# Patient Record
Sex: FEMALE | Race: WHITE | Employment: OTHER | URBAN - METROPOLITAN AREA
[De-identification: names, ages, dates, MRNs, and addresses within clinical notes are randomized per-mention and may not be internally consistent; named-entity substitution may affect disease eponyms.]

---

## 2017-02-09 ENCOUNTER — OFFICE VISIT (OUTPATIENT)
Dept: VASCULAR SURGERY | Age: 67
End: 2017-02-09

## 2017-02-09 DIAGNOSIS — I70.202 OCCLUSION OF LEFT FEMORAL ARTERY (HCC): ICD-10-CM

## 2017-02-09 DIAGNOSIS — I73.9 PVD (PERIPHERAL VASCULAR DISEASE) (HCC): ICD-10-CM

## 2017-02-09 DIAGNOSIS — Z95.828 HISTORY OF EXTREMITY BYPASS GRAFT: Primary | ICD-10-CM

## 2017-02-09 DIAGNOSIS — I74.09 CHRONIC AORTO-ILIAC OCCLUSION SYNDROME (HCC): ICD-10-CM

## 2017-02-09 NOTE — PROCEDURES
[facility]   *** FINAL REPORT ***    Name: Zaira Martinez  MRN: [id number]       Outpatient  : 1950  HIS Order #: [id number]  [id number] [facility] Visit #: [id number]  Date: 2017    TYPE OF TEST: Aorto-Iliac Duplex    REASON FOR TEST  Peripheral Arterial Disease    B-Mode:-                 (cm)   1     2     3  Aortic diameter:         AP:                           TV:  Common iliac diameter:   Right:                           Left:    Duplex:-                           PSV  Stenosis                           ----- --------------------  Aorta: (1)         (2)         (3)    Right common iliac:  Right external iliac:    Left common iliac:  Left external iliac:    INTERPRETATION/FINDINGS  Duplex images were obtained using 2-D gray scale, color flow and  spectral doppler analysis. RT Ax - RT Fem BPG study:  1. Patent right axillary artery to right common femoral artery bypass  graft without significant stenosis. 2. Multiphasic signals noted throughout. 3. The LAMONTE on the right suggest moderate arterial insufficiency at  rest.  The right LAMONTE is 0.68. Left LAMONTE waveform suggest moderate  arterial insufficiency at rest.  The left LAMONTE suggest normal perfusion   at rest.    ADDITIONAL COMMENTS  Inflow 118 cm/sec    Prx Anast 121 cm/sec    Prx 63 cm/sec  Ax 60 cm/sec      Below Ax 65 cm/sec             Mid Rib 81 cm/sec  RibLine 84 cm/sec            Below Rib 69 cm/sec       Hip 61 cm/sec              RT  Groin 72 cm/sec                    Dst Anast 120 cm/sec               Outlfow 123  cm/sec    I have personally reviewed the data relevant to the interpretation of  this  study. TECHNOLOGIST: Carolina Elias RVT, SUE  Signed: 2017 09:33 AM    PHYSICIAN: Chantale Barcenas MD  Signed: 2017 01:46 PM

## 2017-02-09 NOTE — PROCEDURES
[facility]   *** FINAL REPORT ***    Name: Dianna Zazueta  MRN: [id number]       Outpatient  : 1950  HIS Order #: [id number]  [address] [facility] Visit #: [id number]  Date: 2017    TYPE OF TEST: Peripheral Arterial Testing    REASON FOR TEST  Peripheral vascular dz NOS    Right Leg  Segmentals: Abnormal                     mmHg  Brachial         161  High thigh  Low thigh  Calf  Posterior tibial 109  Dorsalis pedis   104  Peroneal  Metatarsal  Toe pressure  Doppler:    Abnormal  Ankle/Brachial: 0.68    Left Leg  Segmentals: Normal                     mmHg  Brachial         146  High thigh  Low thigh  Calf  Posterior tibial 137  Dorsalis pedis   153  Peroneal  Metatarsal  Toe pressure  Doppler:    Abnormal  Ankle/Brachial: 0.95    INTERPRETATION/FINDINGS  Physiologic testing was performed using continuous wave doppler and  segmental pressures. ABIs only:  1. Moderate arterial insufficiency on the right at rest.  2. Moderate arterial insufficiency on the left at rest by waveform  analysis. Left LAMONTE suggest normal perfusion. 3. The right ankle/brachial index is 0.68 and the left ankle/brachial  index is 0.95. ADDITIONAL COMMENTS    I have personally reviewed the data relevant to the interpretation of  this  study. TECHNOLOGIST: Mary Elias RVT, BS  Signed: 2017 09:20 AM    PHYSICIAN: Cl Padilla MD  Signed: 2017 01:46 PM

## 2017-02-09 NOTE — PROCEDURES
[facility]   *** FINAL REPORT ***    Name: Jens Vargas  MRN: [id number]       Outpatient  : 1950  HIS Order #: [id number]  [address] [facility] Visit #: [id number]  Date: 2017    TYPE OF TEST: Bypass Graft Duplex    REASON FOR TEST  PVD, f/u Revasc    Graft:-  Summary:   Left common femoral - popliteal (above knee) bypass with  graft  Op. Date:  2013  Surgeon:   Tiana Walls    Results:-            Velocity  Ratio  Stenosis         Waveform            --------  -----  --------         ----------  Inflow:    180.0  Proximal:  183.0      1.0  Normal           Biphasic  Upper:      39.0      0.2  Normal           Biphasic  Mid-graft:  55.0      1.4  Normal           Biphasic  Lower:      56.0      1.0  Normal           Biphasic  Distal:     63.0      1.1  Normal           Biphasic  Outflow:    92.0      1.5  Normal           Biphasic    LAMONTE:    INTERPRETATION/FINDINGS  Duplex images were obtained using 2-D gray scale, color flow and  spectral doppler analysis. Duplex exam of the bypass graft reveals :  1. Patent left common femoral artery to left popliteal artery bypass  graft without significant stenosis. 2. Biphasic signals noted throughout. 3. The LAMONTE on the right suggest moderate arterial insufficiencyat  rest.  Left LAMONTE suggest normal perfusion at rest, however waveform  analysis suggest moderate arterial insufficiency at rest.  The right  ankle/brachial index is 0.68 and the left ankle/brachial index is  0.95. ADDITIONAL COMMENTS    I have personally reviewed the data relevant to the interpretation of  this  study. TECHNOLOGIST: Cale Elias RVT, SUE  Signed: 2017 09:40 AM    PHYSICIAN: Leanne Jamison MD  Signed: 2017 01:46 PM

## 2017-02-21 ENCOUNTER — OFFICE VISIT (OUTPATIENT)
Dept: VASCULAR SURGERY | Age: 67
End: 2017-02-21

## 2017-02-21 VITALS
DIASTOLIC BLOOD PRESSURE: 72 MMHG | RESPIRATION RATE: 20 BRPM | HEART RATE: 80 BPM | SYSTOLIC BLOOD PRESSURE: 140 MMHG | BODY MASS INDEX: 22.49 KG/M2 | HEIGHT: 65 IN | WEIGHT: 135 LBS

## 2017-02-21 DIAGNOSIS — I70.202 OCCLUSION OF LEFT FEMORAL ARTERY (HCC): ICD-10-CM

## 2017-02-21 DIAGNOSIS — I74.09 CHRONIC AORTO-ILIAC OCCLUSION SYNDROME (HCC): Primary | ICD-10-CM

## 2017-02-21 NOTE — MR AVS SNAPSHOT
Visit Information Date & Time Provider Department Dept. Phone Encounter #  
 2/21/2017  9:30 AM Elpidio Napoles, [address] [facility] [phone] [id number] Follow-up Instructions Return in about 6 months (around 8/21/2017). Upcoming Health Maintenance Date Due Hepatitis C Screening 1950 DTaP/Tdap/Td series (1 - Tdap) 6/7/1969 FOBT Q 1 YEAR AGE 50-75 6/7/1998 ZOSTER VACCINE AGE 60> 6/7/2008 GLAUCOMA SCREENING Q2Y 6/7/2013 OSTEOPOROSIS SCREENING (DEXA) 6/7/2013 Pneumococcal 65+ Low/Medium Risk (1 of 2 - PCV13) 6/7/2013 MEDICARE YEARLY EXAM 6/7/2013 BREAST CANCER SCRN MAMMOGRAM 7/8/2016 INFLUENZA AGE 9 TO ADULT 8/1/2016 Allergies as of 2/21/2017  Review Complete On: 2/21/2017 By: Elpidio Napoles MD  
  
 Severity Noted Reaction Type Reactions Codeine  04/01/2013   Intolerance Nausea and Vomiting Other reaction(s): other/intolerance Darvon [Propoxyphene]  04/01/2013   Systemic Itching Demerol [Meperidine]  04/01/2013   Side Effect Other (comments) Halucinations Keflex [Cephalexin]  01/06/2015    Diarrhea Talwin [Pentazocine Lactate]  04/09/2013    Shortness of Breath, Nausea and Vomiting Current Immunizations  Never Reviewed No immunizations on file. Not reviewed this visit You Were Diagnosed With   
  
 Codes Comments Chronic aorto-iliac occlusion syndrome (HCC)    -  Primary ICD-10-CM: P72.25 
ICD-9-CM: 444.09 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HCC)     ICD-10-CM: I74.3 ICD-9-CM: 534.76 Vitals BP Pulse Resp Height(growth percentile) Weight(growth percentile) BMI  
 140/72 (BP 1 Location: Left arm, BP Patient Position: Sitting) 80 20 5' 5\" (1.651 m) 135 lb (61.2 kg) 22.47 kg/m2 OB Status Smoking Status Postmenopausal Current Every Day Smoker Vitals History BMI and BSA Data  Body Mass Index Body Surface Area  
 22.47 kg/m 2 1.68 m 2  
  
  
 Preferred Pharmacy Pharmacy Name Phone  [address] [phone] Your Updated Medication List  
  
   
This list is accurate as of: 2/21/17 10:18 AM.  Always use your most recent med list.  
  
  
  
  
 aspirin 81 mg tablet Take 81 mg by mouth daily. COMBIVENT  mcg/actuation inhaler Generic drug:  ipratropium-albuterol Take 2 Puffs by inhalation nightly as needed. CYMBALTA 60 mg capsule Generic drug:  DULoxetine Take 60 mg by mouth daily. dicyclomine 10 mg capsule Commonly known as:  BENTYL Take 10 mg by mouth two (2) times a day. diphenhydrAMINE 25 mg capsule Commonly known as:  BENADRYL Take 25 mg by mouth every six (6) hours as needed. DONNATAL 16.2-0.1037 -0.0194 mg per tablet Generic drug:  atropine-PHENobarbital-scopolamine-hyoscyamine Take 1 Tab by mouth as needed. DYAZIDE 37.5-25 mg per capsule Generic drug:  triamterene-hydroCHLOROthiazide Take 1 Cap by mouth daily. FLONASE 50 mcg/actuation nasal spray Generic drug:  fluticasone 2 Sprays by Both Nostrils route nightly. loperamide 2 mg tablet Commonly known as:  IMMODIUM Take 2 mg by mouth daily. LUMIGAN 0.03 % ophthalmic drops Generic drug:  bimatoprost  
Administer 1 Drop to both eyes every evening. predniSONE 5 mg tablet Commonly known as:  Francesca Lion Take 30 mg by mouth as needed. pregabalin 75 mg capsule Commonly known as:  Sánchez Karolina Take 1 Cap by mouth two (2) times a day. Max Daily Amount: 150 mg.  
  
 promethazine 12.5 mg tablet Commonly known as:  PHENERGAN Take 12.5 mg by mouth. Takes 6.25 mg in the morning and 12.5 mg at night REMICADE 100 mg injection Generic drug:  inFLIXimab  
5 mg/kg by IntraVENous route once. Every 8 weeks VITAMIN B-12 1,000 mcg/mL injection Generic drug:  cyanocobalamin  
1,000 mcg by IntraMUSCular route every fourteen (14) days. VOLTAREN 1 % Gel Generic drug:  diclofenac APPLY 4 GRAMS TO AFFECTED AREA FOUR TIMES DAILY. APPLY TO LEFT FOOT FOUR TIMES DAILY  
  
 XANAX 1 mg tablet Generic drug:  ALPRAZolam  
Take 0.5 mg by mouth nightly as needed for Anxiety. Follow-up Instructions Return in about 6 months (around 8/21/2017). To-Do List   
 05/21/2017 Imaging:  LAMONTE WBI LTD SINGLE LEVEL AMB   
  
 05/21/2017 Imaging:  DUPLEX UPPER EXT ARTERY/GRAFT LTD RIGHT AMB   
  
 08/21/2017 Imaging:  DUPLEX LOW EXT ARTERY / GRAFTS LTD LEFT AMB Please provide this summary of care documentation to your next provider. Your primary care clinician is listed as Brandon Robles. If you have any questions after today's visit, please call [phone].

## 2017-02-21 NOTE — PROGRESS NOTES
[address] [place] [address]    Chief Complaint   Patient presents with    Leg Pain       History and Physical    52-year-old following up regarding her history of aortoiliac syndrome femoral occlusive syndrome. She has a right ax femorofemoral bypass and a left femoropopliteal bypass. She has had no claudication no ischemic pain to report. She does have this flank wound on the right there is been essentially a chronic wound. She has been seen by infectious disease and plastic surgery she is off of her antibiotics and one chronically drains but is closing up. She had multiple procedures to cover the graft. She has no fever she feels well she is happy with her progress and keeps a Band-Aid over it.     Past Medical History   Diagnosis Date    Arthritis     Claudication [facility])      left leg    Crohn's disease ([address]     GERD (gastroesophageal reflux disease)     HTN (hypertension)     Nausea & vomiting     Other and unspecified symptoms and signs involving general sensations and perceptions      PVD    Other ill-defined conditions(799.89)      Crohn's    PVD (peripheral vascular disease) ([address]      right leg    Vitamin B12 deficiency      Patient Active Problem List   Diagnosis Code    HTN (hypertension) I10    PVD (peripheral vascular disease) ([address] I73.9    Crohn's disease ([address] K50.90    Mass of breast, left N63    Wound disruption, post-op, skin T81.31XA    Chronic aorto-iliac occlusion syndrome (HCC) I74.09    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HCC) I74.3     Past Surgical History   Procedure Laterality Date    Hx cholecystectomy      Hx appendectomy      Hx bunionectomy       left eye    Hx orthopaedic       lateral epicondilitis on right    Hx cataract removal       bilateral    Hx breast lumpectomy       breast left    Hx other surgical  3/7/2013     catheter into aorta    Hx other surgical       intestional resection x4    Hx other surgical  9/2013     I & D Right chest/flank wall abscess vs granuloma    Pr bypass graft othr,axill-fem Right 4/19/2013    Pr bypass graft othr,fem-pop Left 5/2013     Current Outpatient Prescriptions   Medication Sig Dispense Refill    DULoxetine (CYMBALTA) 60 mg capsule Take 60 mg by mouth daily.  diphenhydrAMINE (BENADRYL) 25 mg capsule Take 25 mg by mouth every six (6) hours as needed.  pregabalin (LYRICA) 75 mg capsule Take 1 Cap by mouth two (2) times a day. Max Daily Amount: 150 mg. (Patient taking differently: Take 150 mg by mouth two (2) times a day.) 180 Cap 3    VOLTAREN 1 % topical gel APPLY 4 GRAMS TO AFFECTED AREA FOUR TIMES DAILY. APPLY TO LEFT FOOT FOUR TIMES DAILY 5 Tube 1    ALPRAZolam (XANAX) 1 mg tablet Take 0.5 mg by mouth nightly as needed for Anxiety.  aspirin 81 mg tablet Take 81 mg by mouth daily.  cyanocobalamin (VITAMIN B-12) 1,000 mcg/mL injection 1,000 mcg by IntraMUSCular route every fourteen (14) days.  promethazine (PHENERGAN) 12.5 mg tablet Take 12.5 mg by mouth. Takes 6.25 mg in the morning and 12.5 mg at night      loperamide (IMMODIUM) 2 mg tablet Take 2 mg by mouth daily.  dicyclomine (BENTYL) 10 mg capsule Take 10 mg by mouth two (2) times a day.  ipratropium-albuterol (COMBIVENT)  mcg/actuation inhaler Take 2 Puffs by inhalation nightly as needed.  fluticasone (FLONASE) 50 mcg/actuation nasal spray 2 Sprays by Both Nostrils route nightly.  triamterene-hydrochlorothiazide (DYAZIDE) 37.5-25 mg per capsule Take 1 Cap by mouth daily.  bimatoprost (LUMIGAN) 0.03 % ophthalmic drops Administer 1 Drop to both eyes every evening.  atropine-PHENobarbital-scopolamine-hyoscyamine (DONNATAL) 16.2-0.1037 -0.0194 mg per tablet Take 1 Tab by mouth as needed.  predniSONE (DELTASONE) 5 mg tablet Take 30 mg by mouth as needed.  inFLIXimab (REMICADE) 100 mg injection 5 mg/kg by IntraVENous route once.  Every 8 weeks       Allergies   Allergen Reactions    Codeine Nausea and Vomiting     Other reaction(s): other/intolerance    Darvon [Propoxyphene] Itching    Demerol [Meperidine] Other (comments)     Halucinations    Keflex [Cephalexin] Diarrhea    Talwin [Pentazocine Lactate] Shortness of Breath and Nausea and Vomiting       Review of Systems    A full review of systems was completed times ten organ systems and was deemed negative unless otherwise mentioned in the HPI. Physical   Visit Vitals    /72 (BP 1 Location: Left arm, BP Patient Position: Sitting)    Pulse 80    Resp 20    Ht 5' 5\" (1.651 m)    Wt 135 lb (61.2 kg)    BMI 22.47 kg/m2       Healthy-appearing 66 good spirits  No facial symmetry pupils are reactive  Neck no JVD  Speech hearing and vision intact  Chest clear  Cardiac regular  Abdomen soft nontender  Small 1 cm opening on her right flank does not appear acutely infected  Extremities without signs of acute arterial insufficiency  No edema notable  No other skin ulceration notable  Axillofemoral bypass wide open with no signs of stenosis  Femoropopliteal bypass wide open with no signs of stenosis    Impression/Plan:     ICD-10-CM ICD-9-CM    1. Chronic aorto-iliac occlusion syndrome (HCC) I74.09 444.09 LAMONTE WBI LTD SINGLE LEVEL AMB      DUPLEX LOW EXT ARTERY / GRAFTS LTD LEFT AMB      DUPLEX UPPER EXT ARTERY/GRAFT LTD RIGHT AMB   2. 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HCC) I74.3 444.22 LAMONTE WBI LTD SINGLE LEVEL AMB      DUPLEX LOW EXT ARTERY / GRAFTS LTD LEFT AMB      DUPLEX UPPER EXT ARTERY/GRAFT LTD RIGHT AMB    continue current care will follow-up in 6 months  Should she develop any abscess or fevers would have to have a diversion procedure   Orders Placed This Encounter    LAMONTE  AMB    DUPLEX LOW EXT ARTERY / GRAFTS LTD LEFT AMB    DUPLEX UPPER EXT ARTERY/GRAFT LTD RIGHT AMB       Follow-up Disposition:  Return in about 6 months (around 8/21/2017).     Hannah Elder MD    PLEASE NOTE:  This document has been produced using voice recognition software. Unrecognized errors in transcription may be present.

## 2017-05-10 ENCOUNTER — HOSPITAL ENCOUNTER (OUTPATIENT)
Dept: WOUND CARE | Age: 67
Discharge: HOME | End: 2017-05-10
Payer: MEDICARE

## 2017-05-10 VITALS
HEART RATE: 74 BPM | TEMPERATURE: 97 F | OXYGEN SATURATION: 95 % | RESPIRATION RATE: 16 BRPM | SYSTOLIC BLOOD PRESSURE: 146 MMHG | DIASTOLIC BLOOD PRESSURE: 79 MMHG

## 2017-05-10 PROBLEM — Q06.8 DERMAL SINUS TRACT OF LUMBOSACRAL REGION (HCC): Status: ACTIVE | Noted: 2017-05-10

## 2017-05-10 PROBLEM — L02.91 ABSCESS: Status: ACTIVE | Noted: 2017-05-10

## 2017-05-10 PROBLEM — T82.7XXA ARTERIOVENOUS GRAFT INFECTION (HCC): Status: ACTIVE | Noted: 2017-05-10

## 2017-05-10 PROCEDURE — A6209 FOAM DRSG <=16 SQ IN W/O BDR: HCPCS | Performed by: INTERNAL MEDICINE

## 2017-05-10 PROCEDURE — 77030020057 HC DRSG MTRX CLLGN STGN -B: Performed by: INTERNAL MEDICINE

## 2017-05-10 PROCEDURE — 10060 I&D ABSCESS SIMPLE/SINGLE: CPT

## 2017-05-10 PROCEDURE — 87070 CULTURE OTHR SPECIMN AEROBIC: CPT | Performed by: INTERNAL MEDICINE

## 2017-05-10 NOTE — PROGRESS NOTES
Interim Visit on 5-10-17  ASSESSMENT;  Purulent discharge again from the center of the area in question - likely fistulous tract to underlying vascular graft which is easily felt. No juan diego erythema . No systemic symptoms    RECOMMENDATION:  Abscess drainage and deep Cx  [facility]  Review Cx on Friday  May need antibiotic suppression to save the graft   Abscess Drainage Wound Care      Pre-Operative Diagnosis: Sinus / abscess  Post-Operative Diagnosis: Same  Procedure:  Drainage  Indications: Drainage & Cx    Site: RT flank    After the benefits/risks/SE were discussed, the patient agreed to proceed. Time out was done:   * Patient was identified by name and date of birth   * Agreement on procedure being performed was verified   * Procedure site verified and marked as necessary   * Patient was positioned for comfort   * Consent was signed and verified. Instruments used:    []  Punch Biopsy  Blade  [] #15  [] #10         [x] Forceps  [] Tissue nippers  [x] sterile scissors  [] Dermal curette     Anesthesia used:    []  EMLA 2.5% cream: applied to wound beds for approximately 15minutes. []   Lidocaine 2% Topical Gel      []  Lidocaine injectable 1% with epinephrine 1:100,000; Amount used: mL    []  Lidocaine injectable 1% without epinephrine; Amount used: mL    []  Other:     []  None         [] patient is insensate due to neuropathy         [] patient declines        [] allergy to anesthetic        [] tissue for debridement is either superficial, loosely adherent and/or necrotic and denervated        []  Other:      Description of Procedure: Abscess drained and explored  Intra-Operative Findings: purulent fluid  Material Removed: pus  Specimens Obtained:  [unfilled]  Estimated Blood Loss:  None      Assessment : Follow up Visit Week: 13   Contact dermatitis around the area - resolved  Wound remains closed - skin tag in the center  No discharge     Cx from 6-8-16. No orgs seen on GS.  Cx grew CNS and Serratia  No antibiotics used   6 monthly patency test of the graft was excellent    PLAN / RECCOMENDATIONS:    Triamcinolone ointment as needed  ABD dressings with cross taping - need to keep open to air  Counseled about ointment application , hand hygiene etc   RTC prn    FIRST VISIT on 6-8-16    ASSESSMENT:    1.RT flank surgical wound with Partial nonhealing and nonclosure. Multiple antibiotic courses. Last- Augmentin three weeks ago. Rt upper body and lt leg vascular graft surgery in 2013. Persistent lower end opening. Debridement and resuturing April , 2016. No complete closure. The Vascular graft felt under the open area. 2. Immunosuppression - On Remicade X 15 yrs  3. Chron,s disease - well controlled  4. HTN - diuretic controlled       WOUND POA CONDITIONS      Wound Abdomen Right (Active)    Number of days:1007         Wound Axilla Right (Active)    Number of days:898         Wound Chest Right;Upper; Lateral (Active)    Number of days:818         Wound Abdomen Right;Lateral (Active)    DRESSING STATUS  Removed  6/8/2016  9:35 AM    DRESSING TYPE  Adhesive wound dressing (Band-Aid)  6/8/2016  9:35 AM    Wound Dimensions (length x width x depth)  1.5x0.4x1.1  6/8/2016  9:35 AM    Condition of Base  [place]  6/8/2016  9:35 AM    Condition of Edges  Open  6/8/2016  9:35 AM    Tissue Type  Pink;Yellow  6/8/2016  9:35 AM    Tissue Type Percent Pink  70  6/8/2016  9:35 AM    Tissue Type Percent Yellow  30  6/8/2016  9:35 AM    Drainage Amount   Small   6/8/2016  9:35 AM    Drainage Color  Yellow  6/8/2016  9:35 AM    Wound Odor  None  6/8/2016  9:35 AM    Periwound Skin Condition  Other (comment)  6/8/2016  9:35 AM    Cleansing and Cleansing Agents   Dermal wound cleanser;Normal saline  6/8/2016  9:35 AM    Dressing Type Applied  Other (Comment)  6/8/2016  9:35 AM    Procedure Tolerated  Carlos Enrique Flor 6/8/2016  9:35 AM    Number of days:0                PLAN /RECOMMENDATIONS:  Deep wound Cx  Alie based dressings  Counseled about hand hygiene and unnecessary antibiotic use      FIRST VISIT DATA: on  6-8-16  Reason for consult:   Thomasina Sacks is 74 y.o. female on whom Wound Care Service is being consulted for 6-8-16    History of Present Illness: On First Visit  1. RT flank surgical wound with Partial nonhealing and nonclosure. Multiple antibiotic courses. Last- Augmentin three weeks ago. Rt upper body and lt leg vascular graft surgery in 2013. Persistent lower end opening. Debridement and resuturing April , 2016. No complete closure. The Vascular graft felt under the open area. 2. Immunosuppression - On Remicade X 15 yrs  3. Chron,s disease - well controlled  4. HTN - diuretic controlled  No systemic symptoms. States she has not had a problem           Patient Active Problem List    Diagnosis  Code      HTN (hypertension)  I10      PVD (peripheral vascular disease) ([facility])  I73.9      Crohn's disease ([facility])  K50.90      Mass of breast, left  N63      Wound disruption, post-op, skin  T81. 31XA      Chronic aorto-iliac occlusion syndrome ([facility])  I74.09      Allergies    Allergen  Reactions      Codeine  Nausea and Vomiting        Other reaction(s): other/intolerance      Darvon [Propoxyphene]  Itching      Demerol [Meperidine]  Other (comments)        Halucinations      Keflex [Cephalexin]  Diarrhea      Talwin [Pentazocine Lactate]  Shortness of Breath and Nausea and Vomiting        Past Medical History    Diagnosis  Date      HTN (hypertension)        Crohn's disease ([facility])        PVD (peripheral vascular disease) ([facility])          right leg      Claudication ([facility])          left leg      Vitamin B12 deficiency        Nausea & vomiting        Other ill-defined conditions(799.89)          Crohn's      Other and unspecified symptoms and signs involving general sensations and perceptions          PVD      Arthritis        GERD (gastroesophageal reflux disease)        Past Surgical History  Procedure  Laterality  Date      Hx cholecystectomy          Hx appendectomy          Hx bunionectomy            left eye      Hx orthopaedic            lateral epicondilitis on right      Hx cataract removal            bilateral      Hx breast lumpectomy            breast left      Hx other surgical    3/7/2013        catheter into aorta      Hx other surgical            intestional resection x4      Hx other surgical    9/2013        I & D Right chest/flank wall abscess vs granuloma      Pr bypass graft othr,axill-fem  Right  4/19/2013      Pr bypass graft othr,fem-pop  Left  5/2013      History    Substance Use Topics      Smoking status:  Current Every Day Smoker -- 0.50 packs/day for 40 years      Smokeless tobacco:  Never Used      Alcohol Use:  No      Family History    Problem  Relation  Age of Onset      Coronary Artery Disease  Mother        Heart Attack  Mother        Heart Surgery  Mother          CABGx3      Elevated Lipids  Mother        COPD  Father        Heart Attack  Brother        COPD  Brother        Other  Brother          PAD      Prior to Admission medications     Medication  Sig  Start Date  End Date  Taking?  Authorizing Provider    diphenhydrAMINE (BENADRYL) 25 mg capsule  Take 25 mg by mouth every six (6) hours as needed.        Historical Provider    pregabalin (LYRICA) 75 mg capsule  Take 1 Cap by mouth two (2) times a day. Max Daily Amount: 150 mg.  5/19/16      TIMMY French    clopidogrel (PLAVIX) 75 mg tablet  Take 1 Tab by mouth daily.  5/9/16      Elana Levy MD    VOLTAREN 1 % topical gel  APPLY 4 GRAMS TO AFFECTED AREA FOUR TIMES DAILY.  APPLY TO LEFT FOOT FOUR TIMES DAILY  8/5/15      Mildred Olivares MD    ALPRAZolam Nagi Mccarthy) 1 mg tablet  Take 0.5 mg by mouth nightly as needed for Anxiety.        Historical Provider    aspirin 81 mg tablet  Take 81 mg by mouth daily.        Historical Provider    cyanocobalamin (VITAMIN B-12) 1,000 mcg/mL injection  1,000 mcg by IntraMUSCular route every fourteen (14) days.        Historical Provider    promethazine (PHENERGAN) 12.5 mg tablet  Take 12.5 mg by mouth. Takes 6.25 mg in the morning and 12.5 mg at night        Historical Provider    loperamide (IMMODIUM) 2 mg tablet  Take 2 mg by mouth daily.        Historical Provider    dicyclomine (BENTYL) 10 mg capsule  Take 10 mg by mouth two (2) times a day.        Historical Provider    ipratropium-albuterol (COMBIVENT)  mcg/actuation inhaler  Take 2 Puffs by inhalation nightly as needed.        Historical Provider    fluticasone (FLONASE) 50 mcg/actuation nasal spray  2 Sprays by Both Nostrils route nightly.        Historical Provider    triamterene-hydrochlorothiazide (DYAZIDE) 37.5-25 mg per capsule  Take 1 Cap by mouth daily.        Historical Provider    bimatoprost (LUMIGAN) 0.03 % ophthalmic drops  Administer 1 Drop to both eyes every evening.        Historical Provider    atropine-PHENobarbital-scopolamine-hyoscyamine (DONNATAL) 16.2-0.1037 -0.0194 mg per tablet  Take 1 Tab by mouth as needed.        Historical Provider    predniSONE (DELTASONE) 5 mg tablet  Take 30 mg by mouth as needed.        Historical Provider    inFLIXimab (REMICADE) 100 mg injection  5 mg/kg by IntraVENous route once. Every 8 weeks        Historical Provider      There are no discontinued medications. Current Outpatient Prescriptions    Medication  Sig      diphenhydrAMINE (BENADRYL) 25 mg capsule  Take 25 mg by mouth every six (6) hours as needed.      pregabalin (LYRICA) 75 mg capsule  Take 1 Cap by mouth two (2) times a day. Max Daily Amount: 150 mg.      clopidogrel (PLAVIX) 75 mg tablet  Take 1 Tab by mouth daily.      VOLTAREN 1 % topical gel  APPLY 4 GRAMS TO AFFECTED AREA FOUR TIMES DAILY.  APPLY TO LEFT FOOT FOUR TIMES DAILY      ALPRAZolam (XANAX) 1 mg tablet  Take 0.5 mg by mouth nightly as needed for Anxiety.      aspirin 81 mg tablet  Take 81 mg by mouth daily.      cyanocobalamin (VITAMIN B-12) 1,000 mcg/mL injection  1,000 mcg by IntraMUSCular route every fourteen (14) days.      promethazine (PHENERGAN) 12.5 mg tablet  Take 12.5 mg by mouth. Takes 6.25 mg in the morning and 12.5 mg at night      loperamide (IMMODIUM) 2 mg tablet  Take 2 mg by mouth daily.      dicyclomine (BENTYL) 10 mg capsule  Take 10 mg by mouth two (2) times a day.      ipratropium-albuterol (COMBIVENT)  mcg/actuation inhaler  Take 2 Puffs by inhalation nightly as needed.      fluticasone (FLONASE) 50 mcg/actuation nasal spray  2 Sprays by Both Nostrils route nightly.      triamterene-hydrochlorothiazide (DYAZIDE) 37.5-25 mg per capsule  Take 1 Cap by mouth daily.      bimatoprost (LUMIGAN) 0.03 % ophthalmic drops  Administer 1 Drop to both eyes every evening.      atropine-PHENobarbital-scopolamine-hyoscyamine (DONNATAL) 16.2-0.1037 -0.0194 mg per tablet  Take 1 Tab by mouth as needed.      predniSONE (DELTASONE) 5 mg tablet  Take 30 mg by mouth as needed.      inFLIXimab (REMICADE) 100 mg injection  5 mg/kg by IntraVENous route once.  Every 8 weeks        No current facility-administered medications for this encounter.                MICROBIOLOGY:-        CULTURE RESULT:    Date  Value  Ref Range  Status    06/08/2016  SERRATIA MARCESCENS     Final    06/08/2016  FEW  STAPHYLOCOCCUS LUGDUNENSIS       Final    06/08/2016  FEW  STAPHYLOCOCCUS SPECIES, COAGULASE NEGATIVE       Final        GRAM STAIN    Date  Value  Ref Range  Status    06/08/2016  FEW  WBC'S       Final    06/08/2016  NO ORGANISMS SEEN     Final    04/19/2016  FEW  WBC'S       Final    04/19/2016  NO ORGANISMS SEEN     Final          Antibiotic History:                Current:                S/P:    Radiology:  Kassandra Quezada        Review of Systems:        Constitutional:  negative for chills, diaphoresis, fever, malaise/fatigue, weakness and weight loss    Skin:  negative for itching and rash    HENT:  negative for ear discharge, ear pain, hearing loss and sore throat    Eyes:  negative for blurred vision, double vision and photophobia    Cardiovascular:  negative for chest pain, claudication, leg swelling, orthopnea, paroxysmal nocturnal dyspnea    Respiratory:  negative for cough, hemoptysis, shortness of breath or sputum production    Gastointestinal:  negative for abdominal pain, blood in stool, constipation, diarrhea, melena, nausea and vomiting    Genitourinary:  No dysuria, increased frequency, hematuria    Musculoskeletal:  negative for back pain, joint pain and myalgias    Endo:  negative for cold intolerance, heat intolerance, polydipsia and polyuria.    Heme:  negative for easy bleeding and easy bruising    Allergies:  negative for hives    Neurological:  negative for headaches, dizziness, focal weakness, level of consciousness, seizures and tingling    Psychiatric:   negative for depression, hallucinations and suicidal ideals          Objective:      Patient Vitals for the past 24 hrs:    Temp  Pulse  Resp  BP  SpO2    06/24/16 0946  97.6 °F (36.4 °C)  82  16  148/83 mmHg  98 %          Constitutional:  well developed, nourished, no distress and alert and oriented x 3    HENT:  atraumatic, nose normal, normocephalic and oropharynx clear and moist    Eyes:  conjunctiva normal, EOM normal and PERRL    Neck:  ROM normal, supple, trachea normal and no adenopathy, thrush.  MMM    Cardiovascular:  heart sounds normal, intact distal pulses, normal rate and regular rhythm    Pulmonary/Chest Wall:  breath sounds normal and effort normal    Abdominal:  appearance normal, bowel sounds normal, soft and No rebound, gaurding or tenderness    Genitourinary/Anorectal:  deferred    Musculoskeletal:  normal ROM    Neurological:  awake, alert and oriented x 3, and    cranial nerves intact  muscular strength 5/5 and symmetric  reflexes normal and symmetric  sensory normal        Skin:  I/3rd of Rt flank surgical wound open with palpable vascular graft underneath                Labwork and Ancillary Studies    CBC w/Diff  Lab Results    Component  Value  Date/Time      WBC  13.4*  12/20/2013 12:39 PM      RBC  5.29  12/20/2013 12:39 PM      HCT  46.6*  12/20/2013 12:39 PM      MCV  88.1  12/20/2013 12:39 PM      MCH  29.7  12/20/2013 12:39 PM      MCHC  33.7  12/20/2013 12:39 PM      RDW  15.7*  12/20/2013 12:39 PM      MONOS  8  05/08/2013 05:20 AM      EOS  3  05/08/2013 05:20 AM      BASOS  1  05/08/2013 05:20 AM        Comprehensive Metabolic Profile  Lab Results    Component  Value  Date/Time      NA  140  04/14/2016 09:10 AM      CO2  31  04/14/2016 09:10 AM      BUN  11  04/14/2016 09:10 AM          Nanci Reyes MD  Pager: [phone]  [facility] Wound Care Staff                       Authored by Vinay Schulte MD on 06/24/16 1022

## 2017-05-10 NOTE — WOUND CARE
05/10/17 0842   Wound Abdomen Right;Lateral   Date First Assessed: 06/08/16   POA: Yes  Wound Type: Closed incision/surgical site  Location: Abdomen  Orientation: Right;Lateral   DRESSING STATUS Removed   DRESSING TYPE Adhesive wound dressing (Band-Aid)   Wound Length (cm) 0.7 cm   Wound Width (cm) 0.5 cm   Wound Depth (cm) 1   Wound Surface area (cm^3) 0.35 cm^2   Condition of Base Pink   Condition of Edges Open   Tissue Type Pink   Tissue Type Percent Pink 100   Drainage Amount  Small    Drainage Color Serosanguinous   Wound Odor None   Periwound Skin Condition Intact   Cleansing and Cleansing Agents  Normal saline   Dressing Type Applied Collagens/cell matrix; Other (Comment)  (Alie, Cutimed Sorbact Siltec)   Procedure Tolerated Well     Patient seen in clinic for wound to right flank. I&D performed bedside by Dr Beltran Benitez and sent for culture. Patient to follow up in wound clinic in 4weeks.

## 2017-05-13 LAB
BACTERIA SPEC CULT: ABNORMAL
GRAM STN SPEC: ABNORMAL
GRAM STN SPEC: ABNORMAL
SERVICE CMNT-IMP: ABNORMAL

## 2017-05-18 ENCOUNTER — HOSPITAL ENCOUNTER (OUTPATIENT)
Age: 67
Discharge: HOME | End: 2017-05-18
Attending: NURSE PRACTITIONER
Payer: MEDICARE

## 2017-05-18 DIAGNOSIS — Z12.39 ENCOUNTER FOR BREAST CANCER SCREENING OTHER THAN MAMMOGRAM: ICD-10-CM

## 2017-05-18 LAB — CREAT UR-MCNC: 0.7 MG/DL (ref 0.6–1.3)

## 2017-05-18 PROCEDURE — A9585 GADOBUTROL INJECTION: HCPCS

## 2017-05-18 PROCEDURE — 74011250636 HC RX REV CODE- 250/636

## 2017-05-18 PROCEDURE — 77059 MRI BREAST BI W WO CONT: CPT

## 2017-05-18 PROCEDURE — 82565 ASSAY OF CREATININE: CPT

## 2017-05-18 RX ADMIN — GADOBUTROL 6 ML: 604.72 INJECTION INTRAVENOUS at 09:00

## 2017-05-24 ENCOUNTER — HOSPITAL ENCOUNTER (OUTPATIENT)
Dept: WOUND CARE | Age: 67
Discharge: HOME | End: 2017-05-24
Payer: MEDICARE

## 2017-05-24 VITALS
RESPIRATION RATE: 16 BRPM | TEMPERATURE: 97.3 F | SYSTOLIC BLOOD PRESSURE: 148 MMHG | DIASTOLIC BLOOD PRESSURE: 75 MMHG | HEART RATE: 90 BPM

## 2017-05-24 PROCEDURE — 77030011256 HC DRSG MEPILEX <16IN NO BORD MOLN -A: Performed by: INTERNAL MEDICINE

## 2017-05-24 PROCEDURE — 97602 WOUND(S) CARE NON-SELECTIVE: CPT

## 2017-05-24 NOTE — PROGRESS NOTES
Follow up visit on 5-24-17    ASSESSMENT:  Much improved  Area covered by a thin layer of tissue  No purulence , jono-erythema , tenderness  CX from 5-10 -- CNS      RECOMMENDATION:  Continue Alie - Cutimed Sorbact siltec  RTC 6 weeks        Interim Visit on 5-10-17  ASSESSMENT;  Purulent discharge again from the center of the area in question - likely fistulous tract to underlying vascular graft which is easily felt. No juan diego erythema . No systemic symptoms    RECOMMENDATION:  Abscess drainage and deep Cx  Cutimed Sorbact Siltec  Review Cx on Friday  May need antibiotic suppression to save the graft   Abscess Drainage Wound Care      Pre-Operative Diagnosis: Sinus / abscess  Post-Operative Diagnosis: Same  Procedure:  Drainage  Indications: Drainage & Cx    Site: RT flank    After the benefits/risks/SE were discussed, the patient agreed to proceed. Time out was done:   * Patient was identified by name and date of birth   * Agreement on procedure being performed was verified   * Procedure site verified and marked as necessary   * Patient was positioned for comfort   * Consent was signed and verified. Instruments used:    []  Punch Biopsy  Blade  [] #15  [] #10         [x] Forceps  [] Tissue nippers  [x] sterile scissors  [] Dermal curette     Anesthesia used:    []  EMLA 2.5% cream: applied to wound beds for approximately 15minutes. []   Lidocaine 2% Topical Gel      []  Lidocaine injectable 1% with epinephrine 1:100,000; Amount used: mL    []  Lidocaine injectable 1% without epinephrine;  Amount used: mL    []  Other:     []  None         [] patient is insensate due to neuropathy         [] patient declines        [] allergy to anesthetic        [] tissue for debridement is either superficial, loosely adherent and/or necrotic and denervated        []  Other:      Description of Procedure: Abscess drained and explored  Intra-Operative Findings: purulent fluid  Material Removed: pus  Specimens Obtained: [unfilled]  Estimated Blood Loss:  None      Assessment : Follow up Visit Week: 13   Contact dermatitis around the area - resolved  Wound remains closed - skin tag in the center  No discharge     Cx from 6-8-16. No orgs seen on GS. Cx grew CNS and Serratia  No antibiotics used   6 monthly patency test of the graft was excellent    PLAN / RECCOMENDATIONS:    Triamcinolone ointment as needed  ABD dressings with cross taping - need to keep open to air  Counseled about ointment application , hand hygiene etc   RTC prn    FIRST VISIT on 6-8-16    ASSESSMENT:    1.RT flank surgical wound with Partial nonhealing and nonclosure. Multiple antibiotic courses. Last- Augmentin three weeks ago. Rt upper body and lt leg vascular graft surgery in 2013. Persistent lower end opening. Debridement and resuturing April , 2016. No complete closure. The Vascular graft felt under the open area. 2. Immunosuppression - On Remicade X 15 yrs  3. Chron,s disease - well controlled  4. HTN - diuretic controlled       WOUND POA CONDITIONS      Wound Abdomen Right (Active)    Number of days:1007         Wound Axilla Right (Active)    Number of days:898         Wound Chest Right;Upper; Lateral (Active)    Number of days:818         Wound Abdomen Right;Lateral (Active)    DRESSING STATUS  Removed  6/8/2016  9:35 AM    DRESSING TYPE  Adhesive wound dressing (Band-Aid)  6/8/2016  9:35 AM    Wound Dimensions (length x width x depth)  1.5x0.4x1.1  6/8/2016  9:35 AM    Condition of Base  [place]  6/8/2016  9:35 AM    Condition of Edges  Open  6/8/2016  9:35 AM    Tissue Type  Pink;Yellow  6/8/2016  9:35 AM    Tissue Type Percent Pink  70  6/8/2016  9:35 AM    Tissue Type Percent Yellow  30  6/8/2016  9:35 AM    Drainage Amount   Small   6/8/2016  9:35 AM    Drainage Color  Yellow  6/8/2016  9:35 AM    Wound Odor  None  6/8/2016  9:35 AM    Periwound Skin Condition  Other (comment)  6/8/2016  9:35 AM    Cleansing and Cleansing Agents   Dermal wound cleanser;Normal saline  6/8/2016  9:35 AM    Dressing Type Applied  Other (Comment)  6/8/2016  9:35 AM    Procedure Tolerated  Nora Seats 6/8/2016  9:35 AM    Number of days:0                PLAN /RECOMMENDATIONS:  Deep wound Cx  Alie based dressings  Counseled about hand hygiene and unnecessary antibiotic use      FIRST VISIT DATA: on  6-8-16  Reason for consult:   Sharon Ortiz is 74 y.o. female on whom Wound Care Service is being consulted for 6-8-16    History of Present Illness: On First Visit  1. RT flank surgical wound with Partial nonhealing and nonclosure. Multiple antibiotic courses. Last- Augmentin three weeks ago. Rt upper body and lt leg vascular graft surgery in 2013. Persistent lower end opening. Debridement and resuturing April , 2016. No complete closure. The Vascular graft felt under the open area. 2. Immunosuppression - On Remicade X 15 yrs  3. Chron,s disease - well controlled  4. HTN - diuretic controlled  No systemic symptoms. States she has not had a problem           Patient Active Problem List    Diagnosis  Code      HTN (hypertension)  I10      PVD (peripheral vascular disease) ([facility])  I73.9      Crohn's disease ([facility])  K50.90      Mass of breast, left  N63      Wound disruption, post-op, skin  T81. 31XA      Chronic aorto-iliac occlusion syndrome ([facility])  I74.09      Allergies    Allergen  Reactions      Codeine  Nausea and Vomiting        Other reaction(s): other/intolerance      Darvon [Propoxyphene]  Itching      Demerol [Meperidine]  Other (comments)        Halucinations      Keflex [Cephalexin]  Diarrhea      Talwin [Pentazocine Lactate]  Shortness of Breath and Nausea and Vomiting        Past Medical History    Diagnosis  Date      HTN (hypertension)        Crohn's disease ([facility])        PVD (peripheral vascular disease) ([facility])          right leg      Claudication ([facility])          left leg      Vitamin B12 deficiency        Nausea & vomiting        Other ill-defined conditions(799.89)          Crohn's      Other and unspecified symptoms and signs involving general sensations and perceptions          PVD      Arthritis        GERD (gastroesophageal reflux disease)        Past Surgical History    Procedure  Laterality  Date      Hx cholecystectomy          Hx appendectomy          Hx bunionectomy            left eye      Hx orthopaedic            lateral epicondilitis on right      Hx cataract removal            bilateral      Hx breast lumpectomy            breast left      Hx other surgical    3/7/2013        catheter into aorta      Hx other surgical            intestional resection x4      Hx other surgical    9/2013        I & D Right chest/flank wall abscess vs granuloma      Pr bypass graft othr,axill-fem  Right  4/19/2013      Pr bypass graft othr,fem-pop  Left  5/2013      History    Substance Use Topics      Smoking status:  Current Every Day Smoker -- 0.50 packs/day for 40 years      Smokeless tobacco:  Never Used      Alcohol Use:  No      Family History    Problem  Relation  Age of Onset      Coronary Artery Disease  Mother        Heart Attack  Mother        Heart Surgery  Mother          CABGx3      Elevated Lipids  Mother        COPD  Father        Heart Attack  Brother        COPD  Brother        Other  Brother          PAD      Prior to Admission medications     Medication  Sig  Start Date  End Date  Taking?  Authorizing Provider    diphenhydrAMINE (BENADRYL) 25 mg capsule  Take 25 mg by mouth every six (6) hours as needed.        Historical Provider    pregabalin (LYRICA) 75 mg capsule  Take 1 Cap by mouth two (2) times a day. Max Daily Amount: 150 mg.  5/19/16      TIMMY French    clopidogrel (PLAVIX) 75 mg tablet  Take 1 Tab by mouth daily.  5/9/16      Elana Levy MD    VOLTAREN 1 % topical gel  APPLY 4 GRAMS TO AFFECTED AREA FOUR TIMES DAILY.  APPLY TO LEFT FOOT FOUR TIMES DAILY  8/5/15      Lupe Pulliam MD  ALPRAZolam (XANAX) 1 mg tablet  Take 0.5 mg by mouth nightly as needed for Anxiety.        Historical Provider    aspirin 81 mg tablet  Take 81 mg by mouth daily.        Historical Provider    cyanocobalamin (VITAMIN B-12) 1,000 mcg/mL injection  1,000 mcg by IntraMUSCular route every fourteen (14) days.        Historical Provider    promethazine (PHENERGAN) 12.5 mg tablet  Take 12.5 mg by mouth. Takes 6.25 mg in the morning and 12.5 mg at night        Historical Provider    loperamide (IMMODIUM) 2 mg tablet  Take 2 mg by mouth daily.        Historical Provider    dicyclomine (BENTYL) 10 mg capsule  Take 10 mg by mouth two (2) times a day.        Historical Provider    ipratropium-albuterol (COMBIVENT)  mcg/actuation inhaler  Take 2 Puffs by inhalation nightly as needed.        Historical Provider    fluticasone (FLONASE) 50 mcg/actuation nasal spray  2 Sprays by Both Nostrils route nightly.        Historical Provider    triamterene-hydrochlorothiazide (DYAZIDE) 37.5-25 mg per capsule  Take 1 Cap by mouth daily.        Historical Provider    bimatoprost (LUMIGAN) 0.03 % ophthalmic drops  Administer 1 Drop to both eyes every evening.        Historical Provider    atropine-PHENobarbital-scopolamine-hyoscyamine (DONNATAL) 16.2-0.1037 -0.0194 mg per tablet  Take 1 Tab by mouth as needed.        Historical Provider    predniSONE (DELTASONE) 5 mg tablet  Take 30 mg by mouth as needed.        Historical Provider    inFLIXimab (REMICADE) 100 mg injection  5 mg/kg by IntraVENous route once. Every 8 weeks        Historical Provider      There are no discontinued medications. Current Outpatient Prescriptions    Medication  Sig      diphenhydrAMINE (BENADRYL) 25 mg capsule  Take 25 mg by mouth every six (6) hours as needed.      pregabalin (LYRICA) 75 mg capsule  Take 1 Cap by mouth two (2) times a day.  Max Daily Amount: 150 mg.      clopidogrel (PLAVIX) 75 mg tablet  Take 1 Tab by mouth daily.      VOLTAREN 1 % topical gel  APPLY 4 GRAMS TO AFFECTED AREA FOUR TIMES DAILY. APPLY TO LEFT FOOT FOUR TIMES DAILY      ALPRAZolam (XANAX) 1 mg tablet  Take 0.5 mg by mouth nightly as needed for Anxiety.      aspirin 81 mg tablet  Take 81 mg by mouth daily.      cyanocobalamin (VITAMIN B-12) 1,000 mcg/mL injection  1,000 mcg by IntraMUSCular route every fourteen (14) days.      promethazine (PHENERGAN) 12.5 mg tablet  Take 12.5 mg by mouth. Takes 6.25 mg in the morning and 12.5 mg at night      loperamide (IMMODIUM) 2 mg tablet  Take 2 mg by mouth daily.      dicyclomine (BENTYL) 10 mg capsule  Take 10 mg by mouth two (2) times a day.      ipratropium-albuterol (COMBIVENT)  mcg/actuation inhaler  Take 2 Puffs by inhalation nightly as needed.      fluticasone (FLONASE) 50 mcg/actuation nasal spray  2 Sprays by Both Nostrils route nightly.      triamterene-hydrochlorothiazide (DYAZIDE) 37.5-25 mg per capsule  Take 1 Cap by mouth daily.      bimatoprost (LUMIGAN) 0.03 % ophthalmic drops  Administer 1 Drop to both eyes every evening.      atropine-PHENobarbital-scopolamine-hyoscyamine (DONNATAL) 16.2-0.1037 -0.0194 mg per tablet  Take 1 Tab by mouth as needed.      predniSONE (DELTASONE) 5 mg tablet  Take 30 mg by mouth as needed.      inFLIXimab (REMICADE) 100 mg injection  5 mg/kg by IntraVENous route once.  Every 8 weeks        No current facility-administered medications for this encounter.                MICROBIOLOGY:-        CULTURE RESULT:    Date  Value  Ref Range  Status    06/08/2016  SERRATIA MARCESCENS     Final    06/08/2016  FEW  STAPHYLOCOCCUS LUGDUNENSIS       Final    06/08/2016  FEW  STAPHYLOCOCCUS SPECIES, COAGULASE NEGATIVE       Final        GRAM STAIN    Date  Value  Ref Range  Status    06/08/2016  FEW  WBC'S       Final    06/08/2016  NO ORGANISMS SEEN     Final    04/19/2016  FEW  WBC'S       Final    04/19/2016  NO ORGANISMS SEEN     Final          Antibiotic History:                Current:                S/P:    Radiology:  @[facility]@        Review of Systems:        Constitutional:  negative for chills, diaphoresis, fever, malaise/fatigue, weakness and weight loss    Skin:  negative for itching and rash    HENT:  negative for ear discharge, ear pain, hearing loss and sore throat    Eyes:  negative for blurred vision, double vision and photophobia    Cardiovascular:  negative for chest pain, claudication, leg swelling, orthopnea, paroxysmal nocturnal dyspnea    Respiratory:  negative for cough, hemoptysis, shortness of breath or sputum production    Gastointestinal:  negative for abdominal pain, blood in stool, constipation, diarrhea, melena, nausea and vomiting    Genitourinary:  No dysuria, increased frequency, hematuria    Musculoskeletal:  negative for back pain, joint pain and myalgias    Endo:  negative for cold intolerance, heat intolerance, polydipsia and polyuria.    Heme:  negative for easy bleeding and easy bruising    Allergies:  negative for hives    Neurological:  negative for headaches, dizziness, focal weakness, level of consciousness, seizures and tingling    Psychiatric:   negative for depression, hallucinations and suicidal ideals          Objective:      Patient Vitals for the past 24 hrs:    Temp  Pulse  Resp  BP  SpO2    06/24/16 0946  97.6 °F (36.4 °C)  82  16  148/83 mmHg  98 %          Constitutional:  well developed, nourished, no distress and alert and oriented x 3    HENT:  atraumatic, nose normal, normocephalic and oropharynx clear and moist    Eyes:  conjunctiva normal, EOM normal and PERRL    Neck:  ROM normal, supple, trachea normal and no adenopathy, thrush.  MMM    Cardiovascular:  heart sounds normal, intact distal pulses, normal rate and regular rhythm    Pulmonary/Chest Wall:  breath sounds normal and effort normal    Abdominal:  appearance normal, bowel sounds normal, soft and No rebound, gaurding or tenderness    Genitourinary/Anorectal:  deferred    Musculoskeletal:  normal ROM    Neurological:  awake, alert and oriented x 3, and    cranial nerves intact  muscular strength 5/5 and symmetric  reflexes normal and symmetric  sensory normal        Skin:  I/3rd of Rt flank surgical wound open with palpable vascular graft underneath                Labwork and Ancillary Studies    CBC w/Diff  Lab Results    Component  Value  Date/Time      WBC  13.4*  12/20/2013 12:39 PM      RBC  5.29  12/20/2013 12:39 PM      HCT  46.6*  12/20/2013 12:39 PM      MCV  88.1  12/20/2013 12:39 PM      MCH  29.7  12/20/2013 12:39 PM      MCHC  33.7  12/20/2013 12:39 PM      RDW  15.7*  12/20/2013 12:39 PM      MONOS  8  05/08/2013 05:20 AM      EOS  3  05/08/2013 05:20 AM      BASOS  1  05/08/2013 05:20 AM        Comprehensive Metabolic Profile  Lab Results    Component  Value  Date/Time      NA  140  04/14/2016 09:10 AM      CO2  31  04/14/2016 09:10 AM      BUN  11  04/14/2016 09:10 AM          Ravi Shore MD  Pager: [phone]  [facility] Wound Care Staff                       Authored by Mayra Arredondo MD on 06/24/16 [id number]

## 2017-05-24 NOTE — WOUND CARE
05/24/17 0843   Wound Abdomen Right;Lateral   Date First Assessed: 06/08/16   POA: Yes  Wound Type: Closed incision/surgical site  Location: Abdomen  Orientation: Right;Lateral   DRESSING STATUS Removed   DRESSING TYPE Other (Comment)  (Cutimed Sorbact Siltec)   Wound Length (cm) 0.4 cm   Wound Width (cm) 0.3 cm   Wound Depth (cm) 1.2   Wound Surface area (cm^3) 0.14 cm^2   Condition of Base Pink   Condition of Edges Open   Tissue Type Pink   Tissue Type Percent Pink 100   Drainage Amount  Small    Drainage Color Serosanguinous   Wound Odor None   Periwound Skin Condition Intact   Cleansing and Cleansing Agents  Dermal wound cleanser   Dressing Type Applied Other (Comment)  (Alie/Cutimed Sorbact/Mepilex Border)   Procedure Tolerated Well      Patient was made a follow-up appointment on 7/5/2017 at 0900. [facility]. Notified patient to contact the clinic with any questions/concerns.

## 2017-05-30 RX ORDER — CLOPIDOGREL BISULFATE 75 MG/1
TABLET ORAL
Qty: 30 TAB | Refills: 11 | Status: SHIPPED | OUTPATIENT
Start: 2017-05-30 | End: 2018-06-18 | Stop reason: SDUPTHER

## 2017-06-27 ENCOUNTER — APPOINTMENT (OUTPATIENT)
Dept: CT IMAGING | Age: 67
DRG: 871 | End: 2017-06-27
Attending: EMERGENCY MEDICINE
Payer: MEDICARE

## 2017-06-27 ENCOUNTER — HOSPITAL ENCOUNTER (INPATIENT)
Age: 67
LOS: 4 days | Discharge: HOME HEALTH | DRG: 871 | End: 2017-07-01
Attending: EMERGENCY MEDICINE | Admitting: INTERNAL MEDICINE
Payer: MEDICARE

## 2017-06-27 ENCOUNTER — APPOINTMENT (OUTPATIENT)
Dept: GENERAL RADIOLOGY | Age: 67
DRG: 871 | End: 2017-06-27
Attending: EMERGENCY MEDICINE
Payer: MEDICARE

## 2017-06-27 DIAGNOSIS — J44.1 COPD WITH ACUTE EXACERBATION (HCC): ICD-10-CM

## 2017-06-27 DIAGNOSIS — A41.9 SEVERE SEPSIS (HCC): ICD-10-CM

## 2017-06-27 DIAGNOSIS — J18.9 CAP (COMMUNITY ACQUIRED PNEUMONIA): Primary | ICD-10-CM

## 2017-06-27 DIAGNOSIS — R65.20 SEVERE SEPSIS (HCC): ICD-10-CM

## 2017-06-27 LAB
ALBUMIN SERPL BCP-MCNC: 3.2 G/DL (ref 3.4–5)
ALBUMIN/GLOB SERPL: 0.6 {RATIO} (ref 0.8–1.7)
ALP SERPL-CCNC: 133 U/L (ref 45–117)
ALT SERPL-CCNC: 27 U/L (ref 13–56)
ANION GAP BLD CALC-SCNC: 10 MMOL/L (ref 3–18)
AST SERPL W P-5'-P-CCNC: 14 U/L (ref 15–37)
BASOPHILS # BLD AUTO: 0 K/UL (ref 0–0.06)
BASOPHILS # BLD: 0 % (ref 0–3)
BILIRUB SERPL-MCNC: 0.6 MG/DL (ref 0.2–1)
BNP SERPL-MCNC: 467 PG/ML (ref 0–900)
BUN SERPL-MCNC: 12 MG/DL (ref 7–18)
BUN/CREAT SERPL: 14 (ref 12–20)
CALCIUM SERPL-MCNC: 10.6 MG/DL (ref 8.5–10.1)
CHLORIDE SERPL-SCNC: 92 MMOL/L (ref 100–108)
CO2 SERPL-SCNC: 33 MMOL/L (ref 21–32)
CREAT SERPL-MCNC: 0.86 MG/DL (ref 0.6–1.3)
D DIMER PPP FEU-MCNC: 2.53 UG/ML(FEU)
DIFFERENTIAL METHOD BLD: ABNORMAL
EOSINOPHIL # BLD: 0 K/UL (ref 0–0.4)
EOSINOPHIL NFR BLD: 0 % (ref 0–5)
ERYTHROCYTE [DISTWIDTH] IN BLOOD BY AUTOMATED COUNT: 13.8 % (ref 11.6–14.5)
GLOBULIN SER CALC-MCNC: 5 G/DL (ref 2–4)
GLUCOSE SERPL-MCNC: 131 MG/DL (ref 74–99)
HCT VFR BLD AUTO: 39.6 % (ref 35–45)
HGB BLD-MCNC: 13.9 G/DL (ref 12–16)
LACTATE BLD-SCNC: 2.5 MMOL/L (ref 0.4–2)
LACTATE BLD-SCNC: 3.7 MMOL/L (ref 0.4–2)
LIPASE SERPL-CCNC: 65 U/L (ref 73–393)
LYMPHOCYTES # BLD AUTO: 19 % (ref 20–51)
LYMPHOCYTES # BLD: 3.6 K/UL (ref 0.8–3.5)
MAGNESIUM SERPL-MCNC: 1.7 MG/DL (ref 1.6–2.6)
MCH RBC QN AUTO: 30.8 PG (ref 24–34)
MCHC RBC AUTO-ENTMCNC: 35.1 G/DL (ref 31–37)
MCV RBC AUTO: 87.6 FL (ref 74–97)
MONOCYTES # BLD: 2.1 K/UL (ref 0–1)
MONOCYTES NFR BLD AUTO: 11 % (ref 2–9)
NEUTS SEG # BLD: 13.2 K/UL (ref 1.8–8)
NEUTS SEG NFR BLD AUTO: 70 % (ref 42–75)
PLATELET # BLD AUTO: 457 K/UL (ref 135–420)
PLATELET COMMENTS,PCOM: ABNORMAL
PMV BLD AUTO: 10.4 FL (ref 9.2–11.8)
POTASSIUM SERPL-SCNC: 3 MMOL/L (ref 3.5–5.5)
PROT SERPL-MCNC: 8.2 G/DL (ref 6.4–8.2)
RBC # BLD AUTO: 4.52 M/UL (ref 4.2–5.3)
RBC MORPH BLD: ABNORMAL
SODIUM SERPL-SCNC: 135 MMOL/L (ref 136–145)
WBC # BLD AUTO: 18.9 K/UL (ref 4.6–13.2)

## 2017-06-27 PROCEDURE — 80053 COMPREHEN METABOLIC PANEL: CPT | Performed by: EMERGENCY MEDICINE

## 2017-06-27 PROCEDURE — 71010 XR CHEST PORT: CPT

## 2017-06-27 PROCEDURE — 74011250637 HC RX REV CODE- 250/637: Performed by: EMERGENCY MEDICINE

## 2017-06-27 PROCEDURE — 83605 ASSAY OF LACTIC ACID: CPT

## 2017-06-27 PROCEDURE — 74011250636 HC RX REV CODE- 250/636: Performed by: EMERGENCY MEDICINE

## 2017-06-27 PROCEDURE — 74011636320 HC RX REV CODE- 636/320: Performed by: EMERGENCY MEDICINE

## 2017-06-27 PROCEDURE — 87040 BLOOD CULTURE FOR BACTERIA: CPT | Performed by: EMERGENCY MEDICINE

## 2017-06-27 PROCEDURE — 83690 ASSAY OF LIPASE: CPT | Performed by: EMERGENCY MEDICINE

## 2017-06-27 PROCEDURE — 74011000250 HC RX REV CODE- 250: Performed by: EMERGENCY MEDICINE

## 2017-06-27 PROCEDURE — 99285 EMERGENCY DEPT VISIT HI MDM: CPT

## 2017-06-27 PROCEDURE — 77030029684 HC NEB SM VOL KT MONA -A

## 2017-06-27 PROCEDURE — 96365 THER/PROPH/DIAG IV INF INIT: CPT

## 2017-06-27 PROCEDURE — 94640 AIRWAY INHALATION TREATMENT: CPT

## 2017-06-27 PROCEDURE — 93005 ELECTROCARDIOGRAM TRACING: CPT

## 2017-06-27 PROCEDURE — 65660000000 HC RM CCU STEPDOWN

## 2017-06-27 PROCEDURE — 83880 ASSAY OF NATRIURETIC PEPTIDE: CPT | Performed by: EMERGENCY MEDICINE

## 2017-06-27 PROCEDURE — 85379 FIBRIN DEGRADATION QUANT: CPT | Performed by: EMERGENCY MEDICINE

## 2017-06-27 PROCEDURE — 71275 CT ANGIOGRAPHY CHEST: CPT

## 2017-06-27 PROCEDURE — 83735 ASSAY OF MAGNESIUM: CPT | Performed by: EMERGENCY MEDICINE

## 2017-06-27 PROCEDURE — 85025 COMPLETE CBC W/AUTO DIFF WBC: CPT | Performed by: EMERGENCY MEDICINE

## 2017-06-27 RX ORDER — DICYCLOMINE HYDROCHLORIDE 10 MG/1
10 CAPSULE ORAL 2 TIMES DAILY
Status: DISCONTINUED | OUTPATIENT
Start: 2017-06-27 | End: 2017-07-01 | Stop reason: HOSPADM

## 2017-06-27 RX ORDER — ASPIRIN 81 MG/1
81 TABLET ORAL DAILY
Status: DISCONTINUED | OUTPATIENT
Start: 2017-06-28 | End: 2017-07-01 | Stop reason: HOSPADM

## 2017-06-27 RX ORDER — TRIAMTERENE/HYDROCHLOROTHIAZID 37.5-25 MG
1 TABLET ORAL DAILY
Status: DISCONTINUED | OUTPATIENT
Start: 2017-06-29 | End: 2017-07-01 | Stop reason: HOSPADM

## 2017-06-27 RX ORDER — LEVOFLOXACIN 5 MG/ML
750 INJECTION, SOLUTION INTRAVENOUS
Status: COMPLETED | OUTPATIENT
Start: 2017-06-27 | End: 2017-06-27

## 2017-06-27 RX ORDER — IBUPROFEN 200 MG
1 TABLET ORAL DAILY
Status: DISCONTINUED | OUTPATIENT
Start: 2017-06-28 | End: 2017-06-27

## 2017-06-27 RX ORDER — DULOXETIN HYDROCHLORIDE 60 MG/1
60 CAPSULE, DELAYED RELEASE ORAL DAILY
Status: DISCONTINUED | OUTPATIENT
Start: 2017-06-28 | End: 2017-07-01 | Stop reason: HOSPADM

## 2017-06-27 RX ORDER — SODIUM CHLORIDE 0.9 % (FLUSH) 0.9 %
5-10 SYRINGE (ML) INJECTION AS NEEDED
Status: DISCONTINUED | OUTPATIENT
Start: 2017-06-27 | End: 2017-07-01 | Stop reason: HOSPADM

## 2017-06-27 RX ORDER — PREGABALIN 75 MG/1
75 CAPSULE ORAL 2 TIMES DAILY
Status: DISCONTINUED | OUTPATIENT
Start: 2017-06-28 | End: 2017-06-28

## 2017-06-27 RX ORDER — SODIUM CHLORIDE 9 MG/ML
125 INJECTION, SOLUTION INTRAVENOUS CONTINUOUS
Status: DISCONTINUED | OUTPATIENT
Start: 2017-06-27 | End: 2017-06-27

## 2017-06-27 RX ORDER — LOPERAMIDE HYDROCHLORIDE 2 MG/1
2 CAPSULE ORAL DAILY PRN
Status: DISCONTINUED | OUTPATIENT
Start: 2017-06-28 | End: 2017-07-01 | Stop reason: HOSPADM

## 2017-06-27 RX ORDER — ALPRAZOLAM 0.5 MG/1
0.5 TABLET ORAL
Status: DISCONTINUED | OUTPATIENT
Start: 2017-06-27 | End: 2017-07-01 | Stop reason: HOSPADM

## 2017-06-27 RX ORDER — BIMATOPROST 0.3 MG/ML
1 SOLUTION/ DROPS OPHTHALMIC EVERY EVENING
Status: DISCONTINUED | OUTPATIENT
Start: 2017-06-28 | End: 2017-07-01 | Stop reason: HOSPADM

## 2017-06-27 RX ORDER — IBUPROFEN 200 MG
1 TABLET ORAL DAILY
Status: DISCONTINUED | OUTPATIENT
Start: 2017-06-27 | End: 2017-07-01 | Stop reason: HOSPADM

## 2017-06-27 RX ORDER — LEVOFLOXACIN 5 MG/ML
750 INJECTION, SOLUTION INTRAVENOUS EVERY 24 HOURS
Status: DISCONTINUED | OUTPATIENT
Start: 2017-06-27 | End: 2017-06-28

## 2017-06-27 RX ORDER — CLOPIDOGREL BISULFATE 75 MG/1
75 TABLET ORAL DAILY
Status: DISCONTINUED | OUTPATIENT
Start: 2017-06-28 | End: 2017-07-01 | Stop reason: HOSPADM

## 2017-06-27 RX ORDER — FLUTICASONE PROPIONATE 50 MCG
2 SPRAY, SUSPENSION (ML) NASAL
Status: DISCONTINUED | OUTPATIENT
Start: 2017-06-27 | End: 2017-07-01 | Stop reason: HOSPADM

## 2017-06-27 RX ORDER — SODIUM CHLORIDE 9 MG/ML
100 INJECTION, SOLUTION INTRAVENOUS CONTINUOUS
Status: DISCONTINUED | OUTPATIENT
Start: 2017-06-27 | End: 2017-06-30

## 2017-06-27 RX ORDER — SODIUM CHLORIDE 0.9 % (FLUSH) 0.9 %
5-10 SYRINGE (ML) INJECTION EVERY 8 HOURS
Status: DISCONTINUED | OUTPATIENT
Start: 2017-06-27 | End: 2017-07-01 | Stop reason: HOSPADM

## 2017-06-27 RX ORDER — IPRATROPIUM BROMIDE AND ALBUTEROL SULFATE 2.5; .5 MG/3ML; MG/3ML
3 SOLUTION RESPIRATORY (INHALATION)
Status: COMPLETED | OUTPATIENT
Start: 2017-06-27 | End: 2017-06-27

## 2017-06-27 RX ADMIN — IPRATROPIUM BROMIDE AND ALBUTEROL SULFATE 3 ML: 2.5; .5 SOLUTION RESPIRATORY (INHALATION) at 19:40

## 2017-06-27 RX ADMIN — IOPAMIDOL 100 ML: 612 INJECTION, SOLUTION INTRAVENOUS at 21:24

## 2017-06-27 RX ADMIN — SODIUM CHLORIDE 125 ML/HR: 900 INJECTION, SOLUTION INTRAVENOUS at 19:53

## 2017-06-27 RX ADMIN — IPRATROPIUM BROMIDE AND ALBUTEROL SULFATE 3 ML: 2.5; .5 SOLUTION RESPIRATORY (INHALATION) at 20:02

## 2017-06-27 RX ADMIN — LEVOFLOXACIN 750 MG: 5 INJECTION, SOLUTION INTRAVENOUS at 19:56

## 2017-06-27 RX ADMIN — SODIUM CHLORIDE 1000 ML: 900 INJECTION, SOLUTION INTRAVENOUS at 19:52

## 2017-06-27 RX ADMIN — IPRATROPIUM BROMIDE AND ALBUTEROL SULFATE 3 ML: 2.5; .5 SOLUTION RESPIRATORY (INHALATION) at 20:12

## 2017-06-27 NOTE — IP AVS SNAPSHOT
[address] Patient: Faizan Mckeon 
MRN: [id number] VERNELL:[birth year] You are allergic to the following Allergen Reactions Codeine Nausea and Vomiting Other reaction(s): other/intolerance Darvon (Propoxyphene) Itching Demerol (Meperidine) Other (comments) Halucinations Keflex (Cephalexin) Diarrhea Talwin (Pentazocine Lactate) Shortness of Breath Nausea and Vomiting Recent Documentation Height Weight Breastfeeding? BMI OB Status Smoking Status 1.651 m 58.1 kg No 21.3 kg/m2 Postmenopausal Current Every Day Smoker Unresulted Labs Order Current Status MYCOPLASMA AB, IGG/IGM In process CULTURE, BLOOD Preliminary result CULTURE, BLOOD Preliminary result Emergency Contacts Name Discharge Info Relation Home Work Mobile Luis Carlos Souza DISCHARGE CAREGIVER [3] Spouse [3] [phone] About your hospitalization You were admitted on:  June 27, 2017 You last received care in the:  [facility] [address] You were discharged on:  July 1, 2017 Unit phone number:  [phone] Why you were hospitalized Your primary diagnosis was:  Not on File Your diagnoses also included:  Cap (Community Acquired Pneumonia), Severe Sepsis (Hcc) Providers Seen During Your Hospitalizations Provider Role Specialty Primary office phone Nazia Friday, MD Attending Provider Emergency Medicine [phone] Mauricio Mcdnoald MD Attending Provider Emergency Medicine [phone] Cady Noe MD Attending Provider Internal Medicine [phone] Ledy Manley MD Attending Provider [facility] [phone] Your Primary Care Physician (PCP) Primary Care Physician Office Phone Office Fax Lalit Peter, [address] [facility] [phone] Follow-up Information Follow up With Details Comments Contact Info Apolinar Viramontes MD  The  will call the office on Monday for 1 week f/u appt with the pcp-will call with appt and time. [facility] 
[phone] Sona Hines MD  The  will call the office on Monday morning for 1 week f/u appt with Dr. Cesario Storey call with appt and time. [address] [place] [address] [address] [address] 
[phone] Your Appointments Wednesday July 05, 2017  9:00 AM EDT  
WOUND CARE FOLLOW UP with David Burk MD  
[facility] [address] [facility] [address] [id number] [phone] Current Discharge Medication List  
  
START taking these medications Dose & Instructions Dispensing Information Comments Morning Noon Evening Bedtime  
 ipratropium-albuterol  mcg/actuation inhaler Commonly known as:  Michael Monreal Replaces:  COMBIVENT  mcg/actuation inhaler Your last dose was: Your next dose is:    
   
   
 Dose:  1 Puff Take 1 Puff by inhalation every six (6) hours as needed for Wheezing or Shortness of Breath. Quantity:  1 Inhaler Refills:  0 Lactobacillus Acidoph & Bulgar 1 million cell Tab tablet Commonly known as:  Kasey Gallardo Your last dose was: Your next dose is:    
   
   
 Dose:  2 Tab Take 2 Tabs by mouth two (2) times a day. Quantity:  20 Tab Refills:  0  
     
   
   
   
  
 levoFLOXacin 750 mg tablet Commonly known as:  Bella Vallejo Start taking on:  7/2/2017 Your last dose was: Your next dose is:    
   
   
 Dose:  750 mg Take 1 Tab by mouth Daily (before breakfast) for 3 days. Quantity:  3 Tab Refills:  0  
     
   
   
   
  
 * OTHER Your last dose was: Your next dose is:    
   
   
 Incentive Spirometry- Use as directed Quantity:  1 Each Refills:  0 * OTHER Your last dose was: Your next dose is:    
   
   
 Check CBC, CMP, Mg in 3 days, results to PCP immediately, Diagnosis- PNA Quantity:  1 Each Refills:  0  
     
   
   
   
  
 * OTHER Your last dose was: Your next dose is:    
   
   
 Oxygen- Use as directed Quantity:  1 Each Refills:  0  
     
   
   
   
  
 potassium chloride 20 mEq packet Commonly known as:  KLOR-CON Your last dose was: Your next dose is:    
   
   
 Dose:  20 mEq Take 1 Packet by mouth daily for 3 days. Quantity:  3 Packet Refills:  0  
     
   
   
   
  
 * Notice: This list has 3 medication(s) that are the same as other medications prescribed for you. Read the directions carefully, and ask your doctor or other care provider to review them with you. CONTINUE these medications which have CHANGED Dose & Instructions Dispensing Information Comments Morning Noon Evening Bedtime  
 pregabalin 75 mg capsule Commonly known as:  Ally Goodson What changed:  how much to take Your last dose was: Your next dose is:    
   
   
 Dose:  75 mg Take 1 Cap by mouth two (2) times a day. Max Daily Amount: 150 mg.  
 Quantity:  180 Cap Refills:  3 CONTINUE these medications which have NOT CHANGED Dose & Instructions Dispensing Information Comments Morning Noon Evening Bedtime  
 aspirin 81 mg tablet Your last dose was: Your next dose is:    
   
   
 Dose:  81 mg Take 81 mg by mouth daily. Refills:  0  
     
   
   
   
  
 clopidogrel 75 mg Tab Commonly known as:  PLAVIX Your last dose was: Your next dose is: TAKE ONE TABLET BY MOUTH DAILY Quantity:  30 Tab Refills:  11  
     
   
   
   
  
 CYMBALTA 60 mg capsule Generic drug:  DULoxetine Your last dose was: Your next dose is:    
   
   
 Dose:  60 mg Take 60 mg by mouth daily. Refills:  0 dicyclomine 10 mg capsule Commonly known as:  BENTYL Your last dose was: Your next dose is:    
   
   
 Dose:  10 mg Take 10 mg by mouth two (2) times a day. Refills:  0 DONNATAL 16.2-0.1037 -0.0194 mg per tablet Generic drug:  atropine-PHENobarbital-scopolamine-hyoscyamine Your last dose was: Your next dose is:    
   
   
 Dose:  1 Tab Take 1 Tab by mouth as needed. Refills:  0 DYAZIDE 37.5-25 mg per capsule Generic drug:  triamterene-hydroCHLOROthiazide Your last dose was: Your next dose is:    
   
   
 Dose:  1 Cap Take 1 Cap by mouth daily. Refills:  0  
     
   
   
   
  
 FLONASE 50 mcg/actuation nasal spray Generic drug:  fluticasone Your last dose was: Your next dose is:    
   
   
 Dose:  2 Spray 2 Sprays by Both Nostrils route nightly. Refills:  0  
     
   
   
   
  
 loperamide 2 mg tablet Commonly known as:  IMMODIUM Your last dose was: Your next dose is:    
   
   
 Dose:  2 mg Take 2 mg by mouth daily. Refills:  0 LUMIGAN 0.03 % ophthalmic drops Generic drug:  bimatoprost  
   
Your last dose was: Your next dose is:    
   
   
 Dose:  1 Drop Administer 1 Drop to both eyes every evening. Refills:  0  
     
   
   
   
  
 VITAMIN B-12 1,000 mcg/mL injection Generic drug:  cyanocobalamin Your last dose was: Your next dose is:    
   
   
 Dose:  1000 mcg  
1,000 mcg by IntraMUSCular route every fourteen (14) days. Refills:  0  
     
   
   
   
  
 XANAX 1 mg tablet Generic drug:  ALPRAZolam  
   
Your last dose was: Your next dose is:    
   
   
 Dose:  0.5 mg Take 0.5 mg by mouth nightly as needed for Anxiety. Refills:  0 STOP taking these medications COMBIVENT  mcg/actuation inhaler Generic drug:  ipratropium-albuterol Replaced by:  ipratropium-albuterol  mcg/actuation inhaler diphenhydrAMINE 25 mg capsule Commonly known as:  BENADRYL predniSONE 5 mg tablet Commonly known as:  DELTASONE  
   
  
 promethazine 12.5 mg tablet Commonly known as:  PHENERGAN  
   
  
 VOLTAREN 1 % Gel Generic drug:  diclofenac ASK your doctor about these medications Dose & Instructions Dispensing Information Comments Morning Noon Evening Bedtime REMICADE 100 mg injection Generic drug:  inFLIXimab Your last dose was: Your next dose is:    
   
   
 Dose:  5 mg/kg 5 mg/kg by IntraVENous route once. Every 8 weeks Refills:  0 Where to Get Your Medications Information on where to get these meds will be given to you by the nurse or doctor. ! Ask your nurse or doctor about these medications  
  ipratropium-albuterol  mcg/actuation inhaler Lactobacillus Acidoph & Bulgar 1 million cell Tab tablet  
 levoFLOXacin 750 mg tablet OTHER  
 OTHER  
 OTHER  
 potassium chloride 20 mEq packet Discharge Instructions DISCHARGE SUMMARY from Nurse The following personal items are in your possession at time of discharge: 
 
Dental Appliances: None, Partials, Other (comment) (top and implants ) Visual Aid: None Home Medications: None Jewelry: Ring, Earrings, Watch Clothing: Slippers, Undergarments, With patient, Shirt Other Valuables: Cell Phone Personal Items Sent to Safe: non PATIENT INSTRUCTIONS: 
 
 
F-face looks uneven A-arms unable to move or move unevenly S-speech slurred or non-existent T-time-call 911 as soon as signs and symptoms begin-DO NOT go Back to bed or wait to see if you get better-TIME IS BRAIN. Warning Signs of HEART ATTACK Call 911 if you have these symptoms: 
? Chest discomfort. Most heart attacks involve discomfort in the center of the chest that lasts more than a few minutes, or that goes away and comes back. It can feel like uncomfortable pressure, squeezing, fullness, or pain. ? Discomfort in other areas of the upper body. Symptoms can include pain or discomfort in one or both arms, the back, neck, jaw, or stomach. ? Shortness of breath with or without chest discomfort. ? Other signs may include breaking out in a cold sweat, nausea, or lightheadedness. Don't wait more than five minutes to call [address]! Fast action can save your life. Calling 911 is almost always the fastest way to get lifesaving treatment. Emergency Medical Services staff can begin treatment when they arrive  up to an hour sooner than if someone gets to the hospital by car. The discharge information has been reviewed with the patient. The patient verbalized understanding. Discharge medications reviewed with the patient and appropriate educational materials and side effects teaching were provided. Patient armband removed and shredded. Discharge Instructions Attachments/References PNEUMONIA (ENGLISH) Discharge Orders None General Information Please provide this summary of care documentation to your next provider. Patient Signature:  ____________________________________________________________ Date:  ____________________________________________________________  
  
[facility] Provider Signature:  ____________________________________________________________ Date:  ____________________________________________________________ More Information Pneumonia: Care Instructions Your Care Instructions Pneumonia is an infection of the lungs. Most cases are caused by infections from bacteria or viruses. Pneumonia may be mild or very severe. If it is caused by bacteria, you will be treated with antibiotics. It may take a few weeks to a few months to recover fully from pneumonia, depending on how sick you were and whether your overall health is good. Follow-up care is a key part of your treatment and safety. Be sure to make and go to all appointments, and call your doctor if you are having problems. Its also a good idea to know your test results and keep a list of the medicines you take. How can you care for yourself at home? · Take your antibiotics exactly as directed. Do not stop taking the medicine just because you are feeling better. You need to take the full course of antibiotics. · Take your medicines exactly as prescribed. Call your doctor if you think you are having a problem with your medicine. · Get plenty of rest and sleep. You may feel weak and tired for a while, but your energy level will improve with time. · To prevent dehydration, drink plenty of fluids, enough so that your urine is light yellow or clear like water. Choose water and other caffeine-free clear liquids until you feel better. If you have kidney, heart, or liver disease and have to limit fluids, talk with your doctor before you increase the amount of fluids you drink. · Take care of your cough so you can rest. A cough that brings up mucus from your lungs is common with pneumonia. It is one way your body gets rid of the infection. But if coughing keeps you from resting or causes severe fatigue and chest-wall pain, talk to your doctor. He or she may suggest that you take a medicine to reduce the cough. · Use a vaporizer or humidifier to add moisture to your bedroom. Follow the directions for cleaning the machine. · Do not smoke or allow others to smoke around you. Smoke will make your cough last longer. If you need help quitting, talk to your doctor about stop-smoking programs and medicines. These can increase your chances of quitting for good. · Take an over-the-counter pain medicine, such as acetaminophen (Tylenol), ibuprofen (Advil, Motrin), or naproxen (Aleve). Read and follow all instructions on the label. · Do not take two or more pain medicines at the same time unless the doctor told you to. Many pain medicines have acetaminophen, which is Tylenol. Too much acetaminophen (Tylenol) can be harmful. · If you were given a spirometer to measure how well your lungs are working, use it as instructed. This can help your doctor tell how your recovery is going. · To prevent pneumonia in the future, talk to your doctor about getting a flu vaccine (once a year) and a pneumococcal vaccine (one time only for most people). When should you call for help? Call 911 anytime you think you may need emergency care. For example, call if: 
· You have severe trouble breathing. Call your doctor now or seek immediate medical care if: 
· You cough up dark brown or bloody mucus (sputum). · You have new or worse trouble breathing. · You are dizzy or lightheaded, or you feel like you may faint. Watch closely for changes in your health, and be sure to contact your doctor if: 
· You have a new or higher fever. · You are coughing more deeply or more often. · You are not getting better after 2 days (48 hours). · You do not get better as expected. Where can you learn more? Go to http://kirit-della.info/. Enter [id number] in the search box to learn more about \"Pneumonia: Care Instructions. \" Current as of: March 25, 2017 Content Version: 11.3 © 1474-7368 [employer].  Care instructions adapted under license by [employer] (which disclaims liability or warranty for this information). If you have questions about a medical condition or this instruction, always ask your healthcare professional. [address] [address] [address] any warranty or liability for your use of this information.

## 2017-06-27 NOTE — ED PROVIDER NOTES
HPI Comments: 7:12 PM Sathish Sood is a 69 y.o. female who presents to the ED for the evaluation of exertional dyspnea, onset suddenly 5 days ago. Also reports productive cough with green sputum, fever (Tmax 102), and states that she's lost about ten pounds in the last week. States that she saw her PCP today for the dyspnea, who recommended she be seen in the ED and prescribed her an inhaler. Reports that her sx started to improve after a breathing treatment while in her PCP's office. Pt reports the she is on Remacade for her Crohn's. Denies recent long distance travel, DVT/PE hx, or h/o hormone therapy. Pt is a smoker. No other complaints at this time. PMHx: Pt has a past medical history of Arthritis; Claudication ([address] Crohn's disease ([address] GERD (gastroesophageal reflux disease); HTN (hypertension); Nausea & vomiting; Other and unspecified symptoms and signs involving general sensations and perceptions; Other ill-defined conditions(799.89); PVD (peripheral vascular disease) ([address] and Vitamin B12 deficiency. PCP: Avery Worthington MD       The history is provided by the patient.         Past Medical History:   Diagnosis Date    Arthritis     Claudication [facility])     left leg    Crohn's disease ([address]     GERD (gastroesophageal reflux disease)     HTN (hypertension)     Nausea & vomiting     Other and unspecified symptoms and signs involving general sensations and perceptions     PVD    Other ill-defined conditions(799.89)     Crohn's    PVD (peripheral vascular disease) ([address]     right leg    Vitamin B12 deficiency        Past Surgical History:   Procedure Laterality Date    BYPASS GRAFT OTHR,AXILL-FEM Right 4/19/2013    BYPASS GRAFT OTHR,FEM-POP Left 5/2013    HX APPENDECTOMY      HX BREAST LUMPECTOMY      breast left    HX BUNIONECTOMY      left eye    HX CATARACT REMOVAL      bilateral    HX CHOLECYSTECTOMY      HX ORTHOPAEDIC      lateral epicondilitis on right    HX OTHER SURGICAL  3/7/2013    catheter into aorta    HX OTHER SURGICAL      intestional resection x4    HX OTHER SURGICAL  9/2013    I & D Right chest/flank wall abscess vs granuloma         Family History:   Problem Relation Age of Onset    Coronary Artery Disease Mother     Heart Attack Mother     Heart Surgery Mother      CABGx3    Elevated Lipids Mother     COPD Father     Heart Attack Brother     COPD Brother     Other Brother      PAD       Social History     Social History    Marital status:      Spouse name: N/A    Number of children: N/A    Years of education: N/A     Occupational History    Not on file. Social History Main Topics    Smoking status: Current Every Day Smoker     Packs/day: 0.50     Years: 40.00    Smokeless tobacco: Never Used    Alcohol use No    Drug use: No    Sexual activity: Not on file     Other Topics Concern    Not on file     Social History Narrative         ALLERGIES: Codeine; Darvon [propoxyphene]; Demerol [meperidine]; Keflex [cephalexin]; and Talwin [pentazocine lactate]    Review of Systems   Constitutional: Positive for fever and unexpected weight change. Negative for chills and fatigue. HENT: Negative for congestion and rhinorrhea. Respiratory: Positive for cough and shortness of breath. Negative for chest tightness. Cardiovascular: Negative for chest pain, palpitations and leg swelling. Gastrointestinal: Negative for abdominal pain, nausea and vomiting. Genitourinary: Negative for dysuria. Musculoskeletal: Negative for back pain. Skin: Negative for rash. Neurological: Negative for weakness. Psychiatric/Behavioral: The patient is not nervous/anxious. Vitals:    06/27/17 2008 06/27/17 2015 06/27/17 2022 06/27/17 2030   BP:  117/49  128/47   Pulse: (!) 103 (!) 107 (!) 110 (!) 114   Resp: 13 16 23 23   Temp:       SpO2: 98%  94%    Weight:       Height:            98% on RA, indicating adequate oxygenation.       Physical Exam Constitutional: She is oriented to person, place, and time. She appears well-developed and well-nourished. No distress. Mild conversational dyspnea    HENT:   Head: Normocephalic and atraumatic. Right Ear: External ear normal.   Left Ear: External ear normal.   Nose: Nose normal.   Mouth/Throat: Oropharynx is clear and moist.   Eyes: Conjunctivae and EOM are normal. Pupils are equal, round, and reactive to light. No scleral icterus. Neck: Normal range of motion. Neck supple. No JVD present. No tracheal deviation present. No thyromegaly present. Cardiovascular: Regular rhythm, normal heart sounds and intact distal pulses. Exam reveals no gallop and no friction rub. No murmur heard. Tachy    Pulmonary/Chest: Effort normal. She has wheezes. She has rales. She exhibits no tenderness. Abdominal: Soft. Bowel sounds are normal. She exhibits no distension. There is no tenderness. There is no rebound and no guarding. Musculoskeletal: Normal range of motion. She exhibits no edema or tenderness. No calf pain    Lymphadenopathy:     She has no cervical adenopathy. Neurological: She is alert and oriented to person, place, and time. No cranial nerve deficit. Coordination normal.   No sensory loss, Gait normal, Motor 5/5   Skin: Skin is warm and dry. Psychiatric: She has a normal mood and affect. Her behavior is normal. Judgment and thought content normal.   Nursing note and vitals reviewed. MDM  Number of Diagnoses or Management Options  Diagnosis management comments: Pt is a 69yo female smoker with a hx of COPD, chron's disease on Remicade, claudication due to PVD, HTN presents to the ED with increasingly productive cough with fever. Pt CXR is suggestive of atypical PNA and meets sepsis criteria. Sepsis alert called but pt is not in distress. Will start nebs, steroids, lactate, levaquin, follow lytes, hydrate, then reevaluate.  Natacha Emery, DO 7:47 PM      ED Course Procedures    Medications ordered:   Medications   0.9% sodium chloride infusion (125 mL/hr IntraVENous New Bag 6/27/17 1953)   sodium chloride 0.9 % bolus infusion 1,000 mL (1,000 mL IntraVENous New Bag 6/27/17 1952)   albuterol-ipratropium (DUO-NEB) 2.5 MG-0.5 MG/3 ML (3 mL Nebulization Given 6/27/17 2012)   levoFLOXacin (LEVAQUIN) 750 mg in D5W IVPB (750 mg IntraVENous New Bag 6/27/17 1956)   iopamidol (ISOVUE 300) 61 % contrast injection 100 mL (100 mL IntraVENous Given 6/27/17 2124)         Lab findings:  Recent Results (from the past 12 hour(s))   EKG, 12 LEAD, INITIAL    Collection Time: 06/27/17  4:57 PM   Result Value Ref Range    Ventricular Rate 107 BPM    Atrial Rate 107 BPM    P-R Interval 156 ms    QRS Duration 100 ms    Q-T Interval 380 ms    QTC Calculation (Bezet) 507 ms    Calculated P Axis 73 degrees    Calculated R Axis 77 degrees    Calculated T Axis 70 degrees    Diagnosis       Sinus tachycardia  Possible Left atrial enlargement  Marked ST abnormality, possible inferior subendocardial injury  Abnormal ECG  When compared with ECG of 27-JAN-2015 11:04,  ST now depressed in Inferior leads  T wave inversion no longer evident in Inferior leads     CBC WITH AUTOMATED DIFF    Collection Time: 06/27/17  5:06 PM   Result Value Ref Range    WBC 18.9 (H) 4.6 - 13.2 K/uL    RBC 4.52 4.20 - 5.30 M/uL    HGB 13.9 12.0 - 16.0 g/dL    HCT 39.6 35.0 - 45.0 %    MCV 87.6 74.0 - 97.0 FL    MCH 30.8 24.0 - 34.0 PG    MCHC 35.1 31.0 - 37.0 g/dL    RDW 13.8 11.6 - 14.5 %    PLATELET 389 (H) 912 - 420 K/uL    MPV 10.4 9.2 - 11.8 FL    NEUTROPHILS 70 42 - 75 %    LYMPHOCYTES 19 (L) 20 - 51 %    MONOCYTES 11 (H) 2 - 9 %    EOSINOPHILS 0 0 - 5 %    BASOPHILS 0 0 - 3 %    ABS. NEUTROPHILS 13.2 (H) 1.8 - 8.0 K/UL    ABS. LYMPHOCYTES 3.6 (H) 0.8 - 3.5 K/UL    ABS. MONOCYTES 2.1 (H) 0 - 1.0 K/UL    ABS. EOSINOPHILS 0.0 0.0 - 0.4 K/UL    ABS.  BASOPHILS 0.0 0.0 - 0.06 K/UL    DF MANUAL      PLATELET COMMENTS ADEQUATE PLATELETS      RBC COMMENTS NORMOCYTIC, NORMOCHROMIC     METABOLIC PANEL, COMPREHENSIVE    Collection Time: 06/27/17  5:06 PM   Result Value Ref Range    Sodium 135 (L) 136 - 145 mmol/L    Potassium 3.0 (L) 3.5 - 5.5 mmol/L    Chloride 92 (L) 100 - 108 mmol/L    CO2 33 (H) 21 - 32 mmol/L    Anion gap 10 3.0 - 18 mmol/L    Glucose 131 (H) 74 - 99 mg/dL    BUN 12 7.0 - 18 MG/DL    Creatinine 0.86 0.6 - 1.3 MG/DL    BUN/Creatinine ratio 14 12 - 20      GFR est AA >60 >60 ml/min/1.73m2    GFR est non-AA >60 >60 ml/min/1.73m2    Calcium 10.6 (H) 8.5 - 10.1 MG/DL    Bilirubin, total 0.6 0.2 - 1.0 MG/DL    ALT (SGPT) 27 13 - 56 U/L    AST (SGOT) 14 (L) 15 - 37 U/L    Alk. phosphatase 133 (H) 45 - 117 U/L    Protein, total 8.2 6.4 - 8.2 g/dL    Albumin 3.2 (L) 3.4 - 5.0 g/dL    Globulin 5.0 (H) 2.0 - 4.0 g/dL    A-G Ratio 0.6 (L) 0.8 - 1.7     PRO-BNP    Collection Time: 06/27/17  5:06 PM   Result Value Ref Range    NT pro- 0 - 900 PG/ML   LIPASE    Collection Time: 06/27/17  5:06 PM   Result Value Ref Range    Lipase 65 (L) 73 - 393 U/L   MAGNESIUM    Collection Time: 06/27/17  5:06 PM   Result Value Ref Range    Magnesium 1.7 1.6 - 2.6 mg/dL   D DIMER    Collection Time: 06/27/17  5:06 PM   Result Value Ref Range    D DIMER 2.53 (H) <0.46 ug/ml(FEU)   POC LACTIC ACID    Collection Time: 06/27/17  7:53 PM   Result Value Ref Range    Lactic Acid (POC) 2.5 (HH) 0.4 - 2.0 mmol/L        EKG interpretation per Harshil Umana MD :  ST at 107, Diffuse ST depressions, no stemi    X-Ray, CT or other radiology findings or impressions:  Cta Chest W Or W Wo Cont    Result Date: 6/27/2017  CTA Chest With Contrast Pulmonary Arterial Exam With Maximum Intensity Projections (MIPs) CPT CODE: 32480 INDICATION: Sudden onset of exertional dyspnea x5 days associated with productive cough and fever , Chest pain, acute, pulmonary embolism (PE) suspected.   coronal and sagittal MIPs  were obtained to better evaluate the pulmonary arteries in a three dimensional angiographic method to evaluate for possible pulmonary emboli COMPARISON: None. TECHNIQUE: Initial localizing images were obtained without intravenous contrast. Standard helical images were obtained from the thoracic inlet through the adrenals at 2.5 mm thick sections following the intravenous injection of a bolus of 100 cc nonionic contrast.   All CT scans at this facility are performed using dose optimization technique as appropriate to a performed exam, to include automated exposure control, adjustment of the mA and/or kV according to patient's size (including appropriate matching for site-specific examinations), or use of iterative reconstruction technique. Images were reviewed on both soft tissue, lung, and bone window settings. FINDINGS: The quality of opacification of the pulmonary arteries is excellent. There are no filling defects . There multiple enlarged middle mediastinal nodes. Left paratracheal node at the AP window measures 1.8 x 1.5 cm. Image 76 series 2. Note the left of the jack measures 1.5 x 1.4 cm, image 85. Right hilar node measures 3.1 x 1.6 cm, image 103. Left hilar node measures 1.5 x 1.4 cm. Infracarinal node measures 2.5 x 1.4 cm. The lungs are emphysematous appearing. There are fuzzy clustered nodules in the peribronchial regions with tree-in-bud appearance within the right upper lobe, lingula, superior segment of the right lower lobe, left lower lobe, and medial right middle lobe. The great vessels and thoracic aorta are unremarkable. There are no pleural effusions. The lungs are clear. The included portion of the of the liver is unremarkable. Micronodularity of the left adrenal gland is nonspecific. The chest wall soft tissues are unremarkable. Reconstructions: Coronal and sagittal MIPs ( maximum intensity projections) were obtained from the axial acquisition. The pulmonary arteries branch normally. There are no filling defects.      IMPRESSION: No evidence of PE. Multilobar pneumonia. Bilateral hilar and mediastinal adenopathy nonspecific. This is in the clinically in determinate size category and may be reactive/post infectious inflammatory or neoplastic. Follow-up is recommended. Report provided to the emergency department at 2144 hrs. Xr Chest Port    Result Date: 6/27/2017  EXAM: Chest Radiograph INDICATION: Shortness of breath and dizziness TECHNIQUE: AP view of the chest COMPARISON: 3/29/2013, 2/18/2012 and 5/23/2008 FINDINGS: No pneumothorax identified. Interstitial markings are noted bilaterally. These are increased. No effusions identified. The cardiomediastinal silhouette is unremarkable. There is cephalization of the pulmonary vasculature. The osseous structures are unremarkable. Impression: 1. Findings concerning for mild interstitial pulmonary edema. Progress notes, Consult notes or additional Procedure notes:   Pt lactate is 2.48 and now meets severe sepsis criteria. Time zero is 7:55 PM.  Will inform the nurse that the lactate needs to be repeated. Feliciana Councilman, DO 7:56 PM    9:59 PM CTA shows that the pt has multil owerlobe pneumonia. Will discuss with the hospitalist to admit. 10:03 PM Consult:  Discussed care with Dr. Esa Grier, hospitalist. Standard discussion; including history of patients chief complaint, available diagnostic results, and treatment course. Agrees to admit the pt     10:09 PM Rechecked patient. Updated patient on all ED findings and plans for admission. All questions answered. Pt understands and agrees. Disposition: Admitted    Diagnosis:  1. CAP (community acquired pneumonia)    2. Severe sepsis ([address]    3. COPD with acute exacerbation ([facility]          Patient's Medications   Start Taking    No medications on file   Continue Taking    ALPRAZOLAM (XANAX) 1 MG TABLET    Take 0.5 mg by mouth nightly as needed for Anxiety. ASPIRIN 81 MG TABLET    Take 81 mg by mouth daily. ATROPINE-PHENOBARBITAL-SCOPOLAMINE-HYOSCYAMINE (DONNATAL) 16.2-0.1037 -0.0194 MG PER TABLET    Take 1 Tab by mouth as needed. BIMATOPROST (LUMIGAN) 0.03 % OPHTHALMIC DROPS    Administer 1 Drop to both eyes every evening. CLOPIDOGREL (PLAVIX) 75 MG TAB    TAKE ONE TABLET BY MOUTH DAILY    CYANOCOBALAMIN (VITAMIN B-12) 1,000 MCG/ML INJECTION    1,000 mcg by IntraMUSCular route every fourteen (14) days. DICYCLOMINE (BENTYL) 10 MG CAPSULE    Take 10 mg by mouth two (2) times a day. DIPHENHYDRAMINE (BENADRYL) 25 MG CAPSULE    Take 25 mg by mouth every six (6) hours as needed. DULOXETINE (CYMBALTA) 60 MG CAPSULE    Take 60 mg by mouth daily. FLUTICASONE (FLONASE) 50 MCG/ACTUATION NASAL SPRAY    2 Sprays by Both Nostrils route nightly. INFLIXIMAB (REMICADE) 100 MG INJECTION    5 mg/kg by IntraVENous route once. Every 8 weeks    IPRATROPIUM-ALBUTEROL (COMBIVENT)  MCG/ACTUATION INHALER    Take 2 Puffs by inhalation nightly as needed. LOPERAMIDE (IMMODIUM) 2 MG TABLET    Take 2 mg by mouth daily. PREDNISONE (DELTASONE) 5 MG TABLET    Take 30 mg by mouth as needed. PREGABALIN (LYRICA) 75 MG CAPSULE    Take 1 Cap by mouth two (2) times a day. Max Daily Amount: 150 mg. PROMETHAZINE (PHENERGAN) 12.5 MG TABLET    Take 12.5 mg by mouth. Takes 6.25 mg in the morning and 12.5 mg at night    TRIAMTERENE-HYDROCHLOROTHIAZIDE (DYAZIDE) 37.5-25 MG PER CAPSULE    Take 1 Cap by mouth daily. VOLTAREN 1 % TOPICAL GEL    APPLY 4 GRAMS TO AFFECTED AREA FOUR TIMES DAILY. APPLY TO LEFT FOOT FOUR TIMES DAILY   These Medications have changed    No medications on file   Stop Taking    No medications on file     SCRIBE ATTESTATION STATEMENT  Documented by: Nakul Colon. Anjum Hurst for, and in the presence of, Gregory Chambers MD 7:12 PM   Signed by Humble Back, 6/27/2017 7:12 PM     PROVIDER ATTESTATION STATEMENT  I personally performed the services described in the documentation, reviewed the documentation, as recorded by the scribe in my presence, and it accurately and completely records my words and actions.   Mauricio Mcdonald MD

## 2017-06-27 NOTE — IP AVS SNAPSHOT
Current Discharge Medication List  
  
START taking these medications Dose & Instructions Dispensing Information Comments Morning Noon Evening Bedtime  
 ipratropium-albuterol  mcg/actuation inhaler Commonly known as:  Nereida Ingram Replaces:  COMBIVENT  mcg/actuation inhaler Your last dose was: Your next dose is:    
   
   
 Dose:  1 Puff Take 1 Puff by inhalation every six (6) hours as needed for Wheezing or Shortness of Breath. Quantity:  1 Inhaler Refills:  0 Lactobacillus Acidoph & Bulgar 1 million cell Tab tablet Commonly known as:  Lucy Paredes Your last dose was: Your next dose is:    
   
   
 Dose:  2 Tab Take 2 Tabs by mouth two (2) times a day. Quantity:  20 Tab Refills:  0  
     
   
   
   
  
 levoFLOXacin 750 mg tablet Commonly known as:  Natan Goldsmith Start taking on:  7/2/2017 Your last dose was: Your next dose is:    
   
   
 Dose:  750 mg Take 1 Tab by mouth Daily (before breakfast) for 3 days. Quantity:  3 Tab Refills:  0  
     
   
   
   
  
 * OTHER Your last dose was: Your next dose is:    
   
   
 Incentive Spirometry- Use as directed Quantity:  1 Each Refills:  0  
     
   
   
   
  
 * OTHER Your last dose was: Your next dose is:    
   
   
 Check CBC, CMP, Mg in 3 days, results to PCP immediately, Diagnosis- PNA Quantity:  1 Each Refills:  0  
     
   
   
   
  
 * OTHER Your last dose was: Your next dose is:    
   
   
 Oxygen- Use as directed Quantity:  1 Each Refills:  0  
     
   
   
   
  
 potassium chloride 20 mEq packet Commonly known as:  KLOR-CON Your last dose was: Your next dose is:    
   
   
 Dose:  20 mEq Take 1 Packet by mouth daily for 3 days. Quantity:  3 Packet Refills:  0 * Notice: This list has 3 medication(s) that are the same as other medications prescribed for you. Read the directions carefully, and ask your doctor or other care provider to review them with you. CONTINUE these medications which have CHANGED Dose & Instructions Dispensing Information Comments Morning Noon Evening Bedtime  
 pregabalin 75 mg capsule Commonly known as:  Brooke Nair What changed:  how much to take Your last dose was: Your next dose is:    
   
   
 Dose:  75 mg Take 1 Cap by mouth two (2) times a day. Max Daily Amount: 150 mg.  
 Quantity:  180 Cap Refills:  3 CONTINUE these medications which have NOT CHANGED Dose & Instructions Dispensing Information Comments Morning Noon Evening Bedtime  
 aspirin 81 mg tablet Your last dose was: Your next dose is:    
   
   
 Dose:  81 mg Take 81 mg by mouth daily. Refills:  0  
     
   
   
   
  
 clopidogrel 75 mg Tab Commonly known as:  PLAVIX Your last dose was: Your next dose is: TAKE ONE TABLET BY MOUTH DAILY Quantity:  30 Tab Refills:  11  
     
   
   
   
  
 CYMBALTA 60 mg capsule Generic drug:  DULoxetine Your last dose was: Your next dose is:    
   
   
 Dose:  60 mg Take 60 mg by mouth daily. Refills:  0  
     
   
   
   
  
 dicyclomine 10 mg capsule Commonly known as:  BENTYL Your last dose was: Your next dose is:    
   
   
 Dose:  10 mg Take 10 mg by mouth two (2) times a day. Refills:  0 DONNATAL 16.2-0.1037 -0.0194 mg per tablet Generic drug:  atropine-PHENobarbital-scopolamine-hyoscyamine Your last dose was: Your next dose is:    
   
   
 Dose:  1 Tab Take 1 Tab by mouth as needed. Refills:  0 DYAZIDE 37.5-25 mg per capsule Generic drug:  triamterene-hydroCHLOROthiazide Your last dose was: Your next dose is:    
   
   
 Dose:  1 Cap Take 1 Cap by mouth daily. Refills:  0  
     
   
   
   
  
 FLONASE 50 mcg/actuation nasal spray Generic drug:  fluticasone Your last dose was: Your next dose is:    
   
   
 Dose:  2 Spray 2 Sprays by Both Nostrils route nightly. Refills:  0  
     
   
   
   
  
 loperamide 2 mg tablet Commonly known as:  IMMODIUM Your last dose was: Your next dose is:    
   
   
 Dose:  2 mg Take 2 mg by mouth daily. Refills:  0 LUMIGAN 0.03 % ophthalmic drops Generic drug:  bimatoprost  
   
Your last dose was: Your next dose is:    
   
   
 Dose:  1 Drop Administer 1 Drop to both eyes every evening. Refills:  0  
     
   
   
   
  
 VITAMIN B-12 1,000 mcg/mL injection Generic drug:  cyanocobalamin Your last dose was: Your next dose is:    
   
   
 Dose:  1000 mcg  
1,000 mcg by IntraMUSCular route every fourteen (14) days. Refills:  0  
     
   
   
   
  
 XANAX 1 mg tablet Generic drug:  ALPRAZolam  
   
Your last dose was: Your next dose is:    
   
   
 Dose:  0.5 mg Take 0.5 mg by mouth nightly as needed for Anxiety. Refills:  0 STOP taking these medications COMBIVENT  mcg/actuation inhaler Generic drug:  ipratropium-albuterol Replaced by:  ipratropium-albuterol  mcg/actuation inhaler diphenhydrAMINE 25 mg capsule Commonly known as:  BENADRYL predniSONE 5 mg tablet Commonly known as:  DELTASONE  
   
  
 promethazine 12.5 mg tablet Commonly known as:  PHENERGAN  
   
  
 VOLTAREN 1 % Gel Generic drug:  diclofenac ASK your doctor about these medications Dose & Instructions Dispensing Information Comments Morning Noon Evening Bedtime REMICADE 100 mg injection Generic drug:  inFLIXimab Your last dose was: Your next dose is: Dose:  5 mg/kg 5 mg/kg by IntraVENous route once. Every 8 weeks Refills:  0 Where to Get Your Medications Information on where to get these meds will be given to you by the nurse or doctor. ! Ask your nurse or doctor about these medications  
  ipratropium-albuterol  mcg/actuation inhaler Lactobacillus Acidoph & Bulgar 1 million cell Tab tablet  
 levoFLOXacin 750 mg tablet OTHER  
 OTHER  
 OTHER  
 potassium chloride 20 mEq packet

## 2017-06-28 LAB
ANION GAP BLD CALC-SCNC: 7 MMOL/L (ref 3–18)
ATRIAL RATE: 107 BPM
BASOPHILS # BLD AUTO: 0 K/UL (ref 0–0.06)
BASOPHILS # BLD: 0 % (ref 0–2)
BUN SERPL-MCNC: 8 MG/DL (ref 7–18)
BUN/CREAT SERPL: 12 (ref 12–20)
CALCIUM SERPL-MCNC: 9 MG/DL (ref 8.5–10.1)
CALCULATED P AXIS, ECG09: 73 DEGREES
CALCULATED R AXIS, ECG10: 77 DEGREES
CALCULATED T AXIS, ECG11: 70 DEGREES
CHLORIDE SERPL-SCNC: 97 MMOL/L (ref 100–108)
CO2 SERPL-SCNC: 31 MMOL/L (ref 21–32)
CREAT SERPL-MCNC: 0.69 MG/DL (ref 0.6–1.3)
DIAGNOSIS, 93000: NORMAL
DIFFERENTIAL METHOD BLD: ABNORMAL
EOSINOPHIL # BLD: 0 K/UL (ref 0–0.4)
EOSINOPHIL NFR BLD: 0 % (ref 0–5)
ERYTHROCYTE [DISTWIDTH] IN BLOOD BY AUTOMATED COUNT: 13.8 % (ref 11.6–14.5)
GLUCOSE BLD STRIP.AUTO-MCNC: 117 MG/DL (ref 70–110)
GLUCOSE SERPL-MCNC: 138 MG/DL (ref 74–99)
HCT VFR BLD AUTO: 31.9 % (ref 35–45)
HGB BLD-MCNC: 10.9 G/DL (ref 12–16)
LACTATE BLD-SCNC: 1.6 MMOL/L (ref 0.4–2)
LACTATE BLD-SCNC: 3.8 MMOL/L (ref 0.4–2)
LYMPHOCYTES # BLD AUTO: 17 % (ref 21–52)
LYMPHOCYTES # BLD: 2.8 K/UL (ref 0.9–3.6)
MCH RBC QN AUTO: 29.6 PG (ref 24–34)
MCHC RBC AUTO-ENTMCNC: 34.2 G/DL (ref 31–37)
MCV RBC AUTO: 86.7 FL (ref 74–97)
MONOCYTES # BLD: 2 K/UL (ref 0.05–1.2)
MONOCYTES NFR BLD AUTO: 12 % (ref 3–10)
NEUTS SEG # BLD: 11.4 K/UL (ref 1.8–8)
NEUTS SEG NFR BLD AUTO: 71 % (ref 40–73)
P-R INTERVAL, ECG05: 156 MS
PLATELET # BLD AUTO: 328 K/UL (ref 135–420)
PMV BLD AUTO: 9.6 FL (ref 9.2–11.8)
POTASSIUM SERPL-SCNC: 2.3 MMOL/L (ref 3.5–5.5)
Q-T INTERVAL, ECG07: 380 MS
QRS DURATION, ECG06: 100 MS
QTC CALCULATION (BEZET), ECG08: 507 MS
RBC # BLD AUTO: 3.68 M/UL (ref 4.2–5.3)
SODIUM SERPL-SCNC: 135 MMOL/L (ref 136–145)
VENTRICULAR RATE, ECG03: 107 BPM
WBC # BLD AUTO: 16.3 K/UL (ref 4.6–13.2)

## 2017-06-28 PROCEDURE — 80048 BASIC METABOLIC PNL TOTAL CA: CPT | Performed by: INTERNAL MEDICINE

## 2017-06-28 PROCEDURE — 74011250637 HC RX REV CODE- 250/637: Performed by: INTERNAL MEDICINE

## 2017-06-28 PROCEDURE — 85025 COMPLETE CBC W/AUTO DIFF WBC: CPT | Performed by: INTERNAL MEDICINE

## 2017-06-28 PROCEDURE — 74011000250 HC RX REV CODE- 250: Performed by: EMERGENCY MEDICINE

## 2017-06-28 PROCEDURE — 74011250636 HC RX REV CODE- 250/636: Performed by: INTERNAL MEDICINE

## 2017-06-28 PROCEDURE — 83605 ASSAY OF LACTIC ACID: CPT

## 2017-06-28 PROCEDURE — 65660000000 HC RM CCU STEPDOWN

## 2017-06-28 PROCEDURE — 87070 CULTURE OTHR SPECIMN AEROBIC: CPT | Performed by: INTERNAL MEDICINE

## 2017-06-28 PROCEDURE — 82962 GLUCOSE BLOOD TEST: CPT

## 2017-06-28 RX ORDER — POTASSIUM CHLORIDE 20 MEQ/1
40 TABLET, EXTENDED RELEASE ORAL
Status: COMPLETED | OUTPATIENT
Start: 2017-06-28 | End: 2017-06-28

## 2017-06-28 RX ORDER — POTASSIUM CHLORIDE 7.45 MG/ML
10 INJECTION INTRAVENOUS
Status: COMPLETED | OUTPATIENT
Start: 2017-06-28 | End: 2017-06-28

## 2017-06-28 RX ORDER — LEVOFLOXACIN 5 MG/ML
750 INJECTION, SOLUTION INTRAVENOUS EVERY 24 HOURS
Status: DISCONTINUED | OUTPATIENT
Start: 2017-06-28 | End: 2017-07-01

## 2017-06-28 RX ORDER — IPRATROPIUM BROMIDE AND ALBUTEROL SULFATE 2.5; .5 MG/3ML; MG/3ML
3 SOLUTION RESPIRATORY (INHALATION)
Status: DISCONTINUED | OUTPATIENT
Start: 2017-06-28 | End: 2017-07-01 | Stop reason: HOSPADM

## 2017-06-28 RX ADMIN — POTASSIUM CHLORIDE 10 MEQ: 10 INJECTION, SOLUTION INTRAVENOUS at 08:56

## 2017-06-28 RX ADMIN — POTASSIUM CHLORIDE 10 MEQ: 10 INJECTION, SOLUTION INTRAVENOUS at 05:54

## 2017-06-28 RX ADMIN — POTASSIUM CHLORIDE 10 MEQ: 10 INJECTION, SOLUTION INTRAVENOUS at 07:29

## 2017-06-28 RX ADMIN — Medication 10 ML: at 11:59

## 2017-06-28 RX ADMIN — ASPIRIN 81 MG: 81 TABLET, COATED ORAL at 08:54

## 2017-06-28 RX ADMIN — SODIUM CHLORIDE 1000 MG: 900 INJECTION, SOLUTION INTRAVENOUS at 18:45

## 2017-06-28 RX ADMIN — LEVOFLOXACIN 750 MG: 5 INJECTION, SOLUTION INTRAVENOUS at 23:14

## 2017-06-28 RX ADMIN — IPRATROPIUM BROMIDE AND ALBUTEROL SULFATE 3 ML: .5; 3 SOLUTION RESPIRATORY (INHALATION) at 11:58

## 2017-06-28 RX ADMIN — SODIUM CHLORIDE 100 ML/HR: 900 INJECTION, SOLUTION INTRAVENOUS at 00:39

## 2017-06-28 RX ADMIN — DICYCLOMINE HYDROCHLORIDE 10 MG: 10 CAPSULE ORAL at 00:38

## 2017-06-28 RX ADMIN — CLOPIDOGREL BISULFATE 75 MG: 75 TABLET, FILM COATED ORAL at 08:54

## 2017-06-28 RX ADMIN — PREGABALIN 75 MG: 75 CAPSULE ORAL at 08:54

## 2017-06-28 RX ADMIN — DICYCLOMINE HYDROCHLORIDE 10 MG: 10 CAPSULE ORAL at 08:54

## 2017-06-28 RX ADMIN — VANCOMYCIN HYDROCHLORIDE 1250 MG: 10 INJECTION, POWDER, LYOPHILIZED, FOR SOLUTION INTRAVENOUS at 04:43

## 2017-06-28 RX ADMIN — POTASSIUM CHLORIDE 40 MEQ: 1500 TABLET, EXTENDED RELEASE ORAL at 04:22

## 2017-06-28 RX ADMIN — Medication 10 ML: at 23:15

## 2017-06-28 RX ADMIN — POTASSIUM CHLORIDE 10 MEQ: 10 INJECTION, SOLUTION INTRAVENOUS at 04:28

## 2017-06-28 RX ADMIN — Medication 10 ML: at 15:10

## 2017-06-28 RX ADMIN — SODIUM CHLORIDE 100 ML/HR: 900 INJECTION, SOLUTION INTRAVENOUS at 18:51

## 2017-06-28 RX ADMIN — PREGABALIN 75 MG: 75 CAPSULE ORAL at 17:35

## 2017-06-28 NOTE — ED NOTES
Hourly rounding complete. Safety  Pt resting   [x  ]  On stretcher with side rails up and bed in locked position, call bell within reach  [  ]  Sitting in chair with casters locked, call bell within reach    Toileting  [  x] pt denies need to use bathroom  [  ] pt assisted to bathroom  [  ] pt assisted with bedpan  [  ] pt independent to bathroom as needed    Ongoing Plan of Care  Plan of care and expected time for test and results reviewed with pt. Pain Management / Comfort  [ x ] dimmed lights  [  ] warm blanket provided  [  ] pain assessed  [ x ] monitor alarms reviewed    Pt is resting quietly in room at this time.

## 2017-06-28 NOTE — ED NOTES
Hourly rounding complete. Safety  Pt resting   [ x ]  On stretcher with side rails up and bed in locked position, call bell within reach  [  ]  Sitting in chair with casters locked, call bell within reach    Toileting  [  ] pt denies need to use bathroom  [  ] pt assisted to bathroom  [  ] pt assisted with bedpan  [ x ] pt independent to bathroom as needed    Ongoing Plan of Care  Plan of care and expected time for test and results reviewed with pt.     Pain Management / Comfort  [ x ] dimmed lights  [ x ] warm blanket provided  [ x ] pain assessed  [ x ] monitor alarms reviewed

## 2017-06-28 NOTE — ED NOTES
Hourly rounding complete. Safety  Pt resting   [ x ]  On stretcher with side rails up and bed in locked position, call bell within reach  [  ]  Sitting in chair with casters locked, call bell within reach    Toileting  [  ] pt denies need to use bathroom  [  ] pt assisted to bathroom  [  ] pt assisted with bedpan  [ x ] pt independent to bathroom as needed    Ongoing Plan of Care  Plan of care and expected time for test and results reviewed with pt.     Pain Management / Comfort  [x  ] dimmed lights  [ x ] warm blanket provided  [ x ] pain assessed  [ x ] monitor alarms reviewed

## 2017-06-28 NOTE — ED NOTES
Bedside shift change report given to Alona RN (oncoming nurse) by Samira Murillo RN (offgoing nurse). Report included the following information SBAR, ED Summary, Intake/Output, MAR, Recent Results and Med Rec Status.

## 2017-06-28 NOTE — ED NOTES
TRANSFER - OUT REPORT:    Verbal report given to SPENCER Lucero(name) on [employer]  being transferred to  (unit) for routine progression of care       Report consisted of patients Situation, Background, Assessment and   Recommendations(SBAR). Information from the following report(s) SBAR, ED Summary and MAR was reviewed with the receiving nurse. Lines:   Peripheral IV 06/27/17 Left Antecubital (Active)   Site Assessment Clean, dry, & intact 6/27/2017  7:50 PM   Dressing Status Clean, dry, & intact 6/27/2017  7:50 PM       Peripheral IV 06/28/17 Right Forearm (Active)   Site Assessment Clean, dry, & intact 6/28/2017  4:51 AM   Phlebitis Assessment 0 6/28/2017  4:51 AM   Infiltration Assessment 0 6/28/2017  4:51 AM   Dressing Status Clean, dry, & intact 6/28/2017  4:51 AM   Dressing Type Transparent 6/28/2017  4:51 AM   Hub Color/Line Status Blue;Flushed;Patent 6/28/2017  4:51 AM        Opportunity for questions and clarification was provided.       Patient transported with:   Viddler

## 2017-06-28 NOTE — PROGRESS NOTES
conducted an initial consultation and Spiritual Assessment for [employer], who is a 69 y.o.,female. Patients Primary Language is: [place]. According to the patients EMR Episcopalian Affiliation is: Anglican. The reason the Patient came to the hospital is:   Patient Active Problem List    Diagnosis Date Noted    CAP (community acquired pneumonia) 06/27/2017    Severe sepsis ([facility] [address] 06/27/2017    [facility] 05/10/2017    Abscess 05/10/2017    Dermal sinus tract of lumbosacral region 05/10/2017    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[facility] 08/09/2016    Chronic aorto-iliac occlusion syndrome ([facility] 01/19/2016    Wound disruption, post-op, skin 01/09/2015    Mass of breast, left 07/30/2014    HTN (hypertension) 04/01/2013    PVD (peripheral vascular disease) ([facility] [facility] 04/01/2013    Crohn's disease ([facility] 04/01/2013        The  provided the following Interventions:  Initiated a relationship of care and support. Explored issues of eleazar, belief, spirituality and Catholic/ritual needs while hospitalized. Listened empathically. Provided chaplaincy education. Provided information about Spiritual Care Services. Offered prayer and assurance of continued prayers on patient's behalf. Chart reviewed. The following outcomes where achieved:  Patient shared limited information about both their medical narrative and spiritual journey/beliefs.  confirmed Patient's Episcopalian Affiliation. Patient processed feeling about current hospitalization. Patient expressed gratitude for 's visit. Assessment:  Patient does not have any Catholic/cultural needs that will affect patients preferences in health care. There are no spiritual or Catholic issues which require intervention at this time. Plan:  Chaplains will continue to follow and will provide pastoral care on an as needed/requested basis.    recommends bedside caregivers page  on duty if patient shows signs of acute spiritual or emotional distress. анна MELÉNDEZ  [address] [facility]  [phone]

## 2017-06-28 NOTE — ED NOTES
Bedside and Verbal shift change report given to Tamara Cleveland RN  (oncoming nurse) by Vernell Freed RN  (offgoing nurse). Report included the following information SBAR, ED Summary and MAR.

## 2017-06-28 NOTE — ED NOTES
Patient greeted / introduced myself as their primary nurse. Patient advised that medical information will be discussed at this time and it is their own responsibility to let nurse know if such conversation should not take place in presence of visitors. Pt expressed understanding. Encouraged to voice any concerns, and all questions/concerns addressed. Assessment in progress. Explanation and teaching of all care given,  including any pending orders or procedures. Awaiting further MD orders at this time. Patient fall risk assessed, with prevention measures in place, to include bed in lowest position with casters locked and rail up, call bell within reach, lights on, pathway to bathroom free from obstacles. Patient instructed to call for assist OOB at all times. Floor dry. Slip resistant socks offered if needed.  Instructed that staff will make hourly rounds to provide reassessments in pain control, concerns, toileting, and any other updates in care. Hand hygiene maintained prior to and after patient/staff interaction.

## 2017-06-28 NOTE — ED NOTES
Hourly rounding complete. Safety  Pt resting   [ x ]  On stretcher with side rails up and bed in locked position, call bell within reach  [  ]  Sitting in chair with casters locked, call bell within reach    Toileting  [  ] pt denies need to use bathroom  [  ] pt assisted to bathroom  [  ] pt assisted with bedpan  [ x ] pt independent to bathroom as needed    Ongoing Plan of Care  Plan of care and expected time for test and results reviewed with pt. Pain Management / Comfort  [  ] dimmed lights  [x  ] warm blanket provided  [x  ] pain assessed  [x  ] monitor alarms reviewed    Pt ambulatory to bathroom. 4th run of K+ started. Pt denies complaints. NSR notes on monitor.

## 2017-06-28 NOTE — ED NOTES
Hourly rounding complete. Safety  Pt resting   [ x ]  On stretcher with side rails up and bed in locked position, call bell within reach  [  ]  Sitting in chair with casters locked, call bell within reach    Toileting  [  x] pt denies need to use bathroom  [  ] pt assisted to bathroom  [  ] pt assisted with bedpan  [  ] pt independent to bathroom as needed    Ongoing Plan of Care  Plan of care and expected time for test and results reviewed with pt. Pain Management / Comfort  [  ] dimmed lights  [  ] warm blanket provided  [  ] pain assessed  [  ] monitor alarms reviewed    Pt is resting quietly watching TV.

## 2017-06-28 NOTE — ED NOTES
Bedside shift change report given to Mateus Love RN   (oncoming nurse) by Faustino Amado RN (offgoing nurse). Report included the following information SBAR, ED Summary and MAR.

## 2017-06-28 NOTE — H&P
History and Physical      NAME:  Isaiah Mars   :   1950   MRN:   [id number]     Date/Time:  2017     CHIEF COMPLAINT: SOB     HISTORY OF PRESENT ILLNESS:     Ms. Ana Perez is a 69 y.o.   female with a PMH of Crohn's disease on remicade, HTN, PVD  who presents with c/c of  Shortness of breathing. She was also c/o productive cough of greenish sputum, fever of 102 at home, shortness of breathing and weakness. She receive remicade as o/p and her last dose was on 17. She denies abdominal pain, nausea or vomiting. She has CXR that showed Findings concerning for mild interstitial pulmonary edema. She had elevated D dimer so she had CT chest that was negative for PE but showed Multilobar pneumonia. Bilateral hilar and mediastinal adenopathy nonspecific. She is started on IV antibiotics and admitted for further evaluation and management.        Past Medical History:   Diagnosis Date    Arthritis     Claudication [facility])     left leg    Crohn's disease ([facility]     GERD (gastroesophageal reflux disease)     HTN (hypertension)     Nausea & vomiting     Other and unspecified symptoms and signs involving general sensations and perceptions     PVD    Other ill-defined conditions(799.89)     Crohn's    PVD (peripheral vascular disease) ([facility] [facility]     right leg    Vitamin B12 deficiency         Past Surgical History:   Procedure Laterality Date    BYPASS GRAFT OTHR,AXILL-FEM Right 2013    BYPASS GRAFT OTHR,FEM-POP Left 2013    HX APPENDECTOMY      HX BREAST LUMPECTOMY      breast left    HX BUNIONECTOMY      left eye    HX CATARACT REMOVAL      bilateral    HX CHOLECYSTECTOMY      HX ORTHOPAEDIC      lateral epicondilitis on right    HX OTHER SURGICAL  3/7/2013    catheter into aorta    HX OTHER SURGICAL      intestional resection x4    HX OTHER SURGICAL  2013    I & D Right chest/flank wall abscess vs granuloma       Social History Substance Use Topics    Smoking status: Current Every Day Smoker     Packs/day: 0.50     Years: 40.00    Smokeless tobacco: Never Used    Alcohol use No        Family History   Problem Relation Age of Onset    Coronary Artery Disease Mother     Heart Attack Mother     Heart Surgery Mother      CABGx3    Elevated Lipids Mother     COPD Father     Heart Attack Brother     COPD Brother     Other Brother      PAD        Allergies   Allergen Reactions    Codeine Nausea and Vomiting     Other reaction(s): other/intolerance    Darvon [Propoxyphene] Itching    Demerol [Meperidine] Other (comments)     Halucinations    Keflex [Cephalexin] Diarrhea    Talwin [Pentazocine Lactate] Shortness of Breath and Nausea and Vomiting        Prior to Admission medications    Medication Sig Start Date End Date Taking? Authorizing Provider   clopidogrel (PLAVIX) 75 mg tab TAKE ONE TABLET BY MOUTH DAILY 5/30/17   Harlan Dutton MD   DULoxetine (CYMBALTA) 60 mg capsule Take 60 mg by mouth daily. Historical Provider   diphenhydrAMINE (BENADRYL) 25 mg capsule Take 25 mg by mouth every six (6) hours as needed. Historical Provider   pregabalin (LYRICA) 75 mg capsule Take 1 Cap by mouth two (2) times a day. Max Daily Amount: 150 mg. Patient taking differently: Take 150 mg by mouth two (2) times a day. 5/19/16   TIMMY French   VOLTAREN 1 % topical gel APPLY 4 GRAMS TO AFFECTED AREA FOUR TIMES DAILY. APPLY TO LEFT FOOT FOUR TIMES DAILY 8/5/15   Chika Ahn MD   ALPRAZolam Naomie Menjivarunes) 1 mg tablet Take 0.5 mg by mouth nightly as needed for Anxiety. Historical Provider   aspirin 81 mg tablet Take 81 mg by mouth daily. Historical Provider   cyanocobalamin (VITAMIN B-12) 1,000 mcg/mL injection 1,000 mcg by IntraMUSCular route every fourteen (14) days. Historical Provider   promethazine (PHENERGAN) 12.5 mg tablet Take 12.5 mg by mouth.  Takes 6.25 mg in the morning and 12.5 mg at night    Historical Provider loperamide (IMMODIUM) 2 mg tablet Take 2 mg by mouth daily. Historical Provider   dicyclomine (BENTYL) 10 mg capsule Take 10 mg by mouth two (2) times a day. Historical Provider   ipratropium-albuterol (COMBIVENT)  mcg/actuation inhaler Take 2 Puffs by inhalation nightly as needed. Historical Provider   fluticasone (FLONASE) 50 mcg/actuation nasal spray 2 Sprays by Both Nostrils route nightly. Historical Provider   triamterene-hydrochlorothiazide (DYAZIDE) 37.5-25 mg per capsule Take 1 Cap by mouth daily. Historical Provider   bimatoprost (LUMIGAN) 0.03 % ophthalmic drops Administer 1 Drop to both eyes every evening. Historical Provider   atropine-PHENobarbital-scopolamine-hyoscyamine [facility]) 16.2-0.1037 -0.0194 mg per tablet Take 1 Tab by mouth as needed. Historical Provider   predniSONE (DELTASONE) 5 mg tablet Take 30 mg by mouth as needed. Historical Provider   inFLIXimab (REMICADE) 100 mg injection 5 mg/kg by IntraVENous route once.  Every 8 weeks    Historical Provider       REVIEW OF SYSTEMS:     CONSTITUTIONAL: No chills, No weight loss, No Night sweats  HEENT:  No epistaxis, No diff in swallowing  CVS: No chest pain, No palpitations, No syncope, No peripheral edema, No PND, No orthopnea  RS: No hemoptysis, No pleuritic chest pain  GI: No abd pain, No vomitting, No diarrhea, No hematemesis, No rectal bleeding, No acid reflux or heartburn  NEURO: No focal weakness, No headaches, No seizures  PSYCH: No anxiety, No depression  MUSCULOSKLETAL: No joint pain or swelling  : No hematuria or dysuria  SKIN: No rash      Physical Exam:    VITALS:    Vital signs reviewed; most recent are:    Visit Vitals    /47    Pulse (!) 114    Temp 99.9 °F (37.7 °C)    Resp 23    Ht 5' 5\" (1.651 m)    Wt 58.1 kg (128 lb)    SpO2 94%    BMI 21.3 kg/m2     SpO2 Readings from Last 6 Encounters:   06/27/17 94%   05/10/17 95%   08/26/16 97%   07/15/16 99%   06/24/16 98%   06/08/16 97% No intake or output data in the 24 hours ending 06/27/17 8141       GENERAL: Not in acute distress  HEENT: pink conjunctiva, un icteric sclera,   NECK: No lymphadenopthy or thyroid swelling, JVD not seen  LYMPH: No supraclavicular or cervical or axillary nodes on both sides  CVS: S1S2, No murmurs, No gallop or rub  RS: bilateral rhonchi  Abd: Soft, non tender, not distended, No guarding, No rigidity  NEURO:  No focal neurologic deficits   Extrm: no leg edema or swelling   Skin: No rash      Labs:  Recent Results (from the past 24 hour(s))   EKG, 12 LEAD, INITIAL    Collection Time: 06/27/17  4:57 PM   Result Value Ref Range    Ventricular Rate 107 BPM    Atrial Rate 107 BPM    P-R Interval 156 ms    QRS Duration 100 ms    Q-T Interval 380 ms    QTC Calculation (Bezet) 507 ms    Calculated P Axis 73 degrees    Calculated R Axis 77 degrees    Calculated T Axis 70 degrees    Diagnosis       Sinus tachycardia  Possible Left atrial enlargement  Marked ST abnormality, possible inferior subendocardial injury  Abnormal ECG  When compared with ECG of 27-JAN-2015 11:04,  ST now depressed in Inferior leads  T wave inversion no longer evident in Inferior leads     CBC WITH AUTOMATED DIFF    Collection Time: 06/27/17  5:06 PM   Result Value Ref Range    WBC 18.9 (H) 4.6 - 13.2 K/uL    RBC 4.52 4.20 - 5.30 M/uL    HGB 13.9 12.0 - 16.0 g/dL    HCT 39.6 35.0 - 45.0 %    MCV 87.6 74.0 - 97.0 FL    MCH 30.8 24.0 - 34.0 PG    MCHC 35.1 31.0 - 37.0 g/dL    RDW 13.8 11.6 - 14.5 %    PLATELET 075 (H) 855 - 420 K/uL    MPV 10.4 9.2 - 11.8 FL    NEUTROPHILS 70 42 - 75 %    LYMPHOCYTES 19 (L) 20 - 51 %    MONOCYTES 11 (H) 2 - 9 %    EOSINOPHILS 0 0 - 5 %    BASOPHILS 0 0 - 3 %    ABS. NEUTROPHILS 13.2 (H) 1.8 - 8.0 K/UL    ABS. LYMPHOCYTES 3.6 (H) 0.8 - 3.5 K/UL    ABS. MONOCYTES 2.1 (H) 0 - 1.0 K/UL    ABS. EOSINOPHILS 0.0 0.0 - 0.4 K/UL    ABS.  BASOPHILS 0.0 0.0 - 0.06 K/UL    DF MANUAL      PLATELET COMMENTS ADEQUATE PLATELETS RBC COMMENTS NORMOCYTIC, NORMOCHROMIC     METABOLIC PANEL, COMPREHENSIVE    Collection Time: 06/27/17  5:06 PM   Result Value Ref Range    Sodium 135 (L) 136 - 145 mmol/L    Potassium 3.0 (L) 3.5 - 5.5 mmol/L    Chloride 92 (L) 100 - 108 mmol/L    CO2 33 (H) 21 - 32 mmol/L    Anion gap 10 3.0 - 18 mmol/L    Glucose 131 (H) 74 - 99 mg/dL    BUN 12 7.0 - 18 MG/DL    Creatinine 0.86 0.6 - 1.3 MG/DL    BUN/Creatinine ratio 14 12 - 20      GFR est AA >60 >60 ml/min/1.73m2    GFR est non-AA >60 >60 ml/min/1.73m2    Calcium 10.6 (H) 8.5 - 10.1 MG/DL    Bilirubin, total 0.6 0.2 - 1.0 MG/DL    ALT (SGPT) 27 13 - 56 U/L    AST (SGOT) 14 (L) 15 - 37 U/L    Alk. phosphatase 133 (H) 45 - 117 U/L    Protein, total 8.2 6.4 - 8.2 g/dL    Albumin 3.2 (L) 3.4 - 5.0 g/dL    Globulin 5.0 (H) 2.0 - 4.0 g/dL    A-G Ratio 0.6 (L) 0.8 - 1.7     PRO-BNP    Collection Time: 06/27/17  5:06 PM   Result Value Ref Range    NT pro- 0 - 900 PG/ML   LIPASE    Collection Time: 06/27/17  5:06 PM   Result Value Ref Range    Lipase 65 (L) 73 - 393 U/L   MAGNESIUM    Collection Time: 06/27/17  5:06 PM   Result Value Ref Range    Magnesium 1.7 1.6 - 2.6 mg/dL   D DIMER    Collection Time: 06/27/17  5:06 PM   Result Value Ref Range    D DIMER 2.53 (H) <0.46 ug/ml(FEU)   POC LACTIC ACID    Collection Time: 06/27/17  7:53 PM   Result Value Ref Range    Lactic Acid (POC) 2.5 (HH) 0.4 - 2.0 mmol/L   POC LACTIC ACID    Collection Time: 06/27/17 10:11 PM   Result Value Ref Range    Lactic Acid (POC) 3.7 (HH) 0.4 - 2.0 mmol/L         Active Problems:    CAP (community acquired pneumonia) (6/27/2017)      Severe sepsis ([facility] [address] (6/27/2017)        Assessment:       1. Pneumonia, multi lobar  2. Sepsis POA  3. Hypokalemia  4. Crohn's disease on remicade,   5. HTN, controlled  6.  PVD     Plan:       · Admit to medical floor  · Continue broad spectrum IV antibiotic in the setting of immune compromised status  · Blood and sputum culture  · Replete K  · IVF hydration  · Resume home medications  · Full code  · DVT prophylaxsis        Total time:  64 minutes             _______________________________________________________________________        Attending Physician:  Nancy Garzon MD

## 2017-06-28 NOTE — ED NOTES
Hourly rounding complete. Safety  Pt resting   [x  ]  On stretcher with side rails up and bed in locked position, call bell within reach  [  ]  Sitting in chair with casters locked, call bell within reach    Toileting  [ x ] pt denies need to use bathroom  [  ] pt assisted to bathroom  [  ] pt assisted with bedpan  [  ] pt independent to bathroom as needed    Ongoing Plan of Care  Plan of care and expected time for test and results reviewed with pt.     Pain Management / Comfort  [  ] dimmed lights  [ x ] warm blanket provided  [ x ] pain assessed  [x  ] monitor alarms reviewed

## 2017-06-28 NOTE — ROUTINE PROCESS
Bedside and Verbal shift change report given to Anuja Dela Cruz RN (oncoming nurse) b Alonzo Duane RN (offgoing nurse). Report given with SBAR, Kardex, Intake/Output, MAR, Accordion and Recent Results.

## 2017-06-28 NOTE — ED NOTES
Hourly rounding complete. Safety  Pt resting   [ x ]  On stretcher with side rails up and bed in locked position, call bell within reach  [  ]  Sitting in chair with casters locked, call bell within reach    Toileting  [  ] pt denies need to use bathroom  [  ] pt assisted to bathroom  [  ] pt assisted with bedpan  [ x ] pt independent to bathroom as needed    Ongoing Plan of Care  Plan of care and expected time for test and results reviewed with pt.     Pain Management / Comfort  [ x ] dimmed lights  [x  ] warm blanket provided  [ x ] pain assessed  [ x ] monitor alarms reviewed

## 2017-06-28 NOTE — ED NOTES
Bedside shift change report given to SPENCER Chavez (oncoming nurse) by ALAN Munoz RN (offgoing nurse). Report included the following information SBAR, ED Summary, Procedure Summary, Intake/Output, Recent Results and Cardiac Rhythm NSR.

## 2017-06-28 NOTE — ED NOTES
Cymbalta not given. Pt states that she takes this medication at night. Pharmacy message sent to re-time medication.

## 2017-06-28 NOTE — ED NOTES
Hourly rounding complete. Safety  Pt resting   [ x ]  On stretcher with side rails up and bed in locked position, call bell within reach  [  ]  Sitting in chair with casters locked, call bell within reach    Toileting  [ x ] pt denies need to use bathroom  [  ] pt assisted to bathroom  [  ] pt assisted with bedpan  [  ] pt independent to bathroom as needed    Ongoing Plan of Care  Plan of care and expected time for test and results reviewed with pt.     Pain Management / Comfort  [ x ] dimmed lights  [ x ] warm blanket provided  [ x ] pain assessed  [  ] monitor alarms reviewed    Pt is resting quietly, denies any pain at this time

## 2017-06-28 NOTE — ED NOTES
Bed in low/locked position with side rail up and call bell within reach. Pt placed on BP, cardiac and O2 monitoring.

## 2017-06-29 LAB
ANION GAP BLD CALC-SCNC: 7 MMOL/L (ref 3–18)
BACTERIA SPEC CULT: NORMAL
BASOPHILS # BLD AUTO: 0 K/UL (ref 0–0.06)
BASOPHILS # BLD: 0 % (ref 0–3)
BUN SERPL-MCNC: 5 MG/DL (ref 7–18)
BUN/CREAT SERPL: 10 (ref 12–20)
CALCIUM SERPL-MCNC: 8.9 MG/DL (ref 8.5–10.1)
CHLORIDE SERPL-SCNC: 103 MMOL/L (ref 100–108)
CO2 SERPL-SCNC: 30 MMOL/L (ref 21–32)
CREAT SERPL-MCNC: 0.5 MG/DL (ref 0.6–1.3)
DIFFERENTIAL METHOD BLD: ABNORMAL
EOSINOPHIL # BLD: 0.1 K/UL (ref 0–0.4)
EOSINOPHIL NFR BLD: 1 % (ref 0–5)
ERYTHROCYTE [DISTWIDTH] IN BLOOD BY AUTOMATED COUNT: 14.6 % (ref 11.6–14.5)
GLUCOSE BLD STRIP.AUTO-MCNC: 84 MG/DL (ref 70–110)
GLUCOSE SERPL-MCNC: 105 MG/DL (ref 74–99)
HCT VFR BLD AUTO: 30.3 % (ref 35–45)
HGB BLD-MCNC: 10.1 G/DL (ref 12–16)
LYMPHOCYTES # BLD AUTO: 19 % (ref 20–51)
LYMPHOCYTES # BLD: 2.5 K/UL (ref 0.8–3.5)
MAGNESIUM SERPL-MCNC: 1.6 MG/DL (ref 1.6–2.6)
MCH RBC QN AUTO: 29.5 PG (ref 24–34)
MCHC RBC AUTO-ENTMCNC: 33.3 G/DL (ref 31–37)
MCV RBC AUTO: 88.6 FL (ref 74–97)
MONOCYTES # BLD: 1.1 K/UL (ref 0–1)
MONOCYTES NFR BLD AUTO: 8 % (ref 2–9)
NEUTS BAND NFR BLD MANUAL: 1 % (ref 0–5)
NEUTS SEG # BLD: 9.7 K/UL (ref 1.8–8)
NEUTS SEG NFR BLD AUTO: 71 % (ref 42–75)
PLATELET # BLD AUTO: 402 K/UL (ref 135–420)
PLATELET COMMENTS,PCOM: ABNORMAL
PMV BLD AUTO: 10.1 FL (ref 9.2–11.8)
POTASSIUM SERPL-SCNC: 3.1 MMOL/L (ref 3.5–5.5)
RBC # BLD AUTO: 3.42 M/UL (ref 4.2–5.3)
RBC MORPH BLD: ABNORMAL
SERVICE CMNT-IMP: NORMAL
SODIUM SERPL-SCNC: 140 MMOL/L (ref 136–145)
WBC # BLD AUTO: 13.4 K/UL (ref 4.6–13.2)

## 2017-06-29 PROCEDURE — 97165 OT EVAL LOW COMPLEX 30 MIN: CPT

## 2017-06-29 PROCEDURE — 85025 COMPLETE CBC W/AUTO DIFF WBC: CPT | Performed by: INTERNAL MEDICINE

## 2017-06-29 PROCEDURE — 97161 PT EVAL LOW COMPLEX 20 MIN: CPT

## 2017-06-29 PROCEDURE — 74011000250 HC RX REV CODE- 250: Performed by: INTERNAL MEDICINE

## 2017-06-29 PROCEDURE — 82962 GLUCOSE BLOOD TEST: CPT

## 2017-06-29 PROCEDURE — 74011250636 HC RX REV CODE- 250/636: Performed by: INTERNAL MEDICINE

## 2017-06-29 PROCEDURE — 83735 ASSAY OF MAGNESIUM: CPT | Performed by: INTERNAL MEDICINE

## 2017-06-29 PROCEDURE — 36415 COLL VENOUS BLD VENIPUNCTURE: CPT | Performed by: INTERNAL MEDICINE

## 2017-06-29 PROCEDURE — 74011250637 HC RX REV CODE- 250/637: Performed by: INTERNAL MEDICINE

## 2017-06-29 PROCEDURE — 65660000000 HC RM CCU STEPDOWN

## 2017-06-29 PROCEDURE — 74011250637 HC RX REV CODE- 250/637: Performed by: EMERGENCY MEDICINE

## 2017-06-29 PROCEDURE — 80048 BASIC METABOLIC PNL TOTAL CA: CPT | Performed by: INTERNAL MEDICINE

## 2017-06-29 PROCEDURE — 87493 C DIFF AMPLIFIED PROBE: CPT | Performed by: EMERGENCY MEDICINE

## 2017-06-29 RX ORDER — ONDANSETRON 4 MG/1
8 TABLET, ORALLY DISINTEGRATING ORAL
Status: DISCONTINUED | OUTPATIENT
Start: 2017-06-29 | End: 2017-07-01 | Stop reason: HOSPADM

## 2017-06-29 RX ADMIN — Medication 10 ML: at 06:08

## 2017-06-29 RX ADMIN — ASPIRIN 81 MG: 81 TABLET, COATED ORAL at 09:19

## 2017-06-29 RX ADMIN — ONDANSETRON 8 MG: 4 TABLET, ORALLY DISINTEGRATING ORAL at 09:31

## 2017-06-29 RX ADMIN — DULOXETINE HYDROCHLORIDE 60 MG: 60 CAPSULE, DELAYED RELEASE ORAL at 00:16

## 2017-06-29 RX ADMIN — SODIUM CHLORIDE 1000 MG: 900 INJECTION, SOLUTION INTRAVENOUS at 06:08

## 2017-06-29 RX ADMIN — PREGABALIN 200 MG: 75 CAPSULE ORAL at 18:28

## 2017-06-29 RX ADMIN — Medication 10 ML: at 20:10

## 2017-06-29 RX ADMIN — PREGABALIN 200 MG: 75 CAPSULE ORAL at 09:19

## 2017-06-29 RX ADMIN — DICYCLOMINE HYDROCHLORIDE 10 MG: 10 CAPSULE ORAL at 09:19

## 2017-06-29 RX ADMIN — LEVOFLOXACIN 750 MG: 5 INJECTION, SOLUTION INTRAVENOUS at 20:09

## 2017-06-29 RX ADMIN — BIMATOPROST 1 DROP: 0.3 SOLUTION/ DROPS OPHTHALMIC at 18:28

## 2017-06-29 RX ADMIN — CLOPIDOGREL BISULFATE 75 MG: 75 TABLET, FILM COATED ORAL at 09:19

## 2017-06-29 RX ADMIN — Medication 10 ML: at 16:33

## 2017-06-29 RX ADMIN — LOPERAMIDE HYDROCHLORIDE 2 MG: 2 CAPSULE ORAL at 09:19

## 2017-06-29 RX ADMIN — FLUTICASONE PROPIONATE 2 SPRAY: 50 SPRAY, METERED NASAL at 23:00

## 2017-06-29 RX ADMIN — TRIAMTERENE AND HYDROCHLOROTHIAZIDE 1 TABLET: 37.5; 25 TABLET ORAL at 09:19

## 2017-06-29 RX ADMIN — SODIUM CHLORIDE 100 ML/HR: 900 INJECTION, SOLUTION INTRAVENOUS at 06:37

## 2017-06-29 RX ADMIN — SODIUM CHLORIDE 1000 MG: 900 INJECTION, SOLUTION INTRAVENOUS at 18:28

## 2017-06-29 RX ADMIN — DICYCLOMINE HYDROCHLORIDE 10 MG: 10 CAPSULE ORAL at 20:08

## 2017-06-29 RX ADMIN — DULOXETINE HYDROCHLORIDE 60 MG: 60 CAPSULE, DELAYED RELEASE ORAL at 20:08

## 2017-06-29 RX ADMIN — FLUTICASONE PROPIONATE 2 SPRAY: 50 SPRAY, METERED NASAL at 06:08

## 2017-06-29 NOTE — PROGRESS NOTES
Problem: Mobility Impaired (Adult and Pediatric)  Goal: *Acute Goals and Plan of Care (Insert Text)  Outcome: Resolved/Met Date Met:  06/29/17  PHYSICAL THERAPY EVALUATION & DISCHARGE     Patient: Haris Monsivais (33 y.o. female)  Date: 6/29/2017  Primary Diagnosis: Severe sepsis ([facility] [address]  CAP (community acquired pneumonia)        Precautions: contact         ASSESSMENT AND RECOMMENDATIONS:  Based on the objective data described below, the patient presents at independent level with ambulation and transfers without an assistive device. Skilled physical therapy is not indicated at this time. Discharge Recommendations: None  Further Equipment Recommendations for Discharge: N/A        G-CODES:      Mobility  Current  CH= 0%   Goal  CH= 0%  D/C  CH= 0%. The severity rating is based on the Level of Assistance required for Functional Mobility and ADLs. Eval Complexity: History: MEDIUM  Complexity : 1-2 comorbidities / personal factors will impact the outcome/ POC Exam:LOW Complexity : 1-2 Standardized tests and measures addressing body structure, function, activity limitation and / or participation in recreation  Presentation: LOW Complexity : Stable, uncomplicated  Clinical Decision Making:Low Complexity , Overall Complexity:LOW       SUBJECTIVE:   Patient stated I'm moving around fine, running to the bathroom has kept me busy.       OBJECTIVE DATA SUMMARY:       Past Medical History:   Diagnosis Date    Arthritis      Claudication ([facility] [facility]       left leg    Crohn's disease ([facility]      GERD (gastroesophageal reflux disease)      HTN (hypertension)      Nausea & vomiting      Other and unspecified symptoms and signs involving general sensations and perceptions       PVD    Other ill-defined conditions(799.89)       Crohn's    PVD (peripheral vascular disease) ([facility]       right leg    Vitamin B12 deficiency       Past Surgical History:   Procedure Laterality Date    BYPASS GRAFT OTHR,AXILL-FEM Right 4/19/2013    BYPASS GRAFT OTHR,FEM-POP Left 5/2013    HX APPENDECTOMY        HX BREAST LUMPECTOMY         breast left    HX BUNIONECTOMY         left eye    HX CATARACT REMOVAL         bilateral    HX CHOLECYSTECTOMY        HX ORTHOPAEDIC         lateral epicondilitis on right    HX OTHER SURGICAL   3/7/2013     catheter into aorta    HX OTHER SURGICAL         intestional resection x4    HX OTHER SURGICAL   9/2013     I & D Right chest/flank wall abscess vs granuloma     Barriers to Learning/Limitations: None  Compensate with: visual, verbal, tactile, kinesthetic cues/model  Prior Level of Function/Home Situation: independent with mobility without assistive device  Home Situation  Home Environment: Apartment (house)  # Steps to Enter: 3  One/Two Story Residence: Two story  # of Interior Steps: 12  Interior Rails: Both  Lift Chair Available: No  Living Alone: No  Support Systems: Family member(s)  Patient Expects to be Discharged to[de-identified] Apartment  Current DME Used/Available at Home: None  Critical Behavior:   calm and cooperative, motivated   Strength:    Strength: Within functional limits (B LEs)      Tone & Sensation:   Tone: Normal (B LEs)       Range Of Motion:  AROM: Within functional limits (B LEs)      Functional Mobility:  Bed Mobility:  Rolling: Independent  Supine to Sit: Independent  Sit to Supine: Independent  Scooting: Independent  Transfers:  Sit to Stand: Independent  Stand to Sit: Independent  Balance:   Sitting: Intact  Standing: Intact; Without support  Ambulation/Gait Training:  Distance (ft): 60 Feet (ft)  Assistive Device:  (none)  Ambulation - Level of Assistance: Independent     Pain:  Pt reports 0/10 pain or discomfort prior to treatment. Pt reports 0/10 pain or discomfort post treatment. Activity Tolerance:   good  Please refer to the flowsheet for vital signs taken during this treatment.   After treatment:   [ ]         Patient left in no apparent distress sitting up in chair  [X]         Patient left in no apparent distress in bed  [X]         Call bell left within reach  [X]         Nursing notified  [ ]         Caregiver present  [ ]         Bed alarm activated      COMMUNICATION/EDUCATION:   [X]         Fall prevention education was provided and the patient/caregiver indicated understanding. [X]         Patient/family have participated as able in goal setting and plan of care.-eval only  [ ]         Patient/family agree to work toward stated goals and plan of care. [ ]         Patient understands intent and goals of therapy, but is neutral about his/her participation. [ ]         Patient is unable to participate in goal setting and plan of care.   Patient was educated on increasing activity in her room as able     Thank you for this referral.  Mirian Galindo, PT   Time Calculation: 10 mins

## 2017-06-29 NOTE — PROGRESS NOTES
Care Management Interventions  PCP Verified by CM: Yes  Last Visit to PCP: 06/27/17  Transition of Care Consult (CM Consult): Discharge Planning  Physical Therapy Consult: Yes  Occupational Therapy Consult: Yes  Current Support Network: Lives with Spouse, Family Lives Nearby  Confirm Follow Up Transport: Family  Plan discussed with Pt/Family/Caregiver: Yes  Discharge Location  Discharge Placement: Home    Pt is a 69year old admitted for community acquired pneumonia and  Severe sepsis. Pt is alert and oriented and alone in room. Pt reports that he is independent with his ADLs and has no DME at home Pt lives with her  Michael Arce [phone] and son. Pt plans to return home and has transportation home with family.

## 2017-06-29 NOTE — PROGRESS NOTES
NUTRITION    Nursing Referral: [facility]      RECOMMENDATIONS / PLAN:     - Add food preference  - Modify diet; change to GI lite  - Add nutrition supplement: Ensure Enlive once daily  - Continue RD inpatient monitoring and evaluation. NUTRITION INTERVENTIONS & DIAGNOSIS:     [x] Meals/Snacks: regular diet  [x] Medical food supplementation: add  [x] Coordination of care: discussed re-ordering po diet for pt given not showing up in \"Manage Orders\" section; MD agreed with plan. Discussed pt's food preference with Foodservice and agreed GI lite may be more tolerable for patient. Nutrition Diagnosis:  Inadequate oral intake related to decreased appetite as evidenced by poor/decreased meal intake x 3 days PTA and 1-2 days since admission    ASSESSMENT:     Pt reported appetite and meal intake were decreased/poor x 3 days PTA and 1-2 days since admission; starting to improve today with fair meal intake. Tolerating meals. Discussed food preference. Discussed adding nutrition supplement; pt agree with plans, options discussed.  Discussed with pt changing diet to GI lite to provide more foods that are lower in spices; pt agreed with plan    Average po intake adequate to meet patients estimated nutritional needs:   [] Yes     [] No   [x] Unable to determine at this time    Diet:   DIET REGULAR  Food Allergies:  None known   Current Appetite:   [x] Good     [x] Fair     [] Poor     [] Other:  Appetite/meal intake prior to admission:   [] Good     [x] Fair     [] Poor     [] Other:  Feeding Limitations:  [] Swallowing difficulty    [] Chewing difficulty    [] Other:  Current Meal Intake: Patient Vitals for the past 100 hrs:   % Diet Eaten   06/29/17 1200 100 %   06/29/17 0919 0 %       BM:  6/28 - loose  Skin Integrity:  No pressure ulcer or wound  Edema:  None   Pertinent Medications: Reviewed    Recent Labs      06/29/17   0334  06/28/17   0305  06/27/17   1706   NA  140  135*  135*   K  3.1*  2.3*  3.0*   CL  103  97*  92* CO2  30  31  33*   GLU  105*  138*  131*   BUN  5*  8  12   CREA  0.50*  0.69  0.86   CA  8.9  9.0  10.6*   MG  1.6   --   1.7   ALB   --    --   3.2*   SGOT   --    --   14*   ALT   --    --   27       Intake/Output Summary (Last 24 hours) at 06/29/17 1627  Last data filed at 06/29/17 1200   Gross per 24 hour   Intake              360 ml   Output                0 ml   Net              360 ml       Anthropometrics:  Ht Readings from Last 1 Encounters:   06/27/17 5' 5\" (1.651 m)     Last 3 Recorded Weights in this Encounter    06/27/17 1648   Weight: 58.1 kg (128 lb)     Body mass index is 21.3 kg/(m^2). Weight History:  Pt reported experiencing unplanned weight loss PTA; stated usual body weight is 135 lb. Noted 7 lb (5.2%) weight loss in past 4 months PTA per chart hx; not significant    Weight Metrics 6/27/2017 2/21/2017 8/16/2016 8/9/2016 8/3/2016 4/7/2016 1/19/2016   Weight 128 lb 135 lb 135 lb 135 lb 135 lb 141 lb 141 lb   BMI 21.3 kg/m2 22.47 kg/m2 22.47 kg/m2 22.47 kg/m2 22.47 kg/m2 23.46 kg/m2 23.46 kg/m2        Admitting Diagnosis: Severe sepsis (HCC)  CAP (community acquired pneumonia)  Pertinent PMHx:  Crohn's disease, hypertension, nausea/vomiting, vitamin B12 deficiency    Education Needs:        [x] None identified  [] Identified - Not appropriate at this time  []  Identified and addressed - refer to education log  Learning Limitations:   [x] None identified  [] Identified    Cultural, Religion & ethnic food preferences:  [x] None identified    [] Identified and addressed     ESTIMATED NUTRITION NEEDS:     Calories: 6017-2806 kcal (MSJx1.2-1.3) based on  [x] Actual BW: 58 kg      [] IBW   Protein: 46-58 gm (0.8-1 gm/kg) based on  [x] Actual BW      [] IBW   Fluid: 1 mL/kcal     MONITORING & EVALUATION:     Nutrition Goal(s):   1. Po intake of meals will meet >75% of patient estimated nutritional needs within the next 7 days.   Outcome:  [] Met/Ongoing    []  Not Met    [x] New/Initial Goal Monitoring:   [x] Diet tolerance   [x] Meal intake   [] Supplement intake   [] GI symptoms/ability to tolerate po diet   [] Respiratory status   [] Plan of care      Previous Recommendations (for follow-up assessments only):     []   Implemented       []   Not Implemented (RD to address)     [] No Recommendation Made     Discharge Planning:  Regular diet   [x] Participated in care planning, discharge planning, & interdisciplinary rounds as appropriate      Afua Gonzalez, [address] [place] [address]   Pager: [phone]

## 2017-06-29 NOTE — ROUTINE PROCESS
Respiratory Care Assessment for Bronchial hygiene or Lung Expansion Therapy  Patient  Haris Monsivais     69 y.o.   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 7XXA    Abscess L02.91    Dermal sinus tract of lumbosacral region L05.92    CAP (community acquired pneumonia) J18.9    Severe sepsis (HCC) A41.9, R65.20       ABG:  Date:6/28/2017  No results found for: PH, PHI, PCO2, PCO2I, PO2, PO2I, HCO3, HCO3I, FIO2, FIO2I    Chest X-ray:  Date:6/28/2017    Results from Hospital Encounter encounter on 06/27/17   XR CHEST PORT   Narrative EXAM: Chest Radiograph    INDICATION: Shortness of breath and dizziness    TECHNIQUE: AP view of the chest    COMPARISON: 3/29/2013, 2/18/2012 and 5/23/2008    FINDINGS: No pneumothorax identified. Interstitial markings are noted  bilaterally. These are increased. No effusions identified. The  cardiomediastinal silhouette is unremarkable. There is cephalization of the  pulmonary vasculature. The osseous structures are unremarkable. Impression Impression:  1. Findings concerning for mild interstitial pulmonary edema.          Lab Test:  Date:6/28/2017  WBC:   Lab Results   Component Value Date/Time    WBC 16.3 06/28/2017 03:05 AM   HGB: Lab Results   Component Value Date/Time    HGB 10.9 06/28/2017 03:05 AM    PLTS: Lab Results   Component Value Date/Time    PLATELET 833 53/67/2429 03:05 AM       SaO2%/flow:   SpO2 Readings from Last 1 Encounters:   06/28/17 97%       Vital Signs:   Patient Vitals for the past 8 hrs:   Pulse Resp BP SpO2   06/28/17 1616 - - - 97 %   06/28/17 1445 89 29 - 97 %   06/28/17 1430 90 27 - 96 %   06/28/17 1415 93 25 - 95 %   06/28/17 1400 94 24 103/48 94 %         RA/O2 flow/device:ROOM AIR       First Inital Assesment:     [x]Yes []No   Reevaluation/Reassessment:    []Yes []No     CHART REVIEW   Points 0 X 1 X 2 X 3 X 4 X Points   Pulmonary History Smoking History (1) none  Recent Smoking History <1 PPD  Recent Smoking History >1 PPD X Pulmonary Disease or Impairment X Severe Pulmonary Disease  3   Surgical History No Surgery X General Surgery  Lower Abdominal  Thoracic or Upper Abdominal  Thoracic & Pulmonary Disease  0   CXR Clear or not indicated  Chronic changes or CXR Pending  Infiltrate, atelectasis or pleural effusions X Infiltrates in more than 1lobe  Infiltrates +atelectasis  +/or pleural effusions  2     PATIENT ASSESSMENT    0 X 1 X 2 X 3 X 4 X Points   Respiratory Pattern Regular pattern RR 12-20 X Increased RR 21-27   Mild Dyspnea at rest, irregular pattern RR 28-32  Moderate Dyspnea at rest, Use of accessory muscles, RR 33-36  Severe Dyspnea, Use of accessory muscles RR >36  0   Mental Status Alert Oriented cooperative X Confused, Follows commands  Lethargic, Does not follow commands  Obtunded  Unresponsive  0   Breath Sounds Clear  Decreased Unilaterally  Decreased Bilaterally  Mild Scattered wheezing or Crackles in bases X Severe Wheezing or rhonchi  3   Cough Strong dry NPC  Strong Productive X Weak NPC  Weak productive or weak with rhonchi  No cough or may require suctioning  1   Level of Activity Ambulatory  Ambulatory with assistance X Temporarily Non-ambulatory  Non-Ambulatory, able to position self  Unable to position self, confined to bed  1   Total Points/Score:   10     Specific Intervention Chart(BRONCHIAL HYGIENE AND LUNG EXPANSION)    Bronchial Hygiene/Secretion Clearance:    []EZPAP []Rotation bed with vibration    []CPT with percussor []CPT via vest   [x]Oscillastiang positive pressure expiratory device      Lung Expansion:    []Incentive Spirometer w/RT visits [x]Incentive Spirometer w/nursing    []EZPAP      *Suctioning:    []Nasal Tracheal []Tracheal     *suctioning will be ordered and done PRN with an associated frequency such as QID/PRN based on score       Other:    Care Plan   Level # Score Modality Frequency Comment   Level 1 >17 0 0    Level 2 14-17 0 0    Level 3 10-13 FLUTTER/IS TID    Level 4 1-9 0 0      BRONCHIAL HYGIENE SCORING AND FREQUENCY GUIDELINES   Frequency Indications/Findings Level #   Q4 ATC Copious secretions, SOB, unable to sleep 1   QID & PRN at night Moderate amounts of secretions 2   TID or Q6 wa Small amounts of secretions and poor cough: recent history of secretions 3   BID or Q8 wa Unable to deep breathe and cough effectively 4        Comments: FLUTTER TID W/ RT AND IS W/ NURSING    Respiratory Therapist: David Grider, RT

## 2017-06-29 NOTE — PROGRESS NOTES
Bedside and Verbal shift change report given to Robert (oncoming nurse) by Yasmin Bravo RN (offgoing nurse). Report included the following information SBAR and Recent Results.

## 2017-06-29 NOTE — PROGRESS NOTES
Problem: Self Care Deficits Care Plan (Adult)  Goal: *Acute Goals and Plan of Care (Insert Text)  OCCUPATIONAL THERAPY EVALUATION/DISCHARGE     Patient: Jovita Francis (89 y.o. female)  Date: 6/29/2017  Primary Diagnosis: Severe sepsis ([facility]  CAP (community acquired pneumonia)  Precautions: contact isolation        ASSESSMENT AND RECOMMENDATIONS:  Based on the objective data described below, the patient presents with out functional deficits; therefore, skilled occupational therapy is not indicated at this time. Discharge Recommendations: None  Further Equipment Recommendations for Discharge: N/A       COMPLEXITY      Eval Complexity: History: LOW Complexity : Brief history review ; Examination: LOW Complexity : 1-3 performance deficits relating to physical, cognitive , or psychosocial skils that result in activity limitations and / or participation restrictions ; Decision Making:LOW Complexity : No comorbidities that affect functional and no verbal or physical assistance needed to complete eval tasks  Assessment: LOW Complexity          G-CODES:      Self Care  Current  CH= 0%   Goal  CH= 0%   D/C  CH= 0%. The severity rating is based on the Level of Assistance required for Functional Mobility and ADLs. SUBJECTIVE:   Patient stated I can do everything.       OBJECTIVE DATA SUMMARY:       Past Medical History:   Diagnosis Date    Arthritis      Claudication ([facility]       left leg    Crohn's disease ([facility]      GERD (gastroesophageal reflux disease)      HTN (hypertension)      Nausea & vomiting      Other and unspecified symptoms and signs involving general sensations and perceptions       PVD    Other ill-defined conditions(799.89)       Crohn's    PVD (peripheral vascular disease) ([facility]       right leg    Vitamin B12 deficiency       Past Surgical History:   Procedure Laterality Date    BYPASS GRAFT OTHR,AXILL-FEM Right 4/19/2013    BYPASS GRAFT OTHR,FEM-POP Left 5/2013    HX APPENDECTOMY        HX BREAST LUMPECTOMY         breast left    HX BUNIONECTOMY         left eye    HX CATARACT REMOVAL         bilateral    HX CHOLECYSTECTOMY        HX ORTHOPAEDIC         lateral epicondilitis on right    HX OTHER SURGICAL   3/7/2013     catheter into aorta    HX OTHER SURGICAL         intestional resection x4    HX OTHER SURGICAL   9/2013     I & D Right chest/flank wall abscess vs granuloma     Prior Level of Function/Home Situation:   Home Situation  Home Environment: Apartment (house)  # Steps to Enter: 3  One/Two Story Residence: Two story  # of Interior Steps: 12  Interior Rails: Both  Lift Chair Available: No  Living Alone: No  Support Systems: Family member(s)  Patient Expects to be Discharged to[de-identified] Apartment  Current DME Used/Available at Home: None  [X]     Right hand dominant       [ ]     Left hand dominant  Cognitive/Behavioral Status:   she is alert, oriented X 4   Skin: no skin integrity issues noted during OT evaluation  Edema: no extremity edema noted  Vision/Perceptual:     tracking is WFL      Coordination:   BUE's WFL   Balance:   independent sitting for LB dressing  Independent standing for LB clothes management  Strength:  BUE's: 4/5   Tone & Sensation:  BUE's WFL   Range of Motion:  BUE's AROM WFL   Functional Mobility and Transfers for ADLs:  Bed Mobility:   supine to sit and return to supine is independent   Transfers:   independent   ADL Assessment:   self feeding: independent  Grooming: independent (simulated)  UB bathing/dressing: independent (simulated)  LB bathing/dressing: independent  Pain:  Pt reports 0/10 pain or discomfort prior to treatment. Pt reports 0/10 pain or discomfort post treatment. Activity Tolerance:   No SOB or c/o fatigue noted  Please refer to the flowsheet for vital signs taken during this treatment.   After treatment:   [ ]  Patient left in no apparent distress sitting up in chair  [X]  Patient left in no apparent distress in bed  [X] Call bell left within reach  [ ]  Nursing notified  [ ]  Caregiver present  [ ]  Bed alarm activated      COMMUNICATION/EDUCATION:   Communication/Collaboration:  [ ]      Home safety education was provided and the patient/caregiver indicated understanding. [X]      Patient/family have participated as able and agree with findings and recommendations. [ ]      Patient is unable to participate in plan of care at this time.      Jose A Callahan, OTR/L  Time Calculation: 9 mins

## 2017-06-30 ENCOUNTER — HOME HEALTH ADMISSION (OUTPATIENT)
Dept: HOME HEALTH SERVICES | Facility: HOME HEALTH | Age: 67
End: 2017-06-30
Payer: MEDICARE

## 2017-06-30 LAB
BACTERIA SPEC CULT: ABNORMAL
BACTERIA SPEC CULT: ABNORMAL
DATE LAST DOSE: NORMAL
GRAM STN SPEC: ABNORMAL
L PNEUMO AG UR QL IA: NEGATIVE
REPORTED DOSE,DOSE: NORMAL UNITS
REPORTED DOSE/TIME,TMG: 1800
SERVICE CMNT-IMP: ABNORMAL
VANCOMYCIN TROUGH SERPL-MCNC: 11.6 UG/ML (ref 10–20)

## 2017-06-30 PROCEDURE — 74011250636 HC RX REV CODE- 250/636: Performed by: INTERNAL MEDICINE

## 2017-06-30 PROCEDURE — 74011250637 HC RX REV CODE- 250/637: Performed by: EMERGENCY MEDICINE

## 2017-06-30 PROCEDURE — 36415 COLL VENOUS BLD VENIPUNCTURE: CPT | Performed by: INTERNAL MEDICINE

## 2017-06-30 PROCEDURE — 74011250637 HC RX REV CODE- 250/637: Performed by: INTERNAL MEDICINE

## 2017-06-30 PROCEDURE — 65660000000 HC RM CCU STEPDOWN

## 2017-06-30 PROCEDURE — 86738 MYCOPLASMA ANTIBODY: CPT | Performed by: INTERNAL MEDICINE

## 2017-06-30 PROCEDURE — 80202 ASSAY OF VANCOMYCIN: CPT | Performed by: INTERNAL MEDICINE

## 2017-06-30 PROCEDURE — 87450 LEGIONELLA PNEUMOPHILA AG, URINE: CPT | Performed by: INTERNAL MEDICINE

## 2017-06-30 RX ORDER — POTASSIUM CHLORIDE 20 MEQ/1
40 TABLET, EXTENDED RELEASE ORAL
Status: COMPLETED | OUTPATIENT
Start: 2017-06-30 | End: 2017-06-30

## 2017-06-30 RX ADMIN — PREGABALIN 200 MG: 75 CAPSULE ORAL at 17:05

## 2017-06-30 RX ADMIN — ASPIRIN 81 MG: 81 TABLET, COATED ORAL at 08:22

## 2017-06-30 RX ADMIN — DICYCLOMINE HYDROCHLORIDE 10 MG: 10 CAPSULE ORAL at 08:22

## 2017-06-30 RX ADMIN — SODIUM CHLORIDE 1000 MG: 900 INJECTION, SOLUTION INTRAVENOUS at 17:06

## 2017-06-30 RX ADMIN — SODIUM CHLORIDE 1000 MG: 900 INJECTION, SOLUTION INTRAVENOUS at 05:47

## 2017-06-30 RX ADMIN — Medication 10 ML: at 13:42

## 2017-06-30 RX ADMIN — Medication 10 ML: at 20:34

## 2017-06-30 RX ADMIN — Medication 10 ML: at 05:47

## 2017-06-30 RX ADMIN — CLOPIDOGREL BISULFATE 75 MG: 75 TABLET, FILM COATED ORAL at 08:22

## 2017-06-30 RX ADMIN — DULOXETINE HYDROCHLORIDE 60 MG: 60 CAPSULE, DELAYED RELEASE ORAL at 20:33

## 2017-06-30 RX ADMIN — FLUTICASONE PROPIONATE 2 SPRAY: 50 SPRAY, METERED NASAL at 20:33

## 2017-06-30 RX ADMIN — LEVOFLOXACIN 750 MG: 5 INJECTION, SOLUTION INTRAVENOUS at 20:33

## 2017-06-30 RX ADMIN — TRIAMTERENE AND HYDROCHLOROTHIAZIDE 1 TABLET: 37.5; 25 TABLET ORAL at 08:22

## 2017-06-30 RX ADMIN — BIMATOPROST 1 DROP: 0.3 SOLUTION/ DROPS OPHTHALMIC at 17:07

## 2017-06-30 RX ADMIN — DICYCLOMINE HYDROCHLORIDE 10 MG: 10 CAPSULE ORAL at 20:33

## 2017-06-30 RX ADMIN — POTASSIUM CHLORIDE 40 MEQ: 1500 TABLET, EXTENDED RELEASE ORAL at 09:06

## 2017-06-30 RX ADMIN — PREGABALIN 200 MG: 75 CAPSULE ORAL at 08:21

## 2017-06-30 NOTE — PROGRESS NOTES
[facility]  Progress Note    Patient: Earl Garcia Age: 69 y.o. : 1950 MR#: [id number] SSN: [id number]  Date/Time:     Subjective:     Patient in NAD, feels better    Assessment/Plan:     1- Pneumonia  2- Sepsis at admission  3- Crohns disease  4- HTN  5- PAD    PLAN  Continue antibiotics, no growth on cultures yet  Bronchial hygiene  On Maxzide  On asa, plavix  Likely home soon    Case discussed with:  [x]Patient  []Family  [x]Nursing  []Case Management  DVT Prophylaxis:  []Lovenox  []Hep SQ  []SCDs  []Coumadin   []On Heparin gtt    Objective:   VS:   Visit Vitals    /75 (BP 1 Location: Left arm, BP Patient Position: At rest)    Pulse 86    Temp 99 °F (37.2 °C)    Resp 18    Ht 5' 5\" (1.651 m)    Wt 58.1 kg (128 lb)    SpO2 90%    Breastfeeding No    BMI 21.3 kg/m2      Tmax/24hrs: Temp (24hrs), Av.9 °F (37.2 °C), Min:98.2 °F (36.8 °C), Max:99.4 °F (37.4 °C)      Intake/Output Summary (Last 24 hours) at 17 0856  Last data filed at 17 0547   Gross per 24 hour   Intake             1000 ml   Output                0 ml   Net             1000 ml       General:  Awake, alert  Cardiovascular:  S1S2+, RRR  Pulmonary: coarse bs b/l  GI:  Soft, BS+, NT, ND  Extremities:  No edema  No rash      Labs:    Recent Results (from the past 24 hour(s))   GLUCOSE, POC    Collection Time: 17  3:48 PM   Result Value Ref Range    Glucose (POC) 84 70 - 110 mg/dL   C. DIFFICILE/EPI PCR    Collection Time: 17  4:03 PM   Result Value Ref Range    Special Requests: STOOL      Culture result:        Toxigenic C. difficile NEGATIVE                         C. difficile target DNA sequences are not detected.    Kenia Blackman    Collection Time: 17  4:43 AM   Result Value Ref Range    Vancomycin,trough 11.6 10.0 - 20.0 ug/mL    Reported dose date: 2017      Reported dose time:       Reported dose: 1000 MG UNITS       Signed By: Margy Jacques MD

## 2017-06-30 NOTE — ROUTINE PROCESS
Patient with [facility] blood sugar checks; patient refusing checks at this time. Per patient, she is not diabetic.  Amor Herman

## 2017-06-30 NOTE — HOME CARE
Rec HC order - d/c plan for Saturday - pt will need O2 - ordered from Kathleen He with [employer] - Office will deliver concentrator to pt home Saturday once notified - they can be reached at [phone] [facility] will follow for SN for disease and med mgmt for PNA - PNA protocol- family will transport pt home -  D Taran PALMER

## 2017-06-30 NOTE — PROGRESS NOTES
[facility] Hospitalist Group  Progress Note    Patient: Taylor Mckeon Age: 69 y.o. : 1950 MR#: [id number] SSN: [id number]  Date/Time:     Subjective:     Patient sitting in bed in NAD, awake, follows commands. O2 sats on RA with ambulation dropped down to 86%    Assessment/Plan:     1- Pneumonia  2- Sepsis at admission  3- Crohns disease  4- HTN  5- PAD    PLAN  On levaquin, follow cx  O2, Pulm consulted  Will need home O2  Bronchial hygiene  On Maxzide  On asa, plavix  Likely home soon  D/w Care manger to arrange home O2- orders placed   D/w patient and   D/w pulmonologist    Case discussed with:  [x]Patient  [x]Family  [x]Nursing  []Case Management  DVT Prophylaxis:  []Lovenox  []Hep SQ  []SCDs  []Coumadin   []On Heparin gtt    Objective:   VS:   Visit Vitals    /72 (BP 1 Location: Left arm, BP Patient Position: At rest)    Pulse (!) 106    Temp 99.6 °F (37.6 °C)    Resp 22    Ht 5' 5\" (1.651 m)    Wt 58.1 kg (128 lb)    SpO2 (!) 86%    Breastfeeding No    BMI 21.3 kg/m2      Tmax/24hrs: Temp (24hrs), Av.1 °F (37.3 °C), Min:98.3 °F (36.8 °C), Max:99.6 °F (37.6 °C)      Intake/Output Summary (Last 24 hours) at 17 1525  Last data filed at 17 1343   Gross per 24 hour   Intake             1130 ml   Output                0 ml   Net             1130 ml       General:  Awake, alert  Cardiovascular:  S1S2+, RRR  Pulmonary:  Coarse BS b/l  GI:  Soft, BS+, NT, ND  Extremities:  No edema        Labs:    Recent Results (from the past 24 hour(s))   GLUCOSE, POC    Collection Time: 17  3:48 PM   Result Value Ref Range    Glucose (POC) 84 70 - 110 mg/dL   C. DIFFICILE/EPI PCR    Collection Time: 17  4:03 PM   Result Value Ref Range    Special Requests: STOOL      Culture result:        Toxigenic C. difficile NEGATIVE                         C. difficile target DNA sequences are not detected.    Jacoby Riedel    Collection Time: 17 4:43 AM   Result Value Ref Range    Vancomycin,trough 11.6 10.0 - 20.0 ug/mL    Reported dose date: [id number]      Reported dose time: 1800      Reported dose: 1000 MG UNITS       Signed By: Jeferson Sinha MD

## 2017-06-30 NOTE — ROUTINE PROCESS
Bedside and Verbal shift change report given to Makayla Stapleton LPN (oncoming nurse) by Christin Mcgrath RN (offgoing nurse). Report included the following information SBAR, Kardex, Intake/Output, MAR and Recent Results.

## 2017-06-30 NOTE — ROUTINE PROCESS
1900 Assumed care of patient. Pt alert and oriented x 4. VSS. 2 PIV's CDI. NS @ 100 mL/hr maintenance fluids infusing. Pt in no distress on RA. No complaints of pain at this time. Bed in low locked position and call bell within reach. 2000  Shift assessment completed. Pt on tele box #41, NSR.    2100 Hourly rounding. Evening meds given. 2200- 0600 Hourly rounding. Pt reassessed, resting quietly with no c/o of pain at this time. Pt up to bathroom independently throughout night. 0700 Bedside shift change report given to Navid Mclean (oncoming nurse) by Dino Lemos RN (offgoing nurse). Report included the following information SBAR, Kardex, Intake/Output, MAR and Recent Results.

## 2017-06-30 NOTE — CONSULTS
[facility]  Pulmonary, Critical Care, and Sleep Medicine    Initial Patient Consult    Name: Urbano Chauhan MRN: [id number]   : 1950 Hospital: [address] [place] [address]    Date: 2017        IMPRESSION:   · Acute hypoxic respiratory failure due to multilobar pneumonia. · CT evidence of Emphysema  · Exercise hypoxemia requiring supplemental O2  · Interstitial infiltrate with nodularity in tree-in-bud pattern more consistent with inflammatory rather than neoplastic etiology  · Mediastinal lymphadenopathy probably reactive, will need follow up  · Crohn's disease  · Tobacco use  · HTN  · GERD      RECOMMENDATIONS:   · Pt responded to empiric antibiotic therapy, would continue but recommend D/c Vancomycin if no further evidence of MRSA  · Legionella and Mycoplasma titers sent. · Acute febrile illness with interstitial pneumonia, consider Influenza although not typically seen this time if the year  · Start supplemental O2 with activity. Will titrate off as tolerated on follow up visit  · Pt will benefit from baseline PFT's to be done in office  · Counseled on smoking cessation      Subjective: This patient has been seen and evaluated at the request of Dr. Seun White for hypoxemia. Patient is a 69 y.o. female PMH significant for Crohn's disease who presented with an acute febrile illness and worsening nonproductive cough with shortness of breath, malaise and prostration. CT done was negative for PE but showed a multilobar interstitial pneumonia. Pt responded to empiric therapy but was found to desaturate upon walking a short distance. She denies chest pain or hemoptysis. Shortness of breath is significantly improved. She denies neurologic Sx, nor has she noted GI Sx other than those typical for her Crohn's Dz.       Past Medical History:   Diagnosis Date    Arthritis     Claudication [facility])     left leg    [facility]hn's disease ([facility] [address]     GERD (gastroesophageal reflux disease)     HTN (hypertension)     Nausea & vomiting     Other and unspecified symptoms and signs involving general sensations and perceptions     PVD    Other ill-defined conditions     Crohn's    PVD (peripheral vascular disease) ([facility] [address]     right leg    Vitamin B12 deficiency       Past Surgical History:   Procedure Laterality Date    BYPASS GRAFT OTHR,AXILL-FEM Right 4/19/2013    BYPASS GRAFT OTHR,FEM-POP Left 5/2013    HX APPENDECTOMY      HX BREAST LUMPECTOMY      breast left    HX BUNIONECTOMY      left eye    HX CATARACT REMOVAL      bilateral    HX CHOLECYSTECTOMY      HX ORTHOPAEDIC      lateral epicondilitis on right    HX OTHER SURGICAL  3/7/2013    catheter into aorta    HX OTHER SURGICAL      intestional resection x4    HX OTHER SURGICAL  9/2013    I & D Right chest/flank wall abscess vs granuloma      Prior to Admission medications    Medication Sig Start Date End Date Taking? Authorizing Provider   clopidogrel (PLAVIX) 75 mg tab TAKE ONE TABLET BY MOUTH DAILY 5/30/17  Yes Kristina Mayer MD   DULoxetine (CYMBALTA) 60 mg capsule Take 60 mg by mouth daily. Yes Historical Provider   pregabalin (LYRICA) 75 mg capsule Take 1 Cap by mouth two (2) times a day. Max Daily Amount: 150 mg. Patient taking differently: Take 150 mg by mouth two (2) times a day. 5/19/16  Yes [address] [place] [address]   aspirin 81 mg tablet Take 81 mg by mouth daily. Yes Historical Provider   cyanocobalamin (VITAMIN B-12) 1,000 mcg/mL injection 1,000 mcg by IntraMUSCular route every fourteen (14) days. Yes Historical Provider   dicyclomine (BENTYL) 10 mg capsule Take 10 mg by mouth two (2) times a day. Yes Historical Provider   ipratropium-albuterol (COMBIVENT)  mcg/actuation inhaler Take 2 Puffs by inhalation nightly as needed. Yes Historical Provider   fluticasone (FLONASE) 50 mcg/actuation nasal spray 2 Sprays by Both Nostrils route nightly.    Yes Historical Provider   triamterene-hydrochlorothiazide (DYAZIDE) 37.5-25 mg per capsule Take 1 Cap by mouth daily. Yes Historical Provider   bimatoprost (LUMIGAN) 0.03 % ophthalmic drops Administer 1 Drop to both eyes every evening. Yes Historical Provider   atropine-PHENobarbital-scopolamine-hyoscyamine [facility]) 16.2-0.1037 -0.0194 mg per tablet Take 1 Tab by mouth as needed. Yes Historical Provider   inFLIXimab (REMICADE) 100 mg injection 5 mg/kg by IntraVENous route once. Every 8 weeks   Yes Historical Provider   diphenhydrAMINE (BENADRYL) 25 mg capsule Take 25 mg by mouth every six (6) hours as needed. Historical Provider   VOLTAREN 1 % topical gel APPLY 4 GRAMS TO AFFECTED AREA FOUR TIMES DAILY. APPLY TO LEFT FOOT FOUR TIMES DAILY 8/5/15   Aubrie Laureano MD   ALPRAZolam [facility]) 1 mg tablet Take 0.5 mg by mouth nightly as needed for Anxiety. Historical Provider   promethazine (PHENERGAN) 12.5 mg tablet Take 12.5 mg by mouth. Takes 6.25 mg in the morning and 12.5 mg at night    Historical Provider   loperamide (IMMODIUM) 2 mg tablet Take 2 mg by mouth daily. Historical Provider   predniSONE (DELTASONE) 5 mg tablet Take 30 mg by mouth as needed.     Historical Provider     Allergies   Allergen Reactions    Codeine Nausea and Vomiting     Other reaction(s): other/intolerance    Darvon [Propoxyphene] Itching    Demerol [Meperidine] Other (comments)     Halucinations    Keflex [Cephalexin] Diarrhea    Talwin [Pentazocine Lactate] Shortness of Breath and Nausea and Vomiting      Social History   Substance Use Topics    Smoking status: Current Every Day Smoker     Packs/day: 1.50     Years: 45.00    Smokeless tobacco: Never Used      Comment: second hand smoke exposure prior to active smoking    Alcohol use No      Family History   Problem Relation Age of Onset    Coronary Artery Disease Mother     Heart Attack Mother     Heart Surgery Mother      CABGx3    Elevated Lipids Mother     COPD Father     Heart Attack Brother     COPD Brother     Other Brother PAD        Current Facility-Administered Medications   Medication Dose Route Frequency    levoFLOXacin (LEVAQUIN) 750 mg in D5W IVPB  750 mg IntraVENous Q24H    vancomycin (VANCOCIN) 1,000 mg in 0.9% sodium chloride (MBP/ADV) 250 mL adv  1,000 mg IntraVENous Q12H    pregabalin (LYRICA) capsule 200 mg  200 mg Oral BID    nicotine (NICODERM CQ) 21 mg/24 hr patch 1 Patch  1 Patch TransDERmal DAILY    aspirin delayed-release tablet 81 mg  81 mg Oral DAILY    bimatoprost (LUMIGAN) 0.03 % ophthalmic drops 1 Drop  1 Drop Ophthalmic QPM    clopidogrel (PLAVIX) tablet 75 mg  75 mg Oral DAILY    dicyclomine (BENTYL) capsule 10 mg  10 mg Oral BID    DULoxetine (CYMBALTA) capsule 60 mg  60 mg Oral DAILY    fluticasone (FLONASE) 50 mcg/actuation nasal spray 2 Spray  2 Spray Both Nostrils QHS    triamterene-hydroCHLOROthiazide (MAXZIDE) 37.5-25 mg per tablet 1 Tab  1 Tab Oral DAILY    sodium chloride (NS) flush 5-10 mL  5-10 mL IntraVENous Q8H       Review of Systems:  Pertinent items are noted in HPI. Objective:   Vital Signs:    Visit Vitals    /66 (BP 1 Location: Left arm, BP Patient Position: At rest)    Pulse 83    Temp 99.3 °F (37.4 °C)    Resp 18    Ht 5' 5\" (1.651 m)    Wt 58.1 kg (128 lb)    SpO2 92%    Breastfeeding No    BMI 21.3 kg/m2       O2 Device: Room air       Temp (24hrs), Av.3 °F (37.4 °C), Min:99 °F (37.2 °C), Max:99.6 °F (37.6 °C)       Intake/Output:   Last shift:       07 -  190  In: 490 [P.O.:490]  Out: -   Last 3 shifts: 1901 -  0700  In: 1000 [P.O.:600; I.V.:400]  Out: -     Intake/Output Summary (Last 24 hours) at 17 1741  Last data filed at 17 1343   Gross per 24 hour   Intake             1130 ml   Output                0 ml   Net             1130 ml      Physical Exam:   General:  Alert, cooperative, no distress, appears stated age. Head:  Normocephalic, without obvious abnormality, atraumatic.    Eyes:  Conjunctivae/corneas clear. PERRL, EOMs intact. Nose: Nares normal.  Mucosa normal. No drainage or sinus tenderness. Throat: Lips, mucosa, and tongue normal. Teeth and gums normal.   Neck: Supple, symmetrical, trachea midline, no adenopathy, thyroid: no enlargment/tenderness/nodules    Back:   Symmetric    Lungs:   Bibasilar crackles without egophony, bilateral mid lung field squeaks and wheezes   Chest wall:  No tenderness or deformity. Heart:  Regular rate and rhythm, S1, S2 normal, no murmur, click, rub or gallop. Abdomen:   Soft, non-tender. Bowel sounds normal. No masses,  No organomegaly. Extremities: Extremities normal, atraumatic, no cyanosis or edema. Pulses: 2+ and symmetric all extremities. Skin: Skin color, texture, turgor normal. No rashes or lesions   Lymph nodes: Cervical,  nodes normal.   Neurologic: Grossly nonfocal     Data review:     Recent Results (from the past 24 hour(s))   VANCOMYCIN, TROUGH    Collection Time: 06/30/17  4:43 AM   Result Value Ref Range    Vancomycin,trough 11.6 10.0 - 20.0 ug/mL    Reported dose date: 20170629      Reported dose time: 1800      Reported dose: 1000 MG UNITS       Imaging:  I have personally reviewed the patients radiographs and have reviewed the reports:  XR Results (most recent):    Results from Hospital Encounter encounter on 06/27/17   XR CHEST PORT   Narrative EXAM: Chest Radiograph    INDICATION: Shortness of breath and dizziness    TECHNIQUE: AP view of the chest    COMPARISON: 3/29/2013, 2/18/2012 and 5/23/2008    FINDINGS: No pneumothorax identified. Interstitial markings are noted  bilaterally. These are increased. No effusions identified. The  cardiomediastinal silhouette is unremarkable. There is cephalization of the  pulmonary vasculature. The osseous structures are unremarkable. Impression Impression:  1. Findings concerning for mild interstitial pulmonary edema.          CT Results (most recent):    Results from [facility] encounter on 06/27/17   CTA CHEST W OR W WO CONT   Narrative CTA Chest With Contrast Pulmonary Arterial Exam With Maximum Intensity  Projections (MIPs)    CPT CODE: 43594    INDICATION: Sudden onset of exertional dyspnea x5 days associated with  productive cough and fever , Chest pain, acute, pulmonary embolism (PE)  suspected. coronal and sagittal MIPs  were obtained to better evaluate the pulmonary  arteries in a three dimensional angiographic method to evaluate for possible  pulmonary emboli    COMPARISON: None. TECHNIQUE: Initial localizing images were obtained without intravenous contrast.  Standard helical images were obtained from the thoracic inlet through the  adrenals at 2.5 mm thick sections following the intravenous injection of a bolus  of 100 cc nonionic contrast.   All CT scans at this facility are performed using  dose optimization technique as appropriate to a performed exam, to include  automated exposure control, adjustment of the mA and/or kV according to  patient's size (including appropriate matching for site-specific examinations),  or use of iterative reconstruction technique. Images were reviewed on both soft tissue, lung, and bone window settings. FINDINGS:     The quality of opacification of the pulmonary arteries is excellent. There are  no filling defects . There multiple enlarged middle mediastinal nodes. Left paratracheal node at the  AP window measures 1.8 x 1.5 cm. Image 76 series 2. Note the left of the jack  measures 1.5 x 1.4 cm, image 85. Right hilar node measures 3.1 x 1.6 cm, image  103. Left hilar node measures 1.5 x 1.4 cm. Infracarinal node measures 2.5 x 1.4  cm. The lungs are emphysematous appearing. There are fuzzy clustered nodules in the  peribronchial regions with tree-in-bud appearance within the right upper lobe,  lingula, superior segment of the right lower lobe, left lower lobe, and medial  right middle lobe.   The great vessels and thoracic aorta are unremarkable. There are no pleural effusions. The lungs are clear. The included portion of the of the liver is unremarkable. Micronodularity of the  left adrenal gland is nonspecific. The chest wall soft tissues are unremarkable. Reconstructions: Coronal and sagittal MIPs ( maximum intensity projections) were  obtained from the axial acquisition. The pulmonary arteries branch normally. There are no filling defects. Impression IMPRESSION:    No evidence of PE. Multilobar pneumonia. Bilateral hilar and mediastinal adenopathy nonspecific. This is in the  clinically in determinate size category and may be reactive/post infectious  inflammatory or neoplastic. Follow-up is recommended. Report provided to the emergency department at 2144 hrs.               Heydi Mccoy MD

## 2017-07-01 VITALS
WEIGHT: 128 LBS | SYSTOLIC BLOOD PRESSURE: 126 MMHG | OXYGEN SATURATION: 93 % | BODY MASS INDEX: 21.33 KG/M2 | DIASTOLIC BLOOD PRESSURE: 66 MMHG | HEART RATE: 84 BPM | RESPIRATION RATE: 20 BRPM | HEIGHT: 65 IN | TEMPERATURE: 98.2 F

## 2017-07-01 LAB
ANION GAP BLD CALC-SCNC: 8 MMOL/L (ref 3–18)
BASOPHILS # BLD AUTO: 0 K/UL (ref 0–0.06)
BASOPHILS # BLD: 0 % (ref 0–3)
BUN SERPL-MCNC: 4 MG/DL (ref 7–18)
BUN/CREAT SERPL: 6 (ref 12–20)
CALCIUM SERPL-MCNC: 9.2 MG/DL (ref 8.5–10.1)
CHLORIDE SERPL-SCNC: 103 MMOL/L (ref 100–108)
CO2 SERPL-SCNC: 29 MMOL/L (ref 21–32)
CREAT SERPL-MCNC: 0.62 MG/DL (ref 0.6–1.3)
DIFFERENTIAL METHOD BLD: ABNORMAL
EOSINOPHIL # BLD: 0.1 K/UL (ref 0–0.4)
EOSINOPHIL NFR BLD: 1 % (ref 0–5)
ERYTHROCYTE [DISTWIDTH] IN BLOOD BY AUTOMATED COUNT: 14.4 % (ref 11.6–14.5)
GLUCOSE BLD STRIP.AUTO-MCNC: 120 MG/DL (ref 70–110)
GLUCOSE SERPL-MCNC: 95 MG/DL (ref 74–99)
HCT VFR BLD AUTO: 31 % (ref 35–45)
HGB BLD-MCNC: 10.3 G/DL (ref 12–16)
LYMPHOCYTES # BLD AUTO: 20 % (ref 20–51)
LYMPHOCYTES # BLD: 2.1 K/UL (ref 0.8–3.5)
MCH RBC QN AUTO: 29.4 PG (ref 24–34)
MCHC RBC AUTO-ENTMCNC: 33.2 G/DL (ref 31–37)
MCV RBC AUTO: 88.6 FL (ref 74–97)
MONOCYTES # BLD: 1.2 K/UL (ref 0–1)
MONOCYTES NFR BLD AUTO: 11 % (ref 2–9)
NEUTS BAND NFR BLD MANUAL: 7 % (ref 0–5)
NEUTS SEG # BLD: 7.2 K/UL (ref 1.8–8)
NEUTS SEG NFR BLD AUTO: 61 % (ref 42–75)
PLATELET # BLD AUTO: 451 K/UL (ref 135–420)
PLATELET COMMENTS,PCOM: ABNORMAL
PMV BLD AUTO: 9.6 FL (ref 9.2–11.8)
POTASSIUM SERPL-SCNC: 2.9 MMOL/L (ref 3.5–5.5)
RBC # BLD AUTO: 3.5 M/UL (ref 4.2–5.3)
RBC MORPH BLD: ABNORMAL
SODIUM SERPL-SCNC: 140 MMOL/L (ref 136–145)
WBC # BLD AUTO: 10.6 K/UL (ref 4.6–13.2)

## 2017-07-01 PROCEDURE — 82962 GLUCOSE BLOOD TEST: CPT

## 2017-07-01 PROCEDURE — 74011250637 HC RX REV CODE- 250/637: Performed by: EMERGENCY MEDICINE

## 2017-07-01 PROCEDURE — 85025 COMPLETE CBC W/AUTO DIFF WBC: CPT | Performed by: EMERGENCY MEDICINE

## 2017-07-01 PROCEDURE — 74011250636 HC RX REV CODE- 250/636: Performed by: INTERNAL MEDICINE

## 2017-07-01 PROCEDURE — 80048 BASIC METABOLIC PNL TOTAL CA: CPT | Performed by: EMERGENCY MEDICINE

## 2017-07-01 PROCEDURE — 74011250637 HC RX REV CODE- 250/637: Performed by: INTERNAL MEDICINE

## 2017-07-01 PROCEDURE — 36415 COLL VENOUS BLD VENIPUNCTURE: CPT | Performed by: EMERGENCY MEDICINE

## 2017-07-01 RX ORDER — LEVOFLOXACIN 750 MG/1
750 TABLET ORAL
Qty: 3 TAB | Refills: 0 | Status: SHIPPED | OUTPATIENT
Start: 2017-07-02 | End: 2017-07-05

## 2017-07-01 RX ORDER — UREA 10 %
2 LOTION (ML) TOPICAL 2 TIMES DAILY
Status: DISCONTINUED | OUTPATIENT
Start: 2017-07-01 | End: 2017-07-01 | Stop reason: HOSPADM

## 2017-07-01 RX ORDER — POTASSIUM CHLORIDE 1.5 G/1.77G
40 POWDER, FOR SOLUTION ORAL
Status: COMPLETED | OUTPATIENT
Start: 2017-07-01 | End: 2017-07-01

## 2017-07-01 RX ORDER — LEVOFLOXACIN 750 MG/1
750 TABLET ORAL
Status: DISCONTINUED | OUTPATIENT
Start: 2017-07-02 | End: 2017-07-01 | Stop reason: HOSPADM

## 2017-07-01 RX ORDER — POTASSIUM CHLORIDE 1.5 G/1.77G
20 POWDER, FOR SOLUTION ORAL DAILY
Qty: 3 PACKET | Refills: 0 | Status: SHIPPED | OUTPATIENT
Start: 2017-07-01 | End: 2017-07-04

## 2017-07-01 RX ORDER — POTASSIUM CHLORIDE 1.5 G/1.77G
40 POWDER, FOR SOLUTION ORAL EVERY 4 HOURS
Status: DISCONTINUED | OUTPATIENT
Start: 2017-07-01 | End: 2017-07-01 | Stop reason: HOSPADM

## 2017-07-01 RX ORDER — UREA 10 %
2 LOTION (ML) TOPICAL 2 TIMES DAILY
Qty: 20 TAB | Refills: 0 | Status: SHIPPED | OUTPATIENT
Start: 2017-07-01 | End: 2018-04-24

## 2017-07-01 RX ADMIN — PREGABALIN 200 MG: 75 CAPSULE ORAL at 08:22

## 2017-07-01 RX ADMIN — Medication 10 ML: at 13:25

## 2017-07-01 RX ADMIN — LOPERAMIDE HYDROCHLORIDE 2 MG: 2 CAPSULE ORAL at 08:29

## 2017-07-01 RX ADMIN — TRIAMTERENE AND HYDROCHLOROTHIAZIDE 1 TABLET: 37.5; 25 TABLET ORAL at 08:21

## 2017-07-01 RX ADMIN — DICYCLOMINE HYDROCHLORIDE 10 MG: 10 CAPSULE ORAL at 08:22

## 2017-07-01 RX ADMIN — ASPIRIN 81 MG: 81 TABLET, COATED ORAL at 08:22

## 2017-07-01 RX ADMIN — SODIUM CHLORIDE 1000 MG: 900 INJECTION, SOLUTION INTRAVENOUS at 05:05

## 2017-07-01 RX ADMIN — POTASSIUM CHLORIDE 40 MEQ: 1.5 POWDER, FOR SOLUTION ORAL at 11:23

## 2017-07-01 RX ADMIN — CLOPIDOGREL BISULFATE 75 MG: 75 TABLET, FILM COATED ORAL at 08:22

## 2017-07-01 RX ADMIN — Medication 10 ML: at 05:05

## 2017-07-01 RX ADMIN — POTASSIUM CHLORIDE 40 MEQ: 1.5 POWDER, FOR SOLUTION ORAL at 06:53

## 2017-07-01 NOTE — ROUTINE PROCESS
Bedside shift change report given to Navid N Joan Mclean (oncoming nurse) by Dipti King LPN (offgoing nurse).  Report included the following information SBAR, Kardex, Intake/Output, MAR, Recent Results (potassium 2.9) and Cardiac Rhythm SR.

## 2017-07-01 NOTE — ROUTINE PROCESS
Discharge instructions given to patient. No questions at this time. Patient verbalized understanding of all instructions. Belongings and prescriptions packed and sent with patient and . Patient discharged at this time via personal vehicle with  driving.  Jocelyne Boyce

## 2017-07-01 NOTE — CONSULTS
[facility]  Pulmonary, Critical Care, and Sleep Medicine    Initial Patient Consult    Name: Desiree Armstrong MRN: [id number]   : 1950 Hospital: [facility]   Date: 2017        IMPRESSION:   · Acute hypoxic respiratory failure due to multilobar pneumonia. · CT evidence of Emphysema  · Exercise hypoxemia requiring supplemental O2  · Interstitial infiltrate with nodularity in tree-in-bud pattern more consistent with inflammatory rather than neoplastic etiology  · Mediastinal lymphadenopathy probably reactive, will need follow up  · Hypokalemia  · Crohn's disease  · Tobacco use  · HTN  · GERD      RECOMMENDATIONS:   · Pt responded to empiric antibiotic therapy, would continue but recommend D/c Vancomycin as there is no further evidence of MRSA  · Will complete treatment with Levaquin 750 mg daily X 7 days total   · Legionella ( negative) and Mycoplasma titers sent. · Will need follow up to complete resolution of imaging abnormalities  · Assess need for supplemental O2 with activity. Will titrate off as tolerated on follow up visit  · Pt will benefit from baseline PFT's to be done in office  · Counseled on smoking cessation   · Replace K  · Will follow up in clinic- 7-10 days     Subjective:     17   Feels better but still very fatigued with going to bathroom- admits to rushing. SaO2 decreased to 90% after walking  C/o cough- less Using Lung Flute  Denies chest pain  K - low this am,  No new complaints- anxious to go home    HPI:  This patient has been seen and evaluated at the request of Dr. Loyda Mares for hypoxemia. Patient is a 69 y.o. female PMH significant for Crohn's disease who presented with an acute febrile illness and worsening nonproductive cough with shortness of breath, malaise and prostration. CT done was negative for PE but showed a multilobar interstitial pneumonia.  Pt responded to empiric therapy but was found to desaturate upon walking a short distance. She denies chest pain or hemoptysis. Shortness of breath is significantly improved. She denies neurologic Sx, nor has she noted GI Sx other than those typical for her Crohn's Dz. Past Medical History:   Diagnosis Date    Arthritis     Claudication [facility])     left leg    Crohn's disease ([facility] [address]     GERD (gastroesophageal reflux disease)     HTN (hypertension)     Nausea & vomiting     Other and unspecified symptoms and signs involving general sensations and perceptions     PVD    Other ill-defined conditions     Crohn's    PVD (peripheral vascular disease) ([facility] [address]     right leg    Vitamin B12 deficiency       Past Surgical History:   Procedure Laterality Date    BYPASS GRAFT OTHR,AXILL-FEM Right 4/19/2013    BYPASS GRAFT OTHR,FEM-POP Left 5/2013    HX APPENDECTOMY      HX BREAST LUMPECTOMY      breast left    HX BUNIONECTOMY      left eye    HX CATARACT REMOVAL      bilateral    HX CHOLECYSTECTOMY      HX ORTHOPAEDIC      lateral epicondilitis on right    HX OTHER SURGICAL  3/7/2013    catheter into aorta    HX OTHER SURGICAL      intestional resection x4    HX OTHER SURGICAL  9/2013    I & D Right chest/flank wall abscess vs granuloma      Prior to Admission medications    Medication Sig Start Date End Date Taking? Authorizing Provider   clopidogrel (PLAVIX) 75 mg tab TAKE ONE TABLET BY MOUTH DAILY 5/30/17  Yes Avery Ann MD   DULoxetine (CYMBALTA) 60 mg capsule Take 60 mg by mouth daily. Yes Historical Provider   pregabalin (LYRICA) 75 mg capsule Take 1 Cap by mouth two (2) times a day. Max Daily Amount: 150 mg. Patient taking differently: Take 150 mg by mouth two (2) times a day. 5/19/16  Yes [place] [place]   aspirin 81 mg tablet Take 81 mg by mouth daily. Yes Historical Provider   cyanocobalamin (VITAMIN B-12) 1,000 mcg/mL injection 1,000 mcg by IntraMUSCular route every fourteen (14) days.    Yes Historical Provider   dicyclomine (BENTYL) 10 mg capsule Take 10 mg by mouth two (2) times a day. Yes Historical Provider   ipratropium-albuterol (COMBIVENT)  mcg/actuation inhaler Take 2 Puffs by inhalation nightly as needed. Yes Historical Provider   fluticasone (FLONASE) 50 mcg/actuation nasal spray 2 Sprays by Both Nostrils route nightly. Yes Historical Provider   triamterene-hydrochlorothiazide (DYAZIDE) 37.5-25 mg per capsule Take 1 Cap by mouth daily. Yes Historical Provider   bimatoprost (LUMIGAN) 0.03 % ophthalmic drops Administer 1 Drop to both eyes every evening. Yes Historical Provider   atropine-PHENobarbital-scopolamine-hyoscyamine [facility]) 16.2-0.1037 -0.0194 mg per tablet Take 1 Tab by mouth as needed. Yes Historical Provider   inFLIXimab (REMICADE) 100 mg injection 5 mg/kg by IntraVENous route once. Every 8 weeks   Yes Historical Provider   diphenhydrAMINE (BENADRYL) 25 mg capsule Take 25 mg by mouth every six (6) hours as needed. Historical Provider   VOLTAREN 1 % topical gel APPLY 4 GRAMS TO AFFECTED AREA FOUR TIMES DAILY. APPLY TO LEFT FOOT FOUR TIMES DAILY 8/5/15   Chika Ahn MD   ALPRAZolam Margorie Clunes) 1 mg tablet Take 0.5 mg by mouth nightly as needed for Anxiety. Historical Provider   promethazine (PHENERGAN) 12.5 mg tablet Take 12.5 mg by mouth. Takes 6.25 mg in the morning and 12.5 mg at night    Historical Provider   loperamide (IMMODIUM) 2 mg tablet Take 2 mg by mouth daily. Historical Provider   predniSONE (DELTASONE) 5 mg tablet Take 30 mg by mouth as needed.     Historical Provider     Allergies   Allergen Reactions    Codeine Nausea and Vomiting     Other reaction(s): other/intolerance    Darvon [Propoxyphene] Itching    Demerol [Meperidine] Other (comments)     Halucinations    Keflex [Cephalexin] Diarrhea    Talwin [Pentazocine Lactate] Shortness of Breath and Nausea and Vomiting        Current Facility-Administered Medications   Medication Dose Route Frequency    potassium chloride (KAON 10%) 20 mEq/15 mL oral liquid 40 mEq  40 mEq Oral Q4H    [START ON 2017] levoFLOXacin (LEVAQUIN) tablet 750 mg  750 mg Oral ACB    pregabalin (LYRICA) capsule 200 mg  200 mg Oral BID    nicotine (NICODERM CQ) 21 mg/24 hr patch 1 Patch  1 Patch TransDERmal DAILY    aspirin delayed-release tablet 81 mg  81 mg Oral DAILY    bimatoprost (LUMIGAN) 0.03 % ophthalmic drops 1 Drop  1 Drop Ophthalmic QPM    clopidogrel (PLAVIX) tablet 75 mg  75 mg Oral DAILY    dicyclomine (BENTYL) capsule 10 mg  10 mg Oral BID    DULoxetine (CYMBALTA) capsule 60 mg  60 mg Oral DAILY    fluticasone (FLONASE) 50 mcg/actuation nasal spray 2 Spray  2 Spray Both Nostrils QHS    triamterene-hydroCHLOROthiazide (MAXZIDE) 37.5-25 mg per tablet 1 Tab  1 Tab Oral DAILY    sodium chloride (NS) flush 5-10 mL  5-10 mL IntraVENous Q8H       Review of Systems:  Pertinent items are noted in HPI. Objective:   Vital Signs:    Visit Vitals    /72 (BP 1 Location: Left arm, BP Patient Position: Supine)    Pulse 75    Temp 98.2 °F (36.8 °C)    Resp 20    Ht 5' 5\" (1.651 m)    Wt 58.1 kg (128 lb)    SpO2 97%    Breastfeeding No    BMI 21.3 kg/m2       O2 Device: Nasal cannula   O2 Flow Rate (L/min): 2 l/min   Temp (24hrs), Av.7 °F (37.1 °C), Min:98.1 °F (36.7 °C), Max:99.6 °F (37.6 °C)       Intake/Output:   Last shift:      701 - 1900  In: 240 [P.O.:240]  Out: -   Last 3 shifts: 1901 -  07  In: 6735 [P.O.:730; I.V.:400]  Out: 765 [Urine:765]    Intake/Output Summary (Last 24 hours) at 17 1030  Last data filed at 17 0900   Gross per 24 hour   Intake              720 ml   Output              765 ml   Net              -45 ml      Physical Exam:   General:  Alert, cooperative, no distress, appears stated age. Head:  Normocephalic, without obvious abnormality, atraumatic. Eyes:  Conjunctivae/corneas clear. PERRL, EOMs intact. Nose: Nares normal.  Mucosa normal. No drainage or sinus tenderness.    Throat: Lips, mucosa, and tongue normal. Teeth and gums normal.   Neck: Supple, symmetrical, trachea midline, no adenopathy, thyroid: no enlargment/tenderness/nodules    Back:   Symmetric    Lungs:   Bibasilar crackles without egophony, bilateral mid lung field squeaks and wheezes   Chest wall:  No tenderness or deformity. Heart:  Regular rate and rhythm, S1, S2 normal, no murmur, click, rub or gallop. Abdomen:   Soft, non-tender. Bowel sounds normal. No masses,  No organomegaly. Extremities: Extremities normal, atraumatic, no cyanosis or edema. Pulses: 2+ and symmetric all extremities. Skin: Skin color, texture, turgor normal. No rashes or lesions   Lymph nodes: Cervical,  nodes normal.   Neurologic: Grossly nonfocal     Data review:     Recent Results (from the past 24 hour(s))   LEGIONELLA PNEUMOPHILA AG, URINE     Collection Time: 06/30/17  6:05 PM   Result Value Ref Range    Legionella Ag, urine NEGATIVE  NEG     CBC WITH AUTOMATED DIFF    Collection Time: 07/01/17  3:10 AM   Result Value Ref Range    WBC 10.6 4.6 - 13.2 K/uL    RBC 3.50 (L) 4.20 - 5.30 M/uL    HGB 10.3 (L) 12.0 - 16.0 g/dL    HCT 31.0 (L) 35.0 - 45.0 %    MCV 88.6 74.0 - 97.0 FL    MCH 29.4 24.0 - 34.0 PG    MCHC 33.2 31.0 - 37.0 g/dL    RDW 14.4 11.6 - 14.5 %    PLATELET 167 (H) 460 - 420 K/uL    MPV 9.6 9.2 - 11.8 FL    NEUTROPHILS 61 42 - 75 %    BAND NEUTROPHILS 7 (H) 0 - 5 %    LYMPHOCYTES 20 20 - 51 %    MONOCYTES 11 (H) 2 - 9 %    EOSINOPHILS 1 0 - 5 %    BASOPHILS 0 0 - 3 %    ABS. NEUTROPHILS 7.2 1.8 - 8.0 K/UL    ABS. LYMPHOCYTES 2.1 0.8 - 3.5 K/UL    ABS. MONOCYTES 1.2 (H) 0 - 1.0 K/UL    ABS. EOSINOPHILS 0.1 0.0 - 0.4 K/UL    ABS.  BASOPHILS 0.0 0.0 - 0.06 K/UL    DF MANUAL      PLATELET COMMENTS INCREASED PLATELETS      RBC COMMENTS NORMOCYTIC, NORMOCHROMIC     METABOLIC PANEL, BASIC    Collection Time: 07/01/17  3:10 AM   Result Value Ref Range    Sodium 140 136 - 145 mmol/L    Potassium 2.9 (LL) 3.5 - 5.5 mmol/L    Chloride 103 100 - 108 mmol/L    CO2 29 21 - 32 mmol/L    Anion gap 8 3.0 - 18 mmol/L    Glucose 95 74 - 99 mg/dL    BUN 4 (L) 7.0 - 18 MG/DL    Creatinine 0.62 0.6 - 1.3 MG/DL    BUN/Creatinine ratio 6 (L) 12 - 20      GFR est AA >60 >60 ml/min/1.73m2    GFR est non-AA >60 >60 ml/min/1.73m2    Calcium 9.2 8.5 - 10.1 MG/DL       Imaging:  I have personally reviewed the patients radiographs and have reviewed the reports:  XR Results (most recent):    Results from Hospital Encounter encounter on 06/27/17   XR CHEST PORT   Narrative EXAM: Chest Radiograph    INDICATION: Shortness of breath and dizziness    TECHNIQUE: AP view of the chest    COMPARISON: 3/29/2013, 2/18/2012 and 5/23/2008    FINDINGS: No pneumothorax identified. Interstitial markings are noted  bilaterally. These are increased. No effusions identified. The  cardiomediastinal silhouette is unremarkable. There is cephalization of the  pulmonary vasculature. The osseous structures are unremarkable. Impression Impression:  1. Findings concerning for mild interstitial pulmonary edema. CT Results (most recent):    Results from Hospital Encounter encounter on 06/27/17   CTA CHEST W OR W WO CONT   Narrative CTA Chest With Contrast Pulmonary Arterial Exam With Maximum Intensity  Projections (MIPs)    CPT CODE: 19002    INDICATION: Sudden onset of exertional dyspnea x5 days associated with  productive cough and fever , Chest pain, acute, pulmonary embolism (PE)  suspected. coronal and sagittal MIPs  were obtained to better evaluate the pulmonary  arteries in a three dimensional angiographic method to evaluate for possible  pulmonary emboli    COMPARISON: None.     TECHNIQUE: Initial localizing images were obtained without intravenous contrast.  Standard helical images were obtained from the thoracic inlet through the  adrenals at 2.5 mm thick sections following the intravenous injection of a bolus  of 100 cc nonionic contrast.   All CT scans at this facility are performed using  dose optimization technique as appropriate to a performed exam, to include  automated exposure control, adjustment of the mA and/or kV according to  patient's size (including appropriate matching for site-specific examinations),  or use of iterative reconstruction technique. Images were reviewed on both soft tissue, lung, and bone window settings. FINDINGS:     The quality of opacification of the pulmonary arteries is excellent. There are  no filling defects . There multiple enlarged middle mediastinal nodes. Left paratracheal node at the  AP window measures 1.8 x 1.5 cm. Image 76 series 2. Note the left of the jack  measures 1.5 x 1.4 cm, image 85. Right hilar node measures 3.1 x 1.6 cm, image  103. Left hilar node measures 1.5 x 1.4 cm. Infracarinal node measures 2.5 x 1.4  cm. The lungs are emphysematous appearing. There are fuzzy clustered nodules in the  peribronchial regions with tree-in-bud appearance within the right upper lobe,  lingula, superior segment of the right lower lobe, left lower lobe, and medial  right middle lobe. The great vessels and thoracic aorta are unremarkable. There are no pleural effusions. The lungs are clear. The included portion of the of the liver is unremarkable. Micronodularity of the  left adrenal gland is nonspecific. The chest wall soft tissues are unremarkable. Reconstructions: Coronal and sagittal MIPs ( maximum intensity projections) were  obtained from the axial acquisition. The pulmonary arteries branch normally. There are no filling defects. Impression IMPRESSION:    No evidence of PE. Multilobar pneumonia. Bilateral hilar and mediastinal adenopathy nonspecific. This is in the  clinically in determinate size category and may be reactive/post infectious  inflammatory or neoplastic. Follow-up is recommended. Report provided to the emergency department at 2144 hrs.               Jerome Vargas MD

## 2017-07-01 NOTE — PROGRESS NOTES
Care Management Interventions  PCP Verified by CM: Yes  Last Visit to PCP: 06/27/17  Transition of Care Consult (CM Consult): Discharge Planning  Discharge Durable Medical Equipment: Yes (Home oxygen)  Physical Therapy Consult: Yes  Occupational Therapy Consult: Yes  Current Support Network: Lives with Spouse, Family Lives Nearby  Confirm Follow Up Transport: Family  Plan discussed with Pt/Family/Caregiver: Yes  Freedom of Choice Offered: Yes  Discharge Location  Discharge Placement: Home with home health     Pt is being discharged home with home health. [facility] was chosen by pt and [facility] was contacted and informed of pt's discharge. Information refaxed to [facility]. [facility] [place]) shares that DME will be dispatched for delivery to pt's home.

## 2017-07-01 NOTE — ROUTINE PROCESS
Pt refuse Blood glucose. \"Im not even diabetic, there is no need for you to take my Blood sugar\" Blood glucose this am 105 via chemistry that was drawn. Message left for Md to reevaluate to see if it is necessary to continue with orders for blood glucose at this time.

## 2017-07-01 NOTE — DISCHARGE INSTRUCTIONS
DISCHARGE SUMMARY from Nurse    The following personal items are in your possession at time of discharge:    Dental Appliances: None, Partials, Other (comment) (top and implants )  Visual Aid: None     Home Medications: None  Jewelry: Ring, Earrings, Watch  Clothing: Slippers, Undergarments, With patient, Shirt  Other Valuables: Cell Phone  Personal Items Sent to Safe: non          PATIENT INSTRUCTIONS:    After general anesthesia or intravenous sedation, for 24 hours or while taking prescription Narcotics:  · Limit your activities  · Do not drive and operate hazardous machinery  · Do not make important personal or business decisions  · Do  not drink alcoholic beverages  · If you have not urinated within 8 hours after discharge, please contact your surgeon on call. Report the following to your surgeon:  · Excessive pain, swelling, redness or odor of or around the surgical area  · Temperature over 100.5  · Nausea and vomiting lasting longer than 4 hours or if unable to take medications  · Any signs of decreased circulation or nerve impairment to extremity: change in color, persistent  numbness, tingling, coldness or increase pain  · Any questions        What to do at Home:  Recommended activity: Activity as tolerated. If you experience any of the following symptoms shortness of breath with exertion or at rest, decrease in energy, inability to catch breath, please follow up with your primary care provider. *  Please give a list of your current medications to your Primary Care Provider. *  Please update this list whenever your medications are discontinued, doses are      changed, or new medications (including over-the-counter products) are added. *  Please carry medication information at all times in case of emergency situations.           These are general instructions for a healthy lifestyle:    No smoking/ No tobacco products/ Avoid exposure to second hand smoke    Surgeon General's Warning: Quitting smoking now greatly reduces serious risk to your health. Obesity, smoking, and sedentary lifestyle greatly increases your risk for illness    A healthy diet, regular physical exercise & weight monitoring are important for maintaining a healthy lifestyle    You may be retaining fluid if you have a history of heart failure or if you experience any of the following symptoms:  Weight gain of 3 pounds or more overnight or 5 pounds in a week, increased swelling in our hands or feet or shortness of breath while lying flat in bed. Please call your doctor as soon as you notice any of these symptoms; do not wait until your next office visit. Recognize signs and symptoms of STROKE:    F-face looks uneven    A-arms unable to move or move unevenly    S-speech slurred or non-existent    T-time-call 911 as soon as signs and symptoms begin-DO NOT go       Back to bed or wait to see if you get better-TIME IS BRAIN. Warning Signs of HEART ATTACK     Call 911 if you have these symptoms:   Chest discomfort. Most heart attacks involve discomfort in the center of the chest that lasts more than a few minutes, or that goes away and comes back. It can feel like uncomfortable pressure, squeezing, fullness, or pain.  Discomfort in other areas of the upper body. Symptoms can include pain or discomfort in one or both arms, the back, neck, jaw, or stomach.  Shortness of breath with or without chest discomfort.  Other signs may include breaking out in a cold sweat, nausea, or lightheadedness. Don't wait more than five minutes to call 911 - MINUTES MATTER! Fast action can save your life. Calling 911 is almost always the fastest way to get lifesaving treatment. Emergency Medical Services staff can begin treatment when they arrive -- up to an hour sooner than if someone gets to the hospital by car. The discharge information has been reviewed with the patient. The patient verbalized understanding.     Discharge medications reviewed with the patient and appropriate educational materials and side effects teaching were provided. Patient armband removed and shredded.

## 2017-07-02 ENCOUNTER — HOME CARE VISIT (OUTPATIENT)
Dept: SCHEDULING | Facility: HOME HEALTH | Age: 67
End: 2017-07-02
Payer: MEDICARE

## 2017-07-02 PROCEDURE — 3331090002 HH PPS REVENUE DEBIT

## 2017-07-02 PROCEDURE — 400013 HH SOC

## 2017-07-02 PROCEDURE — G0299 HHS/HOSPICE OF RN EA 15 MIN: HCPCS

## 2017-07-02 PROCEDURE — 3331090001 HH PPS REVENUE CREDIT

## 2017-07-03 LAB
BACTERIA SPEC CULT: NORMAL
BACTERIA SPEC CULT: NORMAL
SERVICE CMNT-IMP: NORMAL
SERVICE CMNT-IMP: NORMAL

## 2017-07-03 PROCEDURE — 3331090002 HH PPS REVENUE DEBIT

## 2017-07-03 PROCEDURE — 3331090001 HH PPS REVENUE CREDIT

## 2017-07-03 NOTE — HOME CARE
Discharge noted over the weekend, [address] will follow, referral processed by [address] central intake over the weekend. AMPARO VELAZCO.

## 2017-07-04 PROCEDURE — 3331090001 HH PPS REVENUE CREDIT

## 2017-07-04 PROCEDURE — 3331090002 HH PPS REVENUE DEBIT

## 2017-07-05 ENCOUNTER — HOSPITAL ENCOUNTER (OUTPATIENT)
Dept: WOUND CARE | Age: 67
Discharge: HOME | End: 2017-07-05
Payer: MEDICARE

## 2017-07-05 ENCOUNTER — TELEPHONE (OUTPATIENT)
Dept: WOUND CARE | Age: 67
End: 2017-07-05

## 2017-07-05 VITALS
SYSTOLIC BLOOD PRESSURE: 100 MMHG | BODY MASS INDEX: 21.33 KG/M2 | DIASTOLIC BLOOD PRESSURE: 60 MMHG | OXYGEN SATURATION: 95 % | HEART RATE: 72 BPM | RESPIRATION RATE: 18 BRPM | WEIGHT: 128 LBS | HEIGHT: 65 IN | TEMPERATURE: 98.2 F

## 2017-07-05 VITALS
HEART RATE: 78 BPM | OXYGEN SATURATION: 95 % | TEMPERATURE: 97.1 F | SYSTOLIC BLOOD PRESSURE: 139 MMHG | RESPIRATION RATE: 16 BRPM | DIASTOLIC BLOOD PRESSURE: 67 MMHG

## 2017-07-05 LAB
M PNEUMO IGG SER IA-ACNC: 584 U/ML (ref 0–99)
M PNEUMO IGM SER IA-ACNC: <770 U/ML (ref 0–769)

## 2017-07-05 PROCEDURE — 97602 WOUND(S) CARE NON-SELECTIVE: CPT

## 2017-07-05 PROCEDURE — 3331090001 HH PPS REVENUE CREDIT

## 2017-07-05 PROCEDURE — 3331090002 HH PPS REVENUE DEBIT

## 2017-07-05 NOTE — TELEPHONE ENCOUNTER
Patient asked today to have her [address] [facility] switched from [place] to [facility]. She stated in the past she has had a better experience with [facility] and she was having billing issues with [employer] [facility] She received a bill for $100+ and when she called her insurance company they told her she should have a $0 balance. Called and spoke to Edis Palacios with [employer] to close patient's account. Patient wishes to have her supply needs sent to [facility] at this time.

## 2017-07-05 NOTE — PROGRESS NOTES
Progress Notes  Date of Service: 7/5/17 0962  Dana Urena MD   Infectious Diseases      []Hide copied text  Follow up visit on 7/5/17     ASSESSMENT  Recent hospital admission for Pneumonia  Otherwise Doing well  Area covered by a thin layer of tissue  No purulence , jono-erythema , tenderness  CX from 5-10 -- CNS        RECOMMENDATION:  Continue Alie for 2 weeks -   Continue Cutimed Sorbact siltec  RTC 6 weeks                 Interim Visit on 5-10-17  ASSESSMENT;  Purulent discharge again from the center of the area in question - likely fistulous tract to underlying vascular graft which is easily felt. No juan diego erythema . No systemic symptoms     RECOMMENDATION:  Abscess drainage and deep Cx  Cutimed Sorbact Siltec  Review Cx on Friday  May need antibiotic suppression to save the graft   Abscess Drainage Wound Care        Pre-Operative Diagnosis: Sinus / abscess  Post-Operative Diagnosis: Same  Procedure:  Drainage  Indications: Drainage & Cx     Site: RT flank     After the benefits/risks/SE were discussed, the patient agreed to proceed.      Time out was done:   * Patient was identified by name and date of birth   * Agreement on procedure being performed was verified   * Procedure site verified and marked as necessary   * Patient was positioned for comfort   * Consent was signed and verified.      Instruments used:    []  Punch Biopsy  Blade  [] #15  [] #10      [x] Forceps  [] Tissue nippers  [x] sterile scissors  [] Dermal curette      Anesthesia used:    []  EMLA 2.5% cream: applied to wound beds for approximately 15minutes. []   Lidocaine 2% Topical Gel      []  Lidocaine injectable 1% with epinephrine 1:100,000; Amount used: mL    []  Lidocaine injectable 1% without epinephrine;  Amount used: mL    []  Other:     []  None         [] patient is insensate due to neuropathy         [] patient declines        [] allergy to anesthetic        [] tissue for debridement is either superficial, loosely adherent and/or necrotic and denervated          []  Other:       Description of Procedure: Abscess drained and explored  Intra-Operative Findings: purulent fluid  Material Removed: pus  Specimens Obtained:  [unfilled]  Estimated Blood Loss:  None        Assessment : Follow up Visit Week: 13   Contact dermatitis around the area - resolved  Wound remains closed - skin tag in the center  No discharge      Cx from 6-8-16. No orgs seen on GS. Cx grew CNS and Serratia  No antibiotics used   6 monthly patency test of the graft was excellent     PLAN / RECCOMENDATIONS:     Triamcinolone ointment as needed  ABD dressings with cross taping - need to keep open to air  Counseled about ointment application , hand hygiene etc   RTC prn     FIRST VISIT on 6-8-16     ASSESSMENT:    1.RT flank surgical wound with Partial nonhealing and nonclosure. Multiple antibiotic courses. Last- Augmentin three weeks ago. Rt upper body and lt leg vascular graft surgery in 2013. Persistent lower end opening. Debridement and resuturing April , 2016. No complete closure. The Vascular graft felt under the open area. 2. Immunosuppression - On Remicade X 15 yrs  3. Chron,s disease - well controlled  4. HTN - diuretic controlled      WOUND POA CONDITIONS           Wound Abdomen Right (Active)    Number of days:1007         Wound Axilla Right (Active)    Number of days:898         Wound Chest Right;Upper; Lateral (Active)    Number of days:818         Wound Abdomen Right;Lateral (Active)    DRESSING STATUS  Removed  6/8/2016  9:35 AM    DRESSING TYPE  Adhesive wound dressing (Band-Aid)  6/8/2016  9:35 AM    Wound Dimensions (length x width x depth)  1.5x0.4x1.1  6/8/2016  9:35 AM    Condition of Base  [place];Slough  6/8/2016  9:35 AM    Condition of Edges  Open  6/8/2016  9:35 AM    Tissue Type  Pink;Yellow  6/8/2016  9:35 AM    Tissue Type Percent Pink  70  6/8/2016  9:35 AM    Tissue Type Percent Yellow  30  6/8/2016  9:35 AM    Drainage Amount   Small   6/8/2016  9:35 AM    Drainage Color  Yellow  6/8/2016  9:35 AM    Wound Odor  None  6/8/2016  9:35 AM    Periwound Skin Condition  Other (comment)  6/8/2016  9:35 AM    Cleansing and Cleansing Agents   Dermal wound cleanser;Normal saline  6/8/2016  9:35 AM    Dressing Type Applied  Other (Comment)  6/8/2016  9:35 AM    Procedure Tolerated  [place] 6/8/2016  9:35 AM    Number of days:0                PLAN /RECOMMENDATIONS:  Deep wound Cx  Alie based dressings  Counseled about hand hygiene and unnecessary antibiotic use        FIRST VISIT DATA: on  6-8-16  Reason for consult:   Anibal Fields is 74 y.o. female on whom Wound Care Service is being consulted for 6-8-16     History of Present Illness: On First Visit  1. RT flank surgical wound with Partial nonhealing and nonclosure. Multiple antibiotic courses. Last- Augmentin three weeks ago. Rt upper body and lt leg vascular graft surgery in 2013. Persistent lower end opening. Debridement and resuturing April , 2016. No complete closure. The Vascular graft felt under the open area. 2. Immunosuppression - On Remicade X 15 yrs  3. Chron,s disease - well controlled  4. HTN - diuretic controlled  No systemic symptoms. States she has not had a problem               Patient Active Problem List    Diagnosis  Code      HTN (hypertension)  I10      PVD (peripheral vascular disease) ([facility])  I73.9      Crohn's disease ([facility])  K50.90      Mass of breast, left  N63      Wound disruption, post-op, skin  T81. 31XA      Chronic aorto-iliac occlusion syndrome ([facility])  I74.09             Allergies    Allergen  Reactions      Codeine  Nausea and Vomiting          Other reaction(s): other/intolerance      Darvon [Propoxyphene]  Itching      Demerol [Meperidine]  Other (comments)          Halucinations      Keflex [Cephalexin]  Diarrhea      Talwin [Pentazocine Lactate]  Shortness of Breath and Nausea and Vomiting              Past Medical History    Diagnosis  Date    HTN (hypertension)         Crohn's disease (HCC)         PVD (peripheral vascular disease) (HCC)             right leg      Claudication (HCC)             left leg      Vitamin B12 deficiency         Nausea & vomiting         Other ill-defined conditions(799.89)             Crohn's      Other and unspecified symptoms and signs involving general sensations and perceptions             PVD      Arthritis         GERD (gastroesophageal reflux disease)                 Past Surgical History    Procedure  Laterality  Date      Hx cholecystectomy            Hx appendectomy            Hx bunionectomy                left eye      Hx orthopaedic                lateral epicondilitis on right      Hx cataract removal                bilateral      Hx breast lumpectomy                breast left      Hx other surgical     3/7/2013          catheter into aorta      Hx other surgical                intestional resection x4      Hx other surgical     9/2013          I & D Right chest/flank wall abscess vs granuloma      Pr bypass graft othr,axill-fem  Right  4/19/2013      Pr bypass graft othr,fem-pop  Left  5/2013            History    Substance Use Topics      Smoking status:  Current Every Day Smoker -- 0.50 packs/day for 40 years      Smokeless tobacco:  Never Used      Alcohol Use:  No              Family History    Problem  Relation  Age of Onset      Coronary Artery Disease  Mother         Heart Attack  Mother         Heart Surgery  Mother             CABGx3      Elevated Lipids  Mother         COPD  Father         Heart Attack  Brother         COPD  Brother         Other  Brother             PAD               Prior to Admission medications     Medication  Sig  Start Date  End Date  Taking?  Authorizing Provider    diphenhydrAMINE (BENADRYL) 25 mg capsule  Take 25 mg by mouth every six (6) hours as needed.           Historical Provider    pregabalin (LYRICA) 75 mg capsule  Take 1 Cap by mouth two (2) times a day. Max Daily Amount: 150 mg.  5/19/16        TIMMY French    clopidogrel (PLAVIX) 75 mg tablet  Take 1 Tab by mouth daily.  5/9/16        Jolene Levy MD    VOLTAREN 1 % topical gel  APPLY 4 GRAMS TO AFFECTED AREA FOUR TIMES DAILY. APPLY TO LEFT FOOT FOUR TIMES DAILY  8/5/15        Raymond Collazo MD    ALPRAZolam Alfornia Sandra) 1 mg tablet  Take 0.5 mg by mouth nightly as needed for Anxiety.           Historical Provider    aspirin 81 mg tablet  Take 81 mg by mouth daily.           Historical Provider    cyanocobalamin (VITAMIN B-12) 1,000 mcg/mL injection  1,000 mcg by IntraMUSCular route every fourteen (14) days.           Historical Provider    promethazine (PHENERGAN) 12.5 mg tablet  Take 12.5 mg by mouth. Takes 6.25 mg in the morning and 12.5 mg at night           Historical Provider    loperamide (IMMODIUM) 2 mg tablet  Take 2 mg by mouth daily.           Historical Provider    dicyclomine (BENTYL) 10 mg capsule  Take 10 mg by mouth two (2) times a day.           Historical Provider    ipratropium-albuterol (COMBIVENT)  mcg/actuation inhaler  Take 2 Puffs by inhalation nightly as needed.           Historical Provider    fluticasone (FLONASE) 50 mcg/actuation nasal spray  2 Sprays by Both Nostrils route nightly.           Historical Provider    triamterene-hydrochlorothiazide (DYAZIDE) 37.5-25 mg per capsule  Take 1 Cap by mouth daily.           Historical Provider    bimatoprost (LUMIGAN) 0.03 % ophthalmic drops  Administer 1 Drop to both eyes every evening.           Historical Provider    atropine-PHENobarbital-scopolamine-hyoscyamine (DONNATAL) 16.2-0.1037 -0.0194 mg per tablet  Take 1 Tab by mouth as needed.           Historical Provider    predniSONE (DELTASONE) 5 mg tablet  Take 30 mg by mouth as needed.           Historical Provider    inFLIXimab (REMICADE) 100 mg injection  5 mg/kg by IntraVENous route once.  Every 8 weeks           Historical Provider       There are no discontinued medications. Current Outpatient Prescriptions    Medication  Sig      diphenhydrAMINE (BENADRYL) 25 mg capsule  Take 25 mg by mouth every six (6) hours as needed.      pregabalin (LYRICA) 75 mg capsule  Take 1 Cap by mouth two (2) times a day. Max Daily Amount: 150 mg.      clopidogrel (PLAVIX) 75 mg tablet  Take 1 Tab by mouth daily.      VOLTAREN 1 % topical gel  APPLY 4 GRAMS TO AFFECTED AREA FOUR TIMES DAILY. APPLY TO LEFT FOOT FOUR TIMES DAILY      ALPRAZolam (XANAX) 1 mg tablet  Take 0.5 mg by mouth nightly as needed for Anxiety.      aspirin 81 mg tablet  Take 81 mg by mouth daily.      cyanocobalamin (VITAMIN B-12) 1,000 mcg/mL injection  1,000 mcg by IntraMUSCular route every fourteen (14) days.      promethazine (PHENERGAN) 12.5 mg tablet  Take 12.5 mg by mouth. Takes 6.25 mg in the morning and 12.5 mg at night      loperamide (IMMODIUM) 2 mg tablet  Take 2 mg by mouth daily.      dicyclomine (BENTYL) 10 mg capsule  Take 10 mg by mouth two (2) times a day.      ipratropium-albuterol (COMBIVENT)  mcg/actuation inhaler  Take 2 Puffs by inhalation nightly as needed.      fluticasone (FLONASE) 50 mcg/actuation nasal spray  2 Sprays by Both Nostrils route nightly.      triamterene-hydrochlorothiazide (DYAZIDE) 37.5-25 mg per capsule  Take 1 Cap by mouth daily.      bimatoprost (LUMIGAN) 0.03 % ophthalmic drops  Administer 1 Drop to both eyes every evening.      atropine-PHENobarbital-scopolamine-hyoscyamine (DONNATAL) 16.2-0.1037 -0.0194 mg per tablet  Take 1 Tab by mouth as needed.      predniSONE (DELTASONE) 5 mg tablet  Take 30 mg by mouth as needed.      inFLIXimab (REMICADE) 100 mg injection  5 mg/kg by IntraVENous route once.  Every 8 weeks        No current facility-administered medications for this encounter.                    MICROBIOLOGY:-              CULTURE RESULT:    Date  Value  Ref Range  Status    06/08/2016  SERRATIA MARCOSVALDO Jacobo 06/08/2016  FEW  STAPHYLOCOCCUS LUGDUNENSIS        Final    06/08/2016  FEW  STAPHYLOCOCCUS SPECIES, COAGULASE NEGATIVE        Final              GRAM STAIN    Date  Value  Ref Range  Status    06/08/2016  FEW  WBC'S        Final    06/08/2016  NO ORGANISMS SEEN     Final    04/19/2016  FEW  WBC'S        Final    04/19/2016  NO ORGANISMS SEEN     Final             Antibiotic History:                Current:                 S/P:     [facility]         Review of Systems:        Constitutional:  negative for chills, diaphoresis, fever, malaise/fatigue, weakness and weight loss    Skin:  negative for itching and rash    HENT:  negative for ear discharge, ear pain, hearing loss and sore throat    Eyes:  negative for blurred vision, double vision and photophobia    Cardiovascular:  negative for chest pain, claudication, leg swelling, orthopnea, paroxysmal nocturnal dyspnea    Respiratory:  negative for cough, hemoptysis, shortness of breath or sputum production    Gastointestinal:  negative for abdominal pain, blood in stool, constipation, diarrhea, melena, nausea and vomiting    Genitourinary:  No dysuria, increased frequency, hematuria    Musculoskeletal:  negative for back pain, joint pain and myalgias    Endo:  negative for cold intolerance, heat intolerance, polydipsia and polyuria.    Heme:  negative for easy bleeding and easy bruising    Allergies:  negative for hives    Neurological:  negative for headaches, dizziness, focal weakness, level of consciousness, seizures and tingling    Psychiatric:   negative for depression, hallucinations and suicidal ideals           Objective:       Patient Vitals for the past 24 hrs:     Temp  Pulse  Resp  BP  SpO2    06/24/16 0946  97.6 °F (36.4 °C)  82  16  148/83 mmHg  98 %           Constitutional:  well developed, nourished, no distress and alert and oriented x 3    HENT:  atraumatic, nose normal, normocephalic and oropharynx clear and moist    Eyes:  conjunctiva normal, EOM normal and PERRL    Neck:  ROM normal, supple, trachea normal and no adenopathy, thrush.  MMM    Cardiovascular:  heart sounds normal, intact distal pulses, normal rate and regular rhythm    Pulmonary/Chest Wall:  breath sounds normal and effort normal    Abdominal:  appearance normal, bowel sounds normal, soft and No rebound, gaurding or tenderness    Genitourinary/Anorectal:  deferred    Musculoskeletal:  normal ROM    Neurological:  awake, alert and oriented x 3, and    cranial nerves intact  muscular strength 5/5 and symmetric  reflexes normal and symmetric  sensory normal          Skin:  I/3rd of Rt flank surgical wound open with palpable vascular graft underneath                   Labwork and Ancillary Studies    CBC w/Diff        Lab Results    Component  Value  Date/Time       [facility] MCNAIR 13.4*  12/20/2013 12:39 PM       RBC  5.29  12/20/2013 12:39 PM       HCT  46.6*  12/20/2013 12:39 PM       MCV  88.1  12/20/2013 12:39 PM       MCH  29.7  12/20/2013 12:39 PM       MCHC  33.7  12/20/2013 12:39 PM       RDW  15.7*  12/20/2013 12:39 PM       MONOS  8  05/08/2013 05:20 AM       EOS  3  05/08/2013 05:20 AM       BASOS  1  05/08/2013 05:20 AM          Comprehensive Metabolic Profile        Lab Results    Component  Value  Date/Time       NA  140  04/14/2016 09:10 AM       CO2  31  04/14/2016 09:10 AM       BUN  11  04/14/2016 09:10 AM             Edwin Card MD  Pager: [phone]  [facility] Wound Care Staff                             Authored by Grace Escoto MD on 06/24/16 9599

## 2017-07-05 NOTE — WOUND CARE
07/05/17 0945   Wound Abdomen Right;Lateral   Date First Assessed: 06/08/16   POA: Yes  Wound Type: Closed incision/surgical site  Location: Abdomen  Orientation: Right;Lateral   DRESSING STATUS Removed   DRESSING TYPE Other (Comment)  (CutimedSorbact/HypafixTape)   Wound Length (cm) 0.4 cm   Wound Width (cm) 0.3 cm   Wound Depth (cm) 0.3   Wound Surface area (cm^3) 0.04 cm^2   Condition of Base Pink   Condition of Edges Open   Tissue Type Pink   Tissue Type Percent Pink 100   Drainage Amount  Small    Drainage Color Serosanguinous   Wound Odor None   Periwound Skin Condition Intact   Cleansing and Cleansing Agents  Normal saline   Dressing Type Applied Other (Comment)  (CutimedSorbactSiltec)   Procedure Tolerated Well     * Alie was used under CutimedSorbactSiltec.

## 2017-07-06 ENCOUNTER — HOSPITAL ENCOUNTER (OUTPATIENT)
Dept: LAB | Age: 67
Discharge: HOME | End: 2017-07-06
Payer: MEDICARE

## 2017-07-06 LAB
ALBUMIN SERPL BCP-MCNC: 3.3 G/DL (ref 3.4–5)
ALBUMIN/GLOB SERPL: 0.7 {RATIO} (ref 0.8–1.7)
ALP SERPL-CCNC: 82 U/L (ref 45–117)
ALT SERPL-CCNC: 27 U/L (ref 13–56)
ANION GAP BLD CALC-SCNC: 7 MMOL/L (ref 3–18)
AST SERPL W P-5'-P-CCNC: 17 U/L (ref 15–37)
BILIRUB SERPL-MCNC: 0.2 MG/DL (ref 0.2–1)
BUN SERPL-MCNC: 18 MG/DL (ref 7–18)
BUN/CREAT SERPL: 23 (ref 12–20)
CALCIUM SERPL-MCNC: 10.1 MG/DL (ref 8.5–10.1)
CHLORIDE SERPL-SCNC: 104 MMOL/L (ref 100–108)
CO2 SERPL-SCNC: 29 MMOL/L (ref 21–32)
CREAT SERPL-MCNC: 0.79 MG/DL (ref 0.6–1.3)
ERYTHROCYTE [DISTWIDTH] IN BLOOD BY AUTOMATED COUNT: 14.8 % (ref 11.6–14.5)
GLOBULIN SER CALC-MCNC: 4.7 G/DL (ref 2–4)
GLUCOSE SERPL-MCNC: 107 MG/DL (ref 74–99)
HCT VFR BLD AUTO: 41.2 % (ref 35–45)
HGB BLD-MCNC: 13.6 G/DL (ref 12–16)
MAGNESIUM SERPL-MCNC: 2.2 MG/DL (ref 1.6–2.6)
MCH RBC QN AUTO: 29.8 PG (ref 24–34)
MCHC RBC AUTO-ENTMCNC: 33 G/DL (ref 31–37)
MCV RBC AUTO: 90.2 FL (ref 74–97)
PLATELET # BLD AUTO: 596 K/UL (ref 135–420)
PMV BLD AUTO: 9.5 FL (ref 9.2–11.8)
POTASSIUM SERPL-SCNC: 4.2 MMOL/L (ref 3.5–5.5)
PROT SERPL-MCNC: 8 G/DL (ref 6.4–8.2)
RBC # BLD AUTO: 4.57 M/UL (ref 4.2–5.3)
SODIUM SERPL-SCNC: 140 MMOL/L (ref 136–145)
WBC # BLD AUTO: 11 K/UL (ref 4.6–13.2)

## 2017-07-06 PROCEDURE — 3331090002 HH PPS REVENUE DEBIT

## 2017-07-06 PROCEDURE — 80053 COMPREHEN METABOLIC PANEL: CPT | Performed by: EMERGENCY MEDICINE

## 2017-07-06 PROCEDURE — 36415 COLL VENOUS BLD VENIPUNCTURE: CPT | Performed by: EMERGENCY MEDICINE

## 2017-07-06 PROCEDURE — 3331090001 HH PPS REVENUE CREDIT

## 2017-07-06 PROCEDURE — 85027 COMPLETE CBC AUTOMATED: CPT | Performed by: EMERGENCY MEDICINE

## 2017-07-06 PROCEDURE — 83735 ASSAY OF MAGNESIUM: CPT | Performed by: EMERGENCY MEDICINE

## 2017-07-07 ENCOUNTER — HOME CARE VISIT (OUTPATIENT)
Dept: SCHEDULING | Facility: HOME HEALTH | Age: 67
End: 2017-07-07
Payer: MEDICARE

## 2017-07-07 ENCOUNTER — HOME CARE VISIT (OUTPATIENT)
Dept: HOME HEALTH SERVICES | Facility: HOME HEALTH | Age: 67
End: 2017-07-07
Payer: MEDICARE

## 2017-07-07 PROCEDURE — G0299 HHS/HOSPICE OF RN EA 15 MIN: HCPCS

## 2017-07-07 PROCEDURE — 3331090001 HH PPS REVENUE CREDIT

## 2017-07-07 PROCEDURE — 3331090002 HH PPS REVENUE DEBIT

## 2017-07-08 PROCEDURE — 3331090001 HH PPS REVENUE CREDIT

## 2017-07-08 PROCEDURE — 3331090002 HH PPS REVENUE DEBIT

## 2017-07-09 PROCEDURE — 3331090001 HH PPS REVENUE CREDIT

## 2017-07-09 PROCEDURE — 3331090002 HH PPS REVENUE DEBIT

## 2017-07-10 ENCOUNTER — HOME CARE VISIT (OUTPATIENT)
Dept: SCHEDULING | Facility: HOME HEALTH | Age: 67
End: 2017-07-10
Payer: MEDICARE

## 2017-07-10 VITALS
SYSTOLIC BLOOD PRESSURE: 116 MMHG | TEMPERATURE: 98.1 F | RESPIRATION RATE: 20 BRPM | OXYGEN SATURATION: 93 % | HEART RATE: 68 BPM | DIASTOLIC BLOOD PRESSURE: 77 MMHG

## 2017-07-10 PROCEDURE — G0299 HHS/HOSPICE OF RN EA 15 MIN: HCPCS

## 2017-07-10 PROCEDURE — 3331090002 HH PPS REVENUE DEBIT

## 2017-07-10 PROCEDURE — 3331090001 HH PPS REVENUE CREDIT

## 2017-07-11 ENCOUNTER — HOME CARE VISIT (OUTPATIENT)
Dept: HOME HEALTH SERVICES | Facility: HOME HEALTH | Age: 67
End: 2017-07-11
Payer: MEDICARE

## 2017-07-11 ENCOUNTER — OFFICE VISIT (OUTPATIENT)
Dept: PULMONOLOGY | Age: 67
End: 2017-07-11

## 2017-07-11 VITALS — DIASTOLIC BLOOD PRESSURE: 71 MMHG | HEART RATE: 93 BPM | TEMPERATURE: 97.8 F | SYSTOLIC BLOOD PRESSURE: 108 MMHG

## 2017-07-11 VITALS
HEART RATE: 81 BPM | TEMPERATURE: 98.3 F | HEIGHT: 65 IN | OXYGEN SATURATION: 96 % | WEIGHT: 130 LBS | BODY MASS INDEX: 21.66 KG/M2 | RESPIRATION RATE: 16 BRPM | DIASTOLIC BLOOD PRESSURE: 60 MMHG | SYSTOLIC BLOOD PRESSURE: 130 MMHG

## 2017-07-11 DIAGNOSIS — J18.9 CAP (COMMUNITY ACQUIRED PNEUMONIA): Primary | ICD-10-CM

## 2017-07-11 DIAGNOSIS — K21.9 GASTROESOPHAGEAL REFLUX DISEASE WITHOUT ESOPHAGITIS: ICD-10-CM

## 2017-07-11 DIAGNOSIS — K50.919 CROHN'S DISEASE WITH COMPLICATION, UNSPECIFIED GASTROINTESTINAL TRACT LOCATION (HCC): ICD-10-CM

## 2017-07-11 DIAGNOSIS — F17.200 TOBACCO DEPENDENCE: ICD-10-CM

## 2017-07-11 PROCEDURE — 3331090001 HH PPS REVENUE CREDIT

## 2017-07-11 PROCEDURE — 3331090002 HH PPS REVENUE DEBIT

## 2017-07-11 NOTE — MR AVS SNAPSHOT
Visit Information Date & Time Provider Department Dept. Phone Encounter #  
 7/11/2017  8:45 AM MD [address] [facility] [place] [phone] [id number] Follow-up Instructions Return in about 3 months (around 10/11/2017). Your Appointments 8/21/2017  8:00 AM  
PROCEDURE with [facility] 2 [facility] ([facility]) Appt Note: ax fem 6 mos evelina; [facility] [address] [facility] [address]  
[phone] [address] [facility]  
  
    
 8/21/2017  9:00 AM  
PROCEDURE with [facility] 2 [facility] ([facility]) Appt Note: fem pop 6 mos evelina; r/s to new office [address] [facility] [address] [facility] [address]  
[phone] 8/21/2017 10:00 AM  
PROCEDURE with [facility] NONIMAGING [facility] ([facility]) Appt Note: milagro 6 mos evelina; r[facility]s to new [facility] [address] [facility] [address] [facility] [address]  
[phone] [address]  
  
    
 9/5/2017  9:45 AM  
Office Visit with Alexx Sinha MD  
[address] [facility]) Appt Note: 6 MONTH FU AFTER STUDY AT OUR LAB ON 8/21/2017; PT IS AWARE OF PREP; rescheduled from [facility] Client schedule [address] [facility] [address] [facility] [address]  
[phone] [address] [place] [address] [facility] [address] Upcoming Health Maintenance Date Due Hepatitis C Screening 1950 DTaP/Tdap/Td series (1 - Tdap) 6/7/1969 FOBT Q 1 YEAR AGE 50-75 6/7/1998 ZOSTER VACCINE AGE 60> 6/7/2008 GLAUCOMA SCREENING Q2Y 6/7/2013 OSTEOPOROSIS SCREENING (DEXA) 6/7/2013 MEDICARE YEARLY EXAM 6/7/2013 BREAST CANCER SCRN MAMMOGRAM 7/8/2016 Pneumococcal 65+ Low/Medium Risk (2 of 2 - PPSV23) 7/11/2020* INFLUENZA AGE 9 TO ADULT 8/1/2017 *Topic was postponed. The date shown is not the original due date. Allergies as of 7/11/2017  Review Complete On: 7/11/2017 By: Tisha Li LPN Severity Noted Reaction Type Reactions Codeine  04/01/2013   Intolerance Nausea and Vomiting Other reaction(s): other/intolerance Darvon [Propoxyphene]  04/01/2013   Systemic Itching Demerol [Meperidine]  04/01/2013   Side Effect Other (comments) Halucinations Keflex [Cephalexin]  01/06/2015    Diarrhea Talwin [Pentazocine Lactate]  04/09/2013    Shortness of Breath, Nausea and Vomiting Current Immunizations  Reviewed on 7/11/2017 Name Date Pneumococcal Vaccine (Unspecified Type) 6/7/2013 Reviewed by Tisha Li LPN on 7/06/4978 at  9:31 AM  
You Were Diagnosed With   
  
 Codes Comments CAP (community acquired pneumonia)    -  Primary ICD-10-CM: J18.9 ICD-9-CM: 227 Tobacco dependence     ICD-10-CM: P77.813 ICD-9-CM: 305.1 Crohn's disease with complication, unspecified gastrointestinal tract location [facility])     ICD-10-CM: B89.712 ICD-9-CM: 555.9 Gastroesophageal reflux disease without esophagitis     ICD-10-CM: K21.9 ICD-9-CM: 530.81 Vitals BP Pulse Temp Resp Height(growth percentile) Weight(growth percentile) 130/60 (BP 1 Location: Left arm, BP Patient Position: Sitting) 81 98.3 °F (36.8 °C) (Oral) 16 5' 5\" (1.651 m) 130 lb (59 kg) SpO2 BMI OB Status Smoking Status 96% 21.63 kg/m2 Postmenopausal Current Every Day Smoker BMI and BSA Data Body Mass Index Body Surface Area  
 21.63 kg/m 2 1.64 m 2 [facility] [address] [facility] [place] [address] [phone] Your Updated Medication List  
  
   
This list is accurate as of: 7/11/17  9:44 AM.  Always use your most recent med list.  
  
 aspirin 81 mg tablet Take 81 mg by mouth daily. clopidogrel 75 mg Tab Commonly known as:  PLAVIX TAKE ONE TABLET BY MOUTH DAILY  
  
 CYMBALTA 60 mg capsule Generic drug:  DULoxetine Take 60 mg by mouth daily. dicyclomine 10 mg capsule Commonly known as:  BENTYL Take 10 mg by mouth two (2) times a day. DONNATAL 16.2-0.1037 -0.0194 mg per tablet Generic drug:  atropine-PHENobarbital-scopolamine-hyoscyamine Take 1 Tab by mouth as needed (diarrhea). Indications: Irritable Bowel Syndrome DYAZIDE 37.5-25 mg per capsule Generic drug:  triamterene-hydroCHLOROthiazide Take 1 Cap by mouth daily. FLONASE 50 mcg/actuation nasal spray Generic drug:  fluticasone 2 Sprays by Both Nostrils route nightly. ipratropium-albuterol  mcg/actuation inhaler Commonly known as:  Hector Barrette Take 1 Puff by inhalation every six (6) hours as needed for Wheezing or Shortness of Breath. Lactobacillus Acidoph & Bulgar 1 million cell Tab tablet Commonly known as:  Arnetha Darlene Take 2 Tabs by mouth two (2) times a day. loperamide 2 mg tablet Commonly known as:  IMMODIUM Take 2 mg by mouth daily. LUMIGAN 0.03 % ophthalmic drops Generic drug:  bimatoprost  
Administer 1 Drop to both eyes every evening. * OTHER Incentive Spirometry- Use as directed * OTHER Check CBC, CMP, Mg in 3 days, results to PCP immediately, Diagnosis- PNA  
  
 * OTHER Oxygen- Use as directed  
  
 pregabalin 75 mg capsule Commonly known as:  Naveed [place] Take 1 Cap by mouth two (2) times a day. Max Daily Amount: 150 mg.  
  
 REMICADE 100 mg injection Generic drug:  inFLIXimab  
5 mg/kg by IntraVENous route once. Every 8 weeks VITAMIN B-12 1,000 mcg/mL injection Generic drug:  cyanocobalamin  
1,000 mcg by IntraMUSCular route every fourteen (14) days. * Notice:   This list has 3 medication(s) that are the same as other medications prescribed for you. Read the directions carefully, and ask your doctor or other care provider to review them with you. We Performed the Following AMB POC XRAY, CHEST, 2 VIEWS, FRONTAL [64217 CPT(R)] Follow-up Instructions Return in about 3 months (around 10/11/2017). To-Do List   
 07/11/2017 Microbiology:  MYCOPLASMA AB, IGG   
  
 07/12/2017 To Be Determined Appointment with Cassidy Narayanan RN at [address] [facility]  
  
 07/17/2017 To Be Determined Appointment with Cassidy Narayanan RN at [facility]  
  
 08/16/2017 9:00 AM  
  Appointment with Angelina Pham MD at [address] [place] [address] ([phone]) Please provide this summary of care documentation to your next provider. Your primary care clinician is listed as Dustin Murrell. If you have any questions after today's visit, please call [phone].

## 2017-07-11 NOTE — PROGRESS NOTES
HISTORY OF PRESENT ILLNESS  Alberta Barker is a 69 y.o. female. HPI Comments: Hospital follow up for acute hypoxic respiratory failure due to multilobar pneumonia, interstitial pattern on CXR. Pt with elevated Mycoplasma IgG but normal IgM. Pt does not recall much from her hospital admission but states she is almost back to baseline. She still complains of dyspnea with moderate to severe exertion. No chest pain or cough. No new respiratory symptoms registered. Pt has abstained from smoking for two weeks and intends to quit totally. She is using a Nicotine patch. Review of Systems   Constitutional: Negative for chills, diaphoresis, malaise/fatigue and weight loss. HENT: Negative for congestion, ear discharge, hearing loss, nosebleeds and tinnitus. Eyes: Negative for blurred vision, double vision, photophobia, pain, discharge and redness. Respiratory: Negative for stridor. Cardiovascular: Negative for palpitations. Gastrointestinal: Negative for blood in stool, constipation, diarrhea, heartburn, melena and nausea. Genitourinary: Negative for dysuria, flank pain, frequency, hematuria and urgency. Musculoskeletal: Negative for back pain, falls, joint pain and myalgias. Skin: Negative for itching. Neurological: Negative for dizziness, tingling, tremors, sensory change, speech change, focal weakness, seizures, loss of consciousness and weakness. Endo/Heme/Allergies: Negative for environmental allergies and polydipsia. Does not bruise/bleed easily. Psychiatric/Behavioral: Negative for depression, hallucinations, memory loss, substance abuse and suicidal ideas. The patient is not nervous/anxious and does not have insomnia.       Past Medical History:   Diagnosis Date    Arthritis     [facility])     left leg    [facility]     [facility] ([facility]     GERD (gastroesophageal reflux disease)     HTN (hypertension)     Nausea & vomiting     Other and unspecified symptoms and signs involving general sensations and perceptions     PVD    Other ill-defined conditions     Crohn's    PVD (peripheral vascular disease) ([facility]     right leg    Vitamin B12 deficiency      Past Surgical History:   Procedure Laterality Date    BYPASS GRAFT OTHR,AXILL-FEM Right 4/19/2013    BYPASS GRAFT OTHR,FEM-POP Left 5/2013    HX APPENDECTOMY      HX BREAST LUMPECTOMY      breast left    HX BUNIONECTOMY      left eye    HX CATARACT REMOVAL      bilateral    HX CHOLECYSTECTOMY      HX ORTHOPAEDIC      lateral epicondilitis on right    HX OTHER SURGICAL  3/7/2013    catheter into aorta    HX OTHER SURGICAL      intestional resection x4    HX OTHER SURGICAL  9/2013    I & D Right chest/flank wall abscess vs granuloma     Current Outpatient Prescriptions on File Prior to Visit   Medication Sig Dispense Refill    ipratropium-albuterol (COMBIVENT RESPIMAT)  mcg/actuation inhaler Take 1 Puff by inhalation every six (6) hours as needed for Wheezing or Shortness of Breath. 1 Inhaler 0    OTHER Incentive Spirometry- Use as directed 1 Each 0    OTHER Check CBC, CMP, Mg in 3 days, results to PCP immediately, Diagnosis- PNA 1 Each 0    OTHER Oxygen- Use as directed (Patient taking differently: Take 2 L/min by mouth continuous. ) 1 Each 0    clopidogrel (PLAVIX) 75 mg tab TAKE ONE TABLET BY MOUTH DAILY 30 Tab 11    DULoxetine (CYMBALTA) 60 mg capsule Take 60 mg by mouth daily.  pregabalin (LYRICA) 75 mg capsule Take 1 Cap by mouth two (2) times a day. Max Daily Amount: 150 mg. (Patient taking differently: Take 150 mg by mouth two (2) times a day.) 180 Cap 3    aspirin 81 mg tablet Take 81 mg by mouth daily.  cyanocobalamin (VITAMIN B-12) 1,000 mcg/mL injection 1,000 mcg by IntraMUSCular route every fourteen (14) days.  loperamide (IMMODIUM) 2 mg tablet Take 2 mg by mouth daily.  dicyclomine (BENTYL) 10 mg capsule Take 10 mg by mouth two (2) times a day.       fluticasone (FLONASE) 50 mcg/actuation nasal spray 2 Sprays by Both Nostrils route nightly.  triamterene-hydrochlorothiazide (DYAZIDE) 37.5-25 mg per capsule Take 1 Cap by mouth daily.  bimatoprost (LUMIGAN) 0.03 % ophthalmic drops Administer 1 Drop to both eyes every evening.  atropine-PHENobarbital-scopolamine-hyoscyamine (DONNATAL) 16.2-0.1037 -0.0194 mg per tablet Take 1 Tab by mouth as needed (diarrhea). Indications: Irritable Bowel Syndrome      inFLIXimab (REMICADE) 100 mg injection 5 mg/kg by IntraVENous route once. Every 8 weeks      Lactobacillus [facility]) 1 million cell tab tablet Take 2 Tabs by mouth two (2) times a day. 20 Tab 0     No current facility-administered medications on file prior to visit. Allergies   Allergen Reactions    Codeine Nausea and Vomiting     Other reaction(s): other/intolerance    Darvon [Propoxyphene] Itching    Demerol [Meperidine] Other (comments)     Halucinations    Keflex [Cephalexin] Diarrhea    Talwin [Pentazocine Lactate] Shortness of Breath and Nausea and Vomiting     Social History     Social History    Marital status:      Spouse name: N/A    Number of children: N/A    Years of education: N/A     Occupational History    Not on file. Social History Main Topics    Smoking status: Current Every Day Smoker     Packs/day: 1.50     Years: 45.00    Smokeless tobacco: Never Used      Comment: second hand smoke exposure prior to active smoking    Alcohol use No    Drug use: No    Sexual activity: Not on file     Other Topics Concern    Not on file     Social History Narrative    Father worked in the [employer] and was diagnosed with Asbestosis. Blood pressure 130/60, pulse 81, temperature 98.3 °F (36.8 °C), temperature source Oral, resp. rate 16, height 5' 5\" (1.651 m), weight 59 kg (130 lb), SpO2 96 %. Physical Exam   Constitutional: She is oriented to person, place, and time.  She appears well-developed and well-nourished. No distress. HENT:   Head: Normocephalic and atraumatic. Nose: Nose normal.   Mouth/Throat: Oropharynx is clear and moist. No oropharyngeal exudate. Eyes: Conjunctivae are normal. Pupils are equal, round, and reactive to light. Right eye exhibits no discharge. Left eye exhibits no discharge. No scleral icterus. Neck: No JVD present. No tracheal deviation present. No thyromegaly present. Cardiovascular: Normal rate, regular rhythm, normal heart sounds and intact distal pulses. Exam reveals no gallop. No murmur heard. Pulmonary/Chest: Effort normal and breath sounds normal. No stridor. No respiratory distress. She has no wheezes. She has no rales. Abdominal: Soft. She exhibits no mass. There is no tenderness. Musculoskeletal: She exhibits no edema, tenderness or deformity. Lymphadenopathy:     She has no cervical adenopathy. Neurological: She is alert and oriented to person, place, and time. Skin: Skin is warm and dry. No rash noted. She is not diaphoretic. No erythema. Psychiatric: She has a normal mood and affect. Her behavior is normal. Judgment and thought content normal.     Chest PA L: hyperinflation, much improved bilateral interstitial infiltrates C/w 6/27. No cardiomegaly(personal review)  ASSESSMENT and PLAN  Encounter Diagnoses   Name Primary?  CAP (community acquired pneumonia) Yes    Tobacco dependence     Crohn's disease with complication, unspecified gastrointestinal tract location ([facility]     Gastroesophageal reflux disease without esophagitis      Pt with significant clinical and radiologic improvement. No indication for additional antibiotics. reviewed CXR with pt. Will send pt for convalescent Mycoplasma IgG titers. Markie Pulling for pt to resume Remicade therapy. RTC 3 months or sooner prn.

## 2017-07-11 NOTE — PROGRESS NOTES
Patient stated she has fallen three times within the last 12 months due to rising from a sitting position to a standing position to quickly no injuries complains of dizziness when this happens. Patient was advised to rise slowly and if feeling dizzy when standing up to sit back down wait a few minutes and try again.

## 2017-07-11 NOTE — LETTER
Request for Examination Exam Date: 2017 Patient's Name:  Merline Self 
 
SS#:  [id number] :  1950 Pregnant: No 
 
Address:  [address] [place] [address] Phone#:  [phone] (home) Ordering Physician: Corinne Pih, MD 
 
Technician: Ildefonso Weaver LPN Insurance Information: See Attached Exams Ordered: CXR: PA & LAT Clinical Data/ Brief History: Pneumonia J18.9, COPD J44.9 Preliminary Exam Report: 
 
 
 
 
 
 
 
 
___________________________  __________________ ___________ Radiologist                  Date         Time

## 2017-07-12 PROCEDURE — 3331090002 HH PPS REVENUE DEBIT

## 2017-07-12 PROCEDURE — 3331090001 HH PPS REVENUE CREDIT

## 2017-07-13 ENCOUNTER — HOME CARE VISIT (OUTPATIENT)
Dept: SCHEDULING | Facility: HOME HEALTH | Age: 67
End: 2017-07-13
Payer: MEDICARE

## 2017-07-13 ENCOUNTER — HOME CARE VISIT (OUTPATIENT)
Dept: HOME HEALTH SERVICES | Facility: HOME HEALTH | Age: 67
End: 2017-07-13
Payer: MEDICARE

## 2017-07-13 PROCEDURE — 3331090001 HH PPS REVENUE CREDIT

## 2017-07-13 PROCEDURE — 3331090003 HH PPS REVENUE ADJ

## 2017-07-13 PROCEDURE — 3331090002 HH PPS REVENUE DEBIT

## 2017-07-13 PROCEDURE — G0299 HHS/HOSPICE OF RN EA 15 MIN: HCPCS

## 2017-07-14 VITALS
HEART RATE: 81 BPM | SYSTOLIC BLOOD PRESSURE: 106 MMHG | DIASTOLIC BLOOD PRESSURE: 74 MMHG | TEMPERATURE: 97.8 F | RESPIRATION RATE: 20 BRPM

## 2017-07-14 PROCEDURE — 3331090001 HH PPS REVENUE CREDIT

## 2017-07-14 PROCEDURE — 3331090002 HH PPS REVENUE DEBIT

## 2017-07-15 PROCEDURE — 3331090002 HH PPS REVENUE DEBIT

## 2017-07-15 PROCEDURE — 3331090001 HH PPS REVENUE CREDIT

## 2017-07-16 PROCEDURE — 3331090001 HH PPS REVENUE CREDIT

## 2017-07-16 PROCEDURE — 3331090002 HH PPS REVENUE DEBIT

## 2017-07-17 PROCEDURE — 3331090002 HH PPS REVENUE DEBIT

## 2017-07-17 PROCEDURE — 3331090001 HH PPS REVENUE CREDIT

## 2017-07-18 ENCOUNTER — HOME CARE VISIT (OUTPATIENT)
Dept: HOME HEALTH SERVICES | Facility: HOME HEALTH | Age: 67
End: 2017-07-18
Payer: MEDICARE

## 2017-07-18 PROCEDURE — 3331090002 HH PPS REVENUE DEBIT

## 2017-07-18 PROCEDURE — 3331090001 HH PPS REVENUE CREDIT

## 2017-07-19 ENCOUNTER — HOSPITAL ENCOUNTER (OUTPATIENT)
Dept: LAB | Age: 67
Discharge: HOME | End: 2017-07-19
Payer: MEDICARE

## 2017-07-19 ENCOUNTER — HOSPITAL ENCOUNTER (OUTPATIENT)
Dept: GENERAL RADIOLOGY | Age: 67
Discharge: HOME | End: 2017-07-19
Payer: MEDICARE

## 2017-07-19 DIAGNOSIS — J18.9 PNEUMONIA: ICD-10-CM

## 2017-07-19 PROCEDURE — 86738 MYCOPLASMA ANTIBODY: CPT | Performed by: INTERNAL MEDICINE

## 2017-07-19 PROCEDURE — 36415 COLL VENOUS BLD VENIPUNCTURE: CPT | Performed by: INTERNAL MEDICINE

## 2017-07-19 PROCEDURE — 3331090001 HH PPS REVENUE CREDIT

## 2017-07-19 PROCEDURE — 71020 XR CHEST PA LAT: CPT

## 2017-07-19 PROCEDURE — 3331090002 HH PPS REVENUE DEBIT

## 2017-07-20 LAB — M PNEUMO IGG SER IA-ACNC: 611 U/ML (ref 0–99)

## 2017-07-20 PROCEDURE — 3331090001 HH PPS REVENUE CREDIT

## 2017-07-20 PROCEDURE — 3331090002 HH PPS REVENUE DEBIT

## 2017-07-21 PROCEDURE — 3331090002 HH PPS REVENUE DEBIT

## 2017-07-21 PROCEDURE — 3331090001 HH PPS REVENUE CREDIT

## 2017-07-22 PROCEDURE — 3331090002 HH PPS REVENUE DEBIT

## 2017-07-22 PROCEDURE — 3331090001 HH PPS REVENUE CREDIT

## 2017-07-23 PROCEDURE — 3331090002 HH PPS REVENUE DEBIT

## 2017-07-23 PROCEDURE — 3331090001 HH PPS REVENUE CREDIT

## 2017-07-24 PROCEDURE — 3331090001 HH PPS REVENUE CREDIT

## 2017-07-24 PROCEDURE — 3331090002 HH PPS REVENUE DEBIT

## 2017-07-25 PROCEDURE — 3331090002 HH PPS REVENUE DEBIT

## 2017-07-25 PROCEDURE — 3331090001 HH PPS REVENUE CREDIT

## 2017-07-26 PROCEDURE — 3331090001 HH PPS REVENUE CREDIT

## 2017-07-26 PROCEDURE — 3331090002 HH PPS REVENUE DEBIT

## 2017-07-27 PROCEDURE — 3331090002 HH PPS REVENUE DEBIT

## 2017-07-27 PROCEDURE — 3331090001 HH PPS REVENUE CREDIT

## 2017-07-28 PROCEDURE — 3331090001 HH PPS REVENUE CREDIT

## 2017-07-28 PROCEDURE — 3331090002 HH PPS REVENUE DEBIT

## 2017-07-29 PROCEDURE — 3331090002 HH PPS REVENUE DEBIT

## 2017-07-29 PROCEDURE — 3331090001 HH PPS REVENUE CREDIT

## 2017-07-30 PROCEDURE — 3331090002 HH PPS REVENUE DEBIT

## 2017-07-30 PROCEDURE — 3331090001 HH PPS REVENUE CREDIT

## 2017-07-31 PROCEDURE — 3331090001 HH PPS REVENUE CREDIT

## 2017-07-31 PROCEDURE — 3331090002 HH PPS REVENUE DEBIT

## 2017-07-31 NOTE — DISCHARGE SUMMARY
[address] [address]  [address] [place] SUMMARY    Name:  Francois Prescott  MR#:  [id number]  :  1950  Account #:  [de-identified]  Date of Adm:  2017  Date of Discharge:  2017      DISCHARGE DIAGNOSES:  1. Pneumonia. 2. Sepsis at admission. 3. Crohn disease. 4. Hypertension. 5. Peripheral arterial disease. 6. History of tobacco use. 7. Gastroesophageal reflux disease. HOSPITAL COURSE: This is a 20-year-old female who presented to  the ER with some shortness of breath. The patient was evaluated and  was admitted. The patient was noted to have multilobar pneumonia. There was concern for sepsis at the time of admission. The patient  was admitted, was started on broad-spectrum antibiotics. Cultures  were requested. Bronchial hygiene protocol was initiated. The patient  started to feel better. PT, OT were consulted. Pulmonary also saw the  patient. The patient was noted to be hypoxic. Antibiotics were tailored. The patient felt better and wanted to go home. Care Management was  involved and patient was discharged to home. Discharge plans were  discussed with the patient. The patient was hemodynamically stable at  time of discharge. The patient was advised to follow up with primary  care physician in 1 week and with Pulmonary in 1 week. DISCHARGE MEDICATIONS: Included:  1. Combivent 1 puff inhaled every 6 hours p.r.n. shortness of breath. 2. Floranex 2 tablets p.o. twice daily. 3. Incentive spirometry, use as directed. 4. Check a CBC, CMP, magnesium in 3 days, results to primary care  physician. 5. Oxygen, use as directed. 6. Lyrica 75 mg p.o. twice daily. 7. Aspirin 81 mg p.o. daily. 8. Plavix 75 mg p.o. daily. 9. Cymbalta 60 mg p.o. daily. 10. Bentyl 10 mg p.o. twice daily. 11. Donnatal as needed. 12. Dyazide 37.5/25 one capsule p.o. daily. 13. Flonase 2 sprays both nostrils daily. 14. Imodium 2 mg p.o. daily.   15. Lumigan eye drops, 1 drop to both eyes every evening. 16. Vitamin B12 1000 mcg IM every 14 days. 17. Levofloxacin 750 mg p.o. daily for 3 more days. 18. Klor-Con 20 mEq p.o. daily for 3 more days. The patient was hemodynamically stable and was cleared for  discharge. TOTAL TIME FOR DISCHARGE: More than 35 minutes.         MD Jorge Palomino / Travis Human  D:  07/30/2017   22:32  T:  07/30/2017   23:04  Job #:  [id number]

## 2017-08-01 PROCEDURE — 3331090001 HH PPS REVENUE CREDIT

## 2017-08-01 PROCEDURE — 3331090002 HH PPS REVENUE DEBIT

## 2017-08-02 PROCEDURE — 3331090001 HH PPS REVENUE CREDIT

## 2017-08-02 PROCEDURE — 3331090002 HH PPS REVENUE DEBIT

## 2017-08-03 PROCEDURE — 3331090001 HH PPS REVENUE CREDIT

## 2017-08-03 PROCEDURE — 3331090002 HH PPS REVENUE DEBIT

## 2017-08-04 PROCEDURE — 3331090002 HH PPS REVENUE DEBIT

## 2017-08-04 PROCEDURE — 3331090001 HH PPS REVENUE CREDIT

## 2017-08-05 PROCEDURE — 3331090001 HH PPS REVENUE CREDIT

## 2017-08-05 PROCEDURE — 3331090002 HH PPS REVENUE DEBIT

## 2017-08-06 PROCEDURE — 3331090002 HH PPS REVENUE DEBIT

## 2017-08-06 PROCEDURE — 3331090001 HH PPS REVENUE CREDIT

## 2017-08-07 PROCEDURE — 3331090001 HH PPS REVENUE CREDIT

## 2017-08-07 PROCEDURE — 3331090002 HH PPS REVENUE DEBIT

## 2017-08-08 PROCEDURE — 3331090002 HH PPS REVENUE DEBIT

## 2017-08-08 PROCEDURE — 3331090001 HH PPS REVENUE CREDIT

## 2017-08-09 PROCEDURE — 3331090001 HH PPS REVENUE CREDIT

## 2017-08-09 PROCEDURE — 3331090002 HH PPS REVENUE DEBIT

## 2017-08-10 PROCEDURE — 3331090001 HH PPS REVENUE CREDIT

## 2017-08-10 PROCEDURE — 3331090002 HH PPS REVENUE DEBIT

## 2017-08-11 PROCEDURE — 3331090001 HH PPS REVENUE CREDIT

## 2017-08-11 PROCEDURE — 3331090002 HH PPS REVENUE DEBIT

## 2017-08-12 PROCEDURE — 3331090001 HH PPS REVENUE CREDIT

## 2017-08-12 PROCEDURE — 3331090002 HH PPS REVENUE DEBIT

## 2017-08-13 PROCEDURE — 3331090002 HH PPS REVENUE DEBIT

## 2017-08-13 PROCEDURE — 3331090001 HH PPS REVENUE CREDIT

## 2017-08-14 PROCEDURE — 3331090002 HH PPS REVENUE DEBIT

## 2017-08-14 PROCEDURE — 3331090001 HH PPS REVENUE CREDIT

## 2017-08-15 PROCEDURE — 3331090001 HH PPS REVENUE CREDIT

## 2017-08-15 PROCEDURE — 3331090002 HH PPS REVENUE DEBIT

## 2017-08-16 ENCOUNTER — HOSPITAL ENCOUNTER (OUTPATIENT)
Dept: WOUND CARE | Age: 67
Discharge: HOME | End: 2017-08-16
Payer: MEDICARE

## 2017-08-16 VITALS
SYSTOLIC BLOOD PRESSURE: 146 MMHG | DIASTOLIC BLOOD PRESSURE: 72 MMHG | TEMPERATURE: 97 F | HEART RATE: 82 BPM | OXYGEN SATURATION: 96 % | RESPIRATION RATE: 16 BRPM

## 2017-08-16 PROCEDURE — 3331090002 HH PPS REVENUE DEBIT

## 2017-08-16 PROCEDURE — 97602 WOUND(S) CARE NON-SELECTIVE: CPT

## 2017-08-16 PROCEDURE — 3331090001 HH PPS REVENUE CREDIT

## 2017-08-16 PROCEDURE — 77030011256 HC DRSG MEPILEX <16IN NO BORD MOLN -A: Performed by: INTERNAL MEDICINE

## 2017-08-16 NOTE — WOUND CARE
08/16/17 0909   Wound Abdomen Right;Lateral   Date First Assessed: 06/08/16   POA: Yes  Wound Type: Closed incision/surgical site  Location: Abdomen  Orientation: Right;Lateral   DRESSING STATUS Removed   DRESSING TYPE Other (Comment)  (Cutemed Sorbact Siltec)   Wound Length (cm) 0.1 cm   Wound Width (cm) 0.1 cm   Wound Depth (cm) 0.1   Wound Surface area (cm^3) 0 cm^2   Change in Wound Size % 100   Condition of Base Pink   Condition of Edges Open   Tissue Type Pink   Tissue Type Percent Pink 100   Drainage Amount  Small    Drainage Color Brown;Serous   Wound Odor None   Periwound Skin Condition Intact   Cleansing and Cleansing Agents  Dermal wound cleanser   Dressing Type Applied Other (Comment)  (Cutimed Sorbact, Mepilex boarder)   Procedure Tolerated Well     Patient tolerated dressing change well. Instructed patient on applying Cutimed Sorbact and obtaining a silicone bandage for top layer, all questions answered. No follow up needed.

## 2017-08-16 NOTE — PROGRESS NOTES
Progress Notes  Date of Service: 8/16/17   Sharonda Bishop MD   Infectious Diseases      []Hide copied text  Follow up visit on 8/16/17     ASSESSMENT  Rt flank wound healed  Area covered by a layer of tissue  Hardware not visible anymore  No purulence , jono-erythema , tenderness  CX from 5-10 -- CNS        RECOMMENDATION:  Continue Cutimed Sorbact siltec for peotection  Counseled at length about skin hygiene and care of delicate healed area  RTC prn                 Interim Visit on 5-10-17  ASSESSMENT;  Purulent discharge again from the center of the area in question - likely fistulous tract to underlying vascular graft which is easily felt. No juan diego erythema . No systemic symptoms     RECOMMENDATION:  Abscess drainage and deep Cx  Cutimed Sorbact Siltec  Review Cx on Friday  May need antibiotic suppression to save the graft   Abscess Drainage Wound Care        Pre-Operative Diagnosis: Sinus / abscess  Post-Operative Diagnosis: Same  Procedure:  Drainage  Indications: Drainage & Cx     Site: RT flank     After the benefits/risks/SE were discussed, the patient agreed to proceed.      Time out was done:   * Patient was identified by name and date of birth   * Agreement on procedure being performed was verified   * Procedure site verified and marked as necessary   * Patient was positioned for comfort   * Consent was signed and verified.      Instruments used:    []  Punch Biopsy  Blade  [] #15  [] #10      [x] Forceps  [] Tissue nippers  [x] sterile scissors  [] Dermal curette      Anesthesia used:    []  EMLA 2.5% cream: applied to wound beds for approximately 15minutes. []   Lidocaine 2% Topical Gel      []  Lidocaine injectable 1% with epinephrine 1:100,000; Amount used: mL    []  Lidocaine injectable 1% without epinephrine;  Amount used: mL    []  Other:     []  None         [] patient is insensate due to neuropathy         [] patient declines        [] allergy to anesthetic        [] tissue for debridement is either superficial, loosely adherent and/or necrotic and denervated          []  Other:       Description of Procedure: Abscess drained and explored  Intra-Operative Findings: purulent fluid  Material Removed: pus  Specimens Obtained:  [unfilled]  Estimated Blood Loss:  None        Assessment : Follow up Visit Week: 13   Contact dermatitis around the area - resolved  Wound remains closed - skin tag in the center  No discharge      Cx from 6-8-16. No orgs seen on GS. Cx grew CNS and Serratia  No antibiotics used   6 monthly patency test of the graft was excellent     PLAN / RECCOMENDATIONS:     Triamcinolone ointment as needed  ABD dressings with cross taping - need to keep open to air  Counseled about ointment application , hand hygiene etc   RTC prn     FIRST VISIT on 6-8-16     ASSESSMENT:    1.RT flank surgical wound with Partial nonhealing and nonclosure. Multiple antibiotic courses. Last- Augmentin three weeks ago. Rt upper body and lt leg vascular graft surgery in 2013. Persistent lower end opening. Debridement and resuturing April , 2016. No complete closure. The Vascular graft felt under the open area. 2. Immunosuppression - On Remicade X 15 yrs  3. Chron,s disease - well controlled  4. HTN - diuretic controlled      WOUND POA CONDITIONS           Wound Abdomen Right (Active)    Number of days:1007         Wound Axilla Right (Active)    Number of days:898         Wound Chest Right;Upper; Lateral (Active)    Number of days:818         Wound Abdomen Right;Lateral (Active)    DRESSING STATUS  Removed  6/8/2016  9:35 AM    DRESSING TYPE  Adhesive wound dressing (Band-Aid)  6/8/2016  9:35 AM    Wound Dimensions (length x width x depth)  1.5x0.4x1.1  6/8/2016  9:35 AM    Condition of Base  [place];Slough  6/8/2016  9:35 AM    Condition of Edges  Open  6/8/2016  9:35 AM    Tissue Type  Pink;Yellow  6/8/2016  9:35 AM    Tissue Type Percent Pink  70  6/8/2016  9:35 AM    Tissue Type Percent Yellow  30  6/8/2016  9:35 AM    Drainage Amount   Small   6/8/2016  9:35 AM    Drainage Color  Yellow  6/8/2016  9:35 AM    Wound Odor  None  6/8/2016  9:35 AM    Periwound Skin Condition  Other (comment)  6/8/2016  9:35 AM    Cleansing and Cleansing Agents   Dermal wound cleanser;Normal saline  6/8/2016  9:35 AM    Dressing Type Applied  Other (Comment)  6/8/2016  9:35 AM    Procedure Tolerated  [place] 6/8/2016  9:35 AM    Number of days:0                PLAN /RECOMMENDATIONS:  Deep wound Cx  Alie based dressings  Counseled about hand hygiene and unnecessary antibiotic use        FIRST VISIT DATA: on  6-8-16  Reason for consult:   Anibal Fields is 74 y.o. female on whom Wound Care Service is being consulted for 6-8-16     History of Present Illness: On First Visit  1. RT flank surgical wound with Partial nonhealing and nonclosure. Multiple antibiotic courses. Last- Augmentin three weeks ago. Rt upper body and lt leg vascular graft surgery in 2013. Persistent lower end opening. Debridement and resuturing April , 2016. No complete closure. The Vascular graft felt under the open area. 2. Immunosuppression - On Remicade X 15 yrs  3. Chron,s disease - well controlled  4. HTN - diuretic controlled  No systemic symptoms. States she has not had a problem               Patient Active Problem List    Diagnosis  Code      HTN (hypertension)  I10      PVD (peripheral vascular disease) ([facility])  I73.9      Crohn's disease ([facility])  K50.90      Mass of breast, left  N63      Wound disruption, post-op, skin  T81. 31XA      Chronic aorto-iliac occlusion syndrome ([facility])  I74.09             Allergies    Allergen  Reactions      Codeine  Nausea and Vomiting          Other reaction(s): other/intolerance      Darvon [Propoxyphene]  Itching      Demerol [Meperidine]  Other (comments)          Halucinations      Keflex [Cephalexin]  Diarrhea      Talwin [Pentazocine Lactate]  Shortness of Breath and Nausea and Vomiting              Past Medical Marvetta Rushing Date      HTN (hypertension)         Crohn's disease (HCC)         PVD (peripheral vascular disease) (HCC)             right leg      Claudication (HCC)             left leg      Vitamin B12 deficiency         Nausea & vomiting         Other ill-defined conditions(799.89)             Crohn's      Other and unspecified symptoms and signs involving general sensations and perceptions             PVD      Arthritis         GERD (gastroesophageal reflux disease)                 Past Surgical History    Procedure  Laterality  Date      Hx cholecystectomy            Hx appendectomy            Hx bunionectomy                left eye      Hx orthopaedic                lateral epicondilitis on right      Hx cataract removal                bilateral      Hx breast lumpectomy                breast left      Hx other surgical     3/7/2013          catheter into aorta      Hx other surgical                intestional resection x4      Hx other surgical     9/2013          I & D Right chest/flank wall abscess vs granuloma      Pr bypass graft othr,axill-fem  Right  4/19/2013      Pr bypass graft othr,fem-pop  Left  5/2013            History    Substance Use Topics      Smoking status:  Current Every Day Smoker -- 0.50 packs/day for 40 years      Smokeless tobacco:  Never Used      Alcohol Use:  No              Family History    Problem  Relation  Age of Onset      Coronary Artery Disease  Mother         Heart Attack  Mother         Heart Surgery  Mother             CABGx3      Elevated Lipids  Mother         COPD  Father         Heart Attack  Brother         COPD  Brother         Other  Brother             PAD               Prior to Admission medications     Medication  Sig  Start Date  End Date  Taking?  Authorizing Provider    diphenhydrAMINE (BENADRYL) 25 mg capsule  Take 25 mg by mouth every six (6) hours as needed.           Historical Provider    pregabalin (LYRICA) 75 mg capsule  Take 1 Cap by mouth two (2) times a day. Max Daily Amount: 150 mg.  5/19/16        TIMMY French    clopidogrel (PLAVIX) 75 mg tablet  Take 1 Tab by mouth daily.  5/9/16        Desirae Levy MD    VOLTAREN 1 % topical gel  APPLY 4 GRAMS TO AFFECTED AREA FOUR TIMES DAILY. APPLY TO LEFT FOOT FOUR TIMES DAILY  8/5/15        Grey Dhillon MD    ALPRAZolam Brown Shaper) 1 mg tablet  Take 0.5 mg by mouth nightly as needed for Anxiety.           Historical Provider    aspirin 81 mg tablet  Take 81 mg by mouth daily.           Historical Provider    cyanocobalamin (VITAMIN B-12) 1,000 mcg/mL injection  1,000 mcg by IntraMUSCular route every fourteen (14) days.           Historical Provider    promethazine (PHENERGAN) 12.5 mg tablet  Take 12.5 mg by mouth. Takes 6.25 mg in the morning and 12.5 mg at night           Historical Provider    loperamide (IMMODIUM) 2 mg tablet  Take 2 mg by mouth daily.           Historical Provider    dicyclomine (BENTYL) 10 mg capsule  Take 10 mg by mouth two (2) times a day.           Historical Provider    ipratropium-albuterol (COMBIVENT)  mcg/actuation inhaler  Take 2 Puffs by inhalation nightly as needed.           Historical Provider    fluticasone (FLONASE) 50 mcg/actuation nasal spray  2 Sprays by Both Nostrils route nightly.           Historical Provider    triamterene-hydrochlorothiazide (DYAZIDE) 37.5-25 mg per capsule  Take 1 Cap by mouth daily.           Historical Provider    bimatoprost (LUMIGAN) 0.03 % ophthalmic drops  Administer 1 Drop to both eyes every evening.           Historical Provider    atropine-PHENobarbital-scopolamine-hyoscyamine (DONNATAL) 16.2-0.1037 -0.0194 mg per tablet  Take 1 Tab by mouth as needed.           Historical Provider    predniSONE (DELTASONE) 5 mg tablet  Take 30 mg by mouth as needed.           Historical Provider    inFLIXimab (REMICADE) 100 mg injection  5 mg/kg by IntraVENous route once.  Every 8 weeks           Historical Provider       There are no discontinued medications. Current Outpatient Prescriptions    Medication  Sig      diphenhydrAMINE (BENADRYL) 25 mg capsule  Take 25 mg by mouth every six (6) hours as needed.      pregabalin (LYRICA) 75 mg capsule  Take 1 Cap by mouth two (2) times a day. Max Daily Amount: 150 mg.      clopidogrel (PLAVIX) 75 mg tablet  Take 1 Tab by mouth daily.      VOLTAREN 1 % topical gel  APPLY 4 GRAMS TO AFFECTED AREA FOUR TIMES DAILY. APPLY TO LEFT FOOT FOUR TIMES DAILY      ALPRAZolam (XANAX) 1 mg tablet  Take 0.5 mg by mouth nightly as needed for Anxiety.      aspirin 81 mg tablet  Take 81 mg by mouth daily.      cyanocobalamin (VITAMIN B-12) 1,000 mcg/mL injection  1,000 mcg by IntraMUSCular route every fourteen (14) days.      promethazine (PHENERGAN) 12.5 mg tablet  Take 12.5 mg by mouth. Takes 6.25 mg in the morning and 12.5 mg at night      loperamide (IMMODIUM) 2 mg tablet  Take 2 mg by mouth daily.      dicyclomine (BENTYL) 10 mg capsule  Take 10 mg by mouth two (2) times a day.      ipratropium-albuterol (COMBIVENT)  mcg/actuation inhaler  Take 2 Puffs by inhalation nightly as needed.      fluticasone (FLONASE) 50 mcg/actuation nasal spray  2 Sprays by Both Nostrils route nightly.      triamterene-hydrochlorothiazide (DYAZIDE) 37.5-25 mg per capsule  Take 1 Cap by mouth daily.      bimatoprost (LUMIGAN) 0.03 % ophthalmic drops  Administer 1 Drop to both eyes every evening.      atropine-PHENobarbital-scopolamine-hyoscyamine (DONNATAL) 16.2-0.1037 -0.0194 mg per tablet  Take 1 Tab by mouth as needed.      predniSONE (DELTASONE) 5 mg tablet  Take 30 mg by mouth as needed.      inFLIXimab (REMICADE) 100 mg injection  5 mg/kg by IntraVENous route once.  Every 8 weeks        No current facility-administered medications for this encounter.                    MICROBIOLOGY:-              CULTURE RESULT:    Date  Value  Ref Range  Status    06/08/2016  SERRATIA Josee De La Jameelie 480   06/08/2016  FEW  STAPHYLOCOCCUS LUGDUNENSIS        Final    06/08/2016  FEW  STAPHYLOCOCCUS SPECIES, COAGULASE NEGATIVE        Final              GRAM STAIN    Date  Value  Ref Range  Status    06/08/2016  FEW  WBC'S        Final    06/08/2016  NO ORGANISMS SEEN     Final    04/19/2016  FEW  WBC'S        Final    04/19/2016  NO ORGANISMS SEEN     Final             Antibiotic History:                Current:                 S/P:     Radiology:  [unfilled]         Review of Systems:        Constitutional:  negative for chills, diaphoresis, fever, malaise/fatigue, weakness and weight loss    Skin:  negative for itching and rash    HENT:  negative for ear discharge, ear pain, hearing loss and sore throat    Eyes:  negative for blurred vision, double vision and photophobia    Cardiovascular:  negative for chest pain, claudication, leg swelling, orthopnea, paroxysmal nocturnal dyspnea    Respiratory:  negative for cough, hemoptysis, shortness of breath or sputum production    Gastointestinal:  negative for abdominal pain, blood in stool, constipation, diarrhea, melena, nausea and vomiting    Genitourinary:  No dysuria, increased frequency, hematuria    Musculoskeletal:  negative for back pain, joint pain and myalgias    Endo:  negative for cold intolerance, heat intolerance, polydipsia and polyuria.    Heme:  negative for easy bleeding and easy bruising    Allergies:  negative for hives    Neurological:  negative for headaches, dizziness, focal weakness, level of consciousness, seizures and tingling    Psychiatric:   negative for depression, hallucinations and suicidal ideals           Objective:       Patient Vitals for the past 24 hrs:     Temp  Pulse  Resp  BP  SpO2    06/24/16 0946  97.6 °F (36.4 °C)  82  16  148/83 mmHg  98 %           Constitutional:  well developed, nourished, no distress and alert and oriented x 3    HENT:  atraumatic, nose normal, normocephalic and oropharynx clear and moist    Eyes:  conjunctiva normal, EOM normal and PERRL    Neck:  ROM normal, supple, trachea normal and no adenopathy, thrush.  MMM    Cardiovascular:  heart sounds normal, intact distal pulses, normal rate and regular rhythm    Pulmonary/Chest Wall:  breath sounds normal and effort normal    Abdominal:  appearance normal, bowel sounds normal, soft and No rebound, gaurding or tenderness    Genitourinary/Anorectal:  deferred    Musculoskeletal:  normal ROM    Neurological:  awake, alert and oriented x 3, and    cranial nerves intact  muscular strength 5/5 and symmetric  reflexes normal and symmetric  sensory normal          Skin:  I/3rd of Rt flank surgical wound open with palpable vascular graft underneath                   Labwork and Ancillary Studies    CBC w/Diff        Lab Results    Component  Value  Date/Time       [facility] MCNAIR 13.4*  12/20/2013 12:39 PM       RBC  5.29  12/20/2013 12:39 PM       HCT  46.6*  12/20/2013 12:39 PM       MCV  88.1  12/20/2013 12:39 PM       MCH  29.7  12/20/2013 12:39 PM       MCHC  33.7  12/20/2013 12:39 PM       RDW  15.7*  12/20/2013 12:39 PM       MONOS  8  05/08/2013 05:20 AM       EOS  3  05/08/2013 05:20 AM       BASOS  1  05/08/2013 05:20 AM          Comprehensive Metabolic Profile        Lab Results    Component  Value  Date/Time       NA  140  04/14/2016 09:10 AM       CO2  31  04/14/2016 09:10 AM       BUN  11  04/14/2016 09:10 AM             Kiesha Marion MD  Pager: [phone]  [facility] Wound Care Staff

## 2017-08-17 PROCEDURE — 3331090001 HH PPS REVENUE CREDIT

## 2017-08-17 PROCEDURE — 3331090002 HH PPS REVENUE DEBIT

## 2017-08-18 PROCEDURE — 3331090001 HH PPS REVENUE CREDIT

## 2017-08-18 PROCEDURE — 3331090002 HH PPS REVENUE DEBIT

## 2017-08-19 PROCEDURE — 3331090002 HH PPS REVENUE DEBIT

## 2017-08-19 PROCEDURE — 3331090001 HH PPS REVENUE CREDIT

## 2017-08-20 PROCEDURE — 3331090001 HH PPS REVENUE CREDIT

## 2017-08-20 PROCEDURE — 3331090002 HH PPS REVENUE DEBIT

## 2017-08-21 ENCOUNTER — OFFICE VISIT (OUTPATIENT)
Dept: VASCULAR SURGERY | Age: 67
End: 2017-08-21

## 2017-08-21 DIAGNOSIS — I74.09 CHRONIC AORTO-ILIAC OCCLUSION SYNDROME (HCC): ICD-10-CM

## 2017-08-21 DIAGNOSIS — I70.202 OCCLUSION OF LEFT FEMORAL ARTERY (HCC): ICD-10-CM

## 2017-08-21 DIAGNOSIS — I73.9 PVD (PERIPHERAL VASCULAR DISEASE) (HCC): ICD-10-CM

## 2017-08-21 DIAGNOSIS — Z95.828 HISTORY OF ARTERIAL BYPASS OF LOWER EXTREMITY: Primary | ICD-10-CM

## 2017-08-21 PROCEDURE — 3331090001 HH PPS REVENUE CREDIT

## 2017-08-21 PROCEDURE — 3331090002 HH PPS REVENUE DEBIT

## 2017-08-21 NOTE — PROCEDURES
[facility]   *** FINAL REPORT ***    Name: Anna Valdes  MRN: [id number]       Outpatient  : 1950  HIS Order #: [id number]  [id number] [facility] Visit #: [id number]  Date: 21 Aug 2017    TYPE OF TEST: Aorto-Iliac Duplex    REASON FOR TEST  Peripheral Arterial Disease    B-Mode:-                 (cm)   1     2     3  Aortic diameter:         AP:                           TV:  Common iliac diameter:   Right:                           Left:    Duplex:-                           PSV  Stenosis                           ----- --------------------  Aorta: (1)         (2)         (3)    Right common iliac:  Right external iliac:    Left common iliac:  Left external iliac:    INTERPRETATION/FINDINGS  Duplex images were obtained using 2-D gray scale, color flow and  spectral doppler analysis. Right Ax Fem BP. Patent right axillary artery to right common femoral artery bypass  graft with elevated velocities noted in the distal anastomosis  suggesting moderate stenosis. 2. Biphasic signals noted throughout. 3. ABIs suggest moderate arterial insufficiency on the right and mid  to moderate arterial insufficiency on the left by waveform analysis. The LAMONTE on the right is 0.62 and on the left is 0.93. ADDITIONAL COMMENTS  Ax Fem BPG:  Inflow: 142 cm/sec     Prx Anast: 93 cm/sec     Prx 93 cm/sec    Ax: 79 cm/sec   Below Ax  66 cm/sec         Ribline 90 cm/sec   Below Ribline  72 cm/sec   Hip 72 cm/sec  Below Hip  75 cm/sec               Dst Anast 167 cm/sec    Outflow 154 cm/sec    I have personally reviewed the data relevant to the interpretation of  this  study. TECHNOLOGIST: Yvette Elias RVT, BS  Signed: 2017 11:25 AM    PHYSICIAN: Vennie Ada A. Romana Bracket, MD  Signed: 2017 02:00 PM

## 2017-08-21 NOTE — PROCEDURES
[facility]   *** FINAL REPORT ***    Name: Alex Linares  MRN: [id number]       Outpatient  : 1950  HIS Order #: [id number]  [id number] [facility] Visit #: [id number]  Date: 21 Aug 2017    TYPE OF TEST: Bypass Graft Duplex    REASON FOR TEST  PVD, f/u Revasc    Graft:-  Summary:   Left common femoral - popliteal (above knee) bypass with  graft  Op. Date:  2013  Surgeon:   Erwin Linder    Results:-            Velocity  Ratio  Stenosis         Waveform            --------  -----  --------         ----------  Inflow:    212.0  Proximal:  144.0      0.7  Normal           Biphasic  Upper:     112.0      0.8  Normal           Biphasic  Mid-graft:  48.0      0.4  Normal           Biphasic  Lower:      62.0      1.3  Normal           Biphasic  Distal:     62.0      1.0  Normal           Biphasic  Outflow:    81.0      1.3  Normal           Biphasic    LAMONTE:    INTERPRETATION/FINDINGS  Duplex exam of the bypass graft reveals :  1. Patent left common femoral artery to popliteal artery bypass graft  wtihout significant stenosis. 2. Biphasic signals noted throughout. 3. ABIs suggest moderate arterial insufficiency on the right and mid  to moderate arterial insufficiency on the left by waveform analysis. The LAMONTE on the right is 0.62 and on the left is 0.93. ADDITIONAL COMMENTS    I have personally reviewed the data relevant to the interpretation of  this  study. TECHNOLOGIST: Colletta Mains [place], RVT, SUE  Signed: 2017 11:29 AM    PHYSICIAN: Umm Polk MD  Signed: 2017 02:00 PM

## 2017-08-21 NOTE — PROCEDURES
[facility]   *** FINAL REPORT ***    Name: Harper Hassan  MRN: [id number]       Outpatient  : 1950  HIS Order #: [id number]  [id number] [facility] Visit #: [id number]  Date: 21 Aug 2017    TYPE OF TEST: Peripheral Arterial Testing    REASON FOR TEST  Peripheral vascular dz NOS    Right Leg  Segmentals: Abnormal                     mmHg  Brachial         146  High thigh  Low thigh  Calf  Posterior tibial  90  Dorsalis pedis    85  Peroneal  Metatarsal  Toe pressure  Doppler:    Abnormal  Ankle/Brachial: 0.62    Left Leg  Segmentals: Abnormal                     mmHg  Brachial         141  High thigh  Low thigh  Calf  Posterior tibial 131  Dorsalis pedis   136  Peroneal  Metatarsal  Toe pressure  Doppler:    Abnormal  Ankle/Brachial: 0.93    INTERPRETATION/FINDINGS  Physiologic testing was performed using continuous wave doppler and  segmental pressures. ABIs only:  1. Moderate peripheral arterial disease indicated at rest in the right   leg. 2. Mild to moderate arterial insufficiency on the left at rest by  waveform analysis. Left LAMONTE suggest mild arterial insufficiency at  rest.  3. The right ankle/brachial index is 0.62 and the left ankle/brachial  index is 0.93. Essentially no significant changes as compared with previous study. ADDITIONAL COMMENTS    I have personally reviewed the data relevant to the interpretation of  this  study. TECHNOLOGIST: Gisela Elias RVT, BS  Signed: 2017 11:09 AM    PHYSICIAN: Andres Soto MD  Signed: 2017 02:00 PM

## 2017-08-22 PROCEDURE — 3331090001 HH PPS REVENUE CREDIT

## 2017-08-22 PROCEDURE — 3331090002 HH PPS REVENUE DEBIT

## 2017-08-23 ENCOUNTER — OFFICE VISIT (OUTPATIENT)
Dept: ORTHOPEDIC SURGERY | Age: 67
End: 2017-08-23

## 2017-08-23 VITALS
OXYGEN SATURATION: 96 % | BODY MASS INDEX: 22.33 KG/M2 | HEART RATE: 84 BPM | DIASTOLIC BLOOD PRESSURE: 72 MMHG | SYSTOLIC BLOOD PRESSURE: 160 MMHG | TEMPERATURE: 97.3 F | WEIGHT: 134 LBS | HEIGHT: 65 IN | RESPIRATION RATE: 18 BRPM

## 2017-08-23 DIAGNOSIS — M75.51 SUBACROMIAL BURSITIS OF RIGHT SHOULDER JOINT: Primary | ICD-10-CM

## 2017-08-23 DIAGNOSIS — M25.512 LEFT SHOULDER PAIN, UNSPECIFIED CHRONICITY: ICD-10-CM

## 2017-08-23 DIAGNOSIS — M25.511 RIGHT SHOULDER PAIN, UNSPECIFIED CHRONICITY: ICD-10-CM

## 2017-08-23 DIAGNOSIS — M75.52 SUBACROMIAL BURSITIS OF LEFT SHOULDER JOINT: ICD-10-CM

## 2017-08-23 PROCEDURE — 3331090001 HH PPS REVENUE CREDIT

## 2017-08-23 PROCEDURE — 3331090002 HH PPS REVENUE DEBIT

## 2017-08-23 RX ORDER — TRIAMCINOLONE ACETONIDE 40 MG/ML
40 INJECTION, SUSPENSION INTRA-ARTICULAR; INTRAMUSCULAR ONCE
Qty: 1 ML | Refills: 0
Start: 2017-08-23 | End: 2017-08-23

## 2017-08-23 NOTE — PROGRESS NOTES
Ketty Mc  1950   Chief Complaint   Patient presents with    Shoulder Pain     Bilateral        HISTORY OF PRESENT ILLNESS  Ketty Mc is a 69 y.o. female who presents today for evaluation of B/L shoulder pain. She rates her pain 7/10 today. Pain has been present for about 3 months. Initially the right shoulder was worse. She describes pain as aching and tenderness. Pain is worsened with overhead motions. Associated symptoms include pain radiating to the elbow and stiffness of the shoulders. She denies weakness. She states she has been doing more yard work lately. Allergies   Allergen Reactions    Codeine Nausea and Vomiting     Other reaction(s): other/intolerance    Darvon [Propoxyphene] Itching    Demerol [Meperidine] Other (comments)     Halucinations    Keflex [Cephalexin] Diarrhea    Talwin [Pentazocine Lactate] Shortness of Breath and Nausea and Vomiting        Past Medical History:   Diagnosis Date    Arthritis     [facility]ication [facility] [facility]     left leg    Crohn's disease ([facility]     Crohn's disease ([facility]     GERD (gastroesophageal reflux disease)     HTN (hypertension)     Nausea & vomiting     Other and unspecified symptoms and signs involving general sensations and perceptions     PVD    Other ill-defined conditions     Crohn's    PVD (peripheral vascular disease) (HCC)     right leg    Vitamin B12 deficiency       Social History     Social History    Marital status:      Spouse name: N/A    Number of children: N/A    Years of education: N/A     Occupational History    Not on file.      Social History Main Topics    Smoking status: Current Every Day Smoker     Packs/day: 1.50     Years: 45.00    Smokeless tobacco: Never Used      Comment: second hand smoke exposure prior to active smoking    Alcohol use No    Drug use: No    Sexual activity: Not on file     Other Topics Concern    Not on file     Social History Narrative    Father worked in the [employer] and was diagnosed with Asbestosis. Past Surgical History:   Procedure Laterality Date    BYPASS GRAFT OTHR,AXILL-FEM Right 4/19/2013    BYPASS GRAFT OTHR,FEM-POP Left 5/2013    FOOT/TOES SURGERY PROC UNLISTED      HX APPENDECTOMY      HX BREAST LUMPECTOMY      breast left    HX BUNIONECTOMY      left eye    HX CATARACT REMOVAL      bilateral    HX CHOLECYSTECTOMY      HX ORTHOPAEDIC      lateral epicondilitis on right    HX OTHER SURGICAL  3/7/2013    catheter into aorta    HX OTHER SURGICAL      intestional resection x4    HX OTHER SURGICAL  9/2013    I & D Right chest/flank wall abscess vs granuloma    UPPER ARM/ELBOW SURGERY UNLISTED        Family History   Problem Relation Age of Onset    Coronary Artery Disease Mother     Heart Attack Mother     Heart Surgery Mother      CABGx3    Elevated Lipids Mother     COPD Father     Heart Attack Brother     COPD Brother     Other Brother      PAD      Current Outpatient Prescriptions   Medication Sig    Lactobacillus Acidoph & Bulgar ([place]) 1 million cell tab tablet Take 2 Tabs by mouth two (2) times a day.  ipratropium-albuterol (COMBIVENT RESPIMAT)  mcg/actuation inhaler Take 1 Puff by inhalation every six (6) hours as needed for Wheezing or Shortness of Breath.  OTHER Incentive Spirometry- Use as directed    OTHER Check CBC, CMP, Mg in 3 days, results to PCP immediately, Diagnosis- PNA    OTHER Oxygen- Use as directed (Patient taking differently: Take 2 L/min by mouth continuous.)    clopidogrel (PLAVIX) 75 mg tab TAKE ONE TABLET BY MOUTH DAILY    DULoxetine (CYMBALTA) 60 mg capsule Take 60 mg by mouth daily.  pregabalin (LYRICA) 75 mg capsule Take 1 Cap by mouth two (2) times a day. Max Daily Amount: 150 mg. (Patient taking differently: Take 150 mg by mouth two (2) times a day.)    aspirin 81 mg tablet Take 81 mg by mouth daily.     cyanocobalamin (VITAMIN B-12) 1,000 mcg/mL injection 1,000 mcg by IntraMUSCular route every fourteen (14) days.  loperamide (IMMODIUM) 2 mg tablet Take 2 mg by mouth daily.  dicyclomine (BENTYL) 10 mg capsule Take 10 mg by mouth two (2) times a day.  fluticasone (FLONASE) 50 mcg/actuation nasal spray 2 Sprays by Both Nostrils route nightly.  triamterene-hydrochlorothiazide (DYAZIDE) 37.5-25 mg per capsule Take 1 Cap by mouth daily.  bimatoprost (LUMIGAN) 0.03 % ophthalmic drops Administer 1 Drop to both eyes every evening.  atropine-PHENobarbital-scopolamine-hyoscyamine (DONNATAL) 16.2-0.1037 -0.0194 mg per tablet Take 1 Tab by mouth as needed (diarrhea). Indications: Irritable Bowel Syndrome    inFLIXimab (REMICADE) 100 mg injection 5 mg/kg by IntraVENous route once. Every 8 weeks     No current facility-administered medications for this visit. REVIEW OF SYSTEM   Patient denies: Weight loss, Fever/Chills, HA, Visual changes, Fatigue, Chest pain, SOB, Abdominal pain, N/V/D/C, Blood in stool or urine, Edema. Pertinent positive as above in HPI. All others were negative    PHYSICAL EXAM:   Visit Vitals    /72    Pulse 84    Temp 97.3 °F (36.3 °C) (Oral)    Resp 18    Ht 5' 5\" (1.651 m)    Wt 134 lb (60.8 kg)    SpO2 96%    BMI 22.3 kg/m2     The patient is a well-developed, well-nourished female   in no acute distress. The patient is alert and oriented times three. The patient is alert and oriented times three. Mood and affect are normal.  LYMPHATIC: lymph nodes are not enlarged and are within normal limits  SKIN: normal in color and non tender to palpation. There are no bruises or abrasions noted. NEUROLOGICAL: Motor sensory exam is within normal limits. Reflexes are equal bilaterally.  There is normal sensation to pinprick and light touch  MUSCULOSKELETAL:  Examination Left shoulder Right shoulder   Skin Intact Intact   AC joint tenderness - -   Biceps tenderness + +   Forward flexion/Elevation  180   Active abduction  160 Glenohumeral abduction 90 90   External rotation ROM 90 90   Internal rotation ROM 70 70   Apprehension - -   Chatos Relocation - -   Jerk - -   Load and Shift - -   Obriens - -   Speeds - -   Impingement sign + +   Supraspinatus/Empty Can -, 5/5 -, 5/5   External Rotation Strength -, 5/5 -, 5/5   Lift Off/Belly Press -, 5/5 -, 5/5   Neurovascular Intact Intact          PROCEDURE: After sterile prep, 6 cc of Xylocaine and 1 cc of Kenalog were injected into the bilateral subacromial spaces. [facility]  OFFICE PROCEDURE PROGRESS NOTE        Chart reviewed for the following:  Scott Agosto MD, have reviewed the History, Physical and updated the Allergic reactions for [address] [place] [address] performed immediately prior to start of procedure:  Scott Agosto MD, have performed the following reviews on [address] [place] [address] prior to the start of the procedure:            * Patient was identified by name and date of birth   * Agreement on procedure being performed was verified  * Risks and Benefits explained to the patient  * Procedure site verified and marked as necessary  * Patient was positioned for comfort  * Consent was signed and verified     Time: 11:31 AM    Date of procedure: 8/23/2017    Procedure performed by:  TIMMY Marrero    Provider assisted by: (see medication administration)    How tolerated by patient: tolerated the procedure well with no complications    Comments: none      IMAGING:   XR of the B/L shoulders dated 8/23/17 was reviewed and read: no acute abnormalities      IMPRESSION:      ICD-10-CM ICD-9-CM    1. Subacromial bursitis of right shoulder joint M75.51 726.19 TRIAMCINOLONE ACETONIDE INJ      triamcinolone acetonide (KENALOG) 40 mg/mL injection      DRAIN/INJECT LARGE JOINT/BURSA   2. Right shoulder pain, unspecified chronicity M25.511 719.41 AMB POC XRAY, SHOULDER; COMPLETE, 2+   3.  Left shoulder pain, unspecified chronicity M25.512 719.41 AMB POC XRAY, SHOULDER; COMPLETE, 2+   4. Subacromial bursitis of left shoulder joint M75.52 726.19 TRIAMCINOLONE ACETONIDE INJ      triamcinolone acetonide (KENALOG) 40 mg/mL injection      DRAIN/INJECT LARGE JOINT/BURSA        PLAN:   1. I feel the patient's B/L shoulder pain is coming from bursitis. Risk factors include: gerd, htn  2. Yes cortisone injection indicated today: B/L shoulders   3. No Physical/Occupational Therapy indicated today  4. No diagnostic test indicated today  5. No durable medical equipment indicated today  6. No referral indicated today  7. No medications indicated today  8.  No Narcotic indicated today    RTC 4 weeks if pain continues  Follow-up Disposition: Not on File    Scribed by Betina Berrios [facility]) as dictated by ADALGISA Arredondo [address] and Spine Specialist

## 2017-08-23 NOTE — PATIENT INSTRUCTIONS
Bursitis: Care Instructions  Your Care Instructions  A bursa is a small sac of fluid that helps the tissues around a joint slide over one another easily. Injury or overuse of a joint can cause pain, redness, and inflammation in the bursa (bursitis). Bursitis usually gets better if you avoid the activity that caused it. You can help prevent bursitis from coming back by doing stretching and strengthening exercises. You may also need to change the way you do some activities. Follow-up care is a key part of your treatment and safety. Be sure to make and go to all appointments, and call your doctor if you are having problems. Its also a good idea to know your test results and keep a list of the medicines you take. How can you care for yourself at home? · Put ice or a cold pack on the area for 10 to 20 minutes at a time. Try to do this every 1 to 2 hours for the next 3 days (when you are awake) or until the swelling goes down. Put a thin cloth between the ice and your skin. · After the 3 days of using ice, you may use heat on the area. You can use a hot water bottle; a warm, moist towel; or a heating pad set on low. You can also try alternating heat and ice. · Rest the area where you have pain. Stop any activities that cause pain. Switch to activities that do not stress the area. · Take pain medicines exactly as directed. ¨ If the doctor gave you a prescription medicine for pain, take it as prescribed. ¨ If you are not taking a prescription pain medicine, ask your doctor if you can take an over-the-counter medicine. ¨ Do not take two or more pain medicines at the same time unless the doctor told you to. Many pain medicines have acetaminophen, which is Tylenol. Too much acetaminophen (Tylenol) can be harmful. · To prevent stiffness, gently move the joint as much as you can without pain every day. As the pain gets better, keep doing range-of-motion exercises.  Ask your doctor for exercises that will make the muscles around the joint stronger. Do these as directed. · You can slowly return to the activity that caused the pain, but do it with less effort until you can do it without pain or swelling. Be sure to warm up before and stretch after you do the activity. When should you call for help? Call your doctor now or seek immediate medical care if:  · You get a fever and chills. · You have increased swelling or redness in a joint. · You cannot use a joint, or the pain in a joint gets worse. Watch closely for changes in your health, and be sure to contact your doctor if:  · You have pain for 2 weeks or longer despite home treatment. Where can you learn more? Go to http://kirit-della.info/. Enter O364 in the search box to learn more about \"Bursitis: Care Instructions. \"  Current as of: May 23, 2016  Content Version: 11.3  © 1629-8587 [employer]. Care instructions adapted under license by [employer] (which disclaims liability or warranty for this information). If you have questions about a medical condition or this instruction, always ask your healthcare professional. [place] [address] any warranty or liability for your use of this information.

## 2017-09-29 ENCOUNTER — OFFICE VISIT (OUTPATIENT)
Dept: VASCULAR SURGERY | Age: 67
End: 2017-09-29

## 2017-09-29 VITALS
BODY MASS INDEX: 22.33 KG/M2 | SYSTOLIC BLOOD PRESSURE: 144 MMHG | RESPIRATION RATE: 18 BRPM | DIASTOLIC BLOOD PRESSURE: 70 MMHG | HEART RATE: 80 BPM | WEIGHT: 134 LBS | HEIGHT: 65 IN

## 2017-09-29 DIAGNOSIS — I74.09 CHRONIC AORTO-ILIAC OCCLUSION SYNDROME (HCC): Primary | ICD-10-CM

## 2017-09-29 RX ORDER — BUPROPION HYDROCHLORIDE 75 MG/1
TABLET ORAL 2 TIMES DAILY
Status: ON HOLD | COMMUNITY
End: 2018-04-30

## 2017-09-29 NOTE — PROGRESS NOTES
[address] [place] [address]    Chief Complaint   Patient presents with    Leg Pain     6 month follow up after testing       History and Physical    Most pleasant 67-year-old female following up today regarding her history of aortoiliac occlusive syndrome. She has a right ax femorofemoral bypass graft and a left femoropopliteal bypass graft. She has had no claudication no pain with walking. She had a hospital admission recently where she was treated for pneumonia and sepsis with a one-week stay. She tells me she had a very good stay in the hospital has no complaints. No chest pain or shortness of breath currently. Her son has had some work issues but she is dealing with now. But otherwise she is happy with how she is doing. She continues on the same medications. She has healed her bypass graft infection with plastic surgery systems and collagen injections. Past Medical History:   Diagnosis Date    Arthritis     Claudication [facility])     left leg    [facility] [address]     Crohn's disease ([facility] [address]     GERD (gastroesophageal reflux disease)     HTN (hypertension)     Nausea & vomiting     Other and unspecified symptoms and signs involving general sensations and perceptions     PVD    Other ill-defined conditions     Crohn's    PVD (peripheral vascular disease) ([facility] [address]     right leg    Vitamin B12 deficiency      Patient Active Problem List   Diagnosis Code    HTN (hypertension) I10    PVD (peripheral vascular disease) ([facility] [address] I73.9    Crohn's disease ([facility] [address] K50.90    Mass of breast, left N63    Wound disruption, post-op, skin T81.31XA    Chronic aorto-iliac occlusion syndrome (HCC) I74.09    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 7XXA    Abscess L02.91    Dermal sinus tract of lumbosacral region L05.92    CAP (community acquired pneumonia) J18.9    Severe sepsis (HCC) A41.9, R65.20     Past Surgical History:   Procedure Laterality Date    BYPASS GRAFT OTHR,AXILL-FEM Right 4/19/2013    BYPASS GRAFT OTHR,FEM-POP Left 5/2013    FOOT/TOES SURGERY PROC UNLISTED      HX APPENDECTOMY      HX BREAST LUMPECTOMY      breast left    HX BUNIONECTOMY      left eye    HX CATARACT REMOVAL      bilateral    HX CHOLECYSTECTOMY      HX ORTHOPAEDIC      lateral epicondilitis on right    HX OTHER SURGICAL  3/7/2013    catheter into aorta    HX OTHER SURGICAL      intestional resection x4    HX OTHER SURGICAL  9/2013    I & D Right chest/flank wall abscess vs granuloma    UPPER ARM/ELBOW SURGERY UNLISTED       Current Outpatient Prescriptions   Medication Sig Dispense Refill    buPROPion (WELLBUTRIN) 75 mg tablet Take  by mouth two (2) times a day.  ipratropium-albuterol (COMBIVENT RESPIMAT)  mcg/actuation inhaler Take 1 Puff by inhalation every six (6) hours as needed for Wheezing or Shortness of Breath. 1 Inhaler 0    OTHER Oxygen- Use as directed (Patient taking differently: Take 2 L/min by mouth continuous. ) 1 Each 0    clopidogrel (PLAVIX) 75 mg tab TAKE ONE TABLET BY MOUTH DAILY 30 Tab 11    DULoxetine (CYMBALTA) 60 mg capsule Take 60 mg by mouth daily.  pregabalin (LYRICA) 75 mg capsule Take 1 Cap by mouth two (2) times a day. Max Daily Amount: 150 mg. (Patient taking differently: Take 150 mg by mouth two (2) times a day.) 180 Cap 3    cyanocobalamin (VITAMIN B-12) 1,000 mcg/mL injection 1,000 mcg by IntraMUSCular route every fourteen (14) days.  loperamide (IMMODIUM) 2 mg tablet Take 2 mg by mouth daily.  dicyclomine (BENTYL) 10 mg capsule Take 10 mg by mouth two (2) times a day.  fluticasone (FLONASE) 50 mcg/actuation nasal spray 2 Sprays by Both Nostrils route nightly.  triamterene-hydrochlorothiazide (DYAZIDE) 37.5-25 mg per capsule Take 1 Cap by mouth daily.  bimatoprost (LUMIGAN) 0.03 % ophthalmic drops Administer 1 Drop to both eyes every evening.       atropine-PHENobarbital-scopolamine-hyoscyamine (DONNATAL) 16.2-0.1037 -0.0194 mg per tablet Take 1 Tab by mouth as needed (diarrhea). Indications: Irritable Bowel Syndrome      inFLIXimab (REMICADE) 100 mg injection 5 mg/kg by IntraVENous route once. Every 8 weeks      Lact[facility] Acidoph & [facility] 1 million cell tab tablet Take 2 Tabs by mouth two (2) times a day. 20 Tab 0    OTHER Incentive Spirometry- Use as directed 1 Each 0    OTHER Check CBC, CMP, Mg in 3 days, results to PCP immediately, Diagnosis- PNA 1 Each 0    aspirin 81 mg tablet Take 81 mg by mouth daily. Allergies   Allergen Reactions    Codeine Nausea and Vomiting     Other reaction(s): other/intolerance    Darvon [Propoxyphene] Itching    Demerol [Meperidine] Other (comments)     Halucinations    Keflex [Cephalexin] Diarrhea    Talwin [Pentazocine Lactate] Shortness of Breath and Nausea and Vomiting       Review of Systems    A full review of systems was completed times ten organ systems and was deemed negative unless otherwise mentioned in the HPI. Physical   Visit Vitals    /70 (BP 1 Location: Left arm, BP Patient Position: Sitting)    Pulse 80    Resp 18    Ht 5' 5\" (1.651 m)    Wt 134 lb (60.8 kg)    BMI 22.3 kg/m2       Healthy-appearing youthful 67 no distress  No facial symmetry notable pupils are equal reactive  Vision speech hearing intact  Neck no JVD  Chest clear  Cardiac regular  Abdomen soft nontender  Bypass graft intact no ulcerations  No edema of the extremities  Range of motion strengths are equal  No signs of arterial insufficiency bilateral  Doppler shows biphasic axillofemoral bypass graft with becoming moderate outflow stenosis but no critical velocities  Left femoropopliteal also multiphasic    Impression/Plan:     ICD-10-CM ICD-9-CM    1.  Chronic aorto-iliac occlusion syndrome (HCC) I74.09 444.09 LAMONTE WBI LTD SINGLE LEVEL AMB      DUPLEX LOW EXT ARTERY / GRAFTS LTD LEFT AMB      DUPLEX LOWER EXT ARTER/GRAFTS BILAT AMB   Continue current medications  Patent axillofemoral thumb, patent femoropopliteal left  We will follow-up with surveillance in 6 months  Orders Placed This Encounter    LAMONTE  AMB    DUPLEX LOW EXT ARTERY / GRAFTS LTD LEFT AMB    DUPLEX LOWER EXT ARTER/GRAFTS BILAT AMB    buPROPion (WELLBUTRIN) 75 mg tablet       Follow-up Disposition:  Return in about 6 months (around 3/29/2018). Darryl Souza MD    PLEASE NOTE:  This document has been produced using voice recognition software. Unrecognized errors in transcription may be present.

## 2017-09-29 NOTE — MR AVS SNAPSHOT
Visit Information Date & Time Provider Department Dept. Phone Encounter #  
 9/29/2017  9:45 AM MD [facility] [phone] [id number] Follow-up Instructions Return in about 6 months (around 3/29/2018). Follow-up and Disposition History Your Appointments 10/31/2017 11:00 AM  
Nurse Visit with [facility]PA SPIROMETRY [address] ([facility] Appt Note: apt w/lz Luigi Orozco, [address] [address]  
[phone]  
  
   
 [address] [place] [address], [address] [facility] [place] [address]  
  
    
 10/31/2017 11:30 AM  
Follow Up with Daria Mishra MD  
[address] ([facility]) Appt Note: from 7/11/17; martita 11am  
 [address] [place] [address], [address]  
  
   
 [address] [place] [address], [address] [address]  
  
    
 3/28/2018  8:00 AM  
PROCEDURE with B[facility] NONIMAGING [facility] ([facility]) Appt Note: LAMONTE 6 months Anna [address] [facility] [phone] [facility] [address]  
[phone] [address] [place] [address]  
  
    
 3/28/2018  9:00 AM  
PROCEDURE with BSVVS IMAGING 1 [facility] ([facility]) Appt Note: Ax Fem 6 months Anna 1212 [facility] [phone] [facility] [address]  
[phone] [address] [place] [address]  
  
    
 3/28/2018 10:00 AM  
PROCEDURE with BSVVS IMAGING 1 [facility] ([facility] Appt Note: LEFT Fem Pop 6 months Anna 1212 [facility] [address] [facility] [address]  
[phone] 4/13/2018  9:30 AM  
Follow Up with MD [facility]) Appt Note: 6 months fup with studies [address] [facility] [phone] [place] [phone] [address] [place] [address], [facility] [address] [facility] Upcoming Health Maintenance Date Due Hepatitis C Screening 1950 DTaP/Tdap/Td series (1 - Tdap) 6/7/1969 FOBT Q 1 YEAR AGE 50-75 6/7/1998 ZOSTER VACCINE AGE 60> 4/7/2008 GLAUCOMA SCREENING Q2Y 6/7/2013 OSTEOPOROSIS SCREENING (DEXA) 6/7/2013 MEDICARE YEARLY EXAM 6/7/2013 BREAST CANCER SCRN MAMMOGRAM 7/8/2016 INFLUENZA AGE 9 TO ADULT 8/1/2017 Pneumococcal 65+ Low/Medium Risk (2 of 2 - PPSV23) 7/11/2020* *Topic was postponed. The date shown is not the original due date. Allergies as of 9/29/2017  Review Complete On: 9/29/2017 By: Yair Ayala MD  
  
 Severity Noted Reaction Type Reactions Codeine  04/01/2013   Intolerance Nausea and Vomiting Other reaction(s): other/intolerance Darvon [Propoxyphene]  04/01/2013   Systemic Itching Demerol [Meperidine]  04/01/2013   Side Effect Other (comments) Halucinations Keflex [Cephalexin]  01/06/2015    Diarrhea Talwin [Pentazocine Lactate]  04/09/2013    Shortness of Breath, Nausea and Vomiting Current Immunizations  Reviewed on 7/11/2017 Name Date Pneumococcal Vaccine (Unspecified Type) 6/7/2013 Not reviewed this visit You Were Diagnosed With   
  
 Codes Comments Chronic aorto-iliac occlusion syndrome (HCC)    -  Primary ICD-10-CM: O61.45 
ICD-9-CM: 444.09 Vitals BP Pulse Resp Height(growth percentile) Weight(growth percentile) BMI  
 144/70 (BP 1 Location: Left arm, BP Patient Position: Sitting) 80 18 5' 5\" (1.651 m) 134 lb (60.8 kg) 22.3 kg/m2 OB Status Smoking Status Postmenopausal Current Every Day Smoker BMI and BSA Data Body Mass Index Body Surface Area  
 22.3 kg/m 2 1.67 m 2 Preferred Pharmacy Pharmacy Name Phone [facility] [address], [address] [facility] [phone] Your Updated Medication List  
  
   
 This list is accurate as of: 9/29/17 10:31 AM.  Always use your most recent med list.  
  
  
  
  
 aspirin 81 mg tablet Take 81 mg by mouth daily. clopidogrel 75 mg Tab Commonly known as:  PLAVIX TAKE ONE TABLET BY MOUTH DAILY  
  
 CYMBALTA 60 mg capsule Generic drug:  DULoxetine Take 60 mg by mouth daily. dicyclomine 10 mg capsule Commonly known as:  BENTYL Take 10 mg by mouth two (2) times a day. DONNATAL 16.2-0.1037 -0.0194 mg per tablet Generic drug:  atropine-PHENobarbital-scopolamine-hyoscyamine Take 1 Tab by mouth as needed (diarrhea). Indications: Irritable Bowel Syndrome DYAZIDE 37.5-25 mg per capsule Generic drug:  triamterene-hydroCHLOROthiazide Take 1 Cap by mouth daily. FLONASE 50 mcg/actuation nasal spray Generic drug:  fluticasone 2 Sprays by Both Nostrils route nightly. ipratropium-albuterol  mcg/actuation inhaler Commonly known as:  Dheeraj Vinhught Take 1 Puff by inhalation every six (6) hours as needed for Wheezing or Shortness of Breath. Lactobacillus Acidoph & Bulgar 1 million cell Tab tablet Commonly known as:  Mara Hams Take 2 Tabs by mouth two (2) times a day. loperamide 2 mg tablet Commonly known as:  IMMODIUM Take 2 mg by mouth daily. LUMIGAN 0.03 % ophthalmic drops Generic drug:  bimatoprost  
Administer 1 Drop to both eyes every evening. * OTHER Incentive Spirometry- Use as directed * OTHER Check CBC, CMP, Mg in 3 days, results to PCP immediately, Diagnosis- PNA  
  
 * OTHER Oxygen- Use as directed  
  
 pregabalin 75 mg capsule Commonly known as:  Ney Watts Take 1 Cap by mouth two (2) times a day. Max Daily Amount: 150 mg.  
  
 REMICADE 100 mg injection Generic drug:  inFLIXimab  
5 mg/kg by IntraVENous route once. Every 8 weeks VITAMIN B-12 1,000 mcg/mL injection Generic drug:  cyanocobalamin  
1,000 mcg by IntraMUSCular route every fourteen (14) days. WELLBUTRIN 75 mg tablet Generic drug:  buPROPion Take  by mouth two (2) times a day. * Notice: This list has 3 medication(s) that are the same as other medications prescribed for you. Read the directions carefully, and ask your doctor or other care provider to review them with you. Follow-up Instructions Return in about 6 months (around 3/29/2018). To-Do List   
 03/29/2018 Imaging:  LAMONTE WBI LTD SINGLE LEVEL AMB   
  
 03/29/2018 Imaging:  DUPLEX LOW EXT ARTERY / GRAFTS LTD LEFT AMB   
  
 03/29/2018 Imaging:  DUPLEX LOWER EXT ARTER/GRAFTS BILAT AMB Please provide this summary of care documentation to your next provider. Your primary care clinician is listed as Nolan Leon. If you have any questions after today's visit, please call [phone].

## 2017-10-06 DIAGNOSIS — J11.00 INFLUENZA AND PNEUMONIA: Primary | ICD-10-CM

## 2017-10-06 NOTE — PROGRESS NOTES
Verbal Order with read back per Rosa Oppenheim, MD  For PFT smart panel. AMB POC PFT complete w/ bronchodilator  AMB POC PFT complete w/o bronchodilator    Dr. Yecenia Araujo MD will co-sign the orders.

## 2017-10-31 ENCOUNTER — OFFICE VISIT (OUTPATIENT)
Dept: PULMONOLOGY | Age: 67
End: 2017-10-31

## 2017-10-31 VITALS
RESPIRATION RATE: 21 BRPM | HEIGHT: 65 IN | TEMPERATURE: 97.9 F | BODY MASS INDEX: 22.49 KG/M2 | HEART RATE: 84 BPM | OXYGEN SATURATION: 99 % | SYSTOLIC BLOOD PRESSURE: 140 MMHG | WEIGHT: 135 LBS | DIASTOLIC BLOOD PRESSURE: 70 MMHG

## 2017-10-31 DIAGNOSIS — J43.8 OTHER EMPHYSEMA (HCC): ICD-10-CM

## 2017-10-31 DIAGNOSIS — K50.00 CROHN'S DISEASE OF SMALL INTESTINE WITHOUT COMPLICATION (HCC): Chronic | ICD-10-CM

## 2017-10-31 DIAGNOSIS — F17.200 TOBACCO DEPENDENCE: ICD-10-CM

## 2017-10-31 DIAGNOSIS — I10 ESSENTIAL HYPERTENSION: Chronic | ICD-10-CM

## 2017-10-31 DIAGNOSIS — R65.20 SEVERE SEPSIS (HCC): ICD-10-CM

## 2017-10-31 DIAGNOSIS — A41.9 SEVERE SEPSIS (HCC): ICD-10-CM

## 2017-10-31 DIAGNOSIS — Z87.01 HISTORY OF MYCOPLASMA PNEUMONIA: Primary | ICD-10-CM

## 2017-10-31 RX ORDER — FLUTICASONE FUROATE AND VILANTEROL 100; 25 UG/1; UG/1
1 POWDER RESPIRATORY (INHALATION) DAILY
COMMUNITY
End: 2018-01-25 | Stop reason: SDUPTHER

## 2017-10-31 NOTE — PROGRESS NOTES
HISTORY OF PRESENT ILLNESS  Alberta Hodgson is a 69 y.o. female. HPI Comments: Hospital follow up for acute hypoxic respiratory failure due to multilobar pneumonia, interstitial pattern on CXR. Pt with elevated Mycoplasma IgG but normal IgM. Pt does not recall much from her hospital admission but states she is almost back to baseline. She still complains of dyspnea with moderate to severe exertion. No chest pain or cough. No new respiratory symptoms registered. Pt has abstained from smoking for two months but relapsed during a period of family stress. She is using a Nicotine patch and is back on Bupropion. Review of Systems   Constitutional: Negative for chills, diaphoresis, malaise/fatigue and weight loss. HENT: Negative for congestion, ear discharge, hearing loss, nosebleeds and tinnitus. Eyes: Negative for blurred vision, double vision, photophobia, pain, discharge and redness. Respiratory: Negative for stridor. Cardiovascular: Negative for palpitations. Gastrointestinal: Negative for blood in stool, constipation, diarrhea, heartburn, melena and nausea. Genitourinary: Negative for dysuria, flank pain, frequency, hematuria and urgency. Musculoskeletal: Negative for back pain, falls, joint pain and myalgias. Skin: Negative for itching. Neurological: Negative for dizziness, tingling, tremors, sensory change, speech change, focal weakness, seizures, loss of consciousness and weakness. Endo/Heme/Allergies: Negative for environmental allergies and polydipsia. Does not bruise/bleed easily. Psychiatric/Behavioral: Negative for depression, hallucinations, memory loss, substance abuse and suicidal ideas. The patient is not nervous/anxious and does not have insomnia.       Past Medical History:   Diagnosis Date    Arthritis     Claudication [facility])     left leg    [facility]     Cro[facility]'s [facility] ([facility]     GERD (gastroesophageal reflux disease)     HTN (hypertension)     Nausea & vomiting     Other and unspecified symptoms and signs involving general sensations and perceptions     PVD    Other ill-defined conditions(799.89)     Crohn's    PVD (peripheral vascular disease) ([facility]     right leg    Vitamin B12 deficiency      Past Surgical History:   Procedure Laterality Date    BYPASS GRAFT OTHR,AXILL-FEM Right 4/19/2013    BYPASS GRAFT OTHR,FEM-POP Left 5/2013    FOOT/TOES SURGERY PROC UNLISTED      HX APPENDECTOMY      HX BREAST LUMPECTOMY      breast left    HX BUNIONECTOMY      left eye    HX CATARACT REMOVAL      bilateral    HX CHOLECYSTECTOMY      HX ORTHOPAEDIC      lateral epicondilitis on right    HX OTHER SURGICAL  3/7/2013    catheter into aorta    HX OTHER SURGICAL      intestional resection x4    HX OTHER SURGICAL  9/2013    I & D Right chest/flank wall abscess vs granuloma    UPPER ARM/ELBOW SURGERY UNLISTED       Current Outpatient Prescriptions on File Prior to Visit   Medication Sig Dispense Refill    buPROPion (WELLBUTRIN) 75 mg tablet Take  by mouth two (2) times a day.  ipratropium-albuterol (COMBIVENT RESPIMAT)  mcg/actuation inhaler Take 1 Puff by inhalation every six (6) hours as needed for Wheezing or Shortness of Breath. 1 Inhaler 0    OTHER Incentive Spirometry- Use as directed 1 Each 0    OTHER Check CBC, CMP, Mg in 3 days, results to PCP immediately, Diagnosis- PNA 1 Each 0    OTHER Oxygen- Use as directed (Patient taking differently: Take 2 L/min by mouth continuous. ) 1 Each 0    clopidogrel (PLAVIX) 75 mg tab TAKE ONE TABLET BY MOUTH DAILY 30 Tab 11    DULoxetine (CYMBALTA) 60 mg capsule Take 60 mg by mouth daily.  pregabalin (LYRICA) 75 mg capsule Take 1 Cap by mouth two (2) times a day. Max Daily Amount: 150 mg. (Patient taking differently: Take 150 mg by mouth two (2) times a day.) 180 Cap 3    aspirin 81 mg tablet Take 81 mg by mouth daily.       cyanocobalamin (VITAMIN B-12) 1,000 mcg/mL injection 1,000 mcg by IntraMUSCular route every fourteen (14) days.  loperamide (IMMODIUM) 2 mg tablet Take 2 mg by mouth daily.  dicyclomine (BENTYL) 10 mg capsule Take 10 mg by mouth two (2) times a day.  fluticasone (FLONASE) 50 mcg/actuation nasal spray 2 Sprays by Both Nostrils route nightly.  triamterene-hydrochlorothiazide (DYAZIDE) 37.5-25 mg per capsule Take 1 Cap by mouth daily.  bimatoprost (LUMIGAN) 0.03 % ophthalmic drops Administer 1 Drop to both eyes every evening.  atropine-PHENobarbital-scopolamine-hyoscyamine (DONNATAL) 16.2-0.1037 -0.0194 mg per tablet Take 1 Tab by mouth as needed (diarrhea). Indications: Irritable Bowel Syndrome      inFLIXimab (REMICADE) 100 mg injection 5 mg/kg by IntraVENous route once. Every 8 weeks      Lactobacillus Acidoph & Bulgar [facility]) 1 million cell tab tablet Take 2 Tabs by mouth two (2) times a day. 20 Tab 0     No current facility-administered medications on file prior to visit. Allergies   Allergen Reactions    Codeine Nausea and Vomiting     Other reaction(s): other/intolerance    Darvon [Propoxyphene] Itching    Demerol [Meperidine] Other (comments)     Halucinations    Keflex [Cephalexin] Diarrhea    Talwin [Pentazocine Lactate] Shortness of Breath and Nausea and Vomiting     Social History     Social History    Marital status:      Spouse name: N/A    Number of children: N/A    Years of education: N/A     Occupational History    Not on file. Social History Main Topics    Smoking status: Current Every Day Smoker     Packs/day: 1.50     Years: 45.00     Types: Cigarettes    Smokeless tobacco: Never Used      Comment: second hand smoke exposure prior to active smoking    Alcohol use No    Drug use: No    Sexual activity: Not on file     Other Topics Concern    Not on file     Social History Narrative    Father worked in the ship[employer] and was diagnosed with Asbestosis.      Blood pressure 140/70, pulse 84, temperature 97.9 °F (36.6 °C), temperature source Oral, resp. rate 21, height 5' 5\" (1.651 m), weight 61.2 kg (135 lb), SpO2 99 %. Physical Exam   Constitutional: She is oriented to person, place, and time. She appears well-developed and well-nourished. No distress. HENT:   Head: Normocephalic and atraumatic. Nose: Nose normal.   Mouth/Throat: Oropharynx is clear and moist. No oropharyngeal exudate. Eyes: Conjunctivae are normal. Pupils are equal, round, and reactive to light. Right eye exhibits no discharge. Left eye exhibits no discharge. No scleral icterus. Neck: No JVD present. No tracheal deviation present. No thyromegaly present. Cardiovascular: Normal rate, regular rhythm, normal heart sounds and intact distal pulses. Exam reveals no gallop. No murmur heard. Pulmonary/Chest: Effort normal and breath sounds normal. No stridor. No respiratory distress. She has no wheezes. She has no rales. Abdominal: Soft. She exhibits no mass. There is no tenderness. Musculoskeletal: She exhibits no edema, tenderness or deformity. Lymphadenopathy:     She has no cervical adenopathy. Neurological: She is alert and oriented to person, place, and time. Skin: Skin is warm and dry. No rash noted. She is not diaphoretic. No erythema. Psychiatric: She has a normal mood and affect. Her behavior is normal. Judgment and thought content normal.     Spirometry: reduced FEV1 with borderline FEV%  ASSESSMENT and PLAN  Encounter Diagnoses   Name Primary?  History of mycoplasma pneumonia Yes    Influenza and pneumonia     Comment: this is an [facility]    Other emphysema ([address]     Crohn's disease of small intestine without complication ([address]     Essential hypertension     Severe sepsis ([address]     Tobacco dependence      Pt with significant clinical and radiologic improvement. Will not restart antibiotics  Reviewed Mycoplasma IgG titers.   Reviewed spirometry with pt  Ok for pt to continue Remicade therapy. RTC 4 months or sooner prn. Schedule LV and DLCO as borderline ratio may imply mixed restriction dain in relation to CTD and Crohn's disease. Counseled on further efforts at smoking cessation.   Face to face counseling was over 50% of this 40 minute visit

## 2017-11-01 ENCOUNTER — CLINICAL SUPPORT (OUTPATIENT)
Dept: PULMONOLOGY | Age: 67
End: 2017-11-01

## 2017-11-01 VITALS — HEIGHT: 65 IN | WEIGHT: 135 LBS | BODY MASS INDEX: 22.49 KG/M2

## 2017-11-01 DIAGNOSIS — J43.9 PULMONARY EMPHYSEMA, UNSPECIFIED EMPHYSEMA TYPE (HCC): Primary | ICD-10-CM

## 2017-11-01 NOTE — PROGRESS NOTES
Verbal Order with read back per Dr. Katie Cohen MD  For PFT smart panel. Gas Dilute/ wash out lung vol w/wo distrib vet & vol  Diffusing capacity    Dr. Katie Cohen MD will co-sign the orders.

## 2018-01-25 RX ORDER — FLUTICASONE FUROATE AND VILANTEROL 100; 25 UG/1; UG/1
1 POWDER RESPIRATORY (INHALATION) DAILY
Qty: 3 INHALER | Refills: 0 | Status: SHIPPED | COMMUNITY
Start: 2018-01-25 | End: 2018-05-17 | Stop reason: SDUPTHER

## 2018-03-28 ENCOUNTER — OFFICE VISIT (OUTPATIENT)
Dept: VASCULAR SURGERY | Age: 68
End: 2018-03-28

## 2018-03-28 DIAGNOSIS — I73.9 PVD (PERIPHERAL VASCULAR DISEASE) (HCC): Primary | ICD-10-CM

## 2018-03-28 DIAGNOSIS — I74.09 CHRONIC AORTO-ILIAC OCCLUSION SYNDROME (HCC): ICD-10-CM

## 2018-03-28 DIAGNOSIS — Z95.828 HISTORY OF EXTREMITY BYPASS GRAFT: ICD-10-CM

## 2018-03-28 NOTE — PROCEDURES
[facility]  *** FINAL REPORT ***    Name: Hetal Pyle  MRN: [id number]       Outpatient  : 1950  HIS Order #: [id number]  [id number] [facility] Visit #: [id number]  Date: 28 Mar 2018    TYPE OF TEST: Bypass Graft Duplex    REASON FOR TEST  PVD, f/u Revasc    Graft:-  Summary:   Left common femoral - popliteal (above knee) bypass with  graft  Op. Date:  2013  Surgeon:   Dwaine Lui    Results:-            Velocity  Ratio  Stenosis         Waveform            --------  -----  --------         ----------  Inflow:    173.0  Proximal:  220.0      1.3  Normal           Biphasic  Upper:      46.0      0.2  Normal           Biphasic  Mid-graft:  62.0      1.3  Normal           Biphasic  Lower:      56.0      0.9  Normal           Biphasic  Distal:     50.0      0.9  Normal           Biphasic  Outflow:    80.0      1.6  Normal           Biphasic    LAMONTE:    INTERPRETATION/FINDINGS  Duplex images were obtained using 2-D gray scale, color flow and  spectral doppler analysis. Duplex exam of the bypass graft reveals :  1. Patent left common femoral artery to popliteal artery bypass graft  without significant stenosis. 2. Multiphasic signals noted throughout. 3. ABIs suggest moderate arterial insufficiency on the right at rest  and left LAMONTE waveform suggest mild to moderate arterial insufficiency  at rest.  The right ankle/brachial index is 0.73 and the left  ankle/brachial index is 0.97. ADDITIONAL COMMENTS    I have personally reviewed the data relevant to the interpretation of  this  study. TECHNOLOGIST: Dionne Elias RVT, SUE  Signed: 2018 02:07 PM    PHYSICIAN: Vincent Olmstead MD  Signed: 2018 02:13 PM

## 2018-03-28 NOTE — PROCEDURES
[facility]  *** FINAL REPORT ***    Name: Duke Armijo  MRN: [id number]       Outpatient  : 1950  HIS Order #: [id number]  [id number] [facility] Visit #: [id number]  Date: 28 Mar 2018    TYPE OF TEST: Peripheral Arterial Testing    REASON FOR TEST  Peripheral vascular dz NOS    Right Leg  Segmentals: Abnormal                     mmHg  Brachial         153  High thigh  Low thigh  Calf  Posterior tibial 112  Dorsalis pedis   110  Peroneal  Metatarsal  Toe pressure  Doppler:    Abnormal  Ankle/Brachial: 0.73    Left Leg  Segmentals: Normal                     mmHg  Brachial         152  High thigh  Low thigh  Calf  Posterior tibial 129  Dorsalis pedis   148  Peroneal  Metatarsal  Toe pressure  Doppler:    Abnormal  Ankle/Brachial: 0.97    INTERPRETATION/FINDINGS  Physiologic testing was performed using continuous wave doppler and  segmental pressures. ABIs/DBIs only:  1. Moderate peripheral arterial disease indicated at rest in the right   leg. 2. Mild to moderate arterial insufficency on the left at rest by  waveform analysis. Left LAMONTE suggest normal perfusion. 3. The right ankle/brachial index is 0.73 and the left ankle/brachial  index is 0.97.  4. The DBI on the right is 0.46 and on the left is 0.35. ADDITIONAL COMMENTS    I have personally reviewed the data relevant to the interpretation of  this  study. TECHNOLOGIST: David Elias RVT, BS  Signed: 2018 02:04 PM    PHYSICIAN: Mauricio Srivastava MD  Signed: 2018 02:14 PM

## 2018-03-28 NOTE — PROCEDURES
[facility]  *** FINAL REPORT ***    Name: Norberto Dozier  MRN: [id number]       Outpatient  : 1950  HIS Order #: [id number]  [id number] [facility] Visit #: [id number]  Date: 28 Mar 2018    TYPE OF TEST: Aorto-Iliac Duplex    REASON FOR TEST  Peripheral Arterial Disease    B-Mode:-                 (cm)   1     2     3  Aortic diameter:         AP:                           TV:  Common iliac diameter:   Right:                           Left:    Duplex:-                           PSV  Stenosis                           ----- --------------------  Aorta: (1)         (2)         (3)    Right common iliac:  Right external iliac:    Left common iliac:  Left external iliac:    INTERPRETATION/FINDINGS  Duplex images were obtained using 2-D gray scale, color flow and  spectral doppler analysis. RT Ax Fem BP. Patent right axilllary artery to right common femoral artery bypass   graft without significant stenosis. 2. Multiphasic signals noted throughout the graft. 3. ABIs suggest moderate arterial insufficiency on the right at rest  and left LAMONTE waveform suggest mild to moderate arterial insufficiency  at rest.  The right ankle/brachial index is 0.73 and the left  ankle/brachial index is 0.97. ADDITIONAL COMMENTS  Inflow: 154 cm/sec    Prx Anast 115 cm/sec   Prx  63 cm/sec     Above  Ax  74  cm/sec    Axillary 65 cm/sec     Below Ax  63 cm/sec    Above Ribline 79  cm/sec    Ribline  69 cm/sec               Below Ribline 86 cm/sec    Umbilicus 81 cm/sec      Hip  59 cm/sec                          Dst 73 cm/sec      Dst Anast 118 cm/sec  Outflow 122 cm/sec    I have personally reviewed the data relevant to the interpretation of  this  study. TECHNOLOGIST: Alf Elias RVT, BS  Signed: 2018 02:18 PM    PHYSICIAN: Alma Keller D.O.   Signed: 2018 12:06 PM

## 2018-04-13 ENCOUNTER — OFFICE VISIT (OUTPATIENT)
Dept: ORTHOPEDIC SURGERY | Age: 68
End: 2018-04-13

## 2018-04-13 VITALS
HEART RATE: 83 BPM | OXYGEN SATURATION: 94 % | RESPIRATION RATE: 16 BRPM | DIASTOLIC BLOOD PRESSURE: 68 MMHG | SYSTOLIC BLOOD PRESSURE: 152 MMHG | BODY MASS INDEX: 22.69 KG/M2 | TEMPERATURE: 97.1 F | WEIGHT: 136.2 LBS | HEIGHT: 65 IN

## 2018-04-13 DIAGNOSIS — G89.29 CHRONIC RIGHT SHOULDER PAIN: ICD-10-CM

## 2018-04-13 DIAGNOSIS — M25.511 CHRONIC RIGHT SHOULDER PAIN: ICD-10-CM

## 2018-04-13 DIAGNOSIS — M75.51 SUBACROMIAL BURSITIS OF RIGHT SHOULDER JOINT: Primary | ICD-10-CM

## 2018-04-13 RX ORDER — TRIAMCINOLONE ACETONIDE 40 MG/ML
40 INJECTION, SUSPENSION INTRA-ARTICULAR; INTRAMUSCULAR ONCE
Qty: 1 ML | Refills: 0
Start: 2018-04-13 | End: 2018-04-13

## 2018-04-13 NOTE — PROGRESS NOTES
Madie Santana  1950   Chief Complaint   Patient presents with    Shoulder Pain     bilateral R>L        HISTORY OF PRESENT ILLNESS  Alberta Donovan is a 69 y.o. female who presents today for reevaluation of right shoulder pain. Over the last 3 weeks the pain has escalated. She has been doing quite a bit of yard work. Has pain with reaching. Sharp, stabbing pain. Has night pain now. Has been using heat and muscle rubs with some relief. Pain is a 5/10. Patient denies any fever, chills, chest pain, shortness of breath or calf pain. There are no new illness or injuries to report since last seen in the office. No changes in medications, allergies, social or family history. PHYSICAL EXAM:   Visit Vitals    /68 (BP 1 Location: Left arm, BP Patient Position: Sitting)    Pulse 83    Temp 97.1 °F (36.2 °C) (Oral)    Resp 16    Ht 5' 5\" (1.651 m)    Wt 136 lb 3.2 oz (61.8 kg)    SpO2 94%    BMI 22.66 kg/m2     The patient is a well-developed, well-nourished female   in no acute distress. The patient is alert and oriented times three. The patient is alert and oriented times three. Mood and affect are normal.  LYMPHATIC: lymph nodes are not enlarged and are within normal limits  SKIN: normal in color and non tender to palpation. There are no bruises or abrasions noted. NEUROLOGICAL: Motor sensory exam is within normal limits. Reflexes are equal bilaterally.  There is normal sensation to pinprick and light touch  MUSCULOSKELETAL:  Examination Right shoulder   Skin Intact   AC joint tenderness -   Biceps tenderness -   Forward flexion/Elevation    Active abduction    Glenohumeral abduction 90   External rotation ROM 70   Internal rotation ROM 50   Apprehension -   Chatos Relocation -   Jerk -   Load and Shift -   Obriens -   Speeds -   Impingement sign +   Supraspinatus/Empty Can +4/5   External Rotation Strength -, 5/5   Lift Off/Belly Press -, 5/5   Neurovascular Intact          PROCEDURE: After sterile prep, 6 cc of Xylocaine and 1 cc of Kenalog were injected into the right shoulder. [facility]  OFFICE PROCEDURE PROGRESS NOTE        Chart reviewed for the following:  Rody FONG PA, have reviewed the History, Physical and updated the Allergic reactions for Alberta INMAN [address] performed immediately prior to start of procedure:  Rody FONG PA-C, have performed the following reviews on [address] [place] [address] prior to the start of the procedure:            * Patient was identified by name and date of birth   * Agreement on procedure being performed was verified  * Risks and Benefits explained to the patient  * Procedure site verified and marked as necessary  * Patient was positioned for comfort  * Consent was signed and verified     Time: 1:52 PM    Date of procedure: 4/13/2018    Procedure performed by:  TIMMY Romero    Provider assisted by: (see medication administration)    How tolerated by patient: tolerated the procedure well with no complications    Comments: none      IMPRESSION:      ICD-10-CM ICD-9-CM    1. Subacromial bursitis of right shoulder joint M75.51 726.19    2. Chronic right shoulder pain M25.511 719.41 TRIAMCINOLONE ACETONIDE INJ    G89.29 338.29 triamcinolone acetonide (KENALOG) 40 mg/mL injection      DRAIN/INJECT INTERMEDIATE JOINT/BURSA        PLAN: 1. Right shoulder pain worsening. Will proceed with subacromial cortisone injection today  Risk factors include: CAD, PVD  2. Yes cortisone injection indicated today right shoulder  3. No Physical/Occupational Therapy indicated today  4. No diagnostic test indicated today:   5. No durable medical equipment indicated today  6. No referral indicated today   7. No medications indicated today:   8.  No Narcotic indicated today     RTC 3 weeks if pain persists    Follow-up Disposition: Not on [address] [place] [address], ADALGISA  [address] [place] [address] and Spine Specialist

## 2018-04-17 ENCOUNTER — OFFICE VISIT (OUTPATIENT)
Dept: VASCULAR SURGERY | Age: 68
End: 2018-04-17

## 2018-04-17 VITALS
HEIGHT: 65 IN | RESPIRATION RATE: 16 BRPM | SYSTOLIC BLOOD PRESSURE: 130 MMHG | WEIGHT: 136 LBS | BODY MASS INDEX: 22.66 KG/M2 | DIASTOLIC BLOOD PRESSURE: 70 MMHG | HEART RATE: 93 BPM

## 2018-04-17 DIAGNOSIS — I74.09 CHRONIC AORTO-ILIAC OCCLUSION SYNDROME (HCC): Primary | ICD-10-CM

## 2018-04-17 DIAGNOSIS — L02.91 ABSCESS: ICD-10-CM

## 2018-04-17 NOTE — PROGRESS NOTES
[address] [place] [address]    Chief Complaint   Patient presents with    Leg Pain     Follow up studies       History and Physical    78-year-old female following up today regarding her axillofemoral femorofemoral bypass also left femoropopliteal bypass. She has had this persistent local ulceration on her right flank. She has had multiple simple and complex attempts to cover this ulcerative area. It is open and draining today. She is without fever no pain she tells me it has just become a chronic feeling to her. She has no claudication no pain like she is overall thrilled with her long-term outcomes from this bypass. She has history of immune suppressive disease with Crohn's disease history of aortoiliac syndrome hypertension and emphysema. Past Medical History:   Diagnosis Date    Arthritis     Claudication [facility])     left leg    [facility]     Crohn's disease ([facility]     GERD (gastroesophageal reflux disease)     HTN (hypertension)     Nausea & vomiting     Other and unspecified symptoms and signs involving general sensations and perceptions     PVD    Other ill-defined conditions(799.89)     Crohn's    Pulmonary emphysema ([facility]     PVD (peripheral vascular disease) ([address]     right leg    Vitamin B12 deficiency      Patient Active Problem List   Diagnosis Code    HTN (hypertension) I10    PVD (peripheral vascular disease) ([facility] I73.9    Crohn's disease ([facility] K50.90    Mass of breast, left N63.20    Wound disruption, post-op, skin T81.31XA    Chronic aorto-iliac occlusion syndrome (HCC) I74.09    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 7XXA    Abscess L02.91    Dermal sinus tract of lumbosacral region L05.92    CAP (community acquired pneumonia) J18.9    Severe sepsis (HCC) A41.9, R65.20     Past Surgical History:   Procedure Laterality Date    BYPASS GRAFT OTHR,AXILL-FEM Right 4/19/2013    BYPASS GRAFT OTHR,FEM-POP Left 5/2013  FOOT/TOES SURGERY PROC UNLISTED      HX APPENDECTOMY      HX BREAST LUMPECTOMY      breast left    HX BUNIONECTOMY      left eye    HX CATARACT REMOVAL      bilateral    HX CHOLECYSTECTOMY      HX ORTHOPAEDIC      lateral epicondilitis on right    HX OTHER SURGICAL  3/7/2013    catheter into aorta    HX OTHER SURGICAL      intestional resection x4    HX OTHER SURGICAL  9/2013    I & D Right chest/flank wall abscess vs granuloma    UPPER ARM/ELBOW SURGERY UNLISTED       Current Outpatient Prescriptions   Medication Sig Dispense Refill    fluticasone-vilanterol (BREO ELLIPTA) 100-25 mcg/dose inhaler Take 1 Puff by inhalation daily. 3 Inhaler 0    OXYGEN-AIR DELIVERY SYSTEMS by Does Not Apply route.  buPROPion (WELLBUTRIN) 75 mg tablet Take  by mouth two (2) times a day.  Lactobacillus Acidoph & Bulgar (FLORANEX) 1 million cell tab tablet Take 2 Tabs by mouth two (2) times a day. 20 Tab 0    ipratropium-albuterol (COMBIVENT RESPIMAT)  mcg/actuation inhaler Take 1 Puff by inhalation every six (6) hours as needed for Wheezing or Shortness of Breath. 1 Inhaler 0    OTHER Incentive Spirometry- Use as directed 1 Each 0    OTHER Check CBC, CMP, Mg in 3 days, results to PCP immediately, Diagnosis- PNA 1 Each 0    clopidogrel (PLAVIX) 75 mg tab TAKE ONE TABLET BY MOUTH DAILY 30 Tab 11    DULoxetine (CYMBALTA) 60 mg capsule Take 60 mg by mouth daily.  pregabalin (LYRICA) 75 mg capsule Take 1 Cap by mouth two (2) times a day. Max Daily Amount: 150 mg. (Patient taking differently: Take 150 mg by mouth two (2) times a day.) 180 Cap 3    aspirin 81 mg tablet Take 81 mg by mouth daily.  cyanocobalamin (VITAMIN B-12) 1,000 mcg/mL injection 1,000 mcg by IntraMUSCular route every fourteen (14) days.  loperamide (IMMODIUM) 2 mg tablet Take 2 mg by mouth daily.  dicyclomine (BENTYL) 10 mg capsule Take 10 mg by mouth two (2) times a day.       fluticasone (FLONASE) 50 mcg/actuation nasal spray 2 Sprays by Both Nostrils route nightly.  triamterene-hydrochlorothiazide (DYAZIDE) 37.5-25 mg per capsule Take 1 Cap by mouth daily.  bimatoprost (LUMIGAN) 0.03 % ophthalmic drops Administer 1 Drop to both eyes every evening.  atropine-PHENobarbital-scopolamine-hyoscyamine (DONNATAL) 16.2-0.1037 -0.0194 mg per tablet Take 1 Tab by mouth as needed (diarrhea). Indications: Irritable Bowel Syndrome      inFLIXimab (REMICADE) 100 mg injection 5 mg/kg by IntraVENous route once. Every 8 weeks       Allergies   Allergen Reactions    Codeine Nausea and Vomiting     Other reaction(s): other/intolerance    Darvon [Propoxyphene] Itching    Demerol [Meperidine] Other (comments)     Halucinations    Keflex [Cephalexin] Diarrhea    Talwin [Pentazocine Lactate] Shortness of Breath and Nausea and Vomiting       Review of Systems    A full review of systems was completed times ten organ systems and was deemed negative unless otherwise mentioned in the HPI. Physical   Visit Vitals    /70 (BP 1 Location: Left arm, BP Patient Position: Sitting)    Pulse 93    Resp 16    Ht 5' 5\" (1.651 m)    Wt 136 lb (61.7 kg)    BMI 22.63 kg/m2       Good spirits she is healthy youthful looking 69  Head is normocephalic no facial symmetry  Neck no JVD  Chest clear  Cardiac regular  Abdomen soft nontender  Right flank with this 2 cm opening over the bypass hyperemic skin and drainage  Other incisions all healed  Doppler shows nicely patent axillofemoral femorofemoral femoropopliteal with no evidence of stenosis multiphasic flow throughout    Impression/Plan:     ICD-10-CM ICD-9-CM    1. Chronic aorto-iliac occlusion syndrome (HCC) I74.09 444.09 XR CHEST PA LAT      EKG, 12 LEAD, INITIAL      CBC W/O DIFF      METABOLIC PANEL, BASIC      PROTHROMBIN TIME + INR   2.  Abscess L02.91 682.9 XR CHEST PA LAT      EKG, 12 LEAD, INITIAL      CBC W/O DIFF      METABOLIC PANEL, BASIC      PROTHROMBIN TIME + INR Revascularization Remains intact  This chronic ulcer needs to be revised talked to her at length today   we will plan for antibiotics at time of surgery interposition revision and removal of this infected segment  Orders Placed This Encounter    XR CHEST PA LAT    CBC W/O DIFF    METABOLIC PANEL, BASIC    PROTHROMBIN TIME + INR    EKG, 12 LEAD, INITIAL       Follow-up Disposition: Not on File    Estil Ruby, MD    PLEASE NOTE:  This document has been produced using voice recognition software. Unrecognized errors in transcription may be present.

## 2018-04-17 NOTE — PROGRESS NOTES
1. Have you been to an emergency room or urgent care clinic since your last visit? NO    Hospitalized since your last visit? If yes, where, when, and reason for visit? NO    2. Have you seen or consulted any other health care providers outside of the [facility] since your last visit including any procedures, health maintenance items. If yes, where, when and reason for visit?   NO

## 2018-04-24 ENCOUNTER — HOSPITAL ENCOUNTER (OUTPATIENT)
Dept: PREADMISSION TESTING | Age: 68
Discharge: HOME | End: 2018-04-24
Payer: MEDICARE

## 2018-04-24 ENCOUNTER — HOSPITAL ENCOUNTER (OUTPATIENT)
Dept: GENERAL RADIOLOGY | Age: 68
Discharge: HOME | End: 2018-04-24
Payer: MEDICARE

## 2018-04-24 DIAGNOSIS — L02.91 ABSCESS: ICD-10-CM

## 2018-04-24 DIAGNOSIS — I74.09 CHRONIC AORTO-ILIAC OCCLUSION SYNDROME (HCC): ICD-10-CM

## 2018-04-24 LAB
ANION GAP SERPL CALC-SCNC: 7 MMOL/L (ref 3–18)
BUN SERPL-MCNC: 13 MG/DL (ref 7–18)
BUN/CREAT SERPL: 17 (ref 12–20)
CALCIUM SERPL-MCNC: 10.1 MG/DL (ref 8.5–10.1)
CHLORIDE SERPL-SCNC: 103 MMOL/L (ref 100–108)
CO2 SERPL-SCNC: 30 MMOL/L (ref 21–32)
CREAT SERPL-MCNC: 0.75 MG/DL (ref 0.6–1.3)
ERYTHROCYTE [DISTWIDTH] IN BLOOD BY AUTOMATED COUNT: 14.8 % (ref 11.6–14.5)
GLUCOSE SERPL-MCNC: 83 MG/DL (ref 74–99)
HCT VFR BLD AUTO: 43.1 % (ref 35–45)
HGB BLD-MCNC: 14.8 G/DL (ref 12–16)
INR PPP: 1 (ref 0.8–1.2)
MCH RBC QN AUTO: 30.3 PG (ref 24–34)
MCHC RBC AUTO-ENTMCNC: 34.3 G/DL (ref 31–37)
MCV RBC AUTO: 88.1 FL (ref 74–97)
PLATELET # BLD AUTO: 341 K/UL (ref 135–420)
PMV BLD AUTO: 9.8 FL (ref 9.2–11.8)
POTASSIUM SERPL-SCNC: 3.6 MMOL/L (ref 3.5–5.5)
PROTHROMBIN TIME: 12.2 SEC (ref 11.5–15.2)
RBC # BLD AUTO: 4.89 M/UL (ref 4.2–5.3)
SODIUM SERPL-SCNC: 140 MMOL/L (ref 136–145)
WBC # BLD AUTO: 10.9 K/UL (ref 4.6–13.2)

## 2018-04-24 PROCEDURE — 36415 COLL VENOUS BLD VENIPUNCTURE: CPT | Performed by: SURGERY

## 2018-04-24 PROCEDURE — 93005 ELECTROCARDIOGRAM TRACING: CPT

## 2018-04-24 PROCEDURE — 85027 COMPLETE CBC AUTOMATED: CPT | Performed by: SURGERY

## 2018-04-24 PROCEDURE — 85610 PROTHROMBIN TIME: CPT | Performed by: SURGERY

## 2018-04-24 PROCEDURE — 71046 X-RAY EXAM CHEST 2 VIEWS: CPT

## 2018-04-24 PROCEDURE — 80048 BASIC METABOLIC PNL TOTAL CA: CPT | Performed by: SURGERY

## 2018-04-24 RX ORDER — PREGABALIN 100 MG/1
CAPSULE ORAL 2 TIMES DAILY
COMMUNITY

## 2018-04-25 LAB
ATRIAL RATE: 90 BPM
CALCULATED P AXIS, ECG09: 49 DEGREES
CALCULATED R AXIS, ECG10: 81 DEGREES
CALCULATED T AXIS, ECG11: 65 DEGREES
DIAGNOSIS, 93000: NORMAL
P-R INTERVAL, ECG05: 172 MS
Q-T INTERVAL, ECG07: 380 MS
QRS DURATION, ECG06: 106 MS
QTC CALCULATION (BEZET), ECG08: 464 MS
VENTRICULAR RATE, ECG03: 90 BPM

## 2018-04-27 ENCOUNTER — ANESTHESIA EVENT (OUTPATIENT)
Dept: CARDIOTHORACIC SURGERY | Age: 68
DRG: 253 | End: 2018-04-27
Payer: MEDICARE

## 2018-04-27 NOTE — H&P
History and Physical    61-year-old female following up today regarding her axillofemoral femorofemoral bypass also left femoropopliteal bypass. She has had this persistent local ulceration on her right flank. She has had multiple simple and complex attempts to cover this ulcerative area. It is open and draining today. She is without fever no pain she tells me it has just become a chronic feeling to her. She has no claudication no pain like she is overall thrilled with her long-term outcomes from this bypass. She has history of immune suppressive disease with Crohn's disease history of aortoiliac syndrome hypertension and emphysema.          Past Medical History:   Diagnosis Date    Arthritis      Claudication [facility])       left leg    Cro[facility]'s disease ([facility]      Crohn's disease ([facility]      GERD (gastroesophageal reflux disease)      HTN (hypertension)      Nausea & vomiting      Other and unspecified symptoms and signs involving general sensations and perceptions       PVD    Other ill-defined conditions(799.89)       Crohn's    Pulmonary emphysema (HCC)      PVD (peripheral vascular disease) ([facility])       right leg    Vitamin B12 deficiency             Patient Active Problem List   Diagnosis Code    HTN (hypertension) I10    PVD (peripheral vascular disease) ([facility] I73.9    Crohn's disease ([facility] K50.90    Mass of breast, left N63.20    Wound disruption, post-op, skin T81.31XA    Chronic aorto-iliac occlusion syndrome (HCC) I74.09    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 7XXA    Abscess L02.91    Dermal sinus tract of lumbosacral region L05.92    CAP (community acquired pneumonia) J18.9    Severe sepsis ([facility]) A41.9, R65.20            Past Surgical History:   Procedure Laterality Date    BYPASS GRAFT OTHR,AXILL-FEM Right 4/19/2013    BYPASS GRAFT OTHR,FEM-POP Left 5/2013    FOOT/TOES SURGERY PROC UNLISTED        HX APPENDECTOMY        HX BREAST LUMPECTOMY         breast left    HX BUNIONECTOMY         left eye    HX CATARACT REMOVAL         bilateral    HX CHOLECYSTECTOMY        HX ORTHOPAEDIC         lateral epicondilitis on right    HX OTHER SURGICAL   3/7/2013     catheter into aorta    HX OTHER SURGICAL         intestional resection x4    HX OTHER SURGICAL   9/2013     I & D Right chest/flank wall abscess vs granuloma    UPPER ARM/ELBOW SURGERY UNLISTED                 Current Outpatient Prescriptions   Medication Sig Dispense Refill    fluticasone-vilanterol (BREO ELLIPTA) 100-25 mcg/dose inhaler Take 1 Puff by inhalation daily. 3 Inhaler 0    OXYGEN-AIR DELIVERY SYSTEMS by Does Not Apply route.        buPROPion (WELLBUTRIN) 75 mg tablet Take  by mouth two (2) times a day.        Lactobacillus Acidoph & Bulgar (FLORANEX) 1 million cell tab tablet Take 2 Tabs by mouth two (2) times a day. 20 Tab 0    ipratropium-albuterol (COMBIVENT RESPIMAT)  mcg/actuation inhaler Take 1 Puff by inhalation every six (6) hours as needed for Wheezing or Shortness of Breath. 1 Inhaler 0    OTHER Incentive Spirometry- Use as directed 1 Each 0    OTHER Check CBC, CMP, Mg in 3 days, results to PCP immediately, Diagnosis- PNA 1 Each 0    clopidogrel (PLAVIX) 75 mg tab TAKE ONE TABLET BY MOUTH DAILY 30 Tab 11    DULoxetine (CYMBALTA) 60 mg capsule Take 60 mg by mouth daily.        pregabalin (LYRICA) 75 mg capsule Take 1 Cap by mouth two (2) times a day. Max Daily Amount: 150 mg.  (Patient taking differently: Take 150 mg by mouth two (2) times a day.) 180 Cap 3    aspirin 81 mg tablet Take 81 mg by mouth daily.        cyanocobalamin (VITAMIN B-12) 1,000 mcg/mL injection 1,000 mcg by IntraMUSCular route every fourteen (14) days.        loperamide (IMMODIUM) 2 mg tablet Take 2 mg by mouth daily.        dicyclomine (BENTYL) 10 mg capsule Take 10 mg by mouth two (2) times a day.        fluticasone (FLONASE) 50 mcg/actuation nasal spray 2 Sprays by Both Nostrils route nightly.        triamterene-hydrochlorothiazide (DYAZIDE) 37.5-25 mg per capsule Take 1 Cap by mouth daily.        bimatoprost (LUMIGAN) 0.03 % ophthalmic drops Administer 1 Drop to both eyes every evening.        atropine-PHENobarbital-scopolamine-hyoscyamine (DONNATAL) 16.2-0.1037 -0.0194 mg per tablet Take 1 Tab by mouth as needed (diarrhea). Indications: Irritable Bowel Syndrome        inFLIXimab (REMICADE) 100 mg injection 5 mg/kg by IntraVENous route once. Every 8 weeks                Allergies   Allergen Reactions    Codeine Nausea and Vomiting       Other reaction(s): other/intolerance    Darvon [Propoxyphene] Itching    Demerol [Meperidine] Other (comments)       Halucinations    Keflex [Cephalexin] Diarrhea    Talwin [Pentazocine Lactate] Shortness of Breath and Nausea and Vomiting         Review of Systems    A full review of systems was completed times ten organ systems and was deemed negative unless otherwise mentioned in the HPI.     Physical        Visit Vitals    /70 (BP 1 Location: Left arm, BP Patient Position: Sitting)    Pulse 93    Resp 16    Ht 5' 5\" (1.651 m)    Wt 136 lb (61.7 kg)    BMI 22.63 kg/m2         Good spirits she is healthy youthful looking 69  Head is normocephalic no facial symmetry  Neck no JVD  Chest clear  Cardiac regular  Abdomen soft nontender  Right flank with this 2 cm opening over the bypass hyperemic skin and drainage  Other incisions all healed  Doppler shows nicely patent axillofemoral femorofemoral femoropopliteal with no evidence of stenosis multiphasic flow throughout     Impression/Plan:       ICD-10-CM ICD-9-CM     1. Chronic aorto-iliac occlusion syndrome (HCC) I74.09 444.09 XR CHEST PA LAT         EKG, 12 LEAD, INITIAL         CBC W/O DIFF         METABOLIC PANEL, BASIC         PROTHROMBIN TIME + INR   2.  Abscess L02.91 682.9 XR CHEST PA LAT         EKG, 12 LEAD, INITIAL         CBC W/O DIFF         METABOLIC PANEL, BASIC         PROTHROMBIN TIME + INR   Revascularization Remains intact  This chronic ulcer needs to be revised talked to her at length today   we will plan for antibiotics at time of surgery interposition revision and removal of this infected segment

## 2018-04-30 ENCOUNTER — ANESTHESIA (OUTPATIENT)
Dept: CARDIOTHORACIC SURGERY | Age: 68
DRG: 253 | End: 2018-04-30
Payer: MEDICARE

## 2018-04-30 ENCOUNTER — HOSPITAL ENCOUNTER (INPATIENT)
Age: 68
LOS: 1 days | Discharge: HOME HEALTH | DRG: 253 | End: 2018-05-01
Attending: SURGERY | Admitting: SURGERY
Payer: MEDICARE

## 2018-04-30 DIAGNOSIS — T81.89XD NON-HEALING SURGICAL WOUND, SUBSEQUENT ENCOUNTER: Primary | ICD-10-CM

## 2018-04-30 DIAGNOSIS — T82.7XXD INFECTION OF VASCULAR BYPASS GRAFT, SUBSEQUENT ENCOUNTER: ICD-10-CM

## 2018-04-30 PROBLEM — T82.7XXA INFECTION OF VASCULAR BYPASS GRAFT (HCC): Status: ACTIVE | Noted: 2018-04-30

## 2018-04-30 PROBLEM — T81.89XA NONHEALING SURGICAL WOUND: Status: ACTIVE | Noted: 2018-04-30

## 2018-04-30 LAB
ABO + RH BLD: NORMAL
BLOOD GROUP ANTIBODIES SERPL: NORMAL
SPECIMEN EXP DATE BLD: NORMAL

## 2018-04-30 PROCEDURE — 86900 BLOOD TYPING SEROLOGIC ABO: CPT | Performed by: SURGERY

## 2018-04-30 PROCEDURE — 77030013079 HC BLNKT BAIR HGGR 3M -A: Performed by: ANESTHESIOLOGY

## 2018-04-30 PROCEDURE — 77030002933 HC SUT MCRYL J&J -A: Performed by: SURGERY

## 2018-04-30 PROCEDURE — 77030002986 HC SUT PROL J&J -A: Performed by: SURGERY

## 2018-04-30 PROCEDURE — 87075 CULTR BACTERIA EXCEPT BLOOD: CPT | Performed by: SURGERY

## 2018-04-30 PROCEDURE — 74011250637 HC RX REV CODE- 250/637: Performed by: SURGERY

## 2018-04-30 PROCEDURE — 0JB60ZZ EXCISION OF CHEST SUBCUTANEOUS TISSUE AND FASCIA, OPEN APPROACH: ICD-10-PCS | Performed by: SURGERY

## 2018-04-30 PROCEDURE — 77030020782 HC GWN BAIR PAWS FLX 3M -B: Performed by: SURGERY

## 2018-04-30 PROCEDURE — 74011000258 HC RX REV CODE- 258: Performed by: SURGERY

## 2018-04-30 PROCEDURE — 77030008683 HC TU ET CUF COVD -A: Performed by: ANESTHESIOLOGY

## 2018-04-30 PROCEDURE — 87205 SMEAR GRAM STAIN: CPT | Performed by: SURGERY

## 2018-04-30 PROCEDURE — 74011250636 HC RX REV CODE- 250/636: Performed by: NURSE ANESTHETIST, CERTIFIED REGISTERED

## 2018-04-30 PROCEDURE — 65660000004 HC RM CVT STEPDOWN

## 2018-04-30 PROCEDURE — 77030002996 HC SUT SLK J&J -A: Performed by: SURGERY

## 2018-04-30 PROCEDURE — C1768 GRAFT, VASCULAR: HCPCS | Performed by: SURGERY

## 2018-04-30 PROCEDURE — 77030008467 HC STPLR SKN COVD -B: Performed by: SURGERY

## 2018-04-30 PROCEDURE — 94640 AIRWAY INHALATION TREATMENT: CPT

## 2018-04-30 PROCEDURE — 74011000250 HC RX REV CODE- 250: Performed by: SURGERY

## 2018-04-30 PROCEDURE — 88305 TISSUE EXAM BY PATHOLOGIST: CPT | Performed by: SURGERY

## 2018-04-30 PROCEDURE — 74011250636 HC RX REV CODE- 250/636: Performed by: SURGERY

## 2018-04-30 PROCEDURE — 77030034850: Performed by: SURGERY

## 2018-04-30 PROCEDURE — 77030010514 HC APPL CLP LIG COVD -B: Performed by: SURGERY

## 2018-04-30 PROCEDURE — 77030002924 HC SUT GORTX WLGO -B: Performed by: SURGERY

## 2018-04-30 PROCEDURE — 77030018836 HC SOL IRR NACL ICUM -A: Performed by: SURGERY

## 2018-04-30 PROCEDURE — 76210000016 HC OR PH I REC 1 TO 1.5 HR: Performed by: SURGERY

## 2018-04-30 PROCEDURE — 03PY0JZ REMOVAL OF SYNTHETIC SUBSTITUTE FROM UPPER ARTERY, OPEN APPROACH: ICD-10-PCS | Performed by: SURGERY

## 2018-04-30 PROCEDURE — 74011250636 HC RX REV CODE- 250/636

## 2018-04-30 PROCEDURE — 74011250637 HC RX REV CODE- 250/637: Performed by: NURSE ANESTHETIST, CERTIFIED REGISTERED

## 2018-04-30 PROCEDURE — 76060000035 HC ANESTHESIA 2 TO 2.5 HR: Performed by: SURGERY

## 2018-04-30 PROCEDURE — 03150J9 BYPASS RIGHT AXILLARY ARTERY TO RIGHT LOWER LEG ARTERY WITH SYNTHETIC SUBSTITUTE, OPEN APPROACH: ICD-10-PCS | Performed by: SURGERY

## 2018-04-30 PROCEDURE — 87186 SC STD MICRODIL/AGAR DIL: CPT | Performed by: SURGERY

## 2018-04-30 PROCEDURE — 74011000250 HC RX REV CODE- 250

## 2018-04-30 PROCEDURE — 77030011640 HC PAD GRND REM COVD -A: Performed by: SURGERY

## 2018-04-30 PROCEDURE — 74011250636 HC RX REV CODE- 250/636: Performed by: PHYSICIAN ASSISTANT

## 2018-04-30 PROCEDURE — C1757 CATH, THROMBECTOMY/EMBOLECT: HCPCS | Performed by: SURGERY

## 2018-04-30 PROCEDURE — 74011000258 HC RX REV CODE- 258: Performed by: NURSE ANESTHETIST, CERTIFIED REGISTERED

## 2018-04-30 PROCEDURE — 77030010507 HC ADH SKN DERMBND J&J -B: Performed by: SURGERY

## 2018-04-30 PROCEDURE — 87077 CULTURE AEROBIC IDENTIFY: CPT | Performed by: SURGERY

## 2018-04-30 PROCEDURE — 76010000108 HC CV SURG 2 TO 2.5 HR: Performed by: SURGERY

## 2018-04-30 DEVICE — GRAFT VASC L50CM DIA8MM RNG L40CM EPTFE THN WALLED CBAS HEP: Type: IMPLANTABLE DEVICE | Site: ABDOMEN | Status: FUNCTIONAL

## 2018-04-30 RX ORDER — OXYCODONE AND ACETAMINOPHEN 5; 325 MG/1; MG/1
1 TABLET ORAL
Status: DISCONTINUED | OUTPATIENT
Start: 2018-04-30 | End: 2018-05-01 | Stop reason: HOSPADM

## 2018-04-30 RX ORDER — LOPERAMIDE HYDROCHLORIDE 2 MG/1
2 CAPSULE ORAL
Status: DISCONTINUED | OUTPATIENT
Start: 2018-04-30 | End: 2018-05-01 | Stop reason: HOSPADM

## 2018-04-30 RX ORDER — NALOXONE HYDROCHLORIDE 0.4 MG/ML
0.4 INJECTION, SOLUTION INTRAMUSCULAR; INTRAVENOUS; SUBCUTANEOUS AS NEEDED
Status: DISCONTINUED | OUTPATIENT
Start: 2018-04-30 | End: 2018-05-01 | Stop reason: HOSPADM

## 2018-04-30 RX ORDER — LIDOCAINE HYDROCHLORIDE 10 MG/ML
INJECTION, SOLUTION EPIDURAL; INFILTRATION; INTRACAUDAL; PERINEURAL
Status: DISCONTINUED
Start: 2018-04-30 | End: 2018-04-30

## 2018-04-30 RX ORDER — DEXTROSE MONOHYDRATE AND SODIUM CHLORIDE 5; .45 G/100ML; G/100ML
50 INJECTION, SOLUTION INTRAVENOUS CONTINUOUS
Status: DISPENSED | OUTPATIENT
Start: 2018-04-30 | End: 2018-05-01

## 2018-04-30 RX ORDER — ONDANSETRON 2 MG/ML
INJECTION INTRAMUSCULAR; INTRAVENOUS AS NEEDED
Status: DISCONTINUED | OUTPATIENT
Start: 2018-04-30 | End: 2018-04-30 | Stop reason: HOSPADM

## 2018-04-30 RX ORDER — LIDOCAINE HYDROCHLORIDE 20 MG/ML
INJECTION, SOLUTION EPIDURAL; INFILTRATION; INTRACAUDAL; PERINEURAL AS NEEDED
Status: DISCONTINUED | OUTPATIENT
Start: 2018-04-30 | End: 2018-04-30 | Stop reason: HOSPADM

## 2018-04-30 RX ORDER — SODIUM CHLORIDE 9 MG/ML
250 INJECTION, SOLUTION INTRAVENOUS ONCE
Status: COMPLETED | OUTPATIENT
Start: 2018-04-30 | End: 2018-04-30

## 2018-04-30 RX ORDER — SODIUM CHLORIDE 900 MG/100ML
INJECTION INTRAVENOUS
Status: DISCONTINUED
Start: 2018-04-30 | End: 2018-04-30

## 2018-04-30 RX ORDER — SODIUM CHLORIDE 0.9 % (FLUSH) 0.9 %
5-10 SYRINGE (ML) INJECTION EVERY 8 HOURS
Status: DISCONTINUED | OUTPATIENT
Start: 2018-04-30 | End: 2018-04-30 | Stop reason: HOSPADM

## 2018-04-30 RX ORDER — CLOPIDOGREL BISULFATE 75 MG/1
75 TABLET ORAL DAILY
Status: DISCONTINUED | OUTPATIENT
Start: 2018-04-30 | End: 2018-05-01 | Stop reason: HOSPADM

## 2018-04-30 RX ORDER — SODIUM CHLORIDE 9 MG/ML
250 INJECTION, SOLUTION INTRAVENOUS CONTINUOUS
Status: DISCONTINUED | OUTPATIENT
Start: 2018-04-30 | End: 2018-04-30

## 2018-04-30 RX ORDER — PREGABALIN 50 MG/1
100 CAPSULE ORAL DAILY
Status: DISCONTINUED | OUTPATIENT
Start: 2018-05-01 | End: 2018-05-01 | Stop reason: HOSPADM

## 2018-04-30 RX ORDER — SODIUM CHLORIDE, SODIUM LACTATE, POTASSIUM CHLORIDE, CALCIUM CHLORIDE 600; 310; 30; 20 MG/100ML; MG/100ML; MG/100ML; MG/100ML
75 INJECTION, SOLUTION INTRAVENOUS CONTINUOUS
Status: DISCONTINUED | OUTPATIENT
Start: 2018-04-30 | End: 2018-04-30 | Stop reason: HOSPADM

## 2018-04-30 RX ORDER — PREGABALIN 50 MG/1
200 CAPSULE ORAL EVERY EVENING
Status: DISCONTINUED | OUTPATIENT
Start: 2018-04-30 | End: 2018-05-01 | Stop reason: HOSPADM

## 2018-04-30 RX ORDER — DEXTROSE 50 % IN WATER (D50W) INTRAVENOUS SYRINGE
25-50 AS NEEDED
Status: DISCONTINUED | OUTPATIENT
Start: 2018-04-30 | End: 2018-04-30 | Stop reason: HOSPADM

## 2018-04-30 RX ORDER — EPHEDRINE SULFATE 50 MG/ML
INJECTION, SOLUTION INTRAVENOUS AS NEEDED
Status: DISCONTINUED | OUTPATIENT
Start: 2018-04-30 | End: 2018-04-30 | Stop reason: HOSPADM

## 2018-04-30 RX ORDER — IPRATROPIUM BROMIDE AND ALBUTEROL SULFATE 2.5; .5 MG/3ML; MG/3ML
3 SOLUTION RESPIRATORY (INHALATION)
Status: DISCONTINUED | OUTPATIENT
Start: 2018-04-30 | End: 2018-05-01 | Stop reason: HOSPADM

## 2018-04-30 RX ORDER — LIDOCAINE HYDROCHLORIDE 10 MG/ML
0.1 INJECTION, SOLUTION EPIDURAL; INFILTRATION; INTRACAUDAL; PERINEURAL AS NEEDED
Status: DISCONTINUED | OUTPATIENT
Start: 2018-04-30 | End: 2018-04-30 | Stop reason: HOSPADM

## 2018-04-30 RX ORDER — FAMOTIDINE 20 MG/1
20 TABLET, FILM COATED ORAL ONCE
Status: COMPLETED | OUTPATIENT
Start: 2018-04-30 | End: 2018-04-30

## 2018-04-30 RX ORDER — HEPARIN SODIUM 200 [USP'U]/100ML
INJECTION, SOLUTION INTRAVENOUS
Status: DISCONTINUED | OUTPATIENT
Start: 2018-04-30 | End: 2018-04-30 | Stop reason: HOSPADM

## 2018-04-30 RX ORDER — VANCOMYCIN HYDROCHLORIDE 1 G/20ML
INJECTION, POWDER, LYOPHILIZED, FOR SOLUTION INTRAVENOUS
Status: DISCONTINUED
Start: 2018-04-30 | End: 2018-04-30

## 2018-04-30 RX ORDER — MAGNESIUM SULFATE 100 %
4 CRYSTALS MISCELLANEOUS AS NEEDED
Status: DISCONTINUED | OUTPATIENT
Start: 2018-04-30 | End: 2018-04-30 | Stop reason: HOSPADM

## 2018-04-30 RX ORDER — SODIUM CHLORIDE 0.9 % (FLUSH) 0.9 %
5-10 SYRINGE (ML) INJECTION EVERY 8 HOURS
Status: DISCONTINUED | OUTPATIENT
Start: 2018-04-30 | End: 2018-05-01 | Stop reason: HOSPADM

## 2018-04-30 RX ORDER — FENTANYL CITRATE 50 UG/ML
INJECTION, SOLUTION INTRAMUSCULAR; INTRAVENOUS AS NEEDED
Status: DISCONTINUED | OUTPATIENT
Start: 2018-04-30 | End: 2018-04-30 | Stop reason: HOSPADM

## 2018-04-30 RX ORDER — SUCCINYLCHOLINE CHLORIDE 20 MG/ML
INJECTION INTRAMUSCULAR; INTRAVENOUS AS NEEDED
Status: DISCONTINUED | OUTPATIENT
Start: 2018-04-30 | End: 2018-04-30 | Stop reason: HOSPADM

## 2018-04-30 RX ORDER — HEPARIN SODIUM 200 [USP'U]/100ML
INJECTION, SOLUTION INTRAVENOUS
Status: DISCONTINUED
Start: 2018-04-30 | End: 2018-04-30

## 2018-04-30 RX ORDER — SODIUM CHLORIDE 0.9 % (FLUSH) 0.9 %
5-10 SYRINGE (ML) INJECTION AS NEEDED
Status: DISCONTINUED | OUTPATIENT
Start: 2018-04-30 | End: 2018-04-30 | Stop reason: HOSPADM

## 2018-04-30 RX ORDER — PREGABALIN 50 MG/1
100 CAPSULE ORAL 2 TIMES DAILY
Status: DISCONTINUED | OUTPATIENT
Start: 2018-04-30 | End: 2018-04-30

## 2018-04-30 RX ORDER — LIDOCAINE HYDROCHLORIDE 10 MG/ML
INJECTION, SOLUTION EPIDURAL; INFILTRATION; INTRACAUDAL; PERINEURAL AS NEEDED
Status: DISCONTINUED | OUTPATIENT
Start: 2018-04-30 | End: 2018-04-30 | Stop reason: HOSPADM

## 2018-04-30 RX ORDER — FLUTICASONE FUROATE AND VILANTEROL 100; 25 UG/1; UG/1
1 POWDER RESPIRATORY (INHALATION) DAILY
Status: DISCONTINUED | OUTPATIENT
Start: 2018-04-30 | End: 2018-05-01 | Stop reason: HOSPADM

## 2018-04-30 RX ORDER — HEPARIN SODIUM 1000 [USP'U]/ML
INJECTION, SOLUTION INTRAVENOUS; SUBCUTANEOUS AS NEEDED
Status: DISCONTINUED | OUTPATIENT
Start: 2018-04-30 | End: 2018-04-30 | Stop reason: HOSPADM

## 2018-04-30 RX ORDER — DICYCLOMINE HYDROCHLORIDE 10 MG/1
10 CAPSULE ORAL 2 TIMES DAILY
Status: DISCONTINUED | OUTPATIENT
Start: 2018-04-30 | End: 2018-05-01 | Stop reason: HOSPADM

## 2018-04-30 RX ORDER — PROPOFOL 10 MG/ML
INJECTION, EMULSION INTRAVENOUS AS NEEDED
Status: DISCONTINUED | OUTPATIENT
Start: 2018-04-30 | End: 2018-04-30 | Stop reason: HOSPADM

## 2018-04-30 RX ORDER — BIMATOPROST 3 UG/ML
1 SOLUTION TOPICAL EVERY EVENING
Status: DISCONTINUED | OUTPATIENT
Start: 2018-04-30 | End: 2018-05-01 | Stop reason: HOSPADM

## 2018-04-30 RX ORDER — HYDROMORPHONE HYDROCHLORIDE 1 MG/ML
.5-1 INJECTION, SOLUTION INTRAMUSCULAR; INTRAVENOUS; SUBCUTANEOUS
Status: DISCONTINUED | OUTPATIENT
Start: 2018-04-30 | End: 2018-05-01 | Stop reason: HOSPADM

## 2018-04-30 RX ORDER — DULOXETIN HYDROCHLORIDE 30 MG/1
60 CAPSULE, DELAYED RELEASE ORAL
Status: DISCONTINUED | OUTPATIENT
Start: 2018-04-30 | End: 2018-05-01 | Stop reason: HOSPADM

## 2018-04-30 RX ORDER — TRIAMTERENE/HYDROCHLOROTHIAZID 37.5-25 MG
1 TABLET ORAL DAILY
Status: DISCONTINUED | OUTPATIENT
Start: 2018-04-30 | End: 2018-05-01 | Stop reason: HOSPADM

## 2018-04-30 RX ORDER — GUAIFENESIN 100 MG/5ML
81 LIQUID (ML) ORAL DAILY
Status: DISCONTINUED | OUTPATIENT
Start: 2018-04-30 | End: 2018-05-01 | Stop reason: HOSPADM

## 2018-04-30 RX ORDER — FENTANYL CITRATE 50 UG/ML
25 INJECTION, SOLUTION INTRAMUSCULAR; INTRAVENOUS
Status: DISCONTINUED | OUTPATIENT
Start: 2018-04-30 | End: 2018-04-30 | Stop reason: HOSPADM

## 2018-04-30 RX ORDER — PHENOBARBITAL, HYOSCYAMINE SULFATE, ATROPINE SULFATE AND SCOPOLAMINE HYDROBROMIDE .0194; .1037; 16.2; .0065 MG/1; MG/1; MG/1; MG/1
1 TABLET ORAL AS NEEDED
Status: DISCONTINUED | OUTPATIENT
Start: 2018-04-30 | End: 2018-05-01 | Stop reason: HOSPADM

## 2018-04-30 RX ORDER — ONDANSETRON 2 MG/ML
4 INJECTION INTRAMUSCULAR; INTRAVENOUS
Status: DISCONTINUED | OUTPATIENT
Start: 2018-04-30 | End: 2018-05-01 | Stop reason: HOSPADM

## 2018-04-30 RX ORDER — SODIUM CHLORIDE 0.9 % (FLUSH) 0.9 %
5-10 SYRINGE (ML) INJECTION AS NEEDED
Status: DISCONTINUED | OUTPATIENT
Start: 2018-04-30 | End: 2018-05-01 | Stop reason: HOSPADM

## 2018-04-30 RX ADMIN — PROMETHAZINE HYDROCHLORIDE 12.5 MG: 25 INJECTION INTRAMUSCULAR; INTRAVENOUS at 12:31

## 2018-04-30 RX ADMIN — FENTANYL CITRATE 50 MCG: 50 INJECTION, SOLUTION INTRAMUSCULAR; INTRAVENOUS at 10:05

## 2018-04-30 RX ADMIN — EPHEDRINE SULFATE 10 MG: 50 INJECTION, SOLUTION INTRAVENOUS at 08:41

## 2018-04-30 RX ADMIN — EPHEDRINE SULFATE 15 MG: 50 INJECTION, SOLUTION INTRAVENOUS at 08:08

## 2018-04-30 RX ADMIN — PREGABALIN 200 MG: 50 CAPSULE ORAL at 18:49

## 2018-04-30 RX ADMIN — DEXTROSE MONOHYDRATE AND SODIUM CHLORIDE 50 ML/HR: 5; .45 INJECTION, SOLUTION INTRAVENOUS at 15:27

## 2018-04-30 RX ADMIN — SODIUM CHLORIDE, SODIUM LACTATE, POTASSIUM CHLORIDE, AND CALCIUM CHLORIDE 75 ML/HR: 600; 310; 30; 20 INJECTION, SOLUTION INTRAVENOUS at 07:30

## 2018-04-30 RX ADMIN — FENTANYL CITRATE 100 MCG: 50 INJECTION, SOLUTION INTRAMUSCULAR; INTRAVENOUS at 07:59

## 2018-04-30 RX ADMIN — DULOXETINE HYDROCHLORIDE 60 MG: 30 CAPSULE, DELAYED RELEASE ORAL at 21:29

## 2018-04-30 RX ADMIN — SODIUM CHLORIDE 1 G: 900 INJECTION, SOLUTION INTRAVENOUS at 08:12

## 2018-04-30 RX ADMIN — ASPIRIN 81 MG 81 MG: 81 TABLET ORAL at 14:01

## 2018-04-30 RX ADMIN — ONDANSETRON 4 MG: 2 INJECTION INTRAMUSCULAR; INTRAVENOUS at 07:59

## 2018-04-30 RX ADMIN — DICYCLOMINE HYDROCHLORIDE 10 MG: 10 CAPSULE ORAL at 21:29

## 2018-04-30 RX ADMIN — OXYCODONE HYDROCHLORIDE AND ACETAMINOPHEN 1 TABLET: 5; 325 TABLET ORAL at 21:29

## 2018-04-30 RX ADMIN — DICYCLOMINE HYDROCHLORIDE 10 MG: 10 CAPSULE ORAL at 14:00

## 2018-04-30 RX ADMIN — SODIUM CHLORIDE 250 ML: 900 INJECTION, SOLUTION INTRAVENOUS at 19:28

## 2018-04-30 RX ADMIN — EPHEDRINE SULFATE 5 MG: 50 INJECTION, SOLUTION INTRAVENOUS at 08:28

## 2018-04-30 RX ADMIN — FAMOTIDINE 20 MG: 20 TABLET, FILM COATED ORAL at 07:30

## 2018-04-30 RX ADMIN — FENTANYL CITRATE 25 MCG: 50 INJECTION INTRAMUSCULAR; INTRAVENOUS at 11:13

## 2018-04-30 RX ADMIN — SUCCINYLCHOLINE CHLORIDE 120 MG: 20 INJECTION INTRAMUSCULAR; INTRAVENOUS at 08:03

## 2018-04-30 RX ADMIN — PROPOFOL 150 MG: 10 INJECTION, EMULSION INTRAVENOUS at 08:03

## 2018-04-30 RX ADMIN — OXYCODONE HYDROCHLORIDE AND ACETAMINOPHEN 1 TABLET: 5; 325 TABLET ORAL at 18:10

## 2018-04-30 RX ADMIN — Medication 10 ML: at 23:25

## 2018-04-30 RX ADMIN — SODIUM CHLORIDE 1000 MG: 900 INJECTION, SOLUTION INTRAVENOUS at 14:00

## 2018-04-30 RX ADMIN — EPHEDRINE SULFATE 10 MG: 50 INJECTION, SOLUTION INTRAVENOUS at 09:22

## 2018-04-30 RX ADMIN — LIDOCAINE HYDROCHLORIDE 100 MG: 20 INJECTION, SOLUTION EPIDURAL; INFILTRATION; INTRACAUDAL; PERINEURAL at 08:03

## 2018-04-30 RX ADMIN — FENTANYL CITRATE 25 MCG: 50 INJECTION INTRAMUSCULAR; INTRAVENOUS at 11:03

## 2018-04-30 RX ADMIN — CLOPIDOGREL BISULFATE 75 MG: 75 TABLET ORAL at 14:00

## 2018-04-30 RX ADMIN — PROPOFOL 50 MG: 10 INJECTION, EMULSION INTRAVENOUS at 08:05

## 2018-04-30 RX ADMIN — HEPARIN SODIUM 4000 UNITS: 1000 INJECTION, SOLUTION INTRAVENOUS; SUBCUTANEOUS at 08:44

## 2018-04-30 RX ADMIN — IPRATROPIUM BROMIDE AND ALBUTEROL SULFATE 3 ML: .5; 3 SOLUTION RESPIRATORY (INHALATION) at 20:19

## 2018-04-30 RX ADMIN — ONDANSETRON 4 MG: 2 INJECTION INTRAMUSCULAR; INTRAVENOUS at 10:04

## 2018-04-30 RX ADMIN — FENTANYL CITRATE 50 MCG: 50 INJECTION, SOLUTION INTRAMUSCULAR; INTRAVENOUS at 08:35

## 2018-04-30 RX ADMIN — Medication 10 ML: at 14:00

## 2018-04-30 NOTE — IP AVS SNAPSHOT
[address] [address] 
[phone] Patient: Ori Muñoz 
MRN: [id number]:[id number] About your hospitalization You were admitted on:  April 30, 2018 You last received care in the:  [facility] 2 CV STEPDOWN You were discharged on:  May 1, 2018 Why you were hospitalized Your primary diagnosis was:  Not on File Your diagnoses also included:  Infection Of Vascular Bypass Graft (Hcc) Follow-up Information Follow up With Details Comments Contact Info [address] [place]   [address] [address] [address] 
[phone] Your Scheduled Appointments Tuesday May 08, 2018  2:15 PM EDT HOSPITAL DISCHARGE with [facility] ([facility]) [address]  
[phone] Thursday May 17, 2018  9:30 AM EDT Nurse Visit with [facility] [address] ([facility]) [address] [facility], [address] [address]  
[phone] Thursday May 17, 2018 10:00 AM EDT Nurse Visit with [facility] [address] ([facility]) [address] [facility], [address] [address]  
[phone] Thursday May 24, 2018 12:00 PM EDT Follow Up with Juan Miguel Rivero MD  
[address] ([facility]) [address] [facility], [address] [address]  
[phone] Discharge Orders None A check wan indicates which time of day the medication should be taken. My Medications START taking these medications Instructions Each Dose to Equal  
 Morning Noon Evening Bedtime  
 amoxicillin-clavulanate 875-125 mg per tablet Commonly known as:  AUGMENTIN Your last dose was: Your next dose is: Take 1 Tab by mouth every twelve (12) hours for 10 days. 1 Tab  
    
   
   
   
  
 oxyCODONE-acetaminophen 5-325 mg per tablet Commonly known as:  PERCOCET Your last dose was: Your next dose is: Take 1 Tab by mouth every four (4) hours as needed. Max Daily Amount: 6 Tabs. 1 Tab CHANGE how you take these medications Instructions Each Dose to Equal  
 Morning Noon Evening Bedtime  
 ipratropium-albuterol  mcg/actuation inhaler Commonly known as:  Esperanza Cates What changed:  when to take this Your last dose was: Your next dose is: Take 1 Puff by inhalation every six (6) hours as needed for Wheezing or Shortness of Breath. 1 Puff CONTINUE taking these medications Instructions Each Dose to Equal  
 Morning Noon Evening Bedtime  
 aspirin 81 mg tablet Your last dose was: Your next dose is: Take 81 mg by mouth daily. 81 mg  
    
   
   
   
  
 clopidogrel 75 mg Tab Commonly known as:  PLAVIX Your last dose was: Your next dose is: TAKE ONE TABLET BY MOUTH DAILY  
     
   
   
   
  
 CYMBALTA 60 mg capsule Generic drug:  DULoxetine Your last dose was: Your next dose is: Take 60 mg by mouth nightly. 60 mg  
    
   
   
   
  
 dicyclomine 10 mg capsule Commonly known as:  BENTYL Your last dose was: Your next dose is: Take 10 mg by mouth two (2) times a day. 10 mg DONNATAL 16.2-0.1037 -0.0194 mg per tablet Generic drug:  atropine-PHENobarbital-scopolamine-hyoscyamine Your last dose was: Your next dose is: Take 1 Tab by mouth as needed (diarrhea). Indications: Irritable Bowel Syndrome 1 Tab DYAZIDE 37.5-25 mg per capsule Generic drug:  triamterene-hydroCHLOROthiazide Your last dose was: Your next dose is: Take 1 Cap by mouth daily. 1 Cap FLONASE 50 mcg/actuation nasal spray Generic drug:  fluticasone Your last dose was: Your next dose is:    
   
   
 1 Spray by Both Nostrils route two (2) times a day. 1 Spray  
    
   
   
   
  
 fluticasone-vilanterol 100-25 mcg/dose inhaler Commonly known as:  BREO ELLIPTA Your last dose was: Your next dose is: Take 1 Puff by inhalation daily. 1 Puff  
    
   
   
   
  
 loperamide 2 mg tablet Commonly known as:  IMMODIUM Your last dose was: Your next dose is: Take 2 mg by mouth daily as needed. 2 mg LUMIGAN 0.03 % ophthalmic drops Generic drug:  bimatoprost  
   
Your last dose was: Your next dose is:    
   
   
 Administer 1 Drop to both eyes every evening. 1 Drop LYRICA 100 mg capsule Generic drug:  pregabalin Your last dose was: Your next dose is: Take  by mouth two (2) times a day. Takes 100 mg in am and 200 in the pm  
     
   
   
   
  
 OXYGEN-AIR DELIVERY SYSTEMS Your last dose was: Your next dose is:    
   
   
 2 L by Does Not Apply route. Every HS and as needed 2 L  
    
   
   
   
  
 REMICADE 100 mg injection Generic drug:  inFLIXimab Your last dose was: Your next dose is:    
   
   
 5 mg/kg by IntraVENous route once. Every 4 weeks 5 mg/kg VITAMIN B-12 1,000 mcg/mL injection Generic drug:  cyanocobalamin Your last dose was: Your next dose is:    
   
   
 1,000 mcg by IntraMUSCular route every fourteen (14) days. 1000 mcg Where to Get Your Medications Information on where to get these meds will be given to you by the nurse or doctor. ! Ask your nurse or doctor about these medications amoxicillin-clavulanate 875-125 mg per tablet  
 oxyCODONE-acetaminophen 5-325 mg per tablet Opioid Education Prescription Opioids: What You Need to Know: 
 
 
 
F-face looks uneven A-arms unable to move or move unevenly S-speech slurred or non-existent T-time-call 911 as soon as signs and symptoms begin-DO NOT go Back to bed or wait to see if you get better-TIME IS BRAIN. Warning Signs of HEART ATTACK Call 911 if you have these symptoms: 
? Chest discomfort. Most heart attacks involve discomfort in the center of the chest that lasts more than a few minutes, or that goes away and comes back. It can feel like uncomfortable pressure, squeezing, fullness, or pain. ? Discomfort in other areas of the upper body. Symptoms can include pain or discomfort in one or both arms, the back, neck, jaw, or stomach. ? Shortness of breath with or without chest discomfort. ? Other signs may include breaking out in a cold sweat, nausea, or lightheadedness. Don't wait more than five minutes to call [address]! Fast action can save your life. Calling 911 is almost always the fastest way to get lifesaving treatment. Emergency Medical Services staff can begin treatment when they arrive  up to an hour sooner than if someone gets to the hospital by car. The discharge information has been reviewed with the patient and parent. The patient verbalized understanding. Discharge medications reviewed with the patient and appropriate educational materials and side effects teaching were provided. Patient armband removed and shredded MyChart Activation Thank you for requesting access to Baofeng. Please follow the instructions below to securely access and download your online medical record. Baofeng allows you to send messages to your doctor, view your test results, renew your prescriptions, schedule appointments, and more. How Do I Sign Up? 1. In your internet browser, go to [email or web address]. 2. Click on the First Time User? Click Here link in the Sign In box. You will see the New Member Sign Up page. 3. Enter your Baofeng Access Code exactly as it appears below. You will not need to use this code after youve completed the sign-up process. If you do not sign up before the expiration date, you must request a new code. Baofeng Access Code: [email or web address] Expires: 2018 10:19 AM (This is the date your Baofeng access code will ) 4. Enter the last four digits of your Social Security Number (xxxx) and Date of Birth (mm/dd/yyyy) as indicated and click Submit. You will be taken to the next sign-up page. 5. Create a Baofeng ID. This will be your Baofeng login ID and cannot be changed, so think of one that is secure and easy to remember. 6. Create a Baofeng password. You can change your password at any time. 7. Enter your Password Reset Question and Answer. This can be used at a later time if you forget your password. 8. Enter your e-mail address. You will receive e-mail notification when new information is available in [address] 9. Click Sign Up. You can now view and download portions of your medical record. 10. Click the Download Summary menu link to download a portable copy of your medical information. Additional Information If you have questions, please visit the Frequently Asked Questions section of the GuideWall website at [email or web address] Pintail [email or web address]/All Def Digitalhart/. Remember, SodaHeadt is NOT to be used for urgent needs. For medical emergencies, dial 911. 
 
 
________________________________________________ MyChart Announcement We are excited to announce that we are making your provider's discharge notes available to you in GuideWall. You will see these notes when they are completed and signed by the physician that discharged you from your recent hospital stay. If you have any questions or concerns about any information you see in GuideWall, please call the Health Information Department where you were seen or reach out to your Primary Care Provider for more information about your plan of care. Introducing [facility]! [facility] introduces GuideWall patient portal. Now you can access parts of your medical record, email your doctor's office, and request medication refills online. 1. In your internet browser, go to [email or web address]/All Def Digitalhart 2. Click on the First Time User? Click Here link in the Sign In box. You will see the New Member Sign Up page. 3. Enter your GuideWall Access Code exactly as it appears below. You will not need to use this code after youve completed the sign-up process. If you do not sign up before the expiration date, you must request a new code. · GuideWall Access Code: [email or web address] Expires: 6/21/2018 10:19 AM 
 
4. Enter the last four digits of your Social Security Number (xxxx) and Date of Birth (mm/dd/yyyy) as indicated and click Submit. You will be taken to the next sign-up page. 5. Create a SodaHeadt ID. This will be your GuideWall login ID and cannot be changed, so think of one that is secure and easy to remember. 6. Create a GuideWall password. You can change your password at any time. 7. Enter your Password Reset Question and Answer.  This can be used at a later time if you forget your password. 8. Enter your e-mail address. You will receive e-mail notification when new information is available in [address] 9. Click Sign Up. You can now view and download portions of your medical record. 10. Click the Download Summary menu link to download a portable copy of your medical information. If you have questions, please visit the Frequently Asked Questions section of the Nu3 website. Remember, Nu3 is NOT to be used for urgent needs. For medical emergencies, dial 911. Now available from your iPhone and Android! Introducing Candelario Chandler As a [facility] patient, I wanted to make you aware of our electronic visit tool called Candelario Chandler. Arrivelyyl Oka 24/7 allows you to connect within minutes with a medical provider 24 hours a day, seven days a week via a mobile device or tablet or logging into a secure website from your computer. You can access Candelario Chandler from anywhere in the [place]. A virtual visit might be right for you when you have a simple condition and feel like you just dont want to get out of bed, or cant get away from work for an appointment, when your regular [facility] provider is not available (evenings, weekends or holidays), or when youre out of town and need minor care. Electronic visits cost only $49 and if the Arrivelyyl Oka 24/7 provider determines a prescription is needed to treat your condition, one can be electronically transmitted to a nearby pharmacy*. Please take a moment to enroll today if you have not already done so. The enrollment process is free and takes just a few minutes. To enroll, please download the Arrivelyyl Oka 24/7 rowena to your tablet or phone, or visit [email or web address] to enroll on your computer.    
And, as an [address] [place] [address] patient with a Scoot & Doodle account, the results of your visits will be scanned into your electronic medical record and your primary care provider will be able to view the scanned results. We urge you to continue to see your regular Willade[facility] Saint provider for your ongoing medical care. And while your primary care provider may not be the one available when you seek a Candelario Solomonfin virtual visit, the peace of mind you get from getting a real diagnosis real time can be priceless. For more information on Lattice Engines, view our Frequently Asked Questions (FAQs) at [email or web address]. Sincerely, 
 
Eulalia Madrid MD 
Chief Medical Officer [address] *:  certain medications cannot be prescribed via Lattice Engines Unresulted Labs-Please follow up with your PCP about these lab tests Order Current Status CULTURE, ANAEROBIC Preliminary result CULTURE, SURGICAL WOUND W GRAM STAIN Preliminary result Providers Seen During Your Hospitalization Provider Specialty Primary office phone Josephine Nguyễn MD Vascular Surgery [phone] Your Primary Care Physician (PCP) Primary Care Physician Office Phone Office Fax Maggy Maynard, [address] [place] [address] [phone] You are allergic to the following Allergen Reactions Codeine Nausea and Vomiting Other reaction(s): other/intolerance Darvon (Propoxyphene) Itching Demerol (Meperidine) Other (comments) Halucinations Keflex (Cephalexin) Diarrhea Talwin (Pentazocine Lactate) Shortness of Breath Nausea and Vomiting Recent Documentation Height Weight BMI OB Status Smoking Status 1.651 m 63.2 kg 23.18 kg/m2 Postmenopausal Current Every Day Smoker Emergency Contacts Name Discharge Info Relation Home Work Mobile Luis Carlos Souza DISCHARGE CAREGIVER [3] Spouse [3] [phone] Patient Belongings The following personal items are in your possession at time of discharge: Dental Appliances: Lowers, Uppers  Visual Aid: Glasses      Home Medications: Sent home   Jewelry: Watch  Clothing: Jacket/Coat, Undergarments, Footwear, Pants, Shirt    Other Valuables: Eyeglasses, Cell Phone, Terressa Paget  Personal Items Sent to Safe: Declines Please provide this summary of care documentation to your next provider. Signatures-by signing, you are acknowledging that this After Visit Summary has been reviewed with you and you have received a copy. Patient Signature:  ____________________________________________________________ Date:  ____________________________________________________________  
  
[facility] Provider Signature:  ____________________________________________________________ Date:  ____________________________________________________________

## 2018-04-30 NOTE — PROGRESS NOTES
Dr. YOUNG [facility] notified of patient's blood pressure of 102/58 and request for pain medicine. Orders received for 250 normal saline bolus and verification to give pain med.

## 2018-04-30 NOTE — OP NOTES
[address] [facility] REPORT    Name:Madhav DAILEY  MR#: [id number]  : 1950  ACCOUNT #: [de-identified]   DATE OF SERVICE: 2018    SURGEON:  SAI PEOPLES DO    PREOPERATIVE DIAGNOSIS:  Peripheral artery disease with infected axillary femoral graft. POSTOPERATIVE DIAGNOSIS:  Peripheral artery disease with infected axillary femoral graft. PROCEDURES PERFORMED:  Excision of infected graft, right flank, revision with interposition graft, right neiccp-vq-nrdrier graft. COMPLICATIONS:  Zero. ESTIMATED BLOOD LOSS:  Minimal.    CONDITION OF THE PATIENT:  Stable. ANESTHESIA:  General.    ASSISTANT:  Marcheta Angelucci. EXPLANT/SPECIMENS: REMOVED:  Infected graft with ulcer. IMPLANT:  8 mm ringed PTFE. DETAILS OF PROCEDURE:  The patient is a 67-year-old with a chronic wound over her right flank with an exposed bypass graft now. She has had attempts at coverage, really heroic in nature and comprehensive, just cannot get this to stay closed. We discussed risks and benefits of revision and she is agreeable. She had vancomycin, was transferred to the room where she had general anesthesia. Her right chest wall, flank and groin were prepped and draped in usual standard fashion followed by [facility] compliant guidelines for aseptic technique. She had had a Mercer catheter placed as well. I placed a Tegaderm over the ulcer and then isolated the graft in the lower chest or upper flank and made an incision directly over the graft and exposed it. It was well incorporated. No signs of infection here. I made a second incision over the graft, down by the lower abdominal area or groin area and exposed here. There were also 2 areas free of any signs of infection. I made a tunnel between the 2 sites around the area of the suspicious graft and placed the new 8 mm ringed PTFE graft. With this, I anticoagulated the patient.   After appropriate waiting period, I clamped the graft proximal and distal, cross cut the graft in both locations, did an end-to-end anastomosis at each site, so there were 2 end-to-end anastomoses using CV-6 Frederick-Rashid suture in a circular and standard fashion. Just prior to opening, I burped to ensure there was inflow, it was brisk. I thrombectomized the outflow to reduce the risk of any intimal flap and opened up the cyst and there was an excellent pulse in the graft. I did take out as much graft as visualized on the isolated portion, irrigated and closed with 3-0 for subcu, 4-0 Monocryl and Dermabond glue for the skin. Now, I removed the Tegaderm and using a Bovie, I excised the ulcer. The ulcer goes straight down to graft. I pulled the graft in both directions. I removed all sections of infected graft, irrigated this and packed it with a wet-to-dry dressing. She tolerated this well, was extubated and transferred to the recovery room in stable condition.       DO PATRICIA Vicente / YVONNE  D: 04/30/2018 10:20     T: 04/30/2018 11:04  JOB #: [id number]

## 2018-04-30 NOTE — PERIOP NOTES
TRANSFER - OUT REPORT:    Verbal report given to Lisa Langford RN on [employer]  being transferred to [address] [place] [address]  for routine post - op       Report consisted of patients Situation, Background, Assessment and   Recommendations(SBAR). Information from the following report(s) SBAR, OR Summary and MAR was reviewed with the receiving nurse. Lines:   Peripheral IV 04/30/18 Right Antecubital (Active)   Site Assessment Clean, dry, & intact 4/30/2018 10:22 AM   Phlebitis Assessment 0 4/30/2018 10:22 AM   Infiltration Assessment 0 4/30/2018 10:22 AM   Dressing Status Clean, dry, & intact 4/30/2018 10:22 AM   Dressing Type Transparent;Tape 4/30/2018 10:22 AM   Hub Color/Line Status Pink 4/30/2018 10:22 AM       Peripheral IV 04/30/18 Left Antecubital (Active)   Site Assessment Clean, dry, & intact 4/30/2018 10:22 AM   Phlebitis Assessment 0 4/30/2018 10:22 AM   Infiltration Assessment 0 4/30/2018 10:22 AM   Dressing Status Clean, dry, & intact 4/30/2018 10:22 AM   Dressing Type Transparent;Tape 4/30/2018 10:22 AM   Hub Color/Line Status Pink 4/30/2018 10:22 AM        Opportunity for questions and clarification was provided.       Patient transported with:   Monitor  O2 @ 3 liters  Registered Nurse  [facility]

## 2018-04-30 NOTE — PERIOP NOTES
Upon arrival to pacu, patients dressing to mid right flank saturated and medium amount of blood also noted to be on the chux pad directly below the dressing. Swelling also noted to right lower flank incision. Notified Dr Bulmaro Arreola.

## 2018-04-30 NOTE — PERIOP NOTES
Dr Kailey Oshea at bedside. Swelling to lower right flank incision ok per Dr Kailey Oshea. Dressing to right mid flank taken down by Dr Kailey Oshea and redressed. Will continue to monitor.

## 2018-04-30 NOTE — ANESTHESIA POSTPROCEDURE EVALUATION
Post-Anesthesia Evaluation and Assessment    Patient: Cara Cisneros MRN: [id number]  SSN: [id number]    YOB: 1950  Age: 69 y.o. Sex: female       Cardiovascular Function/Vital Signs  Visit Vitals    /54    Pulse 82    Temp 36.3 °C (97.4 °F)    Resp 12    Ht 5' 5\" (1.651 m)    Wt 61.5 kg (135 lb 8 oz)    SpO2 99%    BMI 22.55 kg/m2       Patient is status post general anesthesia for Procedure(s):  REVISION OF RIGHT AXILLO-FEMORAL BYPASS. Nausea/Vomiting: None    Postoperative hydration reviewed and adequate. Pain:  Pain Scale 1: Numeric (0 - 10) (04/30/18 1113)  Pain Intensity 1: 4 (04/30/18 1113)   Managed    Neurological Status:   Neuro (WDL): Within Defined Limits (04/30/18 1022)   At baseline    Mental Status and Level of Consciousness: Arousable    Pulmonary Status:   O2 Device: Oxygen mask (04/30/18 1022)   Adequate oxygenation and airway patent    Complications related to anesthesia: None    Post-anesthesia assessment completed.  No concerns    Signed By: Darryl Velasquez MD     April 30, 2018

## 2018-05-01 VITALS
TEMPERATURE: 97.7 F | DIASTOLIC BLOOD PRESSURE: 58 MMHG | HEIGHT: 65 IN | HEART RATE: 74 BPM | RESPIRATION RATE: 12 BRPM | BODY MASS INDEX: 23.21 KG/M2 | SYSTOLIC BLOOD PRESSURE: 107 MMHG | WEIGHT: 139.3 LBS | OXYGEN SATURATION: 98 %

## 2018-05-01 PROCEDURE — 74011000250 HC RX REV CODE- 250: Performed by: SURGERY

## 2018-05-01 PROCEDURE — 94640 AIRWAY INHALATION TREATMENT: CPT

## 2018-05-01 PROCEDURE — 74011250637 HC RX REV CODE- 250/637: Performed by: SURGERY

## 2018-05-01 RX ORDER — AMOXICILLIN AND CLAVULANATE POTASSIUM 875; 125 MG/1; MG/1
1 TABLET, FILM COATED ORAL EVERY 12 HOURS
Qty: 20 TAB | Refills: 0 | Status: SHIPPED | OUTPATIENT
Start: 2018-05-01 | End: 2018-05-11

## 2018-05-01 RX ORDER — OXYCODONE AND ACETAMINOPHEN 5; 325 MG/1; MG/1
1 TABLET ORAL
Qty: 30 TAB | Refills: 0 | Status: SHIPPED | OUTPATIENT
Start: 2018-05-01 | End: 2018-05-17

## 2018-05-01 RX ADMIN — ASPIRIN 81 MG 81 MG: 81 TABLET ORAL at 08:01

## 2018-05-01 RX ADMIN — PREGABALIN 100 MG: 50 CAPSULE ORAL at 08:08

## 2018-05-01 RX ADMIN — FLUTICASONE FUROATE AND VILANTEROL TRIFENATATE 1 PUFF: 100; 25 POWDER RESPIRATORY (INHALATION) at 08:05

## 2018-05-01 RX ADMIN — TRIAMTERENE AND HYDROCHLOROTHIAZIDE 1 TABLET: 37.5; 25 TABLET ORAL at 08:01

## 2018-05-01 RX ADMIN — Medication 10 ML: at 05:08

## 2018-05-01 RX ADMIN — Medication 10 ML: at 14:00

## 2018-05-01 RX ADMIN — IPRATROPIUM BROMIDE AND ALBUTEROL SULFATE 3 ML: .5; 3 SOLUTION RESPIRATORY (INHALATION) at 08:40

## 2018-05-01 RX ADMIN — CLOPIDOGREL BISULFATE 75 MG: 75 TABLET ORAL at 08:03

## 2018-05-01 RX ADMIN — DICYCLOMINE HYDROCHLORIDE 10 MG: 10 CAPSULE ORAL at 08:01

## 2018-05-01 NOTE — PROGRESS NOTES
Spoke to Dr. Everardo Anderson this morning about some bloody drainage coming from dressing and reinforced at this as no orders to complete dressing change. Telephone order for a wet to dry dressing change.  Advised on coming nurse

## 2018-05-01 NOTE — PROGRESS NOTES
conducted an initial consultation and Spiritual Assessment for [employer], who is a 69 y.o.,female. Patients Primary Language is: [place]. According to the patients EMR Islam Affiliation is: Samaritan. The reason the Patient came to the hospital is:   Patient Active Problem List    Diagnosis Date Noted    Nonhealing surgical wound 04/30/2018    Infection of vascular bypass graft ([facility] 04/30/2018    CAP (community acquired pneumonia) 06/27/2017    Severe sepsis ([address] 06/27/2017    Arteriovenous graft infection ([address] 05/10/2017    Abscess 05/10/2017    Dermal sinus tract of lumbosacral region 05/10/2017    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[address] 08/09/2016    Chronic aorto-iliac occlusion syndrome ([facility] 01/19/2016    Wound disruption, post-op, skin 01/09/2015    Mass of breast, left 07/30/2014    HTN (hypertension) 04/01/2013    PVD (peripheral vascular disease) ([facility] 04/01/2013    Crohn's disease ([facility] 04/01/2013        The  provided the following Interventions:  Initiated a relationship of care and support. Explored issues of eleazar, belief, spirituality and Restoration/ritual needs while hospitalized. Listened empathically to patient and briefly to daughter about her life, her family, her eleazar and her health journey. Provided information about Spiritual Care Services. Offered prayer and assurance of continued prayers on patient's behalf. The following outcomes where achieved:  Patient shared limited information about both their medical narrative and spiritual journey/beliefs.  confirmed Patient's Islam Affiliation: [address] Storm thomas: Father Karine Cagle   Patient processed feeling about current hospitalization. Patient expressed gratitude for 's visit. Assessment:  Patient does not have any Restoration/cultural needs that will affect patients preferences in health care.   There are no spiritual or Restoration issues which require intervention at this time. Plan:  Chaplains will continue to follow and will provide pastoral care on an as needed/requested basis.  recommends bedside caregivers page  on duty if patient shows signs of acute spiritual or emotional distress.       Adam Mcgrath, [address] [place] [address] Care  [phone]

## 2018-05-01 NOTE — PROGRESS NOTES
ASIYA NOTE: Pt is being d/c today home with HH. FOC is in the chart for [facility]. Referral made. Reason for Admission:  following up today regarding her axillofemoral femorofemoral bypass also left femoropopliteal bypass                     RRAT Score:     14             Do you (patient/family) have any concerns for transition/discharge? No concerns noted by pt              Plan for utilizing home health:     Hx of [facility] and wish to continue. Likelihood of readmission? Low/green            Transition of Care Plan:      Pt will be d/c home where she resides with her  and adult son. Family to transport. Pt last saw her PCP on 3-5-18 with her next appointment 7-2-18. Care Management Interventions  PCP Verified by CM:  Yes  Mode of Transport at Discharge: BLS  Transition of Care Consult (CM Consult): [address]: Yes  Discharge Durable Medical Equipment: No  Physical Therapy Consult: No  Occupational Therapy Consult: No  Speech Therapy Consult: No  Current Support Network: Lives with Spouse, Other  Confirm Follow Up Transport: Family  Freedom of Choice Offered: Yes  Discharge Location  Discharge Placement: Home with home health     DORIE Velásquez LSJACOB  [phone] (pager)

## 2018-05-01 NOTE — PHYSICIAN ADVISORY
Letter of admission status determination     Yaron Hairston   Age: 69 y.o. MRN: [id number]  Admitting physician: Carito Finley  Insurance: Payor: [place] MEDICARE / Plan: VA MEDICARE PART A & B / Product Type: Medicare /     Date of admission:  4/30/2018    I have reviewed this case as it involves a Medicare patient not meeting criteria for Inpatient services. The patient underwent elective Excision of infected graft, right flank, revision with interposition graft, right tahpzd-lq-rbatduc graft on 4/30/18. The procedure is not on the current Medicare Inpatient Only List. The patient tolerated the procedure well, without any documented complications, was monitored overnight and has remained stable. There were no unexpected complications to warrant Observation services or Inpatient status. Routine postoperative recovery in outpatient status can be overnight. Therefore, Outpatient status is appropriate. The final decision regarding the patient's hospitalization status depends on the attending physician's judgment.          Reynaldo Faustin MD, MARK, Reynaldo Diego [facility]  Physician Lemuel Monreal.  [phone]    May 1, 2018   2:06 PM

## 2018-05-01 NOTE — DISCHARGE SUMMARY
Physician Discharge Summary     Patient ID:  Ori Muñoz  [id number]  21 y.o.  1950    Admit date: 4/30/2018    Discharge date: 5/1/2018      Admitting Physician: Jose Foreman MD     Discharge Physician: Jose Foreman MD     Admission Diagnoses: I74.09 CHRONIC AORTO-ILIAC OCCLUSION SYNDROME  Nonhealing surgical wound  Infection of vascular bypass graft ([facility] [address]  Infection of vascular bypass graft [facility])    Discharge Diagnoses: There are no discharge diagnoses documented for the most recent discharge. Procedures for this admission: Procedure(s):  REVISION OF RIGHT AXILLO-FEMORAL BYPASS    Discharged Condition: stable    Hospital Course: 49-year-old with a chronic wound over her right flank with an exposed bypass graft now. She has had attempts at coverage, really heroic in nature and comprehensive, just cannot get this to stay closed. She therefore underwent excision of infected graft, right flank, revision with interposition graft, right brifue-bc-aecpuym graft. She tolerated the procedure well and is stable for discharge home today with [facility]. Discharge Exam: General: Well-appearing female in no acute distress   HEENT: EOMI, no scleral icterus is noted. Pulmonary: No increased work or breathing is noted. Abdomen: soft, nondistended. Dressing c/d/i. Extremities: Warm and well perfused bilaterally. Pt has no BLE edema  Neuro: Cranial nerves II through XII are grossly intact     Disposition: home with [facility]    Patient Instructions:   Current Discharge Medication List      START taking these medications    Details   oxyCODONE-acetaminophen (PERCOCET) 5-325 mg per tablet Take 1 Tab by mouth every four (4) hours as needed. Max Daily Amount: 6 Tabs. Qty: 30 Tab, Refills: 0    Associated Diagnoses: Non-healing surgical wound, subsequent encounter;  Infection of vascular bypass graft, subsequent encounter         CONTINUE these medications which have NOT CHANGED    Details   pregabalin (LYRICA) 100 mg capsule Take  by mouth two (2) times a day. Takes 100 mg in am and 200 in the pm      fluticasone-vilanterol (BREO ELLIPTA) 100-25 mcg/dose inhaler Take 1 Puff by inhalation daily. Qty: 3 Inhaler, Refills: 0      OXYGEN-AIR DELIVERY SYSTEMS 2 L by Does Not Apply route. Every HS and as needed      ipratropium-albuterol (COMBIVENT RESPIMAT)  mcg/actuation inhaler Take 1 Puff by inhalation every six (6) hours as needed for Wheezing or Shortness of Breath. Qty: 1 Inhaler, Refills: 0      clopidogrel (PLAVIX) 75 mg tab TAKE ONE TABLET BY MOUTH DAILY  Qty: 30 Tab, Refills: 11      DULoxetine (CYMBALTA) 60 mg capsule Take 60 mg by mouth nightly. aspirin 81 mg tablet Take 81 mg by mouth daily. cyanocobalamin (VITAMIN B-12) 1,000 mcg/mL injection 1,000 mcg by IntraMUSCular route every fourteen (14) days. loperamide (IMMODIUM) 2 mg tablet Take 2 mg by mouth daily as needed. dicyclomine (BENTYL) 10 mg capsule Take 10 mg by mouth two (2) times a day. fluticasone (FLONASE) 50 mcg/actuation nasal spray 1 [place] by Both Nostrils route two (2) times a day. triamterene-hydrochlorothiazide (DYAZIDE) 37.5-25 mg per capsule Take 1 Cap by mouth daily. bimatoprost (LUMIGAN) 0.03 % ophthalmic drops Administer 1 Drop to both eyes every evening. atropine-PHENobarbital-scopolamine-hyoscyamine (DONNATAL) 16.2-0.1037 -0.0194 mg per tablet Take 1 Tab by mouth as needed (diarrhea). Indications: Irritable Bowel Syndrome      inFLIXimab (REMICADE) 100 mg injection 5 mg/kg by IntraVENous route once. Every 4 weeks             Reference discharge instructions as provided by nursing for diet, labs, medications, activity, wound care and any outpatient referrals. Follow-up with Dr Adriane Lau office in 1 week.       SignedRocklin Curtis, [place]  5/1/2018  1:09 PM

## 2018-05-01 NOTE — PROGRESS NOTES
Problem: Falls - Risk of  Goal: *Absence of Falls  Document Trina Fall Risk and appropriate interventions in the flowsheet. Outcome: Progressing Towards Goal  Fall Risk Interventions:            Medication Interventions: Evaluate medications/consider consulting pharmacy, Patient to call before getting OOB, Teach patient to arise slowly         History of Falls Interventions: Consult care management for discharge planning, Room close to nurse's station        Problem: Pressure Injury - Risk of  Goal: *Prevention of pressure injury  Document Flaco Scale and appropriate interventions in the flowsheet.    Outcome: Progressing Towards Goal  Pressure Injury Interventions:  Sensory Interventions: Keep linens dry and wrinkle-free         Activity Interventions: Pressure redistribution bed/mattress(bed type)    Mobility Interventions: Pressure redistribution bed/mattress (bed type)    Nutrition Interventions: Document food/fluid/supplement intake

## 2018-05-01 NOTE — DISCHARGE INSTRUCTIONS
DISCHARGE SUMMARY from Nurse    PATIENT INSTRUCTIONS:    After general anesthesia or intravenous sedation, for 24 hours or while taking prescription Narcotics:  · Limit your activities  · Do not drive and operate hazardous machinery  · Do not make important personal or business decisions  · Do  not drink alcoholic beverages  · If you have not urinated within 8 hours after discharge, please contact your surgeon on call. Report the following to your surgeon:  · Excessive pain, swelling, redness or odor of or around the surgical area  · Temperature over 100.5  · Nausea and vomiting lasting longer than 4 hours or if unable to take medications  · Any signs of decreased circulation or nerve impairment to extremity: change in color, persistent  numbness, tingling, coldness or increase pain  · Any questions    What to do at Home:  Recommended activity: Activity as tolerated and No lifting, Driving, or Strenuous exercise for 6 weeks unless ok with MD,     If you experience any of the following symptoms odor bleeding drainage from wound fever over 101 . *  Please give a list of your current medications to your Primary Care Provider. *  Please update this list whenever your medications are discontinued, doses are      changed, or new medications (including over-the-counter products) are added. *  Please carry medication information at all times in case of emergency situations. These are general instructions for a healthy lifestyle:    No smoking/ No tobacco products/ Avoid exposure to second hand smoke  Surgeon General's Warning:  Quitting smoking now greatly reduces serious risk to your health.     Obesity, smoking, and sedentary lifestyle greatly increases your risk for illness    A healthy diet, regular physical exercise & weight monitoring are important for maintaining a healthy lifestyle    You may be retaining fluid if you have a history of heart failure or if you experience any of the following symptoms: Weight gain of 3 pounds or more overnight or 5 pounds in a week, increased swelling in our hands or feet or shortness of breath while lying flat in bed. Please call your doctor as soon as you notice any of these symptoms; do not wait until your next office visit. Recognize signs and symptoms of STROKE:    F-face looks uneven    A-arms unable to move or move unevenly    S-speech slurred or non-existent    T-time-call 911 as soon as signs and symptoms begin-DO NOT go       Back to bed or wait to see if you get better-TIME IS BRAIN. Warning Signs of HEART ATTACK     Call 911 if you have these symptoms:   Chest discomfort. Most heart attacks involve discomfort in the center of the chest that lasts more than a few minutes, or that goes away and comes back. It can feel like uncomfortable pressure, squeezing, fullness, or pain.  Discomfort in other areas of the upper body. Symptoms can include pain or discomfort in one or both arms, the back, neck, jaw, or stomach.  Shortness of breath with or without chest discomfort.  Other signs may include breaking out in a cold sweat, nausea, or lightheadedness. Don't wait more than five minutes to call 911 - MINUTES MATTER! Fast action can save your life. Calling 911 is almost always the fastest way to get lifesaving treatment. Emergency Medical Services staff can begin treatment when they arrive -- up to an hour sooner than if someone gets to the hospital by car. The discharge information has been reviewed with the patient and parent. The patient verbalized understanding. Discharge medications reviewed with the patient and appropriate educational materials and side effects teaching were provided. Patient armband removed and shredded  [employer] Activation    Thank you for requesting access to [employer]. Please follow the instructions below to securely access and download your online medical record.  [employer] allows you to send messages to your doctor, view your test results, renew your prescriptions, schedule appointments, and more. How Do I Sign Up? 1. In your internet browser, go to [email or web address]. 2. Click on the First Time User? Click Here link in the Sign In box. You will see the New Member Sign Up page. 3. Enter your [employer] Access Code exactly as it appears below. You will not need to use this code after youve completed the sign-up process. If you do not sign up before the expiration date, you must request a new code. [employer] Access Code: [email or web address]  Expires: 2018 10:19 AM (This is the date your [employer] access code will )    4. Enter the last four digits of your Social Security Number (xxxx) and Date of Birth (mm/dd/yyyy) as indicated and click Submit. You will be taken to the next sign-up page. 5. Create a Game[employer] ID. This will be your [employer] login ID and cannot be changed, so think of one that is secure and easy to remember. 6. Create a [employer] password. You can change your password at any time. 7. Enter your Password Reset Question and Answer. This can be used at a later time if you forget your password. 8. Enter your e-mail address. You will receive e-mail notification when new information is available in [address] 9. Click Sign Up. You can now view and download portions of your medical record. 10. Click the Download Summary menu link to download a portable copy of your medical information. Additional Information    If you have questions, please visit the Frequently Asked Questions section of the [employer] website at [email or web address] Remember, [employer] is NOT to be used for urgent needs.  For medical emergencies, dial 911.      ________________________________________________

## 2018-05-01 NOTE — PROGRESS NOTES
Problem: Falls - Risk of  Goal: *Absence of Falls  Document Rtina Fall Risk and appropriate interventions in the flowsheet. Outcome: Progressing Towards Goal  Fall Risk Interventions:            Medication Interventions: Evaluate medications/consider consulting pharmacy   Patient reminded of bedrest status and to use call bell when in need.

## 2018-05-02 ENCOUNTER — HOME HEALTH ADMISSION (OUTPATIENT)
Dept: HOME HEALTH SERVICES | Facility: HOME HEALTH | Age: 68
End: 2018-05-02
Payer: MEDICARE

## 2018-05-02 ENCOUNTER — TELEPHONE (OUTPATIENT)
Dept: VASCULAR SURGERY | Age: 68
End: 2018-05-02

## 2018-05-02 NOTE — HOME CARE
[facility] received referral today for home health for wound assessment and care, disease and medication management, PT/OT, MSW, home aid eval and DME as needed - noted patient discharged on 5/1. This nurse called patient to verify address and contact information - patient lives with her spouse and adult son. Per the patient, a nurse was supposed to come to the home today and told this nurse that she was told by Dr. Michael Adair a nurse would come out and pack the wound everyday. This nurse explained that a skilled nurse would come to the home and teach a caregiver how to do the wound care per medicare requirements and the nurse would determine to frequency of visits to monitor wound healing while keeping in contact with Dr. Michael Adair. It has also been noted there are no specific wound care orders noted for [facility] so this nurse has left a message with Dr. Ava Reddy office for [place], [place] to contact this liaison to clarify - ALBERT Correa LPN

## 2018-05-02 NOTE — TELEPHONE ENCOUNTER
[facility] from [facility] would like to clarify orders for patient please call her at [phone]

## 2018-05-02 NOTE — TELEPHONE ENCOUNTER
Spoke with Demetrio Briones at [facility] care, verified [address] home health care orders placed as noted:     Home health for wound assessment and care, disease and medication management, PT/OT, MSW. Home aid eval. DME as needed.

## 2018-05-02 NOTE — TELEPHONE ENCOUNTER
Pt cld, she states that she was told upon discharge that [address] would come visit everyday. She states she called [facility] and they told her she was not in their files and they would not be coming today. Please call her.

## 2018-05-03 ENCOUNTER — DOCUMENTATION ONLY (OUTPATIENT)
Dept: VASCULAR SURGERY | Age: 68
End: 2018-05-03

## 2018-05-03 ENCOUNTER — HOME CARE VISIT (OUTPATIENT)
Dept: SCHEDULING | Facility: HOME HEALTH | Age: 68
End: 2018-05-03
Payer: MEDICARE

## 2018-05-03 ENCOUNTER — HOME CARE VISIT (OUTPATIENT)
Dept: HOME HEALTH SERVICES | Facility: HOME HEALTH | Age: 68
End: 2018-05-03

## 2018-05-03 LAB
BACTERIA SPEC CULT: ABNORMAL
BACTERIA SPEC CULT: ABNORMAL
GRAM STN SPEC: ABNORMAL
GRAM STN SPEC: ABNORMAL
SERVICE CMNT-IMP: ABNORMAL

## 2018-05-03 PROCEDURE — 400013 HH SOC

## 2018-05-03 PROCEDURE — G0299 HHS/HOSPICE OF RN EA 15 MIN: HCPCS

## 2018-05-03 PROCEDURE — 3331090001 HH PPS REVENUE CREDIT

## 2018-05-03 PROCEDURE — 3331090002 HH PPS REVENUE DEBIT

## 2018-05-03 RX ORDER — LEVOFLOXACIN 500 MG/1
500 TABLET, FILM COATED ORAL DAILY
Qty: 14 TAB | Refills: 0 | OUTPATIENT
Start: 2018-05-03 | End: 2018-06-11

## 2018-05-04 ENCOUNTER — HOME CARE VISIT (OUTPATIENT)
Dept: HOME HEALTH SERVICES | Facility: HOME HEALTH | Age: 68
End: 2018-05-04
Payer: MEDICARE

## 2018-05-04 VITALS
DIASTOLIC BLOOD PRESSURE: 72 MMHG | RESPIRATION RATE: 16 BRPM | OXYGEN SATURATION: 97 % | TEMPERATURE: 97.4 F | HEART RATE: 91 BPM | SYSTOLIC BLOOD PRESSURE: 140 MMHG

## 2018-05-04 PROCEDURE — 3331090002 HH PPS REVENUE DEBIT

## 2018-05-04 PROCEDURE — A5120 SKIN BARRIER, WIPE OR SWAB: HCPCS

## 2018-05-04 PROCEDURE — A9270 NON-COVERED ITEM OR SERVICE: HCPCS

## 2018-05-04 PROCEDURE — 3331090001 HH PPS REVENUE CREDIT

## 2018-05-04 PROCEDURE — A6196 ALGINATE DRESSING <=16 SQ IN: HCPCS

## 2018-05-04 PROCEDURE — A6212 FOAM DRG <=16 SQ IN W/BORDER: HCPCS

## 2018-05-05 ENCOUNTER — HOME CARE VISIT (OUTPATIENT)
Dept: SCHEDULING | Facility: HOME HEALTH | Age: 68
End: 2018-05-05
Payer: MEDICARE

## 2018-05-05 LAB
BACTERIA SPEC CULT: NORMAL
SERVICE CMNT-IMP: NORMAL

## 2018-05-05 PROCEDURE — 3331090002 HH PPS REVENUE DEBIT

## 2018-05-05 PROCEDURE — 3331090001 HH PPS REVENUE CREDIT

## 2018-05-05 PROCEDURE — G0299 HHS/HOSPICE OF RN EA 15 MIN: HCPCS

## 2018-05-06 PROCEDURE — 3331090002 HH PPS REVENUE DEBIT

## 2018-05-06 PROCEDURE — 3331090001 HH PPS REVENUE CREDIT

## 2018-05-07 ENCOUNTER — HOME CARE VISIT (OUTPATIENT)
Dept: SCHEDULING | Facility: HOME HEALTH | Age: 68
End: 2018-05-07
Payer: MEDICARE

## 2018-05-07 VITALS
SYSTOLIC BLOOD PRESSURE: 120 MMHG | TEMPERATURE: 98.4 F | DIASTOLIC BLOOD PRESSURE: 64 MMHG | HEART RATE: 82 BPM | RESPIRATION RATE: 18 BRPM

## 2018-05-07 PROCEDURE — 3331090001 HH PPS REVENUE CREDIT

## 2018-05-07 PROCEDURE — 3331090002 HH PPS REVENUE DEBIT

## 2018-05-07 PROCEDURE — G0299 HHS/HOSPICE OF RN EA 15 MIN: HCPCS

## 2018-05-08 ENCOUNTER — HOME CARE VISIT (OUTPATIENT)
Dept: HOME HEALTH SERVICES | Facility: HOME HEALTH | Age: 68
End: 2018-05-08
Payer: MEDICARE

## 2018-05-08 ENCOUNTER — OFFICE VISIT (OUTPATIENT)
Dept: VASCULAR SURGERY | Age: 68
End: 2018-05-08

## 2018-05-08 VITALS
WEIGHT: 139 LBS | HEIGHT: 65 IN | DIASTOLIC BLOOD PRESSURE: 76 MMHG | BODY MASS INDEX: 23.16 KG/M2 | SYSTOLIC BLOOD PRESSURE: 156 MMHG

## 2018-05-08 DIAGNOSIS — T81.89XD NON-HEALING SURGICAL WOUND, SUBSEQUENT ENCOUNTER: Primary | ICD-10-CM

## 2018-05-08 DIAGNOSIS — T82.7XXD INFECTION OF VASCULAR BYPASS GRAFT, SUBSEQUENT ENCOUNTER: ICD-10-CM

## 2018-05-08 PROCEDURE — 3331090002 HH PPS REVENUE DEBIT

## 2018-05-08 PROCEDURE — 3331090001 HH PPS REVENUE CREDIT

## 2018-05-08 NOTE — PROGRESS NOTES
Cleansed wound to right flank with wound cleanser and gauze, applied calcium alginate, ABD and secured with tape. Wound measures: 3.0x2.0x1.5 cm.

## 2018-05-08 NOTE — PROGRESS NOTES
Subjective:      61-year-old with a chronic wound over her right flank with an exposed bypass graft.  She has had attempts at coverage, really heroic in nature and comprehensive, just cannot get this to stay closed. She therefore underwent excision of infected graft, right flank, revision with interposition graft, right sfxgek-si-ffbfbwt graft    She is doing well. Home health is attending to the wound with daily packing  We did receive final wound cultures and adjusted antibiotics accordingly        Objective:     Visit Vitals    /76 (BP 1 Location: Left arm, BP Patient Position: Sitting)    Ht 5' 5\" (1.651 m)    Wt 139 lb (63 kg)    BMI 23.13 kg/m2     Incision sites of new bypass healing well  Excised area is a large and deep enough wound we could actually now consider for wound vac. It is otherwise clean and no significant drainage    Assessment:     Wound check/discharge visit. Plan:       ICD-10-CM ICD-9-CM    1. Non-healing surgical wound, subsequent encounter T81.89XD V58.89      998.83    2. Infection of vascular bypass graft, subsequent encounter T82. 7XXD V58.89      No orders of the defined types were placed in this encounter. Pt seen with dr Yumiko Moe as well  Vac therapy explained and she would like to try this to assist with wound closure  This will be ordered and new orders sent to home health when available  Otherwise continue current dressings until vac delivered and we will reassess progress with another wound check in about 3 weeks    Follow-up Disposition:  Return in about 3 weeks (around 5/29/2018). TIMMY Dean    Portions of this note have been entered using voice recognition software.

## 2018-05-09 ENCOUNTER — HOME CARE VISIT (OUTPATIENT)
Dept: SCHEDULING | Facility: HOME HEALTH | Age: 68
End: 2018-05-09
Payer: MEDICARE

## 2018-05-09 VITALS
HEART RATE: 100 BPM | DIASTOLIC BLOOD PRESSURE: 70 MMHG | TEMPERATURE: 97.4 F | RESPIRATION RATE: 20 BRPM | SYSTOLIC BLOOD PRESSURE: 140 MMHG

## 2018-05-09 PROCEDURE — G0155 HHCP-SVS OF CSW,EA 15 MIN: HCPCS

## 2018-05-09 PROCEDURE — 3331090001 HH PPS REVENUE CREDIT

## 2018-05-09 PROCEDURE — G0299 HHS/HOSPICE OF RN EA 15 MIN: HCPCS

## 2018-05-09 PROCEDURE — 3331090002 HH PPS REVENUE DEBIT

## 2018-05-10 VITALS
TEMPERATURE: 96.6 F | DIASTOLIC BLOOD PRESSURE: 75 MMHG | SYSTOLIC BLOOD PRESSURE: 126 MMHG | OXYGEN SATURATION: 95 % | RESPIRATION RATE: 17 BRPM | HEART RATE: 96 BPM

## 2018-05-10 PROCEDURE — 3331090001 HH PPS REVENUE CREDIT

## 2018-05-10 PROCEDURE — 3331090002 HH PPS REVENUE DEBIT

## 2018-05-11 PROCEDURE — 3331090001 HH PPS REVENUE CREDIT

## 2018-05-11 PROCEDURE — 3331090002 HH PPS REVENUE DEBIT

## 2018-05-12 ENCOUNTER — HOME CARE VISIT (OUTPATIENT)
Dept: SCHEDULING | Facility: HOME HEALTH | Age: 68
End: 2018-05-12
Payer: MEDICARE

## 2018-05-12 PROCEDURE — 3331090001 HH PPS REVENUE CREDIT

## 2018-05-12 PROCEDURE — G0299 HHS/HOSPICE OF RN EA 15 MIN: HCPCS

## 2018-05-12 PROCEDURE — 3331090002 HH PPS REVENUE DEBIT

## 2018-05-13 PROCEDURE — 3331090001 HH PPS REVENUE CREDIT

## 2018-05-13 PROCEDURE — 3331090002 HH PPS REVENUE DEBIT

## 2018-05-14 ENCOUNTER — HOME CARE VISIT (OUTPATIENT)
Dept: SCHEDULING | Facility: HOME HEALTH | Age: 68
End: 2018-05-14
Payer: MEDICARE

## 2018-05-14 VITALS
DIASTOLIC BLOOD PRESSURE: 75 MMHG | HEART RATE: 93 BPM | TEMPERATURE: 97.9 F | SYSTOLIC BLOOD PRESSURE: 117 MMHG | OXYGEN SATURATION: 97 % | RESPIRATION RATE: 18 BRPM

## 2018-05-14 PROCEDURE — G0299 HHS/HOSPICE OF RN EA 15 MIN: HCPCS

## 2018-05-14 PROCEDURE — 3331090001 HH PPS REVENUE CREDIT

## 2018-05-14 PROCEDURE — 3331090002 HH PPS REVENUE DEBIT

## 2018-05-15 VITALS
HEART RATE: 70 BPM | DIASTOLIC BLOOD PRESSURE: 70 MMHG | OXYGEN SATURATION: 97 % | RESPIRATION RATE: 18 BRPM | SYSTOLIC BLOOD PRESSURE: 124 MMHG | TEMPERATURE: 98.2 F

## 2018-05-15 PROCEDURE — 3331090001 HH PPS REVENUE CREDIT

## 2018-05-15 PROCEDURE — 3331090002 HH PPS REVENUE DEBIT

## 2018-05-16 PROCEDURE — 3331090002 HH PPS REVENUE DEBIT

## 2018-05-16 PROCEDURE — 3331090001 HH PPS REVENUE CREDIT

## 2018-05-17 ENCOUNTER — CLINICAL SUPPORT (OUTPATIENT)
Dept: PULMONOLOGY | Age: 68
End: 2018-05-17

## 2018-05-17 ENCOUNTER — HOME CARE VISIT (OUTPATIENT)
Dept: SCHEDULING | Facility: HOME HEALTH | Age: 68
End: 2018-05-17
Payer: MEDICARE

## 2018-05-17 ENCOUNTER — OFFICE VISIT (OUTPATIENT)
Dept: ORTHOPEDIC SURGERY | Age: 68
End: 2018-05-17

## 2018-05-17 VITALS
SYSTOLIC BLOOD PRESSURE: 131 MMHG | WEIGHT: 134.2 LBS | RESPIRATION RATE: 16 BRPM | HEART RATE: 94 BPM | HEIGHT: 65 IN | DIASTOLIC BLOOD PRESSURE: 72 MMHG | BODY MASS INDEX: 22.36 KG/M2 | TEMPERATURE: 97.1 F | OXYGEN SATURATION: 96 %

## 2018-05-17 VITALS
OXYGEN SATURATION: 98 % | HEART RATE: 92 BPM | HEIGHT: 65 IN | DIASTOLIC BLOOD PRESSURE: 70 MMHG | BODY MASS INDEX: 22.16 KG/M2 | TEMPERATURE: 98.1 F | WEIGHT: 133 LBS | RESPIRATION RATE: 22 BRPM | SYSTOLIC BLOOD PRESSURE: 110 MMHG

## 2018-05-17 DIAGNOSIS — J44.9 CHRONIC OBSTRUCTIVE PULMONARY DISEASE, UNSPECIFIED COPD TYPE (HCC): Primary | ICD-10-CM

## 2018-05-17 DIAGNOSIS — M75.52 SUBACROMIAL BURSITIS OF LEFT SHOULDER JOINT: Primary | ICD-10-CM

## 2018-05-17 PROCEDURE — 3331090001 HH PPS REVENUE CREDIT

## 2018-05-17 PROCEDURE — 3331090002 HH PPS REVENUE DEBIT

## 2018-05-17 PROCEDURE — G0299 HHS/HOSPICE OF RN EA 15 MIN: HCPCS

## 2018-05-17 RX ORDER — TRIAMCINOLONE ACETONIDE 40 MG/ML
40 INJECTION, SUSPENSION INTRA-ARTICULAR; INTRAMUSCULAR ONCE
Qty: 1 ML | Refills: 0
Start: 2018-05-17 | End: 2018-05-17

## 2018-05-17 RX ORDER — FLUTICASONE FUROATE AND VILANTEROL 100; 25 UG/1; UG/1
1 POWDER RESPIRATORY (INHALATION) DAILY
Qty: 3 INHALER | Refills: 3 | Status: SHIPPED | COMMUNITY
Start: 2018-05-17 | End: 2018-05-24 | Stop reason: ALTCHOICE

## 2018-05-17 NOTE — PROGRESS NOTES
[facility]    [address] [place] [address]      SIMPLE PULMONARY STRESS TEST - 6 MINUTE WALK    PATIENT NAME: Johnson Tran    DATE: 5/17/2018     YOB: 1950     AGE: 69 y.o. DIAGNOSIS:   Encounter Diagnosis   Name Primary?  Chronic obstructive pulmonary disease, unspecified COPD type ([address] Yes         TECHNICIAN: Will Valentine, RT         PHYSICIAN: Dr Dandy Fried MD      Visit Vitals    /70 (BP 1 Location: Left arm, BP Patient Position: At rest)    Pulse 92    Temp 98.1 °F (36.7 °C) (Oral)    Resp 22    Ht 5' 5\" (1.651 m)    Wt 133 lb (60.3 kg)    SpO2 98%    BMI 22.13 kg/m2        RESTING DATA:  Dyspnea Scale (1-10):    3 SOB    EXERCISE DATA:   6 MINUTE WALK - HALLWAY (34 METERS)      1   RA    95 % SAT     96 HR   3 SOB    1 Laps x 34m = 34m  2   RA    95 % SAT   109 HR   3 SOB    1 Laps x 34m = 34m  3   RA    94 % SAT   103 HR   3 SOB    1 Laps x 34m = 34m  4   RA    97 % SAT   106 HR   4 SOB    1 Laps x 34m = 34m  5   RA    95 % SAT   106 HR   4 SOB    1 Laps x 34m = 34m  6   RA    96 % SAT   106 HR   4 SOB    1 Laps x 34m = 34m    TOTAL DISTANCE:   204 M    RECOVERY DATA:      1   RA    95 % SAT   112 HR   22 RR   110/70 BP   3 SOB  2   RA    98 % SAT     96 HR   22 RR   110/70 BP   3 SOB      TECHNICIAN COMMENTS: Patient tolerated 6 minute walk well. During walk on room air, patient's 02 saturation level decreased to a low of 94% Patient's heart rate increased to a high of 112 BPM at end of walk. No change in patient's respiratory rate and no distress noted during or post walk.

## 2018-05-17 NOTE — TELEPHONE ENCOUNTER
We received a fax from [employer]. Re: RFs on Breo Ellipta 100-25 mcg. The pt. Is up to date on visits. Order is pended to ALBERT Basilio NP.

## 2018-05-17 NOTE — PROGRESS NOTES
Roma Manoj  1950   Chief Complaint   Patient presents with    Shoulder Pain     F/O APPT. HISTORY OF PRESENT ILLNESS  Alberta Maxwell is a 69 y.o. female who presents today for reevaluation of left shoulder pain. Patient describes the pain as aching, sharp, stabbing and throbbing that is Constant in nature. Symptoms are worse with overhead reaching and is better with  rest.  Associated symptoms include weakness. Since problem started, it: has significantly worsened. Pain does wake patient up at night. Has taken nothing for the problem. Pain is a 9/10. Has tried following treatments: Injections:YES - in the past; Brace:NO; Therapy:NO; Cane/Crutch:NO      Patient denies any fever, chills, chest pain, shortness of breath or calf pain. There are no new illness or injuries to report since last seen in the office. No changes in medications, allergies, social or family history. PHYSICAL EXAM:   Visit Vitals    /72    Pulse 94    Temp 97.1 °F (36.2 °C) (Oral)    Resp 16    Ht 5' 5\" (1.651 m)    Wt 134 lb 3.2 oz (60.9 kg)    SpO2 96%    BMI 22.33 kg/m2     The patient is a well-developed, well-nourished female   in no acute distress. The patient is alert and oriented times three. The patient is alert and oriented times three. Mood and affect are normal.  LYMPHATIC: lymph nodes are not enlarged and are within normal limits  SKIN: normal in color and non tender to palpation. There are no bruises or abrasions noted. NEUROLOGICAL: Motor sensory exam is within normal limits. Reflexes are equal bilaterally.  There is normal sensation to pinprick and light touch  MUSCULOSKELETAL:  Examination Left shoulder   Skin Intact   AC joint tenderness -   Biceps tenderness -   Forward flexion/Elevation    Active abduction    Glenohumeral abduction 90   External rotation ROM 80   Internal rotation ROM 50   Apprehension -   Chatos Relocation -   Jerk -   Load and Shift - Janie Carlos -   Impingement sign +   Supraspinatus/Empty Can +   External Rotation Strength -, 5/5   Lift Off/Belly Press -, 5/5   Neurovascular Intact          PROCEDURE: Left shoulder Injection     Indication: left shoulder pain    After sterile prep, 6 cc of Xylocaine and 1 cc of Kenalog were injected into the left shoulder. [facility]  OFFICE PROCEDURE PROGRESS NOTE        Chart reviewed for the following:  Elen FONG PA, have reviewed the History, Physical and updated the Allergic reactions for Alberta INMAN [address] [place] [address] performed immediately prior to start of procedure:  Elen FONG PA-C, have performed the following reviews on [address] [place] [address] prior to the start of the procedure:            * Patient was identified by name and date of birth   * Agreement on procedure being performed was verified  * Risks and Benefits explained to the patient  * Procedure site verified and marked as necessary  * Patient was positioned for comfort  * Consent was signed and verified     Time: 8:55 AM    Date of procedure: 5/17/2018    Procedure performed by:  TIMMY Giang    Provider assisted by: (see medication administration)    How tolerated by patient: tolerated the procedure well with no complications    Comments: none         IMPRESSION:      ICD-10-CM ICD-9-CM    1. Subacromial bursitis of left shoulder joint M75.52 726.19         PLAN:   1. Patient with worsening left shoulder pain. Risk factors include: PVD, smoker  2. Yes cortisone injection indicated today - left shoulder   3. No Physical/Occupational Therapy indicated today  4. No diagnostic test indicated today:   5. No durable medical equipment indicated today  6. No referral indicated today   7. No medications indicated today:   8.  No Narcotic indicated today    RTC PRN    Follow-up Disposition: Not on [address] [place] [address], ADALGISA Hernandez and Spine Specialist

## 2018-05-18 PROCEDURE — 3331090001 HH PPS REVENUE CREDIT

## 2018-05-18 PROCEDURE — 3331090002 HH PPS REVENUE DEBIT

## 2018-05-19 ENCOUNTER — HOME CARE VISIT (OUTPATIENT)
Dept: SCHEDULING | Facility: HOME HEALTH | Age: 68
End: 2018-05-19
Payer: MEDICARE

## 2018-05-19 PROCEDURE — 3331090001 HH PPS REVENUE CREDIT

## 2018-05-19 PROCEDURE — G0299 HHS/HOSPICE OF RN EA 15 MIN: HCPCS

## 2018-05-19 PROCEDURE — 3331090002 HH PPS REVENUE DEBIT

## 2018-05-20 PROCEDURE — 3331090001 HH PPS REVENUE CREDIT

## 2018-05-20 PROCEDURE — 3331090002 HH PPS REVENUE DEBIT

## 2018-05-21 ENCOUNTER — TELEPHONE (OUTPATIENT)
Dept: VASCULAR SURGERY | Age: 68
End: 2018-05-21

## 2018-05-21 ENCOUNTER — HOME CARE VISIT (OUTPATIENT)
Dept: SCHEDULING | Facility: HOME HEALTH | Age: 68
End: 2018-05-21
Payer: MEDICARE

## 2018-05-21 PROCEDURE — 3331090001 HH PPS REVENUE CREDIT

## 2018-05-21 PROCEDURE — 3331090002 HH PPS REVENUE DEBIT

## 2018-05-21 PROCEDURE — G0299 HHS/HOSPICE OF RN EA 15 MIN: HCPCS

## 2018-05-21 NOTE — TELEPHONE ENCOUNTER
Patient had [facility] home health out of Saturday and Dania Francis called Ashwin Villa to inform her of a lump that was below the incision site. And according to patient Ashwin Villa was going to call her Monday for an appointment. I informed patient that Ashwin Villa would not be in until Tuesday, she wanted me to see if a nurse at spoken to Ashwin Villa about this.  Her follow up is not until next Tuesday with Ashwin Villa

## 2018-05-22 ENCOUNTER — OFFICE VISIT (OUTPATIENT)
Dept: VASCULAR SURGERY | Age: 68
End: 2018-05-22

## 2018-05-22 VITALS
DIASTOLIC BLOOD PRESSURE: 66 MMHG | SYSTOLIC BLOOD PRESSURE: 124 MMHG | RESPIRATION RATE: 18 BRPM | OXYGEN SATURATION: 98 % | HEART RATE: 94 BPM | TEMPERATURE: 97.7 F

## 2018-05-22 DIAGNOSIS — T81.89XD NON-HEALING SURGICAL WOUND, SUBSEQUENT ENCOUNTER: Primary | ICD-10-CM

## 2018-05-22 PROCEDURE — 3331090002 HH PPS REVENUE DEBIT

## 2018-05-22 PROCEDURE — 3331090001 HH PPS REVENUE CREDIT

## 2018-05-22 NOTE — PROGRESS NOTES
Patient being seen to have right flank incision assessed. Proximal flank has wound vac dressing in place over wound but distal flank as an incision that appears intact with no open areas but covered with gauze that has scant area of yellowish, brown drainage. There is swelling above the incision which is soft to touch, no redness noted. Patient states has not been running a fever. Removed dressing from proximal flank and wound has slough noted throughout except edges are granulating but continues to have undermining at proximal wound and a crease of depth at bottom of wound. Had Dr. Teddy Zayas assess patient and wound was slightly debrided and decided to place wound vac on hold and switch to NS wet to dry dressing daily for one week and then patient will return for assessment to see if ready to go back on wound vac. Dr. Teddy Zayas stated that feels patient has fluid pocket near graft due to the tunnel of placing the graft and to allow this to go down on own and to keep incision clean and dry. Patient stated understood. New orders were sent to [address] [place] [address].

## 2018-05-23 ENCOUNTER — HOME CARE VISIT (OUTPATIENT)
Dept: SCHEDULING | Facility: HOME HEALTH | Age: 68
End: 2018-05-23
Payer: MEDICARE

## 2018-05-23 VITALS
SYSTOLIC BLOOD PRESSURE: 129 MMHG | OXYGEN SATURATION: 97 % | TEMPERATURE: 97.5 F | RESPIRATION RATE: 18 BRPM | DIASTOLIC BLOOD PRESSURE: 71 MMHG | HEART RATE: 90 BPM

## 2018-05-23 PROCEDURE — A6216 NON-STERILE GAUZE<=16 SQ IN: HCPCS

## 2018-05-23 PROCEDURE — 3331090002 HH PPS REVENUE DEBIT

## 2018-05-23 PROCEDURE — G0299 HHS/HOSPICE OF RN EA 15 MIN: HCPCS

## 2018-05-23 PROCEDURE — 3331090001 HH PPS REVENUE CREDIT

## 2018-05-23 PROCEDURE — A4216 STERILE WATER/SALINE, 10 ML: HCPCS

## 2018-05-23 PROCEDURE — A6407 PACKING STRIPS, NON-IMPREG: HCPCS

## 2018-05-24 ENCOUNTER — OFFICE VISIT (OUTPATIENT)
Dept: PULMONOLOGY | Age: 68
End: 2018-05-24

## 2018-05-24 VITALS
TEMPERATURE: 97.9 F | WEIGHT: 135 LBS | HEART RATE: 94 BPM | OXYGEN SATURATION: 97 % | RESPIRATION RATE: 18 BRPM | HEIGHT: 65 IN | DIASTOLIC BLOOD PRESSURE: 58 MMHG | SYSTOLIC BLOOD PRESSURE: 132 MMHG | BODY MASS INDEX: 22.49 KG/M2

## 2018-05-24 DIAGNOSIS — Z99.81 HYPOXEMIA REQUIRING SUPPLEMENTAL OXYGEN: ICD-10-CM

## 2018-05-24 DIAGNOSIS — I74.09 CHRONIC AORTO-ILIAC OCCLUSION SYNDROME (HCC): ICD-10-CM

## 2018-05-24 DIAGNOSIS — J43.9 PULMONARY EMPHYSEMA, UNSPECIFIED EMPHYSEMA TYPE (HCC): Primary | ICD-10-CM

## 2018-05-24 DIAGNOSIS — R09.02 HYPOXEMIA REQUIRING SUPPLEMENTAL OXYGEN: ICD-10-CM

## 2018-05-24 DIAGNOSIS — T81.31XS POSTOPERATIVE DEHISCENCE OF SKIN WOUND, SEQUELA: ICD-10-CM

## 2018-05-24 DIAGNOSIS — F17.200 TOBACCO DEPENDENCE: ICD-10-CM

## 2018-05-24 PROCEDURE — 3331090001 HH PPS REVENUE CREDIT

## 2018-05-24 PROCEDURE — 3331090002 HH PPS REVENUE DEBIT

## 2018-05-24 NOTE — MR AVS SNAPSHOT
[address] [facility], [address] [address] 
[phone] Patient: Paloma Conley 
MRN: [id number] QNT:3/8/9226 Visit Information Date & Time Provider Department Dept. Phone Encounter #  
 5/24/2018 12:00 PM Verneita Seip, MD [facility] Luigi Melchor [id number] Follow-up Instructions Return in about 5 months (around 10/24/2018). Your Appointments 5/29/2018  2:15 PM  
Follow Up with TIMMY [facility] ([facility]) Appt Note: WOUND VAC WITH 3 WEEK FOLLOW UP APPT  
 [address] [facility] [facility] [address] [address] [facility]  
[phone] [address] [facility] 200 [facility] Upcoming Health Maintenance Date Due Hepatitis C Screening 1950 DTaP/Tdap/Td series (1 - Tdap) 6/7/1969 FOBT Q 1 YEAR AGE 50-75 6/7/1998 ZOSTER VACCINE AGE 60> 4/7/2008 GLAUCOMA SCREENING Q2Y 6/7/2013 Bone Densitometry (Dexa) Screening 6/7/2013 BREAST CANCER SCRN MAMMOGRAM 7/8/2016 MEDICARE YEARLY EXAM 3/14/2018 Pneumococcal 65+ Low/Medium Risk (2 of 2 - PPSV23) 7/11/2020* Influenza Age 5 to Adult 8/1/2018 *Topic was postponed. The date shown is not the original due date. Allergies as of 5/24/2018  Review Complete On: 5/24/2018 By: Verneita Seip, MD  
  
 Severity Noted Reaction Type Reactions Codeine  04/01/2013   Intolerance Nausea and Vomiting Other reaction(s): other/intolerance Darvon [Propoxyphene]  04/01/2013   Systemic Itching Demerol [Meperidine]  04/01/2013   Side Effect Other (comments) Halucinations Keflex [Cephalexin]  01/06/2015    Diarrhea Talwin [Pentazocine Lactate]  04/09/2013    Shortness of Breath, Nausea and Vomiting Current Immunizations  Reviewed on 7/11/2017 Name Date Pneumococcal Vaccine (Unspecified Type) 6/7/2013 Not reviewed this visit You Were Diagnosed With   
  
 Codes Comments Pulmonary emphysema, unspecified emphysema type ([facility] [address]    -  Primary ICD-10-CM: J43.9 ICD-9-CM: 492.8 Hypoxemia requiring supplemental oxygen     ICD-10-CM: R09.02, Z99.81 ICD-9-CM: 799.02 Chronic aorto-iliac occlusion syndrome (HCC)     ICD-10-CM: I74.09 
ICD-9-CM: 444.09 Tobacco dependence     ICD-10-CM: Z66.434 ICD-9-CM: 305.1 Postoperative dehiscence of skin wound, sequela     ICD-10-CM: T81.31XS ICD-9-CM: 657. 3 Vitals BP Pulse Temp Resp Height(growth percentile) Weight(growth percentile) 132/58 (BP 1 Location: Left arm, BP Patient Position: Sitting) 94 97.9 °F (36.6 °C) (Oral) 18 5' 5\" (1.651 m) 135 lb (61.2 kg) SpO2 BMI OB Status Smoking Status 97% 22.47 kg/m2 Postmenopausal Current Every Day Smoker Vitals History BMI and BSA Data Body Mass Index Body Surface Area  
 22.47 kg/m 2 1.68 m 2 Preferred Pharmacy Pharmacy Name Phone  [facility] [phone] Your Updated Medication List  
  
   
This list is accurate as of 5/24/18 12:47 PM.  Always use your most recent med list.  
  
  
  
  
 aspirin 81 mg tablet Take 81 mg by mouth daily. clopidogrel 75 mg Tab Commonly known as:  PLAVIX TAKE ONE TABLET BY MOUTH DAILY  
  
 CYMBALTA 60 mg capsule Generic drug:  DULoxetine Take 60 mg by mouth nightly. dicyclomine 10 mg capsule Commonly known as:  BENTYL Take 10 mg by mouth two (2) times a day. DONNATAL 16.2-0.1037 -0.0194 mg per tablet Generic drug:  atropine-PHENobarbital-scopolamine-hyoscyamine Take 1 Tab by mouth as needed (diarrhea). Indications: Irritable Bowel Syndrome DYAZIDE 37.5-25 mg per capsule Generic drug:  triamterene-hydroCHLOROthiazide Take 1 Cap by mouth daily. FLONASE 50 mcg/actuation nasal spray Generic drug:  fluticasone 1 [place] by Both Nostrils route two (2) times a day. * fluticasone-umeclidin-vilanter 100-62.5-25 mcg Dsdv Commonly known as:  Jay Dela Cruz Take 1 Inhalation by inhalation daily. * fluticasone-umeclidin-vilanter 100-62.5-25 mcg Dsdv Commonly known as:  Jay Dela Cruz Take 1 Inhalation by inhalation daily. ipratropium-albuterol  mcg/actuation inhaler Commonly known as:  Denjack Gola Take 1 Puff by inhalation every six (6) hours as needed for Wheezing or Shortness of Breath. levoFLOXacin 500 mg tablet Commonly known as:  Mittie Bitter Take 1 Tab by mouth daily. loperamide 2 mg tablet Commonly known as:  IMMODIUM Take 2 mg by mouth daily as needed for Diarrhea. LUMIGAN 0.03 % ophthalmic drops Generic drug:  bimatoprost  
Administer 1 Drop to both eyes every evening. LYRICA 100 mg capsule Generic drug:  pregabalin Take  by mouth two (2) times a day. Takes 100 mg in am and 200 in the pm  
  
 OXYGEN-AIR DELIVERY SYSTEMS  
2 L by Does Not Apply route. Every HS and as needed REMICADE 100 mg injection Generic drug:  inFLIXimab  
5 mg/kg by IntraVENous route once. Every 4 weeks VITAMIN B-12 1,000 mcg/mL injection Generic drug:  cyanocobalamin  
1,000 mcg by IntraMUSCular route every fourteen (14) days. * Notice: This list has 2 medication(s) that are the same as other medications prescribed for you. Read the directions carefully, and ask your doctor or other care provider to review them with you. Prescriptions Sent to Mail Order Refills  
 fluticasone-umeclidin-vilanter (TRELEGY ELLIPTA) 100-62.5-25 mcg dsdv 3 Sig: Take 1 Inhalation by inhalation daily. Class: Mail Order Pharmacy: [place] [address] Ph #: [phone] Route: Inhalation We Performed the Following REFERRAL TO PULMONARY REHAB [HFP971 Custom] Follow-up Instructions Return in about 5 months (around 10/24/2018). To-Do List   
 05/25/2018 To Be Determined Appointment with Atif Colon RN at [address] [facility]  
  
 05/28/2018 To Be Determined Appointment with Atif Colon RN at [address] [facility]  
  
 05/30/2018 To Be Determined Appointment with Atif Colon RN at [address] [facility]  
  
 06/01/2018 To Be Determined Appointment with Atif Colon RN at [address] [facility]  
  
 06/04/2018 To Be Determined Appointment with Atif Colon RN at [address] [facility]  
  
 06/06/2018 To Be Determined Appointment with Atif Colon RN at [address] [facility]  
  
 06/08/2018 To Be Determined Appointment with Atif Colon RN at [employer]  
  
 06/11/2018 To Be Determined Appointment with Atif Colon RN at [address] [facility]  
  
 06/13/2018 To Be Determined Appointment with Atif Colon RN at [address] [facility]  
  
 06/15/2018 To Be Determined Appointment with Atif Colon RN at [employer]  
  
 06/18/2018 To Be Determined Appointment with Atif Colon RN at [employer]  
  
 06/20/2018 To Be Determined Appointment with Atif Colon RN at [employer]  
  
 06/22/2018 To Be Determined Appointment with Atif Colon RN at [employer]  
  
 06/25/2018 To Be Determined Appointment with Atif Colon RN at [employer]  
  
 06/27/2018 To Be Determined Appointment with Edda Armijo RN at [address] [facility]  
  
 06/29/2018 To Be Determined Appointment with Edda Armijo RN at [address] [facility] Referral Information Referral ID Referred By Referred To  
  
 [id number] Jenn phelps Not Available Visits Status Start Date End Date 1 New Request 5/24/18 5/24/19 If your referral has a status of pending review or denied, additional information will be sent to support the outcome of this decision. Introducing [facility]! Frankie Delaney introduces ePantry patient portal. Now you can access parts of your medical record, email your doctor's office, and request medication refills online. 1. In your internet browser, go to [email or web address] 2. Click on the First Time User? Click Here link in the Sign In box. You will see the New Member Sign Up page. 3. Enter your ePantry Access Code exactly as it appears below. You will not need to use this code after youve completed the sign-up process. If you do not sign up before the expiration date, you must request a new code. · ePantry Access Code: [email or web address] Expires: 6/21/2018 10:19 AM 
 
4. Enter the last four digits of your Social Security Number (xxxx) and Date of Birth (mm/dd/yyyy) as indicated and click Submit. You will be taken to the next sign-up page. 5. Create a ePantry ID. This will be your ePantry login ID and cannot be changed, so think of one that is secure and easy to remember. 6. Create a ePantry password. You can change your password at any time. 7. Enter your Password Reset Question and Answer. This can be used at a later time if you forget your password. 8. Enter your e-mail address. You will receive e-mail notification when new information is available in [address] 9. Click Sign Up. You can now view and download portions of your medical record. 10. Click the Download Summary menu link to download a portable copy of your medical information. If you have questions, please visit the Frequently Asked Questions section of the Aisle50 website. Remember, Aisle50 is NOT to be used for urgent needs. For medical emergencies, dial 911. Now available from your iPhone and Android! Please provide this summary of care documentation to your next provider. Your primary care clinician is listed as Rob Montejo. If you have any questions after today's visit, please call [phone].

## 2018-05-24 NOTE — PROGRESS NOTES
Chief Complaint   Patient presents with    Follow-up     martita done 5/17/18     1. Have you been to the ER, urgent care clinic since your last visit? Hospitalized since your last visit? Yes Where: [facility]    2. Have you seen or consulted any other health care providers outside of the [address] [place] [address] since your last visit? Include any pap smears or colon screening. No     [facility]    [address] [place] [address]      SIMPLE PULMONARY STRESS TEST - 6 MINUTE WALK    PATIENT NAME: Delfina Cano    DATE: 5/24/2018     YOB: 1950     AGE: 69 y.o. DIAGNOSIS: No diagnosis found.       TECHNICIAN: Lawanda Edwards LPN          PHYSICIAN: Dr Roman Santana      Visit Vitals    /58 (BP 1 Location: Left arm, BP Patient Position: Sitting)    Pulse 94    Temp 97.9 °F (36.6 °C) (Oral)    Resp 18    Ht 5' 5\" (1.651 m)    Wt 61.2 kg (135 lb)    SpO2 97%    BMI 22.47 kg/m2        RESTING DATA:  Dyspnea Scale (1-10):   0 SOB    EXERCISE DATA:   6 MINUTE WALK - HALLWAY (110FT)    1   RA   85 % SAT   95 HR   1 SOB    1.5 Laps x 110ft = 165ft  2   RA   83 % SAT   99 HR   1 SOB    1 Laps x 110ft = 110ft    1   2 liters   91 % SAT   106 HR   0 SOB    1.5 Laps x 110ft = 165ft  2   2 liters   91 % SAT   108 HR   0 SOB    1    Laps x 110ft = 110ft  3   2 liters   91 % SAT   108 HR   2 SOB    1.25 Laps x 110ft = 137.5ft  4   2 liters   91 % SAT   107 HR   2 SOB    1.25 Laps x 110ft = 137.5ft  5   2 liters    91 % SAT    108 HR   2 SOB    1.5  Laps x 110ft = 165ft      6   2 liters   91 % SAT   110 HR    2 SOB    1     Laps x 110ft  = 110ft          TOTAL DISTANCE: 825 .5 meters

## 2018-05-24 NOTE — PROGRESS NOTES
HISTORY OF PRESENT ILLNESS  Paulette Coleman Hatchet is a 69 y.o. female with COPD FEV1 53%. HPI Comments: Hospital follow up for acute hypoxic respiratory failure due to multilobar pneumonia, interstitial pattern on CXR. Pt with elevated Mycoplasma IgG but normal IgM. Pt does not recall much from her hospital admission but states she is almost back to baseline. She still complains of dyspnea with moderate to severe exertion. No chest pain or cough. No new respiratory symptoms registered. Pt has abstained from smoking for two months but relapsed during a period of personal stress. She is using a Nicotine patch and iwas on Bupropion for a while but not at this time. Part of her stress is due to having to deal with problems associated with her Aorto iliac bypass graft. Review of Systems   Constitutional: Negative for chills, diaphoresis, fever, malaise/fatigue and weight loss. HENT: Negative for congestion, ear discharge, ear pain, hearing loss, nosebleeds, sinus pain, sore throat and tinnitus. Eyes: Negative for blurred vision, double vision, photophobia, pain, discharge and redness. Respiratory: Positive for shortness of breath and wheezing. Negative for cough, hemoptysis, sputum production and stridor. Cardiovascular: Negative for chest pain, palpitations, orthopnea, claudication, leg swelling and PND. Gastrointestinal: Negative for blood in stool, constipation, diarrhea, heartburn, melena and nausea. Genitourinary: Negative for dysuria, flank pain, frequency, hematuria and urgency. Musculoskeletal: Negative for back pain, falls, joint pain and myalgias. Skin: Negative for itching and rash. Chronic skin ulcer   Neurological: Negative for dizziness, tingling, tremors, sensory change, speech change, focal weakness, seizures, loss of consciousness, weakness and headaches. Endo/Heme/Allergies: Negative for environmental allergies and polydipsia. Does not bruise/bleed easily. Psychiatric/Behavioral: Negative for depression, hallucinations, memory loss, substance abuse and suicidal ideas. The patient is not nervous/anxious and does not have insomnia. Past Medical History:   Diagnosis Date    Arthritis     CAP (community acquired pneumonia) 06/27/2017    Chronic lung disease     Claudication ([address]     left leg    Crohn's disease ([address]     Crohn's disease ([address]     GERD (gastroesophageal reflux disease)     HTN (hypertension)     Nausea & vomiting     Other and unspecified symptoms and signs involving general sensations and perceptions     PVD    Other ill-defined conditions(799.89)     Crohn's    Peripheral neuropathy     Pulmonary emphysema ([address]     PVD (peripheral vascular disease) ([address]     right leg    Sepsis ([address] 06/27/2017    Vitamin B12 deficiency      Past Surgical History:   Procedure Laterality Date    BYPASS GRAFT OTHR,AXILL-FEM Right 4/19/2013    BYPASS GRAFT OTHR,FEM-POP Left 5/2013    FOOT/TOES SURGERY PROC UNLISTED      HX APPENDECTOMY      HX BREAST LUMPECTOMY Left     breast left    HX BUNIONECTOMY      left eye    HX CATARACT REMOVAL      bilateral    HX CHOLECYSTECTOMY      HX ORTHOPAEDIC      lateral epicondilitis on right    HX OTHER SURGICAL  3/7/2013    catheter into aorta    HX OTHER SURGICAL      intestional resection x4    HX OTHER SURGICAL  9/2013    I & D Right chest/flank wall abscess vs granuloma    UPPER ARM/ELBOW SURGERY UNLISTED       Current Outpatient Prescriptions on File Prior to Visit   Medication Sig Dispense Refill    pregabalin (LYRICA) 100 mg capsule Take  by mouth two (2) times a day. Takes 100 mg in am and 200 in the pm      OXYGEN-AIR DELIVERY SYSTEMS 2 L by Does Not Apply route. Every HS and as needed      ipratropium-albuterol (COMBIVENT RESPIMAT)  mcg/actuation inhaler Take 1 Puff by inhalation every six (6) hours as needed for Wheezing or Shortness of Breath.  (Patient taking differently: Take 1 Puff by inhalation two (2) times a day.) 1 Inhaler 0    clopidogrel (PLAVIX) 75 mg tab TAKE ONE TABLET BY MOUTH DAILY 30 Tab 11    DULoxetine (CYMBALTA) 60 mg capsule Take 60 mg by mouth nightly.  aspirin 81 mg tablet Take 81 mg by mouth daily.  cyanocobalamin (VITAMIN B-12) 1,000 mcg/mL injection 1,000 mcg by IntraMUSCular route every fourteen (14) days.  loperamide (IMMODIUM) 2 mg tablet Take 2 mg by mouth daily as needed for Diarrhea.  dicyclomine (BENTYL) 10 mg capsule Take 10 mg by mouth two (2) times a day.  fluticasone (FLONASE) 50 mcg/actuation nasal spray 1 Umpire by Both Nostrils route two (2) times a day.  triamterene-hydrochlorothiazide (DYAZIDE) 37.5-25 mg per capsule Take 1 Cap by mouth daily.  bimatoprost (LUMIGAN) 0.03 % ophthalmic drops Administer 1 Drop to both eyes every evening.  atropine-PHENobarbital-scopolamine-hyoscyamine (DONNATAL) 16.2-0.1037 -0.0194 mg per tablet Take 1 Tab by mouth as needed (diarrhea). Indications: Irritable Bowel Syndrome      inFLIXimab (REMICADE) 100 mg injection 5 mg/kg by IntraVENous route once. Every 4 weeks      levoFLOXacin (LEVAQUIN) 500 mg tablet Take 1 Tab by mouth daily. 14 Tab 0     No current facility-administered medications on file prior to visit. Allergies   Allergen Reactions    Codeine Nausea and Vomiting     Other reaction(s): other/intolerance    Darvon [Propoxyphene] Itching    Demerol [Meperidine] Other (comments)     Halucinations    Keflex [Cephalexin] Diarrhea    Talwin [Pentazocine Lactate] Shortness of Breath and Nausea and Vomiting     Social History     Social History    Marital status:      Spouse name: N/A    Number of children: N/A    Years of education: N/A     Occupational History    Not on file.      Social History Main Topics    Smoking status: Current Every Day Smoker     Packs/day: 1.00     Years: 45.00     Types: Cigarettes    Smokeless tobacco: Never Used Comment: second hand smoke exposure prior to active smoking    Alcohol use No    Drug use: No    Sexual activity: Not on file     Other Topics Concern    Not on file     Social History Narrative    Father worked in the shipyard and was diagnosed with Asbestosis. Blood pressure 132/58, pulse 94, temperature 97.9 °F (36.6 °C), temperature source Oral, resp. rate 18, height 5' 5\" (1.651 m), weight 61.2 kg (135 lb), SpO2 97 %. 6 minute walk test per nurse note    Physical Exam   Constitutional: She is oriented to person, place, and time. She appears well-developed and well-nourished. No distress. HENT:   Head: Normocephalic and atraumatic. Nose: Nose normal.   Mouth/Throat: Oropharynx is clear and moist. No oropharyngeal exudate. Eyes: Conjunctivae are normal. Pupils are equal, round, and reactive to light. Right eye exhibits no discharge. Left eye exhibits no discharge. No scleral icterus. Neck: No JVD present. No tracheal deviation present. No thyromegaly present. Cardiovascular: Normal rate, regular rhythm, normal heart sounds and intact distal pulses. Exam reveals no gallop. No murmur heard. Pulmonary/Chest: Effort normal and breath sounds normal. No stridor. No respiratory distress. She has no wheezes. She has no rales. Diminished breath sounds   Abdominal: Soft. She exhibits no mass. There is no tenderness. There is no rebound. Musculoskeletal: She exhibits no edema, tenderness or deformity. Lymphadenopathy:     She has no cervical adenopathy. Neurological: She is alert and oriented to person, place, and time. Skin: Skin is warm and dry. No rash noted. She is not diaphoretic. No erythema. Right flank in dressing   Psychiatric: She has a normal mood and affect. Her behavior is normal. Judgment and thought content normal.     PFT: reduced FEV1 52% with borderline FEV%. DLCO reduced in 2017  ASSESSMENT and PLAN  Encounter Diagnoses   Name Primary?     Pulmonary emphysema, unspecified emphysema type ([facility] Yes    Hypoxemia requiring supplemental oxygen     Chronic aorto-iliac occlusion syndrome (HCC)     Tobacco dependence     Postoperative dehiscence of skin wound, sequela      Pt to start Trelegy for added benefit in increasing FEV1  Pt to be referred to Pulmonary Rehab  Ok for pt to continue Remicade therapy. RTC 4 months or sooner prn. Spirometry on or before next visit  Supplemental O2 titrated to ambulatory oxymetry  Counseled on smoking harm and strongly encouraged to quit smoking.   Face to face counseling was over 50% of this 40 minute visit

## 2018-05-25 ENCOUNTER — HOME CARE VISIT (OUTPATIENT)
Dept: SCHEDULING | Facility: HOME HEALTH | Age: 68
End: 2018-05-25
Payer: MEDICARE

## 2018-05-25 ENCOUNTER — TELEPHONE (OUTPATIENT)
Dept: PULMONOLOGY | Age: 68
End: 2018-05-25

## 2018-05-25 VITALS
RESPIRATION RATE: 20 BRPM | OXYGEN SATURATION: 95 % | HEART RATE: 96 BPM | SYSTOLIC BLOOD PRESSURE: 120 MMHG | DIASTOLIC BLOOD PRESSURE: 70 MMHG | TEMPERATURE: 45.1 F

## 2018-05-25 PROCEDURE — 3331090001 HH PPS REVENUE CREDIT

## 2018-05-25 PROCEDURE — G0299 HHS/HOSPICE OF RN EA 15 MIN: HCPCS

## 2018-05-25 PROCEDURE — 3331090002 HH PPS REVENUE DEBIT

## 2018-05-25 NOTE — TELEPHONE ENCOUNTER
Pulmonary Rehab called patient and left a message about the program. Additional attempts at contact will be made.      Thank you,  Micki Ham

## 2018-05-26 PROCEDURE — 3331090001 HH PPS REVENUE CREDIT

## 2018-05-26 PROCEDURE — 3331090002 HH PPS REVENUE DEBIT

## 2018-05-27 VITALS
DIASTOLIC BLOOD PRESSURE: 71 MMHG | SYSTOLIC BLOOD PRESSURE: 131 MMHG | OXYGEN SATURATION: 97 % | TEMPERATURE: 97.9 F | RESPIRATION RATE: 18 BRPM | HEART RATE: 99 BPM

## 2018-05-27 PROCEDURE — 3331090002 HH PPS REVENUE DEBIT

## 2018-05-27 PROCEDURE — 3331090001 HH PPS REVENUE CREDIT

## 2018-05-28 ENCOUNTER — HOME CARE VISIT (OUTPATIENT)
Dept: SCHEDULING | Facility: HOME HEALTH | Age: 68
End: 2018-05-28
Payer: MEDICARE

## 2018-05-28 VITALS
DIASTOLIC BLOOD PRESSURE: 65 MMHG | SYSTOLIC BLOOD PRESSURE: 133 MMHG | OXYGEN SATURATION: 97 % | TEMPERATURE: 97.5 F | HEART RATE: 91 BPM

## 2018-05-28 PROCEDURE — 3331090001 HH PPS REVENUE CREDIT

## 2018-05-28 PROCEDURE — 3331090002 HH PPS REVENUE DEBIT

## 2018-05-28 PROCEDURE — G0299 HHS/HOSPICE OF RN EA 15 MIN: HCPCS

## 2018-05-29 ENCOUNTER — TELEPHONE (OUTPATIENT)
Dept: PULMONOLOGY | Age: 68
End: 2018-05-29

## 2018-05-29 ENCOUNTER — OFFICE VISIT (OUTPATIENT)
Dept: VASCULAR SURGERY | Age: 68
End: 2018-05-29

## 2018-05-29 VITALS
SYSTOLIC BLOOD PRESSURE: 140 MMHG | HEIGHT: 65 IN | BODY MASS INDEX: 22.49 KG/M2 | RESPIRATION RATE: 17 BRPM | WEIGHT: 135 LBS | DIASTOLIC BLOOD PRESSURE: 68 MMHG | HEART RATE: 94 BPM

## 2018-05-29 DIAGNOSIS — Z99.81 HYPOXEMIA REQUIRING SUPPLEMENTAL OXYGEN: Primary | ICD-10-CM

## 2018-05-29 DIAGNOSIS — J43.9 PULMONARY EMPHYSEMA, UNSPECIFIED EMPHYSEMA TYPE (HCC): ICD-10-CM

## 2018-05-29 DIAGNOSIS — T81.31XD POSTOPERATIVE DEHISCENCE OF SKIN WOUND, SUBSEQUENT ENCOUNTER: Primary | ICD-10-CM

## 2018-05-29 DIAGNOSIS — T81.89XD NON-HEALING SURGICAL WOUND, SUBSEQUENT ENCOUNTER: ICD-10-CM

## 2018-05-29 DIAGNOSIS — R09.02 HYPOXEMIA REQUIRING SUPPLEMENTAL OXYGEN: Primary | ICD-10-CM

## 2018-05-29 PROCEDURE — 3331090001 HH PPS REVENUE CREDIT

## 2018-05-29 PROCEDURE — 3331090002 HH PPS REVENUE DEBIT

## 2018-05-29 NOTE — TELEPHONE ENCOUNTER
Tania Muñoz with [facility] calling. They need order to re-quailify the pt for oxygen. Pt just seen by Dr. Bates Innmoisés. She states  has already sent them the office note and 6 minute walk. They need a new oxygen order.

## 2018-05-29 NOTE — PROGRESS NOTES
1. Have you been to an emergency room or urgent care clinic since your last visit? no    Hospitalized since your last visit? If yes, where, when, and reason for visit? no  2. Have you seen or consulted any other health care providers outside of the [facility] since your last visit including any procedures, health maintenance items.  If yes, where, when and reason for visit? no

## 2018-05-29 NOTE — PROGRESS NOTES
Cleansed wound to right flank with wound cleanser and gauze. Wound tracks down to right lower flank incision with copious amount of serous drainage and also track up at 12:00 about 4-5 cm. Wilber Tiwari still noted in wound bed but less than last visit. Packed with packing strip and then covered with ABD and secured with tape. Notified Laila, the home health nurse with [address], of order to apply white foam to tunnels and black foam on top and continue with pressure at 125 mmHG continous pressure and may apply wet to dry dressing daily until wound vac supplies available. She stated understood.

## 2018-05-30 ENCOUNTER — TELEPHONE (OUTPATIENT)
Dept: PULMONOLOGY | Age: 68
End: 2018-05-30

## 2018-05-30 ENCOUNTER — HOME CARE VISIT (OUTPATIENT)
Dept: SCHEDULING | Facility: HOME HEALTH | Age: 68
End: 2018-05-30
Payer: MEDICARE

## 2018-05-30 VITALS
SYSTOLIC BLOOD PRESSURE: 180 MMHG | RESPIRATION RATE: 18 BRPM | HEART RATE: 58 BPM | TEMPERATURE: 98.6 F | DIASTOLIC BLOOD PRESSURE: 67 MMHG

## 2018-05-30 PROCEDURE — 3331090001 HH PPS REVENUE CREDIT

## 2018-05-30 PROCEDURE — G0299 HHS/HOSPICE OF RN EA 15 MIN: HCPCS

## 2018-05-30 PROCEDURE — 3331090002 HH PPS REVENUE DEBIT

## 2018-05-30 NOTE — TELEPHONE ENCOUNTER
Pulmonary Rehab called patient and spoke to her about the program. She is interested, but is currently in [address]. She wants to call us when she is discharged and ready. Will follow up if we do not hear from patient.      Thank you,  Hay Harris

## 2018-05-31 PROCEDURE — 3331090002 HH PPS REVENUE DEBIT

## 2018-05-31 PROCEDURE — 3331090001 HH PPS REVENUE CREDIT

## 2018-06-01 ENCOUNTER — HOME CARE VISIT (OUTPATIENT)
Dept: SCHEDULING | Facility: HOME HEALTH | Age: 68
End: 2018-06-01
Payer: MEDICARE

## 2018-06-01 PROCEDURE — 3331090001 HH PPS REVENUE CREDIT

## 2018-06-01 PROCEDURE — 3331090002 HH PPS REVENUE DEBIT

## 2018-06-01 PROCEDURE — G0299 HHS/HOSPICE OF RN EA 15 MIN: HCPCS

## 2018-06-02 PROCEDURE — 3331090002 HH PPS REVENUE DEBIT

## 2018-06-02 PROCEDURE — 3331090001 HH PPS REVENUE CREDIT

## 2018-06-03 PROCEDURE — 3331090002 HH PPS REVENUE DEBIT

## 2018-06-03 PROCEDURE — 3331090001 HH PPS REVENUE CREDIT

## 2018-06-04 ENCOUNTER — HOME CARE VISIT (OUTPATIENT)
Dept: SCHEDULING | Facility: HOME HEALTH | Age: 68
End: 2018-06-04
Payer: MEDICARE

## 2018-06-04 VITALS
TEMPERATURE: 97.4 F | OXYGEN SATURATION: 98 % | HEART RATE: 84 BPM | DIASTOLIC BLOOD PRESSURE: 56 MMHG | RESPIRATION RATE: 18 BRPM | SYSTOLIC BLOOD PRESSURE: 123 MMHG

## 2018-06-04 VITALS
TEMPERATURE: 98.3 F | DIASTOLIC BLOOD PRESSURE: 75 MMHG | HEART RATE: 77 BPM | OXYGEN SATURATION: 96 % | SYSTOLIC BLOOD PRESSURE: 137 MMHG | RESPIRATION RATE: 18 BRPM

## 2018-06-04 PROCEDURE — G0299 HHS/HOSPICE OF RN EA 15 MIN: HCPCS

## 2018-06-04 PROCEDURE — 3331090002 HH PPS REVENUE DEBIT

## 2018-06-04 PROCEDURE — 3331090001 HH PPS REVENUE CREDIT

## 2018-06-05 PROCEDURE — 3331090001 HH PPS REVENUE CREDIT

## 2018-06-05 PROCEDURE — 3331090002 HH PPS REVENUE DEBIT

## 2018-06-06 ENCOUNTER — HOME CARE VISIT (OUTPATIENT)
Dept: SCHEDULING | Facility: HOME HEALTH | Age: 68
End: 2018-06-06
Payer: MEDICARE

## 2018-06-06 PROCEDURE — 3331090001 HH PPS REVENUE CREDIT

## 2018-06-06 PROCEDURE — 3331090002 HH PPS REVENUE DEBIT

## 2018-06-06 PROCEDURE — G0299 HHS/HOSPICE OF RN EA 15 MIN: HCPCS

## 2018-06-07 ENCOUNTER — TELEPHONE (OUTPATIENT)
Dept: PULMONOLOGY | Age: 68
End: 2018-06-07

## 2018-06-07 VITALS
HEART RATE: 86 BPM | TEMPERATURE: 97.7 F | OXYGEN SATURATION: 97 % | DIASTOLIC BLOOD PRESSURE: 68 MMHG | RESPIRATION RATE: 18 BRPM | SYSTOLIC BLOOD PRESSURE: 115 MMHG

## 2018-06-07 PROCEDURE — 3331090002 HH PPS REVENUE DEBIT

## 2018-06-07 PROCEDURE — 3331090001 HH PPS REVENUE CREDIT

## 2018-06-07 NOTE — TELEPHONE ENCOUNTER
Olu Nam from [facility] called stating that she has faxed over a DWO and that she needs it signed and sent back to them.

## 2018-06-08 ENCOUNTER — HOME CARE VISIT (OUTPATIENT)
Dept: SCHEDULING | Facility: HOME HEALTH | Age: 68
End: 2018-06-08
Payer: MEDICARE

## 2018-06-08 VITALS
DIASTOLIC BLOOD PRESSURE: 54 MMHG | SYSTOLIC BLOOD PRESSURE: 134 MMHG | OXYGEN SATURATION: 98 % | RESPIRATION RATE: 18 BRPM | HEART RATE: 99 BPM | TEMPERATURE: 99.8 F

## 2018-06-08 PROCEDURE — 3331090001 HH PPS REVENUE CREDIT

## 2018-06-08 PROCEDURE — G0299 HHS/HOSPICE OF RN EA 15 MIN: HCPCS

## 2018-06-08 PROCEDURE — 3331090002 HH PPS REVENUE DEBIT

## 2018-06-08 RX ORDER — CLINDAMYCIN HYDROCHLORIDE 300 MG/1
300 CAPSULE ORAL 3 TIMES DAILY
Qty: 42 CAP | Refills: 0 | Status: SHIPPED | OUTPATIENT
Start: 2018-06-08 | End: 2018-06-15 | Stop reason: ALTCHOICE

## 2018-06-08 NOTE — TELEPHONE ENCOUNTER
Patient's home health nurse, Laila from [facility], has called and stated that the track to right flank wound that goes distally, had closed off and placed pressure to track area with q-tip and was able to get opened back up but foul smelling drainage came from track and patient has also started running a low grade fever. Discussed with Dr. Shar Gupta and patient will have washout of wound on this coming Tuesday and will be started on antibiotic over the weekend. Patient is in agreement with plan. Surgery scheduler will contact patient with date and time.   Clindamycin 300 mg capsule, take one capsule by mouth three times a day for 14 days per verbal order TIMMY Alvarez.

## 2018-06-09 PROCEDURE — 3331090001 HH PPS REVENUE CREDIT

## 2018-06-09 PROCEDURE — 3331090002 HH PPS REVENUE DEBIT

## 2018-06-10 PROCEDURE — 3331090002 HH PPS REVENUE DEBIT

## 2018-06-10 PROCEDURE — 3331090001 HH PPS REVENUE CREDIT

## 2018-06-11 ENCOUNTER — HOME CARE VISIT (OUTPATIENT)
Dept: SCHEDULING | Facility: HOME HEALTH | Age: 68
End: 2018-06-11
Payer: MEDICARE

## 2018-06-11 ENCOUNTER — ANESTHESIA EVENT (OUTPATIENT)
Dept: CARDIOTHORACIC SURGERY | Age: 68
End: 2018-06-11
Payer: MEDICARE

## 2018-06-11 PROCEDURE — G0299 HHS/HOSPICE OF RN EA 15 MIN: HCPCS

## 2018-06-11 PROCEDURE — 3331090001 HH PPS REVENUE CREDIT

## 2018-06-11 PROCEDURE — 3331090002 HH PPS REVENUE DEBIT

## 2018-06-12 ENCOUNTER — TELEPHONE (OUTPATIENT)
Dept: VASCULAR SURGERY | Age: 68
End: 2018-06-12

## 2018-06-12 ENCOUNTER — HOSPITAL ENCOUNTER (OUTPATIENT)
Age: 68
Setting detail: OUTPATIENT SURGERY
Discharge: HOME | End: 2018-06-12
Attending: SURGERY | Admitting: SURGERY
Payer: MEDICARE

## 2018-06-12 ENCOUNTER — ANESTHESIA (OUTPATIENT)
Dept: CARDIOTHORACIC SURGERY | Age: 68
End: 2018-06-12
Payer: MEDICARE

## 2018-06-12 VITALS
TEMPERATURE: 97.6 F | HEART RATE: 76 BPM | HEIGHT: 65 IN | BODY MASS INDEX: 22.11 KG/M2 | OXYGEN SATURATION: 97 % | RESPIRATION RATE: 14 BRPM | SYSTOLIC BLOOD PRESSURE: 125 MMHG | DIASTOLIC BLOOD PRESSURE: 69 MMHG | WEIGHT: 132.7 LBS

## 2018-06-12 VITALS
TEMPERATURE: 97.7 F | HEART RATE: 92 BPM | OXYGEN SATURATION: 96 % | RESPIRATION RATE: 20 BRPM | SYSTOLIC BLOOD PRESSURE: 129 MMHG | DIASTOLIC BLOOD PRESSURE: 71 MMHG

## 2018-06-12 DIAGNOSIS — I74.09 CHRONIC AORTO-ILIAC OCCLUSION SYNDROME (HCC): Primary | ICD-10-CM

## 2018-06-12 DIAGNOSIS — L02.91 ABSCESS: ICD-10-CM

## 2018-06-12 LAB
BUN BLD-MCNC: 14 MG/DL (ref 7–18)
CHLORIDE BLD-SCNC: 101 MMOL/L (ref 100–108)
GLUCOSE BLD STRIP.AUTO-MCNC: 95 MG/DL (ref 74–106)
HCT VFR BLD CALC: 40 % (ref 36–49)
HGB BLD-MCNC: 13.6 G/DL (ref 12–16)
POTASSIUM BLD-SCNC: 4.1 MMOL/L (ref 3.5–5.5)
SODIUM BLD-SCNC: 140 MMOL/L (ref 136–145)

## 2018-06-12 PROCEDURE — 76060000032 HC ANESTHESIA 0.5 TO 1 HR: Performed by: SURGERY

## 2018-06-12 PROCEDURE — 76210000021 HC REC RM PH II 0.5 TO 1 HR: Performed by: SURGERY

## 2018-06-12 PROCEDURE — 74011250637 HC RX REV CODE- 250/637: Performed by: SURGERY

## 2018-06-12 PROCEDURE — 74011000258 HC RX REV CODE- 258: Performed by: SURGERY

## 2018-06-12 PROCEDURE — 74011250636 HC RX REV CODE- 250/636

## 2018-06-12 PROCEDURE — 84295 ASSAY OF SERUM SODIUM: CPT

## 2018-06-12 PROCEDURE — 77030002916 HC SUT ETHLN J&J -A: Performed by: SURGERY

## 2018-06-12 PROCEDURE — 3331090002 HH PPS REVENUE DEBIT

## 2018-06-12 PROCEDURE — 74011000250 HC RX REV CODE- 250: Performed by: SURGERY

## 2018-06-12 PROCEDURE — 74011250637 HC RX REV CODE- 250/637: Performed by: NURSE ANESTHETIST, CERTIFIED REGISTERED

## 2018-06-12 PROCEDURE — 76010000112 HC CV SURG 0.5 TO 1 HR: Performed by: SURGERY

## 2018-06-12 PROCEDURE — 3331090001 HH PPS REVENUE CREDIT

## 2018-06-12 PROCEDURE — 76210000006 HC OR PH I REC 0.5 TO 1 HR: Performed by: SURGERY

## 2018-06-12 PROCEDURE — 77030011640 HC PAD GRND REM COVD -A: Performed by: SURGERY

## 2018-06-12 PROCEDURE — 77030018836 HC SOL IRR NACL ICUM -A: Performed by: SURGERY

## 2018-06-12 RX ORDER — FAMOTIDINE 20 MG/1
20 TABLET, FILM COATED ORAL ONCE
Status: COMPLETED | OUTPATIENT
Start: 2018-06-12 | End: 2018-06-12

## 2018-06-12 RX ORDER — DEXTROSE 50 % IN WATER (D50W) INTRAVENOUS SYRINGE
25-50 AS NEEDED
Status: DISCONTINUED | OUTPATIENT
Start: 2018-06-12 | End: 2018-06-12 | Stop reason: HOSPADM

## 2018-06-12 RX ORDER — PROPOFOL 10 MG/ML
INJECTION, EMULSION INTRAVENOUS AS NEEDED
Status: DISCONTINUED | OUTPATIENT
Start: 2018-06-12 | End: 2018-06-12 | Stop reason: HOSPADM

## 2018-06-12 RX ORDER — SODIUM CHLORIDE 0.9 % (FLUSH) 0.9 %
5-10 SYRINGE (ML) INJECTION AS NEEDED
Status: DISCONTINUED | OUTPATIENT
Start: 2018-06-12 | End: 2018-06-12 | Stop reason: HOSPADM

## 2018-06-12 RX ORDER — FENTANYL CITRATE 50 UG/ML
50 INJECTION, SOLUTION INTRAMUSCULAR; INTRAVENOUS
Status: DISCONTINUED | OUTPATIENT
Start: 2018-06-12 | End: 2018-06-12 | Stop reason: HOSPADM

## 2018-06-12 RX ORDER — HYDROCODONE BITARTRATE AND ACETAMINOPHEN 5; 325 MG/1; MG/1
1 TABLET ORAL ONCE
Status: COMPLETED | OUTPATIENT
Start: 2018-06-12 | End: 2018-06-12

## 2018-06-12 RX ORDER — SODIUM CHLORIDE 0.9 % (FLUSH) 0.9 %
5-10 SYRINGE (ML) INJECTION EVERY 8 HOURS
Status: DISCONTINUED | OUTPATIENT
Start: 2018-06-12 | End: 2018-06-12 | Stop reason: HOSPADM

## 2018-06-12 RX ORDER — MAGNESIUM SULFATE 100 %
4 CRYSTALS MISCELLANEOUS AS NEEDED
Status: DISCONTINUED | OUTPATIENT
Start: 2018-06-12 | End: 2018-06-12 | Stop reason: HOSPADM

## 2018-06-12 RX ORDER — SODIUM CHLORIDE, SODIUM LACTATE, POTASSIUM CHLORIDE, CALCIUM CHLORIDE 600; 310; 30; 20 MG/100ML; MG/100ML; MG/100ML; MG/100ML
75 INJECTION, SOLUTION INTRAVENOUS CONTINUOUS
Status: DISCONTINUED | OUTPATIENT
Start: 2018-06-12 | End: 2018-06-12 | Stop reason: HOSPADM

## 2018-06-12 RX ORDER — HYDROCODONE BITARTRATE AND ACETAMINOPHEN 5; 325 MG/1; MG/1
1 TABLET ORAL
Qty: 30 TAB | Refills: 0 | Status: SHIPPED | OUTPATIENT
Start: 2018-06-12 | End: 2019-09-05

## 2018-06-12 RX ORDER — OXYCODONE AND ACETAMINOPHEN 7.5; 325 MG/1; MG/1
1 TABLET ORAL AS NEEDED
Status: DISCONTINUED | OUTPATIENT
Start: 2018-06-12 | End: 2018-06-12 | Stop reason: HOSPADM

## 2018-06-12 RX ORDER — MIDAZOLAM HYDROCHLORIDE 1 MG/ML
INJECTION, SOLUTION INTRAMUSCULAR; INTRAVENOUS AS NEEDED
Status: DISCONTINUED | OUTPATIENT
Start: 2018-06-12 | End: 2018-06-12 | Stop reason: HOSPADM

## 2018-06-12 RX ORDER — SODIUM CHLORIDE, SODIUM LACTATE, POTASSIUM CHLORIDE, CALCIUM CHLORIDE 600; 310; 30; 20 MG/100ML; MG/100ML; MG/100ML; MG/100ML
INJECTION, SOLUTION INTRAVENOUS
Status: DISCONTINUED | OUTPATIENT
Start: 2018-06-12 | End: 2018-06-12 | Stop reason: HOSPADM

## 2018-06-12 RX ORDER — FENTANYL CITRATE 50 UG/ML
INJECTION, SOLUTION INTRAMUSCULAR; INTRAVENOUS AS NEEDED
Status: DISCONTINUED | OUTPATIENT
Start: 2018-06-12 | End: 2018-06-12 | Stop reason: HOSPADM

## 2018-06-12 RX ORDER — FENTANYL CITRATE 50 UG/ML
INJECTION, SOLUTION INTRAMUSCULAR; INTRAVENOUS
Status: COMPLETED
Start: 2018-06-12 | End: 2018-06-12

## 2018-06-12 RX ORDER — LIDOCAINE HYDROCHLORIDE 10 MG/ML
INJECTION, SOLUTION EPIDURAL; INFILTRATION; INTRACAUDAL; PERINEURAL
Status: DISCONTINUED
Start: 2018-06-12 | End: 2018-06-12 | Stop reason: HOSPADM

## 2018-06-12 RX ORDER — ONDANSETRON 2 MG/ML
4 INJECTION INTRAMUSCULAR; INTRAVENOUS ONCE
Status: DISCONTINUED | OUTPATIENT
Start: 2018-06-12 | End: 2018-06-12 | Stop reason: HOSPADM

## 2018-06-12 RX ORDER — LIDOCAINE HYDROCHLORIDE 10 MG/ML
INJECTION, SOLUTION EPIDURAL; INFILTRATION; INTRACAUDAL; PERINEURAL AS NEEDED
Status: DISCONTINUED | OUTPATIENT
Start: 2018-06-12 | End: 2018-06-12 | Stop reason: HOSPADM

## 2018-06-12 RX ADMIN — FENTANYL CITRATE 50 MCG: 50 INJECTION INTRAMUSCULAR; INTRAVENOUS at 09:50

## 2018-06-12 RX ADMIN — PROPOFOL 50 MG: 10 INJECTION, EMULSION INTRAVENOUS at 09:02

## 2018-06-12 RX ADMIN — FENTANYL CITRATE 50 MCG: 50 INJECTION, SOLUTION INTRAMUSCULAR; INTRAVENOUS at 09:50

## 2018-06-12 RX ADMIN — HYDROCODONE BITARTRATE AND ACETAMINOPHEN 1 TABLET: 5; 325 TABLET ORAL at 10:50

## 2018-06-12 RX ADMIN — FENTANYL CITRATE 100 MCG: 50 INJECTION, SOLUTION INTRAMUSCULAR; INTRAVENOUS at 09:00

## 2018-06-12 RX ADMIN — SODIUM CHLORIDE 600 MG: 900 INJECTION, SOLUTION INTRAVENOUS at 09:01

## 2018-06-12 RX ADMIN — PROPOFOL 50 MG: 10 INJECTION, EMULSION INTRAVENOUS at 09:13

## 2018-06-12 RX ADMIN — FAMOTIDINE 20 MG: 20 TABLET ORAL at 08:39

## 2018-06-12 RX ADMIN — FENTANYL CITRATE 50 MCG: 50 INJECTION, SOLUTION INTRAMUSCULAR; INTRAVENOUS at 09:55

## 2018-06-12 RX ADMIN — SODIUM CHLORIDE, SODIUM LACTATE, POTASSIUM CHLORIDE, CALCIUM CHLORIDE: 600; 310; 30; 20 INJECTION, SOLUTION INTRAVENOUS at 08:55

## 2018-06-12 RX ADMIN — MIDAZOLAM HYDROCHLORIDE 2 MG: 1 INJECTION, SOLUTION INTRAMUSCULAR; INTRAVENOUS at 09:00

## 2018-06-12 NOTE — INTERVAL H&P NOTE
H&P Update:  Merline Self was seen and examined. History and physical has been reviewed. Significant clinical changes have occurred as noted:  Wound with further nonhealing now in need of I+D.     Signed By: Carlos Enrique Sneed MD     June 12, 2018 7:24 AM

## 2018-06-12 NOTE — H&P (VIEW-ONLY)
Subjective:      77-year-old with a chronic wound over her right flank with an exposed bypass graft.  She has had attempts at coverage, really heroic in nature and comprehensive, just cannot get this to stay closed. She therefore underwent excision of infected graft, right flank, revision with interposition graft, right kktand-jx-zwyrfok graft    She already had an initial post op visit a few weeks ago  Came last week with concerns about area of swelling  This was felt to be a seroma  As of yesterday, when home health was packing the wound, it appears the seroma was drained. The area of swelling went down but she has a good amount of serous drainage and tunneling down to the level of the seroma  The open wound site is doing well and granulating. Wet to dry dressings had been done    Objective:     Visit Vitals    /68 (BP 1 Location: Left arm, BP Patient Position: Sitting)    Pulse 94    Resp 17    Ht 5' 5\" (1.651 m)    Wt 135 lb (61.2 kg)    BMI 22.47 kg/m2     As described above     Assessment:     Wound check/discharge visit. Plan:       ICD-10-CM ICD-9-CM    1. Postoperative dehiscence of skin wound, subsequent encounter T81. 31XD V58.89      998.32    2. Non-healing surgical wound, subsequent encounter T81.89XD V58.89      998.83      Will renew use of the wound vac. We are requesting the white foam to be able to pack into the tunneled area and then standard black foam at the wound site  Hopefully this will allow continued and better drainage but also allowance for the tunneling and wound to then fill in  We will see her back in a few weeks for a wound check  Sooner if any changes or concerns     TIMMY Olivares    Portions of this note have been entered using voice recognition software.

## 2018-06-12 NOTE — OP NOTES
[facility] REPORT    Name:Barbara DAILEY  MR#: [id number]  : 1950  ACCOUNT #: [de-identified]   DATE OF SERVICE: 2018    SURGEON:  Faisal Dhillon MD    PREOPERATIVE DIAGNOSIS:  Right flank wound poorly healing. POSTOPERATIVE DIAGNOSIS:  Right flank wound poorly healing. PROCEDURE PERFORMED:  Right flank incision and drainage to total wound size 5.5 cm x 2 cm x 2 cm and attempt at complex wound closure. CULTURES:  None. SPECIMENS:  None. DRAINS:  None. ESTIMATED BLOOD LOSS:  Less than 50 mL    ANESTHESIA:  Local anesthetic with MAC. COMPLICATIONS:  None. INDICATION FOR THE PROCEDURE:  The patient is a 24-year-old female with a right flank wound that is poorly healing and was recommended for debridement with washout. The patient was given risks and benefits of the procedure including but not limited to bleeding, infection, damage to adjacent structures, MI, stroke, and death as well as the need for further surgery. The patient was understanding of all the risks and underwent the procedure. DESCRIPTION OF PROCEDURE:  The patient was correctly identified in the preoperative holding area, taken to the operating room in stable condition. The patient had a preincision timeout prior to incision. The patient was prepped and draped in normal sterile fashion according to anesthesia guidelines for aseptic technique. We then were able to make sure the patient had preoperative antibiotics prior to incision. We then were able to numb her up on both sides of her wound and make small incisions on either side to be able to unroof the tunneling. We then saw some serous drainage out of both tunnel sites and we were able to use electric Bovie cautery to obtain hemostasis as well as remove serous drainage. We then copiously irrigated the wound and lifted up the skin edges so that they would be able to come together a little bit more easily.   We then placed 3 nylon sutures to loosely approximate the wound and placed a Covaderm over top. The patient tolerated the procedure well without any issues.       MD SIMRAN Hannon / [place]  D: 06/12/2018 11:50     T: 06/12/2018 12:45  JOB #: [id number]

## 2018-06-12 NOTE — ANESTHESIA PREPROCEDURE EVALUATION
Anesthetic History     PONV          Review of Systems / Medical History  Patient summary reviewed and pertinent labs reviewed    Pulmonary    COPD      Smoker         Neuro/Psych              Cardiovascular    Hypertension              Exercise tolerance: <4 METS     GI/Hepatic/Renal     GERD           Endo/Other        Arthritis     Other Findings   Comments: Documentation of current medication  Current medications obtained, documented and obtained? YES      Risk Factors for Postoperative nausea/vomiting:       History of postoperative nausea/vomiting? YES       Female? YES       Motion sickness? NO       Intended opioid administration for postoperative analgesia? YES      Smoking Abstinence:  Current Smoker? YES  Elective Surgery? YES  Seen preoperatively by anesthesiologist or proxy prior to day of surgery? YES  Pt abstained from smoking 24 hours prior to anesthesia?  NO    Preventive care/screening for High Blood Pressure:  Aged 18 years and older: YES  Screened for high blood pressure: YES  Patients with high blood pressure referred to primary care provider   for BP management: YES                 Physical Exam    Airway  Mallampati: II  TM Distance: 4 - 6 cm  Neck ROM: normal range of motion   Mouth opening: Normal     Cardiovascular  Regular rate and rhythm,  S1 and S2 normal,  no murmur, click, rub, or gallop             Dental    Dentition: Poor dentition     Pulmonary  Breath sounds clear to auscultation               Abdominal  GI exam deferred       Other Findings            Anesthetic Plan    ASA: 2  Anesthesia type: MAC          Induction: Intravenous  Anesthetic plan and risks discussed with: Patient and Family

## 2018-06-12 NOTE — ANESTHESIA POSTPROCEDURE EVALUATION
Post-Anesthesia Evaluation and Assessment    Patient: Cara Cisneros MRN: [id number]  SSN: [id number]    YOB: 1950  Age: 77 y.o. Sex: female      Data from PACU flowsheet    Cardiovascular Function/Vital Signs  Visit Vitals    /57    Pulse 75    Temp 36.6 °C (97.8 °F)    Resp 14    Ht 5' 5\" (1.651 m)    Wt 60.2 kg (132 lb 11.2 oz)    SpO2 95%    BMI 22.08 kg/m2       Patient is status post MAC anesthesia for Procedure(s):  RIGHT FLANK WOUND WASHOUT. Nausea/Vomiting: controlled    Postoperative hydration reviewed and adequate. Pain:  Pain Scale 1: Numeric (0 - 10) (06/12/18 1004)  Pain Intensity 1: 3 (06/12/18 1004)   Managed      Mental Status and Level of Consciousness: Alert and oriented     Pulmonary Status:   O2 Device: Room air (06/12/18 0941)   Adequate oxygenation and airway patent    Complications related to anesthesia: None    Post-anesthesia assessment completed.  No concerns    Signed By: Wilian Perdomo MD     June 12, 2018

## 2018-06-12 NOTE — IP AVS SNAPSHOT
Soraya Scott 
 
 
 [address] [facility] [address] [facility] Patient: Marce Andres 
MRN: [id number] PUB:[id number] About your hospitalization You were admitted on:  June 12, 2018 You last received care in the:  [facility] PHASE 2 RECOVERY You were discharged on:  June 12, 2018 Why you were hospitalized Your primary diagnosis was:  Not on File Follow-up Information Follow up With Details Comments Contact Info [address] [place]   [address] Jose Urrutia [address] [address] 
[phone] Janet Avelar MD  Follow up in 3-5 days [address] Lenore Negro 
[facility]  [facility] 
[phone] Your Scheduled Appointments Wednesday June 13, 2018 To Be Determined ROUTINE with SPENCER [facility] HOME CARE SCHEDULING/INTAKE ([facility] [address] CARE SCHEDULING/INTAKE ([facility]) Friday Monica 15, 2018 To Be Determined ROUTINE with SPENCER Aguero [employer] [facility] CARE SCHEDULING/INTAKE ([facility] [address] CARE SCHEDULING/INTAKE ([facility]) Monday June 18, 2018 To Be Determined ROUTINE with SPENCER Aguero [employer] HOME CARE SCHEDULING/INTAKE [facility] [address] CARE SCHEDULING/INTAKE ([facility]) Tuesday June 19, 2018  1:45 PM EDT Follow Up with [facility] ([address] [facility]) [address] [facility] Jennifer Marie [phone] [facility]  
[phone] Wednesday June 20, 2018 To Be Determined ROUTINE with SPENCER Aguero [employer] [facility] HOME CARE SCHEDULING/INTAKE ([facility]) [address] CARE SCHEDULING/INTAKE ([facility]) Friday June 22, 2018 To Be Determined ROUTINE with SPENCER Felix GALLO [facility] HOME CARE SCHEDULING/INTAKE [facility] [address] CARE SCHEDULING/INTAKE ([facility]) Monday June 25, 2018 To Be Determined ROUTINE with SPENCER Felix GALLO [facility] HOME CARE SCHEDULING/INTAKE [facility] [address] CARE SCHEDULING/INTAKE ([facility]) Wednesday June 27, 2018 To Be Determined ROUTINE with SPENCER Felix GALLO [facility] HOME CARE SCHEDULING/INTAKE [facility] [address] CARE SCHEDULING/INTAKE ([facility]) Friday June 29, 2018 To Be Determined RECERTIFICATION with SPENCER Felix [address] [place], [place] [address]e CARE SCHEDULING/INTAKE ([facility]) [address] CARE SCHEDULING/INTAKE ([facility]) Discharge Orders None A check wan indicates which time of day the medication should be taken. My Medications START taking these medications Instructions Each Dose to Equal  
 Morning Noon Evening Bedtime HYDROcodone-acetaminophen 5-325 mg per tablet Commonly known as:  Phi Arreola Your last dose was:  10:50am  
Your next dose is:  2:50pm  
Notes to Patient:  Take with food Take 1 Tab by mouth every four (4) hours as needed for Pain. Max Daily Amount: 6 Tabs. 1 Tab CHANGE how you take these medications Instructions Each Dose to Equal  
 Morning Noon Evening Bedtime  
 ipratropium-albuterol  mcg/actuation inhaler Commonly known as:  Onel Dimas What changed:  when to take this Take 1 Puff by inhalation every six (6) hours as needed for Wheezing or Shortness of Breath. 1 Puff CONTINUE taking these medications Instructions Each Dose to Equal  
 Morning Noon Evening Bedtime  
 aspirin 81 mg tablet Take 81 mg by mouth daily. 81 mg  
    
   
   
   
  
 clindamycin 300 mg capsule Commonly known as:  CLEOCIN Take 1 Cap by mouth three (3) times daily. 300 mg  
    
   
   
   
  
 clopidogrel 75 mg Tab Commonly known as:  PLAVIX Notes to Patient:  May resume today as instructed by Dr. Chrissie Cranker TAKE ONE TABLET BY MOUTH DAILY  
     
   
   
   
  
 CYMBALTA 60 mg capsule Generic drug:  DULoxetine Take 60 mg by mouth nightly. 60 mg  
    
   
   
   
  
 dicyclomine 10 mg capsule Commonly known as:  BENTYL Take 10 mg by mouth two (2) times a day. 10 mg DONNATAL 16.2-0.1037 -0.0194 mg per tablet Generic drug:  atropine-PHENobarbital-scopolamine-hyoscyamine Take 1 Tab by mouth as needed (diarrhea). Indications: Irritable Bowel Syndrome 1 Tab DYAZIDE 37.5-25 mg per capsule Generic drug:  triamterene-hydroCHLOROthiazide Take 1 Cap by mouth daily. 1 Cap * fluticasone-umeclidin-vilanter 100-62.5-25 mcg Dsdv Commonly known as:  Parthenia Cobia Take 1 Inhalation by inhalation daily. 1 Inhalation * fluticasone-umeclidin-vilanter 100-62.5-25 mcg Dsdv Commonly known as:  Parthenia Cobia Take 1 Inhalation by inhalation daily. 1 Inhalation  
    
   
   
   
  
 loperamide 2 mg tablet Commonly known as:  IMMODIUM Take 2 mg by mouth daily as needed for Diarrhea.  
 2 mg LUMIGAN 0.03 % ophthalmic drops Generic drug:  bimatoprost  
   
 Administer 1 Drop to both eyes every evening. 1 Drop LYRICA 100 mg capsule Generic drug:  pregabalin Take  by mouth two (2) times a day. Takes 100 mg in am and 200 in the pm  
     
   
   
   
  
 OXYGEN-AIR DELIVERY SYSTEMS  
   
 2 L by Does Not Apply route. Every HS and as needed 2 L  
    
   
   
   
  
 REMICADE 100 mg injection Generic drug:  inFLIXimab  
   
 5 mg/kg by IntraVENous route once. Every 4 weeks 5 mg/kg VITAMIN B-12 1,000 mcg/mL injection Generic drug:  cyanocobalamin  
   
 1,000 mcg by IntraMUSCular route every fourteen (14) days. 1000 mcg * Notice: This list has 2 medication(s) that are the same as other medications prescribed for you. Read the directions carefully, and ask your doctor or other care provider to review them with you. Where to Get Your Medications Information on where to get these meds will be given to you by the nurse or doctor. ! Ask your nurse or doctor about these medications HYDROcodone-acetaminophen 5-325 mg per tablet Opioid Education Prescription Opioids: What You Need to Know: 
 
Prescription opioids can be used to help relieve moderate-to-severe pain and are often prescribed following a surgery or injury, or for certain health conditions. These medications can be an important part of treatment but also come with serious risks. Opioids are strong pain medicines. Examples include hydrocodone, oxycodone, fentanyl, and morphine. Heroin is an example of an illegal opioid. It is important to work with your health care provider to make sure you are getting the safest, most effective care. WHAT ARE THE RISKS AND SIDE EFFECTS OF OPIOID USE? Prescription opioids carry serious risks of addiction and overdose, especially with prolonged use. An opioid overdose, often marked by slow breathing, can cause sudden death. The use of prescription opioids can have a number of side effects as well, even when taken as directed. · Tolerance-meaning you might need to take more of a medication for the same pain relief · Physical dependence-meaning you have symptoms of withdrawal when the medication is stopped. Withdrawal symptoms can include nausea, sweating, chills, diarrhea, stomach cramps, and muscle aches. Withdrawal can last up to several weeks, depending on which drug you took and how long you took it. · Increased sensitivity to pain · Constipation · Nausea, vomiting, and dry mouth · Sleepiness and dizziness · Confusion · Depression · Low levels of testosterone that can result in lower sex drive, energy, and strength · Itching and sweating RISKS ARE GREATER WITH:      
· History of drug misuse, substance use disorder, or overdose · Mental health conditions (such as depression or anxiety) · Sleep apnea · Older age (72 years or older) · Pregnancy Avoid alcohol while taking prescription opioids. Also, unless specifically advised by your health care provider, medications to avoid include: · Benzodiazepines (such as Xanax or Valium) · Muscle relaxants (such as Soma or Flexeril) · Hypnotics (such as Ambien or Lunesta) · Other prescription opioids KNOW YOUR OPTIONS Talk to your health care provider about ways to manage your pain that don't involve prescription opioids. Some of these options may actually work better and have fewer risks and side effects. Options may include: 
· Pain relievers such as acetaminophen, ibuprofen, and naproxen · Some medications that are also used for depression or seizures · Physical therapy and exercise · Counseling to help patients learn how to cope better with triggers of pain and stress. · Application of heat or cold compress · Massage therapy · Relaxation techniques Be Informed Make sure you know the name of your medication, how much and how often to take it, and its potential risks & side effects.  
 
IF YOU ARE PRESCRIBED OPIOIDS FOR PAIN: 
 · Never take opioids in greater amounts or more often than prescribed. Remember the goal is not to be pain-free but to manage your pain at a tolerable level. · Follow up with your primary care provider to: · Work together to create a plan on how to manage your pain. · Talk about ways to help manage your pain that don't involve prescription opioids. · Talk about any and all concerns and side effects. · Help prevent misuse and abuse. · Never sell or share prescription opioids · Help prevent misuse and abuse. · Store prescription opioids in a secure place and out of reach of others (this may include visitors, children, friends, and family). · Safely dispose of unused/unwanted prescription opioids: Find your community drug take-back program or your pharmacy mail-back program, or flush them down the toilet, following guidance from the Food and Drug Administration (www.fda.gov/Drugs/ResourcesForYou). · Visit www.cdc.gov/drugoverdose to learn about the risks of opioid abuse and overdose. · If you believe you may be struggling with addiction, tell your health care provider and ask for guidance or call [address] [place], [place] [address] at [phone]-TGTN. Discharge Instructions Wound Debridement: What to Expect at [facility] Your Recovery After surgery to remove debris or dead tissue from your wound (debridement), you can expect some pain and swelling around your wound. This should get better within a few days after the procedure. You may have a bandage or a moist dressing over your wound. Your doctor will let you know how long to keep it on and how often to change it. The amount of time it will take for your wound to heal depends on how serious your wound is and whether you have any other health problems that may slow healing. You may need to have the wound debrided again.  
How soon you can return to work and your normal routine depends on the type of work you do and how you feel. For example, if your wound is on your arm and your job requires you to do heavy lifting, you may need to wait until your wound has healed before you go back to work. This care sheet gives you a general idea about how long it will take for you to recover. But each person recovers at a different pace. Follow the steps below to feel better as quickly as possible. How can you care for yourself at home? Activity ? · Rest when you feel tired. Getting enough sleep will help you recover. ? · Avoid activities that put stress on the affected body part until your doctor says it's okay. ? · Change positions often to keep pressure off your wound, and spread your body weight evenly with cushions, mattresses, foam wedges, or other pressure-relieving devices. ? · If your wound is on your leg or foot, you may have to use crutches, a supportive boot, or a fitted shoe to keep pressure off your wound. If you need crutches, it may help to use a backpack or wear clothes with a lot of pockets to carry items. ? · Do not shower for at least 24 hours after the procedure or for as long as your doctor tells you to. When you shower, keep your dressing and wound dry. ? · Do not take a bath, swim, use a hot tub, or soak your affected body part until your wound has healed. Diet ? · You can eat your normal diet. If your stomach is upset, try bland, low-fat foods like plain rice, broiled chicken, toast, and yogurt. ? · Eat a well-balanced diet with enough protein to help the wound heal. Protein is a mckenzie nutrient in helping to repair damaged tissue and promote new tissue growth. Good sources of protein are milk, yogurt, cheese, meat, and beans. Medicines ? · Your doctor will tell you if and when you can restart your medicines. He or she will also give you instructions about taking any new medicines.   
? · If you take blood thinners, such as warfarin (Coumadin), clopidogrel (Plavix), or aspirin, be sure to talk to your doctor. He or she will tell you if and when to start taking those medicines again. Make sure that you understand exactly what your doctor wants you to do. ? · Take pain medicines exactly as directed. ¨ If the doctor gave you a prescription medicine for pain, take it as prescribed. ¨ If you are not taking a prescription pain medicine, ask your doctor if you can take an over-the-counter medicine. ? · If you think your pain medicine is making you sick to your stomach: 
¨ Take your medicine after meals (unless your doctor has told you not to). ¨ Ask your doctor for a different pain medicine. ? · If your doctor prescribed antibiotics, take them as directed. Do not stop taking them just because you feel better. You need to take the full course of antibiotics. ? · If your doctor prescribed an antibiotic ointment that you put on the wound, use it as directed. ? Wound care ? · Leave dressing clean, dry and in place until your follow up. · A moist dressing may cover your wound. A dressing helps the wound heal and protects it. Your doctor will tell you how to take care of this. ? · If you had a skin graft, you may have a bandage that is stitched over the graft. Your doctor will remove the bandage and stitches. Other instructions ? · Don't smoke. Smoking dries out the skin, reduces blood flow to the skin, and slows healing. If you need help quitting, talk to your doctor about stop-smoking programs and medicines. These can increase your chances of quitting for good. Follow-up care is a key part of your treatment and safety. Be sure to make and go to all appointments, and call your doctor if you are having problems. It's also a good idea to know your test results and keep a list of the medicines you take. When should you call for help? Call your doctor now or seek immediate medical care if: ? · You have pain that does not get better after you take pain medicine. ? · You have signs of infection, such as: 
¨ Increased pain, swelling, warmth, or redness. ¨ Red streaks leading from the wound. ¨ Pus draining from the wound. ¨ A fever. ? · The wound starts to bleed, and blood soaks through the bandage. Oozing small amounts of blood is normal.  
? · You have loose stitches, or your skin graft comes loose. ? Watch closely for any changes in your health, and be sure to contact your doctor if: 
? · The wound is not getting better as expected. Where can you learn more? Go to http://kirit-della.info/. Enter V553 in the search box to learn more about \"Wound Debridement: What to Expect at Home. \" Current as of: March 20, 2017 Content Version: 11.4 © 4667-4589 [employer]. Care instructions adapted under license by [employer] (which disclaims liability or warranty for this information). If you have questions about a medical condition or this instruction, always ask your healthcare professional. [place] [address] any warranty or liability for your use of this information. Narcotic-Analgesic/Acetaminophen (Percocet, Norco, Lorcet HD, Lortab 10/325) - (By mouth) Why this medicine is used:  
Relieves pain. Contact a nurse or doctor right away if you have: 
· Extreme weakness, shallow breathing, slow heartbeat · Severe confusion, lightheadedness, dizziness, fainting · Yellow skin or eyes, dark urine or pale stools · Severe constipation, severe stomach pain, nausea, vomiting, loss of appetite · Sweating or cold, clammy skin Common side effects: · Mild constipation, nausea, vomiting · Sleepiness, tiredness · Itching, rash © 2017 [facility] Information is for End User's use only and may not be sold, redistributed or otherwise used for commercial purposes. DISCHARGE SUMMARY from Nurse PATIENT INSTRUCTIONS: 
 
 
F-face looks uneven A-arms unable to move or move unevenly S-speech slurred or non-existent T-time-call 911 as soon as signs and symptoms begin-DO NOT go Back to bed or wait to see if you get better-TIME IS BRAIN. Warning Signs of HEART ATTACK Call 911 if you have these symptoms: 
? Chest discomfort. Most heart attacks involve discomfort in the center of the chest that lasts more than a few minutes, or that goes away and comes back. It can feel like uncomfortable pressure, squeezing, fullness, or pain. ? Discomfort in other areas of the upper body. Symptoms can include pain or discomfort in one or both arms, the back, neck, jaw, or stomach. ? Shortness of breath with or without chest discomfort. ? Other signs may include breaking out in a cold sweat, nausea, or lightheadedness. Don't wait more than five minutes to call [address]! Fast action can save your life. Calling 911 is almost always the fastest way to get lifesaving treatment. Emergency Medical Services staff can begin treatment when they arrive  up to an hour sooner than if someone gets to the hospital by car. The discharge information has been reviewed with the patient and spouse. The patient and spouse verbalized understanding. Discharge medications reviewed with the patient and spouse and appropriate educational materials and side effects teaching were provided. ___________________________________________________________________________________________________________________________________ Introducing hospitals SERVICES! [facility] introduces [employer] patient portal. Now you can access parts of your medical record, email your doctor's office, and request medication refills online. 1. In your internet browser, go to [email or web address] 2. Click on the First Time User? Click Here link in the Sign In box.  You will see the New Member Sign Up page. 3. Enter your [employer] Access Code exactly as it appears below. You will not need to use this code after youve completed the sign-up process. If you do not sign up before the expiration date, you must request a new code. · [employer] Access Code: [email or web address] Expires: 6/21/2018 10:19 AM 
 
4. Enter the last four digits of your Social Security Number (xxxx) and Date of Birth (mm/dd/yyyy) as indicated and click Submit. You will be taken to the next sign-up page. 5. Create a AtlanteTrekt ID. This will be your [employer] login ID and cannot be changed, so think of one that is secure and easy to remember. 6. Create a Atl[employer] password. You can change your password at any time. 7. Enter your Password Reset Question and Answer. This can be used at a later time if you forget your password. 8. Enter your e-mail address. You will receive e-mail notification when new information is available in [address] 9. Click Sign Up. You can now view and download portions of your medical record. 10. Click the Download Summary menu link to download a portable copy of your medical information. If you have questions, please visit the Frequently Asked Questions section of the [employer] website. Remember, [employer] is NOT to be used for urgent needs. For medical emergencies, dial 911. Now available from your iPhone and Android! Introducing Candelario Chandler As a [facility] patient, I wanted to make you aware of our electronic visit tool called Candelario Chandler. [facility] 24/7 allows you to connect within minutes with a medical provider 24 hours a day, seven days a week via a mobile device or tablet or logging into a secure website from your computer. You can access Candelario Chandler from anywhere in the [place].  
 
A virtual visit might be right for you when you have a simple condition and feel like you just dont want to get out of bed, or cant get away from work for an appointment, when your regular [employer] provider is not available (evenings, weekends or holidays), or when youre out of town and need minor care. Electronic visits cost only $49 and if the [employer] 24/7 provider determines a prescription is needed to treat your condition, one can be electronically transmitted to a nearby pharmacy*. Please take a moment to enroll today if you have not already done so. The enrollment process is free and takes just a few minutes. To enroll, please download the [employer] 24/7 rowena to your tablet or phone, or visit [email or web address] to enroll on your computer. And, as an [address] [place] [address] patient with a [employer] account, the results of your visits will be scanned into your electronic medical record and your primary care provider will be able to view the scanned results. We urge you to continue to see your regular [employer] provider for your ongoing medical care. And while your primary care provider may not be the one available when you seek a [employer] virtual visit, the peace of mind you get from getting a real diagnosis real time can be priceless. For more information on [employer]Kast, view our Frequently Asked Questions (FAQs) at [email or web address]. Sincerely, 
 
Patricia Cabrera MD 
Chief Medical Officer [address] *:  certain medications cannot be prescribed via madKast Providers Seen During Your Hospitalization Provider Specialty Primary office phone Amaya Camp MD Vascular Surgery [phone] Your Primary Care Physician (PCP) Primary Care Physician Office Phone Office Fax Bea Peck, [address] [place] [address] [phone] You are allergic to the following Allergen Reactions Codeine Nausea and Vomiting Other reaction(s): other/intolerance Darvon (Propoxyphene) Itching Demerol (Meperidine) Other (comments) Halucinations Keflex (Cephalexin) Diarrhea Talwin (Pentazocine Lactate) Shortness of Breath Nausea and Vomiting Recent Documentation Height Weight BMI OB Status Smoking Status 1.651 m 60.2 kg 22.08 kg/m2 Postmenopausal Current Every Day Smoker Emergency Contacts Name Discharge Info Relation Home Work Mobile Luis Carlos Souza Nonda Finder DISCHARGE CAREGIVER [3] Spouse [3] [phone] Luis Carlos Souza  Spouse [3] [phone] Patient Belongings The following personal items are in your possession at time of discharge: 
  Dental Appliances: Lowers, Uppers  Visual Aid: None Please provide this summary of care documentation to your next provider. Signatures-by signing, you are acknowledging that this After Visit Summary has been reviewed with you and you have received a copy. Patient Signature:  ____________________________________________________________ Date:  ____________________________________________________________  
  
Heyd Newton Provider Signature:  ____________________________________________________________ Date:  ____________________________________________________________

## 2018-06-12 NOTE — BRIEF OP NOTE
BRIEF OPERATIVE NOTE    Date of Procedure: 6/12/2018   Preoperative Diagnosis: T81.31XD WOUND DEHISCENCE  Postoperative Diagnosis: T81.31XD WOUND DEHISCENCE    Procedure(s):  RIGHT FLANK WOUND WASHOUT  Surgeon(s) and Role:     * Hesham Bahena MD - Primary    Surgical Staff:  Circ-1: Anila Yao RN  Circ-2: Dania Leslie RN  Scrub RN-1: Kadi Bernstein Asst-1: Marlene Ruby  Event Time In   Incision Start 0912   Incision Close      Anesthesia: MAC   Estimated Blood Loss: <50mL  Specimens: * No specimens in log *   Findings: none   Complications: none  Implants: * No implants in log *

## 2018-06-12 NOTE — PERIOP NOTES
Patient confirmed by two identifiers with discharge instructions prior too being provided to patient and spouse.

## 2018-06-12 NOTE — PERIOP NOTES
Per Dr. Phan Polk, pt may keep dressing in place until follow up. Home health may not be needed as wound was almost closed. Will receive further instructions at follow up. Patient and spouse informed and express understanding.

## 2018-06-12 NOTE — TELEPHONE ENCOUNTER
Patient will be seen this Friday for assessment of wound per Dr. Manuel Bauer. Have notified patient.

## 2018-06-12 NOTE — DISCHARGE INSTRUCTIONS
Wound Debridement: What to Expect at Home  Your Recovery  After surgery to remove debris or dead tissue from your wound (debridement), you can expect some pain and swelling around your wound. This should get better within a few days after the procedure. You may have a bandage or a moist dressing over your wound. Your doctor will let you know how long to keep it on and how often to change it. The amount of time it will take for your wound to heal depends on how serious your wound is and whether you have any other health problems that may slow healing. You may need to have the wound debrided again. How soon you can return to work and your normal routine depends on the type of work you do and how you feel. For example, if your wound is on your arm and your job requires you to do heavy lifting, you may need to wait until your wound has healed before you go back to work. This care sheet gives you a general idea about how long it will take for you to recover. But each person recovers at a different pace. Follow the steps below to feel better as quickly as possible. How can you care for yourself at home? Activity  ? · Rest when you feel tired. Getting enough sleep will help you recover. ? · Avoid activities that put stress on the affected body part until your doctor says it's okay. ? · Change positions often to keep pressure off your wound, and spread your body weight evenly with cushions, mattresses, foam wedges, or other pressure-relieving devices. ? · If your wound is on your leg or foot, you may have to use crutches, a supportive boot, or a fitted shoe to keep pressure off your wound. If you need crutches, it may help to use a backpack or wear clothes with a lot of pockets to carry items. ? · Do not shower for at least 24 hours after the procedure or for as long as your doctor tells you to. When you shower, keep your dressing and wound dry.    ? · Do not take a bath, swim, use a hot tub, or soak your affected body part until your wound has healed. Diet  ? · You can eat your normal diet. If your stomach is upset, try bland, low-fat foods like plain rice, broiled chicken, toast, and yogurt. ? · Eat a well-balanced diet with enough protein to help the wound heal. Protein is a mckenzie nutrient in helping to repair damaged tissue and promote new tissue growth. Good sources of protein are milk, yogurt, cheese, meat, and beans. Medicines  ? · Your doctor will tell you if and when you can restart your medicines. He or she will also give you instructions about taking any new medicines. ? · If you take blood thinners, such as warfarin (Coumadin), clopidogrel (Plavix), or aspirin, be sure to talk to your doctor. He or she will tell you if and when to start taking those medicines again. Make sure that you understand exactly what your doctor wants you to do. ? · Take pain medicines exactly as directed. ¨ If the doctor gave you a prescription medicine for pain, take it as prescribed. ¨ If you are not taking a prescription pain medicine, ask your doctor if you can take an over-the-counter medicine. ? · If you think your pain medicine is making you sick to your stomach:  ¨ Take your medicine after meals (unless your doctor has told you not to). ¨ Ask your doctor for a different pain medicine. ? · If your doctor prescribed antibiotics, take them as directed. Do not stop taking them just because you feel better. You need to take the full course of antibiotics. ? · If your doctor prescribed an antibiotic ointment that you put on the wound, use it as directed. ? Wound care  ? · Leave dressing clean, dry and in place until your follow up. · A moist dressing may cover your wound. A dressing helps the wound heal and protects it. Your doctor will tell you how to take care of this. ? · If you had a skin graft, you may have a bandage that is stitched over the graft. Your doctor will remove the bandage and stitches. Other instructions  ? · Don't smoke. Smoking dries out the skin, reduces blood flow to the skin, and slows healing. If you need help quitting, talk to your doctor about stop-smoking programs and medicines. These can increase your chances of quitting for good. Follow-up care is a key part of your treatment and safety. Be sure to make and go to all appointments, and call your doctor if you are having problems. It's also a good idea to know your test results and keep a list of the medicines you take. When should you call for help? Call your doctor now or seek immediate medical care if:  ? · You have pain that does not get better after you take pain medicine. ? · You have signs of infection, such as:  ¨ Increased pain, swelling, warmth, or redness. ¨ Red streaks leading from the wound. ¨ Pus draining from the wound. ¨ A fever. ? · The wound starts to bleed, and blood soaks through the bandage. Oozing small amounts of blood is normal.   ? · You have loose stitches, or your skin graft comes loose. ? Watch closely for any changes in your health, and be sure to contact your doctor if:  ? · The wound is not getting better as expected. Where can you learn more? Go to http://kirit-della.info/. Enter P193 in the search box to learn more about \"Wound Debridement: What to Expect at Home. \"  Current as of: March 20, 2017  Content Version: 11.4  © 2975-5586 [employer]. Care instructions adapted under license by [employer] (which disclaims liability or warranty for this information). If you have questions about a medical condition or this instruction, always ask your healthcare professional. [place] [address] any warranty or liability for your use of this information. Narcotic-Analgesic/Acetaminophen (Percocet, Norco, Lorcet HD, Lortab 10/325) - (By mouth)   Why this medicine is used:   Relieves pain.   Contact a nurse or doctor right away if you have:  · Extreme weakness, shallow breathing, slow heartbeat  · Severe confusion, lightheadedness, dizziness, fainting  · Yellow skin or eyes, dark urine or pale stools  · Severe constipation, severe stomach pain, nausea, vomiting, loss of appetite  · Sweating or cold, clammy skin     Common side effects:  · Mild constipation, nausea, vomiting  · Sleepiness, tiredness  · Itching, rash  © 2017 [address]  Information is for End User's use only and may not be sold, redistributed or otherwise used for commercial purposes. DISCHARGE SUMMARY from Nurse    PATIENT INSTRUCTIONS:    After general anesthesia or intravenous sedation, for 24 hours or while taking prescription Narcotics:  · Limit your activities  · Do not drive and operate hazardous machinery  · Do not make important personal or business decisions  · Do  not drink alcoholic beverages  · If you have not urinated within 8 hours after discharge, please contact your surgeon on call. Report the following to your surgeon:  · Excessive pain, swelling, redness or odor of or around the surgical area  · Temperature over 100.5  · Nausea and vomiting lasting longer than 4 hours or if unable to take medications  · Any signs of decreased circulation or nerve impairment to extremity: change in color, persistent  numbness, tingling, coldness or increase pain  · Any questions    *  Please give a list of your current medications to your Primary Care Provider. *  Please update this list whenever your medications are discontinued, doses are      changed, or new medications (including over-the-counter products) are added. *  Please carry medication information at all times in case of emergency situations. These are general instructions for a healthy lifestyle:    No smoking/ No tobacco products/ Avoid exposure to second hand smoke  Surgeon General's Warning:  Quitting smoking now greatly reduces serious risk to your health.     Obesity, smoking, and sedentary lifestyle greatly increases your risk for illness    A healthy diet, regular physical exercise & weight monitoring are important for maintaining a healthy lifestyle    You may be retaining fluid if you have a history of heart failure or if you experience any of the following symptoms:  Weight gain of 3 pounds or more overnight or 5 pounds in a week, increased swelling in our hands or feet or shortness of breath while lying flat in bed. Please call your doctor as soon as you notice any of these symptoms; do not wait until your next office visit. Recognize signs and symptoms of STROKE:    F-face looks uneven    A-arms unable to move or move unevenly    S-speech slurred or non-existent    T-time-call 911 as soon as signs and symptoms begin-DO NOT go       Back to bed or wait to see if you get better-TIME IS BRAIN. Warning Signs of HEART ATTACK     Call 911 if you have these symptoms:   Chest discomfort. Most heart attacks involve discomfort in the center of the chest that lasts more than a few minutes, or that goes away and comes back. It can feel like uncomfortable pressure, squeezing, fullness, or pain.  Discomfort in other areas of the upper body. Symptoms can include pain or discomfort in one or both arms, the back, neck, jaw, or stomach.  Shortness of breath with or without chest discomfort.  Other signs may include breaking out in a cold sweat, nausea, or lightheadedness. Don't wait more than five minutes to call 911 - MINUTES MATTER! Fast action can save your life. Calling 911 is almost always the fastest way to get lifesaving treatment. Emergency Medical Services staff can begin treatment when they arrive -- up to an hour sooner than if someone gets to the hospital by car. The discharge information has been reviewed with the patient and spouse. The patient and spouse verbalized understanding.   Discharge medications reviewed with the patient and spouse and appropriate educational materials and side effects teaching were provided.   ___________________________________________________________________________________________________________________________________

## 2018-06-12 NOTE — TELEPHONE ENCOUNTER
Patient called to check on her follow up appt. She stated that the nurse today at [facility] told her that Dr. Brooklyn Oh wanted to see her this Friday. I did let her know that he would not be in the office on Friday. I told her that she was scheduled to see Rashel Abdi on 6/19/2018. I wanted to check to see if she indeed needed to come in on Friday to see someone. I also told her that if she did not need to come in earlier than the 19th no one would call her  Just to come in on that date. she said she was doing fine just a little sore but she expected this.

## 2018-06-13 ENCOUNTER — HOME CARE VISIT (OUTPATIENT)
Dept: HOME HEALTH SERVICES | Facility: HOME HEALTH | Age: 68
End: 2018-06-13
Payer: MEDICARE

## 2018-06-13 PROCEDURE — 3331090001 HH PPS REVENUE CREDIT

## 2018-06-13 PROCEDURE — 3331090002 HH PPS REVENUE DEBIT

## 2018-06-14 PROCEDURE — 3331090002 HH PPS REVENUE DEBIT

## 2018-06-14 PROCEDURE — 3331090001 HH PPS REVENUE CREDIT

## 2018-06-15 ENCOUNTER — OFFICE VISIT (OUTPATIENT)
Dept: VASCULAR SURGERY | Age: 68
End: 2018-06-15

## 2018-06-15 ENCOUNTER — HOME CARE VISIT (OUTPATIENT)
Dept: HOME HEALTH SERVICES | Facility: HOME HEALTH | Age: 68
End: 2018-06-15
Payer: MEDICARE

## 2018-06-15 DIAGNOSIS — T81.89XD NON-HEALING SURGICAL WOUND, SUBSEQUENT ENCOUNTER: Primary | ICD-10-CM

## 2018-06-15 PROCEDURE — 3331090001 HH PPS REVENUE CREDIT

## 2018-06-15 PROCEDURE — 3331090002 HH PPS REVENUE DEBIT

## 2018-06-15 RX ORDER — SULFAMETHOXAZOLE AND TRIMETHOPRIM 800; 160 MG/1; MG/1
1 TABLET ORAL 2 TIMES DAILY
Qty: 14 TAB | Refills: 0 | Status: SHIPPED | OUTPATIENT
Start: 2018-06-15 | End: 2018-06-23

## 2018-06-15 NOTE — PROGRESS NOTES
Patient had right flank wound washed out on Tuesday and being seen for wound assessment this visit. Removed dressing from right flank wound and there was small amount purulent drainage with foul odor noted but no redness and no swelling and only slight opening noted due to loose sutures that were applied to close wound. Palpated all around wound and only very slight oozing of drainage noted. Patient states did have low grade fever last night, 100.6. Contacted Dr. Brooklyn Oh and have decided to change patient's antibiotic. Discontinued Clindmyacin and ordered Bactrim DS BID for 7 days. Cleansed wound with wound cleanser and gauze, applied adaptic gauze, ABD and secured with tape. Patient's  will change dressing twice a day until patient returns on Tuesday for reassessment. Reviewed s/s infection and advised patient if there are any problems to please call the office or go to ED if has to and/ or may come in office on Monday instead of Tuesday. Patient stated understood.

## 2018-06-16 PROCEDURE — 3331090001 HH PPS REVENUE CREDIT

## 2018-06-16 PROCEDURE — 3331090002 HH PPS REVENUE DEBIT

## 2018-06-17 PROCEDURE — 3331090001 HH PPS REVENUE CREDIT

## 2018-06-17 PROCEDURE — 3331090002 HH PPS REVENUE DEBIT

## 2018-06-18 ENCOUNTER — HOME CARE VISIT (OUTPATIENT)
Dept: HOME HEALTH SERVICES | Facility: HOME HEALTH | Age: 68
End: 2018-06-18
Payer: MEDICARE

## 2018-06-18 PROCEDURE — 3331090001 HH PPS REVENUE CREDIT

## 2018-06-18 PROCEDURE — 3331090002 HH PPS REVENUE DEBIT

## 2018-06-18 RX ORDER — CLOPIDOGREL BISULFATE 75 MG/1
TABLET ORAL
Qty: 30 TAB | Refills: 10 | Status: SHIPPED | OUTPATIENT
Start: 2018-06-18 | End: 2019-05-16 | Stop reason: SDUPTHER

## 2018-06-19 ENCOUNTER — OFFICE VISIT (OUTPATIENT)
Dept: VASCULAR SURGERY | Age: 68
End: 2018-06-19

## 2018-06-19 ENCOUNTER — HOSPITAL ENCOUNTER (OUTPATIENT)
Dept: LAB | Age: 68
Discharge: HOME | End: 2018-06-19
Payer: MEDICARE

## 2018-06-19 ENCOUNTER — TELEPHONE (OUTPATIENT)
Dept: PULMONOLOGY | Age: 68
End: 2018-06-19

## 2018-06-19 VITALS
SYSTOLIC BLOOD PRESSURE: 124 MMHG | HEART RATE: 84 BPM | RESPIRATION RATE: 17 BRPM | HEIGHT: 65 IN | DIASTOLIC BLOOD PRESSURE: 60 MMHG | BODY MASS INDEX: 21.99 KG/M2 | WEIGHT: 132 LBS

## 2018-06-19 LAB
BASOPHILS # BLD: 0 K/UL (ref 0–0.1)
BASOPHILS NFR BLD: 0 % (ref 0–2)
DIFFERENTIAL METHOD BLD: ABNORMAL
EOSINOPHIL # BLD: 0.2 K/UL (ref 0–0.4)
EOSINOPHIL NFR BLD: 1 % (ref 0–5)
ERYTHROCYTE [DISTWIDTH] IN BLOOD BY AUTOMATED COUNT: 14.2 % (ref 11.6–14.5)
HCT VFR BLD AUTO: 40.3 % (ref 35–45)
HGB BLD-MCNC: 13.3 G/DL (ref 12–16)
LYMPHOCYTES # BLD: 3.9 K/UL (ref 0.9–3.6)
LYMPHOCYTES NFR BLD: 32 % (ref 21–52)
MCH RBC QN AUTO: 29.5 PG (ref 24–34)
MCHC RBC AUTO-ENTMCNC: 33 G/DL (ref 31–37)
MCV RBC AUTO: 89.4 FL (ref 74–97)
MONOCYTES # BLD: 0.9 K/UL (ref 0.05–1.2)
MONOCYTES NFR BLD: 7 % (ref 3–10)
NEUTS SEG # BLD: 7.2 K/UL (ref 1.8–8)
NEUTS SEG NFR BLD: 60 % (ref 40–73)
PLATELET # BLD AUTO: 497 K/UL (ref 135–420)
PMV BLD AUTO: 9.8 FL (ref 9.2–11.8)
RBC # BLD AUTO: 4.51 M/UL (ref 4.2–5.3)
WBC # BLD AUTO: 12.1 K/UL (ref 4.6–13.2)

## 2018-06-19 PROCEDURE — 3331090001 HH PPS REVENUE CREDIT

## 2018-06-19 PROCEDURE — 36415 COLL VENOUS BLD VENIPUNCTURE: CPT | Performed by: PHYSICIAN ASSISTANT

## 2018-06-19 PROCEDURE — 85025 COMPLETE CBC W/AUTO DIFF WBC: CPT | Performed by: PHYSICIAN ASSISTANT

## 2018-06-19 PROCEDURE — 87077 CULTURE AEROBIC IDENTIFY: CPT | Performed by: PHYSICIAN ASSISTANT

## 2018-06-19 PROCEDURE — 3331090002 HH PPS REVENUE DEBIT

## 2018-06-19 PROCEDURE — 87186 SC STD MICRODIL/AGAR DIL: CPT | Performed by: PHYSICIAN ASSISTANT

## 2018-06-19 PROCEDURE — 87070 CULTURE OTHR SPECIMN AEROBIC: CPT | Performed by: PHYSICIAN ASSISTANT

## 2018-06-19 NOTE — H&P
Surgery History and Physical    Subjective:      Isaiah Mars is a 77 y.o.  female who presents with chronic wound. She has an axillofemoral femorofemoral bypass also left femoropopliteal bypass.  She has had this persistent local ulceration on her right flank. Darci Grace has had multiple simple and complex attempts to cover this ulcerative area.  It is open and draining today.  she has already had 2 recent wound revisions and I&D. Now we see nub of old bypass which is creating persistent drainage and poor wound healing.  Will attempt to further excise and debride wound again    Patient Active Problem List    Diagnosis Date Noted    Nonhealing surgical wound 04/30/2018    Infection of vascular bypass graft ([address] 04/30/2018    CAP (community acquired pneumonia) 06/27/2017    Severe sepsis ([address] 06/27/2017    Arteriovenous graft infection ([address] 05/10/2017    Abscess 05/10/2017    Dermal sinus tract of lumbosacral region 05/10/2017    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[address] 08/09/2016    Chronic aorto-iliac occlusion syndrome ([address] 01/19/2016    Wound disruption, post-op, skin 01/09/2015    Mass of breast, left 07/30/2014    HTN (hypertension) 04/01/2013    PVD (peripheral vascular disease) ([address] 04/01/2013    Crohn's disease ([address] 04/01/2013     Past Medical History:   Diagnosis Date    Arthritis     CAP (community acquired pneumonia) 06/27/2017    Chronic lung disease     Claudication ([address]     left leg    Crohn's disease ([address]     Crohn's disease ([address]     GERD (gastroesophageal reflux disease)     HTN (hypertension)     Nausea & vomiting     Other and unspecified symptoms and signs involving general sensations and perceptions     PVD    Other ill-defined conditions(799.89)     Crohn's    Peripheral neuropathy     Pulmonary emphysema ([address]     PVD (peripheral vascular disease) ([address]     right leg    Sepsis ([address] 06/27/2017    Vitamin B12 deficiency       Past Surgical History: Procedure Laterality Date    BYPASS GRAFT OTHR,AXILL-FEM Right 4/19/2013    BYPASS GRAFT OTHR,FEM-POP Left 5/2013    FOOT/TOES SURGERY PROC UNLISTED      HX APPENDECTOMY      HX BREAST LUMPECTOMY Left     breast left- precancerous    HX BUNIONECTOMY      left eye    HX CATARACT REMOVAL      bilateral    HX CHOLECYSTECTOMY      HX ORTHOPAEDIC      lateral epicondilitis on right    HX OTHER SURGICAL  3/7/2013    catheter into aorta    HX OTHER SURGICAL      intestional resection x4    HX OTHER SURGICAL  9/2013    I & D Right chest/flank wall abscess vs granuloma    UPPER ARM/ELBOW SURGERY UNLISTED        Social History   Substance Use Topics    Smoking status: Current Every Day Smoker     Packs/day: 1.00     Years: 45.00     Types: Cigarettes    Smokeless tobacco: Never Used      Comment: second hand smoke exposure prior to active smoking    Alcohol use No      Family History   Problem Relation Age of Onset    Coronary Artery Disease Mother     Heart Attack Mother     Heart Surgery Mother      CABGx3    Elevated Lipids Mother     COPD Father     Heart Attack Brother     COPD Brother     Other Brother      PAD      Prior to Admission medications    Medication Sig Start Date End Date Taking? Authorizing Provider   clopidogrel (PLAVIX) 75 mg tab TAKE ONE TABLET BY MOUTH DAILY 6/18/18   Arpit Bull MD   trimethoprim-sulfamethoxazole (BACTRIM DS, SEPTRA DS) 160-800 mg per tablet Take 1 Tab by mouth two (2) times a day. 6/15/18   Migdalia Carpio MD   HYDROcodone-acetaminophen [facility]) 5-325 mg per tablet Take 1 Tab by mouth every four (4) hours as needed for Pain. Max Daily Amount: 6 Tabs. 6/12/18   Migdalia Carpio MD   fluticasone-umeclidin-vilanter (TRELEGY ELLIPTA) 100-62.5-25 mcg dsdv Take 1 Inhalation by inhalation daily. 5/24/18   Jay Sanchez MD   fluticasone-umeclidin-vilanter (TRELEGY ELLIPTA) 100-62.5-25 mcg dsdv Take 1 Inhalation by inhalation daily.  5/24/18   Jay Sanchez MD pregabalin (LYRICA) 100 mg capsule Take  by mouth two (2) times a day. Takes 100 mg in am and 200 in the pm    Historical Provider   OXYGEN-AIR DELIVERY SYSTEMS 2 L by Does Not Apply route. Every HS and as needed    Historical Provider   ipratropium-albuterol (COMBIVENT RESPIMAT)  mcg/actuation inhaler Take 1 Puff by inhalation every six (6) hours as needed for Wheezing or Shortness of Breath. Patient taking differently: Take 1 Puff by inhalation two (2) times a day. 7/1/17   Salina Mcginnis MD   DULoxetine (CYMBALTA) 60 mg capsule Take 60 mg by mouth nightly. Historical Provider   aspirin 81 mg tablet Take 81 mg by mouth daily. Historical Provider   cyanocobalamin (VITAMIN B-12) 1,000 mcg/mL injection 1,000 mcg by IntraMUSCular route every fourteen (14) days. Historical Provider   loperamide (IMMODIUM) 2 mg tablet Take 2 mg by mouth daily as needed for Diarrhea. Historical Provider   dicyclomine (BENTYL) 10 mg capsule Take 10 mg by mouth two (2) times a day. Historical Provider   triamterene-hydrochlorothiazide (DYAZIDE) 37.5-25 mg per capsule Take 1 Cap by mouth daily. Historical Provider   bimatoprost (LUMIGAN) 0.03 % ophthalmic drops Administer 1 Drop to both eyes every evening. Historical Provider   atropine-PHENobarbital-scopolamine-hyoscyamine [facility]) 16.2-0.1037 -0.0194 mg per tablet Take 1 Tab by mouth as needed (diarrhea). Indications: Irritable Bowel Syndrome    Historical Provider   inFLIXimab (REMICADE) 100 mg injection 5 mg/kg by IntraVENous route once.  Every 4 weeks    Historical Provider     Allergies   Allergen Reactions    Codeine Nausea and Vomiting     Other reaction(s): other/intolerance    Darvon [Propoxyphene] Itching    Demerol [Meperidine] Other (comments)     Halucinations    Keflex [Cephalexin] Diarrhea    Talwin [Pentazocine Lactate] Shortness of Breath and Nausea and Vomiting         Review of Systems:    Pertinent items are noted in the History of Present Illness. Objective:     No data found.       Temp (24hrs), Av °F (-17.8 °C), Min:, Max:      Physical Exam:  GENERAL: alert, cooperative, no distress, appears stated age, LUNG: clear to auscultation bilaterally, HEART: regular rate and rhythm, S1, S2 normal, no murmur, click, rub or gallop, SKIN: right flank wound      Assessment:     Chronic right flank wound    Plan:     Debride right flank wound    Gave abx today  Explore wound today    Signed By: TIMMY Beverly     2018

## 2018-06-19 NOTE — TELEPHONE ENCOUNTER
Pulmonary Rehab called patient and spoke to her about the program. She is still in [address] and wants a call in a few weeks. Will follow up at that time.      Thank you,  Bridgette Pinto

## 2018-06-20 ENCOUNTER — HOME CARE VISIT (OUTPATIENT)
Dept: SCHEDULING | Facility: HOME HEALTH | Age: 68
End: 2018-06-20
Payer: MEDICARE

## 2018-06-20 ENCOUNTER — ANESTHESIA EVENT (OUTPATIENT)
Dept: CARDIOTHORACIC SURGERY | Age: 68
End: 2018-06-20
Payer: MEDICARE

## 2018-06-20 PROCEDURE — 3331090002 HH PPS REVENUE DEBIT

## 2018-06-20 PROCEDURE — G0299 HHS/HOSPICE OF RN EA 15 MIN: HCPCS

## 2018-06-20 PROCEDURE — 3331090001 HH PPS REVENUE CREDIT

## 2018-06-21 ENCOUNTER — HOSPITAL ENCOUNTER (OUTPATIENT)
Age: 68
Setting detail: OUTPATIENT SURGERY
Discharge: HOME | End: 2018-06-21
Attending: SURGERY | Admitting: SURGERY
Payer: MEDICARE

## 2018-06-21 ENCOUNTER — ANESTHESIA (OUTPATIENT)
Dept: CARDIOTHORACIC SURGERY | Age: 68
End: 2018-06-21
Payer: MEDICARE

## 2018-06-21 VITALS
HEART RATE: 75 BPM | HEIGHT: 65 IN | BODY MASS INDEX: 21.99 KG/M2 | SYSTOLIC BLOOD PRESSURE: 108 MMHG | WEIGHT: 132 LBS | OXYGEN SATURATION: 95 % | RESPIRATION RATE: 12 BRPM | DIASTOLIC BLOOD PRESSURE: 67 MMHG | TEMPERATURE: 96.8 F

## 2018-06-21 DIAGNOSIS — S31.109S CHRONIC WOUND INFECTION OF ABDOMEN, SEQUELA: Primary | ICD-10-CM

## 2018-06-21 DIAGNOSIS — L08.9 CHRONIC WOUND INFECTION OF ABDOMEN, SEQUELA: Primary | ICD-10-CM

## 2018-06-21 PROBLEM — S31.109A CHRONIC WOUND INFECTION OF ABDOMEN: Status: ACTIVE | Noted: 2018-06-21

## 2018-06-21 PROCEDURE — 74011250636 HC RX REV CODE- 250/636: Performed by: SURGERY

## 2018-06-21 PROCEDURE — 74011000250 HC RX REV CODE- 250: Performed by: PHYSICIAN ASSISTANT

## 2018-06-21 PROCEDURE — 77030018836 HC SOL IRR NACL ICUM -A: Performed by: SURGERY

## 2018-06-21 PROCEDURE — 76010000112 HC CV SURG 0.5 TO 1 HR: Performed by: SURGERY

## 2018-06-21 PROCEDURE — 77030011265 HC ELECTRD BLD HEX COVD -A: Performed by: SURGERY

## 2018-06-21 PROCEDURE — 74011000258 HC RX REV CODE- 258: Performed by: PHYSICIAN ASSISTANT

## 2018-06-21 PROCEDURE — 76060000032 HC ANESTHESIA 0.5 TO 1 HR: Performed by: SURGERY

## 2018-06-21 PROCEDURE — 74011250636 HC RX REV CODE- 250/636

## 2018-06-21 PROCEDURE — 74011250636 HC RX REV CODE- 250/636: Performed by: NURSE ANESTHETIST, CERTIFIED REGISTERED

## 2018-06-21 PROCEDURE — 3331090001 HH PPS REVENUE CREDIT

## 2018-06-21 PROCEDURE — 77030020782 HC GWN BAIR PAWS FLX 3M -B: Performed by: SURGERY

## 2018-06-21 PROCEDURE — 3331090002 HH PPS REVENUE DEBIT

## 2018-06-21 PROCEDURE — 77030010509 HC AIRWY LMA MSK TELE -A: Performed by: ANESTHESIOLOGY

## 2018-06-21 PROCEDURE — 77030013708 HC HNDPC SUC IRR PULS STRY –B: Performed by: SURGERY

## 2018-06-21 PROCEDURE — 76210000006 HC OR PH I REC 0.5 TO 1 HR: Performed by: SURGERY

## 2018-06-21 PROCEDURE — 76210000021 HC REC RM PH II 0.5 TO 1 HR: Performed by: SURGERY

## 2018-06-21 PROCEDURE — 77030011640 HC PAD GRND REM COVD -A: Performed by: SURGERY

## 2018-06-21 PROCEDURE — 74011250637 HC RX REV CODE- 250/637: Performed by: NURSE ANESTHETIST, CERTIFIED REGISTERED

## 2018-06-21 PROCEDURE — 88304 TISSUE EXAM BY PATHOLOGIST: CPT | Performed by: SURGERY

## 2018-06-21 PROCEDURE — 88305 TISSUE EXAM BY PATHOLOGIST: CPT | Performed by: SURGERY

## 2018-06-21 RX ORDER — FENTANYL CITRATE 50 UG/ML
25 INJECTION, SOLUTION INTRAMUSCULAR; INTRAVENOUS AS NEEDED
Status: DISCONTINUED | OUTPATIENT
Start: 2018-06-21 | End: 2018-06-21 | Stop reason: HOSPADM

## 2018-06-21 RX ORDER — ONDANSETRON 2 MG/ML
4 INJECTION INTRAMUSCULAR; INTRAVENOUS ONCE
Status: DISCONTINUED | OUTPATIENT
Start: 2018-06-21 | End: 2018-06-21 | Stop reason: HOSPADM

## 2018-06-21 RX ORDER — LIDOCAINE HYDROCHLORIDE 10 MG/ML
0.1 INJECTION, SOLUTION EPIDURAL; INFILTRATION; INTRACAUDAL; PERINEURAL AS NEEDED
Status: DISCONTINUED | OUTPATIENT
Start: 2018-06-21 | End: 2018-06-21 | Stop reason: HOSPADM

## 2018-06-21 RX ORDER — HYDROCODONE BITARTRATE AND ACETAMINOPHEN 5; 325 MG/1; MG/1
1-2 TABLET ORAL
Qty: 40 TAB | Refills: 0 | Status: SHIPPED | OUTPATIENT
Start: 2018-06-21 | End: 2019-09-05

## 2018-06-21 RX ORDER — SODIUM CHLORIDE, SODIUM LACTATE, POTASSIUM CHLORIDE, CALCIUM CHLORIDE 600; 310; 30; 20 MG/100ML; MG/100ML; MG/100ML; MG/100ML
75 INJECTION, SOLUTION INTRAVENOUS CONTINUOUS
Status: DISCONTINUED | OUTPATIENT
Start: 2018-06-21 | End: 2018-06-21 | Stop reason: HOSPADM

## 2018-06-21 RX ORDER — SODIUM CHLORIDE 0.9 % (FLUSH) 0.9 %
5-10 SYRINGE (ML) INJECTION EVERY 8 HOURS
Status: DISCONTINUED | OUTPATIENT
Start: 2018-06-21 | End: 2018-06-21 | Stop reason: HOSPADM

## 2018-06-21 RX ORDER — SODIUM CHLORIDE 0.9 % (FLUSH) 0.9 %
5-10 SYRINGE (ML) INJECTION AS NEEDED
Status: DISCONTINUED | OUTPATIENT
Start: 2018-06-21 | End: 2018-06-21 | Stop reason: HOSPADM

## 2018-06-21 RX ORDER — INSULIN LISPRO 100 [IU]/ML
INJECTION, SOLUTION INTRAVENOUS; SUBCUTANEOUS ONCE
Status: DISCONTINUED | OUTPATIENT
Start: 2018-06-21 | End: 2018-06-21 | Stop reason: HOSPADM

## 2018-06-21 RX ORDER — LIDOCAINE HYDROCHLORIDE 10 MG/ML
INJECTION, SOLUTION EPIDURAL; INFILTRATION; INTRACAUDAL; PERINEURAL
Status: DISCONTINUED
Start: 2018-06-21 | End: 2018-06-21 | Stop reason: HOSPADM

## 2018-06-21 RX ORDER — PROPOFOL 10 MG/ML
INJECTION, EMULSION INTRAVENOUS AS NEEDED
Status: DISCONTINUED | OUTPATIENT
Start: 2018-06-21 | End: 2018-06-21 | Stop reason: HOSPADM

## 2018-06-21 RX ORDER — DIPHENHYDRAMINE HYDROCHLORIDE 50 MG/ML
12.5 INJECTION, SOLUTION INTRAMUSCULAR; INTRAVENOUS
Status: DISCONTINUED | OUTPATIENT
Start: 2018-06-21 | End: 2018-06-21 | Stop reason: HOSPADM

## 2018-06-21 RX ORDER — FENTANYL CITRATE 50 UG/ML
INJECTION, SOLUTION INTRAMUSCULAR; INTRAVENOUS AS NEEDED
Status: DISCONTINUED | OUTPATIENT
Start: 2018-06-21 | End: 2018-06-21 | Stop reason: HOSPADM

## 2018-06-21 RX ORDER — FAMOTIDINE 20 MG/1
20 TABLET, FILM COATED ORAL ONCE
Status: COMPLETED | OUTPATIENT
Start: 2018-06-21 | End: 2018-06-21

## 2018-06-21 RX ORDER — NALBUPHINE HYDROCHLORIDE 10 MG/ML
5 INJECTION, SOLUTION INTRAMUSCULAR; INTRAVENOUS; SUBCUTANEOUS
Status: DISCONTINUED | OUTPATIENT
Start: 2018-06-21 | End: 2018-06-21 | Stop reason: HOSPADM

## 2018-06-21 RX ADMIN — SODIUM CHLORIDE 600 MG: 900 INJECTION, SOLUTION INTRAVENOUS at 12:54

## 2018-06-21 RX ADMIN — PROPOFOL 100 MG: 10 INJECTION, EMULSION INTRAVENOUS at 12:37

## 2018-06-21 RX ADMIN — FENTANYL CITRATE 50 MCG: 50 INJECTION, SOLUTION INTRAMUSCULAR; INTRAVENOUS at 12:40

## 2018-06-21 RX ADMIN — FENTANYL CITRATE 50 MCG: 50 INJECTION, SOLUTION INTRAMUSCULAR; INTRAVENOUS at 12:42

## 2018-06-21 RX ADMIN — SODIUM CHLORIDE 1 G: 900 INJECTION, SOLUTION INTRAVENOUS at 12:28

## 2018-06-21 RX ADMIN — SODIUM CHLORIDE, SODIUM LACTATE, POTASSIUM CHLORIDE, AND CALCIUM CHLORIDE 75 ML/HR: 600; 310; 30; 20 INJECTION, SOLUTION INTRAVENOUS at 10:58

## 2018-06-21 RX ADMIN — FENTANYL CITRATE 25 MCG: 50 INJECTION INTRAMUSCULAR; INTRAVENOUS at 13:32

## 2018-06-21 RX ADMIN — FAMOTIDINE 20 MG: 20 TABLET, FILM COATED ORAL at 10:46

## 2018-06-21 NOTE — PERIOP NOTES
Per Dr. Renato Garcia pt may resume Plavix today, this information was relayed to LAWANDA Montana RN.

## 2018-06-21 NOTE — PERIOP NOTES
Patient armband removed and shredded. Patient confirmed by two identifiers with discharge instructions prior too being provided to patient and spouse.

## 2018-06-21 NOTE — OP NOTES
[facility] REPORT    Name:Pete DAILEY  MR#: [id number]  : 1950  ACCOUNT #: [de-identified]   DATE OF SERVICE: 2018    SURGEON:  Alexia Baca DO     ASSISTANT:  0    PREOPERATIVE DIAGNOSIS:  Right flank wound. POSTOPERATIVE DIAGNOSIS:  Right flank wound. PROCEDURE PERFORMED:  Excisional debridement, right flank wound, including retained bypass graft. COMPLICATIONS:  Zero. ESTIMATED BLOOD LOSS:  Zero. CONDITION OF THE PATIENT:  Stable. ANESTHESIA:  General.    IMPLANTS:  None. SPECIMENS REMOVED: retained graft     DETAILS OF PROCEDURE:  The patient is a 70-year-old female with ax-fem bypass. She had had a complex local abscess of her portion of the graft that was excised. There were some portions of isolated noncirculated graft that were coming to the surface. She had a preop antibiotic, was taken back to the room where she had general anesthesia. There was a ring on the superior aspect that easily came out. On the inferior aspect there is a 2 cm piece of graft that came out very easily. I then used a 10 blade scalpel and excised fixed eschar, including soft tissue only. No muscle or bone was removed. I irrigated out with a liter of pulse lavage. All loose debris was removed. The dimensions of the open wound were 3.5 cm x 2 cm with some tunneling superiorly and inferiorly. I packed this with saline wet to dry. She was extubated and transferred to recovery in stable condition.       DO PATRICIA Paige / Alireza Major  D: 2018 14:22     T: 2018 14:55  JOB #: [id number]

## 2018-06-21 NOTE — IP AVS SNAPSHOT
[address] [address] 
[phone] Patient: Urbano Chauhan 
MRN: [id number] About your hospitalization You were admitted on:  2018 You last received care in the:  [facility] PACU You were discharged on:  2018 Why you were hospitalized Your primary diagnosis was:  Not on File Your diagnoses also included:  Chronic Wound Infection Of Abdomen Follow-up Information Follow up With Details Comments Contact Info [address] [place]   [address] [address] [address] 
[phone] Morgan Laguna MD  Follow up as scheduled [address] [facility] [address] [facility] 
[phone] Your Scheduled Appointments 2018 To Be Determined ROUTINE with SPENCER [facility] HOME CARE SCHEDULING/INTAKE ([facility]) [address] CARE SCHEDULING/INTAKE ([facility]) 2018 To Be Determined ROUTINE with SPENCER [facility] HOME CARE SCHEDULING/INTAKE ([facility] [address] CARE SCHEDULING/INTAKE ([facility]) 2018 To Be Determined ROUTINE with SPENC[facility] HOME CARE SCHEDULING/INTAKE ([facility]) [address] CARE SCHEDULING/INTAKE ([facility]) 2018 To Be Determined RECERTIFICATION with SPENC[facility] [address] CARE SCHEDULING/INTAKE ([facility]) [address] CARE SCHEDULING/INTAKE ([facility]) 2018  9:00 AM EDT HOSPITAL DISCHARGE with Morgan Laguna MD  
 [facility] ([address]) [address] [facility] [address] [address] [address] [facility]  
[phone] Discharge Orders None A check wan indicates which time of day the medication should be taken. My Medications CHANGE how you take these medications Instructions Each Dose to Equal  
 Morning Noon Evening Bedtime * HYDROcodone-acetaminophen 5-325 mg per tablet Commonly known as:  Isaura Blackburn What changed:  Another medication with the same name was added. Make sure you understand how and when to take each. Take 1 Tab by mouth every four (4) hours as needed for Pain. Max Daily Amount: 6 Tabs. 1 Tab  
    
   
   
   
  
 * HYDROcodone-acetaminophen 5-325 mg per tablet Commonly known as:  Isaura Blackburn What changed: You were already taking a medication with the same name, and this prescription was added. Make sure you understand how and when to take each. Take 1-2 Tabs by mouth every four (4) hours as needed for Pain. Max Daily Amount: 12 Tabs. 1-2 Tab  
    
   
   
   
  
 ipratropium-albuterol  mcg/actuation inhaler Commonly known as:  Esperanza Cates What changed:  when to take this Take 1 Puff by inhalation every six (6) hours as needed for Wheezing or Shortness of Breath. 1 Puff * Notice: This list has 2 medication(s) that are the same as other medications prescribed for you. Read the directions carefully, and ask your doctor or other care provider to review them with you. CONTINUE taking these medications Instructions Each Dose to Equal  
 Morning Noon Evening Bedtime  
 aspirin 81 mg tablet Take 81 mg by mouth daily. 81 mg  
    
   
   
   
  
 clopidogrel 75 mg Tab Commonly known as:  PLAVIX Notes to Patient:  May resume today as instructed by Dr. Katerina Flor TAKE ONE TABLET BY MOUTH DAILY  
     
   
   
   
  
 CYMBALTA 60 mg capsule Generic drug:  DULoxetine Take 60 mg by mouth nightly. 60 mg  
    
   
   
   
  
 dicyclomine 10 mg capsule Commonly known as:  BENTYL Take 10 mg by mouth two (2) times a day. 10 mg DONNATAL 16.2-0.1037 -0.0194 mg per tablet Generic drug:  atropine-PHENobarbital-scopolamine-hyoscyamine Take 1 Tab by mouth as needed (diarrhea). Indications: Irritable Bowel Syndrome 1 Tab DYAZIDE 37.5-25 mg per capsule Generic drug:  triamterene-hydroCHLOROthiazide Take 1 Cap by mouth daily. 1 Cap * fluticasone-umeclidin-vilanter 100-62.5-25 mcg Dsdv Commonly known as:  Marnell Fries Take 1 Inhalation by inhalation daily. 1 Inhalation * fluticasone-umeclidin-vilanter 100-62.5-25 mcg Dsdv Commonly known as:  Marnell Fries Take 1 Inhalation by inhalation daily. 1 Inhalation  
    
   
   
   
  
 loperamide 2 mg tablet Commonly known as:  IMMODIUM Take 2 mg by mouth daily as needed for Diarrhea.  
 2 mg LUMIGAN 0.03 % ophthalmic drops Generic drug:  bimatoprost  
   
 Administer 1 Drop to both eyes every evening. 1 Drop LYRICA 100 mg capsule Generic drug:  pregabalin Take  by mouth two (2) times a day. Takes 100 mg in am and 200 in the pm  
     
   
   
   
  
 OXYGEN-AIR DELIVERY SYSTEMS  
   
 2 L by Does Not Apply route. Every HS and as needed 2 L  
    
   
   
   
  
 REMICADE 100 mg injection Generic drug:  inFLIXimab  
   
 5 mg/kg by IntraVENous route once. Every 4 weeks 5 mg/kg  
    
   
   
   
  
 trimethoprim-sulfamethoxazole 160-800 mg per tablet Commonly known as:  BACTRIM DS, SEPTRA DS Take 1 Tab by mouth two (2) times a day. 1 Tab VITAMIN B-12 1,000 mcg/mL injection Generic drug:  cyanocobalamin 1,000 mcg by IntraMUSCular route every fourteen (14) days. 1000 mcg * Notice: This list has 2 medication(s) that are the same as other medications prescribed for you. Read the directions carefully, and ask your doctor or other care provider to review them with you. Where to Get Your Medications Information on where to get these meds will be given to you by the nurse or doctor. ! Ask your nurse or doctor about these medications HYDROcodone-acetaminophen 5-325 mg per tablet Opioid Education Prescription Opioids: What You Need to Know: 
 
Prescription opioids can be used to help relieve moderate-to-severe pain and are often prescribed following a surgery or injury, or for certain health conditions. These medications can be an important part of treatment but also come with serious risks. Opioids are strong pain medicines. Examples include hydrocodone, oxycodone, fentanyl, and morphine. Heroin is an example of an illegal opioid. It is important to work with your health care provider to make sure you are getting the safest, most effective care. WHAT ARE THE RISKS AND SIDE EFFECTS OF OPIOID USE? Prescription opioids carry serious risks of addiction and overdose, especially with prolonged use. An opioid overdose, often marked by slow breathing, can cause sudden death. The use of prescription opioids can have a number of side effects as well, even when taken as directed. · Tolerance-meaning you might need to take more of a medication for the same pain relief · Physical dependence-meaning you have symptoms of withdrawal when the medication is stopped. Withdrawal symptoms can include nausea, sweating, chills, diarrhea, stomach cramps, and muscle aches. Withdrawal can last up to several weeks, depending on which drug you took and how long you took it. · Increased sensitivity to pain · Constipation · Nausea, vomiting, and dry mouth · Sleepiness and dizziness · Confusion · Depression · Low levels of testosterone that can result in lower sex drive, energy, and strength · Itching and sweating RISKS ARE GREATER WITH:      
· History of drug misuse, substance use disorder, or overdose · Mental health conditions (such as depression or anxiety) · Sleep apnea · Older age (72 years or older) · Pregnancy Avoid alcohol while taking prescription opioids. Also, unless specifically advised by your health care provider, medications to avoid include: · Benzodiazepines (such as Xanax or Valium) · Muscle relaxants (such as Soma or Flexeril) · Hypnotics (such as Ambien or Lunesta) · Other prescription opioids KNOW YOUR OPTIONS Talk to your health care provider about ways to manage your pain that don't involve prescription opioids. Some of these options may actually work better and have fewer risks and side effects. Options may include: 
· Pain relievers such as acetaminophen, ibuprofen, and naproxen · Some medications that are also used for depression or seizures · Physical therapy and exercise · Counseling to help patients learn how to cope better with triggers of pain and stress. · Application of heat or cold compress · Massage therapy · Relaxation techniques Be Informed Make sure you know the name of your medication, how much and how often to take it, and its potential risks & side effects. IF YOU ARE PRESCRIBED OPIOIDS FOR PAIN: 
· Never take opioids in greater amounts or more often than prescribed. Remember the goal is not to be pain-free but to manage your pain at a tolerable level. · Follow up with your primary care provider to: · Work together to create a plan on how to manage your pain. · Talk about ways to help manage your pain that don't involve prescription opioids. · Talk about any and all concerns and side effects. · Help prevent misuse and abuse. · Never sell or share prescription opioids · Help prevent misuse and abuse. · Store prescription opioids in a secure place and out of reach of others (this may include visitors, children, friends, and family). · Safely dispose of unused/unwanted prescription opioids: Find your community drug take-back program or your pharmacy mail-back program, or flush them down the toilet, following guidance from the Food and Drug Administration (www.fda.gov/Drugs/ResourcesForYou). · Visit www.cdc.gov/drugoverdose to learn about the risks of opioid abuse and overdose. · If you believe you may be struggling with addiction, tell your health care provider and ask for guidance or call [employer] at 2-929-996-DPGV. Discharge Instructions Wound Debridement: What to Expect at [facility] Your Recovery After surgery to remove debris or dead tissue from your wound (debridement), you can expect some pain and swelling around your wound. This should get better within a few days after the procedure. You may have a bandage or a moist dressing over your wound. Your doctor will let you know how long to keep it on and how often to change it. The amount of time it will take for your wound to heal depends on how serious your wound is and whether you have any other health problems that may slow healing. You may need to have the wound debrided again. How soon you can return to work and your normal routine depends on the type of work you do and how you feel. For example, if your wound is on your arm and your job requires you to do heavy lifting, you may need to wait until your wound has healed before you go back to work. This care sheet gives you a general idea about how long it will take for you to recover. But each person recovers at a different pace. Follow the steps below to feel better as quickly as possible. How can you care for yourself at home? Activity ? · Rest when you feel tired. Getting enough sleep will help you recover. ? · Avoid activities that put stress on the affected body part until your doctor says it's okay. ? · Change positions often to keep pressure off your wound, and spread your body weight evenly with cushions, mattresses, foam wedges, or other pressure-relieving devices. ? · If your wound is on your leg or foot, you may have to use crutches, a supportive boot, or a fitted shoe to keep pressure off your wound. If you need crutches, it may help to use a backpack or wear clothes with a lot of pockets to carry items. ? · Do not shower for at least 24 hours after the procedure or for as long as your doctor tells you to. When you shower, keep your dressing and wound dry. ? · Do not take a bath, swim, use a hot tub, or soak your affected body part until your wound has healed. Diet ? · You can eat your normal diet. If your stomach is upset, try bland, low-fat foods like plain rice, broiled chicken, toast, and yogurt. ? · Eat a well-balanced diet with enough protein to help the wound heal. Protein is a mckenzie nutrient in helping to repair damaged tissue and promote new tissue growth. Good sources of protein are milk, yogurt, cheese, meat, and beans. Medicines ? · Your doctor will tell you if and when you can restart your medicines. He or she will also give you instructions about taking any new medicines. ? · If you take blood thinners, such as warfarin (Coumadin), clopidogrel (Plavix), or aspirin, be sure to talk to your doctor. He or she will tell you if and when to start taking those medicines again. Make sure that you understand exactly what your doctor wants you to do. ? · Take pain medicines exactly as directed. ¨ If the doctor gave you a prescription medicine for pain, take it as prescribed. ¨ If you are not taking a prescription pain medicine, ask your doctor if you can take an over-the-counter medicine. ? · If you think your pain medicine is making you sick to your stomach: 
¨ Take your medicine after meals (unless your doctor has told you not to). ¨ Ask your doctor for a different pain medicine. ? · If your doctor prescribed antibiotics, take them as directed. Do not stop taking them just because you feel better. You need to take the full course of antibiotics. ? · If your doctor prescribed an antibiotic ointment that you put on the wound, use it as directed. ? Wound care ? · A moist dressing may cover your wound. A dressing helps the wound heal and protects it. Your doctor will tell you how to take care of this. ? · If you had a skin graft, you may have a bandage that is stitched over the graft. Your doctor will remove the bandage and stitches. Other instructions ? · Don't smoke. Smoking dries out the skin, reduces blood flow to the skin, and slows healing. If you need help quitting, talk to your doctor about stop-smoking programs and medicines. These can increase your chances of quitting for good. Follow-up care is a key part of your treatment and safety. Be sure to make and go to all appointments, and call your doctor if you are having problems. It's also a good idea to know your test results and keep a list of the medicines you take. When should you call for help? Call your doctor now or seek immediate medical care if: 
? · You have pain that does not get better after you take pain medicine. ? · You have signs of infection, such as: 
¨ Increased pain, swelling, warmth, or redness. ¨ Red streaks leading from the wound. ¨ Pus draining from the wound. ¨ A fever. ? · The wound starts to bleed, and blood soaks through the bandage. Oozing small amounts of blood is normal.  
? · You have loose stitches, or your skin graft comes loose. ? Watch closely for any changes in your health, and be sure to contact your doctor if: 
? · The wound is not getting better as expected. Where can you learn more? Go to http://kirit-della.info/. Enter Y056 in the search box to learn more about \"Wound Debridement: What to Expect at Home. \" Current as of: March 20, 2017 Content Version: 11.4 © 6705-8670 [employer]. Care instructions adapted under license by [employer] (which disclaims liability or warranty for this information). If you have questions about a medical condition or this instruction, always ask your healthcare professional. [address] any warranty or liability for your use of this information. DISCHARGE SUMMARY from Nurse PATIENT INSTRUCTIONS: 
 
 
F-face looks uneven A-arms unable to move or move unevenly S-speech slurred or non-existent T-time-call 911 as soon as signs and symptoms begin-DO NOT go Back to bed or wait to see if you get better-TIME IS BRAIN. Warning Signs of HEART ATTACK Call 911 if you have these symptoms: 
? Chest discomfort. Most heart attacks involve discomfort in the center of the chest that lasts more than a few minutes, or that goes away and comes back. It can feel like uncomfortable pressure, squeezing, fullness, or pain. ? Discomfort in other areas of the upper body. Symptoms can include pain or discomfort in one or both arms, the back, neck, jaw, or stomach. ? Shortness of breath with or without chest discomfort. ? Other signs may include breaking out in a cold sweat, nausea, or lightheadedness. Don't wait more than five minutes to call [address]! Fast action can save your life. Calling 911 is almost always the fastest way to get lifesaving treatment. Emergency Medical Services staff can begin treatment when they arrive  up to an hour sooner than if someone gets to the hospital by car. The discharge information has been reviewed with the patient and spouse. The patient and spouse verbalized understanding. Discharge medications reviewed with the patient and spouse and appropriate educational materials and side effects teaching were provided. ___________________________________________________________________________________________________________________________________ Introducing [facility]! Blaise Chaney introduces cinvolve patient portal. Now you can access parts of your medical record, email your doctor's office, and request medication refills online. 1. In your internet browser, go to [email or web address] [email or web address] Careeristt 2. Click on the First Time User? Click Here link in the Sign In box. You will see the New Member Sign Up page. 3. Enter your cinvolve Access Code exactly as it appears below. You will not need to use this code after youve completed the sign-up process. If you do not sign up before the expiration date, you must request a new code. · cinvolve Access Code: [email or web address] Expires: 9/19/2018  1:56 PM 
 
4. Enter the last four digits of your Social Security Number (xxxx) and Date of Birth (mm/dd/yyyy) as indicated and click Submit. You will be taken to the next sign-up page. 5. Create a cinvolve ID. This will be your cinvolve login ID and cannot be changed, so think of one that is secure and easy to remember. 6. Create a cinvolve password. You can change your password at any time. 7. Enter your Password Reset Question and Answer. This can be used at a later time if you forget your password. 8. Enter your e-mail address. You will receive e-mail notification when new information is available in [address] 9. Click Sign Up. You can now view and download portions of your medical record. 10. Click the Download Summary menu link to download a portable copy of your medical information.  
 
If you have questions, please visit the Frequently Asked Questions section of the [employer]. Remember, The [employer] is NOT to be used for urgent needs. For medical emergencies, dial 911. Now available from your iPhone and Android! Introducing Candelario Chandler As a [facility] patient, I wanted to make you aware of our electronic visit tool called Candelario Chandler. ActivePath 24/7 allows you to connect within minutes with a medical provider 24 hours a day, seven days a week via a mobile device or tablet or logging into a secure website from your computer. You can access Candelario Chandler from anywhere in the [place]. A virtual visit might be right for you when you have a simple condition and feel like you just dont want to get out of bed, or cant get away from work for an appointment, when your regular [facility] provider is not available (evenings, weekends or holidays), or when youre out of town and need minor care. Electronic visits cost only $49 and if the ActivePath 24/7 provider determines a prescription is needed to treat your condition, one can be electronically transmitted to a nearby pharmacy*. Please take a moment to enroll today if you have not already done so. The enrollment process is free and takes just a few minutes. To enroll, please download the [facility] 24/7 rowena to your tablet or phone, or visit [email or web address] to enroll on your computer. And, as an [address] [place] [address] patient with a [employer] account, the results of your visits will be scanned into your electronic medical record and your primary care provider will be able to view the scanned results. We urge you to continue to see your regular [facility] provider for your ongoing medical care. And while your primary care provider may not be the one available when you seek a Candelario Chandler virtual visit, the peace of mind you get from getting a real diagnosis real time can be priceless. For more information on Candelario Chandler, view our Frequently Asked Questions (FAQs) at [email or web address]. Sincerely, 
 
Reynaldo Osorio MD 
Chief Medical Officer [employer] *:  certain medications cannot be prescribed via Candelario Chandler Providers Seen During Your Hospitalization Provider Specialty Primary office phone Morgan Laguna MD Vascular Surgery [phone] Your Primary Care Physician (PCP) Primary Care Physician Office Phone Office Fax Carlota Henry, [address] [place] [address] [phone] You are allergic to the following Allergen Reactions Codeine Nausea and Vomiting Other reaction(s): other/intolerance Darvon (Propoxyphene) Itching Demerol (Meperidine) Other (comments) Halucinations Keflex (Cephalexin) Diarrhea Talwin (Pentazocine Lactate) Shortness of Breath Nausea and Vomiting Recent Documentation Height Weight BMI OB Status Smoking Status 1.651 m 59.9 kg 21.97 kg/m2 Postmenopausal Current Every Day Smoker Emergency Contacts Name Discharge Info Relation Home Work Mobile Luis Carlos Souza DISCHARGE CAREGIVER [3] Spouse [3] [phone] Luis Carlos Souza DISCHARGE CAREGIVER [3] Spouse [3] [phone] Patient Belongings The following personal items are in your possession at time of discharge: 
  Dental Appliances: None         Home Medications: None   Jewelry: None  Clothing: Shirt, Shorts, Footwear, Undergarments Please provide this summary of care documentation to your next provider. Signatures-by signing, you are acknowledging that this After Visit Summary has been reviewed with you and you have received a copy. Patient Signature:  ____________________________________________________________  Date:  ____________________________________________________________  
  
Miguelito Villar    
    
 Provider Signature:  ____________________________________________________________ Date:  ____________________________________________________________

## 2018-06-21 NOTE — DISCHARGE INSTRUCTIONS
Wound Debridement: What to Expect at Home  Your Recovery  After surgery to remove debris or dead tissue from your wound (debridement), you can expect some pain and swelling around your wound. This should get better within a few days after the procedure. You may have a bandage or a moist dressing over your wound. Your doctor will let you know how long to keep it on and how often to change it. The amount of time it will take for your wound to heal depends on how serious your wound is and whether you have any other health problems that may slow healing. You may need to have the wound debrided again. How soon you can return to work and your normal routine depends on the type of work you do and how you feel. For example, if your wound is on your arm and your job requires you to do heavy lifting, you may need to wait until your wound has healed before you go back to work. This care sheet gives you a general idea about how long it will take for you to recover. But each person recovers at a different pace. Follow the steps below to feel better as quickly as possible. How can you care for yourself at home? Activity  ? · Rest when you feel tired. Getting enough sleep will help you recover. ? · Avoid activities that put stress on the affected body part until your doctor says it's okay. ? · Change positions often to keep pressure off your wound, and spread your body weight evenly with cushions, mattresses, foam wedges, or other pressure-relieving devices. ? · If your wound is on your leg or foot, you may have to use crutches, a supportive boot, or a fitted shoe to keep pressure off your wound. If you need crutches, it may help to use a backpack or wear clothes with a lot of pockets to carry items. ? · Do not shower for at least 24 hours after the procedure or for as long as your doctor tells you to. When you shower, keep your dressing and wound dry.    ? · Do not take a bath, swim, use a hot tub, or soak your affected body part until your wound has healed. Diet  ? · You can eat your normal diet. If your stomach is upset, try bland, low-fat foods like plain rice, broiled chicken, toast, and yogurt. ? · Eat a well-balanced diet with enough protein to help the wound heal. Protein is a mckenzie nutrient in helping to repair damaged tissue and promote new tissue growth. Good sources of protein are milk, yogurt, cheese, meat, and beans. Medicines  ? · Your doctor will tell you if and when you can restart your medicines. He or she will also give you instructions about taking any new medicines. ? · If you take blood thinners, such as warfarin (Coumadin), clopidogrel (Plavix), or aspirin, be sure to talk to your doctor. He or she will tell you if and when to start taking those medicines again. Make sure that you understand exactly what your doctor wants you to do. ? · Take pain medicines exactly as directed. ¨ If the doctor gave you a prescription medicine for pain, take it as prescribed. ¨ If you are not taking a prescription pain medicine, ask your doctor if you can take an over-the-counter medicine. ? · If you think your pain medicine is making you sick to your stomach:  ¨ Take your medicine after meals (unless your doctor has told you not to). ¨ Ask your doctor for a different pain medicine. ? · If your doctor prescribed antibiotics, take them as directed. Do not stop taking them just because you feel better. You need to take the full course of antibiotics. ? · If your doctor prescribed an antibiotic ointment that you put on the wound, use it as directed. ? Wound care  ? · A moist dressing may cover your wound. A dressing helps the wound heal and protects it. Your doctor will tell you how to take care of this. ? · If you had a skin graft, you may have a bandage that is stitched over the graft. Your doctor will remove the bandage and stitches. Other instructions  ? · Don't smoke.  Smoking dries out the skin, reduces blood flow to the skin, and slows healing. If you need help quitting, talk to your doctor about stop-smoking programs and medicines. These can increase your chances of quitting for good. Follow-up care is a key part of your treatment and safety. Be sure to make and go to all appointments, and call your doctor if you are having problems. It's also a good idea to know your test results and keep a list of the medicines you take. When should you call for help? Call your doctor now or seek immediate medical care if:  ? · You have pain that does not get better after you take pain medicine. ? · You have signs of infection, such as:  ¨ Increased pain, swelling, warmth, or redness. ¨ Red streaks leading from the wound. ¨ Pus draining from the wound. ¨ A fever. ? · The wound starts to bleed, and blood soaks through the bandage. Oozing small amounts of blood is normal.   ? · You have loose stitches, or your skin graft comes loose. ? Watch closely for any changes in your health, and be sure to contact your doctor if:  ? · The wound is not getting better as expected. Where can you learn more? Go to http://kirit-della.info/. Enter M579 in the search box to learn more about \"Wound Debridement: What to Expect at Home. \"  Current as of: March 20, 2017  Content Version: 11.4  © 7087-1207 [employer]. Care instructions adapted under license by [employer] (which disclaims liability or warranty for this information). If you have questions about a medical condition or this instruction, always ask your healthcare professional. [place] [address] any warranty or liability for your use of this information.     DISCHARGE SUMMARY from Nurse    PATIENT INSTRUCTIONS:    After general anesthesia or intravenous sedation, for 24 hours or while taking prescription Narcotics:  · Limit your activities  · Do not drive and operate hazardous machinery  · Do not make important personal or business decisions  · Do  not drink alcoholic beverages  · If you have not urinated within 8 hours after discharge, please contact your surgeon on call. Report the following to your surgeon:  · Excessive pain, swelling, redness or odor of or around the surgical area  · Temperature over 100.5  · Nausea and vomiting lasting longer than 4 hours or if unable to take medications  · Any signs of decreased circulation or nerve impairment to extremity: change in color, persistent  numbness, tingling, coldness or increase pain  · Any questions    *  Please give a list of your current medications to your Primary Care Provider. *  Please update this list whenever your medications are discontinued, doses are      changed, or new medications (including over-the-counter products) are added. *  Please carry medication information at all times in case of emergency situations. These are general instructions for a healthy lifestyle:    No smoking/ No tobacco products/ Avoid exposure to second hand smoke  Surgeon General's Warning:  Quitting smoking now greatly reduces serious risk to your health. Obesity, smoking, and sedentary lifestyle greatly increases your risk for illness    A healthy diet, regular physical exercise & weight monitoring are important for maintaining a healthy lifestyle    You may be retaining fluid if you have a history of heart failure or if you experience any of the following symptoms:  Weight gain of 3 pounds or more overnight or 5 pounds in a week, increased swelling in our hands or feet or shortness of breath while lying flat in bed. Please call your doctor as soon as you notice any of these symptoms; do not wait until your next office visit.     Recognize signs and symptoms of STROKE:    F-face looks uneven    A-arms unable to move or move unevenly    S-speech slurred or non-existent    T-time-call 911 as soon as signs and symptoms begin-DO NOT go       Back to bed or wait to see if you get better-TIME IS BRAIN. Warning Signs of HEART ATTACK     Call 911 if you have these symptoms:   Chest discomfort. Most heart attacks involve discomfort in the center of the chest that lasts more than a few minutes, or that goes away and comes back. It can feel like uncomfortable pressure, squeezing, fullness, or pain.  Discomfort in other areas of the upper body. Symptoms can include pain or discomfort in one or both arms, the back, neck, jaw, or stomach.  Shortness of breath with or without chest discomfort.  Other signs may include breaking out in a cold sweat, nausea, or lightheadedness. Don't wait more than five minutes to call 911 - MINUTES MATTER! Fast action can save your life. Calling 911 is almost always the fastest way to get lifesaving treatment. Emergency Medical Services staff can begin treatment when they arrive -- up to an hour sooner than if someone gets to the hospital by car. The discharge information has been reviewed with the patient and spouse. The patient and spouse verbalized understanding. Discharge medications reviewed with the patient and spouse and appropriate educational materials and side effects teaching were provided.   ___________________________________________________________________________________________________________________________________

## 2018-06-21 NOTE — ANESTHESIA PREPROCEDURE EVALUATION
Anesthetic History               Review of Systems / Medical History  Patient summary reviewed and pertinent labs reviewed    Pulmonary    COPD (On oxygen via nasal cannula at night.)      Smoker         Neuro/Psych   Within defined limits           Cardiovascular    Hypertension: well controlled              Exercise tolerance: >4 METS     GI/Hepatic/Renal     GERD: well controlled          Comments: H/O Crohn's disease Endo/Other        Arthritis     Other Findings   Comments: Documentation of current medication  Current medications obtained, documented and obtained? YES      Risk Factors for Postoperative nausea/vomiting:       History of postoperative nausea/vomiting? NO       Female? YES       Motion sickness? NO       Intended opioid administration for postoperative analgesia? YES      Smoking Abstinence:  Current Smoker? YES  Elective Surgery? YES  Seen preoperatively by anesthesiologist or proxy prior to day of surgery? YES  Pt abstained from smoking 24 hours prior to anesthesia?  NO    Preventive care/screening for High Blood Pressure:  Aged 18 years and older: YES  Screened for high blood pressure: YES  Patients with high blood pressure referred to primary care provider   for BP management: YES                 Physical Exam    Airway  Mallampati: II  TM Distance: 4 - 6 cm  Neck ROM: normal range of motion   Mouth opening: Normal     Cardiovascular  Regular rate and rhythm,  S1 and S2 normal,  no murmur, click, rub, or gallop             Dental    Dentition: Implants  Comments: No teeth in mouth   Pulmonary  Breath sounds clear to auscultation               Abdominal  GI exam deferred       Other Findings            Anesthetic Plan    ASA: 3  Anesthesia type: general          Induction: Intravenous  Anesthetic plan and risks discussed with: Patient

## 2018-06-21 NOTE — ANESTHESIA POSTPROCEDURE EVALUATION
Post-Anesthesia Evaluation and Assessment    Patient: Moris Gonzalez MRN: [id number]  SSN: [id number]    YOB: 1950  Age: 77 y.o. Sex: female       Cardiovascular Function/Vital Signs  Visit Vitals    /56    Pulse 76    Temp 36.9 °C (98.4 °F)    Resp 16    Ht 5' 5\" (1.651 m)    Wt 59.9 kg (132 lb)    SpO2 95%    BMI 21.97 kg/m2       Patient is status post general anesthesia for Procedure(s):  RIGHT FLANK DEBRIDEMENT/ GRAFT PIECE EXCISED. Nausea/Vomiting: None    Postoperative hydration reviewed and adequate. Pain:  Pain Scale 1: Numeric (0 - 10) (06/21/18 1311)  Pain Intensity 1: 0 (06/21/18 1311)   Managed    Neurological Status:   Neuro (WDL): Within Defined Limits (06/21/18 1311)   At baseline    Mental Status and Level of Consciousness: Arousable    Pulmonary Status:   O2 Device: Room air (06/21/18 1321)   Adequate oxygenation and airway patent    Complications related to anesthesia: None    Post-anesthesia assessment completed.  No concerns    Signed By: Kacie Lagos MD     June 21, 2018

## 2018-06-22 ENCOUNTER — HOSPITAL ENCOUNTER (EMERGENCY)
Age: 68
Discharge: HOME | End: 2018-06-22
Attending: EMERGENCY MEDICINE
Payer: MEDICARE

## 2018-06-22 ENCOUNTER — HOME CARE VISIT (OUTPATIENT)
Dept: SCHEDULING | Facility: HOME HEALTH | Age: 68
End: 2018-06-22
Payer: MEDICARE

## 2018-06-22 ENCOUNTER — TELEPHONE (OUTPATIENT)
Dept: VASCULAR SURGERY | Age: 68
End: 2018-06-22

## 2018-06-22 VITALS
RESPIRATION RATE: 18 BRPM | HEART RATE: 84 BPM | TEMPERATURE: 97.4 F | SYSTOLIC BLOOD PRESSURE: 117 MMHG | DIASTOLIC BLOOD PRESSURE: 61 MMHG

## 2018-06-22 VITALS
SYSTOLIC BLOOD PRESSURE: 130 MMHG | OXYGEN SATURATION: 91 % | TEMPERATURE: 97.7 F | RESPIRATION RATE: 18 BRPM | DIASTOLIC BLOOD PRESSURE: 68 MMHG | HEART RATE: 92 BPM

## 2018-06-22 DIAGNOSIS — L02.91 ABSCESS: Primary | ICD-10-CM

## 2018-06-22 PROCEDURE — G0299 HHS/HOSPICE OF RN EA 15 MIN: HCPCS

## 2018-06-22 PROCEDURE — A4927 NON-STERILE GLOVES: HCPCS

## 2018-06-22 PROCEDURE — 77030019895 HC PCKNG STRP IODO -A

## 2018-06-22 PROCEDURE — 3331090001 HH PPS REVENUE CREDIT

## 2018-06-22 PROCEDURE — 3331090002 HH PPS REVENUE DEBIT

## 2018-06-22 PROCEDURE — 99282 EMERGENCY DEPT VISIT SF MDM: CPT

## 2018-06-22 PROCEDURE — 87070 CULTURE OTHR SPECIMN AEROBIC: CPT | Performed by: EMERGENCY MEDICINE

## 2018-06-22 PROCEDURE — A4452 WATERPROOF TAPE: HCPCS

## 2018-06-22 PROCEDURE — 75810000289 HC I&D ABSCESS SIMP/COMP/MULT

## 2018-06-22 PROCEDURE — A5120 SKIN BARRIER, WIPE OR SWAB: HCPCS

## 2018-06-22 PROCEDURE — 87077 CULTURE AEROBIC IDENTIFY: CPT | Performed by: EMERGENCY MEDICINE

## 2018-06-22 RX ORDER — MUPIROCIN 20 MG/G
OINTMENT TOPICAL 3 TIMES DAILY
Qty: 22 G | Refills: 0 | Status: SHIPPED | OUTPATIENT
Start: 2018-06-22 | End: 2018-11-02 | Stop reason: ALTCHOICE

## 2018-06-22 RX ORDER — SULFAMETHOXAZOLE AND TRIMETHOPRIM 800; 160 MG/1; MG/1
2 TABLET ORAL 2 TIMES DAILY
Qty: 14 TAB | Refills: 0 | Status: SHIPPED | OUTPATIENT
Start: 2018-06-22 | End: 2018-06-29

## 2018-06-22 RX ORDER — CLINDAMYCIN HYDROCHLORIDE 300 MG/1
300 CAPSULE ORAL 4 TIMES DAILY
Qty: 28 CAP | Refills: 0 | Status: SHIPPED | OUTPATIENT
Start: 2018-06-22 | End: 2018-06-25 | Stop reason: ALTCHOICE

## 2018-06-22 NOTE — ED PROVIDER NOTES
EMERGENCY DEPARTMENT HISTORY AND PHYSICAL EXAM    6:22 PM      Date: 6/22/2018  Patient Name: Vladislav Plaza    History of Presenting Illness     Chief Complaint   Patient presents with    Post-Op Problem         History Provided By: Patient    Chief Complaint: [place] op problem  Duration:  Yesterday  Timing:  Acute  Location: right side  Severity: Severe  Modifying Factors: Pt had surgery yesterday to remove an old vacuum site on her surgical site in her RLQ. Associated Symptoms: Denies fever and vomiting. Additional History (Context): Vladislav Plaza is a 77 y.o. female with HTN, GERD,Crohn's disease who presents with acute severe right sided post op problem since her surgery yesterday. Denies fever and vomiting. Pt had surgery yesterday to remove an old vacuum site on her surgical site in her RLQ. She is here to see Dr. Viji Palomino here in the ED. She had a vascular bypass 5 years ago with Dr. Silvio Levy. Pt smokes and does not drink. PCP: Bernice Riley MD    Current Outpatient Prescriptions   Medication Sig Dispense Refill    trimethoprim-sulfamethoxazole (BACTRIM DS) 160-800 mg per tablet Take 2 Tabs by mouth two (2) times a day for 7 days. 14 Tab 0    clindamycin (CLEOCIN) 300 mg capsule Take 1 Cap by mouth four (4) times daily for 7 days. 28 Cap 0    mupirocin (BACTROBAN) 2 % ointment Apply  to affected area three (3) times daily. Apply to area for 10 days 22 g 0    HYDROcodone-acetaminophen (NORCO) 5-325 mg per tablet Take 1-2 Tabs by mouth every four (4) hours as needed for Pain. Max Daily Amount: 12 Tabs. 40 Tab 0    clopidogrel (PLAVIX) 75 mg tab TAKE ONE TABLET BY MOUTH DAILY 30 Tab 10    trimethoprim-sulfamethoxazole (BACTRIM DS, SEPTRA DS) 160-800 mg per tablet Take 1 Tab by mouth two (2) times a day. 14 Tab 0    HYDROcodone-acetaminophen (NORCO) 5-325 mg per tablet Take 1 Tab by mouth every four (4) hours as needed for Pain. Max Daily Amount: 6 Tabs.  30 Tab 0    fluticasone-umeclidin-vilanter (TRELEGY ELLIPTA) 100-62.5-25 mcg dsdv Take 1 Inhalation by inhalation daily. 1 Each 0    fluticasone-umeclidin-vilanter (TRELEGY ELLIPTA) 100-62.5-25 mcg dsdv Take 1 Inhalation by inhalation daily. 3 Each 3    pregabalin (LYRICA) 100 mg capsule Take  by mouth two (2) times a day. Takes 100 mg in am and 200 in the pm      OXYGEN-AIR DELIVERY SYSTEMS 2 L by Does Not Apply route. Every HS and as needed      ipratropium-albuterol (COMBIVENT RESPIMAT)  mcg/actuation inhaler Take 1 Puff by inhalation every six (6) hours as needed for Wheezing or Shortness of Breath. (Patient taking differently: Take 1 Puff by inhalation two (2) times a day.) 1 Inhaler 0    DULoxetine (CYMBALTA) 60 mg capsule Take 60 mg by mouth nightly.  aspirin 81 mg tablet Take 81 mg by mouth daily.  cyanocobalamin (VITAMIN B-12) 1,000 mcg/mL injection 1,000 mcg by IntraMUSCular route every fourteen (14) days.  loperamide (IMMODIUM) 2 mg tablet Take 2 mg by mouth daily as needed for Diarrhea.  dicyclomine (BENTYL) 10 mg capsule Take 10 mg by mouth two (2) times a day.  triamterene-hydrochlorothiazide (DYAZIDE) 37.5-25 mg per capsule Take 1 Cap by mouth daily.  bimatoprost (LUMIGAN) 0.03 % ophthalmic drops Administer 1 Drop to both eyes every evening.  atropine-PHENobarbital-scopolamine-hyoscyamine (DONNATAL) 16.2-0.1037 -0.0194 mg per tablet Take 1 Tab by mouth as needed (diarrhea). Indications: Irritable Bowel Syndrome      inFLIXimab (REMICADE) 100 mg injection 5 mg/kg by IntraVENous route once.  Every 4 weeks         Past History     Past Medical History:  Past Medical History:   Diagnosis Date    Arthritis     CAP (community acquired pneumonia) 06/27/2017    Chronic lung disease     Claudication ([address]     left leg    Crohn's disease ([address]     Crohn's disease ([address]     GERD (gastroesophageal reflux disease)     HTN (hypertension)     Nausea & vomiting     Other and unspecified symptoms and signs involving general sensations and perceptions     PVD    Other ill-defined conditions(799.89)     Crohn's    Peripheral neuropathy     Pulmonary emphysema (HCC)     PVD (peripheral vascular disease) [facility]     right leg    Sepsis ([facility] 06/27/2017    Vitamin B12 deficiency        Past Surgical History:  Past Surgical History:   Procedure Laterality Date    BYPASS GRAFT OTHR,AXILL-FEM Right 4/19/2013    BYPASS GRAFT OTHR,FEM-POP Left 5/2013    FOOT/TOES SURGERY PROC UNLISTED      HX APPENDECTOMY      HX BREAST LUMPECTOMY Left     breast left- precancerous    HX BUNIONECTOMY      left eye    HX CATARACT REMOVAL      bilateral    HX CHOLECYSTECTOMY      HX ORTHOPAEDIC      lateral epicondilitis on right    HX OTHER SURGICAL  3/7/2013    catheter into aorta    HX OTHER SURGICAL      intestional resection x4    HX OTHER SURGICAL  9/2013    I & D Right chest/flank wall abscess vs granuloma    UPPER ARM/ELBOW SURGERY UNLISTED         Family History:  Family History   Problem Relation Age of Onset    Coronary Artery Disease Mother     Heart Attack Mother     Heart Surgery Mother      CABGx3    Elevated Lipids Mother     COPD Father     Heart Attack Brother     COPD Brother     Other Brother      PAD       Social History:  Social History   Substance Use Topics    Smoking status: Current Every Day Smoker     Packs/day: 1.00     Years: 45.00     Types: Cigarettes    Smokeless tobacco: Never Used      Comment: second hand smoke exposure prior to active smoking    Alcohol use No       Allergies:   Allergies   Allergen Reactions    Codeine Nausea and Vomiting     Other reaction(s): other/intolerance    Darvon [Propoxyphene] Itching    Demerol [Meperidine] Other (comments)     Halucinations    Keflex [Cephalexin] Diarrhea    Talwin [Pentazocine Lactate] Shortness of Breath and Nausea and Vomiting         Review of Systems       Review of Systems   Constitutional: Negative for chills, diaphoresis and fever. Positive for post op problem   HENT: Negative. Respiratory: Negative for cough and shortness of breath. Cardiovascular: Negative for chest pain. Gastrointestinal: Negative for abdominal pain, constipation, diarrhea, nausea and vomiting. Genitourinary: Negative for dysuria, frequency and hematuria. Musculoskeletal: Negative for back pain. Skin: Negative for rash. Neurological: Negative for dizziness, light-headedness and numbness. All other systems reviewed and are negative. Physical Exam     Visit Vitals    /68 (BP 1 Location: Right arm, BP Patient Position: Sitting)    Pulse 92    Temp 97.7 °F (36.5 °C)    Resp 18    SpO2 91%         Physical Exam   Constitutional: No distress. HENT:   Head: Normocephalic and atraumatic. Mouth/Throat: Oropharynx is clear and moist.   Eyes: Conjunctivae and EOM are normal. Pupils are equal, round, and reactive to light. Neck: Normal range of motion. Neck supple. Cardiovascular: Normal rate, regular rhythm and normal heart sounds. No murmur heard. Pulmonary/Chest: Effort normal and breath sounds normal. She has no wheezes. She has no rales. Abdominal: Soft. Bowel sounds are normal. She exhibits no distension. There is no tenderness. Musculoskeletal: Normal range of motion. She exhibits no edema or deformity. Lymphadenopathy:     She has no cervical adenopathy. Neurological: She is alert. She exhibits normal muscle tone. Coordination normal.   Skin: Skin is warm and dry. No rash noted. She is not diaphoretic. No erythema. 5cm x 10cm subcutaneous area of induration and fluctuance   Psychiatric: She has a normal mood and affect. Her behavior is normal.         Diagnostic Study Results     Labs -  No results found for this or any previous visit (from the past 12 hour(s)).     Radiologic Studies -   No orders to display         Medical Decision Making   I am the first provider for this patient. I reviewed the vital signs, available nursing notes, past medical history, past surgical history, family history and social history. Vital Signs-Reviewed the patient's vital signs. Records Reviewed: Nursing Notes (Time of Review: 6:22 PM)    ED Course: Progress Notes, Reevaluation, and Consults:  6:48 PM Consult: I discussed care with Dr. Rafi Díaz (GI surgeon ). It was a standard discussion including patient history, chief complaint, available diagnostic results, and predicted treatment course. Agrees that the wound seems infected and the decision to drain, pack and give abx. will see pt in office on Monday morning. I&D Abcess Simple  Date/Time: 6/22/2018 7:20 PM  Performed by: Prince Spence  Authorized by: Prince Spence     Consent:     Consent obtained:  Verbal    Consent given by:  Patient and spouse    Risks discussed:  Bleeding, incomplete drainage, pain, infection and damage to other organs    Alternatives discussed:  No treatment, delayed treatment and observation  Location:     Type:  Abscess    Size:  5x8cm    Location:  Trunk    Trunk location:  Abdomen  Pre-procedure details:     Skin preparation:  Antiseptic wash  Anesthesia (see MAR for exact dosages): Anesthesia method:  Local infiltration    Local anesthetic:  Lidocaine 1% w/o epi  Procedure type:     Complexity:  Simple  Procedure details:     Incision types:  Single straight    Incision depth:  Submucosal    Scalpel blade:  10    Wound management:  Probed and deloculated and irrigated with saline    Drainage:  Purulent    Drainage amount: Moderate    Wound treatment:  Wound left open    Packing materials:  1/2 in iodoform gauze    Amount 1/2\" iodoform:  8 inches  Post-procedure details:     Patient tolerance of procedure: Tolerated well, no immediate complications        Provider Notes (Medical Decision Making): Post-op wound infection at site of incision lower right abdominal wall.  Case d/w patient's surgeon,  Noemi Mayorga, who agrees with I&D now, check culture, and broaden antibiotic coverage. He will see in the office on Monday morning. Diagnosis     Clinical Impression:   1. Abscess        Disposition: discharge    Follow-up Information     Follow up With Details Comments Contact Info    Bobbi Thao MD In 3 days For wound re-check [facility] [place] [id number]  [phone]      [facility] EMERGENCY DEPT  As needed, If symptoms worsen [address] [place] [id number]  [phone]           Current Discharge Medication List      START taking these medications    Details   clindamycin (CLEOCIN) 300 mg capsule Take 1 Cap by mouth four (4) times daily for 7 days. Qty: 28 Cap, Refills: 0      mupirocin (BACTROBAN) 2 % ointment Apply  to affected area three (3) times daily. Apply to area for 10 days  Qty: 22 g, Refills: 0         CONTINUE these medications which have CHANGED    Details   trimethoprim-sulfamethoxazole (BACTRIM DS) 160-800 mg per tablet Take 2 Tabs by mouth two (2) times a day for 7 days. Qty: 14 Tab, Refills: 0         CONTINUE these medications which have NOT CHANGED    Details   !! HYDROcodone-acetaminophen (NORCO) 5-325 mg per tablet Take 1-2 Tabs by mouth every four (4) hours as needed for Pain. Max Daily Amount: 12 Tabs. Qty: 40 Tab, Refills: 0    Associated Diagnoses: Chronic wound infection of abdomen, sequela      clopidogrel (PLAVIX) 75 mg tab TAKE ONE TABLET BY MOUTH DAILY  Qty: 30 Tab, Refills: 10      !! HYDROcodone-acetaminophen (NORCO) 5-325 mg per tablet Take 1 Tab by mouth every four (4) hours as needed for Pain. Max Daily Amount: 6 Tabs. Qty: 30 Tab, Refills: 0    Associated Diagnoses: Chronic aorto-iliac occlusion syndrome ([address] Abscess      !! fluticasone-umeclidin-vilanter (TRELEGY ELLIPTA) 100-62.5-25 mcg dsdv Take 1 Inhalation by inhalation daily.   Qty: 1 Each, Refills: 0      !! fluticasone-umeclidin-vilanter (Dorothea Messenger) 100-62.5-25 mcg dsdv Take 1 Inhalation by inhalation daily. Qty: 3 Each, Refills: 3      pregabalin (LYRICA) 100 mg capsule Take  by mouth two (2) times a day. Takes 100 mg in am and 200 in the pm      OXYGEN-AIR DELIVERY SYSTEMS 2 L by Does Not Apply route. Every HS and as needed      ipratropium-albuterol (COMBIVENT RESPIMAT)  mcg/actuation inhaler Take 1 Puff by inhalation every six (6) hours as needed for Wheezing or Shortness of Breath. Qty: 1 Inhaler, Refills: 0      DULoxetine (CYMBALTA) 60 mg capsule Take 60 mg by mouth nightly. aspirin 81 mg tablet Take 81 mg by mouth daily. cyanocobalamin (VITAMIN B-12) 1,000 mcg/mL injection 1,000 mcg by IntraMUSCular route every fourteen (14) days. loperamide (IMMODIUM) 2 mg tablet Take 2 mg by mouth daily as needed for Diarrhea. dicyclomine (BENTYL) 10 mg capsule Take 10 mg by mouth two (2) times a day. triamterene-hydrochlorothiazide (DYAZIDE) 37.5-25 mg per capsule Take 1 Cap by mouth daily. bimatoprost (LUMIGAN) 0.03 % ophthalmic drops Administer 1 Drop to both eyes every evening. atropine-PHENobarbital-scopolamine-hyoscyamine (DONNATAL) 16.2-0.1037 -0.0194 mg per tablet Take 1 Tab by mouth as needed (diarrhea). Indications: Irritable Bowel Syndrome      inFLIXimab (REMICADE) 100 mg injection 5 mg/kg by IntraVENous route once. Every 4 weeks       !! - Potential duplicate medications found. Please discuss with provider.        _______________________________    Attestations:  [address]ors[address] A[address]ats acting as a scribe for and in the presence of Svitlana Barrios MD      June 22, 2018 at [facility] PM       Provider Attestation:      I personally performed the services described in the documentation, reviewed the documentation, as recorded by the scribe in my presence, and it accurately and completely records my words and actions.  June 22, 2018 at 6:22 PM - Bowie Denis, MD    _______________________________

## 2018-06-22 NOTE — TELEPHONE ENCOUNTER
Patient's home health nurse called and stated that patient is not feeling well. Patient is not running a fever but very run down feeling. Home health nurse went to apply new dressing after yesterdays procedure and the right flank wound has small amount of green like pus noted but what is more concerning is the lower abdominal incision from new bypass is now swollen and has formed a head with green like pus oozing from that. Contacted Dr. Dalia Araiza to notify of assessment and patient will be sent to the ED. Have notified Dr. Cal Carney who is on call.

## 2018-06-22 NOTE — DISCHARGE INSTRUCTIONS
Skin Abscess: Care Instructions  Your Care Instructions    * Bactrim DS two tablets twice daily (high-dose) as prescribed. * Clindamycin and bactroban as prescribed. * Return to the ER for dressing / packing changes once daily tomorrow and Sunday (then follow up with your surgeon on Monday). A skin abscess is a bacterial infection that forms a pocket of pus. A boil is a kind of skin abscess. The doctor may have cut an opening in the abscess so that the pus can drain out. You may have gauze in the cut so that the abscess will stay open and keep draining. You may need antibiotics. You will need to follow up with your doctor to make sure the infection has gone away. The doctor has checked you carefully, but problems can develop later. If you notice any problems or new symptoms, get medical treatment right away. Follow-up care is a key part of your treatment and safety. Be sure to make and go to all appointments, and call your doctor if you are having problems. It's also a good idea to know your test results and keep a list of the medicines you take. How can you care for yourself at home? · Apply warm and dry compresses, a heating pad set on low, or a hot water bottle 3 or 4 times a day for pain. Keep a cloth between the heat source and your skin. · If your doctor prescribed antibiotics, take them as directed. Do not stop taking them just because you feel better. You need to take the full course of antibiotics. · Take pain medicines exactly as directed. ¨ If the doctor gave you a prescription medicine for pain, take it as prescribed. ¨ If you are not taking a prescription pain medicine, ask your doctor if you can take an over-the-counter medicine. · Keep your bandage clean and dry. Change the bandage whenever it gets wet or dirty, or at least one time a day. · If the abscess was packed with gauze:  ¨ Keep follow-up appointments to have the gauze changed or removed.  If the doctor instructed you to remove the gauze, gently pull out all of the gauze when your doctor tells you to. ¨ After the gauze is removed, soak the area in warm water for 15 to 20 minutes 2 times a day, until the wound closes. When should you call for help? Call your doctor now or seek immediate medical care if:  ? · You have signs of worsening infection, such as:  ¨ Increased pain, swelling, warmth, or redness. ¨ Red streaks leading from the infected skin. ¨ Pus draining from the wound. ¨ A fever. ? Watch closely for changes in your health, and be sure to contact your doctor if:  ? · You do not get better as expected. Where can you learn more? Go to http://kirit-della.info/. Enter W578 in the search box to learn more about \"Skin Abscess: Care Instructions. \"  Current as of: October 13, 2016  Content Version: 11.4  © 5881-1669 [employer]. Care instructions adapted under license by [employer] (which disclaims liability or warranty for this information). If you have questions about a medical condition or this instruction, always ask your healthcare professional. [place] [address] any warranty or liability for your use of this information.

## 2018-06-22 NOTE — ED TRIAGE NOTES
Pt [facility] was told to come here to ED to see Dr Victo rM Alejandra, [facility] has developed abscess below surgical wound site for debriment to RLQ.  [facility] [place] [facility]

## 2018-06-23 PROCEDURE — 3331090001 HH PPS REVENUE CREDIT

## 2018-06-23 PROCEDURE — 3331090002 HH PPS REVENUE DEBIT

## 2018-06-24 PROCEDURE — 3331090002 HH PPS REVENUE DEBIT

## 2018-06-24 PROCEDURE — 3331090001 HH PPS REVENUE CREDIT

## 2018-06-25 ENCOUNTER — OFFICE VISIT (OUTPATIENT)
Dept: VASCULAR SURGERY | Age: 68
End: 2018-06-25

## 2018-06-25 ENCOUNTER — HOME CARE VISIT (OUTPATIENT)
Dept: SCHEDULING | Facility: HOME HEALTH | Age: 68
End: 2018-06-25
Payer: MEDICARE

## 2018-06-25 VITALS
TEMPERATURE: 97.4 F | DIASTOLIC BLOOD PRESSURE: 60 MMHG | HEART RATE: 82 BPM | RESPIRATION RATE: 18 BRPM | SYSTOLIC BLOOD PRESSURE: 105 MMHG

## 2018-06-25 VITALS
HEIGHT: 65 IN | WEIGHT: 132 LBS | HEART RATE: 100 BPM | RESPIRATION RATE: 16 BRPM | BODY MASS INDEX: 21.99 KG/M2 | SYSTOLIC BLOOD PRESSURE: 130 MMHG | DIASTOLIC BLOOD PRESSURE: 70 MMHG

## 2018-06-25 DIAGNOSIS — T81.89XD NON-HEALING SURGICAL WOUND, SUBSEQUENT ENCOUNTER: Primary | ICD-10-CM

## 2018-06-25 DIAGNOSIS — T82.7XXD INFECTION OF VASCULAR BYPASS GRAFT, SUBSEQUENT ENCOUNTER: ICD-10-CM

## 2018-06-25 DIAGNOSIS — I74.09 CHRONIC AORTO-ILIAC OCCLUSION SYNDROME (HCC): ICD-10-CM

## 2018-06-25 PROCEDURE — A6216 NON-STERILE GAUZE<=16 SQ IN: HCPCS

## 2018-06-25 PROCEDURE — A9270 NON-COVERED ITEM OR SERVICE: HCPCS

## 2018-06-25 PROCEDURE — A4216 STERILE WATER/SALINE, 10 ML: HCPCS

## 2018-06-25 PROCEDURE — 3331090001 HH PPS REVENUE CREDIT

## 2018-06-25 PROCEDURE — 3331090002 HH PPS REVENUE DEBIT

## 2018-06-25 PROCEDURE — A6266 IMPREG GAUZE NO H20/SAL/YARD: HCPCS

## 2018-06-25 RX ORDER — AMOXICILLIN AND CLAVULANATE POTASSIUM 875; 125 MG/1; MG/1
1 TABLET, FILM COATED ORAL EVERY 12 HOURS
Qty: 28 TAB | Refills: 0 | Status: SHIPPED | OUTPATIENT
Start: 2018-06-25 | End: 2018-07-03 | Stop reason: SDUPTHER

## 2018-06-25 NOTE — MR AVS SNAPSHOT
[address] [facility] [address] 
 
 
 [address] [place], [place] [address] [address] [facility] [address] 
[phone] Patient: Haris Monsivais 
MRN: [id number] FERCHO:[birth year] Visit Information Date & Time Provider Department Dept. Phone Encounter #  
 6/25/2018 10:45 AM Bertha Lee MD [facility] [id number] Follow-up Instructions Return in about 1 week (around 7/2/2018). Follow-up and Disposition History Your Appointments 7/3/2018  9:00 AM  
HOSPITAL DISCHARGE with Shelvy Angelucci, MD  
[address] [facility] [address] [facility]) Appt Note: [address] [place], [place] [address] 200 [facility]  
[phone] 2300 [facility] [address] [facility] [address] Upcoming Health Maintenance Date Due Hepatitis C Screening 1950 DTaP/Tdap/Td series (1 - Tdap) 6/7/1969 FOBT Q 1 YEAR AGE 50-75 6/7/1998 ZOSTER VACCINE AGE 60> 4/7/2008 GLAUCOMA SCREENING Q2Y 6/7/2013 Bone Densitometry (Dexa) Screening 6/7/2013 BREAST CANCER SCRN MAMMOGRAM 7/8/2016 MEDICARE YEARLY EXAM 3/14/2018 Pneumococcal 65+ Low/Medium Risk (2 of 2 - PPSV23) 7/11/2020* Influenza Age 5 to Adult 8/1/2018 *Topic was postponed. The date shown is not the original due date. Allergies as of 6/25/2018  Review Complete On: 6/25/2018 By: Bertha Lee MD  
  
 Severity Noted Reaction Type Reactions Codeine  04/01/2013   Intolerance Nausea and Vomiting Other reaction(s): other/intolerance Darvon [Propoxyphene]  04/01/2013   Systemic Itching Demerol [Meperidine]  04/01/2013   Side Effect Other (comments) Halucinations Keflex [Cephalexin]  01/06/2015    Diarrhea Talwin [Pentazocine Lactate]  04/09/2013    Shortness of Breath, Nausea and Vomiting Current Immunizations  Reviewed on 7/11/2017 Name Date Pneumococcal Vaccine (Unspecified Type) 6/7/2013 Not reviewed this visit You Were Diagnosed With   
  
 Codes Comments Non-healing surgical wound, subsequent encounter    -  Primary ICD-10-CM: T81.89XD ICD-9-CM: V58.89, 998.83 Infection of vascular bypass graft, subsequent encounter     ICD-10-CM: T82. 7XXD ICD-9-CM: V58.89 Chronic aorto-iliac occlusion syndrome (HCC)     ICD-10-CM: I74.09 
ICD-9-CM: 444.09 Vitals BP Pulse Resp Height(growth percentile) Weight(growth percentile) BMI  
 130/70 (BP 1 Location: Left arm, BP Patient Position: Sitting) 100 16 5' 5\" (1.651 m) 132 lb (59.9 kg) 21.97 kg/m2 OB Status Smoking Status Postmenopausal Current Every Day Smoker Vitals History BMI and BSA Data Body Mass Index Body Surface Area  
 21.97 kg/m 2 1.66 m 2 [facility]erred Pharm[facility], [facility] [phone] Your Updated Medication List  
  
   
This list is accurate as of 6/25/18 11:28 AM.  Always use your most recent med list.  
  
  
  
  
 amoxicillin-clavulanate 875-125 mg per tablet Commonly known as:  AUGMENTIN Take 1 Tab by mouth every twelve (12) hours. aspirin 81 mg tablet Take 81 mg by mouth daily. clopidogrel 75 mg Tab Commonly known as:  PLAVIX TAKE ONE TABLET BY MOUTH DAILY  
  
 CYMBALTA 60 mg capsule Generic drug:  DULoxetine Take 60 mg by mouth nightly. dicyclomine 10 mg capsule Commonly known as:  BENTYL Take 10 mg by mouth two (2) times a day. DONNATAL 16.2-0.1037 -0.0194 mg per tablet Generic drug:  atropine-PHENobarbital-scopolamine-hyoscyamine Take 1 Tab by mouth as needed (diarrhea). Indications: Irritable Bowel Syndrome DYAZIDE 37.5-25 mg per capsule Generic drug:  triamterene-hydroCHLOROthiazide Take 1 Cap by mouth daily. * fluticasone-umeclidin-vilanter 100-62.5-25 mcg Dsdv Commonly known as:  Dorothea Klein Take 1 Inhalation by inhalation daily. * fluticasone-umeclidin-vilanter 100-62.5-25 mcg Dsdv Commonly known as:  Katy Douglas Take 1 Inhalation by inhalation daily. * HYDROcodone-acetaminophen 5-325 mg per tablet Commonly known as:  Ganga Dozier Take 1 Tab by mouth every four (4) hours as needed for Pain. Max Daily Amount: 6 Tabs. * HYDROcodone-acetaminophen 5-325 mg per tablet Commonly known as:  Ganga Dozier Take 1-2 Tabs by mouth every four (4) hours as needed for Pain. Max Daily Amount: 12 Tabs. ipratropium-albuterol  mcg/actuation inhaler Commonly known as:  Elin Lints Take 1 Puff by inhalation every six (6) hours as needed for Wheezing or Shortness of Breath. loperamide 2 mg tablet Commonly known as:  IMMODIUM Take 2 mg by mouth daily as needed for Diarrhea. LUMIGAN 0.03 % ophthalmic drops Generic drug:  bimatoprost  
Administer 1 Drop to both eyes every evening. LYRICA 100 mg capsule Generic drug:  pregabalin Take  by mouth two (2) times a day. Takes 100 mg in am and 200 in the pm  
  
 mupirocin 2 % ointment Commonly known as:  [facility] Apply  to affected area three (3) times daily. Apply to area for 10 days OXYGEN-AIR DELIVERY SYSTEMS  
2 L by Does Not Apply route. Every HS and as needed REMICADE 100 mg injection Generic drug:  inFLIXimab  
5 mg/kg by IntraVENous route once. Every 4 weeks  
  
 trimethoprim-sulfamethoxazole 160-800 mg per tablet Commonly known as:  BACTRIM DS Take 2 Tabs by mouth two (2) times a day for 7 days. VITAMIN B-12 1,000 mcg/mL injection Generic drug:  cyanocobalamin  
1,000 mcg by IntraMUSCular route every fourteen (14) days. * Notice: This list has 4 medication(s) that are the same as other medications prescribed for you. Read the directions carefully, and ask your doctor or other care provider to review them with you. Prescriptions Sent to Pharmacy Refills  
 amoxicillin-clavulanate (AUGMENTIN) 875-125 mg per tablet 0 Sig: Take 1 Tab by mouth every twelve (12) hours. Class: Normal  
 Pharmacy: Samantha Burton [address] [facility], [address] [place] [address] #: [phone] Route: Oral  
  
Follow-up Instructions Return in about 1 week (around 7/2/2018). To-Do List   
 06/27/2018 To Be Determined Appointment with Mariela Maya RN at [address] [facility]  
  
 06/29/2018 To Be Determined Appointment with Mariela Maya RN at [address] [facility]! Ly Ornelas introduces Love[employer]ice patient portal. Now you can access parts of your medical record, email your doctor's office, and request medication refills online. 1. In your internet browser, go to [email or web address] [email or web address] 2. Click on the First Time User? Click Here link in the Sign In box. You will see the New Member Sign Up page. 3. Enter your Lovejuice Access Code exactly as it appears below. You will not need to use this code after youve completed the sign-up process. If you do not sign up before the expiration date, you must request a new code. · Love[employer]ice Access Code: [email or web address] Expires: 9/19/2018  1:56 PM 
 
4. Enter the last four digits of your Social Security Number (xxxx) and Date of Birth (mm/dd/yyyy) as indicated and click Submit. You will be taken to the next sign-up page. 5. Create a [employer] Gr[employer]pt ID. This will be your [employer] login ID and cannot be changed, so think of one that is secure and easy to remember. 6. Create a [employer] password. You can change your password at any time. 7. Enter your Password Reset Question and Answer. This can be used at a later time if you forget your password. 8. Enter your e-mail address. You will receive e-mail notification when new information is available in [address] 9. Click Sign Up. You can now view and download portions of your medical record. 10. Click the Download Summary menu link to download a portable copy of your medical information. If you have questions, please visit the Frequently Asked Questions section of the CareLuLu website. Remember, CareLuLu is NOT to be used for urgent needs. For medical emergencies, dial 911. Now available from your iPhone and Android! Please provide this summary of care documentation to your next provider. Your primary care clinician is listed as Dustin Murrell. If you have any questions after today's visit, please call [phone].

## 2018-06-25 NOTE — PROGRESS NOTES
[address] [place] [address]    Chief Complaint   Patient presents with    Wound Check       History and Physical    Alberta Reagan is a 77 y.o. female with right axillofemoral bypass. She has had difficulty with healing as well as abscesses. Recently had an I&D within the emergency room. Overall no fevers or chills seems to be feeling fine.   No claudication or rest pain    Past Medical History:   Diagnosis Date    Arthritis     CAP (community acquired pneumonia) 06/27/2017    Chronic lung disease     Claudication ([address]     left leg    Crohn's disease ([address]     Crohn's disease ([address]     GERD (gastroesophageal reflux disease)     HTN (hypertension)     Nausea & vomiting     Other and unspecified symptoms and signs involving general sensations and perceptions     PVD    Other ill-defined conditions(799.89)     Crohn's    Peripheral neuropathy     Pulmonary emphysema ([address]     PVD (peripheral vascular disease) ([address]     right leg    Sepsis ([address] 06/27/2017    Vitamin B12 deficiency      Past Surgical History:   Procedure Laterality Date    BYPASS GRAFT OTHR,AXILL-FEM Right 4/19/2013    BYPASS GRAFT OTHR,FEM-POP Left 5/2013    FOOT/TOES SURGERY PROC UNLISTED      HX APPENDECTOMY      HX BREAST LUMPECTOMY Left     breast left- precancerous    HX BUNIONECTOMY      left eye    HX CATARACT REMOVAL      bilateral    HX CHOLECYSTECTOMY      HX ORTHOPAEDIC      lateral epicondilitis on right    HX OTHER SURGICAL  3/7/2013    catheter into aorta    HX OTHER SURGICAL      intestional resection x4    HX OTHER SURGICAL  9/2013    I & D Right chest/flank wall abscess vs granuloma    UPPER ARM/ELBOW SURGERY UNLISTED       Patient Active Problem List   Diagnosis Code    HTN (hypertension) I10    PVD (peripheral vascular disease) ([address] I73.9    Crohn's disease ([address] K50.90    Mass of breast, left N63.20    Wound disruption, post-op, skin T81.31XA    Chronic aorto-iliac occlusion syndrome ([address] I74.09  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[facility]) I70.202    Arteriovenous graft infection ([facility] T82. 7XXA    Abscess L02.91    Dermal sinus tract of lumbosacral region L05.92    CAP (community acquired pneumonia) J18.9    Severe sepsis ([facility]) A41.9, R65.20    Nonhealing surgical wound T81.89XA    Infection of vascular bypass graft ([facility] T82. 7XXA    Chronic wound infection of abdomen S31.109A, L08.9     Current Outpatient Prescriptions   Medication Sig Dispense Refill    trimethoprim-sulfamethoxazole (BACTRIM DS) 160-800 mg per tablet Take 2 Tabs by mouth two (2) times a day for 7 days. 14 Tab 0    clindamycin (CLEOCIN) 300 mg capsule Take 1 Cap by mouth four (4) times daily for 7 days. 28 Cap 0    mupirocin (BACTROBAN) 2 % ointment Apply  to affected area three (3) times daily. Apply to area for 10 days 22 g 0    HYDROcodone-acetaminophen (NORCO) 5-325 mg per tablet Take 1-2 Tabs by mouth every four (4) hours as needed for Pain. Max Daily Amount: 12 Tabs. 40 Tab 0    clopidogrel (PLAVIX) 75 mg tab TAKE ONE TABLET BY MOUTH DAILY 30 Tab 10    HYDROcodone-acetaminophen (NORCO) 5-325 mg per tablet Take 1 Tab by mouth every four (4) hours as needed for Pain. Max Daily Amount: 6 Tabs. 30 Tab 0    fluticasone-umeclidin-vilanter (TRELEGY ELLIPTA) 100-62.5-25 mcg dsdv Take 1 Inhalation by inhalation daily. 1 Each 0    fluticasone-umeclidin-vilanter (TRELEGY ELLIPTA) 100-62.5-25 mcg dsdv Take 1 Inhalation by inhalation daily. 3 Each 3    pregabalin (LYRICA) 100 mg capsule Take  by mouth two (2) times a day. Takes 100 mg in am and 200 in the pm      OXYGEN-AIR DELIVERY SYSTEMS 2 L by Does Not Apply route. Every HS and as needed      ipratropium-albuterol (COMBIVENT RESPIMAT)  mcg/actuation inhaler Take 1 Puff by inhalation every six (6) hours as needed for Wheezing or Shortness of Breath.  (Patient taking differently: Take 1 Puff by inhalation two (2) times a day.) 1 Inhaler 0    DULoxetine (CYMBALTA) 60 mg capsule Take 60 mg by mouth nightly.  aspirin 81 mg tablet Take 81 mg by mouth daily.  cyanocobalamin (VITAMIN B-12) 1,000 mcg/mL injection 1,000 mcg by IntraMUSCular route every fourteen (14) days.  loperamide (IMMODIUM) 2 mg tablet Take 2 mg by mouth daily as needed for Diarrhea.  dicyclomine (BENTYL) 10 mg capsule Take 10 mg by mouth two (2) times a day.  triamterene-hydrochlorothiazide (DYAZIDE) 37.5-25 mg per capsule Take 1 Cap by mouth daily.  bimatoprost (LUMIGAN) 0.03 % ophthalmic drops Administer 1 Drop to both eyes every evening.  atropine-PHENobarbital-scopolamine-hyoscyamine (DONNATAL) 16.2-0.1037 -0.0194 mg per tablet Take 1 Tab by mouth as needed (diarrhea). Indications: Irritable Bowel Syndrome      inFLIXimab (REMICADE) 100 mg injection 5 mg/kg by IntraVENous route once. Every 4 weeks       Allergies   Allergen Reactions    Codeine Nausea and Vomiting     Other reaction(s): other/intolerance    Darvon [Propoxyphene] Itching    Demerol [Meperidine] Other (comments)     Halucinations    Keflex [Cephalexin] Diarrhea    Talwin [Pentazocine Lactate] Shortness of Breath and Nausea and Vomiting     Social History     Social History    Marital status:      Spouse name: N/A    Number of children: N/A    Years of education: N/A     Occupational History    Not on file. Social History Main Topics    Smoking status: Current Every Day Smoker     Packs/day: 1.00     Years: 45.00     Types: Cigarettes    Smokeless tobacco: Never Used      Comment: second hand smoke exposure prior to active smoking    Alcohol use No    Drug use: No    Sexual activity: Not on file     Other Topics Concern    Not on file     Social History Narrative    Father worked in the [employer] and was diagnosed with Asbestosis.       Family History   Problem Relation Age of Onset    Coronary Artery Disease Mother     Heart Attack Mother     Heart Surgery Mother      CABGx3   Deenafeliciano Gordillo Elevated Lipids Mother     COPD Father     Heart Attack Brother     COPD Brother     Other Brother      PAD       Physical Exam:    Visit Vitals    /70 (BP 1 Location: Left arm, BP Patient Position: Sitting)    Pulse 100    Resp 16    Ht 5' 5\" (1.651 m)    Wt 132 lb (59.9 kg)    BMI 21.97 kg/m2      General: Well-appearing female no acute distress  HEENT: EOMI no scleral icterus is noted  Pulmonary: No increased work of breathing is noted  Right flank: Patient has her chronic wound on the right flank that shows good pink granulation tissue throughout minimal drainage but some minor fat liquefication is identified no cellulitis is identified just distal to this there is another small area again with fat liquefication but no purulent drainage or cellulitis is noted no graft is identified  Extremities: Warm and perfused bilaterally  Neuro: Cranial nerves II through XII grossly intact    Impression and Plan:  Ketty Mc is a 77 y.o. female with multiple I&D for abscess around her right axillary graft. Everything seems to be healing appropriately. I did spend some time looking at her multiple surgical microbiology cultures. Everything seems to be sensitive to Augmentin. I think we will switch her to a 2 week course of Augmentin I think continued wet to dry dressings as can be beneficial we will see her back next week depending on what the wounds look like will depend on if wound VAC would be appropriate. We reviewed the plan with the patient and the patient understands. We also gave the patient appropriate instructions on their disease process and when to call back. Follow-up Disposition: Not on [address] [place] [address], MD    PLEASE NOTE:  This document has been produced using voice recognition software. Unrecognized errors in transcription may be present.

## 2018-06-25 NOTE — PROGRESS NOTES
Cleansed proximal and distal wounds to right flank with dermal wound cleanser and gauze, applied NS wet to dry dressing, covered with ABD and secured with tape. Patient tolerated well. Informed Laila, the nurse from [facility], with verbal order for new wound care to have daily wet to dry dressing applied until patient is seen again next week. She stated she understood.

## 2018-06-25 NOTE — PROGRESS NOTES
1. Have you been to an emergency room or urgent care clinic since your last visit? Yes  6/23    Hospitalized since your last visit? If yes, where, when, and reason for visit? no  2. Have you seen or consulted any other health care providers outside of the [facility] since your last visit including any procedures, health maintenance items.  If yes, where, when and reason for visit?  no

## 2018-06-26 PROCEDURE — 3331090002 HH PPS REVENUE DEBIT

## 2018-06-26 PROCEDURE — 3331090001 HH PPS REVENUE CREDIT

## 2018-06-27 ENCOUNTER — HOME CARE VISIT (OUTPATIENT)
Dept: SCHEDULING | Facility: HOME HEALTH | Age: 68
End: 2018-06-27
Payer: MEDICARE

## 2018-06-27 PROCEDURE — 3331090002 HH PPS REVENUE DEBIT

## 2018-06-27 PROCEDURE — G0299 HHS/HOSPICE OF RN EA 15 MIN: HCPCS

## 2018-06-27 PROCEDURE — 3331090001 HH PPS REVENUE CREDIT

## 2018-06-28 PROCEDURE — 3331090001 HH PPS REVENUE CREDIT

## 2018-06-28 PROCEDURE — 3331090002 HH PPS REVENUE DEBIT

## 2018-06-29 ENCOUNTER — HOME CARE VISIT (OUTPATIENT)
Dept: SCHEDULING | Facility: HOME HEALTH | Age: 68
End: 2018-06-29
Payer: MEDICARE

## 2018-06-29 VITALS
SYSTOLIC BLOOD PRESSURE: 117 MMHG | DIASTOLIC BLOOD PRESSURE: 66 MMHG | HEART RATE: 85 BPM | TEMPERATURE: 97.2 F | RESPIRATION RATE: 18 BRPM

## 2018-06-29 PROCEDURE — 3331090001 HH PPS REVENUE CREDIT

## 2018-06-29 PROCEDURE — G0299 HHS/HOSPICE OF RN EA 15 MIN: HCPCS

## 2018-06-29 PROCEDURE — 3331090002 HH PPS REVENUE DEBIT

## 2018-06-30 PROCEDURE — 3331090002 HH PPS REVENUE DEBIT

## 2018-06-30 PROCEDURE — 3331090001 HH PPS REVENUE CREDIT

## 2018-07-01 PROCEDURE — 3331090002 HH PPS REVENUE DEBIT

## 2018-07-01 PROCEDURE — 3331090001 HH PPS REVENUE CREDIT

## 2018-07-02 ENCOUNTER — HOME CARE VISIT (OUTPATIENT)
Dept: SCHEDULING | Facility: HOME HEALTH | Age: 68
End: 2018-07-02
Payer: MEDICARE

## 2018-07-02 VITALS
SYSTOLIC BLOOD PRESSURE: 118 MMHG | TEMPERATURE: 99.3 F | OXYGEN SATURATION: 97 % | RESPIRATION RATE: 18 BRPM | HEART RATE: 89 BPM | DIASTOLIC BLOOD PRESSURE: 65 MMHG

## 2018-07-02 PROCEDURE — 3331090002 HH PPS REVENUE DEBIT

## 2018-07-02 PROCEDURE — G0299 HHS/HOSPICE OF RN EA 15 MIN: HCPCS

## 2018-07-02 PROCEDURE — 400014 HH F/U

## 2018-07-02 PROCEDURE — 3331090001 HH PPS REVENUE CREDIT

## 2018-07-03 ENCOUNTER — HOSPITAL ENCOUNTER (OUTPATIENT)
Dept: LAB | Age: 68
Discharge: HOME | End: 2018-07-03
Payer: MEDICARE

## 2018-07-03 ENCOUNTER — OFFICE VISIT (OUTPATIENT)
Dept: VASCULAR SURGERY | Age: 68
End: 2018-07-03

## 2018-07-03 VITALS
DIASTOLIC BLOOD PRESSURE: 63 MMHG | RESPIRATION RATE: 18 BRPM | SYSTOLIC BLOOD PRESSURE: 132 MMHG | HEART RATE: 94 BPM | TEMPERATURE: 97.3 F

## 2018-07-03 VITALS
WEIGHT: 132 LBS | SYSTOLIC BLOOD PRESSURE: 150 MMHG | DIASTOLIC BLOOD PRESSURE: 70 MMHG | BODY MASS INDEX: 21.99 KG/M2 | HEIGHT: 65 IN

## 2018-07-03 DIAGNOSIS — T81.31XD POSTOPERATIVE DEHISCENCE OF SKIN WOUND, SUBSEQUENT ENCOUNTER: Primary | ICD-10-CM

## 2018-07-03 DIAGNOSIS — T81.31XD POSTOPERATIVE DEHISCENCE OF SKIN WOUND, SUBSEQUENT ENCOUNTER: ICD-10-CM

## 2018-07-03 DIAGNOSIS — T81.31XS POSTOPERATIVE DEHISCENCE OF SKIN WOUND, SEQUELA: Primary | ICD-10-CM

## 2018-07-03 PROCEDURE — 3331090002 HH PPS REVENUE DEBIT

## 2018-07-03 PROCEDURE — 3331090001 HH PPS REVENUE CREDIT

## 2018-07-03 PROCEDURE — 87070 CULTURE OTHR SPECIMN AEROBIC: CPT | Performed by: SURGERY

## 2018-07-03 RX ORDER — DOXYCYCLINE 100 MG/1
100 TABLET ORAL 2 TIMES DAILY
Qty: 20 TAB | Refills: 0 | Status: SHIPPED | OUTPATIENT
Start: 2018-07-03 | End: 2019-09-05

## 2018-07-03 RX ORDER — AMOXICILLIN AND CLAVULANATE POTASSIUM 875; 125 MG/1; MG/1
1 TABLET, FILM COATED ORAL EVERY 12 HOURS
Qty: 14 TAB | Refills: 0 | Status: SHIPPED | OUTPATIENT
Start: 2018-07-03 | End: 2018-11-02 | Stop reason: ALTCHOICE

## 2018-07-03 RX ORDER — SULFAMETHOXAZOLE AND TRIMETHOPRIM 400; 80 MG/1; MG/1
1 TABLET ORAL 2 TIMES DAILY
Qty: 14 TAB | Refills: 0 | Status: SHIPPED | OUTPATIENT
Start: 2018-07-03 | End: 2018-07-03 | Stop reason: DRUGHIGH

## 2018-07-03 NOTE — MR AVS SNAPSHOT
[address] [address] 200 [facility] 
[phone] Patient: Manish Anne 
MRN: [id number] Visit Information Date & Time Provider Department Dept. Phone Encounter #  
 7/3/2018  9:00 AM Ashley Basurto  [facility] [id number] [phone] Follow-up Instructions Return in about 2 weeks (around 7/17/2018). Follow-up and Disposition History Your Appointments 7/20/2018 10:45 AM  
Follow Up with TIMMY [facility] ([facility]) Appt Note: follow up 2300 [facility] Subha Krystin 992 200 [facility]  
[phone] 2300 [facility] 200 [facility] Upcoming Health Maintenance Date Due Hepatitis C Screening 1950 DTaP/Tdap/Td series (1 - Tdap) 6/7/1969 FOBT Q 1 YEAR AGE 50-75 6/7/1998 ZOSTER VACCINE AGE 60> 4/7/2008 GLAUCOMA SCREENING Q2Y 6/7/2013 Bone Densitometry (Dexa) Screening 6/7/2013 BREAST CANCER SCRN MAMMOGRAM 7/8/2016 MEDICARE YEARLY EXAM 3/14/2018 Pneumococcal 65+ Low/Medium Risk (2 of 2 - PPSV23) 7/11/2020* Influenza Age 5 to Adult 8/1/2018 *Topic was postponed. The date shown is not the original due date. Allergies as of 7/3/2018  Review Complete On: 7/3/2018 By: Ashley Basurto MD  
  
 Severity Noted Reaction Type Reactions Codeine  04/01/2013   Intolerance Nausea and Vomiting Other reaction(s): other/intolerance Darvon [Propoxyphene]  04/01/2013   Systemic Itching Demerol [Meperidine]  04/01/2013   Side Effect Other (comments) Halucinations Keflex [Cephalexin]  01/06/2015    Diarrhea Talwin [Pentazocine Lactate]  04/09/2013    Shortness of Breath, Nausea and Vomiting Current Immunizations  Reviewed on 7/11/2017 Name Date Pneumococcal Vaccine (Unspecified Type) 6/7/2013 Not reviewed this visit You Were Diagnosed With   
  
 Codes Comments Postoperative dehiscence of skin wound, sequela    -  Primary ICD-10-CM: T81.31XS ICD-9-CM: 958. 3 Vitals BP Height(growth percentile) Weight(growth percentile) BMI OB Status Smoking Status 150/70 (BP 1 Location: Left arm, BP Patient Position: Sitting) 5' 5\" (1.651 m) 132 lb (59.9 kg) 21.97 kg/m2 Postmenopausal Current Every Day Smoker Vitals History BMI and BSA Data Body Mass Index Body Surface Area  
 21.97 kg/m 2 1.66 m 2 Preferred Pharmacy Pharmacy Name Phone Inoc[facility] Duty [address], [address] [facility] [phone] Your Updated Medication List  
  
   
This list is accurate as of 7/3/18  3:34 PM.  Always use your most recent med list.  
  
  
  
  
 amoxicillin-clavulanate 875-125 mg per tablet Commonly known as:  AUGMENTIN Take 1 Tab by mouth every twelve (12) hours. aspirin 81 mg tablet Take 81 mg by mouth daily. clopidogrel 75 mg Tab Commonly known as:  PLAVIX TAKE ONE TABLET BY MOUTH DAILY  
  
 CYMBALTA 60 mg capsule Generic drug:  DULoxetine Take 60 mg by mouth nightly. dicyclomine 10 mg capsule Commonly known as:  BENTYL Take 10 mg by mouth two (2) times a day. DONNATAL 16.2-0.1037 -0.0194 mg per tablet Generic drug:  atropine-PHENobarbital-scopolamine-hyoscyamine Take 1 Tab by mouth as needed (diarrhea). Indications: Irritable Bowel Syndrome  
  
 doxycycline 100 mg tablet Commonly known as:  ADOXA Take 1 Tab by mouth two (2) times a day. DYAZIDE 37.5-25 mg per capsule Generic drug:  triamterene-hydroCHLOROthiazide Take 1 Cap by mouth daily. * fluticasone-umeclidin-vilanter 100-62.5-25 mcg Dsdv Commonly known as:  Jolene Long Take 1 Inhalation by inhalation daily. * fluticasone-umeclidin-vilanter 100-62.5-25 mcg Dsdv Commonly known as:  Jolene Long Take 1 Inhalation by inhalation daily. * HYDROcodone-acetaminophen 5-325 mg per tablet Commonly known as:  Lynnetta Blackwood Take 1 Tab by mouth every four (4) hours as needed for Pain. Max Daily Amount: 6 Tabs. * HYDROcodone-acetaminophen 5-325 mg per tablet Commonly known as:  Lynnetta Blackwood Take 1-2 Tabs by mouth every four (4) hours as needed for Pain. Max Daily Amount: 12 Tabs. ipratropium-albuterol  mcg/actuation inhaler Commonly known as:  Washburn Learn Take 1 Puff by inhalation every six (6) hours as needed for Wheezing or Shortness of Breath. loperamide 2 mg tablet Commonly known as:  IMMODIUM Take 2 mg by mouth daily as needed for Diarrhea. LUMIGAN 0.03 % ophthalmic drops Generic drug:  bimatoprost  
Administer 1 Drop to both eyes every evening. LYRICA 100 mg capsule Generic drug:  pregabalin Take  by mouth two (2) times a day. Takes 100 mg in am and 200 in the pm  
  
 mupirocin 2 % ointment Commonly known as:  [facility] Apply  to affected area three (3) times daily. Apply to area for 10 days OXYGEN-AIR DELIVERY SYSTEMS  
2 L by Does Not Apply route. Every HS and as needed REMICADE 100 mg injection Generic drug:  inFLIXimab  
5 mg/kg by IntraVENous route once. Every 4 weeks VITAMIN B-12 1,000 mcg/mL injection Generic drug:  cyanocobalamin  
1,000 mcg by IntraMUSCular route every fourteen (14) days. * Notice: This list has 4 medication(s) that are the same as other medications prescribed for you. Read the directions carefully, and ask your doctor or other care provider to review them with you. Prescriptions Sent to Pharmacy Refills  
 doxycycline (ADOXA) 100 mg tablet 0 Sig: Take 1 Tab by mouth two (2) times a day. Class: Normal  
 Pharmacy: [address] #: [phone]  Route: Oral  
 trimethoprim-sulfamethoxazole (BACTRIM, SEPTRA)  mg per tablet (Discontinued) 0  
 Sig: Take 1 Tab by mouth two (2) times a day. Class: Normal  
 Pharmacy: [address], [address] [place] [address] #: [phone] Route: Oral  
 Reason for Discontinue: Dose Adjustment Follow-up Instructions Return in about 2 weeks (around 7/17/2018). To-Do List   
 07/04/2018 To Be Determined Appointment with Radha Felix RN at [address] [place] [address] REG MED CTR  
  
 07/06/2018 To Be Determined Appointment with Radha Felix RN at [address] [place] [address] REG MED CTR  
  
 07/09/2018 To Be Determined Appointment with Radha Felix RN at [address] [place] [address] REG MED CTR  
  
 07/11/2018 To Be Determined Appointment with Radha Felix RN at [address] [place] [address] REG MED CTR  
  
 07/13/2018 To Be Determined Appointment with Radha Felix RN at [address] [place] [address] REG MED CTR  
  
 07/16/2018 To Be Determined Appointment with Radha Felix RN at [address] [place] [address] REG MED CTR  
  
 07/18/2018 To Be Determined Appointment with Radha Felix RN at [address] [place] [address] REG MED CTR  
  
 07/20/2018 To Be Determined Appointment with Radha Felix RN at [address] [place] [address] REG MED CTR  
  
 07/23/2018 To Be Determined Appointment with Radha Felix RN at [address] [place] [address] REG MED CTR  
  
 07/25/2018 To Be Determined Appointment with Radha Felix RN at [address] [place] [address] REG MED [facility]  
  
 07/27/2018 To Be Determined Appointment with Radha Felix RN at [address] [place] [address] & HEALTH SERVICES!    
 [employer] introduces MyChart patient portal. Now you can access parts of your medical record, email your doctor's office, and request medication refills online. 1. In your internet browser, go to [email or web address] 2. Click on the First Time User? Click Here link in the Sign In box. You will see the New Member Sign Up page. 3. Enter your [employer] Access Code exactly as it appears below. You will not need to use this code after youve completed the sign-up process. If you do not sign up before the expiration date, you must request a new code. · [employer] Access Code: [email or web address] Expires: 9/19/2018  1:56 PM 
 
4. Enter the last four digits of your Social Security Number (xxxx) and Date of Birth (mm/dd/yyyy) as indicated and click Submit. You will be taken to the next sign-up page. 5. Create a [employer] ID. This will be your [employer] login ID and cannot be changed, so think of one that is secure and easy to remember. 6. Create a [employer] password. You can change your password at any time. 7. Enter your Password Reset Question and Answer. This can be used at a later time if you forget your password. 8. Enter your e-mail address. You will receive e-mail notification when new information is available in [address] 9. Click Sign Up. You can now view and download portions of your medical record. 10. Click the Download Summary menu link to download a portable copy of your medical information. If you have questions, please visit the Frequently Asked Questions section of the [employer] website. Remember, [employer] is NOT to be used for urgent needs. For medical emergencies, dial 911. Now available from your iPhone and Android! Please provide this summary of care documentation to your next provider. Your primary care clinician is listed as Judeen Severs. If you have any questions after today's visit, please call [phone].

## 2018-07-03 NOTE — PROGRESS NOTES
Error was made: patient will not take Bactrim but continue on Augmentin 875 mg for 7 more days and also start Doxcycline 100mg BID.

## 2018-07-03 NOTE — PROGRESS NOTES
Ms. Isabelle Nava is here following up today regarding this revision of her right axillofemoral bypass. Finally the right flank wound looks clean open and without drainage. Shows signs now of progressive healing. There is no redness and nonpainful. Unfortunately I do see a new wound she went to the ER and had an abscess lanced on the lower flank. There is no drainage we did send cultures today there is no redness or pain. I do not see any retained fluid collections. This overlies the bypass graft. We are going to give her Bactrim and doxycycline follow-up with cultures and follow-up on exam.  She knows if she has any fevers redness drainage to come in right away.   I have asked her to hold off on her Remicade until this show signs of healing

## 2018-07-03 NOTE — PROGRESS NOTES
1. Have you been to an emergency room or urgent care clinic since your last visit? No  Hospitalized since your last visit? If yes, where, when, and reason for visit? No  2. Have you seen or consulted any other health care providers outside of the [facility] since your last visit including any procedures, health maintenance items. If yes, where, when and reason for visit?

## 2018-07-04 ENCOUNTER — HOME CARE VISIT (OUTPATIENT)
Dept: SCHEDULING | Facility: HOME HEALTH | Age: 68
End: 2018-07-04
Payer: MEDICARE

## 2018-07-04 VITALS
SYSTOLIC BLOOD PRESSURE: 107 MMHG | HEART RATE: 76 BPM | RESPIRATION RATE: 18 BRPM | OXYGEN SATURATION: 97 % | DIASTOLIC BLOOD PRESSURE: 59 MMHG | TEMPERATURE: 98.4 F

## 2018-07-04 PROCEDURE — G0299 HHS/HOSPICE OF RN EA 15 MIN: HCPCS

## 2018-07-04 PROCEDURE — 3331090001 HH PPS REVENUE CREDIT

## 2018-07-04 PROCEDURE — 3331090002 HH PPS REVENUE DEBIT

## 2018-07-05 PROCEDURE — 3331090001 HH PPS REVENUE CREDIT

## 2018-07-05 PROCEDURE — 3331090002 HH PPS REVENUE DEBIT

## 2018-07-06 ENCOUNTER — HOME CARE VISIT (OUTPATIENT)
Dept: SCHEDULING | Facility: HOME HEALTH | Age: 68
End: 2018-07-06
Payer: MEDICARE

## 2018-07-06 VITALS
SYSTOLIC BLOOD PRESSURE: 111 MMHG | TEMPERATURE: 98.6 F | RESPIRATION RATE: 18 BRPM | HEART RATE: 84 BPM | DIASTOLIC BLOOD PRESSURE: 63 MMHG

## 2018-07-06 PROCEDURE — G0299 HHS/HOSPICE OF RN EA 15 MIN: HCPCS

## 2018-07-06 PROCEDURE — 3331090002 HH PPS REVENUE DEBIT

## 2018-07-06 PROCEDURE — 3331090001 HH PPS REVENUE CREDIT

## 2018-07-07 LAB
BACTERIA SPEC CULT: NORMAL
GRAM STN SPEC: NORMAL
GRAM STN SPEC: NORMAL
SERVICE CMNT-IMP: NORMAL

## 2018-07-07 PROCEDURE — 3331090002 HH PPS REVENUE DEBIT

## 2018-07-07 PROCEDURE — 3331090001 HH PPS REVENUE CREDIT

## 2018-07-08 PROCEDURE — 3331090001 HH PPS REVENUE CREDIT

## 2018-07-08 PROCEDURE — 3331090002 HH PPS REVENUE DEBIT

## 2018-07-09 ENCOUNTER — HOME CARE VISIT (OUTPATIENT)
Dept: SCHEDULING | Facility: HOME HEALTH | Age: 68
End: 2018-07-09
Payer: MEDICARE

## 2018-07-09 VITALS
SYSTOLIC BLOOD PRESSURE: 139 MMHG | HEART RATE: 82 BPM | DIASTOLIC BLOOD PRESSURE: 68 MMHG | RESPIRATION RATE: 19 BRPM | TEMPERATURE: 96.7 F

## 2018-07-09 PROCEDURE — 3331090002 HH PPS REVENUE DEBIT

## 2018-07-09 PROCEDURE — 3331090001 HH PPS REVENUE CREDIT

## 2018-07-09 PROCEDURE — G0299 HHS/HOSPICE OF RN EA 15 MIN: HCPCS

## 2018-07-10 PROCEDURE — 3331090001 HH PPS REVENUE CREDIT

## 2018-07-10 PROCEDURE — 3331090002 HH PPS REVENUE DEBIT

## 2018-07-11 ENCOUNTER — HOME CARE VISIT (OUTPATIENT)
Dept: SCHEDULING | Facility: HOME HEALTH | Age: 68
End: 2018-07-11
Payer: MEDICARE

## 2018-07-11 VITALS
TEMPERATURE: 97.1 F | SYSTOLIC BLOOD PRESSURE: 129 MMHG | OXYGEN SATURATION: 96 % | HEART RATE: 88 BPM | DIASTOLIC BLOOD PRESSURE: 64 MMHG | RESPIRATION RATE: 18 BRPM

## 2018-07-11 PROCEDURE — 3331090001 HH PPS REVENUE CREDIT

## 2018-07-11 PROCEDURE — 3331090002 HH PPS REVENUE DEBIT

## 2018-07-11 PROCEDURE — G0299 HHS/HOSPICE OF RN EA 15 MIN: HCPCS

## 2018-07-12 PROCEDURE — 3331090002 HH PPS REVENUE DEBIT

## 2018-07-12 PROCEDURE — 3331090001 HH PPS REVENUE CREDIT

## 2018-07-13 ENCOUNTER — HOME CARE VISIT (OUTPATIENT)
Dept: SCHEDULING | Facility: HOME HEALTH | Age: 68
End: 2018-07-13
Payer: MEDICARE

## 2018-07-13 PROCEDURE — 3331090002 HH PPS REVENUE DEBIT

## 2018-07-13 PROCEDURE — 3331090001 HH PPS REVENUE CREDIT

## 2018-07-13 PROCEDURE — G0299 HHS/HOSPICE OF RN EA 15 MIN: HCPCS

## 2018-07-14 PROCEDURE — 3331090002 HH PPS REVENUE DEBIT

## 2018-07-14 PROCEDURE — 3331090001 HH PPS REVENUE CREDIT

## 2018-07-15 PROCEDURE — 3331090001 HH PPS REVENUE CREDIT

## 2018-07-15 PROCEDURE — 3331090002 HH PPS REVENUE DEBIT

## 2018-07-16 ENCOUNTER — HOME CARE VISIT (OUTPATIENT)
Dept: SCHEDULING | Facility: HOME HEALTH | Age: 68
End: 2018-07-16
Payer: MEDICARE

## 2018-07-16 VITALS
SYSTOLIC BLOOD PRESSURE: 104 MMHG | OXYGEN SATURATION: 98 % | DIASTOLIC BLOOD PRESSURE: 53 MMHG | HEART RATE: 93 BPM | TEMPERATURE: 97.1 F | RESPIRATION RATE: 18 BRPM

## 2018-07-16 PROCEDURE — 3331090002 HH PPS REVENUE DEBIT

## 2018-07-16 PROCEDURE — G0299 HHS/HOSPICE OF RN EA 15 MIN: HCPCS

## 2018-07-16 PROCEDURE — 3331090001 HH PPS REVENUE CREDIT

## 2018-07-17 PROCEDURE — 3331090002 HH PPS REVENUE DEBIT

## 2018-07-17 PROCEDURE — 3331090001 HH PPS REVENUE CREDIT

## 2018-07-18 ENCOUNTER — HOME CARE VISIT (OUTPATIENT)
Dept: SCHEDULING | Facility: HOME HEALTH | Age: 68
End: 2018-07-18
Payer: MEDICARE

## 2018-07-18 VITALS
DIASTOLIC BLOOD PRESSURE: 33 MMHG | TEMPERATURE: 98.8 F | HEART RATE: 91 BPM | SYSTOLIC BLOOD PRESSURE: 115 MMHG | OXYGEN SATURATION: 100 % | RESPIRATION RATE: 16 BRPM

## 2018-07-18 PROCEDURE — 3331090001 HH PPS REVENUE CREDIT

## 2018-07-18 PROCEDURE — G0299 HHS/HOSPICE OF RN EA 15 MIN: HCPCS

## 2018-07-18 PROCEDURE — 3331090002 HH PPS REVENUE DEBIT

## 2018-07-19 PROCEDURE — 3331090001 HH PPS REVENUE CREDIT

## 2018-07-19 PROCEDURE — 3331090002 HH PPS REVENUE DEBIT

## 2018-07-20 ENCOUNTER — OFFICE VISIT (OUTPATIENT)
Dept: VASCULAR SURGERY | Age: 68
End: 2018-07-20

## 2018-07-20 DIAGNOSIS — T81.89XD NON-HEALING SURGICAL WOUND, SUBSEQUENT ENCOUNTER: ICD-10-CM

## 2018-07-20 DIAGNOSIS — L08.9 CHRONIC WOUND INFECTION OF ABDOMEN, SEQUELA: Primary | ICD-10-CM

## 2018-07-20 DIAGNOSIS — S31.109S CHRONIC WOUND INFECTION OF ABDOMEN, SEQUELA: Primary | ICD-10-CM

## 2018-07-20 PROCEDURE — 3331090001 HH PPS REVENUE CREDIT

## 2018-07-20 PROCEDURE — 3331090002 HH PPS REVENUE DEBIT

## 2018-07-20 NOTE — PROGRESS NOTES
Ms. Tania Palma is here for a wound check following up today regarding revision of her right axillofemoral bypass. Finally the right flank wound looks clean open and without drainage. Shows signs now of progressive healing. There is no redness and nonpainful. There is no drainage. I do not see any retained fluid collections. This overlies the bypass graft. We reapplied her wound VAC dressing today which we will continue with this on a Monday Wednesday Friday schedule with the assistance of home health.   We will plan on another recheck in a couple weeks to ensure continued wound healing

## 2018-07-21 PROCEDURE — 3331090001 HH PPS REVENUE CREDIT

## 2018-07-21 PROCEDURE — 3331090002 HH PPS REVENUE DEBIT

## 2018-07-22 VITALS
DIASTOLIC BLOOD PRESSURE: 60 MMHG | OXYGEN SATURATION: 98 % | SYSTOLIC BLOOD PRESSURE: 120 MMHG | HEART RATE: 88 BPM | RESPIRATION RATE: 16 BRPM

## 2018-07-22 PROCEDURE — 3331090001 HH PPS REVENUE CREDIT

## 2018-07-22 PROCEDURE — 3331090002 HH PPS REVENUE DEBIT

## 2018-07-23 ENCOUNTER — HOME CARE VISIT (OUTPATIENT)
Dept: SCHEDULING | Facility: HOME HEALTH | Age: 68
End: 2018-07-23
Payer: MEDICARE

## 2018-07-23 PROCEDURE — G0299 HHS/HOSPICE OF RN EA 15 MIN: HCPCS

## 2018-07-23 PROCEDURE — 3331090001 HH PPS REVENUE CREDIT

## 2018-07-23 PROCEDURE — 3331090002 HH PPS REVENUE DEBIT

## 2018-07-24 VITALS
RESPIRATION RATE: 18 BRPM | HEART RATE: 82 BPM | DIASTOLIC BLOOD PRESSURE: 55 MMHG | SYSTOLIC BLOOD PRESSURE: 107 MMHG | TEMPERATURE: 97.1 F

## 2018-07-24 PROCEDURE — 3331090002 HH PPS REVENUE DEBIT

## 2018-07-24 PROCEDURE — 3331090001 HH PPS REVENUE CREDIT

## 2018-07-25 ENCOUNTER — HOME CARE VISIT (OUTPATIENT)
Dept: SCHEDULING | Facility: HOME HEALTH | Age: 68
End: 2018-07-25
Payer: MEDICARE

## 2018-07-25 PROCEDURE — G0299 HHS/HOSPICE OF RN EA 15 MIN: HCPCS

## 2018-07-25 PROCEDURE — A4452 WATERPROOF TAPE: HCPCS

## 2018-07-25 PROCEDURE — 3331090002 HH PPS REVENUE DEBIT

## 2018-07-25 PROCEDURE — A6197 ALGINATE DRSG >16 <=48 SQ IN: HCPCS

## 2018-07-25 PROCEDURE — 3331090001 HH PPS REVENUE CREDIT

## 2018-07-26 PROCEDURE — 3331090002 HH PPS REVENUE DEBIT

## 2018-07-26 PROCEDURE — 3331090001 HH PPS REVENUE CREDIT

## 2018-07-27 ENCOUNTER — HOME CARE VISIT (OUTPATIENT)
Dept: SCHEDULING | Facility: HOME HEALTH | Age: 68
End: 2018-07-27
Payer: MEDICARE

## 2018-07-27 VITALS
TEMPERATURE: 97.4 F | HEART RATE: 87 BPM | RESPIRATION RATE: 18 BRPM | SYSTOLIC BLOOD PRESSURE: 112 MMHG | OXYGEN SATURATION: 97 % | DIASTOLIC BLOOD PRESSURE: 64 MMHG

## 2018-07-27 VITALS
TEMPERATURE: 97.8 F | DIASTOLIC BLOOD PRESSURE: 62 MMHG | RESPIRATION RATE: 18 BRPM | OXYGEN SATURATION: 99 % | SYSTOLIC BLOOD PRESSURE: 110 MMHG | HEART RATE: 84 BPM

## 2018-07-27 PROCEDURE — 3331090001 HH PPS REVENUE CREDIT

## 2018-07-27 PROCEDURE — 3331090002 HH PPS REVENUE DEBIT

## 2018-07-27 PROCEDURE — G0299 HHS/HOSPICE OF RN EA 15 MIN: HCPCS

## 2018-07-28 PROCEDURE — 3331090002 HH PPS REVENUE DEBIT

## 2018-07-28 PROCEDURE — 3331090001 HH PPS REVENUE CREDIT

## 2018-07-29 PROCEDURE — 3331090001 HH PPS REVENUE CREDIT

## 2018-07-29 PROCEDURE — 3331090002 HH PPS REVENUE DEBIT

## 2018-07-30 ENCOUNTER — HOME CARE VISIT (OUTPATIENT)
Dept: SCHEDULING | Facility: HOME HEALTH | Age: 68
End: 2018-07-30
Payer: MEDICARE

## 2018-07-30 PROCEDURE — G0299 HHS/HOSPICE OF RN EA 15 MIN: HCPCS

## 2018-07-30 PROCEDURE — 3331090002 HH PPS REVENUE DEBIT

## 2018-07-30 PROCEDURE — 3331090001 HH PPS REVENUE CREDIT

## 2018-07-31 VITALS
TEMPERATURE: 97.6 F | SYSTOLIC BLOOD PRESSURE: 116 MMHG | HEART RATE: 86 BPM | DIASTOLIC BLOOD PRESSURE: 86 MMHG | RESPIRATION RATE: 18 BRPM | OXYGEN SATURATION: 97 %

## 2018-07-31 PROCEDURE — 3331090002 HH PPS REVENUE DEBIT

## 2018-07-31 PROCEDURE — 3331090001 HH PPS REVENUE CREDIT

## 2018-08-01 ENCOUNTER — HOME CARE VISIT (OUTPATIENT)
Dept: SCHEDULING | Facility: HOME HEALTH | Age: 68
End: 2018-08-01
Payer: MEDICARE

## 2018-08-01 PROCEDURE — G0299 HHS/HOSPICE OF RN EA 15 MIN: HCPCS

## 2018-08-01 PROCEDURE — 3331090002 HH PPS REVENUE DEBIT

## 2018-08-01 PROCEDURE — 3331090001 HH PPS REVENUE CREDIT

## 2018-08-02 PROCEDURE — 3331090002 HH PPS REVENUE DEBIT

## 2018-08-02 PROCEDURE — 3331090001 HH PPS REVENUE CREDIT

## 2018-08-03 ENCOUNTER — HOME CARE VISIT (OUTPATIENT)
Dept: SCHEDULING | Facility: HOME HEALTH | Age: 68
End: 2018-08-03
Payer: MEDICARE

## 2018-08-03 VITALS
DIASTOLIC BLOOD PRESSURE: 63 MMHG | SYSTOLIC BLOOD PRESSURE: 126 MMHG | HEART RATE: 98 BPM | TEMPERATURE: 97.2 F | OXYGEN SATURATION: 98 % | RESPIRATION RATE: 18 BRPM

## 2018-08-03 PROCEDURE — 3331090002 HH PPS REVENUE DEBIT

## 2018-08-03 PROCEDURE — 3331090001 HH PPS REVENUE CREDIT

## 2018-08-03 PROCEDURE — G0299 HHS/HOSPICE OF RN EA 15 MIN: HCPCS

## 2018-08-04 PROCEDURE — 3331090002 HH PPS REVENUE DEBIT

## 2018-08-04 PROCEDURE — 3331090001 HH PPS REVENUE CREDIT

## 2018-08-05 PROCEDURE — 3331090001 HH PPS REVENUE CREDIT

## 2018-08-05 PROCEDURE — 3331090002 HH PPS REVENUE DEBIT

## 2018-08-06 ENCOUNTER — HOME CARE VISIT (OUTPATIENT)
Dept: SCHEDULING | Facility: HOME HEALTH | Age: 68
End: 2018-08-06
Payer: MEDICARE

## 2018-08-06 VITALS
TEMPERATURE: 97.6 F | OXYGEN SATURATION: 95 % | SYSTOLIC BLOOD PRESSURE: 116 MMHG | RESPIRATION RATE: 18 BRPM | DIASTOLIC BLOOD PRESSURE: 60 MMHG | HEART RATE: 82 BPM

## 2018-08-06 VITALS
HEART RATE: 86 BPM | OXYGEN SATURATION: 98 % | RESPIRATION RATE: 18 BRPM | DIASTOLIC BLOOD PRESSURE: 73 MMHG | SYSTOLIC BLOOD PRESSURE: 126 MMHG | TEMPERATURE: 97.6 F

## 2018-08-06 PROCEDURE — 3331090001 HH PPS REVENUE CREDIT

## 2018-08-06 PROCEDURE — 3331090002 HH PPS REVENUE DEBIT

## 2018-08-06 PROCEDURE — G0299 HHS/HOSPICE OF RN EA 15 MIN: HCPCS

## 2018-08-07 PROCEDURE — 3331090002 HH PPS REVENUE DEBIT

## 2018-08-07 PROCEDURE — 3331090001 HH PPS REVENUE CREDIT

## 2018-08-08 ENCOUNTER — HOME CARE VISIT (OUTPATIENT)
Dept: SCHEDULING | Facility: HOME HEALTH | Age: 68
End: 2018-08-08
Payer: MEDICARE

## 2018-08-08 PROCEDURE — 3331090001 HH PPS REVENUE CREDIT

## 2018-08-08 PROCEDURE — G0299 HHS/HOSPICE OF RN EA 15 MIN: HCPCS

## 2018-08-08 PROCEDURE — 3331090002 HH PPS REVENUE DEBIT

## 2018-08-09 ENCOUNTER — OFFICE VISIT (OUTPATIENT)
Dept: VASCULAR SURGERY | Age: 68
End: 2018-08-09

## 2018-08-09 VITALS
RESPIRATION RATE: 17 BRPM | HEART RATE: 88 BPM | WEIGHT: 132 LBS | BODY MASS INDEX: 21.99 KG/M2 | HEIGHT: 65 IN | DIASTOLIC BLOOD PRESSURE: 70 MMHG | SYSTOLIC BLOOD PRESSURE: 140 MMHG

## 2018-08-09 DIAGNOSIS — T81.89XD NON-HEALING SURGICAL WOUND, SUBSEQUENT ENCOUNTER: ICD-10-CM

## 2018-08-09 DIAGNOSIS — T81.31XD POSTOPERATIVE DEHISCENCE OF SKIN WOUND, SUBSEQUENT ENCOUNTER: Primary | ICD-10-CM

## 2018-08-09 PROCEDURE — 3331090001 HH PPS REVENUE CREDIT

## 2018-08-09 PROCEDURE — 3331090002 HH PPS REVENUE DEBIT

## 2018-08-09 NOTE — PROGRESS NOTES
Ms. Nikhil Hong is here today for wound check. She had have several debridements for wound issues and abscess related to old right axillofemoral bypass. We have mainly been monitoring 2 separate wounds, each of which at one point required wound VAC therapy. That has now been discontinued, and is doing topical dressings. Each of these 2 separate wounds has 1 pinhole opening. No drainage expressed from the bottom wound, but the trunk wound did have some minimal serous drainage. We cannot express any more, but there was some on the dressing. There is no surrounding redness, no warmth or tenderness. She feels well overall. We did some silver nitrate to see if that would stimulate some additional closure of these pinholes, and then just stopped it with Iodosorb and a dry dressing. She was given some additional care instructions to be able to continue this on her own and then in conjunction with at least one more home health visit.   And then we will see her back in another couple of weeks for a wound check back here in the office

## 2018-08-09 NOTE — PROGRESS NOTES
1. Have you been to an emergency room or urgent care clinic since your last visit? no    Hospitalized since your last visit? If yes, where, when, and reason for visit? no  2. Have you seen or consulted any other health care providers outside of the [facility] since your last visit including any procedures, health maintenance items.  If yes, where, when and reason for visit? no

## 2018-08-10 VITALS
TEMPERATURE: 98.4 F | RESPIRATION RATE: 18 BRPM | HEART RATE: 94 BPM | OXYGEN SATURATION: 98 % | DIASTOLIC BLOOD PRESSURE: 75 MMHG | SYSTOLIC BLOOD PRESSURE: 128 MMHG

## 2018-08-10 PROCEDURE — 3331090002 HH PPS REVENUE DEBIT

## 2018-08-10 PROCEDURE — 3331090001 HH PPS REVENUE CREDIT

## 2018-08-11 PROCEDURE — 3331090001 HH PPS REVENUE CREDIT

## 2018-08-11 PROCEDURE — 3331090002 HH PPS REVENUE DEBIT

## 2018-08-12 PROCEDURE — 3331090002 HH PPS REVENUE DEBIT

## 2018-08-12 PROCEDURE — 3331090001 HH PPS REVENUE CREDIT

## 2018-08-13 ENCOUNTER — HOME CARE VISIT (OUTPATIENT)
Dept: SCHEDULING | Facility: HOME HEALTH | Age: 68
End: 2018-08-13
Payer: MEDICARE

## 2018-08-13 PROCEDURE — G0299 HHS/HOSPICE OF RN EA 15 MIN: HCPCS

## 2018-08-13 PROCEDURE — 3331090001 HH PPS REVENUE CREDIT

## 2018-08-13 PROCEDURE — 3331090002 HH PPS REVENUE DEBIT

## 2018-08-14 VITALS
DIASTOLIC BLOOD PRESSURE: 61 MMHG | RESPIRATION RATE: 18 BRPM | HEART RATE: 95 BPM | TEMPERATURE: 97.3 F | SYSTOLIC BLOOD PRESSURE: 128 MMHG | OXYGEN SATURATION: 100 %

## 2018-08-14 PROCEDURE — 3331090002 HH PPS REVENUE DEBIT

## 2018-08-14 PROCEDURE — 3331090001 HH PPS REVENUE CREDIT

## 2018-08-15 ENCOUNTER — HOME CARE VISIT (OUTPATIENT)
Dept: SCHEDULING | Facility: HOME HEALTH | Age: 68
End: 2018-08-15
Payer: MEDICARE

## 2018-08-15 PROCEDURE — 3331090001 HH PPS REVENUE CREDIT

## 2018-08-15 PROCEDURE — 3331090002 HH PPS REVENUE DEBIT

## 2018-08-15 PROCEDURE — G0299 HHS/HOSPICE OF RN EA 15 MIN: HCPCS

## 2018-08-16 VITALS
RESPIRATION RATE: 18 BRPM | SYSTOLIC BLOOD PRESSURE: 107 MMHG | OXYGEN SATURATION: 95 % | DIASTOLIC BLOOD PRESSURE: 70 MMHG | TEMPERATURE: 97.1 F | HEART RATE: 98 BPM

## 2018-08-16 PROCEDURE — 3331090001 HH PPS REVENUE CREDIT

## 2018-08-16 PROCEDURE — 3331090002 HH PPS REVENUE DEBIT

## 2018-08-17 ENCOUNTER — HOME CARE VISIT (OUTPATIENT)
Dept: SCHEDULING | Facility: HOME HEALTH | Age: 68
End: 2018-08-17
Payer: MEDICARE

## 2018-08-17 PROCEDURE — 3331090001 HH PPS REVENUE CREDIT

## 2018-08-17 PROCEDURE — G0299 HHS/HOSPICE OF RN EA 15 MIN: HCPCS

## 2018-08-17 PROCEDURE — 3331090002 HH PPS REVENUE DEBIT

## 2018-08-18 PROCEDURE — 3331090001 HH PPS REVENUE CREDIT

## 2018-08-18 PROCEDURE — 3331090002 HH PPS REVENUE DEBIT

## 2018-08-19 PROCEDURE — 3331090002 HH PPS REVENUE DEBIT

## 2018-08-19 PROCEDURE — 3331090001 HH PPS REVENUE CREDIT

## 2018-08-20 ENCOUNTER — HOME CARE VISIT (OUTPATIENT)
Dept: SCHEDULING | Facility: HOME HEALTH | Age: 68
End: 2018-08-20
Payer: MEDICARE

## 2018-08-20 VITALS
SYSTOLIC BLOOD PRESSURE: 109 MMHG | TEMPERATURE: 97.7 F | OXYGEN SATURATION: 97 % | RESPIRATION RATE: 18 BRPM | HEART RATE: 94 BPM | DIASTOLIC BLOOD PRESSURE: 64 MMHG

## 2018-08-20 PROCEDURE — 3331090001 HH PPS REVENUE CREDIT

## 2018-08-20 PROCEDURE — G0299 HHS/HOSPICE OF RN EA 15 MIN: HCPCS

## 2018-08-20 PROCEDURE — 3331090002 HH PPS REVENUE DEBIT

## 2018-08-21 VITALS
SYSTOLIC BLOOD PRESSURE: 118 MMHG | DIASTOLIC BLOOD PRESSURE: 64 MMHG | TEMPERATURE: 97.3 F | HEART RATE: 90 BPM | RESPIRATION RATE: 18 BRPM

## 2018-08-21 PROCEDURE — 3331090002 HH PPS REVENUE DEBIT

## 2018-08-21 PROCEDURE — 3331090001 HH PPS REVENUE CREDIT

## 2018-08-22 ENCOUNTER — HOME CARE VISIT (OUTPATIENT)
Dept: SCHEDULING | Facility: HOME HEALTH | Age: 68
End: 2018-08-22
Payer: MEDICARE

## 2018-08-22 PROCEDURE — 3331090002 HH PPS REVENUE DEBIT

## 2018-08-22 PROCEDURE — 3331090001 HH PPS REVENUE CREDIT

## 2018-08-23 PROCEDURE — 3331090002 HH PPS REVENUE DEBIT

## 2018-08-23 PROCEDURE — 3331090001 HH PPS REVENUE CREDIT

## 2018-08-24 ENCOUNTER — HOME CARE VISIT (OUTPATIENT)
Dept: SCHEDULING | Facility: HOME HEALTH | Age: 68
End: 2018-08-24
Payer: MEDICARE

## 2018-08-24 VITALS
RESPIRATION RATE: 18 BRPM | DIASTOLIC BLOOD PRESSURE: 74 MMHG | SYSTOLIC BLOOD PRESSURE: 127 MMHG | OXYGEN SATURATION: 99 % | HEART RATE: 88 BPM | TEMPERATURE: 97.1 F

## 2018-08-24 PROCEDURE — 3331090002 HH PPS REVENUE DEBIT

## 2018-08-24 PROCEDURE — 3331090001 HH PPS REVENUE CREDIT

## 2018-08-24 PROCEDURE — G0299 HHS/HOSPICE OF RN EA 15 MIN: HCPCS

## 2018-08-25 PROCEDURE — 3331090002 HH PPS REVENUE DEBIT

## 2018-08-25 PROCEDURE — 3331090001 HH PPS REVENUE CREDIT

## 2018-08-26 PROCEDURE — 3331090002 HH PPS REVENUE DEBIT

## 2018-08-26 PROCEDURE — 3331090001 HH PPS REVENUE CREDIT

## 2018-08-27 ENCOUNTER — HOME CARE VISIT (OUTPATIENT)
Dept: SCHEDULING | Facility: HOME HEALTH | Age: 68
End: 2018-08-27
Payer: MEDICARE

## 2018-08-27 VITALS
TEMPERATURE: 97.2 F | RESPIRATION RATE: 18 BRPM | HEART RATE: 92 BPM | SYSTOLIC BLOOD PRESSURE: 116 MMHG | DIASTOLIC BLOOD PRESSURE: 70 MMHG

## 2018-08-27 PROCEDURE — 3331090001 HH PPS REVENUE CREDIT

## 2018-08-27 PROCEDURE — G0299 HHS/HOSPICE OF RN EA 15 MIN: HCPCS

## 2018-08-27 PROCEDURE — 3331090002 HH PPS REVENUE DEBIT

## 2018-08-28 VITALS
RESPIRATION RATE: 18 BRPM | SYSTOLIC BLOOD PRESSURE: 147 MMHG | HEART RATE: 85 BPM | DIASTOLIC BLOOD PRESSURE: 67 MMHG | OXYGEN SATURATION: 100 % | TEMPERATURE: 96.8 F

## 2018-08-28 PROCEDURE — 3331090001 HH PPS REVENUE CREDIT

## 2018-08-28 PROCEDURE — 3331090002 HH PPS REVENUE DEBIT

## 2018-08-29 ENCOUNTER — HOME CARE VISIT (OUTPATIENT)
Dept: SCHEDULING | Facility: HOME HEALTH | Age: 68
End: 2018-08-29
Payer: MEDICARE

## 2018-08-29 PROCEDURE — 3331090001 HH PPS REVENUE CREDIT

## 2018-08-29 PROCEDURE — G0299 HHS/HOSPICE OF RN EA 15 MIN: HCPCS

## 2018-08-29 PROCEDURE — 3331090002 HH PPS REVENUE DEBIT

## 2018-08-30 VITALS
HEART RATE: 79 BPM | SYSTOLIC BLOOD PRESSURE: 135 MMHG | DIASTOLIC BLOOD PRESSURE: 76 MMHG | OXYGEN SATURATION: 99 % | TEMPERATURE: 97.3 F | RESPIRATION RATE: 18 BRPM

## 2018-08-30 PROCEDURE — 3331090002 HH PPS REVENUE DEBIT

## 2018-08-30 PROCEDURE — 3331090001 HH PPS REVENUE CREDIT

## 2018-09-04 ENCOUNTER — OFFICE VISIT (OUTPATIENT)
Dept: VASCULAR SURGERY | Age: 68
End: 2018-09-04

## 2018-09-04 VITALS
HEART RATE: 92 BPM | SYSTOLIC BLOOD PRESSURE: 140 MMHG | RESPIRATION RATE: 17 BRPM | DIASTOLIC BLOOD PRESSURE: 70 MMHG | WEIGHT: 132 LBS | HEIGHT: 65 IN | BODY MASS INDEX: 21.99 KG/M2

## 2018-09-04 DIAGNOSIS — I74.09 CHRONIC AORTO-ILIAC OCCLUSION SYNDROME (HCC): Primary | ICD-10-CM

## 2018-09-04 DIAGNOSIS — I70.202 OCCLUSION OF LEFT FEMORAL ARTERY (HCC): ICD-10-CM

## 2018-09-04 DIAGNOSIS — Z95.828 S/P VASCULAR BYPASS: ICD-10-CM

## 2018-09-04 DIAGNOSIS — I73.9 PAD (PERIPHERAL ARTERY DISEASE) (HCC): ICD-10-CM

## 2018-09-04 NOTE — MR AVS SNAPSHOT
[address] 200 [facility] 
[phone] Patient: Kody Atkins 
MRN: [id number]:[id number] Visit Information Date & Time Provider Department Dept. Phone Encounter #  
 9/4/2018  9:00 AM Elen Velia, [address] [facility] [phone] Follow-up Instructions Follow-up and Disposition History Your Appointments 10/15/2018  8:00 AM  
PROCEDURE with [facility] NONIMAGING [facility] (Jacob Barth) Appt Note: milagro 6 wks [address] 1212 [facility] [address] 200 [facility]  
[phone] [address] [place] [address]  
  
    
 10/15/2018  9:00 AM  
PROCEDURE with [facility] IMAGING 1 [facility] (Jacob Barth) Appt Note: ao iliac 6wks [address] 1212 [facility] [address] 200 [facility]  
[phone] [address] [place] [address]  
  
    
 10/15/2018 10:00 AM  
PROCEDURE with [facility] IMAGING 1 [facility] (Jacob Barth) Appt Note: l bpg 6 wks [address] 1212 [facility] [address] 251 200 [facility]  
[phone] Upcoming Health Maintenance Date Due Hepatitis C Screening 1950 DTaP/Tdap/Td series (1 - Tdap) 6/7/1969 FOBT Q 1 YEAR AGE 50-75 6/7/1998 ZOSTER VACCINE AGE 60> 4/7/2008 GLAUCOMA SCREENING Q2Y 6/7/2013 Bone Densitometry (Dexa) Screening 6/7/2013 BREAST CANCER SCRN MAMMOGRAM 7/8/2016 MEDICARE YEARLY EXAM 3/14/2018 Influenza Age 5 to Adult 8/1/2018 Pneumococcal 65+ Low/Medium Risk (2 of 2 - PPSV23) 7/11/2020* *Topic was postponed. The date shown is not the original due date. Allergies as of 9/4/2018  Review Complete On: 9/4/2018 By: Lauren Cotto Severity Noted Reaction Type Reactions Codeine  04/01/2013   Intolerance Nausea and Vomiting Other reaction(s): other/intolerance Darvon [Propoxyphene]  04/01/2013   Systemic Itching Demerol [Meperidine]  04/01/2013   Side Effect Other (comments) Halucinations Keflex [Cephalexin]  01/06/2015    Diarrhea Talwin [Pentazocine Lactate]  04/09/2013    Shortness of Breath, Nausea and Vomiting Current Immunizations  Reviewed on 7/11/2017 Name Date Pneumococcal Vaccine (Unspecified Type) 6/7/2013 Not reviewed this visit You Were Diagnosed With   
  
 Codes Comments Chronic aorto-iliac occlusion syndrome (HCC)    -  Primary ICD-10-CM: U20.98 
ICD-9-CM: 444.09 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HCC)     ICD-10-CM: C03.988 ICD-9-CM: 444.22   
 PAD (peripheral artery disease) ([facility] 75.)     ICD-10-CM: I73.9 ICD-9-CM: 443.9 S/P vascular bypass     ICD-10-CM: C54.755 ICD-9-CM: V45.89 Vitals BP Pulse Resp Height(growth percentile) Weight(growth percentile) BMI  
 140/70 (BP 1 Location: Left arm, BP Patient Position: Sitting) 92 17 5' 5\" (1.651 m) 132 lb (59.9 kg) 21.97 kg/m2 OB Status Smoking Status Postmenopausal Current Every Day Smoker Vitals History BMI and BSA Data Body Mass Index Body Surface Area  
 21.97 kg/m 2 1.66 m 2 Preferred Pharmacy Pharmacy Name Phone [address] [facility] [phone] Your Updated Medication List  
  
   
This list is accurate as of 9/4/18  9:17 AM.  Always use your most recent med list.  
  
  
  
  
 amoxicillin-clavulanate 875-125 mg per tablet Commonly known as:  AUGMENTIN Take 1 Tab by mouth every twelve (12) hours. aspirin 81 mg tablet Take 81 mg by mouth daily. clopidogrel 75 mg Tab Commonly known as:  PLAVIX TAKE ONE TABLET BY MOUTH DAILY  
  
 CYMBALTA 60 mg capsule Generic drug:  DULoxetine Take 60 mg by mouth nightly. dicyclomine 10 mg capsule Commonly known as:  BENTYL Take 10 mg by mouth two (2) times a day. DONNATAL 16.2-0.1037 -0.0194 mg per tablet Generic drug:  atropine-PHENobarbital-scopolamine-hyoscyamine Take 1 Tab by mouth as needed (diarrhea). Indications: Irritable Bowel Syndrome  
  
 doxycycline 100 mg tablet Commonly known as:  ADOXA Take 1 Tab by mouth two (2) times a day. DYAZIDE 37.5-25 mg per capsule Generic drug:  triamterene-hydroCHLOROthiazide Take 1 Cap by mouth daily. * fluticasone-umeclidin-vilanter 100-62.5-25 mcg Dsdv Commonly known as:  Marigene Lofty Take 1 Inhalation by inhalation daily. * fluticasone-umeclidin-vilanter 100-62.5-25 mcg Dsdv Commonly known as:  Marigene Lofty Take 1 Inhalation by inhalation daily. * HYDROcodone-acetaminophen 5-325 mg per tablet Commonly known as:  Sadaf Remberto Take 1 Tab by mouth every four (4) hours as needed for Pain. Max Daily Amount: 6 Tabs. * HYDROcodone-acetaminophen 5-325 mg per tablet Commonly known as:  Sadaf Remberto Take 1-2 Tabs by mouth every four (4) hours as needed for Pain. Max Daily Amount: 12 Tabs. ipratropium-albuterol  mcg/actuation inhaler Commonly known as:  Benna Idler Take 1 Puff by inhalation every six (6) hours as needed for Wheezing or Shortness of Breath. loperamide 2 mg tablet Commonly known as:  IMMODIUM Take 2 mg by mouth daily as needed for Diarrhea. LUMIGAN 0.03 % ophthalmic drops Generic drug:  bimatoprost  
Administer 1 Drop to both eyes every evening. LYRICA 100 mg capsule Generic drug:  pregabalin Take  by mouth two (2) times a day. Takes 100 mg in am and 200 in the pm  
  
 mupirocin 2 % ointment Commonly known as:  [facility] Apply  to affected area three (3) times daily. Apply to area for 10 days OXYGEN-AIR DELIVERY SYSTEMS  
2 L by Does Not Apply route. Every HS and as needed REMICADE 100 mg injection Generic drug:  inFLIXimab  
5 mg/kg by IntraVENous route once. Every 4 weeks VITAMIN B-12 1,000 mcg/mL injection Generic drug:  cyanocobalamin  
1,000 mcg by IntraMUSCular route every fourteen (14) days. * Notice: This list has 4 medication(s) that are the same as other medications prescribed for you. Read the directions carefully, and ask your doctor or other care provider to review them with you. To-Do List   
 10/15/2018 Vascular/US:  DUPLEX AORTA/IVC/ILIAC/GRAFTS LTD RT   
  
 10/15/2018 Vascular/US:  DUPLEX LOWER EXT BYPASS GRAFT LEFT [address] Introducing [facility]! [employer] introduces [employer] patient portal. Now you can access parts of your medical record, email your doctor's office, and request medication refills online. 1. In your internet browser, go to [email or web address] 2. Click on the First Time User? Click Here link in the Sign In box. You will see the New Member Sign Up page. 3. Enter your [employer] Access Code exactly as it appears below. You will not need to use this code after youve completed the sign-up process. If you do not sign up before the expiration date, you must request a new code. · [employer] Access Code: [email or web address] Expires: 9/19/2018  1:56 PM 
 
4. Enter the last four digits of your Social Security Number (xxxx) and Date of Birth (mm/dd/yyyy) as indicated and click Submit. You will be taken to the next sign-up page. 5. Create a [employer] ID. This will be your [employer] login ID and cannot be changed, so think of one that is secure and easy to remember. 6. Create a [employer] password. You can change your password at any time. 7. Enter your Password Reset Question and Answer. This can be used at a later time if you forget your password. 8. Enter your e-mail address. You will receive e-mail notification when new information is available in [address] 9. Click Sign Up. You can now view and download portions of your medical record. 10. Click the Download Summary menu link to download a portable copy of your medical information. If you have questions, please visit the Frequently Asked Questions section of the [email or web address] website. Remember, [email or web address] is NOT to be used for urgent needs. For medical emergencies, dial 911. Now available from your iPhone and Android! Please provide this summary of care documentation to your next provider. Your primary care clinician is listed as Rodney Whitley. If you have any questions after today's visit, please call [phone].

## 2018-09-04 NOTE — PROGRESS NOTES
Ms. Isabelle Nava is here for a wound check. Over the course of the summer she has had several procedures to debride and washout 2 separate wounds from the right axillofemoral bypass. She had had some chronic wound dehiscence and prior revisions, so we actually removed a portion of the bypass and created a new section, and then she was left with these wounds. She initially had wound VAC and now is just doing dry dressings as the bottom flank wound is completely healed, and now just a dry eschar of the upper flank wound. There was 1 small opening of the proximal and that I did apply some silver nitrate for today. Wounds were redressed, and she is continuing wound care on her own since these are nearly healed. I told her there is any problems or concerns to let us know, and if she feels that she could benefit from an additional silver nitrate application we can certainly accommodate that for her.   She is due as well for surveillance studies this fall, so we will plan to see her back in about 4-6 weeks with those studies ahead of time

## 2018-09-04 NOTE — PROGRESS NOTES
1. Have you been to an emergency room or urgent care clinic since your last visit? no    Hospitalized since your last visit? If yes, where, when, and reason for visit? no  2. Have you seen or consulted any other health care providers outside of the [facility] since your last visit including any procedures, health maintenance items.  If yes, where, when and reason for visit? no

## 2018-09-07 ENCOUNTER — TELEPHONE (OUTPATIENT)
Dept: PULMONOLOGY | Age: 68
End: 2018-09-07

## 2018-09-07 NOTE — TELEPHONE ENCOUNTER
Pulmonary Rehab called patient and spoke to her about the program. She isn't sure and wants to think about it. Will follow up in about a week.     Thank you,  Christos Loyola

## 2018-09-25 ENCOUNTER — TELEPHONE (OUTPATIENT)
Dept: PULMONOLOGY | Age: 68
End: 2018-09-25

## 2018-09-25 ENCOUNTER — OFFICE VISIT (OUTPATIENT)
Dept: VASCULAR SURGERY | Age: 68
End: 2018-09-25

## 2018-09-25 DIAGNOSIS — T81.89XD NON-HEALING SURGICAL WOUND, SUBSEQUENT ENCOUNTER: Primary | ICD-10-CM

## 2018-09-25 NOTE — PROGRESS NOTES
Patient being seen to have distal right lower abdominal incision assessed. No drainage is noted but there is a slight bulge protruding from incision. The incision just above this one appears to have slightly reopened as well, granulating with very scant ss drainage. Had TIMMY Burrell assess and her thoughts are that the distal wound may have a granuloma and silver nitrate was applied to both areas. Covered with gauze and secured with tape. Patient will do this daily and watch the areas and return next Friday for Dr. Divya Flores to assess.

## 2018-09-25 NOTE — TELEPHONE ENCOUNTER
Pulmonary Rehab called patient and left a message with her . He will have her call us back. Will follow up if we do not get a call.     Thank you,  Russ Delacruz

## 2018-09-25 NOTE — MR AVS SNAPSHOT
[address] [place] [address] 040 706 [facility] 
[phone] Patient: Orlin Hercules 
MRN: [id number] YAH:3/6/1925 Visit Information Date & Time Provider Department Dept. Phone Encounter #  
 9/25/2018  4:00 PM [facility] NURSE [facility] [phone] [id number] Your Appointments 9/25/2018  4:00 PM  
Follow Up with [facility] NURSE [facility] ([address] [place] [address]) Appt Note: Wound Check; ok per Alona 2300 [facility] [phone] [facility]  
[phone] 2300 [facility] 47 [facility]  
  
    
 10/5/2018  1:30 PM  
Follow Up with Elisa Rainey MD  
[address] [place] [address] [facility] [address] [place] [address]) 2300 [facility] [phone] [facility]  
[phone] [address] [place] [address]  
  
    
 10/15/2018  8:00 AM  
PROCEDURE with [facility] NONIMAGING [facility] ([address] [place] [address]) Appt Note: mliagro 6 wks knaak 2300 [facility] 970 706 [facility]  
[phone] [address] [place] [address]  
  
    
 10/15/2018  9:00 AM  
PROCEDURE with [facility] IMAGING 1 [facility] ([address] [place] [address]) Appt Note: ao iliac 6wks knaak 2300 [facility] 704 706 [facility]  
[phone] [address] [place] [address]  
  
    
 10/15/2018 10:00 AM  
PROCEDURE with [facility] IMAGING 1 [facility] ([address] [place] [address]) Appt Note: l bpg 6 wks knaak 2300 [facility] 023 706 [facility]  
[phone]  
  
    
 10/19/2018  9:00 AM  
Follow Up with TIMMY Davis [facility] ([address] [place] [address]) Appt Note: 6 wk f/u appt w/test on 10/15/18 7404 [facility] 178 [id number] [address] [address] [phone] 2300 [facility] Joe Stone [address] [place] [address]  
  
    
 11/2/2018  8:30 AM  
Nurse Visit with [facility] [address] ([facility]) Appt Note: 5MFU appt @ [facility], [address] [address]  
[phone]  
  
   
 [address] [place] [address], [address] [place] [address],[address] [place] [address]  
  
    
 11/2/2018  9:00 AM  
Follow Up with Ayad Hines MD  
[address] ([facility]) Appt Note: 5MFU appt W/ SP @ 8:30  
 [address] [place] [address], [address] [address]  
[phone]  
  
   
 [address] [place] [address], [address] [place] [address],[address] [place] [address] Upcoming Health Maintenance Date Due Hepatitis C Screening 1950 DTaP/Tdap/Td series (1 - Tdap) 6/7/1969 Shingrix Vaccine Age 50> (1 of 2) 6/7/1998 FOBT Q 1 YEAR AGE 50-75 6/7/1998 GLAUCOMA SCREENING Q2Y 6/7/2013 Bone Densitometry (Dexa) Screening 6/7/2013 BREAST CANCER SCRN MAMMOGRAM 7/8/2016 MEDICARE YEARLY EXAM 3/14/2018 Influenza Age 5 to Adult 8/1/2018 Pneumococcal 65+ Low/Medium Risk (2 of 2 - PPSV23) 7/11/2020* *Topic was postponed. The date shown is not the original due date. Allergies as of 9/25/2018  Review Complete On: 9/4/2018 By: Wilian Daniel Severity Noted Reaction Type Reactions Codeine  04/01/2013   Intolerance Nausea and Vomiting Other reaction(s): other/intolerance Darvon [Propoxyphene]  04/01/2013   Systemic Itching Demerol [Meperidine]  04/01/2013   Side Effect Other (comments) Halucinations Keflex [Cephalexin]  01/06/2015    Diarrhea Talwin [Pentazocine Lactate]  04/09/2013    Shortness of Breath, Nausea and Vomiting Current Immunizations  Reviewed on 7/11/2017 Name Date Pneumococcal Vaccine (Unspecified Type) 6/7/2013 Not reviewed this visit Vitals OB Status Smoking Status Postmenopausal Current Every Day Smoker Preferred Pharmacy Pharmacy Name Phone Alireza Nu[facility]ez,[address] [facility] [phone] Your Updated Medication List  
  
   
This list is accurate as of 9/25/18  3:54 PM.  Always use your most recent med list.  
  
  
  
  
 amoxicillin-clavulanate 875-125 mg per tablet Commonly known as:  AUGMENTIN Take 1 Tab by mouth every twelve (12) hours. aspirin 81 mg tablet Take 81 mg by mouth daily. clopidogrel 75 mg Tab Commonly known as:  PLAVIX TAKE ONE TABLET BY MOUTH DAILY  
  
 CYMBALTA 60 mg capsule Generic drug:  DULoxetine Take 60 mg by mouth nightly. dicyclomine 10 mg capsule Commonly known as:  BENTYL Take 10 mg by mouth two (2) times a day. DONNATAL 16.2-0.1037 -0.0194 mg per tablet Generic drug:  atropine-PHENobarbital-scopolamine-hyoscyamine Take 1 Tab by mouth as needed (diarrhea). Indications: Irritable Bowel Syndrome  
  
 doxycycline 100 mg tablet Commonly known as:  ADOXA Take 1 Tab by mouth two (2) times a day. DYAZIDE 37.5-25 mg per capsule Generic drug:  triamterene-hydroCHLOROthiazide Take 1 Cap by mouth daily. * fluticasone-umeclidin-vilanter 100-62.5-25 mcg Dsdv Commonly known as:  Taryn Simpers Take 1 Inhalation by inhalation daily. * fluticasone-umeclidin-vilanter 100-62.5-25 mcg Dsdv Commonly known as:  Taryn Simpers Take 1 Inhalation by inhalation daily. * HYDROcodone-acetaminophen 5-325 mg per tablet Commonly known as:  All Dolphin Take 1 Tab by mouth every four (4) hours as needed for Pain. Max Daily Amount: 6 Tabs. * HYDROcodone-acetaminophen 5-325 mg per tablet Commonly known as:  All Dolphin Take 1-2 Tabs by mouth every four (4) hours as needed for Pain. Max Daily Amount: 12 Tabs. ipratropium-albuterol  mcg/actuation inhaler Commonly known as:  Zada Primes Take 1 Puff by inhalation every six (6) hours as needed for Wheezing or Shortness of Breath. loperamide 2 mg tablet Commonly known as:  IMMODIUM Take 2 mg by mouth daily as needed for Diarrhea. LUMIGAN 0.03 % ophthalmic drops Generic drug:  bimatoprost  
Administer 1 Drop to both eyes every evening. LYRICA 100 mg capsule Generic drug:  pregabalin Take  by mouth two (2) times a day. Takes 100 mg in am and 200 in the pm  
  
 mupirocin 2 % ointment Commonly known as:  Sergio Mattock Apply  to affected area three (3) times daily. Apply to area for 10 days OXYGEN-AIR DELIVERY SYSTEMS  
2 L by Does Not Apply route. Every HS and as needed REMICADE 100 mg injection Generic drug:  inFLIXimab  
5 mg/kg by IntraVENous route once. Every 4 weeks VITAMIN B-12 1,000 mcg/mL injection Generic drug:  cyanocobalamin  
1,000 mcg by IntraMUSCular route every fourteen (14) days. * Notice: This list has 4 medication(s) that are the same as other medications prescribed for you. Read the directions carefully, and ask your doctor or other care provider to review them with you. Introducing [facility]! [employer] introduces [employer] patient portal. Now you can access parts of your medical record, email your doctor's office, and request medication refills online. 1. In your internet browser, go to [email or web address] 2. Click on the First Time User? Click Here link in the Sign In box. You will see the New Member Sign Up page. 3. Enter your [employer] Access Code exactly as it appears below. You will not need to use this code after youve completed the sign-up process. If you do not sign up before the expiration date, you must request a new code. · [employer] Access Code: [email or web address] Expires: 12/24/2018  3:36 PM 
 
4. Enter the last four digits of your Social Security Number (xxxx) and Date of Birth (mm/dd/yyyy) as indicated and click Submit. You will be taken to the next sign-up page. 5. Create a [employer] ID. This will be your [employer] login ID and cannot be changed, so think of one that is secure and easy to remember. 6. Create a [employer] password. You can change your password at any time. 7. Enter your Password Reset Question and Answer. This can be used at a later time if you forget your password. 8. Enter your e-mail address. You will receive e-mail notification when new information is available in [address] 9. Click Sign Up. You can now view and download portions of your medical record. 10. Click the Download Summary menu link to download a portable copy of your medical information. If you have questions, please visit the Frequently Asked Questions section of the [employer] website. Remember, [employer] is NOT to be used for urgent needs. For medical emergencies, dial 911. Now available from your iPhone and Android! Please provide this summary of care documentation to your next provider. Your primary care clinician is listed as Elia Sinha. If you have any questions after today's visit, please call [phone].

## 2018-10-05 ENCOUNTER — OFFICE VISIT (OUTPATIENT)
Dept: VASCULAR SURGERY | Age: 68
End: 2018-10-05

## 2018-10-05 VITALS
BODY MASS INDEX: 21.99 KG/M2 | SYSTOLIC BLOOD PRESSURE: 144 MMHG | HEART RATE: 86 BPM | HEIGHT: 65 IN | DIASTOLIC BLOOD PRESSURE: 80 MMHG | WEIGHT: 132 LBS

## 2018-10-05 DIAGNOSIS — I74.09 CHRONIC AORTO-ILIAC OCCLUSION SYNDROME (HCC): Primary | ICD-10-CM

## 2018-10-05 DIAGNOSIS — T81.31XS POSTOPERATIVE DEHISCENCE OF SKIN WOUND, SEQUELA: ICD-10-CM

## 2018-10-05 NOTE — PROGRESS NOTES
Ms. Toñito Alejandra is a 68-year-old female here today for follow-up regarding these wounds on her right flank. Her bypass graft seems to be working well she is scheduled in 2 weeks for a Doppler to examine the performance. The initial incision that was treated has healed up there is no more drainage here. The second draining site on the right flank has a small 1 x 2 mm blister that ID removed and silver nitrate but there is no pus or signs of infection no pain. No foreign body observed.   She can keep her appointment for Doppler and come back and see me after that and pleased with her progress at this point

## 2018-10-05 NOTE — PROGRESS NOTES
1. Have you been to an emergency room or urgent care clinic since your last visit? No  Hospitalized since your last visit? If yes, where, when, and reason for visit? No  2. Have you seen or consulted any other health care providers outside of the [facility] since your last visit including any procedures, health maintenance items. If yes, where, when and reason for visit?

## 2018-10-19 ENCOUNTER — OFFICE VISIT (OUTPATIENT)
Dept: VASCULAR SURGERY | Age: 68
End: 2018-10-19

## 2018-10-19 VITALS
HEART RATE: 76 BPM | DIASTOLIC BLOOD PRESSURE: 78 MMHG | BODY MASS INDEX: 21.99 KG/M2 | WEIGHT: 132 LBS | SYSTOLIC BLOOD PRESSURE: 130 MMHG | HEIGHT: 65 IN | RESPIRATION RATE: 18 BRPM

## 2018-10-19 DIAGNOSIS — L92.9 GRANULOMA OF SKIN: ICD-10-CM

## 2018-10-19 DIAGNOSIS — I73.9 PAD (PERIPHERAL ARTERY DISEASE) (HCC): Primary | ICD-10-CM

## 2018-10-19 DIAGNOSIS — Z95.828 S/P VASCULAR BYPASS: ICD-10-CM

## 2018-10-19 NOTE — PROGRESS NOTES
Ms Riana Franklin is here for a wound check and this also coincided with surveillance studies    She has a granuloma from a recent debridement of a chronic wound (right thorax) from her right ax fem bypass. Wound is closed other than this residual granuloma. As we have done before, we applied silver nitrate. May need to continue this for a period of time until resolved. But she was reassured with her studies that no abscess or perigraft fluid on duplex. This bypass as well as a left fem pop are patent and both without any stenosis. 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 But with no claudication, no further intervention has been discussed over the years with these stable results. She is aware of symptoms to notify us about if any concerns    We will continue to treat and monitor this granuloma and see her back in a few weeks.  She has the necessary supplies for wound care at home  She is to call to come in sooner if any changes

## 2018-11-02 ENCOUNTER — CLINICAL SUPPORT (OUTPATIENT)
Dept: PULMONOLOGY | Age: 68
End: 2018-11-02

## 2018-11-02 ENCOUNTER — OFFICE VISIT (OUTPATIENT)
Dept: PULMONOLOGY | Age: 68
End: 2018-11-02

## 2018-11-02 VITALS
SYSTOLIC BLOOD PRESSURE: 130 MMHG | HEART RATE: 85 BPM | TEMPERATURE: 98 F | DIASTOLIC BLOOD PRESSURE: 70 MMHG | RESPIRATION RATE: 19 BRPM | OXYGEN SATURATION: 98 % | WEIGHT: 138 LBS | HEIGHT: 65 IN | BODY MASS INDEX: 22.99 KG/M2

## 2018-11-02 DIAGNOSIS — F17.200 TOBACCO DEPENDENCE: ICD-10-CM

## 2018-11-02 DIAGNOSIS — R59.0 MEDIASTINAL LYMPHADENOPATHY: ICD-10-CM

## 2018-11-02 DIAGNOSIS — J96.11 CHRONIC HYPOXEMIC RESPIRATORY FAILURE (HCC): ICD-10-CM

## 2018-11-02 DIAGNOSIS — J44.9 CHRONIC OBSTRUCTIVE PULMONARY DISEASE, UNSPECIFIED COPD TYPE (HCC): Primary | ICD-10-CM

## 2018-11-02 DIAGNOSIS — J44.9 COPD, MODERATE (HCC): Primary | ICD-10-CM

## 2018-11-02 DIAGNOSIS — Z23 ENCOUNTER FOR IMMUNIZATION: ICD-10-CM

## 2018-11-02 NOTE — PROGRESS NOTES
Verbal Order with read back per Yusuf Duvall MD  For PFT smart panel. AMB POC PFT complete w/ bronchodilator AMB POC PFT complete w/o bronchodilator Dr. Lyla Love MD will co-sign the orders.

## 2018-11-02 NOTE — PROGRESS NOTES
HISTORY OF PRESENT ILLNESS Porsche Nazario is a 77 y.o. female with COPD FEV1 53%. Follow up for COPD and acute on chronic hypoxic respiratory failure due to multilobar pneumonia, interstitial pattern on CXR. Pt with elevated Mycoplasma IgG but normal IgM. Pt does not recall much from her hospital admission but states she is almost back to baseline. She still complains of dyspnea with moderate to severe exertion. No chest pain or cough. No new respiratory symptoms registered. Pt reports significant improvement in baseline and exercise dyspnea after starting Trelegy. States she is able to perform more heavy chores at home. She denies any new respiratory symptoms. Pt has abstained from smoking for two months but relapsed during a period of personal stress. She is using a Nicotine patch and was on Bupropion for a while but not at this time. Part of her stress is due to having to deal with problems associated with her Aorto iliac bypass graft as well as family stressors. COPD Pertinent negatives include no chest pain, no abdominal pain and no headaches. Shortness of breath: improved. Other Pertinent negatives include no chest pain, no abdominal pain and no headaches. Shortness of breath: improved. Nicotine Dependence Pertinent negatives include no chest pain, no abdominal pain and no headaches. Shortness of breath: improved. Review of Systems Constitutional: Negative for chills, diaphoresis, fever, malaise/fatigue and weight loss. HENT: Negative for congestion, ear discharge, ear pain, hearing loss, nosebleeds, sinus pain, sore throat and tinnitus. Eyes: Negative for blurred vision, double vision, photophobia, pain, discharge and redness. Respiratory: Positive for wheezing. Negative for cough, hemoptysis, sputum production and stridor. Shortness of breath: improved. Cardiovascular: Negative for chest pain, palpitations, orthopnea, claudication, leg swelling and PND. Gastrointestinal: Negative for abdominal pain, blood in stool, constipation, diarrhea, heartburn, melena, nausea and vomiting. Genitourinary: Negative for dysuria, flank pain, frequency, hematuria and urgency. Musculoskeletal: Negative for back pain, falls, joint pain, myalgias and neck pain. Skin: Negative for itching and rash. Chronic skin ulcer Neurological: Negative for dizziness, tingling, tremors, sensory change, speech change, focal weakness, seizures, loss of consciousness, weakness and headaches. Endo/Heme/Allergies: Negative for environmental allergies and polydipsia. Does not bruise/bleed easily. Psychiatric/Behavioral: Positive for depression. Negative for hallucinations, memory loss, substance abuse and suicidal ideas. The patient is not nervous/anxious and does not have insomnia. Past Medical History:  
Diagnosis Date  Arthritis  CAP (community acquired pneumonia) 06/27/2017  Chronic lung disease  Claudication ([address]   
 left leg  Crohn's disease ([address]  Crohn's disease ([address]  GERD (gastroesophageal reflux disease)  HTN (hypertension)  Nausea & vomiting  Other and unspecified symptoms and signs involving general sensations and perceptions PVD  Other ill-defined conditions(799.89) Crohn's  Peripheral neuropathy  Pulmonary emphysema ([address]  PVD (peripheral vascular disease) ([address] right leg  Sepsis ([address] 06/27/2017  Vitamin B12 deficiency Past Surgical History:  
Procedure Laterality Date  BYPASS GRAFT OTHR,AXILL-FEM Right 4/19/2013  
 BYPASS GRAFT OTHR,FEM-POP Left 5/2013  
 FOOT/TOES SURGERY PROC UNLISTED  HX APPENDECTOMY  HX BREAST LUMPECTOMY Left   
 breast left- precancerous  HX BUNIONECTOMY    
 left eye  HX CATARACT REMOVAL    
 bilateral  
 HX CHOLECYSTECTOMY  HX ORTHOPAEDIC    
 lateral epicondilitis on right  HX OTHER SURGICAL  3/7/2013  
 catheter into aorta  HX OTHER SURGICAL    
 intestional resection x4  
 HX OTHER SURGICAL  9/2013 I & D Right chest/flank wall abscess vs granuloma  UPPER ARM/ELBOW SURGERY UNLISTED Current Outpatient Medications on File Prior to Visit Medication Sig Dispense Refill  doxycycline (ADOXA) 100 mg tablet Take 1 Tab by mouth two (2) times a day. 20 Tab 0  
 HYDROcodone-acetaminophen (NORCO) 5-325 mg per tablet Take 1-2 Tabs by mouth every four (4) hours as needed for Pain. Max Daily Amount: 12 Tabs. 40 Tab 0  clopidogrel (PLAVIX) 75 mg tab TAKE ONE TABLET BY MOUTH DAILY 30 Tab 10  
 HYDROcodone-acetaminophen (NORCO) 5-325 mg per tablet Take 1 Tab by mouth every four (4) hours as needed for Pain. Max Daily Amount: 6 Tabs. 30 Tab 0  
 fluticasone-umeclidin-vilanter (TRELEGY ELLIPTA) 100-62.5-25 mcg dsdv Take 1 Inhalation by inhalation daily. 3 Each 3  
 pregabalin (LYRICA) 100 mg capsule Take  by mouth two (2) times a day. Takes 100 mg in am and 200 in the pm    
 OXYGEN-AIR DELIVERY SYSTEMS 2 L by Does Not Apply route. Every HS and as needed  ipratropium-albuterol (COMBIVENT RESPIMAT)  mcg/actuation inhaler Take 1 Puff by inhalation every six (6) hours as needed for Wheezing or Shortness of Breath. (Patient taking differently: Take 1 Puff by inhalation two (2) times a day.) 1 Inhaler 0  
 DULoxetine (CYMBALTA) 60 mg capsule Take 60 mg by mouth nightly.  aspirin 81 mg tablet Take 81 mg by mouth daily.  cyanocobalamin (VITAMIN B-12) 1,000 mcg/mL injection 1,000 mcg by IntraMUSCular route every fourteen (14) days.  loperamide (IMMODIUM) 2 mg tablet Take 2 mg by mouth daily as needed for Diarrhea.  dicyclomine (BENTYL) 10 mg capsule Take 10 mg by mouth two (2) times a day.  triamterene-hydrochlorothiazide (DYAZIDE) 37.5-25 mg per capsule Take 1 Cap by mouth daily.  bimatoprost (LUMIGAN) 0.03 % ophthalmic drops Administer 1 Drop to both eyes every evening.  atropine-PHENobarbital-scopolamine-hyoscyamine (DONNATAL) 16.2-0.1037 -0.0194 mg per tablet Take 1 Tab by mouth as needed (diarrhea). Indications: Irritable Bowel Syndrome  inFLIXimab (REMICADE) 100 mg injection 5 mg/kg by IntraVENous route once. Every 4 weeks No current facility-administered medications on file prior to visit. Allergies Allergen Reactions  Codeine Nausea and Vomiting Other reaction(s): other/intolerance  Darvon [Propoxyphene] Itching  Demerol [Meperidine] Other (comments) Halucinations  Keflex [Cephalexin] Diarrhea  Talwin [Pentazocine Lactate] Shortness of Breath and Nausea and Vomiting Social History Socioeconomic History  Marital status:  Spouse name: Not on file  Number of children: Not on file  Years of education: Not on file  Highest education level: Not on file Social Needs  Financial resource strain: Not on file  Food insecurity - worry: Not on file  Food insecurity - inability: Not on file  Transportation needs - medical: Not on file  Transportation needs - non-medical: Not on file Occupational History  Not on file Tobacco Use  Smoking status: Current Every Day Smoker Packs/day: 1.00 Years: 45.00 Pack years: 45.00 Types: Cigarettes  Smokeless tobacco: Never Used  Tobacco comment: pt down to 0.5 ppd recently Substance and Sexual Activity  Alcohol use: No  
 Drug use: No  
 Sexual activity: Not on file Other Topics Concern  Not on file Social History Narrative Father worked in the shipyard and was diagnosed with Asbestosis. Blood pressure 130/70, pulse 85, temperature 98 °F (36.7 °C), temperature source Oral, resp. rate 19, height 5' 5\" (1.651 m), weight 62.6 kg (138 lb), SpO2 98 %. 6 minute walk test per nurse note Physical Exam  
Constitutional: She is oriented to person, place, and time.  She appears well-developed and well-nourished. No distress. HENT:  
Head: Normocephalic and atraumatic. Nose: Nose normal.  
Mouth/Throat: Oropharynx is clear and moist. No oropharyngeal exudate. Eyes: Conjunctivae are normal. Pupils are equal, round, and reactive to light. Right eye exhibits no discharge. Left eye exhibits no discharge. No scleral icterus. Neck: No JVD present. No tracheal deviation present. No thyromegaly present. Cardiovascular: Normal rate, regular rhythm, normal heart sounds and intact distal pulses. Exam reveals no gallop. No murmur heard. Pulmonary/Chest: Effort normal and breath sounds normal. No stridor. No respiratory distress. She has no wheezes. She has no rales. Diminished breath sounds Abdominal: Soft. She exhibits no mass. There is no tenderness. There is no rebound. Musculoskeletal: She exhibits no edema, tenderness or deformity. Lymphadenopathy:  
  She has no cervical adenopathy. Neurological: She is alert and oriented to person, place, and time. Skin: Skin is warm and dry. No rash noted. She is not diaphoretic. No erythema. Right flank in dressing Psychiatric: She has a normal mood and affect. Her behavior is normal. Judgment and thought content normal.  
 
PFT: reduced FEV1 58% with reduced FEV%. Improved from prior study ASSESSMENT and PLAN Encounter Diagnoses Name Primary?  COPD, moderate ([address] Yes  Chronic hypoxemic respiratory failure ([address]  Mediastinal lymphadenopathy  Encounter for immunization  Tobacco dependence Continue  Trelegy, good symptomatic and spirometric response noted. [id number] Lynsey Coto for pt to continue Remicade therapy. RTC 6 months or sooner prn. Schedule CT w wo contrast to reassess mediastinal adenopathy. May consider biopsy if significant adenopathy persists. Continue supplemental O2 as written. Counseled on smoking harm and strongly encouraged to quit smoking. Flu vaccine today Face to face counseling was over 50% of this 40 minute visit

## 2018-11-02 NOTE — PATIENT INSTRUCTIONS
Vaccine Information Statement Influenza (Flu) Vaccine (Inactivated or Recombinant): What you need to know Many Vaccine Information Statements are available in Frisian and other languages. See www.immunize.org/vis Hojas de Información Sobre Vacunas están disponibles en Español y en muchos otros idiomas. Visite www.immunize.org/vis 1. Why get vaccinated? Influenza (flu) is a contagious disease that spreads around the [place] every year, usually between October and May. Flu is caused by influenza viruses, and is spread mainly by coughing, sneezing, and close contact. Anyone can get flu. Flu strikes suddenly and can last several days. Symptoms vary by age, but can include: 
 fever/chills  sore throat  muscle aches  fatigue  cough  headache  runny or stuffy nose Flu can also lead to pneumonia and blood infections, and cause diarrhea and seizures in children. If you have a medical condition, such as heart or lung disease, flu can make it worse. Flu is more dangerous for some people. Infants and young children, people 72years of age and older, pregnant women, and people with certain health conditions or a weakened immune system are at greatest risk. Each year thousands of people in the [facility] die from flu, and many more are hospitalized. Flu vaccine can: 
 keep you from getting flu, 
 make flu less severe if you do get it, and 
 keep you from spreading flu to your family and other people. 2. Inactivated and recombinant flu vaccines A dose of flu vaccine is recommended every flu season. Children 6 months through 6years of age may need two doses during the same flu season. Everyone else needs only one dose each flu season.   
 
 
Some inactivated flu vaccines contain a very small amount of a mercury-based preservative called thimerosal. Studies have not shown thimerosal in vaccines to be harmful, but flu vaccines that do not contain thimerosal are available. There is no live flu virus in flu shots. They cannot cause the flu. There are many flu viruses, and they are always changing. Each year a new flu vaccine is made to protect against three or four viruses that are likely to cause disease in the upcoming flu season. But even when the vaccine doesnt exactly match these viruses, it may still provide some protection Flu vaccine cannot prevent: 
 flu that is caused by a virus not covered by the vaccine, or 
 illnesses that look like flu but are not. It takes about 2 weeks for protection to develop after vaccination, and protection lasts through the flu season. 3. Some people should not get this vaccine Tell the person who is giving you the vaccine:  If you have any severe, life-threatening allergies. If you ever had a life-threatening allergic reaction after a dose of flu vaccine, or have a severe allergy to any part of this vaccine, you may be advised not to get vaccinated. Most, but not all, types of flu vaccine contain a small amount of egg protein.  If you ever had Guillain-Barré Syndrome (also called GBS). Some people with a history of GBS should not get this vaccine. This should be discussed with your doctor.  If you are not feeling well. It is usually okay to get flu vaccine when you have a mild illness, but you might be asked to come back when you feel better. 4. Risks of a vaccine reaction With any medicine, including vaccines, there is a chance of reactions. These are usually mild and go away on their own, but serious reactions are also possible. Most people who get a flu shot do not have any problems with it. Minor problems following a flu shot include:  
 soreness, redness, or swelling where the shot was given  hoarseness  sore, red or itchy eyes  cough  fever  aches  headache  itching  fatigue If these problems occur, they usually begin soon after the shot and last 1 or 2 days. More serious problems following a flu shot can include the following:  There may be a small increased risk of Guillain-Barré Syndrome (GBS) after inactivated flu vaccine. This risk has been estimated at 1 or 2 additional cases per million people vaccinated. This is much lower than the risk of severe complications from flu, which can be prevented by flu vaccine.  Young children who get the flu shot along with pneumococcal vaccine (PCV13) and/or DTaP vaccine at the same time might be slightly more likely to have a seizure caused by fever. Ask your doctor for more information. Tell your doctor if a child who is getting flu vaccine has ever had a seizure. Problems that could happen after any injected vaccine:  People sometimes faint after a medical procedure, including vaccination. Sitting or lying down for about 15 minutes can help prevent fainting, and injuries caused by a fall. Tell your doctor if you feel dizzy, or have vision changes or ringing in the ears.  Some people get severe pain in the shoulder and have difficulty moving the arm where a shot was given. This happens very rarely.  Any medication can cause a severe allergic reaction. Such reactions from a vaccine are very rare, estimated at about 1 in a million doses, and would happen within a few minutes to a few hours after the vaccination. As with any medicine, there is a very remote chance of a vaccine causing a serious injury or death. The safety of vaccines is always being monitored. For more information, visit: www.cdc.gov/vaccinesafety/ 
 
5. What if there is a serious reaction? What should I look for?  Look for anything that concerns you, such as signs of a severe allergic reaction, very high fever, or unusual behavior.  
 
Signs of a severe allergic reaction can include hives, swelling of the face and throat, difficulty breathing, a fast heartbeat, dizziness, and weakness  usually within a few minutes to a few hours after the vaccination. What should I do?  If you think it is a severe allergic reaction or other emergency that cant wait, call 9-1-1 and get the person to the nearest hospital. Otherwise, call your doctor.  Reactions should be reported to the Vaccine Adverse Event Reporting System (VAERS). Your doctor should file this report, or you can do it yourself through  the VAERS web site at www.vaers. [facility].gov, or by calling 5-238.490.7754. VAERS does not give medical advice. 6. The National Vaccine Injury Compensation Program 
 
The [facility] Vaccine Injury Compensation Program (VICP) is a federal program that was created to compensate people who may have been injured by certain vaccines. Persons who believe they may have been injured by a vaccine can learn about the program and about filing a claim by calling 7-856.336.7996 or visiting the [address] [employer] website at www.[facility].gov/vaccinecompensation. There is a time limit to file a claim for compensation. 7. How can I learn more?  Ask your healthcare provider. He or she can give you the vaccine package insert or suggest other sources of information.  Call your local or state health department.  Contact the Centers for Disease Control and Prevention (CDC): 
- Call 3-402.203.2060 (1-800-CDC-INFO) or 
- Visit CDCs website at www.cdc.gov/flu Vaccine Information Statement Inactivated Influenza Vaccine 8/7/2015 
42 PIERO Massey [id number]-81 Department of Health and [email or web address] Centers for Disease Control and Prevention Office Use Only

## 2018-11-02 NOTE — PROGRESS NOTES
Chief Complaint Patient presents with  COPD  
  follow up from 5/24/2018  Other  
  emphysema  Nicotine Dependence 1. Have you been to the ER, urgent care clinic since your last visit? Hospitalized since your last visit? Yes When: June 2018 Where: [facility] Reason for visit: Right Flank Wound 2. Have you seen or consulted any other health care providers outside of the [address] [place] [address] since your last visit? Include any pap smears or colon screening. Yes Where: Dr. Torres Avelar, PCP [place] Nick is a 77 y.o. female who presents for routine immunizations. She denies any symptoms , reactions or allergies that would exclude them from being immunized today. Risks and adverse reactions were discussed and the VIS was given to them. All questions were addressed. She was observed for 20 min post injection. There were no reactions observed.  
 
Raul Garcia LPN

## 2018-11-08 ENCOUNTER — TELEPHONE (OUTPATIENT)
Dept: PULMONOLOGY | Age: 68
End: 2018-11-08

## 2018-11-08 NOTE — TELEPHONE ENCOUNTER
Pulmonary Rehab called patient and spoke to her about the program. She still has a draining wound but would like a call back in about a month. Will follow up at that time.     Thank you,  Duyen Lai

## 2018-11-09 ENCOUNTER — OFFICE VISIT (OUTPATIENT)
Dept: VASCULAR SURGERY | Age: 68
End: 2018-11-09

## 2018-11-09 VITALS
BODY MASS INDEX: 22.99 KG/M2 | SYSTOLIC BLOOD PRESSURE: 150 MMHG | WEIGHT: 138 LBS | HEART RATE: 78 BPM | RESPIRATION RATE: 16 BRPM | DIASTOLIC BLOOD PRESSURE: 78 MMHG | HEIGHT: 65 IN

## 2018-11-09 DIAGNOSIS — T81.89XD NON-HEALING SURGICAL WOUND, SUBSEQUENT ENCOUNTER: Primary | ICD-10-CM

## 2018-11-09 NOTE — PROGRESS NOTES
1. Have you been to an emergency room or urgent care clinic since your last visit? No  Hospitalized since your last visit? If yes, where, when, and reason for visit? No  2. Have you seen or consulted any other health care providers outside of the [facility] since your last visit including any procedures, health maintenance items. If yes, where, when and reason for visit?

## 2018-11-09 NOTE — PROGRESS NOTES
Ms Lito Cerda is here for a wound check      She has a chronic wound (right thorax, also s/p surgical debridement a couple of months ago) from her right ax fem bypass. Wound is closed other than this residual superficial ulcer, which was more of a granuloma in appearance at her last visit. As we have done before, we applied silver nitrate. May need to continue this for a period of time until resolved. Also dressed with iodorsorb today as well. There is no sign of infection     We will continue to treat and monitor this site and see her back in a few weeks.  She has the necessary supplies for wound care at home  She is to call to come in sooner if any changes

## 2018-11-10 ENCOUNTER — HOSPITAL ENCOUNTER (OUTPATIENT)
Dept: CT IMAGING | Age: 68
Discharge: HOME | End: 2018-11-10
Attending: INTERNAL MEDICINE
Payer: MEDICARE

## 2018-11-10 DIAGNOSIS — R59.0 MEDIASTINAL LYMPHADENOPATHY: ICD-10-CM

## 2018-11-10 LAB — CREAT UR-MCNC: 0.7 MG/DL (ref 0.6–1.3)

## 2018-11-10 PROCEDURE — 74011636320 HC RX REV CODE- 636/320: Performed by: INTERNAL MEDICINE

## 2018-11-10 PROCEDURE — 71260 CT THORAX DX C+: CPT

## 2018-11-10 PROCEDURE — 82565 ASSAY OF CREATININE: CPT

## 2018-11-10 RX ADMIN — IOPAMIDOL 75 ML: 612 INJECTION, SOLUTION INTRAVENOUS at 08:39

## 2018-11-27 ENCOUNTER — OFFICE VISIT (OUTPATIENT)
Dept: VASCULAR SURGERY | Age: 68
End: 2018-11-27

## 2018-11-27 VITALS
BODY MASS INDEX: 22.99 KG/M2 | HEIGHT: 65 IN | SYSTOLIC BLOOD PRESSURE: 140 MMHG | RESPIRATION RATE: 16 BRPM | WEIGHT: 138 LBS | DIASTOLIC BLOOD PRESSURE: 70 MMHG | HEART RATE: 76 BPM

## 2018-11-27 DIAGNOSIS — L08.9 CHRONIC WOUND INFECTION OF ABDOMEN, SUBSEQUENT ENCOUNTER: Primary | ICD-10-CM

## 2018-11-27 DIAGNOSIS — S31.109D CHRONIC WOUND INFECTION OF ABDOMEN, SUBSEQUENT ENCOUNTER: Primary | ICD-10-CM

## 2018-11-27 NOTE — PROGRESS NOTES
Ms Jeanie Martinez is here for a wound check      She has a chronic wound (right thorax, also s/p surgical debridement a couple of months ago) from her right ax fem bypass. Wound is closed other than what had been a residual superficial ulcer, which was more of a granuloma in appearance at her last visit. As we have done before, we applied silver nitrate at that time. Also dressed with iodorsorb as well.  There has been no sign of infection     Today it is now a dry eschar  No drainage  No need for additional silver nitrate or a dressing  We did apply bacitracin ointment to soften the dry eschar  She will continue these efforts and call us back if any changes but we will otherwise plan to re-evaluate early January

## 2018-11-27 NOTE — PROGRESS NOTES
1. Have you been to an emergency room or urgent care clinic since your last visit? NO    Hospitalized since your last visit? If yes, where, when, and reason for visit? NO  2. Have you seen or consulted any other health care providers outside of the [facility] since your last visit including any procedures, health maintenance items. If yes, where, when and reason for visit?  NO

## 2018-12-28 ENCOUNTER — TELEPHONE (OUTPATIENT)
Dept: PULMONOLOGY | Age: 68
End: 2018-12-28

## 2018-12-28 NOTE — TELEPHONE ENCOUNTER
Pulmonary Rehab called patient and spoke to her . He will give her the message and have her return our call. Will follow up if we do not hear back.     Thank you,  Olga Priddy

## 2019-01-04 ENCOUNTER — OFFICE VISIT (OUTPATIENT)
Dept: VASCULAR SURGERY | Age: 69
End: 2019-01-04

## 2019-01-04 VITALS
SYSTOLIC BLOOD PRESSURE: 140 MMHG | WEIGHT: 138 LBS | HEIGHT: 65 IN | DIASTOLIC BLOOD PRESSURE: 80 MMHG | HEART RATE: 76 BPM | RESPIRATION RATE: 16 BRPM | BODY MASS INDEX: 22.99 KG/M2

## 2019-01-04 DIAGNOSIS — Z51.89 VISIT FOR WOUND CHECK: Primary | ICD-10-CM

## 2019-01-04 NOTE — PROGRESS NOTES
Pneumonia in Children  Pneumonia is a term that means lung infection. It can be caused by infection by germs, including bacteria, viruses, and fungi. Though most children are able to get better at home with treatment from their healthcare provider, pneumonia can be very serious and can require hospitalization. Untreated pneumonia can lead to serious illness and even death. So it is important for a child with pneumonia to get treatment.     Ask your healthcare provider whether your child should have a flu shot or a vaccination against pneumococcal pneumonia.   What are the symptoms of pneumonia?    Pneumonia is caused by an infection that spreads to the lungs. The child often begins with symptoms of a cold or sore throat. Symptoms then get worse as pneumonia develops. Symptoms vary widely, but often include:    Fever, chills    Cough (either dry or producing thick phlegm)    Wheezing or fast breathing    Chest pain    Tiredness    Muscle pain    Headache  Any child with cold or flu symptoms that don t seem to be getting better should be checked by a healthcare provider.  How is pneumonia treated?     Bacterial pneumonia: If the cause of the infection is found to be bacterial, antibiotics will be prescribed. Your child should start to feel better within 24 to 48 hours of starting this medication. It is very important that the child finish ALL of the antibiotics, even if he or she feels better.    Viral pneumonia: Antibiotics will not help treat viral pneumonia. Occasionally, antiviral medicines may be prescribed. In time. this infection will go away on its own. To help your child feel more comfortable, your health care provider may suggest medication for the child s symptoms.  Follow any instructions your provider gives you for treating your child s illness. A very sick child may need to be admitted to the hospital for a short time. In the hospital, the child can be made comfortable and may be given fluids and  1. Have you been to an emergency room or urgent care clinic since your last visit? NO    Hospitalized since your last visit? If yes, where, when, and reason for visit? NO  2. Have you seen or consulted any other health care providers outside of the [facility] since your last visit including any procedures, health maintenance items. If yes, where, when and reason for visit?  NO oxygen.  Helping your child feel better  If your health care provider feels it is safe to treat the child at home, do the following to help him feel more comfortable and get better faster:    Keep the child quiet and be sure he or she gets plenty of rest.    Encourage your child to drink plenty of fluids, such as water or apple juice.    To keep an infant s nose clear, use a rubber bulb suction device to remove any mucus (sticky fluid).    Elevate your child s head slightly to make breathing easier.    Don t allow anyone to smoke in the house.    Treat a fever and aches and pains with children s acetaminophen. Do not give a child aspirin. Do not give ibuprofen to infants 6 months of age or younger.    Do not use cough medicine unless your provider recommends it.  Preventing the spread of infection    Wash your hands with warm water and soap often, especially before and after tending to your sick child.    Limit contact between a sick child and other children.    Do not let anyone smoke around a sick child.     When you should call your healthcare provider  Call your healthcare provider right away any time you see signs of distress in your otherwise healthy child, including:    Harsh, persistent, or wheezy cough    Trouble breathing    Severe headache  Unless advised otherwise by your child s healthcare provider, call the provider right away if:    Your child is of any age and has repeated fevers above 104 F (40 C).    Your child is younger than 2 years of age and a fever of 100.4 F (38 C) continues for more than 1 day.    Your child is 2 years old or older and a fever of 100.4 F (38 C) continues for more than 3 days.      Date Last Reviewed: 1/1/2017 2000-2017 [employer]. [address] [place] [address], [place] [address]. All rights reserved. This information is not intended as a substitute for professional medical care. Always follow your healthcare professional's instructions.

## 2019-01-04 NOTE — PROGRESS NOTES
Ms. Lupe Rogers is here for a wound check. 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 We did recent studies on those which showed good patency. No perigraft fluid collection to be concerned for abscess. But these 2 separate wound locations have failed to completely heal.  She does self care for them at home and then we have been continuing to check intermittently. There have been times that infection has been concerned and we have treated accordingly with antibiotics. But currently everything looks well without signs of infection. The top wound is actually now just more of a dry eschar, so bacitracin and a bandage were applied. She was encouraged that she can continue to use bacitracin and or even like a vitamin E or coconut oil on this area. The lower wound has just a pinpoint opening, which does have a scant amount of clear watery drainage. I did some silver nitrate and then applied Iodosorb and a bandage. She has the Iodosorb at home and will continue to use that.   With the chronic stability we agreed can just continue to follow her, with next visit in 2 months, but sooner if any worsening appearances

## 2019-02-04 ENCOUNTER — HOSPITAL ENCOUNTER (OUTPATIENT)
Dept: LAB | Age: 69
Discharge: HOME | End: 2019-02-04
Payer: MEDICARE

## 2019-02-04 DIAGNOSIS — K50.90 CROHN DISEASE (HCC): ICD-10-CM

## 2019-02-04 LAB
ALBUMIN SERPL-MCNC: 4.1 G/DL (ref 3.4–5)
ALBUMIN/GLOB SERPL: 1 {RATIO} (ref 0.8–1.7)
ALP SERPL-CCNC: 91 U/L (ref 45–117)
ALT SERPL-CCNC: 27 U/L (ref 13–56)
ANION GAP SERPL CALC-SCNC: 7 MMOL/L (ref 3–18)
AST SERPL-CCNC: 19 U/L (ref 15–37)
BILIRUB DIRECT SERPL-MCNC: <0.1 MG/DL (ref 0–0.2)
BILIRUB SERPL-MCNC: 0.4 MG/DL (ref 0.2–1)
BUN SERPL-MCNC: 19 MG/DL (ref 7–18)
BUN/CREAT SERPL: 26 (ref 12–20)
CALCIUM SERPL-MCNC: 10.2 MG/DL (ref 8.5–10.1)
CHLORIDE SERPL-SCNC: 105 MMOL/L (ref 100–108)
CO2 SERPL-SCNC: 30 MMOL/L (ref 21–32)
CREAT SERPL-MCNC: 0.74 MG/DL (ref 0.6–1.3)
ERYTHROCYTE [DISTWIDTH] IN BLOOD BY AUTOMATED COUNT: 15.3 % (ref 11.6–14.5)
ERYTHROCYTE [SEDIMENTATION RATE] IN BLOOD: 14 MM/HR (ref 0–30)
GLOBULIN SER CALC-MCNC: 4.1 G/DL (ref 2–4)
GLUCOSE SERPL-MCNC: 89 MG/DL (ref 74–99)
HBV SURFACE AG SER QL: <0.1 INDEX
HBV SURFACE AG SER QL: NEGATIVE
HCT VFR BLD AUTO: 42.6 % (ref 35–45)
HGB BLD-MCNC: 14.2 G/DL (ref 12–16)
MCH RBC QN AUTO: 29.7 PG (ref 24–34)
MCHC RBC AUTO-ENTMCNC: 33.3 G/DL (ref 31–37)
MCV RBC AUTO: 89.1 FL (ref 74–97)
PLATELET # BLD AUTO: 320 K/UL (ref 135–420)
PMV BLD AUTO: 10.2 FL (ref 9.2–11.8)
POTASSIUM SERPL-SCNC: 3.5 MMOL/L (ref 3.5–5.5)
PROT SERPL-MCNC: 8.2 G/DL (ref 6.4–8.2)
RBC # BLD AUTO: 4.78 M/UL (ref 4.2–5.3)
SODIUM SERPL-SCNC: 142 MMOL/L (ref 136–145)
WBC # BLD AUTO: 9.7 K/UL (ref 4.6–13.2)

## 2019-02-04 PROCEDURE — 80299 QUANTITATIVE ASSAY DRUG: CPT

## 2019-02-04 PROCEDURE — 80076 HEPATIC FUNCTION PANEL: CPT

## 2019-02-04 PROCEDURE — 36415 COLL VENOUS BLD VENIPUNCTURE: CPT

## 2019-02-04 PROCEDURE — 85027 COMPLETE CBC AUTOMATED: CPT

## 2019-02-04 PROCEDURE — 80048 BASIC METABOLIC PNL TOTAL CA: CPT

## 2019-02-04 PROCEDURE — 86141 C-REACTIVE PROTEIN HS: CPT

## 2019-02-04 PROCEDURE — 85652 RBC SED RATE AUTOMATED: CPT

## 2019-02-04 PROCEDURE — 86140 C-REACTIVE PROTEIN: CPT

## 2019-02-04 PROCEDURE — 87340 HEPATITIS B SURFACE AG IA: CPT

## 2019-02-05 LAB
CRP SERPL HS-MCNC: 1.46 MG/L (ref 0–3)
CRP SERPL-MCNC: <0.3 MG/DL (ref 0–0.3)

## 2019-02-10 LAB
INFLIXIMAB AB SERPL-MCNC: <22 NG/ML
INFLIXIMAB SERPL-MCNC: 16 UG/ML

## 2019-03-05 ENCOUNTER — OFFICE VISIT (OUTPATIENT)
Dept: VASCULAR SURGERY | Age: 69
End: 2019-03-05

## 2019-03-05 VITALS
SYSTOLIC BLOOD PRESSURE: 130 MMHG | DIASTOLIC BLOOD PRESSURE: 70 MMHG | HEIGHT: 65 IN | HEART RATE: 70 BPM | RESPIRATION RATE: 17 BRPM | BODY MASS INDEX: 22.99 KG/M2 | WEIGHT: 138 LBS

## 2019-03-05 DIAGNOSIS — S31.109D CHRONIC ABDOMINAL WOUND INFECTION, SUBSEQUENT ENCOUNTER: Primary | ICD-10-CM

## 2019-03-05 DIAGNOSIS — L08.9 CHRONIC ABDOMINAL WOUND INFECTION, SUBSEQUENT ENCOUNTER: Primary | ICD-10-CM

## 2019-03-05 NOTE — PROGRESS NOTES
Ms. Mayur Ibanez is here for a wound check. 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 We did studies in the fall on those which showed good patency. No perigraft fluid collection to be concerned for abscess. But these 2 separate wound locations have failed to completely heal.  She does self care for them at home and then we have been continuing to check intermittently. There have been times that infection has been concerned and we have treated accordingly with antibiotics. But currently everything looks well without signs of infection. The top wound is actually now just more of a dry eschar, so bacitracin and a bandage were applied. We will prescribe bactroban to her pharmacy, since that may be more moisturizing than what she is currently using. The lower wound has just a pinpoint opening, which does have a scant amount of clear watery drainage. There is some skin irritation, probably from the moisture from this drainage.  Will advise using zinc oxide with the bactroban here     With the chronic stability we agreed can just continue to follow her, with next visit in 6 weeks with Dr Tarsha Pruett but sooner if any worsening appearances

## 2019-03-05 NOTE — PROGRESS NOTES
1. Have you been to an emergency room or urgent care clinic since your last visit? NO    Hospitalized since your last visit? If yes, where, when, and reason for visit? NO  2. Have you seen or consulted any other health care providers outside of the [facility] since your last visit including any procedures, health maintenance items. If yes, where, when and reason for visit?  NO

## 2019-03-26 ENCOUNTER — OFFICE VISIT (OUTPATIENT)
Dept: VASCULAR SURGERY | Age: 69
End: 2019-03-26

## 2019-03-26 VITALS
BODY MASS INDEX: 22.99 KG/M2 | WEIGHT: 138 LBS | DIASTOLIC BLOOD PRESSURE: 60 MMHG | HEIGHT: 65 IN | RESPIRATION RATE: 17 BRPM | HEART RATE: 80 BPM | SYSTOLIC BLOOD PRESSURE: 130 MMHG

## 2019-03-26 DIAGNOSIS — I73.9 PVD (PERIPHERAL VASCULAR DISEASE) (HCC): ICD-10-CM

## 2019-03-26 DIAGNOSIS — L08.9 CHRONIC ABDOMINAL WOUND INFECTION, SUBSEQUENT ENCOUNTER: Primary | ICD-10-CM

## 2019-03-26 DIAGNOSIS — Z95.828 HISTORY OF ARTERIAL BYPASS OF LOWER EXTREMITY: ICD-10-CM

## 2019-03-26 DIAGNOSIS — S31.109D CHRONIC ABDOMINAL WOUND INFECTION, SUBSEQUENT ENCOUNTER: Primary | ICD-10-CM

## 2019-03-26 RX ORDER — MUPIROCIN 20 MG/G
OINTMENT TOPICAL DAILY
Qty: 15 G | Refills: 0 | Status: SHIPPED | OUTPATIENT
Start: 2019-03-26 | End: 2019-09-05

## 2019-03-26 NOTE — PROGRESS NOTES
1. Have you been to an emergency room or urgent care clinic since your last visit? NO    Hospitalized since your last visit? If yes, where, when, and reason for visit? NO  2. Have you seen or consulted any other health care providers outside of the [facility] since your last visit including any procedures, health maintenance items. If yes, where, when and reason for visit?  NO

## 2019-03-27 ENCOUNTER — TELEPHONE (OUTPATIENT)
Dept: PULMONOLOGY | Age: 69
End: 2019-03-27

## 2019-03-27 NOTE — TELEPHONE ENCOUNTER
Pulmonary Rehab called patient and left a message. This is the third message left without response, so no additional attempts at contact will be made. If patient is interested, please have her contact our office at [phone].     Thank you,  Bridgette Pinto

## 2019-05-16 RX ORDER — CLOPIDOGREL BISULFATE 75 MG/1
TABLET ORAL
Qty: 30 TAB | Refills: 9 | Status: SHIPPED | OUTPATIENT
Start: 2019-05-16 | End: 2020-06-11 | Stop reason: SDUPTHER

## 2019-05-30 ENCOUNTER — OFFICE VISIT (OUTPATIENT)
Dept: PULMONOLOGY | Age: 69
End: 2019-05-30

## 2019-05-30 VITALS
HEART RATE: 83 BPM | DIASTOLIC BLOOD PRESSURE: 60 MMHG | RESPIRATION RATE: 16 BRPM | SYSTOLIC BLOOD PRESSURE: 126 MMHG | TEMPERATURE: 97.9 F | BODY MASS INDEX: 23.16 KG/M2 | WEIGHT: 139 LBS | HEIGHT: 65 IN | OXYGEN SATURATION: 96 %

## 2019-05-30 DIAGNOSIS — F17.200 TOBACCO DEPENDENCE: ICD-10-CM

## 2019-05-30 DIAGNOSIS — K21.9 GASTROESOPHAGEAL REFLUX DISEASE WITHOUT ESOPHAGITIS: ICD-10-CM

## 2019-05-30 DIAGNOSIS — R59.0 MEDIASTINAL ADENOPATHY: ICD-10-CM

## 2019-05-30 DIAGNOSIS — Z99.81 HYPOXEMIA REQUIRING SUPPLEMENTAL OXYGEN: ICD-10-CM

## 2019-05-30 DIAGNOSIS — K50.90 CROHN'S DISEASE WITHOUT COMPLICATION, UNSPECIFIED GASTROINTESTINAL TRACT LOCATION (HCC): ICD-10-CM

## 2019-05-30 DIAGNOSIS — R09.02 HYPOXEMIA REQUIRING SUPPLEMENTAL OXYGEN: ICD-10-CM

## 2019-05-30 DIAGNOSIS — J44.9 COPD, MODERATE (HCC): Primary | ICD-10-CM

## 2019-05-30 NOTE — PROGRESS NOTES
HISTORY OF PRESENT ILLNESS  Alberta Mead is a 77 y.o. female with COPD FEV1 53%. Follow up for COPD and hospital admission in 2018 for acute hypoxic respiratory failure due to multilobar pneumonia, interstitial pattern on CXR. Pt with elevated Mycoplasma IgG but normal IgM. Pt does not recall much from her hospital admission but states she is almost back to baseline. She still complains of dyspnea with moderate to severe exertion. No chest pain or cough. No new respiratory symptoms registered. Pt reports significant improvement in baseline and exercise dyspnea after starting Trelegy. Exercise tolerance has improved and pt was able to do heavy yardwork in the heat. Pt has abstained from smoking for two months but relapsed during a period of personal stress but is still trying to cut down use. She denies chest pain, increased cough. No interval ED visits or hospital admissions. COPD   The history is provided by the patient. This is a chronic problem. The problem occurs every several days. The problem has been gradually improving. Pertinent negatives include no abdominal pain and no headaches. Shortness of breath: rare. Treatments tried: Trelegy. The treatment provided significant relief. Review of Systems   Constitutional: Negative for chills, diaphoresis, fever, malaise/fatigue and weight loss. HENT: Negative for congestion, ear discharge, ear pain, hearing loss, nosebleeds, sinus pain, sore throat and tinnitus. Eyes: Negative for blurred vision, double vision, photophobia, pain, discharge and redness. Respiratory: Positive for wheezing. Negative for cough, hemoptysis, sputum production and stridor. Shortness of breath: rare. Cardiovascular: Negative for palpitations, orthopnea, claudication, leg swelling and PND. Gastrointestinal: Negative for abdominal pain, blood in stool, constipation, diarrhea, heartburn, melena, nausea and vomiting.    Genitourinary: Negative for dysuria, flank pain, frequency, hematuria and urgency. Musculoskeletal: Negative for back pain, falls, joint pain, myalgias and neck pain. Skin: Negative for itching and rash. Chronic skin ulcer   Neurological: Negative for dizziness, tingling, tremors, sensory change, speech change, focal weakness, seizures, loss of consciousness, weakness and headaches. Endo/Heme/Allergies: Negative for environmental allergies and polydipsia. Bruises/bleeds easily. Psychiatric/Behavioral: Positive for depression. Negative for hallucinations, memory loss, substance abuse and suicidal ideas. The patient is not nervous/anxious and does not have insomnia.       Past Medical History:   Diagnosis Date    Arthritis     CAP (community acquired pneumonia) 06/27/2017    Chronic lung disease     Claudication ([address]     left leg    Crohn's disease ([address]     Crohn's disease ([address]     GERD (gastroesophageal reflux disease)     HTN (hypertension)     Nausea & vomiting     Other and unspecified symptoms and signs involving general sensations and perceptions     PVD    Other ill-defined conditions(799.89)     Crohn's    Peripheral neuropathy     Pulmonary emphysema ([address]     PVD (peripheral vascular disease) ([address]     right leg    Sepsis ([address] 06/27/2017    Vitamin B12 deficiency      Past Surgical History:   Procedure Laterality Date    BYPASS GRAFT OTHR,AXILL-FEM Right 4/19/2013    BYPASS GRAFT OTHR,FEM-POP Left 5/2013    FOOT/TOES SURGERY PROC UNLISTED      HX APPENDECTOMY      HX BREAST LUMPECTOMY Left     breast left- precancerous    HX BUNIONECTOMY      left eye    HX CATARACT REMOVAL      bilateral    HX CHOLECYSTECTOMY      HX ORTHOPAEDIC      lateral epicondilitis on right    HX OTHER SURGICAL  3/7/2013    catheter into aorta    HX OTHER SURGICAL      intestional resection x4    HX OTHER SURGICAL  9/2013    I & D Right chest/flank wall abscess vs granuloma    UPPER ARM/ELBOW SURGERY UNLISTED       Current Outpatient Medications on File Prior to Visit   Medication Sig Dispense Refill    clopidogrel (PLAVIX) 75 mg tab TAKE ONE TABLET BY MOUTH DAILY 30 Tab 9    mupirocin (BACTROBAN) 2 % ointment Apply  to affected area daily. 15 g 0    doxycycline (ADOXA) 100 mg tablet Take 1 Tab by mouth two (2) times a day. 20 Tab 0    HYDROcodone-acetaminophen (NORCO) 5-325 mg per tablet Take 1-2 Tabs by mouth every four (4) hours as needed for Pain. Max Daily Amount: 12 Tabs. 40 Tab 0    HYDROcodone-acetaminophen (NORCO) 5-325 mg per tablet Take 1 Tab by mouth every four (4) hours as needed for Pain. Max Daily Amount: 6 Tabs. 30 Tab 0    fluticasone-umeclidin-vilanter (TRELEGY ELLIPTA) 100-62.5-25 mcg dsdv Take 1 Inhalation by inhalation daily. 3 Each 3    pregabalin (LYRICA) 100 mg capsule Take  by mouth two (2) times a day. Takes 100 mg in am and 200 in the pm      OXYGEN-AIR DELIVERY SYSTEMS 2 L by Does Not Apply route. Every HS and as needed      ipratropium-albuterol (COMBIVENT RESPIMAT)  mcg/actuation inhaler Take 1 Puff by inhalation every six (6) hours as needed for Wheezing or Shortness of Breath. (Patient taking differently: Take 1 Puff by inhalation two (2) times a day.) 1 Inhaler 0    DULoxetine (CYMBALTA) 60 mg capsule Take 60 mg by mouth nightly.  aspirin 81 mg tablet Take 81 mg by mouth daily.  cyanocobalamin (VITAMIN B-12) 1,000 mcg/mL injection 1,000 mcg by IntraMUSCular route every fourteen (14) days.  loperamide (IMMODIUM) 2 mg tablet Take 2 mg by mouth daily as needed for Diarrhea.  dicyclomine (BENTYL) 10 mg capsule Take 10 mg by mouth two (2) times a day.  triamterene-hydrochlorothiazide (DYAZIDE) 37.5-25 mg per capsule Take 1 Cap by mouth daily.  bimatoprost (LUMIGAN) 0.03 % ophthalmic drops Administer 1 Drop to both eyes every evening.       atropine-PHENobarbital-scopolamine-hyoscyamine (DONNATAL) 16.2-0.1037 -0.0194 mg per tablet Take 1 Tab by mouth as needed (diarrhea). Indications: Irritable Bowel Syndrome      inFLIXimab (REMICADE) 100 mg injection 5 mg/kg by IntraVENous route once. Every 4 weeks       No current facility-administered medications on file prior to visit.       Allergies   Allergen Reactions    Codeine Nausea and Vomiting     Other reaction(s): other/intolerance    Darvon [Propoxyphene] Itching    Demerol [Meperidine] Other (comments)     Halucinations    Keflex [Cephalexin] Diarrhea    Talwin [Pentazocine Lactate] Shortness of Breath and Nausea and Vomiting     Social History     Socioeconomic History    Marital status:      Spouse name: Not on file    Number of children: Not on file    Years of education: Not on file    Highest education level: Not on file   Occupational History    Not on file   Social Needs    Financial resource strain: Not on file    Food insecurity:     Worry: Not on file     Inability: Not on file    Transportation needs:     Medical: Not on file     Non-medical: Not on file   Tobacco Use    Smoking status: Current Every Day Smoker     Packs/day: 1.00     Years: 45.00     Pack years: 45.00     Types: Cigarettes    Smokeless tobacco: Never Used    Tobacco comment: pt down to 0.5 ppd recently   Substance and Sexual Activity    Alcohol use: No    Drug use: No    Sexual activity: Not on file   Lifestyle    Physical activity:     Days per week: Not on file     Minutes per session: Not on file    Stress: Not on file   Relationships    Social connections:     Talks on phone: Not on file     Gets together: Not on file     Attends [employer] service: Not on file     Active member of club or organization: Not on file     Attends meetings of clubs or organizations: Not on file     Relationship status: Not on file    Intimate partner violence:     Fear of current or ex partner: Not on file     Emotionally abused: Not on file     Physically abused: Not on file     Forced sexual activity: Not on file   Other Topics Concern    Not on file   Social History Narrative    Father worked in the shipyard and was diagnosed with Asbestosis. There were no vitals taken for this visit. 6 minute walk test per nurse note    Physical Exam   Constitutional: She is oriented to person, place, and time. She appears well-developed and well-nourished. No distress. HENT:   Head: Normocephalic and atraumatic. Nose: Nose normal.   Mouth/Throat: Oropharynx is clear and moist. No oropharyngeal exudate. Eyes: Pupils are equal, round, and reactive to light. Conjunctivae are normal. Right eye exhibits no discharge. Left eye exhibits no discharge. No scleral icterus. Neck: No JVD present. No tracheal deviation present. No thyromegaly present. Cardiovascular: Normal rate, regular rhythm, normal heart sounds and intact distal pulses. Exam reveals no gallop. No murmur heard. Pulmonary/Chest: Effort normal and breath sounds normal. No stridor. No respiratory distress. She has no wheezes. She has no rales. She exhibits no tenderness. Diminished breath sounds, rhonchi left base   Abdominal: Soft. She exhibits no mass. There is no tenderness. There is no rebound. Musculoskeletal: She exhibits no edema, tenderness or deformity. Lymphadenopathy:     She has no cervical adenopathy. Neurological: She is alert and oriented to person, place, and time. Coordination normal.   Skin: Skin is warm and dry. No rash noted. She is not diaphoretic. No erythema. No pallor. Right flank in dressing   Psychiatric: She has a normal mood and affect. Her behavior is normal. Judgment and thought content normal.     PFT: reduced FEV1 58% with reduced FEV%. Improved from prior study  CT Results (most recent):  Results from [place] encounter on 11/10/18   CT CHEST W CONT    Narrative CT CHEST WITH IV CONTRAST    COMPARISON: June 27, 2017. INDICATIONS:    Mediastinal lymphadenopathy.     TECHNIQUE: Volumetric data acquisition performed through the chest with a  multislice scanner. .  100 cc of Isovue 300 was administered without incident   for the contrast enhanced portion of the exam. .Volumetric data acquisition was  repeated through the chest with reconstructions in the axial, coronal, and  sagittal planes. FINDINGS:     Thyroid/Base Of Neck:  Unremarkable  . Lungs:   No acute infiltrates are evident. .   No mass lesions are seen. .  The bronchi appear unremarkable. The lungs appear emphysematous     Pleural Spaces: There is no pneumothorax or pleural fluid evident. No pleural plaques are seen    Lymph Nodes:   Axillae: Normal in size and number. Mediastinum / Estefany: Normal in size and number. Mediastinum, Great Vessels And Heart: There is no mediastinal mass. The heart and great vessels appear unremarkable. Abdomen Structures Included: The included portions of the liver and spleen are unremarkable. .  . Right axillary femoral bypass noted. It is patent but incompletely included on  this study. Candance Huger      Osseous Structures: Unremarkable for age. Impression IMPRESSION:     Emphysema. .  No acute process evident. No mass or adenopathy is seen. All CT scans at this facility are performed using dose optimization technique as  appropriate to the performed exam, to include automated exposure control,  adjustment of the mA and/or kV according to patient's size (Including  appropriate matching for site-specific examinations), or use of iterative  reconstruction technique. ASSESSMENT and PLAN  Encounter Diagnoses   Name Primary?  COPD, moderate ([address] Yes    Mediastinal adenopathy     Tobacco dependence     Crohn's disease without complication, unspecified gastrointestinal tract location (HCC)     Gastroesophageal reflux disease without esophagitis      Good response to Trelegy, continue this as well as rescue Albuterol. Continue rest of medications including Remicade.    CT reviewed with pt, no further need for CT follow up as adenopathy has resolved. Continue supplemental O2 as written. Refills for tubing and supplies written. Counseled on smoking harm and strongly encouraged to quit smoking.   RTC 10 months with full PFT's

## 2019-05-30 NOTE — PROGRESS NOTES
Huy Paredes presents today for   Chief Complaint   Patient presents with    Shortness of Breath       Is someone accompanying this pt? No    Is the patient using any DME equipment during OV? NO     -[employer]     Depression Screening:  3 most recent PHQ Screens 3/5/2019   Little interest or pleasure in doing things Not at all   Feeling down, depressed, irritable, or hopeless Not at all   Total Score PHQ 2 0       Learning Assessment:  Learning Assessment 3/5/2019   PRIMARY LEARNER Patient   CO-LEARNER CAREGIVER -   PRIMARY LANGUAGE ENGLISH   LEARNER PREFERENCE PRIMARY LISTENING   ANSWERED BY kamran CHUN SELF       Abuse Screening:  No flowsheet data found. Fall Risk  Fall Risk Assessment, last 12 mths 3/5/2019   Able to walk? Yes   Fall in past 12 months? No   Fall with injury? -   Number of falls in past 12 months -   Fall Risk Score -         Coordination of Care:  1. Have you been to the ER, urgent care clinic since your last visit? Hospitalized since your last visit? No    2. Have you seen or consulted any other health care providers outside of the [address] [place] [address] since your last visit? Include any pap smears or colon screening.  No

## 2019-07-30 ENCOUNTER — TELEPHONE (OUTPATIENT)
Dept: PULMONOLOGY | Age: 69
End: 2019-07-30

## 2019-10-08 ENCOUNTER — OFFICE VISIT (OUTPATIENT)
Dept: VASCULAR SURGERY | Age: 69
End: 2019-10-08

## 2019-10-08 VITALS
HEIGHT: 65 IN | BODY MASS INDEX: 21.83 KG/M2 | RESPIRATION RATE: 17 BRPM | DIASTOLIC BLOOD PRESSURE: 60 MMHG | SYSTOLIC BLOOD PRESSURE: 160 MMHG | WEIGHT: 131 LBS

## 2019-10-08 DIAGNOSIS — I73.9 PVD (PERIPHERAL VASCULAR DISEASE) (HCC): Primary | ICD-10-CM

## 2019-10-08 NOTE — PROGRESS NOTES
[address] [place] [address]    Chief Complaint   Patient presents with    Wound Check   History and Physical    Ms. James Harrison tells me she been doing well with her bypass. She has no claudication no ischemic symptoms. Her superior wounds of been healed this whole time. She has a small 1 cm area just superior to the groin that occasionally drains. She is had no fevers redness pain at the site. No feelings of being ill. She is compliant her medication she stays active. Past Medical History:   Diagnosis Date    Arthritis     CAP (community acquired pneumonia) 06/27/2017    Chronic lung disease     Claudication ([address]     left leg    Crohn's disease ([address]     Crohn's disease ([address]     GERD (gastroesophageal reflux disease)     HTN (hypertension)     Ill-defined condition     Uses O2 at night.  Nausea & vomiting     Other and unspecified symptoms and signs involving general sensations and perceptions     PVD    Other ill-defined conditions(799.89)     Crohn's    Peripheral neuropathy     Pulmonary emphysema ([address]     PVD (peripheral vascular disease) ([address]     right leg    Sepsis ([address] 06/27/2017    Vitamin B12 deficiency      Patient Active Problem List   Diagnosis Code    HTN (hypertension) I10    PVD (peripheral vascular disease) ([address] I73.9    Crohn's disease ([address] K50.90    Mass of breast, left N63.20    Wound disruption, post-op, skin T81.31XA    Chronic aorto-iliac occlusion syndrome (HCC) I74.09    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 7XXA    Abscess L02.91    Dermal sinus tract of lumbosacral region L05.92    CAP (community acquired pneumonia) J18.9    Severe sepsis (HCC) A41.9, R65.20    Nonhealing surgical wound T81.89XA    Infection of vascular bypass graft ([address] T82. 7XXA    Chronic wound infection of abdomen S31.109A, L08.9     Past Surgical History:   Procedure Laterality Date    BYPASS GRAFT OTHR,AXILL-FEM Right Reason For Visit    Chief Complaint: Initial NP visit SNF.   Skilled Nursing Facility:   Facility: [facility].   SNF Attending: Marychuy.   SNF APN: Aaron.   PCP Name & Contact: NAZANIN Daniel   Date of Admission: 12/30/17.   Hospital: [facility].   Skilled Care Need: Occupational Therapy and Physical Therapy.      Quality    Adult Wellness CI does not have feelings of hopelessness (PHQ-2) and no Anhedonia (PHQ-2).      History of Present Illness  Informant: patient. Per Chart Review.   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 Pt has multiple PMHx: DM, tremors, HLD, Chronic kidney disease (CKD), stage III (moderate); Dehydration; Hemorrhagic cystitis; History of nephrectomy R kidney CA; Weakness generalized due to UTI with BPH. Pt transferred for rehab to [facility].    Pt seen alert, oriented, in no acute distress on room air. Denies pain at this time, denies SOB, CP. Good appetite, daily BMs. Vitals reviewed. No new health related concerns per pt and nursing. Skin intact. Last bm today and daily. Denies any dysuria, pain, difficulty with urinatioin. Admission cbc, cmp labs reviewed.         Review of SystemsConst: no fever and no chills . Per HPI.   All other systems reviewed and negative.      Allergies    Allergies Reconciliation: Allergies Reconciled Against Allergy List.      Current [facility] meds:  dutasteride (dutasteride 0.5 mg oral capsule) 1 Capsule Oral every day.  These Medications Were NOT Changed, Continue to TAKE THESE  amLODIPine (amLODIPine 10 mg oral tablet) 1 Tab Oral every day.  dutasteride (dutasteride 0.5 mg oral capsule) 1 Capsule Oral every day.  fluticasone nasal (fluticasone 50 mcg/inh nasal spray) 1 Spray Both Nostrils every day.  gabapentin (gabapentin 300 mg oral capsule) 1 Capsule Oral every day.  glucosamine (glucosamine 500 mg oral capsule) 1 Capsule Oral every  4/19/2013    BYPASS GRAFT OTHR,FEM-POP Left 5/2013    COLONOSCOPY N/A 9/11/2019    DIAGNOSTIC COLONOSCOPY performed by Froy Diaz MD at [facility] FOOT/TOES SURGERY [address] UNLISTED      HX APPENDECTOMY      HX BREAST LUMPECTOMY Left     breast left- precancerous    HX BUNIONECTOMY      left eye    HX CATARACT REMOVAL      bilateral    HX CHOLECYSTECTOMY      HX ORTHOPAEDIC      lateral epicondilitis on right    HX OTHER SURGICAL  3/7/2013    catheter into aorta    HX OTHER SURGICAL      intestional resection x4    HX OTHER SURGICAL  9/2013    I & D Right chest/flank wall abscess vs granuloma    UPPER ARM/ELBOW SURGERY UNLISTED       Current Outpatient Medications   Medication Sig Dispense Refill    loratadine (CLARITIN) 10 mg tablet Take 10 mg by mouth daily.  fluticasone propionate (FLONASE ALLERGY RELIEF) 50 mcg/actuation nasal spray 2 Sprays by Both Nostrils route daily as needed for Rhinitis.  fluticasone-umeclidinium-vilanterol (TRELEGY ELLIPTA) 100-62.5-25 mcg inhaler Take 1 Puff by inhalation daily. 3 Inhaler 3    clopidogrel (PLAVIX) 75 mg tab TAKE ONE TABLET BY MOUTH DAILY 30 Tab 9    pregabalin (LYRICA) 100 mg capsule Take  by mouth two (2) times a day. Takes 100 mg in am and 200 in the pm      ipratropium-albuterol (COMBIVENT RESPIMAT)  mcg/actuation inhaler Take 1 Puff by inhalation every six (6) hours as needed for Wheezing or Shortness of Breath. (Patient taking differently: Take 1 Puff by inhalation two (2) times a day.) 1 Inhaler 0    DULoxetine (CYMBALTA) 60 mg capsule Take 60 mg by mouth nightly.  aspirin 81 mg tablet Take 81 mg by mouth daily.  cyanocobalamin (VITAMIN B-12) 1,000 mcg/mL injection 1,000 mcg by IntraMUSCular route every fourteen (14) days.  loperamide (IMMODIUM) 2 mg tablet Take 2 mg by mouth daily as needed for Diarrhea.  dicyclomine (BENTYL) 10 mg capsule Take 10 mg by mouth two (2) times a day.       day.  insulin aspart (NovoLOG FlexPen 100 units/mL subcutaneous solution) 9 Units Subcutaneous 3 times a day before meals.  insulin aspart (NovoLOG FlexPen) Subcutaneous 3 times a day before meals.  insulin glargine (Lantus 100 units/mL subcutaneous solution) 24 Units Subcutaneous every day. at bedtime.  metoprolol (metoprolol succinate 50 mg oral tablet, extended release) 1 Tab Oral every day.  pravastatin (pravastatin 20 mg oral tablet) 1 Tab Oral every day.  These Medications Were STOPPED, DO NOT TAKE THESE  alfuzosin (alfuzosin 10 mg oral tablet, extended release) 1 Tab Oral every day.  chlorthalidone (chlorthalidone 25 mg oral tablet) 1 Tab Oral every day.  tolterodine (tolterodine 4 mg oral capsule, extended release) 1 Capsule Oral every day.   Inpatient Discharge Medications Reconciled Against Current Medication List.      Review  Past medical history, problem list, family medical history, surgical history and social history reviewed.      Vitals  98.0, 132/70, 74, 18, 95% on ra.      Physical Exam  Constitutional: alert, in no acute distress and current vital signs reviewed.   Head and Face: atraumatic.   Eyes: no discharge and normal conjunctiva.   ENT: oral mucosa pink and moist.   Neck: supple neck.   Lymphatic: no lymphadenopathy.   Pulmonary: no respiratory distress and normal respiratory rate and effort.   Cardiovascular: normal rate, no murmurs were heard, regular rhythm, normal S1 and normal S2. the jugular vein was normal.   Abdomen: soft.   Musculoskeletal: up in w/chair. no musculoskeletal erythema was seen and no joint swelling seen.   Neurologic: cranial nerves grossly intact.   Psychiatric: oriented to person, oriented to place and oriented to time. alert and awake, interactive and mood/affect were appropriate.   Skin, Hair, Nails: normal skin color and pigmentation.      Results/Data    Other Results: Hospital records: Weight: 90.5 kg   12/30/2017    WBC: 7.5 x10(9)/L   Hgb: 15.6 gm/dL --    12/30/2017 Creatinine: 1.97    BUN: 40 mg/dL    GFR Non AA: 32    12/26/2017 Hgb A1c: 6.9 %   Troponin-I: 0.057 ng/mL --    .      Assessment  Chronic kidney disease, unspecified CKD stage (N18.9)  History of nephrectomy, unilateral (Z90.5)  Type 2 diabetes mellitus with chronic kidney disease, with long-term current use of  insulin, unspecified CKD stage (E11.22,Z79.4)  HTN (hypertension), benign (I10)  Hyperlipidemia, unspecified hyperlipidemia type (E78.5)  Debility (R53.81)  ACP (advance care planning) (Z71.89)   · Full code. GOC restorative.    Plan  Hemorrhagic cystitis, hx R kidney CA nephrectomy- monitor labs, I&Os. cont tolterodine. [facility] [phone] f/up Within 1 to 2 weeks. Hosp received rocephin iv.   CKD, stage III- baseline cr 1.97, bun 40F/up with nephrology per hospital dc, monitor labs weekly.   BPH- cont alpha bloker.   DM- continue insulin, 12/26/2017 Hgb A1c: 6.9 %, NovoLOG 9 Units sq tid. Lantus 24 u sq hqs.    Dehydration- encourage po fluids water and monitor bmp.    Weakness generalized, Debility- PT, OT, falls safety precautions.    Hypertension. Blood pressure is stable. We will continue current management. 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 Pt ambulates and TEDs for DVT prophylaxis. Risks and benefits reviewed with pt. Pt plans to return to [facility] after rehab.   DNR  .   Patients Goals of Care: stabilization to prolong life.       Time  Total face to face time spent with patient >60 minutes. Greater than 50% of the total time was spent counseling and/or coordinating care.      Signatures   Electronically signed by : JORDAN VOGEL; Jan 6 2018 11:10PM CST     triamterene-hydrochlorothiazide (DYAZIDE) 37.5-25 mg per capsule Take 1 Cap by mouth daily.  bimatoprost (LUMIGAN) 0.03 % ophthalmic drops Administer 1 Drop to both eyes every evening.  atropine-PHENobarbital-scopolamine-hyoscyamine (DONNATAL) 16.2-0.1037 -0.0194 mg per tablet Take 1 Tab by mouth as needed (diarrhea). Indications: Irritable Bowel Syndrome      inFLIXimab (REMICADE) 100 mg injection 5 mg/kg by IntraVENous route once. Every 4 weeks       Allergies   Allergen Reactions    Codeine Nausea and Vomiting     Other reaction(s): other/intolerance    Darvon [Propoxyphene] Itching    Demerol [Meperidine] Other (comments)     Halucinations    Keflex [Cephalexin] Diarrhea    Talwin [Pentazocine Lactate] Shortness of Breath and Nausea and Vomiting       Review of Systems    A full review of systems was completed times ten organ systems and was deemed negative unless otherwise mentioned in the HPI. Physical   Visit Vitals  /60 (BP 1 Location: Left arm, BP Patient Position: Sitting)   Resp 17   Ht 5' 5\" (1.651 m)   Wt 131 lb (59.4 kg)   BMI 21.80 kg/m²       Appears well today no distress  Head is normocephalic  Neck no JVD  Chest clear  Cardiac regular  Abdomen soft nontender  Examined the area her bypass graft is intact and patent she has this 1 cm area just above the right groin there is no exposed graft there is healing skin over the area of infection no open opening only an indentation at this point. I do not see any drainage no purulence. Her lower extremity is without ulcer there are viable  Doppler shows bypass graft is patent axillofemoral femorofemoral femoropopliteal to the left only moderate PAD on the right    Impression/Plan:     ICD-10-CM ICD-9-CM    1. PVD (peripheral vascular disease) ([facility]) I73.9 443.9 DUPLEX AORTA/IVC/ILIAC/GRAFTS LTD RT      DUPLEX LOW EXT ARTERIES WITH LAMONTE     Patent extra-anatomic bypass without symptoms  Ulcer without infection  See back in 6 months. She will continue her current medications  No orders of the defined types were placed in this encounter. Nadiya Pierre MD    PLEASE NOTE:  This document has been produced using voice recognition software. Unrecognized errors in transcription may be present.

## 2019-10-08 NOTE — PROGRESS NOTES
1. Have you been to an emergency room or urgent care clinic since your last visit? NO    Hospitalized since your last visit? If yes, where, when, and reason for visit? NO  2. Have you seen or consulted any other health care providers outside of the [facility] since your last visit including any procedures, health maintenance items. If yes, where, when and reason for visit?  No

## 2019-10-15 ENCOUNTER — HOSPITAL ENCOUNTER (OUTPATIENT)
Age: 69
Discharge: HOME | End: 2019-10-15
Attending: NURSE PRACTITIONER
Payer: MEDICARE

## 2019-10-15 DIAGNOSIS — Z12.39 OTHER SCREENING BREAST EXAMINATION: ICD-10-CM

## 2019-10-15 LAB — CREAT UR-MCNC: 0.7 MG/DL (ref 0.6–1.3)

## 2019-10-15 PROCEDURE — 74011250636 HC RX REV CODE- 250/636

## 2019-10-15 PROCEDURE — A9577 INJ MULTIHANCE: HCPCS

## 2019-10-15 PROCEDURE — 82565 ASSAY OF CREATININE: CPT

## 2019-10-15 PROCEDURE — 77049 MRI BREAST C-+ W/CAD BI: CPT

## 2019-10-15 RX ADMIN — GADOBENATE DIMEGLUMINE 12 ML: 529 INJECTION, SOLUTION INTRAVENOUS at 11:00

## 2020-04-07 ENCOUNTER — TELEPHONE (OUTPATIENT)
Dept: VASCULAR SURGERY | Age: 70
End: 2020-04-07

## 2020-04-07 NOTE — TELEPHONE ENCOUNTER
Contacted patient in regards to upcoming appointments. Due to COVID 19, we will be rescheduling patient's appointments out to a later date. Patient is to call with any new symptoms or concerns.   Patient is in agreement with plan

## 2020-05-08 ENCOUNTER — TELEPHONE (OUTPATIENT)
Dept: PULMONOLOGY | Age: 70
End: 2020-05-08

## 2020-05-08 NOTE — TELEPHONE ENCOUNTER
Per Dustin Shadow from [facility], states they need to requalify pt for oxygen and want to know if Dr. Nicole Butler can send an order for them to do an overnight b/c pt states she is only using O2 at night.

## 2020-05-08 NOTE — TELEPHONE ENCOUNTER
Co-worker working from home will send Kam with [facility] a email to call me back

## 2020-05-08 NOTE — TELEPHONE ENCOUNTER
Spoke with Didier Parks with [employer]. Pt is due for an appt. Last visit 5/2019. Pt order was for portable oxygen.  Appt was already make by  to re qualify or d/c her oxygen

## 2020-05-21 ENCOUNTER — OFFICE VISIT (OUTPATIENT)
Dept: PULMONOLOGY | Age: 70
End: 2020-05-21

## 2020-05-21 VITALS
HEART RATE: 90 BPM | HEIGHT: 65 IN | DIASTOLIC BLOOD PRESSURE: 67 MMHG | OXYGEN SATURATION: 97 % | TEMPERATURE: 98.1 F | BODY MASS INDEX: 23.26 KG/M2 | RESPIRATION RATE: 18 BRPM | SYSTOLIC BLOOD PRESSURE: 152 MMHG | WEIGHT: 139.6 LBS

## 2020-05-21 DIAGNOSIS — K21.9 GASTROESOPHAGEAL REFLUX DISEASE, ESOPHAGITIS PRESENCE NOT SPECIFIED: ICD-10-CM

## 2020-05-21 DIAGNOSIS — Z99.81 HYPOXEMIA REQUIRING SUPPLEMENTAL OXYGEN: ICD-10-CM

## 2020-05-21 DIAGNOSIS — I73.9 PVD (PERIPHERAL VASCULAR DISEASE) (HCC): ICD-10-CM

## 2020-05-21 DIAGNOSIS — Z87.891 HISTORY OF TOBACCO USE: ICD-10-CM

## 2020-05-21 DIAGNOSIS — J44.9 COPD, MODERATE (HCC): Primary | ICD-10-CM

## 2020-05-21 DIAGNOSIS — R09.02 HYPOXEMIA REQUIRING SUPPLEMENTAL OXYGEN: ICD-10-CM

## 2020-05-21 NOTE — PROGRESS NOTES
PATIENT'S O2 SATS:   98% Room Air Rest, 95 Heart Rate                                          97% Room Air Activity, 100 Heart Rate - Patient Walked 204 Meters

## 2020-05-21 NOTE — PROGRESS NOTES
Endy Phillips presents today for   Chief Complaint   Patient presents with    Follow Up Chronic Condition       Is someone accompanying this pt? No    Is the patient using any DME equipment during OV? Oxygen     -D[employer]    Depression Screening:  3 most recent PHQ Screens 5/21/2020   Little interest or pleasure in doing things Not at all   Feeling down, depressed, irritable, or hopeless Not at all   Total Score PHQ 2 0       Learning Assessment:  Learning Assessment 10/8/2019   PRIMARY LEARNER Patient   CO-LEARNER CAREGIVER -   PRIMARY LANGUAGE ENGLISH   LEARNER PREFERENCE PRIMARY LISTENING   ANSWERED BY kamran CHUN SELF       Abuse Screening:  No flowsheet data found. Fall Risk  Fall Risk Assessment, last 12 mths 5/21/2020   Able to walk? Yes   Fall in past 12 months? No   Fall with injury? -   Number of falls in past 12 months -   Fall Risk Score -         Coordination of Care:  1. Have you been to the ER, urgent care clinic since your last visit? Hospitalized since your last visit? No    2. Have you seen or consulted any other health care providers outside of the [address] [place] [address] since your last visit? Include any pap smears or colon screening.  No

## 2020-05-21 NOTE — PROGRESS NOTES
HISTORY OF PRESENT ILLNESS  Alberta Braswell is a 68 y.o. female  Following up for COPD. Follow up for COPD on LTOT and FEV1 53% who was admitted in 2018 for acute hypoxic respiratory failure due to multilobar pneumonia. Pt does not recall much from her hospital admission but states she is back to baseline. She still complains of dyspnea with moderate to severe exertion. Pt reports significant improvement in baseline and exercise dyspnea after starting Trelegy. Exercise tolerance has improved and pt was able to do heavy yardwork in the heat. Pt had abstained from smoking for two months but relapsed during a period of personal stress and is back to smoking 1 ppd. She understands the need to quit smoking. She denies chest pain, increased cough. No interval ED visits or hospital admissions. COPD   The history is provided by the patient. This is a chronic problem. The problem occurs every several days. The problem has been gradually improving. Pertinent negatives include no chest pain, no abdominal pain and no shortness of breath. Exacerbated by: severe exertion. The symptoms are relieved by rest and medications. Treatments tried: Trelegy. The treatment provided significant relief. Review of Systems   Constitutional: Negative for chills, diaphoresis, fever, malaise/fatigue and weight loss. HENT: Negative for congestion, ear discharge, ear pain, hearing loss, nosebleeds, sinus pain, sore throat and tinnitus. Eyes: Negative for blurred vision, double vision, photophobia, pain, discharge and redness. Respiratory: Positive for wheezing. Negative for cough, hemoptysis, sputum production, shortness of breath and stridor. Cardiovascular: Negative for chest pain, palpitations, orthopnea, claudication, leg swelling and PND. Gastrointestinal: Negative for abdominal pain, blood in stool, constipation, diarrhea, heartburn, melena, nausea and vomiting.    Genitourinary: Negative for dysuria, flank pain, frequency, hematuria and urgency. Musculoskeletal: Negative for back pain, falls, joint pain, myalgias and neck pain. Skin: Negative for itching and rash. Neurological: Negative for dizziness, tingling, tremors, sensory change, speech change, focal weakness, seizures, loss of consciousness and weakness. Endo/Heme/Allergies: Negative for environmental allergies and polydipsia. Bruises/bleeds easily. Psychiatric/Behavioral: Positive for depression. Negative for hallucinations, memory loss, substance abuse and suicidal ideas. The patient is not nervous/anxious and does not have insomnia. Past Medical History:   Diagnosis Date    Arthritis     CAP (community acquired pneumonia) 06/27/2017    Chronic lung disease     Claudication ([address]     left leg    Crohn's disease ([address]     Crohn's disease ([address]     GERD (gastroesophageal reflux disease)     HTN (hypertension)     Ill-defined condition     Uses O2 at night.     Nausea & vomiting     Other and unspecified symptoms and signs involving general sensations and perceptions     PVD    Other ill-defined conditions(799.89)     Crohn's    Peripheral neuropathy     Pulmonary emphysema ([address]     PVD (peripheral vascular disease) ([address]     right leg    Sepsis ([address] 06/27/2017    Vitamin B12 deficiency      Past Surgical History:   Procedure Laterality Date    BYPASS GRAFT OTHR,AXILL-FEM Right 4/19/2013    BYPASS GRAFT OTHR,FEM-POP Left 5/2013    COLONOSCOPY N/A 9/11/2019    DIAGNOSTIC COLONOSCOPY performed by Haroon Valle MD at [facility] FOOT/TOES SURGERY [address] UNLISTED      HX APPENDECTOMY      HX BREAST LUMPECTOMY Left     breast left- precancerous    HX BUNIONECTOMY      left eye    HX CATARACT REMOVAL      bilateral    HX CHOLECYSTECTOMY      HX ORTHOPAEDIC      lateral epicondilitis on right    HX OTHER SURGICAL  3/7/2013    catheter into aorta    HX OTHER SURGICAL      intestional resection x4    HX OTHER SURGICAL 9/2013    I & D Right chest/flank wall abscess vs granuloma    UPPER ARM/ELBOW SURGERY UNLISTED       Current Outpatient Medications on File Prior to Visit   Medication Sig Dispense Refill    loratadine (CLARITIN) 10 mg tablet Take 10 mg by mouth daily.  fluticasone-umeclidinium-vilanterol (TRELEGY ELLIPTA) 100-62.5-25 mcg inhaler Take 1 Puff by inhalation daily. 3 Inhaler 3    clopidogrel (PLAVIX) 75 mg tab TAKE ONE TABLET BY MOUTH DAILY 30 Tab 9    pregabalin (LYRICA) 100 mg capsule Take  by mouth two (2) times a day. Takes 100 mg in am and 200 in the pm      ipratropium-albuterol (COMBIVENT RESPIMAT)  mcg/actuation inhaler Take 1 Puff by inhalation every six (6) hours as needed for Wheezing or Shortness of Breath. (Patient taking differently: Take 1 Puff by inhalation two (2) times a day.) 1 Inhaler 0    DULoxetine (CYMBALTA) 60 mg capsule Take 60 mg by mouth nightly.  aspirin 81 mg tablet Take 81 mg by mouth daily.  cyanocobalamin (VITAMIN B-12) 1,000 mcg/mL injection 1,000 mcg by IntraMUSCular route every fourteen (14) days.  dicyclomine (BENTYL) 10 mg capsule Take 10 mg by mouth two (2) times a day.  triamterene-hydrochlorothiazide (DYAZIDE) 37.5-25 mg per capsule Take 1 Cap by mouth daily.  bimatoprost (LUMIGAN) 0.03 % ophthalmic drops Administer 1 Drop to both eyes every evening.  inFLIXimab (REMICADE) 100 mg injection 5 mg/kg by IntraVENous route once. Every 4 weeks      fluticasone propionate (FLONASE ALLERGY RELIEF) 50 mcg/actuation nasal spray 2 Sprays by Both Nostrils route daily as needed for Rhinitis.  loperamide (IMMODIUM) 2 mg tablet Take 2 mg by mouth daily as needed for Diarrhea.  atropine-PHENobarbital-scopolamine-hyoscyamine (DONNATAL) 16.2-0.1037 -0.0194 mg per tablet Take 1 Tab by mouth as needed (diarrhea). Indications: Irritable Bowel Syndrome       No current facility-administered medications on file prior to visit.       Allergies Allergen Reactions    Codeine Nausea and Vomiting     Other reaction(s): other/intolerance    Darvon [Propoxyphene] Itching    Demerol [Meperidine] Other (comments)     Halucinations    Keflex [Cephalexin] Diarrhea    Talwin [Pentazocine Lactate] Shortness of Breath and Nausea and Vomiting     Social History     Socioeconomic History    Marital status:      Spouse name: Not on file    Number of children: Not on file    Years of education: Not on file    Highest education level: Not on file   Occupational History    Not on file   Social Needs    Financial resource strain: Not on file    Food insecurity     Worry: Not on file     Inability: Not on file    Transportation needs     Medical: Not on file     Non-medical: Not on file   Tobacco Use    Smoking status: Current Every Day Smoker     Packs/day: 1.00     Years: 45.00     Pack years: 45.00     Types: Cigarettes    Smokeless tobacco: Never Used    Tobacco comment: pt down to 0.5 ppd recently   Substance and Sexual Activity    Alcohol use: No    Drug use: No    Sexual activity: Not on file   Lifestyle    Physical activity     Days per week: Not on file     Minutes per session: Not on file    Stress: Not on file   Relationships    Social connections     Talks on phone: Not on file     Gets together: Not on file     Attends Mu-ism service: Not on file     Active member of club or organization: Not on file     Attends meetings of clubs or organizations: Not on file     Relationship status: Not on file    Intimate partner violence     Fear of current or ex partner: Not on file     Emotionally abused: Not on file     Physically abused: Not on file     Forced sexual activity: Not on file   Other Topics Concern    Not on file   Social History Narrative    Father worked in the shipyard and was diagnosed with Asbestosis. Blood pressure 152/67, pulse 90, temperature 98.1 °F (36.7 °C), temperature source Oral, resp.  rate 18, height 5' 5\" (1.651 m), weight 63.3 kg (139 lb 9.6 oz), SpO2 97 %. Ambulatory oxymetry per nurse note    Physical Exam   Constitutional: She is oriented to person, place, and time. She appears well-developed and well-nourished. No distress. HENT:   Head: Normocephalic and atraumatic. Nose: Nose normal.   Mouth/Throat: Oropharynx is clear and moist. No oropharyngeal exudate. Eyes: Pupils are equal, round, and reactive to light. Conjunctivae are normal. Right eye exhibits no discharge. Left eye exhibits no discharge. No scleral icterus. Neck: No JVD present. No tracheal deviation present. No thyromegaly present. Cardiovascular: Normal rate, regular rhythm, normal heart sounds and intact distal pulses. Exam reveals no gallop. No murmur heard. Pulmonary/Chest: Effort normal and breath sounds normal. No stridor. No respiratory distress. She has no wheezes. She has no rales. She exhibits no tenderness. Diminished breath sounds bilaterally     Abdominal: Soft. She exhibits no mass. There is no abdominal tenderness. There is no rebound. Musculoskeletal:         General: No tenderness, deformity or edema. Lymphadenopathy:     She has no cervical adenopathy. Neurological: She is alert and oriented to person, place, and time. Coordination normal.   Skin: Skin is warm and dry. No rash noted. She is not diaphoretic. No erythema. No pallor. Small ecchymosis on dorsum of R hand   Psychiatric: She has a normal mood and affect. Her behavior is normal. Judgment and thought content normal.     PFT: reduced FEV1 58% with reduced FEV%. Improved from prior study  CT Results (most recent):  Results from [place] encounter on 11/10/18   CT CHEST W CONT    Narrative CT CHEST WITH IV CONTRAST    COMPARISON: June 27, 2017. INDICATIONS:    Mediastinal lymphadenopathy. TECHNIQUE: Volumetric data acquisition performed through the chest with a  multislice scanner.  .  100 cc of Isovue 300 was administered without incident   for the contrast enhanced portion of the exam. .Volumetric data acquisition was  repeated through the chest with reconstructions in the axial, coronal, and  sagittal planes. FINDINGS:     Thyroid/Base Of Neck:  Unremarkable  . Lungs:   No acute infiltrates are evident. .   No mass lesions are seen. .  The bronchi appear unremarkable. The lungs appear emphysematous     Pleural Spaces: There is no pneumothorax or pleural fluid evident. No pleural plaques are seen    Lymph Nodes:   Axillae: Normal in size and number. Mediastinum / Estefany: Normal in size and number. Mediastinum, Great Vessels And Heart: There is no mediastinal mass. The heart and great vessels appear unremarkable. Abdomen Structures Included: The included portions of the liver and spleen are unremarkable. .  . Right axillary femoral bypass noted. It is patent but incompletely included on  this study. Ana Graven      Osseous Structures: Unremarkable for age. Impression IMPRESSION:     Emphysema. .  No acute process evident. No mass or adenopathy is seen. All CT scans at this facility are performed using dose optimization technique as  appropriate to the performed exam, to include automated exposure control,  adjustment of the mA and/or kV according to patient's size (Including  appropriate matching for site-specific examinations), or use of iterative  reconstruction technique. ASSESSMENT and PLAN  Encounter Diagnoses   Name Primary?  COPD, moderate ([address] Yes    History of tobacco use     Gastroesophageal reflux disease, esophagitis presence not specified     PVD (peripheral vascular disease) (HCC)     Hypoxemia requiring supplemental oxygen      Good response to Trelegy, continue this as well as rescue Albuterol. Continue rest of medications including Remicade. Discussed need for smoking cessation with pt. Also discussed risks of smoking and indication for low dose CT screen. Pt agrees to CT.  Will call pt with results. Ambulatory oxymetry per nurse note. Schedule overnight oxymetry. Further intervention eg. Supplemental O2 per results.   RTC 6 months with full PFT's  Flu vaccination on return visit

## 2020-06-11 RX ORDER — CLOPIDOGREL BISULFATE 75 MG/1
75 TABLET ORAL DAILY
Qty: 30 TAB | Refills: 5 | Status: SHIPPED | OUTPATIENT
Start: 2020-06-11 | End: 2020-09-09 | Stop reason: SDUPTHER

## 2020-06-11 NOTE — TELEPHONE ENCOUNTER
Refill request for Plavix 75 mg tablet, take one tablet by mouth daily, quantity 30 with 5 refills per verbal order TIMMY Grigsby sent to [address] [place] [address].

## 2020-06-15 RX ORDER — CLOPIDOGREL BISULFATE 75 MG/1
TABLET ORAL
Qty: 30 TAB | Refills: 8 | Status: SHIPPED | OUTPATIENT
Start: 2020-06-15

## 2020-07-21 DIAGNOSIS — R09.02 HYPOXEMIA REQUIRING SUPPLEMENTAL OXYGEN: Primary | ICD-10-CM

## 2020-07-21 DIAGNOSIS — Z99.81 HYPOXEMIA REQUIRING SUPPLEMENTAL OXYGEN: Primary | ICD-10-CM

## 2020-08-28 ENCOUNTER — OFFICE VISIT (OUTPATIENT)
Dept: VASCULAR SURGERY | Age: 70
End: 2020-08-28

## 2020-08-28 VITALS
RESPIRATION RATE: 20 BRPM | SYSTOLIC BLOOD PRESSURE: 130 MMHG | OXYGEN SATURATION: 99 % | HEART RATE: 83 BPM | DIASTOLIC BLOOD PRESSURE: 68 MMHG

## 2020-08-28 DIAGNOSIS — I74.09 CHRONIC AORTO-ILIAC OCCLUSION SYNDROME (HCC): ICD-10-CM

## 2020-08-28 DIAGNOSIS — I73.9 PVD (PERIPHERAL VASCULAR DISEASE) (HCC): Primary | ICD-10-CM

## 2020-08-28 DIAGNOSIS — S31.109D CHRONIC ABDOMINAL WOUND INFECTION, SUBSEQUENT ENCOUNTER: ICD-10-CM

## 2020-08-28 DIAGNOSIS — L08.9 CHRONIC ABDOMINAL WOUND INFECTION, SUBSEQUENT ENCOUNTER: ICD-10-CM

## 2020-08-28 DIAGNOSIS — Z95.828 HISTORY OF ARTERIAL BYPASS OF LOWER EXTREMITY: ICD-10-CM

## 2020-08-28 NOTE — PROGRESS NOTES
1. Have you been to the ER, urgent care clinic since your last visit? Hospitalized since your last visit? No    2. Have you seen or consulted any other health care providers outside of the [address] [place] [address] since your last visit? Include any pap smears or colon screening.  Yes Reason for visit: pcp       3 most recent PHQ Screens 8/28/2020   Little interest or pleasure in doing things Not at all   Feeling down, depressed, irritable, or hopeless Not at all   Total Score PHQ 2 0

## 2020-08-28 NOTE — PROGRESS NOTES
[address] [place] [address]    Chief Complaint   Patient presents with    Leg Pain       History and Physical    77-year-old female following up regarding her history of right axillary to femoral and femorofemoral bypass. She tells me vascular wise she is doing very well. She does walk with her  through the neighborhood. She occasionally gets some pain in the legs but really is very minor. 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 This is been treated with rotational skin flaps, debridements, chronic antibiotics, she has a autoimmune disorder with her Crohn's disease and is immune suppressed. Past Medical History:   Diagnosis Date    Arthritis     CAP (community acquired pneumonia) 06/27/2017    Chronic lung disease     Claudication ([address]     left leg    Crohn's disease ([address]     Crohn's disease ([address]     GERD (gastroesophageal reflux disease)     HTN (hypertension)     Ill-defined condition     Uses O2 at night.  Nausea & vomiting     Other and unspecified symptoms and signs involving general sensations and perceptions     PVD    Other ill-defined conditions(799.89)     Crohn's    Peripheral neuropathy     Pulmonary emphysema ([address]     PVD (peripheral vascular disease) ([address]     right leg    Sepsis ([address] 06/27/2017    Vitamin B12 deficiency      Patient Active Problem List   Diagnosis Code    HTN (hypertension) I10    PVD (peripheral vascular disease) ([address] I73.9    Crohn's disease ([address] K50.90    Mass of breast, left N63.20    Wound disruption, post-op, skin T81.31XA    Chronic aorto-iliac occlusion syndrome (HCC) I74.09    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 7XXA    Abscess L02.91    Dermal sinus tract of lumbosacral region L05.92    CAP (community acquired pneumonia) J18.9    Severe sepsis (HCC) A41.9, R65.20    Nonhealing surgical wound T81.89XA    Infection of vascular bypass graft ([address] T82. 7XXA    Chronic wound infection of abdomen S31.109A, L08.9     Past Surgical History:   Procedure Laterality Date    BYPASS GRAFT OTHR,AXILL-FEM Right 4/19/2013    BYPASS GRAFT OTHR,FEM-POP Left 5/2013    COLONOSCOPY N/A 9/11/2019    DIAGNOSTIC COLONOSCOPY performed by Gudelia Pinto MD at [facility] FOOT/TOES SURGERY [address] UNLISTED      HX APPENDECTOMY      HX BREAST LUMPECTOMY Left     breast left- precancerous    HX BUNIONECTOMY      left eye    HX CATARACT REMOVAL      bilateral    HX CHOLECYSTECTOMY      HX ORTHOPAEDIC      lateral epicondilitis on right    HX OTHER SURGICAL  3/7/2013    catheter into aorta    HX OTHER SURGICAL      intestional resection x4    HX OTHER SURGICAL  9/2013    I & D Right chest/flank wall abscess vs granuloma    UPPER ARM/ELBOW SURGERY UNLISTED       Current Outpatient Medications   Medication Sig Dispense Refill    clopidogreL (PLAVIX) 75 mg tab TAKE ONE TABLET BY MOUTH DAILY 30 Tab 8    clopidogreL (PLAVIX) 75 mg tab Take 1 Tab by mouth daily. 30 Tab 5    loratadine (CLARITIN) 10 mg tablet Take 10 mg by mouth daily.  fluticasone propionate (FLONASE ALLERGY RELIEF) 50 mcg/actuation nasal spray 2 Sprays by Both Nostrils route daily as needed for Rhinitis.  fluticasone-umeclidinium-vilanterol (TRELEGY ELLIPTA) 100-62.5-25 mcg inhaler Take 1 Puff by inhalation daily. 3 Inhaler 3    pregabalin (LYRICA) 100 mg capsule Take  by mouth two (2) times a day. Takes 100 mg in am and 200 in the pm      ipratropium-albuterol (COMBIVENT RESPIMAT)  mcg/actuation inhaler Take 1 Puff by inhalation every six (6) hours as needed for Wheezing or Shortness of Breath. (Patient taking differently: Take 1 Puff by inhalation two (2) times a day.) 1 Inhaler 0    DULoxetine (CYMBALTA) 60 mg capsule Take 60 mg by mouth nightly.  aspirin 81 mg tablet Take 81 mg by mouth daily.       cyanocobalamin (VITAMIN B-12) 1,000 mcg/mL injection 1,000 mcg by IntraMUSCular route every fourteen (14) days.  loperamide (IMMODIUM) 2 mg tablet Take 2 mg by mouth daily as needed for Diarrhea.  dicyclomine (BENTYL) 10 mg capsule Take 10 mg by mouth two (2) times a day.  triamterene-hydrochlorothiazide (DYAZIDE) 37.5-25 mg per capsule Take 1 Cap by mouth daily.  bimatoprost (LUMIGAN) 0.03 % ophthalmic drops Administer 1 Drop to both eyes every evening.  atropine-PHENobarbital-scopolamine-hyoscyamine (DONNATAL) 16.2-0.1037 -0.0194 mg per tablet Take 1 Tab by mouth as needed (diarrhea). Indications: Irritable Bowel Syndrome      inFLIXimab (REMICADE) 100 mg injection 5 mg/kg by IntraVENous route once. Every 4 weeks       Allergies   Allergen Reactions    Codeine Nausea and Vomiting     Other reaction(s): other/intolerance    Darvon [Propoxyphene] Itching    Demerol [Meperidine] Other (comments)     Halucinations    Keflex [Cephalexin] Diarrhea    Talwin [Pentazocine Lactate] Shortness of Breath and Nausea and Vomiting       Review of Systems    A full review of systems was completed times ten organ systems and was deemed negative unless otherwise mentioned in the HPI. Physical   Visit Vitals  /68 (BP 1 Location: Right arm, BP Patient Position: Sitting)   Pulse 83   Resp 20   SpO2 99%       She appears well today she does not appear ill  Head is normocephalic  Neck no JVD  Chest is clear  Cardiac regular  Abdomen soft flat nontender  Right flank has this 2.5 cm circular wound 1 cm in depth with a granulomatous base  No pus no signs of acute infection  Lower extremities without signs of arterial insufficiency  Doppler shows patent axillofemoral bypass with biphasic flow no evidence of stenosis  Lower extremity Doppler is stable    Applied a sample portion of ACell graft to the wound after cleaning it. She will follow-up for a wound care check in a week.   If this shows any signs of progress would consider debridement with ACell matrix and ACell graft as well. She is agreeable to this    Impression/Plan:     ICD-10-CM ICD-9-CM    1. PVD (peripheral vascular disease) ([facility])  I73.9 443.9    2. History of arterial bypass of lower extremity  Z95.828 V45.89 DUPLEX LOWER EXT BYPASS GRAFT BILATERAL W LAMONTE      DUPLEX AORTA ILIAC GRAFT LTD RT   3. Chronic aorto-iliac occlusion syndrome ([facility])  I74.09 444.09    4. Chronic abdominal wound infection, subsequent encounter  S31.109D V58.89     L08.9       No orders of the defined types were placed in this encounter. Will see her back in 6 months regarding a Doppler of her axillofemoral femorofemoral bypass. Follow-up and Dispositions    · Return in about 6 months (around 2/28/2021). Coleman Rodriguez MD    PLEASE NOTE:  This document has been produced using voice recognition software. Unrecognized errors in transcription may be present.

## 2020-08-28 NOTE — PROGRESS NOTES
Wound to right side of abdomen measures: 1.1x2.5x0.1cm. After skin graft was applied, I applied steri strips to edge of graft, then applied wound gel and adaptic gauze and steri stripped this in place as well and covered with ABD and secured with tape. Patient tolerated well. Patient will return in one week for dressing change.

## 2020-09-04 ENCOUNTER — HOSPITAL ENCOUNTER (OUTPATIENT)
Dept: PREADMISSION TESTING | Age: 70
Discharge: HOME | End: 2020-09-04
Payer: MEDICARE

## 2020-09-04 ENCOUNTER — OFFICE VISIT (OUTPATIENT)
Dept: VASCULAR SURGERY | Age: 70
End: 2020-09-04

## 2020-09-04 DIAGNOSIS — I74.09 CHRONIC AORTO-ILIAC OCCLUSION SYNDROME (HCC): Primary | ICD-10-CM

## 2020-09-04 DIAGNOSIS — I74.09 CHRONIC AORTO-ILIAC OCCLUSION SYNDROME (HCC): ICD-10-CM

## 2020-09-04 DIAGNOSIS — T81.31XS POSTOPERATIVE DEHISCENCE OF SKIN WOUND, SEQUELA: ICD-10-CM

## 2020-09-04 PROCEDURE — 87635 SARS-COV-2 COVID-19 AMP PRB: CPT

## 2020-09-04 NOTE — PROGRESS NOTES
Patient being seen for wound assessment and dressing change to right side of abdomen. A cell skin graft applied one week ago. Removed dressing gently and the stoma like area has improved in appearance but center of wound is an open area where patient's bypass graft is exposed. Surrounding tissue is slightly red. Rinsed area with wound cleanser and gauze, patient tolerated well. Had Dr. Jessica Underwood assess as well and will schedule patient for debridement and wash out of area and application of skin graft. Patient is in agreement with plan and will also come in on Tuesday for next dressing change as well.

## 2020-09-05 LAB — SARS-COV-2, COV2NT: NOT DETECTED

## 2020-09-08 ENCOUNTER — OFFICE VISIT (OUTPATIENT)
Dept: VASCULAR SURGERY | Age: 70
End: 2020-09-08

## 2020-09-08 DIAGNOSIS — L08.9 CHRONIC WOUND INFECTION OF ABDOMEN, SUBSEQUENT ENCOUNTER: Primary | ICD-10-CM

## 2020-09-08 DIAGNOSIS — S31.109D CHRONIC WOUND INFECTION OF ABDOMEN, SUBSEQUENT ENCOUNTER: Primary | ICD-10-CM

## 2020-09-09 ENCOUNTER — ANESTHESIA EVENT (OUTPATIENT)
Dept: CARDIOTHORACIC SURGERY | Age: 70
End: 2020-09-09
Payer: MEDICARE

## 2020-09-09 RX ORDER — BISMUTH SUBSALICYLATE 262 MG
1 TABLET,CHEWABLE ORAL DAILY
COMMUNITY

## 2020-09-09 NOTE — H&P
History and Physical    20-year-old female following up regarding her history of right axillary to femoral and femorofemoral bypass. She tells me vascular wise she is doing very well. She does walk with her  through the neighborhood. She occasionally gets some pain in the legs but really is very minor. 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 This is been treated with rotational skin flaps, debridements, chronic antibiotics, she has a autoimmune disorder with her Crohn's disease and is immune suppressed.          Past Medical History:   Diagnosis Date    Arthritis      CAP (community acquired pneumonia) 06/27/2017    Chronic lung disease      Claudication [facility])       left leg    Crohn's disease ([facility]      Crohn's disease ([facility]      GERD (gastroesophageal reflux disease)      HTN (hypertension)      Ill-defined condition       Uses O2 at night.  Nausea & vomiting      Other and unspecified symptoms and signs involving general sensations and perceptions       PVD    Other ill-defined conditions(799.89)       Crohn's    Peripheral neuropathy      Pulmonary emphysema (HCC)      PVD (peripheral vascular disease) ([facility]       right leg    Sepsis ([facility] [facility] 06/27/2017    Vitamin B12 deficiency            Patient Active Problem List   Diagnosis Code    HTN (hypertension) I10    PVD (peripheral vascular disease) ([facility] [facility] I73.9    Crohn's disease ([facility] [facility] K50.90    Mass of breast, left N63.20    Wound disruption, post-op, skin T81.31XA    Chronic aorto-iliac occlusion syndrome (HCC) I74.09    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 7XXA    Abscess L02.91    Dermal sinus tract of lumbosacral region L05.92    CAP (community acquired pneumonia) J18.9    Severe sepsis (HCC) A41.9, R65.20    Nonhealing surgical wound T81.89XA    Infection of vascular bypass graft ([address] T82. 7XXA    Chronic wound infection of abdomen S31.109A, L08.9           Past Surgical History:   Procedure Laterality Date    BYPASS GRAFT OTHR,AXILL-FEM Right 4/19/2013    BYPASS GRAFT OTHR,FEM-POP Left 5/2013    COLONOSCOPY N/A 9/11/2019     DIAGNOSTIC COLONOSCOPY performed by K[facility] at [facility] ENDOSCOPY    FOOT/TOES SURGERY PROC UNLISTED        HX APPENDECTOMY        HX BREAST LUMPECTOMY Left       breast left- precancerous    HX BUNIONECTOMY         left eye    HX CATARACT REMOVAL         bilateral    HX CHOLECYSTECTOMY        HX ORTHOPAEDIC         lateral epicondilitis on right    HX OTHER SURGICAL   3/7/2013     catheter into aorta    HX OTHER SURGICAL         intestional resection x4    HX OTHER SURGICAL   9/2013     I & D Right chest/flank wall abscess vs granuloma    UPPER ARM/ELBOW SURGERY UNLISTED                Current Outpatient Medications   Medication Sig Dispense Refill    clopidogreL (PLAVIX) 75 mg tab TAKE ONE TABLET BY MOUTH DAILY 30 Tab 8    clopidogreL (PLAVIX) 75 mg tab Take 1 Tab by mouth daily. 30 Tab 5    loratadine (CLARITIN) 10 mg tablet Take 10 mg by mouth daily.        fluticasone propionate (FLONASE ALLERGY RELIEF) 50 mcg/actuation nasal spray 2 Sprays by Both Nostrils route daily as needed for Rhinitis.        fluticasone-umeclidinium-vilanterol (TRELEGY ELLIPTA) 100-62.5-25 mcg inhaler Take 1 Puff by inhalation daily. 3 Inhaler 3    pregabalin (LYRICA) 100 mg capsule Take  by mouth two (2) times a day. Takes 100 mg in am and 200 in the pm        ipratropium-albuterol (COMBIVENT RESPIMAT)  mcg/actuation inhaler Take 1 Puff by inhalation every six (6) hours as needed for Wheezing or Shortness of Breath.  (Patient taking differently: Take 1 Puff by inhalation two (2) times a day.) 1 Inhaler 0    DULoxetine (CYMBALTA) 60 mg capsule Take 60 mg by mouth nightly.        aspirin 81 mg tablet Take 81 mg by mouth daily.        cyanocobalamin (VITAMIN B-12) 1,000 mcg/mL injection 1,000 mcg by IntraMUSCular route every fourteen (14) days.        loperamide (IMMODIUM) 2 mg tablet Take 2 mg by mouth daily as needed for Diarrhea.        dicyclomine (BENTYL) 10 mg capsule Take 10 mg by mouth two (2) times a day.        triamterene-hydrochlorothiazide (DYAZIDE) 37.5-25 mg per capsule Take 1 Cap by mouth daily.        bimatoprost (LUMIGAN) 0.03 % ophthalmic drops Administer 1 Drop to both eyes every evening.        atropine-PHENobarbital-scopolamine-hyoscyamine (DONNATAL) 16.2-0.1037 -0.0194 mg per tablet Take 1 Tab by mouth as needed (diarrhea). Indications: Irritable Bowel Syndrome        inFLIXimab (REMICADE) 100 mg injection 5 mg/kg by IntraVENous route once. Every 4 weeks               Allergies   Allergen Reactions    Codeine Nausea and Vomiting       Other reaction(s): other/intolerance    Darvon [Propoxyphene] Itching    Demerol [Meperidine] Other (comments)       Halucinations    Keflex [Cephalexin] Diarrhea    Talwin [Pentazocine Lactate] Shortness of Breath and Nausea and Vomiting        Review of Systems    A full review of systems was completed times ten organ systems and was deemed negative unless otherwise mentioned in the HPI.     Physical   Visit Vitals  /68 (BP 1 Location: Right arm, BP Patient Position: Sitting)   Pulse 83   Resp 20   SpO2 99%        She appears well today she does not appear ill  Head is normocephalic  Neck no JVD  Chest is clear  Cardiac regular  Abdomen soft flat nontender  Right flank has this 2.5 cm circular wound 1 cm in depth with a granulomatous base  No pus no signs of acute infection  Lower extremities without signs of arterial insufficiency  Doppler shows patent axillofemoral bypass with biphasic flow no evidence of stenosis  Lower extremity Doppler is stable        Impression/Plan:      ICD-10-CM ICD-9-CM     1. PVD (peripheral vascular disease) ([facility])  I73.9 443. 9     2.  History of arterial bypass of lower extremity  Z95.828 V45.89 DUPLEX LOWER EXT BYPASS GRAFT BILATERAL W LAMONTE         DUPLEX AORTA ILIAC GRAFT LTD RT   3. Chronic aorto-iliac occlusion syndrome (HCC)  I74.09 444.09     4. Chronic abdominal wound infection, subsequent encounter  S31.109D V58.89       L08. 9         Applied a sample portion of ACell graft to the wound after cleaning it.     She followed up in clinic but graft fully exposed  Will bring to OR for debridement and appropriate wound/graft coverage

## 2020-09-10 ENCOUNTER — HOSPITAL ENCOUNTER (OUTPATIENT)
Age: 70
Discharge: HOME | End: 2020-09-10
Attending: SURGERY | Admitting: SURGERY
Payer: MEDICARE

## 2020-09-10 ENCOUNTER — ANESTHESIA (OUTPATIENT)
Dept: CARDIOTHORACIC SURGERY | Age: 70
End: 2020-09-10
Payer: MEDICARE

## 2020-09-10 VITALS
BODY MASS INDEX: 23.73 KG/M2 | WEIGHT: 139 LBS | DIASTOLIC BLOOD PRESSURE: 67 MMHG | OXYGEN SATURATION: 97 % | RESPIRATION RATE: 20 BRPM | HEART RATE: 69 BPM | HEIGHT: 64 IN | TEMPERATURE: 96.9 F | SYSTOLIC BLOOD PRESSURE: 118 MMHG

## 2020-09-10 DIAGNOSIS — T14.8XXA OPEN WOUND: Primary | ICD-10-CM

## 2020-09-10 LAB
ANION GAP SERPL CALC-SCNC: 4 MMOL/L (ref 3–18)
ATRIAL RATE: 80 BPM
BUN SERPL-MCNC: 19 MG/DL (ref 7–18)
BUN/CREAT SERPL: 25 (ref 12–20)
CALCIUM SERPL-MCNC: 10.7 MG/DL (ref 8.5–10.1)
CALCULATED P AXIS, ECG09: 42 DEGREES
CALCULATED R AXIS, ECG10: 76 DEGREES
CALCULATED T AXIS, ECG11: 54 DEGREES
CHLORIDE SERPL-SCNC: 106 MMOL/L (ref 100–111)
CO2 SERPL-SCNC: 31 MMOL/L (ref 21–32)
CREAT SERPL-MCNC: 0.75 MG/DL (ref 0.6–1.3)
DIAGNOSIS, 93000: NORMAL
ERYTHROCYTE [DISTWIDTH] IN BLOOD BY AUTOMATED COUNT: 15.5 % (ref 11.6–14.5)
GLUCOSE SERPL-MCNC: 92 MG/DL (ref 74–99)
HCT VFR BLD AUTO: 43.5 % (ref 35–45)
HGB BLD-MCNC: 14.2 G/DL (ref 12–16)
MCH RBC QN AUTO: 29.8 PG (ref 24–34)
MCHC RBC AUTO-ENTMCNC: 32.6 G/DL (ref 31–37)
MCV RBC AUTO: 91.2 FL (ref 74–97)
P-R INTERVAL, ECG05: 184 MS
PLATELET # BLD AUTO: 376 K/UL (ref 135–420)
PMV BLD AUTO: 10.1 FL (ref 9.2–11.8)
POTASSIUM SERPL-SCNC: 4.4 MMOL/L (ref 3.5–5.5)
Q-T INTERVAL, ECG07: 388 MS
QRS DURATION, ECG06: 110 MS
QTC CALCULATION (BEZET), ECG08: 447 MS
RBC # BLD AUTO: 4.77 M/UL (ref 4.2–5.3)
SODIUM SERPL-SCNC: 141 MMOL/L (ref 136–145)
VENTRICULAR RATE, ECG03: 80 BPM
WBC # BLD AUTO: 11 K/UL (ref 4.6–13.2)

## 2020-09-10 PROCEDURE — 76210000021 HC REC RM PH II 0.5 TO 1 HR: Performed by: SURGERY

## 2020-09-10 PROCEDURE — 87077 CULTURE AEROBIC IDENTIFY: CPT

## 2020-09-10 PROCEDURE — 80048 BASIC METABOLIC PNL TOTAL CA: CPT

## 2020-09-10 PROCEDURE — 74011250636 HC RX REV CODE- 250/636

## 2020-09-10 PROCEDURE — 76210000063 HC OR PH I REC FIRST 0.5 HR: Performed by: SURGERY

## 2020-09-10 PROCEDURE — 85027 COMPLETE CBC AUTOMATED: CPT

## 2020-09-10 PROCEDURE — 87205 SMEAR GRAM STAIN: CPT

## 2020-09-10 PROCEDURE — 76010000112 HC CV SURG 0.5 TO 1 HR: Performed by: SURGERY

## 2020-09-10 PROCEDURE — 74011000250 HC RX REV CODE- 250

## 2020-09-10 PROCEDURE — 74011250637 HC RX REV CODE- 250/637: Performed by: NURSE ANESTHETIST, CERTIFIED REGISTERED

## 2020-09-10 PROCEDURE — 74011000258 HC RX REV CODE- 258

## 2020-09-10 PROCEDURE — 87147 CULTURE TYPE IMMUNOLOGIC: CPT

## 2020-09-10 PROCEDURE — 76060000032 HC ANESTHESIA 0.5 TO 1 HR: Performed by: SURGERY

## 2020-09-10 PROCEDURE — 77030038692 HC WND DEB SYS IRMX -B: Performed by: SURGERY

## 2020-09-10 PROCEDURE — 93005 ELECTROCARDIOGRAM TRACING: CPT

## 2020-09-10 PROCEDURE — 74011000250 HC RX REV CODE- 250: Performed by: SURGERY

## 2020-09-10 PROCEDURE — 74011250636 HC RX REV CODE- 250/636: Performed by: PHYSICIAN ASSISTANT

## 2020-09-10 PROCEDURE — 74011250636 HC RX REV CODE- 250/636: Performed by: NURSE ANESTHETIST, CERTIFIED REGISTERED

## 2020-09-10 PROCEDURE — 77030011265 HC ELECTRD BLD HEX COVD -A: Performed by: SURGERY

## 2020-09-10 PROCEDURE — 87186 SC STD MICRODIL/AGAR DIL: CPT

## 2020-09-10 PROCEDURE — 87075 CULTR BACTERIA EXCEPT BLOOD: CPT

## 2020-09-10 PROCEDURE — 77030013708 HC HNDPC SUC IRR PULS STRY –B: Performed by: SURGERY

## 2020-09-10 DEVICE — DRESSING WND MULTLYR CLLGN MTRX 7X10 CM SHT MATRISTEM: Type: IMPLANTABLE DEVICE | Site: ABDOMEN | Status: FUNCTIONAL

## 2020-09-10 DEVICE — GRAFT MICRO MATRX 1000MG EA --: Type: IMPLANTABLE DEVICE | Site: ABDOMEN | Status: FUNCTIONAL

## 2020-09-10 RX ORDER — SODIUM CHLORIDE, SODIUM LACTATE, POTASSIUM CHLORIDE, CALCIUM CHLORIDE 600; 310; 30; 20 MG/100ML; MG/100ML; MG/100ML; MG/100ML
INJECTION, SOLUTION INTRAVENOUS
Status: DISCONTINUED | OUTPATIENT
Start: 2020-09-10 | End: 2020-09-10 | Stop reason: HOSPADM

## 2020-09-10 RX ORDER — LIDOCAINE HYDROCHLORIDE 10 MG/ML
INJECTION, SOLUTION EPIDURAL; INFILTRATION; INTRACAUDAL; PERINEURAL
Status: DISCONTINUED
Start: 2020-09-10 | End: 2020-09-10 | Stop reason: HOSPADM

## 2020-09-10 RX ORDER — SODIUM CHLORIDE 0.9 % (FLUSH) 0.9 %
5-40 SYRINGE (ML) INJECTION EVERY 8 HOURS
Status: DISCONTINUED | OUTPATIENT
Start: 2020-09-10 | End: 2020-09-10 | Stop reason: HOSPADM

## 2020-09-10 RX ORDER — FENTANYL CITRATE 50 UG/ML
INJECTION, SOLUTION INTRAMUSCULAR; INTRAVENOUS AS NEEDED
Status: DISCONTINUED | OUTPATIENT
Start: 2020-09-10 | End: 2020-09-10 | Stop reason: HOSPADM

## 2020-09-10 RX ORDER — LIDOCAINE HYDROCHLORIDE 20 MG/ML
INJECTION, SOLUTION EPIDURAL; INFILTRATION; INTRACAUDAL; PERINEURAL AS NEEDED
Status: DISCONTINUED | OUTPATIENT
Start: 2020-09-10 | End: 2020-09-10 | Stop reason: HOSPADM

## 2020-09-10 RX ORDER — PROPOFOL 10 MG/ML
VIAL (ML) INTRAVENOUS
Status: DISCONTINUED | OUTPATIENT
Start: 2020-09-10 | End: 2020-09-10 | Stop reason: HOSPADM

## 2020-09-10 RX ORDER — SODIUM CHLORIDE, SODIUM LACTATE, POTASSIUM CHLORIDE, CALCIUM CHLORIDE 600; 310; 30; 20 MG/100ML; MG/100ML; MG/100ML; MG/100ML
25 INJECTION, SOLUTION INTRAVENOUS CONTINUOUS
Status: DISCONTINUED | OUTPATIENT
Start: 2020-09-10 | End: 2020-09-10 | Stop reason: HOSPADM

## 2020-09-10 RX ORDER — SODIUM CHLORIDE 0.9 % (FLUSH) 0.9 %
5-40 SYRINGE (ML) INJECTION AS NEEDED
Status: DISCONTINUED | OUTPATIENT
Start: 2020-09-10 | End: 2020-09-10 | Stop reason: HOSPADM

## 2020-09-10 RX ORDER — FAMOTIDINE 20 MG/1
20 TABLET, FILM COATED ORAL ONCE
Status: COMPLETED | OUTPATIENT
Start: 2020-09-10 | End: 2020-09-10

## 2020-09-10 RX ORDER — MIDAZOLAM HYDROCHLORIDE 1 MG/ML
INJECTION, SOLUTION INTRAMUSCULAR; INTRAVENOUS AS NEEDED
Status: DISCONTINUED | OUTPATIENT
Start: 2020-09-10 | End: 2020-09-10 | Stop reason: HOSPADM

## 2020-09-10 RX ORDER — LIDOCAINE HYDROCHLORIDE 10 MG/ML
INJECTION, SOLUTION EPIDURAL; INFILTRATION; INTRACAUDAL; PERINEURAL AS NEEDED
Status: DISCONTINUED | OUTPATIENT
Start: 2020-09-10 | End: 2020-09-10 | Stop reason: HOSPADM

## 2020-09-10 RX ORDER — PROPOFOL 10 MG/ML
INJECTION, EMULSION INTRAVENOUS AS NEEDED
Status: DISCONTINUED | OUTPATIENT
Start: 2020-09-10 | End: 2020-09-10 | Stop reason: HOSPADM

## 2020-09-10 RX ORDER — TRAMADOL HYDROCHLORIDE 50 MG/1
50 TABLET ORAL
Qty: 20 TAB | Refills: 0 | Status: SHIPPED | OUTPATIENT
Start: 2020-09-10 | End: 2020-09-13

## 2020-09-10 RX ADMIN — FAMOTIDINE 20 MG: 20 TABLET, FILM COATED ORAL at 07:10

## 2020-09-10 RX ADMIN — MIDAZOLAM HYDROCHLORIDE 1 MG: 2 INJECTION, SOLUTION INTRAMUSCULAR; INTRAVENOUS at 08:00

## 2020-09-10 RX ADMIN — LIDOCAINE HYDROCHLORIDE 100 MG: 20 INJECTION, SOLUTION EPIDURAL; INFILTRATION; INTRACAUDAL; PERINEURAL at 08:04

## 2020-09-10 RX ADMIN — FENTANYL CITRATE 12.5 MCG: 50 INJECTION, SOLUTION INTRAMUSCULAR; INTRAVENOUS at 08:09

## 2020-09-10 RX ADMIN — FENTANYL CITRATE 12.5 MCG: 50 INJECTION, SOLUTION INTRAMUSCULAR; INTRAVENOUS at 08:28

## 2020-09-10 RX ADMIN — PROPOFOL 100 MCG/KG/MIN: 10 INJECTION, EMULSION INTRAVENOUS at 08:04

## 2020-09-10 RX ADMIN — SODIUM CHLORIDE, SODIUM LACTATE, POTASSIUM CHLORIDE, AND CALCIUM CHLORIDE 25 ML/HR: 600; 310; 30; 20 INJECTION, SOLUTION INTRAVENOUS at 06:44

## 2020-09-10 RX ADMIN — PROPOFOL 30 MG: 10 INJECTION, EMULSION INTRAVENOUS at 08:04

## 2020-09-10 RX ADMIN — FENTANYL CITRATE 12.5 MCG: 50 INJECTION, SOLUTION INTRAMUSCULAR; INTRAVENOUS at 08:17

## 2020-09-10 RX ADMIN — SODIUM CHLORIDE 1000 MG: 900 INJECTION, SOLUTION INTRAVENOUS at 07:11

## 2020-09-10 RX ADMIN — SODIUM CHLORIDE, SODIUM LACTATE, POTASSIUM CHLORIDE, AND CALCIUM CHLORIDE: 600; 310; 30; 20 INJECTION, SOLUTION INTRAVENOUS at 08:00

## 2020-09-10 RX ADMIN — FENTANYL CITRATE 12.5 MCG: 50 INJECTION, SOLUTION INTRAMUSCULAR; INTRAVENOUS at 08:22

## 2020-09-10 RX ADMIN — FENTANYL CITRATE 25 MCG: 50 INJECTION, SOLUTION INTRAMUSCULAR; INTRAVENOUS at 08:05

## 2020-09-10 RX ADMIN — PHENYLEPHRINE HYDROCHLORIDE 50 MCG: 10 INJECTION INTRAVENOUS at 08:30

## 2020-09-10 NOTE — PERIOP NOTES
Informed [facility] OR nurse, to Inform   Dr. Britton Dear pt. Took Plavix and aspirin up to 09/09/20.  She was not told to stop blood thinners

## 2020-09-10 NOTE — ANESTHESIA POSTPROCEDURE EVALUATION
Procedure(s):  RIGHT ABDOMEN DEBRIDEMENT/WASHOUT OF EXPOSED BYPASS GRAFT/ACELL APPLICATION. MAC, general - backup    Anesthesia Post Evaluation      Multimodal analgesia: multimodal analgesia used between 6 hours prior to anesthesia start to PACU discharge  Patient location during evaluation: bedside  Patient participation: complete - patient participated  Level of consciousness: awake  Pain score: 1  Pain management: adequate  Airway patency: patent  Anesthetic complications: no  Cardiovascular status: stable  Respiratory status: acceptable  Hydration status: acceptable  Post anesthesia nausea and vomiting:  controlled  Final Post Anesthesia Temperature Assessment:  Normothermia (36.0-37.5 degrees C)      INITIAL Post-op Vital signs:   Vitals Value Taken Time   /53 9/10/2020  9:09 AM   Temp 36.8 °C (98.2 °F) 9/10/2020  8:49 AM   Pulse 63 9/10/2020  9:12 AM   Resp 14 9/10/2020  9:12 AM   SpO2 96 % 9/10/2020  9:12 AM   Vitals shown include unvalidated device data.

## 2020-09-10 NOTE — ANESTHESIA PREPROCEDURE EVALUATION
Anesthetic History     PONV          Review of Systems / Medical History  Patient summary reviewed and pertinent labs reviewed    Pulmonary    COPD (On oxygen via nasal cannula at night.)      Smoker         Neuro/Psych   Within defined limits           Cardiovascular    Hypertension: well controlled              Exercise tolerance: >4 METS     GI/Hepatic/Renal     GERD: well controlled          Comments: H/O Crohn's disease Endo/Other        Arthritis     Other Findings   Comments: Documentation of current medication  Current medications obtained, documented and obtained? YES      Risk Factors for Postoperative nausea/vomiting:       History of postoperative nausea/vomiting? NO       Female? YES       Motion sickness? NO       Intended opioid administration for postoperative analgesia? YES      Smoking Abstinence:  Current Smoker? YES  Elective Surgery? YES  Seen preoperatively by anesthesiologist or proxy prior to day of surgery? YES  Pt abstained from smoking 24 hours prior to anesthesia?  NO    Preventive care/screening for High Blood Pressure:  Aged 18 years and older: YES  Screened for high blood pressure: YES  Patients with high blood pressure referred to primary care provider   for BP management: YES                 Physical Exam    Airway  Mallampati: II  TM Distance: 4 - 6 cm  Neck ROM: normal range of motion   Mouth opening: Normal     Cardiovascular  Regular rate and rhythm,  S1 and S2 normal,  no murmur, click, rub, or gallop             Dental    Dentition: Implants  Comments: No teeth in mouth   Pulmonary  Breath sounds clear to auscultation               Abdominal  GI exam deferred       Other Findings            Anesthetic Plan    ASA: 3  Anesthesia type: MAC and general - backup          Induction: Intravenous  Anesthetic plan and risks discussed with: Patient

## 2020-09-10 NOTE — DISCHARGE INSTRUCTIONS
DISCHARGE SUMMARY from Nurse    PATIENT INSTRUCTIONS:    After general anesthesia or intravenous sedation, for 24 hours or while taking prescription Narcotics:  · Limit your activities  · Do not drive and operate hazardous machinery  · Do not make important personal or business decisions  · Do  not drink alcoholic beverages  · If you have not urinated within 8 hours after discharge, please contact your surgeon on call. Report the following to your surgeon:  · Excessive pain, swelling, redness or odor of or around the surgical area  · Temperature over 100.5  · Nausea and vomiting lasting longer than 4 hours or if unable to take medications  · Any signs of decreased circulation or nerve impairment to extremity: change in color, persistent  numbness, tingling, coldness or increase pain  · Any questions    What to do at Home:  Recommended activity: Activity as tolerated and no driving for today. *  Please give a list of your current medications to your Primary Care Provider. *  Please update this list whenever your medications are discontinued, doses are      changed, or new medications (including over-the-counter products) are added. *  Please carry medication information at all times in case of emergency situations. These are general instructions for a healthy lifestyle:    No smoking/ No tobacco products/ Avoid exposure to second hand smoke  Surgeon General's Warning:  Quitting smoking now greatly reduces serious risk to your health. Obesity, smoking, and sedentary lifestyle greatly increases your risk for illness    A healthy diet, regular physical exercise & weight monitoring are important for maintaining a healthy lifestyle    You may be retaining fluid if you have a history of heart failure or if you experience any of the following symptoms:  Weight gain of 3 pounds or more overnight or 5 pounds in a week, increased swelling in our hands or feet or shortness of breath while lying flat in bed. Please call your doctor as soon as you notice any of these symptoms; do not wait until your next office visit. The discharge information has been reviewed with the patient. The patient verbalized understanding. Discharge medications reviewed with the patient and appropriate educational materials and side effects teaching were provided. ___________________________________________________________________________________________________________________________________    Patient Education        Wound Debridement: What to Expect at Home  Your Recovery  Your doctor removed dead tissue from your wound (debridement). How it was done depends on how severe the wound was. You can expect some pain and swelling around your wound. This should get better within a few days after the procedure. You may have a bandage or a moist dressing over your wound. Your doctor will let you know how long to keep it on and how often to change it. How long it will take for your wound to heal depends on how serious your wound is and whether you have any other health problems that may slow healing. You may need to have the wound debrided again. How soon you can return to work and your normal routine depends on the type of work you do and how you feel. For example, if your wound is on your arm and your job requires you to do heavy lifting, you may need to wait until your wound has healed before you go back to work. This care sheet gives you a general idea about how long it will take for you to recover. But each person recovers at a different pace. Follow the steps below to feel better as quickly as possible. How can you care for yourself at home? Activity    · Rest when you feel tired.  Getting enough sleep will help you recover.     · Avoid activities that put stress on the affected body part until your doctor says it's okay.     · Change positions often to keep pressure off your wound, and spread your body weight evenly with cushions, mattresses, foam wedges, or other pressure-relieving devices.     · If your wound is on your leg or foot, you may have to use crutches, a supportive boot, or a fitted shoe to keep pressure off your wound. If you need crutches, it may help to use a backpack or wear clothes with a lot of pockets to carry items.     · Do not shower for at least 24 hours after the procedure or for as long as your doctor tells you to. When you shower, keep your dressing and wound dry.     · Do not take a bath, swim, use a hot tub, or soak your affected body part until your wound has healed. Diet    · You can eat your normal diet. If your stomach is upset, try bland, low-fat foods like plain rice, broiled chicken, toast, and yogurt.     · Eat a well-balanced diet with enough protein to help the wound heal. Protein is a mckenzie nutrient in helping to repair damaged tissue and promote new tissue growth. Good sources of protein are milk, yogurt, cheese, meat, and beans. Medicines    · Your doctor will tell you if and when you can restart your medicines. He or she will also give you instructions about taking any new medicines.     · If you take aspirin or some other blood thinner, ask your doctor if and when to start taking it again. Make sure that you understand exactly what your doctor wants you to do.     · Take pain medicines exactly as directed. ? If the doctor gave you a prescription medicine for pain, take it as prescribed. ? If you are not taking a prescription pain medicine, ask your doctor if you can take an over-the-counter medicine.     · If you think your pain medicine is making you sick to your stomach:  ? Take your medicine after meals (unless your doctor has told you not to). ? Ask your doctor for a different pain medicine.     · If your doctor prescribed antibiotics, take them as directed. Do not stop taking them just because you feel better.  You need to take the full course of antibiotics.     · If your doctor prescribed an antibiotic ointment that you put on the wound, use it as directed. Wound care    · A moist dressing may cover your wound. A dressing helps the wound heal and protects it. Your doctor will tell you how to take care of this.     · If you had a skin graft, you may have a bandage that is stitched over the graft. Your doctor will remove the bandage and stitches. Other instructions    · Don't smoke. Smoking dries out the skin, reduces blood flow to the skin, and slows healing. If you need help quitting, talk to your doctor about stop-smoking programs and medicines. These can increase your chances of quitting for good. Follow-up care is a key part of your treatment and safety. Be sure to make and go to all appointments, and call your doctor if you are having problems. It's also a good idea to know your test results and keep a list of the medicines you take. When should you call for help? Call your doctor now or seek immediate medical care if:    · You have pain that does not get better after you take pain medicine.     · You have signs of infection, such as:  ? Increased pain, swelling, warmth, or redness. ? Red streaks leading from the wound. ? Pus draining from the wound. ? A fever.     · The wound starts to bleed, and blood soaks through the bandage. Oozing small amounts of blood is normal.     · You have loose stitches, or your skin graft comes loose. Watch closely for any changes in your health, and be sure to contact your doctor if:    · The wound is not getting better as expected. Where can you learn more? Go to http://www.gray.com/  Enter Z931 in the search box to learn more about \"Wound Debridement: What to Expect at Home. \"  Current as of: March 4, 2020               Content Version: 12.6  © 0025-1173 [employer]. Care instructions adapted under license by [employer] (which disclaims liability or warranty for this information).  If you have questions about a medical condition or this instruction, always ask your healthcare professional. [place] [address] any warranty or liability for your use of this information.

## 2020-09-10 NOTE — OP NOTES
Preoperative diagnosis: Chronic granuloma right flank    Postoperative diagnosis: Same    Procedures performed:  #1  Debridement of granuloma right flank  #2  Application of ACell skin replacement graft right flank      Cultures: None    Specimens: None    Drains: None    Estimated blood loss: Less than 50 mL    Assistants: None    Implants: Please see above    Complications: None    Anesthesia: Moderate conscious. Indications for the procedure:  Talia White is a 65 y.o. recurrent granuloma right flank. Patient was given the appropriate risk and benefits of the procedure including but not limited to bleeding, infection, damage to adjacent structures, MI, stroke, death, loss of lower extremity, need for further surgery. Patient was understanding of all the risks and underwent a procedure. Operative findings:   #1  Exposed active bypass graft. Cleaned out no signs of obvious infection. Granulomatous tissue seen culture sent. Matrix and ACell graft applied    Procedure:  Patient was correctly identified in the preoperative area and taken to the operating room in stable condition. Patient had pre-incision timeout prior to any incision. Patient was prepped and draped in the normal sterile fashion according to CDC guidelines aseptic technique. I did an ultrasound of the site this indeed is the active graft or looking up. There is a 15 blade scalpel debrided all granulomatous tissue around it circumferentially even. It does not tunnel too far. The wound dimensions are 3 x 2 x 1 cm. Applied an antibiotic irrigation copiously. Then a matrix of ACell around the graft and applied a 2 layer ACell graft over the site stapled in place applying appropriate lubricant and a nonstick layer and a moisture type dressing. She tolerated this well. We will follow cultures and progress with this graft. If shows no signs of healing we will have to re-tunnel and applied new graft around this.

## 2020-09-11 VITALS
HEIGHT: 64 IN | WEIGHT: 139 LBS | DIASTOLIC BLOOD PRESSURE: 70 MMHG | SYSTOLIC BLOOD PRESSURE: 132 MMHG | HEART RATE: 78 BPM | RESPIRATION RATE: 18 BRPM | OXYGEN SATURATION: 99 % | BODY MASS INDEX: 23.73 KG/M2

## 2020-09-11 NOTE — PROGRESS NOTES
Pt ambulated to the room old dressing removed and wound cleaned with DWC, pt's bypass graft visible, small amount of drainage present, patted dry applied ishmael cream to edges , adaptic and aqua enedelia ag to wound , covered with abd cut in half, and secured with tape, pt is scheduled for skin graft placement Thursday and will follow up after that.

## 2020-09-12 LAB
BACTERIA SPEC CULT: ABNORMAL
BACTERIA SPEC CULT: NORMAL
GRAM STN SPEC: ABNORMAL
GRAM STN SPEC: ABNORMAL
SERVICE CMNT-IMP: ABNORMAL
SERVICE CMNT-IMP: NORMAL

## 2020-09-17 ENCOUNTER — HOSPITAL ENCOUNTER (OUTPATIENT)
Dept: PREADMISSION TESTING | Age: 70
Discharge: HOME | End: 2020-09-17
Payer: MEDICARE

## 2020-09-17 ENCOUNTER — OFFICE VISIT (OUTPATIENT)
Dept: VASCULAR SURGERY | Age: 70
End: 2020-09-17

## 2020-09-17 DIAGNOSIS — I73.9 PVD (PERIPHERAL VASCULAR DISEASE) (HCC): Primary | ICD-10-CM

## 2020-09-17 PROCEDURE — 87635 SARS-COV-2 COVID-19 AMP PRB: CPT

## 2020-09-17 RX ORDER — DOXYCYCLINE 100 MG/1
100 CAPSULE ORAL 2 TIMES DAILY
Qty: 28 CAP | Refills: 0 | Status: SHIPPED | OUTPATIENT
Start: 2020-09-17 | End: 2020-11-03

## 2020-09-17 NOTE — PROGRESS NOTES
Patient seen today for dressing change to right side of abdomen after washout and A cell skin graft placement one week ago. Removed dressing and there was large amount of purulent drainage, surrounding tissue is very excoriated and there is more of the bypass graft exposed than last visit in office. Notified Dr. Elmer Piña who asked that patient be set up for revision of the axillary to femoral bypass and patient is in agreement with plan and was scheduled for Monday. I rinsed area with NS and covered with wound gel and adaptic gauze, ABD and secured with tape. Patient tolerated well. Had Rory Goltz, PA review wound culture results as well and was given order for Doxycycline twice daily for 14 days and patient has been notified.

## 2020-09-18 LAB — SARS-COV-2, COV2NT: NOT DETECTED

## 2020-09-18 NOTE — H&P
History and Physical    78-year-old female following up regarding her history of right axillary to femoral and femorofemoral bypass.  She tells me vascular wise she is doing very well.  She does walk with her  through the neighborhood.  She occasionally gets some pain in the legs but really is very minor.  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  This is been treated with rotational skin flaps, debridements, chronic antibiotics, she has a autoimmune disorder with her Crohn's disease and is immune suppressed.     She had recent debridement and A cell and came last week for dressing change to right side of abdomen after washout and A cell skin graft placement one week ago. Removed dressing and there was large amount of purulent drainage, surrounding tissue is very excoriated and there is more of the bypass graft exposed than last visit in office. Notified Dr. Leoncio Herrera who asked that patient be set up for revision of the axillary to femoral bypass            Past Medical History:   Diagnosis Date    Arthritis      CAP (community acquired pneumonia) 06/27/2017    Chronic lung disease      Claud[facility])       left leg    Cro[facility]'s disease ([facility]      Crohn's disease ([address]      GERD (gastroesophageal reflux disease)      HTN (hypertension)      Ill-defined condition       Uses O2 at night.     Nausea & vomiting      Other and unspecified symptoms and signs involving general sensations and perceptions       PVD    Other ill-defined conditions(799.89)       Crohn's    Peripheral neuropathy      Pulmonary emphysema (HCC)      PVD (peripheral vascular disease) ([address]       right leg    Sepsis ([address] 06/27/2017    Vitamin B12 deficiency                Patient Active Problem List   Diagnosis Code    HTN (hypertension) I10    PVD (peripheral vascular disease) ([address] I73.9    Crohn's disease ([facility] K50.90    Mass of breast, left N63.20    Wound disruption, post-op, skin T81. 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 7XXA    Abscess L02.91    Dermal sinus tract of lumbosacral region L05.92    CAP (community acquired pneumonia) J18.9    Severe sepsis (HCC) A41.9, R65.20    Nonhealing surgical wound T81.89XA    Infection of vascular bypass graft ([address] T82. 7XXA    Chronic wound infection of abdomen S31.109A, L08.9                Past Surgical History:   Procedure Laterality Date    BYPASS GRAFT OTHR,AXILL-FEM Right 4/19/2013    BYPASS GRAFT OTHR,FEM-POP Left 5/2013    COLONOSCOPY N/A 9/11/2019     DIAGNOSTIC COLONOSCOPY performed by Esmer Kumari MD at [facility] ENDOSCOPY    FOOT/TOES SURGERY PROC UNLISTED        HX APPENDECTOMY        HX BREAST LUMPECTOMY Left       breast left- precancerous    HX BUNIONECTOMY         left eye    HX CATARACT REMOVAL         bilateral    HX CHOLECYSTECTOMY        HX ORTHOPAEDIC         lateral epicondilitis on right    HX OTHER SURGICAL   3/7/2013     catheter into aorta    HX OTHER SURGICAL         intestional resection x4    HX OTHER SURGICAL   9/2013     I & D Right chest/flank wall abscess vs granuloma    UPPER ARM/ELBOW SURGERY UNLISTED                      Current Outpatient Medications   Medication Sig Dispense Refill    clopidogreL (PLAVIX) 75 mg tab TAKE ONE TABLET BY MOUTH DAILY 30 Tab 8    clopidogreL (PLAVIX) 75 mg tab Take 1 Tab by mouth daily. 30 Tab 5    loratadine (CLARITIN) 10 mg tablet Take 10 mg by mouth daily.        fluticasone propionate (FLONASE ALLERGY RELIEF) 50 mcg/actuation nasal spray 2 Sprays by Both Nostrils route daily as needed for Rhinitis.        fluticasone-umeclidinium-vilanterol (TRELEGY ELLIPTA) 100-62.5-25 mcg inhaler Take 1 Puff by inhalation daily. 3 Inhaler 3    pregabalin (LYRICA) 100 mg capsule Take  by mouth two (2) times a day.  Takes 100 mg in am and 200 in the pm      ipratropium-albuterol (COMBIVENT RESPIMAT)  mcg/actuation inhaler Take 1 Puff by inhalation every six (6) hours as needed for Wheezing or Shortness of Breath. (Patient taking differently: Take 1 Puff by inhalation two (2) times a day.) 1 Inhaler 0    DULoxetine (CYMBALTA) 60 mg capsule Take 60 mg by mouth nightly.        aspirin 81 mg tablet Take 81 mg by mouth daily.        cyanocobalamin (VITAMIN B-12) 1,000 mcg/mL injection 1,000 mcg by IntraMUSCular route every fourteen (14) days.        loperamide (IMMODIUM) 2 mg tablet Take 2 mg by mouth daily as needed for Diarrhea.        dicyclomine (BENTYL) 10 mg capsule Take 10 mg by mouth two (2) times a day.        triamterene-hydrochlorothiazide (DYAZIDE) 37.5-25 mg per capsule Take 1 Cap by mouth daily.        bimatoprost (LUMIGAN) 0.03 % ophthalmic drops Administer 1 Drop to both eyes every evening.        atropine-PHENobarbital-scopolamine-hyoscyamine (DONNATAL) 16.2-0.1037 -0.0194 mg per tablet Take 1 Tab by mouth as needed (diarrhea). Indications: Irritable Bowel Syndrome        inFLIXimab (REMICADE) 100 mg injection 5 mg/kg by IntraVENous route once.  Every 4 weeks                    Allergies   Allergen Reactions    Codeine Nausea and Vomiting       Other reaction(s): other/intolerance    Darvon [Propoxyphene] Itching    Demerol [Meperidine] Other (comments)       Halucinations    Keflex [Cephalexin] Diarrhea    Talwin [Pentazocine Lactate] Shortness of Breath and Nausea and Vomiting         Review of Systems    A full review of systems was completed times ten organ systems and was deemed negative unless otherwise mentioned in the HPI.     Physical   Visit Vitals  /68 (BP 1 Location: Right arm, BP Patient Position: Sitting)   Pulse 83   Resp 20   SpO2 99%         She appears well today she does not appear ill  Head is normocephalic  Neck no JVD  Chest is clear  Cardiac regular  Abdomen soft flat nontender  Right flank has this 2.5 cm circular wound 1 cm in depth with a granulomatous base  No pus no signs of acute infection  Lower extremities without signs of arterial insufficiency  Doppler shows patent axillofemoral bypass with biphasic flow no evidence of stenosis  Lower extremity Doppler is stable        Impression/Plan:       ICD-10-CM ICD-9-CM     1. PVD (peripheral vascular disease) ([facility])  I73.9 443. 9     2. History of arterial bypass of lower extremity  Z95.828 V45.89 DUPLEX LOWER EXT BYPASS GRAFT BILATERAL W LAMONTE         DUPLEX AORTA ILIAC GRAFT LTD RT   3. Chronic aorto-iliac occlusion syndrome ([facility])  I74.09 444.09     4. Chronic abdominal wound infection, subsequent encounter  S31.109D V58.89       L08. 9         Wash out/revision of ax fem bypass

## 2020-09-20 ENCOUNTER — ANESTHESIA EVENT (OUTPATIENT)
Dept: CARDIOTHORACIC SURGERY | Age: 70
End: 2020-09-20
Payer: MEDICARE

## 2020-09-21 ENCOUNTER — ANESTHESIA (OUTPATIENT)
Dept: CARDIOTHORACIC SURGERY | Age: 70
End: 2020-09-21
Payer: MEDICARE

## 2020-09-21 ENCOUNTER — HOSPITAL ENCOUNTER (OUTPATIENT)
Age: 70
LOS: 1 days | Discharge: HOME | End: 2020-09-21
Attending: SURGERY | Admitting: SURGERY
Payer: MEDICARE

## 2020-09-21 VITALS
HEIGHT: 64 IN | DIASTOLIC BLOOD PRESSURE: 67 MMHG | BODY MASS INDEX: 23.64 KG/M2 | SYSTOLIC BLOOD PRESSURE: 132 MMHG | TEMPERATURE: 96.8 F | HEART RATE: 73 BPM | OXYGEN SATURATION: 95 % | WEIGHT: 138.5 LBS | RESPIRATION RATE: 14 BRPM

## 2020-09-21 DIAGNOSIS — S31.109D CHRONIC WOUND INFECTION OF ABDOMEN, SUBSEQUENT ENCOUNTER: Primary | ICD-10-CM

## 2020-09-21 DIAGNOSIS — L08.9 CHRONIC WOUND INFECTION OF ABDOMEN, SUBSEQUENT ENCOUNTER: Primary | ICD-10-CM

## 2020-09-21 PROCEDURE — 74011250636 HC RX REV CODE- 250/636: Performed by: PHYSICIAN ASSISTANT

## 2020-09-21 PROCEDURE — 74011000250 HC RX REV CODE- 250: Performed by: ANESTHESIOLOGY

## 2020-09-21 PROCEDURE — 2709999900 HC NON-CHARGEABLE SUPPLY: Performed by: SURGERY

## 2020-09-21 PROCEDURE — 76010000129 HC CV SURG 1.5 TO 2 HR: Performed by: SURGERY

## 2020-09-21 PROCEDURE — 77030002986 HC SUT PROL J&J -A: Performed by: SURGERY

## 2020-09-21 PROCEDURE — 76210000025 HC REC RM PH II 3 TO 3.5 HR: Performed by: SURGERY

## 2020-09-21 PROCEDURE — C1768 GRAFT, VASCULAR: HCPCS | Performed by: SURGERY

## 2020-09-21 PROCEDURE — 77030002933 HC SUT MCRYL J&J -A: Performed by: SURGERY

## 2020-09-21 PROCEDURE — 77030010509 HC AIRWY LMA MSK TELE -A: Performed by: ANESTHESIOLOGY

## 2020-09-21 PROCEDURE — 74011250636 HC RX REV CODE- 250/636: Performed by: SURGERY

## 2020-09-21 PROCEDURE — 77030010512 HC APPL CLP LIG J&J -C: Performed by: SURGERY

## 2020-09-21 PROCEDURE — 77030002924 HC SUT GORTX WLGO -B: Performed by: SURGERY

## 2020-09-21 PROCEDURE — 77030002996 HC SUT SLK J&J -A: Performed by: SURGERY

## 2020-09-21 PROCEDURE — 74011250637 HC RX REV CODE- 250/637: Performed by: NURSE ANESTHETIST, CERTIFIED REGISTERED

## 2020-09-21 PROCEDURE — 74011250636 HC RX REV CODE- 250/636: Performed by: NURSE ANESTHETIST, CERTIFIED REGISTERED

## 2020-09-21 PROCEDURE — 74011000258 HC RX REV CODE- 258: Performed by: ANESTHESIOLOGY

## 2020-09-21 PROCEDURE — 74011000250 HC RX REV CODE- 250: Performed by: NURSE ANESTHETIST, CERTIFIED REGISTERED

## 2020-09-21 PROCEDURE — 76210000006 HC OR PH I REC 0.5 TO 1 HR: Performed by: SURGERY

## 2020-09-21 PROCEDURE — 76060000034 HC ANESTHESIA 1.5 TO 2 HR: Performed by: SURGERY

## 2020-09-21 PROCEDURE — 77030013079 HC BLNKT BAIR HGGR 3M -A: Performed by: ANESTHESIOLOGY

## 2020-09-21 PROCEDURE — 74011250636 HC RX REV CODE- 250/636: Performed by: ANESTHESIOLOGY

## 2020-09-21 DEVICE — GRAFT VASC L50CM DIA8MM RNG L40CM EPTFE THN WALLED CBAS HEP: Type: IMPLANTABLE DEVICE | Site: ABDOMEN | Status: FUNCTIONAL

## 2020-09-21 RX ORDER — SODIUM CHLORIDE, SODIUM LACTATE, POTASSIUM CHLORIDE, CALCIUM CHLORIDE 600; 310; 30; 20 MG/100ML; MG/100ML; MG/100ML; MG/100ML
INJECTION, SOLUTION INTRAVENOUS
Status: DISCONTINUED | OUTPATIENT
Start: 2020-09-21 | End: 2020-09-21 | Stop reason: HOSPADM

## 2020-09-21 RX ORDER — LIDOCAINE HYDROCHLORIDE 20 MG/ML
INJECTION, SOLUTION EPIDURAL; INFILTRATION; INTRACAUDAL; PERINEURAL AS NEEDED
Status: DISCONTINUED | OUTPATIENT
Start: 2020-09-21 | End: 2020-09-21 | Stop reason: HOSPADM

## 2020-09-21 RX ORDER — FAMOTIDINE 20 MG/1
20 TABLET, FILM COATED ORAL ONCE
Status: COMPLETED | OUTPATIENT
Start: 2020-09-21 | End: 2020-09-21

## 2020-09-21 RX ORDER — DEXTROSE 50 % IN WATER (D50W) INTRAVENOUS SYRINGE
25-50 AS NEEDED
Status: DISCONTINUED | OUTPATIENT
Start: 2020-09-21 | End: 2020-09-21 | Stop reason: HOSPADM

## 2020-09-21 RX ORDER — OXYCODONE AND ACETAMINOPHEN 5; 325 MG/1; MG/1
1 TABLET ORAL
Status: DISCONTINUED | OUTPATIENT
Start: 2020-09-21 | End: 2020-09-21 | Stop reason: HOSPADM

## 2020-09-21 RX ORDER — SODIUM CHLORIDE 0.9 % (FLUSH) 0.9 %
5-40 SYRINGE (ML) INJECTION EVERY 8 HOURS
Status: DISCONTINUED | OUTPATIENT
Start: 2020-09-21 | End: 2020-09-21 | Stop reason: HOSPADM

## 2020-09-21 RX ORDER — HEPARIN SODIUM 200 [USP'U]/100ML
INJECTION, SOLUTION INTRAVENOUS
Status: DISCONTINUED
Start: 2020-09-21 | End: 2020-09-21 | Stop reason: HOSPADM

## 2020-09-21 RX ORDER — ALBUTEROL SULFATE 0.83 MG/ML
2.5 SOLUTION RESPIRATORY (INHALATION) AS NEEDED
Status: DISCONTINUED | OUTPATIENT
Start: 2020-09-21 | End: 2020-09-21 | Stop reason: HOSPADM

## 2020-09-21 RX ORDER — PROPOFOL 10 MG/ML
INJECTION, EMULSION INTRAVENOUS AS NEEDED
Status: DISCONTINUED | OUTPATIENT
Start: 2020-09-21 | End: 2020-09-21 | Stop reason: HOSPADM

## 2020-09-21 RX ORDER — MAGNESIUM SULFATE 100 %
4 CRYSTALS MISCELLANEOUS AS NEEDED
Status: DISCONTINUED | OUTPATIENT
Start: 2020-09-21 | End: 2020-09-21 | Stop reason: HOSPADM

## 2020-09-21 RX ORDER — SODIUM CHLORIDE, SODIUM LACTATE, POTASSIUM CHLORIDE, CALCIUM CHLORIDE 600; 310; 30; 20 MG/100ML; MG/100ML; MG/100ML; MG/100ML
75 INJECTION, SOLUTION INTRAVENOUS CONTINUOUS
Status: DISCONTINUED | OUTPATIENT
Start: 2020-09-21 | End: 2020-09-21 | Stop reason: HOSPADM

## 2020-09-21 RX ORDER — NALOXONE HYDROCHLORIDE 0.4 MG/ML
0.1 INJECTION, SOLUTION INTRAMUSCULAR; INTRAVENOUS; SUBCUTANEOUS AS NEEDED
Status: DISCONTINUED | OUTPATIENT
Start: 2020-09-21 | End: 2020-09-21 | Stop reason: HOSPADM

## 2020-09-21 RX ORDER — SODIUM CHLORIDE, SODIUM LACTATE, POTASSIUM CHLORIDE, CALCIUM CHLORIDE 600; 310; 30; 20 MG/100ML; MG/100ML; MG/100ML; MG/100ML
50 INJECTION, SOLUTION INTRAVENOUS CONTINUOUS
Status: DISCONTINUED | OUTPATIENT
Start: 2020-09-21 | End: 2020-09-21 | Stop reason: HOSPADM

## 2020-09-21 RX ORDER — HEPARIN SODIUM 200 [USP'U]/100ML
INJECTION, SOLUTION INTRAVENOUS
Status: COMPLETED | OUTPATIENT
Start: 2020-09-21 | End: 2020-09-21

## 2020-09-21 RX ORDER — SODIUM CHLORIDE 0.9 % (FLUSH) 0.9 %
5-40 SYRINGE (ML) INJECTION AS NEEDED
Status: DISCONTINUED | OUTPATIENT
Start: 2020-09-21 | End: 2020-09-21 | Stop reason: HOSPADM

## 2020-09-21 RX ORDER — HYDROMORPHONE HYDROCHLORIDE 2 MG/ML
0.5 INJECTION, SOLUTION INTRAMUSCULAR; INTRAVENOUS; SUBCUTANEOUS
Status: DISCONTINUED | OUTPATIENT
Start: 2020-09-21 | End: 2020-09-21 | Stop reason: HOSPADM

## 2020-09-21 RX ORDER — KETOROLAC TROMETHAMINE 15 MG/ML
INJECTION, SOLUTION INTRAMUSCULAR; INTRAVENOUS AS NEEDED
Status: DISCONTINUED | OUTPATIENT
Start: 2020-09-21 | End: 2020-09-21 | Stop reason: HOSPADM

## 2020-09-21 RX ORDER — ONDANSETRON 2 MG/ML
INJECTION INTRAMUSCULAR; INTRAVENOUS AS NEEDED
Status: DISCONTINUED | OUTPATIENT
Start: 2020-09-21 | End: 2020-09-21 | Stop reason: HOSPADM

## 2020-09-21 RX ORDER — FENTANYL CITRATE 50 UG/ML
INJECTION, SOLUTION INTRAMUSCULAR; INTRAVENOUS AS NEEDED
Status: DISCONTINUED | OUTPATIENT
Start: 2020-09-21 | End: 2020-09-21 | Stop reason: HOSPADM

## 2020-09-21 RX ORDER — OXYCODONE AND ACETAMINOPHEN 5; 325 MG/1; MG/1
1-2 TABLET ORAL
Qty: 60 TAB | Refills: 0 | Status: SHIPPED | OUTPATIENT
Start: 2020-09-21 | End: 2020-09-26

## 2020-09-21 RX ORDER — DEXAMETHASONE SODIUM PHOSPHATE 4 MG/ML
INJECTION, SOLUTION INTRA-ARTICULAR; INTRALESIONAL; INTRAMUSCULAR; INTRAVENOUS; SOFT TISSUE AS NEEDED
Status: DISCONTINUED | OUTPATIENT
Start: 2020-09-21 | End: 2020-09-21 | Stop reason: HOSPADM

## 2020-09-21 RX ORDER — HEPARIN SODIUM 1000 [USP'U]/ML
INJECTION, SOLUTION INTRAVENOUS; SUBCUTANEOUS AS NEEDED
Status: DISCONTINUED | OUTPATIENT
Start: 2020-09-21 | End: 2020-09-21 | Stop reason: HOSPADM

## 2020-09-21 RX ORDER — MIDAZOLAM HYDROCHLORIDE 1 MG/ML
INJECTION, SOLUTION INTRAMUSCULAR; INTRAVENOUS AS NEEDED
Status: DISCONTINUED | OUTPATIENT
Start: 2020-09-21 | End: 2020-09-21 | Stop reason: HOSPADM

## 2020-09-21 RX ORDER — LIDOCAINE HYDROCHLORIDE 10 MG/ML
0.1 INJECTION, SOLUTION EPIDURAL; INFILTRATION; INTRACAUDAL; PERINEURAL AS NEEDED
Status: DISCONTINUED | OUTPATIENT
Start: 2020-09-21 | End: 2020-09-21 | Stop reason: HOSPADM

## 2020-09-21 RX ORDER — LIDOCAINE HYDROCHLORIDE 10 MG/ML
INJECTION, SOLUTION EPIDURAL; INFILTRATION; INTRACAUDAL; PERINEURAL
Status: DISCONTINUED
Start: 2020-09-21 | End: 2020-09-21 | Stop reason: HOSPADM

## 2020-09-21 RX ORDER — INSULIN LISPRO 100 [IU]/ML
INJECTION, SOLUTION INTRAVENOUS; SUBCUTANEOUS ONCE
Status: DISCONTINUED | OUTPATIENT
Start: 2020-09-21 | End: 2020-09-21 | Stop reason: HOSPADM

## 2020-09-21 RX ADMIN — FENTANYL CITRATE 50 MCG: 50 INJECTION, SOLUTION INTRAMUSCULAR; INTRAVENOUS at 10:19

## 2020-09-21 RX ADMIN — MIDAZOLAM HYDROCHLORIDE 2 MG: 2 INJECTION, SOLUTION INTRAMUSCULAR; INTRAVENOUS at 09:36

## 2020-09-21 RX ADMIN — FENTANYL CITRATE 50 MCG: 50 INJECTION, SOLUTION INTRAMUSCULAR; INTRAVENOUS at 09:42

## 2020-09-21 RX ADMIN — LIDOCAINE HYDROCHLORIDE 40 MG: 20 INJECTION, SOLUTION EPIDURAL; INFILTRATION; INTRACAUDAL; PERINEURAL at 09:42

## 2020-09-21 RX ADMIN — ONDANSETRON 4 MG: 2 INJECTION INTRAMUSCULAR; INTRAVENOUS at 09:42

## 2020-09-21 RX ADMIN — FAMOTIDINE 20 MG: 20 TABLET ORAL at 08:55

## 2020-09-21 RX ADMIN — PHENYLEPHRINE HYDROCHLORIDE 100 MCG: 10 INJECTION INTRAVENOUS at 09:46

## 2020-09-21 RX ADMIN — FENTANYL CITRATE 50 MCG: 50 INJECTION, SOLUTION INTRAMUSCULAR; INTRAVENOUS at 10:01

## 2020-09-21 RX ADMIN — SODIUM CHLORIDE 1 G: 900 INJECTION, SOLUTION INTRAVENOUS at 09:36

## 2020-09-21 RX ADMIN — DEXAMETHASONE SODIUM PHOSPHATE 4 MG: 4 INJECTION, SOLUTION INTRAMUSCULAR; INTRAVENOUS at 09:42

## 2020-09-21 RX ADMIN — FENTANYL CITRATE 50 MCG: 50 INJECTION, SOLUTION INTRAMUSCULAR; INTRAVENOUS at 10:37

## 2020-09-21 RX ADMIN — PROPOFOL 150 MG: 10 INJECTION, EMULSION INTRAVENOUS at 09:42

## 2020-09-21 RX ADMIN — PHENYLEPHRINE HYDROCHLORIDE 100 MCG: 10 INJECTION INTRAVENOUS at 10:23

## 2020-09-21 RX ADMIN — SODIUM CHLORIDE 1000 MG: 900 INJECTION, SOLUTION INTRAVENOUS at 09:01

## 2020-09-21 RX ADMIN — PHENYLEPHRINE HYDROCHLORIDE 100 MCG: 10 INJECTION INTRAVENOUS at 10:08

## 2020-09-21 RX ADMIN — SODIUM CHLORIDE, SODIUM LACTATE, POTASSIUM CHLORIDE, AND CALCIUM CHLORIDE: 600; 310; 30; 20 INJECTION, SOLUTION INTRAVENOUS at 09:36

## 2020-09-21 RX ADMIN — KETOROLAC TROMETHAMINE 15 MG: 15 INJECTION, SOLUTION INTRAMUSCULAR; INTRAVENOUS at 09:42

## 2020-09-21 RX ADMIN — SODIUM CHLORIDE, SODIUM LACTATE, POTASSIUM CHLORIDE, AND CALCIUM CHLORIDE 50 ML/HR: 600; 310; 30; 20 INJECTION, SOLUTION INTRAVENOUS at 09:02

## 2020-09-21 RX ADMIN — PHENYLEPHRINE HYDROCHLORIDE 100 MCG: 10 INJECTION INTRAVENOUS at 10:40

## 2020-09-21 RX ADMIN — FENTANYL CITRATE 50 MCG: 50 INJECTION, SOLUTION INTRAMUSCULAR; INTRAVENOUS at 11:07

## 2020-09-21 RX ADMIN — HEPARIN SODIUM 5000 UNITS: 1000 INJECTION, SOLUTION INTRAVENOUS; SUBCUTANEOUS at 10:13

## 2020-09-21 NOTE — DISCHARGE INSTRUCTIONS
Call Dr. Phillip Tipton office for any questions/concerns    DISCHARGE SUMMARY from Nurse  PATIENT INSTRUCTIONS:  After general anesthesia or intravenous sedation, for 24 hours or while taking prescription Narcotics:  · Limit your activities  · Do not drive and operate hazardous machinery  · Do not make important personal or business decisions  · Do  not drink alcoholic beverages  · If you have not urinated within 8 hours after discharge, please contact your surgeon on call. Report the following to your surgeon:  · Excessive pain, swelling, redness or odor of or around the surgical area  · Temperature over 100.5  · Nausea and vomiting lasting longer than 4 hours or if unable to take medications  · Any signs of decreased circulation or nerve impairment to extremity: change in color, persistent  numbness, tingling, coldness or increase pain  · Any questions    What to do at Home:  Recommended activity: Activity as tolerated and no driving for today. *  Please give a list of your current medications to your Primary Care Provider. *  Please update this list whenever your medications are discontinued, doses are      changed, or new medications (including over-the-counter products) are added. *  Please carry medication information at all times in case of emergency situations. These are general instructions for a healthy lifestyle:    No smoking/ No tobacco products/ Avoid exposure to second hand smoke  Surgeon General's Warning:  Quitting smoking now greatly reduces serious risk to your health.     Obesity, smoking, and sedentary lifestyle greatly increases your risk for illness    A healthy diet, regular physical exercise & weight monitoring are important for maintaining a healthy lifestyle    You may be retaining fluid if you have a history of heart failure or if you experience any of the following symptoms:  Weight gain of 3 pounds or more overnight or 5 pounds in a week, increased swelling in our hands or feet or shortness of breath while lying flat in bed. Please call your doctor as soon as you notice any of these symptoms; do not wait until your next office visit. The discharge information has been reviewed with the patient. The patient verbalized understanding. Discharge medications reviewed with the patient and appropriate educational materials and side effects teaching were provided.   ___________________________________________________________________________________________________________________________________

## 2020-09-21 NOTE — PERIOP NOTES
Dr Dane Morgan at bedside; performed dressing change, instructions given on completing dressing changes at home if necessary. Patient able to go home.

## 2020-09-21 NOTE — OP NOTES
Preoperative diagnosis: Infected exposed bypass graft, right axillary femoral bypass graft. Postoperative diagnosis: Same    Procedures performed:  #1  Revision of axillary to femoral bypass with interposition graft. #2  Excision of infected graft    Cultures: None    Specimens: None    Drains: None    Estimated blood loss: Less than 50 mL    Assistants: None    Implants: Please see above    Complications: None    Anesthesia: General anesthesia. Indications for the procedure:  Trell Maravilla is a 65 y.o. chronic wound with no exposed bypass. Patient was given the appropriate risk and benefits of the procedure including but not limited to bleeding, infection, damage to adjacent structures, MI, stroke, death, loss of lower extremity, need for further surgery. Patient was understanding of all the risks and underwent a procedure. Operative findings:   #1  Interposition 8 mm ringed propatent graft used to revise graft. Infected graft excised fully. Procedure:  Patient was correctly identified in the precath area and taken to the Cath Lab in stable condition. Patient had pre-incision timeout prior to any incision. Patient was prepped and draped in the normal sterile fashion according to CDC guidelines aseptic technique. Used ultrasound to find the precise location of the graft and made a skin sparing incision above the hip on the right and then a second incision well above the area of open graft placement which was Tegadermed off. Exposed the graft was these incisions and placed a vessel loop. I made a curvilinear tunnel well outside the field of the infected graft and right a new 8 mm ringed propatent graft and pulled through the tunnel.   Anticoagulated the patient in place current clamps on the inflow and outflow portion of the existing graft and transected the graft from these clean sites I did take out some portion of the graft headed to the expected area that I did end to end anastomosis with a new interposition graft with CV 6 Aurora-Rashid suture circular in standard fashion. Good Axis flow was opened up very good flow. There was no bleeding. Irrigated and closed with 2-0 Monocryl for subcu for Monocryl and Dermabond liver skin. Next I examined the infected graft removed entirely. Electrocardiogram of stasis impacted with a saline wet-to-dry.

## 2020-09-21 NOTE — ROUTINE PROCESS
TRANSFER - OUT REPORT: 
 
Verbal report given to daria (name) on [address]  being transferred to t [facility] (unit) for routine progression of care Report consisted of patients Situation, Background, Assessment and  
Recommendations(SBAR). Information from the following report(s) SBAR, Kardex, [facility] and Recent Results was reviewed with the receiving nurse. Lines:  
Peripheral IV 09/21/20 Left Hand (Active) Site Assessment Clean, dry, & intact 09/21/20 6597 Phlebitis Assessment 0 09/21/20 0909 Infiltration Assessment 0 09/21/20 0909 Dressing Status Occlusive 09/21/20 0909 Dressing Type Transparent 09/21/20 0909 Hub Color/Line Status Blue; Infusing 09/21/20 0909 Opportunity for questions and clarification was provided. Patient transported with: 
 [employer]

## 2020-09-21 NOTE — PERIOP NOTES
Pt's dressing saturated second time with bloody discharge. Called Dr Michael Pollock to report findings; will redress packing site with ABD an monitor. Pt denies pain.

## 2020-09-21 NOTE — ANESTHESIA PREPROCEDURE EVALUATION
Relevant Problems   No relevant active problems       Anesthetic History     PONV          Review of Systems / Medical History  Patient summary reviewed and pertinent labs reviewed    Pulmonary    COPD: moderate      Smoker      Comments: Home O2   Neuro/Psych             Comments: neuopathy Cardiovascular    Hypertension          PAD    Exercise tolerance: <4 METS     GI/Hepatic/Renal     GERD          Comments: Crohn's Endo/Other        Arthritis     Other Findings              Physical Exam    Airway  Mallampati: II  TM Distance: > 6 cm  Neck ROM: normal range of motion   Mouth opening: Normal     Cardiovascular  Regular rate and rhythm,  S1 and S2 normal,  no murmur, click, rub, or gallop             Dental    Dentition: Edentulous     Pulmonary      Decreased breath sounds: bilateral      Prolonged expiration     Abdominal  GI exam deferred       Other Findings            Anesthetic Plan    ASA: 4  Anesthesia type: general      Post-op pain plan if not by surgeon: IV PCA    Induction: Intravenous  Anesthetic plan and risks discussed with: Patient

## 2020-09-21 NOTE — PERIOP NOTES
Called Dr Sage Araujo to inform him of ongoing bloody drainage from packing. He will come see patient.

## 2020-09-21 NOTE — ANESTHESIA POSTPROCEDURE EVALUATION
Procedure(s):  REVISION OF RIGHT AXILLARY TO FEMORAL BYPASS DUE TO EXPOSED GRAFT AND WASHOUT.    general    Anesthesia Post Evaluation      Multimodal analgesia: multimodal analgesia used between 6 hours prior to anesthesia start to PACU discharge  Patient location during evaluation: PACU  Patient participation: complete - patient participated  Level of consciousness: awake  Pain score: 2  Pain management: adequate  Airway patency: patent  Anesthetic complications: no  Cardiovascular status: acceptable  Respiratory status: acceptable  Hydration status: acceptable  Post anesthesia nausea and vomiting:  none  Final Post Anesthesia Temperature Assessment:  Normothermia (36.0-37.5 degrees C)      INITIAL Post-op Vital signs:   Vitals Value Taken Time   /52 9/21/2020 12:10 PM   Temp 36.2 °C (97.2 °F) 9/21/2020 11:32 AM   Pulse 74 9/21/2020 12:11 PM   Resp 17 9/21/2020 12:11 PM   SpO2 99 % 9/21/2020 12:11 PM   Vitals shown include unvalidated device data.

## 2020-09-22 ENCOUNTER — TELEPHONE (OUTPATIENT)
Dept: VASCULAR SURGERY | Age: 70
End: 2020-09-22

## 2020-09-22 NOTE — TELEPHONE ENCOUNTER
Called the pt back and per pt , after procedure yesterday , dressing had to be removed and redone due to oozing , per pt Dr. Crystal Barr removed the packing and changed dressing several times , and they did show pt how to do it at home but pt would feel more comfortable coming in here to have dressing changed in office for the first time, pt scheduled for appt.

## 2020-09-23 ENCOUNTER — OFFICE VISIT (OUTPATIENT)
Dept: VASCULAR SURGERY | Age: 70
End: 2020-09-23
Payer: MEDICARE

## 2020-09-23 DIAGNOSIS — T14.8XXA OPEN WOUND: Primary | ICD-10-CM

## 2020-09-23 PROCEDURE — 99024 POSTOP FOLLOW-UP VISIT: CPT | Performed by: SURGERY

## 2020-09-23 NOTE — PROGRESS NOTES
Patient being seen for dressing change after surgery on Monday. Gently removed dressing from right side of abdomen. No active bleeding noted, only granulating tissue noted with small amount bloody drainage, surrounding tissue slightly red. Wound measures: 0.5x2. 4x1.5cm with tunnel at 12:00 that measures: 3.0cm. Cleansed wound with wound cleanser and gauze. Gently packed with calcium alginate with silver, applied barrier cream to surrounding tissue and covered with mepliex and secured with a piece of medi pore tape. Patient tolerated well and will return on Friday for next dressing change. Patient will also call if there are any changes.

## 2020-09-25 ENCOUNTER — OFFICE VISIT (OUTPATIENT)
Dept: VASCULAR SURGERY | Age: 70
End: 2020-09-25
Payer: MEDICARE

## 2020-09-25 DIAGNOSIS — T14.8XXA OPEN WOUND: Primary | ICD-10-CM

## 2020-09-25 PROCEDURE — 99024 POSTOP FOLLOW-UP VISIT: CPT | Performed by: SURGERY

## 2020-09-25 NOTE — PROGRESS NOTES
Patient being seen for wound assessment and dressing change to right side of abdomen. Wound is cleansed with wound cleanser and gauze, patient tolerated well. Wound is granulating with tunnel at 12:00 with small amount purulent drainage, surrounding tissue slightly red but less than last visit. Applied mixture of barrier cream and antifungal powder to surrounding tissue, packed wound lightly with calcium alginate with silver and covered with mepilex. Patient tolerated well. Patient will return on Tuesday for next dressing change.

## 2020-09-29 ENCOUNTER — OFFICE VISIT (OUTPATIENT)
Dept: VASCULAR SURGERY | Age: 70
End: 2020-09-29
Payer: MEDICARE

## 2020-09-29 VITALS — SYSTOLIC BLOOD PRESSURE: 130 MMHG | DIASTOLIC BLOOD PRESSURE: 60 MMHG | RESPIRATION RATE: 20 BRPM | HEART RATE: 88 BPM

## 2020-09-29 DIAGNOSIS — I73.9 PVD (PERIPHERAL VASCULAR DISEASE) (HCC): ICD-10-CM

## 2020-09-29 DIAGNOSIS — L92.9 GRANULOMA OF SKIN: Primary | ICD-10-CM

## 2020-09-29 PROCEDURE — 99024 POSTOP FOLLOW-UP VISIT: CPT | Performed by: SURGERY

## 2020-09-29 NOTE — PROGRESS NOTES
1. Have you been to the ER, urgent care clinic since your last visit? Hospitalized since your last visit? No    2. Have you seen or consulted any other health care providers outside of the [address] [place] [address] since your last visit? Include any pap smears or colon screening.  No       3 most recent PHQ Screens 9/29/2020   Little interest or pleasure in doing things Not at all   Feeling down, depressed, irritable, or hopeless Not at all   Total Score PHQ 2 0

## 2020-09-29 NOTE — PROGRESS NOTES
Cleansed wound to right side of abdomen with wound cleanser and gauze, patient tolerated well. Wound measures: 0.4x2. 1x0.7 cm and 12 tunnel measures : 2.5cm. Applied mixture of barrier cream and antifungal cream to surrounding tissue and packed with iodoform gauze and covered with large mepilex and secured with medipore tape. Patient tolerated well. Patient will return tomorrow for next dressing change.

## 2020-09-29 NOTE — PROGRESS NOTES
Start her postop visit today for revision of this bypass and revision of chronic wound. She has no ischemic symptoms. I dopplered her bypasses widely patent. The access sites of the bypass are completely clean the glue is intact there is no redness no pain. The drainage site is starting to close. There is still some drainage notable. No signs of acute infection overall the area looks more clean. Like to increase her frequency she will come to the office Monday Wednesday Friday. I will see her back in 2 weeks we will consider a washout if it needs any help for progression.

## 2020-09-30 ENCOUNTER — OFFICE VISIT (OUTPATIENT)
Dept: VASCULAR SURGERY | Age: 70
End: 2020-09-30
Payer: MEDICARE

## 2020-09-30 DIAGNOSIS — T14.8XXA OPEN WOUND: Primary | ICD-10-CM

## 2020-09-30 PROCEDURE — 99024 POSTOP FOLLOW-UP VISIT: CPT | Performed by: SURGERY

## 2020-09-30 NOTE — PROGRESS NOTES
Patient being seen for wound assessment and dressing change to right side of abdomen. Cleansed wound to abdomen with wound cleanser and gauze, patient tolerated well. Wound is granulating with some darker tissue noted which I believe is from the area being cauterized during surgery, tunnel at 12:00,  large amount ss drainage, surrounding tissue slightly red. Applied mixture of barrier cream, antifungal powder and hydrocortisone to surrounding tissue, packed with iodoform packing and covered with mepilex and secured with tape. Patient tolerated well. Patient will return on Friday for next dressing change.

## 2020-10-02 ENCOUNTER — OFFICE VISIT (OUTPATIENT)
Dept: VASCULAR SURGERY | Age: 70
End: 2020-10-02
Payer: MEDICARE

## 2020-10-02 DIAGNOSIS — T81.31XS POSTOPERATIVE DEHISCENCE OF SKIN WOUND, SEQUELA: Primary | ICD-10-CM

## 2020-10-02 PROCEDURE — 99024 POSTOP FOLLOW-UP VISIT: CPT | Performed by: SURGERY

## 2020-10-02 NOTE — PROGRESS NOTES
Patient being seen for wound assessment and dressing change to right side of abdomen. Cleansed wound with wound cleanser and gauze, patient tolerated well. Patient had much less drainage this visit, moderate amount purulent drainage and surrounding tissue is much less red as well. Wound is granulating with tunnel at 12:00 but seems to have decreased. Applied mixture of barrier cream, hydrocortisone cream, and nystatin powder to surrounding tissue and packed wound with iodoform packing and covered with gauze and mepilex and then secured with extra tape. Patient tolerated well and will return on Monday for next dressing change.

## 2020-10-05 ENCOUNTER — OFFICE VISIT (OUTPATIENT)
Dept: VASCULAR SURGERY | Age: 70
End: 2020-10-05
Payer: MEDICARE

## 2020-10-05 VITALS
OXYGEN SATURATION: 98 % | SYSTOLIC BLOOD PRESSURE: 132 MMHG | RESPIRATION RATE: 18 BRPM | BODY MASS INDEX: 23.56 KG/M2 | HEIGHT: 64 IN | DIASTOLIC BLOOD PRESSURE: 70 MMHG | WEIGHT: 138 LBS | HEART RATE: 78 BPM

## 2020-10-05 DIAGNOSIS — T81.89XD NON-HEALING SURGICAL WOUND, SUBSEQUENT ENCOUNTER: Primary | ICD-10-CM

## 2020-10-05 PROCEDURE — 99024 POSTOP FOLLOW-UP VISIT: CPT | Performed by: SURGERY

## 2020-10-05 NOTE — PROGRESS NOTES
Pt ambulated to the room, old dressing removed , purulent drainage noted . Wound cleansed with DWC , packed with iodoform packing covered with gauze and mepilex dressing and secured with tape. Pt will return on Wednesday for dressing change.

## 2020-10-07 ENCOUNTER — OFFICE VISIT (OUTPATIENT)
Dept: VASCULAR SURGERY | Age: 70
End: 2020-10-07
Payer: MEDICARE

## 2020-10-07 DIAGNOSIS — T14.8XXA OPEN WOUND: Primary | ICD-10-CM

## 2020-10-07 PROCEDURE — 99024 POSTOP FOLLOW-UP VISIT: CPT | Performed by: SURGERY

## 2020-10-09 ENCOUNTER — OFFICE VISIT (OUTPATIENT)
Dept: VASCULAR SURGERY | Age: 70
End: 2020-10-09
Payer: MEDICARE

## 2020-10-09 DIAGNOSIS — T14.8XXA OPEN WOUND: Primary | ICD-10-CM

## 2020-10-09 PROCEDURE — 99024 POSTOP FOLLOW-UP VISIT: CPT | Performed by: SURGERY

## 2020-10-12 ENCOUNTER — OFFICE VISIT (OUTPATIENT)
Dept: VASCULAR SURGERY | Age: 70
End: 2020-10-12
Payer: MEDICARE

## 2020-10-12 DIAGNOSIS — T14.8XXA OPEN WOUND: Primary | ICD-10-CM

## 2020-10-12 PROCEDURE — 99024 POSTOP FOLLOW-UP VISIT: CPT | Performed by: SURGERY

## 2020-10-14 ENCOUNTER — OFFICE VISIT (OUTPATIENT)
Dept: VASCULAR SURGERY | Age: 70
End: 2020-10-14
Payer: MEDICARE

## 2020-10-14 DIAGNOSIS — T14.8XXA OPEN WOUND: Primary | ICD-10-CM

## 2020-10-14 PROCEDURE — 99024 POSTOP FOLLOW-UP VISIT: CPT | Performed by: SURGERY

## 2020-10-14 RX ORDER — AMOXICILLIN AND CLAVULANATE POTASSIUM 875; 125 MG/1; MG/1
1 TABLET, FILM COATED ORAL EVERY 12 HOURS
Qty: 20 TAB | Refills: 0 | Status: SHIPPED | OUTPATIENT
Start: 2020-10-14 | End: 2020-11-02 | Stop reason: SDUPTHER

## 2020-10-16 ENCOUNTER — OFFICE VISIT (OUTPATIENT)
Dept: VASCULAR SURGERY | Age: 70
End: 2020-10-16

## 2020-10-16 DIAGNOSIS — T14.8XXA OPEN WOUND: Primary | ICD-10-CM

## 2020-10-16 NOTE — PROGRESS NOTES
Patient being seen for wound assessment and dressing change to right side of abdomen. Cleansed wound with wound cleanser and gauze, patient tolerated well. Wound is granulating with tunnel at 12:00 with small amount ss drainage, surrounding tissue slightly red. The incision at lower part of abdomen has improved since last visit and patient starting antibiotics. The area is less red and less hard to touch and incision is just slightly open with small amount ss drainage. Applied mixture of barrier cream and nystatin powder to surrounding tissue of wound, packed wound with iodoform and covered with gauze and then mepilex. Lower incision was covered with barrier cream and gauze and mepilex. Secured with tape. Patient tolerated well. Patient will return on Monday for dressing change.

## 2020-10-19 ENCOUNTER — OFFICE VISIT (OUTPATIENT)
Dept: VASCULAR SURGERY | Age: 70
End: 2020-10-19
Payer: MEDICARE

## 2020-10-19 DIAGNOSIS — T14.8XXA OPEN WOUND: Primary | ICD-10-CM

## 2020-10-19 PROCEDURE — 99024 POSTOP FOLLOW-UP VISIT: CPT | Performed by: SURGERY

## 2020-10-19 NOTE — PROGRESS NOTES
Patient being seen for wound assessment and dressing change to right side of abdomen. Cleansed wounds to abdomen with wound cleanser and gauze, patient tolerated well. Abdominal wound is granulating with 12:00 tunnel noted with small amount serous drainage, surrounding tissue slightly red. Distal incision is slightly raw with small amount serous drainage and surrounding tissue just slightly red and much better than last visit. Applied mixture of barrier cream and nystatin powder to surrounding tissue, packed wound with iodoform packing strip and distal incision applied barrier cream and calcium alginate with silver to incision, covered each with gauze and then covered with mepilex and secured with tape. Patient tolerated well. Patient will return on Wednesday for next dressing change. Abdominal wound measures: 1.0x0.9x0.3cm and tunnel at 12:00 measures: 1.6cm.

## 2020-10-21 ENCOUNTER — OFFICE VISIT (OUTPATIENT)
Dept: VASCULAR SURGERY | Age: 70
End: 2020-10-21
Payer: MEDICARE

## 2020-10-21 DIAGNOSIS — T14.8XXA OPEN WOUND: Primary | ICD-10-CM

## 2020-10-21 PROCEDURE — 99024 POSTOP FOLLOW-UP VISIT: CPT | Performed by: SURGERY

## 2020-10-21 NOTE — PROGRESS NOTES
Patient being seen for wound assessment and dressing change to right side of abdomen. Cleansed wound to abdomen with wound cleanser and gauze, patient tolerated well. Wound is granulating with tunnel at 12:00 with less drainage this visit, very small amount serous drainage, surrounding tissue slightly pink. Distal incision still has slight open area but only superficial with very small amount serous drainage, surrounding tissue slightly pink. Applied barrier cream and nystatin powder mixture to surrounding tissue of each wound, packed proximal wound with iodoform packing strip and distal wound with calcium alginate with silver, covered with gauze and then applied mepilex and secured with tape. Patient tolerated well. Patient will return on Friday for next dressing change.

## 2020-10-23 ENCOUNTER — OFFICE VISIT (OUTPATIENT)
Dept: VASCULAR SURGERY | Age: 70
End: 2020-10-23
Payer: MEDICARE

## 2020-10-23 DIAGNOSIS — T14.8XXA OPEN WOUND: Primary | ICD-10-CM

## 2020-10-23 PROCEDURE — 99024 POSTOP FOLLOW-UP VISIT: CPT | Performed by: SURGERY

## 2020-10-23 NOTE — PROGRESS NOTES
Patient being seen for wound assessment and dressing change to right side of abdomen. Each wound cleansed with wound cleanser and gauze, patient tolerated well. Proximal wound is granulating with small amount serous drainage and tunnel at 12:00, surrounding tissue slightly red. Distal incision continues to have superficial opening with small amount serous drainage noted and internal stitch had pushed its way through this visit and was able to snip away. Applied mixture of barrier cream and nystatin powder to surrounding tissue of each wound, calcium alginate with silver to distal wound and packed proximal wound lightly with iodoform packing and covered with gauze and mepilex and secured with extra tape. Patient tolerated well and will return on Monday for next dressing change.

## 2020-10-26 ENCOUNTER — OFFICE VISIT (OUTPATIENT)
Dept: VASCULAR SURGERY | Age: 70
End: 2020-10-26
Payer: MEDICARE

## 2020-10-26 VITALS
WEIGHT: 138 LBS | HEIGHT: 64 IN | HEART RATE: 78 BPM | SYSTOLIC BLOOD PRESSURE: 132 MMHG | BODY MASS INDEX: 23.56 KG/M2 | OXYGEN SATURATION: 99 % | RESPIRATION RATE: 18 BRPM | DIASTOLIC BLOOD PRESSURE: 70 MMHG

## 2020-10-26 DIAGNOSIS — Z51.89 VISIT FOR WOUND CHECK: Primary | ICD-10-CM

## 2020-10-26 PROCEDURE — 99024 POSTOP FOLLOW-UP VISIT: CPT | Performed by: SURGERY

## 2020-10-26 NOTE — PROGRESS NOTES
Patient being seen for wound assessment and dressing change to right side of abdomen. Each wound cleansed with wound cleanser and gauze, patient tolerated well. Proximal wound is granulating with small amount serous drainage , surrounding tissue slightly red. Distal incision continues to have superficial opening with small amount serous drainage noted . Applied mixture of barrier cream and nystatin powder to surrounding tissue of each wound, calcium alginate with silver to distal wound and packed proximal wound lightly with iodoform packing and covered with gauze and mepilex and secured with extra tape. Patient tolerated well and will return on Wednesday for next dressing change. Pt does have small area of suture that appears to be spitting at the incision at the top above the incision that is being packed.

## 2020-10-28 ENCOUNTER — OFFICE VISIT (OUTPATIENT)
Dept: VASCULAR SURGERY | Age: 70
End: 2020-10-28
Payer: MEDICARE

## 2020-10-28 VITALS
OXYGEN SATURATION: 99 % | SYSTOLIC BLOOD PRESSURE: 128 MMHG | HEIGHT: 64 IN | WEIGHT: 138 LBS | BODY MASS INDEX: 23.56 KG/M2 | HEART RATE: 76 BPM | RESPIRATION RATE: 18 BRPM | DIASTOLIC BLOOD PRESSURE: 70 MMHG

## 2020-10-28 DIAGNOSIS — L08.9 CHRONIC ABDOMINAL WOUND INFECTION, SUBSEQUENT ENCOUNTER: Primary | ICD-10-CM

## 2020-10-28 DIAGNOSIS — S31.109D CHRONIC ABDOMINAL WOUND INFECTION, SUBSEQUENT ENCOUNTER: Primary | ICD-10-CM

## 2020-10-28 PROCEDURE — 99024 POSTOP FOLLOW-UP VISIT: CPT | Performed by: SURGERY

## 2020-10-28 NOTE — PROGRESS NOTES
Patient being seen for wound assessment and dressing change to right side of abdomen.  Each wound cleansed with wound cleanser and gauze, patient tolerated well.  Proximal wound is granulating with small amount serous drainage , surrounding tissue slightly red.  Distal incision continues to have superficial opening with small amount serous drainage noted .  Applied mixture of barrier cream and nystatin powder to surrounding tissue of each wound, calcium alginate with silver to distal wound and packed proximal wound lightly with iodoform packing and covered with gauze and mepilex and secured with extra tape.  Patient tolerated well and will return on friday for next dressing change. Pt had an area that looked like it was spitting stitch but has healed over , but top incision appears to be slightly red , marked area so comparison can be done Friday, pt completed the antibiotic Sunday.

## 2020-10-30 ENCOUNTER — OFFICE VISIT (OUTPATIENT)
Dept: VASCULAR SURGERY | Age: 70
End: 2020-10-30
Payer: MEDICARE

## 2020-10-30 DIAGNOSIS — T14.8XXA OPEN WOUND: Primary | ICD-10-CM

## 2020-10-30 PROCEDURE — 99024 POSTOP FOLLOW-UP VISIT: CPT | Performed by: SURGERY

## 2020-10-30 NOTE — PROGRESS NOTES
Patient being seen for wound assessment and dressing change to right side of abdomen. Cleansed wounds to right side of abdomen with dermal wound cleanser and gauze, patient tolerated well. Wound is granulating with tunnel at 12:00 and distal incision is slightly open, superficial, and proximal incision has pinpoint opening with large amount of serous drainage, surrounding tissue is slightly red and irritated. Applied mixture of barrier cream and nystatin powder, packed mid wound with iodoform packing strip and placed calcium alginate with silver over each incision and covered with gauze and abd pads and secured with tape. Had Dr. Knox Buerger assess as well and will continue to monitor the area and patient is to call with any change. Patient will return on Monday for next dressing change.

## 2020-11-02 ENCOUNTER — OFFICE VISIT (OUTPATIENT)
Dept: VASCULAR SURGERY | Age: 70
End: 2020-11-02
Payer: MEDICARE

## 2020-11-02 DIAGNOSIS — T14.8XXA OPEN WOUND: Primary | ICD-10-CM

## 2020-11-02 PROCEDURE — 99024 POSTOP FOLLOW-UP VISIT: CPT | Performed by: SURGERY

## 2020-11-02 RX ORDER — AMOXICILLIN AND CLAVULANATE POTASSIUM 875; 125 MG/1; MG/1
1 TABLET, FILM COATED ORAL EVERY 12 HOURS
Qty: 20 TAB | Refills: 0 | Status: SHIPPED | OUTPATIENT
Start: 2020-11-02 | End: 2020-11-24

## 2020-11-03 ENCOUNTER — OFFICE VISIT (OUTPATIENT)
Dept: VASCULAR SURGERY | Age: 70
End: 2020-11-03
Payer: MEDICARE

## 2020-11-03 ENCOUNTER — HOSPITAL ENCOUNTER (OUTPATIENT)
Dept: LAB | Age: 70
Discharge: HOME | End: 2020-11-03
Payer: MEDICARE

## 2020-11-03 DIAGNOSIS — T14.8XXA OPEN WOUND: Primary | ICD-10-CM

## 2020-11-03 DIAGNOSIS — T14.8XXA OPEN WOUND: ICD-10-CM

## 2020-11-03 PROCEDURE — 87077 CULTURE AEROBIC IDENTIFY: CPT

## 2020-11-03 PROCEDURE — 99024 POSTOP FOLLOW-UP VISIT: CPT | Performed by: SURGERY

## 2020-11-03 PROCEDURE — 87205 SMEAR GRAM STAIN: CPT

## 2020-11-03 PROCEDURE — 87186 SC STD MICRODIL/AGAR DIL: CPT

## 2020-11-03 NOTE — PROGRESS NOTES
Patient being seen for wound assessment and dressing change to right side of abdomen. Right proximal abdominal incision is more swollen this visit and red extends down along the path of the ax-fem bypass and there is pus like drainage noted instead of serous drainage and small open area on medial side of incision a little bit bigger than pin point. Patient did start antibiotic last night and apply warm compress over bandage. Patient is afebrile. Mid abdominal wound is granulating with tunnel at 12:00 with small amount bloody drainage and surrounding tissue remains slightly red. Distal incision is no longer drainage and no open area noted but slightly red. Applied mixture of barrier cream, hydrocortisone and nystatin powder to surrounding tissue, applied calcium alginate with silver to proximal and distal incision but left proximal open area uncovered and then lightly packed mid open area with iodoform packing. Covered all with gauze and ABD's and secured with tape. Patient tolerated well. Had Dr. Enrrique Carrasquillo assess prior to dressing wound and he discussed washing out that proximal incision on Thursday and patient really does not want to go back to the hospital but states understands. She will have wound care tomorrow in our office and then be set up for surgery on Thursday. Patient is to call with any questions or concerns. Wound culture was obtained this visit as well.

## 2020-11-04 ENCOUNTER — ANESTHESIA EVENT (OUTPATIENT)
Dept: CARDIOTHORACIC SURGERY | Age: 70
End: 2020-11-04
Payer: MEDICARE

## 2020-11-04 ENCOUNTER — OFFICE VISIT (OUTPATIENT)
Dept: VASCULAR SURGERY | Age: 70
End: 2020-11-04
Payer: MEDICARE

## 2020-11-04 DIAGNOSIS — T14.8XXA OPEN WOUND: Primary | ICD-10-CM

## 2020-11-04 PROCEDURE — 99024 POSTOP FOLLOW-UP VISIT: CPT | Performed by: SURGERY

## 2020-11-04 NOTE — PROGRESS NOTES
Patient being seen for wound assessment to right side of abdomen. Each wound looks so much better than yesterday. Proximal incision of right side of abdomen continues to have open area with small amount of pus like drainage but less than yesterday, surrounding tissue just barely red only right around incision but continues to have swelling to this area. Mid open wound continues to have granulating tissue with slight tunnel noted and small amount ss drainage, surrounding tissue slightly red. Distal incision remains intact with no redness. Applied mixture of hydrocortisone, nystatin powder and barrier cream to surrounding tissue. Mid open wound is packed very lightly with iodoform and proximal and distal incision continues to have calcium alginate with silver and then covered all with gauze and ABD and secured with tape. Patient tolerated well. Patient will have procedure tomorrow with Dr. Rae Paez and then will plan a follow up appointment after surgery tomorrow.

## 2020-11-04 NOTE — PROGRESS NOTES
Patient being seen for wound assessment and dressing change to right side of abdomen. Cleansed wounds to right side of abdomen with dermal wound cleanser and gauze, patient tolerated well. Proximal incision of right side of abdomen with raised, red and swollen with small amount serous drainage seeping from incision. Mid open area is granulating with tunnel at 12, small amount ss drainage, surrounding tissue is red. Distal incision is intact with no drainage and no redness. Applied mixture of barrier cream and nystatin powder to surrounding tissue of each area. Applied light packing of iodoform to mid open area. Calcium alginate with silver to proximal and distal incision. Covered all with gauze and ABD and secured with tape. Patient tolerated well. Had contacted Dr. Dom Chua in regards to proximal incision and was given verbal order to place patient back on Augmentin for 10 days and so prescription sent to pharmacy and will have patient return tomorrow for assessment.

## 2020-11-05 ENCOUNTER — HOSPITAL ENCOUNTER (OUTPATIENT)
Age: 70
Setting detail: OUTPATIENT SURGERY
Discharge: HOME | End: 2020-11-05
Attending: SURGERY | Admitting: SURGERY
Payer: MEDICARE

## 2020-11-05 ENCOUNTER — ANESTHESIA (OUTPATIENT)
Dept: CARDIOTHORACIC SURGERY | Age: 70
End: 2020-11-05
Payer: MEDICARE

## 2020-11-05 VITALS
SYSTOLIC BLOOD PRESSURE: 121 MMHG | WEIGHT: 133.4 LBS | OXYGEN SATURATION: 100 % | TEMPERATURE: 97.2 F | RESPIRATION RATE: 14 BRPM | HEART RATE: 69 BPM | BODY MASS INDEX: 22.77 KG/M2 | HEIGHT: 64 IN | DIASTOLIC BLOOD PRESSURE: 68 MMHG

## 2020-11-05 LAB
ANION GAP SERPL CALC-SCNC: 8 MMOL/L (ref 3–18)
BASOPHILS # BLD: 0 K/UL (ref 0–0.1)
BASOPHILS NFR BLD: 0 % (ref 0–2)
BUN SERPL-MCNC: 11 MG/DL (ref 7–18)
BUN/CREAT SERPL: 13 (ref 12–20)
CALCIUM SERPL-MCNC: 10.5 MG/DL (ref 8.5–10.1)
CHLORIDE SERPL-SCNC: 105 MMOL/L (ref 100–111)
CO2 SERPL-SCNC: 27 MMOL/L (ref 21–32)
COVID-19 RAPID TEST, COVR: NOT DETECTED
CREAT SERPL-MCNC: 0.87 MG/DL (ref 0.6–1.3)
DIFFERENTIAL METHOD BLD: ABNORMAL
EOSINOPHIL # BLD: 0.2 K/UL (ref 0–0.4)
EOSINOPHIL NFR BLD: 2 % (ref 0–5)
ERYTHROCYTE [DISTWIDTH] IN BLOOD BY AUTOMATED COUNT: 14.8 % (ref 11.6–14.5)
GLUCOSE SERPL-MCNC: 112 MG/DL (ref 74–99)
HCT VFR BLD AUTO: 34.5 % (ref 35–45)
HGB BLD-MCNC: 11.7 G/DL (ref 12–16)
INR PPP: 1.1 (ref 0.8–1.2)
LYMPHOCYTES # BLD: 3 K/UL (ref 0.9–3.6)
LYMPHOCYTES NFR BLD: 26 % (ref 21–52)
MCH RBC QN AUTO: 30.2 PG (ref 24–34)
MCHC RBC AUTO-ENTMCNC: 33.9 G/DL (ref 31–37)
MCV RBC AUTO: 88.9 FL (ref 74–97)
MONOCYTES # BLD: 0.9 K/UL (ref 0.05–1.2)
MONOCYTES NFR BLD: 8 % (ref 3–10)
NEUTS SEG # BLD: 7.4 K/UL (ref 1.8–8)
NEUTS SEG NFR BLD: 64 % (ref 40–73)
PLATELET # BLD AUTO: 413 K/UL (ref 135–420)
PMV BLD AUTO: 9.7 FL (ref 9.2–11.8)
POTASSIUM SERPL-SCNC: 3 MMOL/L (ref 3.5–5.5)
PROTHROMBIN TIME: 14.1 SEC (ref 11.5–15.2)
RBC # BLD AUTO: 3.88 M/UL (ref 4.2–5.3)
SODIUM SERPL-SCNC: 140 MMOL/L (ref 136–145)
SOURCE, COVRS: NORMAL
SPECIMEN TYPE, XMCV1T: NORMAL
WBC # BLD AUTO: 11.6 K/UL (ref 4.6–13.2)

## 2020-11-05 PROCEDURE — 77030002916 HC SUT ETHLN J&J -A: Performed by: SURGERY

## 2020-11-05 PROCEDURE — 85025 COMPLETE CBC W/AUTO DIFF WBC: CPT

## 2020-11-05 PROCEDURE — 80048 BASIC METABOLIC PNL TOTAL CA: CPT

## 2020-11-05 PROCEDURE — 74011250636 HC RX REV CODE- 250/636: Performed by: NURSE ANESTHETIST, CERTIFIED REGISTERED

## 2020-11-05 PROCEDURE — 87635 SARS-COV-2 COVID-19 AMP PRB: CPT

## 2020-11-05 PROCEDURE — 00400 ANES INTEGUMENTARY SYS NOS: CPT | Performed by: ANESTHESIOLOGY

## 2020-11-05 PROCEDURE — 87077 CULTURE AEROBIC IDENTIFY: CPT

## 2020-11-05 PROCEDURE — 74011000250 HC RX REV CODE- 250: Performed by: NURSE ANESTHETIST, CERTIFIED REGISTERED

## 2020-11-05 PROCEDURE — 77030011265 HC ELECTRD BLD HEX COVD -A: Performed by: SURGERY

## 2020-11-05 PROCEDURE — 87075 CULTR BACTERIA EXCEPT BLOOD: CPT

## 2020-11-05 PROCEDURE — 00400 ANES INTEGUMENTARY SYS NOS: CPT | Performed by: NURSE ANESTHETIST, CERTIFIED REGISTERED

## 2020-11-05 PROCEDURE — 85610 PROTHROMBIN TIME: CPT

## 2020-11-05 PROCEDURE — 87205 SMEAR GRAM STAIN: CPT

## 2020-11-05 PROCEDURE — 87186 SC STD MICRODIL/AGAR DIL: CPT

## 2020-11-05 PROCEDURE — 76210000006 HC OR PH I REC 0.5 TO 1 HR: Performed by: SURGERY

## 2020-11-05 PROCEDURE — 76010000112 HC CV SURG 0.5 TO 1 HR: Performed by: SURGERY

## 2020-11-05 PROCEDURE — 36415 COLL VENOUS BLD VENIPUNCTURE: CPT

## 2020-11-05 PROCEDURE — 99100 ANES PT EXTEME AGE<1 YR&>70: CPT | Performed by: ANESTHESIOLOGY

## 2020-11-05 PROCEDURE — 87040 BLOOD CULTURE FOR BACTERIA: CPT

## 2020-11-05 PROCEDURE — 77030038692 HC WND DEB SYS IRMX -B: Performed by: SURGERY

## 2020-11-05 PROCEDURE — 76210000020 HC REC RM PH II FIRST 0.5 HR: Performed by: SURGERY

## 2020-11-05 PROCEDURE — 99100 ANES PT EXTEME AGE<1 YR&>70: CPT | Performed by: NURSE ANESTHETIST, CERTIFIED REGISTERED

## 2020-11-05 PROCEDURE — 2709999900 HC NON-CHARGEABLE SUPPLY: Performed by: SURGERY

## 2020-11-05 PROCEDURE — 77030040922 HC BLNKT HYPOTHRM STRY -A: Performed by: SURGERY

## 2020-11-05 PROCEDURE — 76060000032 HC ANESTHESIA 0.5 TO 1 HR: Performed by: SURGERY

## 2020-11-05 PROCEDURE — 99024 POSTOP FOLLOW-UP VISIT: CPT | Performed by: SURGERY

## 2020-11-05 PROCEDURE — 12032 INTMD RPR S/A/T/EXT 2.6-7.5: CPT | Performed by: SURGERY

## 2020-11-05 PROCEDURE — 74011250637 HC RX REV CODE- 250/637: Performed by: NURSE ANESTHETIST, CERTIFIED REGISTERED

## 2020-11-05 RX ORDER — VANCOMYCIN HYDROCHLORIDE 1 G/20ML
INJECTION, POWDER, LYOPHILIZED, FOR SOLUTION INTRAVENOUS
Status: DISCONTINUED
Start: 2020-11-05 | End: 2020-11-05 | Stop reason: HOSPADM

## 2020-11-05 RX ORDER — DEXTROSE 50 % IN WATER (D50W) INTRAVENOUS SYRINGE
25-50 AS NEEDED
Status: CANCELLED | OUTPATIENT
Start: 2020-11-05

## 2020-11-05 RX ORDER — FENTANYL CITRATE 50 UG/ML
INJECTION, SOLUTION INTRAMUSCULAR; INTRAVENOUS AS NEEDED
Status: DISCONTINUED | OUTPATIENT
Start: 2020-11-05 | End: 2020-11-05 | Stop reason: HOSPADM

## 2020-11-05 RX ORDER — SODIUM CHLORIDE 0.9 % (FLUSH) 0.9 %
5-40 SYRINGE (ML) INJECTION AS NEEDED
Status: DISCONTINUED | OUTPATIENT
Start: 2020-11-05 | End: 2020-11-05 | Stop reason: HOSPADM

## 2020-11-05 RX ORDER — DIPHENHYDRAMINE HYDROCHLORIDE 50 MG/ML
12.5 INJECTION, SOLUTION INTRAMUSCULAR; INTRAVENOUS
Status: CANCELLED | OUTPATIENT
Start: 2020-11-05

## 2020-11-05 RX ORDER — DEXAMETHASONE SODIUM PHOSPHATE 4 MG/ML
INJECTION, SOLUTION INTRA-ARTICULAR; INTRALESIONAL; INTRAMUSCULAR; INTRAVENOUS; SOFT TISSUE AS NEEDED
Status: DISCONTINUED | OUTPATIENT
Start: 2020-11-05 | End: 2020-11-05 | Stop reason: HOSPADM

## 2020-11-05 RX ORDER — SODIUM CHLORIDE 0.9 % (FLUSH) 0.9 %
5-40 SYRINGE (ML) INJECTION EVERY 8 HOURS
Status: CANCELLED | OUTPATIENT
Start: 2020-11-05

## 2020-11-05 RX ORDER — MIDAZOLAM HYDROCHLORIDE 1 MG/ML
INJECTION, SOLUTION INTRAMUSCULAR; INTRAVENOUS AS NEEDED
Status: DISCONTINUED | OUTPATIENT
Start: 2020-11-05 | End: 2020-11-05 | Stop reason: HOSPADM

## 2020-11-05 RX ORDER — SODIUM CHLORIDE, SODIUM LACTATE, POTASSIUM CHLORIDE, CALCIUM CHLORIDE 600; 310; 30; 20 MG/100ML; MG/100ML; MG/100ML; MG/100ML
75 INJECTION, SOLUTION INTRAVENOUS CONTINUOUS
Status: CANCELLED | OUTPATIENT
Start: 2020-11-05

## 2020-11-05 RX ORDER — MAGNESIUM SULFATE 100 %
4 CRYSTALS MISCELLANEOUS AS NEEDED
Status: CANCELLED | OUTPATIENT
Start: 2020-11-05

## 2020-11-05 RX ORDER — SODIUM CHLORIDE 0.9 % (FLUSH) 0.9 %
5-40 SYRINGE (ML) INJECTION EVERY 8 HOURS
Status: DISCONTINUED | OUTPATIENT
Start: 2020-11-05 | End: 2020-11-05 | Stop reason: HOSPADM

## 2020-11-05 RX ORDER — FAMOTIDINE 20 MG/1
20 TABLET, FILM COATED ORAL ONCE
Status: COMPLETED | OUTPATIENT
Start: 2020-11-05 | End: 2020-11-05

## 2020-11-05 RX ORDER — SODIUM CHLORIDE 0.9 % (FLUSH) 0.9 %
5-40 SYRINGE (ML) INJECTION AS NEEDED
Status: CANCELLED | OUTPATIENT
Start: 2020-11-05

## 2020-11-05 RX ORDER — FENTANYL CITRATE 50 UG/ML
50 INJECTION, SOLUTION INTRAMUSCULAR; INTRAVENOUS AS NEEDED
Status: CANCELLED | OUTPATIENT
Start: 2020-11-05

## 2020-11-05 RX ORDER — ONDANSETRON 2 MG/ML
4 INJECTION INTRAMUSCULAR; INTRAVENOUS ONCE
Status: CANCELLED | OUTPATIENT
Start: 2020-11-05 | End: 2020-11-05

## 2020-11-05 RX ORDER — SODIUM CHLORIDE, SODIUM LACTATE, POTASSIUM CHLORIDE, CALCIUM CHLORIDE 600; 310; 30; 20 MG/100ML; MG/100ML; MG/100ML; MG/100ML
50 INJECTION, SOLUTION INTRAVENOUS CONTINUOUS
Status: DISCONTINUED | OUTPATIENT
Start: 2020-11-05 | End: 2020-11-05 | Stop reason: HOSPADM

## 2020-11-05 RX ORDER — PROPOFOL 10 MG/ML
INJECTION, EMULSION INTRAVENOUS AS NEEDED
Status: DISCONTINUED | OUTPATIENT
Start: 2020-11-05 | End: 2020-11-05 | Stop reason: HOSPADM

## 2020-11-05 RX ORDER — NALBUPHINE HYDROCHLORIDE 10 MG/ML
5 INJECTION, SOLUTION INTRAMUSCULAR; INTRAVENOUS; SUBCUTANEOUS
Status: CANCELLED | OUTPATIENT
Start: 2020-11-05

## 2020-11-05 RX ORDER — INSULIN LISPRO 100 [IU]/ML
INJECTION, SOLUTION INTRAVENOUS; SUBCUTANEOUS ONCE
Status: CANCELLED | OUTPATIENT
Start: 2020-11-05 | End: 2020-11-06

## 2020-11-05 RX ORDER — LIDOCAINE HYDROCHLORIDE 20 MG/ML
INJECTION, SOLUTION EPIDURAL; INFILTRATION; INTRACAUDAL; PERINEURAL AS NEEDED
Status: DISCONTINUED | OUTPATIENT
Start: 2020-11-05 | End: 2020-11-05 | Stop reason: HOSPADM

## 2020-11-05 RX ADMIN — PROPOFOL 100 MG: 10 INJECTION, EMULSION INTRAVENOUS at 12:50

## 2020-11-05 RX ADMIN — LIDOCAINE HYDROCHLORIDE 80 MG: 20 INJECTION, SOLUTION EPIDURAL; INFILTRATION; INTRACAUDAL; PERINEURAL at 12:50

## 2020-11-05 RX ADMIN — FAMOTIDINE 20 MG: 20 TABLET, FILM COATED ORAL at 10:01

## 2020-11-05 RX ADMIN — MIDAZOLAM HYDROCHLORIDE 2 MG: 2 INJECTION, SOLUTION INTRAMUSCULAR; INTRAVENOUS at 12:43

## 2020-11-05 RX ADMIN — FENTANYL CITRATE 50 MCG: 50 INJECTION, SOLUTION INTRAMUSCULAR; INTRAVENOUS at 12:50

## 2020-11-05 RX ADMIN — SODIUM CHLORIDE, SODIUM LACTATE, POTASSIUM CHLORIDE, AND CALCIUM CHLORIDE 50 ML/HR: 600; 310; 30; 20 INJECTION, SOLUTION INTRAVENOUS at 10:01

## 2020-11-05 RX ADMIN — DEXAMETHASONE SODIUM PHOSPHATE 4 MG: 4 INJECTION, SOLUTION INTRAMUSCULAR; INTRAVENOUS at 12:54

## 2020-11-05 RX ADMIN — FENTANYL CITRATE 25 MCG: 50 INJECTION, SOLUTION INTRAMUSCULAR; INTRAVENOUS at 13:14

## 2020-11-05 RX ADMIN — FENTANYL CITRATE 25 MCG: 50 INJECTION, SOLUTION INTRAMUSCULAR; INTRAVENOUS at 13:21

## 2020-11-05 RX ADMIN — SODIUM CHLORIDE 1 G: 900 INJECTION, SOLUTION INTRAVENOUS at 12:45

## 2020-11-05 NOTE — ANESTHESIA PREPROCEDURE EVALUATION
Relevant Problems   No relevant active problems       Anesthetic History   No history of anesthetic complications            Review of Systems / Medical History  Patient summary reviewed and pertinent labs reviewed    Pulmonary    COPD               Neuro/Psych   Within defined limits           Cardiovascular    Hypertension          PAD    Exercise tolerance: >4 METS     GI/Hepatic/Renal     GERD           Endo/Other  Within defined limits           Other Findings              Physical Exam    Airway  Mallampati: II  TM Distance: 4 - 6 cm  Neck ROM: normal range of motion   Mouth opening: Normal     Cardiovascular  Regular rate and rhythm,  S1 and S2 normal,  no murmur, click, rub, or gallop  Rhythm: regular  Rate: normal         Dental      Comments: 1 lower post and 2 lower implants   Pulmonary  Breath sounds clear to auscultation               Abdominal  GI exam deferred       Other Findings            Anesthetic Plan    ASA: 3  Anesthesia type: general            Anesthetic plan and risks discussed with: Patient

## 2020-11-05 NOTE — H&P
Surgery History and Physical    Subjective:      Memo Schumacher is a 65 y.o.  female who presents with possible infection at surgery site. Patient Active Problem List    Diagnosis Date Noted    Open wound 09/10/2020    Chronic wound infection of abdomen 06/21/2018    Nonhealing surgical wound 04/30/2018    Infection of vascular bypass graft ([address] 04/30/2018    CAP (community acquired pneumonia) 06/27/2017    Severe sepsis ([address] 06/27/2017    Arteriovenous graft infection ([address] 05/10/2017    Abscess 05/10/2017    Dermal sinus tract of lumbosacral region 05/10/2017    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[address] 08/09/2016    Chronic aorto-iliac occlusion syndrome ([address] 01/19/2016    Wound disruption, post-op, skin 01/09/2015    Mass of breast, left 07/30/2014    HTN (hypertension) 04/01/2013    PVD (peripheral vascular disease) ([address] 04/01/2013    Crohn's disease ([address] 04/01/2013     Past Medical History:   Diagnosis Date    Arthritis     CAP (community acquired pneumonia) 06/27/2017    Chronic lung disease     Claudication ([address]     left leg    Crohn's disease ([address]     Crohn's disease ([address]     GERD (gastroesophageal reflux disease)     HTN (hypertension)     Ill-defined condition     Uses O2 at night.     Nausea & vomiting     Other and unspecified symptoms and signs involving general sensations and perceptions     PVD    Other ill-defined conditions(799.89)     Crohn's    Peripheral neuropathy     Pulmonary emphysema ([address]     PVD (peripheral vascular disease) ([address]     right leg    Sepsis ([address] 06/27/2017    Vitamin B12 deficiency       Past Surgical History:   Procedure Laterality Date    BYPASS GRAFT OTHR,AXILL-FEM Right 4/19/2013    BYPASS GRAFT OTHR,FEM-POP Left 5/2013    COLONOSCOPY N/A 9/11/2019    DIAGNOSTIC COLONOSCOPY performed by Jorge Sullivan MD at [facility] ENDOSCOPY    FOOT/TOES SURGERY [address] UNLISTED      HX APPENDECTOMY      HX BREAST LUMPECTOMY Left breast left- precancerous    HX BUNIONECTOMY      left eye    HX CATARACT REMOVAL      bilateral    HX CHOLECYSTECTOMY      HX ORTHOPAEDIC      lateral epicondilitis on right    HX OTHER SURGICAL  3/7/2013    catheter into aorta    HX OTHER SURGICAL      intestional resection x4    HX OTHER SURGICAL  9/2013    I & D Right chest/flank wall abscess vs granuloma    UPPER ARM/ELBOW SURGERY UNLISTED      VASCULAR SURGERY PROCEDURE UNLIST  09/10/2020    Debridement and washout of bypass graft. Social History     Tobacco Use    Smoking status: Current Every Day Smoker     Packs/day: 1.00     Years: 45.00     Pack years: 45.00     Types: Cigarettes    Smokeless tobacco: Never Used   Substance Use Topics    Alcohol use: No      Family History   Problem Relation Age of Onset    Coronary Artery Disease Mother     Heart Attack Mother     Heart Surgery Mother         CABGx3    Elevated Lipids Mother     COPD Father     Heart Attack Brother     COPD Brother     Other Brother         PAD      Prior to Admission medications    Medication Sig Start Date End Date Taking? Authorizing Provider   amoxicillin-clavulanate (AUGMENTIN) 875-125 mg per tablet Take 1 Tab by mouth every twelve (12) hours. 11/2/20  Yes Cherry Waldron MD   multivitamin (ONE A DAY) tablet Take 1 Tab by mouth daily. Yes Provider, Historical   BIOTIN PO Take  by mouth. Yes Provider, Historical   OTHER    Yes Provider, Historical   clopidogreL (PLAVIX) 75 mg tab TAKE ONE TABLET BY MOUTH DAILY 6/15/20  Yes Cherry Waldron MD   loratadine (CLARITIN) 10 mg tablet Take 10 mg by mouth daily. Yes Provider, Historical   fluticasone propionate (FLONASE ALLERGY RELIEF) 50 mcg/actuation nasal spray 2 Sprays by Both Nostrils route daily as needed for Rhinitis. Yes Provider, Historical   fluticasone-umeclidinium-vilanterol (TRELEGY ELLIPTA) 100-62.5-25 mcg inhaler Take 1 Puff by inhalation daily.  7/30/19  Yes Carmen Crow NP pregabalin (LYRICA) 100 mg capsule Take  by mouth two (2) times a day. Takes 100 mg in am and 200 in the pm   Yes Provider, Historical   ipratropium-albuterol (COMBIVENT RESPIMAT)  mcg/actuation inhaler Take 1 Puff by inhalation every six (6) hours as needed for Wheezing or Shortness of Breath. Patient taking differently: Take 1 Puff by inhalation two (2) times a day. 7/1/17  Yes Crescencio Patino MD   DULoxetine (CYMBALTA) 60 mg capsule Take 60 mg by mouth nightly. For Anxiety. Yes Provider, Historical   aspirin 81 mg tablet Take 81 mg by mouth daily. Yes Provider, Historical   cyanocobalamin (VITAMIN B-12) 1,000 mcg/mL injection 1,000 mcg by IntraMUSCular route every fourteen (14) days. Yes Provider, Historical   dicyclomine (BENTYL) 10 mg capsule Take 10 mg by mouth daily. Yes Provider, Historical   triamterene-hydrochlorothiazide (DYAZIDE) 37.5-25 mg per capsule Take 1 Cap by mouth daily. Yes Provider, Historical   bimatoprost (LUMIGAN) 0.03 % ophthalmic drops Administer 1 Drop to both eyes every evening. Yes Provider, Historical   inFLIXimab (REMICADE) 100 mg injection 5 mg/kg by IntraVENous route once. Every 4 weeks   Yes Provider, Historical   loperamide (IMMODIUM) 2 mg tablet Take 2 mg by mouth daily as needed for Diarrhea. Provider, Historical   atropine-PHENobarbital-scopolamine-hyoscyamine (DONNATAL) 16.2-0.1037 -0.0194 mg per tablet Take 1 Tab by mouth as needed (diarrhea).  Indications: Irritable Bowel Syndrome    Provider, Historical     Allergies   Allergen Reactions    Codeine Nausea and Vomiting     Other reaction(s): other/intolerance    Darvon [Propoxyphene] Itching    Demerol [Meperidine] Other (comments)     Halucinations    Keflex [Cephalexin] Diarrhea    Talwin [Pentazocine Lactate] Shortness of Breath and Nausea and Vomiting         Review of Systems:    A comprehensive review of systems was negative except for that written in the History of Present Illness. Objective:     Patient Vitals for the past 8 hrs:   BP Temp Pulse Resp SpO2 Height Weight   20 0943 (!) 154/71 97.7 °F (36.5 °C) 86 20 98 % 5' 4\" (1.626 m) 133 lb 6.4 oz (60.5 kg)       Temp (24hrs), Av.7 °F (36.5 °C), Min:97.7 °F (36.5 °C), Max:97.7 °F (36.5 °C)      Physical Exam:  LUNG: clear to auscultation bilaterally, HEART: S1, S2 normal, ABDOMEN: soft, non-tender. Bowel sounds normal. No masses,  no organomegaly, right flank with chronic ulcer, some drainage and redness    Labs:   Recent Results (from the past 24 hour(s))   CBC WITH AUTOMATED DIFF    Collection Time: 20  9:35 AM   Result Value Ref Range    WBC 11.6 4.6 - 13.2 K/uL    RBC 3.88 (L) 4.20 - 5.30 M/uL    HGB 11.7 (L) 12.0 - 16.0 g/dL    HCT 34.5 (L) 35.0 - 45.0 %    MCV 88.9 74.0 - 97.0 FL    MCH 30.2 24.0 - 34.0 PG    MCHC 33.9 31.0 - 37.0 g/dL    RDW 14.8 (H) 11.6 - 14.5 %    PLATELET 330 247 - 610 K/uL    MPV 9.7 9.2 - 11.8 FL    NEUTROPHILS 64 40 - 73 %    LYMPHOCYTES 26 21 - 52 %    MONOCYTES 8 3 - 10 %    EOSINOPHILS 2 0 - 5 %    BASOPHILS 0 0 - 2 %    ABS. NEUTROPHILS 7.4 1.8 - 8.0 K/UL    ABS. LYMPHOCYTES 3.0 0.9 - 3.6 K/UL    ABS. MONOCYTES 0.9 0.05 - 1.2 K/UL    ABS. EOSINOPHILS 0.2 0.0 - 0.4 K/UL    ABS.  BASOPHILS 0.0 0.0 - 0.1 K/UL    DF AUTOMATED     METABOLIC PANEL, BASIC    Collection Time: 20  9:35 AM   Result Value Ref Range    Sodium 140 136 - 145 mmol/L    Potassium 3.0 (L) 3.5 - 5.5 mmol/L    Chloride 105 100 - 111 mmol/L    CO2 27 21 - 32 mmol/L    Anion gap 8 3.0 - 18 mmol/L    Glucose 112 (H) 74 - 99 mg/dL    BUN 11 7.0 - 18 MG/DL    Creatinine 0.87 0.6 - 1.3 MG/DL    BUN/Creatinine ratio 13 12 - 20      GFR est AA >60 >60 ml/min/1.73m2    GFR est non-AA >60 >60 ml/min/1.73m2    Calcium 10.5 (H) 8.5 - 10.1 MG/DL   PROTHROMBIN TIME + INR    Collection Time: 20  9:35 AM   Result Value Ref Range    Prothrombin time 14.1 11.5 - 15.2 sec    INR 1.1 0.8 - 1.2     SARS-COV-2    Collection Time: 11/05/20  9:35 AM   Result Value Ref Range    Specimen source Nasopharyngeal      COVID-19 rapid test Not detected NOTD      Specimen type NP Swab         Data Review:    CBC:   Lab Results   Component Value Date/Time    WBC 11.6 11/05/2020 09:35 AM    RBC 3.88 (L) 11/05/2020 09:35 AM    HGB 11.7 (L) 11/05/2020 09:35 AM    HCT 34.5 (L) 11/05/2020 09:35 AM    PLATELET 044 52/61/9387 09:35 AM      BMP:   Lab Results   Component Value Date/Time    Glucose 112 (H) 11/05/2020 09:35 AM    Sodium 140 11/05/2020 09:35 AM    Potassium 3.0 (L) 11/05/2020 09:35 AM    Chloride 105 11/05/2020 09:35 AM    CO2 27 11/05/2020 09:35 AM    BUN 11 11/05/2020 09:35 AM    Creatinine 0.87 11/05/2020 09:35 AM    Calcium 10.5 (H) 11/05/2020 09:35 AM       Assessment:     Active Problems:    * No active hospital problems.  *      Plan:     Washout of wound senddeep space cultures    Signed By: Elpidio Napoles MD     November 5, 2020

## 2020-11-05 NOTE — DISCHARGE INSTRUCTIONS
DISCHARGE SUMMARY from Nurse  PATIENT INSTRUCTIONS:  After general anesthesia or intravenous sedation, for 24 hours or while taking prescription Narcotics:  · Limit your activities  · Do not drive and operate hazardous machinery  · Do not make important personal or business decisions  · Do  not drink alcoholic beverages  · If you have not urinated within 8 hours after discharge, please contact your surgeon on call. Report the following to your surgeon:  · Excessive pain, swelling, redness or odor of or around the surgical area  · Temperature over 100.5  · Nausea and vomiting lasting longer than 4 hours or if unable to take medications  · Any signs of decreased circulation or nerve impairment to extremity: change in color, persistent  numbness, tingling, coldness or increase pain  · Any questions    What to do at Home:  Recommended activity: Activity as tolerated and no driving for today. *  Please give a list of your current medications to your Primary Care Provider. *  Please update this list whenever your medications are discontinued, doses are      changed, or new medications (including over-the-counter products) are added. *  Please carry medication information at all times in case of emergency situations. These are general instructions for a healthy lifestyle:    No smoking/ No tobacco products/ Avoid exposure to second hand smoke  Surgeon General's Warning:  Quitting smoking now greatly reduces serious risk to your health. Obesity, smoking, and sedentary lifestyle greatly increases your risk for illness    A healthy diet, regular physical exercise & weight monitoring are important for maintaining a healthy lifestyle    You may be retaining fluid if you have a history of heart failure or if you experience any of the following symptoms:  Weight gain of 3 pounds or more overnight or 5 pounds in a week, increased swelling in our hands or feet or shortness of breath while lying flat in bed.   Please call your doctor as soon as you notice any of these symptoms; do not wait until your next office visit. The discharge information has been reviewed with the patient. The patient verbalized understanding. Discharge medications reviewed with the patient and appropriate educational materials and side effects teaching were provided. ___________________________________________________________________________________________________________________________________    Patient Education   Care of Closed Wounds: After Your Visit  Your Care Instructions  Stitches, staples, or tape called Steri-Strips are sometimes used to keep the edges of a cut together and help it heal right. Skin adhesives such as Dermabond are also used to close cuts. When the adhesive dries, it forms a film that holds the edges of the cut together. Skin adhesives are sometimes called liquid stitches. You may have some swelling, color changes, and bloody crusting on or around the wound for 2 or 3 days. This is normal. Taking good care of your wound at home will help it heal quickly and reduce your chance of infection. Any wound that passes through the full thickness of skin will cause a permanent scar. The scar gradually improves for about a year. Protect your healing wound from too much sunlight. Follow-up care is a key part of your treatment and safety. Be sure to make and go to all appointments, and call your doctor if you are having problems. Its also a good idea to know your test results and keep a list of the medicines you take. How can you care for yourself at home? · If possible, prop up the injured area on a pillow when you ice it or anytime you sit or lie down during the next 3 days. Try to keep it above the level of your heart. This will help reduce swelling. · Put ice or a cold pack on your wound for 10 to 20 minutes at a time.  Try to do this every 1 to 2 hours for the next 3 days (when you are awake) or until the swelling goes down. Put a thin cloth between the ice pack and your skin. · Take an over-the-counter pain medicine, such as acetaminophen (Tylenol), ibuprofen (Advil, Motrin), or naproxen (Aleve). Read and follow all instructions on the label. Some pain is normal with a wound, but do not ignore pain that is getting worse. · Do not take two or more pain medicines at the same time unless the doctor told you to. Many pain medicines have acetaminophen, which is Tylenol. Too much acetaminophen (Tylenol) can be harmful. If you have stitches, staples, or Steri-Strips:  · Leave the bandage on and do not get the wound wet for the first 24 to 48 hours. Use a plastic bag to cover the area when you shower. · After the first 24 to 48 hours, you can remove the bandage and gently wash the wound. If the bandage sticks to the wound, use warm water to loosen it. Do not scrub or soak the area. Do not go swimming. · Replace the bandage with a clean one at least once a day and whenever the old one gets wet or dirty. If a small wound is not likely to get dirty, is not draining, and is not in an area where clothing will rub it, you may not need a bandage. · Clean the wound with soap and water 2 times a day unless your doctor gives you different instructions. Don't use hydrogen peroxide or alcohol, which can slow healing. ¨ You may cover the wound with a thin layer of antibiotic ointment, such as bacitracin, and a nonstick bandage. ¨ Apply more ointment and replace the bandage as needed. · Do not remove the stitches on your own. Your doctor will tell you when to return to have the stitches removed. · Leave Steri-Strips on until they fall off. If a liquid skin adhesive was used to close the wound:  · Leave the skin adhesive on your skin until it falls off on its own. This may take 5 to 10 days. · Do not scratch, rub, or pick at the adhesive. · Do not put tape directly over the adhesive.   · You can shower with a skin adhesive in place, but do not soak the area in water. Do not go swimming. Be sure to gently dry the area after it gets wet. · Do not put any kind of ointment, cream, or lotion over the area. This can make the adhesive fall off too soon. · If the doctor bandaged the wound, keep the bandage clean and dry. Change the bandage each day until the adhesive film has fallen off or whenever the bandage gets wet or dirty. When should you call for help? Call your doctor now or seek immediate medical care if:  · The skin near the wound is cool or pale or changes color. · You have tingling, weakness, or numbness in your limb near the wound. · The wound starts to bleed, and blood soaks through the bandage. Oozing small amounts of blood is normal.  · You have trouble moving a limb near the wound. · You have signs of infection, such as:  ¨ Increased pain, swelling, warmth, or redness around the wound. ¨ Red streaks leading from the wound. ¨ Pus draining from the wound. ¨ A fever. Watch closely for changes in your health, and be sure to contact your doctor if:  · The wound is not getting better each day. Where can you learn more? Go to [employer]  Enter A341 in the search box to learn more about \"Care of Closed Wounds: After Your Visit. \"   © 7245-3439 [employer]. Care instructions adapted under license by [facility] (which disclaims liability or warranty for this information). This care instruction is for use with your licensed healthcare professional. If you have questions about a medical condition or this instruction, always ask your healthcare professional. [place] [address] any warranty or liability for your use of this information. Content Version: 35.4.831423;  Last Revised: May 17, 2013

## 2020-11-06 ENCOUNTER — OFFICE VISIT (OUTPATIENT)
Dept: VASCULAR SURGERY | Age: 70
End: 2020-11-06
Payer: MEDICARE

## 2020-11-06 DIAGNOSIS — T14.8XXA OPEN WOUND: Primary | ICD-10-CM

## 2020-11-06 PROCEDURE — 99024 POSTOP FOLLOW-UP VISIT: CPT | Performed by: SURGERY

## 2020-11-06 NOTE — OP NOTES
[facility]  OPERATIVE REPORT    Name:  Miryam Rahman  MR#:   [id number]  :  1950  ACCOUNT #:  [de-identified]  DATE OF SERVICE:  2020    PREOPERATIVE DIAGNOSIS:  Wound dehiscence, right flank. POSTOPERATIVE DIAGNOSIS:  Wound dehiscence, right flank. PROCEDURE PERFORMED:  Excision of ulcer over wound and debridement of wound. SURGEON:  DO Heather    ASSISTANT:  None. ANESTHESIA:  General.    COMPLICATIONS:  None. SPECIMENS REMOVED:  Cultures sent. IMPLANTS:  None. ESTIMATED BLOOD LOSS:  0    DETAILS OF PROCEDURE:  The patient is a 38-year-old female with ax fem bypass that has been revised for wound dehiscence. She is here today with some scant drainage from one of the incision sites of the ulcer, but I excised this entire thing and sent cultures of the underlying space. I did not see any purulence. There was just some fibrous tissue here, which I debrided including skin and soft tissue only, with size of 2.5 cm x 1 cm. I irrigated it out with antibiotic irrigation and then did a primary closure over this. The graft was underneath here using two Ethilon mattress sutures. My consideration is possible absorbable suture allergy. There was a chronic ulcer, which we washed out and left open. There was no drainage here today. I did send cultures of the deep space. I applied a dressing, transferred back to recovery in stable condition.   Her bypass graft is functional.      Betina Gutierrez DO CM/CLEMENTE_ALVAP_T/[email or web address]  D:  2020 13:33  T:  2020 20:43  JOB #:  [id number]

## 2020-11-06 NOTE — ANESTHESIA POSTPROCEDURE EVALUATION
Procedure(s):  [address] OUT OF RIGHT SIDE OF ABDOMEN INCISION. general    Anesthesia Post Evaluation      Multimodal analgesia: multimodal analgesia used between 6 hours prior to anesthesia start to PACU discharge  Patient location during evaluation: PACU  Patient participation: complete - patient participated  Level of consciousness: awake  Pain management: adequate  Airway patency: patent  Anesthetic complications: no  Cardiovascular status: acceptable  Respiratory status: acceptable  Hydration status: acceptable  Post anesthesia nausea and vomiting:  none  Final Post Anesthesia Temperature Assessment:  Normothermia (36.0-37.5 degrees C)      INITIAL Post-op Vital signs:   Vitals Value Taken Time   /51 11/5/2020  2:20 PM   Temp 36.6 °C (97.8 °F) 11/5/2020  1:31 PM   Pulse 75 11/5/2020  2:22 PM   Resp 20 11/5/2020  2:22 PM   SpO2 97 % 11/5/2020  2:22 PM   Vitals shown include unvalidated device data.

## 2020-11-06 NOTE — PROGRESS NOTES
Patient being seen for dressing change after surgery  Yesterday to right side of abdomen. Removed dressings. Cleansed each wound with wound cleanser and gauze, patient tolerated well. Proximal right side of abdomen incision has suture intact with small amount of serous drainage, surrounding tissue WNL. No swelling noted. Mid abdomen wound is granulating and can barely pack wound, just placed edge of calicum alginate with silver into wound, small amount bloody drainage and surrounding tissue is just slightly red. Distal incision remains intact with skin dry and no redness. Applied dry gauze to proximal wound and then covered all wounds with ABD and secured with tape. Patient will return on Monday for next dressing change.

## 2020-11-07 LAB
BACTERIA SPEC CULT: NORMAL
SERVICE CMNT-IMP: NORMAL

## 2020-11-09 ENCOUNTER — OFFICE VISIT (OUTPATIENT)
Dept: VASCULAR SURGERY | Age: 70
End: 2020-11-09
Payer: MEDICARE

## 2020-11-09 DIAGNOSIS — T14.8XXA OPEN WOUND: Primary | ICD-10-CM

## 2020-11-09 PROCEDURE — 99024 POSTOP FOLLOW-UP VISIT: CPT | Performed by: PHYSICIAN ASSISTANT

## 2020-11-09 NOTE — PROGRESS NOTES
Patient being seen for dressing change to right side of abdomen. Cleansed wounds to abdomen with wound cleanser and gauze, patient tolerated well. Proximal incision continues to have loose sutures with small amount purulent drainage, surrounding tissue WNL. Mid abdomen wound is granulating with only about 0.4cm in depth, no tunnel noted and about 0.5x0.5cm, surrounding tissue slightly red. Distal incision has slight scab that has come off and left two pinpoint open areas but no drainage noted. Applied barrier cream to surrounding tissue of each area, applied calcium alginate with silver to proximal and distal incision and packed mid wound lightly, covered with ABD and secured with tape. Patient tolerated well and will return on Friday for next dressing change.

## 2020-11-09 NOTE — TELEPHONE ENCOUNTER
Pt [id number]([phone]). Needs refills forTrelegy and Combivent sent to [facility] mail order [phone].

## 2020-11-10 RX ORDER — FLUTICASONE FUROATE, UMECLIDINIUM BROMIDE AND VILANTEROL TRIFENATATE 100; 62.5; 25 UG/1; UG/1; UG/1
1 POWDER RESPIRATORY (INHALATION) DAILY
Qty: 3 INHALER | Refills: 1 | Status: SHIPPED | COMMUNITY
Start: 2020-11-10 | End: 2021-06-14 | Stop reason: SDUPTHER

## 2020-11-11 LAB
BACTERIA SPEC CULT: NORMAL
SERVICE CMNT-IMP: NORMAL

## 2020-11-17 ENCOUNTER — OFFICE VISIT (OUTPATIENT)
Dept: VASCULAR SURGERY | Age: 70
End: 2020-11-17
Payer: MEDICARE

## 2020-11-17 DIAGNOSIS — T14.8XXA OPEN WOUND: Primary | ICD-10-CM

## 2020-11-17 PROCEDURE — 99024 POSTOP FOLLOW-UP VISIT: CPT | Performed by: SURGERY

## 2020-11-17 NOTE — PROGRESS NOTES
Patient being seen for wound assessment and dressing change to right side of abdomen. Cleansed proximal incision with wound cleanser and gauze, there are still sutures secured lightly to this incision but there is moderate amount purulent drainage, surrounding tissue is slightly red. Mid abdominal wound has healed with skin dry and intact but surrounding tissue slightly red. Distal incision has one pinpoint open area noted with no drainage, surrounding tissue WNL. Patient has finished her antibiotic. Had DR. Castillo assess wounds and he gave order to remove on suture mid incision to allow the wound to drain more and to continue with wound care and to monitor. Patient does not want surgery to remove this bypass and perform a different bypass. Patient will return on Friday for next assessment. Applied mixture of hydrocortisone and barrier cream to red areas, covered proximal incision with calcium alginate with silver and then covered each area with gauze and then ABD and secured with tape. Patient tolerated well.

## 2020-11-20 ENCOUNTER — OFFICE VISIT (OUTPATIENT)
Dept: VASCULAR SURGERY | Age: 70
End: 2020-11-20
Payer: MEDICARE

## 2020-11-20 DIAGNOSIS — T14.8XXA OPEN WOUND: Primary | ICD-10-CM

## 2020-11-20 DIAGNOSIS — I73.9 PVD (PERIPHERAL VASCULAR DISEASE) (HCC): ICD-10-CM

## 2020-11-20 PROCEDURE — 99024 POSTOP FOLLOW-UP VISIT: CPT | Performed by: SURGERY

## 2020-11-20 RX ORDER — LEVOFLOXACIN 500 MG/1
500 TABLET, FILM COATED ORAL DAILY
Qty: 10 TAB | Refills: 0 | Status: SHIPPED | OUTPATIENT
Start: 2020-11-20 | End: 2020-11-24

## 2020-11-20 NOTE — PROGRESS NOTES
Patient's being seen for wound assessment and dressing change to right side of abdomen. Proximal incision still has loose sutures intact but still having moderate amount of purulent drainage, surrounding tissue has slightly red irritation. Mid wound has scab with no drainage and distal incision continues to have pinpoint open area with very scant amount ss drainage, surrounding tissue WNL. Applied mixture of hydrocortisone, barrier cream and nystatin powder to surrounding tissue, calcium alginate with silver to proximal incision, covered with ABD and secured with tape. Patient tolerated well. Had Dr. Randall Arrieta assess as well and given verbal order for Levaquin 500 mg, take one tablet by mouth daily for 10 days and sent to [address] [place] [address]. Patient will return on Tuesday for next dressing change.

## 2020-11-24 ENCOUNTER — OFFICE VISIT (OUTPATIENT)
Dept: VASCULAR SURGERY | Age: 70
End: 2020-11-24
Payer: MEDICARE

## 2020-11-24 DIAGNOSIS — T14.8XXA OPEN WOUND: Primary | ICD-10-CM

## 2020-11-24 PROCEDURE — 99024 POSTOP FOLLOW-UP VISIT: CPT | Performed by: SURGERY

## 2020-11-24 NOTE — PROGRESS NOTES
Patient being seen for wound assessment and dressing change to right side of abdomen. Removed dressing and there was moderate amount ss drainage to proximal incision which is slightly open mid incision and surrounding tissue only slightly red. Mid wound continues to be healed and distal incision has pinpoint opening that is starting to form a scab with very scant amount ss drainage. Patient states that since starting the Levaquin she constantly feels nauseous and dizzy all the time and now her right calf is hurting. Reviewed patient with Dr. Alice Marrero and patient was instructed to discontinue the Levaquin and to start daily wet to dry Dakins dressing to proximal incision. Informed patient of this and then demonstrated how to perform wound care. Cleansed wound with Dakins solution, Removed remaining sutures, applied Dakins to gauze and covered incision and then applied more gauze and an ABD pad, also applied calcium alginate to pinpoint opening of distal wound, secured all with tape. Patient states will be able to perform daily and will return on Friday for next assessment.

## 2020-11-27 ENCOUNTER — OFFICE VISIT (OUTPATIENT)
Dept: VASCULAR SURGERY | Age: 70
End: 2020-11-27
Payer: MEDICARE

## 2020-11-27 DIAGNOSIS — T14.8XXA OPEN WOUND: Primary | ICD-10-CM

## 2020-11-27 PROCEDURE — 99024 POSTOP FOLLOW-UP VISIT: CPT | Performed by: SURGERY

## 2020-11-27 NOTE — PROGRESS NOTES
Patient being seen for wound assessment and dressing change to right side of abdomen. Patient has been performing own wound care of Dakins wet to dry daily since  Last visit. Such a great improvement in wounds. The mid wound and distal incision have healed with skin dry and intact and no redness to surrounding tissue. The proximal incision has two slight open areas to incision line with slough like tissue noted with very small amount purulent drainage and surrounding tissue totally WNL and no swelling. Applied NS wet to dry dressing just to proximal incision, covered with gauze and secured with tape. Patient will perform daily wound care with Dakins until Tuesday and then switch to NS dressing daily. Patient will return on Friday for next dressing change.

## 2020-12-03 ENCOUNTER — APPOINTMENT (OUTPATIENT)
Dept: GENERAL RADIOLOGY | Age: 70
DRG: 252 | End: 2020-12-03
Attending: EMERGENCY MEDICINE
Payer: MEDICARE

## 2020-12-03 ENCOUNTER — HOSPITAL ENCOUNTER (INPATIENT)
Age: 70
LOS: 8 days | Discharge: HOME HEALTH | DRG: 252 | End: 2020-12-11
Attending: EMERGENCY MEDICINE | Admitting: SURGERY
Payer: MEDICARE

## 2020-12-03 ENCOUNTER — TELEPHONE (OUTPATIENT)
Dept: VASCULAR SURGERY | Age: 70
End: 2020-12-03

## 2020-12-03 DIAGNOSIS — T14.8XXA WOUND INFECTION: Primary | ICD-10-CM

## 2020-12-03 DIAGNOSIS — L08.9 WOUND INFECTION: Primary | ICD-10-CM

## 2020-12-03 PROBLEM — N39.0 UTI (URINARY TRACT INFECTION): Status: ACTIVE | Noted: 2020-12-03

## 2020-12-03 PROBLEM — L03.311 CELLULITIS OF ABDOMINAL WALL: Status: ACTIVE | Noted: 2020-12-03

## 2020-12-03 LAB
ALBUMIN SERPL-MCNC: 2.9 G/DL (ref 3.4–5)
ALBUMIN/GLOB SERPL: 0.6 {RATIO} (ref 0.8–1.7)
ALP SERPL-CCNC: 94 U/L (ref 45–117)
ALT SERPL-CCNC: 18 U/L (ref 13–56)
ANION GAP SERPL CALC-SCNC: 6 MMOL/L (ref 3–18)
APPEARANCE UR: ABNORMAL
APTT PPP: 40.3 SEC (ref 23–36.4)
AST SERPL-CCNC: 13 U/L (ref 10–38)
BACTERIA URNS QL MICRO: ABNORMAL /HPF
BASOPHILS # BLD: 0 K/UL (ref 0–0.06)
BASOPHILS NFR BLD: 0 % (ref 0–3)
BILIRUB SERPL-MCNC: 0.4 MG/DL (ref 0.2–1)
BILIRUB UR QL: ABNORMAL
BUN SERPL-MCNC: 14 MG/DL (ref 7–18)
BUN/CREAT SERPL: 16 (ref 12–20)
CALCIUM SERPL-MCNC: 11.2 MG/DL (ref 8.5–10.1)
CAOX CRY URNS QL MICRO: ABNORMAL
CHLORIDE SERPL-SCNC: 101 MMOL/L (ref 100–111)
CO2 SERPL-SCNC: 29 MMOL/L (ref 21–32)
COLOR UR: ABNORMAL
COVID-19 RAPID TEST, COVR: NOT DETECTED
CREAT SERPL-MCNC: 0.86 MG/DL (ref 0.6–1.3)
DIFFERENTIAL METHOD BLD: ABNORMAL
EOSINOPHIL # BLD: 0 K/UL (ref 0–0.4)
EOSINOPHIL NFR BLD: 0 % (ref 0–5)
EPITH CASTS URNS QL MICRO: ABNORMAL /LPF (ref 0–5)
ERYTHROCYTE [DISTWIDTH] IN BLOOD BY AUTOMATED COUNT: 14.4 % (ref 11.6–14.5)
GLOBULIN SER CALC-MCNC: 5.2 G/DL (ref 2–4)
GLUCOSE SERPL-MCNC: 102 MG/DL (ref 74–99)
GLUCOSE UR STRIP.AUTO-MCNC: NEGATIVE MG/DL
HCT VFR BLD AUTO: 35.4 % (ref 35–45)
HGB BLD-MCNC: 11.5 G/DL (ref 12–16)
HGB UR QL STRIP: NEGATIVE
INR PPP: 1.3 (ref 0.8–1.2)
KETONES UR QL STRIP.AUTO: ABNORMAL MG/DL
LACTATE BLD-SCNC: 0.98 MMOL/L (ref 0.4–2)
LACTATE BLD-SCNC: 2.15 MMOL/L (ref 0.4–2)
LEUKOCYTE ESTERASE UR QL STRIP.AUTO: ABNORMAL
LYMPHOCYTES # BLD: 4.4 K/UL (ref 0.8–3.5)
LYMPHOCYTES NFR BLD: 27 % (ref 20–51)
MCH RBC QN AUTO: 27.8 PG (ref 24–34)
MCHC RBC AUTO-ENTMCNC: 32.5 G/DL (ref 31–37)
MCV RBC AUTO: 85.5 FL (ref 74–97)
MONOCYTES # BLD: 1 K/UL (ref 0–1)
MONOCYTES NFR BLD: 6 % (ref 2–9)
MUCOUS THREADS URNS QL MICRO: ABNORMAL /LPF
NEUTS SEG # BLD: 10.8 K/UL (ref 1.8–8)
NEUTS SEG NFR BLD: 67 % (ref 42–75)
NITRITE UR QL STRIP.AUTO: NEGATIVE
PH UR STRIP: 5.5 [PH] (ref 5–8)
PLATELET # BLD AUTO: 478 K/UL (ref 135–420)
PLATELET COMMENTS,PCOM: ABNORMAL
PMV BLD AUTO: 9.8 FL (ref 9.2–11.8)
POTASSIUM SERPL-SCNC: 3.7 MMOL/L (ref 3.5–5.5)
PROT SERPL-MCNC: 8.1 G/DL (ref 6.4–8.2)
PROT UR STRIP-MCNC: 30 MG/DL
PROTHROMBIN TIME: 15.9 SEC (ref 11.5–15.2)
RBC # BLD AUTO: 4.14 M/UL (ref 4.2–5.3)
RBC #/AREA URNS HPF: NEGATIVE /HPF (ref 0–5)
RBC MORPH BLD: ABNORMAL
SODIUM SERPL-SCNC: 136 MMOL/L (ref 136–145)
SOURCE, COVRS: NORMAL
SP GR UR REFRACTOMETRY: 1.02 (ref 1–1.03)
SPECIMEN TYPE, XMCV1T: NORMAL
UROBILINOGEN UR QL STRIP.AUTO: 1 EU/DL (ref 0.2–1)
WBC # BLD AUTO: 16.2 K/UL (ref 4.6–13.2)
WBC URNS QL MICRO: ABNORMAL /HPF (ref 0–4)

## 2020-12-03 PROCEDURE — 85610 PROTHROMBIN TIME: CPT

## 2020-12-03 PROCEDURE — 96367 TX/PROPH/DG ADDL SEQ IV INF: CPT

## 2020-12-03 PROCEDURE — 65660000000 HC RM CCU STEPDOWN

## 2020-12-03 PROCEDURE — 2709999900 HC NON-CHARGEABLE SUPPLY

## 2020-12-03 PROCEDURE — 74011250636 HC RX REV CODE- 250/636: Performed by: EMERGENCY MEDICINE

## 2020-12-03 PROCEDURE — 87086 URINE CULTURE/COLONY COUNT: CPT

## 2020-12-03 PROCEDURE — 99221 1ST HOSP IP/OBS SF/LOW 40: CPT | Performed by: SURGERY

## 2020-12-03 PROCEDURE — 87635 SARS-COV-2 COVID-19 AMP PRB: CPT

## 2020-12-03 PROCEDURE — 87205 SMEAR GRAM STAIN: CPT

## 2020-12-03 PROCEDURE — 93005 ELECTROCARDIOGRAM TRACING: CPT

## 2020-12-03 PROCEDURE — 74011000258 HC RX REV CODE- 258: Performed by: EMERGENCY MEDICINE

## 2020-12-03 PROCEDURE — 83605 ASSAY OF LACTIC ACID: CPT

## 2020-12-03 PROCEDURE — 85025 COMPLETE CBC W/AUTO DIFF WBC: CPT

## 2020-12-03 PROCEDURE — 81001 URINALYSIS AUTO W/SCOPE: CPT

## 2020-12-03 PROCEDURE — 99284 EMERGENCY DEPT VISIT MOD MDM: CPT

## 2020-12-03 PROCEDURE — 87077 CULTURE AEROBIC IDENTIFY: CPT

## 2020-12-03 PROCEDURE — 85730 THROMBOPLASTIN TIME PARTIAL: CPT

## 2020-12-03 PROCEDURE — 74011250637 HC RX REV CODE- 250/637: Performed by: EMERGENCY MEDICINE

## 2020-12-03 PROCEDURE — 80053 COMPREHEN METABOLIC PANEL: CPT

## 2020-12-03 PROCEDURE — 71045 X-RAY EXAM CHEST 1 VIEW: CPT

## 2020-12-03 PROCEDURE — 96365 THER/PROPH/DIAG IV INF INIT: CPT

## 2020-12-03 PROCEDURE — 87147 CULTURE TYPE IMMUNOLOGIC: CPT

## 2020-12-03 PROCEDURE — 87040 BLOOD CULTURE FOR BACTERIA: CPT

## 2020-12-03 PROCEDURE — 87186 SC STD MICRODIL/AGAR DIL: CPT

## 2020-12-03 RX ORDER — PREGABALIN 50 MG/1
200 CAPSULE ORAL
Status: COMPLETED | OUTPATIENT
Start: 2020-12-03 | End: 2020-12-03

## 2020-12-03 RX ORDER — VANCOMYCIN HYDROCHLORIDE
1250 ONCE
Status: COMPLETED | OUTPATIENT
Start: 2020-12-03 | End: 2020-12-03

## 2020-12-03 RX ADMIN — PREGABALIN 200 MG: 50 CAPSULE ORAL at 21:34

## 2020-12-03 RX ADMIN — PIPERACILLIN AND TAZOBACTAM 3.38 G: 3; .375 INJECTION, POWDER, LYOPHILIZED, FOR SOLUTION INTRAVENOUS at 18:38

## 2020-12-03 RX ADMIN — SODIUM CHLORIDE, SODIUM LACTATE, POTASSIUM CHLORIDE, AND CALCIUM CHLORIDE 1000 ML: 600; 310; 30; 20 INJECTION, SOLUTION INTRAVENOUS at 18:37

## 2020-12-03 RX ADMIN — VANCOMYCIN HYDROCHLORIDE 1250 MG: 10 INJECTION, POWDER, LYOPHILIZED, FOR SOLUTION INTRAVENOUS at 19:08

## 2020-12-03 NOTE — Clinical Note
Status[de-identified] INPATIENT [101]   Type of Bed: Telemetry [19]   Inpatient Hospitalization Certified Necessary for the Following Reasons: 3.  Patient receiving treatment that can only be provided in an inpatient setting (further clarification in H&P documentation)   Admitting Diagnosis: UTI (urinary tract infection) [420612]   Admitting Diagnosis: Cellulitis of abdominal wall [011725]   Admitting Physician: Luc Gauthier [[id number]]   Attending Physician: Karolyn Babin   Estimated Length of Stay: 3-4 Midnights   Discharge Plan[de-identified] Home with Office Follow-up

## 2020-12-03 NOTE — H&P
Surgery History and Physical    Subjective:      Hannah Mathis is a 65 y.o.  female who presents with new complaint of not feeling well. She was at home and called the office and said she had some increased drainage today and overall felt sick. This is not normal for her. She has a chronic wound of the right flank. This is been revised treated topically treated with antibiotics had skin bypasses multiple revisions. It just periodically opens up and drains. She does not have pain at the site. This is a right axillary to bilateral femoral graft for chronic aortoiliac occlusive disease. She has history of Crohn's disease, hypertension, and autoimmune disorder. .    Patient Active Problem List    Diagnosis Date Noted    Open wound 09/10/2020    Chronic wound infection of abdomen 06/21/2018    Nonhealing surgical wound 04/30/2018    Infection of vascular bypass graft ([address] 04/30/2018    CAP (community acquired pneumonia) 06/27/2017    Severe sepsis ([address] 06/27/2017    Arteriovenous graft infection ([address] 05/10/2017    Abscess 05/10/2017    Dermal sinus tract of lumbosacral region 05/10/2017    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[address] 08/09/2016    Chronic aorto-iliac occlusion syndrome ([address] 01/19/2016    Wound disruption, post-op, skin 01/09/2015    Mass of breast, left 07/30/2014    HTN (hypertension) 04/01/2013    PVD (peripheral vascular disease) ([address] 04/01/2013    Crohn's disease ([address] 04/01/2013     Past Medical History:   Diagnosis Date    Arthritis     CAP (community acquired pneumonia) 06/27/2017    Chronic lung disease     Claudication ([address]     left leg    Crohn's disease ([address]     Crohn's disease ([address]     GERD (gastroesophageal reflux disease)     HTN (hypertension)     Ill-defined condition     Uses O2 at night.     Nausea & vomiting     Other and unspecified symptoms and signs involving general sensations and perceptions     PVD    Other ill-defined conditions(799.89)     Crohn's    Peripheral neuropathy     Pulmonary emphysema ([place]     PVD (peripheral vascular disease) ([place])     right leg    Sepsis ([place]) 06/27/2017    Vitamin B12 deficiency       Past Surgical History:   Procedure Laterality Date    BYPASS GRAFT OTHR,AXILL-FEM Right 4/19/2013    BYPASS GRAFT OTHR,FEM-POP Left 5/2013    COLONOSCOPY N/A 9/11/2019    DIAGNOSTIC COLONOSCOPY performed by Jorge Sullivan MD at [facility] ENDOSCOPY    FOOT/TOES SURGERY [address] UNLISTED      HX APPENDECTOMY      HX BREAST LUMPECTOMY Left     breast left- precancerous    HX BUNIONECTOMY      left eye    HX CATARACT REMOVAL      bilateral    HX CHOLECYSTECTOMY      HX ORTHOPAEDIC      lateral epicondilitis on right    HX OTHER SURGICAL  3/7/2013    catheter into aorta    HX OTHER SURGICAL      intestional resection x4    HX OTHER SURGICAL  9/2013    I & D Right chest/flank wall abscess vs granuloma    UPPER ARM/ELBOW SURGERY UNLISTED      VASCULAR SURGERY PROCEDURE UNLIST  09/10/2020    Debridement and washout of bypass graft. Social History     Tobacco Use    Smoking status: Current Every Day Smoker     Packs/day: 1.00     Years: 45.00     Pack years: 45.00     Types: Cigarettes    Smokeless tobacco: Never Used   Substance Use Topics    Alcohol use: No      Family History   Problem Relation Age of Onset    Coronary Artery Disease Mother     Heart Attack Mother     Heart Surgery Mother         CABGx3    Elevated Lipids Mother     COPD Father     Heart Attack Brother     COPD Brother     Other Brother         PAD      Prior to Admission medications    Medication Sig Start Date End Date Taking? Authorizing Provider   fluticasone-umeclidinium-vilanterol (Trelegy Ellipta) 100-62.5-25 mcg inhaler Take 1 Puff by inhalation daily. 11/10/20  Yes Vanesa Campbell MD   pregabalin (LYRICA) 100 mg capsule Take  by mouth two (2) times a day.  Takes 100 mg in am and 200 in the pm   Yes Provider, Historical   aspirin 81 mg tablet Take 81 mg by mouth daily. Yes Provider, Historical   bimatoprost (LUMIGAN) 0.03 % ophthalmic drops Administer 1 Drop to both eyes every evening. Yes Provider, Historical   atropine-PHENobarbital-scopolamine-hyoscyamine (DONNATAL) 16.2-0.1037 -0.0194 mg per tablet Take 1 Tab by mouth as needed (diarrhea). Indications: Irritable Bowel Syndrome   Yes Provider, Historical   inFLIXimab (REMICADE) 100 mg injection 5 mg/kg by IntraVENous route once. Every 4 weeks   Yes Provider, Historical   ipratropium-albuteroL (Combivent Respimat)  mcg/actuation inhaler Take 1 Puff by inhalation every six (6) hours as needed for Wheezing or Shortness of Breath. 11/10/20   Maida Galo MD   multivitamin (ONE A DAY) tablet Take 1 Tab by mouth daily. Provider, Historical   BIOTIN PO Take  by mouth. Provider, Historical   OTHER     Provider, Historical   clopidogreL (PLAVIX) 75 mg tab TAKE ONE TABLET BY MOUTH DAILY 6/15/20   Elana Levy MD   loratadine (CLARITIN) 10 mg tablet Take 10 mg by mouth daily. Provider, Historical   fluticasone propionate (FLONASE ALLERGY RELIEF) 50 mcg/actuation nasal spray 2 Sprays by Both Nostrils route daily as needed for Rhinitis. Provider, Historical   DULoxetine (CYMBALTA) 60 mg capsule Take 60 mg by mouth nightly. For Anxiety. Provider, Historical   cyanocobalamin (VITAMIN B-12) 1,000 mcg/mL injection 1,000 mcg by IntraMUSCular route every fourteen (14) days. Provider, Historical   loperamide (IMMODIUM) 2 mg tablet Take 2 mg by mouth daily as needed for Diarrhea. Provider, Historical   dicyclomine (BENTYL) 10 mg capsule Take 10 mg by mouth daily. Provider, Historical   triamterene-hydrochlorothiazide (DYAZIDE) 37.5-25 mg per capsule Take 1 Cap by mouth daily.     Provider, Historical     Allergies   Allergen Reactions    Codeine Nausea and Vomiting     Other reaction(s): other/intolerance    Darvon [Propoxyphene] Itching    Demerol [Meperidine] Other (comments)     Halucinations    Keflex [Cephalexin] Diarrhea    Talwin [Pentazocine Lactate] Shortness of Breath and Nausea and Vomiting         Review of Systems:    A comprehensive review of systems was negative except for that written in the History of Present Illness. Objective:     Patient Vitals for the past 8 hrs:   BP Temp Pulse Resp SpO2 Height Weight   20 1411 (!) 120/56 99.1 °F (37.3 °C) 81 16 95 % 5' 5\" (1.651 m) 132 lb (59.9 kg)       Temp (24hrs), Av.1 °F (37.3 °C), Min:99.1 °F (37.3 °C), Max:99.1 °F (37.3 °C)      Physical Exam:  LUNG: clear to auscultation bilaterally, HEART: S1, S2 normal, ABDOMEN: soft, non-tender.  Bowel sounds normal. No masses,  no organomegaly, chronic open wounds right flank    Labs:   Recent Results (from the past 24 hour(s))   URINALYSIS W/ RFLX MICROSCOPIC    Collection Time: 20  2:15 PM   Result Value Ref Range    Color DARK YELLOW      Appearance CLOUDY      Specific gravity 1.023 1.005 - 1.030      pH (UA) 5.5 5.0 - 8.0      Protein 30 (A) NEG mg/dL    Glucose Negative NEG mg/dL    Ketone TRACE (A) NEG mg/dL    Bilirubin SMALL (A) NEG      Blood Negative NEG      Urobilinogen 1.0 0.2 - 1.0 EU/dL    Nitrites Negative NEG      Leukocyte Esterase MODERATE (A) NEG     URINE MICROSCOPIC ONLY    Collection Time: 20  2:15 PM   Result Value Ref Range    WBC 20 to 40 0 - 4 /hpf    RBC Negative 0 - 5 /hpf    Epithelial cells 2+ 0 - 5 /lpf    Bacteria FEW (A) NEG /hpf    Mucus 1+ (A) NEG /lpf    CA Oxalate crystals 2+ (A) NEG   CBC WITH AUTOMATED DIFF    Collection Time: 20  2:20 PM   Result Value Ref Range    WBC 16.2 (H) 4.6 - 13.2 K/uL    RBC 4.14 (L) 4.20 - 5.30 M/uL    HGB 11.5 (L) 12.0 - 16.0 g/dL    HCT 35.4 35.0 - 45.0 %    MCV 85.5 74.0 - 97.0 FL    MCH 27.8 24.0 - 34.0 PG    MCHC 32.5 31.0 - 37.0 g/dL    RDW 14.4 11.6 - 14.5 %    PLATELET 044 (H) 367 - 420 K/uL    MPV 9.8 9.2 - 11.8 FL    NEUTROPHILS 67 42 - 75 % LYMPHOCYTES 27 20 - 51 %    MONOCYTES 6 2 - 9 %    EOSINOPHILS 0 0 - 5 %    BASOPHILS 0 0 - 3 %    ABS. NEUTROPHILS 10.8 (H) 1.8 - 8.0 K/UL    ABS. LYMPHOCYTES 4.4 (H) 0.8 - 3.5 K/UL    ABS. MONOCYTES 1.0 0 - 1.0 K/UL    ABS. EOSINOPHILS 0.0 0.0 - 0.4 K/UL    ABS. BASOPHILS 0.0 0.0 - 0.06 K/UL    DF MANUAL      PLATELET COMMENTS Increased Platelets      RBC COMMENTS ANISOCYTOSIS  1+       METABOLIC PANEL, COMPREHENSIVE    Collection Time: 12/03/20  2:20 PM   Result Value Ref Range    Sodium 136 136 - 145 mmol/L    Potassium 3.7 3.5 - 5.5 mmol/L    Chloride 101 100 - 111 mmol/L    CO2 29 21 - 32 mmol/L    Anion gap 6 3.0 - 18 mmol/L    Glucose 102 (H) 74 - 99 mg/dL    BUN 14 7.0 - 18 MG/DL    Creatinine 0.86 0.6 - 1.3 MG/DL    BUN/Creatinine ratio 16 12 - 20      GFR est AA >60 >60 ml/min/1.73m2    GFR est non-AA >60 >60 ml/min/1.73m2    Calcium 11.2 (H) 8.5 - 10.1 MG/DL    Bilirubin, total 0.4 0.2 - 1.0 MG/DL    ALT (SGPT) 18 13 - 56 U/L    AST (SGOT) 13 10 - 38 U/L    Alk. phosphatase 94 45 - 117 U/L    Protein, total 8.1 6.4 - 8.2 g/dL    Albumin 2.9 (L) 3.4 - 5.0 g/dL    Globulin 5.2 (H) 2.0 - 4.0 g/dL    A-G Ratio 0.6 (L) 0.8 - 1.7     PROTHROMBIN TIME + INR    Collection Time: 12/03/20  2:20 PM   Result Value Ref Range    Prothrombin time 15.9 (H) 11.5 - 15.2 sec    INR 1.3 (H) 0.8 - 1.2     PTT    Collection Time: 12/03/20  2:20 PM   Result Value Ref Range    aPTT 40.3 (H) 23.0 - 36.4 SEC   POC LACTIC ACID    Collection Time: 12/03/20  2:29 PM   Result Value Ref Range    Lactic Acid (POC) 2.15 (HH) 0.40 - 2.00 mmol/L       Data Review:    CBC:   Lab Results   Component Value Date/Time    WBC 16.2 (H) 12/03/2020 02:20 PM    RBC 4.14 (L) 12/03/2020 02:20 PM    HGB 11.5 (L) 12/03/2020 02:20 PM    HCT 35.4 12/03/2020 02:20 PM    PLATELET 017 (H) 37/20/1045 02:20 PM        Assessment:     Active Problems:    * No active hospital problems.  *      Plan:     She was given vancomycin and Zosyn in the ER today  We will send urine cultures and follow blood cultures  We will culture her wounds  Discussed with Dr. Desean Landa from infectious disease and she will see tomorrow. Patient is happy and agrees with the plan. We will obtain a rapid Covid in case she needs a surgical procedure. I have a low index of suspicion for COVID-19 with her as she has a normal chest x-ray no respiratory symptoms and likely source of infection is either chronic wounds or UTI.     Signed By: Ashley Silverio MD     December 3, 2020

## 2020-12-03 NOTE — ED TRIAGE NOTES
Wound check Transabdominal area. referral Dr. Katrine Sicard to Dr. Merlin Grimes, recent fevers, TMax unknown, n/v. Plan: blood cultures and wound cultures.

## 2020-12-04 ENCOUNTER — APPOINTMENT (OUTPATIENT)
Dept: CT IMAGING | Age: 70
DRG: 252 | End: 2020-12-04
Attending: INTERNAL MEDICINE
Payer: MEDICARE

## 2020-12-04 LAB
ANION GAP SERPL CALC-SCNC: 6 MMOL/L (ref 3–18)
ATRIAL RATE: 92 BPM
BASOPHILS # BLD: 0 K/UL (ref 0–0.06)
BASOPHILS NFR BLD: 0 % (ref 0–3)
BUN SERPL-MCNC: 11 MG/DL (ref 7–18)
BUN/CREAT SERPL: 15 (ref 12–20)
CALCIUM SERPL-MCNC: 10.2 MG/DL (ref 8.5–10.1)
CALCULATED P AXIS, ECG09: 59 DEGREES
CALCULATED R AXIS, ECG10: 69 DEGREES
CALCULATED T AXIS, ECG11: 44 DEGREES
CHLORIDE SERPL-SCNC: 107 MMOL/L (ref 100–111)
CO2 SERPL-SCNC: 28 MMOL/L (ref 21–32)
CREAT SERPL-MCNC: 0.72 MG/DL (ref 0.6–1.3)
DIAGNOSIS, 93000: NORMAL
DIFFERENTIAL METHOD BLD: ABNORMAL
EOSINOPHIL # BLD: 0 K/UL (ref 0–0.4)
EOSINOPHIL NFR BLD: 0 % (ref 0–5)
ERYTHROCYTE [DISTWIDTH] IN BLOOD BY AUTOMATED COUNT: 14.6 % (ref 11.6–14.5)
GLUCOSE SERPL-MCNC: 96 MG/DL (ref 74–99)
HCT VFR BLD AUTO: 31.5 % (ref 35–45)
HGB BLD-MCNC: 10.3 G/DL (ref 12–16)
LYMPHOCYTES # BLD: 4.3 K/UL (ref 0.8–3.5)
LYMPHOCYTES NFR BLD: 26 % (ref 20–51)
MCH RBC QN AUTO: 28.2 PG (ref 24–34)
MCHC RBC AUTO-ENTMCNC: 32.7 G/DL (ref 31–37)
MCV RBC AUTO: 86.3 FL (ref 74–97)
MONOCYTES # BLD: 2.1 K/UL (ref 0–1)
MONOCYTES NFR BLD: 13 % (ref 2–9)
NEUTS SEG # BLD: 10 K/UL (ref 1.8–8)
NEUTS SEG NFR BLD: 61 % (ref 42–75)
P-R INTERVAL, ECG05: 168 MS
PLATELET # BLD AUTO: 384 K/UL (ref 135–420)
PLATELET COMMENTS,PCOM: ABNORMAL
PMV BLD AUTO: 9.7 FL (ref 9.2–11.8)
POTASSIUM SERPL-SCNC: 3.4 MMOL/L (ref 3.5–5.5)
Q-T INTERVAL, ECG07: 346 MS
QRS DURATION, ECG06: 94 MS
QTC CALCULATION (BEZET), ECG08: 427 MS
RBC # BLD AUTO: 3.65 M/UL (ref 4.2–5.3)
RBC MORPH BLD: ABNORMAL
RBC MORPH BLD: ABNORMAL
SODIUM SERPL-SCNC: 141 MMOL/L (ref 136–145)
VENTRICULAR RATE, ECG03: 92 BPM
WBC # BLD AUTO: 16.4 K/UL (ref 4.6–13.2)

## 2020-12-04 PROCEDURE — 74011250636 HC RX REV CODE- 250/636: Performed by: INTERNAL MEDICINE

## 2020-12-04 PROCEDURE — P9047 ALBUMIN (HUMAN), 25%, 50ML: HCPCS | Performed by: INTERNAL MEDICINE

## 2020-12-04 PROCEDURE — 36415 COLL VENOUS BLD VENIPUNCTURE: CPT

## 2020-12-04 PROCEDURE — 2709999900 HC NON-CHARGEABLE SUPPLY

## 2020-12-04 PROCEDURE — 85025 COMPLETE CBC W/AUTO DIFF WBC: CPT

## 2020-12-04 PROCEDURE — 74011250637 HC RX REV CODE- 250/637: Performed by: PHYSICIAN ASSISTANT

## 2020-12-04 PROCEDURE — 71260 CT THORAX DX C+: CPT

## 2020-12-04 PROCEDURE — 74011000636 HC RX REV CODE- 636: Performed by: INTERNAL MEDICINE

## 2020-12-04 PROCEDURE — 74011000250 HC RX REV CODE- 250: Performed by: PHYSICIAN ASSISTANT

## 2020-12-04 PROCEDURE — 99223 1ST HOSP IP/OBS HIGH 75: CPT | Performed by: INTERNAL MEDICINE

## 2020-12-04 PROCEDURE — 82533 TOTAL CORTISOL: CPT

## 2020-12-04 PROCEDURE — 65660000004 HC RM CVT STEPDOWN

## 2020-12-04 PROCEDURE — 74011000258 HC RX REV CODE- 258: Performed by: INTERNAL MEDICINE

## 2020-12-04 PROCEDURE — 80048 BASIC METABOLIC PNL TOTAL CA: CPT

## 2020-12-04 RX ORDER — SODIUM CHLORIDE 9 MG/ML
500 INJECTION, SOLUTION INTRAVENOUS ONCE
Status: COMPLETED | OUTPATIENT
Start: 2020-12-04 | End: 2020-12-04

## 2020-12-04 RX ORDER — ACETAMINOPHEN 500 MG
500 TABLET ORAL
Status: DISCONTINUED | OUTPATIENT
Start: 2020-12-04 | End: 2020-12-11 | Stop reason: HOSPADM

## 2020-12-04 RX ORDER — ALBUMIN HUMAN 250 G/1000ML
25 SOLUTION INTRAVENOUS EVERY 8 HOURS
Status: COMPLETED | OUTPATIENT
Start: 2020-12-04 | End: 2020-12-05

## 2020-12-04 RX ADMIN — ACETAMINOPHEN 500 MG: 500 TABLET ORAL at 09:51

## 2020-12-04 RX ADMIN — ALBUMIN (HUMAN) 25 G: 0.25 INJECTION, SOLUTION INTRAVENOUS at 23:03

## 2020-12-04 RX ADMIN — PIPERACILLIN AND TAZOBACTAM 3.38 G: 3; .375 INJECTION, POWDER, LYOPHILIZED, FOR SOLUTION INTRAVENOUS at 17:11

## 2020-12-04 RX ADMIN — PIPERACILLIN AND TAZOBACTAM 3.38 G: 3; .375 INJECTION, POWDER, LYOPHILIZED, FOR SOLUTION INTRAVENOUS at 22:21

## 2020-12-04 RX ADMIN — SODIUM HYPOCHLORITE: 2.5 SOLUTION TOPICAL at 17:12

## 2020-12-04 RX ADMIN — SODIUM CHLORIDE 500 ML: 900 INJECTION, SOLUTION INTRAVENOUS at 09:51

## 2020-12-04 RX ADMIN — PIPERACILLIN AND TAZOBACTAM 3.38 G: 3; .375 INJECTION, POWDER, LYOPHILIZED, FOR SOLUTION INTRAVENOUS at 12:15

## 2020-12-04 RX ADMIN — ALBUMIN (HUMAN) 25 G: 0.25 INJECTION, SOLUTION INTRAVENOUS at 17:10

## 2020-12-04 RX ADMIN — IOPAMIDOL 100 ML: 612 INJECTION, SOLUTION INTRAVENOUS at 19:59

## 2020-12-04 NOTE — PROGRESS NOTES
Problem: Falls - Risk of  Goal: *Absence of Falls  Description: Document Joe Sukhjinder Fall Risk and appropriate interventions in the flowsheet.   Outcome: Progressing Towards Goal  Note: Fall Risk Interventions:            Medication Interventions: Evaluate medications/consider consulting pharmacy, Patient to call before getting OOB, Teach patient to arise slowly

## 2020-12-04 NOTE — CONSULTS
Infectious Disease Consultation Note        Reason: Fevers, gram-positive bacteremia    Current abx Prior abx   Pip/tazo, vancomycin on 12/3      Lines:       Assessment :    65 y.o.  female  with history of Crohn's disease status post Remicade, severe peripheral artery disease status post right axillary femoral, left femoropopliteal bypass graft with multiple surgeries who presented to [facility] on 12/3/2020 with subjective sensation of not feeling well, fever. Status post revision of axillary to femoral bypass graft with excision of infected graft on 9/21/2020. Status post excision of the ulcer over the wound and debridement of the wound on 11/5/2020. Cultures revealed methicillin susceptible Staphylococcus aureus. Now with recent h/o purulent drainage right abdominal surgical site, fevers x 1 week, gram positive bacteremia. Clinical presentation consistent with sepsis (present on admission) secondary to gram-positive bloodstream infection, probable vascular graft infection    Recent history of right abdominal wound infection with methicillin susceptible Staphylococcus aureus likely suggest patient has methicillin susceptible Staphylococcus aureus bloodstream infection due to partially treated infection. Concurrent immunocompromise state secondary to Remicade is likely masking the full clinical presentation. Low clinical probability of COVID-19 infection at this time    Some pyuria noted on urinalysis 12/3-no dysuria or other signs or symptoms to suggest UTI. We will follow-up urine cultures    Recommendations:    1. Recommend piperacillin/tazobactam  2. Obtain CT chest/abdomen/pelvis with IV contrast  3. Follow-up identification and susceptibility of gram-positive cocci in blood culture  4. Repeat blood culture on 12/6  5.   Decisions about vascular graft surgery based on the above test results, clinical course    Thank you for consultation request. Above plan was discussed in details with patient, and dr Cyrus Banegas. Please call me if any further questions or concerns. Will continue to participate in the care of this patient. HPI:    65 y.o.  female  with history of Crohn's disease status post Remicade, severe peripheral artery disease status post right axillary femoral, left femoropopliteal bypass graft with multiple surgeries who presented to [address] on 12/3/2020 with subjective sensation of not feeling well, fever. I obtained history from review of records, talking to Dr. Eliel Haas and the patient. Most recently patient was noted to have purulent drainage right flank with exposed bypass graft. Status post revision of axillary to femoral bypass graft with excision of infected graft on 9/21/2020. Patient subsequently followed up with vascular surgery office. Was managed by wound care. Was noted to have wound dehiscence in early November. Status post excision of the ulcer over the wound and debridement of the wound on 11/5/2020. Cultures revealed methicillin susceptible Staphylococcus aureus. It is not clear to me if patient received any antibiotic subsequently or not. Notes from 11/17/2020 stated that patient had moderate amount of purulent drainage at the surgical site. Was managed with wound care. Dr. Eliel Haas received a call from the patient yesterday that she was running a temperature for about a week. She had a T-max of 101.7 at home. She was advised to come to the emergency room. I was consulted for further recommendations. Blood cultures, wound cultures were obtained. Patient was started on piperacillin/tazobactam, vancomycin x1. Today morning patient has had recurrent fever. Blood cultures done on admission now positive for gram-positive cocci. Patient denies any increasing abdominal pain. She denies any sore throat, cough, chest pain, shortness of breath, diarrhea, dysuria.   She last received Remicade about 2 weeks ago        Past Medical History: Diagnosis Date    Arthritis     CAP (community acquired pneumonia) 06/27/2017    Chronic lung disease     Claudication ([address]     left leg    Crohn's disease ([address]     Crohn's disease ([address]     GERD (gastroesophageal reflux disease)     HTN (hypertension)     Ill-defined condition     Uses O2 at night.  Nausea & vomiting     Other and unspecified symptoms and signs involving general sensations and perceptions     PVD    Other ill-defined conditions(799.89)     Crohn's    Peripheral neuropathy     Pulmonary emphysema ([address]     PVD (peripheral vascular disease) ([address]     right leg    Sepsis ([address] 06/27/2017    Vitamin B12 deficiency        Past Surgical History:   Procedure Laterality Date    BYPASS GRAFT OTHR,AXILL-FEM Right 4/19/2013    BYPASS GRAFT OTHR,FEM-POP Left 5/2013    COLONOSCOPY N/A 9/11/2019    DIAGNOSTIC COLONOSCOPY performed by Jason Bland MD at [facility] FOOT/TOES SURGERY [address] UNLISTED      HX APPENDECTOMY      HX BREAST LUMPECTOMY Left     breast left- precancerous    HX BUNIONECTOMY      left eye    HX CATARACT REMOVAL      bilateral    HX CHOLECYSTECTOMY      HX ORTHOPAEDIC      lateral epicondilitis on right    HX OTHER SURGICAL  3/7/2013    catheter into aorta    HX OTHER SURGICAL      intestional resection x4    HX OTHER SURGICAL  9/2013    I & D Right chest/flank wall abscess vs granuloma    UPPER ARM/ELBOW SURGERY UNLISTED      VASCULAR SURGERY PROCEDURE UNLIST  09/10/2020    Debridement and washout of bypass graft.       home Medication List    Details   fluticasone-umeclidinium-vilanterol (Trelegy Ellipta) 100-62.5-25 mcg inhaler Take 1 Puff by inhalation daily. Qty: 3 Inhaler, Refills: 1      clopidogreL (PLAVIX) 75 mg tab TAKE ONE TABLET BY MOUTH DAILY  Qty: 30 Tab, Refills: 8      pregabalin (LYRICA) 100 mg capsule Take  by mouth two (2) times a day. Takes 100 mg in am and 200 in the pm      aspirin 81 mg tablet Take 81 mg by mouth daily. cyanocobalamin (VITAMIN B-12) 1,000 mcg/mL injection 1,000 mcg by IntraMUSCular route every fourteen (14) days. triamterene-hydrochlorothiazide (DYAZIDE) 37.5-25 mg per capsule Take 1 Cap by mouth daily. bimatoprost (LUMIGAN) 0.03 % ophthalmic drops Administer 1 Drop to both eyes every evening. atropine-PHENobarbital-scopolamine-hyoscyamine (DONNATAL) 16.2-0.1037 -0.0194 mg per tablet Take 1 Tab by mouth as needed (diarrhea). Indications: Irritable Bowel Syndrome      inFLIXimab (REMICADE) 100 mg injection 5 mg/kg by IntraVENous route once. Every 4 weeks      ipratropium-albuteroL (Combivent Respimat)  mcg/actuation inhaler Take 1 Puff by inhalation every six (6) hours as needed for Wheezing or Shortness of Breath. Qty: 1 Inhaler, Refills: 3      multivitamin (ONE A DAY) tablet Take 1 Tab by mouth daily. BIOTIN PO Take  by mouth. OTHER       loratadine (CLARITIN) 10 mg tablet Take 10 mg by mouth daily. fluticasone propionate (FLONASE ALLERGY RELIEF) 50 mcg/actuation nasal spray 2 Sprays by Both Nostrils route daily as needed for Rhinitis. DULoxetine (CYMBALTA) 60 mg capsule Take 60 mg by mouth nightly. For Anxiety. loperamide (IMMODIUM) 2 mg tablet Take 2 mg by mouth daily as needed for Diarrhea. dicyclomine (BENTYL) 10 mg capsule Take 10 mg by mouth daily. Current Facility-Administered Medications   Medication Dose Route Frequency    acetaminophen (TYLENOL) tablet 500 mg  500 mg Oral Q4H PRN    influenza vaccine 2020-21 (6 mos+)(PF) (FLUARIX/FLULAVAL/FLUZONE QUAD) injection 0.5 mL  0.5 mL IntraMUSCular ONCE       Allergies: Codeine; Darvon [propoxyphene]; Demerol [meperidine];  Keflex [cephalexin]; and Talwin [pentazocine lactate]    Family History   Problem Relation Age of Onset    Coronary Artery Disease Mother     Heart Attack Mother     Heart Surgery Mother         CABGx3    Elevated Lipids Mother     COPD Father     Heart Attack Brother     COPD Brother     Other Brother         PAD     Social History     Socioeconomic History    Marital status:      Spouse name: Not on file    Number of children: Not on file    Years of education: Not on file    Highest education level: Not on file   Occupational History    Not on file   Social Needs    Financial resource strain: Not on file    Food insecurity     Worry: Not on file     Inability: Not on file   [employer] needs     Medical: Not on file     Non-medical: Not on file   Tobacco Use    Smoking status: Current Every Day Smoker     Packs/day: 1.00     Years: 45.00     Pack years: 45.00     Types: Cigarettes    Smokeless tobacco: Never Used   Substance and Sexual Activity    Alcohol use: No    Drug use: No    Sexual activity: Not on file   Lifestyle    Physical activity     Days per week: Not on file     Minutes per session: Not on file    Stress: Not on file   Relationships    Social connections     Talks on phone: Not on file     Gets together: Not on file     Attends Rastafari service: Not on file     Active member of club or organization: Not on file     Attends meetings of clubs or organizations: Not on file     Relationship status: Not on file    Intimate partner violence     Fear of current or ex partner: Not on file     Emotionally abused: Not on file     Physically abused: Not on file     Forced sexual activity: Not on file   Other Topics Concern    Not on file   Social History Narrative    Father worked in the [employer] and was diagnosed with Asbestosis.      Social History     Tobacco Use   Smoking Status Current Every Day Smoker    Packs/day: 1.00    Years: 45.00    Pack years: 45.00    Types: Cigarettes   Smokeless Tobacco Never Used        Temp (24hrs), Av.8 °F (37.7 °C), Min:98.9 °F (37.2 °C), Max:101.2 °F (38.4 °C)    Visit Vitals  BP (!) 99/51 (BP 1 Location: Right arm, BP Patient Position: At rest)   Pulse 85   Temp 100.4 °F (38 °C)   Resp 20 Ht 5' 5\" (1.651 m)   Wt 59.9 kg (132 lb)   SpO2 96%   Breastfeeding Unknown   BMI 21.97 kg/m²       ROS: 12 point ROS obtained in details. Pertinent positives as mentioned in HPI,   otherwise negative    Physical Exam:    General: Well developed, well nourished female sitting on the  bed AAOx3 in no acute distress. General:   awake alert and oriented   HEENT:  Normocephalic, atraumatic, PERRL, EOMI, no scleral icterus or pallor; no conjunctival hemmohage;  nasal and oral mucous are moist and without evidence of lesions. Neck supple, no bruits. Lymph Nodes:   no cervical, axillary or inguinal adenopathy   Lungs:   non-labored, bilaterally clear to auscultation- no crackles wheezes rales or rhonchi   Heart:  RRR, s1 and s2; no  rubs or gallops, no edema   Abdomen:  soft, non-distended, active bowel sounds, no hepatomegaly, no splenomegaly. Non-tender. No significant drainage right flank   Genitourinary:  deferred   Extremities:   no clubbing, cyanosis; no joint effusions or swelling; Full ROM of all large joints to the upper and lower extremities; muscle mass appropriate for age   Neurologic:  No gross focal sensory abnormalities; 5/5 muscle strength to upper and lower extremities. Speech appropriate.  Cranial nerves intact                        Skin:  No rash or ulcers noted   Back:  no spinal or paraspinal muscle tenderness or rigidity, no CVA tenderness     Psychiatric:  No suicidal or homicidal ideations, appropriate mood and affect         Labs: Results:   Chemistry Recent Labs     12/03/20  1420   *      K 3.7      CO2 29   BUN 14   CREA 0.86   CA 11.2*   AGAP 6   BUCR 16   AP 94   TP 8.1   ALB 2.9*   GLOB 5.2*   AGRAT 0.6*      CBC w/Diff Recent Labs     12/04/20  0311 12/03/20  1420   WBC 16.4* 16.2*   RBC 3.65* 4.14*   HGB 10.3* 11.5*   HCT 31.5* 35.4    478*   GRANS 61 67   LYMPH 26 27   EOS 0 0      Microbiology Recent Labs     12/03/20  1436 12/03/20  1420   CULT NO GROWTH AFTER 16 HOURS NO GROWTH AFTER 16 HOURS          RADIOLOGY:    All available imaging studies/reports in [facility] for this admission were reviewed    Dr. Penny Oliveros, Infectious Disease Specialist  [phone]  December 4, 2020  10:48 AM

## 2020-12-04 NOTE — PROGRESS NOTES
Problem: Falls - Risk of  Goal: *Absence of Falls  Description: Document Suzanne Corcoran Fall Risk and appropriate interventions in the flowsheet.   Outcome: Progressing Towards Goal  Note: Fall Risk Interventions:            Medication Interventions: Evaluate medications/consider consulting pharmacy, Patient to call before getting OOB, Teach patient to arise slowly

## 2020-12-04 NOTE — ED NOTES
Received report from Willis Felix, [facility].

## 2020-12-04 NOTE — PROGRESS NOTES
TRANSFER - IN REPORT:    Verbal report received from [address] [place] [address],[address], RN(name) on [employer]  being received from ER(unit) for routine progression of care      Report consisted of patients Situation, Background, Assessment and   Recommendations(SBAR). Information from the following report(s) SBAR, Kardex, ED Summary, Intake/Output and MAR was reviewed with the receiving nurse. Opportunity for questions and clarification was provided. Assessment completed upon patients arrival to unit and care assumed.

## 2020-12-04 NOTE — CONSULTS
History and Physical    Patient: Memo Schumacher MRN: [id number]  SSN: [id number]    YOB: 1950  Age: 65 y.o. Sex: female      Yaron Logan MD     Medical consult for-sepsis/COPD/hypertension    Subjective:      Memo Schumacher is a 65 y.o. female with past medical history of COPD Crohn's disease on Remicade, hypertension chronic history of smoking, PAD requiring surgery on both lower leg. Patient has a history of chronic right chest area open wound, with history of local dressing and local antibiotic use without any healing. She underwent to vascular surgery office with complaint of fatigue and not feeling well from where she has been admitted, she also had a blood culture which was MSSA positive, today repeat blood cultures all 4 out of 4 bottles are positive for gram-positive cocci repeat cultures has been sent ID consulted patient is on Zosyn based on previous culture report. Full code    Past Medical History:   Diagnosis Date    Arthritis     CAP (community acquired pneumonia) 06/27/2017    Chronic lung disease     Claudication ([address]     left leg    Crohn's disease ([address]     Crohn's disease ([address]     GERD (gastroesophageal reflux disease)     HTN (hypertension)     Ill-defined condition     Uses O2 at night.     Nausea & vomiting     Other and unspecified symptoms and signs involving general sensations and perceptions     PVD    Other ill-defined conditions(799.89)     Crohn's    Peripheral neuropathy     Pulmonary emphysema ([address]     PVD (peripheral vascular disease) ([address]     right leg    Sepsis ([address] 06/27/2017    Vitamin B12 deficiency      Past Surgical History:   Procedure Laterality Date    BYPASS GRAFT OTHR,AXILL-FEM Right 4/19/2013    BYPASS GRAFT OTHR,FEM-POP Left 5/2013    COLONOSCOPY N/A 9/11/2019    DIAGNOSTIC COL[facility]OSCOPY performed [facility] Jorge [facility] at [facility] ENDOSCOPY    FOOT/TOES SURGERY [address] UNLISTED      HX APPENDECTOMY      HX BREAST LUMPECTOMY Left     breast left- precancerous    HX BUNIONECTOMY      left eye    HX CATARACT REMOVAL      bilateral    HX CHOLECYSTECTOMY      HX ORTHOPAEDIC      lateral epicondilitis on right    HX OTHER SURGICAL  3/7/2013    catheter into aorta    HX OTHER SURGICAL      intestional resection x4    HX OTHER SURGICAL  9/2013    I & D Right chest/flank wall abscess vs granuloma    UPPER ARM/ELBOW SURGERY UNLISTED      VASCULAR SURGERY PROCEDURE UNLIST  09/10/2020    Debridement and washout of bypass graft. Family History   Problem Relation Age of Onset    Coronary Artery Disease Mother     Heart Attack Mother     Heart Surgery Mother         CABGx3    Elevated Lipids Mother     COPD Father     Heart Attack Brother     COPD Brother     Other Brother         PAD     Social History     Tobacco Use    Smoking status: Current Every Day Smoker     Packs/day: 1.00     Years: 45.00     Pack years: 45.00     Types: Cigarettes    Smokeless tobacco: Never Used   Substance Use Topics    Alcohol use: No      Prior to Admission medications    Medication Sig Start Date End Date Taking? Authorizing Provider   fluticasone-umeclidinium-vilanterol (Trelegy Ellipta) 100-62.5-25 mcg inhaler Take 1 Puff by inhalation daily. 11/10/20  Yes Haleigh Lyons MD   clopidogreL (PLAVIX) 75 mg tab TAKE ONE TABLET BY MOUTH DAILY 6/15/20  Yes Jhonny Dudley MD   pregabalin (LYRICA) 100 mg capsule Take  by mouth two (2) times a day. Takes 100 mg in am and 200 in the pm   Yes Provider, Historical   aspirin 81 mg tablet Take 81 mg by mouth daily. Yes Provider, Historical   cyanocobalamin (VITAMIN B-12) 1,000 mcg/mL injection 1,000 mcg by IntraMUSCular route every fourteen (14) days. Yes Provider, Historical   triamterene-hydrochlorothiazide (DYAZIDE) 37.5-25 mg per capsule Take 1 Cap by mouth daily.    Yes Provider, Historical   bimatoprost (LUMIGAN) 0.03 % ophthalmic drops Administer 1 Drop to both eyes every evening. Yes Provider, Historical   atropine-PHENobarbital-scopolamine-hyoscyamine (DONNATAL) 16.2-0.1037 -0.0194 mg per tablet Take 1 Tab by mouth as needed (diarrhea). Indications: Irritable Bowel Syndrome   Yes Provider, Historical   inFLIXimab (REMICADE) 100 mg injection 5 mg/kg by IntraVENous route once. Every 4 weeks   Yes Provider, Historical   ipratropium-albuteroL (Combivent Respimat)  mcg/actuation inhaler Take 1 Puff by inhalation every six (6) hours as needed for Wheezing or Shortness of Breath. 11/10/20   Kane Emmanuel MD   multivitamin (ONE A DAY) tablet Take 1 Tab by mouth daily. Provider, Historical   BIOTIN PO Take  by mouth. Provider, Historical   OTHER     Provider, Historical   loratadine (CLARITIN) 10 mg tablet Take 10 mg by mouth daily. Provider, Historical   fluticasone propionate (FLONASE ALLERGY RELIEF) 50 mcg/actuation nasal spray 2 Sprays by Both Nostrils route daily as needed for Rhinitis. Provider, Historical   DULoxetine (CYMBALTA) 60 mg capsule Take 60 mg by mouth nightly. For Anxiety. Provider, Historical   loperamide (IMMODIUM) 2 mg tablet Take 2 mg by mouth daily as needed for Diarrhea. Provider, Historical   dicyclomine (BENTYL) 10 mg capsule Take 10 mg by mouth daily. Provider, Historical        Allergies   Allergen Reactions    Codeine Nausea and Vomiting     Other reaction(s): other/intolerance    Darvon [Propoxyphene] Itching    Demerol [Meperidine] Other (comments)     Halucinations    Keflex [Cephalexin] Diarrhea    Talwin [Pentazocine Lactate] Shortness of Breath and Nausea and Vomiting           Review of Systems:  positive responses in bold type   Constitutional: Negative for fever, chills, diaphoresis and unexpected weight change. HENT: Negative for ear pain, congestion, sore throat, rhinorrhea, drooling, trouble swallowing, neck pain and tinnitus. Eyes: Negative for photophobia, pain, redness and visual disturbance.    Respiratory: negative for shortness of breath, cough, choking, chest tightness, wheezing or stridor. Cardiovascular: Negative for chest pain, palpitations , complains of fatigue  Gastrointestinal: Negative for nausea, vomiting, abdominal pain, diarrhea, constipation, blood in stool, abdominal distention and anal bleeding. Genitourinary: Negative for dysuria, urgency, frequency, hematuria, flank pain and difficulty urinating. Musculoskeletal: Negative for back pain and arthralgias. Skin: Negative for color change, rash and wound. Neurological: Negative for dizziness, seizures, syncope, speech difficulty, light-headedness or headaches. Hematological: Does not bruise/bleed easily. Psychiatric/Behavioral: Negative for suicidal ideas, hallucinations, behavioral problems, self-injury or agitation         Objective: Body mass index is 21.97 kg/m².   Vitals:    12/03/20 2215 12/03/20 2350 12/04/20 0336 12/04/20 0755   BP: 107/72 (!) 103/53 108/60 (!) 106/53   Pulse: 92 86 90 89   Resp: 28 22 23 20   Temp: 98.9 °F (37.2 °C) 99.4 °F (37.4 °C) 99.9 °F (37.7 °C) (!) 101.2 °F (38.4 °C)   SpO2: 97% 100% 100% 96%   Weight:       Height:            Physical Exam:  General appearance - alert, well appearing, and in no distress and oriented to person, place, and time  Mental status - alert, oriented to person, place, and time  Eyes - pupils equal and reactive, extraocular eye movements intact  Ears - bilateral TM's and external ear canals normal  Nose - normal and patent, no erythema, discharge or polyps  Mouth - mucous membranes moist, pharynx normal without lesions  Neck - supple, no significant adenopathy  Chest - clear to auscultation, no wheezes, rales or rhonchi, symmetric air entry  Heart - normal rate, regular rhythm, normal S1, S2, no murmurs, rubs, clicks or gallops  Abdomen - soft, nontender, nondistended, no masses or organomegaly  Extremities - peripheral pulses normal, no pedal edema, no clubbing or cyanosis Hospital Problems  Date Reviewed: 9/29/2020          Codes Class Noted POA    Cellulitis of abdominal wall ICD-10-CM: L03.311  ICD-9-CM: 682.2  12/3/2020 Unknown        UTI (urinary tract infection) ICD-10-CM: N39.0  ICD-9-CM: 599.0  12/3/2020 Unknown              CBC:  Lab Results   Component Value Date/Time    WBC 16.4 (H) 12/04/2020 03:11 AM    HGB 10.3 (L) 12/04/2020 03:11 AM    HCT 31.5 (L) 12/04/2020 03:11 AM    PLATELET 666 68/31/9179 03:11 AM    MCV 86.3 12/04/2020 03:11 AM        CMP:  Lab Results   Component Value Date/Time    Sodium 136 12/03/2020 02:20 PM    Potassium 3.7 12/03/2020 02:20 PM    Chloride 101 12/03/2020 02:20 PM    CO2 29 12/03/2020 02:20 PM    Anion gap 6 12/03/2020 02:20 PM    Glucose 102 (H) 12/03/2020 02:20 PM    BUN 14 12/03/2020 02:20 PM    Creatinine 0.86 12/03/2020 02:20 PM    BUN/Creatinine ratio 16 12/03/2020 02:20 PM    GFR est AA >60 12/03/2020 02:20 PM    GFR est non-AA >60 12/03/2020 02:20 PM    Calcium 11.2 (H) 12/03/2020 02:20 PM    Alk.  phosphatase 94 12/03/2020 02:20 PM    Protein, total 8.1 12/03/2020 02:20 PM    Albumin 2.9 (L) 12/03/2020 02:20 PM    Globulin 5.2 (H) 12/03/2020 02:20 PM    A-G Ratio 0.6 (L) 12/03/2020 02:20 PM    ALT (SGPT) 18 12/03/2020 02:20 PM        PT/INR  Lab Results   Component Value Date/Time    INR 1.3 (H) 12/03/2020 02:20 PM    INR 1.1 11/05/2020 09:35 AM    INR 1.0 04/24/2018 09:16 AM    Prothrombin time 15.9 (H) 12/03/2020 02:20 PM    Prothrombin time 14.1 11/05/2020 09:35 AM    Prothrombin time 12.2 04/24/2018 09:16 AM            EKG:   Results for orders placed or performed in visit on 04/09/13   AMB POC EKG ROUTINE W/ 12 LEADS, INTER & REP     Status: None    Narrative    EKG: unchanged from previous tracings, normal sinus rhythm,  inf.scar          Assessment And  Plan:     1 sepsis-temperature+ tachycardia+ wound infection+ positive blood cultures+ hypotension( Shock )   -IV fluid-normal saline 500 cc bolus-monitor blood pressure/IV albumin// Get random cortisol .  -Patient is started on IV antibiotic per ID recommendation  -Local wound care  -Patient to be transferred to stepdown for further closure management as patient is developing sepsis  -     2 COPD-without exacerbation  -As needed DuoNeb        3 H/O Crohn's disease  -Was on Remicade as an outpatient        Signed By: Jamison Ritter MD     December 4, 2020

## 2020-12-04 NOTE — PROGRESS NOTES
Pt admitted yesterday  ID has seen pt today  Started zosyn  Tylenol for fever  Fluids currently for BP - hospitalist following  ID has order CT  Dressing care ordered for bid with dakins to right flank wound  Pending CT and feedback from ID can do any necessary surgery on Monday

## 2020-12-04 NOTE — PROGRESS NOTES
conducted an initial consultation and Spiritual Assessment for [employer], who is a 65 y.o.,female. Patient's Primary Language is: [place]. According to the patient's EMR Advent Affiliation is: Zoroastrianism. The reason the Patient came to the hospital is:   Patient Active Problem List    Diagnosis Date Noted    Cellulitis of abdominal wall 12/03/2020    UTI (urinary tract infection) 12/03/2020    Open wound 09/10/2020    Chronic wound infection of abdomen 06/21/2018    Nonhealing surgical wound 04/30/2018    Infection of vascular bypass graft ([address] 04/30/2018    CAP (community acquired pneumonia) 06/27/2017    Severe sepsis ([address] 06/27/2017    Arteriovenous graft infection ([address] 05/10/2017    Abscess 05/10/2017    Dermal sinus tract of lumbosacral region 05/10/2017    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[address] 08/09/2016    Chronic aorto-iliac occlusion syndrome ([address] 01/19/2016    Wound disruption, post-op, skin 01/09/2015    Mass of breast, left 07/30/2014    HTN (hypertension) 04/01/2013    PVD (peripheral vascular disease) ([address] 04/01/2013    Crohn's disease ([facility] 04/01/2013        The  provided the following Interventions:  Initiated a relationship of care and support. Explored issues of eleazar, belief, spirituality and Roman Catholic/ritual needs while hospitalized. Listened empathically. Provided chaplaincy education. Provided information about Spiritual Care Services. Offered prayer and assurance of continued prayers on patient's behalf. Chart reviewed. The following outcomes where achieved:  Patient shared limited information about both their medical narrative and spiritual journey/beliefs. Patient processed feeling about current hospitalization. Patient expressed gratitude for 's visit. Assessment:  Patient does not have any Roman Catholic/cultural needs that will affect patient's preferences in health care.   There are no spiritual or Roman Catholic issues which require intervention at this time. Plan:  Chaplains will continue to follow and will provide pastoral care on an as needed/requested basis.  recommends bedside caregivers page  on duty if patient shows signs of acute spiritual or emotional distress. Chaplain HOOPER  [address] [place] [address]   [phone]

## 2020-12-04 NOTE — ED NOTES
Patient in bed resting quietly. Requesting \"200 mg of Lyrica, my feet are burning badly. \" Will speak to the doctor regarding patient's request. Patient has no further request or complaints at this time.

## 2020-12-04 NOTE — PROGRESS NOTES
1836: TRANSFER - IN REPORT:    Verbal report received from Jan Skinner RN(name) on [employer]  being received from [address] [place] [address](unit) for change in patient condition(sepsis, hypotension, close monitoring)      Report consisted of patients Situation, Background, Assessment and   Recommendations(SBAR). Information from the following report(s) SBAR, Kardex, Intake/Output, MAR, Recent Results and Cardiac Rhythm NSR was reviewed with the receiving nurse. Opportunity for questions and clarification was provided. Assessment completed upon patients arrival to unit and care assumed. Bedside shift change report given to Carrillo Carreno RN (oncoming nurse) by Bernie Mann RN (offgoing nurse). Report included the following information SBAR, Intake/Output, MAR, Recent Results and Cardiac Rhythm NSR.

## 2020-12-05 LAB
ANION GAP SERPL CALC-SCNC: 4 MMOL/L (ref 3–18)
BASOPHILS # BLD: 0 K/UL (ref 0–0.1)
BASOPHILS NFR BLD: 0 % (ref 0–2)
BUN SERPL-MCNC: 8 MG/DL (ref 7–18)
BUN/CREAT SERPL: 12 (ref 12–20)
CALCIUM SERPL-MCNC: 10.5 MG/DL (ref 8.5–10.1)
CHLORIDE SERPL-SCNC: 109 MMOL/L (ref 100–111)
CO2 SERPL-SCNC: 27 MMOL/L (ref 21–32)
CORTIS SERPL-MCNC: 24.3 UG/DL
CORTIS SERPL-MCNC: 30.4 UG/DL
CREAT SERPL-MCNC: 0.69 MG/DL (ref 0.6–1.3)
DIFFERENTIAL METHOD BLD: ABNORMAL
EOSINOPHIL # BLD: 0.2 K/UL (ref 0–0.4)
EOSINOPHIL NFR BLD: 2 % (ref 0–5)
ERYTHROCYTE [DISTWIDTH] IN BLOOD BY AUTOMATED COUNT: 14.7 % (ref 11.6–14.5)
GLUCOSE SERPL-MCNC: 94 MG/DL (ref 74–99)
HCT VFR BLD AUTO: 26.7 % (ref 35–45)
HGB BLD-MCNC: 8.5 G/DL (ref 12–16)
LYMPHOCYTES # BLD: 2.2 K/UL (ref 0.9–3.6)
LYMPHOCYTES NFR BLD: 21 % (ref 21–52)
MCH RBC QN AUTO: 27.6 PG (ref 24–34)
MCHC RBC AUTO-ENTMCNC: 31.8 G/DL (ref 31–37)
MCV RBC AUTO: 86.7 FL (ref 74–97)
MONOCYTES # BLD: 1.1 K/UL (ref 0.05–1.2)
MONOCYTES NFR BLD: 11 % (ref 3–10)
NEUTS SEG # BLD: 6.9 K/UL (ref 1.8–8)
NEUTS SEG NFR BLD: 66 % (ref 40–73)
PLATELET # BLD AUTO: 361 K/UL (ref 135–420)
PMV BLD AUTO: 10.1 FL (ref 9.2–11.8)
POTASSIUM SERPL-SCNC: 3 MMOL/L (ref 3.5–5.5)
RBC # BLD AUTO: 3.08 M/UL (ref 4.2–5.3)
SARS-COV-2, COV2NT: NOT DETECTED
SODIUM SERPL-SCNC: 140 MMOL/L (ref 136–145)
WBC # BLD AUTO: 10.4 K/UL (ref 4.6–13.2)

## 2020-12-05 PROCEDURE — 74011250636 HC RX REV CODE- 250/636: Performed by: INTERNAL MEDICINE

## 2020-12-05 PROCEDURE — 94640 AIRWAY INHALATION TREATMENT: CPT

## 2020-12-05 PROCEDURE — 74011250637 HC RX REV CODE- 250/637: Performed by: HOSPITALIST

## 2020-12-05 PROCEDURE — P9047 ALBUMIN (HUMAN), 25%, 50ML: HCPCS | Performed by: INTERNAL MEDICINE

## 2020-12-05 PROCEDURE — 65660000004 HC RM CVT STEPDOWN

## 2020-12-05 PROCEDURE — 74011000258 HC RX REV CODE- 258: Performed by: INTERNAL MEDICINE

## 2020-12-05 PROCEDURE — 80048 BASIC METABOLIC PNL TOTAL CA: CPT

## 2020-12-05 PROCEDURE — 99232 SBSQ HOSP IP/OBS MODERATE 35: CPT | Performed by: HOSPITALIST

## 2020-12-05 PROCEDURE — 99231 SBSQ HOSP IP/OBS SF/LOW 25: CPT | Performed by: SURGERY

## 2020-12-05 PROCEDURE — 36415 COLL VENOUS BLD VENIPUNCTURE: CPT

## 2020-12-05 PROCEDURE — 85025 COMPLETE CBC W/AUTO DIFF WBC: CPT

## 2020-12-05 PROCEDURE — 2709999900 HC NON-CHARGEABLE SUPPLY

## 2020-12-05 PROCEDURE — 74011000250 HC RX REV CODE- 250: Performed by: HOSPITALIST

## 2020-12-05 PROCEDURE — 94762 N-INVAS EAR/PLS OXIMTRY CONT: CPT

## 2020-12-05 PROCEDURE — 77010033678 HC OXYGEN DAILY

## 2020-12-05 RX ORDER — GUAIFENESIN 100 MG/5ML
81 LIQUID (ML) ORAL DAILY
Status: DISCONTINUED | OUTPATIENT
Start: 2020-12-06 | End: 2020-12-11 | Stop reason: HOSPADM

## 2020-12-05 RX ORDER — BUDESONIDE 0.25 MG/2ML
250 INHALANT ORAL
Status: DISCONTINUED | OUTPATIENT
Start: 2020-12-05 | End: 2020-12-11 | Stop reason: HOSPADM

## 2020-12-05 RX ORDER — POTASSIUM CHLORIDE 20 MEQ/1
40 TABLET, EXTENDED RELEASE ORAL
Status: COMPLETED | OUTPATIENT
Start: 2020-12-05 | End: 2020-12-05

## 2020-12-05 RX ORDER — PREGABALIN 50 MG/1
100 CAPSULE ORAL 2 TIMES DAILY
Status: DISCONTINUED | OUTPATIENT
Start: 2020-12-05 | End: 2020-12-11 | Stop reason: HOSPADM

## 2020-12-05 RX ORDER — CLOPIDOGREL BISULFATE 75 MG/1
75 TABLET ORAL DAILY
Status: DISCONTINUED | OUTPATIENT
Start: 2020-12-06 | End: 2020-12-11 | Stop reason: HOSPADM

## 2020-12-05 RX ORDER — DULOXETIN HYDROCHLORIDE 60 MG/1
60 CAPSULE, DELAYED RELEASE ORAL
Status: DISCONTINUED | OUTPATIENT
Start: 2020-12-05 | End: 2020-12-07

## 2020-12-05 RX ORDER — LORATADINE 10 MG/1
10 TABLET ORAL DAILY
Status: DISCONTINUED | OUTPATIENT
Start: 2020-12-06 | End: 2020-12-11 | Stop reason: HOSPADM

## 2020-12-05 RX ORDER — LATANOPROST 50 UG/ML
1 SOLUTION/ DROPS OPHTHALMIC EVERY EVENING
Status: DISCONTINUED | OUTPATIENT
Start: 2020-12-05 | End: 2020-12-11 | Stop reason: HOSPADM

## 2020-12-05 RX ORDER — ARFORMOTEROL TARTRATE 15 UG/2ML
15 SOLUTION RESPIRATORY (INHALATION)
Status: DISCONTINUED | OUTPATIENT
Start: 2020-12-05 | End: 2020-12-11 | Stop reason: HOSPADM

## 2020-12-05 RX ORDER — IPRATROPIUM BROMIDE 0.5 MG/2.5ML
0.5 SOLUTION RESPIRATORY (INHALATION)
Status: DISCONTINUED | OUTPATIENT
Start: 2020-12-05 | End: 2020-12-11 | Stop reason: HOSPADM

## 2020-12-05 RX ORDER — FLUTICASONE PROPIONATE 50 MCG
2 SPRAY, SUSPENSION (ML) NASAL
Status: DISCONTINUED | OUTPATIENT
Start: 2020-12-05 | End: 2020-12-11 | Stop reason: HOSPADM

## 2020-12-05 RX ADMIN — PREGABALIN 100 MG: 50 CAPSULE ORAL at 12:01

## 2020-12-05 RX ADMIN — PIPERACILLIN AND TAZOBACTAM 3.38 G: 3; .375 INJECTION, POWDER, LYOPHILIZED, FOR SOLUTION INTRAVENOUS at 16:30

## 2020-12-05 RX ADMIN — DULOXETINE HYDROCHLORIDE 60 MG: 60 CAPSULE, DELAYED RELEASE ORAL at 22:08

## 2020-12-05 RX ADMIN — BUDESONIDE 250 MCG: 0.25 INHALANT RESPIRATORY (INHALATION) at 21:02

## 2020-12-05 RX ADMIN — LATANOPROST 1 DROP: 50 SOLUTION OPHTHALMIC at 17:06

## 2020-12-05 RX ADMIN — PREGABALIN 100 MG: 50 CAPSULE ORAL at 20:30

## 2020-12-05 RX ADMIN — SODIUM HYPOCHLORITE: 2.5 SOLUTION TOPICAL at 08:45

## 2020-12-05 RX ADMIN — ALBUMIN (HUMAN) 25 G: 0.25 INJECTION, SOLUTION INTRAVENOUS at 08:41

## 2020-12-05 RX ADMIN — POTASSIUM CHLORIDE 40 MEQ: 1500 TABLET, EXTENDED RELEASE ORAL at 12:50

## 2020-12-05 RX ADMIN — ARFORMOTEROL TARTRATE 15 MCG: 15 SOLUTION RESPIRATORY (INHALATION) at 21:02

## 2020-12-05 RX ADMIN — ALBUMIN (HUMAN) 25 G: 0.25 INJECTION, SOLUTION INTRAVENOUS at 15:06

## 2020-12-05 RX ADMIN — PIPERACILLIN AND TAZOBACTAM 3.38 G: 3; .375 INJECTION, POWDER, LYOPHILIZED, FOR SOLUTION INTRAVENOUS at 22:08

## 2020-12-05 RX ADMIN — PIPERACILLIN AND TAZOBACTAM 3.38 G: 3; .375 INJECTION, POWDER, LYOPHILIZED, FOR SOLUTION INTRAVENOUS at 05:58

## 2020-12-05 RX ADMIN — PIPERACILLIN AND TAZOBACTAM 3.38 G: 3; .375 INJECTION, POWDER, LYOPHILIZED, FOR SOLUTION INTRAVENOUS at 10:48

## 2020-12-05 RX ADMIN — SODIUM HYPOCHLORITE: 2.5 SOLUTION TOPICAL at 17:03

## 2020-12-05 NOTE — PROGRESS NOTES
1900 - Bedside and Verbal shift change report given to Carrillo Carreno RN (oncoming nurse) by Woodard Kanner, RN (offgoing nurse). Report included the following information SBAR, Kardex, Intake/Output, MAR, Recent Results and Cardiac Rhythm SR.     2000 - Patient arrived to unit; assessment completed; PIV present on LAC on arrival, leaking/infiltrated, redness observed at site; PIV removed; dressing present on right side of abdomen wet with sanguinous drainage; wound cleansed with dakins solution, wet to dry dressing replaced; will continue to monitor patient. 2200 - PIV replaced by Ashely Porter, ICU RN; IV antibiotic administered; will continue to monitor patient. 0700 - Bedside and Verbal shift change report given to Eduardo Lim RN/SPENCER Gardner (oncoming nurse) by Carrillo Carreno RN (offgoing nurse). Report included the following information SBAR, Kardex, Intake/Output, MAR, Recent Results and Cardiac Rhythm SR/ST.

## 2020-12-05 NOTE — PROGRESS NOTES
Bedside, Verbal, and Written shift change report given to Ivan Phillips RN (oncoming nurse) by Janette Rosado RN (offgoing nurse). Report included the following information SBAR, Kardex, Intake/Output, MAR, Recent Results, and Cardiac Rhythm NSR.     0950 IV occluded, new IV started in right AC.     1800 pt wound dressing cleaned and changed. Bedside, Verbal, and Written shift change report given to Janette Rosado RN (oncoming nurse) by Ivan Phillips RN (offgoing nurse). Report included the following information SBAR, Kardex, Intake/Output, MAR, Recent Results, and Cardiac Rhythm NSR.

## 2020-12-05 NOTE — PROGRESS NOTES
Vascular Surgery Progress Note    Admit Date: 12/3/2020  POD * No surgery found *    Procedure:  * No surgery found *      Subjective:     Patient has no new complaints. Has some drainage from the ulcers on the right flank    Objective:     Blood pressure (!) 108/51, pulse 68, temperature 98.3 °F (36.8 °C), resp. rate 23, height 5' 5\" (1.651 m), weight 135 lb 12.8 oz (61.6 kg), SpO2 97 %, unknown if currently breastfeeding.     Temp (24hrs), Av °F (37.2 °C), Min:98.3 °F (36.8 °C), Max:99.5 °F (37.5 °C)      Physical Exam:  LUNG:  clear to auscultation bilaterally, HEART:  S1, S2 normal, ABDOMEN:  no change and Right flank with ulceration and some drainage    Labs: Results:       Chemistry Recent Labs     20  0238 20  1822 20  1420   GLU 94 96 102*    141 136   K 3.0* 3.4* 3.7    107 101   CO2 27 28 29   BUN 8 11 14   CREA 0.69 0.72 0.86   CA 10.5* 10.2* 11.2*   AGAP 4 6 6   BUCR 12 15 16   AP  --   --  94   TP  --   --  8.1   ALB  --   --  2.9*   GLOB  --   --  5.2*   AGRAT  --   --  0.6*      CBC w/Diff Recent Labs     20  0238 20  0311 20  1420   WBC 10.4 16.4* 16.2*   RBC 3.08* 3.65* 4.14*   HGB 8.5* 10.3* 11.5*   HCT 26.7* 31.5* 35.4    384 478*   GRANS 66 61 67   LYMPH 21 26 27   EOS 2 0 0      Microbiology Recent Labs     20  2254 20  1436 20  1420   CULT LIGHT POSSIBLE STAPHYLOCOCCUS AUREUS* PROBABLE STAPHYLOCOCCUS AUREUS GROWING IN BOTH BOTTLES DRAWN* PROBABLE STAPHYLOCOCCUS AUREUS GROWING IN BOTH BOTTLES DRAWN*      Coagulation Recent Labs     20  1420   PTP 15.9*   INR 1.3*   APTT 40.3*         Data Review: images and reports reviewed    Assessment:     Active Problems:    Cellulitis of abdominal wall (12/3/2020)      UTI (urinary tract infection) (12/3/2020)        Plan/Recommendations/Medical Decision Making:     Continue present treatment   Await follow-up cultures  Will discuss with ID, consider revision of axillofemoral bypass if felt beneficial.

## 2020-12-05 NOTE — PROGRESS NOTES
The following therapeutic interchange was done per the [employer] therapeutic interchange policy:    Medication Ordered Preferred Medication Dispensed     Trelegy Ellipta® (fluticasone/umeclidinium/vilanterol) 100/62.5/25 mcg daily Pulmicort Respules® (budesonide) 0.25mg/2ml BID  +  Arformoterol 15mcg/2ml BID  +  Ipratropium scheduled q8h

## 2020-12-06 LAB
ANION GAP SERPL CALC-SCNC: 8 MMOL/L (ref 3–18)
BACTERIA SPEC CULT: ABNORMAL
BASOPHILS # BLD: 0 K/UL (ref 0–0.1)
BASOPHILS NFR BLD: 0 % (ref 0–2)
BUN SERPL-MCNC: 8 MG/DL (ref 7–18)
BUN/CREAT SERPL: 10 (ref 12–20)
CALCIUM SERPL-MCNC: 10.6 MG/DL (ref 8.5–10.1)
CC UR VC: ABNORMAL
CHLORIDE SERPL-SCNC: 111 MMOL/L (ref 100–111)
CO2 SERPL-SCNC: 24 MMOL/L (ref 21–32)
CREAT SERPL-MCNC: 0.81 MG/DL (ref 0.6–1.3)
DIFFERENTIAL METHOD BLD: ABNORMAL
EOSINOPHIL # BLD: 0.2 K/UL (ref 0–0.4)
EOSINOPHIL NFR BLD: 1 % (ref 0–5)
ERYTHROCYTE [DISTWIDTH] IN BLOOD BY AUTOMATED COUNT: 14.9 % (ref 11.6–14.5)
GLUCOSE SERPL-MCNC: 126 MG/DL (ref 74–99)
GRAM STN SPEC: ABNORMAL
HCT VFR BLD AUTO: 26.5 % (ref 35–45)
HGB BLD-MCNC: 8.4 G/DL (ref 12–16)
LYMPHOCYTES # BLD: 3.1 K/UL (ref 0.9–3.6)
LYMPHOCYTES NFR BLD: 26 % (ref 21–52)
MCH RBC QN AUTO: 27.5 PG (ref 24–34)
MCHC RBC AUTO-ENTMCNC: 31.7 G/DL (ref 31–37)
MCV RBC AUTO: 86.6 FL (ref 74–97)
MONOCYTES # BLD: 1.1 K/UL (ref 0.05–1.2)
MONOCYTES NFR BLD: 9 % (ref 3–10)
NEUTS SEG # BLD: 7.4 K/UL (ref 1.8–8)
NEUTS SEG NFR BLD: 64 % (ref 40–73)
PLATELET # BLD AUTO: 407 K/UL (ref 135–420)
PMV BLD AUTO: 10 FL (ref 9.2–11.8)
POTASSIUM SERPL-SCNC: 3.5 MMOL/L (ref 3.5–5.5)
RBC # BLD AUTO: 3.06 M/UL (ref 4.2–5.3)
SERVICE CMNT-IMP: ABNORMAL
SODIUM SERPL-SCNC: 143 MMOL/L (ref 136–145)
WBC # BLD AUTO: 11.7 K/UL (ref 4.6–13.2)

## 2020-12-06 PROCEDURE — 94640 AIRWAY INHALATION TREATMENT: CPT

## 2020-12-06 PROCEDURE — 85025 COMPLETE CBC W/AUTO DIFF WBC: CPT

## 2020-12-06 PROCEDURE — 77010033678 HC OXYGEN DAILY

## 2020-12-06 PROCEDURE — 99232 SBSQ HOSP IP/OBS MODERATE 35: CPT | Performed by: HOSPITALIST

## 2020-12-06 PROCEDURE — 74011250637 HC RX REV CODE- 250/637: Performed by: HOSPITALIST

## 2020-12-06 PROCEDURE — 2709999900 HC NON-CHARGEABLE SUPPLY

## 2020-12-06 PROCEDURE — 36415 COLL VENOUS BLD VENIPUNCTURE: CPT

## 2020-12-06 PROCEDURE — 74011250636 HC RX REV CODE- 250/636: Performed by: INTERNAL MEDICINE

## 2020-12-06 PROCEDURE — 74011000250 HC RX REV CODE- 250: Performed by: HOSPITALIST

## 2020-12-06 PROCEDURE — 87040 BLOOD CULTURE FOR BACTERIA: CPT

## 2020-12-06 PROCEDURE — 80048 BASIC METABOLIC PNL TOTAL CA: CPT

## 2020-12-06 PROCEDURE — 65660000004 HC RM CVT STEPDOWN

## 2020-12-06 PROCEDURE — 74011000258 HC RX REV CODE- 258: Performed by: INTERNAL MEDICINE

## 2020-12-06 RX ADMIN — SODIUM HYPOCHLORITE: 2.5 SOLUTION TOPICAL at 17:03

## 2020-12-06 RX ADMIN — PIPERACILLIN AND TAZOBACTAM 3.38 G: 3; .375 INJECTION, POWDER, LYOPHILIZED, FOR SOLUTION INTRAVENOUS at 04:41

## 2020-12-06 RX ADMIN — LATANOPROST 1 DROP: 50 SOLUTION OPHTHALMIC at 17:02

## 2020-12-06 RX ADMIN — SODIUM HYPOCHLORITE: 2.5 SOLUTION TOPICAL at 08:04

## 2020-12-06 RX ADMIN — CLOPIDOGREL BISULFATE 75 MG: 75 TABLET ORAL at 08:03

## 2020-12-06 RX ADMIN — PIPERACILLIN AND TAZOBACTAM 3.38 G: 3; .375 INJECTION, POWDER, LYOPHILIZED, FOR SOLUTION INTRAVENOUS at 22:52

## 2020-12-06 RX ADMIN — ARFORMOTEROL TARTRATE 15 MCG: 15 SOLUTION RESPIRATORY (INHALATION) at 19:39

## 2020-12-06 RX ADMIN — PIPERACILLIN AND TAZOBACTAM 3.38 G: 3; .375 INJECTION, POWDER, LYOPHILIZED, FOR SOLUTION INTRAVENOUS at 10:23

## 2020-12-06 RX ADMIN — BUDESONIDE 250 MCG: 0.25 INHALANT RESPIRATORY (INHALATION) at 09:45

## 2020-12-06 RX ADMIN — LORATADINE 10 MG: 10 TABLET ORAL at 08:03

## 2020-12-06 RX ADMIN — IPRATROPIUM BROMIDE 0.5 MG: 0.5 SOLUTION RESPIRATORY (INHALATION) at 15:05

## 2020-12-06 RX ADMIN — IPRATROPIUM BROMIDE 0.5 MG: 0.5 SOLUTION RESPIRATORY (INHALATION) at 09:45

## 2020-12-06 RX ADMIN — ARFORMOTEROL TARTRATE 15 MCG: 15 SOLUTION RESPIRATORY (INHALATION) at 09:45

## 2020-12-06 RX ADMIN — ASPIRIN 81 MG CHEWABLE TABLET 81 MG: 81 TABLET CHEWABLE at 08:03

## 2020-12-06 RX ADMIN — PIPERACILLIN AND TAZOBACTAM 3.38 G: 3; .375 INJECTION, POWDER, LYOPHILIZED, FOR SOLUTION INTRAVENOUS at 16:21

## 2020-12-06 RX ADMIN — BUDESONIDE 250 MCG: 0.25 INHALANT RESPIRATORY (INHALATION) at 19:39

## 2020-12-06 RX ADMIN — IPRATROPIUM BROMIDE 0.5 MG: 0.5 SOLUTION RESPIRATORY (INHALATION) at 23:35

## 2020-12-06 RX ADMIN — PREGABALIN 100 MG: 50 CAPSULE ORAL at 08:03

## 2020-12-06 RX ADMIN — PREGABALIN 100 MG: 50 CAPSULE ORAL at 20:03

## 2020-12-06 RX ADMIN — DULOXETINE HYDROCHLORIDE 60 MG: 60 CAPSULE, DELAYED RELEASE ORAL at 22:52

## 2020-12-06 NOTE — PROGRESS NOTES
[facility]  [facility]    Patient: Felicita Ann Age: 65 y.o. : 1950 MR#: [id number] SSN: [id number]  Date/Time: 2020     Subjective:     Pt seen & evaluated , lying in bed, NAD, dressing changed by nursing         Assessment/Plan:     1. Bacteremia - continue IV Zosyn , ID on board   2. UTI - continue IV abx , Urine cx not sent   3. H/o COPD - continue BD  4. H/o Crohn's disease - was on remicade  5. Resume home meds - cymbalta & lyrica  6. AOCD - monitor H&H   DVT Px - SCD   FC           Case discussed with:  [x]Patient  []Family  []Nursing  []Case Management  DVT Prophylaxis:  []Lovenox  []Hep SQ  []SCDs  []Coumadin   []On Heparin gtt    Objective:   VS:   Visit Vitals  BP (!) 113/47 (BP 1 Location: Left arm, BP Patient Position: At rest;Head of bed elevated (Comment degrees))   Pulse 68   Temp 98.1 °F (36.7 °C)   Resp 25   Ht 5' 5\" (1.651 m)   Wt 61.2 kg (135 lb)   SpO2 96%   Breastfeeding Unknown   BMI 22.47 kg/m²      Tmax/24hrs: Temp (24hrs), Av.7 °F (37.1 °C), Min:98.1 °F (36.7 °C), Max:99.5 °F (37.5 °C)  IOBRIEF    Intake/Output Summary (Last 24 hours) at 2020 1549  Last data filed at 2020 1200  Gross per 24 hour   Intake 1000 ml   Output    Net 1000 ml       General:  Alert, cooperative, no acute distress    HEENT: PERRLA, anicteric sclerae. Pulmonary:  CTA Bilaterally. No Wheezing/Rhonchi/Rales. Cardiovascular: Regular rate and Rhythm. GI:  Soft, Non distended, Non tender. + Bowel sounds. Extremities:  No edema, cyanosis, clubbing. No calf tenderness. Neurologic: Alert and oriented X 3. No acute neuro deficits.   Additional:    Medications:   Current Facility-Administered Medications   Medication Dose Route Frequency    aspirin chewable tablet 81 mg  81 mg Oral DAILY    latanoprost (XALATAN) 0.005 % ophthalmic solution 1 Drop  1 Drop Both Eyes QPM    clopidogreL (PLAVIX) tablet 75 mg  75 mg Oral DAILY    DULoxetine (CYMBALTA) capsule 60 mg  60 mg Oral QHS    fluticasone propionate (FLONASE) 50 mcg/actuation nasal spray 2 Spray  2 Spray Both Nostrils DAILY PRN    loratadine (CLARITIN) tablet 10 mg  10 mg Oral DAILY    pregabalin (LYRICA) capsule 100 mg  100 mg Oral BID    budesonide (PULMICORT) 250 mcg/2ml nebulizer susp  250 mcg Nebulization BID RT    ipratropium (ATROVENT) 0.02 % nebulizer solution 0.5 mg  0.5 mg Nebulization Q8H RT    arformoteroL (BROVANA) neb solution 15 mcg  15 mcg Nebulization BID RT    acetaminophen (TYLENOL) tablet 500 mg  500 mg Oral Q4H PRN    piperacillin-tazobactam (ZOSYN) 3.375 g in 0.9% sodium chloride (MBP/ADV) 100 mL MBP  3.375 g IntraVENous Q6H    sodium hypochlorite (HALF STRENGTH DAKIN'S) 0.25% irrigation (bottle)   Topical BID       Imaging:   XR Results (most recent):  Results from Hospital Encounter encounter on 12/03/20   XR CHEST SNGL V    Narrative EXAM: Chest Radiograph    INDICATION:  flank pain    TECHNIQUE: AP view of the chest    COMPARISON: 4/24/2018, 7/19/2017, 7/27/2017 and 3/29/2013    FINDINGS: No pneumothorax identified. The lungs are hyperinflated. No acute  infiltrate or effusion appreciated. The cardiac silhouette is unremarkable. Calcifications are noted in the thoracic aorta. The pulmonary vasculature is  unremarkable. Severe degenerative changes of the shoulders and spine. Impression Impression:  1. No acute infiltrate or effusion. 2.  Hyperinflated lungs suggestive COPD.          CT Results (most recent):  Results from Hospital Encounter encounter on 12/03/20   CT CHEST ABD PELV W CONT    Narrative EXAM:  CT CHEST ABD PELV W CONT    INDICATION: evaluate for vascular graft infection, occult infection  , positive  blood cultures and as as a positive, gram-positive cocci positive, history of  chronic right chest area open wound nonresponsive to outpatient local dressings  and antibiotics, generalized fatigue and malaise, fever, tachycardia, history of  Crohn's disease, right flank wound, severe peripheral artery disease status post  right axillary femoral graft, left femoral-popliteal graft, status post revision  of axillary to femoral bypass graft with excision of an infected graft on  9/21/2020, status post excision of the ulcer over the wound and debridement of  the wound on 11/5/2020, now with recent history of purulent drainage to the  right abdominal surgical site, fevers x1 week, gram-positive bacteremia, likely  vascular graft infection    COMPARISON: CT chest November 10, 2018    CONTRAST:  100 mL of Isovue-370. TECHNIQUE:   Following the uneventful intravenous administration of contrast, thin axial  images were obtained through the chest, abdomen and pelvis. Coronal and sagittal  reconstructions were generated. Oral contrast was not administered. CT dose  reduction was achieved through use of a standardized protocol tailored for this  examination and automatic exposure control for dose modulation. FINDINGS:     CHEST:  THYROID: No nodule. MEDIASTINUM: Several stable minimally prominent middle mediastinal nodes  measuring less than 1 cm in the short axis dimension. Image 21 series 2. NEETU: No mass or lymphadenopathy. THORACIC AORTA: No dissection or aneurysm. MAIN PULMONARY ARTERY: Normal in caliber. TRACHEA/BRONCHI: Patent. ESOPHAGUS: No wall thickening or dilatation. HEART: Normal in size. PLEURA: No effusion or pneumothorax. LUNGS: Stable emphysema. Mild right greater than left basal subsegmental  atelectasis. ABDOMEN:  LIVER: No mass or intrahepatic biliary dilatation. There is mild dilatation of  the extrahepatic common duct measuring 9 mm which tapers to the ampulla. GALLBLADDER: Surgically removed  SPLEEN: No mass. PANCREAS: No mass or ductal dilatation. ADRENALS: Stable mild thickening of the left adrenal. Normal right adrenal.  KIDNEYS: No mass, calculus, or hydronephrosis.  Bilateral mild cortical scarring  STOMACH: Unremarkable. SMALL BOWEL: No dilatation or wall thickening. COLON: Scattered diverticula of the descending colon without evidence of  surrounding inflammation. APPENDIX: Unremarkable. PERITONEUM: No ascites or pneumoperitoneum. RETROPERITONEUM: No lymphadenopathy or aortic aneurysm. PELVIS:  REPRODUCTIVE ORGANS: Left ovarian 3.6 cm simple cyst. Uterus has been surgically  removed. URINARY BLADDER: No mass or calculus. BONES: Degenerative disc disease at L5/S1 with reactive endplate sclerosis. ADDITIONAL COMMENTS: Right axillary to femoral graft is patent. An additional very short segment abandoned graft is seen at the right lateral  chest wall inferiorly located more posteriorly to the patent graft. At its  inferior border there is soft tissue density extending to the overlying chest  wall at the site of prior surgery which has decreased from the previous exam.    There is surrounding low density/soft tissue density in the subcutaneous fat  involving the more anterior patent graft, image 31 through image 37 series 3. At  this level the opacified lumen is decreased in size with a slitlike surrounded  configuration by soft tissue density within the lumen of the stent. The AP  diameter of the contrast opacified lumen is 3 mm with a transverse diameter of 5  mm. Image 34 series 2. The diameter of the graft itself at this site is 1 x 1.1  cm. See coronal image 16 series 604. Sagittal image 64 series 605. The  immediately proximal graft is completely opacified measuring 1 x 0.9 cm and the  more distal graft is completely opacified measuring 1.2 x 0.9 cm. Probable small amount of eccentric thrombus at the distal level of the stent  graft, image 55 series 3. See coronal image 16 series 604, sagittal image 60  series 605.   There is additional surrounding soft tissue density in the subcutaneous fat at  the right lower quadrant abdominal wall where a short segment of apparent  endograft in the patent graft converge to merge with the femoral artery. Impression IMPRESSION:  Findings are concerning for infection at the level of the mid abdominal and  possibly distal abdominal right axillary femoral graft with surrounding soft  tissue density and intraluminal thrombus with narrowing of the opacified lumen. As clinically indicated follow-up nuclear isotope infection scan may be of  benefit. Left ovarian cyst.  Mild extrahepatic biliary dilatation likely reservoir effect. Diverticulosis coli without evidence of diverticulitis. Minimal bibasal lung dependent atelectasis.                Labs:    Recent Results (from the past 48 hour(s))   CORTISOL    Collection Time: 12/04/20  6:22 PM   Result Value Ref Range    Cortisol, random 70.9 ug/dL   METABOLIC PANEL, BASIC    Collection Time: 12/04/20  6:22 PM   Result Value Ref Range    Sodium 141 136 - 145 mmol/L    Potassium 3.4 (L) 3.5 - 5.5 mmol/L    Chloride 107 100 - 111 mmol/L    CO2 28 21 - 32 mmol/L    Anion gap 6 3.0 - 18 mmol/L    Glucose 96 74 - 99 mg/dL    BUN 11 7.0 - 18 MG/DL    Creatinine 0.72 0.6 - 1.3 MG/DL    BUN/Creatinine ratio 15 12 - 20      GFR est AA >60 >60 ml/min/1.73m2    GFR est non-AA >60 >60 ml/min/1.73m2    Calcium 10.2 (H) 8.5 - 32.1 MG/DL   METABOLIC PANEL, BASIC    Collection Time: 12/05/20  2:38 AM   Result Value Ref Range    Sodium 140 136 - 145 mmol/L    Potassium 3.0 (L) 3.5 - 5.5 mmol/L    Chloride 109 100 - 111 mmol/L    CO2 27 21 - 32 mmol/L    Anion gap 4 3.0 - 18 mmol/L    Glucose 94 74 - 99 mg/dL    BUN 8 7.0 - 18 MG/DL    Creatinine 0.69 0.6 - 1.3 MG/DL    BUN/Creatinine ratio 12 12 - 20      GFR est AA >60 >60 ml/min/1.73m2    GFR est non-AA >60 >60 ml/min/1.73m2    Calcium 10.5 (H) 8.5 - 10.1 MG/DL   CBC WITH AUTOMATED DIFF    Collection Time: 12/05/20  2:38 AM   Result Value Ref Range    WBC 10.4 4.6 - 13.2 K/uL    RBC 3.08 (L) 4.20 - 5.30 M/uL    HGB 8.5 (L) 12.0 - 16.0 g/dL    HCT 26.7 (L) 35.0 - 45.0 %    MCV 86.7 74.0 - 97.0 FL    MCH 27.6 24.0 - 34.0 PG    MCHC 31.8 31.0 - 37.0 g/dL    RDW 14.7 (H) 11.6 - 14.5 %    PLATELET 221 618 - 391 K/uL    MPV 10.1 9.2 - 11.8 FL    NEUTROPHILS 66 40 - 73 %    LYMPHOCYTES 21 21 - 52 %    MONOCYTES 11 (H) 3 - 10 %    EOSINOPHILS 2 0 - 5 %    BASOPHILS 0 0 - 2 %    ABS. NEUTROPHILS 6.9 1.8 - 8.0 K/UL    ABS. LYMPHOCYTES 2.2 0.9 - 3.6 K/UL    ABS. MONOCYTES 1.1 0.05 - 1.2 K/UL    ABS. EOSINOPHILS 0.2 0.0 - 0.4 K/UL    ABS. BASOPHILS 0.0 0.0 - 0.1 K/UL    DF AUTOMATED     METABOLIC PANEL, BASIC    Collection Time: 12/06/20 12:37 AM   Result Value Ref Range    Sodium 143 136 - 145 mmol/L    Potassium 3.5 3.5 - 5.5 mmol/L    Chloride 111 100 - 111 mmol/L    CO2 24 21 - 32 mmol/L    Anion gap 8 3.0 - 18 mmol/L    Glucose 126 (H) 74 - 99 mg/dL    BUN 8 7.0 - 18 MG/DL    Creatinine 0.81 0.6 - 1.3 MG/DL    BUN/Creatinine ratio 10 (L) 12 - 20      GFR est AA >60 >60 ml/min/1.73m2    GFR est non-AA >60 >60 ml/min/1.73m2    Calcium 10.6 (H) 8.5 - 10.1 MG/DL   CBC WITH AUTOMATED DIFF    Collection Time: 12/06/20 12:37 AM   Result Value Ref Range    WBC 11.7 4.6 - 13.2 K/uL    RBC 3.06 (L) 4.20 - 5.30 M/uL    HGB 8.4 (L) 12.0 - 16.0 g/dL    HCT 26.5 (L) 35.0 - 45.0 %    MCV 86.6 74.0 - 97.0 FL    MCH 27.5 24.0 - 34.0 PG    MCHC 31.7 31.0 - 37.0 g/dL    RDW 14.9 (H) 11.6 - 14.5 %    PLATELET 909 734 - 575 K/uL    MPV 10.0 9.2 - 11.8 FL    NEUTROPHILS 64 40 - 73 %    LYMPHOCYTES 26 21 - 52 %    MONOCYTES 9 3 - 10 %    EOSINOPHILS 1 0 - 5 %    BASOPHILS 0 0 - 2 %    ABS. NEUTROPHILS 7.4 1.8 - 8.0 K/UL    ABS. LYMPHOCYTES 3.1 0.9 - 3.6 K/UL    ABS. MONOCYTES 1.1 0.05 - 1.2 K/UL    ABS. EOSINOPHILS 0.2 0.0 - 0.4 K/UL    ABS.  BASOPHILS 0.0 0.0 - 0.1 K/UL    DF AUTOMATED     CULTURE, BLOOD    Collection Time: 12/06/20 12:37 AM    Specimen: Blood   Result Value Ref Range    Special Requests: NO SPECIAL REQUESTS      Culture result: NO GROWTH AFTER 4 HOURS         Signed By: Felipe Kim MD     December 6, 2020 I spent 35 minutes with the patient in face-to-face consultation, of which greater than 50% was spent in counseling and coordination of care as described above    Disclaimer: Sections of this note are dictated using utilizing voice recognition software. Minor typographical errors may be present. If questions arise, please do not hesitate to contact me or call our department.

## 2020-12-06 NOTE — PROGRESS NOTES
Vascular Surgery Progress Note    Admit Date: 12/3/2020  POD * No surgery found *    Procedure:  * No surgery found *      Subjective:     Patient has no new complaints. No pain at flank    Objective:     Blood pressure (!) 113/47, pulse 67, temperature 98.1 °F (36.7 °C), resp. rate 20, height 5' 5\" (1.651 m), weight 135 lb (61.2 kg), SpO2 97 %, unknown if currently breastfeeding. Temp (24hrs), Av.7 °F (37.1 °C), Min:98.1 °F (36.7 °C), Max:99.5 °F (37.5 °C)      Physical Exam:  LUNG:  clear to auscultation bilaterally, HEART:  S1, S2 normal and ABDOMEN:  no change    Labs: Results:       Chemistry Recent Labs     208 20  1822   * 94 96    140 141   K 3.5 3.0* 3.4*    109 107   CO2 24 27 28   BUN 8 8 11   CREA 0.81 0.69 0.72   CA 10.6* 10.5* 10.2*   AGAP 8 4 6   BUCR 10* 12 15      CBC w/Diff Recent Labs     20  0037 20  0238 20  0311   WBC 11.7 10.4 16.4*   RBC 3.06* 3.08* 3.65*   HGB 8.4* 8.5* 10.3*   HCT 26.5* 26.7* 31.5*    361 384   GRANS 64 66 61   LYMPH 26 21 26   EOS 1 2 0      Microbiology Recent Labs     20  0037 20  2254   CULT NO GROWTH AFTER 4 HOURS LIGHT STAPHYLOCOCCUS AUREUS*      Coagulation No results for input(s): PTP, INR, APTT, INREXT in the last 72 hours.       Data Review: images and reports reviewed    Assessment:     Active Problems:    Cellulitis of abdominal wall (12/3/2020)      UTI (urinary tract infection) (12/3/2020)        Plan/Recommendations/Medical Decision Making:     Continue present treatment await follow-up cultures  Noted   Urinary tract infection  We will discuss with infectious disease when is appropriate for intervention

## 2020-12-06 NOTE — PROGRESS NOTES
1900 - Bedside and Verbal shift change report given to Carrillo Carreno RN (oncoming nurse) by Norma Connor RN/SPENCER Gardner (offgoing nurse). Report included the following information SBAR, Kardex, Intake/Output, MAR, Recent Results and Cardiac Rhythm SR.     2000 - During shift assessment, patient found with copious amount of sanguinous drainage saturating dressing, and portions of patient's gown, sheet, blanket and bed pads; dressing removed, wound cleansed with Dakins solution, surrounding area cleansed and wet-dry dressing applied covered with abdominal dressing and secured with tape; patient gown, bed pads and linen changed; treatment tolerated well; will continue to monitor patient. 0500 - Following antibiotic administration, patient informed that her right flank dressing was again saturated; RN observed dressing with breakthrough drainage present prior to removal; dressing removed, wound and surrounding area cleansed with gauze and Dakins solution, wet-dry dressing placed on top of open area then covered with abdominal pad and secured with tape; patient tolerated treatment well; will continue to monitor patient. 0700 - Bedside and Verbal shift change report given to Norma Connor RN/SPENCER Gardner (oncoming nurse) by Carrillo Carreno RN (offgoing nurse).  Report included the following information SBAR, Kardex, Intake/Output, MAR, Recent Results and Cardiac Rhythm SR.

## 2020-12-06 NOTE — PROGRESS NOTES
Bedside, Verbal, and Written shift change report given to Raimundo Mayorga RN (oncoming nurse) by Cas Hamm RN (offgoing nurse). Report included the following information SBAR, Kardex, Intake/Output, MAR, Recent Results, and Cardiac Rhythm NSR.     1330 pts dressing saturated with blood, changed dressing and linens. Bedside, Verbal, and Written shift change report given to Cas Hamm RN (oncoming nurse) by Raimundo Mayorga RN (offgoing nurse). Report included the following information SBAR, Kardex, Intake/Output, MAR, Recent Results, and Cardiac Rhythm NSR.

## 2020-12-06 NOTE — PROGRESS NOTES
[facility]  Progress Note    Patient: Jazmin Markham Age: 65 y.o. : 1950 MR#: [id number] SSN: [id number]  Date/Time: 2020     Subjective:     Pt seen & evaluated , lying in bed, NAD         Assessment/Plan:     1. Bacteremia - continue IV Zosyn , ID on board    2. H/o COPD - continue BD  3. H/o Crohn's disease - was on remicade  4. Resume home meds - cymbalta & lyrica  5. AOCD - monitor H&H   DVT Px - SCD   FC           Case discussed with:  [x]Patient  []Family  []Nursing  []Case Management  DVT Prophylaxis:  []Lovenox  []Hep SQ  []SCDs  []Coumadin   []On Heparin gtt    Objective:   VS:   Visit Vitals  BP (!) 118/44 (BP 1 Location: Left arm, BP Patient Position: At rest)   Pulse 86   Temp 99.2 °F (37.3 °C)   Resp 28   Ht 5' 5\" (1.651 m)   Wt 61.6 kg (135 lb 12.8 oz)   SpO2 99%   Breastfeeding Unknown   BMI 22.60 kg/m²      Tmax/24hrs: Temp (24hrs), Av.9 °F (37.2 °C), Min:98.3 °F (36.8 °C), Max:99.5 °F (37.5 °C)  IOBRIEF    Intake/Output Summary (Last 24 hours) at 2020 2341  Last data filed at 2020 1709  Gross per 24 hour   Intake 1620 ml   Output 400 ml   Net 1220 ml       General:  Alert, cooperative, no acute distress    HEENT: PERRLA, anicteric sclerae. Pulmonary:  CTA Bilaterally. No Wheezing/Rhonchi/Rales. Cardiovascular: Regular rate and Rhythm. GI:  Soft, Non distended, Non tender. + Bowel sounds. Extremities:  No edema, cyanosis, clubbing. No calf tenderness. Neurologic: Alert and oriented X 3. No acute neuro deficits.   Additional:    Medications:   Current Facility-Administered Medications   Medication Dose Route Frequency    [START ON 2020] aspirin chewable tablet 81 mg  81 mg Oral DAILY    latanoprost (XALATAN) 0.005 % ophthalmic solution 1 Drop  1 Drop Both Eyes QPM    [START ON 2020] clopidogreL (PLAVIX) tablet 75 mg  75 mg Oral DAILY    DULoxetine (CYMBALTA) capsule 60 mg  60 mg Oral QHS    fluticasone propionate (FLONASE) 50 mcg/actuation nasal spray 2 Spray  2 Spray Both Nostrils DAILY PRN    [START ON 12/6/2020] loratadine (CLARITIN) tablet 10 mg  10 mg Oral DAILY    pregabalin (LYRICA) capsule 100 mg  100 mg Oral BID    budesonide (PULMICORT) 250 mcg/2ml nebulizer susp  250 mcg Nebulization BID RT    ipratropium (ATROVENT) 0.02 % nebulizer solution 0.5 mg  0.5 mg Nebulization Q8H RT    arformoteroL (BROVANA) neb solution 15 mcg  15 mcg Nebulization BID RT    acetaminophen (TYLENOL) tablet 500 mg  500 mg Oral Q4H PRN    piperacillin-tazobactam (ZOSYN) 3.375 g in 0.9% sodium chloride (MBP/ADV) 100 mL MBP  3.375 g IntraVENous Q6H    sodium hypochlorite (HALF STRENGTH DAKIN'S) 0.25% irrigation (bottle)   Topical BID       Imaging:   XR Results (most recent):  Results from Hospital Encounter encounter on 12/03/20   XR CHEST SNGL V    Narrative EXAM: Chest Radiograph    INDICATION:  flank pain    TECHNIQUE: AP view of the chest    COMPARISON: 4/24/2018, 7/19/2017, 7/27/2017 and 3/29/2013    FINDINGS: No pneumothorax identified. The lungs are hyperinflated. No acute  infiltrate or effusion appreciated. The cardiac silhouette is unremarkable. Calcifications are noted in the thoracic aorta. The pulmonary vasculature is  unremarkable. Severe degenerative changes of the shoulders and spine. Impression Impression:  1. No acute infiltrate or effusion. 2.  Hyperinflated lungs suggestive COPD.          CT Results (most recent):  Results from Hospital Encounter encounter on 12/03/20   CT CHEST ABD PELV W CONT    Narrative EXAM:  CT CHEST ABD PELV W CONT    INDICATION: evaluate for vascular graft infection, occult infection  , positive  blood cultures and as as a positive, gram-positive cocci positive, history of  chronic right chest area open wound nonresponsive to outpatient local dressings  and antibiotics, generalized fatigue and malaise, fever, tachycardia, history of  Crohn's disease, right flank wound, severe peripheral artery disease status post  right axillary femoral graft, left femoral-popliteal graft, status post revision  of axillary to femoral bypass graft with excision of an infected graft on  9/21/2020, status post excision of the ulcer over the wound and debridement of  the wound on 11/5/2020, now with recent history of purulent drainage to the  right abdominal surgical site, fevers x1 week, gram-positive bacteremia, likely  vascular graft infection    COMPARISON: CT chest November 10, 2018    CONTRAST:  100 mL of Isovue-370. TECHNIQUE:   Following the uneventful intravenous administration of contrast, thin axial  images were obtained through the chest, abdomen and pelvis. Coronal and sagittal  reconstructions were generated. Oral contrast was not administered. CT dose  reduction was achieved through use of a standardized protocol tailored for this  examination and automatic exposure control for dose modulation. FINDINGS:     CHEST:  THYROID: No nodule. MEDIASTINUM: Several stable minimally prominent middle mediastinal nodes  measuring less than 1 cm in the short axis dimension. Image 21 series 2. NEETU: No mass or lymphadenopathy. THORACIC AORTA: No dissection or aneurysm. MAIN PULMONARY ARTERY: Normal in caliber. TRACHEA/BRONCHI: Patent. ESOPHAGUS: No wall thickening or dilatation. HEART: Normal in size. PLEURA: No effusion or pneumothorax. LUNGS: Stable emphysema. Mild right greater than left basal subsegmental  atelectasis. ABDOMEN:  LIVER: No mass or intrahepatic biliary dilatation. There is mild dilatation of  the extrahepatic common duct measuring 9 mm which tapers to the ampulla. GALLBLADDER: Surgically removed  SPLEEN: No mass. PANCREAS: No mass or ductal dilatation. ADRENALS: Stable mild thickening of the left adrenal. Normal right adrenal.  KIDNEYS: No mass, calculus, or hydronephrosis. Bilateral mild cortical scarring  STOMACH: Unremarkable.   SMALL BOWEL: No dilatation or wall thickening. COLON: Scattered diverticula of the descending colon without evidence of  surrounding inflammation. APPENDIX: Unremarkable. PERITONEUM: No ascites or pneumoperitoneum. RETROPERITONEUM: No lymphadenopathy or aortic aneurysm. PELVIS:  REPRODUCTIVE ORGANS: Left ovarian 3.6 cm simple cyst. Uterus has been surgically  removed. URINARY BLADDER: No mass or calculus. BONES: Degenerative disc disease at L5/S1 with reactive endplate sclerosis. ADDITIONAL COMMENTS: Right axillary to femoral graft is patent. An additional very short segment abandoned graft is seen at the right lateral  chest wall inferiorly located more posteriorly to the patent graft. At its  inferior border there is soft tissue density extending to the overlying chest  wall at the site of prior surgery which has decreased from the previous exam.    There is surrounding low density/soft tissue density in the subcutaneous fat  involving the more anterior patent graft, image 31 through image 37 series 3. At  this level the opacified lumen is decreased in size with a slitlike surrounded  configuration by soft tissue density within the lumen of the stent. The AP  diameter of the contrast opacified lumen is 3 mm with a transverse diameter of 5  mm. Image 34 series 2. The diameter of the graft itself at this site is 1 x 1.1  cm. See coronal image 16 series 604. Sagittal image 64 series 605. The  immediately proximal graft is completely opacified measuring 1 x 0.9 cm and the  more distal graft is completely opacified measuring 1.2 x 0.9 cm. Probable small amount of eccentric thrombus at the distal level of the stent  graft, image 55 series 3. See coronal image 16 series 604, sagittal image 60  series 605.   There is additional surrounding soft tissue density in the subcutaneous fat at  the right lower quadrant abdominal wall where a short segment of apparent  endograft in the patent graft converge to merge with the femoral artery. Impression IMPRESSION:  Findings are concerning for infection at the level of the mid abdominal and  possibly distal abdominal right axillary femoral graft with surrounding soft  tissue density and intraluminal thrombus with narrowing of the opacified lumen. As clinically indicated follow-up nuclear isotope infection scan may be of  benefit. Left ovarian cyst.  Mild extrahepatic biliary dilatation likely reservoir effect. Diverticulosis coli without evidence of diverticulitis. Minimal bibasal lung dependent atelectasis. Labs:    Recent Results (from the past 48 hour(s))   CBC WITH AUTOMATED DIFF    Collection Time: 12/04/20  3:11 AM   Result Value Ref Range    WBC 16.4 (H) 4.6 - 13.2 K/uL    RBC 3.65 (L) 4.20 - 5.30 M/uL    HGB 10.3 (L) 12.0 - 16.0 g/dL    HCT 31.5 (L) 35.0 - 45.0 %    MCV 86.3 74.0 - 97.0 FL    MCH 28.2 24.0 - 34.0 PG    MCHC 32.7 31.0 - 37.0 g/dL    RDW 14.6 (H) 11.6 - 14.5 %    PLATELET 562 090 - 951 K/uL    MPV 9.7 9.2 - 11.8 FL    NEUTROPHILS 61 42 - 75 %    LYMPHOCYTES 26 20 - 51 %    MONOCYTES 13 (H) 2 - 9 %    EOSINOPHILS 0 0 - 5 %    BASOPHILS 0 0 - 3 %    ABS. NEUTROPHILS 10.0 (H) 1.8 - 8.0 K/UL    ABS. LYMPHOCYTES 4.3 (H) 0.8 - 3.5 K/UL    ABS. MONOCYTES 2.1 (H) 0 - 1.0 K/UL    ABS. EOSINOPHILS 0.0 0.0 - 0.4 K/UL    ABS.  BASOPHILS 0.0 0.0 - 0.06 K/UL    DF MANUAL      PLATELET COMMENTS ADEQUATE PLATELETS      RBC COMMENTS POLYCHROMASIA  1+        RBC COMMENTS HYPOCHROMIA  1+       CORTISOL    Collection Time: 12/04/20  2:20 PM   Result Value Ref Range    Cortisol, random 30.4 ug/dL   CORTISOL    Collection Time: 12/04/20  6:22 PM   Result Value Ref Range    Cortisol, random 35.1 ug/dL   METABOLIC PANEL, BASIC    Collection Time: 12/04/20  6:22 PM   Result Value Ref Range    Sodium 141 136 - 145 mmol/L    Potassium 3.4 (L) 3.5 - 5.5 mmol/L    Chloride 107 100 - 111 mmol/L    CO2 28 21 - 32 mmol/L    Anion gap 6 3.0 - 18 mmol/L    Glucose 96 74 - 99 mg/dL    BUN 11 7.0 - 18 MG/DL    Creatinine 0.72 0.6 - 1.3 MG/DL    BUN/Creatinine ratio 15 12 - 20      GFR est AA >60 >60 ml/min/1.73m2    GFR est non-AA >60 >60 ml/min/1.73m2    Calcium 10.2 (H) 8.5 - 04.6 MG/DL   METABOLIC PANEL, BASIC    Collection Time: 12/05/20  2:38 AM   Result Value Ref Range    Sodium 140 136 - 145 mmol/L    Potassium 3.0 (L) 3.5 - 5.5 mmol/L    Chloride 109 100 - 111 mmol/L    CO2 27 21 - 32 mmol/L    Anion gap 4 3.0 - 18 mmol/L    Glucose 94 74 - 99 mg/dL    BUN 8 7.0 - 18 MG/DL    Creatinine 0.69 0.6 - 1.3 MG/DL    BUN/Creatinine ratio 12 12 - 20      GFR est AA >60 >60 ml/min/1.73m2    GFR est non-AA >60 >60 ml/min/1.73m2    Calcium 10.5 (H) 8.5 - 10.1 MG/DL   CBC WITH AUTOMATED DIFF    Collection Time: 12/05/20  2:38 AM   Result Value Ref Range    WBC 10.4 4.6 - 13.2 K/uL    RBC 3.08 (L) 4.20 - 5.30 M/uL    HGB 8.5 (L) 12.0 - 16.0 g/dL    HCT 26.7 (L) 35.0 - 45.0 %    MCV 86.7 74.0 - 97.0 FL    MCH 27.6 24.0 - 34.0 PG    MCHC 31.8 31.0 - 37.0 g/dL    RDW 14.7 (H) 11.6 - 14.5 %    PLATELET 603 278 - 732 K/uL    MPV 10.1 9.2 - 11.8 FL    NEUTROPHILS 66 40 - 73 %    LYMPHOCYTES 21 21 - 52 %    MONOCYTES 11 (H) 3 - 10 %    EOSINOPHILS 2 0 - 5 %    BASOPHILS 0 0 - 2 %    ABS. NEUTROPHILS 6.9 1.8 - 8.0 K/UL    ABS. LYMPHOCYTES 2.2 0.9 - 3.6 K/UL    ABS. MONOCYTES 1.1 0.05 - 1.2 K/UL    ABS. EOSINOPHILS 0.2 0.0 - 0.4 K/UL    ABS. BASOPHILS 0.0 0.0 - 0.1 K/UL    DF AUTOMATED         Signed By: Rox Flannery MD     December 5, 2020      I spent 35 minutes with the patient in face-to-face consultation, of which greater than 50% was spent in counseling and coordination of care as described above    Disclaimer: Sections of this note are dictated using utilizing voice recognition software. Minor typographical errors may be present. If questions arise, please do not hesitate to contact me or call our department.

## 2020-12-06 NOTE — PROGRESS NOTES
12/05/20 2102   Oxygen Therapy   O2 Sat (%) 99 %   Pulse via Oximetry 82 beats per minute   O2 Device Nasal cannula   O2 Flow Rate (L/min) 2.5 l/min   Pre-Treatment   Breathing Pattern Regular   Breath Sounds Bilateral Diminished   Post-Treatment   Breathing Pattern Regular   Breath Sounds Bilateral Diminished   Pulse 82   SpO2 99 %   Respirations 16   Treatment Tolerance Patient tolerated   Procedures   $$ Initial Procedures Aerosol   Delivery Source Breath Actuated Nebulizer   Aerosolized Medications Brovana;Pulmicort     No respiratory distress noted, patient is comfortable. Pt instructed to call if help is needed, call bell within reach.   Patient confirms understanding

## 2020-12-07 LAB
ANION GAP SERPL CALC-SCNC: 5 MMOL/L (ref 3–18)
BASOPHILS # BLD: 0 K/UL (ref 0–0.1)
BASOPHILS NFR BLD: 0 % (ref 0–2)
BUN SERPL-MCNC: 11 MG/DL (ref 7–18)
BUN/CREAT SERPL: 15 (ref 12–20)
CALCIUM SERPL-MCNC: 10.2 MG/DL (ref 8.5–10.1)
CHLORIDE SERPL-SCNC: 114 MMOL/L (ref 100–111)
CO2 SERPL-SCNC: 26 MMOL/L (ref 21–32)
CREAT SERPL-MCNC: 0.73 MG/DL (ref 0.6–1.3)
DIFFERENTIAL METHOD BLD: ABNORMAL
EOSINOPHIL # BLD: 0.2 K/UL (ref 0–0.4)
EOSINOPHIL NFR BLD: 2 % (ref 0–5)
ERYTHROCYTE [DISTWIDTH] IN BLOOD BY AUTOMATED COUNT: 14.8 % (ref 11.6–14.5)
GLUCOSE SERPL-MCNC: 91 MG/DL (ref 74–99)
HCT VFR BLD AUTO: 25.4 % (ref 35–45)
HGB BLD-MCNC: 8.1 G/DL (ref 12–16)
LYMPHOCYTES # BLD: 2.7 K/UL (ref 0.9–3.6)
LYMPHOCYTES NFR BLD: 23 % (ref 21–52)
MCH RBC QN AUTO: 27.7 PG (ref 24–34)
MCHC RBC AUTO-ENTMCNC: 31.9 G/DL (ref 31–37)
MCV RBC AUTO: 87 FL (ref 74–97)
MONOCYTES # BLD: 1.2 K/UL (ref 0.05–1.2)
MONOCYTES NFR BLD: 10 % (ref 3–10)
NEUTS SEG # BLD: 7.5 K/UL (ref 1.8–8)
NEUTS SEG NFR BLD: 65 % (ref 40–73)
PLATELET # BLD AUTO: 472 K/UL (ref 135–420)
PMV BLD AUTO: 9.8 FL (ref 9.2–11.8)
POTASSIUM SERPL-SCNC: 3.8 MMOL/L (ref 3.5–5.5)
RBC # BLD AUTO: 2.92 M/UL (ref 4.2–5.3)
SODIUM SERPL-SCNC: 145 MMOL/L (ref 136–145)
WBC # BLD AUTO: 11.6 K/UL (ref 4.6–13.2)

## 2020-12-07 PROCEDURE — 99231 SBSQ HOSP IP/OBS SF/LOW 25: CPT | Performed by: SURGERY

## 2020-12-07 PROCEDURE — 85025 COMPLETE CBC W/AUTO DIFF WBC: CPT

## 2020-12-07 PROCEDURE — 36415 COLL VENOUS BLD VENIPUNCTURE: CPT

## 2020-12-07 PROCEDURE — 74011000250 HC RX REV CODE- 250: Performed by: HOSPITALIST

## 2020-12-07 PROCEDURE — 80048 BASIC METABOLIC PNL TOTAL CA: CPT

## 2020-12-07 PROCEDURE — 2709999900 HC NON-CHARGEABLE SUPPLY

## 2020-12-07 PROCEDURE — 74011250636 HC RX REV CODE- 250/636: Performed by: INTERNAL MEDICINE

## 2020-12-07 PROCEDURE — 77010033678 HC OXYGEN DAILY

## 2020-12-07 PROCEDURE — 94762 N-INVAS EAR/PLS OXIMTRY CONT: CPT

## 2020-12-07 PROCEDURE — 99232 SBSQ HOSP IP/OBS MODERATE 35: CPT | Performed by: HOSPITALIST

## 2020-12-07 PROCEDURE — 94640 AIRWAY INHALATION TREATMENT: CPT

## 2020-12-07 PROCEDURE — 74011250637 HC RX REV CODE- 250/637: Performed by: NURSE ANESTHETIST, CERTIFIED REGISTERED

## 2020-12-07 PROCEDURE — 74011000258 HC RX REV CODE- 258: Performed by: INTERNAL MEDICINE

## 2020-12-07 PROCEDURE — 65660000004 HC RM CVT STEPDOWN

## 2020-12-07 PROCEDURE — 87040 BLOOD CULTURE FOR BACTERIA: CPT

## 2020-12-07 PROCEDURE — 74011250637 HC RX REV CODE- 250/637: Performed by: HOSPITALIST

## 2020-12-07 RX ORDER — SODIUM CHLORIDE 0.9 % (FLUSH) 0.9 %
5-40 SYRINGE (ML) INJECTION EVERY 8 HOURS
Status: DISCONTINUED | OUTPATIENT
Start: 2020-12-07 | End: 2020-12-09 | Stop reason: HOSPADM

## 2020-12-07 RX ORDER — CIPROFLOXACIN 2 MG/ML
400 INJECTION, SOLUTION INTRAVENOUS EVERY 12 HOURS
Status: DISCONTINUED | OUTPATIENT
Start: 2020-12-07 | End: 2020-12-10

## 2020-12-07 RX ORDER — SODIUM CHLORIDE 0.9 % (FLUSH) 0.9 %
5-40 SYRINGE (ML) INJECTION AS NEEDED
Status: DISCONTINUED | OUTPATIENT
Start: 2020-12-07 | End: 2020-12-09 | Stop reason: HOSPADM

## 2020-12-07 RX ORDER — SODIUM CHLORIDE, SODIUM LACTATE, POTASSIUM CHLORIDE, CALCIUM CHLORIDE 600; 310; 30; 20 MG/100ML; MG/100ML; MG/100ML; MG/100ML
75 INJECTION, SOLUTION INTRAVENOUS CONTINUOUS
Status: DISPENSED | OUTPATIENT
Start: 2020-12-07 | End: 2020-12-08

## 2020-12-07 RX ORDER — LIDOCAINE HYDROCHLORIDE 10 MG/ML
0.1 INJECTION, SOLUTION EPIDURAL; INFILTRATION; INTRACAUDAL; PERINEURAL AS NEEDED
Status: DISCONTINUED | OUTPATIENT
Start: 2020-12-07 | End: 2020-12-09 | Stop reason: HOSPADM

## 2020-12-07 RX ORDER — FAMOTIDINE 20 MG/1
20 TABLET, FILM COATED ORAL ONCE
Status: COMPLETED | OUTPATIENT
Start: 2020-12-07 | End: 2020-12-07

## 2020-12-07 RX ADMIN — LORATADINE 10 MG: 10 TABLET ORAL at 08:41

## 2020-12-07 RX ADMIN — PREGABALIN 100 MG: 50 CAPSULE ORAL at 21:45

## 2020-12-07 RX ADMIN — BUDESONIDE 250 MCG: 0.25 INHALANT RESPIRATORY (INHALATION) at 08:56

## 2020-12-07 RX ADMIN — SODIUM HYPOCHLORITE: 2.5 SOLUTION TOPICAL at 17:21

## 2020-12-07 RX ADMIN — Medication 10 ML: at 17:04

## 2020-12-07 RX ADMIN — PIPERACILLIN AND TAZOBACTAM 3.38 G: 3; .375 INJECTION, POWDER, LYOPHILIZED, FOR SOLUTION INTRAVENOUS at 12:09

## 2020-12-07 RX ADMIN — CIPROFLOXACIN 400 MG: 2 INJECTION, SOLUTION INTRAVENOUS at 21:45

## 2020-12-07 RX ADMIN — CLOPIDOGREL BISULFATE 75 MG: 75 TABLET ORAL at 08:41

## 2020-12-07 RX ADMIN — PREGABALIN 100 MG: 50 CAPSULE ORAL at 08:41

## 2020-12-07 RX ADMIN — PIPERACILLIN AND TAZOBACTAM 3.38 G: 3; .375 INJECTION, POWDER, LYOPHILIZED, FOR SOLUTION INTRAVENOUS at 17:04

## 2020-12-07 RX ADMIN — IPRATROPIUM BROMIDE 0.5 MG: 0.5 SOLUTION RESPIRATORY (INHALATION) at 08:57

## 2020-12-07 RX ADMIN — ARFORMOTEROL TARTRATE 15 MCG: 15 SOLUTION RESPIRATORY (INHALATION) at 08:56

## 2020-12-07 RX ADMIN — Medication 10 ML: at 21:45

## 2020-12-07 RX ADMIN — CIPROFLOXACIN 400 MG: 2 INJECTION, SOLUTION INTRAVENOUS at 08:42

## 2020-12-07 RX ADMIN — ASPIRIN 81 MG CHEWABLE TABLET 81 MG: 81 TABLET CHEWABLE at 08:41

## 2020-12-07 RX ADMIN — Medication 10 ML: at 08:43

## 2020-12-07 RX ADMIN — ARFORMOTEROL TARTRATE 15 MCG: 15 SOLUTION RESPIRATORY (INHALATION) at 19:18

## 2020-12-07 RX ADMIN — FAMOTIDINE 20 MG: 20 TABLET, FILM COATED ORAL at 08:41

## 2020-12-07 RX ADMIN — BUDESONIDE 250 MCG: 0.25 INHALANT RESPIRATORY (INHALATION) at 19:18

## 2020-12-07 RX ADMIN — IPRATROPIUM BROMIDE 0.5 MG: 0.5 SOLUTION RESPIRATORY (INHALATION) at 19:18

## 2020-12-07 RX ADMIN — PIPERACILLIN AND TAZOBACTAM 3.38 G: 3; .375 INJECTION, POWDER, LYOPHILIZED, FOR SOLUTION INTRAVENOUS at 04:54

## 2020-12-07 RX ADMIN — PIPERACILLIN AND TAZOBACTAM 3.38 G: 3; .375 INJECTION, POWDER, LYOPHILIZED, FOR SOLUTION INTRAVENOUS at 22:56

## 2020-12-07 RX ADMIN — SODIUM HYPOCHLORITE: 2.5 SOLUTION TOPICAL at 08:45

## 2020-12-07 NOTE — PROGRESS NOTES
Physician Progress Note      PATIENT:               Tk Gandhi  CSN #:                  [id number]  :                       1950  ADMIT DATE:       12/3/2020 2:28 PM  [address] [place] DATE:  RESPONDING  PROVIDER #:        Fadi Lira MD [facility] MD          QUERY TEXT:    Pt admitted with bacteremia and has shock documented. Bolus given, no vasopressors. If possible, please document in progress notes and discharge summary further specificity regarding the type of shock: The medical record reflects the following:  Risk Factors: Sepsis, Bacteremia  Clinical Indicators: Per  Medicine PN:  Med:  sepsis-temperature+ tachycardia+ wound infection+ positive blood cultures+ hypotension( Shock )  Treatment: IV fluid-normal saline 500 cc bolus-monitor blood pressure/IV albumin; -Patient started on IV antibiotic per ID recommendation and transferred to stepdown.     Thank you,  Jeannie Sanches, RN, [facility]  Options provided:  -- Septic Shock  -- Other - I will add my own diagnosis  -- Disagree - Not applicable / Not valid  -- Disagree - Clinically unable to determine / Unknown  -- Refer to Clinical Documentation Reviewer    PROVIDER RESPONSE TEXT:    Sepsis    Query created by: Leandro Bynum on 2020 10:55 AM      Electronically signed by:  Fadi MARIENETRESSA [facility] MD 2020 12:02 PM

## 2020-12-07 NOTE — PROGRESS NOTES
Infectious Disease progress Note        Reason: Fevers, gram-positive bacteremia    Current abx Prior abx   Pip/tazo, vancomycin on 12/3      Lines:       Assessment :    65 y.o.  female  with history of Crohn's disease status post Remicade, severe peripheral artery disease status post right axillary femoral, left femoropopliteal bypass graft with multiple surgeries who presented to [facility] on 12/3/2020 with subjective sensation of not feeling well, fever. Status post revision of axillary to femoral bypass graft with excision of infected graft on 9/21/2020. Status post excision of the ulcer over the wound and debridement of the wound on 11/5/2020. Cultures revealed methicillin susceptible Staphylococcus aureus. Now with recent h/o purulent drainage right abdominal surgical site, fevers x 1 week, gram positive bacteremia. Clinical presentation consistent with sepsis (present on admission) secondary to methicillin susceptible Staphylococcus aureus bloodstream infection (positive blood culture 12/3, negative blood culture 12/6),  vascular graft infection    CT chest/abdomen/pelvis 12/4-results reviewed. Findings consistent with vascular graft infection. Concurrent immunocompromise state secondary to Remicade is likely masking the full clinical presentation. Low clinical probability of COVID-19 infection at this time    Some pyuria noted on urinalysis 12/3-no dysuria or other signs or symptoms to suggest UTI. However moderate leukocyte esterase noted on urine analysis. Urine culture 12/3+ for Citrobacter, ampicillin susceptible Enterococcus faecalis. Immunocompromise state can interfere with the clinical presentation. Since patient scheduled for vascular graft surgery, recommend to treat for probable Citrobacter/Enterococcus cystitis. Recommendations:    1. Recommend piperacillin/tazobactam.  Add ciprofloxacin to cover Citrobacter in urine culture.   2.  Repeat blood culture today, follow-up blood culture 12/6  3. Commend to proceed with vascular graft surgery on or after 12/10 so that patient has completed adequate treatment of cystitis and has had received adequate antibiotics for MSSA bloodstream infection in order to prevent reinfection of new graft     Above plan was discussed in details with patient, and dr Ramin Botello. Please call me if any further questions or concerns. Will continue to participate in the care of this patient. HPI:    Feels better. Patient denies any increasing abdominal pain. She denies any sore throat, cough, chest pain, shortness of breath, diarrhea, dysuria. home Medication List    Details   fluticasone-umeclidinium-vilanterol (Trelegy Ellipta) 100-62.5-25 mcg inhaler Take 1 Puff by inhalation daily. Qty: 3 Inhaler, Refills: 1      clopidogreL (PLAVIX) 75 mg tab TAKE ONE TABLET BY MOUTH DAILY  Qty: 30 Tab, Refills: 8      pregabalin (LYRICA) 100 mg capsule Take  by mouth two (2) times a day. Takes 100 mg in am and 200 in the pm      aspirin 81 mg tablet Take 81 mg by mouth daily. cyanocobalamin (VITAMIN B-12) 1,000 mcg/mL injection 1,000 mcg by IntraMUSCular route every fourteen (14) days. triamterene-hydrochlorothiazide (DYAZIDE) 37.5-25 mg per capsule Take 1 Cap by mouth daily. bimatoprost (LUMIGAN) 0.03 % ophthalmic drops Administer 1 Drop to both eyes every evening. atropine-PHENobarbital-scopolamine-hyoscyamine (DONNATAL) 16.2-0.1037 -0.0194 mg per tablet Take 1 Tab by mouth as needed (diarrhea). Indications: Irritable Bowel Syndrome      inFLIXimab (REMICADE) 100 mg injection 5 mg/kg by IntraVENous route once. Every 4 weeks      ipratropium-albuteroL (Combivent Respimat)  mcg/actuation inhaler Take 1 Puff by inhalation every six (6) hours as needed for Wheezing or Shortness of Breath. Qty: 1 Inhaler, Refills: 3      multivitamin (ONE A DAY) tablet Take 1 Tab by mouth daily. BIOTIN PO Take  by mouth. OTHER       loratadine (CLARITIN) 10 mg tablet Take 10 mg by mouth daily. fluticasone propionate (FLONASE ALLERGY RELIEF) 50 mcg/actuation nasal spray 2 Sprays by Both Nostrils route daily as needed for Rhinitis. DULoxetine (CYMBALTA) 60 mg capsule Take 60 mg by mouth nightly. For Anxiety. loperamide (IMMODIUM) 2 mg tablet Take 2 mg by mouth daily as needed for Diarrhea. dicyclomine (BENTYL) 10 mg capsule Take 10 mg by mouth daily. Current Facility-Administered Medications   Medication Dose Route Frequency    lidocaine (PF) (XYLOCAINE) 10 mg/mL (1 %) injection 0.1 mL  0.1 mL SubCUTAneous PRN    lactated Ringers infusion  75 mL/hr IntraVENous CONTINUOUS    sodium chloride (NS) flush 5-40 mL  5-40 mL IntraVENous Q8H    sodium chloride (NS) flush 5-40 mL  5-40 mL IntraVENous PRN    famotidine (PEPCID) tablet 20 mg  20 mg Oral ONCE    ciprofloxacin (CIPRO) 400 mg in D5W IVPB (premix)  400 mg IntraVENous Q12H    aspirin chewable tablet 81 mg  81 mg Oral DAILY    latanoprost (XALATAN) 0.005 % ophthalmic solution 1 Drop  1 Drop Both Eyes QPM    clopidogreL (PLAVIX) tablet 75 mg  75 mg Oral DAILY    fluticasone propionate (FLONASE) 50 mcg/actuation nasal spray 2 Spray  2 Spray Both Nostrils DAILY PRN    loratadine (CLARITIN) tablet 10 mg  10 mg Oral DAILY    pregabalin (LYRICA) capsule 100 mg  100 mg Oral BID    budesonide (PULMICORT) 250 mcg/2ml nebulizer susp  250 mcg Nebulization BID RT    ipratropium (ATROVENT) 0.02 % nebulizer solution 0.5 mg  0.5 mg Nebulization Q8H RT    arformoteroL (BROVANA) neb solution 15 mcg  15 mcg Nebulization BID RT    acetaminophen (TYLENOL) tablet 500 mg  500 mg Oral Q4H PRN    piperacillin-tazobactam (ZOSYN) 3.375 g in 0.9% sodium chloride (MBP/ADV) 100 mL MBP  3.375 g IntraVENous Q6H    sodium hypochlorite (HALF STRENGTH DAKIN'S) 0.25% irrigation (bottle)   Topical BID       Allergies: Codeine; Darvon [propoxyphene];  Demerol [meperidine]; Keflex [cephalexin]; and Talwin [pentazocine lactate]    Temp (24hrs), Av.3 °F (36.8 °C), Min:97.9 °F (36.6 °C), Max:98.4 °F (36.9 °C)    Visit Vitals  BP (!) 117/54 (BP 1 Location: Left arm, BP Patient Position: At rest;Head of bed elevated (Comment degrees))   Pulse 83   Temp 97.9 °F (36.6 °C)   Resp 23   Ht 5' 5\" (1.651 m)   Wt 63.1 kg (139 lb 3.2 oz)   SpO2 96%   Breastfeeding Unknown   BMI 23.16 kg/m²       ROS: 12 point ROS obtained in details. Pertinent positives as mentioned in HPI,   otherwise negative    Physical Exam:    General: Well developed, well nourished female sitting on the  bed AAOx3 in no acute distress. General:   awake alert and oriented   HEENT:  Normocephalic, atraumatic, PERRL, EOMI, no scleral icterus or pallor; no conjunctival hemmohage;  nasal and oral mucous are moist and without evidence of lesions. Neck supple, no bruits. Lymph Nodes:   no cervical, axillary or inguinal adenopathy   Lungs:   non-labored, bilaterally clear to auscultation- no crackles wheezes rales or rhonchi   Heart:  RRR, s1 and s2; no  rubs or gallops, no edema   Abdomen:  soft, non-distended, active bowel sounds, no hepatomegaly, no splenomegaly. Non-tender. No significant drainage right flank   Genitourinary:  deferred   Extremities:   no clubbing, cyanosis; no joint effusions or swelling; Full ROM of all large joints to the upper and lower extremities; muscle mass appropriate for age   Neurologic:  No gross focal sensory abnormalities; 5/5 muscle strength to upper and lower extremities. Speech appropriate.  Cranial nerves intact                        Skin:  No rash or ulcers noted   Back:  no spinal or paraspinal muscle tenderness or rigidity, no CVA tenderness     Psychiatric:  No suicidal or homicidal ideations, appropriate mood and affect         Labs: Results:   Chemistry Recent Labs     20  0037 20  0238 20  1822   * 94 96    140 141   K 3.5 3.0* 3.4*  109 107   CO2 24 27 28   BUN 8 8 11   CREA 0.81 0.69 0.72   CA 10.6* 10.5* 10.2*   AGAP 8 4 6   BUCR 10* 12 15      CBC w/Diff Recent Labs     12/06/20  0037 12/05/20  0238   WBC 11.7 10.4   RBC 3.06* 3.08*   HGB 8.4* 8.5*   HCT 26.5* 26.7*    361   GRANS 64 66   LYMPH 26 21   EOS 1 2      Microbiology Recent Labs     12/06/20  0037   CULT NO GROWTH 1 DAY          RADIOLOGY:    All available imaging studies/reports in [facility] for this admission were reviewed    Total time spent >35 minutes. High complexity decision making was performed during the evaluation of this patient at high risk for decompensation with multiple organ involvement     Above mentioned total time spent on reviewing the case/medical record/data/notes/EMR/patient examination/documentation/coordinating care with nurse/consultants, exclusive of procedures with complex decision making performed and > 50% time spent in face to face evaluation.     Dr. Jamil Manley, Infectious Disease Specialist  [phone]  December 7, 2020  10:48 AM

## 2020-12-07 NOTE — PROGRESS NOTES
0800:  Report received, care assumed. 0825:  New orders noted from Venus including may eat, no surgery today. Patient aware and agreeable plan of care. 1200:  Appropriate return demonstration of Incentive Spirometer for 1,000ml.  1900:  Bedside and Verbal shift change report given to [facility] (oncoming nurse) by Katie Bean RNoffgoing nurse). Report included the following information SBAR, Kardex, Intake/Output, MAR, Accordion, Recent Results, Cardiac Rhythm NSR. and Alarm Parameters .

## 2020-12-07 NOTE — PROGRESS NOTES
[facility] Hospitalist Group  Progress Note    Patient: Víctor Zaldivar Age: 65 y.o. : 1950 MR#: [id number] SSN: [id number]  Date/Time: 2020     Subjective:     Pt seen & evaluated , lying in bed, NAD, dressing changed by nursing         Assessment/Plan:     1. Bacteremia - continue IV Cipro , ID on board , plan for surg on 12/10  2. UTI - continue IV abx , Urine cx not sent - add Cipro to cover citrobacter   3. Recommend to proceed with vascular graft surg on 12/10.   4. H/o COPD - continue BD  5. H/o Crohn's disease - was on remicade  6. Resume home meds - cymbalta & lyrica  7. AOCD - monitor H&H   DVT Px - SCD   FC           Case discussed with:  [x]Patient  []Family  []Nursing  []Case Management  DVT Prophylaxis:  []Lovenox  []Hep SQ  []SCDs  []Coumadin   []On Heparin gtt    Objective:   VS:   Visit Vitals  BP (!) 109/54 (BP 1 Location: Left arm, BP Patient Position: At rest;Head of bed elevated (Comment degrees))   Pulse 70   Temp 97.9 °F (36.6 °C)   Resp 22   Ht 5' 5\" (1.651 m)   Wt 63.1 kg (139 lb 3.2 oz)   SpO2 97%   Breastfeeding Unknown   BMI 23.16 kg/m²      Tmax/24hrs: Temp (24hrs), Av.2 °F (36.8 °C), Min:97.9 °F (36.6 °C), Max:98.4 °F (36.9 °C)  IOBRIEF    Intake/Output Summary (Last 24 hours) at 2020 1559  Last data filed at 2020 0916  Gross per 24 hour   Intake 920 ml   Output    Net 920 ml       General:  Alert, cooperative, no acute distress    HEENT: PERRLA, anicteric sclerae. Pulmonary:  CTA Bilaterally. No Wheezing/Rhonchi/Rales. Cardiovascular: Regular rate and Rhythm. GI:  Soft, Non distended, Non tender. + Bowel sounds. Extremities:  No edema, cyanosis, clubbing. No calf tenderness. Neurologic: Alert and oriented X 3. No acute neuro deficits.   Additional:    Medications:   Current Facility-Administered Medications   Medication Dose Route Frequency    lidocaine (PF) (XYLOCAINE) 10 mg/mL (1 %) injection 0.1 mL  0.1 mL SubCUTAneous PRN    lactated Ringers infusion  75 mL/hr IntraVENous CONTINUOUS    sodium chloride (NS) flush 5-40 mL  5-40 mL IntraVENous Q8H    sodium chloride (NS) flush 5-40 mL  5-40 mL IntraVENous PRN    ciprofloxacin (CIPRO) 400 mg in D5W IVPB (premix)  400 mg IntraVENous Q12H    aspirin chewable tablet 81 mg  81 mg Oral DAILY    latanoprost (XALATAN) 0.005 % ophthalmic solution 1 Drop  1 Drop Both Eyes QPM    clopidogreL (PLAVIX) tablet 75 mg  75 mg Oral DAILY    fluticasone propionate (FLONASE) 50 mcg/actuation nasal spray 2 Spray  2 Spray Both Nostrils DAILY PRN    loratadine (CLARITIN) tablet 10 mg  10 mg Oral DAILY    pregabalin (LYRICA) capsule 100 mg  100 mg Oral BID    budesonide (PULMICORT) 250 mcg/2ml nebulizer susp  250 mcg Nebulization BID RT    ipratropium (ATROVENT) 0.02 % nebulizer solution 0.5 mg  0.5 mg Nebulization Q8H RT    arformoteroL (BROVANA) neb solution 15 mcg  15 mcg Nebulization BID RT    acetaminophen (TYLENOL) tablet 500 mg  500 mg Oral Q4H PRN    piperacillin-tazobactam (ZOSYN) 3.375 g in 0.9% sodium chloride (MBP/ADV) 100 mL MBP  3.375 g IntraVENous Q6H    sodium hypochlorite (HALF STRENGTH DAKIN'S) 0.25% irrigation (bottle)   Topical BID       Imaging:   XR Results (most recent):  Results from Hospital Encounter encounter on 12/03/20   XR CHEST SNGL V    Narrative EXAM: Chest Radiograph    INDICATION:  flank pain    TECHNIQUE: AP view of the chest    COMPARISON: 4/24/2018, 7/19/2017, 7/27/2017 and 3/29/2013    FINDINGS: No pneumothorax identified. The lungs are hyperinflated. No acute  infiltrate or effusion appreciated. The cardiac silhouette is unremarkable. Calcifications are noted in the thoracic aorta. The pulmonary vasculature is  unremarkable. Severe degenerative changes of the shoulders and spine. Impression Impression:  1. No acute infiltrate or effusion. 2.  Hyperinflated lungs suggestive COPD.          CT Results (most recent):  Results from [facility] Encounter encounter on 12/03/20   CT CHEST ABD PELV W CONT    Narrative EXAM:  CT CHEST ABD PELV W CONT    INDICATION: evaluate for vascular graft infection, occult infection  , positive  blood cultures and as as a positive, gram-positive cocci positive, history of  chronic right chest area open wound nonresponsive to outpatient local dressings  and antibiotics, generalized fatigue and malaise, fever, tachycardia, history of  Crohn's disease, right flank wound, severe peripheral artery disease status post  right axillary femoral graft, left femoral-popliteal graft, status post revision  of axillary to femoral bypass graft with excision of an infected graft on  9/21/2020, status post excision of the ulcer over the wound and debridement of  the wound on 11/5/2020, now with recent history of purulent drainage to the  right abdominal surgical site, fevers x1 week, gram-positive bacteremia, likely  vascular graft infection    COMPARISON: CT chest November 10, 2018    CONTRAST:  100 mL of Isovue-370. TECHNIQUE:   Following the uneventful intravenous administration of contrast, thin axial  images were obtained through the chest, abdomen and pelvis. Coronal and sagittal  reconstructions were generated. Oral contrast was not administered. CT dose  reduction was achieved through use of a standardized protocol tailored for this  examination and automatic exposure control for dose modulation. FINDINGS:     CHEST:  THYROID: No nodule. MEDIASTINUM: Several stable minimally prominent middle mediastinal nodes  measuring less than 1 cm in the short axis dimension. Image 21 series 2. NEETU: No mass or lymphadenopathy. THORACIC AORTA: No dissection or aneurysm. MAIN PULMONARY ARTERY: Normal in caliber. TRACHEA/BRONCHI: Patent. ESOPHAGUS: No wall thickening or dilatation. HEART: Normal in size. PLEURA: No effusion or pneumothorax. LUNGS: Stable emphysema.  Mild right greater than left basal subsegmental  atelectasis. ABDOMEN:  LIVER: No mass or intrahepatic biliary dilatation. There is mild dilatation of  the extrahepatic common duct measuring 9 mm which tapers to the ampulla. GALLBLADDER: Surgically removed  SPLEEN: No mass. PANCREAS: No mass or ductal dilatation. ADRENALS: Stable mild thickening of the left adrenal. Normal right adrenal.  KIDNEYS: No mass, calculus, or hydronephrosis. Bilateral mild cortical scarring  STOMACH: Unremarkable. SMALL BOWEL: No dilatation or wall thickening. COLON: Scattered diverticula of the descending colon without evidence of  surrounding inflammation. APPENDIX: Unremarkable. PERITONEUM: No ascites or pneumoperitoneum. RETROPERITONEUM: No lymphadenopathy or aortic aneurysm. PELVIS:  REPRODUCTIVE ORGANS: Left ovarian 3.6 cm simple cyst. Uterus has been surgically  removed. URINARY BLADDER: No mass or calculus. BONES: Degenerative disc disease at L5/S1 with reactive endplate sclerosis. ADDITIONAL COMMENTS: Right axillary to femoral graft is patent. An additional very short segment abandoned graft is seen at the right lateral  chest wall inferiorly located more posteriorly to the patent graft. At its  inferior border there is soft tissue density extending to the overlying chest  wall at the site of prior surgery which has decreased from the previous exam.    There is surrounding low density/soft tissue density in the subcutaneous fat  involving the more anterior patent graft, image 31 through image 37 series 3. At  this level the opacified lumen is decreased in size with a slitlike surrounded  configuration by soft tissue density within the lumen of the stent. The AP  diameter of the contrast opacified lumen is 3 mm with a transverse diameter of 5  mm. Image 34 series 2. The diameter of the graft itself at this site is 1 x 1.1  cm. See coronal image 16 series 604. Sagittal image 64 series 605.  The  immediately proximal graft is completely opacified measuring 1 x 0.9 cm and the  more distal graft is completely opacified measuring 1.2 x 0.9 cm. Probable small amount of eccentric thrombus at the distal level of the stent  graft, image 55 series 3. See coronal image 16 series 604, sagittal image 60  series 605. There is additional surrounding soft tissue density in the subcutaneous fat at  the right lower quadrant abdominal wall where a short segment of apparent  endograft in the patent graft converge to merge with the femoral artery. Impression IMPRESSION:  Findings are concerning for infection at the level of the mid abdominal and  possibly distal abdominal right axillary femoral graft with surrounding soft  tissue density and intraluminal thrombus with narrowing of the opacified lumen. As clinically indicated follow-up nuclear isotope infection scan may be of  benefit. Left ovarian cyst.  Mild extrahepatic biliary dilatation likely reservoir effect. Diverticulosis coli without evidence of diverticulitis. Minimal bibasal lung dependent atelectasis.                Labs:    Recent Results (from the past 48 hour(s))   METABOLIC PANEL, BASIC    Collection Time: 12/06/20 12:37 AM   Result Value Ref Range    Sodium 143 136 - 145 mmol/L    Potassium 3.5 3.5 - 5.5 mmol/L    Chloride 111 100 - 111 mmol/L    CO2 24 21 - 32 mmol/L    Anion gap 8 3.0 - 18 mmol/L    Glucose 126 (H) 74 - 99 mg/dL    BUN 8 7.0 - 18 MG/DL    Creatinine 0.81 0.6 - 1.3 MG/DL    BUN/Creatinine ratio 10 (L) 12 - 20      GFR est AA >60 >60 ml/min/1.73m2    GFR est non-AA >60 >60 ml/min/1.73m2    Calcium 10.6 (H) 8.5 - 10.1 MG/DL   CBC WITH AUTOMATED DIFF    Collection Time: 12/06/20 12:37 AM   Result Value Ref Range    WBC 11.7 4.6 - 13.2 K/uL    RBC 3.06 (L) 4.20 - 5.30 M/uL    HGB 8.4 (L) 12.0 - 16.0 g/dL    HCT 26.5 (L) 35.0 - 45.0 %    MCV 86.6 74.0 - 97.0 FL    MCH 27.5 24.0 - 34.0 PG    MCHC 31.7 31.0 - 37.0 g/dL    RDW 14.9 (H) 11.6 - 14.5 %    PLATELET 645 675 - 532 K/uL    MPV 10.0 9.2 - 11.8 FL    NEUTROPHILS 64 40 - 73 %    LYMPHOCYTES 26 21 - 52 %    MONOCYTES 9 3 - 10 %    EOSINOPHILS 1 0 - 5 %    BASOPHILS 0 0 - 2 %    ABS. NEUTROPHILS 7.4 1.8 - 8.0 K/UL    ABS. LYMPHOCYTES 3.1 0.9 - 3.6 K/UL    ABS. MONOCYTES 1.1 0.05 - 1.2 K/UL    ABS. EOSINOPHILS 0.2 0.0 - 0.4 K/UL    ABS. BASOPHILS 0.0 0.0 - 0.1 K/UL    DF AUTOMATED     CULTURE, BLOOD    Collection Time: 12/06/20 12:37 AM    Specimen: Blood   Result Value Ref Range    Special Requests: NO SPECIAL REQUESTS      Culture result: NO GROWTH 1 DAY     METABOLIC PANEL, BASIC    Collection Time: 12/07/20  7:18 AM   Result Value Ref Range    Sodium 145 136 - 145 mmol/L    Potassium 3.8 3.5 - 5.5 mmol/L    Chloride 114 (H) 100 - 111 mmol/L    CO2 26 21 - 32 mmol/L    Anion gap 5 3.0 - 18 mmol/L    Glucose 91 74 - 99 mg/dL    BUN 11 7.0 - 18 MG/DL    Creatinine 0.73 0.6 - 1.3 MG/DL    BUN/Creatinine ratio 15 12 - 20      GFR est AA >60 >60 ml/min/1.73m2    GFR est non-AA >60 >60 ml/min/1.73m2    Calcium 10.2 (H) 8.5 - 10.1 MG/DL   CBC WITH AUTOMATED DIFF    Collection Time: 12/07/20  7:18 AM   Result Value Ref Range    WBC 11.6 4.6 - 13.2 K/uL    RBC 2.92 (L) 4.20 - 5.30 M/uL    HGB 8.1 (L) 12.0 - 16.0 g/dL    HCT 25.4 (L) 35.0 - 45.0 %    MCV 87.0 74.0 - 97.0 FL    MCH 27.7 24.0 - 34.0 PG    MCHC 31.9 31.0 - 37.0 g/dL    RDW 14.8 (H) 11.6 - 14.5 %    PLATELET 936 (H) 316 - 420 K/uL    MPV 9.8 9.2 - 11.8 FL    NEUTROPHILS 65 40 - 73 %    LYMPHOCYTES 23 21 - 52 %    MONOCYTES 10 3 - 10 %    EOSINOPHILS 2 0 - 5 %    BASOPHILS 0 0 - 2 %    ABS. NEUTROPHILS 7.5 1.8 - 8.0 K/UL    ABS. LYMPHOCYTES 2.7 0.9 - 3.6 K/UL    ABS. MONOCYTES 1.2 0.05 - 1.2 K/UL    ABS. EOSINOPHILS 0.2 0.0 - 0.4 K/UL    ABS.  BASOPHILS 0.0 0.0 - 0.1 K/UL    DF AUTOMATED         Signed By: Claudia Rendon MD     December 7, 2020      I spent 35 minutes with the patient in face-to-face consultation, of which greater than 50% was spent in counseling and coordination of care as described above    Disclaimer: Sections of this note are dictated using utilizing voice recognition software. Minor typographical errors may be present. If questions arise, please do not hesitate to contact me or call our department.

## 2020-12-07 NOTE — PROGRESS NOTES
Physician Progress Note      PATIENT:               Torsten Odell  CSN #:                  [id number]  :                       1950  ADMIT DATE:       12/3/2020 2:28 PM  [address] [place] DATE:  RESPONDING  PROVIDER #:        Andrea Mireles MD          QUERY TEXT:    Edith Cota,  I am unclear as to the meaning of your query response of \"Sepsis. \" Do you mean sepsis only, no shock? or Septic Shock? Please clarify by using on of the responses below. Thank you very much for your time. Pt admitted with bacteremia and has shock documented. Bolus given, no vasopressors. If possible, please document in progress notes and discharge summary further specificity regarding the type of shock: The medical record reflects the following:  Risk Factors: Sepsis, Bacteremia  Clinical Indicators: Per  Medicine PN:  Med:  sepsis-temperature+ tachycardia+ wound infection+ positive blood cultures+ hypotension( Shock )  Treatment: IV fluid-normal saline 500 cc bolus-monitor blood pressure/IV albumin; -Patient started on IV antibiotic per ID recommendation and transferred to stepdown. Thank you,  Rachel Banegas RN, [facility]  [phone]  Options provided:  -- Septic Shock  -- Septic Shock ruled out, sepsis only  -- Other - I will add my own diagnosis  -- Disagree - Not applicable / Not valid  -- Disagree - Clinically unable to determine / Unknown  -- Refer to Clinical Documentation Reviewer    PROVIDER RESPONSE TEXT:    This patient has septic shock ruled out, sepsis only.     Query created by: Gian Puente on 2020 1:05 PM      Electronically signed by:  Andrea Mireles MD 2020 4:01 PM

## 2020-12-07 NOTE — PROGRESS NOTES
1900 - Bedside and Verbal shift change report given to Carrillo Carreno RN (oncoming nurse) by Eduardo Lim RN/SPENCER Gardner (offgoing nurse). Report included the following information SBAR, Kardex, Intake/Output, MAR, Recent Results and Cardiac Rhythm SR.    0700 - Bedside and Verbal shift change report given to Huma Matos RN (oncoming nurse) by Carrillo Carreno RN (offgoing nurse).  Report included the following information SBAR, Kardex, Intake/Output, MAR, Recent Results and Cardiac Rhythm SR.

## 2020-12-07 NOTE — PROGRESS NOTES
Vascular Surgery Progress Note    Admit Date: 12/3/2020  POD * No surgery date entered *    Procedure:  Procedure(s):  REVISION AXILLO-FEMORAL BYPASS      Subjective:     Patient has no new complaints. Objective:     Blood pressure (!) 108/54, pulse 76, temperature 98 °F (36.7 °C), resp. rate 22, height 5' 5\" (1.651 m), weight 139 lb 3.2 oz (63.1 kg), SpO2 97 %, unknown if currently breastfeeding. Temp (24hrs), Av.2 °F (36.8 °C), Min:97.9 °F (36.6 °C), Max:98.4 °F (36.9 °C)      Physical Exam:  LUNG:  clear to auscultation bilaterally, HEART:  regular rate and rhythm, S1, S2 normal, no murmur, click, rub or gallop and ABDOMEN:  no change    Labs: Results:       Chemistry Recent Labs     20  0718 207 20  0238   GLU 91 126* 94    143 140   K 3.8 3.5 3.0*   * 111 109   CO2 26 24 27   BUN 11 8 8   CREA 0.73 0.81 0.69   CA 10.2* 10.6* 10.5*   AGAP 5 8 4   BUCR 15 10* 12      CBC w/Diff Recent Labs     20  0718 207 20  0238   WBC 11.6 11.7 10.4   RBC 2.92* 3.06* 3.08*   HGB 8.1* 8.4* 8.5*   HCT 25.4* 26.5* 26.7*   * 407 361   GRANS 65 64 66   LYMPH 23 26 21   EOS 2 1 2      Microbiology Recent Labs     20  0037   CULT NO GROWTH 1 DAY      Coagulation No results for input(s): PTP, INR, APTT, INREXT in the last 72 hours. Data Review: images and reports reviewed    Assessment:     Active Problems:    Cellulitis of abdominal wall (12/3/2020)      UTI (urinary tract infection) (12/3/2020)        Plan/Recommendations/Medical Decision Making:     Continue present treatment and Discussed with infectious disease, appreciate their input. Will allow for antibiotics and plan for surgery on .

## 2020-12-08 LAB
ERYTHROCYTE [DISTWIDTH] IN BLOOD BY AUTOMATED COUNT: 15.3 % (ref 11.6–14.5)
HCT VFR BLD AUTO: 24.1 % (ref 35–45)
HGB BLD-MCNC: 7.6 G/DL (ref 12–16)
MCH RBC QN AUTO: 27.5 PG (ref 24–34)
MCHC RBC AUTO-ENTMCNC: 31.5 G/DL (ref 31–37)
MCV RBC AUTO: 87.3 FL (ref 74–97)
PLATELET # BLD AUTO: 496 K/UL (ref 135–420)
PMV BLD AUTO: 9.8 FL (ref 9.2–11.8)
RBC # BLD AUTO: 2.76 M/UL (ref 4.2–5.3)
WBC # BLD AUTO: 11.1 K/UL (ref 4.6–13.2)

## 2020-12-08 PROCEDURE — 36415 COLL VENOUS BLD VENIPUNCTURE: CPT

## 2020-12-08 PROCEDURE — 77010033678 HC OXYGEN DAILY

## 2020-12-08 PROCEDURE — 74011000250 HC RX REV CODE- 250: Performed by: HOSPITALIST

## 2020-12-08 PROCEDURE — 94640 AIRWAY INHALATION TREATMENT: CPT

## 2020-12-08 PROCEDURE — 85027 COMPLETE CBC AUTOMATED: CPT

## 2020-12-08 PROCEDURE — 65660000004 HC RM CVT STEPDOWN

## 2020-12-08 PROCEDURE — 74011000258 HC RX REV CODE- 258: Performed by: INTERNAL MEDICINE

## 2020-12-08 PROCEDURE — 86900 BLOOD TYPING SEROLOGIC ABO: CPT

## 2020-12-08 PROCEDURE — 74011250636 HC RX REV CODE- 250/636: Performed by: INTERNAL MEDICINE

## 2020-12-08 PROCEDURE — 74011250637 HC RX REV CODE- 250/637: Performed by: HOSPITALIST

## 2020-12-08 PROCEDURE — 99231 SBSQ HOSP IP/OBS SF/LOW 25: CPT | Performed by: PHYSICIAN ASSISTANT

## 2020-12-08 PROCEDURE — 99232 SBSQ HOSP IP/OBS MODERATE 35: CPT | Performed by: HOSPITALIST

## 2020-12-08 PROCEDURE — 2709999900 HC NON-CHARGEABLE SUPPLY

## 2020-12-08 PROCEDURE — 86923 COMPATIBILITY TEST ELECTRIC: CPT

## 2020-12-08 RX ADMIN — CIPROFLOXACIN 400 MG: 2 INJECTION, SOLUTION INTRAVENOUS at 09:05

## 2020-12-08 RX ADMIN — SODIUM HYPOCHLORITE: 2.5 SOLUTION TOPICAL at 09:06

## 2020-12-08 RX ADMIN — Medication 10 ML: at 13:26

## 2020-12-08 RX ADMIN — CIPROFLOXACIN 400 MG: 2 INJECTION, SOLUTION INTRAVENOUS at 21:28

## 2020-12-08 RX ADMIN — BUDESONIDE 250 MCG: 0.25 INHALANT RESPIRATORY (INHALATION) at 07:43

## 2020-12-08 RX ADMIN — IPRATROPIUM BROMIDE 0.5 MG: 0.5 SOLUTION RESPIRATORY (INHALATION) at 15:48

## 2020-12-08 RX ADMIN — PREGABALIN 100 MG: 50 CAPSULE ORAL at 21:27

## 2020-12-08 RX ADMIN — CLOPIDOGREL BISULFATE 75 MG: 75 TABLET ORAL at 09:06

## 2020-12-08 RX ADMIN — BUDESONIDE 250 MCG: 0.25 INHALANT RESPIRATORY (INHALATION) at 19:52

## 2020-12-08 RX ADMIN — ARFORMOTEROL TARTRATE 15 MCG: 15 SOLUTION RESPIRATORY (INHALATION) at 07:43

## 2020-12-08 RX ADMIN — ASPIRIN 81 MG CHEWABLE TABLET 81 MG: 81 TABLET CHEWABLE at 09:06

## 2020-12-08 RX ADMIN — ARFORMOTEROL TARTRATE 15 MCG: 15 SOLUTION RESPIRATORY (INHALATION) at 19:51

## 2020-12-08 RX ADMIN — PIPERACILLIN AND TAZOBACTAM 3.38 G: 3; .375 INJECTION, POWDER, LYOPHILIZED, FOR SOLUTION INTRAVENOUS at 07:47

## 2020-12-08 RX ADMIN — PIPERACILLIN AND TAZOBACTAM 3.38 G: 3; .375 INJECTION, POWDER, LYOPHILIZED, FOR SOLUTION INTRAVENOUS at 21:41

## 2020-12-08 RX ADMIN — IPRATROPIUM BROMIDE 0.5 MG: 0.5 SOLUTION RESPIRATORY (INHALATION) at 07:43

## 2020-12-08 RX ADMIN — Medication 10 ML: at 07:50

## 2020-12-08 RX ADMIN — LORATADINE 10 MG: 10 TABLET ORAL at 09:06

## 2020-12-08 RX ADMIN — PIPERACILLIN AND TAZOBACTAM 3.38 G: 3; .375 INJECTION, POWDER, LYOPHILIZED, FOR SOLUTION INTRAVENOUS at 14:24

## 2020-12-08 RX ADMIN — Medication 10 ML: at 21:43

## 2020-12-08 RX ADMIN — LATANOPROST 1 DROP: 50 SOLUTION OPHTHALMIC at 18:47

## 2020-12-08 RX ADMIN — PREGABALIN 100 MG: 50 CAPSULE ORAL at 09:06

## 2020-12-08 NOTE — PROGRESS NOTES
[facility] Hospitalist Group  Progress Note    Patient: Elisabeth Paiz Age: 65 y.o. : 1950 MR#: [id number] SSN: [id number]  Date/Time: 2020     Subjective:     Pt seen & evaluated , lying in bed, NAD, dressing changed by nursing         Assessment/Plan:     1. Bacteremia - continue IV Cipro , ID on board , plan for surg on 12/10  2. UTI - continue IV abx , Urine cx not sent - add Cipro to cover citrobacter   3. Recommend to proceed with vascular graft surg on 12/10.   4. H/o COPD - continue BD  5. H/o Crohn's disease - was on remicade  6. Resume home meds - cymbalta & lyrica  7. AOCD - monitor H&H   DVT Px - SCD   FC       Continue current treatment plan    Case discussed with:  [x]Patient  []Family  []Nursing  []Case Management  DVT Prophylaxis:  []Lovenox  []Hep SQ  []SCDs  []Coumadin   []On Heparin gtt    Objective:   VS:   Visit Vitals  BP (!) 114/54 (BP 1 Location: Left arm, BP Patient Position: At rest;Head of bed elevated (Comment degrees))   Pulse 83   Temp 98.3 °F (36.8 °C)   Resp 27   Ht 5' 5\" (1.651 m)   Wt 63.1 kg (139 lb 3.2 oz)   SpO2 96%   Breastfeeding Unknown   BMI 23.16 kg/m²      Tmax/24hrs: Temp (24hrs), Av °F (36.7 °C), Min:97.5 °F (36.4 °C), Max:98.3 °F (36.8 °C)  IOBRIEF    Intake/Output Summary (Last 24 hours) at 2020 1233  Last data filed at 2020 0920  Gross per 24 hour   Intake 700 ml   Output    Net 700 ml       General:  Alert, cooperative, no acute distress    HEENT: PERRLA, anicteric sclerae. Pulmonary:  CTA Bilaterally. No Wheezing/Rhonchi/Rales. Cardiovascular: Regular rate and Rhythm. GI:  Soft, Non distended, Non tender. + Bowel sounds. Extremities:  No edema, cyanosis, clubbing. No calf tenderness. Neurologic: Alert and oriented X 3. No acute neuro deficits.   Additional:    Medications:   Current Facility-Administered Medications   Medication Dose Route Frequency    lidocaine (PF) (XYLOCAINE) 10 mg/mL (1 %) injection 0.1 mL  0.1 mL SubCUTAneous PRN    sodium chloride (NS) flush 5-40 mL  5-40 mL IntraVENous Q8H    sodium chloride (NS) flush 5-40 mL  5-40 mL IntraVENous PRN    ciprofloxacin (CIPRO) 400 mg in D5W IVPB (premix)  400 mg IntraVENous Q12H    aspirin chewable tablet 81 mg  81 mg Oral DAILY    latanoprost (XALATAN) 0.005 % ophthalmic solution 1 Drop  1 Drop Both Eyes QPM    clopidogreL (PLAVIX) tablet 75 mg  75 mg Oral DAILY    fluticasone propionate (FLONASE) 50 mcg/actuation nasal spray 2 Spray  2 Spray Both Nostrils DAILY PRN    loratadine (CLARITIN) tablet 10 mg  10 mg Oral DAILY    pregabalin (LYRICA) capsule 100 mg  100 mg Oral BID    budesonide (PULMICORT) 250 mcg/2ml nebulizer susp  250 mcg Nebulization BID RT    ipratropium (ATROVENT) 0.02 % nebulizer solution 0.5 mg  0.5 mg Nebulization Q8H RT    arformoteroL (BROVANA) neb solution 15 mcg  15 mcg Nebulization BID RT    acetaminophen (TYLENOL) tablet 500 mg  500 mg Oral Q4H PRN    piperacillin-tazobactam (ZOSYN) 3.375 g in 0.9% sodium chloride (MBP/ADV) 100 mL MBP  3.375 g IntraVENous Q6H    sodium hypochlorite (HALF STRENGTH DAKIN'S) 0.25% irrigation (bottle)   Topical BID       Imaging:   XR Results (most recent):  Results from Hospital Encounter encounter on 12/03/20   XR CHEST SNGL V    Narrative EXAM: Chest Radiograph    INDICATION:  flank pain    TECHNIQUE: AP view of the chest    COMPARISON: 4/24/2018, 7/19/2017, 7/27/2017 and 3/29/2013    FINDINGS: No pneumothorax identified. The lungs are hyperinflated. No acute  infiltrate or effusion appreciated. The cardiac silhouette is unremarkable. Calcifications are noted in the thoracic aorta. The pulmonary vasculature is  unremarkable. Severe degenerative changes of the shoulders and spine. Impression Impression:  1. No acute infiltrate or effusion. 2.  Hyperinflated lungs suggestive COPD.          CT Results (most recent):  Results from Hospital Encounter encounter on 12/03/20   CT CHEST ABD PELV W CONT    Narrative EXAM:  CT CHEST ABD PELV W CONT    INDICATION: evaluate for vascular graft infection, occult infection  , positive  blood cultures and as as a positive, gram-positive cocci positive, history of  chronic right chest area open wound nonresponsive to outpatient local dressings  and antibiotics, generalized fatigue and malaise, fever, tachycardia, history of  Crohn's disease, right flank wound, severe peripheral artery disease status post  right axillary femoral graft, left femoral-popliteal graft, status post revision  of axillary to femoral bypass graft with excision of an infected graft on  9/21/2020, status post excision of the ulcer over the wound and debridement of  the wound on 11/5/2020, now with recent history of purulent drainage to the  right abdominal surgical site, fevers x1 week, gram-positive bacteremia, likely  vascular graft infection    COMPARISON: CT chest November 10, 2018    CONTRAST:  100 mL of Isovue-370. TECHNIQUE:   Following the uneventful intravenous administration of contrast, thin axial  images were obtained through the chest, abdomen and pelvis. Coronal and sagittal  reconstructions were generated. Oral contrast was not administered. CT dose  reduction was achieved through use of a standardized protocol tailored for this  examination and automatic exposure control for dose modulation. FINDINGS:     CHEST:  THYROID: No nodule. MEDIASTINUM: Several stable minimally prominent middle mediastinal nodes  measuring less than 1 cm in the short axis dimension. Image 21 series 2. NEETU: No mass or lymphadenopathy. THORACIC AORTA: No dissection or aneurysm. MAIN PULMONARY ARTERY: Normal in caliber. TRACHEA/BRONCHI: Patent. ESOPHAGUS: No wall thickening or dilatation. HEART: Normal in size. PLEURA: No effusion or pneumothorax. LUNGS: Stable emphysema. Mild right greater than left basal subsegmental  atelectasis.     ABDOMEN:  LIVER: No mass or intrahepatic biliary dilatation. There is mild dilatation of  the extrahepatic common duct measuring 9 mm which tapers to the ampulla. GALLBLADDER: Surgically removed  SPLEEN: No mass. PANCREAS: No mass or ductal dilatation. ADRENALS: Stable mild thickening of the left adrenal. Normal right adrenal.  KIDNEYS: No mass, calculus, or hydronephrosis. Bilateral mild cortical scarring  STOMACH: Unremarkable. SMALL BOWEL: No dilatation or wall thickening. COLON: Scattered diverticula of the descending colon without evidence of  surrounding inflammation. APPENDIX: Unremarkable. PERITONEUM: No ascites or pneumoperitoneum. RETROPERITONEUM: No lymphadenopathy or aortic aneurysm. PELVIS:  REPRODUCTIVE ORGANS: Left ovarian 3.6 cm simple cyst. Uterus has been surgically  removed. URINARY BLADDER: No mass or calculus. BONES: Degenerative disc disease at L5/S1 with reactive endplate sclerosis. ADDITIONAL COMMENTS: Right axillary to femoral graft is patent. An additional very short segment abandoned graft is seen at the right lateral  chest wall inferiorly located more posteriorly to the patent graft. At its  inferior border there is soft tissue density extending to the overlying chest  wall at the site of prior surgery which has decreased from the previous exam.    There is surrounding low density/soft tissue density in the subcutaneous fat  involving the more anterior patent graft, image 31 through image 37 series 3. At  this level the opacified lumen is decreased in size with a slitlike surrounded  configuration by soft tissue density within the lumen of the stent. The AP  diameter of the contrast opacified lumen is 3 mm with a transverse diameter of 5  mm. Image 34 series 2. The diameter of the graft itself at this site is 1 x 1.1  cm. See coronal image 16 series 604. Sagittal image 64 series 605.  The  immediately proximal graft is completely opacified measuring 1 x 0.9 cm and the  more distal graft is completely opacified measuring 1.2 x 0.9 cm. Probable small amount of eccentric thrombus at the distal level of the stent  graft, image 55 series 3. See coronal image 16 series 604, sagittal image 60  series 605. There is additional surrounding soft tissue density in the subcutaneous fat at  the right lower quadrant abdominal wall where a short segment of apparent  endograft in the patent graft converge to merge with the femoral artery. Impression IMPRESSION:  Findings are concerning for infection at the level of the mid abdominal and  possibly distal abdominal right axillary femoral graft with surrounding soft  tissue density and intraluminal thrombus with narrowing of the opacified lumen. As clinically indicated follow-up nuclear isotope infection scan may be of  benefit. Left ovarian cyst.  Mild extrahepatic biliary dilatation likely reservoir effect. Diverticulosis coli without evidence of diverticulitis. Minimal bibasal lung dependent atelectasis.                Labs:    Recent Results (from the past 48 hour(s))   METABOLIC PANEL, BASIC    Collection Time: 12/07/20  7:18 AM   Result Value Ref Range    Sodium 145 136 - 145 mmol/L    Potassium 3.8 3.5 - 5.5 mmol/L    Chloride 114 (H) 100 - 111 mmol/L    CO2 26 21 - 32 mmol/L    Anion gap 5 3.0 - 18 mmol/L    Glucose 91 74 - 99 mg/dL    BUN 11 7.0 - 18 MG/DL    Creatinine 0.73 0.6 - 1.3 MG/DL    BUN/Creatinine ratio 15 12 - 20      GFR est AA >60 >60 ml/min/1.73m2    GFR est non-AA >60 >60 ml/min/1.73m2    Calcium 10.2 (H) 8.5 - 10.1 MG/DL   CBC WITH AUTOMATED DIFF    Collection Time: 12/07/20  7:18 AM   Result Value Ref Range    WBC 11.6 4.6 - 13.2 K/uL    RBC 2.92 (L) 4.20 - 5.30 M/uL    HGB 8.1 (L) 12.0 - 16.0 g/dL    HCT 25.4 (L) 35.0 - 45.0 %    MCV 87.0 74.0 - 97.0 FL    MCH 27.7 24.0 - 34.0 PG    MCHC 31.9 31.0 - 37.0 g/dL    RDW 14.8 (H) 11.6 - 14.5 %    PLATELET 039 (H) 367 - 420 K/uL    MPV 9.8 9.2 - 11.8 FL    NEUTROPHILS 65 40 - 73 % LYMPHOCYTES 23 21 - 52 %    MONOCYTES 10 3 - 10 %    EOSINOPHILS 2 0 - 5 %    BASOPHILS 0 0 - 2 %    ABS. NEUTROPHILS 7.5 1.8 - 8.0 K/UL    ABS. LYMPHOCYTES 2.7 0.9 - 3.6 K/UL    ABS. MONOCYTES 1.2 0.05 - 1.2 K/UL    ABS. EOSINOPHILS 0.2 0.0 - 0.4 K/UL    ABS. BASOPHILS 0.0 0.0 - 0.1 K/UL    DF AUTOMATED     CULTURE, BLOOD    Collection Time: 12/07/20  9:19 AM    Specimen: Blood   Result Value Ref Range    Special Requests: NO SPECIAL REQUESTS      Culture result: NO GROWTH AFTER 21 HOURS         Signed By: Felipe Kim MD     December 8, 2020      I spent 35 minutes with the patient in face-to-face consultation, of which greater than 50% was spent in counseling and coordination of care as described above    Disclaimer: Sections of this note are dictated using utilizing voice recognition software. Minor typographical errors may be present. If questions arise, please do not hesitate to contact me or call our department.

## 2020-12-08 NOTE — PROGRESS NOTES
Infectious Disease progress Note        Reason: Fevers, gram-positive bacteremia    Current abx Prior abx   Pip/tazo since 12/3  Ciprofloxacin since 12/7 Vancomycin 12/3-12/4     Lines:       Assessment :    65 y.o.  female  with history of Crohn's disease status post Remicade, severe peripheral artery disease status post right axillary femoral, left femoropopliteal bypass graft with multiple surgeries who presented to [facility] on 12/3/2020 with subjective sensation of not feeling well, fever. Status post revision of axillary to femoral bypass graft with excision of infected graft on 9/21/2020. Status post excision of the ulcer over the wound and debridement of the wound on 11/5/2020. Cultures revealed methicillin susceptible Staphylococcus aureus. Now with recent h/o purulent drainage right abdominal surgical site, fevers x 1 week, gram positive bacteremia. Clinical presentation consistent with sepsis (present on admission) secondary to methicillin susceptible Staphylococcus aureus bloodstream infection (positive blood culture 12/3, negative blood culture 12/6, 12/7),  vascular graft infection    CT chest/abdomen/pelvis 12/4-results reviewed. Findings consistent with vascular graft infection. Concurrent immunocompromise state secondary to Remicade is likely masking the full clinical presentation. Low clinical probability of COVID-19 infection at this time    Some pyuria noted on urinalysis 12/3-no dysuria or other signs or symptoms to suggest UTI. However moderate leukocyte esterase noted on urine analysis. Urine culture 12/3+ for Citrobacter, ampicillin susceptible Enterococcus faecalis. Immunocompromise state can interfere with the clinical presentation. Since patient scheduled for vascular graft surgery, recommend to treat for probable Citrobacter/Enterococcus cystitis. Recommendations:    1.   Recommend piperacillin/tazobactam.  Add ciprofloxacin to cover Citrobacter in urine culture. 2.  follow-up blood culture 12/7  3.  recommend to proceed with vascular graft surgery on or after 12/10 so that patient has completed adequate treatment of cystitis and has had received adequate antibiotics for MSSA bloodstream infection in order to prevent reinfection of new graft     Above plan was discussed in details with patient    HPI:    Feels better. Patient denies any increasing abdominal pain. She denies any sore throat, cough, chest pain, shortness of breath, diarrhea, dysuria. home Medication List    Details   fluticasone-umeclidinium-vilanterol (Trelegy Ellipta) 100-62.5-25 mcg inhaler Take 1 Puff by inhalation daily. Qty: 3 Inhaler, Refills: 1      clopidogreL (PLAVIX) 75 mg tab TAKE ONE TABLET BY MOUTH DAILY  Qty: 30 Tab, Refills: 8      pregabalin (LYRICA) 100 mg capsule Take  by mouth two (2) times a day. Takes 100 mg in am and 200 in the pm      aspirin 81 mg tablet Take 81 mg by mouth daily. cyanocobalamin (VITAMIN B-12) 1,000 mcg/mL injection 1,000 mcg by IntraMUSCular route every fourteen (14) days. triamterene-hydrochlorothiazide (DYAZIDE) 37.5-25 mg per capsule Take 1 Cap by mouth daily. bimatoprost (LUMIGAN) 0.03 % ophthalmic drops Administer 1 Drop to both eyes every evening. atropine-PHENobarbital-scopolamine-hyoscyamine (DONNATAL) 16.2-0.1037 -0.0194 mg per tablet Take 1 Tab by mouth as needed (diarrhea). Indications: Irritable Bowel Syndrome      inFLIXimab (REMICADE) 100 mg injection 5 mg/kg by IntraVENous route once. Every 4 weeks      ipratropium-albuteroL (Combivent Respimat)  mcg/actuation inhaler Take 1 Puff by inhalation every six (6) hours as needed for Wheezing or Shortness of Breath. Qty: 1 Inhaler, Refills: 3      multivitamin (ONE A DAY) tablet Take 1 Tab by mouth daily. BIOTIN PO Take  by mouth. OTHER       loratadine (CLARITIN) 10 mg tablet Take 10 mg by mouth daily.       fluticasone propionate (FLONASE ALLERGY RELIEF) 50 mcg/actuation nasal spray 2 Sprays by Both Nostrils route daily as needed for Rhinitis. DULoxetine (CYMBALTA) 60 mg capsule Take 60 mg by mouth nightly. For Anxiety. loperamide (IMMODIUM) 2 mg tablet Take 2 mg by mouth daily as needed for Diarrhea. dicyclomine (BENTYL) 10 mg capsule Take 10 mg by mouth daily. Current Facility-Administered Medications   Medication Dose Route Frequency    lidocaine (PF) (XYLOCAINE) 10 mg/mL (1 %) injection 0.1 mL  0.1 mL SubCUTAneous PRN    sodium chloride (NS) flush 5-40 mL  5-40 mL IntraVENous Q8H    sodium chloride (NS) flush 5-40 mL  5-40 mL IntraVENous PRN    ciprofloxacin (CIPRO) 400 mg in D5W IVPB (premix)  400 mg IntraVENous Q12H    aspirin chewable tablet 81 mg  81 mg Oral DAILY    latanoprost (XALATAN) 0.005 % ophthalmic solution 1 Drop  1 Drop Both Eyes QPM    clopidogreL (PLAVIX) tablet 75 mg  75 mg Oral DAILY    fluticasone propionate (FLONASE) 50 mcg/actuation nasal spray 2 Spray  2 Spray Both Nostrils DAILY PRN    loratadine (CLARITIN) tablet 10 mg  10 mg Oral DAILY    pregabalin (LYRICA) capsule 100 mg  100 mg Oral BID    budesonide (PULMICORT) 250 mcg/2ml nebulizer susp  250 mcg Nebulization BID RT    ipratropium (ATROVENT) 0.02 % nebulizer solution 0.5 mg  0.5 mg Nebulization Q8H RT    arformoteroL (BROVANA) neb solution 15 mcg  15 mcg Nebulization BID RT    acetaminophen (TYLENOL) tablet 500 mg  500 mg Oral Q4H PRN    piperacillin-tazobactam (ZOSYN) 3.375 g in 0.9% sodium chloride (MBP/ADV) 100 mL MBP  3.375 g IntraVENous Q6H    sodium hypochlorite (HALF STRENGTH DAKIN'S) 0.25% irrigation (bottle)   Topical BID       Allergies: Codeine; Darvon [propoxyphene]; Demerol [meperidine];  Keflex [cephalexin]; and Talwin [pentazocine lactate]    Temp (24hrs), Av.9 °F (36.6 °C), Min:97.5 °F (36.4 °C), Max:98.1 °F (36.7 °C)    Visit Vitals  BP (!) 106/54 (BP 1 Location: Left arm, BP Patient Position: At rest;Head of bed elevated (Comment degrees))   Pulse 71   Temp 98 °F (36.7 °C)   Resp 23   Ht 5' 5\" (1.651 m)   Wt 63.1 kg (139 lb 3.2 oz)   SpO2 97%   Breastfeeding Unknown   BMI 23.16 kg/m²       ROS: 12 point ROS obtained in details. Pertinent positives as mentioned in HPI,   otherwise negative    Physical Exam:    General: Well developed, well nourished female sitting on the  bed AAOx3 in no acute distress. General:   awake alert and oriented   HEENT:  Normocephalic, atraumatic, PERRL, EOMI, no scleral icterus or pallor; no conjunctival hemmohage;  nasal and oral mucous are moist and without evidence of lesions. Neck supple, no bruits. Lymph Nodes:   no cervical, axillary or inguinal adenopathy   Lungs:   non-labored, bilaterally clear to auscultation- no crackles wheezes rales or rhonchi   Heart:  RRR, s1 and s2; no  rubs or gallops, no edema   Abdomen:  soft, non-distended, active bowel sounds, no hepatomegaly, no splenomegaly. Non-tender. No significant drainage right flank   Genitourinary:  deferred   Extremities:   no clubbing, cyanosis; no joint effusions or swelling; Full ROM of all large joints to the upper and lower extremities; muscle mass appropriate for age   Neurologic:  No gross focal sensory abnormalities; 5/5 muscle strength to upper and lower extremities. Speech appropriate.  Cranial nerves intact                        Skin:  No rash or ulcers noted   Back:  no spinal or paraspinal muscle tenderness or rigidity, no CVA tenderness     Psychiatric:  No suicidal or homicidal ideations, appropriate mood and affect         Labs: Results:   Chemistry Recent Labs     12/07/20  0718 12/06/20  0037   GLU 91 126*    143   K 3.8 3.5   * 111   CO2 26 24   BUN 11 8   CREA 0.73 0.81   CA 10.2* 10.6*   AGAP 5 8   BUCR 15 10*      CBC w/Diff Recent Labs     12/07/20  0718 12/06/20  0037   WBC 11.6 11.7   RBC 2.92* 3.06*   HGB 8.1* 8.4*   HCT 25.4* 26.5*   * 407   GRANS 65 64   LYMPH 23 26   EOS 2 1 Microbiology Recent Labs     12/07/20  0919 12/06/20  0037   CULT NO GROWTH AFTER 21 HOURS NO GROWTH 2 DAYS          RADIOLOGY:    All available imaging studies/reports in [facility] for this admission were reviewed    Total time spent >35 minutes. High complexity decision making was performed during the evaluation of this patient at high risk for decompensation with multiple organ involvement     Above mentioned total time spent on reviewing the case/medical record/data/notes/EMR/patient examination/documentation/coordinating care with nurse/consultants, exclusive of procedures with complex decision making performed and > 50% time spent in face to face evaluation.     Dr. Toniann Bence, Infectious Disease Specialist  [phone]  December 8, 2020  10:48 AM

## 2020-12-08 NOTE — PROGRESS NOTES
Problem: Falls - Risk of  Goal: *Absence of Falls  Description: Document Jolene Doll Fall Risk and appropriate interventions in the flowsheet. Outcome: Progressing Towards Goal  Note: Fall Risk Interventions:            Medication Interventions: Patient to call before getting OOB, Teach patient to arise slowly, Evaluate medications/consider consulting pharmacy, Assess postural VS orthostatic hypotension, Bed/chair exit alarm, Utilize gait belt for transfers/ambulation                   Problem: Patient Education: Go to Patient Education Activity  Goal: Patient/Family Education  Outcome: Progressing Towards Goal     Problem: Cellulitis Care Plan (Adult)  Goal: *Control of acute pain  Outcome: Progressing Towards Goal  Goal: *Skin integrity maintained  Outcome: Progressing Towards Goal  Goal: *Absence of infection signs and symptoms  Outcome: Progressing Towards Goal     Problem: Patient Education: Go to Patient Education Activity  Goal: Patient/Family Education  Outcome: Progressing Towards Goal     Problem: Pain  Goal: *Control of Pain  Outcome: Progressing Towards Goal     Problem: Patient Education: Go to Patient Education Activity  Goal: Patient/Family Education  Outcome: Progressing Towards Goal     Problem: Airway Clearance - Ineffective  Goal: Achieve or maintain patent airway  Outcome: Progressing Towards Goal     Problem: Gas Exchange - Impaired  Goal: Absence of hypoxia  Outcome: Progressing Towards Goal  Goal: Promote optimal lung function  Outcome: Progressing Towards Goal     Problem: Breathing Pattern - Ineffective  Goal: Ability to achieve and maintain a regular respiratory rate  Outcome: Progressing Towards Goal     Problem:  Body Temperature -  Risk of, Imbalanced  Goal: Ability to maintain a body temperature within defined limits  Outcome: Progressing Towards Goal  Goal: Will regain or maintain usual level of consciousness  Outcome: Progressing Towards Goal  Goal: Complications related to the disease process, condition or treatment will be avoided or minimized  Outcome: Progressing Towards Goal     Problem: Isolation Precautions - Risk of Spread of Infection  Goal: Prevent transmission of infectious organism to others  Outcome: Progressing Towards Goal     Problem: Nutrition Deficits  Goal: Optimize nutrtional status  Outcome: Progressing Towards Goal     Problem: Risk for Fluid Volume Deficit  Goal: Maintain normal heart rhythm  Outcome: Progressing Towards Goal  Goal: Maintain absence of muscle cramping  Outcome: Progressing Towards Goal  Goal: Maintain normal serum potassium, sodium, calcium, phosphorus, and pH  Outcome: Progressing Towards Goal     Problem: Loneliness or Risk for Loneliness  Goal: Demonstrate positive use of time alone when socialization is not possible  Outcome: Progressing Towards Goal     Problem: Fatigue  Goal: Verbalize increase energy and improved vitality  Outcome: Progressing Towards Goal     Problem: Patient Education: Go to Patient Education Activity  Goal: Patient/Family Education  Outcome: Progressing Towards Goal

## 2020-12-08 NOTE — PROGRESS NOTES
Patient calling for assistance in her room. Patient's dressing was soaked on her right side in blood. Patient stated that this \"drainage\" is not unusual.  She stated that it hasn't happened in a while. Charge nurse informed of patient wound bleeding. Patient dressing on wound changed. Patient's gown and linen changed. Vascular MD on call paged to informed of patient status and the amount of blood/drainage that was present. MD ok and made aware. Patient is stable with no signs and symptoms of distress.

## 2020-12-08 NOTE — PROGRESS NOTES
Vascular Surgery Progress Note    Admit Date: 12/3/2020      Subjective:     Patient has no new complaints. Denies pain. Objective:     Blood pressure (!) 106/54, pulse 71, temperature 98 °F (36.7 °C), resp. rate 23, height 5' 5\" (1.651 m), weight 139 lb 3.2 oz (63.1 kg), SpO2 97 %, unknown if currently breastfeeding. Temp (24hrs), Av.9 °F (36.6 °C), Min:97.5 °F (36.4 °C), Max:98.1 °F (36.7 °C)      Physical Exam:  GENERAL: alert, cooperative, no distress, appears stated age, THROAT & NECK:  neck supple and symmetrical.  trachea midline, LUNG:  no resp difficulty, ABDOMEN:  soft, NT, ND. RLQ wound with scant bloody drainage on exam. no purulent drainage noted. and EXTREMITIES:  no BLE edema. feet warm and well perfused    Labs: Results:       Chemistry Recent Labs     20  0718 20   GLU 91 126*    143   K 3.8 3.5   * 111   CO2 26 24   BUN 11 8   CREA 0.73 0.81   CA 10.2* 10.6*   AGAP 5 8   BUCR 15 10*      CBC w/Diff Recent Labs     20  0718 20   WBC 11.6 11.7   RBC 2.92* 3.06*   HGB 8.1* 8.4*   HCT 25.4* 26.5*   * 407   GRANS 65 64   LYMPH 23 26   EOS 2 1      Microbiology Recent Labs     20  0919 20  0037   CULT NO GROWTH AFTER 21 HOURS NO GROWTH 2 DAYS      Coagulation No results for input(s): PTP, INR, APTT, INREXT, INREXT in the last 72 hours. Data Review: images and reports reviewed    Assessment:     Active Problems:    Cellulitis of abdominal wall (12/3/2020)      UTI (urinary tract infection) (12/3/2020)        Plan/Recommendations/Medical Decision Making:     Continue present treatment and Discussed with infectious disease, appreciate their input. Will allow for antibiotics and plan for surgery on 12/10.

## 2020-12-08 NOTE — PROGRESS NOTES
0800:  Report received, care assumed. AAOx4. Denies pain, SOB or discomforts. States is just returning from bathroom for urination. Refuses offer for sitting up in recliner, states \"too much going on last night and this morning\". Abdominal dressing c/d/i. Call bell and phones at side. Monitor. 1400:  Called to room by patient \"I need a dressing change\". Right abdominal wound site, gown, linens saturated with bright red blood. Dressing removed, site has constant trickle bleeding noted. Dr Lorelei Gann vascular provider in to assess. MD applied folded 4x4 gauze covered with transparent Tegaderm dressing to right abdominal wound site, no further bleeding noted. MD ordered discontinue any further Dakins wet to dry dressing changes. NPO now. Stat labs ordered. Patient remains AAOx4. Denies pain. NSR. VSS. Patient verbalizes understanding info from Dr Lorelei Gann and plan of care. Call bell and cell phone at side. Monitor. 1900:  Right abdominal wound drsg remains c/d/i. Bedside and Verbal shift change report given to Nick (oncoming nurse) by Sandeep Decker RN(offgoing nurse). Report included the following information SBAR, Kardex, Intake/Output, MAR, Accordion, Recent Results, Cardiac Rhythm NSR. and Alarm Parameters . 85 y/o F with a h/o hypothyroid, HTN, prior CVA, dementia, a-fib (not on a/c), AVR, presented with right MCA stroke s/p TPA 8/28, now still encephalopathic but protecting airway. Prognosis poor.

## 2020-12-09 ENCOUNTER — ANESTHESIA (OUTPATIENT)
Dept: CARDIOTHORACIC SURGERY | Age: 70
DRG: 252 | End: 2020-12-09
Payer: MEDICARE

## 2020-12-09 ENCOUNTER — ANESTHESIA EVENT (OUTPATIENT)
Dept: CARDIOTHORACIC SURGERY | Age: 70
DRG: 252 | End: 2020-12-09
Payer: MEDICARE

## 2020-12-09 PROBLEM — T82.7XXA VASCULAR GRAFT INFECTION (HCC): Status: ACTIVE | Noted: 2020-12-09

## 2020-12-09 LAB
COVID-19 RAPID TEST, COVR: NOT DETECTED
GLUCOSE BLD STRIP.AUTO-MCNC: 97 MG/DL (ref 70–110)
HCT VFR BLD AUTO: 29.5 % (ref 35–45)
HGB BLD-MCNC: 9.7 G/DL (ref 12–16)
HISTORY CHECKED?,CKHIST: NORMAL
HISTORY CHECKED?,CKHIST: NORMAL
SOURCE, COVRS: NORMAL
SPECIMEN TYPE, XMCV1T: NORMAL

## 2020-12-09 PROCEDURE — 2709999900 HC NON-CHARGEABLE SUPPLY: Performed by: SURGERY

## 2020-12-09 PROCEDURE — 74011250636 HC RX REV CODE- 250/636

## 2020-12-09 PROCEDURE — 36415 COLL VENOUS BLD VENIPUNCTURE: CPT

## 2020-12-09 PROCEDURE — 74011000258 HC RX REV CODE- 258: Performed by: INTERNAL MEDICINE

## 2020-12-09 PROCEDURE — 74011000250 HC RX REV CODE- 250: Performed by: NURSE ANESTHETIST, CERTIFIED REGISTERED

## 2020-12-09 PROCEDURE — 74011000250 HC RX REV CODE- 250: Performed by: HOSPITALIST

## 2020-12-09 PROCEDURE — 85018 HEMOGLOBIN: CPT

## 2020-12-09 PROCEDURE — 77030002933 HC SUT MCRYL J&J -A: Performed by: SURGERY

## 2020-12-09 PROCEDURE — 74011250636 HC RX REV CODE- 250/636: Performed by: SURGERY

## 2020-12-09 PROCEDURE — 87635 SARS-COV-2 COVID-19 AMP PRB: CPT

## 2020-12-09 PROCEDURE — 76060000036 HC ANESTHESIA 2.5 TO 3 HR: Performed by: SURGERY

## 2020-12-09 PROCEDURE — 74011250637 HC RX REV CODE- 250/637: Performed by: HOSPITALIST

## 2020-12-09 PROCEDURE — 77030008462 HC STPLR SKN PROX J&J -A: Performed by: SURGERY

## 2020-12-09 PROCEDURE — 77030010512 HC APPL CLP LIG J&J -C: Performed by: SURGERY

## 2020-12-09 PROCEDURE — 30233N1 TRANSFUSION OF NONAUTOLOGOUS RED BLOOD CELLS INTO PERIPHERAL VEIN, PERCUTANEOUS APPROACH: ICD-10-PCS | Performed by: SURGERY

## 2020-12-09 PROCEDURE — 77030002996 HC SUT SLK J&J -A: Performed by: SURGERY

## 2020-12-09 PROCEDURE — 99100 ANES PT EXTEME AGE<1 YR&>70: CPT | Performed by: NURSE ANESTHETIST, CERTIFIED REGISTERED

## 2020-12-09 PROCEDURE — 35876 REMOVAL OF CLOT IN GRAFT: CPT | Performed by: SURGERY

## 2020-12-09 PROCEDURE — 74011250636 HC RX REV CODE- 250/636: Performed by: NURSE ANESTHETIST, CERTIFIED REGISTERED

## 2020-12-09 PROCEDURE — 36430 TRANSFUSION BLD/BLD COMPNT: CPT

## 2020-12-09 PROCEDURE — 82962 GLUCOSE BLOOD TEST: CPT

## 2020-12-09 PROCEDURE — 77030018390 HC SPNG HEMSTAT2 J&J -B: Performed by: SURGERY

## 2020-12-09 PROCEDURE — 74011000250 HC RX REV CODE- 250: Performed by: SURGERY

## 2020-12-09 PROCEDURE — 74011250636 HC RX REV CODE- 250/636: Performed by: INTERNAL MEDICINE

## 2020-12-09 PROCEDURE — C1894 INTRO/SHEATH, NON-LASER: HCPCS | Performed by: SURGERY

## 2020-12-09 PROCEDURE — 76210000016 HC OR PH I REC 1 TO 1.5 HR: Performed by: SURGERY

## 2020-12-09 PROCEDURE — 77030002986 HC SUT PROL J&J -A: Performed by: SURGERY

## 2020-12-09 PROCEDURE — 01270 ANES PX ARTERIES UPR LEG NOS: CPT | Performed by: NURSE ANESTHETIST, CERTIFIED REGISTERED

## 2020-12-09 PROCEDURE — 99232 SBSQ HOSP IP/OBS MODERATE 35: CPT | Performed by: FAMILY MEDICINE

## 2020-12-09 PROCEDURE — 65660000004 HC RM CVT STEPDOWN

## 2020-12-09 PROCEDURE — 99100 ANES PT EXTEME AGE<1 YR&>70: CPT | Performed by: ANESTHESIOLOGY

## 2020-12-09 PROCEDURE — C1768 GRAFT, VASCULAR: HCPCS | Performed by: SURGERY

## 2020-12-09 PROCEDURE — P9016 RBC LEUKOCYTES REDUCED: HCPCS

## 2020-12-09 PROCEDURE — 77030040830 HC CATH URETH FOL MDII -A: Performed by: SURGERY

## 2020-12-09 PROCEDURE — 76010000109 HC CV SURG 2.5 TO 3 HR: Performed by: SURGERY

## 2020-12-09 PROCEDURE — 94640 AIRWAY INHALATION TREATMENT: CPT

## 2020-12-09 PROCEDURE — 01270 ANES PX ARTERIES UPR LEG NOS: CPT | Performed by: ANESTHESIOLOGY

## 2020-12-09 DEVICE — GRAFT VASC L50CM DIA8MM RNG L40CM EPTFE THN WALLED CBAS HEP: Type: IMPLANTABLE DEVICE | Site: GROIN | Status: FUNCTIONAL

## 2020-12-09 RX ORDER — FENTANYL CITRATE 50 UG/ML
INJECTION, SOLUTION INTRAMUSCULAR; INTRAVENOUS AS NEEDED
Status: DISCONTINUED | OUTPATIENT
Start: 2020-12-09 | End: 2020-12-09 | Stop reason: HOSPADM

## 2020-12-09 RX ORDER — CHLORHEXIDINE GLUCONATE 4 G/100ML
SOLUTION TOPICAL
Status: DISPENSED
Start: 2020-12-09 | End: 2020-12-10

## 2020-12-09 RX ORDER — FENTANYL CITRATE 50 UG/ML
50 INJECTION, SOLUTION INTRAMUSCULAR; INTRAVENOUS AS NEEDED
Status: DISCONTINUED | OUTPATIENT
Start: 2020-12-09 | End: 2020-12-11 | Stop reason: HOSPADM

## 2020-12-09 RX ORDER — HYDROCODONE BITARTRATE AND ACETAMINOPHEN 5; 325 MG/1; MG/1
1 TABLET ORAL
Status: DISCONTINUED | OUTPATIENT
Start: 2020-12-09 | End: 2020-12-11 | Stop reason: HOSPADM

## 2020-12-09 RX ORDER — HYDROMORPHONE HYDROCHLORIDE 1 MG/ML
.5-1 INJECTION, SOLUTION INTRAMUSCULAR; INTRAVENOUS; SUBCUTANEOUS
Status: DISCONTINUED | OUTPATIENT
Start: 2020-12-09 | End: 2020-12-11 | Stop reason: HOSPADM

## 2020-12-09 RX ORDER — ONDANSETRON 2 MG/ML
INJECTION INTRAMUSCULAR; INTRAVENOUS
Status: COMPLETED
Start: 2020-12-09 | End: 2020-12-09

## 2020-12-09 RX ORDER — ROCURONIUM BROMIDE 10 MG/ML
INJECTION, SOLUTION INTRAVENOUS AS NEEDED
Status: DISCONTINUED | OUTPATIENT
Start: 2020-12-09 | End: 2020-12-09 | Stop reason: HOSPADM

## 2020-12-09 RX ORDER — PROPOFOL 10 MG/ML
INJECTION, EMULSION INTRAVENOUS AS NEEDED
Status: DISCONTINUED | OUTPATIENT
Start: 2020-12-09 | End: 2020-12-09 | Stop reason: HOSPADM

## 2020-12-09 RX ORDER — NEOSTIGMINE METHYLSULFATE 1 MG/ML
INJECTION INTRAVENOUS AS NEEDED
Status: DISCONTINUED | OUTPATIENT
Start: 2020-12-09 | End: 2020-12-09 | Stop reason: HOSPADM

## 2020-12-09 RX ORDER — LABETALOL HCL 20 MG/4 ML
SYRINGE (ML) INTRAVENOUS AS NEEDED
Status: DISCONTINUED | OUTPATIENT
Start: 2020-12-09 | End: 2020-12-09 | Stop reason: HOSPADM

## 2020-12-09 RX ORDER — SODIUM CHLORIDE, SODIUM LACTATE, POTASSIUM CHLORIDE, CALCIUM CHLORIDE 600; 310; 30; 20 MG/100ML; MG/100ML; MG/100ML; MG/100ML
50 INJECTION, SOLUTION INTRAVENOUS CONTINUOUS
Status: DISCONTINUED | OUTPATIENT
Start: 2020-12-09 | End: 2020-12-10

## 2020-12-09 RX ORDER — MORPHINE SULFATE 2 MG/ML
INJECTION, SOLUTION INTRAMUSCULAR; INTRAVENOUS AS NEEDED
Status: DISCONTINUED | OUTPATIENT
Start: 2020-12-09 | End: 2020-12-09 | Stop reason: HOSPADM

## 2020-12-09 RX ORDER — PROTAMINE SULFATE 10 MG/ML
INJECTION, SOLUTION INTRAVENOUS AS NEEDED
Status: DISCONTINUED | OUTPATIENT
Start: 2020-12-09 | End: 2020-12-09 | Stop reason: HOSPADM

## 2020-12-09 RX ORDER — HEPARIN SODIUM 200 [USP'U]/100ML
INJECTION, SOLUTION INTRAVENOUS
Status: DISPENSED
Start: 2020-12-09 | End: 2020-12-10

## 2020-12-09 RX ORDER — DEXAMETHASONE SODIUM PHOSPHATE 4 MG/ML
INJECTION, SOLUTION INTRA-ARTICULAR; INTRALESIONAL; INTRAMUSCULAR; INTRAVENOUS; SOFT TISSUE AS NEEDED
Status: DISCONTINUED | OUTPATIENT
Start: 2020-12-09 | End: 2020-12-09 | Stop reason: HOSPADM

## 2020-12-09 RX ORDER — LIDOCAINE HYDROCHLORIDE 10 MG/ML
INJECTION, SOLUTION EPIDURAL; INFILTRATION; INTRACAUDAL; PERINEURAL AS NEEDED
Status: DISCONTINUED | OUTPATIENT
Start: 2020-12-09 | End: 2020-12-09 | Stop reason: HOSPADM

## 2020-12-09 RX ORDER — HYDROMORPHONE HYDROCHLORIDE 2 MG/ML
0.5 INJECTION, SOLUTION INTRAMUSCULAR; INTRAVENOUS; SUBCUTANEOUS
Status: DISCONTINUED | OUTPATIENT
Start: 2020-12-09 | End: 2020-12-10 | Stop reason: HOSPADM

## 2020-12-09 RX ORDER — NALOXONE HYDROCHLORIDE 0.4 MG/ML
0.1 INJECTION, SOLUTION INTRAMUSCULAR; INTRAVENOUS; SUBCUTANEOUS AS NEEDED
Status: DISCONTINUED | OUTPATIENT
Start: 2020-12-09 | End: 2020-12-11 | Stop reason: HOSPADM

## 2020-12-09 RX ORDER — SODIUM CHLORIDE 9 MG/ML
INJECTION, SOLUTION INTRAVENOUS
Status: DISCONTINUED | OUTPATIENT
Start: 2020-12-09 | End: 2020-12-09 | Stop reason: HOSPADM

## 2020-12-09 RX ORDER — SODIUM CHLORIDE 0.9 % (FLUSH) 0.9 %
5-40 SYRINGE (ML) INJECTION AS NEEDED
Status: DISCONTINUED | OUTPATIENT
Start: 2020-12-09 | End: 2020-12-11 | Stop reason: HOSPADM

## 2020-12-09 RX ORDER — GLYCOPYRROLATE 0.2 MG/ML
INJECTION INTRAMUSCULAR; INTRAVENOUS AS NEEDED
Status: DISCONTINUED | OUTPATIENT
Start: 2020-12-09 | End: 2020-12-09 | Stop reason: HOSPADM

## 2020-12-09 RX ORDER — SODIUM CHLORIDE 9 MG/ML
250 INJECTION, SOLUTION INTRAVENOUS AS NEEDED
Status: DISCONTINUED | OUTPATIENT
Start: 2020-12-09 | End: 2020-12-11 | Stop reason: HOSPADM

## 2020-12-09 RX ORDER — SODIUM CHLORIDE 0.9 % (FLUSH) 0.9 %
5-40 SYRINGE (ML) INJECTION EVERY 8 HOURS
Status: DISCONTINUED | OUTPATIENT
Start: 2020-12-09 | End: 2020-12-11 | Stop reason: HOSPADM

## 2020-12-09 RX ORDER — ONDANSETRON 2 MG/ML
4 INJECTION INTRAMUSCULAR; INTRAVENOUS ONCE
Status: COMPLETED | OUTPATIENT
Start: 2020-12-09 | End: 2020-12-09

## 2020-12-09 RX ORDER — CEFAZOLIN SODIUM 1 G/3ML
INJECTION, POWDER, FOR SOLUTION INTRAMUSCULAR; INTRAVENOUS AS NEEDED
Status: DISCONTINUED | OUTPATIENT
Start: 2020-12-09 | End: 2020-12-09 | Stop reason: HOSPADM

## 2020-12-09 RX ORDER — DIPHENHYDRAMINE HYDROCHLORIDE 50 MG/ML
12.5 INJECTION, SOLUTION INTRAMUSCULAR; INTRAVENOUS
Status: DISCONTINUED | OUTPATIENT
Start: 2020-12-09 | End: 2020-12-11 | Stop reason: HOSPADM

## 2020-12-09 RX ORDER — LIDOCAINE HYDROCHLORIDE 20 MG/ML
INJECTION, SOLUTION EPIDURAL; INFILTRATION; INTRACAUDAL; PERINEURAL AS NEEDED
Status: DISCONTINUED | OUTPATIENT
Start: 2020-12-09 | End: 2020-12-09 | Stop reason: HOSPADM

## 2020-12-09 RX ORDER — LIDOCAINE HYDROCHLORIDE 10 MG/ML
INJECTION, SOLUTION EPIDURAL; INFILTRATION; INTRACAUDAL; PERINEURAL
Status: DISPENSED
Start: 2020-12-09 | End: 2020-12-10

## 2020-12-09 RX ORDER — ONDANSETRON 2 MG/ML
INJECTION INTRAMUSCULAR; INTRAVENOUS AS NEEDED
Status: DISCONTINUED | OUTPATIENT
Start: 2020-12-09 | End: 2020-12-09 | Stop reason: HOSPADM

## 2020-12-09 RX ORDER — HEPARIN SODIUM 1000 [USP'U]/ML
INJECTION, SOLUTION INTRAVENOUS; SUBCUTANEOUS AS NEEDED
Status: DISCONTINUED | OUTPATIENT
Start: 2020-12-09 | End: 2020-12-09 | Stop reason: HOSPADM

## 2020-12-09 RX ORDER — HEPARIN SODIUM 200 [USP'U]/100ML
INJECTION, SOLUTION INTRAVENOUS
Status: COMPLETED | OUTPATIENT
Start: 2020-12-09 | End: 2020-12-09

## 2020-12-09 RX ADMIN — SODIUM CHLORIDE: 900 INJECTION, SOLUTION INTRAVENOUS at 17:16

## 2020-12-09 RX ADMIN — ONDANSETRON 4 MG: 2 INJECTION INTRAMUSCULAR; INTRAVENOUS at 21:07

## 2020-12-09 RX ADMIN — PIPERACILLIN AND TAZOBACTAM 3.38 G: 3; .375 INJECTION, POWDER, LYOPHILIZED, FOR SOLUTION INTRAVENOUS at 04:06

## 2020-12-09 RX ADMIN — PREGABALIN 100 MG: 50 CAPSULE ORAL at 22:51

## 2020-12-09 RX ADMIN — PHENYLEPHRINE HYDROCHLORIDE 100 MCG: 10 INJECTION INTRAVENOUS at 17:45

## 2020-12-09 RX ADMIN — FENTANYL CITRATE 100 MCG: 50 INJECTION, SOLUTION INTRAMUSCULAR; INTRAVENOUS at 17:23

## 2020-12-09 RX ADMIN — PREGABALIN 100 MG: 50 CAPSULE ORAL at 09:02

## 2020-12-09 RX ADMIN — PHENYLEPHRINE HYDROCHLORIDE 100 MCG: 10 INJECTION INTRAVENOUS at 18:13

## 2020-12-09 RX ADMIN — ONDANSETRON 4 MG: 2 INJECTION INTRAMUSCULAR; INTRAVENOUS at 19:29

## 2020-12-09 RX ADMIN — HEPARIN SODIUM 4000 UNITS: 1000 INJECTION, SOLUTION INTRAVENOUS; SUBCUTANEOUS at 18:11

## 2020-12-09 RX ADMIN — FENTANYL CITRATE 100 MCG: 50 INJECTION, SOLUTION INTRAMUSCULAR; INTRAVENOUS at 18:07

## 2020-12-09 RX ADMIN — SODIUM CHLORIDE, SODIUM LACTATE, POTASSIUM CHLORIDE, AND CALCIUM CHLORIDE 50 ML/HR: 600; 310; 30; 20 INJECTION, SOLUTION INTRAVENOUS at 23:36

## 2020-12-09 RX ADMIN — SODIUM CHLORIDE: 900 INJECTION, SOLUTION INTRAVENOUS at 19:46

## 2020-12-09 RX ADMIN — ROCURONIUM BROMIDE 50 MG: 50 INJECTION INTRAVENOUS at 17:28

## 2020-12-09 RX ADMIN — DEXAMETHASONE SODIUM PHOSPHATE 8 MG: 4 INJECTION, SOLUTION INTRAMUSCULAR; INTRAVENOUS at 17:58

## 2020-12-09 RX ADMIN — CIPROFLOXACIN 400 MG: 2 INJECTION, SOLUTION INTRAVENOUS at 23:00

## 2020-12-09 RX ADMIN — PROPOFOL 100 MG: 10 INJECTION, EMULSION INTRAVENOUS at 17:28

## 2020-12-09 RX ADMIN — PROTAMINE SULFATE 20 MG: 10 INJECTION, SOLUTION INTRAVENOUS at 19:09

## 2020-12-09 RX ADMIN — Medication 5 ML: at 14:00

## 2020-12-09 RX ADMIN — NEOSTIGMINE METHYLSULFATE 3 MG: 1 INJECTION, SOLUTION INTRAVENOUS at 19:29

## 2020-12-09 RX ADMIN — GLYCOPYRROLATE 0.4 MG: 0.2 INJECTION, SOLUTION INTRAMUSCULAR; INTRAVENOUS at 19:29

## 2020-12-09 RX ADMIN — MORPHINE SULFATE 2 MG: 2 INJECTION, SOLUTION INTRAMUSCULAR; INTRAVENOUS at 19:42

## 2020-12-09 RX ADMIN — LATANOPROST 1 DROP: 50 SOLUTION OPHTHALMIC at 23:07

## 2020-12-09 RX ADMIN — IPRATROPIUM BROMIDE 0.5 MG: 0.5 SOLUTION RESPIRATORY (INHALATION) at 08:05

## 2020-12-09 RX ADMIN — LORATADINE 10 MG: 10 TABLET ORAL at 09:02

## 2020-12-09 RX ADMIN — PIPERACILLIN AND TAZOBACTAM 3.38 G: 3; .375 INJECTION, POWDER, LYOPHILIZED, FOR SOLUTION INTRAVENOUS at 22:51

## 2020-12-09 RX ADMIN — PIPERACILLIN AND TAZOBACTAM 3.38 G: 3; .375 INJECTION, POWDER, LYOPHILIZED, FOR SOLUTION INTRAVENOUS at 15:30

## 2020-12-09 RX ADMIN — ARFORMOTEROL TARTRATE 15 MCG: 15 SOLUTION RESPIRATORY (INHALATION) at 08:05

## 2020-12-09 RX ADMIN — ASPIRIN 81 MG CHEWABLE TABLET 81 MG: 81 TABLET CHEWABLE at 09:02

## 2020-12-09 RX ADMIN — PIPERACILLIN AND TAZOBACTAM 3.38 G: 3; .375 INJECTION, POWDER, LYOPHILIZED, FOR SOLUTION INTRAVENOUS at 09:11

## 2020-12-09 RX ADMIN — Medication 25 ML: at 06:00

## 2020-12-09 RX ADMIN — CIPROFLOXACIN 400 MG: 2 INJECTION, SOLUTION INTRAVENOUS at 09:11

## 2020-12-09 RX ADMIN — Medication 10 ML: at 23:07

## 2020-12-09 RX ADMIN — LIDOCAINE HYDROCHLORIDE 100 MG: 20 INJECTION, SOLUTION EPIDURAL; INFILTRATION; INTRACAUDAL; PERINEURAL at 17:28

## 2020-12-09 RX ADMIN — CEFAZOLIN SODIUM 2 G: 1 INJECTION, POWDER, FOR SOLUTION INTRAMUSCULAR; INTRAVENOUS at 17:58

## 2020-12-09 RX ADMIN — BUDESONIDE 250 MCG: 0.25 INHALANT RESPIRATORY (INHALATION) at 08:05

## 2020-12-09 RX ADMIN — MORPHINE SULFATE 2 MG: 2 INJECTION, SOLUTION INTRAMUSCULAR; INTRAVENOUS at 19:31

## 2020-12-09 RX ADMIN — LABETALOL 20 MG/4 ML (5 MG/ML) INTRAVENOUS SYRINGE 5 MG: at 19:25

## 2020-12-09 NOTE — PROGRESS NOTES
Problem: Falls - Risk of  Goal: *Absence of Falls  Description: Document Nasir Porter Fall Risk and appropriate interventions in the flowsheet. Outcome: Progressing Towards Goal  Note: Fall Risk Interventions:  Mobility Interventions: Patient to call before getting OOB, Strengthening exercises (ROM-active/passive)    Mentation Interventions: Adequate sleep, hydration, pain control, Increase mobility, Update white board    Medication Interventions: Patient to call before getting OOB, Teach patient to arise slowly    Elimination Interventions: Call light in reach, Toilet paper/wipes in reach    History of Falls Interventions: Bed/chair exit alarm, Investigate reason for fall, Vital signs minimum Q4HRs X 24 hrs (comment for end date)         Problem: Patient Education: Go to Patient Education Activity  Goal: Patient/Family Education  Outcome: Progressing Towards Goal     Problem: Cellulitis Care Plan (Adult)  Goal: *Control of acute pain  Outcome: Progressing Towards Goal  Goal: *Skin integrity maintained  Outcome: Progressing Towards Goal  Goal: *Absence of infection signs and symptoms  Outcome: Progressing Towards Goal     Problem: Patient Education: Go to Patient Education Activity  Goal: Patient/Family Education  Outcome: Progressing Towards Goal     Problem: Pain  Goal: *Control of Pain  Outcome: Progressing Towards Goal     Problem: Patient Education: Go to Patient Education Activity  Goal: Patient/Family Education  Outcome: Progressing Towards Goal     Problem: Airway Clearance - Ineffective  Goal: Achieve or maintain patent airway  Outcome: Progressing Towards Goal     Problem: Gas Exchange - Impaired  Goal: Absence of hypoxia  Outcome: Progressing Towards Goal  Goal: Promote optimal lung function  Outcome: Progressing Towards Goal     Problem: Breathing Pattern - Ineffective  Goal: Ability to achieve and maintain a regular respiratory rate  Outcome: Progressing Towards Goal     Problem:  Body Temperature -  Risk of, Imbalanced  Goal: Ability to maintain a body temperature within defined limits  Outcome: Progressing Towards Goal  Goal: Will regain or maintain usual level of consciousness  Outcome: Progressing Towards Goal  Goal: Complications related to the disease process, condition or treatment will be avoided or minimized  Outcome: Progressing Towards Goal     Problem: Isolation Precautions - Risk of Spread of Infection  Goal: Prevent transmission of infectious organism to others  Outcome: Progressing Towards Goal     Problem: Nutrition Deficits  Goal: Optimize nutrtional status  Outcome: Progressing Towards Goal     Problem: Risk for Fluid Volume Deficit  Goal: Maintain normal heart rhythm  Outcome: Progressing Towards Goal  Goal: Maintain absence of muscle cramping  Outcome: Progressing Towards Goal  Goal: Maintain normal serum potassium, sodium, calcium, phosphorus, and pH  Outcome: Progressing Towards Goal     Problem: Loneliness or Risk for Loneliness  Goal: Demonstrate positive use of time alone when socialization is not possible  Outcome: Progressing Towards Goal     Problem: Fatigue  Goal: Verbalize increase energy and improved vitality  Outcome: Progressing Towards Goal     Problem: Patient Education: Go to Patient Education Activity  Goal: Patient/Family Education  Outcome: Progressing Towards Goal     Problem: Nutrition Deficit  Goal: *Optimize nutritional status  Outcome: Progressing Towards Goal

## 2020-12-09 NOTE — ANESTHESIA PROCEDURE NOTES
Arterial Line Placement    Start time: 12/9/2020 5:24 PM  End time: 12/9/2020 5:30 PM  Performed by: Lisa Garcia MD  Authorized by: Lisa Garcia MD     Pre-Procedure  Preanesthetic Checklist: patient identified, risks and benefits discussed, anesthesia consent, site marked, patient being monitored, timeout performed and patient being monitored      Procedure:   Prep:  Chlorhexidine  Seldinger Technique?: Yes    Orientation:  Left  Location:  Radial artery  Catheter size:  20 G  Number of attempts:  1  Cont Cardiac Output Sensor: No      Assessment:   Post-procedure:  Line secured and sterile dressing applied  Patient Tolerance:  Patient tolerated the procedure well with no immediate complications

## 2020-12-09 NOTE — PROGRESS NOTES
Comprehensive Nutrition Assessment    Type and Reason for Visit: Initial, RD nutrition re-screen/LOS    Nutrition Recommendations/Plan:   - Diet resumption per MD.  - Recommend adding Ensure Enlive, BID once diet resumed. Nutrition Assessment:  Admitted with cellulitis of the abdominal wall and UTI. NPO multiple times during admission for possible surgery however noted vascular plan for surgery 12/10. Remains NPO today incase of need for emergent surgery if bleeding reoccurs. Pt c/o thirst more than hunger. Likes the food with fair intake when meals provided. Agreeable to Ensure when able. Malnutrition Assessment:  Malnutrition Status: At risk for malnutrition (specify)(intermittently NPO for scheduled/possible surgery)      Nutrition History and Allergies: PMHx- COPD, crohns disease requiring intestinal resections x4 (right partial colectomy with ileal anastomosis) and B12 IM injection every 14 days, HTN, GERD and PVD. Pt reports wt overall stable with UBW of 132#, consistent with cart review. Good appetite with normal meal intake PTA, occasional Ensure consumption 2/2 recently advised by MD to gain some weight. NKFA.     Estimated Daily Nutrient Needs:  Energy (kcal): 6378-4562; Weight Used for Energy Requirements: Current(64 kg)  Protein (g): 51-64; Weight Used for Protein Requirements: Current(0.8-1)  Fluid (ml/day): 0032-5610; Method Used for Fluid Requirements: 1 ml/kcal    Nutrition Related Findings:  Loose BM 12/5      Wounds:    Surgical incision       Current Nutrition Therapies:  DIET NPO    Anthropometric Measures:  · Height:  5' 5\" (165.1 cm)  · Current Body Wt:  63.9 kg (140 lb 14 oz)   · Admission Body Wt:  135 lb    · Usual Body Wt:  59.9 kg (132 lb)     · Ideal Body Wt:  125 lbs:  112.7 %   · BMI Category:  Normal weight (BMI 22.0-24.9) age over 72       Nutrition Diagnosis:   · Inadequate energy intake related to (NPO ordered for scheduled & possible surgery) as evidenced by NPO or clear liquid status due to medical condition    Nutrition Interventions:   Food and/or Nutrient Delivery: (diet resumption per MD)  Nutrition Education and Counseling: Education not indicated  Coordination of Nutrition Care: Continue to monitor while inpatient(discussed with RN)    Goals:  PO nutrition intake will meet >75% of patient estimated nutritional needs within the next 7 days. Nutrition Monitoring and Evaluation:   Behavioral-Environmental Outcomes: None identified  Food/Nutrient Intake Outcomes: Diet advancement/tolerance, Food and nutrient intake  Physical Signs/Symptoms Outcomes: Biochemical data, GI status, Nutrition focused physical findings    Discharge Planning:     Too soon to determine     Electronically signed by Da Waddell RD on 12/9/2020 at 12:37 PM    Contact: [phone]

## 2020-12-09 NOTE — ANESTHESIA PREPROCEDURE EVALUATION
Relevant Problems   No relevant active problems       Anesthetic History   No history of anesthetic complications            Review of Systems / Medical History  Patient summary reviewed and pertinent labs reviewed    Pulmonary    COPD: moderate               Neuro/Psych   Within defined limits           Cardiovascular    Hypertension          PAD    Exercise tolerance: >4 METS  Comments: Infected graft   GI/Hepatic/Renal  Within defined limits              Endo/Other  Within defined limits           Other Findings   Comments:                  Physical Exam    Airway  Mallampati: II  TM Distance: 4 - 6 cm  Neck ROM: normal range of motion   Mouth opening: Normal     Cardiovascular  Regular rate and rhythm,  S1 and S2 normal,  no murmur, click, rub, or gallop  Rhythm: regular  Rate: normal         Dental    Dentition: Poor dentition     Pulmonary  Breath sounds clear to auscultation               Abdominal  GI exam deferred       Other Findings            Anesthetic Plan    ASA: 4  Anesthesia type: general    Monitoring Plan: Arterial line      Induction: Intravenous  Anesthetic plan and risks discussed with: Patient

## 2020-12-09 NOTE — PROGRESS NOTES
Pt seen earlier  Late entry  Called to bedside by night nurse earlier this morning   Pt had bleeding from her wound overnight. Pressure held and clean dressing applied. This morning still some bloody saturation on the last dressing but no active bleeding from wound. Clot at wound exit site so did not remove. Did redress with quick clot and dressing.  Did have some blood loss from originial bleed causing a drop in h/h so transfusion ordered  With previous drainage has always been purulence/serous drainage but never bleeding so this was unusual  Pt had been made npo   Was already planning for OR tomorrow but may need to do today if bleeding recurs  Dr Hubert Tate aware

## 2020-12-09 NOTE — PROGRESS NOTES
Reason for Admission:  UTI (urinary tract infection) [N39.0]  Cellulitis of abdominal wall [L03.311]                 RUR Score:    11%            Plan for utilizing home health:    Pt is agreeable to any home health agency. Likelihood of Readmission:   LOW                         Transition of Care Plan:              Initial assessment completed with patient. Cognitive status of patient: oriented to time, place, person and situation. Face sheet information confirmed:  yes. The patient designates her  to participate in her discharge plan and to receive any needed information. This patient lives in a single family home with her . Patient is able to navigate steps as needed. Prior to hospitalization, patient was considered to be independent with ADLs/IADLS : yes . Patient has a current ACP document on file: no  The  will be available to transport patient home upon discharge. The patient already has home oxygen at night provided by [employer] medical equipment available in the home. Patient is not currently active with home health. Patient has not stayed in a skilled nursing facility or rehab. This patient is on dialysis :no      List of available Home Health agencies were provided and reviewed with the patient prior to discharge. Freedom of choice signed: yes, for any home health agency. Currently, the discharge plan is Home with [address] [place] [address] [facility]. The patient states that she can obtain her medications from the pharmacy, and take her medications as directed. Patient's current insurance is Medicare and [employer]. Care Management Interventions  PCP Verified by CM: Yes  Mode of Transport at Discharge:  Other (see comment)( will take home)  Transition of Care Consult (CM Consult): Discharge Planning  Physical Therapy Consult: No  Occupational Therapy Consult: No  Current Support Network: Lives with Spouse  Confirm Follow Up Transport: Family  The Patient and/or Patient Representative was Provided with a Choice of Provider and Agrees with the Discharge Plan?: Yes  Name of the Patient Representative Who was Provided with a Choice of Provider and Agrees with the Discharge Plan: self  Freedom of Choice List was Provided with Basic Dialogue that Supports the Patient's Individualized Plan of Care/Goals, Treatment Preferences and Shares the Quality Data Associated with the Providers?: Yes  Discharge Location  Discharge Placement: Home with home health(Pt does not have a home health agency preference)        Nohelia Herring RN BSN  Care Manager  [phone]

## 2020-12-09 NOTE — PROGRESS NOTES
TRANSFER - IN REPORT:    Verbal report received from Saniya Abel RN(name) on [employer]  being received from [facility](unit) for ordered procedure      Report consisted of patients Situation, Background, Assessment and   Recommendations(SBAR). Information from the following report(s) SBAR, Kardex, MAR, Recent Results and Pre Procedure Checklist was reviewed with the receiving nurse. Opportunity for questions and clarification was provided. Assessment completed upon patients arrival to unit and care assumed.

## 2020-12-09 NOTE — PROGRESS NOTES
Infectious Disease progress Note        Reason: Fevers, gram-positive bacteremia    Current abx Prior abx   Pip/tazo since 12/3  Ciprofloxacin since 12/7 Vancomycin 12/3-12/4     Lines:       Assessment :    65 y.o.  female  with history of Crohn's disease status post Remicade, severe peripheral artery disease status post right axillary femoral, left femoropopliteal bypass graft with multiple surgeries who presented to [address] [place] [address] on 12/3/2020 with subjective sensation of not feeling well, fever. Status post revision of axillary to femoral bypass graft with excision of infected graft on 9/21/2020. Status post excision of the ulcer over the wound and debridement of the wound on 11/5/2020. Cultures revealed methicillin susceptible Staphylococcus aureus. Now with recent h/o purulent drainage right abdominal surgical site, fevers x 1 week, gram positive bacteremia. Clinical presentation consistent with sepsis (present on admission) secondary to methicillin susceptible Staphylococcus aureus bloodstream infection (positive blood culture 12/3, negative blood culture 12/6, 12/7),  vascular graft infection    CT chest/abdomen/pelvis 12/4-results reviewed. Findings consistent with vascular graft infection. Concurrent immunocompromise state secondary to Remicade is likely masking the full clinical presentation. Low clinical probability of COVID-19 infection at this time    Some pyuria noted on urinalysis 12/3-no dysuria or other signs or symptoms to suggest UTI. However moderate leukocyte esterase noted on urine analysis. Urine culture 12/3+ for Citrobacter, ampicillin susceptible Enterococcus faecalis. Immunocompromise state can interfere with the clinical presentation. Since patient scheduled for vascular graft surgery, recommend to treat for probable Citrobacter/Enterococcus cystitis. Clinically same.  Some bleeding noted from right abdominal wound- no significant increase compared to prior per patient. Recommendations:    1. Recommend piperacillin/tazobactam,  ciprofloxacin  2.  recommend to proceed with vascular graft surgery on or after 12/10 if emergent surgery not required  3. Will de-escalate antibiotics after vascular surgery tomorrow     Above plan was discussed in details with patient, vascular surgery PA    HPI:    Feels the same. Does not think she has had any worsening bleeding from the abdominal wound compared to prior exams. Patient denies any increasing abdominal pain. She denies any sore throat, cough, chest pain, shortness of breath, diarrhea, dysuria. home Medication List    Details   fluticasone-umeclidinium-vilanterol (Trelegy Ellipta) 100-62.5-25 mcg inhaler Take 1 Puff by inhalation daily. Qty: 3 Inhaler, Refills: 1      clopidogreL (PLAVIX) 75 mg tab TAKE ONE TABLET BY MOUTH DAILY  Qty: 30 Tab, Refills: 8      pregabalin (LYRICA) 100 mg capsule Take  by mouth two (2) times a day. Takes 100 mg in am and 200 in the pm      aspirin 81 mg tablet Take 81 mg by mouth daily. cyanocobalamin (VITAMIN B-12) 1,000 mcg/mL injection 1,000 mcg by IntraMUSCular route every fourteen (14) days. triamterene-hydrochlorothiazide (DYAZIDE) 37.5-25 mg per capsule Take 1 Cap by mouth daily. bimatoprost (LUMIGAN) 0.03 % ophthalmic drops Administer 1 Drop to both eyes every evening. atropine-PHENobarbital-scopolamine-hyoscyamine (DONNATAL) 16.2-0.1037 -0.0194 mg per tablet Take 1 Tab by mouth as needed (diarrhea). Indications: Irritable Bowel Syndrome      inFLIXimab (REMICADE) 100 mg injection 5 mg/kg by IntraVENous route once. Every 4 weeks      ipratropium-albuteroL (Combivent Respimat)  mcg/actuation inhaler Take 1 Puff by inhalation every six (6) hours as needed for Wheezing or Shortness of Breath. Qty: 1 Inhaler, Refills: 3      multivitamin (ONE A DAY) tablet Take 1 Tab by mouth daily. BIOTIN PO Take  by mouth.       OTHER       loratadine (CLARITIN) 10 mg tablet Take 10 mg by mouth daily. fluticasone propionate (FLONASE ALLERGY RELIEF) 50 mcg/actuation nasal spray 2 Sprays by Both Nostrils route daily as needed for Rhinitis. DULoxetine (CYMBALTA) 60 mg capsule Take 60 mg by mouth nightly. For Anxiety. loperamide (IMMODIUM) 2 mg tablet Take 2 mg by mouth daily as needed for Diarrhea. dicyclomine (BENTYL) 10 mg capsule Take 10 mg by mouth daily. Current Facility-Administered Medications   Medication Dose Route Frequency    lidocaine (PF) (XYLOCAINE) 10 mg/mL (1 %) injection 0.1 mL  0.1 mL SubCUTAneous PRN    sodium chloride (NS) flush 5-40 mL  5-40 mL IntraVENous Q8H    sodium chloride (NS) flush 5-40 mL  5-40 mL IntraVENous PRN    ciprofloxacin (CIPRO) 400 mg in D5W IVPB (premix)  400 mg IntraVENous Q12H    aspirin chewable tablet 81 mg  81 mg Oral DAILY    latanoprost (XALATAN) 0.005 % ophthalmic solution 1 Drop  1 Drop Both Eyes QPM    clopidogreL (PLAVIX) tablet 75 mg  75 mg Oral DAILY    fluticasone propionate (FLONASE) 50 mcg/actuation nasal spray 2 Spray  2 Spray Both Nostrils DAILY PRN    loratadine (CLARITIN) tablet 10 mg  10 mg Oral DAILY    pregabalin (LYRICA) capsule 100 mg  100 mg Oral BID    budesonide (PULMICORT) 250 mcg/2ml nebulizer susp  250 mcg Nebulization BID RT    ipratropium (ATROVENT) 0.02 % nebulizer solution 0.5 mg  0.5 mg Nebulization Q8H RT    arformoteroL (BROVANA) neb solution 15 mcg  15 mcg Nebulization BID RT    acetaminophen (TYLENOL) tablet 500 mg  500 mg Oral Q4H PRN    piperacillin-tazobactam (ZOSYN) 3.375 g in 0.9% sodium chloride (MBP/ADV) 100 mL MBP  3.375 g IntraVENous Q6H       Allergies: Codeine; Darvon [propoxyphene]; Demerol [meperidine];  Keflex [cephalexin]; and Talwin [pentazocine lactate]    Temp (24hrs), Av.4 °F (36.9 °C), Min:98.1 °F (36.7 °C), Max:98.7 °F (37.1 °C)    Visit Vitals  BP (!) 114/54 (BP 1 Location: Right arm, BP Patient Position: At rest)   Pulse 80 Temp 98.7 °F (37.1 °C)   Resp 20   Ht 5' 5\" (1.651 m)   Wt 63.9 kg (140 lb 12.8 oz)   SpO2 92%   Breastfeeding Unknown   BMI 23.43 kg/m²       ROS: 12 point ROS obtained in details. Pertinent positives as mentioned in HPI,   otherwise negative    Physical Exam:    General: Well developed, well nourished female sitting on the  bed AAOx3 in no acute distress. General:   awake alert and oriented   HEENT:  Normocephalic, atraumatic, PERRL, EOMI, no scleral icterus or pallor; no conjunctival hemmohage;  nasal and oral mucous are moist and without evidence of lesions. Neck supple, no bruits. Lymph Nodes:   no cervical, axillary or inguinal adenopathy   Lungs:   non-labored, bilaterally clear to auscultation- no crackles wheezes rales or rhonchi   Heart:  RRR, s1 and s2; no  rubs or gallops, no edema   Abdomen:  soft, non-distended, active bowel sounds, no hepatomegaly, no splenomegaly. Non-tender. No significant drainage right flank   Genitourinary:  deferred   Extremities:   no clubbing, cyanosis; no joint effusions or swelling; Full ROM of all large joints to the upper and lower extremities; muscle mass appropriate for age   Neurologic:  No gross focal sensory abnormalities; 5/5 muscle strength to upper and lower extremities. Speech appropriate.  Cranial nerves intact                        Skin:  No rash or ulcers noted   Back:  no spinal or paraspinal muscle tenderness or rigidity, no CVA tenderness     Psychiatric:  No suicidal or homicidal ideations, appropriate mood and affect         Labs: Results:   Chemistry Recent Labs     12/07/20  0718   GLU 91      K 3.8   *   CO2 26   BUN 11   CREA 0.73   CA 10.2*   AGAP 5   BUCR 15      CBC w/Diff Recent Labs     12/08/20  1500 12/07/20  0718   WBC 11.1 11.6   RBC 2.76* 2.92*   HGB 7.6* 8.1*   HCT 24.1* 25.4*   * 472*   GRANS  --  65   LYMPH  --  23   EOS  --  2      Microbiology Recent Labs     12/07/20  0919   CULT NO GROWTH AFTER 21 HOURS RADIOLOGY:    All available imaging studies/reports in [facility] for this admission were reviewed    Total time spent >35 minutes. High complexity decision making was performed during the evaluation of this patient at high risk for decompensation with multiple organ involvement     Above mentioned total time spent on reviewing the case/medical record/data/notes/EMR/patient examination/documentation/coordinating care with nurse/consultants, exclusive of procedures with complex decision making performed and > 50% time spent in face to face evaluation.     Dr. Carrie Michele, Infectious Disease Specialist  [phone]  December 9, 2020  10:48 AM

## 2020-12-09 NOTE — PROGRESS NOTES
1945 bedside turnover given to me by SPENCER Obrien. Pt is in her bed sitting up watching tv on the cardiac telemetry monitor. Denies pain and denies shortness of breath. Bed is in the lowest position with the wheels locked and call bell within reach. Pt has wound bleeding on her abdomen, wound care bandages from  on her at this time, will assess closely. 2030 vitals assessed WNL. Pt is receiving antibiotic denies pain assisted to bedside commode. Wound still clean dry and intact  2130 meds given with a sip of a drink otherwise NPO. In bed watching tv, was moved to room 2302 earlier due to a need for the other room. Pt was a bit upset, she understands and has calmed down since. 2210 vitals reassessed, lights dimmed, checked wound on abdomen no break through bleeding at this time. 2315 in bed resting vitals reassessed, wound clean dry intact no needs at this time denies pain and denies sob  0010 sleeping on monitor   0120 hrly round assessed wound, bleeding, pt's chux pad under her had a puddle of blood, gown and all linens changed, held pressure 10 mins used 4 x 4 s and abd pad with tape and more pressure pt denies pain denies sob denies distress. 0140 after finishing caring for wound, went back to reassess, has already bled through and down to her leg, dressings and gown changed again and more pressure held    0730 bedside turnover given to [employer]. On cardiac telemetry monitor cvnew 7.

## 2020-12-09 NOTE — PROGRESS NOTES
1400: 1 hr VS were taken. Pt was found with right wound dressing soaked w/ blood, blood saturated gown and linen. 1415: Site clotted and cleansed, new dressing applied with gauze and compression tape. Pt tolerated well and is alert and oriented.

## 2020-12-09 NOTE — PROGRESS NOTES
conducted a Follow up consultation and Spiritual Assessment for The ROSIO Peterson, who is a 65 y.o.,female. The  provided the following Interventions:  Continued the relationship of care and support. [place] that patient is NPO   Listened empathically. Patient asked for a quick prayer as she is about to have a surgical procedure. Offered prayer and assurance of continued prayer on patient's behalf. Chart reviewed. The following outcomes were achieved:  Patient expressed gratitude for 's visit. Assessment:  There are no further spiritual or [employer] issues which require Spiritual Care Services interventions at this time. Plan:  Chaplains will continue to follow and will provide pastoral care on an as needed/requested basis.  recommends bedside caregivers page  on duty if patient shows signs of acute spiritual or emotional distress.      [address] [facility]   [phone]

## 2020-12-09 NOTE — PROGRESS NOTES
[facility]  Progress Note    Patient: Joss Sauceda Age: 65 y.o. : 1950 MR#: [id number] SSN: [id number]  Date/Time: 2020     Subjective:     Seen in room earlier  Lying in bed, NAD  Had more bleeding from wound overnight  Pressure held and dressing applied, quick clot applied  H&H dropped overnight, PRBCs ordered  Denies pain      Assessment/Plan:     1. SepsisPOA, with leukocytosis, secondary to #2 and 3  2. Bacteremia  3. Vascular graft infection  4. UTI  5. COPD  6. Crohn's disease   7. Anemia of chronic disease    Possibly to the OR today  Vascular surgery is primary, hospitalist group consulted  ID following  Continue IV ABX per ID Zosyn, Cipro  Continue ipratropium, budesonide and Brovana nebulizers twice daily  Continue ASA, Plavix  Continue Lyrica  PT, OT    Case discussed with:  [x]Patient  []Family  []Nursing  []Case Management  DVT Prophylaxis: SCDs  Diet: N.p.o.  CODE STATUS: Full  Contact: Nicole Kraus ()     [phone]  Disposition: Continue current care and stepdown, greater than 2 nights      H. Meri Salguero, DO   2020          Objective:   VS:   Visit Vitals  BP (!) 140/65   Pulse 81   Temp 98.4 °F (36.9 °C)   Resp 20   Ht 5' 5\" (1.651 m)   Wt 63.9 kg (140 lb 12.8 oz)   SpO2 96%   Breastfeeding Unknown   BMI 23.43 kg/m²      Tmax/24hrs: Temp (24hrs), Av.1 °F (36.7 °C), Min:97.4 °F (36.3 °C), Max:98.9 °F (37.2 °C)  IOBRIEF    Intake/Output Summary (Last 24 hours) at 2020 1733  Last data filed at 2020 1530  Gross per 24 hour   Intake 1000 ml   Output    Net 1000 ml       General:  Alert, cooperative, no acute distress    HEENT: nicteric sclerae. Pulmonary:  CTA Bilaterally. No Wheezing/Rhonchi/Rales. Cardiovascular: Regular rate and Rhythm. GI:  Soft, Non distended, Non tender. + Bowel sounds. Extremities:  No edema, cyanosis, clubbing. No calf tenderness. Neurologic: Alert and oriented X 3.  No acute neuro deficits. Additional:    Medications:   Current Facility-Administered Medications   Medication Dose Route Frequency    0.9% sodium chloride infusion 250 mL  250 mL IntraVENous PRN    0.9% sodium chloride infusion 250 mL  250 mL IntraVENous PRN    heparinized saline 2 units/mL 1,000 unit/500 mL infusion        chlorhexidine (HIBICLENS) 4 % liquid        lidocaine (PF) (XYLOCAINE) 10 mg/mL (1 %) injection        lidocaine (PF) (XYLOCAINE) 10 mg/mL (1 %) injection 0.1 mL  0.1 mL SubCUTAneous PRN    sodium chloride (NS) flush 5-40 mL  5-40 mL IntraVENous Q8H    sodium chloride (NS) flush 5-40 mL  5-40 mL IntraVENous PRN    ciprofloxacin (CIPRO) 400 mg in D5W IVPB (premix)  400 mg IntraVENous Q12H    aspirin chewable tablet 81 mg  81 mg Oral DAILY    latanoprost (XALATAN) 0.005 % ophthalmic solution 1 Drop  1 Drop Both Eyes QPM    clopidogreL (PLAVIX) tablet 75 mg  75 mg Oral DAILY    fluticasone propionate (FLONASE) 50 mcg/actuation nasal spray 2 Spray  2 Spray Both Nostrils DAILY PRN    loratadine (CLARITIN) tablet 10 mg  10 mg Oral DAILY    pregabalin (LYRICA) capsule 100 mg  100 mg Oral BID    budesonide (PULMICORT) 250 mcg/2ml nebulizer susp  250 mcg Nebulization BID RT    ipratropium (ATROVENT) 0.02 % nebulizer solution 0.5 mg  0.5 mg Nebulization Q8H RT    arformoteroL (BROVANA) neb solution 15 mcg  15 mcg Nebulization BID RT    acetaminophen (TYLENOL) tablet 500 mg  500 mg Oral Q4H PRN    piperacillin-tazobactam (ZOSYN) 3.375 g in 0.9% sodium chloride (MBP/ADV) 100 mL MBP  3.375 g IntraVENous Q6H       Imaging:   XR Results (most recent):  Results from Hospital Encounter encounter on 12/03/20   XR CHEST SNGL V    Narrative EXAM: Chest Radiograph    INDICATION:  flank pain    TECHNIQUE: AP view of the chest    COMPARISON: 4/24/2018, 7/19/2017, 7/27/2017 and 3/29/2013    FINDINGS: No pneumothorax identified. The lungs are hyperinflated. No acute  infiltrate or effusion appreciated. The cardiac silhouette is unremarkable. Calcifications are noted in the thoracic aorta. The pulmonary vasculature is  unremarkable. Severe degenerative changes of the shoulders and spine. Impression Impression:  1. No acute infiltrate or effusion. 2.  Hyperinflated lungs suggestive COPD. CT Results (most recent):  Results from Hospital Encounter encounter on 12/03/20   CT CHEST ABD PELV W CONT    Narrative EXAM:  CT CHEST ABD PELV W CONT    INDICATION: evaluate for vascular graft infection, occult infection  , positive  blood cultures and as as a positive, gram-positive cocci positive, history of  chronic right chest area open wound nonresponsive to outpatient local dressings  and antibiotics, generalized fatigue and malaise, fever, tachycardia, history of  Crohn's disease, right flank wound, severe peripheral artery disease status post  right axillary femoral graft, left femoral-popliteal graft, status post revision  of axillary to femoral bypass graft with excision of an infected graft on  9/21/2020, status post excision of the ulcer over the wound and debridement of  the wound on 11/5/2020, now with recent history of purulent drainage to the  right abdominal surgical site, fevers x1 week, gram-positive bacteremia, likely  vascular graft infection    COMPARISON: CT chest November 10, 2018    CONTRAST:  100 mL of Isovue-370. TECHNIQUE:   Following the uneventful intravenous administration of contrast, thin axial  images were obtained through the chest, abdomen and pelvis. Coronal and sagittal  reconstructions were generated. Oral contrast was not administered. CT dose  reduction was achieved through use of a standardized protocol tailored for this  examination and automatic exposure control for dose modulation. FINDINGS:     CHEST:  THYROID: No nodule. MEDIASTINUM: Several stable minimally prominent middle mediastinal nodes  measuring less than 1 cm in the short axis dimension.  Image 21 series 2. NEETU: No mass or lymphadenopathy. THORACIC AORTA: No dissection or aneurysm. MAIN PULMONARY ARTERY: Normal in caliber. TRACHEA/BRONCHI: Patent. ESOPHAGUS: No wall thickening or dilatation. HEART: Normal in size. PLEURA: No effusion or pneumothorax. LUNGS: Stable emphysema. Mild right greater than left basal subsegmental  atelectasis. ABDOMEN:  LIVER: No mass or intrahepatic biliary dilatation. There is mild dilatation of  the extrahepatic common duct measuring 9 mm which tapers to the ampulla. GALLBLADDER: Surgically removed  SPLEEN: No mass. PANCREAS: No mass or ductal dilatation. ADRENALS: Stable mild thickening of the left adrenal. Normal right adrenal.  KIDNEYS: No mass, calculus, or hydronephrosis. Bilateral mild cortical scarring  STOMACH: Unremarkable. SMALL BOWEL: No dilatation or wall thickening. COLON: Scattered diverticula of the descending colon without evidence of  surrounding inflammation. APPENDIX: Unremarkable. PERITONEUM: No ascites or pneumoperitoneum. RETROPERITONEUM: No lymphadenopathy or aortic aneurysm. PELVIS:  REPRODUCTIVE ORGANS: Left ovarian 3.6 cm simple cyst. Uterus has been surgically  removed. URINARY BLADDER: No mass or calculus. BONES: Degenerative disc disease at L5/S1 with reactive endplate sclerosis. ADDITIONAL COMMENTS: Right axillary to femoral graft is patent. An additional very short segment abandoned graft is seen at the right lateral  chest wall inferiorly located more posteriorly to the patent graft. At its  inferior border there is soft tissue density extending to the overlying chest  wall at the site of prior surgery which has decreased from the previous exam.    There is surrounding low density/soft tissue density in the subcutaneous fat  involving the more anterior patent graft, image 31 through image 37 series 3.  At  this level the opacified lumen is decreased in size with a slitlike surrounded  configuration by soft tissue density within the lumen of the stent. The AP  diameter of the contrast opacified lumen is 3 mm with a transverse diameter of 5  mm. Image 34 series 2. The diameter of the graft itself at this site is 1 x 1.1  cm. See coronal image 16 series 604. Sagittal image 64 series 605. The  immediately proximal graft is completely opacified measuring 1 x 0.9 cm and the  more distal graft is completely opacified measuring 1.2 x 0.9 cm. Probable small amount of eccentric thrombus at the distal level of the stent  graft, image 55 series 3. See coronal image 16 series 604, sagittal image 60  series 605. There is additional surrounding soft tissue density in the subcutaneous fat at  the right lower quadrant abdominal wall where a short segment of apparent  endograft in the patent graft converge to merge with the femoral artery. Impression IMPRESSION:  Findings are concerning for infection at the level of the mid abdominal and  possibly distal abdominal right axillary femoral graft with surrounding soft  tissue density and intraluminal thrombus with narrowing of the opacified lumen. As clinically indicated follow-up nuclear isotope infection scan may be of  benefit. Left ovarian cyst.  Mild extrahepatic biliary dilatation likely reservoir effect. Diverticulosis coli without evidence of diverticulitis. Minimal bibasal lung dependent atelectasis.                Labs:    Recent Results (from the past 48 hour(s))   TYPE & SCREEN    Collection Time: 12/08/20  3:00 PM   Result Value Ref Range    Crossmatch Expiration 12/11/2020,2359     ABO/Rh(D) A POSITIVE     Antibody screen NEG     CALLED TO:  RAQUEL, 0957, 12/09/2020, CVT, NJE     Unit number [id number]     Blood component type [facility]     Unit division 00     Status of unit ISSUED     Crossmatch result Compatible     Unit number [id number]     Blood component type [facility]     Unit division 00     Status of unit ISSUED     Crossmatch result Compatible     Unit number [id number]     Blood component type  LR     Unit division 00     Status of unit ALLOCATED     Crossmatch result Compatible     Unit number [id number]     Blood component type  LR     Unit division 00     Status of unit ALLOCATED     Crossmatch result Compatible    CBC W/O DIFF    Collection Time: 12/08/20  3:00 PM   Result Value Ref Range    WBC 11.1 4.6 - 13.2 K/uL    RBC 2.76 (L) 4.20 - 5.30 M/uL    HGB 7.6 (L) 12.0 - 16.0 g/dL    HCT 24.1 (L) 35.0 - 45.0 %    MCV 87.3 74.0 - 97.0 FL    MCH 27.5 24.0 - 34.0 PG    MCHC 31.5 31.0 - 37.0 g/dL    RDW 15.3 (H) 11.6 - 14.5 %    PLATELET 744 (H) 100 - 420 K/uL    MPV 9.8 9.2 - 11.8 FL   RBC, ALLOCATE    Collection Time: 12/09/20  9:45 AM   Result Value Ref Range    HISTORY CHECKED? Historical check performed    GLUCOSE, POC    Collection Time: 12/09/20 11:50 AM   Result Value Ref Range    Glucose (POC) 97 70 - 110 mg/dL   SARS-COV-2    Collection Time: 12/09/20  4:21 PM   Result Value Ref Range    Specimen source Nasopharyngeal      COVID-19 rapid test Not detected NOTD      Specimen type NP Swab     RBC, ALLOCATE    Collection Time: 12/09/20  4:30 PM   Result Value Ref Range    HISTORY CHECKED? Historical check performed        Disclaimer: Sections of this note are dictated using utilizing voice recognition software. Minor typographical errors may be present. If questions arise, please do not hesitate to contact me or call our department.

## 2020-12-09 NOTE — PROGRESS NOTES
Bedside and Verbal shift change report given to SPENCER Rosa  (oncoming nurse) by Shanelle Quijano RN (offgoing nurse). Report included the following information SBAR, Kardex and Cardiac Rhythm NSR.

## 2020-12-09 NOTE — ACP (ADVANCE CARE PLANNING)
Advance Care Planning     General Advance Care Planning (ACP) Conversation      Date of Conversation: 12/3/2020  Conducted with: Patient with Decision Making Capacity    Healthcare Decision Maker:     Click here to complete [place] including selection of the Healthcare Decision Maker Relationship (ie \"Primary\")  Today we documented Decision Maker(s). The patient will provide ACP documents. Primary decision maker Alaina Lay [facility] [phone]    Content/Action Overview:    Has ACP document(s) NOT on file - requested patient to provide      [address]  [phone]

## 2020-12-10 ENCOUNTER — APPOINTMENT (OUTPATIENT)
Dept: INTERVENTIONAL RADIOLOGY/VASCULAR | Age: 70
DRG: 252 | End: 2020-12-10
Attending: INTERNAL MEDICINE
Payer: MEDICARE

## 2020-12-10 ENCOUNTER — HOME HEALTH ADMISSION (OUTPATIENT)
Dept: HOME HEALTH SERVICES | Facility: HOME HEALTH | Age: 70
End: 2020-12-10
Payer: MEDICARE

## 2020-12-10 LAB
BASOPHILS # BLD: 0 K/UL (ref 0–0.1)
BASOPHILS NFR BLD: 0 % (ref 0–2)
DIFFERENTIAL METHOD BLD: ABNORMAL
EOSINOPHIL # BLD: 0 K/UL (ref 0–0.4)
EOSINOPHIL NFR BLD: 0 % (ref 0–5)
ERYTHROCYTE [DISTWIDTH] IN BLOOD BY AUTOMATED COUNT: 15.2 % (ref 11.6–14.5)
HCT VFR BLD AUTO: 27 % (ref 35–45)
HGB BLD-MCNC: 8.8 G/DL (ref 12–16)
LYMPHOCYTES # BLD: 1.9 K/UL (ref 0.9–3.6)
LYMPHOCYTES NFR BLD: 14 % (ref 21–52)
MCH RBC QN AUTO: 27.8 PG (ref 24–34)
MCHC RBC AUTO-ENTMCNC: 32.6 G/DL (ref 31–37)
MCV RBC AUTO: 85.4 FL (ref 74–97)
MONOCYTES # BLD: 0.9 K/UL (ref 0.05–1.2)
MONOCYTES NFR BLD: 7 % (ref 3–10)
NEUTS SEG # BLD: 10.6 K/UL (ref 1.8–8)
NEUTS SEG NFR BLD: 79 % (ref 40–73)
PLATELET # BLD AUTO: 420 K/UL (ref 135–420)
PMV BLD AUTO: 9.5 FL (ref 9.2–11.8)
RBC # BLD AUTO: 3.16 M/UL (ref 4.2–5.3)
WBC # BLD AUTO: 13.5 K/UL (ref 4.6–13.2)

## 2020-12-10 PROCEDURE — 97535 SELF CARE MNGMENT TRAINING: CPT

## 2020-12-10 PROCEDURE — 03150J6 BYPASS RIGHT AXILLARY ARTERY TO RIGHT UPPER LEG ARTERY WITH SYNTHETIC SUBSTITUTE, OPEN APPROACH: ICD-10-PCS | Performed by: SURGERY

## 2020-12-10 PROCEDURE — 74011250636 HC RX REV CODE- 250/636: Performed by: INTERNAL MEDICINE

## 2020-12-10 PROCEDURE — 97161 PT EVAL LOW COMPLEX 20 MIN: CPT

## 2020-12-10 PROCEDURE — 02HV33Z INSERTION OF INFUSION DEVICE INTO SUPERIOR VENA CAVA, PERCUTANEOUS APPROACH: ICD-10-PCS | Performed by: RADIOLOGY

## 2020-12-10 PROCEDURE — 74011000250 HC RX REV CODE- 250: Performed by: HOSPITALIST

## 2020-12-10 PROCEDURE — 74011000258 HC RX REV CODE- 258: Performed by: INTERNAL MEDICINE

## 2020-12-10 PROCEDURE — 77010033678 HC OXYGEN DAILY

## 2020-12-10 PROCEDURE — 2709999900 HC NON-CHARGEABLE SUPPLY

## 2020-12-10 PROCEDURE — 65660000004 HC RM CVT STEPDOWN

## 2020-12-10 PROCEDURE — 97165 OT EVAL LOW COMPLEX 30 MIN: CPT

## 2020-12-10 PROCEDURE — 03PY0JZ REMOVAL OF SYNTHETIC SUBSTITUTE FROM UPPER ARTERY, OPEN APPROACH: ICD-10-PCS | Performed by: SURGERY

## 2020-12-10 PROCEDURE — 94640 AIRWAY INHALATION TREATMENT: CPT

## 2020-12-10 PROCEDURE — 77001 FLUOROGUIDE FOR VEIN DEVICE: CPT

## 2020-12-10 PROCEDURE — 85025 COMPLETE CBC W/AUTO DIFF WBC: CPT

## 2020-12-10 PROCEDURE — 94761 N-INVAS EAR/PLS OXIMETRY MLT: CPT

## 2020-12-10 PROCEDURE — 74011250637 HC RX REV CODE- 250/637: Performed by: HOSPITALIST

## 2020-12-10 PROCEDURE — 04PY0JZ REMOVAL OF SYNTHETIC SUBSTITUTE FROM LOWER ARTERY, OPEN APPROACH: ICD-10-PCS | Performed by: SURGERY

## 2020-12-10 PROCEDURE — 74011250637 HC RX REV CODE- 250/637: Performed by: INTERNAL MEDICINE

## 2020-12-10 PROCEDURE — 99232 SBSQ HOSP IP/OBS MODERATE 35: CPT | Performed by: FAMILY MEDICINE

## 2020-12-10 PROCEDURE — 36415 COLL VENOUS BLD VENIPUNCTURE: CPT

## 2020-12-10 RX ORDER — CIPROFLOXACIN 500 MG/1
500 TABLET ORAL EVERY 12 HOURS
Status: DISCONTINUED | OUTPATIENT
Start: 2020-12-10 | End: 2020-12-11 | Stop reason: HOSPADM

## 2020-12-10 RX ADMIN — LORATADINE 10 MG: 10 TABLET ORAL at 09:46

## 2020-12-10 RX ADMIN — ARFORMOTEROL TARTRATE 15 MCG: 15 SOLUTION RESPIRATORY (INHALATION) at 23:28

## 2020-12-10 RX ADMIN — CIPROFLOXACIN 500 MG: 500 TABLET, FILM COATED ORAL at 20:40

## 2020-12-10 RX ADMIN — CLOPIDOGREL BISULFATE 75 MG: 75 TABLET ORAL at 09:46

## 2020-12-10 RX ADMIN — Medication 10 ML: at 06:00

## 2020-12-10 RX ADMIN — PIPERACILLIN AND TAZOBACTAM 3.38 G: 3; .375 INJECTION, POWDER, LYOPHILIZED, FOR SOLUTION INTRAVENOUS at 20:40

## 2020-12-10 RX ADMIN — Medication 10 ML: at 21:12

## 2020-12-10 RX ADMIN — CIPROFLOXACIN 500 MG: 500 TABLET, FILM COATED ORAL at 09:46

## 2020-12-10 RX ADMIN — PIPERACILLIN AND TAZOBACTAM 3.38 G: 3; .375 INJECTION, POWDER, LYOPHILIZED, FOR SOLUTION INTRAVENOUS at 14:52

## 2020-12-10 RX ADMIN — LATANOPROST 1 DROP: 50 SOLUTION OPHTHALMIC at 17:26

## 2020-12-10 RX ADMIN — ARFORMOTEROL TARTRATE 15 MCG: 15 SOLUTION RESPIRATORY (INHALATION) at 07:53

## 2020-12-10 RX ADMIN — PREGABALIN 100 MG: 50 CAPSULE ORAL at 09:46

## 2020-12-10 RX ADMIN — IPRATROPIUM BROMIDE 0.5 MG: 0.5 SOLUTION RESPIRATORY (INHALATION) at 23:29

## 2020-12-10 RX ADMIN — IPRATROPIUM BROMIDE 0.5 MG: 0.5 SOLUTION RESPIRATORY (INHALATION) at 07:53

## 2020-12-10 RX ADMIN — ASPIRIN 81 MG CHEWABLE TABLET 81 MG: 81 TABLET CHEWABLE at 09:47

## 2020-12-10 RX ADMIN — Medication 10 ML: at 14:00

## 2020-12-10 RX ADMIN — PIPERACILLIN AND TAZOBACTAM 3.38 G: 3; .375 INJECTION, POWDER, LYOPHILIZED, FOR SOLUTION INTRAVENOUS at 02:16

## 2020-12-10 RX ADMIN — BUDESONIDE 250 MCG: 0.25 INHALANT RESPIRATORY (INHALATION) at 23:29

## 2020-12-10 RX ADMIN — PIPERACILLIN AND TAZOBACTAM 3.38 G: 3; .375 INJECTION, POWDER, LYOPHILIZED, FOR SOLUTION INTRAVENOUS at 09:45

## 2020-12-10 RX ADMIN — PREGABALIN 100 MG: 50 CAPSULE ORAL at 20:40

## 2020-12-10 RX ADMIN — BUDESONIDE 250 MCG: 0.25 INHALANT RESPIRATORY (INHALATION) at 07:53

## 2020-12-10 NOTE — PROGRESS NOTES
OT order received and chart reviewed. Attempted to see patient for skilled OT evaluation. Patient with respiratory therapist present getting breathing treatment. Will continue to follow and see patient when available/as appropriate.             Thank you for this referral,   Nancy Pablo MS, OTR/L

## 2020-12-10 NOTE — PROGRESS NOTES
Bedside and Verbal shift change report given to SPENCER Rosa (oncoming nurse) by Barbara Berger RN  (offgoing nurse). Report included the following information SBAR, Kardex and Cardiac Rhythm NSR .

## 2020-12-10 NOTE — PROGRESS NOTES
Infectious Disease progress Note        Reason: Fevers, gram-positive bacteremia    Current abx Prior abx   Pip/tazo since 12/3  Ciprofloxacin since 12/7 Vancomycin 12/3-12/4     Lines:       Assessment :    65 y.o.  female  with history of Crohn's disease status post Remicade, severe peripheral artery disease status post right axillary femoral, left femoropopliteal bypass graft with multiple surgeries who presented to [facility] on 12/3/2020 with subjective sensation of not feeling well, fever. Status post revision of axillary to femoral bypass graft with excision of infected graft on 9/21/2020. Status post excision of the ulcer over the wound and debridement of the wound on 11/5/2020. Cultures revealed methicillin susceptible Staphylococcus aureus. Now with recent h/o purulent drainage right abdominal surgical site, fevers x 1 week, gram positive bacteremia. Clinical presentation consistent with sepsis (present on admission) secondary to methicillin susceptible Staphylococcus aureus bloodstream infection (positive blood culture 12/3, negative blood culture 12/6, 12/7),  vascular graft infection    CT chest/abdomen/pelvis 12/4-results reviewed. Findings consistent with vascular graft infection. Concurrent immunocompromise state secondary to Remicade is likely masking the full clinical presentation. Low clinical probability of COVID-19 infection at this time    Some pyuria noted on urinalysis 12/3-no dysuria or other signs or symptoms to suggest UTI. However moderate leukocyte esterase noted on urine analysis. Urine culture 12/3+ for Citrobacter, ampicillin susceptible Enterococcus faecalis. Immunocompromise state can interfere with the clinical presentation. Since patient scheduled for vascular graft surgery, recommend to treat for probable Citrobacter/Enterococcus cystitis.      bleeding noted from right abdominal wound 12/9/2020-status post revision axillofemoral bypass graft 12/9/2020. Intraoperative findings discussed with Dr. Josué Quinn. No gross purulence noted. Recommendations:    1. Continue piperacillin/tazobactam, switch ciprofloxacin to p.o. for 5 more days  2. Recommend outpatient IV antibiotics till 1/23/2021. Patient requesting once daily antibiotic. Outpatient IV antibiotic orders for daptomycin till 1/23/2021 written. 3.  Place PICC line for home IV antibiotics  4. If surgical site not completely healed at the end of 6 weeks IV antibiotics or concerns for persistent indolent infection, recommend to use p.o. suppressive Augmentin after completion of 6 weeks IV antibiotics. Discussed with Dr. Josué Quinn. Further decisions will be made made based on subsequent clinical course as outpatient. If home IV antibiotics not cost effective, will need to arrange for antibiotics at outpatient infusion center. Discussed with patient. Above plan was discussed in details with patient, vascular surgery PA. D/w , . All questions answered to their full satisfaction. Spent additional 35 minutes in management and evaluation of this patient. >50% time spent in counselling and coordination of care. HPI:  Feels better. In good spirits. Angel Luis Cursteve to go home  Patient denies any increasing abdominal pain. She denies any sore throat, cough, chest pain, shortness of breath, diarrhea, dysuria. home Medication List    Details   fluticasone-umeclidinium-vilanterol (Trelegy Ellipta) 100-62.5-25 mcg inhaler Take 1 Puff by inhalation daily. Qty: 3 Inhaler, Refills: 1      clopidogreL (PLAVIX) 75 mg tab TAKE ONE TABLET BY MOUTH DAILY  Qty: 30 Tab, Refills: 8      pregabalin (LYRICA) 100 mg capsule Take  by mouth two (2) times a day. Takes 100 mg in am and 200 in the pm      aspirin 81 mg tablet Take 81 mg by mouth daily. cyanocobalamin (VITAMIN B-12) 1,000 mcg/mL injection 1,000 mcg by IntraMUSCular route every fourteen (14) days. triamterene-hydrochlorothiazide (DYAZIDE) 37.5-25 mg per capsule Take 1 Cap by mouth daily. bimatoprost (LUMIGAN) 0.03 % ophthalmic drops Administer 1 Drop to both eyes every evening. atropine-PHENobarbital-scopolamine-hyoscyamine (DONNATAL) 16.2-0.1037 -0.0194 mg per tablet Take 1 Tab by mouth as needed (diarrhea). Indications: Irritable Bowel Syndrome      inFLIXimab (REMICADE) 100 mg injection 5 mg/kg by IntraVENous route once. Every 4 weeks      ipratropium-albuteroL (Combivent Respimat)  mcg/actuation inhaler Take 1 Puff by inhalation every six (6) hours as needed for Wheezing or Shortness of Breath. Qty: 1 Inhaler, Refills: 3      multivitamin (ONE A DAY) tablet Take 1 Tab by mouth daily. BIOTIN PO Take  by mouth. OTHER       loratadine (CLARITIN) 10 mg tablet Take 10 mg by mouth daily. fluticasone propionate (FLONASE ALLERGY RELIEF) 50 mcg/actuation nasal spray 2 Sprays by Both Nostrils route daily as needed for Rhinitis. DULoxetine (CYMBALTA) 60 mg capsule Take 60 mg by mouth nightly. For Anxiety. loperamide (IMMODIUM) 2 mg tablet Take 2 mg by mouth daily as needed for Diarrhea. dicyclomine (BENTYL) 10 mg capsule Take 10 mg by mouth daily.              Current Facility-Administered Medications   Medication Dose Route Frequency    ciprofloxacin HCl (CIPRO) tablet 500 mg  500 mg Oral Q12H    0.9% sodium chloride infusion 250 mL  250 mL IntraVENous PRN    0.9% sodium chloride infusion 250 mL  250 mL IntraVENous PRN    lactated Ringers infusion  50 mL/hr IntraVENous CONTINUOUS    sodium chloride (NS) flush 5-40 mL  5-40 mL IntraVENous Q8H    sodium chloride (NS) flush 5-40 mL  5-40 mL IntraVENous PRN    fentaNYL citrate (PF) injection 50 mcg  50 mcg IntraVENous PRN    HYDROmorphone (DILAUDID) injection 0.5 mg  0.5 mg IntraVENous Multiple    naloxone (NARCAN) injection 0.1 mg  0.1 mg IntraVENous PRN    diphenhydrAMINE (BENADRYL) injection 12.5 mg  12.5 mg IntraVENous Multiple    HYDROmorphone (DILAUDID) syringe 0.5-1 mg  0.5-1 mg IntraVENous Q2H PRN    HYDROcodone-acetaminophen (NORCO) 5-325 mg per tablet 1 Tab  1 Tab Oral Q4H PRN    ciprofloxacin (CIPRO) 400 mg in D5W IVPB (premix)  400 mg IntraVENous Q12H    aspirin chewable tablet 81 mg  81 mg Oral DAILY    latanoprost (XALATAN) 0.005 % ophthalmic solution 1 Drop  1 Drop Both Eyes QPM    clopidogreL (PLAVIX) tablet 75 mg  75 mg Oral DAILY    fluticasone propionate (FLONASE) 50 mcg/actuation nasal spray 2 Spray  2 Spray Both Nostrils DAILY PRN    loratadine (CLARITIN) tablet 10 mg  10 mg Oral DAILY    pregabalin (LYRICA) capsule 100 mg  100 mg Oral BID    budesonide (PULMICORT) 250 mcg/2ml nebulizer susp  250 mcg Nebulization BID RT    ipratropium (ATROVENT) 0.02 % nebulizer solution 0.5 mg  0.5 mg Nebulization Q8H RT    arformoteroL (BROVANA) neb solution 15 mcg  15 mcg Nebulization BID RT    acetaminophen (TYLENOL) tablet 500 mg  500 mg Oral Q4H PRN    piperacillin-tazobactam (ZOSYN) 3.375 g in 0.9% sodium chloride (MBP/ADV) 100 mL MBP  3.375 g IntraVENous Q6H       Allergies: Codeine; Darvon [propoxyphene]; Demerol [meperidine]; Keflex [cephalexin]; and Talwin [pentazocine lactate]    Temp (24hrs), Av.9 °F (36.6 °C), Min:96.7 °F (35.9 °C), Max:98.9 °F (37.2 °C)    Visit Vitals  /67 (BP 1 Location: Right arm, BP Patient Position: At rest)   Pulse 73   Temp 97.7 °F (36.5 °C)   Resp 16   Ht 5' 5\" (1.651 m)   Wt 64 kg (141 lb 1.5 oz)   SpO2 99%   Breastfeeding Unknown   BMI 23.48 kg/m²       ROS: 12 point ROS obtained in details. Pertinent positives as mentioned in HPI,   otherwise negative    Physical Exam:    General: Well developed, well nourished female sitting on the  bed AAOx3 in no acute distress.     General:   awake alert and oriented   HEENT:  Normocephalic, atraumatic, PERRL, EOMI, no scleral icterus or pallor; no conjunctival hemmohage;  nasal and oral mucous are moist and without evidence of lesions. Neck supple, no bruits. Lymph Nodes:   no cervical, axillary or inguinal adenopathy   Lungs:   non-labored, bilaterally clear to auscultation- no crackles wheezes rales or rhonchi   Heart:  RRR, s1 and s2; no  rubs or gallops, no edema   Abdomen:  soft, non-distended, active bowel sounds, no hepatomegaly, no splenomegaly. Non-tender. Right flank surgical dressing not opened   Genitourinary:  deferred   Extremities:   no clubbing, cyanosis; no joint effusions or swelling; Full ROM of all large joints to the upper and lower extremities; muscle mass appropriate for age   Neurologic:  No gross focal sensory abnormalities; 5/5 muscle strength to upper and lower extremities. Speech appropriate. Cranial nerves intact                        Skin:  No rash or ulcers noted   Back:  no spinal or paraspinal muscle tenderness or rigidity, no CVA tenderness     Psychiatric:  No suicidal or homicidal ideations, appropriate mood and affect         Labs: Results:   Chemistry No results for input(s): GLU, NA, K, CL, CO2, BUN, CREA, CA, AGAP, BUCR, TBIL, AP, TP, ALB, GLOB, AGRAT in the last 72 hours. No lab exists for component: GPT   CBC w/Diff Recent Labs     12/10/20  0516 12/09/20  1648 12/08/20  1500   WBC 13.5*  --  11.1   RBC 3.16*  --  2.76*   HGB 8.8* 9.7* 7.6*   HCT 27.0* 29.5* 24.1*     --  496*   GRANS 79*  --   --    LYMPH 14*  --   --    EOS 0  --   --       Microbiology Recent Labs     12/07/20  0919   CULT NO GROWTH 3 DAYS          RADIOLOGY:    All available imaging studies/reports in [facility] for this admission were reviewed    Total time spent >35 minutes.  High complexity decision making was performed during the evaluation of this patient at high risk for decompensation with multiple organ involvement     Above mentioned total time spent on reviewing the case/medical record/data/notes/EMR/patient examination/documentation/coordinating care with nurse/consultants, exclusive of procedures with complex decision making performed and > 50% time spent in face to face evaluation.     Dr. Rekha Blunt, Infectious Disease Specialist  [phone]  December 10, 2020  10:48 AM

## 2020-12-10 NOTE — PROGRESS NOTES
[facility] Hospitalist Group  Progress Note    Patient: Roger Stephens Age: 65 y.o. : 1950 MR#: [id number] SSN: [id number]  Date/Time: 12/10/2020     Subjective:     Seen in room earlier  Sitting up in chair, NAD  Status post revision of new interposition bypass graft, and excision of old bypass graft 12/10  Complains of some minor discomfort  Looking forward to going home tomorrow      Assessment/Plan:     1. SepsisPOA, with leukocytosis, secondary to #2 and 3  2. Bacteremia  3. Vascular graft infection  4. UTI  5. COPD  6. Crohn's disease   7. Anemia of chronic disease    Status post revision of new interposition bypass graft, and excision of old bypass graft 12/10  Vascular surgery is primary, hospitalist group consulted  ID following  PICC line placed today  Continue IV ABX per ID Zosyn, Cipro  Continue ipratropium, budesonide and Brovana nebulizers twice daily  Continue ASA, Plavix  Continue Lyrica  PT, OT    Case discussed with:  [x]Patient  []Family  [x]Nursing  []Case Management  DVT Prophylaxis: SCDs  Diet: N.p.o.   CODE STATUS: Full  Contact: Tr Castro ()     [phone]  Disposition: Continue current care and stepdown, likely home tomorrow per vascular      ARLIN Ortega, DO   December 10, 2020          Objective:   VS:   Visit Vitals  /60 (BP 1 Location: Right arm, BP Patient Position: At rest)   Pulse 80   Temp 97.8 °F (36.6 °C)   Resp 20   Ht 5' 5\" (1.651 m)   Wt 64 kg (141 lb 1.5 oz)   SpO2 94%   Breastfeeding Unknown   BMI 23.48 kg/m²      Tmax/24hrs: Temp (24hrs), Av.6 °F (36.4 °C), Min:96.7 °F (35.9 °C), Max:98.5 °F (36.9 °C)  IOBRIEF    Intake/Output Summary (Last 24 hours) at 12/10/2020 1821  Last data filed at 12/10/2020 1452  Gross per 24 hour   Intake 2242.5 ml   Output 650 ml   Net 1592.5 ml       General:  Alert, cooperative, no acute distress    HEENT: nicteric sclerae. Pulmonary:  CTA Bilaterally.  No Wheezing/Rhonchi/Rales. Cardiovascular: Regular rate and Rhythm. GI:  Soft, Non distended, Non tender. + Bowel sounds. Extremities:  No edema, cyanosis, clubbing. No calf tenderness. Neurologic: Alert and oriented X 3. No acute neuro deficits.   Additional:    Medications:   Current Facility-Administered Medications   Medication Dose Route Frequency    ciprofloxacin HCl (CIPRO) tablet 500 mg  500 mg Oral Q12H    0.9% sodium chloride infusion 250 mL  250 mL IntraVENous PRN    0.9% sodium chloride infusion 250 mL  250 mL IntraVENous PRN    sodium chloride (NS) flush 5-40 mL  5-40 mL IntraVENous Q8H    sodium chloride (NS) flush 5-40 mL  5-40 mL IntraVENous PRN    fentaNYL citrate (PF) injection 50 mcg  50 mcg IntraVENous PRN    naloxone (NARCAN) injection 0.1 mg  0.1 mg IntraVENous PRN    diphenhydrAMINE (BENADRYL) injection 12.5 mg  12.5 mg IntraVENous Multiple    HYDROmorphone (DILAUDID) syringe 0.5-1 mg  0.5-1 mg IntraVENous Q2H PRN    HYDROcodone-acetaminophen (NORCO) 5-325 mg per tablet 1 Tab  1 Tab Oral Q4H PRN    aspirin chewable tablet 81 mg  81 mg Oral DAILY    latanoprost (XALATAN) 0.005 % ophthalmic solution 1 Drop  1 Drop Both Eyes QPM    clopidogreL (PLAVIX) tablet 75 mg  75 mg Oral DAILY    fluticasone propionate (FLONASE) 50 mcg/actuation nasal spray 2 Spray  2 Spray Both Nostrils DAILY PRN    loratadine (CLARITIN) tablet 10 mg  10 mg Oral DAILY    pregabalin (LYRICA) capsule 100 mg  100 mg Oral BID    budesonide (PULMICORT) 250 mcg/2ml nebulizer susp  250 mcg Nebulization BID RT    ipratropium (ATROVENT) 0.02 % nebulizer solution 0.5 mg  0.5 mg Nebulization Q8H RT    arformoteroL (BROVANA) neb solution 15 mcg  15 mcg Nebulization BID RT    acetaminophen (TYLENOL) tablet 500 mg  500 mg Oral Q4H PRN    piperacillin-tazobactam (ZOSYN) 3.375 g in 0.9% sodium chloride (MBP/ADV) 100 mL MBP  3.375 g IntraVENous Q6H       Imaging:   XR Results (most recent):  [facility]   XR CHEST SNGL V    Narrative EXAM: Chest Radiograph    INDICATION:  flank pain    TECHNIQUE: AP view of the chest    COMPARISON: 4/24/2018, 7/19/2017, 7/27/2017 and 3/29/2013    FINDINGS: No pneumothorax identified. The lungs are hyperinflated. No acute  infiltrate or effusion appreciated. The cardiac silhouette is unremarkable. Calcifications are noted in the thoracic aorta. The pulmonary vasculature is  unremarkable. Severe degenerative changes of the shoulders and spine. Impression Impression:  1. No acute infiltrate or effusion. 2.  Hyperinflated lungs suggestive COPD. CT Results (most recent):  Results from Hospital Encounter encounter on 12/03/20   CT CHEST ABD PELV W CONT    Narrative EXAM:  CT CHEST ABD PELV W CONT    INDICATION: evaluate for vascular graft infection, occult infection  , positive  blood cultures and as as a positive, gram-positive cocci positive, history of  chronic right chest area open wound nonresponsive to outpatient local dressings  and antibiotics, generalized fatigue and malaise, fever, tachycardia, history of  Crohn's disease, right flank wound, severe peripheral artery disease status post  right axillary femoral graft, left femoral-popliteal graft, status post revision  of axillary to femoral bypass graft with excision of an infected graft on  9/21/2020, status post excision of the ulcer over the wound and debridement of  the wound on 11/5/2020, now with recent history of purulent drainage to the  right abdominal surgical site, fevers x1 week, gram-positive bacteremia, likely  vascular graft infection    COMPARISON: CT chest November 10, 2018    CONTRAST:  100 mL of Isovue-370. TECHNIQUE:   Following the uneventful intravenous administration of contrast, thin axial  images were obtained through the chest, abdomen and pelvis. Coronal and sagittal  reconstructions were generated. Oral contrast was not administered.  CT dose  reduction was achieved through use of a standardized protocol tailored for this  examination and automatic exposure control for dose modulation. FINDINGS:     CHEST:  THYROID: No nodule. MEDIASTINUM: Several stable minimally prominent middle mediastinal nodes  measuring less than 1 cm in the short axis dimension. Image 21 series 2. NEETU: No mass or lymphadenopathy. THORACIC AORTA: No dissection or aneurysm. MAIN PULMONARY ARTERY: Normal in caliber. TRACHEA/BRONCHI: Patent. ESOPHAGUS: No wall thickening or dilatation. HEART: Normal in size. PLEURA: No effusion or pneumothorax. LUNGS: Stable emphysema. Mild right greater than left basal subsegmental  atelectasis. ABDOMEN:  LIVER: No mass or intrahepatic biliary dilatation. There is mild dilatation of  the extrahepatic common duct measuring 9 mm which tapers to the ampulla. GALLBLADDER: Surgically removed  SPLEEN: No mass. PANCREAS: No mass or ductal dilatation. ADRENALS: Stable mild thickening of the left adrenal. Normal right adrenal.  KIDNEYS: No mass, calculus, or hydronephrosis. Bilateral mild cortical scarring  STOMACH: Unremarkable. SMALL BOWEL: No dilatation or wall thickening. COLON: Scattered diverticula of the descending colon without evidence of  surrounding inflammation. APPENDIX: Unremarkable. PERITONEUM: No ascites or pneumoperitoneum. RETROPERITONEUM: No lymphadenopathy or aortic aneurysm. PELVIS:  REPRODUCTIVE ORGANS: Left ovarian 3.6 cm simple cyst. Uterus has been surgically  removed. URINARY BLADDER: No mass or calculus. BONES: Degenerative disc disease at L5/S1 with reactive endplate sclerosis. ADDITIONAL COMMENTS: Right axillary to femoral graft is patent. An additional very short segment abandoned graft is seen at the right lateral  chest wall inferiorly located more posteriorly to the patent graft.  At its  inferior border there is soft tissue density extending to the overlying chest  wall at the site of prior surgery which has decreased from the previous exam.    There is surrounding low density/soft tissue density in the subcutaneous fat  involving the more anterior patent graft, image 31 through image 37 series 3. At  this level the opacified lumen is decreased in size with a slitlike surrounded  configuration by soft tissue density within the lumen of the stent. The AP  diameter of the contrast opacified lumen is 3 mm with a transverse diameter of 5  mm. Image 34 series 2. The diameter of the graft itself at this site is 1 x 1.1  cm. See coronal image 16 series 604. Sagittal image 64 series 605. The  immediately proximal graft is completely opacified measuring 1 x 0.9 cm and the  more distal graft is completely opacified measuring 1.2 x 0.9 cm. Probable small amount of eccentric thrombus at the distal level of the stent  graft, image 55 series 3. See coronal image 16 series 604, sagittal image 60  series 605. There is additional surrounding soft tissue density in the subcutaneous fat at  the right lower quadrant abdominal wall where a short segment of apparent  endograft in the patent graft converge to merge with the femoral artery. Impression IMPRESSION:  Findings are concerning for infection at the level of the mid abdominal and  possibly distal abdominal right axillary femoral graft with surrounding soft  tissue density and intraluminal thrombus with narrowing of the opacified lumen. As clinically indicated follow-up nuclear isotope infection scan may be of  benefit. Left ovarian cyst.  Mild extrahepatic biliary dilatation likely reservoir effect. Diverticulosis coli without evidence of diverticulitis. Minimal bibasal lung dependent atelectasis. Labs:    Recent Results (from the past 48 hour(s))   RBC, ALLOCATE    Collection Time: 12/09/20  9:45 AM   Result Value Ref Range    HISTORY CHECKED?  Historical check performed    GLUCOSE, POC    Collection Time: 12/09/20 11:50 AM   Result Value Ref Range    Glucose (POC) 97 70 - 110 mg/dL   SARS-COV-2    Collection Time: 12/09/20  4:21 PM   Result Value Ref Range    Specimen source Nasopharyngeal      COVID-19 rapid test Not detected NOTD      Specimen type NP Swab     RBC, ALLOCATE    Collection Time: 12/09/20  4:30 PM   Result Value Ref Range    HISTORY CHECKED? Historical check performed    HGB & HCT    Collection Time: 12/09/20  4:48 PM   Result Value Ref Range    HGB 9.7 (L) 12.0 - 16.0 g/dL    HCT 29.5 (L) 35.0 - 45.0 %   CBC WITH AUTOMATED DIFF    Collection Time: 12/10/20  5:16 AM   Result Value Ref Range    WBC 13.5 (H) 4.6 - 13.2 K/uL    RBC 3.16 (L) 4.20 - 5.30 M/uL    HGB 8.8 (L) 12.0 - 16.0 g/dL    HCT 27.0 (L) 35.0 - 45.0 %    MCV 85.4 74.0 - 97.0 FL    MCH 27.8 24.0 - 34.0 PG    MCHC 32.6 31.0 - 37.0 g/dL    RDW 15.2 (H) 11.6 - 14.5 %    PLATELET 583 425 - 162 K/uL    MPV 9.5 9.2 - 11.8 FL    NEUTROPHILS 79 (H) 40 - 73 %    LYMPHOCYTES 14 (L) 21 - 52 %    MONOCYTES 7 3 - 10 %    EOSINOPHILS 0 0 - 5 %    BASOPHILS 0 0 - 2 %    ABS. NEUTROPHILS 10.6 (H) 1.8 - 8.0 K/UL    ABS. LYMPHOCYTES 1.9 0.9 - 3.6 K/UL    ABS. MONOCYTES 0.9 0.05 - 1.2 K/UL    ABS. EOSINOPHILS 0.0 0.0 - 0.4 K/UL    ABS. BASOPHILS 0.0 0.0 - 0.1 K/UL    DF AUTOMATED         Disclaimer: Sections of this note are dictated using utilizing voice recognition software. Minor typographical errors may be present. If questions arise, please do not hesitate to contact me or call our department.

## 2020-12-10 NOTE — PROGRESS NOTES
Bedside and Verbal shift change report given to SPENCER Garnica (oncoming nurse) by Lidia Mcmahon RN (offgoing nurse). Report included the following information SBAR, Kardex and Cardiac Rhythm NSR.

## 2020-12-10 NOTE — PROGRESS NOTES
Home health IV antibiotic orders noted. Pt agreeable to [id number] Home health referral sent to them via the [facility], spoke to [facility].     Fatoumata Lilly RN BSN  Care Manager  [phone]

## 2020-12-10 NOTE — PROGRESS NOTES
2200 - pt returned to floor and assessed. All flank and groin wounds clean, dry, intact. Left radial dressing clean, intact, blood drainage noted. Will continue to assess. 0500 - L radial dressing replaced d/t blood drainage. R abdomen dressing replaced d/t leaking iodine. Packed gauze was left in place, only outer dressing changed.

## 2020-12-10 NOTE — PROGRESS NOTES
Problem: Mobility Impaired (Adult and Pediatric)  Goal: *Acute Goals and Plan of Care (Insert Text)  Description: Pt able to amb around room without difficulty, reporting that she does not need PT and does not need to practice stairs. Recommend d/c home with [facility] and assist from  when needed. She has all needed equipment. PLOF: pt lives in a 2 story home, able to live on the first floor with her . She has a RW and SPC and was indep without use of an AD PTA. Outcome: Resolved/Met     PHYSICAL THERAPY EVALUATION/DISCHARGE    Patient: Jelly Celestin (38 y.o. female)  Date: 12/10/2020  Primary Diagnosis: UTI (urinary tract infection) [N39.0]  Cellulitis of abdominal wall [L03.311]  Procedure(s) (LRB):  REVISION AXILLO-FEMORAL BYPASS/C-ARM (Right) 1 Day Post-Op   Precautions:  (standard)    ASSESSMENT :  Based on the objective data described below, the patient presents with decreased endurance and pain. Pt found supine in bed, questioning why PT is coming to see here, but eventually agreeable to be seen. She reports some discomfort in her incision but otherwise feels good and wants to go home. Pt perform spine-sit Reji and stood without an AD to amb 20 feet around room. She declined going outside of her room to amb/practice stairs as she says she will be fine at home. Pt back supine in bed at end of session, call bell nearby, no new questions or concerns. Pt not in need of acute PT a this time. Recommend d/c home with [facility] and assist from  if needed. Patient will benefit from skilled intervention to address the above impairments.   Patient's rehabilitation potential is considered to be Good  Factors which may influence rehabilitation potential include:   []         None noted  []         Mental ability/status  []         Medical condition  []         Home/family situation and support systems  [x]         Safety awareness  [x]         Pain tolerance/management  []         Other: Patient not in need of acute PT at this time. Discharge Recommendations: Home Health  Further Equipment Recommendations for Discharge: N/A     SUBJECTIVE:   Patient stated Gale Williamson don't know why I need physical therapy? Luann Bogdan    OBJECTIVE DATA SUMMARY:     Past Medical History:   Diagnosis Date    Arthritis     CAP (community acquired pneumonia) 06/27/2017    Chronic lung disease     Claudication ([address]     left leg    Crohn's disease ([address]     Crohn's disease ([address]     GERD (gastroesophageal reflux disease)     HTN (hypertension)     Ill-defined condition     Uses O2 at night.  Nausea & vomiting     Other and unspecified symptoms and signs involving general sensations and perceptions     PVD    Other ill-defined conditions(799.89)     Crohn's    Peripheral neuropathy     Pulmonary emphysema ([address]     PVD (peripheral vascular disease) ([address]     right leg    Sepsis ([address] 06/27/2017    Vitamin B12 deficiency      Past Surgical History:   Procedure Laterality Date    BYPASS GRAFT OTHR,AXILL-FEM Right 4/19/2013    BYPASS GRAFT OTHR,FEM-POP Left 5/2013    COLONOSCOPY N/A 9/11/2019    DIAGNOSTIC COLONOSCOPY performed by Connie Teran MD at [address] [place] [address] FOOT/TOES SURGERY [address] UNLISTED      HX APPENDECTOMY      HX BREAST LUMPECTOMY Left     breast left- precancerous    HX BUNIONECTOMY      left eye    HX CATARACT REMOVAL      bilateral    HX CHOLECYSTECTOMY      HX ORTHOPAEDIC      lateral epicondilitis on right    HX OTHER SURGICAL  3/7/2013    catheter into aorta    HX OTHER SURGICAL      intestional resection x4    HX OTHER SURGICAL  9/2013    I & D Right chest/flank wall abscess vs granuloma    UPPER ARM/ELBOW SURGERY UNLISTED      VASCULAR SURGERY PROCEDURE UNLIST  09/10/2020    Debridement and washout of bypass graft.      Barriers to Learning/Limitations: None  Compensate with: N/A  Home Situation:  Home Situation  Home Environment: Private residence  # Steps to Enter: 2  One/Two Story Residence: Two story, live on 1st floor  # of Interior Steps: 12  Interior Rails: Left  Lift Chair Available: No  Living Alone: No  Support Systems: Spouse/Significant Other/Partner  Patient Expects to be Discharged to[de-identified] Private residence  Current DME Used/Available at Home: Walker, rolling, Cane, straight  Tub or Shower Type: Tub/Shower combination  Critical Behavior:  Neurologic State: Alert  Orientation Level: Oriented X4  Cognition: Follows commands  Safety/Judgement: Fall prevention;Good awareness of safety precautions  Psychosocial  Patient Behaviors: Calm; Cooperative    Strength:    Strength: Within functional limits    Tone & Sensation:   Tone: Normal    Sensation: Intact    Range Of Motion:  AROM: Within functional limits    Functional Mobility:  Bed Mobility:     Supine to Sit: Modified independent  Sit to Supine: Modified independent  Scooting: Modified independent  Transfers:  Sit to Stand: Modified independent  Stand to Sit: Modified independent    Balance:   Sitting: Intact  Standing: Intact; Without support       Ambulation/Gait Training:  Distance (ft): 20 Feet (ft)  Assistive Device: Other (comment)(none)  Ambulation - Level of Assistance: Supervision        Gait Abnormalities: Decreased step clearance        Base of Support: Narrowed     Speed/Amy: Slow  Step Length: Left shortened;Right shortened    Pain:  Pain level pre-treatment: 3/10 - discomfort at incision sites  Pain level post-treatment: 3/10   Pain Intervention(s) : Medication (see MAR); Rest, Repositioning  Response to intervention: Nurse notified, See doc flow    Activity Tolerance:   Pt tolerated mobility well  Please refer to the flowsheet for vital signs taken during this treatment.   After treatment:   []         Patient left in no apparent distress sitting up in chair  [x]         Patient left in no apparent distress in bed  [x]         Call bell left within reach  [x]         Nursing notified  [] Caregiver present  []         Bed alarm activated  []         SCDs applied    COMMUNICATION/EDUCATION:   [x]         Role of Physical Therapy in the acute care setting. [x]         Fall prevention education was provided and the patient/caregiver indicated understanding. [x]         Patient/family have participated as able in goal setting and plan of care. []         Patient/family agree to work toward stated goals and plan of care. []         Patient understands intent and goals of therapy, but is neutral about his/her participation. []         Patient is unable to participate in goal setting/plan of care: ongoing with therapy staff.  []         Other:     Thank you for this referral.  Andrés Adams   Time Calculation: 10 mins      Eval Complexity: History: LOW Complexity : Zero comorbidities / personal factors that will impact the outcome / POCExam:LOW Complexity : 1-2 Standardized tests and measures addressing body structure, function, activity limitation and / or participation in recreation  Presentation: LOW Complexity : Stable, uncomplicated  Clinical Decision Making:Low Complexity    Overall Complexity:LOW

## 2020-12-10 NOTE — ANESTHESIA POSTPROCEDURE EVALUATION
Procedure(s):  REVISION AXILLO-FEMORAL BYPASS/C-ARM. general    Anesthesia Post Evaluation      Multimodal analgesia: multimodal analgesia used between 6 hours prior to anesthesia start to PACU discharge  Patient location during evaluation: PACU  Patient participation: complete - patient participated  Level of consciousness: sleepy but conscious  Pain management: adequate  Airway patency: patent  Anesthetic complications: no  Cardiovascular status: acceptable  Respiratory status: acceptable  Hydration status: acceptable  Post anesthesia nausea and vomiting:  none  Final Post Anesthesia Temperature Assessment:  Normothermia (36.0-37.5 degrees C)      INITIAL Post-op Vital signs:   Vitals Value Taken Time   /46 12/9/2020  8:59 PM   Temp 36.5 °C (97.7 °F) 12/9/2020  8:02 PM   Pulse 70 12/9/2020  9:00 PM   Resp 14 12/9/2020  8:39 PM   SpO2 96 % 12/9/2020  8:47 PM   Vitals shown include unvalidated device data.

## 2020-12-10 NOTE — OP NOTES
[facility]  OPERATIVE REPORT    Name:  Mae Laurent  MR#:   [id number]  :  1950  ACCOUNT #:  [de-identified]  DATE OF SERVICE:  2020    PREOPERATIVE DIAGNOSIS:  Infected bypass graft. POSTOPERATIVE DIAGNOSIS:  Infected bypass graft. PROCEDURE PERFORMED:  Revision of new interposition bypass graft, excision of old bypass graft. SURGEON:  Clara Mckeon DO    ASSISTANT:  None. ANESTHESIA:  General.    COMPLICATIONS:  Zero. SPECIMENS REMOVED:  Infected bypass. IMPLANTS: 8 mm ringed propatent graft. ESTIMATED BLOOD LOSS:  Minimal.    CONDITION OF THE PATIENT:  Stable. DETAILS OF THE PROCEDURE:  The patient is a 77-year-old with complex healing issues, autoimmune disorder with infected bypass graft. She was brought to the OR after she had some bleeding from the wound. She had general anesthesia. Her right flank and abdominal wall were prepped and draped in the usual standard fashion followed by [facility] compliant guidelines for aseptic technique. I had excluded it with Dulcie Calkin in the central portion of the wound. I did a high and low cutdown in clean areas, composed a clean, well-incorporated graft, and made a tunnel between the two wide at the areas of infection and heparinized her. Once this was completed, I clamped the graft distal and proximal and transected the graft at cleaned sites. I sewed the interposition graft end-to-end at each end using CV-6 [place]-Rashid suture with good effect. I burped the graft to remove air from the system. I then opened up the clamps after completing the anastomosis. There was good flow to the graft and there was no bleeding. I irrigated the sites with antibiotic irrigation and closed with interrupted 3-0 Vicryl and closed the skin with 3-0 Ethilon. I applied the sterile dressings here and then I dressed the infected area. I did an elliptical incision around here and found to have infected bypass graft.   I pulled it out completely. I irrigated and placed a Betadine wet-to-dry dressing and she was transferred to Recovery in stable condition.       Lucas Ingram DO      [id number]  D:  12/09/2020 20:23  T:  12/10/2020 5:23  JOB #:  [id number]

## 2020-12-10 NOTE — PROGRESS NOTES
Sitting up in bed feels well, tolerating diet  Vital signs are stable  Surgical sites are stable no bleeding  We will change open wound daily  Discussed with ID regarding antibiotic plan  Anticipate possible discharge tomorrow

## 2020-12-10 NOTE — PROGRESS NOTES
Problem: Self Care Deficits Care Plan (Adult)  Goal: *Acute Goals and Plan of Care (Insert Text)  Outcome: Resolved/Met       OCCUPATIONAL THERAPY EVALUATION/DISCHARGE    Patient: Dereck Sever (14 y.o. female)  Date: 12/10/2020  Primary Diagnosis: UTI (urinary tract infection) [N39.0]  Cellulitis of abdominal wall [L03.311]  Procedure(s) (LRB):  REVISION AXILLO-FEMORAL BYPASS/C-ARM (Right) 1 Day Post-Op   Precautions:  (standard)  PLOF: Patient was independent with self-care and functional mobility PTA. ASSESSMENT AND RECOMMENDATIONS:  Based on the objective data described below, the patient presents with no deficits that impede pt function with ADLs, functional transfers, and functional mobility. At this time patient is safe to d/c home with family support. OT to d/c from caseload at this time. Skilled occupational therapy is not indicated at this time. Discharge Recommendations: None  Further Equipment Recommendations for Discharge: N/A      SUBJECTIVE:   Patient stated I love being out in my yard    OBJECTIVE DATA SUMMARY:     Past Medical History:   Diagnosis Date    Arthritis     CAP (community acquired pneumonia) 06/27/2017    Chronic lung disease     Claudication ([address]     left leg    Crohn's disease ([address]     Crohn's disease ([address]     GERD (gastroesophageal reflux disease)     HTN (hypertension)     Ill-defined condition     Uses O2 at night.     Nausea & vomiting     Other and unspecified symptoms and signs involving general sensations and perceptions     PVD    Other ill-defined conditions(799.89)     Crohn's    Peripheral neuropathy     Pulmonary emphysema (HCC)     PVD (peripheral vascular disease) ([address]     right leg    Sepsis ([address] 06/27/2017    Vitamin B12 deficiency      Past Surgical History:   Procedure Laterality Date    BYPASS GRAFT OTHR,AXILL-FEM Right 4/19/2013    BYPASS GRAFT OTHR,FEM-POP Left 5/2013    COLONOSCOPY N/A 9/11/2019    DIAGNOSTIC COLONOSCOPY performed by Orlando Gongora MD at [facility] ENDOSCOPY    FOOT/TOES SURGERY PROC UNLISTED      HX APPENDECTOMY      HX BREAST LUMPECTOMY Left     breast left- precancerous    HX BUNIONECTOMY      left eye    HX CATARACT REMOVAL      bilateral    HX CHOLECYSTECTOMY      HX ORTHOPAEDIC      lateral epicondilitis on right    HX OTHER SURGICAL  3/7/2013    catheter into aorta    HX OTHER SURGICAL      intestional resection x4    HX OTHER SURGICAL  9/2013    I & D Right chest/flank wall abscess vs granuloma    UPPER ARM/ELBOW SURGERY UNLISTED      VASCULAR SURGERY PROCEDURE UNLIST  09/10/2020    Debridement and washout of bypass graft. Barriers to Learning/Limitations: None  Compensate with: visual, verbal, tactile, kinesthetic cues/model    Home Situation:   Home Situation  Home Environment: Private residence  # Steps to Enter: 2  One/Two Story Residence: Two story, live on 1st floor  # of Interior Steps: 12  Interior Rails: Left  Lift Chair Available: No  Living Alone: No  Support Systems: Spouse/Significant Other/Partner  Patient Expects to be Discharged to[de-identified] Private residence  Current DME Used/Available at Home: Lysbeth Lemmings, rolling, Cane, straight  Tub or Shower Type: Tub/Shower combination  [x]     Right hand dominant   []     Left hand dominant    Cognitive/Behavioral Status:  Neurologic State: Alert  Orientation Level: Oriented X4  Cognition: Follows commands  Safety/Judgement: Fall prevention;Good awareness of safety precautions    Skin: Intact  Edema None noted    Vision/Perceptual:                           Acuity: Within Defined Limits         Coordination: BUE     Fine Motor Skills-Upper: Left Intact; Right Intact    Gross Motor Skills-Upper: Left Intact; Right Intact    Balance:  Sitting: Intact  Standing: Intact; Without support    Strength: BUE    Strength:  Within functional limits              Tone & Sensation: BUE    Tone: Normal  Sensation: Intact                    Range of Motion: BUE    AROM: Within functional limits Functional Mobility and Transfers for ADLs:  Bed Mobility:     Supine to Sit: Modified independent  Sit to Supine: Modified independent  Scooting: Modified independent  Transfers:  Sit to Stand: Modified independent  Stand to Sit: Modified independent                                 ADL Assessment:  Feeding: Independent    Oral Facial Hygiene/Grooming: Independent    Bathing: Independent    Upper Body Dressing: Independent    Lower Body Dressing: Independent    Toileting: Independent                ADL Intervention:                      Upper Body Dressing Assistance  Dressing Assistance: Independent  Shirt simulation with hospital gown: Independent    Lower Body Dressing Assistance  Dressing Assistance: Independent  Socks: Independent  Leg Crossed Method Used: Yes  Position Performed: Seated edge of bed         Cognitive Retraining  Safety/Judgement: Fall prevention;Good awareness of safety precautions      Pain:  Pain level pre-treatment: 0/10   Pain level post-treatment: 0/10   Pain Intervention(s): Medication (see MAR); Response to intervention: Nurse notified, See doc flow    Activity Tolerance:   Good    Please refer to the flowsheet for vital signs taken during this treatment. After treatment:   []  Patient left in no apparent distress sitting up in chair  [x]  Patient left in no apparent distress in bed  [x]  Call bell left within reach  [x]  Nursing notified  []  Caregiver present  []  Bed alarm activated    COMMUNICATION/EDUCATION:   [x]      Role of Occupational Therapy in the acute care setting  [x]      Home safety education was provided and the patient/caregiver indicated understanding. [x]      Patient/family have participated as able and agree with findings and recommendations. []      Patient is unable to participate in plan of care at this time.     Thank you for this referral.  Bianca Singleton OTR/L  Time Calculation: 23 mins      Eval Complexity: History: LOW Complexity : Brief history review ; Examination: LOW Complexity : 1-3 performance deficits relating to physical, cognitive , or psychosocial skils that result in activity limitations and / or participation restrictions ;    Decision Making:LOW Complexity : No comorbidities that affect functional and no verbal or physical assistance needed to complete eval tasks

## 2020-12-10 NOTE — PROGRESS NOTES
Problem: Falls - Risk of  Goal: *Absence of Falls  Description: Document Pao Nicole Fall Risk and appropriate interventions in the flowsheet. Outcome: Progressing Towards Goal  Note: Fall Risk Interventions:  Mobility Interventions: Patient to call before getting OOB    Mentation Interventions: Adequate sleep, hydration, pain control, Increase mobility, Update white board    Medication Interventions: Patient to call before getting OOB, Teach patient to arise slowly    Elimination Interventions: Call light in reach, Toilet paper/wipes in reach    History of Falls Interventions: Door open when patient unattended, Vital signs minimum Q4HRs X 24 hrs (comment for end date)         Problem: Patient Education: Go to Patient Education Activity  Goal: Patient/Family Education  Outcome: Progressing Towards Goal     Problem: Cellulitis Care Plan (Adult)  Goal: *Control of acute pain  Outcome: Progressing Towards Goal  Goal: *Skin integrity maintained  Outcome: Progressing Towards Goal  Goal: *Absence of infection signs and symptoms  Outcome: Progressing Towards Goal     Problem: Patient Education: Go to Patient Education Activity  Goal: Patient/Family Education  Outcome: Progressing Towards Goal     Problem: Pain  Goal: *Control of Pain  Outcome: Progressing Towards Goal     Problem: Patient Education: Go to Patient Education Activity  Goal: Patient/Family Education  Outcome: Progressing Towards Goal     Problem: Airway Clearance - Ineffective  Goal: Achieve or maintain patent airway  Outcome: Progressing Towards Goal     Problem: Gas Exchange - Impaired  Goal: Absence of hypoxia  Outcome: Progressing Towards Goal  Goal: Promote optimal lung function  Outcome: Progressing Towards Goal     Problem: Breathing Pattern - Ineffective  Goal: Ability to achieve and maintain a regular respiratory rate  Outcome: Progressing Towards Goal     Problem:  Body Temperature -  Risk of, Imbalanced  Goal: Ability to maintain a body temperature within defined limits  Outcome: Progressing Towards Goal  Goal: Will regain or maintain usual level of consciousness  Outcome: Progressing Towards Goal  Goal: Complications related to the disease process, condition or treatment will be avoided or minimized  Outcome: Progressing Towards Goal     Problem: Isolation Precautions - Risk of Spread of Infection  Goal: Prevent transmission of infectious organism to others  Outcome: Progressing Towards Goal     Problem: Nutrition Deficits  Goal: Optimize nutrtional status  Outcome: Progressing Towards Goal     Problem: Risk for Fluid Volume Deficit  Goal: Maintain normal heart rhythm  Outcome: Progressing Towards Goal  Goal: Maintain absence of muscle cramping  Outcome: Progressing Towards Goal  Goal: Maintain normal serum potassium, sodium, calcium, phosphorus, and pH  Outcome: Progressing Towards Goal     Problem: Loneliness or Risk for Loneliness  Goal: Demonstrate positive use of time alone when socialization is not possible  Outcome: Progressing Towards Goal     Problem: Fatigue  Goal: Verbalize increase energy and improved vitality  Outcome: Progressing Towards Goal     Problem: Patient Education: Go to Patient Education Activity  Goal: Patient/Family Education  Outcome: Progressing Towards Goal     Problem: Nutrition Deficit  Goal: *Optimize nutritional status  Outcome: Progressing Towards Goal     Problem: Patient Education: Go to Patient Education Activity  Goal: Patient/Family Education  Outcome: Progressing Towards Goal     Problem: Patient Education: Go to Patient Education Activity  Goal: Patient/Family Education  Outcome: Progressing Towards Goal

## 2020-12-10 NOTE — HOME CARE
Referral for [facility] noted for SN - IV antibiotics. Demographics verified with patient's spouse Chad King explained to patient's spouse and he agrees to be caregiver for the patient. Clinicals faxed to [facility] for cost analysis. PICC report pending. Will continue to follow. [facility] with Letty Dickinson at [facility]. Patient is covered at 100% for IV abx. Patient's spouse informed. [facility] will continue to follow. PICC report and most recent BMP faxed to [facility] as requested. Referral for [facility] noted and arranged. Central office notified of possible discharge on 12/11/2020.     Obey Johnson LPN   [facility] Liaison  [phone]

## 2020-12-10 NOTE — PROGRESS NOTES
PT eval order received and chart reviewed. Unable to see patient at this time as patient eat breakfast. Will follow up as patient schedule allows. Thank you for this referral. Alisha Lawrence, PT, DPT.

## 2020-12-10 NOTE — PROGRESS NOTES
1111: Pt off unit to angio for PICC line insertion. 1221: Pt back on unit. VSS. Tele back on. Pt alert, no complaints of pain. Site clean and dry. 1230: Mercer removed. Pt able to void - 50 ml.

## 2020-12-10 NOTE — PROCEDURES
INTERVENTIONAL RADIOLOGY POST PICC LINE NOTE     December 10, 2020       11:42 AM     Preoperative Diagnosis:   IV Access    Postoperative Diagnosis:  Same. :  Simeon Maravilla PA-C    Assistant:  None. Attending Physician: Dr. Marie Harp    Type of Anesthesia:  1% Lidocaine local    Procedure/Description: right basilic  upper extremity PICC Line. Findings:  right basilic 5 [place] catheter placed. Tip at SVC/RA junction. OK to use. Length 35 cm. Estimated blood Loss: Minimal    Specimen Removed: None    Drains: None     Complications:  None. Condition:  Stable.     Discharge Plan:  continue present therapy

## 2020-12-11 ENCOUNTER — APPOINTMENT (OUTPATIENT)
Dept: BONE DENSITY | Age: 70
DRG: 252 | End: 2020-12-11
Attending: NURSE PRACTITIONER
Payer: MEDICARE

## 2020-12-11 VITALS
HEART RATE: 81 BPM | SYSTOLIC BLOOD PRESSURE: 147 MMHG | DIASTOLIC BLOOD PRESSURE: 68 MMHG | WEIGHT: 139 LBS | OXYGEN SATURATION: 95 % | BODY MASS INDEX: 23.16 KG/M2 | RESPIRATION RATE: 18 BRPM | TEMPERATURE: 98.4 F | HEIGHT: 65 IN

## 2020-12-11 PROCEDURE — 94640 AIRWAY INHALATION TREATMENT: CPT

## 2020-12-11 PROCEDURE — 99238 HOSP IP/OBS DSCHRG MGMT 30/<: CPT | Performed by: PHYSICIAN ASSISTANT

## 2020-12-11 PROCEDURE — 74011250637 HC RX REV CODE- 250/637: Performed by: HOSPITALIST

## 2020-12-11 PROCEDURE — 74011250637 HC RX REV CODE- 250/637: Performed by: INTERNAL MEDICINE

## 2020-12-11 PROCEDURE — 74011250636 HC RX REV CODE- 250/636: Performed by: INTERNAL MEDICINE

## 2020-12-11 PROCEDURE — 74011000258 HC RX REV CODE- 258: Performed by: INTERNAL MEDICINE

## 2020-12-11 PROCEDURE — 74011000250 HC RX REV CODE- 250: Performed by: HOSPITALIST

## 2020-12-11 PROCEDURE — 94761 N-INVAS EAR/PLS OXIMETRY MLT: CPT

## 2020-12-11 RX ORDER — CIPROFLOXACIN 500 MG/1
500 TABLET ORAL EVERY 12 HOURS
Qty: 8 TAB | Refills: 0 | Status: SHIPPED | OUTPATIENT
Start: 2020-12-11

## 2020-12-11 RX ORDER — VANCOMYCIN HYDROCHLORIDE
1250 ONCE
Status: COMPLETED | OUTPATIENT
Start: 2020-12-11 | End: 2020-12-11

## 2020-12-11 RX ADMIN — Medication 10 ML: at 05:48

## 2020-12-11 RX ADMIN — ASPIRIN 81 MG CHEWABLE TABLET 81 MG: 81 TABLET CHEWABLE at 09:23

## 2020-12-11 RX ADMIN — VANCOMYCIN HYDROCHLORIDE 1250 MG: 10 INJECTION, POWDER, LYOPHILIZED, FOR SOLUTION INTRAVENOUS at 12:49

## 2020-12-11 RX ADMIN — BUDESONIDE 250 MCG: 0.25 INHALANT RESPIRATORY (INHALATION) at 10:10

## 2020-12-11 RX ADMIN — PIPERACILLIN AND TAZOBACTAM 3.38 G: 3; .375 INJECTION, POWDER, LYOPHILIZED, FOR SOLUTION INTRAVENOUS at 01:08

## 2020-12-11 RX ADMIN — PREGABALIN 100 MG: 50 CAPSULE ORAL at 09:23

## 2020-12-11 RX ADMIN — PIPERACILLIN AND TAZOBACTAM 3.38 G: 3; .375 INJECTION, POWDER, LYOPHILIZED, FOR SOLUTION INTRAVENOUS at 09:22

## 2020-12-11 RX ADMIN — CLOPIDOGREL BISULFATE 75 MG: 75 TABLET ORAL at 09:23

## 2020-12-11 RX ADMIN — ARFORMOTEROL TARTRATE 15 MCG: 15 SOLUTION RESPIRATORY (INHALATION) at 10:09

## 2020-12-11 RX ADMIN — Medication 10 ML: at 15:46

## 2020-12-11 RX ADMIN — LORATADINE 10 MG: 10 TABLET ORAL at 09:23

## 2020-12-11 RX ADMIN — CIPROFLOXACIN 500 MG: 500 TABLET, FILM COATED ORAL at 09:23

## 2020-12-11 RX ADMIN — IPRATROPIUM BROMIDE 0.5 MG: 0.5 SOLUTION RESPIRATORY (INHALATION) at 10:09

## 2020-12-11 NOTE — PROGRESS NOTES
0730- received bedside report received from off going nurse [id number].    1614- I have reviewed discharge instructions with the patient. The patient verbalized understanding.  Patient armband removed and shredded

## 2020-12-11 NOTE — PROGRESS NOTES
12/10/20 2329   Oxygen Therapy   O2 Sat (%) 97 %   Pulse via Oximetry 70 beats per minute   O2 Device Room air   Pre-Treatment   Breathing Pattern Regular   Breath Sounds Bilateral Clear;Diminished   Post-Treatment   Breathing Pattern Regular   Pulse 70   SpO2 97 %   Respirations 16   Treatment Tolerance Patient tolerated   Procedures   $$ Subsequent Procedure Aerosol   Delivery Source Breath Actuated Nebulizer   Aerosolized Medications Brovana;Pulmicort     No respiratory distress noted, patient is comfortable. Pt instructed to call if help is needed, call bell within reach.   Patient confirms understanding

## 2020-12-11 NOTE — DISCHARGE SUMMARY
Physician Discharge Summary     Patient ID:  Felicitadarian Ann  [id number]  45 y.o.  1950    Admit date: 12/3/2020    Discharge date: 12/11/2020      Admitting Physician: Cee Taylor MD     Discharge Physician: Cee Taylor MD     Admission Diagnoses: UTI (urinary tract infection) [N39.0]  Cellulitis of abdominal wall [L03.311]    Discharge Diagnoses: There are no discharge diagnoses documented for the most recent discharge. 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 She came in with fever and general malaise so admitted for further work up and eval. She did also have cystitis which contributed to her presentation. But she did also have increased purulence from the chronic wound. She received several days of antibiotics and improved. Plan was to proceed to OR for wash out/revision. This had to occur sooner than planned because she also developed some bleeding from wound such that she had acute blood loss anemia as well and required 2 units prbcs. No bleeding was evident during surgery but still did the revision as planned. She was stable post op with no further concerns. She is ready for d/c. ID has recommended extended iv abx so she received picc line and will go home with home health for necessary care    Consults: ID and Hospitalist    Treatments: antibiotics: vancomycin and Cipro, surgery: as above and she did receive 2 units prbcs    Discharge Exam: GEN: alert, cooperative, no distress, appears stated age  LUNG: clear to auscultation bilaterally  HEART: regular rate and rhythm, S1, S2 normal, no murmur, click, rub or gallop  ABDOMEN:  benign  Wound:  Right lateral trunk wound clean.  Packed with dakins/gauze  Incision:  Several other smaller incisions right trunk c/d/i    Disposition: home    Patient Instructions:   Current Discharge Medication List      START taking these medications    Details   ciprofloxacin HCl (CIPRO) 500 mg tablet Take 1 Tab by mouth every twelve (12) hours. Qty: 8 Tab, Refills: 0      DAPTOmycin (CUBICIN) IVPB 379 mg by IntraVENous route every twenty-four (24) hours for 43 days. Qty: 43 Dose, Refills: 0         CONTINUE these medications which have NOT CHANGED    Details   fluticasone-umeclidinium-vilanterol (Trelegy Ellipta) 100-62.5-25 mcg inhaler Take 1 Puff by inhalation daily. Qty: 3 Inhaler, Refills: 1      clopidogreL (PLAVIX) 75 mg tab TAKE ONE TABLET BY MOUTH DAILY  Qty: 30 Tab, Refills: 8      pregabalin (LYRICA) 100 mg capsule Take  by mouth two (2) times a day. Takes 100 mg in am and 200 in the pm      aspirin 81 mg tablet Take 81 mg by mouth daily. cyanocobalamin (VITAMIN B-12) 1,000 mcg/mL injection 1,000 mcg by IntraMUSCular route every fourteen (14) days. triamterene-hydrochlorothiazide (DYAZIDE) 37.5-25 mg per capsule Take 1 Cap by mouth daily. bimatoprost (LUMIGAN) 0.03 % ophthalmic drops Administer 1 Drop to both eyes every evening. atropine-PHENobarbital-scopolamine-hyoscyamine (DONNATAL) 16.2-0.1037 -0.0194 mg per tablet Take 1 Tab by mouth as needed (diarrhea). Indications: Irritable Bowel Syndrome      inFLIXimab (REMICADE) 100 mg injection 5 mg/kg by IntraVENous route once. Every 4 weeks      ipratropium-albuteroL (Combivent Respimat)  mcg/actuation inhaler Take 1 Puff by inhalation every six (6) hours as needed for Wheezing or Shortness of Breath. Qty: 1 Inhaler, Refills: 3      multivitamin (ONE A DAY) tablet Take 1 Tab by mouth daily. BIOTIN PO Take  by mouth. OTHER       loratadine (CLARITIN) 10 mg tablet Take 10 mg by mouth daily. fluticasone propionate (FLONASE ALLERGY RELIEF) 50 mcg/actuation nasal spray 2 Sprays by Both Nostrils route daily as needed for Rhinitis. DULoxetine (CYMBALTA) 60 mg capsule Take 60 mg by mouth nightly. For Anxiety. loperamide (IMMODIUM) 2 mg tablet Take 2 mg by mouth daily as needed for Diarrhea. dicyclomine (BENTYL) 10 mg capsule Take 10 mg by mouth daily. Reference discharge instructions as provided by nursing for diet, labs, medications, activity, wound care and any outpatient referrals.     Follow-up with office nurse next week for wound check then will arrange visit with dr Bridger Blackburn accordingly      Signed:  TIMMY Lyman  12/11/2020  2:33 PM

## 2020-12-11 NOTE — PROGRESS NOTES
Nutrition:    S/p revision axillo-femoral bypass 12/9. Diet resumed with overall good intake & appetite. Agreeable to Ensure Enlive, once daily. Noted plan for discharge today. Full nutrition assessment completed 12/9, please refer to note as needed. Pt progressing towards nutritional goals. Nutrition Recommendations/Plan:   - Add supplements: Ensure Enlive, once daily.  - Monitor and encourage po intake as tolerated.     Jarrett Pozo RD  Pager: [phone]

## 2020-12-11 NOTE — DISCHARGE INSTRUCTIONS

## 2020-12-11 NOTE — PROGRESS NOTES
Discharge order noted for today. Pt has been accepted to [facility] agency. CM confirmed with Yolsi that pt's IV antibiotics have been arranged. Antibiotic delivery through [address] They are aware of pt's discharge today and will begin home antibiotic regimen tomorrow. Met with patient and she is agreeable to the transition plan today. Important Message from [id number]" reviewed and explained with the patient and/or representative at bedside and signature was obtained. A signed copy provided to patient/representative. Original signed document placed in patient's chart. Transport has been arranged through her . Patient's discharge summary and home health orders have been forwarded to [facility] home health agency via [employer]. Updated bedside RN, Marcial Phillips,  to the transition plan.   Discharge information has been documented on the AVS.       [address]  [phone]

## 2020-12-11 NOTE — PROGRESS NOTES
Bedside and Verbal shift change report given to Carrillo Carreno RN (oncoming nurse) by Sheyla Garcia RN (offgoing nurse). Report included the following information SBAR, Kardex and Cardiac Rhythm NSR .

## 2020-12-11 NOTE — PROGRESS NOTES
1900 - Bedside and Verbal shift change report given to Carrillo Carreno RN (oncoming nurse) by Caleb Anne RN (offgoing nurse). Report included the following information SBAR, Kardex, OR Summary, Intake/Output, MAR, Recent Results and Cardiac Rhythm SR.     2100 - right flank wound care performed; cleansed with NS, packed with NS soaked gauze, covered with dry gauze, secured with tape; treatment tolerated well; no c/o pain or discomfort following bandage change; will continue to monitor patient. 0700 - Bedside and Verbal shift change report given to [address] JACOB Mclean RN (oncoming nurse) by Carrillo Carreno RN (offgoing nurse).  Report included the following information SBAR, Kardex, Procedure Summary, Intake/Output, MAR, Recent Results and Cardiac Rhythm SR.

## 2020-12-11 NOTE — PROGRESS NOTES
Infectious Disease progress Note        Reason: Fevers, gram-positive bacteremia    Current abx Prior abx   Pip/tazo since 12/3  Ciprofloxacin since 12/7 Vancomycin 12/3-12/4     Lines:       Assessment :    89 [address] y.o.  female  with history of Crohn's disease status post Remicade, severe peripheral artery disease status post right axillary femoral, left femoropopliteal bypass graft with multiple surgeries who presented to [facility] on 12/3/2020 with subjective sensation of not feeling well, fever. Status post revision of axillary to femoral bypass graft with excision of infected graft on 9/21/2020. Status post excision of the ulcer over the wound and debridement of the wound on 11/5/2020. Cultures revealed methicillin susceptible Staphylococcus aureus. Now with recent h/o purulent drainage right abdominal surgical site, fevers x 1 week, gram positive bacteremia. Clinical presentation consistent with sepsis (present on admission) secondary to methicillin susceptible Staphylococcus aureus bloodstream infection (positive blood culture 12/3, negative blood culture 12/6, 12/7),  vascular graft infection    CT chest/abdomen/pelvis 12/4-results reviewed. Findings consistent with vascular graft infection. Concurrent immunocompromise state secondary to Remicade is likely masking the full clinical presentation. Low clinical probability of COVID-19 infection at this time    Some pyuria noted on urinalysis 12/3-no dysuria or other signs or symptoms to suggest UTI. However moderate leukocyte esterase noted on urine analysis. Urine culture 12/3+ for Citrobacter, ampicillin susceptible Enterococcus faecalis. Immunocompromise state can interfere with the clinical presentation. Since patient scheduled for vascular graft surgery, recommend to treat for probable Citrobacter/Enterococcus cystitis.      bleeding noted from right abdominal wound 12/9/2020-status post revision axillofemoral bypass graft 12/9/2020. Intraoperative findings discussed with Dr. Nitish Gallardo. No gross purulence noted. Recommendations:    1. Continue ciprofloxacin p.o. for 4 more days. Discontinue piperacillin/tazobactam, give vancomycin x1 in anticipation of discharge  2. Recommend outpatient IV daptomycin till 1/23/2021. Patient requesting once daily antibiotic. Outpatient IV antibiotic orders for daptomycin till 1/23/2021 arranged. Discussed with   3. If surgical site not completely healed at the end of 6 weeks IV antibiotics or concerns for persistent indolent infection, recommend to use p.o. suppressive Augmentin after completion of 6 weeks IV antibiotics. Discussed with Dr. Nitish Gallardo. Further decisions will be made made based on subsequent clinical course as outpatient. Please make sure patient gets vancomycin dose prior to discharge          Above plan was discussed in details with patient, vascular surgery PA.     HPI:  Feels better. In good spirits. Princess Espino to go home  Patient denies any increasing abdominal pain. She denies any sore throat, cough, chest pain, shortness of breath, diarrhea, dysuria. home Medication List    Details   fluticasone-umeclidinium-vilanterol (Trelegy Ellipta) 100-62.5-25 mcg inhaler Take 1 Puff by inhalation daily. Qty: 3 Inhaler, Refills: 1      clopidogreL (PLAVIX) 75 mg tab TAKE ONE TABLET BY MOUTH DAILY  Qty: 30 Tab, Refills: 8      pregabalin (LYRICA) 100 mg capsule Take  by mouth two (2) times a day. Takes 100 mg in am and 200 in the pm      aspirin 81 mg tablet Take 81 mg by mouth daily. cyanocobalamin (VITAMIN B-12) 1,000 mcg/mL injection 1,000 mcg by IntraMUSCular route every fourteen (14) days. triamterene-hydrochlorothiazide (DYAZIDE) 37.5-25 mg per capsule Take 1 Cap by mouth daily. bimatoprost (LUMIGAN) 0.03 % ophthalmic drops Administer 1 Drop to both eyes every evening.       atropine-PHENobarbital-scopolamine-hyoscyamine [facility]) 16.2-0.1037 -0.0194 mg per tablet Take 1 Tab by mouth as needed (diarrhea). Indications: Irritable Bowel Syndrome      inFLIXimab (REMICADE) 100 mg injection 5 mg/kg by IntraVENous route once. Every 4 weeks      ipratropium-albuteroL (Combivent Respimat)  mcg/actuation inhaler Take 1 Puff by inhalation every six (6) hours as needed for Wheezing or Shortness of Breath. Qty: 1 Inhaler, Refills: 3      multivitamin (ONE A DAY) tablet Take 1 Tab by mouth daily. BIOTIN PO Take  by mouth. OTHER       loratadine (CLARITIN) 10 mg tablet Take 10 mg by mouth daily. fluticasone propionate (FLONASE ALLERGY RELIEF) 50 mcg/actuation nasal spray 2 Sprays by Both Nostrils route daily as needed for Rhinitis. DULoxetine (CYMBALTA) 60 mg capsule Take 60 mg by mouth nightly. For Anxiety. loperamide (IMMODIUM) 2 mg tablet Take 2 mg by mouth daily as needed for Diarrhea. dicyclomine (BENTYL) 10 mg capsule Take 10 mg by mouth daily.              Current Facility-Administered Medications   Medication Dose Route Frequency    vancomycin (VANCOCIN) 1250 mg in  ml infusion  1,250 mg IntraVENous ONCE    ciprofloxacin HCl (CIPRO) tablet 500 mg  500 mg Oral Q12H    0.9% sodium chloride infusion 250 mL  250 mL IntraVENous PRN    0.9% sodium chloride infusion 250 mL  250 mL IntraVENous PRN    sodium chloride (NS) flush 5-40 mL  5-40 mL IntraVENous Q8H    sodium chloride (NS) flush 5-40 mL  5-40 mL IntraVENous PRN    fentaNYL citrate (PF) injection 50 mcg  50 mcg IntraVENous PRN    naloxone (NARCAN) injection 0.1 mg  0.1 mg IntraVENous PRN    diphenhydrAMINE (BENADRYL) injection 12.5 mg  12.5 mg IntraVENous Multiple    HYDROmorphone (DILAUDID) syringe 0.5-1 mg  0.5-1 mg IntraVENous Q2H PRN    HYDROcodone-acetaminophen (NORCO) 5-325 mg per tablet 1 Tab  1 Tab Oral Q4H PRN    aspirin chewable tablet 81 mg  81 mg Oral DAILY    latanoprost (XALATAN) 0.005 % ophthalmic solution 1 Drop  1 Drop Both Eyes QPM    clopidogreL (PLAVIX) tablet 75 mg  75 mg Oral DAILY    fluticasone propionate (FLONASE) 50 mcg/actuation nasal spray 2 Spray  2 Spray Both Nostrils DAILY PRN    loratadine (CLARITIN) tablet 10 mg  10 mg Oral DAILY    pregabalin (LYRICA) capsule 100 mg  100 mg Oral BID    budesonide (PULMICORT) 250 mcg/2ml nebulizer susp  250 mcg Nebulization BID RT    ipratropium (ATROVENT) 0.02 % nebulizer solution 0.5 mg  0.5 mg Nebulization Q8H RT    arformoteroL (BROVANA) neb solution 15 mcg  15 mcg Nebulization BID RT    acetaminophen (TYLENOL) tablet 500 mg  500 mg Oral Q4H PRN       Allergies: Codeine; Darvon [propoxyphene]; Demerol [meperidine]; Keflex [cephalexin]; and Talwin [pentazocine lactate]    Temp (24hrs), Av °F (36.7 °C), Min:97.2 °F (36.2 °C), Max:98.7 °F (37.1 °C)    Visit Vitals  BP (!) 108/54 (BP 1 Location: Left arm, BP Patient Position: At rest)   Pulse 80   Temp 98 °F (36.7 °C)   Resp 18   Ht 5' 5\" (1.651 m)   Wt 63 kg (139 lb)   SpO2 94%   Breastfeeding Unknown   BMI 23.13 kg/m²       ROS: 12 point ROS obtained in details. Pertinent positives as mentioned in HPI,   otherwise negative    Physical Exam:    General: Well developed, well nourished female sitting on the  bed AAOx3 in no acute distress. General:   awake alert and oriented   HEENT:  Normocephalic, atraumatic, PERRL, EOMI, no scleral icterus or pallor; no conjunctival hemmohage;  nasal and oral mucous are moist and without evidence of lesions. Neck supple, no bruits. Lymph Nodes:   no cervical, axillary or inguinal adenopathy   Lungs:   non-labored, bilaterally clear to auscultation- no crackles wheezes rales or rhonchi   Heart:  RRR, s1 and s2; no  rubs or gallops, no edema   Abdomen:  soft, non-distended, active bowel sounds, no hepatomegaly, no splenomegaly. Non-tender. Right flank surgical dressing not opened   Genitourinary:  deferred   Extremities:   no clubbing, cyanosis; no joint effusions or swelling;  Full ROM of all large joints to the upper and lower extremities; muscle mass appropriate for age   Neurologic:  No gross focal sensory abnormalities; 5/5 muscle strength to upper and lower extremities. Speech appropriate. Cranial nerves intact                        Skin:  No rash or ulcers noted   Back:  no spinal or paraspinal muscle tenderness or rigidity, no CVA tenderness     Psychiatric:  No suicidal or homicidal ideations, appropriate mood and affect         Labs: Results:   Chemistry No results for input(s): GLU, NA, K, CL, CO2, BUN, CREA, CA, AGAP, BUCR, TBIL, AP, TP, ALB, GLOB, AGRAT in the last 72 hours. No lab exists for component: GPT   CBC w/Diff Recent Labs     12/10/20  0516 12/09/20  1648 12/08/20  1500   WBC 13.5*  --  11.1   RBC 3.16*  --  2.76*   HGB 8.8* 9.7* 7.6*   HCT 27.0* 29.5* 24.1*     --  496*   GRANS 79*  --   --    LYMPH 14*  --   --    EOS 0  --   --       Microbiology No results for input(s): CULT in the last 72 hours. RADIOLOGY:    All available imaging studies/reports in [facility] for this admission were reviewed    Total time spent >35 minutes. High complexity decision making was performed during the evaluation of this patient at high risk for decompensation with multiple organ involvement     Above mentioned total time spent on reviewing the case/medical record/data/notes/EMR/patient examination/documentation/coordinating care with nurse/consultants, exclusive of procedures with complex decision making performed and > 50% time spent in face to face evaluation.     Dr. Jamil Manley, Infectious Disease Specialist  [phone]  December 11, 2020  10:48 AM

## 2020-12-11 NOTE — PROGRESS NOTES
Problem: Falls - Risk of  Goal: *Absence of Falls  Description: Document Maximo Apo Fall Risk and appropriate interventions in the flowsheet.   Outcome: Progressing Towards Goal  Note: Fall Risk Interventions:  Mobility Interventions: Bed/chair exit alarm    Mentation Interventions: Bed/chair exit alarm, Door open when patient unattended    Medication Interventions: Bed/chair exit alarm    Elimination Interventions: Bed/chair exit alarm, Call light in reach    History of Falls Interventions: Door open when patient unattended         Problem: Patient Education: Go to Patient Education Activity  Goal: Patient/Family Education  Outcome: Progressing Towards Goal     Problem: Cellulitis Care Plan (Adult)  Goal: *Skin integrity maintained  Outcome: Progressing Towards Goal  Goal: *Absence of infection signs and symptoms  Outcome: Progressing Towards Goal     Problem: Patient Education: Go to Patient Education Activity  Goal: Patient/Family Education  Outcome: Progressing Towards Goal     Problem: Nutrition Deficits  Goal: Optimize nutrtional status  Outcome: Progressing Towards Goal     Problem: Nutrition Deficit  Goal: *Optimize nutritional status  Outcome: Progressing Towards Goal

## 2020-12-11 NOTE — HOME CARE
yessy noted for today. Received home health referral for [facility] for IV abx. Spoke with patient via phone, explained services and answered all questions. Demographic verified, confirmed address: [address] [place] [address]. Patient lives with spouse Dominga Higgins [phone]. Spoke with [facility] IV abx and supplies will be delivered this evening. Orders noted and arranged through central intake and scheduling.     Thomas Bryant RN  Home Health Intake/Liaison

## 2020-12-12 ENCOUNTER — HOME CARE VISIT (OUTPATIENT)
Dept: SCHEDULING | Facility: HOME HEALTH | Age: 70
End: 2020-12-12
Payer: MEDICARE

## 2020-12-12 LAB
ABO + RH BLD: NORMAL
BACTERIA SPEC CULT: NORMAL
BLD PROD TYP BPU: NORMAL
BLOOD GROUP ANTIBODIES SERPL: NORMAL
BPU ID: NORMAL
CALLED TO:,BCALL1: NORMAL
CROSSMATCH RESULT,%XM: NORMAL
SERVICE CMNT-IMP: NORMAL
SPECIMEN EXP DATE BLD: NORMAL
STATUS OF UNIT,%ST: NORMAL
UNIT DIVISION, %UDIV: 0

## 2020-12-12 PROCEDURE — 3331090001 HH PPS REVENUE CREDIT

## 2020-12-12 PROCEDURE — 3331090002 HH PPS REVENUE DEBIT

## 2020-12-12 PROCEDURE — G0299 HHS/HOSPICE OF RN EA 15 MIN: HCPCS

## 2020-12-12 PROCEDURE — 400013 HH SOC

## 2020-12-13 ENCOUNTER — HOME CARE VISIT (OUTPATIENT)
Dept: SCHEDULING | Facility: HOME HEALTH | Age: 70
End: 2020-12-13
Payer: MEDICARE

## 2020-12-13 VITALS
RESPIRATION RATE: 18 BRPM | TEMPERATURE: 97.9 F | DIASTOLIC BLOOD PRESSURE: 80 MMHG | SYSTOLIC BLOOD PRESSURE: 122 MMHG | OXYGEN SATURATION: 98 % | HEART RATE: 80 BPM

## 2020-12-13 LAB
BACTERIA SPEC CULT: NORMAL
SERVICE CMNT-IMP: NORMAL

## 2020-12-13 PROCEDURE — G0299 HHS/HOSPICE OF RN EA 15 MIN: HCPCS

## 2020-12-13 PROCEDURE — 3331090001 HH PPS REVENUE CREDIT

## 2020-12-13 PROCEDURE — 3331090002 HH PPS REVENUE DEBIT

## 2020-12-14 ENCOUNTER — TELEPHONE (OUTPATIENT)
Dept: VASCULAR SURGERY | Age: 70
End: 2020-12-14

## 2020-12-14 ENCOUNTER — HOME CARE VISIT (OUTPATIENT)
Dept: SCHEDULING | Facility: HOME HEALTH | Age: 70
End: 2020-12-14
Payer: MEDICARE

## 2020-12-14 ENCOUNTER — HOSPITAL ENCOUNTER (OUTPATIENT)
Dept: LAB | Age: 70
Discharge: HOME | End: 2020-12-14
Payer: MEDICARE

## 2020-12-14 ENCOUNTER — HOME CARE VISIT (OUTPATIENT)
Dept: HOME HEALTH SERVICES | Facility: HOME HEALTH | Age: 70
End: 2020-12-14
Payer: MEDICARE

## 2020-12-14 VITALS
DIASTOLIC BLOOD PRESSURE: 78 MMHG | HEART RATE: 80 BPM | SYSTOLIC BLOOD PRESSURE: 122 MMHG | OXYGEN SATURATION: 98 % | TEMPERATURE: 98.2 F | RESPIRATION RATE: 18 BRPM

## 2020-12-14 VITALS
HEART RATE: 90 BPM | TEMPERATURE: 98.2 F | DIASTOLIC BLOOD PRESSURE: 54 MMHG | SYSTOLIC BLOOD PRESSURE: 104 MMHG | RESPIRATION RATE: 20 BRPM | OXYGEN SATURATION: 99 %

## 2020-12-14 LAB
ALBUMIN SERPL-MCNC: 2.9 G/DL (ref 3.4–5)
ALBUMIN/GLOB SERPL: 0.6 {RATIO} (ref 0.8–1.7)
ALP SERPL-CCNC: 89 U/L (ref 45–117)
ALT SERPL-CCNC: 20 U/L (ref 13–56)
AST SERPL-CCNC: 12 U/L (ref 10–38)
BASOPHILS # BLD: 0 K/UL (ref 0–0.06)
BASOPHILS NFR BLD: 0 % (ref 0–3)
BILIRUB DIRECT SERPL-MCNC: 0.2 MG/DL (ref 0–0.2)
BILIRUB SERPL-MCNC: 0.5 MG/DL (ref 0.2–1)
BUN SERPL-MCNC: 8 MG/DL (ref 7–18)
CK SERPL-CCNC: 24 U/L (ref 26–192)
CREAT SERPL-MCNC: 0.75 MG/DL (ref 0.6–1.3)
CRP SERPL-MCNC: 4.6 MG/DL (ref 0–0.3)
DIFFERENTIAL METHOD BLD: ABNORMAL
EOSINOPHIL # BLD: 0.2 K/UL (ref 0–0.4)
EOSINOPHIL NFR BLD: 1 % (ref 0–5)
ERYTHROCYTE [DISTWIDTH] IN BLOOD BY AUTOMATED COUNT: 15.8 % (ref 11.6–14.5)
GLOBULIN SER CALC-MCNC: 4.5 G/DL (ref 2–4)
HCT VFR BLD AUTO: 29 % (ref 35–45)
HGB BLD-MCNC: 9.5 G/DL (ref 12–16)
LYMPHOCYTES # BLD: 6.7 K/UL (ref 0.8–3.5)
LYMPHOCYTES NFR BLD: 32 % (ref 20–51)
MCH RBC QN AUTO: 28.2 PG (ref 24–34)
MCHC RBC AUTO-ENTMCNC: 32.8 G/DL (ref 31–37)
MCV RBC AUTO: 86.1 FL (ref 74–97)
MONOCYTES # BLD: 1.3 K/UL (ref 0–1)
MONOCYTES NFR BLD: 6 % (ref 2–9)
NEUTS SEG # BLD: 12.8 K/UL (ref 1.8–8)
NEUTS SEG NFR BLD: 61 % (ref 42–75)
PLATELET # BLD AUTO: 602 K/UL (ref 135–420)
PLATELET COMMENTS,PCOM: ABNORMAL
PMV BLD AUTO: 9.3 FL (ref 9.2–11.8)
PROT SERPL-MCNC: 7.4 G/DL (ref 6.4–8.2)
RBC # BLD AUTO: 3.37 M/UL (ref 4.2–5.3)
RBC MORPH BLD: ABNORMAL
RBC MORPH BLD: ABNORMAL
WBC # BLD AUTO: 21 K/UL (ref 4.6–13.2)

## 2020-12-14 PROCEDURE — 80076 HEPATIC FUNCTION PANEL: CPT

## 2020-12-14 PROCEDURE — A6258 TRANSPARENT FILM >16<=48 IN: HCPCS

## 2020-12-14 PROCEDURE — 82565 ASSAY OF CREATININE: CPT

## 2020-12-14 PROCEDURE — 85025 COMPLETE CBC W/AUTO DIFF WBC: CPT

## 2020-12-14 PROCEDURE — G0299 HHS/HOSPICE OF RN EA 15 MIN: HCPCS

## 2020-12-14 PROCEDURE — 3331090001 HH PPS REVENUE CREDIT

## 2020-12-14 PROCEDURE — 82550 ASSAY OF CK (CPK): CPT

## 2020-12-14 PROCEDURE — 3331090002 HH PPS REVENUE DEBIT

## 2020-12-14 PROCEDURE — 84520 ASSAY OF UREA NITROGEN: CPT

## 2020-12-14 PROCEDURE — 86140 C-REACTIVE PROTEIN: CPT

## 2020-12-14 NOTE — TELEPHONE ENCOUNTER
Monica from [address] [place] [address] called requesting for orders for wound care for the surgical site on her right abdomen. Please call [phone].

## 2020-12-14 NOTE — TELEPHONE ENCOUNTER
Returned phone call and notified home health nurse to continue with wet to dry dressings to abdominal wound.

## 2020-12-15 PROCEDURE — 3331090002 HH PPS REVENUE DEBIT

## 2020-12-15 PROCEDURE — 3331090001 HH PPS REVENUE CREDIT

## 2020-12-16 PROCEDURE — 3331090002 HH PPS REVENUE DEBIT

## 2020-12-16 PROCEDURE — 3331090001 HH PPS REVENUE CREDIT

## 2020-12-17 ENCOUNTER — HOME CARE VISIT (OUTPATIENT)
Dept: SCHEDULING | Facility: HOME HEALTH | Age: 70
End: 2020-12-17
Payer: MEDICARE

## 2020-12-17 PROCEDURE — 3331090001 HH PPS REVENUE CREDIT

## 2020-12-17 PROCEDURE — G0299 HHS/HOSPICE OF RN EA 15 MIN: HCPCS

## 2020-12-17 PROCEDURE — 3331090002 HH PPS REVENUE DEBIT

## 2020-12-18 PROCEDURE — 3331090001 HH PPS REVENUE CREDIT

## 2020-12-18 PROCEDURE — 3331090002 HH PPS REVENUE DEBIT

## 2020-12-19 PROCEDURE — 3331090001 HH PPS REVENUE CREDIT

## 2020-12-19 PROCEDURE — 3331090002 HH PPS REVENUE DEBIT

## 2020-12-20 VITALS
TEMPERATURE: 97.9 F | DIASTOLIC BLOOD PRESSURE: 70 MMHG | HEART RATE: 78 BPM | SYSTOLIC BLOOD PRESSURE: 122 MMHG | RESPIRATION RATE: 18 BRPM | OXYGEN SATURATION: 98 %

## 2020-12-20 PROCEDURE — A6402 STERILE GAUZE <= 16 SQ IN: HCPCS

## 2020-12-20 PROCEDURE — A6216 NON-STERILE GAUZE<=16 SQ IN: HCPCS

## 2020-12-20 PROCEDURE — A6258 TRANSPARENT FILM >16<=48 IN: HCPCS

## 2020-12-20 PROCEDURE — 3331090001 HH PPS REVENUE CREDIT

## 2020-12-20 PROCEDURE — 3331090002 HH PPS REVENUE DEBIT

## 2020-12-20 PROCEDURE — A4452 WATERPROOF TAPE: HCPCS

## 2020-12-21 ENCOUNTER — HOME CARE VISIT (OUTPATIENT)
Dept: SCHEDULING | Facility: HOME HEALTH | Age: 70
End: 2020-12-21
Payer: MEDICARE

## 2020-12-21 ENCOUNTER — HOSPITAL ENCOUNTER (OUTPATIENT)
Dept: LAB | Age: 70
Discharge: HOME | End: 2020-12-21
Payer: MEDICARE

## 2020-12-21 LAB
ALBUMIN SERPL-MCNC: 2.9 G/DL (ref 3.4–5)
ALBUMIN/GLOB SERPL: 0.6 {RATIO} (ref 0.8–1.7)
ALP SERPL-CCNC: 92 U/L (ref 45–117)
ALT SERPL-CCNC: 19 U/L (ref 13–56)
AST SERPL-CCNC: 14 U/L (ref 10–38)
BASOPHILS # BLD: 0 K/UL (ref 0–0.1)
BASOPHILS NFR BLD: 0 % (ref 0–2)
BILIRUB DIRECT SERPL-MCNC: <0.1 MG/DL (ref 0–0.2)
BILIRUB SERPL-MCNC: 0.3 MG/DL (ref 0.2–1)
BUN SERPL-MCNC: 15 MG/DL (ref 7–18)
CK SERPL-CCNC: 18 U/L (ref 26–192)
CREAT SERPL-MCNC: 0.8 MG/DL (ref 0.6–1.3)
CRP SERPL-MCNC: 0.5 MG/DL (ref 0–0.3)
DIFFERENTIAL METHOD BLD: ABNORMAL
EOSINOPHIL # BLD: 0.4 K/UL (ref 0–0.4)
EOSINOPHIL NFR BLD: 3 % (ref 0–5)
ERYTHROCYTE [DISTWIDTH] IN BLOOD BY AUTOMATED COUNT: 15.8 % (ref 11.6–14.5)
GLOBULIN SER CALC-MCNC: 5 G/DL (ref 2–4)
HCT VFR BLD AUTO: 30.8 % (ref 35–45)
HGB BLD-MCNC: 9.8 G/DL (ref 12–16)
LYMPHOCYTES # BLD: 4 K/UL (ref 0.9–3.6)
LYMPHOCYTES NFR BLD: 31 % (ref 21–52)
MCH RBC QN AUTO: 27.8 PG (ref 24–34)
MCHC RBC AUTO-ENTMCNC: 31.8 G/DL (ref 31–37)
MCV RBC AUTO: 87.3 FL (ref 74–97)
MONOCYTES # BLD: 0.7 K/UL (ref 0.05–1.2)
MONOCYTES NFR BLD: 6 % (ref 3–10)
NEUTS SEG # BLD: 7.6 K/UL (ref 1.8–8)
NEUTS SEG NFR BLD: 60 % (ref 40–73)
PLATELET # BLD AUTO: 633 K/UL (ref 135–420)
PMV BLD AUTO: 9.7 FL (ref 9.2–11.8)
PROT SERPL-MCNC: 7.9 G/DL (ref 6.4–8.2)
RBC # BLD AUTO: 3.53 M/UL (ref 4.2–5.3)
WBC # BLD AUTO: 12.7 K/UL (ref 4.6–13.2)

## 2020-12-21 PROCEDURE — 3331090001 HH PPS REVENUE CREDIT

## 2020-12-21 PROCEDURE — 82565 ASSAY OF CREATININE: CPT

## 2020-12-21 PROCEDURE — 85025 COMPLETE CBC W/AUTO DIFF WBC: CPT

## 2020-12-21 PROCEDURE — 3331090002 HH PPS REVENUE DEBIT

## 2020-12-21 PROCEDURE — 84520 ASSAY OF UREA NITROGEN: CPT

## 2020-12-21 PROCEDURE — 86140 C-REACTIVE PROTEIN: CPT

## 2020-12-21 PROCEDURE — G0299 HHS/HOSPICE OF RN EA 15 MIN: HCPCS

## 2020-12-21 PROCEDURE — 80076 HEPATIC FUNCTION PANEL: CPT

## 2020-12-21 PROCEDURE — 82550 ASSAY OF CK (CPK): CPT

## 2020-12-22 ENCOUNTER — OFFICE VISIT (OUTPATIENT)
Dept: VASCULAR SURGERY | Age: 70
End: 2020-12-22
Payer: MEDICARE

## 2020-12-22 DIAGNOSIS — T14.8XXA OPEN WOUND: Primary | ICD-10-CM

## 2020-12-22 PROCEDURE — 3331090002 HH PPS REVENUE DEBIT

## 2020-12-22 PROCEDURE — A6199 ALGINATE DRSG WOUND FILLER: HCPCS

## 2020-12-22 PROCEDURE — A4452 WATERPROOF TAPE: HCPCS

## 2020-12-22 PROCEDURE — 3331090001 HH PPS REVENUE CREDIT

## 2020-12-22 PROCEDURE — 99024 POSTOP FOLLOW-UP VISIT: CPT | Performed by: PHYSICIAN ASSISTANT

## 2020-12-22 NOTE — PROGRESS NOTES
Patient being seen for wound assessment of right side of abdomen. Patient has sutures intact to proximal and distal incision of abdomen which is dry and intact and sutures still in place. Mid abdomen wound is granulating with small amount ss drainage, surrounding tissue WNL. Had TIMMY Silvestre assess and decided to switch from NS wet to dry daily dressing to aquacel ag every other day. Will send new wound care orders to [address] [place] [address]. Patient will return next week for her next appt. Wound measures: 1.0x3.7x0.5cm. Sutures will be removed at that visit.

## 2020-12-23 VITALS
DIASTOLIC BLOOD PRESSURE: 80 MMHG | TEMPERATURE: 97.9 F | SYSTOLIC BLOOD PRESSURE: 122 MMHG | HEART RATE: 80 BPM | OXYGEN SATURATION: 98 % | RESPIRATION RATE: 18 BRPM

## 2020-12-23 PROCEDURE — 3331090001 HH PPS REVENUE CREDIT

## 2020-12-23 PROCEDURE — 3331090002 HH PPS REVENUE DEBIT

## 2020-12-24 ENCOUNTER — HOME CARE VISIT (OUTPATIENT)
Dept: SCHEDULING | Facility: HOME HEALTH | Age: 70
End: 2020-12-24
Payer: MEDICARE

## 2020-12-24 PROCEDURE — 3331090002 HH PPS REVENUE DEBIT

## 2020-12-24 PROCEDURE — G0299 HHS/HOSPICE OF RN EA 15 MIN: HCPCS

## 2020-12-24 PROCEDURE — 3331090001 HH PPS REVENUE CREDIT

## 2020-12-25 PROCEDURE — 3331090002 HH PPS REVENUE DEBIT

## 2020-12-25 PROCEDURE — 3331090001 HH PPS REVENUE CREDIT

## 2020-12-26 VITALS
TEMPERATURE: 97 F | DIASTOLIC BLOOD PRESSURE: 76 MMHG | SYSTOLIC BLOOD PRESSURE: 124 MMHG | HEART RATE: 80 BPM | RESPIRATION RATE: 18 BRPM | OXYGEN SATURATION: 97 %

## 2020-12-26 PROCEDURE — 3331090001 HH PPS REVENUE CREDIT

## 2020-12-26 PROCEDURE — 3331090002 HH PPS REVENUE DEBIT

## 2020-12-27 PROCEDURE — 3331090002 HH PPS REVENUE DEBIT

## 2020-12-27 PROCEDURE — 3331090001 HH PPS REVENUE CREDIT

## 2020-12-28 ENCOUNTER — HOSPITAL ENCOUNTER (OUTPATIENT)
Dept: LAB | Age: 70
Discharge: HOME | End: 2020-12-28
Payer: MEDICARE

## 2020-12-28 ENCOUNTER — HOME CARE VISIT (OUTPATIENT)
Dept: SCHEDULING | Facility: HOME HEALTH | Age: 70
End: 2020-12-28
Payer: MEDICARE

## 2020-12-28 VITALS
TEMPERATURE: 97.7 F | OXYGEN SATURATION: 99 % | HEART RATE: 80 BPM | DIASTOLIC BLOOD PRESSURE: 78 MMHG | RESPIRATION RATE: 18 BRPM | SYSTOLIC BLOOD PRESSURE: 128 MMHG

## 2020-12-28 LAB
ALBUMIN SERPL-MCNC: 3.2 G/DL (ref 3.4–5)
ALBUMIN/GLOB SERPL: 0.7 {RATIO} (ref 0.8–1.7)
ALP SERPL-CCNC: 100 U/L (ref 45–117)
ALT SERPL-CCNC: 26 U/L (ref 13–56)
AST SERPL-CCNC: 18 U/L (ref 10–38)
BASOPHILS # BLD: 0.1 K/UL (ref 0–0.06)
BASOPHILS NFR BLD: 1 % (ref 0–3)
BILIRUB DIRECT SERPL-MCNC: 0.1 MG/DL (ref 0–0.2)
BILIRUB SERPL-MCNC: 0.3 MG/DL (ref 0.2–1)
BUN SERPL-MCNC: 11 MG/DL (ref 7–18)
CK SERPL-CCNC: 29 U/L (ref 26–192)
CREAT SERPL-MCNC: 0.79 MG/DL (ref 0.6–1.3)
CRP SERPL-MCNC: 0.5 MG/DL (ref 0–0.3)
DIFFERENTIAL METHOD BLD: ABNORMAL
EOSINOPHIL # BLD: 0.5 K/UL (ref 0–0.4)
EOSINOPHIL NFR BLD: 4 % (ref 0–5)
ERYTHROCYTE [DISTWIDTH] IN BLOOD BY AUTOMATED COUNT: 15.9 % (ref 11.6–14.5)
GLOBULIN SER CALC-MCNC: 4.9 G/DL (ref 2–4)
HCT VFR BLD AUTO: 32.2 % (ref 35–45)
HGB BLD-MCNC: 10.1 G/DL (ref 12–16)
LYMPHOCYTES # BLD: 4.9 K/UL (ref 0.8–3.5)
LYMPHOCYTES NFR BLD: 36 % (ref 20–51)
MCH RBC QN AUTO: 27 PG (ref 24–34)
MCHC RBC AUTO-ENTMCNC: 31.4 G/DL (ref 31–37)
MCV RBC AUTO: 86.1 FL (ref 74–97)
MONOCYTES # BLD: 0.8 K/UL (ref 0–1)
MONOCYTES NFR BLD: 6 % (ref 2–9)
NEUTS SEG # BLD: 7.2 K/UL (ref 1.8–8)
NEUTS SEG NFR BLD: 53 % (ref 42–75)
PLATELET # BLD AUTO: 514 K/UL (ref 135–420)
PLATELET COMMENTS,PCOM: ABNORMAL
PMV BLD AUTO: 10 FL (ref 9.2–11.8)
PROT SERPL-MCNC: 8.1 G/DL (ref 6.4–8.2)
RBC # BLD AUTO: 3.74 M/UL (ref 4.2–5.3)
RBC MORPH BLD: ABNORMAL
WBC # BLD AUTO: 13.5 K/UL (ref 4.6–13.2)

## 2020-12-28 PROCEDURE — 3331090001 HH PPS REVENUE CREDIT

## 2020-12-28 PROCEDURE — 80076 HEPATIC FUNCTION PANEL: CPT

## 2020-12-28 PROCEDURE — 82550 ASSAY OF CK (CPK): CPT

## 2020-12-28 PROCEDURE — 3331090002 HH PPS REVENUE DEBIT

## 2020-12-28 PROCEDURE — 86140 C-REACTIVE PROTEIN: CPT

## 2020-12-28 PROCEDURE — G0299 HHS/HOSPICE OF RN EA 15 MIN: HCPCS

## 2020-12-28 PROCEDURE — 82565 ASSAY OF CREATININE: CPT

## 2020-12-28 PROCEDURE — 85025 COMPLETE CBC W/AUTO DIFF WBC: CPT

## 2020-12-28 PROCEDURE — 84520 ASSAY OF UREA NITROGEN: CPT

## 2020-12-29 ENCOUNTER — OFFICE VISIT (OUTPATIENT)
Dept: VASCULAR SURGERY | Age: 70
End: 2020-12-29
Payer: MEDICARE

## 2020-12-29 VITALS — BODY MASS INDEX: 23.16 KG/M2 | WEIGHT: 139 LBS | HEIGHT: 65 IN

## 2020-12-29 DIAGNOSIS — L08.9 CHRONIC ABDOMINAL WOUND INFECTION, SUBSEQUENT ENCOUNTER: ICD-10-CM

## 2020-12-29 DIAGNOSIS — T14.8XXA OPEN WOUND: Primary | ICD-10-CM

## 2020-12-29 DIAGNOSIS — S31.109D CHRONIC ABDOMINAL WOUND INFECTION, SUBSEQUENT ENCOUNTER: ICD-10-CM

## 2020-12-29 DIAGNOSIS — I73.9 PVD (PERIPHERAL VASCULAR DISEASE) (HCC): ICD-10-CM

## 2020-12-29 PROCEDURE — 99024 POSTOP FOLLOW-UP VISIT: CPT | Performed by: SURGERY

## 2020-12-29 PROCEDURE — 3331090001 HH PPS REVENUE CREDIT

## 2020-12-29 PROCEDURE — 3331090002 HH PPS REVENUE DEBIT

## 2020-12-29 NOTE — PROGRESS NOTES
Cleansed wound to right side of abdomen with wound cleanser and gauze, patient tolerated well. Wound measures: 2.9x0.8x0.3cm. Applied calcium alginate with silver, gauze and secured with tape. Removed sutures from proximal and distal incision without difficulty and incisions were dry and intact.

## 2020-12-29 NOTE — PROGRESS NOTES
64-year-old female following up today after hospital stay. We did a complete wide excision of graft in place and new revision of the axillofemoral graft on the right. She complains of no ischemic symptoms of the lower extremities. She had no fevers or chills. On exam today her surgical incisions with a new bypass are closed with no redness no pain no hematoma no appearance of infection. The site of the open wound is clean with no drainage no signs of infection beefy red. There is no necrotic debris. Other sites are closed. Continue with current wound care and will follow up evaluation  It appears to be much improved since last evaluation.

## 2020-12-29 NOTE — PROGRESS NOTES
1. Have you been to an emergency room or urgent care clinic since your last visit?   no  Hospitalized since your last visit? If yes, where, when, and reason for visit?   no  2. Have you seen or consulted any other health care providers outside of the [facility] since your last visit including any procedures, health maintenance items. If yes, where, when and reason for visit?    yes

## 2020-12-30 PROCEDURE — 3331090002 HH PPS REVENUE DEBIT

## 2020-12-30 PROCEDURE — 3331090001 HH PPS REVENUE CREDIT

## 2020-12-30 NOTE — ED PROVIDER NOTES
EMERGENCY DEPARTMENT HISTORY AND PHYSICAL EXAM      Date: 12/3/2020  Patient Name: Hannah Mathis    History of Presenting Illness     Chief Complaint   Patient presents with    Wound Check     blood culture, vascular patient Dr. Yaya Gaming       History (Context): Hannah Mathis is a 65 y.o. Frandy Fake with significant peripheral vascular disease, who has a complicated set of comorbid conditions as noted below including a chronic wound from a remote femoral bypass surgery, who presents with subacute onset, persistent, progressive, severe fatigue, malaise, and increased purulence from the wound. There were no other exacerbating/relieving features or other associated symptoms. On review of systems, the patient denies numbness, weakness, tingling, leg pain, fever, chills. Otherwise, all other systems reviewed and negative. PCP: Nimco Vaughn MD    Current Outpatient Medications   Medication Sig Dispense Refill    ciprofloxacin HCl (CIPRO) 500 mg tablet Take 1 Tab by mouth every twelve (12) hours. 8 Tab 0    DAPTOmycin (CUBICIN) IVPB 379 mg by IntraVENous route every twenty-four (24) hours for 43 days. 43 Dose 0    fluticasone-umeclidinium-vilanterol (Trelegy Ellipta) 100-62.5-25 mcg inhaler Take 1 Puff by inhalation daily. 3 Inhaler 1    clopidogreL (PLAVIX) 75 mg tab TAKE ONE TABLET BY MOUTH DAILY 30 Tab 8    pregabalin (LYRICA) 100 mg capsule Take  by mouth two (2) times a day. Takes 100 mg in am and 200 in the pm      aspirin 81 mg tablet Take 81 mg by mouth daily.  cyanocobalamin (VITAMIN B-12) 1,000 mcg/mL injection 1,000 mcg by IntraMUSCular route every fourteen (14) days.  triamterene-hydrochlorothiazide (DYAZIDE) 37.5-25 mg per capsule Take 1 Cap by mouth daily.  bimatoprost (LUMIGAN) 0.03 % ophthalmic drops Administer 1 Drop to both eyes every evening.       atropine-PHENobarbital-scopolamine-hyoscyamine (DONNATAL) 16.2-0.1037 -0.0194 mg per tablet Take 1 Tab by mouth as needed (diarrhea). Indications: Irritable Bowel Syndrome      inFLIXimab (REMICADE) 100 mg injection 5 mg/kg by IntraVENous route once. Every 4 weeks      colchicine 0.6 mg tablet Take 0.6 mg by mouth two (2) times a day.  ipratropium-albuteroL (COMBIVENT RESPIMAT)  mcg/actuation inhaler Take 1 Puff by inhalation every six (6) hours as needed for Shortness of Breath.  ipratropium-albuteroL (Combivent Respimat)  mcg/actuation inhaler Take 1 Puff by inhalation every six (6) hours as needed for Wheezing or Shortness of Breath. 1 Inhaler 3    multivitamin (ONE A DAY) tablet Take 1 Tab by mouth daily.  BIOTIN PO Take 1 Tab by mouth daily.  OTHER       loratadine (CLARITIN) 10 mg tablet Take 10 mg by mouth daily.  fluticasone propionate (FLONASE ALLERGY RELIEF) 50 mcg/actuation nasal spray 2 Sprays by Both Nostrils route daily as needed for Rhinitis.  DULoxetine (CYMBALTA) 60 mg capsule Take 60 mg by mouth nightly. For Anxiety.  loperamide (IMMODIUM) 2 mg tablet Take 2 mg by mouth daily as needed for Diarrhea.  dicyclomine (BENTYL) 10 mg capsule Take 10 mg by mouth daily as needed for Abdominal Cramps. Past History     Past Medical History:  Past Medical History:   Diagnosis Date    Arthritis     CAP (community acquired pneumonia) 06/27/2017    Chronic lung disease     Claudication ([address]     left leg    Crohn's disease ([address]     Crohn's disease ([address]     GERD (gastroesophageal reflux disease)     HTN (hypertension)     Ill-defined condition     Uses O2 at night.     Nausea & vomiting     Other and unspecified symptoms and signs involving general sensations and perceptions     PVD    Other ill-defined conditions(799.89)     Crohn's    Peripheral neuropathy     Pulmonary emphysema ([address]     PVD (peripheral vascular disease) ([address]     right leg    Sepsis ([address] 06/27/2017    Vitamin B12 deficiency        Past Surgical History:  Past Surgical History:   Procedure Laterality Date    BYPASS GRAFT OTHR,AXILL-FEM Right 4/19/2013    BYPASS GRAFT OTHR,FEM-POP Left 5/2013    COLONOSCOPY N/A 9/11/2019    DIAGNOSTIC COLONOSCOPY performed by Merline Gammon, MD at [facility] ENDOSCOPY    FOOT/TOES SURGERY [address] UNLISTED      HX APPENDECTOMY      HX BREAST LUMPECTOMY Left     breast left- precancerous    HX BUNIONECTOMY      left eye    HX CATARACT REMOVAL      bilateral    HX CHOLECYSTECTOMY      HX ORTHOPAEDIC      lateral epicondilitis on right    HX OTHER SURGICAL  3/7/2013    catheter into aorta    HX OTHER SURGICAL      intestional resection x4    HX OTHER SURGICAL  9/2013    I & D Right chest/flank wall abscess vs granuloma    UPPER ARM/ELBOW SURGERY UNLISTED      VASCULAR SURGERY PROCEDURE UNLIST  09/10/2020    Debridement and washout of bypass graft. Family History:  Family History   Problem Relation Age of Onset    Coronary Artery Disease Mother     Heart Attack Mother     Heart Surgery Mother         CABGx3    Elevated Lipids Mother     COPD Father     Heart Attack Brother     COPD Brother     Other Brother         PAD       Social History:  Social History     Tobacco Use    Smoking status: Current Every Day Smoker     Packs/day: 1.00     Years: 45.00     Pack years: 45.00     Types: Cigarettes    Smokeless tobacco: Never Used   Substance Use Topics    Alcohol use: No    Drug use: No       Allergies: Allergies   Allergen Reactions    Codeine Nausea and Vomiting     Other reaction(s): other/intolerance    Darvon [Propoxyphene] Itching    Demerol [Meperidine] Other (comments)     Halucinations    Keflex [Cephalexin] Diarrhea    Talwin [Pentazocine Lactate] Shortness of Breath and Nausea and Vomiting       PMH, PSH, family history, social history, allergies reviewed with the patient with significant items noted above. Review of Systems   As per HPI, otherwise reviewed and negative.      Physical Exam     Vitals: 12/11/20 0812 12/11/20 1012 12/11/20 1105 12/11/20 1520   BP: (!) 108/54  119/77 (!) 147/68   Pulse: 80  (!) 114 81   Resp: 18  18 18   Temp: 98 °F (36.7 °C)  98.7 °F (37.1 °C) 98.4 °F (36.9 °C)   SpO2: 94% 96% 100% 95%   Weight:       Height:           Gen: Well-appearing, in no acute distress   HEENT: Normocephalic, sclera anicteric  Cardiovascular: Tachycardic, regular rhythm, no murmurs, rubs, gallops. Pulses intact and equal distally. Pulmonary: No respiratory distress. No stridor. Clear lungs. ABD: Soft, nontender, nondistended. Neuro: Alert. Normal speech. Normal mentation. Psych: Normal thought content and thought processes. : No CVA tenderness  EXT: Chronic wound in the right groin. Moves all extremities well. No cyanosis or clubbing. Skin: Warm and well-perfused. Other:        Diagnostic Study Results     Labs -   No results found for this or any previous visit (from the past 12 hour(s)). Radiologic Studies -   IR PICC INSERT WO PORT OVER 5 YEARS WO IMG   Final Result   IMPRESSION:    Successful placement dual lumen PICC catheter. CT CHEST ABD PELV W CONT   Final Result   IMPRESSION:   Findings are concerning for infection at the level of the mid abdominal and   possibly distal abdominal right axillary femoral graft with surrounding soft   tissue density and intraluminal thrombus with narrowing of the opacified lumen. As clinically indicated follow-up nuclear isotope infection scan may be of   benefit. Left ovarian cyst.   Mild extrahepatic biliary dilatation likely reservoir effect. Diverticulosis coli without evidence of diverticulitis. Minimal bibasal lung dependent atelectasis. XR CHEST SNGL V   Final Result   Impression:   1. No acute infiltrate or effusion. 2.  Hyperinflated lungs suggestive COPD.          CT Results  (Last 48 hours)    None        CXR Results  (Last 48 hours)    None            Medical Decision Making   I am the first provider for this patient. I reviewed the vital signs, available nursing notes, past medical history, past surgical history, family history and social history. Vital Signs-Reviewed the patient's vital signs. EKG: Interpreted by myself. Records Reviewed: Personally, on initial evaluation    MDM:   Patient presents with chronic and worsening wound. Exam significant for same. DDX considered: Wound infection, sepsis, bacteremia  DDX thought to be less likely but also considered due to high risk condition: Vascular occlusion    Patient condition on initial evaluation: Seriously ill    Plan:   Pain control    Orders as below:  Orders Placed This Encounter    CULTURE, BLOOD    CULTURE, BLOOD    CULTURE, URINE    CULTURE, WOUND W GRAM STAIN    CULTURE, BLOOD    CULTURE, BLOOD    XR CHEST SNGL V    CT CHEST ABD PELV W CONT    IR PICC INSERT WO PORT OVER 5 YEARS WO IMG    CBC WITH AUTOMATED DIFF    METABOLIC PANEL, COMPREHENSIVE    PROTHROMBIN TIME + INR    PTT    URINALYSIS W/ RFLX MICROSCOPIC    URINE MICROSCOPIC ONLY    NOVEL CORONAVIRUS (COVID-19)    SARS-COV-2    CBC WITH AUTOMATED DIFF    METABOLIC PANEL, BASIC    CBC WITH AUTOMATED DIFF    CORTISOL    METABOLIC PANEL, BASIC    SARS-COV-2    HGB & HCT    CBC WITH AUTOMATED DIFF    POC LACTIC ACID    PACU MONITORING START CONTINUOUS STANDARD CARDIAC AND VITAL SIGNS MONITORING UPON PATIENT'S ARRIVAL.  NOTIFY PROVIDER: [address] [place] [address] NOTIFY PROVIDER: SPECIFY If BG < 70 mg/dl notify Anesthesia Provider. CONTINUOUS Routine    NURSING-MISCELLANEOUS: Initiate forced air warming  Initiate forced air warming unit for patient core temperature less than 96 degrees F, and discontinue when patients temperature reaches 97 degrees F. May discharge patient when temperature is gr. ..    IP CONSULT TO OCCUPATIONAL THERAPY    IP CONSULT TO PHYSICAL THERAPY    OXYGEN CANNULA Liters per minute: 2; Indications for O2 therapy: IMMEDIATE POST-OP PERIOD CONTINUOUS Routine    POC LACTIC ACID    POC LACTIC ACID    GLUCOSE, POC    EKG, 12 LEAD, INITIAL    RBC, ALLOCATE, 2 Units Date needed for transfusion: 12/9/2020    RBC, ALLOCATE, 2 Units Date needed for transfusion: 12/9/2020; Patient volume to be transfused (mL's): 2 units to OR for surgery    TRANSFER PATIENT    DISCHARGE PATIENT    vancomycin (VANCOCIN) 1250 mg in  ml infusion    piperacillin-tazobactam (ZOSYN) 3.375 g in 0.9% sodium chloride (MBP/ADV) 100 mL MBP    lactated ringers bolus infusion 1,000 mL    pregabalin (LYRICA) capsule 200 mg    influenza vaccine 2020-21 (6 mos+)(PF) (FLUARIX/FLULAVAL/FLUZONE QUAD) injection 0.5 mL    DISCONTD: acetaminophen (TYLENOL) tablet 500 mg    0.9% sodium chloride infusion 500 mL    DISCONTD: piperacillin-tazobactam (ZOSYN) 3.375 g in 0.9% sodium chloride (MBP/ADV) 100 mL MBP    DISCONTD: sodium hypochlorite (HALF STRENGTH DAKIN'S) 0.25% irrigation (bottle)    albumin human 25% (BUMINATE) solution 25 g    iopamidoL (ISOVUE 300) 61 % contrast injection  mL    DISCONTD: aspirin chewable tablet 81 mg    DISCONTD: latanoprost (XALATAN) 0.005 % ophthalmic solution 1 Drop    DISCONTD: clopidogreL (PLAVIX) tablet 75 mg    DISCONTD: DULoxetine (CYMBALTA) capsule 60 mg    DISCONTD: fluticasone propionate (FLONASE) 50 mcg/actuation nasal spray 2 Spray    DISCONTD: . PHARMACY TO SUBSTITUTE PER PROTOCOL (Reordered from: fluticasone-umeclidinium-vilanterol (Trelegy Ellipta) 100-62.5-25 mcg inhaler)    DISCONTD: loratadine (CLARITIN) tablet 10 mg    DISCONTD: pregabalin (LYRICA) capsule 100 mg    DISCONTD: budesonide (PULMICORT) 250 mcg/2ml nebulizer susp    DISCONTD: ipratropium (ATROVENT) 0.02 % nebulizer solution 0.5 mg    DISCONTD: arformoteroL (BROVANA) neb solution 15 mcg    potassium chloride (K-DUR, KLOR-CON) SR tablet 40 mEq    DISCONTD: lidocaine (PF) (XYLOCAINE) 10 mg/mL (1 %) injection 0.1 mL    lactated Ringers infusion  DISCONTD: sodium chloride (NS) flush 5-40 mL    DISCONTD: sodium chloride (NS) flush 5-40 mL    famotidine (PEPCID) tablet 20 mg    DISCONTD: ciprofloxacin (CIPRO) 400 mg in D5W IVPB (premix)    DISCONTD: 0.9% sodium chloride infusion 250 mL    DISCONTD: 0.9% sodium chloride infusion 250 mL    heparinized saline 2 units/mL 1,000 unit/500 mL infusion    chlorhexidine (HIBICLENS) 4 % liquid    lidocaine (PF) (XYLOCAINE) 10 mg/mL (1 %) injection    heparinized saline 2 units/mL infusion    DISCONTD: lidocaine (PF) (XYLOCAINE) 10 mg/mL (1 %) injection    DISCONTD: lactated Ringers infusion    DISCONTD: sodium chloride (NS) flush 5-40 mL    DISCONTD: sodium chloride (NS) flush 5-40 mL    DISCONTD: fentaNYL citrate (PF) injection 50 mcg    DISCONTD: HYDROmorphone (DILAUDID) injection 0.5 mg    DISCONTD: naloxone (NARCAN) injection 0.1 mg    ondansetron (ZOFRAN) injection 4 mg    DISCONTD: diphenhydrAMINE (BENADRYL) injection 12.5 mg    DISCONTD: HYDROmorphone (DILAUDID) syringe 0.5-1 mg    DISCONTD: HYDROcodone-acetaminophen (NORCO) 5-325 mg per tablet 1 Tab    ondansetron (ZOFRAN) 4 mg/2 mL injection    DISCONTD: ciprofloxacin HCl (CIPRO) tablet 500 mg    vancomycin (VANCOCIN) 1250 mg in  ml infusion    ciprofloxacin HCl (CIPRO) 500 mg tablet    DAPTOmycin (CUBICIN) IVPB    IP CONSULT TO HOSPITALIST    INITIAL PHYSICIAN ORDER: INPATIENT Telemetry; 3. Patient receiving treatment that can only be provided in an inpatient setting (further clarification in H&P documentation)        ED Course:     Initiated sepsis protocol, gave antibiotics. Contacted vascular surgery, Dr. Portia Moreno who will admit the patient. Patient being given fluids. ED Course as of Dec 29 2031   Thu Dec 03, 2020   1828 SARS-COV-2 [DT]      ED Course User Index  [DT] Anibal Garcia MD         Patient condition at time of disposition: Stable      Disposition: Admit    Diagnosis     Clinical Impression:   1.  Wound infection        Signed,  Cal Hess MD  Emergency Physician  [facility]    As a voice dictation software was utilized to dictate this note, minor word transpositions can occur. I apologize for confusing wording and typographic errors. Please feel free to contact me for clarification.

## 2020-12-31 ENCOUNTER — HOME CARE VISIT (OUTPATIENT)
Dept: SCHEDULING | Facility: HOME HEALTH | Age: 70
End: 2020-12-31
Payer: MEDICARE

## 2020-12-31 PROCEDURE — 3331090001 HH PPS REVENUE CREDIT

## 2020-12-31 PROCEDURE — G0299 HHS/HOSPICE OF RN EA 15 MIN: HCPCS

## 2020-12-31 PROCEDURE — 3331090002 HH PPS REVENUE DEBIT

## 2021-01-01 VITALS
OXYGEN SATURATION: 98 % | TEMPERATURE: 98.3 F | RESPIRATION RATE: 18 BRPM | HEART RATE: 80 BPM | SYSTOLIC BLOOD PRESSURE: 122 MMHG | DIASTOLIC BLOOD PRESSURE: 70 MMHG

## 2021-01-01 PROCEDURE — 3331090002 HH PPS REVENUE DEBIT

## 2021-01-01 PROCEDURE — 3331090001 HH PPS REVENUE CREDIT

## 2021-01-02 PROCEDURE — 3331090002 HH PPS REVENUE DEBIT

## 2021-01-02 PROCEDURE — 3331090001 HH PPS REVENUE CREDIT

## 2021-01-03 PROCEDURE — 3331090001 HH PPS REVENUE CREDIT

## 2021-01-03 PROCEDURE — 3331090002 HH PPS REVENUE DEBIT

## 2021-01-04 ENCOUNTER — HOME CARE VISIT (OUTPATIENT)
Dept: SCHEDULING | Facility: HOME HEALTH | Age: 71
End: 2021-01-04
Payer: MEDICARE

## 2021-01-04 ENCOUNTER — HOSPITAL ENCOUNTER (OUTPATIENT)
Dept: LAB | Age: 71
Discharge: HOME | End: 2021-01-04
Payer: MEDICARE

## 2021-01-04 VITALS
HEART RATE: 88 BPM | TEMPERATURE: 98.2 F | DIASTOLIC BLOOD PRESSURE: 70 MMHG | SYSTOLIC BLOOD PRESSURE: 124 MMHG | OXYGEN SATURATION: 99 % | RESPIRATION RATE: 18 BRPM

## 2021-01-04 LAB
ALBUMIN SERPL-MCNC: 3 G/DL (ref 3.4–5)
ALBUMIN/GLOB SERPL: 0.6 {RATIO} (ref 0.8–1.7)
ALP SERPL-CCNC: 101 U/L (ref 45–117)
ALT SERPL-CCNC: 21 U/L (ref 13–56)
AST SERPL-CCNC: 16 U/L (ref 10–38)
BASOPHILS # BLD: 0.1 K/UL (ref 0–0.1)
BASOPHILS NFR BLD: 0 % (ref 0–2)
BILIRUB DIRECT SERPL-MCNC: <0.1 MG/DL (ref 0–0.2)
BILIRUB SERPL-MCNC: 0.2 MG/DL (ref 0.2–1)
BUN SERPL-MCNC: 14 MG/DL (ref 7–18)
CK SERPL-CCNC: 22 U/L (ref 26–192)
CREAT SERPL-MCNC: 0.84 MG/DL (ref 0.6–1.3)
CRP SERPL-MCNC: 0.9 MG/DL (ref 0–0.3)
DIFFERENTIAL METHOD BLD: ABNORMAL
EOSINOPHIL # BLD: 1.4 K/UL (ref 0–0.4)
EOSINOPHIL NFR BLD: 11 % (ref 0–5)
ERYTHROCYTE [DISTWIDTH] IN BLOOD BY AUTOMATED COUNT: 15.8 % (ref 11.6–14.5)
GLOBULIN SER CALC-MCNC: 4.7 G/DL (ref 2–4)
HCT VFR BLD AUTO: 32.7 % (ref 35–45)
HGB BLD-MCNC: 10.3 G/DL (ref 12–16)
LYMPHOCYTES # BLD: 3.1 K/UL (ref 0.9–3.6)
LYMPHOCYTES NFR BLD: 24 % (ref 21–52)
MCH RBC QN AUTO: 27.2 PG (ref 24–34)
MCHC RBC AUTO-ENTMCNC: 31.5 G/DL (ref 31–37)
MCV RBC AUTO: 86.5 FL (ref 74–97)
MONOCYTES # BLD: 1.3 K/UL (ref 0.05–1.2)
MONOCYTES NFR BLD: 10 % (ref 3–10)
NEUTS SEG # BLD: 6.9 K/UL (ref 1.8–8)
NEUTS SEG NFR BLD: 55 % (ref 40–73)
PLATELET # BLD AUTO: 498 K/UL (ref 135–420)
PMV BLD AUTO: 10.3 FL (ref 9.2–11.8)
PROT SERPL-MCNC: 7.7 G/DL (ref 6.4–8.2)
RBC # BLD AUTO: 3.78 M/UL (ref 4.2–5.3)
WBC # BLD AUTO: 12.7 K/UL (ref 4.6–13.2)

## 2021-01-04 PROCEDURE — 84520 ASSAY OF UREA NITROGEN: CPT

## 2021-01-04 PROCEDURE — 3331090001 HH PPS REVENUE CREDIT

## 2021-01-04 PROCEDURE — G0299 HHS/HOSPICE OF RN EA 15 MIN: HCPCS

## 2021-01-04 PROCEDURE — 80076 HEPATIC FUNCTION PANEL: CPT

## 2021-01-04 PROCEDURE — 82565 ASSAY OF CREATININE: CPT

## 2021-01-04 PROCEDURE — 86140 C-REACTIVE PROTEIN: CPT

## 2021-01-04 PROCEDURE — 3331090002 HH PPS REVENUE DEBIT

## 2021-01-04 PROCEDURE — 82550 ASSAY OF CK (CPK): CPT

## 2021-01-04 PROCEDURE — 85025 COMPLETE CBC W/AUTO DIFF WBC: CPT

## 2021-01-05 PROCEDURE — 3331090001 HH PPS REVENUE CREDIT

## 2021-01-05 PROCEDURE — 3331090002 HH PPS REVENUE DEBIT

## 2021-01-06 PROCEDURE — 3331090001 HH PPS REVENUE CREDIT

## 2021-01-06 PROCEDURE — 3331090002 HH PPS REVENUE DEBIT

## 2021-01-07 PROCEDURE — 3331090002 HH PPS REVENUE DEBIT

## 2021-01-07 PROCEDURE — 3331090001 HH PPS REVENUE CREDIT

## 2021-01-08 PROCEDURE — 3331090002 HH PPS REVENUE DEBIT

## 2021-01-08 PROCEDURE — 3331090001 HH PPS REVENUE CREDIT

## 2021-01-09 PROCEDURE — 3331090001 HH PPS REVENUE CREDIT

## 2021-01-09 PROCEDURE — 3331090002 HH PPS REVENUE DEBIT

## 2021-01-10 PROCEDURE — 3331090002 HH PPS REVENUE DEBIT

## 2021-01-10 PROCEDURE — 3331090001 HH PPS REVENUE CREDIT

## 2021-01-11 ENCOUNTER — HOME CARE VISIT (OUTPATIENT)
Dept: SCHEDULING | Facility: HOME HEALTH | Age: 71
End: 2021-01-11
Payer: MEDICARE

## 2021-01-11 ENCOUNTER — HOSPITAL ENCOUNTER (OUTPATIENT)
Dept: LAB | Age: 71
Discharge: HOME | End: 2021-01-11
Payer: COMMERCIAL

## 2021-01-11 VITALS
HEART RATE: 88 BPM | DIASTOLIC BLOOD PRESSURE: 80 MMHG | RESPIRATION RATE: 18 BRPM | OXYGEN SATURATION: 99 % | TEMPERATURE: 98.4 F | SYSTOLIC BLOOD PRESSURE: 122 MMHG

## 2021-01-11 LAB
ALBUMIN SERPL-MCNC: 3.2 G/DL (ref 3.4–5)
ALBUMIN/GLOB SERPL: 0.6 {RATIO} (ref 0.8–1.7)
ALP SERPL-CCNC: 103 U/L (ref 45–117)
ALT SERPL-CCNC: 22 U/L (ref 13–56)
AST SERPL-CCNC: 14 U/L (ref 10–38)
BASOPHILS # BLD: 0 K/UL (ref 0–0.1)
BASOPHILS NFR BLD: 0 % (ref 0–2)
BILIRUB DIRECT SERPL-MCNC: <0.1 MG/DL (ref 0–0.2)
BILIRUB SERPL-MCNC: 0.6 MG/DL (ref 0.2–1)
BUN SERPL-MCNC: 13 MG/DL (ref 7–18)
CK SERPL-CCNC: 34 U/L (ref 26–192)
CREAT SERPL-MCNC: 0.77 MG/DL (ref 0.6–1.3)
CRP SERPL-MCNC: 0.4 MG/DL (ref 0–0.3)
DIFFERENTIAL METHOD BLD: ABNORMAL
EOSINOPHIL # BLD: 0.7 K/UL (ref 0–0.4)
EOSINOPHIL NFR BLD: 7 % (ref 0–5)
ERYTHROCYTE [DISTWIDTH] IN BLOOD BY AUTOMATED COUNT: 15.6 % (ref 11.6–14.5)
GLOBULIN SER CALC-MCNC: 5.1 G/DL (ref 2–4)
HCT VFR BLD AUTO: 32.2 % (ref 35–45)
HGB BLD-MCNC: 10.3 G/DL (ref 12–16)
LYMPHOCYTES # BLD: 3.3 K/UL (ref 0.9–3.6)
LYMPHOCYTES NFR BLD: 31 % (ref 21–52)
MCH RBC QN AUTO: 27.4 PG (ref 24–34)
MCHC RBC AUTO-ENTMCNC: 32 G/DL (ref 31–37)
MCV RBC AUTO: 85.6 FL (ref 74–97)
MONOCYTES # BLD: 0.9 K/UL (ref 0.05–1.2)
MONOCYTES NFR BLD: 8 % (ref 3–10)
NEUTS SEG # BLD: 5.8 K/UL (ref 1.8–8)
NEUTS SEG NFR BLD: 54 % (ref 40–73)
PLATELET # BLD AUTO: 486 K/UL (ref 135–420)
PMV BLD AUTO: 9.9 FL (ref 9.2–11.8)
PROT SERPL-MCNC: 8.3 G/DL (ref 6.4–8.2)
RBC # BLD AUTO: 3.76 M/UL (ref 4.2–5.3)
WBC # BLD AUTO: 10.6 K/UL (ref 4.6–13.2)

## 2021-01-11 PROCEDURE — G0299 HHS/HOSPICE OF RN EA 15 MIN: HCPCS

## 2021-01-11 PROCEDURE — 80076 HEPATIC FUNCTION PANEL: CPT

## 2021-01-11 PROCEDURE — 84520 ASSAY OF UREA NITROGEN: CPT

## 2021-01-11 PROCEDURE — 3331090001 HH PPS REVENUE CREDIT

## 2021-01-11 PROCEDURE — 82565 ASSAY OF CREATININE: CPT

## 2021-01-11 PROCEDURE — 85025 COMPLETE CBC W/AUTO DIFF WBC: CPT

## 2021-01-11 PROCEDURE — 82550 ASSAY OF CK (CPK): CPT

## 2021-01-11 PROCEDURE — 3331090002 HH PPS REVENUE DEBIT

## 2021-01-11 PROCEDURE — 400018 HH-NO PAY CLAIM PROCEDURE

## 2021-01-11 PROCEDURE — 400013 HH SOC

## 2021-01-11 PROCEDURE — 86140 C-REACTIVE PROTEIN: CPT

## 2021-01-12 PROCEDURE — 3331090001 HH PPS REVENUE CREDIT

## 2021-01-12 PROCEDURE — 3331090002 HH PPS REVENUE DEBIT

## 2021-01-13 PROCEDURE — 3331090002 HH PPS REVENUE DEBIT

## 2021-01-13 PROCEDURE — 3331090001 HH PPS REVENUE CREDIT

## 2021-01-14 PROCEDURE — 3331090001 HH PPS REVENUE CREDIT

## 2021-01-14 PROCEDURE — 3331090002 HH PPS REVENUE DEBIT

## 2021-01-15 PROCEDURE — 3331090002 HH PPS REVENUE DEBIT

## 2021-01-15 PROCEDURE — 3331090001 HH PPS REVENUE CREDIT

## 2021-01-16 PROCEDURE — 3331090001 HH PPS REVENUE CREDIT

## 2021-01-16 PROCEDURE — 3331090002 HH PPS REVENUE DEBIT

## 2021-01-17 PROCEDURE — 3331090001 HH PPS REVENUE CREDIT

## 2021-01-17 PROCEDURE — 3331090002 HH PPS REVENUE DEBIT

## 2021-01-18 ENCOUNTER — HOSPITAL ENCOUNTER (OUTPATIENT)
Dept: LAB | Age: 71
Discharge: HOME | End: 2021-01-18
Payer: MEDICARE

## 2021-01-18 ENCOUNTER — HOME CARE VISIT (OUTPATIENT)
Dept: SCHEDULING | Facility: HOME HEALTH | Age: 71
End: 2021-01-18
Payer: MEDICARE

## 2021-01-18 VITALS
HEART RATE: 80 BPM | TEMPERATURE: 97.9 F | RESPIRATION RATE: 18 BRPM | OXYGEN SATURATION: 98 % | DIASTOLIC BLOOD PRESSURE: 70 MMHG | SYSTOLIC BLOOD PRESSURE: 122 MMHG

## 2021-01-18 LAB
ALBUMIN SERPL-MCNC: 3.1 G/DL (ref 3.4–5)
ALBUMIN/GLOB SERPL: 0.6 {RATIO} (ref 0.8–1.7)
ALP SERPL-CCNC: 107 U/L (ref 45–117)
ALT SERPL-CCNC: 21 U/L (ref 13–56)
AST SERPL-CCNC: 11 U/L (ref 10–38)
BASOPHILS # BLD: 0 K/UL (ref 0–0.1)
BASOPHILS NFR BLD: 0 % (ref 0–2)
BILIRUB DIRECT SERPL-MCNC: <0.1 MG/DL (ref 0–0.2)
BILIRUB SERPL-MCNC: 0.2 MG/DL (ref 0.2–1)
BUN SERPL-MCNC: 14 MG/DL (ref 7–18)
CK SERPL-CCNC: 23 U/L (ref 26–192)
CREAT SERPL-MCNC: 0.8 MG/DL (ref 0.6–1.3)
CRP SERPL-MCNC: <0.3 MG/DL (ref 0–0.3)
DIFFERENTIAL METHOD BLD: ABNORMAL
EOSINOPHIL # BLD: 0.6 K/UL (ref 0–0.4)
EOSINOPHIL NFR BLD: 7 % (ref 0–5)
ERYTHROCYTE [DISTWIDTH] IN BLOOD BY AUTOMATED COUNT: 15.5 % (ref 11.6–14.5)
GLOBULIN SER CALC-MCNC: 4.8 G/DL (ref 2–4)
HCT VFR BLD AUTO: 31.5 % (ref 35–45)
HGB BLD-MCNC: 10 G/DL (ref 12–16)
LYMPHOCYTES # BLD: 3.1 K/UL (ref 0.9–3.6)
LYMPHOCYTES NFR BLD: 35 % (ref 21–52)
MCH RBC QN AUTO: 27 PG (ref 24–34)
MCHC RBC AUTO-ENTMCNC: 31.7 G/DL (ref 31–37)
MCV RBC AUTO: 84.9 FL (ref 74–97)
MONOCYTES # BLD: 0.6 K/UL (ref 0.05–1.2)
MONOCYTES NFR BLD: 7 % (ref 3–10)
NEUTS SEG # BLD: 4.6 K/UL (ref 1.8–8)
NEUTS SEG NFR BLD: 51 % (ref 40–73)
PLATELET # BLD AUTO: 535 K/UL (ref 135–420)
PMV BLD AUTO: 9.6 FL (ref 9.2–11.8)
PROT SERPL-MCNC: 7.9 G/DL (ref 6.4–8.2)
RBC # BLD AUTO: 3.71 M/UL (ref 4.2–5.3)
WBC # BLD AUTO: 9 K/UL (ref 4.6–13.2)

## 2021-01-18 PROCEDURE — 3331090002 HH PPS REVENUE DEBIT

## 2021-01-18 PROCEDURE — 3331090001 HH PPS REVENUE CREDIT

## 2021-01-18 PROCEDURE — 84520 ASSAY OF UREA NITROGEN: CPT

## 2021-01-18 PROCEDURE — 86140 C-REACTIVE PROTEIN: CPT

## 2021-01-18 PROCEDURE — 80076 HEPATIC FUNCTION PANEL: CPT

## 2021-01-18 PROCEDURE — G0299 HHS/HOSPICE OF RN EA 15 MIN: HCPCS

## 2021-01-18 PROCEDURE — 82565 ASSAY OF CREATININE: CPT

## 2021-01-18 PROCEDURE — 85025 COMPLETE CBC W/AUTO DIFF WBC: CPT

## 2021-01-18 PROCEDURE — 82550 ASSAY OF CK (CPK): CPT

## 2021-01-19 ENCOUNTER — OFFICE VISIT (OUTPATIENT)
Dept: VASCULAR SURGERY | Age: 71
End: 2021-01-19
Payer: MEDICARE

## 2021-01-19 DIAGNOSIS — T81.89XD NON-HEALING SURGICAL WOUND, SUBSEQUENT ENCOUNTER: Primary | ICD-10-CM

## 2021-01-19 DIAGNOSIS — I73.9 PVD (PERIPHERAL VASCULAR DISEASE) (HCC): ICD-10-CM

## 2021-01-19 DIAGNOSIS — S31.109D CHRONIC ABDOMINAL WOUND INFECTION, SUBSEQUENT ENCOUNTER: ICD-10-CM

## 2021-01-19 DIAGNOSIS — L08.9 CHRONIC ABDOMINAL WOUND INFECTION, SUBSEQUENT ENCOUNTER: ICD-10-CM

## 2021-01-19 PROCEDURE — 3331090002 HH PPS REVENUE DEBIT

## 2021-01-19 PROCEDURE — 99024 POSTOP FOLLOW-UP VISIT: CPT | Performed by: PHYSICIAN ASSISTANT

## 2021-01-19 PROCEDURE — 3331090001 HH PPS REVENUE CREDIT

## 2021-01-19 NOTE — PROGRESS NOTES
Patient being seen for wound assessment of right side of abdomen. Proximal open incision is granulating and much smaller than last visit, small amount ss drainage, surrounding tissue WNL. Distal incision is completely intact but patient stated yesterday there was a pinhole opening and there was large amount of clear, yellow drainage but I am unable to see any opening or even with palpation around incision no drainage noted. Able to palpate possible fluid pocket just below incision. No redness and no swelling. Patient will finish IV antibiotics this weekend and requires lifelong by mouth antibiotics. Contacted Dr. Cristina Abel, who patient would like to follow with now that he has opened new practice and so will schedule her appt next week with him and work on referral for infectious disease. Cleansed wound to proximal side of abdomen with wound cleanser and gauze, patient tolerated well. Applied aquacel ag and mepilex, patient tolerated well.

## 2021-01-20 PROCEDURE — 3331090002 HH PPS REVENUE DEBIT

## 2021-01-20 PROCEDURE — 3331090001 HH PPS REVENUE CREDIT

## 2021-01-21 PROCEDURE — 3331090001 HH PPS REVENUE CREDIT

## 2021-01-21 PROCEDURE — 3331090002 HH PPS REVENUE DEBIT

## 2021-01-22 PROCEDURE — 3331090002 HH PPS REVENUE DEBIT

## 2021-01-22 PROCEDURE — 3331090001 HH PPS REVENUE CREDIT

## 2021-01-23 PROCEDURE — 3331090001 HH PPS REVENUE CREDIT

## 2021-01-23 PROCEDURE — 3331090002 HH PPS REVENUE DEBIT

## 2021-01-24 PROCEDURE — 3331090001 HH PPS REVENUE CREDIT

## 2021-01-24 PROCEDURE — 3331090002 HH PPS REVENUE DEBIT

## 2021-01-25 ENCOUNTER — HOME CARE VISIT (OUTPATIENT)
Dept: SCHEDULING | Facility: HOME HEALTH | Age: 71
End: 2021-01-25
Payer: MEDICARE

## 2021-01-25 PROCEDURE — G0299 HHS/HOSPICE OF RN EA 15 MIN: HCPCS

## 2021-01-25 PROCEDURE — 3331090002 HH PPS REVENUE DEBIT

## 2021-01-25 PROCEDURE — 3331090001 HH PPS REVENUE CREDIT

## 2021-01-26 PROCEDURE — 3331090002 HH PPS REVENUE DEBIT

## 2021-01-26 PROCEDURE — 3331090001 HH PPS REVENUE CREDIT

## 2021-01-27 VITALS
OXYGEN SATURATION: 98 % | DIASTOLIC BLOOD PRESSURE: 70 MMHG | RESPIRATION RATE: 18 BRPM | HEART RATE: 80 BPM | TEMPERATURE: 97.4 F | SYSTOLIC BLOOD PRESSURE: 122 MMHG

## 2021-01-27 PROCEDURE — A4452 WATERPROOF TAPE: HCPCS

## 2021-01-27 PROCEDURE — A6197 ALGINATE DRSG >16 <=48 SQ IN: HCPCS

## 2021-01-27 PROCEDURE — 3331090002 HH PPS REVENUE DEBIT

## 2021-01-27 PROCEDURE — 3331090001 HH PPS REVENUE CREDIT

## 2021-01-27 PROCEDURE — A6216 NON-STERILE GAUZE<=16 SQ IN: HCPCS

## 2021-01-28 PROCEDURE — 3331090001 HH PPS REVENUE CREDIT

## 2021-01-28 PROCEDURE — 3331090002 HH PPS REVENUE DEBIT

## 2021-01-29 PROCEDURE — 3331090001 HH PPS REVENUE CREDIT

## 2021-01-29 PROCEDURE — 3331090002 HH PPS REVENUE DEBIT

## 2021-01-30 PROCEDURE — 3331090001 HH PPS REVENUE CREDIT

## 2021-01-30 PROCEDURE — 3331090002 HH PPS REVENUE DEBIT

## 2021-01-31 PROCEDURE — 3331090002 HH PPS REVENUE DEBIT

## 2021-01-31 PROCEDURE — 3331090001 HH PPS REVENUE CREDIT

## 2021-02-01 ENCOUNTER — HOME CARE VISIT (OUTPATIENT)
Dept: SCHEDULING | Facility: HOME HEALTH | Age: 71
End: 2021-02-01
Payer: MEDICARE

## 2021-02-01 VITALS
RESPIRATION RATE: 18 BRPM | HEART RATE: 80 BPM | TEMPERATURE: 97.5 F | SYSTOLIC BLOOD PRESSURE: 124 MMHG | DIASTOLIC BLOOD PRESSURE: 70 MMHG | OXYGEN SATURATION: 98 %

## 2021-02-01 PROCEDURE — 3331090002 HH PPS REVENUE DEBIT

## 2021-02-01 PROCEDURE — G0299 HHS/HOSPICE OF RN EA 15 MIN: HCPCS

## 2021-02-01 PROCEDURE — 3331090001 HH PPS REVENUE CREDIT

## 2021-02-02 PROCEDURE — 3331090001 HH PPS REVENUE CREDIT

## 2021-02-02 PROCEDURE — 3331090002 HH PPS REVENUE DEBIT

## 2021-02-03 PROCEDURE — 3331090002 HH PPS REVENUE DEBIT

## 2021-02-03 PROCEDURE — 3331090001 HH PPS REVENUE CREDIT

## 2021-02-04 PROCEDURE — 3331090001 HH PPS REVENUE CREDIT

## 2021-02-04 PROCEDURE — 3331090002 HH PPS REVENUE DEBIT

## 2021-02-05 PROCEDURE — 3331090002 HH PPS REVENUE DEBIT

## 2021-02-05 PROCEDURE — 3331090001 HH PPS REVENUE CREDIT

## 2021-02-06 PROCEDURE — 3331090001 HH PPS REVENUE CREDIT

## 2021-02-06 PROCEDURE — 3331090002 HH PPS REVENUE DEBIT

## 2021-02-07 PROCEDURE — 3331090001 HH PPS REVENUE CREDIT

## 2021-02-07 PROCEDURE — 3331090002 HH PPS REVENUE DEBIT

## 2021-02-08 ENCOUNTER — HOME CARE VISIT (OUTPATIENT)
Dept: SCHEDULING | Facility: HOME HEALTH | Age: 71
End: 2021-02-08
Payer: MEDICARE

## 2021-02-08 PROCEDURE — G0299 HHS/HOSPICE OF RN EA 15 MIN: HCPCS

## 2021-02-08 PROCEDURE — 3331090002 HH PPS REVENUE DEBIT

## 2021-02-08 PROCEDURE — 3331090001 HH PPS REVENUE CREDIT

## 2021-02-09 PROCEDURE — 3331090001 HH PPS REVENUE CREDIT

## 2021-02-09 PROCEDURE — 3331090002 HH PPS REVENUE DEBIT

## 2021-02-10 VITALS
RESPIRATION RATE: 18 BRPM | OXYGEN SATURATION: 98 % | HEART RATE: 80 BPM | DIASTOLIC BLOOD PRESSURE: 78 MMHG | TEMPERATURE: 97.9 F | SYSTOLIC BLOOD PRESSURE: 122 MMHG

## 2021-06-15 RX ORDER — FLUTICASONE FUROATE, UMECLIDINIUM BROMIDE AND VILANTEROL TRIFENATATE 100; 62.5; 25 UG/1; UG/1; UG/1
1 POWDER RESPIRATORY (INHALATION) DAILY
Qty: 3 INHALER | Refills: 1 | Status: SHIPPED | COMMUNITY
Start: 2021-06-15 | End: 2022-02-25 | Stop reason: SDUPTHER

## 2021-08-13 DIAGNOSIS — R09.02 HYPOXEMIA REQUIRING SUPPLEMENTAL OXYGEN: ICD-10-CM

## 2021-08-13 DIAGNOSIS — Z99.81 HYPOXEMIA REQUIRING SUPPLEMENTAL OXYGEN: ICD-10-CM

## 2021-08-13 DIAGNOSIS — I73.9 PVD (PERIPHERAL VASCULAR DISEASE) (HCC): ICD-10-CM

## 2021-08-13 DIAGNOSIS — Z87.891 HISTORY OF TOBACCO USE: ICD-10-CM

## 2021-08-13 DIAGNOSIS — J44.9 COPD, MODERATE (HCC): Primary | ICD-10-CM

## 2021-08-13 NOTE — PROGRESS NOTES
Order placed for covid screen, per Verbal Order from Dr. Brittany Shoemaker on 8/13/2021. Last office visit: 5/21/20  Follow up Visit: 8/26/21    Provider is aware of last office visit and follow up. No further action requested from provider.

## 2021-08-23 ENCOUNTER — HOSPITAL ENCOUNTER (OUTPATIENT)
Dept: LAB | Age: 71
Discharge: HOME | End: 2021-08-23
Payer: MEDICARE

## 2021-08-23 PROCEDURE — 36415 COLL VENOUS BLD VENIPUNCTURE: CPT

## 2021-08-23 PROCEDURE — U0005 INFEC AGEN DETEC AMPLI PROBE: HCPCS

## 2021-08-25 LAB — SARS-COV-2, COV2NT: NOT DETECTED

## 2021-08-26 ENCOUNTER — OFFICE VISIT (OUTPATIENT)
Dept: PULMONOLOGY | Age: 71
End: 2021-08-26
Payer: MEDICARE

## 2021-08-26 VITALS
TEMPERATURE: 98.2 F | HEIGHT: 65 IN | WEIGHT: 138 LBS | HEART RATE: 85 BPM | SYSTOLIC BLOOD PRESSURE: 148 MMHG | DIASTOLIC BLOOD PRESSURE: 63 MMHG | RESPIRATION RATE: 20 BRPM | BODY MASS INDEX: 22.99 KG/M2 | OXYGEN SATURATION: 92 %

## 2021-08-26 DIAGNOSIS — R06.02 SHORTNESS OF BREATH: ICD-10-CM

## 2021-08-26 DIAGNOSIS — J44.9 COPD, MODERATE (HCC): Primary | ICD-10-CM

## 2021-08-26 DIAGNOSIS — Z87.891 PERSONAL HISTORY OF TOBACCO USE, PRESENTING HAZARDS TO HEALTH: ICD-10-CM

## 2021-08-26 DIAGNOSIS — K21.9 GASTROESOPHAGEAL REFLUX DISEASE, UNSPECIFIED WHETHER ESOPHAGITIS PRESENT: ICD-10-CM

## 2021-08-26 PROCEDURE — 94060 EVALUATION OF WHEEZING: CPT | Performed by: INTERNAL MEDICINE

## 2021-08-26 PROCEDURE — 1101F PT FALLS ASSESS-DOCD LE1/YR: CPT | Performed by: INTERNAL MEDICINE

## 2021-08-26 PROCEDURE — 94729 DIFFUSING CAPACITY: CPT | Performed by: INTERNAL MEDICINE

## 2021-08-26 PROCEDURE — G8536 NO DOC ELDER MAL SCRN: HCPCS | Performed by: INTERNAL MEDICINE

## 2021-08-26 PROCEDURE — G8420 CALC BMI NORM PARAMETERS: HCPCS | Performed by: INTERNAL MEDICINE

## 2021-08-26 PROCEDURE — 94727 GAS DIL/WSHOT DETER LNG VOL: CPT | Performed by: INTERNAL MEDICINE

## 2021-08-26 PROCEDURE — G8400 PT W/DXA NO RESULTS DOC: HCPCS | Performed by: INTERNAL MEDICINE

## 2021-08-26 PROCEDURE — G8753 SYS BP > OR = 140: HCPCS | Performed by: INTERNAL MEDICINE

## 2021-08-26 PROCEDURE — 1090F PRES/ABSN URINE INCON ASSESS: CPT | Performed by: INTERNAL MEDICINE

## 2021-08-26 PROCEDURE — G8432 DEP SCR NOT DOC, RNG: HCPCS | Performed by: INTERNAL MEDICINE

## 2021-08-26 PROCEDURE — G8427 DOCREV CUR MEDS BY ELIG CLIN: HCPCS | Performed by: INTERNAL MEDICINE

## 2021-08-26 PROCEDURE — 99214 OFFICE O/P EST MOD 30 MIN: CPT | Performed by: INTERNAL MEDICINE

## 2021-08-26 PROCEDURE — G8754 DIAS BP LESS 90: HCPCS | Performed by: INTERNAL MEDICINE

## 2021-08-26 PROCEDURE — 3017F COLORECTAL CA SCREEN DOC REV: CPT | Performed by: INTERNAL MEDICINE

## 2021-08-26 RX ORDER — ALBUTEROL SULFATE 90 UG/1
2 AEROSOL, METERED RESPIRATORY (INHALATION)
Qty: 3 INHALER | Refills: 3 | Status: SHIPPED | COMMUNITY
Start: 2021-08-26

## 2021-08-26 NOTE — PROGRESS NOTES
HISTORY OF PRESENT ILLNESS  Alberta Kauffman is a 66 y.o. female  Following up for COPD. Follow up for COPD on LTOT and FEV1 53% who was admitted in 2018 for acute hypoxic respiratory failure due to multilobar pneumonia. Pt does not recall much from her hospital admission but states she is back to baseline. She still complains of dyspnea with moderate to severe exertion. Pt was started on Trelegy on a prior visit and reports significant improvement in baseline shortness of breath. Exercise tolerance has likewise improved. Pt has been trying to quit smoking unsuccessfully. She is even more motivated after review of today's PFT's. Review of Systems   Constitutional: Negative for chills, diaphoresis, fever, malaise/fatigue and weight loss. HENT: Negative for congestion, ear discharge, ear pain, hearing loss, nosebleeds, sinus pain, sore throat and tinnitus. Eyes: Negative for blurred vision, double vision, photophobia, pain, discharge and redness. Respiratory: Positive for wheezing. Negative for cough, hemoptysis, sputum production and stridor. Shortness of breath: occasional.    Cardiovascular: Negative for chest pain, palpitations, orthopnea, claudication, leg swelling and PND. Gastrointestinal: Negative for abdominal pain, blood in stool, constipation, diarrhea, heartburn, melena, nausea and vomiting. Genitourinary: Negative for dysuria, flank pain, frequency, hematuria and urgency. Musculoskeletal: Negative for back pain, falls, joint pain, myalgias and neck pain. Skin: Negative for itching and rash. Neurological: Positive for headaches. Negative for dizziness, tingling, tremors, sensory change, speech change, focal weakness, seizures, loss of consciousness and weakness. Endo/Heme/Allergies: Negative for environmental allergies and polydipsia. Bruises/bleeds easily. Psychiatric/Behavioral: Negative for depression, hallucinations, memory loss, substance abuse and suicidal ideas. The patient is not nervous/anxious and does not have insomnia. Past Medical History:   Diagnosis Date    Arthritis     CAP (community acquired pneumonia) 06/27/2017    Chronic lung disease     Claudication ([address]     left leg    Crohn's disease ([address]     Crohn's disease ([address]     GERD (gastroesophageal reflux disease)     HTN (hypertension)     Ill-defined condition     Uses O2 at night.  Nausea & vomiting     Other and unspecified symptoms and signs involving general sensations and perceptions     PVD    Other ill-defined conditions(799.89)     Crohn's    Peripheral neuropathy     Pulmonary emphysema (HCC)     PVD (peripheral vascular disease) ([address]     right leg    Sepsis ([address] 06/27/2017    Vitamin B12 deficiency      Past Surgical History:   Procedure Laterality Date    COLONOSCOPY N/A 9/11/2019    DIAGNOSTIC COLONOSCOPY performed by Jennifer Hansen MD at [address] [place] [address] FOOT/TOES SURGERY [address] UNLISTED      HX APPENDECTOMY      HX BREAST LUMPECTOMY Left     breast left- precancerous    HX BUNIONECTOMY      left eye    HX CATARACT REMOVAL      bilateral    HX CHOLECYSTECTOMY      HX ORTHOPAEDIC      lateral epicondilitis on right    HX OTHER SURGICAL  3/7/2013    catheter into aorta    HX OTHER SURGICAL      intestional resection x4    HX OTHER SURGICAL  9/2013    I & D Right chest/flank wall abscess vs granuloma    MN BYPASS GRAFT OTHR,AXILL-FEM Right 4/19/2013    MN BYPASS GRAFT OTHR,FEM-POP Left 5/2013    UPPER ARM/ELBOW SURGERY UNLISTED      VASCULAR SURGERY PROCEDURE UNLIST  09/10/2020    Debridement and washout of bypass graft. Current Outpatient Medications on File Prior to Visit   Medication Sig Dispense Refill    fluticasone-umeclidinium-vilanterol (Trelegy Ellipta) 100-62.5-25 mcg inhaler Take 1 Puff by inhalation daily. 3 Inhaler 1    ciprofloxacin HCl (CIPRO) 500 mg tablet Take 1 Tab by mouth every twelve (12) hours.  8 Tab 0    multivitamin (ONE A DAY) tablet Take 1 Tab by mouth daily.  BIOTIN PO Take 1 Tab by mouth daily.  clopidogreL (PLAVIX) 75 mg tab TAKE ONE TABLET BY MOUTH DAILY 30 Tab 8    loratadine (CLARITIN) 10 mg tablet Take 10 mg by mouth daily.  fluticasone propionate (FLONASE ALLERGY RELIEF) 50 mcg/actuation nasal spray 2 Sprays by Both Nostrils route daily as needed for Rhinitis.  pregabalin (LYRICA) 100 mg capsule Take  by mouth two (2) times a day. Takes 100 mg in am and 200 in the pm      DULoxetine (CYMBALTA) 60 mg capsule Take 60 mg by mouth nightly. For Anxiety.  aspirin 81 mg tablet Take 81 mg by mouth daily.  cyanocobalamin (VITAMIN B-12) 1,000 mcg/mL injection 1,000 mcg by IntraMUSCular route every fourteen (14) days.  dicyclomine (BENTYL) 10 mg capsule Take 10 mg by mouth daily as needed for Abdominal Cramps.  triamterene-hydrochlorothiazide (DYAZIDE) 37.5-25 mg per capsule Take 1 Cap by mouth daily.  bimatoprost (LUMIGAN) 0.03 % ophthalmic drops Administer 1 Drop to both eyes every evening.  atropine-PHENobarbital-scopolamine-hyoscyamine (DONNATAL) 16.2-0.1037 -0.0194 mg per tablet Take 1 Tab by mouth every eight (8) hours as needed for Diarrhea or Nausea. Indications: irritable colon      inFLIXimab (REMICADE) 100 mg injection 5 mg/kg by IntraVENous route once. Every 4 weeks      colchicine 0.6 mg tablet Take 0.6 mg by mouth two (2) times a day. (Patient not taking: Reported on 8/26/2021)      OTHER  (Patient not taking: Reported on 8/26/2021)      loperamide (IMMODIUM) 2 mg tablet Take 2 mg by mouth daily as needed for Diarrhea. (Patient not taking: Reported on 8/26/2021)       No current facility-administered medications on file prior to visit.      Allergies   Allergen Reactions    Codeine Nausea and Vomiting     Other reaction(s): other/intolerance    Darvon [Propoxyphene] Itching    Demerol [Meperidine] Other (comments)     Halucinations    Keflex [Cephalexin] Diarrhea    Talwin [Pentazocine Lactate] Shortness of Breath and Nausea and Vomiting     Social History     Socioeconomic History    Marital status:      Spouse name: Not on file    Number of children: Not on file    Years of education: Not on file    Highest education level: Not on file   Occupational History    Not on file   Tobacco Use    Smoking status: Current Every Day Smoker     Packs/day: 1.00     Years: 45.00     Pack years: 45.00     Types: Cigarettes    Smokeless tobacco: Never Used   Substance and Sexual Activity    Alcohol use: No    Drug use: No    Sexual activity: Not on file   Other Topics Concern    Not on file   Social History Narrative    Father worked in the [employer] and was diagnosed with Asbestosis. Social Determinants of Health     Financial Resource Strain:     Difficulty of Paying Living Expenses:    Food Insecurity:     Worried About Running Out of Food in the Last Year:     [address] in the Last Year:    Transportation Needs:     Lack of Transportation (Medical):  Lack of Transportation (Non-Medical):    Physical Activity:     Days of Exercise per Week:     Minutes of Exercise per Session:    Stress:     Feeling of Stress :    Social Connections:     Frequency of Communication with Friends and Family:     Frequency of Social Gatherings with Friends and Family:     Attends Congregational Services:     Active Member of Clubs or Organizations:     Attends Club or Organization Meetings:     Marital Status:    Intimate Partner Violence:     Fear of Current or Ex-Partner:     Emotionally Abused:     Physically Abused:     Sexually Abused:      Blood pressure (!) 148/63, pulse 85, temperature 98.2 °F (36.8 °C), temperature source Oral, resp. rate 20, height 5' 5\" (1.651 m), weight 62.6 kg (138 lb), SpO2 92 %, unknown if currently breastfeeding. Physical Exam  Constitutional:       General: She is not in acute distress. Appearance: She is well-developed. She is not diaphoretic. HENT:      Head: Normocephalic and atraumatic. Nose:      Comments: Deferred      Mouth/Throat:      Comments: Deferred   Eyes:      General: No scleral icterus. Right eye: No discharge. Left eye: No discharge. Conjunctiva/sclera: Conjunctivae normal.      Pupils: Pupils are equal, round, and reactive to light. Neck:      Thyroid: No thyromegaly. Vascular: No JVD. Trachea: No tracheal deviation. Cardiovascular:      Rate and Rhythm: Normal rate and regular rhythm. Heart sounds: Normal heart sounds. No murmur heard. No gallop. Pulmonary:      Effort: Pulmonary effort is normal. No respiratory distress. Breath sounds: Normal breath sounds. No stridor. No wheezing or rales. Chest:      Chest wall: No tenderness. Abdominal:      Palpations: Abdomen is soft. There is no mass. Tenderness: There is no abdominal tenderness. There is no rebound. Musculoskeletal:         General: No tenderness or deformity. Lymphadenopathy:      Cervical: No cervical adenopathy. Skin:     General: Skin is warm and dry. Coloration: Skin is not pale. Findings: No erythema or rash. Comments: Small ecchymosis on dorsum of R hand   Neurological:      Mental Status: She is alert and oriented to person, place, and time. Coordination: Coordination normal.   Psychiatric:         Behavior: Behavior normal.         Thought Content: Thought content normal.         Judgment: Judgment normal.       PFT: reduced FEV1 58% with reduced FEV%.  Improved from prior study  CT Results (most recent):  Results from Hospital Encounter encounter on 12/03/20    CT CHEST ABD PELV W CONT    Narrative  EXAM:  CT CHEST ABD PELV W CONT    INDICATION: evaluate for vascular graft infection, occult infection  , positive  blood cultures and as as a positive, gram-positive cocci positive, history of  chronic right chest area open wound nonresponsive to outpatient local dressings  and antibiotics, generalized fatigue and malaise, fever, tachycardia, history of  Crohn's disease, right flank wound, severe peripheral artery disease status post  right axillary femoral graft, left femoral-popliteal graft, status post revision  of axillary to femoral bypass graft with excision of an infected graft on  9/21/2020, status post excision of the ulcer over the wound and debridement of  the wound on 11/5/2020, now with recent history of purulent drainage to the  right abdominal surgical site, fevers x1 week, gram-positive bacteremia, likely  vascular graft infection    COMPARISON: CT chest November 10, 2018    CONTRAST:  100 mL of Isovue-370. TECHNIQUE:  Following the uneventful intravenous administration of contrast, thin axial  images were obtained through the chest, abdomen and pelvis. Coronal and sagittal  reconstructions were generated. Oral contrast was not administered. CT dose  reduction was achieved through use of a standardized protocol tailored for this  examination and automatic exposure control for dose modulation. FINDINGS:    CHEST:  THYROID: No nodule. MEDIASTINUM: Several stable minimally prominent middle mediastinal nodes  measuring less than 1 cm in the short axis dimension. Image 21 series 2. NEETU: No mass or lymphadenopathy. THORACIC AORTA: No dissection or aneurysm. MAIN PULMONARY ARTERY: Normal in caliber. TRACHEA/BRONCHI: Patent. ESOPHAGUS: No wall thickening or dilatation. HEART: Normal in size. PLEURA: No effusion or pneumothorax. LUNGS: Stable emphysema. Mild right greater than left basal subsegmental  atelectasis. ABDOMEN:  LIVER: No mass or intrahepatic biliary dilatation. There is mild dilatation of  the extrahepatic common duct measuring 9 mm which tapers to the ampulla. GALLBLADDER: Surgically removed  SPLEEN: No mass. PANCREAS: No mass or ductal dilatation.   ADRENALS: Stable mild thickening of the left adrenal. Normal right adrenal.  KIDNEYS: No mass, calculus, or hydronephrosis. Bilateral mild cortical scarring  STOMACH: Unremarkable. SMALL BOWEL: No dilatation or wall thickening. COLON: Scattered diverticula of the descending colon without evidence of  surrounding inflammation. APPENDIX: Unremarkable. PERITONEUM: No ascites or pneumoperitoneum. RETROPERITONEUM: No lymphadenopathy or aortic aneurysm. PELVIS:  REPRODUCTIVE ORGANS: Left ovarian 3.6 cm simple cyst. Uterus has been surgically  removed. URINARY BLADDER: No mass or calculus. BONES: Degenerative disc disease at L5/S1 with reactive endplate sclerosis. ADDITIONAL COMMENTS: Right axillary to femoral graft is patent. An additional very short segment abandoned graft is seen at the right lateral  chest wall inferiorly located more posteriorly to the patent graft. At its  inferior border there is soft tissue density extending to the overlying chest  wall at the site of prior surgery which has decreased from the previous exam.    There is surrounding low density/soft tissue density in the subcutaneous fat  involving the more anterior patent graft, image 31 through image 37 series 3. At  this level the opacified lumen is decreased in size with a slitlike surrounded  configuration by soft tissue density within the lumen of the stent. The AP  diameter of the contrast opacified lumen is 3 mm with a transverse diameter of 5  mm. Image 34 series 2. The diameter of the graft itself at this site is 1 x 1.1  cm. See coronal image 16 series 604. Sagittal image 64 series 605. The  immediately proximal graft is completely opacified measuring 1 x 0.9 cm and the  more distal graft is completely opacified measuring 1.2 x 0.9 cm. Probable small amount of eccentric thrombus at the distal level of the stent  graft, image 55 series 3. See coronal image 16 series 604, sagittal image 60  series 605.   There is additional surrounding soft tissue density in the subcutaneous fat at  the right lower quadrant abdominal wall where a short segment of apparent  endograft in the patent graft converge to merge with the femoral artery. Impression  IMPRESSION:  Findings are concerning for infection at the level of the mid abdominal and  possibly distal abdominal right axillary femoral graft with surrounding soft  tissue density and intraluminal thrombus with narrowing of the opacified lumen. As clinically indicated follow-up nuclear isotope infection scan may be of  benefit. Left ovarian cyst.  Mild extrahepatic biliary dilatation likely reservoir effect. Diverticulosis coli without evidence of diverticulitis. Minimal bibasal lung dependent atelectasis. ASSESSMENT and PLAN  Encounter Diagnoses   Name Primary?  COPD, moderate (HCC) Yes    Current smoker     Gastroesophageal reflux disease, unspecified whether esophagitis present     Shortness of breath      Good response to Trelegy, continue this as well as rescue Albuterol. Will discontinue Combivent from [facility]. Continue rest of medications including Remicade. Discussed need for smoking cessation with pt. Also discussed risks of smoking and indication for low dose CT screen. Pt agrees to CT. Will call pt with results. Ambulatory oxymetry per nurse note. Schedule overnight oxymetry. Further intervention eg. Supplemental O2 per results. RTC 6 months with full PFT's  Flu vaccination in the fall. Schedule low dose CT scan. Discussed with patient current guidelines for screening for lung cancer. Current recommendations are to obtain yearly screening LDCT yearly for age 46-80, or until smoke free for 15 years. Patient has 45 pack year history of cigarette smoking and currently actively smokes. Discussed with patient risks and benefits of screening, including over-diagnosis, false positive rate, and total radiation exposure. Patient currently exhibits no signs or symptoms suggestive of lung cancer.   Discussed with patient importance of compliance with yearly annual lung cancer screenings and willingness to undergo diagnosis and treatment if screening scan is positive. In addition, patient was counseled regarding (remaining smoke free/total smoking cessation).

## 2021-08-26 NOTE — PROGRESS NOTES
Shaniqua Beal presents today for   Chief Complaint   Patient presents with    Shortness of Breath       Is someone accompanying this pt? No    Is the patient using any DME equipment during OV? No    -DME Company NA    Depression Screening:  3 most recent PHQ Screens 10/28/2020   Little interest or pleasure in doing things Not at all   Feeling down, depressed, irritable, or hopeless Not at all   Total Score PHQ 2 0       Learning Assessment:  Learning Assessment 10/8/2019   PRIMARY LEARNER Patient   CO-LEARNER CAREGIVER -   PRIMARY LANGUAGE ENGLISH   LEARNER PREFERENCE PRIMARY LISTENING   ANSWERED BY kamran CHUN SELF       Abuse Screening:  No flowsheet data found. Fall Risk  Fall Risk Assessment, last 12 mths 10/28/2020   Able to walk? Yes   Fall in past 12 months? No   Number of falls in past 12 months -   Fall with injury? -         Coordination of Care:  1. Have you been to the ER, urgent care clinic since your last visit? Hospitalized since your last visit? No    2. Have you seen or consulted any other health care providers outside of the [address] [place] [address] since your last visit? Include any pap smears or colon screening.  No

## 2021-10-08 ENCOUNTER — HOSPITAL ENCOUNTER (OUTPATIENT)
Dept: LAB | Age: 71
Discharge: HOME | End: 2021-10-08
Payer: MEDICARE

## 2021-10-08 LAB
BASOPHILS # BLD: 0.1 K/UL (ref 0–0.1)
BASOPHILS NFR BLD: 1 % (ref 0–2)
DIFFERENTIAL METHOD BLD: ABNORMAL
EOSINOPHIL # BLD: 0.4 K/UL (ref 0–0.4)
EOSINOPHIL NFR BLD: 3 % (ref 0–5)
ERYTHROCYTE [DISTWIDTH] IN BLOOD BY AUTOMATED COUNT: 15.4 % (ref 11.6–14.5)
HCT VFR BLD AUTO: 44.8 % (ref 35–45)
HGB BLD-MCNC: 14.2 G/DL (ref 12–16)
LYMPHOCYTES # BLD: 3.7 K/UL (ref 0.9–3.6)
LYMPHOCYTES NFR BLD: 33 % (ref 21–52)
MCH RBC QN AUTO: 28.3 PG (ref 24–34)
MCHC RBC AUTO-ENTMCNC: 31.7 G/DL (ref 31–37)
MCV RBC AUTO: 89.4 FL (ref 78–100)
MONOCYTES # BLD: 0.9 K/UL (ref 0.05–1.2)
MONOCYTES NFR BLD: 8 % (ref 3–10)
NEUTS SEG # BLD: 6 K/UL (ref 1.8–8)
NEUTS SEG NFR BLD: 55 % (ref 40–73)
PLATELET # BLD AUTO: 387 K/UL (ref 135–420)
PMV BLD AUTO: 9.9 FL (ref 9.2–11.8)
RBC # BLD AUTO: 5.01 M/UL (ref 4.2–5.3)
RPR SER QL: NONREACTIVE
WBC # BLD AUTO: 11.1 K/UL (ref 4.6–13.2)

## 2021-10-08 PROCEDURE — 86618 LYME DISEASE ANTIBODY: CPT

## 2021-10-08 PROCEDURE — 86757 RICKETTSIA ANTIBODY: CPT

## 2021-10-08 PROCEDURE — 85025 COMPLETE CBC W/AUTO DIFF WBC: CPT

## 2021-10-08 PROCEDURE — 36415 COLL VENOUS BLD VENIPUNCTURE: CPT

## 2021-10-08 PROCEDURE — 86592 SYPHILIS TEST NON-TREP QUAL: CPT

## 2021-10-09 LAB — RPR SER QL: NON REACTIVE

## 2021-10-11 LAB
B BURGDOR IGG+IGM SER-ACNC: <0.91 ISR (ref 0–0.9)
R RICKETTSI IGG SER QL IA: NEGATIVE

## 2021-10-12 RX ORDER — PREDNISONE 20 MG/1
40 TABLET ORAL
Qty: 7 TABLET | Refills: 0 | Status: SHIPPED | OUTPATIENT
Start: 2021-10-12

## 2021-12-21 ENCOUNTER — NURSE NAVIGATOR (OUTPATIENT)
Dept: OTHER | Age: 71
End: 2021-12-21

## 2021-12-21 NOTE — PROGRESS NOTES
Referring Provider: Kami Allison MD      Lung Cancer Risk Profile:   Age: 66  Gender: Female  Height: 65\"  Weight: 138#    Smoking History:  Smoking Status: current use  # years smokin  # years quit: 0  Packs/day: 1  Pack years: 39    Patient discussed smoking cessation with PCP: Yes, per provider note    Patient participated in shared decision making process with PCP: Yes, per provider note    Patient is currently experiencing symptoms: No    If yes what symptoms:     Co-Morbidities:  COPD, PVD    Cancer History:      Additional Risk Factors:    Exposure to second hand smoke      Patient's smoking history verified via provider note. Patient meets LDCT lung cancer screening criteria.        SUE MartinezN, RN, OCN  Lung Health Nurse Navigator

## 2021-12-27 ENCOUNTER — HOSPITAL ENCOUNTER (OUTPATIENT)
Dept: CT IMAGING | Age: 71
Discharge: HOME | End: 2021-12-27
Attending: INTERNAL MEDICINE
Payer: MEDICARE

## 2021-12-27 VITALS — HEIGHT: 65 IN | WEIGHT: 142 LBS | BODY MASS INDEX: 23.66 KG/M2

## 2021-12-27 DIAGNOSIS — Z87.891 PERSONAL HISTORY OF TOBACCO USE, PRESENTING HAZARDS TO HEALTH: ICD-10-CM

## 2021-12-27 PROCEDURE — 71271 CT THORAX LUNG CANCER SCR C-: CPT

## 2022-02-02 NOTE — PROGRESS NOTES
Subjective:      68-year-old with a chronic wound over her right flank with an exposed bypass graft.  She has had attempts at coverage, really heroic in nature and comprehensive, just cannot get this to stay closed. She therefore underwent excision of infected graft, right flank, revision with interposition graft, right [id number] graft    She already had an initial post op visit a few weeks ago  Came last week with concerns about area of swelling  This was felt to be a seroma  As of yesterday, when home health was packing the wound, it appears the seroma was drained. The area of swelling went down but she has a good amount of serous drainage and tunneling down to the level of the seroma  The open wound site is doing well and granulating. Wet to dry dressings had been done    Objective:     Visit Vitals    /68 (BP 1 Location: Left arm, BP Patient Position: Sitting)    Pulse 94    Resp 17    Ht 5' 5\" (1.651 m)    Wt 135 lb (61.2 kg)    BMI 22.47 kg/m2     As described above     Assessment:     Wound check/discharge visit. Plan:       ICD-10-CM ICD-9-CM    1. Postoperative dehiscence of skin wound, subsequent encounter T81. 31XD V58.89      998.32    2. Non-healing surgical wound, subsequent encounter T81.89XD V58.89      998.83      Will renew use of the wound vac. We are requesting the white foam to be able to pack into the tunneled area and then standard black foam at the wound site  Hopefully this will allow continued and better drainage but also allowance for the tunneling and wound to then fill in  We will see her back in a few weeks for a wound check  Sooner if any changes or concerns     TIMMY Marin    Portions of this note have been entered using voice recognition software. Parents

## 2022-02-25 NOTE — TELEPHONE ENCOUNTER
Pt requesting 90 day supply of trelegy to be sent to [facility] mail order.

## 2022-02-28 RX ORDER — FLUTICASONE FUROATE, UMECLIDINIUM BROMIDE AND VILANTEROL TRIFENATATE 100; 62.5; 25 UG/1; UG/1; UG/1
1 POWDER RESPIRATORY (INHALATION) DAILY
Qty: 3 EACH | Refills: 1 | Status: SHIPPED | COMMUNITY
Start: 2022-02-28

## 2022-03-18 PROBLEM — N39.0 UTI (URINARY TRACT INFECTION): Status: ACTIVE | Noted: 2020-12-03

## 2022-03-18 PROBLEM — L08.9 CHRONIC WOUND INFECTION OF ABDOMEN: Status: ACTIVE | Noted: 2018-06-21

## 2022-03-18 PROBLEM — L02.91 ABSCESS: Status: ACTIVE | Noted: 2017-05-10

## 2022-03-18 PROBLEM — T82.7XXA INFECTION OF VASCULAR BYPASS GRAFT (HCC): Status: ACTIVE | Noted: 2018-04-30

## 2022-03-18 PROBLEM — T82.7XXA ARTERIOVENOUS GRAFT INFECTION (HCC): Status: ACTIVE | Noted: 2017-05-10

## 2022-03-18 PROBLEM — S31.109A CHRONIC WOUND INFECTION OF ABDOMEN: Status: ACTIVE | Noted: 2018-06-21

## 2022-03-19 PROBLEM — T14.8XXA OPEN WOUND: Status: ACTIVE | Noted: 2020-09-10

## 2022-03-19 PROBLEM — Q06.8 DERMAL SINUS TRACT OF LUMBOSACRAL REGION (HCC): Status: ACTIVE | Noted: 2017-05-10

## 2022-03-19 PROBLEM — J18.9 CAP (COMMUNITY ACQUIRED PNEUMONIA): Status: ACTIVE | Noted: 2017-06-27

## 2022-03-19 PROBLEM — T81.89XA NONHEALING SURGICAL WOUND: Status: ACTIVE | Noted: 2018-04-30

## 2022-03-19 PROBLEM — T82.7XXA VASCULAR GRAFT INFECTION (HCC): Status: ACTIVE | Noted: 2020-12-09

## 2022-03-20 PROBLEM — A41.9 SEVERE SEPSIS (HCC): Status: ACTIVE | Noted: 2017-06-27

## 2022-03-20 PROBLEM — R65.20 SEVERE SEPSIS (HCC): Status: ACTIVE | Noted: 2017-06-27

## 2022-03-20 PROBLEM — L03.311 CELLULITIS OF ABDOMINAL WALL: Status: ACTIVE | Noted: 2020-12-03

## 2022-04-22 PROBLEM — M81.0 POSTMENOPAUSAL OSTEOPOROSIS: Status: ACTIVE | Noted: 2022-04-22

## 2022-04-22 RX ORDER — ONDANSETRON 2 MG/ML
8 INJECTION INTRAMUSCULAR; INTRAVENOUS AS NEEDED
Status: CANCELLED | OUTPATIENT
Start: 2022-04-29

## 2022-04-22 RX ORDER — HYDROCORTISONE SODIUM SUCCINATE 100 MG/2ML
100 INJECTION, POWDER, FOR SOLUTION INTRAMUSCULAR; INTRAVENOUS AS NEEDED
Status: CANCELLED | OUTPATIENT
Start: 2022-04-29

## 2022-04-22 RX ORDER — ALBUTEROL SULFATE 0.83 MG/ML
2.5 SOLUTION RESPIRATORY (INHALATION) AS NEEDED
Status: CANCELLED
Start: 2022-04-29

## 2022-04-22 RX ORDER — ACETAMINOPHEN 325 MG/1
650 TABLET ORAL AS NEEDED
Status: CANCELLED
Start: 2022-04-29

## 2022-04-22 RX ORDER — EPINEPHRINE 1 MG/ML
0.3 INJECTION, SOLUTION, CONCENTRATE INTRAVENOUS AS NEEDED
Status: CANCELLED | OUTPATIENT
Start: 2022-04-29

## 2022-04-22 RX ORDER — DIPHENHYDRAMINE HYDROCHLORIDE 50 MG/ML
25 INJECTION, SOLUTION INTRAMUSCULAR; INTRAVENOUS AS NEEDED
Status: CANCELLED
Start: 2022-04-29

## 2022-04-22 RX ORDER — DIPHENHYDRAMINE HYDROCHLORIDE 50 MG/ML
50 INJECTION, SOLUTION INTRAMUSCULAR; INTRAVENOUS AS NEEDED
Status: CANCELLED
Start: 2022-04-29

## 2022-04-27 ENCOUNTER — HOSPITAL ENCOUNTER (OUTPATIENT)
Dept: INFUSION THERAPY | Age: 72
Discharge: HOME | End: 2022-04-27
Payer: MEDICARE

## 2022-04-27 VITALS
TEMPERATURE: 98 F | HEART RATE: 114 BPM | RESPIRATION RATE: 20 BRPM | DIASTOLIC BLOOD PRESSURE: 70 MMHG | OXYGEN SATURATION: 96 % | SYSTOLIC BLOOD PRESSURE: 133 MMHG

## 2022-04-27 LAB
ALBUMIN SERPL-MCNC: 3.7 G/DL (ref 3.4–5)
ALBUMIN/GLOB SERPL: 0.8 {RATIO} (ref 0.8–1.7)
ALP SERPL-CCNC: 97 U/L (ref 45–117)
ALT SERPL-CCNC: 28 U/L (ref 13–56)
ANION GAP SERPL CALC-SCNC: 6 MMOL/L (ref 3–18)
AST SERPL-CCNC: 21 U/L (ref 10–38)
BILIRUB SERPL-MCNC: 0.3 MG/DL (ref 0.2–1)
BUN SERPL-MCNC: 13 MG/DL (ref 7–18)
BUN/CREAT SERPL: 14 (ref 12–20)
CALCIUM SERPL-MCNC: 12.3 MG/DL (ref 8.5–10.1)
CHLORIDE SERPL-SCNC: 108 MMOL/L (ref 100–111)
CO2 SERPL-SCNC: 28 MMOL/L (ref 21–32)
CREAT SERPL-MCNC: 0.92 MG/DL (ref 0.6–1.3)
GLOBULIN SER CALC-MCNC: 4.4 G/DL (ref 2–4)
GLUCOSE SERPL-MCNC: 99 MG/DL (ref 74–99)
MAGNESIUM SERPL-MCNC: 1.7 MG/DL (ref 1.6–2.6)
PHOSPHATE SERPL-MCNC: 2.6 MG/DL (ref 2.5–4.9)
POTASSIUM SERPL-SCNC: 4.5 MMOL/L (ref 3.5–5.5)
PROT SERPL-MCNC: 8.1 G/DL (ref 6.4–8.2)
SODIUM SERPL-SCNC: 142 MMOL/L (ref 136–145)

## 2022-04-27 PROCEDURE — 80053 COMPREHEN METABOLIC PANEL: CPT

## 2022-04-27 PROCEDURE — 84100 ASSAY OF PHOSPHORUS: CPT

## 2022-04-27 PROCEDURE — 83735 ASSAY OF MAGNESIUM: CPT

## 2022-04-27 PROCEDURE — 36415 COLL VENOUS BLD VENIPUNCTURE: CPT

## 2022-04-27 NOTE — PROGRESS NOTES
[facility] Lab Visit:    Caleb Covert  1950  [id number]    [id number] Pt arrived ambulatory w/o assist. Labs drawn per order via Left AC venipuncture x1 attempt. Pt tolerated well without complaints. Gauze/ coban to site. Pt departed [facility] ambulatory and in no distress at 1125.    Visit Vitals  /70 (BP 1 Location: Left upper arm, BP Patient Position: Sitting)   Pulse (!) 114   Temp 98 °F (36.7 °C)   Resp 20   SpO2 96%

## 2022-04-29 ENCOUNTER — HOSPITAL ENCOUNTER (OUTPATIENT)
Dept: INFUSION THERAPY | Age: 72
Discharge: HOME | End: 2022-04-29
Payer: MEDICARE

## 2022-04-29 VITALS
SYSTOLIC BLOOD PRESSURE: 117 MMHG | OXYGEN SATURATION: 99 % | DIASTOLIC BLOOD PRESSURE: 72 MMHG | RESPIRATION RATE: 18 BRPM | HEART RATE: 80 BPM | TEMPERATURE: 97.5 F

## 2022-04-29 DIAGNOSIS — M81.0 POSTMENOPAUSAL OSTEOPOROSIS: Primary | ICD-10-CM

## 2022-04-29 PROCEDURE — 96372 THER/PROPH/DIAG INJ SC/IM: CPT

## 2022-04-29 PROCEDURE — 74011250636 HC RX REV CODE- 250/636: Performed by: NURSE PRACTITIONER

## 2022-04-29 RX ORDER — DIPHENHYDRAMINE HYDROCHLORIDE 50 MG/ML
25 INJECTION, SOLUTION INTRAMUSCULAR; INTRAVENOUS AS NEEDED
Status: CANCELLED
Start: 2022-10-28

## 2022-04-29 RX ORDER — DIPHENHYDRAMINE HYDROCHLORIDE 50 MG/ML
50 INJECTION, SOLUTION INTRAMUSCULAR; INTRAVENOUS AS NEEDED
Status: CANCELLED
Start: 2022-10-28

## 2022-04-29 RX ORDER — HYDROCORTISONE SODIUM SUCCINATE 100 MG/2ML
100 INJECTION, POWDER, FOR SOLUTION INTRAMUSCULAR; INTRAVENOUS AS NEEDED
Status: CANCELLED | OUTPATIENT
Start: 2022-10-28

## 2022-04-29 RX ORDER — EPINEPHRINE 1 MG/ML
0.3 INJECTION, SOLUTION, CONCENTRATE INTRAVENOUS AS NEEDED
Status: CANCELLED | OUTPATIENT
Start: 2022-10-28

## 2022-04-29 RX ORDER — ONDANSETRON 2 MG/ML
8 INJECTION INTRAMUSCULAR; INTRAVENOUS AS NEEDED
Status: CANCELLED | OUTPATIENT
Start: 2022-10-28

## 2022-04-29 RX ORDER — ACETAMINOPHEN 325 MG/1
650 TABLET ORAL AS NEEDED
Status: CANCELLED
Start: 2022-10-28

## 2022-04-29 RX ORDER — ALBUTEROL SULFATE 0.83 MG/ML
2.5 SOLUTION RESPIRATORY (INHALATION) AS NEEDED
Status: CANCELLED
Start: 2022-10-28

## 2022-04-29 RX ADMIN — DENOSUMAB 60 MG: 60 INJECTION SUBCUTANEOUS at 14:19

## 2022-04-29 NOTE — PROGRESS NOTES
[facility] Progress Note    Date: 2022    Name: Onofre Alberts    MRN: [id number]         : 1950      Ms. Souza arrived to [place] at [phone], here for Prolia (Q6 months). Ms. Motta Service was assessed and both verbal and written education was provided. Ms. Souza's vitals were reviewed. Visit Vitals  /72 (BP 1 Location: Right upper arm, BP Patient Position: Sitting)   Pulse 80   Temp 97.5 °F (36.4 °C)   Resp 18   SpO2 99%   Breastfeeding No     Lab results from 22 were obtained and reviewed. Okay to proceed with treatment today. Per pt, she met with Dr. South aCrroll yesterday who referred her to endocrinology for her high calcium levels. Results for Miranda Quijano (MRN [id number]) as of 2022 15:29   Ref. Range 2022 11:16   Sodium Latest Ref Range: 136 - 145 mmol/L 142   Potassium Latest Ref Range: 3.5 - 5.5 mmol/L 4.5   Chloride Latest Ref Range: 100 - 111 mmol/L 108   CO2 Latest Ref Range: 21 - 32 mmol/L 28   Anion gap Latest Ref Range: 3.0 - 18 mmol/L 6   Glucose Latest Ref Range: 74 - 99 mg/dL 99   BUN Latest Ref Range: 7.0 - 18 MG/DL 13   Creatinine Latest Ref Range: 0.6 - 1.3 MG/DL 0.92   BUN/Creatinine ratio Latest Ref Range: 12 - 20   14   Calcium Latest Ref Range: 8.5 - 10.1 MG/DL 12.3 (H)   Phosphorus Latest Ref Range: 2.5 - 4.9 MG/DL 2.6   Magnesium Latest Ref Range: 1.6 - 2.6 mg/dL 1.7   GFR est non-AA Latest Ref Range: >60 ml/min/1.73m2 60 (L)   GFR est AA Latest Ref Range: >60 ml/min/1.73m2 >60   Bilirubin, total Latest Ref Range: 0.2 - 1.0 MG/DL 0.3   Protein, total Latest Ref Range: 6.4 - 8.2 g/dL 8.1   Albumin Latest Ref Range: 3.4 - 5.0 g/dL 3.7   Globulin Latest Ref Range: 2.0 - 4.0 g/dL 4.4 (H)   A-G Ratio Latest Ref Range: 0.8 - 1.7   0.8   ALT Latest Ref Range: 13 - 56 U/L 28   AST Latest Ref Range: 10 - 38 U/L 21   Alk.  phosphatase Latest Ref Range: 45 - 117 U/L 97       Denosumab (Prolia) 60mg was administered as ordered SQ in the back of pt's R upper arm. Band-Aid to site. Ms. Yahaira Calvo tolerated well without complaints. Pt stayed in [facility] for 30 minutes of observation. VS stable. No s/s of reaction noted. Discharge/ follow-up instructions discussed w/ pt. Pt verbalized understanding. Pt armband removed & shredded. Ms. Yahaira Calvo was discharged from [place] [address] in stable condition at 1450. She is to return on 10/26/22 at 200 for her next appointment, for her Prolia labs.     Marie Jalloh RN  April 29, 2022

## 2022-06-02 NOTE — PROGRESS NOTES
Patient being seen for wound assessment to right side of abdomen wound. Cleansed wound with wound cleanser and gauze, patient tolerated well. Wound continues to have tunnel at 12:00 but wound bed is granulating and can see new tissue growth, increase in drainage this visit so about moderate amount ss drainage, surrounding tissue slightly red. Applied mixture of barrier cream, hydrocortisone and nystatin powder to surrounding tissue, packed lightly with iodoform packing and covered with mepilex and secured with tape. Patient tolerated well. Patient will return on Monday for next dressing change.
no

## 2022-06-13 ENCOUNTER — TRANSCRIBE ORDER (OUTPATIENT)
Dept: SCHEDULING | Age: 72
End: 2022-06-13

## 2022-06-13 DIAGNOSIS — E21.3 HYPERPARATHYROIDISM (HCC): ICD-10-CM

## 2022-06-13 DIAGNOSIS — E83.52 HYPERCALCEMIA: Primary | ICD-10-CM

## 2022-06-13 DIAGNOSIS — M81.0 OSTEOPOROSIS WITHOUT CURRENT PATHOLOGICAL FRACTURE, UNSPECIFIED OSTEOPOROSIS TYPE: ICD-10-CM

## 2022-06-13 DIAGNOSIS — K91.2: ICD-10-CM

## 2022-06-13 DIAGNOSIS — K50.90 CROHN DISEASE (HCC): ICD-10-CM

## 2022-06-14 ENCOUNTER — HOSPITAL ENCOUNTER (OUTPATIENT)
Dept: NUCLEAR MEDICINE | Age: 72
Discharge: HOME | End: 2022-06-14
Attending: INTERNAL MEDICINE
Payer: MEDICARE

## 2022-06-14 DIAGNOSIS — M81.0 OSTEOPOROSIS WITHOUT CURRENT PATHOLOGICAL FRACTURE, UNSPECIFIED OSTEOPOROSIS TYPE: ICD-10-CM

## 2022-06-14 DIAGNOSIS — K50.90 CROHN DISEASE (HCC): ICD-10-CM

## 2022-06-14 DIAGNOSIS — E83.52 HYPERCALCEMIA: ICD-10-CM

## 2022-06-14 DIAGNOSIS — K91.2: ICD-10-CM

## 2022-06-14 DIAGNOSIS — E21.3 HYPERPARATHYROIDISM (HCC): ICD-10-CM

## 2022-06-14 PROCEDURE — 78072 PARATHYRD PLANAR W/SPECT&CT: CPT

## 2022-10-26 ENCOUNTER — HOSPITAL ENCOUNTER (OUTPATIENT)
Dept: INFUSION THERAPY | Age: 72
Discharge: HOME | End: 2022-10-26
Payer: MEDICARE

## 2022-10-26 VITALS
RESPIRATION RATE: 18 BRPM | HEART RATE: 96 BPM | SYSTOLIC BLOOD PRESSURE: 120 MMHG | DIASTOLIC BLOOD PRESSURE: 70 MMHG | OXYGEN SATURATION: 99 % | TEMPERATURE: 97.8 F

## 2022-10-26 LAB
ALBUMIN SERPL-MCNC: 3.9 G/DL (ref 3.4–5)
ALBUMIN/GLOB SERPL: 0.9 {RATIO} (ref 0.8–1.7)
ALP SERPL-CCNC: 77 U/L (ref 45–117)
ALT SERPL-CCNC: 31 U/L (ref 13–56)
ANION GAP SERPL CALC-SCNC: 8 MMOL/L (ref 3–18)
AST SERPL-CCNC: 19 U/L (ref 10–38)
BILIRUB SERPL-MCNC: 0.4 MG/DL (ref 0.2–1)
BUN SERPL-MCNC: 17 MG/DL (ref 7–18)
BUN/CREAT SERPL: 18 (ref 12–20)
CALCIUM SERPL-MCNC: 12.8 MG/DL (ref 8.5–10.1)
CHLORIDE SERPL-SCNC: 103 MMOL/L (ref 100–111)
CO2 SERPL-SCNC: 28 MMOL/L (ref 21–32)
CREAT SERPL-MCNC: 0.94 MG/DL (ref 0.6–1.3)
GLOBULIN SER CALC-MCNC: 4.4 G/DL (ref 2–4)
GLUCOSE SERPL-MCNC: 128 MG/DL (ref 74–99)
MAGNESIUM SERPL-MCNC: 1.8 MG/DL (ref 1.6–2.6)
PHOSPHATE SERPL-MCNC: 2.3 MG/DL (ref 2.5–4.9)
POTASSIUM SERPL-SCNC: 3.9 MMOL/L (ref 3.5–5.5)
PROT SERPL-MCNC: 8.3 G/DL (ref 6.4–8.2)
SODIUM SERPL-SCNC: 139 MMOL/L (ref 136–145)

## 2022-10-26 PROCEDURE — 36415 COLL VENOUS BLD VENIPUNCTURE: CPT

## 2022-10-26 PROCEDURE — 83735 ASSAY OF MAGNESIUM: CPT

## 2022-10-26 PROCEDURE — 80053 COMPREHEN METABOLIC PANEL: CPT

## 2022-10-26 PROCEDURE — 84100 ASSAY OF PHOSPHORUS: CPT

## 2022-10-26 NOTE — PROGRESS NOTES
[facility] Lab Visit:    Dede Metcalfr  1950  [id number]    [id number] Pt arrived ambulatory w/o assist. Labs drawn per order via Left AC venipuncture x1 attempt. Pt tolerated well without complaints. Gauze/ coban to site. Pt departed [facility] ambulatory and in no distress at 1125.      Visit Vitals  /70 (BP 1 Location: Right upper arm, BP Patient Position: Sitting)   Pulse 96   Temp 97.8 °F (36.6 °C)   Resp 18   SpO2 99%

## 2022-10-28 ENCOUNTER — HOSPITAL ENCOUNTER (OUTPATIENT)
Dept: INFUSION THERAPY | Age: 72
Discharge: HOME | End: 2022-10-28
Payer: MEDICARE

## 2022-10-28 VITALS
OXYGEN SATURATION: 99 % | RESPIRATION RATE: 18 BRPM | HEART RATE: 98 BPM | TEMPERATURE: 98.2 F | DIASTOLIC BLOOD PRESSURE: 82 MMHG | SYSTOLIC BLOOD PRESSURE: 162 MMHG

## 2022-10-28 DIAGNOSIS — M81.0 POSTMENOPAUSAL OSTEOPOROSIS: Primary | ICD-10-CM

## 2022-10-28 PROCEDURE — 74011250636 HC RX REV CODE- 250/636: Performed by: NURSE PRACTITIONER

## 2022-10-28 PROCEDURE — 96372 THER/PROPH/DIAG INJ SC/IM: CPT

## 2022-10-28 RX ORDER — EPINEPHRINE 1 MG/ML
0.3 INJECTION, SOLUTION, CONCENTRATE INTRAVENOUS AS NEEDED
Status: CANCELLED | OUTPATIENT
Start: 2023-04-28

## 2022-10-28 RX ORDER — HYDROCORTISONE SODIUM SUCCINATE 100 MG/2ML
100 INJECTION, POWDER, FOR SOLUTION INTRAMUSCULAR; INTRAVENOUS AS NEEDED
Status: CANCELLED | OUTPATIENT
Start: 2023-04-28

## 2022-10-28 RX ORDER — DIPHENHYDRAMINE HYDROCHLORIDE 50 MG/ML
50 INJECTION, SOLUTION INTRAMUSCULAR; INTRAVENOUS AS NEEDED
Status: CANCELLED
Start: 2023-04-28

## 2022-10-28 RX ORDER — ALBUTEROL SULFATE 0.83 MG/ML
2.5 SOLUTION RESPIRATORY (INHALATION) AS NEEDED
Status: CANCELLED
Start: 2023-04-28

## 2022-10-28 RX ORDER — ACETAMINOPHEN 325 MG/1
650 TABLET ORAL AS NEEDED
Status: CANCELLED
Start: 2023-04-28

## 2022-10-28 RX ORDER — DIPHENHYDRAMINE HYDROCHLORIDE 50 MG/ML
25 INJECTION, SOLUTION INTRAMUSCULAR; INTRAVENOUS AS NEEDED
Status: CANCELLED
Start: 2023-04-28

## 2022-10-28 RX ORDER — ONDANSETRON 2 MG/ML
8 INJECTION INTRAMUSCULAR; INTRAVENOUS AS NEEDED
Status: CANCELLED | OUTPATIENT
Start: 2023-04-28

## 2022-10-28 RX ORDER — GLUCOSAMINE SULFATE 1500 MG
POWDER IN PACKET (EA) ORAL DAILY
COMMUNITY

## 2022-10-28 RX ADMIN — DENOSUMAB 60 MG: 60 INJECTION SUBCUTANEOUS at 14:00

## 2022-10-28 NOTE — PROGRESS NOTES
[address] [facility] Progress Note    Date: 2022    Name: Ancelmo Chowdhury    MRN: [id number]         : 1950      Ms. Gina Lazar arrived to [facility] at [phone], here for Prolia (Q6 months). Ms. Gina Lazar was assessed and education was provided. Ms. Souza's vitals were reviewed. Visit Vitals  BP (!) 162/82 (BP 1 Location: Right upper arm, BP Patient Position: Sitting)   Pulse 98   Temp 98.2 °F (36.8 °C)   Resp 18   SpO2 99%   Breastfeeding No     Lab results from 10/26/22 were obtained and reviewed. 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 Results for Jane Álvarez (MRN [id number]) as of 2022 15:29   Ref. Range 2022 11:16   Sodium Latest Ref Range: 136 - 145 mmol/L 142   Potassium Latest Ref Range: 3.5 - 5.5 mmol/L 4.5   Chloride Latest Ref Range: 100 - 111 mmol/L 108   CO2 Latest Ref Range: 21 - 32 mmol/L 28   Anion gap Latest Ref Range: 3.0 - 18 mmol/L 6   Glucose Latest Ref Range: 74 - 99 mg/dL 99   BUN Latest Ref Range: 7.0 - 18 MG/DL 13   Creatinine Latest Ref Range: 0.6 - 1.3 MG/DL 0.92   BUN/Creatinine ratio Latest Ref Range: 12 - 20   14   Calcium Latest Ref Range: 8.5 - 10.1 MG/DL 12.3 (H)   Phosphorus Latest Ref Range: 2.5 - 4.9 MG/DL 2.6   Magnesium Latest Ref Range: 1.6 - 2.6 mg/dL 1.7   GFR est non-AA Latest Ref Range: >60 ml/min/1.73m2 60 (L)   GFR est AA Latest Ref Range: >60 ml/min/1.73m2 >60   Bilirubin, total Latest Ref Range: 0.2 - 1.0 MG/DL 0.3   Protein, total Latest Ref Range: 6.4 - 8.2 g/dL 8.1   Albumin Latest Ref Range: 3.4 - 5.0 g/dL 3.7   Globulin Latest Ref Range: 2.0 - 4.0 g/dL 4.4 (H)   A-G Ratio Latest Ref Range: 0.8 - 1.7   0.8   ALT Latest Ref Range: 13 - 56 U/L 28   AST Latest Ref Range: 10 - 38 U/L 21   Alk. phosphatase Latest Ref Range: 45 - 117 U/L 97       Denosumab (Prolia) 60mg was administered as ordered SQ in the back of pt's R upper arm.  Band-Aid to site.    Ms. Khloe Malin tolerated well without complaints. Discharge/ follow-up instructions discussed w/ pt. Pt verbalized understanding. Pt armband removed & shredded. Ms. Khloe Malin was discharged from [place] [address] in stable condition at 1405. She is to return on 4/26/23 at 200 for her next appointment, for her Prolia labs.     Alejandra Lopes RN  October 28, 2022

## 2022-11-14 RX ORDER — FLUTICASONE FUROATE, UMECLIDINIUM BROMIDE AND VILANTEROL TRIFENATATE 100; 62.5; 25 UG/1; UG/1; UG/1
1 POWDER RESPIRATORY (INHALATION) DAILY
Qty: 3 EACH | Refills: 0 | Status: SHIPPED | COMMUNITY
Start: 2022-11-14

## 2022-11-14 NOTE — TELEPHONE ENCOUNTER
Pt requesting refill of trelegy sent to [facility] mail order. Pt has follow up & full pft scheduled in our office for 01/09/2023.  Please advise

## 2023-01-04 ENCOUNTER — ANESTHESIA EVENT (OUTPATIENT)
Dept: CARDIOTHORACIC SURGERY | Age: 73
End: 2023-01-04
Payer: MEDICARE

## 2023-01-04 RX ORDER — CETIRIZINE HYDROCHLORIDE 10 MG/1
CAPSULE, LIQUID FILLED ORAL
COMMUNITY

## 2023-01-04 RX ORDER — MONTELUKAST SODIUM 10 MG/1
10 TABLET ORAL DAILY
COMMUNITY

## 2023-01-04 RX ORDER — TRIAMTERENE AND HYDROCHLOROTHIAZIDE 37.5; 25 MG/1; MG/1
CAPSULE ORAL DAILY
COMMUNITY

## 2023-01-04 NOTE — PERIOP NOTES
PRE-SURGICAL INSTRUCTIONS        Patient's Name:  Rosio Wayne      UPWCB'X Date:  1/4/2023            Covid Testing Date and Time:    Surgery Date:  1/5/2023                Do NOT eat or drink anything, including candy, gum, or ice chips after midnight on 1/4/2023, unless you have specific instructions from your surgeon or anesthesia provider to do so. You may brush your teeth before coming to the hospital.  No smoking 24 hours prior to the day of surgery. No alcohol 24 hours prior to the day of surgery. No recreational drugs for one week prior to the day of surgery. Leave all valuables, including money/purse, at home. Remove all jewelry, nail polish, acrylic nails, and makeup (including mascara); no lotions powders, deodorant, or perfume/cologne/after shave on the skin. Follow instruction for Hibiclens washes and CHG wipes from surgeon's office. Glasses/contact lenses and dentures may be worn to the hospital.  They will be removed prior to surgery. Call your doctor if symptoms of a cold or illness develop within 24-48 hours prior to your surgery. 11.  If you are having an outpatient procedure, please make arrangements for a responsible ADULT TO [address] [place] [address] and stay with you for 24 hours after your surgery. 12. ONE VISITOR in the hospital at this time for outpatient procedures. Exceptions may be made for surgical admissions, per nursing unit guidelines      Special Instructions:      Bring list of CURRENT medications. Bring inhaler. Bring CPAP machine. Bring any pertinent legal medical records. Take these medications the morning of surgery with a sip of water:  per MD  Follow physician instructions about insulin. Follow physician instructions about stopping anticoagulants. Complete bowel prep per MD instructions. On the day of surgery, come in the main entrance of [facility]. Let the  at the desk know you are there for surgery.   A staff member will come escort you to the surgical area on the second floor. If you have any questions or concerns, please do not hesitate to call:     (Prior to the day of surgery) PAT department:  [phone]   (Day of surgery) Pre-Op department:  [phone]    These surgical instructions were reviewed with patient during the PAT phone call.

## 2023-01-05 ENCOUNTER — ANESTHESIA (OUTPATIENT)
Dept: CARDIOTHORACIC SURGERY | Age: 73
End: 2023-01-05
Payer: MEDICARE

## 2023-01-05 ENCOUNTER — APPOINTMENT (OUTPATIENT)
Dept: GENERAL RADIOLOGY | Age: 73
End: 2023-01-05
Attending: SURGERY
Payer: MEDICARE

## 2023-01-05 ENCOUNTER — HOSPITAL ENCOUNTER (INPATIENT)
Age: 73
LOS: 2 days | Discharge: HOME | End: 2023-01-07
Attending: SURGERY | Admitting: SURGERY
Payer: MEDICARE

## 2023-01-05 DIAGNOSIS — I70.202 OCCLUSION OF LEFT FEMORAL ARTERY (HCC): ICD-10-CM

## 2023-01-05 DIAGNOSIS — I73.9 PVD (PERIPHERAL VASCULAR DISEASE) (HCC): Primary | Chronic | ICD-10-CM

## 2023-01-05 LAB
ANION GAP SERPL CALC-SCNC: 5 MMOL/L (ref 3–18)
BUN SERPL-MCNC: 19 MG/DL (ref 7–18)
BUN/CREAT SERPL: 22 (ref 12–20)
CALCIUM SERPL-MCNC: 11.2 MG/DL (ref 8.5–10.1)
CHLORIDE SERPL-SCNC: 105 MMOL/L (ref 100–111)
CO2 SERPL-SCNC: 30 MMOL/L (ref 21–32)
CREAT SERPL-MCNC: 0.86 MG/DL (ref 0.6–1.3)
ERYTHROCYTE [DISTWIDTH] IN BLOOD BY AUTOMATED COUNT: 14.9 % (ref 11.6–14.5)
GLUCOSE SERPL-MCNC: 96 MG/DL (ref 74–99)
HCT VFR BLD AUTO: 42 % (ref 35–45)
HGB BLD-MCNC: 13.5 G/DL (ref 12–16)
MCH RBC QN AUTO: 29 PG (ref 24–34)
MCHC RBC AUTO-ENTMCNC: 32.1 G/DL (ref 31–37)
MCV RBC AUTO: 90.1 FL (ref 78–100)
NRBC # BLD: 0 K/UL (ref 0–0.01)
NRBC BLD-RTO: 0 PER 100 WBC
PLATELET # BLD AUTO: 326 K/UL (ref 135–420)
PMV BLD AUTO: 9.9 FL (ref 9.2–11.8)
POTASSIUM SERPL-SCNC: 3.6 MMOL/L (ref 3.5–5.5)
RBC # BLD AUTO: 4.66 M/UL (ref 4.2–5.3)
SODIUM SERPL-SCNC: 140 MMOL/L (ref 136–145)
WBC # BLD AUTO: 13.5 K/UL (ref 4.6–13.2)

## 2023-01-05 PROCEDURE — 76010000207 HC CV SURG 2.5 TO 3 HR INTENSV-TIER 1: Performed by: SURGERY

## 2023-01-05 PROCEDURE — 65620000000 HC RM CCU GENERAL

## 2023-01-05 PROCEDURE — 77030002986 HC SUT PROL J&J -A: Performed by: SURGERY

## 2023-01-05 PROCEDURE — 77030040830 HC CATH URETH FOL MDII -A: Performed by: SURGERY

## 2023-01-05 PROCEDURE — B41G1ZZ FLUOROSCOPY OF LEFT LOWER EXTREMITY ARTERIES USING LOW OSMOLAR CONTRAST: ICD-10-PCS | Performed by: SURGERY

## 2023-01-05 PROCEDURE — 77030014023 HC SYR INFL LVEEN BSC -B: Performed by: SURGERY

## 2023-01-05 PROCEDURE — 74011000258 HC RX REV CODE- 258: Performed by: ANESTHESIOLOGY

## 2023-01-05 PROCEDURE — 74011250636 HC RX REV CODE- 250/636: Performed by: ANESTHESIOLOGY

## 2023-01-05 PROCEDURE — 2709999900 HC NON-CHARGEABLE SUPPLY: Performed by: SURGERY

## 2023-01-05 PROCEDURE — 77030025869: Performed by: SURGERY

## 2023-01-05 PROCEDURE — C1769 GUIDE WIRE: HCPCS | Performed by: SURGERY

## 2023-01-05 PROCEDURE — 77030038692 HC WND DEB SYS IRMX -B: Performed by: SURGERY

## 2023-01-05 PROCEDURE — 74011250636 HC RX REV CODE- 250/636: Performed by: NURSE ANESTHETIST, CERTIFIED REGISTERED

## 2023-01-05 PROCEDURE — 77030016441 HC APPL CLP LIG1 J&J -B: Performed by: SURGERY

## 2023-01-05 PROCEDURE — 77030002987 HC SUT PROL J&J -B: Performed by: SURGERY

## 2023-01-05 PROCEDURE — 77030011265 HC ELECTRD BLD HEX COVD -A: Performed by: SURGERY

## 2023-01-05 PROCEDURE — C1757 CATH, THROMBECTOMY/EMBOLECT: HCPCS | Performed by: SURGERY

## 2023-01-05 PROCEDURE — 77030018836 HC SOL IRR NACL ICUM -A: Performed by: SURGERY

## 2023-01-05 PROCEDURE — 76060000036 HC ANESTHESIA 2.5 TO 3 HR: Performed by: SURGERY

## 2023-01-05 PROCEDURE — 2709999900 HC NON-CHARGEABLE SUPPLY

## 2023-01-05 PROCEDURE — 80048 BASIC METABOLIC PNL TOTAL CA: CPT

## 2023-01-05 PROCEDURE — 77030010509 HC AIRWY LMA MSK TELE -A: Performed by: ANESTHESIOLOGY

## 2023-01-05 PROCEDURE — C1768 GRAFT, VASCULAR: HCPCS | Performed by: SURGERY

## 2023-01-05 PROCEDURE — 77030005206: Performed by: SURGERY

## 2023-01-05 PROCEDURE — 74011000250 HC RX REV CODE- 250: Performed by: SURGERY

## 2023-01-05 PROCEDURE — C1876 STENT, NON-COA/NON-COV W/DEL: HCPCS | Performed by: SURGERY

## 2023-01-05 PROCEDURE — 77030013079 HC BLNKT BAIR HGGR 3M -A: Performed by: ANESTHESIOLOGY

## 2023-01-05 PROCEDURE — 74011000636 HC RX REV CODE- 636: Performed by: SURGERY

## 2023-01-05 PROCEDURE — C1894 INTRO/SHEATH, NON-LASER: HCPCS | Performed by: SURGERY

## 2023-01-05 PROCEDURE — 77030002933 HC SUT MCRYL J&J -A: Performed by: SURGERY

## 2023-01-05 PROCEDURE — 74011250636 HC RX REV CODE- 250/636: Performed by: SURGERY

## 2023-01-05 PROCEDURE — 74011000250 HC RX REV CODE- 250: Performed by: ANESTHESIOLOGY

## 2023-01-05 PROCEDURE — 74011250637 HC RX REV CODE- 250/637: Performed by: SURGERY

## 2023-01-05 PROCEDURE — 99100 ANES PT EXTEME AGE<1 YR&>70: CPT | Performed by: ANESTHESIOLOGY

## 2023-01-05 PROCEDURE — 01270 ANES PX ARTERIES UPR LEG NOS: CPT | Performed by: ANESTHESIOLOGY

## 2023-01-05 PROCEDURE — 047J0DZ DILATION OF LEFT EXTERNAL ILIAC ARTERY WITH INTRALUMINAL DEVICE, OPEN APPROACH: ICD-10-PCS | Performed by: SURGERY

## 2023-01-05 PROCEDURE — 77030002996 HC SUT SLK J&J -A: Performed by: SURGERY

## 2023-01-05 PROCEDURE — 77030018673: Performed by: SURGERY

## 2023-01-05 PROCEDURE — 74011250637 HC RX REV CODE- 250/637: Performed by: NURSE ANESTHETIST, CERTIFIED REGISTERED

## 2023-01-05 PROCEDURE — 85027 COMPLETE CBC AUTOMATED: CPT

## 2023-01-05 PROCEDURE — 041K0JL BYPASS RIGHT FEMORAL ARTERY TO POPLITEAL ARTERY WITH SYNTHETIC SUBSTITUTE, OPEN APPROACH: ICD-10-PCS | Performed by: SURGERY

## 2023-01-05 PROCEDURE — 77030010507 HC ADH SKN DERMBND J&J -B: Performed by: SURGERY

## 2023-01-05 DEVICE — PREMOUNTED STENT SYSTEM
Type: IMPLANTABLE DEVICE | Site: LEG | Status: FUNCTIONAL
Brand: EXPRESS® LD ILIAC / BILIARY

## 2023-01-05 DEVICE — PROPATEN VASCULAR GRAFT TW RR 6MMX80CM 60CM RINGS HEPARIN
Type: IMPLANTABLE DEVICE | Site: LEG | Status: FUNCTIONAL
Brand: GORE PROPATEN VASCULAR GRAFT

## 2023-01-05 RX ORDER — SODIUM CHLORIDE 0.9 % (FLUSH) 0.9 %
5-40 SYRINGE (ML) INJECTION AS NEEDED
Status: DISCONTINUED | OUTPATIENT
Start: 2023-01-05 | End: 2023-01-05 | Stop reason: HOSPADM

## 2023-01-05 RX ORDER — KETOROLAC TROMETHAMINE 15 MG/ML
INJECTION, SOLUTION INTRAMUSCULAR; INTRAVENOUS AS NEEDED
Status: DISCONTINUED | OUTPATIENT
Start: 2023-01-05 | End: 2023-01-05 | Stop reason: HOSPADM

## 2023-01-05 RX ORDER — FENTANYL CITRATE 50 UG/ML
INJECTION, SOLUTION INTRAMUSCULAR; INTRAVENOUS AS NEEDED
Status: DISCONTINUED | OUTPATIENT
Start: 2023-01-05 | End: 2023-01-05 | Stop reason: HOSPADM

## 2023-01-05 RX ORDER — DEXTROSE MONOHYDRATE AND SODIUM CHLORIDE 5; .45 G/100ML; G/100ML
75 INJECTION, SOLUTION INTRAVENOUS CONTINUOUS
Status: DISPENSED | OUTPATIENT
Start: 2023-01-05 | End: 2023-01-06

## 2023-01-05 RX ORDER — GUAIFENESIN 100 MG/5ML
81 LIQUID (ML) ORAL DAILY
Status: DISCONTINUED | OUTPATIENT
Start: 2023-01-06 | End: 2023-01-07 | Stop reason: HOSPADM

## 2023-01-05 RX ORDER — SODIUM CHLORIDE 0.9 % (FLUSH) 0.9 %
5-40 SYRINGE (ML) INJECTION EVERY 8 HOURS
Status: DISCONTINUED | OUTPATIENT
Start: 2023-01-05 | End: 2023-01-07 | Stop reason: HOSPADM

## 2023-01-05 RX ORDER — LIDOCAINE HYDROCHLORIDE 10 MG/ML
INJECTION, SOLUTION EPIDURAL; INFILTRATION; INTRACAUDAL; PERINEURAL
Status: DISCONTINUED
Start: 2023-01-05 | End: 2023-01-05

## 2023-01-05 RX ORDER — HEPARIN SODIUM 200 [USP'U]/100ML
INJECTION, SOLUTION INTRAVENOUS
Status: DISCONTINUED
Start: 2023-01-05 | End: 2023-01-05

## 2023-01-05 RX ORDER — PROPOFOL 10 MG/ML
INJECTION, EMULSION INTRAVENOUS AS NEEDED
Status: DISCONTINUED | OUTPATIENT
Start: 2023-01-05 | End: 2023-01-05 | Stop reason: HOSPADM

## 2023-01-05 RX ORDER — IPRATROPIUM BROMIDE AND ALBUTEROL SULFATE 2.5; .5 MG/3ML; MG/3ML
3 SOLUTION RESPIRATORY (INHALATION)
Status: DISCONTINUED | OUTPATIENT
Start: 2023-01-05 | End: 2023-01-07 | Stop reason: HOSPADM

## 2023-01-05 RX ORDER — ARFORMOTEROL TARTRATE 15 UG/2ML
15 SOLUTION RESPIRATORY (INHALATION)
Status: DISCONTINUED | OUTPATIENT
Start: 2023-01-05 | End: 2023-01-07 | Stop reason: HOSPADM

## 2023-01-05 RX ORDER — MONTELUKAST SODIUM 10 MG/1
10 TABLET ORAL DAILY
Status: DISCONTINUED | OUTPATIENT
Start: 2023-01-06 | End: 2023-01-07 | Stop reason: HOSPADM

## 2023-01-05 RX ORDER — LATANOPROST 50 UG/ML
1 SOLUTION/ DROPS OPHTHALMIC
Status: DISCONTINUED | OUTPATIENT
Start: 2023-01-05 | End: 2023-01-07 | Stop reason: HOSPADM

## 2023-01-05 RX ORDER — LIDOCAINE HYDROCHLORIDE 20 MG/ML
INJECTION, SOLUTION EPIDURAL; INFILTRATION; INTRACAUDAL; PERINEURAL AS NEEDED
Status: DISCONTINUED | OUTPATIENT
Start: 2023-01-05 | End: 2023-01-05 | Stop reason: HOSPADM

## 2023-01-05 RX ORDER — NALOXONE HYDROCHLORIDE 0.4 MG/ML
0.4 INJECTION, SOLUTION INTRAMUSCULAR; INTRAVENOUS; SUBCUTANEOUS AS NEEDED
Status: DISCONTINUED | OUTPATIENT
Start: 2023-01-05 | End: 2023-01-07 | Stop reason: HOSPADM

## 2023-01-05 RX ORDER — DEXAMETHASONE SODIUM PHOSPHATE 4 MG/ML
INJECTION, SOLUTION INTRA-ARTICULAR; INTRALESIONAL; INTRAMUSCULAR; INTRAVENOUS; SOFT TISSUE AS NEEDED
Status: DISCONTINUED | OUTPATIENT
Start: 2023-01-05 | End: 2023-01-05 | Stop reason: HOSPADM

## 2023-01-05 RX ORDER — HYDROMORPHONE HYDROCHLORIDE 1 MG/ML
.5-1 INJECTION, SOLUTION INTRAMUSCULAR; INTRAVENOUS; SUBCUTANEOUS
Status: DISCONTINUED | OUTPATIENT
Start: 2023-01-05 | End: 2023-01-07 | Stop reason: HOSPADM

## 2023-01-05 RX ORDER — CLOPIDOGREL BISULFATE 75 MG/1
75 TABLET ORAL DAILY
Status: DISCONTINUED | OUTPATIENT
Start: 2023-01-06 | End: 2023-01-07 | Stop reason: HOSPADM

## 2023-01-05 RX ORDER — HEPARIN SODIUM 200 [USP'U]/100ML
INJECTION, SOLUTION INTRAVENOUS
Status: COMPLETED | OUTPATIENT
Start: 2023-01-05 | End: 2023-01-05

## 2023-01-05 RX ORDER — DULOXETIN HYDROCHLORIDE 30 MG/1
60 CAPSULE, DELAYED RELEASE ORAL
Status: DISCONTINUED | OUTPATIENT
Start: 2023-01-05 | End: 2023-01-07 | Stop reason: HOSPADM

## 2023-01-05 RX ORDER — FLUTICASONE PROPIONATE 50 MCG
2 SPRAY, SUSPENSION (ML) NASAL
Status: DISCONTINUED | OUTPATIENT
Start: 2023-01-05 | End: 2023-01-07 | Stop reason: HOSPADM

## 2023-01-05 RX ORDER — ONDANSETRON 2 MG/ML
4 INJECTION INTRAMUSCULAR; INTRAVENOUS
Status: DISCONTINUED | OUTPATIENT
Start: 2023-01-05 | End: 2023-01-07 | Stop reason: HOSPADM

## 2023-01-05 RX ORDER — SODIUM CHLORIDE, SODIUM LACTATE, POTASSIUM CHLORIDE, CALCIUM CHLORIDE 600; 310; 30; 20 MG/100ML; MG/100ML; MG/100ML; MG/100ML
25 INJECTION, SOLUTION INTRAVENOUS CONTINUOUS
Status: DISCONTINUED | OUTPATIENT
Start: 2023-01-05 | End: 2023-01-05 | Stop reason: HOSPADM

## 2023-01-05 RX ORDER — PREGABALIN 75 MG/1
150 CAPSULE ORAL EVERY 12 HOURS
Status: DISCONTINUED | OUTPATIENT
Start: 2023-01-05 | End: 2023-01-07 | Stop reason: HOSPADM

## 2023-01-05 RX ORDER — MIDAZOLAM HYDROCHLORIDE 1 MG/ML
INJECTION, SOLUTION INTRAMUSCULAR; INTRAVENOUS AS NEEDED
Status: DISCONTINUED | OUTPATIENT
Start: 2023-01-05 | End: 2023-01-05 | Stop reason: HOSPADM

## 2023-01-05 RX ORDER — OXYCODONE AND ACETAMINOPHEN 5; 325 MG/1; MG/1
1 TABLET ORAL
Status: DISCONTINUED | OUTPATIENT
Start: 2023-01-05 | End: 2023-01-07 | Stop reason: HOSPADM

## 2023-01-05 RX ORDER — LIDOCAINE HYDROCHLORIDE 10 MG/ML
0.1 INJECTION, SOLUTION EPIDURAL; INFILTRATION; INTRACAUDAL; PERINEURAL AS NEEDED
Status: DISCONTINUED | OUTPATIENT
Start: 2023-01-05 | End: 2023-01-05 | Stop reason: HOSPADM

## 2023-01-05 RX ORDER — ONDANSETRON 2 MG/ML
INJECTION INTRAMUSCULAR; INTRAVENOUS AS NEEDED
Status: DISCONTINUED | OUTPATIENT
Start: 2023-01-05 | End: 2023-01-05 | Stop reason: HOSPADM

## 2023-01-05 RX ORDER — HYDRALAZINE HYDROCHLORIDE 20 MG/ML
5-10 INJECTION INTRAMUSCULAR; INTRAVENOUS
Status: DISCONTINUED | OUTPATIENT
Start: 2023-01-05 | End: 2023-01-07 | Stop reason: HOSPADM

## 2023-01-05 RX ORDER — FAMOTIDINE 20 MG/1
20 TABLET, FILM COATED ORAL ONCE
Status: COMPLETED | OUTPATIENT
Start: 2023-01-05 | End: 2023-01-05

## 2023-01-05 RX ORDER — BUDESONIDE 0.25 MG/2ML
250 INHALANT ORAL
Status: DISCONTINUED | OUTPATIENT
Start: 2023-01-05 | End: 2023-01-07 | Stop reason: HOSPADM

## 2023-01-05 RX ORDER — SODIUM CHLORIDE 0.9 % (FLUSH) 0.9 %
5-40 SYRINGE (ML) INJECTION EVERY 8 HOURS
Status: DISCONTINUED | OUTPATIENT
Start: 2023-01-05 | End: 2023-01-05 | Stop reason: HOSPADM

## 2023-01-05 RX ORDER — IPRATROPIUM BROMIDE 0.5 MG/2.5ML
0.5 SOLUTION RESPIRATORY (INHALATION)
Status: DISCONTINUED | OUTPATIENT
Start: 2023-01-05 | End: 2023-01-07 | Stop reason: HOSPADM

## 2023-01-05 RX ORDER — HEPARIN SODIUM 1000 [USP'U]/ML
INJECTION, SOLUTION INTRAVENOUS; SUBCUTANEOUS AS NEEDED
Status: DISCONTINUED | OUTPATIENT
Start: 2023-01-05 | End: 2023-01-05 | Stop reason: HOSPADM

## 2023-01-05 RX ORDER — SODIUM CHLORIDE 0.9 % (FLUSH) 0.9 %
5-40 SYRINGE (ML) INJECTION AS NEEDED
Status: DISCONTINUED | OUTPATIENT
Start: 2023-01-05 | End: 2023-01-07 | Stop reason: HOSPADM

## 2023-01-05 RX ORDER — IODIXANOL 320 MG/ML
INJECTION, SOLUTION INTRAVASCULAR AS NEEDED
Status: DISCONTINUED | OUTPATIENT
Start: 2023-01-05 | End: 2023-01-05 | Stop reason: HOSPADM

## 2023-01-05 RX ADMIN — HEPARIN SODIUM 5000 UNITS: 1000 INJECTION, SOLUTION INTRAVENOUS; SUBCUTANEOUS at 12:36

## 2023-01-05 RX ADMIN — KETOROLAC TROMETHAMINE 15 MG: 15 INJECTION, SOLUTION INTRAMUSCULAR; INTRAVENOUS at 11:54

## 2023-01-05 RX ADMIN — DEXAMETHASONE SODIUM PHOSPHATE 4 MG: 4 INJECTION, SOLUTION INTRAMUSCULAR; INTRAVENOUS at 11:54

## 2023-01-05 RX ADMIN — SODIUM CHLORIDE, PRESERVATIVE FREE 10 ML: 5 INJECTION INTRAVENOUS at 21:35

## 2023-01-05 RX ADMIN — VANCOMYCIN HYDROCHLORIDE 1000 MG: 1 INJECTION, POWDER, LYOPHILIZED, FOR SOLUTION INTRAVENOUS at 10:55

## 2023-01-05 RX ADMIN — FAMOTIDINE 20 MG: 20 TABLET, FILM COATED ORAL at 10:53

## 2023-01-05 RX ADMIN — LATANOPROST 1 DROP: 50 SOLUTION OPHTHALMIC at 21:31

## 2023-01-05 RX ADMIN — DULOXETINE HYDROCHLORIDE 60 MG: 30 CAPSULE, DELAYED RELEASE ORAL at 21:31

## 2023-01-05 RX ADMIN — PHENYLEPHRINE HYDROCHLORIDE 100 MCG: 10 INJECTION INTRAVENOUS at 13:35

## 2023-01-05 RX ADMIN — PHENYLEPHRINE HYDROCHLORIDE 100 MCG: 10 INJECTION INTRAVENOUS at 12:25

## 2023-01-05 RX ADMIN — PHENYLEPHRINE HYDROCHLORIDE 100 MCG: 10 INJECTION INTRAVENOUS at 12:42

## 2023-01-05 RX ADMIN — LIDOCAINE HYDROCHLORIDE 40 MG: 20 INJECTION, SOLUTION EPIDURAL; INFILTRATION; INTRACAUDAL; PERINEURAL at 11:54

## 2023-01-05 RX ADMIN — FENTANYL CITRATE 50 MCG: 50 INJECTION INTRAMUSCULAR; INTRAVENOUS at 13:53

## 2023-01-05 RX ADMIN — SODIUM CHLORIDE, POTASSIUM CHLORIDE, SODIUM LACTATE AND CALCIUM CHLORIDE 25 ML/HR: 600; 310; 30; 20 INJECTION, SOLUTION INTRAVENOUS at 10:53

## 2023-01-05 RX ADMIN — PHENYLEPHRINE HYDROCHLORIDE 100 MCG: 10 INJECTION INTRAVENOUS at 14:00

## 2023-01-05 RX ADMIN — PREGABALIN 150 MG: 75 CAPSULE ORAL at 21:31

## 2023-01-05 RX ADMIN — PHENYLEPHRINE HYDROCHLORIDE 100 MCG: 10 INJECTION INTRAVENOUS at 12:17

## 2023-01-05 RX ADMIN — SODIUM CHLORIDE, PRESERVATIVE FREE 10 ML: 5 INJECTION INTRAVENOUS at 18:48

## 2023-01-05 RX ADMIN — VANCOMYCIN HYDROCHLORIDE 1000 MG: 1 INJECTION, POWDER, LYOPHILIZED, FOR SOLUTION INTRAVENOUS at 21:31

## 2023-01-05 RX ADMIN — FENTANYL CITRATE 50 MCG: 50 INJECTION INTRAMUSCULAR; INTRAVENOUS at 13:25

## 2023-01-05 RX ADMIN — MIDAZOLAM HYDROCHLORIDE 2 MG: 2 INJECTION, SOLUTION INTRAMUSCULAR; INTRAVENOUS at 11:54

## 2023-01-05 RX ADMIN — DEXTROSE MONOHYDRATE AND SODIUM CHLORIDE 75 ML/HR: 5; .45 INJECTION, SOLUTION INTRAVENOUS at 15:00

## 2023-01-05 RX ADMIN — FENTANYL CITRATE 50 MCG: 50 INJECTION INTRAMUSCULAR; INTRAVENOUS at 12:55

## 2023-01-05 RX ADMIN — PROPOFOL 100 MG: 10 INJECTION, EMULSION INTRAVENOUS at 11:54

## 2023-01-05 RX ADMIN — PHENYLEPHRINE HYDROCHLORIDE 100 MCG: 10 INJECTION INTRAVENOUS at 13:08

## 2023-01-05 RX ADMIN — FENTANYL CITRATE 50 MCG: 50 INJECTION INTRAMUSCULAR; INTRAVENOUS at 11:54

## 2023-01-05 RX ADMIN — ONDANSETRON 4 MG: 2 INJECTION INTRAMUSCULAR; INTRAVENOUS at 11:54

## 2023-01-05 NOTE — OP NOTES
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 Procedure(s):  LEFT LEG TRHOMBECTOMY/LEFT LEG ANGIOGRAM/ANGIOPLASTY/LEFT ILIAC STENT PLACEMENT/LEFT LEG FEM BELOW KNEE BYPASS    Surgeon(s):  Samira Dorado MD    Surgical Assistant: Surg Asst-1: Ashwini Proctor    Anesthesia: General     Estimated Blood Loss (mL):  Minimal    Complications: None    Specimens: * No specimens in log *     Implants:   Implant Name Type Inv. Item Serial No.  Lot No. LRB No. Used Action   GRAFT TW STRTCH RR [id number] -- PROPATEN - [id number]  GRAFT TW STRTCH RR [id number] -- PROPATEN [id number]  [employer]  Left 1 Implanted   STENT BILI LD PMTD [id number] -- Ananth Shultz [id number] - [id number]  STENT BILI LD PMTD [id number] -- Ananth Shultz [id number]  [employer] [id number] Left 1 Implanted       Drains: * No LDAs found *    Findings: Critical 99% stenosis of the proximal 1 cm of left external iliac artery. Balloon angioplasty and stent with a 7 x 37 balloon expandable stent with no residual stenosis. Chronic appearing occlusion of left femoropopliteal bypass. Aorto iliac patent with stent now. Left femoral and profunda are patent. Reconstitution late of a popliteal with anterior tibial and posterior tibial runoff. Femoral artery to below-knee popliteal bypass with good flow. 6 mm ringed propatent    Detailed Description of Procedure:   Patient came to hospital after reporting nearly a week of increasing pain left leg. Patient has history of right axillary to femoral  Bypass also history of left femoropopliteal bypass.   She had a preop antibiotic vancomycin General anesthesia Mercer catheter placed shaved prepped draped from the umbilicus to the foot on the left usual standard fashion following [facility] compliant guidelines for aseptic technique. Main incision in the groin exposing the proximal portion of the femoropopliteal bypass this was not infected and was occluded. There was a pulse just above this. Opened on the occluded part of the graft and try to thrombectomized but there is really vacuous space. There was no acute thrombus at this location and I could not thrombectomize or open the graft. I closed the site back up with 5-0 Prolene's. I punctured the pulsatile femoral artery and placed the sheath performed an angiogram the aorta was patent the iliac common iliac was patent on the right the common and external patent external was totally occluded. On the left, was patent there is a critical inflow at the external and a patent internal.  I placed Magic torque wire and a 6 Serbian sheath and under mapping technique balloon angioplastied the site with a 7 x 37 balloon expandable stent to full profile with a pressure of 12. There is no residual stenosis on follow-up angiogram.  I performed the remainder of the left leg angiogram there was late reconstitution of the popliteal with tibial runoff. Made an incision here at this time expose the popliteal artery it was soft and seen to be a good target for new bypass. I made a subsartorial tunnel between these 2 sites that heparinized long ago. Placed a 6 mm ringed propatent graft here. Pulled the sheath and clamped the area and opened up this stuart where the sheath was noted anastomose the graft after cutting on a bevel and decide. This was done with CV 6 Carlyle-Rashid suture circular in standard fashion. Next I placed Vesseloops in the popliteal artery and gain control made incision on the popliteal artery limb with scalpel and Adair scissors cut the graft on the beveled end of the appropriate membranes and sewed end-to-side to the popliteal artery also with CV 6 suture circular standard fashion.   Just prior to completion I brought the graft to remove air backbled the site and then we completed the anastomosis and released all the controls. Doppler was excellent the popliteal excellent at the dorsalis pedis and improved at the posterior tibial.  Irrigated the sites all was done with antibiotic irrigation throughout the procedure to maximize antibiotic effect. He did receive 5000 of heparin prior to intervention. Closed the wounds meticulously interrupted 2-0 Monocryl's for fascia 3-0 Vicryl for subcu interrupted 4-0 Monocryl and Dermabond glue for the skin.   A wound management system was applied and she was transferred to ICU for postop care in stable condition    Electronically Signed by Samir Beckman MD on 1/5/2023 at 2:13 PM

## 2023-01-05 NOTE — H&P
Surgery History and Physical    Subjective:      Nahun Nick is a 74 y.o.  female who presents with complaints of increasing pain in her left leg since last Friday. She has a left femoropopliteal bypass is greater than 8years old. She also has an axillofemoral bypass that is patent. Her left femoropopliteal was seen by Doppler to be occluded yesterday and is here now for attempted thrombectomy and limb salvage. Patient Active Problem List    Diagnosis Date Noted    Postmenopausal osteoporosis 04/22/2022    Vascular graft infection ([address] 12/09/2020    Cellulitis of abdominal wall 12/03/2020    UTI (urinary tract infection) 12/03/2020    Open wound 09/10/2020    Chronic wound infection of abdomen 06/21/2018    Nonhealing surgical wound 04/30/2018    Infection of vascular bypass graft ([address] 04/30/2018    CAP (community acquired pneumonia) 06/27/2017    Severe sepsis ([address] 06/27/2017    Arteriovenous graft infection ([address] 05/10/2017    Abscess 05/10/2017    Dermal sinus tract of lumbosacral region ([address] 05/10/2017    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[address] 08/09/2016    Chronic aorto-iliac occlusion syndrome ([address] 01/19/2016    Wound disruption, post-op, skin 01/09/2015    Mass of breast, left 07/30/2014    HTN (hypertension) 04/01/2013    PVD (peripheral vascular disease) ([address] 04/01/2013    Crohn's disease ([address] 04/01/2013     Past Medical History:   Diagnosis Date    Arthritis     CAP (community acquired pneumonia) 06/27/2017    Chronic lung disease     Claudication ([address]     left leg    Crohn's disease ([address]     Crohn's disease ([address]     GERD (gastroesophageal reflux disease)     HTN (hypertension)     Ill-defined condition     Uses O2 at night.     Nausea & vomiting     Neuropathy     Other and unspecified symptoms and signs involving general sensations and perceptions     PVD    Other ill-defined conditions(799.89)     Crohn's    Parathyroid tumor     sees Endo Dr Lucia Lockhart    Peripheral neuropathy     Pulmonary emphysema (HCC)     PVD (peripheral vascular disease) [facility]     right leg    Sepsis ([facility] 06/27/2017    Vitamin B12 deficiency       Past Surgical History:   Procedure Laterality Date    COLONOSCOPY N/A 9/11/2019    DIAGNOSTIC COLONOSCOPY performed by Franki Sparks MD at [facility] ENDOSCOPY    HX APPENDECTOMY      HX BREAST LUMPECTOMY Left     breast left- precancerous    HX BUNIONECTOMY      left eye    HX CATARACT REMOVAL      bilateral    HX CHOLECYSTECTOMY      HX ORTHOPAEDIC      lateral epicondilitis on right    HX OTHER SURGICAL  3/7/2013    catheter into aorta    HX OTHER SURGICAL      intestional resection x4    HX OTHER SURGICAL  9/2013    I & D Right chest/flank wall abscess vs granuloma    ID BYP OTH/THN VEIN AXILLARY-FEMORAL Right 4/19/2013    ID BYP OTH/THN VEIN FEMORAL-POPLITEAL Left 5/2013    ID UNLISTED PROCEDURE FOOT/TOES      ID UNLISTED PROCEDURE HUMERUS/ELBOW      ID UNLISTED PROCEDURE VASCULAR SURGERY  09/10/2020    Debridement and washout of bypass graft. Social History     Tobacco Use    Smoking status: Every Day     Packs/day: 1.00     Years: 45.00     Pack years: 45.00     Types: Cigarettes    Smokeless tobacco: Never   Substance Use Topics    Alcohol use: No      Family History   Problem Relation Age of Onset    Coronary Art Dis Mother     Heart Attack Mother     Heart Surgery Mother         CABGx3    Elevated Lipids Mother     COPD Father     Heart Attack Brother     COPD Brother     Other Brother         PAD      Prior to Admission medications    Medication Sig Start Date End Date Taking? Authorizing Provider   Cetirizine (ZyrTEC) 10 mg cap Take  by mouth nightly. Yes Provider, Historical   montelukast (Singulair) 10 mg tablet Take 10 mg by mouth daily. Yes Provider, Historical   triamterene-hydroCHLOROthiazide (DYAZIDE) 37.5-25 mg per capsule Take  by mouth daily.    Yes Provider, Historical   fluticasone-umeclidinium-vilanterol (Estrellita Hansen) 100-62.5-25 mcg inhaler Take 1 Puff by inhalation daily. 11/14/22  Yes Kb Cohen MD   cholecalciferol (VITAMIN D3) 25 mcg (1,000 unit) cap Take  by mouth daily. Yes Provider, Historical   albuterol (PROVENTIL HFA, VENTOLIN HFA, PROAIR HFA) 90 mcg/actuation inhaler Take 2 Puffs by inhalation every six (6) hours as needed for Wheezing. 8/26/21  Yes Rosa Davies MD   OTHER    Yes Provider, Historical   fluticasone propionate (FLONASE) 50 mcg/actuation nasal spray 2 Sprays by Both Nostrils route daily as needed for Rhinitis. Yes Provider, Historical   pregabalin (LYRICA) 100 mg capsule Take  by mouth two (2) times a day. Takes 100 mg in am and 200 in the pm   Yes Provider, Historical   DULoxetine (CYMBALTA) 60 mg capsule Take 60 mg by mouth nightly. For Anxiety. 60mg at night, 20mg in AM   Yes Provider, Historical   aspirin 81 mg tablet Take 81 mg by mouth daily. Yes Provider, Historical   cyanocobalamin (VITAMIN B12) 1,000 mcg/mL injection 1,000 mcg by IntraMUSCular route every fourteen (14) days. Yes Provider, Historical   loperamide (IMMODIUM) 2 mg tablet Take 2 mg by mouth daily as needed for Diarrhea. Yes Provider, Historical   dicyclomine (BENTYL) 10 mg capsule Take 10 mg by mouth daily as needed for Abdominal Cramps. Yes Provider, Historical   bimatoprost (LUMIGAN) 0.03 % ophthalmic drops Administer 1 Drop to both eyes every evening. Yes Provider, Historical   inFLIXimab (REMICADE) 100 mg injection 5 mg/kg by IntraVENous route once. Every 4 weeks   Yes Provider, Historical   predniSONE (DELTASONE) 20 mg tablet Take 40 mg by mouth daily (with breakfast). Patient not taking: Reported on 4/29/2022 10/12/21   Mell Douglas,    colchicine 0.6 mg tablet Take 0.6 mg by mouth two (2) times a day. Patient not taking: No sig reported    Provider, Historical   ciprofloxacin HCl (CIPRO) 500 mg tablet Take 1 Tab by mouth every twelve (12) hours.  12/11/20   TIMMY Mistry   multivitamin (ONE A DAY) tablet Take 1 Tab by mouth daily. Patient not taking: Reported on 2022    Provider, Historical   BIOTIN PO Take 1 Tab by mouth daily. Patient not taking: Reported on 2022    Provider, Historical   clopidogreL (PLAVIX) 75 mg tab TAKE ONE TABLET BY MOUTH DAILY  Patient not taking: Reported on 2023 6/15/20   Jose A Guevara MD   loratadine (CLARITIN) 10 mg tablet Take 10 mg by mouth daily. Patient not taking: Reported on 2023    Provider, Historical   triamterene-hydrochlorothiazide (DYAZIDE) 37.5-25 mg per capsule Take 1 Capsule by mouth daily. Provider, Historical   atropine-PHENobarbital-scopolamine-hyoscyamine (DONNATAL) 16.2-0.1037 -0.0194 mg per tablet Take 1 Tab by mouth every eight (8) hours as needed for Diarrhea or Nausea. Indications: irritable colon  Patient not taking: Reported on 2023    Provider, Historical     Allergies   Allergen Reactions    Codeine Nausea and Vomiting     Other reaction(s): other/intolerance    Darvon [Propoxyphene] Itching    Demerol [Meperidine] Other (comments)     Halucinations    Keflex [Cephalexin] Diarrhea    Talwin [Pentazocine Lactate] Shortness of Breath and Nausea and Vomiting         Review of Systems:    A comprehensive review of systems was negative except for that written in the History of Present Illness. Objective:     Patient Vitals for the past 8 hrs:   BP Temp Pulse Resp SpO2 Weight   23 1045 (!) 156/72 98 °F (36.7 °C) 85 16 98 % 62.6 kg (138 lb)       Temp (24hrs), Av °F (36.7 °C), Min:98 °F (36.7 °C), Max:98 °F (36.7 °C)      Physical Exam:  LUNG: clear to auscultation bilaterally, HEART: S1, S2 normal, ABDOMEN: soft, non-tender. Bowel sounds normal. No masses,  no organomegaly. Generally appears well today. Nonpalpable pulses left foot.   Left femoral has a positive pulse to palpation    Labs:   Recent Results (from the past 24 hour(s))   METABOLIC PANEL, BASIC    Collection Time: 23 10:42 AM   Result Value Ref Range    Sodium 140 136 - 145 mmol/L    Potassium 3.6 3.5 - 5.5 mmol/L    Chloride 105 100 - 111 mmol/L    CO2 30 21 - 32 mmol/L    Anion gap 5 3.0 - 18 mmol/L    Glucose 96 74 - 99 mg/dL    BUN 19 (H) 7.0 - 18 MG/DL    Creatinine 0.86 0.6 - 1.3 MG/DL    BUN/Creatinine ratio 22 (H) 12 - 20      eGFR >60 >60 ml/min/1.73m2    Calcium 11.2 (H) 8.5 - 10.1 MG/DL   CBC W/O DIFF    Collection Time: 01/05/23 10:42 AM   Result Value Ref Range    WBC 13.5 (H) 4.6 - 13.2 K/uL    RBC 4.66 4.20 - 5.30 M/uL    HGB 13.5 12.0 - 16.0 g/dL    HCT 42.0 35.0 - 45.0 %    MCV 90.1 78.0 - 100.0 FL    MCH 29.0 24.0 - 34.0 PG    MCHC 32.1 31.0 - 37.0 g/dL    RDW 14.9 (H) 11.6 - 14.5 %    PLATELET 789 835 - 134 K/uL    MPV 9.9 9.2 - 11.8 FL    NRBC 0.0 0  WBC    ABSOLUTE NRBC 0.00 0.00 - 0.01 K/uL       Data Review: Doppler shows occluded bypass graft left femoropopliteal    Assessment:     Active Problems:    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[facility] (8/9/2016)        Plan:      Thrombectomy revision left leg bypass graft for limb salvage purposes    Signed By: Venus Lennox, MD     January 5, 2023

## 2023-01-05 NOTE — PROGRESS NOTES
Latanoprost ophth solution was therapeutically interchanged for bimatoprost per the P&T Committee approved Therapeutic Interchanges Policy.     [facility], Pharmacist  1/5/2023 2:50 PM

## 2023-01-05 NOTE — PROGRESS NOTES
[address] [facility] Pharmacokinetic Monitoring Service - Vancomycin     Benedict Dubin is a 74 y.o. female starting on vancomycin therapy for Surgical Prophylaxis. Pharmacy consulted by Marita Lilly MD for monitoring and adjustment. Additional Antimicrobials: None    Pertinent Laboratory Values:   Temp: 97.9 °F (36.6 °C)  Weight: 62.6 kg (138 lb)  Recent Labs     01/05/23  1042   CREA 0.86   BUN 19*   WBC 13.5*     Estimated Creatinine Clearance: 51.6 mL/min (based on SCr of 0.86 mg/dL).     Plan:  Start vancomycin 1000 mg IV q12 hours x 2 doses  Renal labs as indicated   Pharmacy will continue to monitor patient and adjust therapy as indicated    Thank you for the consult,  JERMAINE Dow  1/5/2023

## 2023-01-05 NOTE — ANESTHESIA POSTPROCEDURE EVALUATION
Procedure(s):  LEFT LEG TRHOMBECTOMY/LEFT LEG ANGIOGRAM/ANGIOPLASTY/LEFT ILIAC STENT PLACEMENT/LEFT LEG FEM BELOW KNEE BYPASS. general    Anesthesia Post Evaluation      Multimodal analgesia: multimodal analgesia used between 6 hours prior to anesthesia start to PACU discharge  Patient location during evaluation: ICU  Patient participation: complete - patient participated  Level of consciousness: awake  Pain score: 3  Airway patency: patent  Anesthetic complications: no  Cardiovascular status: acceptable  Respiratory status: acceptable  Hydration status: acceptable  Post anesthesia nausea and vomiting:  none  Final Post Anesthesia Temperature Assessment:  Normothermia (36.0-37.5 degrees C)      INITIAL Post-op Vital signs:   Vitals Value Taken Time   /59 01/05/23 1600   Temp 36.6 °C (97.9 °F) 01/05/23 1435   Pulse 77 01/05/23 1617   Resp 16 01/05/23 1617   SpO2 97 % 01/05/23 1617   Vitals shown include unvalidated device data.

## 2023-01-05 NOTE — ANESTHESIA PREPROCEDURE EVALUATION
Relevant Problems   No relevant active problems       Anesthetic History     PONV          Review of Systems / Medical History  Patient summary reviewed and pertinent labs reviewed    Pulmonary    COPD: moderate      Shortness of breath and smoker      Comments: 2L home O2   Neuro/Psych             Comments: neuropathy Cardiovascular    Hypertension          PAD and hyperlipidemia    Exercise tolerance: <4 METS     GI/Hepatic/Renal     GERD          Comments: Crohn's Endo/Other        Arthritis     Other Findings   Comments: Non-healing wounds            Physical Exam    Airway  Mallampati: II    Neck ROM: normal range of motion   Mouth opening: Normal     Cardiovascular  Regular rate and rhythm,  S1 and S2 normal,  no murmur, click, rub, or gallop             Dental    Dentition: Poor dentition, Upper partial plate and Lower partial plate     Pulmonary      Decreased breath sounds: bilateral      Prolonged expiration     Abdominal  GI exam deferred       Other Findings            Anesthetic Plan    ASA: 4  Anesthesia type: general          Induction: Intravenous  Anesthetic plan and risks discussed with: Patient

## 2023-01-05 NOTE — PROGRESS NOTES
TRANSFER - OUT REPORT:    Verbal report given to Fernando Austin RN on [employer]  being transferred to CVT ICU for routine post - op       Report consisted of patients Situation, Background, Assessment and   Recommendations(SBAR). Information from the following report(s) OR Summary, Procedure Summary, Intake/Output, and MAR was reviewed with the receiving nurse. Lines:   Peripheral IV 01/05/23 Anterior;Left;Proximal Forearm (Active)   Site Assessment Clean, dry, & intact 01/05/23 1046   Phlebitis Assessment 0 01/05/23 1046   Dressing Status Clean, dry, & intact 01/05/23 1046   Dressing Type Tape;Transparent 01/05/23 1046   Hub Color/Line Status Blue 01/05/23 1046   Alcohol Cap Used No 01/05/23 1046        Opportunity for questions and clarification was provided.       Patient transported with:   Monitor  O2 @ 4 liters  Registered Nurse

## 2023-01-05 NOTE — PROGRESS NOTES
1435 patient received from Dr. Jenny Agosto and Raynald Dance. Patient lethargic upon arrival but awakens to voice. Spoke w/ dr. Jenny Agosto in regards to procedural [facility] and surgical sites. Patient has dopplerable pulses in both lower extremities upon arrival. LLE noted to be red/inflammed distal to surgical site and the other extremity unremarkable. [address] [place] [address] Dr. April Madrid at bedside. Discussed potential for htn intervention. Verbal order given. See MAR. No intervention given as patient pressure was lower on follow up NIBP. 1510 patient becoming easier to arouse, following commands and answering questions appropriately. 1712 Pulses are still dopplerable. Spouse at bedside requesting wife sleep. Patient is still tired post-op which spouse informed me is normal for her. Vitals look good on the monitor and patient still easily aroused. 1800  spoke to me about spouses smoking cessation and I inquired with patient about needing a nicotine patch and patient denied the need for it.  leaving for the evening. Will continue to monitor and update as needed.

## 2023-01-05 NOTE — PROGRESS NOTES
Budesonide/arformoterol/ipratropium nebulizers was therapeutically interchanged for Trelegy inhaler per the P&T Committee approved Therapeutic Interchanges Policy.     Elyse [facility], Pharmacist  1/5/2023 2:53 PM

## 2023-01-06 LAB
ANION GAP SERPL CALC-SCNC: 4 MMOL/L (ref 3–18)
BUN SERPL-MCNC: 16 MG/DL (ref 7–18)
BUN/CREAT SERPL: 25 (ref 12–20)
CALCIUM SERPL-MCNC: 9.2 MG/DL (ref 8.5–10.1)
CHLORIDE SERPL-SCNC: 110 MMOL/L (ref 100–111)
CO2 SERPL-SCNC: 27 MMOL/L (ref 21–32)
CREAT SERPL-MCNC: 0.64 MG/DL (ref 0.6–1.3)
ERYTHROCYTE [DISTWIDTH] IN BLOOD BY AUTOMATED COUNT: 15 % (ref 11.6–14.5)
GLUCOSE SERPL-MCNC: 110 MG/DL (ref 74–99)
HCT VFR BLD AUTO: 35.2 % (ref 35–45)
HGB BLD-MCNC: 11.2 G/DL (ref 12–16)
MCH RBC QN AUTO: 29.6 PG (ref 24–34)
MCHC RBC AUTO-ENTMCNC: 31.8 G/DL (ref 31–37)
MCV RBC AUTO: 92.9 FL (ref 78–100)
NRBC # BLD: 0 K/UL (ref 0–0.01)
NRBC BLD-RTO: 0 PER 100 WBC
PLATELET # BLD AUTO: 272 K/UL (ref 135–420)
PMV BLD AUTO: 10.4 FL (ref 9.2–11.8)
POTASSIUM SERPL-SCNC: 3.9 MMOL/L (ref 3.5–5.5)
RBC # BLD AUTO: 3.79 M/UL (ref 4.2–5.3)
SODIUM SERPL-SCNC: 141 MMOL/L (ref 136–145)
WBC # BLD AUTO: 11.3 K/UL (ref 4.6–13.2)

## 2023-01-06 PROCEDURE — 65660000004 HC RM CVT STEPDOWN

## 2023-01-06 PROCEDURE — 77010033678 HC OXYGEN DAILY

## 2023-01-06 PROCEDURE — 2709999900 HC NON-CHARGEABLE SUPPLY

## 2023-01-06 PROCEDURE — 51798 US URINE CAPACITY MEASURE: CPT

## 2023-01-06 PROCEDURE — 74011250636 HC RX REV CODE- 250/636: Performed by: SURGERY

## 2023-01-06 PROCEDURE — 74011000250 HC RX REV CODE- 250: Performed by: SURGERY

## 2023-01-06 PROCEDURE — 36415 COLL VENOUS BLD VENIPUNCTURE: CPT

## 2023-01-06 PROCEDURE — 85027 COMPLETE CBC AUTOMATED: CPT

## 2023-01-06 PROCEDURE — 94762 N-INVAS EAR/PLS OXIMTRY CONT: CPT

## 2023-01-06 PROCEDURE — 94640 AIRWAY INHALATION TREATMENT: CPT

## 2023-01-06 PROCEDURE — 80048 BASIC METABOLIC PNL TOTAL CA: CPT

## 2023-01-06 PROCEDURE — 74011250637 HC RX REV CODE- 250/637: Performed by: SURGERY

## 2023-01-06 RX ADMIN — SODIUM CHLORIDE, PRESERVATIVE FREE 10 ML: 5 INJECTION INTRAVENOUS at 06:12

## 2023-01-06 RX ADMIN — ASPIRIN 81 MG CHEWABLE TABLET 81 MG: 81 TABLET CHEWABLE at 09:58

## 2023-01-06 RX ADMIN — DULOXETINE HYDROCHLORIDE 60 MG: 30 CAPSULE, DELAYED RELEASE ORAL at 21:22

## 2023-01-06 RX ADMIN — CLOPIDOGREL BISULFATE 75 MG: 75 TABLET ORAL at 09:58

## 2023-01-06 RX ADMIN — ARFORMOTEROL TARTRATE 15 MCG: 15 SOLUTION RESPIRATORY (INHALATION) at 21:32

## 2023-01-06 RX ADMIN — DEXTROSE MONOHYDRATE AND SODIUM CHLORIDE 75 ML/HR: 5; .45 INJECTION, SOLUTION INTRAVENOUS at 07:16

## 2023-01-06 RX ADMIN — BUDESONIDE 250 MCG: 0.25 SUSPENSION RESPIRATORY (INHALATION) at 21:32

## 2023-01-06 RX ADMIN — IPRATROPIUM BROMIDE 0.5 MG: 0.5 SOLUTION RESPIRATORY (INHALATION) at 16:32

## 2023-01-06 RX ADMIN — IPRATROPIUM BROMIDE 0.5 MG: 0.5 SOLUTION RESPIRATORY (INHALATION) at 09:45

## 2023-01-06 RX ADMIN — SODIUM CHLORIDE, PRESERVATIVE FREE 10 ML: 5 INJECTION INTRAVENOUS at 13:36

## 2023-01-06 RX ADMIN — PREGABALIN 150 MG: 75 CAPSULE ORAL at 09:58

## 2023-01-06 RX ADMIN — VANCOMYCIN HYDROCHLORIDE 1000 MG: 1 INJECTION, POWDER, LYOPHILIZED, FOR SOLUTION INTRAVENOUS at 09:58

## 2023-01-06 RX ADMIN — SODIUM CHLORIDE, PRESERVATIVE FREE 10 ML: 5 INJECTION INTRAVENOUS at 21:23

## 2023-01-06 RX ADMIN — ARFORMOTEROL TARTRATE 15 MCG: 15 SOLUTION RESPIRATORY (INHALATION) at 09:44

## 2023-01-06 RX ADMIN — HYDROMORPHONE HYDROCHLORIDE 0.5 MG: 1 INJECTION, SOLUTION INTRAMUSCULAR; INTRAVENOUS; SUBCUTANEOUS at 17:22

## 2023-01-06 RX ADMIN — LATANOPROST 1 DROP: 50 SOLUTION OPHTHALMIC at 21:23

## 2023-01-06 RX ADMIN — PREGABALIN 150 MG: 75 CAPSULE ORAL at 21:23

## 2023-01-06 RX ADMIN — BUDESONIDE 250 MCG: 0.25 SUSPENSION RESPIRATORY (INHALATION) at 09:45

## 2023-01-06 RX ADMIN — MONTELUKAST 10 MG: 10 TABLET, FILM COATED ORAL at 09:58

## 2023-01-06 NOTE — PROGRESS NOTES
1900: Bedside shift change report given to this nurse (oncoming nurse) by JOE Jauregui (offgoing nurse). Report included the following information SBAR, Kardex, and Cardiac Rhythm NSR . Wound Prevention Checklist    Patient: Benedict Dubin (84 y.o. female)  Date: 1/5/2023  Diagnosis: 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[address] [I70.202] <principal problem not specified>    Precautions:         []  Heel prevention boots placed on patient    [x]  Patient turned q2h during shift    []  Lift team ordered    [x]  Patient on [place] bed/Specialty bed    [x]  Each Wound is documented during shift (Stage, Color, drainage, odor, measurements, and dressings)    [x]  Dual skin check done with Evy Dunbar RN/ALAN Saavedra RN  Patient laying flat in bed, O2 on 4L via face mask with sats in mid 90's. NSR on monitor. Mercer in place and draining. Patient denies any pain, no distress noted. Wound vac intact to left groin. No drainage noted in cannister. Dry dressing noted to incision to left leg, clean, dry, and intact. Pulses present to all four extremities. 2100: Patient switched from o2 mask, to NC on 4 liters. No respiratory distress noted. Patient tolerating well. O2 sats in low- mid 90's.  2300: Patient sleeping in bed, no distress noted  0100: Patient sleeping in bed, no distress noted  0300: Patient sleeping in bed, no distress noted. 0500: Mercer catheter removed per protocol. Patient voided in commode. Able to ambulate to chair with assist of staff x1 and walker. No distress noted during ambulation. 0600: Patient up in recliner, no distress noted. 0800: Report given to SPENCER Alonso

## 2023-01-06 NOTE — PROGRESS NOTES
conducted an initial consultation and Spiritual Assessment for [employer], who is a 74 y.o.,female. Patients Primary Language is: [place]. According to the patients EMR Yazidism Affiliation is: Spiritism. The reason the Patient came to the hospital is:   Patient Active Problem List    Diagnosis Date Noted    Postmenopausal osteoporosis 04/22/2022    Vascular graft infection ([address] 12/09/2020    Cellulitis of abdominal wall 12/03/2020    UTI (urinary tract infection) 12/03/2020    Open wound 09/10/2020    Chronic wound infection of abdomen 06/21/2018    Nonhealing surgical wound 04/30/2018    Infection of vascular bypass graft ([address] 04/30/2018    CAP (community acquired pneumonia) 06/27/2017    Severe sepsis ([address] 06/27/2017    Arteriovenous graft infection ([address] 05/10/2017    Abscess 05/10/2017    Dermal sinus tract of lumbosacral region ([address] 05/10/2017    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[address] 08/09/2016    Chronic aorto-iliac occlusion syndrome ([address] 01/19/2016    Wound disruption, post-op, skin 01/09/2015    Mass of breast, left 07/30/2014    HTN (hypertension) 04/01/2013    PVD (peripheral vascular disease) ([address] 04/01/2013    Crohn's disease ([address] 04/01/2013        The  provided the following Interventions:  Initiated a relationship of care and support with patient in room 2352 where she had marcelino surgery done recently. Patient in good spirits and will be discharged later today. There is no advance directive on file here but patient reports that there is one at the house.  agreed to bring us a copy for our records. Provided chaplaincy education. Provided information about Spiritual Care Services. Offered prayer on patients behalf. The following outcomes were achieved:  Patient shared limited information about her medical narrative and spiritual journey/beliefs. Patient processed feeling about current hospitalization.   Patient expressed gratitude for pastoral care visit. Assessment:  Patient does not have any Anglican/cultural needs that will affect patients preferences in health care. There are no further spiritual or Anglican issues which require Spiritual Care Services interventions at this time. Plan:  Chaplains will continue to follow and will provide pastoral care on an as needed/requested basis    . Rafael Velásquez   Spiritual Care   [phone]

## 2023-01-06 NOTE — PROGRESS NOTES
Sitting up in chair, feels much better  No new complaints,  at bedside  Femoral and popliteal incisions are clean dry and intact  Excellent dorsalis pedis pulse  Resume double antiplatelet therapy  Ambulatory today  Anticipate discharge tomorrow

## 2023-01-06 NOTE — PROGRESS NOTES
1450 TRANSFER - IN REPORT:    Verbal report received from [facility] (name) on [employer]  being received from CVT ICU (unit) for routine progression of care      Report consisted of patients Situation, Background, Assessment and   Recommendations(SBAR). Information from the following report(s) SBAR, Kardex, ED Summary, OR Summary, Procedure Summary, Intake/Output, MAR, Recent Results, Med Rec Status, and Cardiac Rhythm NST w/ PVC's  was reviewed with the receiving nurse. Opportunity for questions and clarification was provided. Assessment completed upon patients arrival to unit and care assumed.

## 2023-01-06 NOTE — ACP (ADVANCE CARE PLANNING)
Advance Care Planning   Advance Care Planning Inpatient Note  [address] [place] [address]   Spiritual Care Department    Today's Date: 1/6/2023  Unit: [facility] CV INTNSV CARE    Received request from . Upon review of chart and communication with care team, patient's decision making abilities are not in question. Patient was/were present in the room during visit. Goals of ACP Conversation:  Discuss Advance Care planning documents    Health Care Decision Makers:    No healthcare decision makers have been documented. Click here to complete [address] including selection of the Healthcare Decision Maker Relationship (ie \"Primary\")  Summary:  No Decision Maker named by patient at this time    Advance Care Planning Documents (Patient Wishes) on file:  None     Assessment:    Patient seen in the cardiac care unit CVT1 where patient has had surgery and will be discharged later today.  of patient is here as well,. There is no advance directive here on file but there is one at home according to patient.     Interventions:  Deferred conversation as patient not interested in completing an advance directive at this time    Care Preferences Communicated:  No    Outcomes/Plan:      Eli Villafuerte [facility] on 1/6/2023 at 10:46 AM

## 2023-01-06 NOTE — PROGRESS NOTES
Problem: Pressure Injury - Risk of  Goal: *Prevention of pressure injury  Description: Document Flaco Scale and appropriate interventions in the flowsheet. Outcome: Progressing Towards Goal  Note: Pressure Injury Interventions:             Activity Interventions: Pressure redistribution bed/mattress(bed type)    Mobility Interventions: Pressure redistribution bed/mattress (bed type)

## 2023-01-06 NOTE — PROGRESS NOTES
07:15  Received pt up in chair from Mich Bernal RN. Pt A&O x 4..  Denies pain. VSS. Heart in sinus rhythm with frequent PVCs. Dressing to LLE CDI. Easily dopplered pedal pulses (both TB and DP). Foot with jadiel toes but capillary refill less than 3 seconds. D5-1/2 NS infusing at 75ml/hr. Wound Prevention Checklist    Patient: Missael Pisano (80 y.o. female)  Date: 1/6/2023  Diagnosis: 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HCC) [I70.202] <principal problem not specified>    Precautions:         []  Heel prevention boots placed on patient    []  Patient turned q2h during shift    []  Lift team ordered    []  Patient on [place] bed/Specialty bed    []  Each Wound is documented during shift (Stage, Color, drainage, odor, measurements, and dressings)    [x]  Dual skin check done with SPENCER Lopez RN     10:15  Dr. Luis Galeas at bedside. No new orders at this time. 13:30  Discontinued IVF. 13:45  Pt ambulated to bathroom with FWW and one person assist.  Pt voided 100 ml yellow urine. Post void residual via bladder scanner 77 ml  14:50  TRANSFER - OUT REPORT:    Verbal report given to Alivia Fragoso RN(name) on Missael Pisano  being transferred to [address] (unit) for routine progression of care       Report consisted of patients Situation, Background, Assessment and   Recommendations(SBAR). Information from the following report(s) SBAR, Kardex, OR Summary, Procedure Summary, Intake/Output, MAR, Recent Results, and Cardiac Rhythm Sinus rhythm with frequent PVCs  was reviewed with the receiving nurse. Lines:   Peripheral IV 01/05/23 Anterior;Left;Proximal Forearm (Active)   Site Assessment Clean, dry, & intact 01/06/23 1200   Phlebitis Assessment 0 01/06/23 1200   Infiltration Assessment 0 01/06/23 1200   Dressing Status Clean, dry, & intact 01/06/23 1200   Dressing Type Transparent 01/06/23 1200   Hub Color/Line Status Blue; Infusing 01/06/23 1200   Action Taken Open ports on tubing capped 01/06/23 1200   Alcohol Cap Used Yes 01/06/23 1200        Opportunity for questions and clarification was provided. 14:54  Patient transported with:   Monitor  O2 @ 2 liters, personal belongings, cell phone and   RN.

## 2023-01-06 NOTE — PROGRESS NOTES
Comprehensive Nutrition Assessment    Type and Reason for Visit: Initial, Positive nutrition screen    Nutrition Recommendations/Plan:   Continue current diet as tolerated. Monitor PO intake, weight, labs and plan of care during admission. Malnutrition Assessment:  Malnutrition Status:  No malnutrition (01/06/23 4646)      Nutrition History and Allergies: PMHx: HTN, Crohn's disease. Wt hx: 142 lb (12/7/21). Pt reports  lb, wt may fluctuate a lb here or there but overall stable. Reports good intake/appetite PTA, denies any decrease in appetite. NKFA. Nutrition Assessment:    Admitted for increasing pain in left leg. Pt s/p left leg thrombectomy/angiogram/angioplasty/stent placement/fem below knee bypass. Positive nutrition screen noted, MST: unsure. Pt OOBTC, reports eating majority of breakfast this morning. Declining any oral supplements at this time.  at bedside. Nutrition Related Findings:    Pertinent Meds: pulmicort, cymbalta, vanco  Pertinent Labs: reviewed Wound Type: Surgical incision    Current Nutrition Intake & Therapies:  Average Meal Intake: %  Average Supplement Intake: None ordered  ADULT DIET Regular    Anthropometric Measures:  Height: 5' 5\" (165.1 cm)  Ideal Body Weight (IBW): 125 lbs (57 kg)  Admission Body Weight: 138 lb 0.1 oz (62.6 kg)  Current Body Wt:  62.6 kg (138 lb 0.1 oz), 110.4 % IBW.     Current BMI (kg/m2): 23  BMI Category: Normal weight (BMI 22.0-24.9) age over 72    Estimated Daily Nutrient Needs:  Energy Requirements Based On: Formula  Weight Used for Energy Requirements: Admission  Energy (kcal/day): 8323-0783 (MSJ 1.1-1.2)  Weight Used for Protein Requirements: Admission  Protein (g/day): 50-75 (0.8-1.2 g/day)  Method Used for Fluid Requirements: 1 ml/kcal  Fluid (ml/day): 7407-6064    Nutrition Diagnosis:   No nutrition diagnosis at this time     Nutrition Interventions:   Food and/or Nutrient Delivery: Continue current diet  Nutrition Education/Counseling: No recommendations at this time  Coordination of Nutrition Care: Continue to monitor while inpatient  Plan of Care discussed with: pt,     Goals:     Goals: Meet at least 75% of estimated needs, by next RD assessment       Nutrition Monitoring and Evaluation:      Food/Nutrient Intake Outcomes: Food and nutrient intake  Physical Signs/Symptoms Outcomes: Biochemical data, Chewing or swallowing, Hemodynamic status, Meal time behavior, GI status, Weight    Discharge Planning:    Continue current diet    Jamie Johnson [address], [address] [place] [address]   Contact: [phone]

## 2023-01-07 VITALS
HEART RATE: 91 BPM | TEMPERATURE: 98.2 F | DIASTOLIC BLOOD PRESSURE: 52 MMHG | BODY MASS INDEX: 22.99 KG/M2 | SYSTOLIC BLOOD PRESSURE: 111 MMHG | HEIGHT: 65 IN | RESPIRATION RATE: 16 BRPM | OXYGEN SATURATION: 98 % | WEIGHT: 138 LBS

## 2023-01-07 PROCEDURE — 2709999900 HC NON-CHARGEABLE SUPPLY

## 2023-01-07 PROCEDURE — 74011250637 HC RX REV CODE- 250/637: Performed by: SURGERY

## 2023-01-07 PROCEDURE — 74011000250 HC RX REV CODE- 250: Performed by: SURGERY

## 2023-01-07 PROCEDURE — 77010033678 HC OXYGEN DAILY

## 2023-01-07 PROCEDURE — 94640 AIRWAY INHALATION TREATMENT: CPT

## 2023-01-07 PROCEDURE — 94761 N-INVAS EAR/PLS OXIMETRY MLT: CPT

## 2023-01-07 RX ORDER — HYDROCODONE BITARTRATE AND ACETAMINOPHEN 5; 325 MG/1; MG/1
1 TABLET ORAL
Qty: 40 TABLET | Refills: 0 | Status: SHIPPED | OUTPATIENT
Start: 2023-01-07 | End: 2023-01-14

## 2023-01-07 RX ADMIN — IPRATROPIUM BROMIDE 0.5 MG: 0.5 SOLUTION RESPIRATORY (INHALATION) at 07:50

## 2023-01-07 RX ADMIN — BUDESONIDE 250 MCG: 0.25 SUSPENSION RESPIRATORY (INHALATION) at 07:50

## 2023-01-07 RX ADMIN — ASPIRIN 81 MG CHEWABLE TABLET 81 MG: 81 TABLET CHEWABLE at 08:17

## 2023-01-07 RX ADMIN — SODIUM CHLORIDE, PRESERVATIVE FREE 10 ML: 5 INJECTION INTRAVENOUS at 05:38

## 2023-01-07 RX ADMIN — ARFORMOTEROL TARTRATE 15 MCG: 15 SOLUTION RESPIRATORY (INHALATION) at 07:50

## 2023-01-07 RX ADMIN — CLOPIDOGREL BISULFATE 75 MG: 75 TABLET ORAL at 08:17

## 2023-01-07 RX ADMIN — MONTELUKAST 10 MG: 10 TABLET, FILM COATED ORAL at 08:17

## 2023-01-07 RX ADMIN — IPRATROPIUM BROMIDE 0.5 MG: 0.5 SOLUTION RESPIRATORY (INHALATION) at 01:06

## 2023-01-07 RX ADMIN — PREGABALIN 150 MG: 75 CAPSULE ORAL at 08:17

## 2023-01-07 NOTE — PROGRESS NOTES
D/C order noted for today. Orders reviewed. No needs identified at this time.              SUE CroweN RN  Care Management  Pager: [phone]

## 2023-01-07 NOTE — PROGRESS NOTES
Bedside and Verbal shift change report given by Grey Pizarro RN (offgoing nurse). Report included the following information SBAR, Kardex, Intake/Output, Recent Results, and Cardiac Rhythm NSR PVCs . 1950: AOX4, on room air, resting in bed, no c/o pain or otyher discomforts; v/s checked, shift assessment done; no needs voiced at this time; NSR PVCs on tele    2122   meds given; no needs voiced at this time    2308: reassessment done; no changes noted    0200: sleeping comfortably    0345: reassessment unchanged    0540: sleeping comfortably; NSR PVCs on tele    Bedside and Verbal shift change report given to [address] [place] [address] Jaime [place] (oncoming nurse) by Facundo Membreno RN (offgoing nurse). Report included the following information SBAR, Kardex, Intake/Output, Recent Results, and Cardiac Rhythm NSR PVCs .       Wound Prevention Checklist    Patient: Cristobal Robles (40 y.o. female)  Date: 1/7/2023  Diagnosis: 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HCC) [I70.202] <principal problem not specified>    Precautions:         []  Heel prevention boots placed on patient    [x]  Patient turned q2h during shift    []  Lift team ordered    []  Patient on [place] bed/Specialty bed    []  Each Wound is documented during shift (Stage, Color, drainage, odor, measurements, and dressings)    [x]  Dual skin check done with Molly Membreno, RN

## 2023-01-07 NOTE — PROGRESS NOTES
0630 Bedside shift change report given to Latosha Prieto RN (oncoming nurse) by Nina Goel RN (offgoing nurse). Report included the following information SBAR, Kardex, ED Summary, Procedure Summary, Intake/Output, MAR, Recent Results, Med Rec Status, and Cardiac Rhythm NSR w/ PVC's .      Wound Prevention Checklist    Patient: Lorena Reece (41 y.o. female)  Date: 1/7/2023  Diagnosis: 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HCC) [I70.202] <principal problem not specified>    Precautions:         []  Heel prevention boots placed on patient    [x]  Patient turned q2h during shift    []  Lift team ordered    []  Patient on [place] bed/Specialty bed    [x]  Each Wound is documented during shift (Stage, Color, drainage, odor, measurements, and dressings)    [x]  Dual skin check done with Rosalind Card RN

## 2023-01-07 NOTE — DISCHARGE SUMMARY
Physician Discharge Summary     Patient ID:  Sydney Falcon  [id number]  6 y.o.  1950    Admit date: 1/5/2023    Discharge date: 1/7/2023      Admitting Physician: Manish Gauthier MD     Discharge Physician: Manish Gauthier MD     Admission Diagnoses: Occlusion of left femoral artery [facility]) [I70.202]    Discharge Diagnoses: Occlusion of left femoropopliteal bypass. Postop thrombectomy, balloon angioplasty and stent of iliac artery, bypass femoral to below-knee popliteal artery. Procedures for this admission: Procedure(s):  LEFT LEG TRHOMBECTOMY/LEFT LEG ANGIOGRAM/ANGIOPLASTY/LEFT ILIAC STENT PLACEMENT/LEFT LEG FEM BELOW KNEE BYPASS    Discharged Condition: stable    Hospital Course: Admitted to the hospital after complaints of a week or 2 of left leg pain. Doppler in office found femoral popliteal bypass to be occluded with scheduled immediately for surgery. She had a thrombectomy of the graft but the popliteal artery was densely occluded above the knee and the graft appeared to be chronically occluded as there was no acute thrombus in the graft itself. Inflow lesion was identified this was balloon angioplastied and stented I feel this was the cause for her pain. Difficult to completely know so I did review bypass from the femoral artery to the below-knee popliteal with good results. Postoperatively her ischemia is resolved. She had perioperative antibiotics antibiotic washes and wound management system applied. She has a history of persistent infections.   No complications were encountered she is ambulatory feels well afebrile and wishes to go home today    Consults: None    Significant Diagnostic Studies: angiography: Left iliac artery balloon angioplasty and stent    Treatments: surgery: As above    Discharge Exam: LUNG: clear to auscultation bilaterally  HEART: S1, S2 normal  ABDOMEN:  no change  Incisions are clean dry and intact  Excellent pedal pulses by Doppler    Disposition: home    Patient Instructions:   Current Discharge Medication List        CONTINUE these medications which have NOT CHANGED    Details   Cetirizine (ZyrTEC) 10 mg cap Take  by mouth nightly. montelukast (Singulair) 10 mg tablet Take 10 mg by mouth daily. triamterene-hydroCHLOROthiazide (DYAZIDE) 37.5-25 mg per capsule Take  by mouth daily. fluticasone-umeclidinium-vilanterol (Trelegy Ellipta) 100-62.5-25 mcg inhaler Take 1 Puff by inhalation daily. Qty: 3 Each, Refills: 0      cholecalciferol (VITAMIN D3) 25 mcg (1,000 unit) cap Take  by mouth daily. albuterol (PROVENTIL HFA, VENTOLIN HFA, PROAIR HFA) 90 mcg/actuation inhaler Take 2 Puffs by inhalation every six (6) hours as needed for Wheezing. Qty: 3 Inhaler, Refills: 3      OTHER       fluticasone propionate (FLONASE) 50 mcg/actuation nasal spray 2 Sprays by Both Nostrils route daily as needed for Rhinitis. pregabalin (LYRICA) 100 mg capsule Take  by mouth two (2) times a day. Takes 100 mg in am and 200 in the pm      DULoxetine (CYMBALTA) 60 mg capsule Take 60 mg by mouth nightly. For Anxiety. 60mg at night, 20mg in AM      aspirin 81 mg tablet Take 81 mg by mouth daily. cyanocobalamin (VITAMIN B12) 1,000 mcg/mL injection 1,000 mcg by IntraMUSCular route every fourteen (14) days. loperamide (IMMODIUM) 2 mg tablet Take 2 mg by mouth daily as needed for Diarrhea. dicyclomine (BENTYL) 10 mg capsule Take 10 mg by mouth daily as needed for Abdominal Cramps. bimatoprost (LUMIGAN) 0.03 % ophthalmic drops Administer 1 Drop to both eyes every evening. inFLIXimab (REMICADE) 100 mg injection 5 mg/kg by IntraVENous route once. Every 4 weeks      colchicine 0.6 mg tablet Take 0.6 mg by mouth two (2) times a day. clopidogreL (PLAVIX) 75 mg tab TAKE ONE TABLET BY MOUTH DAILY  Qty: 30 Tab, Refills: 8      loratadine (CLARITIN) 10 mg tablet Take 10 mg by mouth daily.       atropine-PHENobarbital-scopolamine-hyoscyamine (DONNATAL) 16.2-0.1037 -0.0194 mg per tablet Take 1 Tab by mouth every eight (8) hours as needed for Diarrhea or Nausea. Indications: irritable colon           STOP taking these medications       predniSONE (DELTASONE) 20 mg tablet Comments:   Reason for Stopping:         ciprofloxacin HCl (CIPRO) 500 mg tablet Comments:   Reason for Stopping:               Reference discharge instructions as provided by nursing for diet, labs, medications, activity, wound care and any outpatient referrals.     Follow-up with Dr. Isma Rowe:  Venus Lennox, MD  1/7/2023  1:18 PM

## 2023-01-18 ENCOUNTER — ANESTHESIA EVENT (OUTPATIENT)
Dept: CARDIOTHORACIC SURGERY | Age: 73
DRG: 253 | End: 2023-01-18
Payer: MEDICARE

## 2023-01-18 NOTE — PERIOP NOTES
PRE-SURGICAL INSTRUCTIONS        Patient's Name:  Cayetano [facility] Date:  1/18/2023              Surgery Date:  1/19/2023                Do NOT eat or drink anything, including candy, gum, or ice chips after midnight on 1/19/23, unless you have specific instructions from your surgeon or anesthesia provider to do so. You may brush your teeth before coming to the hospital.  No smoking 24 hours prior to the day of surgery. No alcohol 24 hours prior to the day of surgery. No recreational drugs for one week prior to the day of surgery. Leave all valuables, including money/purse, at home. Remove all jewelry, nail polish, acrylic nails, and makeup (including mascara); no lotions powders, deodorant, or perfume/cologne/after shave on the skin. Follow instruction for Hibiclens washes and CHG wipes from surgeon's office. Glasses/contact lenses and dentures may be worn to the hospital.  They will be removed prior to surgery. Call your doctor if symptoms of a cold or illness develop within 24-48 hours prior to your surgery. 11.  If you are having an outpatient procedure, please make arrangements for a responsible ADULT TO [address] [place] [address] and stay with you for 24 hours after your surgery. 12. ONE VISITOR in the hospital at this time for outpatient procedures. Exceptions may be made for surgical admissions, per nursing unit guidelines      Special Instructions:      Bring list of CURRENT medications. Bring inhaler. Bring any pertinent legal medical records. Take these medications the morning of surgery with a sip of water:  as instructed by office    Follow physician instructions about stopping anticoagulants. On the day of surgery, come in the main entrance of [facility]. Let the  at the desk know you are there for surgery. A staff member will come escort you to the surgical area on the second floor.     If you have any questions or concerns, please do not hesitate to call:     (Prior to the day of surgery) [facility] department:  [phone]   (Day of surgery) Pre-Op department:  [phone]    These surgical instructions were reviewed with Placido Beal during the [facility] phone call.

## 2023-01-19 ENCOUNTER — ANESTHESIA (OUTPATIENT)
Dept: CARDIOTHORACIC SURGERY | Age: 73
DRG: 253 | End: 2023-01-19
Payer: MEDICARE

## 2023-01-19 ENCOUNTER — HOSPITAL ENCOUNTER (INPATIENT)
Age: 73
LOS: 10 days | Discharge: HOME | DRG: 253 | End: 2023-01-29
Attending: SURGERY | Admitting: SURGERY
Payer: MEDICARE

## 2023-01-19 ENCOUNTER — APPOINTMENT (OUTPATIENT)
Dept: GENERAL RADIOLOGY | Age: 73
DRG: 253 | End: 2023-01-19
Attending: SURGERY
Payer: MEDICARE

## 2023-01-19 DIAGNOSIS — I51.3 RIGHT ATRIAL THROMBUS: ICD-10-CM

## 2023-01-19 PROBLEM — I70.202 FEMORAL ARTERY OCCLUSION, LEFT (HCC): Status: ACTIVE | Noted: 2023-01-19

## 2023-01-19 LAB
ABO + RH BLD: NORMAL
BLOOD GROUP ANTIBODIES SERPL: NORMAL
SPECIMEN EXP DATE BLD: NORMAL

## 2023-01-19 PROCEDURE — 77030005206: Performed by: SURGERY

## 2023-01-19 PROCEDURE — 77030002996 HC SUT SLK J&J -A: Performed by: SURGERY

## 2023-01-19 PROCEDURE — 77030040830 HC CATH URETH FOL MDII -A: Performed by: SURGERY

## 2023-01-19 PROCEDURE — 04CN0ZZ EXTIRPATION OF MATTER FROM LEFT POPLITEAL ARTERY, OPEN APPROACH: ICD-10-PCS | Performed by: SURGERY

## 2023-01-19 PROCEDURE — 77030010507 HC ADH SKN DERMBND J&J -B: Performed by: SURGERY

## 2023-01-19 PROCEDURE — 74011250636 HC RX REV CODE- 250/636: Performed by: NURSE ANESTHETIST, CERTIFIED REGISTERED

## 2023-01-19 PROCEDURE — 74011250637 HC RX REV CODE- 250/637: Performed by: NURSE ANESTHETIST, CERTIFIED REGISTERED

## 2023-01-19 PROCEDURE — 2709999900 HC NON-CHARGEABLE SUPPLY: Performed by: SURGERY

## 2023-01-19 PROCEDURE — 74011000250 HC RX REV CODE- 250: Performed by: NURSE ANESTHETIST, CERTIFIED REGISTERED

## 2023-01-19 PROCEDURE — 76060000034 HC ANESTHESIA 1.5 TO 2 HR: Performed by: SURGERY

## 2023-01-19 PROCEDURE — 76010000129 HC CV SURG 1.5 TO 2 HR: Performed by: SURGERY

## 2023-01-19 PROCEDURE — B41G1ZZ FLUOROSCOPY OF LEFT LOWER EXTREMITY ARTERIES USING LOW OSMOLAR CONTRAST: ICD-10-PCS | Performed by: SURGERY

## 2023-01-19 PROCEDURE — C1894 INTRO/SHEATH, NON-LASER: HCPCS | Performed by: SURGERY

## 2023-01-19 PROCEDURE — 77030018836 HC SOL IRR NACL ICUM -A: Performed by: SURGERY

## 2023-01-19 PROCEDURE — 3E05317 INTRODUCTION OF OTHER THROMBOLYTIC INTO PERIPHERAL ARTERY, PERCUTANEOUS APPROACH: ICD-10-PCS | Performed by: SURGERY

## 2023-01-19 PROCEDURE — 77030016441 HC APPL CLP LIG1 J&J -B: Performed by: SURGERY

## 2023-01-19 PROCEDURE — 77030038692 HC WND DEB SYS IRMX -B: Performed by: SURGERY

## 2023-01-19 PROCEDURE — 74011250636 HC RX REV CODE- 250/636: Performed by: SURGERY

## 2023-01-19 PROCEDURE — 86900 BLOOD TYPING SEROLOGIC ABO: CPT

## 2023-01-19 PROCEDURE — 74011250637 HC RX REV CODE- 250/637: Performed by: SURGERY

## 2023-01-19 PROCEDURE — 65620000000 HC RM CCU GENERAL

## 2023-01-19 PROCEDURE — 77030002986 HC SUT PROL J&J -A: Performed by: SURGERY

## 2023-01-19 PROCEDURE — 74011000636 HC RX REV CODE- 636: Performed by: SURGERY

## 2023-01-19 PROCEDURE — 77030002987 HC SUT PROL J&J -B: Performed by: SURGERY

## 2023-01-19 PROCEDURE — 04CL0ZZ EXTIRPATION OF MATTER FROM LEFT FEMORAL ARTERY, OPEN APPROACH: ICD-10-PCS | Performed by: SURGERY

## 2023-01-19 PROCEDURE — C1757 CATH, THROMBECTOMY/EMBOLECT: HCPCS | Performed by: SURGERY

## 2023-01-19 PROCEDURE — 74011000250 HC RX REV CODE- 250: Performed by: SURGERY

## 2023-01-19 PROCEDURE — 77030018673: Performed by: SURGERY

## 2023-01-19 PROCEDURE — 77030002933 HC SUT MCRYL J&J -A: Performed by: SURGERY

## 2023-01-19 PROCEDURE — 77030025869: Performed by: SURGERY

## 2023-01-19 PROCEDURE — 77030003390 HC NDL ANGI MRTM -A: Performed by: SURGERY

## 2023-01-19 RX ORDER — HYDROMORPHONE HYDROCHLORIDE 1 MG/ML
0.5 INJECTION, SOLUTION INTRAMUSCULAR; INTRAVENOUS; SUBCUTANEOUS
Status: DISCONTINUED | OUTPATIENT
Start: 2023-01-19 | End: 2023-01-29 | Stop reason: HOSPADM

## 2023-01-19 RX ORDER — IODIXANOL 320 MG/ML
INJECTION, SOLUTION INTRAVASCULAR
Status: DISCONTINUED
Start: 2023-01-19 | End: 2023-01-19

## 2023-01-19 RX ORDER — SODIUM CHLORIDE, SODIUM LACTATE, POTASSIUM CHLORIDE, CALCIUM CHLORIDE 600; 310; 30; 20 MG/100ML; MG/100ML; MG/100ML; MG/100ML
50 INJECTION, SOLUTION INTRAVENOUS CONTINUOUS
Status: DISCONTINUED | OUTPATIENT
Start: 2023-01-19 | End: 2023-01-19 | Stop reason: HOSPADM

## 2023-01-19 RX ORDER — GLYCOPYRROLATE 0.2 MG/ML
INJECTION INTRAMUSCULAR; INTRAVENOUS AS NEEDED
Status: DISCONTINUED | OUTPATIENT
Start: 2023-01-19 | End: 2023-01-19 | Stop reason: HOSPADM

## 2023-01-19 RX ORDER — LIDOCAINE HYDROCHLORIDE 20 MG/ML
INJECTION, SOLUTION EPIDURAL; INFILTRATION; INTRACAUDAL; PERINEURAL AS NEEDED
Status: DISCONTINUED | OUTPATIENT
Start: 2023-01-19 | End: 2023-01-19 | Stop reason: HOSPADM

## 2023-01-19 RX ORDER — HEPARIN SODIUM 200 [USP'U]/100ML
INJECTION, SOLUTION INTRAVENOUS
Status: COMPLETED | OUTPATIENT
Start: 2023-01-19 | End: 2023-01-19

## 2023-01-19 RX ORDER — NEOSTIGMINE METHYLSULFATE 1 MG/ML
INJECTION, SOLUTION INTRAVENOUS AS NEEDED
Status: DISCONTINUED | OUTPATIENT
Start: 2023-01-19 | End: 2023-01-19 | Stop reason: HOSPADM

## 2023-01-19 RX ORDER — ONDANSETRON 2 MG/ML
INJECTION INTRAMUSCULAR; INTRAVENOUS AS NEEDED
Status: DISCONTINUED | OUTPATIENT
Start: 2023-01-19 | End: 2023-01-19 | Stop reason: HOSPADM

## 2023-01-19 RX ORDER — OXYCODONE AND ACETAMINOPHEN 5; 325 MG/1; MG/1
1 TABLET ORAL
Status: DISCONTINUED | OUTPATIENT
Start: 2023-01-19 | End: 2023-01-29 | Stop reason: HOSPADM

## 2023-01-19 RX ORDER — FAMOTIDINE 20 MG/1
20 TABLET, FILM COATED ORAL ONCE
Status: COMPLETED | OUTPATIENT
Start: 2023-01-19 | End: 2023-01-19

## 2023-01-19 RX ORDER — SODIUM CHLORIDE 0.9 % (FLUSH) 0.9 %
5-40 SYRINGE (ML) INJECTION EVERY 8 HOURS
Status: DISCONTINUED | OUTPATIENT
Start: 2023-01-19 | End: 2023-01-29 | Stop reason: HOSPADM

## 2023-01-19 RX ORDER — DEXAMETHASONE SODIUM PHOSPHATE 4 MG/ML
INJECTION, SOLUTION INTRA-ARTICULAR; INTRALESIONAL; INTRAMUSCULAR; INTRAVENOUS; SOFT TISSUE AS NEEDED
Status: DISCONTINUED | OUTPATIENT
Start: 2023-01-19 | End: 2023-01-19 | Stop reason: HOSPADM

## 2023-01-19 RX ORDER — PHENYLEPHRINE HCL IN 0.9% NACL 1 MG/10 ML
SYRINGE (ML) INTRAVENOUS AS NEEDED
Status: DISCONTINUED | OUTPATIENT
Start: 2023-01-19 | End: 2023-01-19 | Stop reason: HOSPADM

## 2023-01-19 RX ORDER — GUAIFENESIN 100 MG/5ML
81 LIQUID (ML) ORAL DAILY
Status: DISCONTINUED | OUTPATIENT
Start: 2023-01-20 | End: 2023-01-29 | Stop reason: HOSPADM

## 2023-01-19 RX ORDER — PROPOFOL 10 MG/ML
INJECTION, EMULSION INTRAVENOUS AS NEEDED
Status: DISCONTINUED | OUTPATIENT
Start: 2023-01-19 | End: 2023-01-19 | Stop reason: HOSPADM

## 2023-01-19 RX ORDER — IODIXANOL 320 MG/ML
INJECTION, SOLUTION INTRAVASCULAR AS NEEDED
Status: DISCONTINUED | OUTPATIENT
Start: 2023-01-19 | End: 2023-01-19 | Stop reason: HOSPADM

## 2023-01-19 RX ORDER — NALOXONE HYDROCHLORIDE 0.4 MG/ML
0.4 INJECTION, SOLUTION INTRAMUSCULAR; INTRAVENOUS; SUBCUTANEOUS AS NEEDED
Status: DISCONTINUED | OUTPATIENT
Start: 2023-01-19 | End: 2023-01-29 | Stop reason: HOSPADM

## 2023-01-19 RX ORDER — SUCCINYLCHOLINE CHLORIDE 20 MG/ML
INJECTION INTRAMUSCULAR; INTRAVENOUS AS NEEDED
Status: DISCONTINUED | OUTPATIENT
Start: 2023-01-19 | End: 2023-01-19 | Stop reason: HOSPADM

## 2023-01-19 RX ORDER — HEPARIN SODIUM 1000 [USP'U]/ML
INJECTION, SOLUTION INTRAVENOUS; SUBCUTANEOUS AS NEEDED
Status: DISCONTINUED | OUTPATIENT
Start: 2023-01-19 | End: 2023-01-19 | Stop reason: HOSPADM

## 2023-01-19 RX ORDER — IPRATROPIUM BROMIDE AND ALBUTEROL SULFATE 2.5; .5 MG/3ML; MG/3ML
3 SOLUTION RESPIRATORY (INHALATION)
Status: DISCONTINUED | OUTPATIENT
Start: 2023-01-19 | End: 2023-01-21

## 2023-01-19 RX ORDER — FENTANYL CITRATE 50 UG/ML
INJECTION, SOLUTION INTRAMUSCULAR; INTRAVENOUS AS NEEDED
Status: DISCONTINUED | OUTPATIENT
Start: 2023-01-19 | End: 2023-01-19 | Stop reason: HOSPADM

## 2023-01-19 RX ORDER — PREGABALIN 50 MG/1
100 CAPSULE ORAL 2 TIMES DAILY
Status: DISCONTINUED | OUTPATIENT
Start: 2023-01-19 | End: 2023-01-29 | Stop reason: HOSPADM

## 2023-01-19 RX ORDER — DICYCLOMINE HYDROCHLORIDE 10 MG/1
10 CAPSULE ORAL
Status: DISCONTINUED | OUTPATIENT
Start: 2023-01-19 | End: 2023-01-29 | Stop reason: HOSPADM

## 2023-01-19 RX ORDER — SODIUM CHLORIDE 0.9 % (FLUSH) 0.9 %
5-40 SYRINGE (ML) INJECTION AS NEEDED
Status: DISCONTINUED | OUTPATIENT
Start: 2023-01-19 | End: 2023-01-19 | Stop reason: HOSPADM

## 2023-01-19 RX ORDER — SODIUM CHLORIDE 0.9 % (FLUSH) 0.9 %
5-40 SYRINGE (ML) INJECTION AS NEEDED
Status: DISCONTINUED | OUTPATIENT
Start: 2023-01-19 | End: 2023-01-29 | Stop reason: HOSPADM

## 2023-01-19 RX ORDER — SCOLOPAMINE TRANSDERMAL SYSTEM 1 MG/1
1 PATCH, EXTENDED RELEASE TRANSDERMAL ONCE
Status: COMPLETED | OUTPATIENT
Start: 2023-01-19 | End: 2023-01-22

## 2023-01-19 RX ORDER — CETIRIZINE HYDROCHLORIDE 10 MG/1
TABLET ORAL DAILY
Status: DISCONTINUED | OUTPATIENT
Start: 2023-01-20 | End: 2023-01-29 | Stop reason: HOSPADM

## 2023-01-19 RX ORDER — HEPARIN SODIUM 10000 [USP'U]/100ML
12-25 INJECTION, SOLUTION INTRAVENOUS
Status: DISCONTINUED | OUTPATIENT
Start: 2023-01-19 | End: 2023-01-20

## 2023-01-19 RX ORDER — MIDAZOLAM HYDROCHLORIDE 1 MG/ML
INJECTION, SOLUTION INTRAMUSCULAR; INTRAVENOUS AS NEEDED
Status: DISCONTINUED | OUTPATIENT
Start: 2023-01-19 | End: 2023-01-19 | Stop reason: HOSPADM

## 2023-01-19 RX ORDER — ARFORMOTEROL TARTRATE 15 UG/2ML
15 SOLUTION RESPIRATORY (INHALATION)
Status: DISCONTINUED | OUTPATIENT
Start: 2023-01-19 | End: 2023-01-20

## 2023-01-19 RX ORDER — WATER FOR INJECTION,STERILE
VIAL (ML) INJECTION
Status: DISCONTINUED
Start: 2023-01-19 | End: 2023-01-19

## 2023-01-19 RX ORDER — MONTELUKAST SODIUM 10 MG/1
10 TABLET ORAL DAILY
Status: DISCONTINUED | OUTPATIENT
Start: 2023-01-20 | End: 2023-01-29 | Stop reason: HOSPADM

## 2023-01-19 RX ORDER — ROCURONIUM BROMIDE 10 MG/ML
INJECTION, SOLUTION INTRAVENOUS AS NEEDED
Status: DISCONTINUED | OUTPATIENT
Start: 2023-01-19 | End: 2023-01-19 | Stop reason: HOSPADM

## 2023-01-19 RX ORDER — DULOXETIN HYDROCHLORIDE 30 MG/1
60 CAPSULE, DELAYED RELEASE ORAL
Status: DISCONTINUED | OUTPATIENT
Start: 2023-01-19 | End: 2023-01-29 | Stop reason: HOSPADM

## 2023-01-19 RX ORDER — SODIUM CHLORIDE 0.9 % (FLUSH) 0.9 %
5-40 SYRINGE (ML) INJECTION EVERY 8 HOURS
Status: DISCONTINUED | OUTPATIENT
Start: 2023-01-19 | End: 2023-01-19 | Stop reason: HOSPADM

## 2023-01-19 RX ORDER — IPRATROPIUM BROMIDE 0.5 MG/2.5ML
0.5 SOLUTION RESPIRATORY (INHALATION) EVERY 8 HOURS
Status: DISCONTINUED | OUTPATIENT
Start: 2023-01-19 | End: 2023-01-20

## 2023-01-19 RX ORDER — LIDOCAINE HYDROCHLORIDE 10 MG/ML
INJECTION, SOLUTION EPIDURAL; INFILTRATION; INTRACAUDAL; PERINEURAL
Status: DISCONTINUED
Start: 2023-01-19 | End: 2023-01-19

## 2023-01-19 RX ORDER — BUDESONIDE 0.25 MG/2ML
250 INHALANT ORAL 2 TIMES DAILY
Status: DISCONTINUED | OUTPATIENT
Start: 2023-01-19 | End: 2023-01-20

## 2023-01-19 RX ORDER — LIDOCAINE HYDROCHLORIDE 10 MG/ML
0.1 INJECTION, SOLUTION EPIDURAL; INFILTRATION; INTRACAUDAL; PERINEURAL AS NEEDED
Status: DISCONTINUED | OUTPATIENT
Start: 2023-01-19 | End: 2023-01-19 | Stop reason: HOSPADM

## 2023-01-19 RX ORDER — DEXTROSE MONOHYDRATE AND SODIUM CHLORIDE 5; .45 G/100ML; G/100ML
75 INJECTION, SOLUTION INTRAVENOUS CONTINUOUS
Status: DISCONTINUED | OUTPATIENT
Start: 2023-01-19 | End: 2023-01-20

## 2023-01-19 RX ORDER — HEPARIN SODIUM 200 [USP'U]/100ML
INJECTION, SOLUTION INTRAVENOUS
Status: DISCONTINUED
Start: 2023-01-19 | End: 2023-01-19

## 2023-01-19 RX ORDER — TRIAMTERENE/HYDROCHLOROTHIAZID 37.5-25 MG
TABLET ORAL DAILY
Status: DISCONTINUED | OUTPATIENT
Start: 2023-01-20 | End: 2023-01-29 | Stop reason: HOSPADM

## 2023-01-19 RX ADMIN — Medication 100 MCG: at 13:53

## 2023-01-19 RX ADMIN — FENTANYL CITRATE 25 MCG: 50 INJECTION INTRAMUSCULAR; INTRAVENOUS at 13:47

## 2023-01-19 RX ADMIN — IPRATROPIUM BROMIDE 0.5 MG: 0.5 SOLUTION RESPIRATORY (INHALATION) at 17:54

## 2023-01-19 RX ADMIN — SODIUM CHLORIDE, POTASSIUM CHLORIDE, SODIUM LACTATE AND CALCIUM CHLORIDE 50 ML/HR: 600; 310; 30; 20 INJECTION, SOLUTION INTRAVENOUS at 10:45

## 2023-01-19 RX ADMIN — VANCOMYCIN HYDROCHLORIDE 1000 MG: 1 INJECTION, POWDER, LYOPHILIZED, FOR SOLUTION INTRAVENOUS at 11:03

## 2023-01-19 RX ADMIN — FAMOTIDINE 20 MG: 20 TABLET, FILM COATED ORAL at 10:59

## 2023-01-19 RX ADMIN — FENTANYL CITRATE 25 MCG: 50 INJECTION INTRAMUSCULAR; INTRAVENOUS at 13:02

## 2023-01-19 RX ADMIN — LIDOCAINE HYDROCHLORIDE 60 MG: 20 INJECTION, SOLUTION EPIDURAL; INFILTRATION; INTRACAUDAL; PERINEURAL at 13:02

## 2023-01-19 RX ADMIN — GLYCOPYRROLATE 0.2 MG: 0.2 INJECTION, SOLUTION INTRAMUSCULAR; INTRAVENOUS at 14:20

## 2023-01-19 RX ADMIN — ONDANSETRON 4 MG: 2 INJECTION INTRAMUSCULAR; INTRAVENOUS at 14:01

## 2023-01-19 RX ADMIN — ROCURONIUM BROMIDE 15 MG: 50 INJECTION INTRAVENOUS at 13:10

## 2023-01-19 RX ADMIN — ROCURONIUM BROMIDE 5 MG: 50 INJECTION INTRAVENOUS at 13:46

## 2023-01-19 RX ADMIN — BUDESONIDE 250 MCG: 0.25 SUSPENSION RESPIRATORY (INHALATION) at 17:54

## 2023-01-19 RX ADMIN — SODIUM CHLORIDE, PRESERVATIVE FREE 10 ML: 5 INJECTION INTRAVENOUS at 16:22

## 2023-01-19 RX ADMIN — Medication 100 MCG: at 13:09

## 2023-01-19 RX ADMIN — DEXTROSE AND SODIUM CHLORIDE 75 ML/HR: 5; 450 INJECTION, SOLUTION INTRAVENOUS at 16:10

## 2023-01-19 RX ADMIN — HYDROMORPHONE HYDROCHLORIDE 0.5 MG: 1 INJECTION, SOLUTION INTRAMUSCULAR; INTRAVENOUS; SUBCUTANEOUS at 20:29

## 2023-01-19 RX ADMIN — FENTANYL CITRATE 25 MCG: 50 INJECTION INTRAMUSCULAR; INTRAVENOUS at 13:19

## 2023-01-19 RX ADMIN — DEXAMETHASONE SODIUM PHOSPHATE 4 MG: 4 INJECTION, SOLUTION INTRAMUSCULAR; INTRAVENOUS at 13:17

## 2023-01-19 RX ADMIN — MIDAZOLAM HYDROCHLORIDE 1 MG: 2 INJECTION, SOLUTION INTRAMUSCULAR; INTRAVENOUS at 12:51

## 2023-01-19 RX ADMIN — HEPARIN SODIUM 7000 UNITS: 1000 INJECTION, SOLUTION INTRAVENOUS; SUBCUTANEOUS at 13:36

## 2023-01-19 RX ADMIN — ARFORMOTEROL TARTRATE 15 MCG: 15 SOLUTION RESPIRATORY (INHALATION) at 20:29

## 2023-01-19 RX ADMIN — ROCURONIUM BROMIDE 5 MG: 50 INJECTION INTRAVENOUS at 13:02

## 2023-01-19 RX ADMIN — SUCCINYLCHOLINE CHLORIDE 80 MG: 20 INJECTION, SOLUTION INTRAMUSCULAR; INTRAVENOUS at 13:02

## 2023-01-19 RX ADMIN — PROPOFOL 80 MG: 10 INJECTION, EMULSION INTRAVENOUS at 13:02

## 2023-01-19 RX ADMIN — HEPARIN SODIUM 12 UNITS/KG/HR: 10000 INJECTION, SOLUTION INTRAVENOUS at 15:24

## 2023-01-19 RX ADMIN — PROPOFOL 40 MG: 10 INJECTION, EMULSION INTRAVENOUS at 14:27

## 2023-01-19 RX ADMIN — DULOXETINE HYDROCHLORIDE 60 MG: 30 CAPSULE, DELAYED RELEASE ORAL at 23:24

## 2023-01-19 RX ADMIN — PREGABALIN 100 MG: 50 CAPSULE ORAL at 17:54

## 2023-01-19 RX ADMIN — FENTANYL CITRATE 25 MCG: 50 INJECTION INTRAMUSCULAR; INTRAVENOUS at 14:08

## 2023-01-19 RX ADMIN — Medication 100 MCG: at 13:24

## 2023-01-19 RX ADMIN — MIDAZOLAM HYDROCHLORIDE 1 MG: 2 INJECTION, SOLUTION INTRAMUSCULAR; INTRAVENOUS at 12:59

## 2023-01-19 RX ADMIN — Medication 100 MCG: at 13:11

## 2023-01-19 RX ADMIN — NEOSTIGMINE METHYLSULFATE 3 MG: 1 INJECTION, SOLUTION INTRAVENOUS at 14:20

## 2023-01-19 RX ADMIN — Medication 100 MCG: at 13:35

## 2023-01-19 RX ADMIN — BIMATOPROST 1 DROP: 0.1 SOLUTION/ DROPS OPHTHALMIC at 20:24

## 2023-01-19 NOTE — ANESTHESIA POSTPROCEDURE EVALUATION
Procedure(s):  LEFT LEG BYPASS GRAFT THROMBECTOMY / Dread Cassidy / THROMBOLYSIS. MAC    Anesthesia Post Evaluation      Multimodal analgesia: multimodal analgesia used between 6 hours prior to anesthesia start to PACU discharge  Patient location during evaluation: bedside  Patient participation: complete - patient participated  Level of consciousness: awake  Pain management: adequate  Airway patency: patent  Anesthetic complications: no  Cardiovascular status: stable  Respiratory status: acceptable  Hydration status: acceptable  Post anesthesia nausea and vomiting:  controlled      INITIAL Post-op Vital signs:   Vitals Value Taken Time   /66 01/19/23 1630   Temp 36.6 °C (97.9 °F) 01/19/23 1448   Pulse 76 01/19/23 1635   Resp 18 01/19/23 1635   SpO2 98 % 01/19/23 1635   Vitals shown include unvalidated device data.

## 2023-01-19 NOTE — H&P
Surgery History and Physical    Subjective:      Alber Horton is a 74 y.o.  female who presents with an occluded Banks below-knee pop bypass graft. She tells me she was ischemic free and doing well until about 7 days ago when she had pain return to her leg and coldness and swelling of the foot. She is recently had this Banks below-knee pop bypass with an iliac stent placement. Doppler confirms multiphasic flow at the femoral but the occluded bypass. She has a history of autoimmune disorder complex PAD. Patient Active Problem List    Diagnosis Date Noted    Femoral artery occlusion, left ([address] 01/19/2023    Postmenopausal osteoporosis 04/22/2022    Vascular graft infection ([address] 12/09/2020    Cellulitis of abdominal wall 12/03/2020    UTI (urinary tract infection) 12/03/2020    Open wound 09/10/2020    Chronic wound infection of abdomen 06/21/2018    Nonhealing surgical wound 04/30/2018    Infection of vascular bypass graft ([address] 04/30/2018    CAP (community acquired pneumonia) 06/27/2017    Severe sepsis ([address] 06/27/2017    Arteriovenous graft infection ([address] 05/10/2017    Abscess 05/10/2017    Dermal sinus tract of lumbosacral region ([address] 05/10/2017    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[address] 08/09/2016    Chronic aorto-iliac occlusion syndrome ([address] 01/19/2016    Wound disruption, post-op, skin 01/09/2015    Mass of breast, left 07/30/2014    HTN (hypertension) 04/01/2013    PVD (peripheral vascular disease) ([address] 04/01/2013    Crohn's disease ([address] 04/01/2013     Past Medical History:   Diagnosis Date    Arthritis     CAP (community acquired pneumonia) 06/27/2017    Chronic lung disease     Claudication ([address]     left leg    Crohn's disease ([address]     Crohn's disease ([address]     GERD (gastroesophageal reflux disease)     HTN (hypertension)     Ill-defined condition     Uses O2 at night.     Nausea & vomiting     Neuropathy     Other and unspecified symptoms and signs involving general sensations and perceptions     PVD    Other ill-defined conditions(799.89)     Crohn's    Parathyroid tumor     sees Endo Dr Halie Martínez    Peripheral neuropathy     Pulmonary emphysema ([facility]     PVD (peripheral vascular disease) ([facility]     right leg    Sepsis ([facility] 06/27/2017    Vitamin B12 deficiency       Past Surgical History:   Procedure Laterality Date    COLONOSCOPY N/A 09/11/2019    DIAGNOSTIC COLONOSCOPY performed by Ryanne Kaplan MD at [facility] ENDOSCOPY    HX APPENDECTOMY      HX BREAST LUMPECTOMY Left     breast left- precancerous    HX BUNIONECTOMY      left eye    HX CATARACT REMOVAL      bilateral    HX CHOLECYSTECTOMY      HX ORTHOPAEDIC      lateral epicondilitis on right    HX OTHER SURGICAL  03/07/2013    catheter into aorta    HX OTHER SURGICAL      intestional resection x4    HX OTHER SURGICAL  09/2013    I & D Right chest/flank wall abscess vs granuloma    HX OTHER SURGICAL  01/05/2023    left leg thrombectomy with stent    AK BYP OTH/THN VEIN AXILLARY-FEMORAL Right 04/19/2013    AK BYP OTH/THN VEIN FEMORAL-POPLITEAL Left 05/2013    AK UNLISTED PROCEDURE FOOT/TOES      AK UNLISTED PROCEDURE HUMERUS/ELBOW      AK UNLISTED PROCEDURE VASCULAR SURGERY  09/10/2020    Debridement and washout of bypass graft. Social History     Tobacco Use    Smoking status: Every Day     Packs/day: 1.00     Years: 45.00     Pack years: 45.00     Types: Cigarettes    Smokeless tobacco: Never   Substance Use Topics    Alcohol use: No      Family History   Problem Relation Age of Onset    Coronary Art Dis Mother     Heart Attack Mother     Heart Surgery Mother         CABGx3    Elevated Lipids Mother     COPD Father     Heart Attack Brother     COPD Brother     Other Brother         PAD      Prior to Admission medications    Medication Sig Start Date End Date Taking? Authorizing Provider   Cetirizine (ZyrTEC) 10 mg cap Take  by mouth nightly.    Yes Provider, Historical   montelukast (SINGULAIR) 10 mg tablet Take 10 mg by mouth daily. Yes Provider, Historical   triamterene-hydroCHLOROthiazide (DYAZIDE) 37.5-25 mg per capsule Take  by mouth daily. Yes Provider, Historical   fluticasone-umeclidinium-vilanterol (Trelegy Ellipta) 100-62.5-25 mcg inhaler Take 1 Puff by inhalation daily. 11/14/22  Yes Adin Morejon MD   cholecalciferol (VITAMIN D3) 25 mcg (1,000 unit) cap Take  by mouth daily. Yes Provider, Historical   albuterol (PROVENTIL HFA, VENTOLIN HFA, PROAIR HFA) 90 mcg/actuation inhaler Take 2 Puffs by inhalation every six (6) hours as needed for Wheezing. 8/26/21  Yes Mati Mack MD   clopidogreL (PLAVIX) 75 mg tab TAKE ONE TABLET BY MOUTH DAILY 6/15/20  Yes Selina Koch MD   fluticasone propionate (FLONASE) 50 mcg/actuation nasal spray 2 Sprays by Both Nostrils route daily as needed for Rhinitis. Yes Provider, Historical   pregabalin (LYRICA) 100 mg capsule Take  by mouth two (2) times a day. Takes 100 mg in am and 200 in the pm   Yes Provider, Historical   DULoxetine (CYMBALTA) 60 mg capsule Take 60 mg by mouth nightly. For Anxiety. 60mg at night, 20mg in AM   Yes Provider, Historical   aspirin 81 mg tablet Take 81 mg by mouth daily. Yes Provider, Historical   cyanocobalamin (VITAMIN B12) 1,000 mcg/mL injection 1,000 mcg by IntraMUSCular route every fourteen (14) days. Yes Provider, Historical   dicyclomine (BENTYL) 10 mg capsule Take 10 mg by mouth daily as needed for Abdominal Cramps. Yes Provider, Historical   bimatoprost (LUMIGAN) 0.03 % ophthalmic drops Administer 1 Drop to both eyes every evening. Yes Provider, Historical   inFLIXimab (REMICADE) 100 mg injection 5 mg/kg by IntraVENous route once. Every 4 weeks   Yes Provider, Historical   colchicine 0.6 mg tablet Take 0.6 mg by mouth two (2) times a day. Patient not taking: No sig reported    Provider, Historical   OTHER     Provider, Historical   loratadine (CLARITIN) 10 mg tablet Take 10 mg by mouth daily.   Patient not taking: No sig reported Provider, Historical   loperamide (IMMODIUM) 2 mg tablet Take 2 mg by mouth daily as needed for Diarrhea. Patient not taking: Reported on 2023    Provider, Historical   atropine-PHENobarbital-scopolamine-hyoscyamine (DONNATAL) 16.2-0.1037 -0.0194 mg per tablet Take 1 Tab by mouth every eight (8) hours as needed for Diarrhea or Nausea. Indications: irritable colon  Patient not taking: Reported on 2023    Provider, Historical     Allergies   Allergen Reactions    Codeine Nausea and Vomiting     Other reaction(s): other/intolerance    Darvon [Propoxyphene] Itching    Demerol [Meperidine] Other (comments)     Halucinations    Keflex [Cephalexin] Diarrhea    Talwin [Pentazocine Lactate] Shortness of Breath and Nausea and Vomiting         Review of Systems:    A comprehensive review of systems was negative except for that written in the History of Present Illness.     Objective:     Patient Vitals for the past 8 hrs:   BP Temp Pulse Resp SpO2 Height Weight   23 1045 (!) 170/74 97.9 °F (36.6 °C) 97 18 96 % 5' 5\" (1.651 m) 62.6 kg (138 lb)       Temp (24hrs), Av.9 °F (36.6 °C), Min:97.9 °F (36.6 °C), Max:97.9 °F (36.6 °C)      Physical Exam:  LUNG: clear to auscultation bilaterally  Cardiac is regular  Abdomen soft nontender  Left foot is cool to touch discolored toes and there is swelling    Labs:   Recent Results (from the past 24 hour(s))   TYPE & SCREEN    Collection Time: 23 10:48 AM   Result Value Ref Range    Crossmatch Expiration 2023,2359     ABO/Rh(D) A POSITIVE     Antibody screen NEG        Data Review: Doppler confirms femoropopliteal below-knee bypass is occluded    Assessment:     Active Problems:    Femoral artery occlusion, left (HCC) (2023)        Plan:     Attempt thrombectomy and revision of left leg graft    Signed By: Caesar Wolff MD     2023

## 2023-01-19 NOTE — OP NOTES
Med Rec completed per patient   Allergies reviewed  No ORAL antibiotics in last 14 days         Operative Note    Patient: Memo Ro  YOB: 1950  MRN: [id number]    Date of Procedure: 1/19/2023     Pre-Op Diagnosis: T82.868A OCCLUDED GRAFT    Post-Op Diagnosis:  Occluded bypass graft left leg femoral to below-knee popliteal artery       Procedure(s):  LEFT LEG BYPASS GRAFT THROMBECTOMY / Duy Rahul / THROMBOLYSIS    Surgeon(s):  Saad Luu MD    Surgical Assistant: Surg Asst-1: Marni Shone    Anesthesia: General     Estimated Blood Loss (mL):  Minimal    Complications: None    Specimens: * No specimens in log *     Implants: * No implants in log *    Drains: * No LDAs found *    Findings: Acutely occluded PTFE bypass left leg. Thrombectomized easily. Aorta: The distal aorta is patent and free of thrombus. Bilateral common iliac arteries are patent and free of thrombus. There is a patent stent in the left iliac artery there is no thrombus. The external iliac artery is patent. The common femoral is small in size but patent. Left leg bypass is patent from the femoral to the below-knee popliteal segment with no retained thrombus. The popliteal below the knee is patent runoff into 3 vessels. Some retained thrombus that freed with tPA. Left foot ischemia resolved    Detailed Description of Procedure:   Patient had appropriate antibiotics and general anesthesia. The left leg was prepped draped usual standard fashion followed by CC compliant guidelines for aseptic technique. Made incision on the groin exposing the proximal femoropopliteal bypass. It was well incorporated there is no signs of infection here. There was some small seroma although. I made an incision on the graft in appropriate fashion there was fresh thrombus evident thrombectomized the lower portion of the graft with a 4 Jun and then a 3 Jun all the way down into the tibials achieved all fresh thrombus until backbleeding occurred and I flushed the graft. Placed a clamp.   And thrombectomized the inflow applied achieved a pulse. I placed the sheath into the inflow and performed an angiogram as above. Pulled the sheath out and repaired the site of the graft with 6-0 Prolene sutures. I given 7000 of heparin as well. Punctured the graft with a 6 Korean sheath in a downward direction and performed the angiogram here I saw some minor retained thrombus down in the tibial vessels and give 6 mg of tPA is completely resolved. The anastomosis and runoff are patent as before. I suspect small vessel disease hopefully the tPA will help out with this we will heparinized the patient tonight as well. The foot pinked up very nicely was pleased to see that, it looked mostly cadaveric before. Irrigated out the sites were pulled the sheath repaired this with a 6-0 Prolene. Irrigated with antibiotic irrigation and closed the fascia with interrupted 2-0 Monocryl sutures. 3-0 Monocryl for subcu interrupted. 4 Monocryl for the skin and a wound protection device was applied.   She was extubated and transferred to the ICU in stable condition    Electronically Signed by Jennifer Taveras MD on 1/19/2023 at 2:21 PM

## 2023-01-19 NOTE — ANESTHESIA PREPROCEDURE EVALUATION
Relevant Problems   RESPIRATORY SYSTEM   (+) CAP (community acquired pneumonia)      CARDIOVASCULAR   (+) Chronic aorto-iliac occlusion syndrome (HCC)   (+) Femoral artery occlusion, left (HCC)   (+) HTN (hypertension)   (+) 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HCC)       Anesthetic History     PONV          Review of Systems / Medical History  Patient summary reviewed and pertinent labs reviewed    Pulmonary    COPD: moderate      Shortness of breath      Comments: Home O2 - 2L   Neuro/Psych         Psychiatric history    Comments: neuropathy Cardiovascular    Hypertension          PAD    Exercise tolerance: <4 METS  Comments: claudication   GI/Hepatic/Renal     GERD          Comments: Crohn's  Endo/Other        Arthritis     Other Findings              Physical Exam    Airway  Mallampati: II  TM Distance: > 6 cm  Neck ROM: normal range of motion   Mouth opening: Normal     Cardiovascular  Regular rate and rhythm,  S1 and S2 normal,  no murmur, click, rub, or gallop             Dental    Dentition: Lower partial plate, Upper partial plate and Poor dentition     Pulmonary      Decreased breath sounds: bilateral      Prolonged expiration     Abdominal  GI exam deferred       Other Findings            Anesthetic Plan    ASA: 4  Anesthesia type: general          Induction: Intravenous  Anesthetic plan and risks discussed with: Patient

## 2023-01-19 NOTE — ROUTINE PROCESS
TRANSFER - OUT REPORT:    Verbal report given to Leandro PALMER(name) on [address]  being transferred to CVT ICU(unit) for routine post - op       Report consisted of patients Situation, Background, Assessment and   Recommendations(SBAR). Information from the following report(s) SBAR, Kardex, Procedure Summary, MAR, and Recent Results was reviewed with the receiving nurse. Lines:   Peripheral IV 01/19/23 Left Antecubital (Active)   Site Assessment Clean, dry, & intact 01/19/23 1324   Phlebitis Assessment 0 01/19/23 1324   Infiltration Assessment 0 01/19/23 1324   Dressing Status Clean, dry, & intact 01/19/23 1324   Dressing Type Transparent 01/19/23 1324   Hub Color/Line Status Pink 01/19/23 1324        Opportunity for questions and clarification was provided.       Patient transported with:   Monitor  O2 @ 6 liters  Registered Nurse

## 2023-01-20 ENCOUNTER — APPOINTMENT (OUTPATIENT)
Dept: NON INVASIVE DIAGNOSTICS | Age: 73
DRG: 253 | End: 2023-01-20
Attending: SURGERY
Payer: MEDICARE

## 2023-01-20 LAB
ANION GAP SERPL CALC-SCNC: 5 MMOL/L (ref 3–18)
APTT PPP: 40.7 SEC (ref 23–36.4)
APTT PPP: 63.4 SEC (ref 23–36.4)
BUN SERPL-MCNC: 11 MG/DL (ref 7–18)
BUN/CREAT SERPL: 13 (ref 12–20)
CALCIUM SERPL-MCNC: 9.9 MG/DL (ref 8.5–10.1)
CHLORIDE SERPL-SCNC: 108 MMOL/L (ref 100–111)
CO2 SERPL-SCNC: 26 MMOL/L (ref 21–32)
CREAT SERPL-MCNC: 0.84 MG/DL (ref 0.6–1.3)
ECHO AO ASC DIAM: 3.1 CM
ECHO AO ASCENDING AORTA INDEX: 1.83 CM/M2
ECHO AO ROOT DIAM: 3 CM
ECHO AO ROOT INDEX: 1.78 CM/M2
ECHO LA VOL 2C: 20 ML (ref 22–52)
ECHO LA VOL 4C: 24 ML (ref 22–52)
ECHO LA VOLUME AREA LENGTH: 29 ML
ECHO LA VOLUME INDEX A2C: 12 ML/M2 (ref 16–34)
ECHO LA VOLUME INDEX A4C: 14 ML/M2 (ref 16–34)
ECHO LA VOLUME INDEX AREA LENGTH: 17 ML/M2 (ref 16–34)
ECHO LV E' LATERAL VELOCITY: 9 CM/S
ECHO LV E' SEPTAL VELOCITY: 7 CM/S
ECHO LV FRACTIONAL SHORTENING: 67 % (ref 28–44)
ECHO LV INTERNAL DIMENSION DIASTOLE INDEX: 2.31 CM/M2
ECHO LV INTERNAL DIMENSION DIASTOLIC: 3.9 CM (ref 3.9–5.3)
ECHO LV INTERNAL DIMENSION SYSTOLIC INDEX: 0.77 CM/M2
ECHO LV INTERNAL DIMENSION SYSTOLIC: 1.3 CM
ECHO LV IVSD: 1 CM (ref 0.6–0.9)
ECHO LV MASS 2D: 105.3 G (ref 67–162)
ECHO LV MASS INDEX 2D: 62.3 G/M2 (ref 43–95)
ECHO LV POSTERIOR WALL DIASTOLIC: 0.8 CM (ref 0.6–0.9)
ECHO LV RELATIVE WALL THICKNESS RATIO: 0.41
ECHO LVOT AREA: 2.5 CM2
ECHO LVOT DIAM: 1.8 CM
ECHO LVOT MEAN GRADIENT: 1 MMHG
ECHO LVOT PEAK GRADIENT: 3 MMHG
ECHO LVOT PEAK VELOCITY: 0.9 M/S
ECHO LVOT STROKE VOLUME INDEX: 32.1 ML/M2
ECHO LVOT SV: 54.2 ML
ECHO LVOT VTI: 21.3 CM
ECHO MV A VELOCITY: 1.11 M/S
ECHO MV E DECELERATION TIME (DT): 151.9 MS
ECHO MV E VELOCITY: 0.94 M/S
ECHO MV E/A RATIO: 0.85
ECHO MV E/E' LATERAL: 10.44
ECHO MV E/E' RATIO (AVERAGED): 11.94
ECHO MV E/E' SEPTAL: 13.43
ECHO PULMONARY ARTERY SYSTOLIC PRESSURE (PASP): 60 MMHG
ECHO RV INTERNAL DIMENSION: 3.4 CM
ECHO TV REGURGITANT MAX VELOCITY: 3.35 M/S
ECHO TV REGURGITANT PEAK GRADIENT: 45 MMHG
ERYTHROCYTE [DISTWIDTH] IN BLOOD BY AUTOMATED COUNT: 14.7 % (ref 11.6–14.5)
GLUCOSE SERPL-MCNC: 118 MG/DL (ref 74–99)
HCT VFR BLD AUTO: 32.6 % (ref 35–45)
HGB BLD-MCNC: 10.4 G/DL (ref 12–16)
MCH RBC QN AUTO: 29.4 PG (ref 24–34)
MCHC RBC AUTO-ENTMCNC: 31.9 G/DL (ref 31–37)
MCV RBC AUTO: 92.1 FL (ref 78–100)
NRBC # BLD: 0 K/UL (ref 0–0.01)
NRBC BLD-RTO: 0 PER 100 WBC
PLATELET # BLD AUTO: 374 K/UL (ref 135–420)
PMV BLD AUTO: 9.8 FL (ref 9.2–11.8)
POTASSIUM SERPL-SCNC: 3.7 MMOL/L (ref 3.5–5.5)
RBC # BLD AUTO: 3.54 M/UL (ref 4.2–5.3)
SODIUM SERPL-SCNC: 139 MMOL/L (ref 136–145)
WBC # BLD AUTO: 11.7 K/UL (ref 4.6–13.2)

## 2023-01-20 PROCEDURE — 85730 THROMBOPLASTIN TIME PARTIAL: CPT

## 2023-01-20 PROCEDURE — 85027 COMPLETE CBC AUTOMATED: CPT

## 2023-01-20 PROCEDURE — 36415 COLL VENOUS BLD VENIPUNCTURE: CPT

## 2023-01-20 PROCEDURE — 94640 AIRWAY INHALATION TREATMENT: CPT

## 2023-01-20 PROCEDURE — 93306 TTE W/DOPPLER COMPLETE: CPT

## 2023-01-20 PROCEDURE — 74011250637 HC RX REV CODE- 250/637: Performed by: SURGERY

## 2023-01-20 PROCEDURE — 94762 N-INVAS EAR/PLS OXIMTRY CONT: CPT

## 2023-01-20 PROCEDURE — 74011250636 HC RX REV CODE- 250/636: Performed by: SURGERY

## 2023-01-20 PROCEDURE — 74011000250 HC RX REV CODE- 250: Performed by: SURGERY

## 2023-01-20 PROCEDURE — 77010033678 HC OXYGEN DAILY

## 2023-01-20 PROCEDURE — 65620000000 HC RM CCU GENERAL

## 2023-01-20 PROCEDURE — 80048 BASIC METABOLIC PNL TOTAL CA: CPT

## 2023-01-20 PROCEDURE — 99222 1ST HOSP IP/OBS MODERATE 55: CPT | Performed by: INTERNAL MEDICINE

## 2023-01-20 RX ORDER — HEPARIN SODIUM 10000 [USP'U]/100ML
12-25 INJECTION, SOLUTION INTRAVENOUS
Status: DISPENSED | OUTPATIENT
Start: 2023-01-20 | End: 2023-01-26

## 2023-01-20 RX ORDER — IPRATROPIUM BROMIDE 0.5 MG/2.5ML
0.5 SOLUTION RESPIRATORY (INHALATION)
Status: DISCONTINUED | OUTPATIENT
Start: 2023-01-21 | End: 2023-01-29 | Stop reason: HOSPADM

## 2023-01-20 RX ORDER — ARFORMOTEROL TARTRATE 15 UG/2ML
15 SOLUTION RESPIRATORY (INHALATION)
Status: DISCONTINUED | OUTPATIENT
Start: 2023-01-20 | End: 2023-01-20

## 2023-01-20 RX ORDER — ARFORMOTEROL TARTRATE 15 UG/2ML
15 SOLUTION RESPIRATORY (INHALATION)
Status: DISCONTINUED | OUTPATIENT
Start: 2023-01-21 | End: 2023-01-29 | Stop reason: HOSPADM

## 2023-01-20 RX ORDER — BUDESONIDE 0.25 MG/2ML
250 INHALANT ORAL
Status: DISCONTINUED | OUTPATIENT
Start: 2023-01-20 | End: 2023-01-20

## 2023-01-20 RX ORDER — HEPARIN SODIUM 1000 [USP'U]/ML
40 INJECTION, SOLUTION INTRAVENOUS; SUBCUTANEOUS ONCE
Status: COMPLETED | OUTPATIENT
Start: 2023-01-20 | End: 2023-01-20

## 2023-01-20 RX ORDER — HEPARIN SODIUM 1000 [USP'U]/ML
5000 INJECTION, SOLUTION INTRAVENOUS; SUBCUTANEOUS ONCE
Status: COMPLETED | OUTPATIENT
Start: 2023-01-20 | End: 2023-01-20

## 2023-01-20 RX ORDER — BUDESONIDE 0.25 MG/2ML
250 INHALANT ORAL
Status: DISCONTINUED | OUTPATIENT
Start: 2023-01-21 | End: 2023-01-29 | Stop reason: HOSPADM

## 2023-01-20 RX ORDER — IPRATROPIUM BROMIDE 0.5 MG/2.5ML
0.5 SOLUTION RESPIRATORY (INHALATION) EVERY 8 HOURS
Status: DISCONTINUED | OUTPATIENT
Start: 2023-01-20 | End: 2023-01-20

## 2023-01-20 RX ADMIN — HYDROMORPHONE HYDROCHLORIDE 0.5 MG: 1 INJECTION, SOLUTION INTRAMUSCULAR; INTRAVENOUS; SUBCUTANEOUS at 21:44

## 2023-01-20 RX ADMIN — CETIRIZINE HYDROCHLORIDE 10 MG: 10 TABLET ORAL at 09:11

## 2023-01-20 RX ADMIN — PREGABALIN 100 MG: 50 CAPSULE ORAL at 09:11

## 2023-01-20 RX ADMIN — ASPIRIN 81 MG CHEWABLE TABLET 81 MG: 81 TABLET CHEWABLE at 09:11

## 2023-01-20 RX ADMIN — BUDESONIDE 250 MCG: 0.25 SUSPENSION RESPIRATORY (INHALATION) at 20:33

## 2023-01-20 RX ADMIN — MONTELUKAST 10 MG: 10 TABLET, FILM COATED ORAL at 09:11

## 2023-01-20 RX ADMIN — OXYCODONE HYDROCHLORIDE AND ACETAMINOPHEN 1 TABLET: 5; 325 TABLET ORAL at 09:11

## 2023-01-20 RX ADMIN — DULOXETINE HYDROCHLORIDE 60 MG: 30 CAPSULE, DELAYED RELEASE ORAL at 21:44

## 2023-01-20 RX ADMIN — HEPARIN SODIUM 5000 UNITS: 1000 INJECTION, SOLUTION INTRAVENOUS; SUBCUTANEOUS at 10:00

## 2023-01-20 RX ADMIN — HEPARIN SODIUM 2500 UNITS: 1000 INJECTION INTRAVENOUS; SUBCUTANEOUS at 20:24

## 2023-01-20 RX ADMIN — SODIUM CHLORIDE, PRESERVATIVE FREE 5 ML: 5 INJECTION INTRAVENOUS at 22:29

## 2023-01-20 RX ADMIN — IPRATROPIUM BROMIDE 0.5 MG: 0.5 SOLUTION RESPIRATORY (INHALATION) at 16:00

## 2023-01-20 RX ADMIN — BIMATOPROST 1 DROP: 0.1 SOLUTION/ DROPS OPHTHALMIC at 20:34

## 2023-01-20 RX ADMIN — SODIUM CHLORIDE, PRESERVATIVE FREE 20 ML: 5 INJECTION INTRAVENOUS at 14:00

## 2023-01-20 RX ADMIN — SODIUM CHLORIDE, PRESERVATIVE FREE 10 ML: 5 INJECTION INTRAVENOUS at 06:00

## 2023-01-20 RX ADMIN — HEPARIN SODIUM 16 UNITS/KG/HR: 10000 INJECTION, SOLUTION INTRAVENOUS at 09:11

## 2023-01-20 RX ADMIN — OXYCODONE HYDROCHLORIDE AND ACETAMINOPHEN 1 TABLET: 5; 325 TABLET ORAL at 20:33

## 2023-01-20 RX ADMIN — ARFORMOTEROL TARTRATE 15 MCG: 15 SOLUTION RESPIRATORY (INHALATION) at 20:33

## 2023-01-20 RX ADMIN — ARFORMOTEROL TARTRATE 15 MCG: 15 SOLUTION RESPIRATORY (INHALATION) at 08:41

## 2023-01-20 RX ADMIN — VANCOMYCIN HYDROCHLORIDE 1000 MG: 1 INJECTION, POWDER, LYOPHILIZED, FOR SOLUTION INTRAVENOUS at 13:00

## 2023-01-20 RX ADMIN — PREGABALIN 100 MG: 50 CAPSULE ORAL at 18:53

## 2023-01-20 RX ADMIN — IPRATROPIUM BROMIDE 0.5 MG: 0.5 SOLUTION RESPIRATORY (INHALATION) at 08:41

## 2023-01-20 RX ADMIN — BUDESONIDE 250 MCG: 0.25 SUSPENSION RESPIRATORY (INHALATION) at 08:41

## 2023-01-20 NOTE — CONSULTS
[facility] - Consult Note    Consultation request by Isabel Clayton MD for advice/opinion related to evaluating Femoral artery occlusion, left ([facility] [I70.202]    Date of  Admission: 1/19/2023  9:58 AM   Primary Care Physician:  J Carlos Centeno MD     Assessment:     -Status post occlusion of left femoral bypass graft status post thrombectomy January 19, 2023, initial surgery 1/7/23, remote bypass more proximal about 10 years ago  -History of extensive peripheral arterial disease and bypass graft  -Right atrial mass by transthoracic echo 1/20/23, likely prominent Eustachian ridge, however, thrombus cannot be completely excluded.    -History of Crohn's disease  -Ongoing tobacco use    No primary cardiologist     Plan:     I discussed with patient as well as Dr. Marily Bruner. Given her recurrent thrombosis of her graft, I discussed transesophageal echocardiogram to rule out shunt as well as more closely evaluate the right atrium. We will tentatively plan for Monday. If she is discharged over the weekend I could make arrangements as outpatient. Discussed wtih Dr. Marily Bruner will tentatively plan for Monday morning. Tobacco cessation discussed. History of Present Illness: This is a 74 y.o. female admitted for Femoral artery occlusion, left ([address] [I70.202]. Patient complains of:    Patient is started 6 variant seen left leg coldness and swelling about a week ago. She was seen by Dr. Marily Bruner and findings were consistent with occluded bypass graft. She has a history of complex peripheral arterial disease. She underwent left leg bypass graft thrombectomy yesterday. During routine echocardiogram there is concern for right atrial thrombus versus mass. Currently no chest pain or dyspnea. No syncope. No known history of heart disease.     Cardiac risk factors: Known PAD    Review of Symptoms:  Except as stated above include:  Constitutional:  Negative  Ears, nose, throat: Negative  Respiratory:  negative  Cardiovascular:  negative  Gastrointestinal: negative  Genitourinary:  negative  Musculoskeletal:  Negative  Neurological:  Negative  Dermatological:  Negative  Hematological: Negative  Endocrinological: Negative  Allergy: Negative  Psychological:  Negative       Past Medical History:     Past Medical History:   Diagnosis Date    Arthritis     CAP (community acquired pneumonia) 06/27/2017    Chronic lung disease     Claudication ([facility]     left leg    Crohn's disease ([facility]     Crohn's disease ([facility]     GERD (gastroesophageal reflux disease)     HTN (hypertension)     Ill-defined condition     Uses O2 at night. Nausea & vomiting     Neuropathy     Other and unspecified symptoms and signs involving general sensations and perceptions     PVD    Other ill-defined conditions(799.89)     Crohn's    Parathyroid tumor     sees Endo Dr Olman Thorpe    Peripheral neuropathy     Pulmonary emphysema ([facility]     PVD (peripheral vascular disease) ([facility]     right leg    Sepsis ([facility] 06/27/2017    Vitamin B12 deficiency          Social History:     Social History     Socioeconomic History    Marital status:    Tobacco Use    Smoking status: Every Day     Packs/day: 1.00     Years: 45.00     Pack years: 45.00     Types: Cigarettes    Smokeless tobacco: Never   Vaping Use    Vaping Use: Never used   Substance and Sexual Activity    Alcohol use: No    Drug use: Never   Social History Narrative    Father worked in the [employer] and was diagnosed with Asbestosis. Family History:     Family History   Problem Relation Age of Onset    Coronary Art Dis Mother     Heart Attack Mother     Heart Surgery Mother         CABGx3    Elevated Lipids Mother     COPD Father     Heart Attack Brother     COPD Brother     Other Brother         PAD        Medications:      Allergies   Allergen Reactions    Codeine Nausea and Vomiting     Other reaction(s): other/intolerance    Darvon [Propoxyphene] Itching Demerol [Meperidine] Other (comments)     Halucinations    Keflex [Cephalexin] Diarrhea    Talwin [Pentazocine Lactate] Shortness of Breath and Nausea and Vomiting        Current Facility-Administered Medications   Medication Dose Route Frequency    heparin (porcine) 25,000 units in 0.45% saline 250 ml infusion  12-25 Units/kg/hr IntraVENous TITRATE    budesonide (PULMICORT) 250 mcg/2ml nebulizer susp  250 mcg Nebulization BID RT    And    arformoteroL (BROVANA) neb solution 15 mcg  15 mcg Nebulization BID RT    And    ipratropium (ATROVENT) 0.02 % nebulizer solution 0.5 mg  0.5 mg Nebulization Q8H    scopolamine (TRANSDERM-SCOP) 1 mg over 3 days 1 Patch  1 Patch TransDERmal ONCE    albuterol-ipratropium (DUO-NEB) 2.5 MG-0.5 MG/3 ML  3 mL Nebulization Q4H PRN    aspirin chewable tablet 81 mg  81 mg Oral DAILY    bimatoprost (LUMIGAN) 0.01 % ophthalmic drops 1 Drop  1 Drop Both Eyes QHS    cetirizine (ZYRTEC) tablet   Oral DAILY    dicyclomine (BENTYL) capsule 10 mg  10 mg Oral DAILY PRN    DULoxetine (CYMBALTA) capsule 60 mg  60 mg Oral QHS    montelukast (SINGULAIR) tablet 10 mg  10 mg Oral DAILY    pregabalin (LYRICA) capsule 100 mg  100 mg Oral BID    triamterene-hydroCHLOROthiazide (MAXZIDE) 37.5-25 mg per tablet   Oral DAILY    sodium chloride (NS) flush 5-40 mL  5-40 mL IntraVENous Q8H    sodium chloride (NS) flush 5-40 mL  5-40 mL IntraVENous PRN    oxyCODONE-acetaminophen (PERCOCET) 5-325 mg per tablet 1 Tablet  1 Tablet Oral Q4H PRN    HYDROmorphone (DILAUDID) syringe 0.5 mg  0.5 mg IntraVENous Q2H PRN    naloxone (NARCAN) injection 0.4 mg  0.4 mg IntraVENous PRN         Physical Exam:   Visit Vitals  /63   Pulse 61   Temp 98.4 °F (36.9 °C)   Resp 16   Ht 5' 5\" (1.651 m)   Wt 62.6 kg (138 lb)   SpO2 97%   BMI 22.96 kg/m²     BP Readings from Last 3 Encounters:   01/20/23 116/63   01/07/23 (!) 111/52   10/28/22 (!) 162/82     Pulse Readings from Last 3 Encounters:   01/20/23 61   01/07/23 91   10/28/22 98     Wt Readings from Last 3 Encounters:   01/20/23 62.6 kg (138 lb)   01/05/23 62.6 kg (138 lb)   12/27/21 64.4 kg (142 lb)       General:  alert, cooperative, no distress, appears stated age  Neck:  nontender  Lungs:  clear to auscultation bilaterally  Heart:  regular rate and rhythm, S1, S2 normal, no murmur, click, rub or gallop  Abdomen:  abdomen is soft without significant tenderness, masses, organomegaly or guarding  Extremities:  extremities normal, atraumatic, no cyanosis or edema  Skin: Warm and dry.  no hyperpigmentation, vitiligo, or suspicious lesions  Neuro: alert, oriented x3, affect appropriate, no focal neurological deficits, moves all extremities well, no involuntary movements, reflexes at knee and ankle intact  Psych: non focal     Data Review:     Recent Labs     01/20/23  0503   WBC 11.7   HGB 10.4*   HCT 32.6*        Recent Labs     01/20/23  0503      K 3.7      CO2 26   *   BUN 11   CREA 0.84   CA 9.9       Results for orders placed or performed during the hospital encounter of 12/03/20   EKG, 12 LEAD, INITIAL   Result Value Ref Range    Ventricular Rate 92 BPM    Atrial Rate 92 BPM    P-R Interval 168 ms    QRS Duration 94 ms    Q-T Interval 346 ms    QTC Calculation (Bezet) 427 ms    Calculated P Axis 59 degrees    Calculated R Axis 69 degrees    Calculated T Axis 44 degrees    Diagnosis       Sinus rhythm with occasional premature ventricular complexes  Cannot rule out Anterior infarct , age undetermined  Abnormal ECG  When compared with ECG of 10-SEP-2020 07:09,  premature ventricular complexes are now present  T wave inversion now evident in Inferior leads  Confirmed by Scherrie Fraction ([id number]) on 12/4/2020 8:17:53 PM     Results for orders placed or performed in visit on 04/09/13   AMB POC EKG ROUTINE W/ 12 LEADS, INTER & REP    Narrative    EKG: unchanged from previous tracings, normal sinus rhythm,  inf.scar       All Cardiac Markers in the last 24 hours:   No results found for: CPK, CK, CKMMB, CKMB, RCK3, CKMBT, CKNDX, CKND1, ALEXI, TROPT, TROIQ, GARY, TROPT, TNIPOC, BNP, BNPP    Last Lipid:    Lab Results   Component Value Date/Time    Cholesterol, total 151 03/10/2010 12:00 PM    HDL Cholesterol 44 03/10/2010 12:00 PM    LDL, calculated 42.4 03/10/2010 12:00 PM    Triglyceride 323 (H) 03/10/2010 12:00 PM    CHOL/HDL Ratio 3.4 03/10/2010 12:00 PM       Signed By: Lana Travis MD     January 20, 2023

## 2023-01-20 NOTE — PROGRESS NOTES
[id number]: Bedside, Verbal, and Written shift change report given to  Report included the following information OR Summary, Intake/Output, MAR, Accordion, Recent Results, Med Rec Status, Cardiac Rhythm ., Alarm Parameters , Quality Measures, and Dual Neuro Assessment. Wound Prevention Checklist  Patient: Jonatan Ruffin (40 y.o. female)  Date: 1/20/2023  Diagnosis: Femoral artery occlusion, left (HCC) [I70.202] <principal problem not specified>  Precautions:    [x]  Heel prevention boots not placed on patient, pt declined. [x]  Patient turned q2h during shift  []  Lift team ordered  [x]  Patient on [place] bed/Specialty bed  [x]  Each Wound is documented during shift (Stage, Color, drainage, odor, measurements, and dressings)  [x]  Dual skin check done with Mirian Rodriguez RN   IV Heparin verified with offgoing RNJesusita. Doppler pulses assessed- see PVL flowsheet, no change since last assessment. Dr. Nick Westbrook aware missing Left DP pulses, Left PT dopplerable-sluggish. Toes jadiel with hypersenstivity left heel and left great toe, capillary refill greater than 3 seconds, LLE warm and dry. 0911: Heparin IV infusion adjusted per Heparin protocol   1900: Bedside and Verbal shift change report given to  HEATH Esquivel Rn by Suhas Frias. Report included the following information ED Summary, OR Summary, Procedure Summary, Intake/Output, MAR, Recent Results, Med Rec Status, Cardiac Rhythm ., Alarm Parameters , Pre Procedure Checklist, Procedure Verification, Quality Measures, and Dual Neuro Assessment.

## 2023-01-20 NOTE — PROGRESS NOTES
conducted an initial consultation and Spiritual Assessment for [employer], who is a 74 y.o.,female. Patients Primary Language is: [place]. According to the patients EMR Christianity Affiliation is: Shinto. The reason the Patient came to the hospital is:   Patient Active Problem List    Diagnosis Date Noted    Femoral artery occlusion, left ([facility] 01/19/2023    Postmenopausal osteoporosis 04/22/2022    Vascular graft infection ([address] 12/09/2020    Cellulitis of abdominal wall 12/03/2020    UTI (urinary tract infection) 12/03/2020    Open wound 09/10/2020    Chronic wound infection of abdomen 06/21/2018    Nonhealing surgical wound 04/30/2018    Infection of vascular bypass graft ([address] 04/30/2018    CAP (community acquired pneumonia) 06/27/2017    Severe sepsis ([address] 06/27/2017    Arteriovenous graft infection ([address] 05/10/2017    Abscess 05/10/2017    Dermal sinus tract of lumbosacral region ([address] 05/10/2017    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[address] 08/09/2016    Chronic aorto-iliac occlusion syndrome ([address] 01/19/2016    Wound disruption, post-op, skin 01/09/2015    Mass of breast, left 07/30/2014    HTN (hypertension) 04/01/2013    PVD (peripheral vascular disease) ([address] 04/01/2013    Crohn's disease ([address] 04/01/2013        The  provided the following Interventions:  Initiated a relationship of care and support with patient on day one post surgery in room 2351. Patient reported that she was doing fine and had no complaints now. Family member was present. Patient was asked about her having an advance directive on file and she said that there is not one done. Provided information about Spiritual Care Services. Offered prayer on patients behalf. The following outcomes were achieved:  Patient shared limited information about her medical narrative and spiritual journey/beliefs. Patient processed feeling about current hospitalization.   Patient expressed gratitude for pastoral care visit. Assessment:  Patient does not have any Sikh/cultural needs that will affect patients preferences in health care. There are no further spiritual or Sikh issues which require Spiritual Care Services interventions at this time. Plan:  Chaplains will continue to follow and will provide pastoral care on an as needed/requested basis    . Trang Mohan   Spiritual Care   [phone]

## 2023-01-20 NOTE — PROGRESS NOTES
Ischemia.   Resolved  Foot is warm and has normal color but really minimal Doppler signal  Doppler signal appreciated over bypass graft  Will increase to therapeutic heparin  Surgical site is clean

## 2023-01-20 NOTE — ACP (ADVANCE CARE PLANNING)
Advance Care Planning   Advance Care Planning Inpatient Note  [address] [facility] Department    Today's Date: 1/20/2023  Unit: [facility]    Received request from . Upon review of chart and communication with care team, patient's decision making abilities are not in question. Patient was/were present in the room during visit. Goals of ACP Conversation:  Discuss Advance Care planning documents    Health Care Decision Makers:    No healthcare decision makers have been documented. Click here to complete [address] including selection of the Healthcare Decision Maker Relationship (ie \"Primary\")  Summary:  No Decision Maker named by patient at this time    Advance Care Planning Documents (Patient Wishes) on file:  None     Assessment:    Patient see in bed [phone] where she has had surgery and is now finding recovery. This is day one post surgery. Family present in the room as well, There is no advance directive  Present.   Interventions:  Deferred conversation as patient not interested in completing an advance directive at this time    Care Preferences Communicated:  No    Outcomes/Plan:      Ngoc P[facility]rez [facility] on 1/20/2023 at 11:21 AM

## 2023-01-20 NOTE — PROGRESS NOTES
Problem: Falls - Risk of  Goal: *Absence of Falls  Description: Document Theo Rehmanregine Fall Risk and appropriate interventions in the flowsheet.   Outcome: Resolved/Met  Note: Fall Risk Interventions:  Mobility Interventions: Bed/chair exit alarm, Patient to call before getting OOB, PT Consult for mobility concerns         Medication Interventions: Patient to call before getting OOB, Teach patient to arise slowly, Bed/chair exit alarm    Elimination Interventions: Bed/chair exit alarm, Patient to call for help with toileting needs, Stay With Me (per policy), Toileting schedule/hourly rounds              Problem: Patient Education: Go to Patient Education Activity  Goal: Patient/Family Education  Outcome: Resolved/Met

## 2023-01-20 NOTE — PROGRESS NOTES
Reason for Admission:  Femoral artery occlusion, left (HCC) [I70.202]                 RUR Score:    8            Plan for utilizing home health:    to be determined                      Likelihood of Readmission:   LOW                         Transition of Care Plan:              Initial assessment completed with patient. Cognitive status of patient: oriented to time, place, person and situation. Face sheet information confirmed:  yes. The patient designates her  Dougie Alarcon to participate in her discharge plan and to receive any needed information. This patient lives in a single family home with spouse. Patient is able to navigate steps as needed. Prior to hospitalization, patient was considered to be independent with ADLs/IADLS : yes . Patient has a current ACP document on file: no      Healthcare Decision Maker:     Click here to complete [id number] including selection of the Healthcare Decision Maker Relationship (ie \"Primary\")    The  will be available to transport patient home upon discharge. The patient already has [facility] medical equipment available in the home. Patient is not currently active with home health. Patient has not stayed in a skilled nursing facility or rehab. Was  stay within last 60 days : no. This patient is on dialysis :no    Currently, the discharge plan is home with family vs Home with  [facility]. Per pt she will use [facility] if needed. The patient states that she can obtain her medications from the pharmacy, and take her medications as directed. Patient's current insurance is [employer] Medicare and [address] [place] [address] Management Interventions  PCP Verified by CM:  Yes  Palliative Care Criteria Met (RRAT>21 & CHF Dx)?: No  Transition of Care Consult (CM Consult): Discharge Planning  Support Systems: Spouse/Significant Other  Confirm Follow Up Transport: Family  Discharge Location  Patient Expects to be Discharged to[de-identified] Home with family assistance (vs home health)        SUE PonceN RN  Care Management  Pager: [phone]

## 2023-01-21 LAB — APTT PPP: 108.1 SEC (ref 23–36.4)

## 2023-01-21 PROCEDURE — 36415 COLL VENOUS BLD VENIPUNCTURE: CPT

## 2023-01-21 PROCEDURE — 94640 AIRWAY INHALATION TREATMENT: CPT

## 2023-01-21 PROCEDURE — 65620000000 HC RM CCU GENERAL

## 2023-01-21 PROCEDURE — 77010033678 HC OXYGEN DAILY

## 2023-01-21 PROCEDURE — 74011250636 HC RX REV CODE- 250/636: Performed by: SURGERY

## 2023-01-21 PROCEDURE — 74011250637 HC RX REV CODE- 250/637: Performed by: SURGERY

## 2023-01-21 PROCEDURE — 94762 N-INVAS EAR/PLS OXIMTRY CONT: CPT

## 2023-01-21 PROCEDURE — 74011000250 HC RX REV CODE- 250: Performed by: SURGERY

## 2023-01-21 PROCEDURE — 85730 THROMBOPLASTIN TIME PARTIAL: CPT

## 2023-01-21 RX ADMIN — CETIRIZINE HYDROCHLORIDE 10 MG: 10 TABLET ORAL at 09:11

## 2023-01-21 RX ADMIN — SODIUM CHLORIDE, PRESERVATIVE FREE 5 ML: 5 INJECTION INTRAVENOUS at 06:48

## 2023-01-21 RX ADMIN — BUDESONIDE 250 MCG: 0.25 SUSPENSION RESPIRATORY (INHALATION) at 09:49

## 2023-01-21 RX ADMIN — BIMATOPROST 1 DROP: 0.1 SOLUTION/ DROPS OPHTHALMIC at 20:03

## 2023-01-21 RX ADMIN — ARFORMOTEROL TARTRATE 15 MCG: 15 SOLUTION RESPIRATORY (INHALATION) at 09:48

## 2023-01-21 RX ADMIN — MONTELUKAST 10 MG: 10 TABLET, FILM COATED ORAL at 09:11

## 2023-01-21 RX ADMIN — IPRATROPIUM BROMIDE 0.5 MG: 0.5 SOLUTION RESPIRATORY (INHALATION) at 20:03

## 2023-01-21 RX ADMIN — BUDESONIDE 250 MCG: 0.25 SUSPENSION RESPIRATORY (INHALATION) at 20:03

## 2023-01-21 RX ADMIN — HYDROMORPHONE HYDROCHLORIDE 0.5 MG: 1 INJECTION, SOLUTION INTRAMUSCULAR; INTRAVENOUS; SUBCUTANEOUS at 20:03

## 2023-01-21 RX ADMIN — SODIUM CHLORIDE, PRESERVATIVE FREE 10 ML: 5 INJECTION INTRAVENOUS at 14:00

## 2023-01-21 RX ADMIN — IPRATROPIUM BROMIDE 0.5 MG: 0.5 SOLUTION RESPIRATORY (INHALATION) at 09:49

## 2023-01-21 RX ADMIN — PREGABALIN 100 MG: 50 CAPSULE ORAL at 09:11

## 2023-01-21 RX ADMIN — PREGABALIN 100 MG: 50 CAPSULE ORAL at 17:13

## 2023-01-21 RX ADMIN — SODIUM CHLORIDE, PRESERVATIVE FREE 5 ML: 5 INJECTION INTRAVENOUS at 22:06

## 2023-01-21 RX ADMIN — ARFORMOTEROL TARTRATE 15 MCG: 15 SOLUTION RESPIRATORY (INHALATION) at 20:03

## 2023-01-21 RX ADMIN — ASPIRIN 81 MG CHEWABLE TABLET 81 MG: 81 TABLET CHEWABLE at 09:11

## 2023-01-21 RX ADMIN — DULOXETINE HYDROCHLORIDE 60 MG: 30 CAPSULE, DELAYED RELEASE ORAL at 22:06

## 2023-01-21 NOTE — PROGRESS NOTES
Problem: Lower Extremity Wound Care  Goal: *Non-infected wound: Improvement of existing wound, absence of infection, and maintenance of skin integrity  Outcome: Progressing Towards Goal  Goal: Interventions  Outcome: Progressing Towards Goal     Problem: Patient Education: Go to Patient Education Activity  Goal: Patient/Family Education  Outcome: Progressing Towards Goal

## 2023-01-21 NOTE — PROGRESS NOTES
Awakens easily tells me her foot feels better today  And getting some Doppler signal at the posterior tibial although very monophasic  Her lower extremity and foot are now very warm  She is tolerating her heparin very nicely  Discussed her echo results with cardiology, plan for ALEXIA Monday  Discussed with her we will change from double antiplatelet therapy to Eliquis plus aspirin  Still following this bypass, clinically not ischemic at this time  Okay to ambulate

## 2023-01-21 NOTE — PROGRESS NOTES
1900- Change in shift. Report received by Shahbaz Key RN and Donnie RN. Patient in bed with spouse at bedside. No complaints of pain at this time. Will assume care of patient. Wound Prevention Checklist  Patient: Krystyna Chaudhry (37 y.o. female)  Date: 1/21/2023  Diagnosis: Femoral artery occlusion, left (HCC) [I70.202] <principal problem not specified>  Precautions:    []  Heel prevention boots placed on patient  []  Patient turned q2h during shift  []  Lift team ordered  [x]  Patient on [place] bed/Specialty bed  []  Each Wound is documented during shift (Stage, Color, drainage, odor, measurements, and dressings)  [x]  Dual skin check done with SPENCER Almodovar RN      6322- Heparin verified with Shahbaz Key RN.     2000- aPTT result back, 63.4 sec. Adjustments made to Heparin per protocol. Order placed for aPTT to be drawn at 0230. Patient in bed with spouse at bedside. Patients left foot jadiel in color, swollen, and warm. Very faint PT pulse present with doppler, pedal pulse absent. No complaints of pain at this time but left foot has hypersensitivity with movement. 2033- Patient complains of pain. Percocet given. See MAR.     2100- Patient ambulated to the bathroom. 2144- Patient complains of pain, states that Percocet did not work. Dilaudid given. See MAR.     2214- Patient states that Dilaudid works for pain. No further complaints of pain at this time. 2300- Patient ambulated to the bathroom. 0000- Patient in bed sleeping. Patients left foot jadiel in color, swollen, and warm. Very faint PT pulse present with doppler, pedal pulse absent. No complaints of pain at this time. 0348- aPTT result back, 108.1 sec and therapeutic. Order placed for aPTT to be drawn tomorrow am.     0400- Patient bathed with CHG. Gown, bed pads, and linen changed. faint PT pulse present with doppler, pedal pulse absent. No complaints of pain at this time. 0700- Change of shift. Report given to SPENCER Fields.

## 2023-01-21 NOTE — PROGRESS NOTES
Problem: Lower Extremity Wound Care  Goal: *Non-infected wound: Improvement of existing wound, absence of infection, and maintenance of skin integrity  Outcome: Progressing Towards Goal  Goal: Interventions  Outcome: Progressing Towards Goal     Problem: Patient Education: Go to Patient Education Activity  Goal: Patient/Family Education  Outcome: Progressing Towards Goal     Problem: Surgical Pathway Post-Op Day 2 through Discharge  Goal: Activity/Safety  Outcome: Progressing Towards Goal  Goal: Nutrition/Diet  Outcome: Progressing Towards Goal  Goal: Discharge Planning  Outcome: Progressing Towards Goal  Goal: Medications  Outcome: Progressing Towards Goal  Goal: Respiratory  Outcome: Progressing Towards Goal  Goal: Treatments/Interventions/Procedures  Outcome: Progressing Towards Goal  Goal: Psychosocial  Outcome: Progressing Towards Goal  Goal: *No signs and symptoms of infection or wound complications  Outcome: Progressing Towards Goal  Goal: *Optimal pain control at patient's stated goal  Outcome: Progressing Towards Goal  Goal: *Adequate urinary output (equal to or greater than 30 milliliters/hour)  Outcome: Progressing Towards Goal  Goal: *Hemodynamically stable  Outcome: Progressing Towards Goal  Goal: *Tolerating diet  Outcome: Progressing Towards Goal  Goal: *Demonstrates progressive activity  Outcome: Progressing Towards Goal  Goal: *Lungs clear or at baseline  Outcome: Progressing Towards Goal

## 2023-01-22 LAB
APTT PPP: 114.3 SEC (ref 23–36.4)
APTT PPP: 53.8 SEC (ref 23–36.4)

## 2023-01-22 PROCEDURE — 74011250636 HC RX REV CODE- 250/636: Performed by: SURGERY

## 2023-01-22 PROCEDURE — 74011000250 HC RX REV CODE- 250: Performed by: SURGERY

## 2023-01-22 PROCEDURE — 94640 AIRWAY INHALATION TREATMENT: CPT

## 2023-01-22 PROCEDURE — 65620000000 HC RM CCU GENERAL

## 2023-01-22 PROCEDURE — 36415 COLL VENOUS BLD VENIPUNCTURE: CPT

## 2023-01-22 PROCEDURE — 94762 N-INVAS EAR/PLS OXIMTRY CONT: CPT

## 2023-01-22 PROCEDURE — 85730 THROMBOPLASTIN TIME PARTIAL: CPT

## 2023-01-22 PROCEDURE — 74011250637 HC RX REV CODE- 250/637: Performed by: SURGERY

## 2023-01-22 RX ORDER — HEPARIN SODIUM 1000 [USP'U]/ML
80 INJECTION, SOLUTION INTRAVENOUS; SUBCUTANEOUS ONCE
Status: COMPLETED | OUTPATIENT
Start: 2023-01-22 | End: 2023-01-22

## 2023-01-22 RX ADMIN — ARFORMOTEROL TARTRATE 15 MCG: 15 SOLUTION RESPIRATORY (INHALATION) at 19:41

## 2023-01-22 RX ADMIN — HEPARIN SODIUM 5010 UNITS: 1000 INJECTION INTRAVENOUS; SUBCUTANEOUS at 09:54

## 2023-01-22 RX ADMIN — CETIRIZINE HYDROCHLORIDE 10 MG: 10 TABLET ORAL at 08:47

## 2023-01-22 RX ADMIN — MONTELUKAST 10 MG: 10 TABLET, FILM COATED ORAL at 08:47

## 2023-01-22 RX ADMIN — BUDESONIDE 250 MCG: 0.25 SUSPENSION RESPIRATORY (INHALATION) at 19:42

## 2023-01-22 RX ADMIN — IPRATROPIUM BROMIDE 0.5 MG: 0.5 SOLUTION RESPIRATORY (INHALATION) at 19:41

## 2023-01-22 RX ADMIN — SODIUM CHLORIDE, PRESERVATIVE FREE 5 ML: 5 INJECTION INTRAVENOUS at 13:20

## 2023-01-22 RX ADMIN — SODIUM CHLORIDE, PRESERVATIVE FREE 10 ML: 5 INJECTION INTRAVENOUS at 22:05

## 2023-01-22 RX ADMIN — HYDROMORPHONE HYDROCHLORIDE 0.5 MG: 1 INJECTION, SOLUTION INTRAMUSCULAR; INTRAVENOUS; SUBCUTANEOUS at 19:42

## 2023-01-22 RX ADMIN — DULOXETINE HYDROCHLORIDE 60 MG: 30 CAPSULE, DELAYED RELEASE ORAL at 22:04

## 2023-01-22 RX ADMIN — HEPARIN SODIUM 22 UNITS/KG/HR: 10000 INJECTION, SOLUTION INTRAVENOUS at 09:54

## 2023-01-22 RX ADMIN — BIMATOPROST 1 DROP: 0.1 SOLUTION/ DROPS OPHTHALMIC at 22:04

## 2023-01-22 RX ADMIN — ARFORMOTEROL TARTRATE 15 MCG: 15 SOLUTION RESPIRATORY (INHALATION) at 08:26

## 2023-01-22 RX ADMIN — IPRATROPIUM BROMIDE 0.5 MG: 0.5 SOLUTION RESPIRATORY (INHALATION) at 08:26

## 2023-01-22 RX ADMIN — SODIUM CHLORIDE, PRESERVATIVE FREE 5 ML: 5 INJECTION INTRAVENOUS at 06:23

## 2023-01-22 RX ADMIN — BUDESONIDE 250 MCG: 0.25 SUSPENSION RESPIRATORY (INHALATION) at 08:26

## 2023-01-22 RX ADMIN — PREGABALIN 100 MG: 50 CAPSULE ORAL at 17:44

## 2023-01-22 RX ADMIN — ASPIRIN 81 MG CHEWABLE TABLET 81 MG: 81 TABLET CHEWABLE at 08:47

## 2023-01-22 RX ADMIN — TRIAMTERENE AND HYDROCHLOROTHIAZIDE 1 TABLET: 37.5; 25 TABLET ORAL at 08:48

## 2023-01-22 RX ADMIN — PREGABALIN 100 MG: 50 CAPSULE ORAL at 08:47

## 2023-01-22 NOTE — PROGRESS NOTES
1900- Change of shift. Report received by Ramiro Hanna RN and Donnie RN. Patient in bed with spouse at bedside. No complaints of pain at this time. Will assume care of patient. Wound Prevention Checklist  Patient: Alber Horton (2 y.o. female)  Date: 1/21/2023  Diagnosis: Femoral artery occlusion, left (HCC) [I70.202] <principal problem not specified>  Precautions:    []  Heel prevention boots placed on patient  []  Patient turned q2h during shift  []  Lift team ordered  [x]  Patient on [place] bed/Specialty bed  []  Each Wound is documented during shift (Stage, Color, drainage, odor, measurements, and dressings)  [x]  Dual skin check done with SPENCER Fields  [address]- Heparin therapeutic, will have aPTT drawn at 0400 by lab. Patient A/O x4, in bed and spouse at bedside. Patients left foot jadiel,swollen, and warm. Very faint PT pulse present with doppler, pedal pulse absent. Patient complains of pain and discomfort in the left foot. 2003- Patient received pain medication. See MAR.     2200- Patient O2 in between 88-90, patient placed on 2L of O2, patient states that she usually sleeps     0000- No assessment changes. Patient in bed sleeping. Patients left foot jadiel, swollen, and warm. Very faint PT pulse present with doppler, pedal pulse absent. No complaints of pain at this time. 0400- PT and pedal pulse present with doppler on left foot. Both pulses are faint when compared to the right foot. No complaints of pain at this time. Spoke with Lab regarding aPTT and informed that there are only 2 lab techs and someone will be by shortly to draw labs. 0500-  Patient bathed with CHG. Gown, bed pads, and linen changed. Patient requests to stay in bed at this time, but ambulated to the bathroom and back to bed without any difficulties. Patient O2 removed, patient O2 Sat greater than 92%. 7792- Spoke with Lab regarding no one coming to draw aPTT and informed that someone is on their way. 0700- Change of shift. Report given to SPENCER Fields.

## 2023-01-22 NOTE — PROGRESS NOTES
4303- Change of shift. Report received from [address], [address] Will assume care of the patient. 0730- Phlebotomy at bedside for PTT. Patient resting in bed. Left leg dressing C/D/I. + PT, absent pedal. Foot warm, patient reports only minimal discomfort. No patient needs, call bell with patient. 1000- Heparin gtt changed per order. 1200- Patient escorted to bathroom then returned to bed. Patient assessment unchanged. No patient needs, call bell with patient. 1415- Patient ambulated with RN for about 5-7 minutes, tolerated well. Patient back in room and resting comfortably in bed. 1610- Phlebotomy at bedside for repeat PTT. 1700- Heparin gtt changed per order. 1900- Change of shift. Report given to Twyla Barbosa RN.

## 2023-01-23 ENCOUNTER — ANESTHESIA EVENT (OUTPATIENT)
Dept: NON INVASIVE DIAGNOSTICS | Age: 73
DRG: 253 | End: 2023-01-23
Payer: MEDICARE

## 2023-01-23 ENCOUNTER — ANESTHESIA (OUTPATIENT)
Dept: NON INVASIVE DIAGNOSTICS | Age: 73
DRG: 253 | End: 2023-01-23
Payer: MEDICARE

## 2023-01-23 ENCOUNTER — TRANSCRIBE ORDER (OUTPATIENT)
Dept: CARDIAC CATH/INVASIVE PROCEDURES | Age: 73
End: 2023-01-23

## 2023-01-23 ENCOUNTER — APPOINTMENT (OUTPATIENT)
Dept: NON INVASIVE DIAGNOSTICS | Age: 73
DRG: 253 | End: 2023-01-23
Attending: INTERNAL MEDICINE
Payer: MEDICARE

## 2023-01-23 ENCOUNTER — APPOINTMENT (OUTPATIENT)
Dept: NON INVASIVE DIAGNOSTICS | Age: 73
DRG: 253 | End: 2023-01-23
Payer: MEDICARE

## 2023-01-23 DIAGNOSIS — I51.3 RIGHT ATRIAL THROMBUS: Primary | ICD-10-CM

## 2023-01-23 LAB
APTT PPP: 74.8 SEC (ref 23–36.4)
APTT PPP: 82.1 SEC (ref 23–36.4)
ECHO EST RA PRESSURE: 8 MMHG
ECHO RIGHT VENTRICULAR SYSTOLIC PRESSURE (RVSP): 21 MMHG
ECHO TV REGURGITANT MAX VELOCITY: 1.8 M/S
ECHO TV REGURGITANT PEAK GRADIENT: 13 MMHG

## 2023-01-23 PROCEDURE — 74011000250 HC RX REV CODE- 250: Performed by: SURGERY

## 2023-01-23 PROCEDURE — 36415 COLL VENOUS BLD VENIPUNCTURE: CPT

## 2023-01-23 PROCEDURE — 85730 THROMBOPLASTIN TIME PARTIAL: CPT

## 2023-01-23 PROCEDURE — 96374 THER/PROPH/DIAG INJ IV PUSH: CPT

## 2023-01-23 PROCEDURE — 74011250636 HC RX REV CODE- 250/636: Performed by: SURGERY

## 2023-01-23 PROCEDURE — 76060000031 HC ANESTHESIA FIRST 0.5 HR

## 2023-01-23 PROCEDURE — 94762 N-INVAS EAR/PLS OXIMTRY CONT: CPT

## 2023-01-23 PROCEDURE — 65660000004 HC RM CVT STEPDOWN

## 2023-01-23 PROCEDURE — 74011250637 HC RX REV CODE- 250/637: Performed by: SURGERY

## 2023-01-23 PROCEDURE — 74011250636 HC RX REV CODE- 250/636: Performed by: ANESTHESIOLOGY

## 2023-01-23 PROCEDURE — 99232 SBSQ HOSP IP/OBS MODERATE 35: CPT | Performed by: INTERNAL MEDICINE

## 2023-01-23 PROCEDURE — 94640 AIRWAY INHALATION TREATMENT: CPT

## 2023-01-23 PROCEDURE — 74011000250 HC RX REV CODE- 250: Performed by: ANESTHESIOLOGY

## 2023-01-23 RX ORDER — FENTANYL CITRATE 50 UG/ML
INJECTION, SOLUTION INTRAMUSCULAR; INTRAVENOUS AS NEEDED
Status: DISCONTINUED | OUTPATIENT
Start: 2023-01-23 | End: 2023-01-23 | Stop reason: HOSPADM

## 2023-01-23 RX ORDER — PROPOFOL 10 MG/ML
INJECTION, EMULSION INTRAVENOUS AS NEEDED
Status: DISCONTINUED | OUTPATIENT
Start: 2023-01-23 | End: 2023-01-23 | Stop reason: HOSPADM

## 2023-01-23 RX ORDER — LIDOCAINE HYDROCHLORIDE 20 MG/ML
INJECTION, SOLUTION EPIDURAL; INFILTRATION; INTRACAUDAL; PERINEURAL AS NEEDED
Status: DISCONTINUED | OUTPATIENT
Start: 2023-01-23 | End: 2023-01-23 | Stop reason: HOSPADM

## 2023-01-23 RX ADMIN — DULOXETINE HYDROCHLORIDE 60 MG: 30 CAPSULE, DELAYED RELEASE ORAL at 21:00

## 2023-01-23 RX ADMIN — PREGABALIN 100 MG: 50 CAPSULE ORAL at 18:05

## 2023-01-23 RX ADMIN — PREGABALIN 100 MG: 50 CAPSULE ORAL at 08:54

## 2023-01-23 RX ADMIN — BUDESONIDE 250 MCG: 0.25 SUSPENSION RESPIRATORY (INHALATION) at 08:28

## 2023-01-23 RX ADMIN — SODIUM CHLORIDE, PRESERVATIVE FREE 5 ML: 5 INJECTION INTRAVENOUS at 05:21

## 2023-01-23 RX ADMIN — HEPARIN SODIUM 20 UNITS/KG/HR: 10000 INJECTION, SOLUTION INTRAVENOUS at 05:19

## 2023-01-23 RX ADMIN — HYDROMORPHONE HYDROCHLORIDE 0.5 MG: 1 INJECTION, SOLUTION INTRAMUSCULAR; INTRAVENOUS; SUBCUTANEOUS at 21:04

## 2023-01-23 RX ADMIN — ARFORMOTEROL TARTRATE 15 MCG: 15 SOLUTION RESPIRATORY (INHALATION) at 08:28

## 2023-01-23 RX ADMIN — CETIRIZINE HYDROCHLORIDE 10 MG: 10 TABLET ORAL at 08:54

## 2023-01-23 RX ADMIN — ARFORMOTEROL TARTRATE 15 MCG: 15 SOLUTION RESPIRATORY (INHALATION) at 20:40

## 2023-01-23 RX ADMIN — LIDOCAINE HYDROCHLORIDE 40 MG: 20 INJECTION, SOLUTION EPIDURAL; INFILTRATION; INTRACAUDAL; PERINEURAL at 12:48

## 2023-01-23 RX ADMIN — BUDESONIDE 250 MCG: 0.25 SUSPENSION RESPIRATORY (INHALATION) at 20:40

## 2023-01-23 RX ADMIN — FENTANYL CITRATE 100 MCG: 50 INJECTION, SOLUTION INTRAMUSCULAR; INTRAVENOUS at 12:48

## 2023-01-23 RX ADMIN — PROPOFOL 50 MG: 10 INJECTION, EMULSION INTRAVENOUS at 12:48

## 2023-01-23 RX ADMIN — TRIAMTERENE AND HYDROCHLOROTHIAZIDE 1 TABLET: 37.5; 25 TABLET ORAL at 08:54

## 2023-01-23 RX ADMIN — SODIUM CHLORIDE, PRESERVATIVE FREE 10 ML: 5 INJECTION INTRAVENOUS at 13:49

## 2023-01-23 RX ADMIN — MONTELUKAST 10 MG: 10 TABLET, FILM COATED ORAL at 08:54

## 2023-01-23 RX ADMIN — BIMATOPROST 1 DROP: 0.1 SOLUTION/ DROPS OPHTHALMIC at 20:56

## 2023-01-23 RX ADMIN — IPRATROPIUM BROMIDE 0.5 MG: 0.5 SOLUTION RESPIRATORY (INHALATION) at 08:28

## 2023-01-23 RX ADMIN — ASPIRIN 81 MG CHEWABLE TABLET 81 MG: 81 TABLET CHEWABLE at 08:54

## 2023-01-23 RX ADMIN — SODIUM CHLORIDE, PRESERVATIVE FREE 10 ML: 5 INJECTION INTRAVENOUS at 20:57

## 2023-01-23 RX ADMIN — IPRATROPIUM BROMIDE 0.5 MG: 0.5 SOLUTION RESPIRATORY (INHALATION) at 20:39

## 2023-01-23 NOTE — PROGRESS NOTES
[facility] - Progress Note  Admit Date: 1/19/2023    Assessment:     -Status post occlusion of left femoral bypass graft status post thrombectomy January 19, 2023, initial surgery 1/7/23, remote bypass more proximal about 10 years ago  -History of extensive peripheral arterial disease and bypass graft  -Right atrial mass by transthoracic echo 1/20/23, likely prominent Eustachian ridge, however, thrombus cannot be completely excluded.    -History of Crohn's disease  -Ongoing tobacco use     No primary cardiologist    Plan:     Plan ALEXIA today to evaluate right atrium and exclude shunt, changed to stepdown status, will plan to do in holding area with anesthesia assistance. Remains on heparin drip. Procedure discussed and all questions answered. Subjective:     No new complaints.      Objective:      Patient Vitals for the past 8 hrs:   Temp Pulse Resp BP SpO2   01/23/23 0800 97.9 °F (36.6 °C) 69 17 (!) 136/58 91 %   01/23/23 0700 -- 70 19 132/68 --   01/23/23 0600 -- 72 18 (!) 140/54 91 %   01/23/23 0500 -- 74 19 (!) 126/58 93 %   01/23/23 0400 97.7 °F (36.5 °C) 71 17 139/63 93 %   01/23/23 0300 -- 69 20 125/61 96 %   01/23/23 0200 -- 72 21 131/60 95 %   01/23/23 0100 -- 77 13 128/72 98 %         Patient Vitals for the past 96 hrs:   Weight   01/20/23 1554 62.6 kg (138 lb)   01/19/23 1045 62.6 kg (138 lb)                    Intake/Output Summary (Last 24 hours) at 1/23/2023 0809  Last data filed at 1/23/2023 0400  Gross per 24 hour   Intake 976.93 ml   Output 2250 ml   Net -1273.07 ml       Physical Exam:  General:  alert, cooperative, no distress, appears stated age  Neck:  nontender  Lungs:  clear to auscultation bilaterally  Heart:  regular rate and rhythm, S1, S2 normal, no murmur, click, rub or gallop  Abdomen:  abdomen is soft without significant tenderness, masses, organomegaly or guarding  Extremities:  extremities normal, atraumatic, no cyanosis or edema    Data Review:     Labs: Results:       Chemistry No results for input(s): GLU, NA, K, CL, CO2, BUN, CREA, CA, MG, PHOS, AGAP, BUCR, TBIL, AP, TP, ALB, GLOB, AGRAT in the last 72 hours. No lab exists for component: GPT   CBC w/Diff No results for input(s): WBC, RBC, HGB, HCT, PLT, GRANS, LYMPH, EOS, HGBEXT, HCTEXT, PLTEXT in the last 72 hours. Cardiac Enzymes No results found for: CPK, CK, CKMMB, CKMB, RCK3, CKMBT, CKNDX, CKND1, ALEXI, TROPT, TROIQ, GARY, TROPT, TNIPOC, BNP, BNPP   Coagulation Recent Labs     01/22/23  2304 01/22/23  1615   APTT 82.1* 114.3*       Lipid Panel Lab Results   Component Value Date/Time    Cholesterol, total 151 03/10/2010 12:00 PM    HDL Cholesterol 44 03/10/2010 12:00 PM    LDL, calculated 42.4 03/10/2010 12:00 PM    VLDL, calculated 64.6 03/10/2010 12:00 PM    Triglyceride 323 (H) 03/10/2010 12:00 PM    CHOL/HDL Ratio 3.4 03/10/2010 12:00 PM      BNP No results found for: BNP, BNPP, XBNPT   Liver Enzymes No results for input(s): TP, ALB, TBIL, AP in the last 72 hours.     No lab exists for component: SGOT, GPT, DBIL   Digoxin    Thyroid Studies Lab Results   Component Value Date/Time    TSH 0.43 10/14/2016 12:01 PM          Signed By: Barry Hood MD     January 23, 2023

## 2023-01-23 NOTE — PROGRESS NOTES
Completed her ALEXIA today, no thrombus seen in the atrium. Has been up and ambulatory. Tells me her leg feels better but her left foot still does hurt. No fevers or chills no drainage or bleeding. Will check labs in a.m. and do a CAT scan to better evaluate the arterial circulation left leg.   Foot is warm and viable appearing and not tender to touch

## 2023-01-23 NOTE — ANESTHESIA POSTPROCEDURE EVALUATION
* No procedures listed *.     MAC    Anesthesia Post Evaluation      Multimodal analgesia: multimodal analgesia used between 6 hours prior to anesthesia start to PACU discharge  Patient location during evaluation: bedside  Patient participation: complete - patient participated  Level of consciousness: lethargic  Pain score: 1  Airway patency: patent  Anesthetic complications: no  Cardiovascular status: acceptable  Respiratory status: acceptable  Hydration status: acceptable  Post anesthesia nausea and vomiting:  none  Final Post Anesthesia Temperature Assessment:  Normothermia (36.0-37.5 degrees C)      INITIAL Post-op Vital signs:   Vitals Value Taken Time   /55 01/23/23 1255   Temp 37 °C (98.6 °F) 01/23/23 1255   Pulse 68 01/23/23 1255   Resp 11 01/23/23 1255   SpO2 95 % 01/23/23 1255

## 2023-01-23 NOTE — PROGRESS NOTES
1900- Change of shift. Report received by SPENCER Fields. Patient in bed, no complaints of pain at this time. Will assume care of patient. Wound Prevention Checklist  Patient: Shane Lopez (68 y.o. female)  Date: 1/22/2023  Diagnosis: Femoral artery occlusion, left (HCC) [I70.202] <principal problem not specified>  Precautions:    []  Heel prevention boots placed on patient  []  Patient turned q2h during shift  []  Lift team ordered  [x]  Patient on [place] bed/Specialty bed  []  Each Wound is documented during shift (Stage, Color, drainage, odor, measurements, and dressings)  [x]  Dual skin check done with PSENCER Fields  [facility]. Order already in place for aPTT to be drawn at 2300.     1942- Patient complains of pain and discomfort in her left foot. Pain medication given. See MAR.     2000- Patient A/O x4, in bed. Patient left foot jadiel, swollen, and warm. PT present with doppler, pedal pulse absent. 2200- Patient ambulated to the bathroom. 2304- Phlebotomy at bedside drawing aPTT. 0000- No assessment changes. All cups taken from bedside, patient is now NPO, last sip of water at 2350. Patient bathed with CHG. Gown, bed pads, and linen changed. 0015- Labs reviewed, aPTT therapeutic at 82.1 sec. Order placed for aPTT to be drawn tomorrow,  01/24/22 at 0400.    0100- Patient ambulated to the bathroom. 0400- No assessment changes. No complaints of pain at this time. Patient bathed with CHG. Patient ambulated to the bathroom. 0700- Change of shift. Report given to SPENCRE Fields.

## 2023-01-23 NOTE — PROGRESS NOTES
0700- Change of shift. Report received from [address], [address]. Will assume care of the patient. 8864- Patient OK for stepdown status per Dr Nely Toro, may go off floor for ALEXIA.   1140- Patient off floor for ALEXIA.   1325- Patient back from ALEXIA procedure. 1600- Patient resting comfortably in bed. No patient needs, call bell with patient. 1900-Change of shift. Report given to Julien Holland RN.

## 2023-01-23 NOTE — ROUTINE PROCESS
TRANSFER - OUT REPORT:    Verbal report given to Josias Whitman RN(name) on [employer]  being transferred to [facility](unit) for routine progression of care       Report consisted of patients Situation, Background, Assessment and   Recommendations(SBAR). Information from the following report(s) SBAR was reviewed with the receiving nurse. Lines:   Peripheral IV 01/19/23 Left Antecubital (Active)   Site Assessment Clean, dry, & intact 01/23/23 1200   Phlebitis Assessment 0 01/23/23 1200   Infiltration Assessment 0 01/23/23 1200   Dressing Status Clean, dry, & intact; Occlusive 01/23/23 1200   Dressing Type Tape;Transparent 01/23/23 1200   Hub Color/Line Status Patent 01/23/23 1200   Action Taken Open ports on tubing capped 01/23/23 1200   Alcohol Cap Used No 01/23/23 1200       Peripheral IV 01/20/23 Anterior;Proximal;Right Forearm (Active)   Site Assessment Clean, dry, & intact 01/23/23 1200   Phlebitis Assessment 0 01/23/23 1200   Infiltration Assessment 0 01/23/23 1200   Dressing Status Clean, dry, & intact 01/23/23 1200   Dressing Type Transparent 01/23/23 1200   Hub Color/Line Status Blue 01/23/23 1200   Action Taken Open ports on tubing capped 01/23/23 1200   Alcohol Cap Used No 01/23/23 1200        Opportunity for questions and clarification was provided. Patient transported with:   Registered Nurse: ARYAN Dowling

## 2023-01-23 NOTE — ANESTHESIA PREPROCEDURE EVALUATION
Relevant Problems   RESPIRATORY SYSTEM   (+) CAP (community acquired pneumonia)      CARDIOVASCULAR   (+) Chronic aorto-iliac occlusion syndrome (HCC)   (+) Femoral artery occlusion, left (HCC)   (+) HTN (hypertension)   (+) 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HCC)       Anesthetic History     PONV          Review of Systems / Medical History  Patient summary reviewed and pertinent labs reviewed    Pulmonary    COPD: moderate      Shortness of breath      Comments: Home O2 - 2L   Neuro/Psych         Psychiatric history    Comments: neuropathy Cardiovascular    Hypertension          PAD    Exercise tolerance: <4 METS  Comments: Claudication  Question atrial mass on right?    GI/Hepatic/Renal     GERD          Comments: Crohn's  Endo/Other        Arthritis     Other Findings              Physical Exam    Airway  Mallampati: II  TM Distance: > 6 cm  Neck ROM: normal range of motion   Mouth opening: Normal     Cardiovascular  Regular rate and rhythm,  S1 and S2 normal,  no murmur, click, rub, or gallop             Dental    Dentition: Lower partial plate, Upper partial plate and Poor dentition     Pulmonary      Decreased breath sounds: bilateral      Prolonged expiration     Abdominal  GI exam deferred       Other Findings            Anesthetic Plan    ASA: 4  Anesthesia type: MAC          Induction: Intravenous  Anesthetic plan and risks discussed with: Patient

## 2023-01-23 NOTE — PROGRESS NOTES
Problem: Lower Extremity Wound Care  Goal: *Non-infected wound: Improvement of existing wound, absence of infection, and maintenance of skin integrity  Outcome: Progressing Towards Goal  Goal: Interventions  Outcome: Progressing Towards Goal     Problem: Surgical Pathway Post-Op Day 2 through Discharge  Goal: Activity/Safety  Outcome: Progressing Towards Goal  Goal: Nutrition/Diet  Outcome: Progressing Towards Goal  Goal: Discharge Planning  Outcome: Progressing Towards Goal  Goal: Medications  Outcome: Progressing Towards Goal  Goal: Respiratory  Outcome: Progressing Towards Goal  Goal: Treatments/Interventions/Procedures  Outcome: Progressing Towards Goal  Goal: Psychosocial  Outcome: Progressing Towards Goal  Goal: *No signs and symptoms of infection or wound complications  Outcome: Progressing Towards Goal  Goal: *Optimal pain control at patient's stated goal  Outcome: Progressing Towards Goal  Goal: *Adequate urinary output (equal to or greater than 30 milliliters/hour)  Outcome: Progressing Towards Goal  Goal: *Hemodynamically stable  Outcome: Progressing Towards Goal  Goal: *Tolerating diet  Outcome: Progressing Towards Goal  Goal: *Demonstrates progressive activity  Outcome: Progressing Towards Goal  Goal: *Lungs clear or at baseline  Outcome: Progressing Towards Goal

## 2023-01-24 ENCOUNTER — APPOINTMENT (OUTPATIENT)
Dept: CT IMAGING | Age: 73
DRG: 253 | End: 2023-01-24
Attending: SURGERY
Payer: MEDICARE

## 2023-01-24 LAB
ANION GAP SERPL CALC-SCNC: 5 MMOL/L (ref 3–18)
APTT PPP: 79.9 SEC (ref 23–36.4)
BASOPHILS # BLD: 0.1 K/UL (ref 0–0.1)
BASOPHILS NFR BLD: 1 % (ref 0–2)
BUN SERPL-MCNC: 11 MG/DL (ref 7–18)
BUN/CREAT SERPL: 14 (ref 12–20)
CALCIUM SERPL-MCNC: 9.9 MG/DL (ref 8.5–10.1)
CHLORIDE SERPL-SCNC: 107 MMOL/L (ref 100–111)
CO2 SERPL-SCNC: 27 MMOL/L (ref 21–32)
CREAT SERPL-MCNC: 0.81 MG/DL (ref 0.6–1.3)
DIFFERENTIAL METHOD BLD: ABNORMAL
EOSINOPHIL # BLD: 0.6 K/UL (ref 0–0.4)
EOSINOPHIL NFR BLD: 6 % (ref 0–5)
ERYTHROCYTE [DISTWIDTH] IN BLOOD BY AUTOMATED COUNT: 15.6 % (ref 11.6–14.5)
GLUCOSE SERPL-MCNC: 94 MG/DL (ref 74–99)
HCT VFR BLD AUTO: 37.3 % (ref 35–45)
HGB BLD-MCNC: 11.7 G/DL (ref 12–16)
IMM GRANULOCYTES # BLD AUTO: 0.1 K/UL (ref 0–0.04)
IMM GRANULOCYTES NFR BLD AUTO: 1 % (ref 0–0.5)
LYMPHOCYTES # BLD: 3.7 K/UL (ref 0.9–3.6)
LYMPHOCYTES NFR BLD: 40 % (ref 21–52)
MCH RBC QN AUTO: 28.9 PG (ref 24–34)
MCHC RBC AUTO-ENTMCNC: 31.4 G/DL (ref 31–37)
MCV RBC AUTO: 92.1 FL (ref 78–100)
MONOCYTES # BLD: 0.9 K/UL (ref 0.05–1.2)
MONOCYTES NFR BLD: 10 % (ref 3–10)
NEUTS SEG # BLD: 4 K/UL (ref 1.8–8)
NEUTS SEG NFR BLD: 43 % (ref 40–73)
NRBC # BLD: 0 K/UL (ref 0–0.01)
NRBC BLD-RTO: 0 PER 100 WBC
PLATELET # BLD AUTO: 366 K/UL (ref 135–420)
PMV BLD AUTO: 10 FL (ref 9.2–11.8)
POTASSIUM SERPL-SCNC: 4.3 MMOL/L (ref 3.5–5.5)
RBC # BLD AUTO: 4.05 M/UL (ref 4.2–5.3)
SODIUM SERPL-SCNC: 139 MMOL/L (ref 136–145)
WBC # BLD AUTO: 9.2 K/UL (ref 4.6–13.2)

## 2023-01-24 PROCEDURE — 74011000250 HC RX REV CODE- 250: Performed by: SURGERY

## 2023-01-24 PROCEDURE — 74011250637 HC RX REV CODE- 250/637: Performed by: SURGERY

## 2023-01-24 PROCEDURE — 74011000636 HC RX REV CODE- 636: Performed by: SURGERY

## 2023-01-24 PROCEDURE — 94762 N-INVAS EAR/PLS OXIMTRY CONT: CPT

## 2023-01-24 PROCEDURE — 80048 BASIC METABOLIC PNL TOTAL CA: CPT

## 2023-01-24 PROCEDURE — 94640 AIRWAY INHALATION TREATMENT: CPT

## 2023-01-24 PROCEDURE — 65660000004 HC RM CVT STEPDOWN

## 2023-01-24 PROCEDURE — 36415 COLL VENOUS BLD VENIPUNCTURE: CPT

## 2023-01-24 PROCEDURE — 74011250636 HC RX REV CODE- 250/636: Performed by: SURGERY

## 2023-01-24 PROCEDURE — 85730 THROMBOPLASTIN TIME PARTIAL: CPT

## 2023-01-24 PROCEDURE — 75635 CT ANGIO ABDOMINAL ARTERIES: CPT

## 2023-01-24 PROCEDURE — 85025 COMPLETE CBC W/AUTO DIFF WBC: CPT

## 2023-01-24 RX ADMIN — HYDROMORPHONE HYDROCHLORIDE 0.5 MG: 1 INJECTION, SOLUTION INTRAMUSCULAR; INTRAVENOUS; SUBCUTANEOUS at 22:33

## 2023-01-24 RX ADMIN — DULOXETINE HYDROCHLORIDE 60 MG: 30 CAPSULE, DELAYED RELEASE ORAL at 21:10

## 2023-01-24 RX ADMIN — BIMATOPROST 1 DROP: 0.1 SOLUTION/ DROPS OPHTHALMIC at 21:10

## 2023-01-24 RX ADMIN — IPRATROPIUM BROMIDE 0.5 MG: 0.5 SOLUTION RESPIRATORY (INHALATION) at 09:50

## 2023-01-24 RX ADMIN — MONTELUKAST 10 MG: 10 TABLET, FILM COATED ORAL at 09:30

## 2023-01-24 RX ADMIN — BUDESONIDE 250 MCG: 0.25 SUSPENSION RESPIRATORY (INHALATION) at 21:14

## 2023-01-24 RX ADMIN — PREGABALIN 100 MG: 50 CAPSULE ORAL at 17:34

## 2023-01-24 RX ADMIN — IOPAMIDOL 90 ML: 755 INJECTION, SOLUTION INTRAVENOUS at 08:57

## 2023-01-24 RX ADMIN — ARFORMOTEROL TARTRATE 15 MCG: 15 SOLUTION RESPIRATORY (INHALATION) at 09:50

## 2023-01-24 RX ADMIN — ASPIRIN 81 MG CHEWABLE TABLET 81 MG: 81 TABLET CHEWABLE at 09:30

## 2023-01-24 RX ADMIN — BUDESONIDE 250 MCG: 0.25 SUSPENSION RESPIRATORY (INHALATION) at 09:50

## 2023-01-24 RX ADMIN — IPRATROPIUM BROMIDE 0.5 MG: 0.5 SOLUTION RESPIRATORY (INHALATION) at 21:14

## 2023-01-24 RX ADMIN — ARFORMOTEROL TARTRATE 15 MCG: 15 SOLUTION RESPIRATORY (INHALATION) at 21:14

## 2023-01-24 RX ADMIN — CETIRIZINE HYDROCHLORIDE 10 MG: 10 TABLET ORAL at 09:30

## 2023-01-24 RX ADMIN — HYDROMORPHONE HYDROCHLORIDE 0.5 MG: 1 INJECTION, SOLUTION INTRAMUSCULAR; INTRAVENOUS; SUBCUTANEOUS at 15:58

## 2023-01-24 RX ADMIN — SODIUM CHLORIDE, PRESERVATIVE FREE 10 ML: 5 INJECTION INTRAVENOUS at 21:10

## 2023-01-24 RX ADMIN — PREGABALIN 100 MG: 50 CAPSULE ORAL at 09:30

## 2023-01-24 NOTE — PROGRESS NOTES
Bedside and Verbal shift change report given to willem  (oncoming nurse) by Anne Marie Olivarez (offgoing nurse). Report included the following information SBAR, Kardex, Procedure Summary, Intake/Output, MAR, Recent Results, Cardiac Rhythm  , and Quality Measures. no

## 2023-01-24 NOTE — PROGRESS NOTES
Sitting up in chair at bedside  Foot is warm, getting seen on dorsalis pedis now  Posterior tibial present by Doppler only  Has pain with walking and foot  Labs are reviewed and stable, vital signs stable  No other complaints  CTA today

## 2023-01-24 NOTE — ROUTINE PROCESS
1900:  Assumed care of patient.  at bedside. Patient does have faint, weak doppler pulse noted in LEFT foot. 2104:  Patient stating pain 8/10 after ambulating to toilet and back to bed. PRN administered, see MAR.     0515:  Labs drawn and sent to lab. Patient bathed, up to toilet, and sitting in chair with no needs at this time.

## 2023-01-24 NOTE — PROGRESS NOTES
Problem: Lower Extremity Wound Care  Goal: *Non-infected wound: Improvement of existing wound, absence of infection, and maintenance of skin integrity  Outcome: Progressing Towards Goal  Goal: Interventions  Outcome: Progressing Towards Goal     Problem: Patient Education: Go to Patient Education Activity  Goal: Patient/Family Education  Outcome: Progressing Towards Goal     Problem: Surgical Pathway Post-Op Day 1  Goal: Activity/Safety  Outcome: Progressing Towards Goal  Goal: Nutrition/Diet  Outcome: Progressing Towards Goal  Goal: Discharge Planning  Outcome: Progressing Towards Goal  Goal: Medications  Outcome: Progressing Towards Goal  Goal: Respiratory  Outcome: Progressing Towards Goal  Goal: Treatments/Interventions/Procedures  Outcome: Progressing Towards Goal  Goal: Psychosocial  Outcome: Progressing Towards Goal  Goal: *No signs and symptoms of infection or wound complications  Outcome: Progressing Towards Goal  Goal: *Optimal pain control at patient's stated goal  Outcome: Progressing Towards Goal  Goal: *Adequate urinary output (equal to or greater than 30 milliliters/hour)  Outcome: Progressing Towards Goal  Goal: *Hemodynamically stable  Outcome: Progressing Towards Goal  Goal: *Tolerating diet  Outcome: Progressing Towards Goal  Goal: *Demonstrates progressive activity  Outcome: Progressing Towards Goal  Goal: *Lungs clear or at baseline  Outcome: Progressing Towards Goal     Problem: Surgical Pathway Post-Op Day 2 through Discharge  Goal: Activity/Safety  Outcome: Progressing Towards Goal  Goal: Nutrition/Diet  Outcome: Progressing Towards Goal  Goal: Discharge Planning  Outcome: Progressing Towards Goal  Goal: Medications  Outcome: Progressing Towards Goal  Goal: Respiratory  Outcome: Progressing Towards Goal  Goal: Treatments/Interventions/Procedures  Outcome: Progressing Towards Goal  Goal: Psychosocial  Outcome: Progressing Towards Goal  Goal: *No signs and symptoms of infection or wound complications  Outcome: Progressing Towards Goal  Goal: *Optimal pain control at patient's stated goal  Outcome: Progressing Towards Goal  Goal: *Adequate urinary output (equal to or greater than 30 milliliters/hour)  Outcome: Progressing Towards Goal  Goal: *Hemodynamically stable  Outcome: Progressing Towards Goal  Goal: *Tolerating diet  Outcome: Progressing Towards Goal  Goal: *Demonstrates progressive activity  Outcome: Progressing Towards Goal  Goal: *Lungs clear or at baseline  Outcome: Progressing Towards Goal     Problem: Surgical Pathway: Discharge Outcomes  Goal: *Hemodynamically stable  Outcome: Progressing Towards Goal  Goal: *Lungs clear or at baseline  Outcome: Progressing Towards Goal  Goal: *Demonstrates independent activity or return to baseline  Outcome: Progressing Towards Goal  Goal: *Optimal pain control at patient's stated goal  Outcome: Progressing Towards Goal  Goal: *Verbalizes understanding and describes prescribed diet  Outcome: Progressing Towards Goal  Goal: *Tolerating diet  Outcome: Progressing Towards Goal  Goal: *Verbalizes name, dosage, time, side effects, and number of days to continue medications  Outcome: Progressing Towards Goal  Goal: *No signs and symptoms of infection or wound complications  Outcome: Progressing Towards Goal  Goal: *Anxiety reduced or absent  Outcome: Progressing Towards Goal  Goal: *Understands and describes signs and symptoms to report to providers(Stroke Metric)  Outcome: Progressing Towards Goal  Goal: *Describes follow-up/return visits to physicians  Outcome: Progressing Towards Goal  Goal: *Describes available resources and support systems  Outcome: Progressing Towards Goal

## 2023-01-25 LAB
APTT PPP: 51.8 SEC (ref 23–36.4)
APTT PPP: 62.8 SEC (ref 23–36.4)

## 2023-01-25 PROCEDURE — 2709999900 HC NON-CHARGEABLE SUPPLY

## 2023-01-25 PROCEDURE — 36415 COLL VENOUS BLD VENIPUNCTURE: CPT

## 2023-01-25 PROCEDURE — 94640 AIRWAY INHALATION TREATMENT: CPT

## 2023-01-25 PROCEDURE — 74011250637 HC RX REV CODE- 250/637: Performed by: SURGERY

## 2023-01-25 PROCEDURE — 85730 THROMBOPLASTIN TIME PARTIAL: CPT

## 2023-01-25 PROCEDURE — 74011000250 HC RX REV CODE- 250: Performed by: SURGERY

## 2023-01-25 PROCEDURE — 65660000004 HC RM CVT STEPDOWN

## 2023-01-25 PROCEDURE — 74011250636 HC RX REV CODE- 250/636: Performed by: SURGERY

## 2023-01-25 RX ORDER — HEPARIN SODIUM 1000 [USP'U]/ML
80 INJECTION, SOLUTION INTRAVENOUS; SUBCUTANEOUS ONCE
Status: COMPLETED | OUTPATIENT
Start: 2023-01-25 | End: 2023-01-25

## 2023-01-25 RX ADMIN — BIMATOPROST 1 DROP: 0.1 SOLUTION/ DROPS OPHTHALMIC at 21:36

## 2023-01-25 RX ADMIN — DULOXETINE HYDROCHLORIDE 60 MG: 30 CAPSULE, DELAYED RELEASE ORAL at 21:35

## 2023-01-25 RX ADMIN — IPRATROPIUM BROMIDE 0.5 MG: 0.5 SOLUTION RESPIRATORY (INHALATION) at 08:30

## 2023-01-25 RX ADMIN — ARFORMOTEROL TARTRATE 15 MCG: 15 SOLUTION RESPIRATORY (INHALATION) at 08:41

## 2023-01-25 RX ADMIN — ASPIRIN 81 MG CHEWABLE TABLET 81 MG: 81 TABLET CHEWABLE at 09:28

## 2023-01-25 RX ADMIN — BUDESONIDE 250 MCG: 0.25 SUSPENSION RESPIRATORY (INHALATION) at 08:41

## 2023-01-25 RX ADMIN — HEPARIN SODIUM 20 UNITS/KG/HR: 10000 INJECTION, SOLUTION INTRAVENOUS at 05:06

## 2023-01-25 RX ADMIN — HEPARIN SODIUM 24 UNITS/KG/HR: 10000 INJECTION, SOLUTION INTRAVENOUS at 23:40

## 2023-01-25 RX ADMIN — SODIUM CHLORIDE, PRESERVATIVE FREE 10 ML: 5 INJECTION INTRAVENOUS at 23:39

## 2023-01-25 RX ADMIN — PREGABALIN 100 MG: 50 CAPSULE ORAL at 09:28

## 2023-01-25 RX ADMIN — SODIUM CHLORIDE, PRESERVATIVE FREE 10 ML: 5 INJECTION INTRAVENOUS at 09:28

## 2023-01-25 RX ADMIN — HYDROMORPHONE HYDROCHLORIDE 0.5 MG: 1 INJECTION, SOLUTION INTRAMUSCULAR; INTRAVENOUS; SUBCUTANEOUS at 17:05

## 2023-01-25 RX ADMIN — PREGABALIN 100 MG: 50 CAPSULE ORAL at 17:05

## 2023-01-25 RX ADMIN — HYDROMORPHONE HYDROCHLORIDE 0.5 MG: 1 INJECTION, SOLUTION INTRAMUSCULAR; INTRAVENOUS; SUBCUTANEOUS at 23:39

## 2023-01-25 RX ADMIN — SODIUM CHLORIDE, PRESERVATIVE FREE 10 ML: 5 INJECTION INTRAVENOUS at 17:05

## 2023-01-25 RX ADMIN — TRIAMTERENE AND HYDROCHLOROTHIAZIDE 1 TABLET: 37.5; 25 TABLET ORAL at 09:28

## 2023-01-25 RX ADMIN — HYDROMORPHONE HYDROCHLORIDE 0.5 MG: 1 INJECTION, SOLUTION INTRAMUSCULAR; INTRAVENOUS; SUBCUTANEOUS at 09:41

## 2023-01-25 RX ADMIN — IPRATROPIUM BROMIDE 0.5 MG: 0.5 SOLUTION RESPIRATORY (INHALATION) at 21:51

## 2023-01-25 RX ADMIN — HEPARIN SODIUM 5010 UNITS: 1000 INJECTION INTRAVENOUS; SUBCUTANEOUS at 18:09

## 2023-01-25 RX ADMIN — CETIRIZINE HYDROCHLORIDE 10 MG: 10 TABLET ORAL at 09:28

## 2023-01-25 RX ADMIN — ARFORMOTEROL TARTRATE 15 MCG: 15 SOLUTION RESPIRATORY (INHALATION) at 21:51

## 2023-01-25 RX ADMIN — BUDESONIDE 250 MCG: 0.25 SUSPENSION RESPIRATORY (INHALATION) at 21:51

## 2023-01-25 RX ADMIN — MONTELUKAST 10 MG: 10 TABLET, FILM COATED ORAL at 09:28

## 2023-01-25 NOTE — PROGRESS NOTES
1900:  Assumed care of patient. Lines and drip traced and verified with Atiya Gregory RN. Patient sitting up and has no needs at this time. 2233:  PRN administered for pain 9/10, see MAR.     0700:   Report given to 1901 JACOB Mclean RN. Heparin gtt verified. Patient and belongings moved to CVT stepdown [id number].

## 2023-01-25 NOTE — PROGRESS NOTES
0700-received shift report from off going nurse Daniel Larson RN. Pt to be transferred to CVT Stepdown by off going nurse.

## 2023-01-25 NOTE — PROGRESS NOTES
Problem: Falls - Risk of  Goal: *Absence of Falls  Description: Document Liana Counts Fall Risk and appropriate interventions in the flowsheet.   Outcome: Progressing Towards Goal  Note: Fall Risk Interventions:  Mobility Interventions: Bed/chair exit alarm, Patient to call before getting OOB         Medication Interventions: Bed/chair exit alarm, Patient to call before getting OOB, Teach patient to arise slowly    Elimination Interventions: Call light in reach, Patient to call for help with toileting needs              Problem: Patient Education: Go to Patient Education Activity  Goal: Patient/Family Education  Outcome: Progressing Towards Goal     Problem: Lower Extremity Wound Care  Goal: *Non-infected wound: Improvement of existing wound, absence of infection, and maintenance of skin integrity  Outcome: Progressing Towards Goal     Problem: Patient Education: Go to Patient Education Activity  Goal: Patient/Family Education  Outcome: Progressing Towards Goal     Problem: Surgical Pathway Post-Op Day 2 through Discharge  Goal: *Hemodynamically stable  Outcome: Progressing Towards Goal  Goal: *Tolerating diet  Outcome: Progressing Towards Goal  Goal: *Demonstrates progressive activity  Outcome: Progressing Towards Goal  Goal: *Lungs clear or at baseline  Outcome: Progressing Towards Goal

## 2023-01-25 NOTE — PROGRESS NOTES
Reviewed CT yesterday, retained thrombus in the inflow portion of the graft is patent  Patient tells me her leg is hurting today  Appears low ischemic as well  Or tomorrow morning for revision thrombectomy bypass graft

## 2023-01-26 ENCOUNTER — ANESTHESIA EVENT (OUTPATIENT)
Dept: CARDIOTHORACIC SURGERY | Age: 73
DRG: 253 | End: 2023-01-26
Payer: MEDICARE

## 2023-01-26 ENCOUNTER — ANESTHESIA (OUTPATIENT)
Dept: CARDIOTHORACIC SURGERY | Age: 73
DRG: 253 | End: 2023-01-26
Payer: MEDICARE

## 2023-01-26 ENCOUNTER — APPOINTMENT (OUTPATIENT)
Dept: GENERAL RADIOLOGY | Age: 73
DRG: 253 | End: 2023-01-26
Attending: SURGERY
Payer: MEDICARE

## 2023-01-26 LAB
ACT BLD: 137 SECS (ref 79–138)
ACT BLD: 191 SECS (ref 79–138)
ANION GAP SERPL CALC-SCNC: 3 MMOL/L (ref 3–18)
ANION GAP SERPL CALC-SCNC: 8 MMOL/L (ref 3–18)
APTT PPP: 113.7 SEC (ref 23–36.4)
APTT PPP: 48.9 SEC (ref 23–36.4)
APTT PPP: 90.7 SEC (ref 23–36.4)
APTT PPP: >180 SEC (ref 23–36.4)
BASOPHILS # BLD: 0 K/UL (ref 0–0.1)
BASOPHILS # BLD: 0.1 K/UL (ref 0–0.1)
BASOPHILS # BLD: 0.1 K/UL (ref 0–0.1)
BASOPHILS NFR BLD: 0 % (ref 0–2)
BASOPHILS NFR BLD: 1 % (ref 0–2)
BASOPHILS NFR BLD: 1 % (ref 0–2)
BUN SERPL-MCNC: 13 MG/DL (ref 7–18)
BUN SERPL-MCNC: 16 MG/DL (ref 7–18)
BUN/CREAT SERPL: 16 (ref 12–20)
BUN/CREAT SERPL: 19 (ref 12–20)
CALCIUM SERPL-MCNC: 10 MG/DL (ref 8.5–10.1)
CALCIUM SERPL-MCNC: 9.1 MG/DL (ref 8.5–10.1)
CHLORIDE SERPL-SCNC: 107 MMOL/L (ref 100–111)
CHLORIDE SERPL-SCNC: 109 MMOL/L (ref 100–111)
CO2 SERPL-SCNC: 22 MMOL/L (ref 21–32)
CO2 SERPL-SCNC: 25 MMOL/L (ref 21–32)
CREAT SERPL-MCNC: 0.82 MG/DL (ref 0.6–1.3)
CREAT SERPL-MCNC: 0.85 MG/DL (ref 0.6–1.3)
DIFFERENTIAL METHOD BLD: ABNORMAL
EOSINOPHIL # BLD: 0.1 K/UL (ref 0–0.4)
EOSINOPHIL # BLD: 0.7 K/UL (ref 0–0.4)
EOSINOPHIL # BLD: 0.7 K/UL (ref 0–0.4)
EOSINOPHIL NFR BLD: 1 % (ref 0–5)
EOSINOPHIL NFR BLD: 6 % (ref 0–5)
EOSINOPHIL NFR BLD: 6 % (ref 0–5)
ERYTHROCYTE [DISTWIDTH] IN BLOOD BY AUTOMATED COUNT: 15.5 % (ref 11.6–14.5)
ERYTHROCYTE [DISTWIDTH] IN BLOOD BY AUTOMATED COUNT: 15.6 % (ref 11.6–14.5)
ERYTHROCYTE [DISTWIDTH] IN BLOOD BY AUTOMATED COUNT: 15.7 % (ref 11.6–14.5)
GLUCOSE SERPL-MCNC: 123 MG/DL (ref 74–99)
GLUCOSE SERPL-MCNC: 147 MG/DL (ref 74–99)
HCT VFR BLD AUTO: 29.7 % (ref 35–45)
HCT VFR BLD AUTO: 31 % (ref 35–45)
HCT VFR BLD AUTO: 34.3 % (ref 35–45)
HGB BLD-MCNC: 10 G/DL (ref 12–16)
HGB BLD-MCNC: 11 G/DL (ref 12–16)
HGB BLD-MCNC: 9.6 G/DL (ref 12–16)
HISTORY CHECKED?,CKHIST: NORMAL
IMM GRANULOCYTES # BLD AUTO: 0.1 K/UL (ref 0–0.04)
IMM GRANULOCYTES # BLD AUTO: 0.1 K/UL (ref 0–0.04)
IMM GRANULOCYTES # BLD AUTO: 0.2 K/UL (ref 0–0.04)
IMM GRANULOCYTES NFR BLD AUTO: 1 % (ref 0–0.5)
LYMPHOCYTES # BLD: 1.6 K/UL (ref 0.9–3.6)
LYMPHOCYTES # BLD: 3.1 K/UL (ref 0.9–3.6)
LYMPHOCYTES # BLD: 4 K/UL (ref 0.9–3.6)
LYMPHOCYTES NFR BLD: 11 % (ref 21–52)
LYMPHOCYTES NFR BLD: 29 % (ref 21–52)
LYMPHOCYTES NFR BLD: 37 % (ref 21–52)
MAGNESIUM SERPL-MCNC: 1.7 MG/DL (ref 1.6–2.6)
MCH RBC QN AUTO: 29.1 PG (ref 24–34)
MCH RBC QN AUTO: 29.5 PG (ref 24–34)
MCH RBC QN AUTO: 29.6 PG (ref 24–34)
MCHC RBC AUTO-ENTMCNC: 32.1 G/DL (ref 31–37)
MCHC RBC AUTO-ENTMCNC: 32.3 G/DL (ref 31–37)
MCHC RBC AUTO-ENTMCNC: 32.3 G/DL (ref 31–37)
MCV RBC AUTO: 90.1 FL (ref 78–100)
MCV RBC AUTO: 91.7 FL (ref 78–100)
MCV RBC AUTO: 92 FL (ref 78–100)
MONOCYTES # BLD: 0.3 K/UL (ref 0.05–1.2)
MONOCYTES # BLD: 0.9 K/UL (ref 0.05–1.2)
MONOCYTES # BLD: 1 K/UL (ref 0.05–1.2)
MONOCYTES NFR BLD: 10 % (ref 3–10)
MONOCYTES NFR BLD: 2 % (ref 3–10)
MONOCYTES NFR BLD: 8 % (ref 3–10)
NEUTS SEG # BLD: 12.5 K/UL (ref 1.8–8)
NEUTS SEG # BLD: 4.8 K/UL (ref 1.8–8)
NEUTS SEG # BLD: 6 K/UL (ref 1.8–8)
NEUTS SEG NFR BLD: 45 % (ref 40–73)
NEUTS SEG NFR BLD: 56 % (ref 40–73)
NEUTS SEG NFR BLD: 85 % (ref 40–73)
NRBC # BLD: 0 K/UL (ref 0–0.01)
NRBC BLD-RTO: 0 PER 100 WBC
PHOSPHATE SERPL-MCNC: 2.6 MG/DL (ref 2.5–4.9)
PLATELET # BLD AUTO: 313 K/UL (ref 135–420)
PLATELET # BLD AUTO: 335 K/UL (ref 135–420)
PLATELET # BLD AUTO: 360 K/UL (ref 135–420)
PMV BLD AUTO: 10 FL (ref 9.2–11.8)
PMV BLD AUTO: 10.1 FL (ref 9.2–11.8)
PMV BLD AUTO: 10.1 FL (ref 9.2–11.8)
POTASSIUM SERPL-SCNC: 3.8 MMOL/L (ref 3.5–5.5)
POTASSIUM SERPL-SCNC: 4.3 MMOL/L (ref 3.5–5.5)
RBC # BLD AUTO: 3.24 M/UL (ref 4.2–5.3)
RBC # BLD AUTO: 3.44 M/UL (ref 4.2–5.3)
RBC # BLD AUTO: 3.73 M/UL (ref 4.2–5.3)
SODIUM SERPL-SCNC: 137 MMOL/L (ref 136–145)
SODIUM SERPL-SCNC: 137 MMOL/L (ref 136–145)
WBC # BLD AUTO: 10.7 K/UL (ref 4.6–13.2)
WBC # BLD AUTO: 10.8 K/UL (ref 4.6–13.2)
WBC # BLD AUTO: 14.7 K/UL (ref 4.6–13.2)

## 2023-01-26 PROCEDURE — 77030002996 HC SUT SLK J&J -A: Performed by: SURGERY

## 2023-01-26 PROCEDURE — 36415 COLL VENOUS BLD VENIPUNCTURE: CPT

## 2023-01-26 PROCEDURE — 80048 BASIC METABOLIC PNL TOTAL CA: CPT

## 2023-01-26 PROCEDURE — C1768 GRAFT, VASCULAR: HCPCS | Performed by: SURGERY

## 2023-01-26 PROCEDURE — 77030038692 HC WND DEB SYS IRMX -B: Performed by: SURGERY

## 2023-01-26 PROCEDURE — 04UL0KZ SUPPLEMENT LEFT FEMORAL ARTERY WITH NONAUTOLOGOUS TISSUE SUBSTITUTE, OPEN APPROACH: ICD-10-PCS | Performed by: SURGERY

## 2023-01-26 PROCEDURE — 2709999900 HC NON-CHARGEABLE SUPPLY: Performed by: SURGERY

## 2023-01-26 PROCEDURE — 3E05317 INTRODUCTION OF OTHER THROMBOLYTIC INTO PERIPHERAL ARTERY, PERCUTANEOUS APPROACH: ICD-10-PCS | Performed by: SURGERY

## 2023-01-26 PROCEDURE — 77030026438 HC STYL ET INTUB CARD -A: Performed by: STUDENT IN AN ORGANIZED HEALTH CARE EDUCATION/TRAINING PROGRAM

## 2023-01-26 PROCEDURE — 77030018836 HC SOL IRR NACL ICUM -A: Performed by: SURGERY

## 2023-01-26 PROCEDURE — 74011000250 HC RX REV CODE- 250: Performed by: NURSE ANESTHETIST, CERTIFIED REGISTERED

## 2023-01-26 PROCEDURE — 74011250636 HC RX REV CODE- 250/636: Performed by: NURSE ANESTHETIST, CERTIFIED REGISTERED

## 2023-01-26 PROCEDURE — 94762 N-INVAS EAR/PLS OXIMTRY CONT: CPT

## 2023-01-26 PROCEDURE — 65620000000 HC RM CCU GENERAL

## 2023-01-26 PROCEDURE — 74011250637 HC RX REV CODE- 250/637: Performed by: SURGERY

## 2023-01-26 PROCEDURE — 77030002924 HC SUT GORTX WLGO -B: Performed by: SURGERY

## 2023-01-26 PROCEDURE — 77030010507 HC ADH SKN DERMBND J&J -B: Performed by: SURGERY

## 2023-01-26 PROCEDURE — 83735 ASSAY OF MAGNESIUM: CPT

## 2023-01-26 PROCEDURE — 77030002933 HC SUT MCRYL J&J -A: Performed by: SURGERY

## 2023-01-26 PROCEDURE — 047K3ZZ DILATION OF RIGHT FEMORAL ARTERY, PERCUTANEOUS APPROACH: ICD-10-PCS | Performed by: SURGERY

## 2023-01-26 PROCEDURE — 74011250636 HC RX REV CODE- 250/636: Performed by: SURGERY

## 2023-01-26 PROCEDURE — 74011000258 HC RX REV CODE- 258: Performed by: NURSE ANESTHETIST, CERTIFIED REGISTERED

## 2023-01-26 PROCEDURE — 74011000250 HC RX REV CODE- 250: Performed by: SURGERY

## 2023-01-26 PROCEDURE — 74011000636 HC RX REV CODE- 636: Performed by: SURGERY

## 2023-01-26 PROCEDURE — 85025 COMPLETE CBC W/AUTO DIFF WBC: CPT

## 2023-01-26 PROCEDURE — 85730 THROMBOPLASTIN TIME PARTIAL: CPT

## 2023-01-26 PROCEDURE — 77030011265 HC ELECTRD BLD HEX COVD -A: Performed by: SURGERY

## 2023-01-26 PROCEDURE — C1757 CATH, THROMBECTOMY/EMBOLECT: HCPCS | Performed by: SURGERY

## 2023-01-26 PROCEDURE — 74011000250 HC RX REV CODE- 250: Performed by: STUDENT IN AN ORGANIZED HEALTH CARE EDUCATION/TRAINING PROGRAM

## 2023-01-26 PROCEDURE — 77030014023 HC SYR INFL LVEEN BSC -B: Performed by: SURGERY

## 2023-01-26 PROCEDURE — 76010000109 HC CV SURG 2.5 TO 3 HR: Performed by: SURGERY

## 2023-01-26 PROCEDURE — 94640 AIRWAY INHALATION TREATMENT: CPT

## 2023-01-26 PROCEDURE — C1894 INTRO/SHEATH, NON-LASER: HCPCS | Performed by: SURGERY

## 2023-01-26 PROCEDURE — 77030002986 HC SUT PROL J&J -A: Performed by: SURGERY

## 2023-01-26 PROCEDURE — 85347 COAGULATION TIME ACTIVATED: CPT

## 2023-01-26 PROCEDURE — 77030025869: Performed by: SURGERY

## 2023-01-26 PROCEDURE — B41G1ZZ FLUOROSCOPY OF LEFT LOWER EXTREMITY ARTERIES USING LOW OSMOLAR CONTRAST: ICD-10-PCS | Performed by: SURGERY

## 2023-01-26 PROCEDURE — 04CK0ZZ EXTIRPATION OF MATTER FROM RIGHT FEMORAL ARTERY, OPEN APPROACH: ICD-10-PCS | Performed by: SURGERY

## 2023-01-26 PROCEDURE — 77030003390 HC NDL ANGI MRTM -A: Performed by: SURGERY

## 2023-01-26 PROCEDURE — 76060000036 HC ANESTHESIA 2.5 TO 3 HR: Performed by: SURGERY

## 2023-01-26 PROCEDURE — 77030016441 HC APPL CLP LIG1 J&J -B: Performed by: SURGERY

## 2023-01-26 PROCEDURE — 84100 ASSAY OF PHOSPHORUS: CPT

## 2023-01-26 PROCEDURE — 77030040830 HC CATH URETH FOL MDII -A: Performed by: SURGERY

## 2023-01-26 PROCEDURE — 77030008683 HC TU ET CUF COVD -A: Performed by: STUDENT IN AN ORGANIZED HEALTH CARE EDUCATION/TRAINING PROGRAM

## 2023-01-26 PROCEDURE — C1725 CATH, TRANSLUMIN NON-LASER: HCPCS | Performed by: SURGERY

## 2023-01-26 PROCEDURE — 77030005206: Performed by: SURGERY

## 2023-01-26 PROCEDURE — C1769 GUIDE WIRE: HCPCS | Performed by: SURGERY

## 2023-01-26 PROCEDURE — 86923 COMPATIBILITY TEST ELECTRIC: CPT

## 2023-01-26 PROCEDURE — 77030013079 HC BLNKT BAIR HGGR 3M -A: Performed by: STUDENT IN AN ORGANIZED HEALTH CARE EDUCATION/TRAINING PROGRAM

## 2023-01-26 PROCEDURE — 77030018673: Performed by: SURGERY

## 2023-01-26 PROCEDURE — 86900 BLOOD TYPING SEROLOGIC ABO: CPT

## 2023-01-26 PROCEDURE — 77030030416 HC DRSG VAC ASST KCON -D: Performed by: SURGERY

## 2023-01-26 DEVICE — XENOSURE BIOLOGIC PATCH, 0.8CM X 8CM, EIFU
Type: IMPLANTABLE DEVICE | Site: LEG | Status: FUNCTIONAL
Brand: XENOSURE BIOLOGIC PATCH

## 2023-01-26 RX ORDER — HEPARIN SODIUM 200 [USP'U]/100ML
INJECTION, SOLUTION INTRAVENOUS
Status: DISPENSED
Start: 2023-01-26 | End: 2023-01-27

## 2023-01-26 RX ORDER — NEOSTIGMINE METHYLSULFATE 1 MG/ML
INJECTION, SOLUTION INTRAVENOUS AS NEEDED
Status: DISCONTINUED | OUTPATIENT
Start: 2023-01-26 | End: 2023-01-26 | Stop reason: HOSPADM

## 2023-01-26 RX ORDER — IODIXANOL 320 MG/ML
INJECTION, SOLUTION INTRAVASCULAR
Status: DISPENSED
Start: 2023-01-26 | End: 2023-01-26

## 2023-01-26 RX ORDER — EPHEDRINE SULFATE/0.9% NACL/PF 25 MG/5 ML
SYRINGE (ML) INTRAVENOUS AS NEEDED
Status: DISCONTINUED | OUTPATIENT
Start: 2023-01-26 | End: 2023-01-26 | Stop reason: HOSPADM

## 2023-01-26 RX ORDER — ROCURONIUM BROMIDE 10 MG/ML
INJECTION, SOLUTION INTRAVENOUS AS NEEDED
Status: DISCONTINUED | OUTPATIENT
Start: 2023-01-26 | End: 2023-01-26 | Stop reason: HOSPADM

## 2023-01-26 RX ORDER — FENTANYL CITRATE 50 UG/ML
INJECTION, SOLUTION INTRAMUSCULAR; INTRAVENOUS AS NEEDED
Status: DISCONTINUED | OUTPATIENT
Start: 2023-01-26 | End: 2023-01-26 | Stop reason: HOSPADM

## 2023-01-26 RX ORDER — CEFAZOLIN SODIUM 1 G/3ML
INJECTION, POWDER, FOR SOLUTION INTRAMUSCULAR; INTRAVENOUS AS NEEDED
Status: DISCONTINUED | OUTPATIENT
Start: 2023-01-26 | End: 2023-01-26 | Stop reason: HOSPADM

## 2023-01-26 RX ORDER — DEXAMETHASONE SODIUM PHOSPHATE 4 MG/ML
INJECTION, SOLUTION INTRA-ARTICULAR; INTRALESIONAL; INTRAMUSCULAR; INTRAVENOUS; SOFT TISSUE AS NEEDED
Status: DISCONTINUED | OUTPATIENT
Start: 2023-01-26 | End: 2023-01-26 | Stop reason: HOSPADM

## 2023-01-26 RX ORDER — SODIUM CHLORIDE, SODIUM LACTATE, POTASSIUM CHLORIDE, CALCIUM CHLORIDE 600; 310; 30; 20 MG/100ML; MG/100ML; MG/100ML; MG/100ML
INJECTION, SOLUTION INTRAVENOUS
Status: DISCONTINUED | OUTPATIENT
Start: 2023-01-26 | End: 2023-01-26 | Stop reason: HOSPADM

## 2023-01-26 RX ORDER — CEFAZOLIN SODIUM 1 G/3ML
INJECTION, POWDER, FOR SOLUTION INTRAMUSCULAR; INTRAVENOUS
Status: COMPLETED
Start: 2023-01-26 | End: 2023-01-26

## 2023-01-26 RX ORDER — HEPARIN SODIUM 200 [USP'U]/100ML
INJECTION, SOLUTION INTRAVENOUS
Status: COMPLETED | OUTPATIENT
Start: 2023-01-26 | End: 2023-01-26

## 2023-01-26 RX ORDER — HEPARIN SODIUM 1000 [USP'U]/ML
INJECTION, SOLUTION INTRAVENOUS; SUBCUTANEOUS AS NEEDED
Status: DISCONTINUED | OUTPATIENT
Start: 2023-01-26 | End: 2023-01-26 | Stop reason: HOSPADM

## 2023-01-26 RX ORDER — LIDOCAINE HYDROCHLORIDE 10 MG/ML
INJECTION, SOLUTION EPIDURAL; INFILTRATION; INTRACAUDAL; PERINEURAL
Status: DISPENSED
Start: 2023-01-26 | End: 2023-01-26

## 2023-01-26 RX ORDER — IODIXANOL 320 MG/ML
INJECTION, SOLUTION INTRAVASCULAR AS NEEDED
Status: DISCONTINUED | OUTPATIENT
Start: 2023-01-26 | End: 2023-01-26 | Stop reason: HOSPADM

## 2023-01-26 RX ORDER — LIDOCAINE HYDROCHLORIDE 10 MG/ML
INJECTION, SOLUTION EPIDURAL; INFILTRATION; INTRACAUDAL; PERINEURAL AS NEEDED
Status: DISCONTINUED | OUTPATIENT
Start: 2023-01-26 | End: 2023-01-26 | Stop reason: HOSPADM

## 2023-01-26 RX ORDER — SODIUM CHLORIDE 9 MG/ML
250 INJECTION, SOLUTION INTRAVENOUS AS NEEDED
Status: DISCONTINUED | OUTPATIENT
Start: 2023-01-26 | End: 2023-01-29 | Stop reason: HOSPADM

## 2023-01-26 RX ORDER — LIDOCAINE HYDROCHLORIDE 20 MG/ML
INJECTION, SOLUTION EPIDURAL; INFILTRATION; INTRACAUDAL; PERINEURAL AS NEEDED
Status: DISCONTINUED | OUTPATIENT
Start: 2023-01-26 | End: 2023-01-26 | Stop reason: HOSPADM

## 2023-01-26 RX ORDER — GLYCOPYRROLATE 0.2 MG/ML
INJECTION INTRAMUSCULAR; INTRAVENOUS AS NEEDED
Status: DISCONTINUED | OUTPATIENT
Start: 2023-01-26 | End: 2023-01-26 | Stop reason: HOSPADM

## 2023-01-26 RX ORDER — HEPARIN SODIUM 10000 [USP'U]/100ML
500 INJECTION, SOLUTION INTRAVENOUS
Status: DISCONTINUED | OUTPATIENT
Start: 2023-01-26 | End: 2023-01-28

## 2023-01-26 RX ORDER — METOPROLOL TARTRATE 5 MG/5ML
INJECTION INTRAVENOUS AS NEEDED
Status: DISCONTINUED | OUTPATIENT
Start: 2023-01-26 | End: 2023-01-26 | Stop reason: HOSPADM

## 2023-01-26 RX ORDER — PROPOFOL 10 MG/ML
INJECTION, EMULSION INTRAVENOUS AS NEEDED
Status: DISCONTINUED | OUTPATIENT
Start: 2023-01-26 | End: 2023-01-26 | Stop reason: HOSPADM

## 2023-01-26 RX ORDER — HEPARIN SODIUM 200 [USP'U]/100ML
INJECTION, SOLUTION INTRAVENOUS
Status: DISPENSED
Start: 2023-01-26 | End: 2023-01-26

## 2023-01-26 RX ORDER — WATER FOR INJECTION,STERILE
VIAL (ML) INJECTION
Status: DISPENSED
Start: 2023-01-26 | End: 2023-01-27

## 2023-01-26 RX ORDER — HEPARIN SODIUM 10000 [USP'U]/100ML
12-25 INJECTION, SOLUTION INTRAVENOUS
Status: CANCELLED | OUTPATIENT
Start: 2023-01-26

## 2023-01-26 RX ORDER — DEXTROSE MONOHYDRATE AND SODIUM CHLORIDE 5; .45 G/100ML; G/100ML
50 INJECTION, SOLUTION INTRAVENOUS CONTINUOUS
Status: DISCONTINUED | OUTPATIENT
Start: 2023-01-26 | End: 2023-01-29

## 2023-01-26 RX ORDER — SUCCINYLCHOLINE CHLORIDE 20 MG/ML
INJECTION INTRAMUSCULAR; INTRAVENOUS AS NEEDED
Status: DISCONTINUED | OUTPATIENT
Start: 2023-01-26 | End: 2023-01-26 | Stop reason: HOSPADM

## 2023-01-26 RX ORDER — CHLORHEXIDINE GLUCONATE 4 G/100ML
SOLUTION TOPICAL
Status: DISPENSED
Start: 2023-01-26 | End: 2023-01-26

## 2023-01-26 RX ADMIN — IPRATROPIUM BROMIDE 0.5 MG: 0.5 SOLUTION RESPIRATORY (INHALATION) at 07:42

## 2023-01-26 RX ADMIN — ARFORMOTEROL TARTRATE 15 MCG: 15 SOLUTION RESPIRATORY (INHALATION) at 22:26

## 2023-01-26 RX ADMIN — IPRATROPIUM BROMIDE 0.5 MG: 0.5 SOLUTION RESPIRATORY (INHALATION) at 22:26

## 2023-01-26 RX ADMIN — Medication 10 MG: at 15:09

## 2023-01-26 RX ADMIN — BUDESONIDE 250 MCG: 0.25 SUSPENSION RESPIRATORY (INHALATION) at 07:42

## 2023-01-26 RX ADMIN — GLYCOPYRROLATE 0.4 MG: 0.2 INJECTION, SOLUTION INTRAMUSCULAR; INTRAVENOUS at 15:48

## 2023-01-26 RX ADMIN — HEPARIN SODIUM 5000 UNITS: 1000 INJECTION, SOLUTION INTRAVENOUS; SUBCUTANEOUS at 15:19

## 2023-01-26 RX ADMIN — FENTANYL CITRATE 50 MCG: 50 INJECTION INTRAMUSCULAR; INTRAVENOUS at 13:22

## 2023-01-26 RX ADMIN — HYDROMORPHONE HYDROCHLORIDE 0.5 MG: 1 INJECTION, SOLUTION INTRAMUSCULAR; INTRAVENOUS; SUBCUTANEOUS at 09:58

## 2023-01-26 RX ADMIN — SUCCINYLCHOLINE CHLORIDE 100 MG: 20 INJECTION, SOLUTION INTRAMUSCULAR; INTRAVENOUS at 13:22

## 2023-01-26 RX ADMIN — DULOXETINE HYDROCHLORIDE 60 MG: 30 CAPSULE, DELAYED RELEASE ORAL at 22:25

## 2023-01-26 RX ADMIN — ROCURONIUM BROMIDE 20 MG: 50 INJECTION INTRAVENOUS at 13:33

## 2023-01-26 RX ADMIN — HEPARIN SODIUM 3000 UNITS: 1000 INJECTION, SOLUTION INTRAVENOUS; SUBCUTANEOUS at 14:54

## 2023-01-26 RX ADMIN — ROCURONIUM BROMIDE 20 MG: 50 INJECTION INTRAVENOUS at 14:15

## 2023-01-26 RX ADMIN — HEPARIN SODIUM 2000 UNITS: 1000 INJECTION, SOLUTION INTRAVENOUS; SUBCUTANEOUS at 14:30

## 2023-01-26 RX ADMIN — CEFAZOLIN SODIUM 2 G: 1 INJECTION, POWDER, FOR SOLUTION INTRAMUSCULAR; INTRAVENOUS at 13:40

## 2023-01-26 RX ADMIN — SODIUM CHLORIDE, SODIUM LACTATE, POTASSIUM CHLORIDE, AND CALCIUM CHLORIDE: 600; 310; 30; 20 INJECTION, SOLUTION INTRAVENOUS at 13:14

## 2023-01-26 RX ADMIN — HEPARIN SODIUM 5000 UNITS: 1000 INJECTION, SOLUTION INTRAVENOUS; SUBCUTANEOUS at 13:50

## 2023-01-26 RX ADMIN — DEXTROSE AND SODIUM CHLORIDE 50 ML/HR: 5; 450 INJECTION, SOLUTION INTRAVENOUS at 16:37

## 2023-01-26 RX ADMIN — PHENYLEPHRINE HYDROCHLORIDE 100 MCG: 10 INJECTION INTRAVENOUS at 13:26

## 2023-01-26 RX ADMIN — NEOSTIGMINE METHYLSULFATE 3 MG: 1 INJECTION, SOLUTION INTRAVENOUS at 15:48

## 2023-01-26 RX ADMIN — BIMATOPROST 1 DROP: 0.1 SOLUTION/ DROPS OPHTHALMIC at 22:30

## 2023-01-26 RX ADMIN — HYDROMORPHONE HYDROCHLORIDE 0.5 MG: 1 INJECTION, SOLUTION INTRAMUSCULAR; INTRAVENOUS; SUBCUTANEOUS at 16:17

## 2023-01-26 RX ADMIN — METOPROLOL TARTRATE 5 MG: 1 INJECTION, SOLUTION INTRAVENOUS at 14:46

## 2023-01-26 RX ADMIN — DEXAMETHASONE SODIUM PHOSPHATE 4 MG: 4 INJECTION, SOLUTION INTRAMUSCULAR; INTRAVENOUS at 13:25

## 2023-01-26 RX ADMIN — PHENYLEPHRINE HYDROCHLORIDE 100 MCG: 10 INJECTION INTRAVENOUS at 14:04

## 2023-01-26 RX ADMIN — LIDOCAINE HYDROCHLORIDE 60 MG: 20 INJECTION, SOLUTION EPIDURAL; INFILTRATION; INTRACAUDAL; PERINEURAL at 13:22

## 2023-01-26 RX ADMIN — BUDESONIDE 250 MCG: 0.25 SUSPENSION RESPIRATORY (INHALATION) at 22:26

## 2023-01-26 RX ADMIN — SODIUM CHLORIDE, PRESERVATIVE FREE 5 ML: 5 INJECTION INTRAVENOUS at 22:27

## 2023-01-26 RX ADMIN — FENTANYL CITRATE 25 MCG: 50 INJECTION INTRAMUSCULAR; INTRAVENOUS at 14:12

## 2023-01-26 RX ADMIN — FENTANYL CITRATE 25 MCG: 50 INJECTION INTRAMUSCULAR; INTRAVENOUS at 13:33

## 2023-01-26 RX ADMIN — PREGABALIN 100 MG: 50 CAPSULE ORAL at 17:59

## 2023-01-26 RX ADMIN — ARFORMOTEROL TARTRATE 15 MCG: 15 SOLUTION RESPIRATORY (INHALATION) at 07:42

## 2023-01-26 RX ADMIN — PROPOFOL 100 MG: 10 INJECTION, EMULSION INTRAVENOUS at 13:22

## 2023-01-26 RX ADMIN — HYDROMORPHONE HYDROCHLORIDE 0.5 MG: 1 INJECTION, SOLUTION INTRAMUSCULAR; INTRAVENOUS; SUBCUTANEOUS at 22:25

## 2023-01-26 RX ADMIN — SODIUM CHLORIDE, PRESERVATIVE FREE 10 ML: 5 INJECTION INTRAVENOUS at 06:32

## 2023-01-26 RX ADMIN — HEPARIN SODIUM 500 UNITS/HR: 10000 INJECTION, SOLUTION INTRAVENOUS at 16:35

## 2023-01-26 NOTE — ROUTINE PROCESS
TRANSFER - IN REPORT:    Verbal report received from Tano Murdock RN on [employer]  being received from CVT Stepdown(unit) for routine post - op      Report consisted of patients Situation, Background, Assessment and   Recommendations(SBAR). Information from the following report(s) SBAR, Kardex, Procedure Summary, Intake/Output, MAR, and Recent Results was reviewed with the receiving nurse. Opportunity for questions and clarification was provided. Assessment completed upon patients arrival to unit and care assumed.

## 2023-01-26 NOTE — PROGRESS NOTES
Patient off unit for procedure but will be transferred to CVT ICU post procedure. Report given to Travis García RN in CVT ICU. Patient's spouse collected all the patient's personal property from room [id number] (I spoke with him via phone and he confirmed that he has her belongings).     Tory Darling, SUEN, RN

## 2023-01-26 NOTE — ANESTHESIA POSTPROCEDURE EVALUATION
Procedure(s):  THROMBECTOMY REVISION LEFT LEG BYPASS GRAFT / PATCH ANGIOPLASY / BALLOON ANGIOPLASTY. general    Anesthesia Post Evaluation      Multimodal analgesia: multimodal analgesia used between 6 hours prior to anesthesia start to PACU discharge  Patient location during evaluation: PACU  Patient participation: complete - patient participated  Level of consciousness: awake and alert  Pain management: adequate  Airway patency: patent  Anesthetic complications: no  Cardiovascular status: acceptable  Respiratory status: acceptable  Hydration status: acceptable  Post anesthesia nausea and vomiting:  controlled  Final Post Anesthesia Temperature Assessment:  Normothermia (36.0-37.5 degrees C)      INITIAL Post-op Vital signs:   Vitals Value Taken Time   /40 01/26/23 1730   Temp 36.9 °C (98.5 °F) 01/26/23 1558   Pulse 76 01/26/23 1736   Resp 18 01/26/23 1736   SpO2 95 % 01/26/23 1736   Vitals shown include unvalidated device data.

## 2023-01-26 NOTE — PROGRESS NOTES
Bedside and Verbal shift change report given to SPENCER Murguia and Benito Forbes RN (oncoming nurse) by Ranulfo Sim RN (offgoing nurse). Report included the following information SBAR, Kardex, Intake/Output, Recent Results, and Cardiac Rhythm NSR . Wound Prevention Checklist    Patient: Shane Lopez (47 y.o. 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      [x]  Dual skin check done with SPENCER Quevedo RN

## 2023-01-26 NOTE — ROUTINE PROCESS
1920:Bedside and Verbal shift change report given to Sae PALMER (oncoming nurse) by Viky Cordova RN (offgoing nurse). Report included the following information SBAR, Kardex, Intake/Output, MAR, Recent Results, and Cardiac Rhythm SR w/ PVC's . Quietly resting in bed. HOB elevated. No SOB on RA. On Heparin gtt at 24 units/kg/hr or 15 ml/hr. Infusing well to Left arm. Reminded patient nothing by mouth after MN. Verbalized understanding. Call light within reach. Wound Prevention Checklist    Patient: Greer Palacios (63 y.o. 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 HS snack provided. 2239: No change from previous assessment. 2339: Medicated for pain per patient request.     0200: Comfortably sleeping. 4029: APTT 113.7. Heparin gtt rate adjusted per protocol. Next APTT due at 0830. Consent for Thrombectomy revision left leg bypass graft signed & witnessed. 0422: No change from previous assessment. 9345: Slept good thru night. Needs attended. 0582: Bedside and Verbal shift change report given to [id number] RN (oncoming nurse) by Dipesh Heredia RN (offgoing nurse).  Report included the following information SBAR, Kardex, Intake/Output, MAR, Recent Results, and Cardiac Rhythm SR .

## 2023-01-26 NOTE — OP NOTES
Operative Note    Patient: Letty Irving  YOB: 1950  MRN: [id number]    Date of Procedure: 1/26/2023     Pre-Op Diagnosis: dx    Post-Op Diagnosis:  Occluded left common femoral artery, occluded left femoropopliteal bypass       Procedure(s):  THROMBECTOMY REVISION LEFT LEG BYPASS GRAFT   PATCH ANGIOPLASY of common femoral to bypass graft  Left leg angiogram, BALLOON ANGIOPLASTY of left common femoral artery with angiogram interpretation    Surgeon(s):  Alisha Conti MD    Surgical Assistant: Surg Asst-1: Dale Palacios    Anesthesia: General     Estimated Blood Loss (mL):  924    Complications: None    Specimens: * No specimens in log *     Implants:   Implant Name Type Inv. Item Serial No.  Lot No. LRB No. Used Action   GRAFT VASC W0.8XL8CM THK0.35-0.75MM CAR PERICARD PROC BOV - [id number]  GRAFT VASC W0.8XL8CM THK0.35-0.75MM CAR PERICARD PROC BOV  [employer] [id number] Left 1 Implanted       Drains: * No LDAs found *    Findings: The aorta, iliac artery are patent on the left the distal external iliac artery is patent on the left the stents are all patent. The common femoral is a critical narrowing with dense retained thrombus. The bypass graft was reoccluded. Balloon angioplasty of the common femoral to high pressure resolved the stenosis completely. Site of inflow was repaired after thrombectomy achieved the stents fibrous thrombus from the common femoral.  Strong pulses achieved at the groin now. Thrombectomized the graft achieving thrombectomy of the graft and runoff into 3 vessels. tPA was administered for small vessel    Detailed Description of Procedure:   She is brought to the operating room placed in supine position had general anesthesia. Prepped draped usual standard fashion followed by  quite guidelines and antibiotics were given. Made incision on the left groin exposing the proximal bypass graft it was occluded.   Gave her 5000 of heparin and made an incision on the top part of the graft and extended onto the femoral artery. I thrombectomized the inflow there was a very tough thrombus. Did an angiogram showing the distal aorta and external and common iliacs are patent he is a high-pressure balloon to break up this common femoral thrombus and remove it came out as a large fixed thrombus. Now I have it and excellent inflow. I repaired the site after thrombectomized and the runoff with fluoroscopy Jun and 3 Jun's. Repair is the patch angioplasty with a bovine pericardial patch CV 6 suture circular standard fashion. The inflow is now clean angiogram the runoff and gave 6 mg of tPA this cleaned it up there is little bit of firm thrombus in the distal graft I reopened and thrombectomized that as well now I have an excellent pulse in the graft I have dopplerable pulses on the runoff tibials. Irrigated with the antibiotic irrigation as we went throughout the procedure closed the fascia gently over the graft with interrupted 2-0 Monocryl 3-0 Monocryl for subcu 4 Monocryl and Dermabond glue for skin wound management system was applied she was transferred to recovery in stable condition. I gave heparin throughout the procedure she seemed to be clotting quite easily.   Will return to her heparin IV    Electronically Signed by Nina Delatorre MD on 1/26/2023 at 4:01 PM

## 2023-01-26 NOTE — PROGRESS NOTES
conducted a Follow up consultation and Spiritual Assessment for [employer], who is a 74 y.o.,female. The  provided the following Interventions:  Continued the relationship of care and support. Patient's  stepped out during this writer's visit. Listened empathically to patient's concerns for her upcoming procedure. Offered prayers, Scripture readings, Act of Spiritual Communion in lieu of holy communion because patient is NPO and assurance of continued prayer on patient's behalf. Chart reviewed. The following outcomes were achieved:  Patient expressed gratitude for 's visit. Assessment:  There are no further spiritual or [facility] issues which require Spiritual Care Services interventions at this time. Plan:  Chaplains will continue to follow and will provide pastoral care on an as needed/requested basis.  recommends bedside caregivers page  on duty if patient shows signs of acute spiritual or emotional distress.      Kaylyn Freeman 605   [phone]

## 2023-01-26 NOTE — PERIOP NOTES
TRANSFER - OUT REPORT:    Verbal report given to Debbie Curling, RN (name) on [employer]  being transferred to CVT ICU (unit) for routine post - op       Report consisted of patients Situation, Background, Assessment and   Recommendations(SBAR). Information from the following report(s) SBAR, Kardex, OR Summary, Intake/Output, MAR, and Recent Results was reviewed with the receiving nurse. Lines:   Peripheral IV 01/19/23 Left Antecubital (Active)   Site Assessment Clean, dry, & intact 01/26/23 1331   Phlebitis Assessment 0 01/26/23 1331   Infiltration Assessment 0 01/26/23 1244   Dressing Status Clean, dry, & intact 01/26/23 1331   Dressing Type Transparent;Tape 01/26/23 1331   Hub Color/Line Status Pink 01/26/23 1331   Action Taken Open ports on tubing capped 01/26/23 1244   Alcohol Cap Used Yes 01/26/23 1244        Opportunity for questions and clarification was provided.       Patient transported with:   Monitor  O2 @ 10 liters  Registered Nurse  CRNA

## 2023-01-26 NOTE — ANESTHESIA PREPROCEDURE EVALUATION
Relevant Problems   No relevant active problems       Anesthetic History     PONV          Review of Systems / Medical History  Patient summary reviewed and pertinent labs reviewed    Pulmonary    COPD: moderate      Shortness of breath      Comments: Home O2  smoker   Neuro/Psych         Psychiatric history    Comments: neuropathy Cardiovascular    Hypertension: poorly controlled          PAD    Exercise tolerance: <4 METS  Comments: Claudication  Non healing leg wounds   GI/Hepatic/Renal     GERD: well controlled           Endo/Other        Arthritis     Other Findings   Comments: Crohn's            Physical Exam    Airway  Mallampati: II  TM Distance: > 6 cm  Neck ROM: normal range of motion   Mouth opening: Normal     Cardiovascular  Regular rate and rhythm,  S1 and S2 normal,  no murmur, click, rub, or gallop             Dental    Dentition: Poor dentition  Comments: Multiple missing or diseased teeth   Pulmonary      Decreased breath sounds: bilateral      Prolonged expiration     Abdominal  GI exam deferred       Other Findings            Anesthetic Plan    ASA: 4  Anesthesia type: general          Induction: Intravenous  Anesthetic plan and risks discussed with: Patient

## 2023-01-26 NOTE — PROGRESS NOTES
Problem: Falls - Risk of  Goal: *Absence of Falls  Description: Document Sofia Fothergill Fall Risk and appropriate interventions in the flowsheet.   Note: Fall Risk Interventions:  Mobility Interventions: Bed/chair exit alarm, Communicate number of staff needed for ambulation/transfer, Patient to call before getting OOB         Medication Interventions: Bed/chair exit alarm, Evaluate medications/consider consulting pharmacy, Patient to call before getting OOB, Teach patient to arise slowly    Elimination Interventions: Bed/chair exit alarm, Call light in reach, Patient to call for help with toileting needs, Stay With Me (per policy)              Problem: Lower Extremity Wound Care  Goal: *Non-infected wound: Improvement of existing wound, absence of infection, and maintenance of skin integrity  Outcome: Progressing Towards Goal  Goal: Interventions  Outcome: Progressing Towards Goal     Problem: Pain  Goal: *Control of Pain  Outcome: Progressing Towards Goal

## 2023-01-27 LAB — APTT PPP: 31.2 SEC (ref 23–36.4)

## 2023-01-27 PROCEDURE — 2709999900 HC NON-CHARGEABLE SUPPLY

## 2023-01-27 PROCEDURE — 74011250637 HC RX REV CODE- 250/637: Performed by: SURGERY

## 2023-01-27 PROCEDURE — 74011000250 HC RX REV CODE- 250: Performed by: SURGERY

## 2023-01-27 PROCEDURE — 77010033678 HC OXYGEN DAILY

## 2023-01-27 PROCEDURE — 94640 AIRWAY INHALATION TREATMENT: CPT

## 2023-01-27 PROCEDURE — 85730 THROMBOPLASTIN TIME PARTIAL: CPT

## 2023-01-27 PROCEDURE — 36415 COLL VENOUS BLD VENIPUNCTURE: CPT

## 2023-01-27 PROCEDURE — 94762 N-INVAS EAR/PLS OXIMTRY CONT: CPT

## 2023-01-27 PROCEDURE — 74011250636 HC RX REV CODE- 250/636: Performed by: SURGERY

## 2023-01-27 PROCEDURE — 65620000000 HC RM CCU GENERAL

## 2023-01-27 RX ADMIN — MONTELUKAST 10 MG: 10 TABLET, FILM COATED ORAL at 08:54

## 2023-01-27 RX ADMIN — ASPIRIN 81 MG CHEWABLE TABLET 81 MG: 81 TABLET CHEWABLE at 08:54

## 2023-01-27 RX ADMIN — OXYCODONE HYDROCHLORIDE AND ACETAMINOPHEN 1 TABLET: 5; 325 TABLET ORAL at 08:54

## 2023-01-27 RX ADMIN — SODIUM CHLORIDE, PRESERVATIVE FREE 5 ML: 5 INJECTION INTRAVENOUS at 05:06

## 2023-01-27 RX ADMIN — HYDROMORPHONE HYDROCHLORIDE 0.5 MG: 1 INJECTION, SOLUTION INTRAMUSCULAR; INTRAVENOUS; SUBCUTANEOUS at 08:54

## 2023-01-27 RX ADMIN — DULOXETINE HYDROCHLORIDE 60 MG: 30 CAPSULE, DELAYED RELEASE ORAL at 21:23

## 2023-01-27 RX ADMIN — HYDROMORPHONE HYDROCHLORIDE 0.5 MG: 1 INJECTION, SOLUTION INTRAMUSCULAR; INTRAVENOUS; SUBCUTANEOUS at 22:57

## 2023-01-27 RX ADMIN — SODIUM CHLORIDE, PRESERVATIVE FREE 10 ML: 5 INJECTION INTRAVENOUS at 22:00

## 2023-01-27 RX ADMIN — ARFORMOTEROL TARTRATE 15 MCG: 15 SOLUTION RESPIRATORY (INHALATION) at 07:52

## 2023-01-27 RX ADMIN — CETIRIZINE HYDROCHLORIDE 10 MG: 10 TABLET ORAL at 08:54

## 2023-01-27 RX ADMIN — BUDESONIDE 250 MCG: 0.25 SUSPENSION RESPIRATORY (INHALATION) at 07:52

## 2023-01-27 RX ADMIN — PREGABALIN 100 MG: 50 CAPSULE ORAL at 17:34

## 2023-01-27 RX ADMIN — IPRATROPIUM BROMIDE 0.5 MG: 0.5 SOLUTION RESPIRATORY (INHALATION) at 07:53

## 2023-01-27 RX ADMIN — OXYCODONE HYDROCHLORIDE AND ACETAMINOPHEN 1 TABLET: 5; 325 TABLET ORAL at 17:36

## 2023-01-27 RX ADMIN — IPRATROPIUM BROMIDE 0.5 MG: 0.5 SOLUTION RESPIRATORY (INHALATION) at 19:08

## 2023-01-27 RX ADMIN — HYDROMORPHONE HYDROCHLORIDE 0.5 MG: 1 INJECTION, SOLUTION INTRAMUSCULAR; INTRAVENOUS; SUBCUTANEOUS at 17:37

## 2023-01-27 RX ADMIN — BUDESONIDE 250 MCG: 0.25 SUSPENSION RESPIRATORY (INHALATION) at 19:08

## 2023-01-27 RX ADMIN — PREGABALIN 100 MG: 50 CAPSULE ORAL at 08:54

## 2023-01-27 RX ADMIN — SODIUM CHLORIDE, PRESERVATIVE FREE 10 ML: 5 INJECTION INTRAVENOUS at 17:34

## 2023-01-27 RX ADMIN — ARFORMOTEROL TARTRATE 15 MCG: 15 SOLUTION RESPIRATORY (INHALATION) at 19:08

## 2023-01-27 RX ADMIN — BIMATOPROST 1 DROP: 0.1 SOLUTION/ DROPS OPHTHALMIC at 21:23

## 2023-01-27 NOTE — PROGRESS NOTES
1900- Change of shift. Report received by Ronit Emmanuel and Yohan Still RN. [place] Bath noted on left groin. Mercer noted. Heparin infusing at 5mL/hr and D5 1/2 NS infusing at 50 mL/hr. Patient LLE cool and jadiel. Absent pedal pulse, PT pulse is present with doppler. Bear hugger on patient. Will assume care of patient. Wound Prevention Checklist  Patient: Rupa Dill (45 y.o. 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 Dilaudid given. See MAR.     0000- No assessment changes. Patient bathed with CHG. 0400- No assessment changes. 0700- Change of shift. Report given to Collins Medrano RN.

## 2023-01-27 NOTE — CONSULTS
[facility]    Name:  Sanjeev Parr  MR#:   [id number]  :  1950  ACCOUNT #:  [de-identified]  DATE OF SERVICE:  2023    REFERRING PHYSICIAN:  [address] [place] [address].    REASON FOR EVALUATION:  Assess for hypercoagulable state. HISTORY OF PRESENT ILLNESS:  The patient is a very pleasant 64-year-old woman with peripheral vascular disease, who came in with an occluded below-the-knee femoropopliteal bypass graft. She required thrombectomy revision of the bypass graft and balloon angioplasty. I have been asked to evaluate her for hypercoagulability. Her other medical problems include Crohn's disease. This morning, she is awake and alert and does not have any ongoing leg pain or swelling. There is no headache, vision problems, or upper extremity swelling. PAST MEDICAL HISTORY:  Peripheral vascular disease, Crohn's disease, gastroesophageal reflux disease, hypertension, parathyroidism, vitamin B12 deficiency, osteoporosis. PAST SURGICAL HISTORY:  Appendectomy, left breast lumpectomy, bunionectomy, bilateral cataract removal, cholecystectomy, femoropopliteal bypass with revision and thrombectomy now, colonoscopy in 2019. SOCIAL HISTORY:  More than 45 pack-year tobacco use. FAMILY HISTORY:  Significant coronary artery disease in her mother as well as in her brother. REVIEW OF SYSTEMS:  No chest pain, cough, or shortness of breath. Denies nausea or vomiting. No headache or vision problems. No abdominal pain, nausea, or vomiting. Denies change in bowel habits or bleeding per rectum. No focal weakness or paresthesias. Denies headache. LABORATORY DATA:  Hemoglobin 9.6, MCV 91, BUN 13, creatinine 0.8. IMAGING DATA:  Echo:  EF 60-65%. Normal left ventricular wall thickness. Mobile echogenic structure in the right atrium. Prominent eustachian valve, although thrombus or mass cannot be excluded.   Transesophageal echocardiogram:  No mass or thrombus noted in the right atrium. EF 60%. IMPRESSION:  1. Peripheral arterial disease; thrombosis of the femoropopliteal bypass graft, on anticoagulation. 2.  Forty five pack-year tobacco use. 3.  Atherosclerotic vascular disease. RECOMMENDATIONS:  We reviewed the available information including her revision surgery and transthoracic and transesophageal echoes. I have suggested hypercoagulable workup in 3-4 weeks after the acute event. This will avoid any false positive results following an acute thrombotic event. Plan to see her in the office in two weeks. Thank you Dr. Alexx Anne for requesting my evaluation in this patient's care.       Veronika Kaye MD      [id number]  D:  01/27/2023 12:46  T:  01/27/2023 18:37  JOB #:  [id number]

## 2023-01-27 NOTE — PROGRESS NOTES
Problem: Lower Extremity Wound Care  Goal: *Non-infected wound: Improvement of existing wound, absence of infection, and maintenance of skin integrity  1/27/2023 0406 by Mayra Light RN  Outcome: Progressing Towards Goal  Goal: Interventions  1/27/2023 0406 by Mayra Light RN  Outcome: Progressing Towards Goal     Problem: Patient Education: Go to Patient Education Activity  Goal: Patient/Family Education  1/27/2023 0406 by Mayra Light RN  Outcome: Progressing Towards Goal     Problem: Falls - Risk of  Goal: *Absence of Falls  Description: Document Bonifacio Phillips Fall Risk and appropriate interventions in the flowsheet.   1/27/2023 0406 by Mayra Light RN  Outcome: Progressing Towards Goal  Note: Fall Risk Interventions:  Mobility Interventions: Bed/chair exit alarm, Communicate number of staff needed for ambulation/transfer, Patient to call before getting OOB  Medication Interventions: Bed/chair exit alarm, Evaluate medications/consider consulting pharmacy, Patient to call before getting OOB, Teach patient to arise slowly  Elimination Interventions: Bed/chair exit alarm, Call light in reach, Patient to call for help with toileting needs, Stay With Me (per policy)    Problem: Patient Education: Go to Patient Education Activity  Goal: Patient/Family Education  1/27/2023 0406 by Mayra Light RN  Outcome: Progressing Towards Goal     Problem: Pain  Goal: *Control of Pain  Outcome: Progressing Towards Goal     Problem: Patient Education: Go to Patient Education Activity  Goal: Patient/Family Education  Outcome: Progressing Towards Goal

## 2023-01-27 NOTE — PROGRESS NOTES
Sitting up in bed with no complaints, ischemic pain is resolved  Vital signs are stable  Labs are stable  Left groin incisions intact there is no redness no signs of infection  She has no respiratory distress  She now has strong multiphasic Doppler signals at the tibial level, somewhat improved at the pedal level  No overt signs of ischemia no mottling no skin changes she is a nice pink appearance with warm feet line appreciate hematology evaluation  Considering warfarin versus Eliquis  We will get out of bed today

## 2023-01-27 NOTE — PROGRESS NOTES
Comprehensive Nutrition Assessment    Type and Reason for Visit: Initial, RD nutrition re-screen/LOS    Nutrition Recommendations/Plan:   Modify PO diet to regular diet. Monitor PO intake, weight, labs and plan of care during admission. Malnutrition Assessment:  Malnutrition Status:  No malnutrition (01/27/23 0859)      Nutrition History and Allergies: PMHx: PAD, HTN, PVD, Chron's disease, GERD. Wt hx: 142 lb (12/27/21), pt reports  lb, denies any wt changes. Reports good intake/appetite PTA, no decrease in appetite. NKFA. Nutrition Assessment:    Presents with an occluded Banks below-knee pop bypass graft, s/p thrombectomy revision left leg bypass graft 1/26. Visited pt [facility], states sometimes doesn't feel like eating but majority appetite/intake is not bad. Fabian at bedside. Declining any oral supplements. Food preferences taken. Nutrition Related Findings:    Pertinent Meds: pulmicort, cymbalta, lyrica, D5 @ 50 ml/hr (provides 60g dex, 204 kcal)   Pertinent Labs: 1/26: reviewed Wound Type: Surgical incision    Current Nutrition Intake & Therapies:  Average Meal Intake: %  Average Supplement Intake: None ordered  ADULT DIET Regular; like pudding, ice cream    Anthropometric Measures:  Height: 5' 5\" (165.1 cm)  Ideal Body Weight (IBW): 125 lbs (57 kg)  Admission Body Weight: 138 lb 0.1 oz (62.6 kg)  Current Body Wt:  62.6 kg (138 lb 0.1 oz), 110.4 % IBW.     Current BMI (kg/m2): 23    Estimated Daily Nutrient Needs:  Energy Requirements Based On: Formula  Weight Used for Energy Requirements: Admission  Energy (kcal/day): 5192-3925 (MSJ 1.1-1.2)  Weight Used for Protein Requirements: Admission  Protein (g/day): 50-75 (0.8-1.2 g/day)  Method Used for Fluid Requirements: 1 ml/kcal  Fluid (ml/day): 3123-9273    Nutrition Diagnosis:   No nutrition diagnosis at this time     Nutrition Interventions:   Food and/or Nutrient Delivery: Modify current diet  Nutrition Education/Counseling: No recommendations at this time  Coordination of Nutrition Care: Continue to monitor while inpatient  Plan of Care discussed with: pt    Goals:     Goals: Meet at least 75% of estimated needs, by next RD assessment       Nutrition Monitoring and Evaluation:   Behavioral-Environmental Outcomes: None identified  Food/Nutrient Intake Outcomes: Food and nutrient intake  Physical Signs/Symptoms Outcomes: Biochemical data, Hemodynamic status, Meal time behavior, Weight, GI status    Discharge Planning:    Continue current diet    Jamie Zhou 85, 64 [address]   Contact: [phone]

## 2023-01-28 ENCOUNTER — APPOINTMENT (OUTPATIENT)
Dept: GENERAL RADIOLOGY | Age: 73
DRG: 253 | End: 2023-01-28
Attending: SURGERY
Payer: MEDICARE

## 2023-01-28 LAB
APTT PPP: 24.7 SEC (ref 23–36.4)
APTT PPP: 25 SEC (ref 23–36.4)
APTT PPP: 55.1 SEC (ref 23–36.4)
BASOPHILS # BLD: 0.1 K/UL (ref 0–0.1)
BASOPHILS NFR BLD: 1 % (ref 0–2)
DIFFERENTIAL METHOD BLD: ABNORMAL
EOSINOPHIL # BLD: 0.7 K/UL (ref 0–0.4)
EOSINOPHIL NFR BLD: 6 % (ref 0–5)
ERYTHROCYTE [DISTWIDTH] IN BLOOD BY AUTOMATED COUNT: 15.9 % (ref 11.6–14.5)
HCT VFR BLD AUTO: 28.5 % (ref 35–45)
HGB BLD-MCNC: 8.8 G/DL (ref 12–16)
IMM GRANULOCYTES # BLD AUTO: 0.2 K/UL (ref 0–0.04)
IMM GRANULOCYTES NFR BLD AUTO: 2 % (ref 0–0.5)
LYMPHOCYTES # BLD: 3.3 K/UL (ref 0.9–3.6)
LYMPHOCYTES NFR BLD: 28 % (ref 21–52)
MCH RBC QN AUTO: 29.3 PG (ref 24–34)
MCHC RBC AUTO-ENTMCNC: 30.9 G/DL (ref 31–37)
MCV RBC AUTO: 95 FL (ref 78–100)
MONOCYTES # BLD: 0.9 K/UL (ref 0.05–1.2)
MONOCYTES NFR BLD: 8 % (ref 3–10)
NEUTS SEG # BLD: 6.6 K/UL (ref 1.8–8)
NEUTS SEG NFR BLD: 57 % (ref 40–73)
NRBC # BLD: 0 K/UL (ref 0–0.01)
NRBC BLD-RTO: 0 PER 100 WBC
PLATELET # BLD AUTO: 286 K/UL (ref 135–420)
PMV BLD AUTO: 10.9 FL (ref 9.2–11.8)
RBC # BLD AUTO: 3 M/UL (ref 4.2–5.3)
WBC # BLD AUTO: 11.7 K/UL (ref 4.6–13.2)

## 2023-01-28 PROCEDURE — 65620000000 HC RM CCU GENERAL

## 2023-01-28 PROCEDURE — 85730 THROMBOPLASTIN TIME PARTIAL: CPT

## 2023-01-28 PROCEDURE — 74011250637 HC RX REV CODE- 250/637: Performed by: SURGERY

## 2023-01-28 PROCEDURE — 74011250636 HC RX REV CODE- 250/636

## 2023-01-28 PROCEDURE — 97535 SELF CARE MNGMENT TRAINING: CPT

## 2023-01-28 PROCEDURE — 94640 AIRWAY INHALATION TREATMENT: CPT

## 2023-01-28 PROCEDURE — 94762 N-INVAS EAR/PLS OXIMTRY CONT: CPT

## 2023-01-28 PROCEDURE — 74011250636 HC RX REV CODE- 250/636: Performed by: SURGERY

## 2023-01-28 PROCEDURE — 77030037878 HC DRSG MEPILEX >48IN BORD MOLN -B

## 2023-01-28 PROCEDURE — 77030040392 HC DRSG OPTIFOAM MDII -A

## 2023-01-28 PROCEDURE — 2709999900 HC NON-CHARGEABLE SUPPLY

## 2023-01-28 PROCEDURE — 97162 PT EVAL MOD COMPLEX 30 MIN: CPT

## 2023-01-28 PROCEDURE — 85025 COMPLETE CBC W/AUTO DIFF WBC: CPT

## 2023-01-28 PROCEDURE — 97165 OT EVAL LOW COMPLEX 30 MIN: CPT

## 2023-01-28 PROCEDURE — 71045 X-RAY EXAM CHEST 1 VIEW: CPT

## 2023-01-28 PROCEDURE — 36415 COLL VENOUS BLD VENIPUNCTURE: CPT

## 2023-01-28 PROCEDURE — 87040 BLOOD CULTURE FOR BACTERIA: CPT

## 2023-01-28 PROCEDURE — 77010033678 HC OXYGEN DAILY

## 2023-01-28 PROCEDURE — 97116 GAIT TRAINING THERAPY: CPT

## 2023-01-28 PROCEDURE — 74011000250 HC RX REV CODE- 250: Performed by: SURGERY

## 2023-01-28 RX ORDER — OXYCODONE AND ACETAMINOPHEN 5; 325 MG/1; MG/1
2 TABLET ORAL
Status: DISCONTINUED | OUTPATIENT
Start: 2023-01-28 | End: 2023-01-29 | Stop reason: HOSPADM

## 2023-01-28 RX ORDER — HEPARIN SODIUM 1000 [USP'U]/ML
2500 INJECTION, SOLUTION INTRAVENOUS; SUBCUTANEOUS ONCE
Status: COMPLETED | OUTPATIENT
Start: 2023-01-28 | End: 2023-01-28

## 2023-01-28 RX ORDER — ACETAMINOPHEN 325 MG/1
650 TABLET ORAL
Status: DISCONTINUED | OUTPATIENT
Start: 2023-01-28 | End: 2023-01-29 | Stop reason: HOSPADM

## 2023-01-28 RX ORDER — HEPARIN SODIUM 10000 [USP'U]/100ML
18-36 INJECTION, SOLUTION INTRAVENOUS
Status: DISCONTINUED | OUTPATIENT
Start: 2023-01-28 | End: 2023-01-29

## 2023-01-28 RX ORDER — HEPARIN SODIUM 1000 [USP'U]/ML
INJECTION, SOLUTION INTRAVENOUS; SUBCUTANEOUS
Status: COMPLETED
Start: 2023-01-28 | End: 2023-01-28

## 2023-01-28 RX ADMIN — ARFORMOTEROL TARTRATE 15 MCG: 15 SOLUTION RESPIRATORY (INHALATION) at 21:02

## 2023-01-28 RX ADMIN — ARFORMOTEROL TARTRATE 15 MCG: 15 SOLUTION RESPIRATORY (INHALATION) at 09:03

## 2023-01-28 RX ADMIN — SODIUM CHLORIDE, PRESERVATIVE FREE 10 ML: 5 INJECTION INTRAVENOUS at 21:27

## 2023-01-28 RX ADMIN — IPRATROPIUM BROMIDE 0.5 MG: 0.5 SOLUTION RESPIRATORY (INHALATION) at 21:02

## 2023-01-28 RX ADMIN — IPRATROPIUM BROMIDE 0.5 MG: 0.5 SOLUTION RESPIRATORY (INHALATION) at 09:03

## 2023-01-28 RX ADMIN — PREGABALIN 100 MG: 50 CAPSULE ORAL at 08:55

## 2023-01-28 RX ADMIN — ASPIRIN 81 MG CHEWABLE TABLET 81 MG: 81 TABLET CHEWABLE at 08:55

## 2023-01-28 RX ADMIN — MONTELUKAST 10 MG: 10 TABLET, FILM COATED ORAL at 08:55

## 2023-01-28 RX ADMIN — PREGABALIN 100 MG: 50 CAPSULE ORAL at 17:33

## 2023-01-28 RX ADMIN — HEPARIN SODIUM 2500 UNITS: 1000 INJECTION, SOLUTION INTRAVENOUS; SUBCUTANEOUS at 20:11

## 2023-01-28 RX ADMIN — DULOXETINE HYDROCHLORIDE 60 MG: 30 CAPSULE, DELAYED RELEASE ORAL at 21:27

## 2023-01-28 RX ADMIN — DEXTROSE AND SODIUM CHLORIDE 50 ML/HR: 5; 450 INJECTION, SOLUTION INTRAVENOUS at 11:19

## 2023-01-28 RX ADMIN — OXYCODONE HYDROCHLORIDE AND ACETAMINOPHEN 2 TABLET: 5; 325 TABLET ORAL at 17:33

## 2023-01-28 RX ADMIN — BUDESONIDE 250 MCG: 0.25 SUSPENSION RESPIRATORY (INHALATION) at 09:03

## 2023-01-28 RX ADMIN — OXYCODONE HYDROCHLORIDE AND ACETAMINOPHEN 1 TABLET: 5; 325 TABLET ORAL at 10:01

## 2023-01-28 RX ADMIN — SODIUM CHLORIDE, PRESERVATIVE FREE 10 ML: 5 INJECTION INTRAVENOUS at 17:34

## 2023-01-28 RX ADMIN — TRIAMTERENE AND HYDROCHLOROTHIAZIDE 1 TABLET: 37.5; 25 TABLET ORAL at 08:55

## 2023-01-28 RX ADMIN — BIMATOPROST 1 DROP: 0.1 SOLUTION/ DROPS OPHTHALMIC at 21:27

## 2023-01-28 RX ADMIN — BUDESONIDE 250 MCG: 0.25 SUSPENSION RESPIRATORY (INHALATION) at 21:02

## 2023-01-28 RX ADMIN — CETIRIZINE HYDROCHLORIDE 10 MG: 10 TABLET ORAL at 08:55

## 2023-01-28 RX ADMIN — HEPARIN SODIUM 2500 UNITS: 1000 INJECTION INTRAVENOUS; SUBCUTANEOUS at 20:11

## 2023-01-28 RX ADMIN — SODIUM CHLORIDE, PRESERVATIVE FREE 10 ML: 5 INJECTION INTRAVENOUS at 08:59

## 2023-01-28 RX ADMIN — OXYCODONE HYDROCHLORIDE AND ACETAMINOPHEN 2 TABLET: 5; 325 TABLET ORAL at 21:26

## 2023-01-28 RX ADMIN — VANCOMYCIN HYDROCHLORIDE 1000 MG: 1 INJECTION, POWDER, LYOPHILIZED, FOR SOLUTION INTRAVENOUS at 11:37

## 2023-01-28 NOTE — PROGRESS NOTES
Problem: Mobility Impaired (Adult and Pediatric)  Goal: *Acute Goals and Plan of Care (Insert Text)  Outcome: Resolved/Met     PHYSICAL THERAPY EVALUATION AND DISCHARGE    Patient: Janeth Vazquez (59 y.o. 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 Pt stands to RW with mod ind, ambulates approx 150 ft with supervision for equipment management for safety. Pt with safe walker negotiation, step through gait pattern, no LOB. Pt vitals remained WFL during session. Pt does not require further PT at this time and thus will sign off from caseload. Pt left in recliner with all needs met. Patient does not require further skilled intervention at this level of care. PLAN :  Recommendations and Planned Interventions:   No formal PT needs identified at this time. Further Equipment Recommendations for Discharge: rolling walker- pt owns     AMPAC: At this time and based on an AM-PAC score of 22/24 (or **/20 if omitting stairs), no further PT is recommended upon discharge due to (i.e. patient at baseline functional statusetc). Recommend patient returns to prior setting with prior services. This AMPAC score should be considered in conjunction with interdisciplinary team recommendations to determine the most appropriate discharge setting. Patient's social support, diagnosis, medical stability, and prior level of function should also be taken into consideration. SUBJECTIVE:   Patient stated I am feeling much better.     OBJECTIVE DATA SUMMARY:     Past Medical History:   Diagnosis Date    Arthritis CAP (community acquired pneumonia) 06/27/2017    Chronic lung disease     Claudication ([address]     left leg    Crohn's disease ([address]     Crohn's disease ([address]     GERD (gastroesophageal reflux disease)     HTN (hypertension)     Ill-defined condition     Uses O2 at night. Nausea & vomiting     Neuropathy     Other and unspecified symptoms and signs involving general sensations and perceptions     PVD    Other ill-defined conditions(799.89)     Crohn's    Parathyroid tumor     sees Endo Dr Beatris Brooks    Peripheral neuropathy     Pulmonary emphysema ([address]     PVD (peripheral vascular disease) ([address]     right leg    Sepsis ([address] 06/27/2017    Vitamin B12 deficiency      Past Surgical History:   Procedure Laterality Date    COLONOSCOPY N/A 09/11/2019    DIAGNOSTIC COLONOSCOPY performed by Jani Shelton MD at [facility] ENDOSCOPY    HX APPENDECTOMY      HX BREAST LUMPECTOMY Left     breast left- precancerous    HX BUNIONECTOMY      left eye    HX CATARACT REMOVAL      bilateral    HX CHOLECYSTECTOMY      HX ORTHOPAEDIC      lateral epicondilitis on right    HX OTHER SURGICAL  03/07/2013    catheter into aorta    HX OTHER SURGICAL      intestional resection x4    HX OTHER SURGICAL  09/2013    I & D Right chest/flank wall abscess vs granuloma    HX OTHER SURGICAL  01/05/2023    left leg thrombectomy with stent    DC BYP OTH/THN VEIN AXILLARY-FEMORAL Right 04/19/2013    DC BYP OTH/THN VEIN FEMORAL-POPLITEAL Left 05/2013    DC UNLISTED PROCEDURE FOOT/TOES      DC UNLISTED PROCEDURE HUMERUS/ELBOW      DC UNLISTED PROCEDURE VASCULAR SURGERY  09/10/2020    Debridement and washout of bypass graft.      Barriers to Learning/Limitations: yes;  physical  Compensate with: Visual Cues, Verbal Cues, and Tactile Cues  Home Situation:   Home Situation  Home Environment: Private residence  # Steps to Enter: 4  One/Two Story Residence: (P) Two story  # of Interior Steps: (P) 12  Interior Rails: (P) Left  Living Alone: No  Support Systems: Spouse/Significant Other  Patient Expects to be Discharged to[de-identified] Home with family assistance  Current DME Used/Available at Home: [employer] Showman, rolling, Cane, straight  Critical Behavior:  Neurologic State: Alert  Orientation Level: Oriented X4  Cognition: Follows commands  Safety/Judgement: Fall prevention  Psychosocial  Patient Behaviors: Calm; Cooperative                 Strength:    Strength: Within functional limits                    Tone & Sensation:   Tone: Normal              Sensation: Intact               Range Of Motion:  AROM: Within functional limits          Functional Mobility:  Bed Mobility:              Transfers:  Sit to Stand: Modified independent  Stand to Sit: Modified independent          Balance:   Sitting: Intact  Standing: Intact; With support     Ambulation/Gait Training:  Distance (ft): 150 Feet (ft)  Assistive Device: Walker, rollator  Ambulation - Level of Assistance: Supervision        Gait Abnormalities: Decreased step clearance    Speed/Amy: Slow  Step Length: Left shortened;Right shortened    Pain:  Pain level pre-treatment: 0/10   Pain level post-treatment: 0/10  Pain Intervention(s): Medication (see MAR); Rest, Ice, Repositioning   Response to intervention: Nurse notified    Activity Tolerance:   Good    Please refer to the flowsheet for vital signs taken during this treatment. After treatment:   []         Patient left in no apparent distress sitting up in chair  [x]         Patient left in no apparent distress in bed  [x]         Call bell left within reach  [x]         Nursing notified  []         Caregiver present  []         Bed alarm activated  []         SCDs applied    COMMUNICATION/EDUCATION:   [x]         Role of Physical Therapy in the acute care setting. [x]         Fall prevention education was provided and the patient/caregiver indicated understanding. [x]         Patient/family have participated as able in goal setting and plan of care.   [x]         Patient/family agree to work toward stated goals and plan of care. []         Patient understands intent and goals of therapy, but is neutral about his/her participation. []         Patient is unable to participate in goal setting/plan of care: ongoing with therapy staff.  []         Other: Thank you for this referral.  Adriano Lopezvonniepraveen   Time Calculation: 20 mins      Eval Complexity: History: MEDIUM  Complexity : 1-2 comorbidities / personal factors will impact the outcome/ POC Exam:MEDIUM Complexity : 3 Standardized tests and measures addressing body structure, function, activity limitation and / or participation in recreation  Presentation: MEDIUM Complexity : Evolving with changing characteristics  Clinical Decision Making:Medium Complexity    Overall Complexity:MEDIUM    [employer] AM-PAC® Basic Mobility Inpatient Short Form (6-Clicks) Version 2    How much HELP from another person does the patient currently need    (If the patient hasn't done an activity recently, how much help from another person do you think he/she would need if he/she tried?)   Total (Total A or Dep)   A Lot  (Mod to Max A)   A Little (Sup or Min A)   None (Mod I to I)   Turning from your back to your side while in a flat bed without using bedrails? [] 1 [] 2 [] 3 [x] 4   2. Moving from lying on your back to sitting on the side of a flat bed without using bedrails? [] 1 [] 2 [] 3 [x] 4   3. Moving to and from a bed to a chair (including a wheelchair)? [] 1 [] 2 [] 3 [x] 4   4. Standing up from a chair using your arms (e.g., wheelchair, or bedside chair)? [] 1 [] 2 [] 3 [x] 4   5. Walking in hospital room? [] 1 [] 2 [x] 3 [] 4   6. Climbing 3-5 steps with a railing?+   [] 1 [] 2 [x] 3 [] 4   +If stair climbing cannot be assessed, skip item #6. Sum responses from items 1-5.        At this time and based on an AM-PAC score of 22/24 (or **/20 if omitting stairs), no further PT is recommended upon discharge due to (i.e. patient at baseline functional statusetc). Recommend patient returns to prior setting with prior services.

## 2023-01-28 NOTE — PROGRESS NOTES
OCCUPATIONAL THERAPY EVALUATION/DISCHARGE    Patient: Ancelmo Chowdhury (44 y.o. 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 Pt lives with spouse. ASSESSMENT AND RECOMMENDATIONS:  Based on the objective data described below, the patient presents with ability to perform basic ADLs and functional transfers very near baseline level of function. Pt performed/simulated UB/LB ADLs with Mod Ind for seated and std aspects. Pt demonstrates good safety awareness during all standing tasks. Pt lives with supportive spouse available to assist as needed. Pt educated on mult energy conservation techniques to utilize in home environment and while at the hospital, including pacing and deep breathing to prevent SOB and fatigue, and increase activity tolerance and safety w/ADLs and functional mobility, pt verbalized understanding. Skilled occupational therapy is not indicated at this time. Further Equipment Recommendations for Discharge: shower chair    AMPAC: At this time and based on an AM-PAC score of 24/24, no further OT is recommended upon discharge due to (i.e. patient at baseline functional statusetc). Recommend patient returns to prior setting with prior services. This AMPAC score should be considered in conjunction with interdisciplinary team recommendations to determine the most appropriate discharge setting. Patient's social support, diagnosis, medical stability, and prior level of function should also be taken into consideration. SUBJECTIVE:   Patient stated I feel alright.     OBJECTIVE DATA SUMMARY:     Past Medical History:   Diagnosis Date    Arthritis     CAP (community acquired pneumonia) 06/27/2017    Chronic lung disease Claudication ([address]     left leg    Crohn's disease ([address]     Crohn's disease ([address]     GERD (gastroesophageal reflux disease)     HTN (hypertension)     Ill-defined condition     Uses O2 at night. Nausea & vomiting     Neuropathy     Other and unspecified symptoms and signs involving general sensations and perceptions     PVD    Other ill-defined conditions(799.89)     Crohn's    Parathyroid tumor     sees Endo Dr Radha Rollins    Peripheral neuropathy     Pulmonary emphysema ([address]     PVD (peripheral vascular disease) ([address]     right leg    Sepsis ([address] 06/27/2017    Vitamin B12 deficiency      Past Surgical History:   Procedure Laterality Date    COLONOSCOPY N/A 09/11/2019    DIAGNOSTIC COLONOSCOPY performed by Liseth Diaz MD at [facility] ENDOSCOPY    HX APPENDECTOMY      HX BREAST LUMPECTOMY Left     breast left- precancerous    HX BUNIONECTOMY      left eye    HX CATARACT REMOVAL      bilateral    HX CHOLECYSTECTOMY      HX ORTHOPAEDIC      lateral epicondilitis on right    HX OTHER SURGICAL  03/07/2013    catheter into aorta    HX OTHER SURGICAL      intestional resection x4    HX OTHER SURGICAL  09/2013    I & D Right chest/flank wall abscess vs granuloma    HX OTHER SURGICAL  01/05/2023    left leg thrombectomy with stent    CT BYP OTH/THN VEIN AXILLARY-FEMORAL Right 04/19/2013    CT BYP OTH/THN VEIN FEMORAL-POPLITEAL Left 05/2013    CT UNLISTED PROCEDURE FOOT/TOES      CT UNLISTED PROCEDURE HUMERUS/ELBOW      CT UNLISTED PROCEDURE VASCULAR SURGERY  09/10/2020    Debridement and washout of bypass graft.      Barriers to Learning/Limitations: None  Compensate with: visual, verbal, tactile, kinesthetic cues/model    Home Situation:   Home Situation  Home Environment: Private residence  # Steps to Enter: 4  One/Two Story Residence: (P) Two story  # of Interior Steps: (P) 12  Interior Rails: (P) Left  Living Alone: No  Support Systems: Spouse/Significant Other  Patient Expects to be Discharged to[de-identified] Home with family assistance  Current DME Used/Available at Home: Walker, rolling, Cane, straight  Tub or Shower Type: Tub/Shower combination  []     Right hand dominant   []     Left hand dominant    Cognitive/Behavioral Status:  Neurologic State: Alert  Orientation Level: Oriented X4  Cognition: Follows commands  Safety/Judgement: Awareness of environment; Fall prevention    Skin: visible skin intact, wound vac to L groin, LLE [place] intact  Edema: none noted    Vision/Perceptual:       WFL  Coordination: BUE  Coordination: Within functional limits  Fine Motor Skills-Upper: Left Intact; Right Intact    Gross Motor Skills-Upper: Left Intact; Right Intact    Balance:  Sitting: Intact  Standing: Intact; With support    Strength: BUE    Strength: Within functional limits      Tone & Sensation: BUE    Tone: Normal  Sensation: Intact   Range of Motion: BUE  AROM: Within functional limits   Functional Mobility and Transfers for ADLs:  Transfers:  Sit to Stand: Modified independent  Stand to Sit: Modified independent   Toilet Transfer : Modified independent    Bathroom Mobility: Modified independent    ADL Assessment:  Feeding: Modified independent    Oral Facial Hygiene/Grooming: Modified Independent    Bathing: Modified independent    Upper Body Dressing: Modified independent    Lower Body Dressing: Modified independent    Toileting: Modified independent   ADL Intervention:   Cognitive Retraining  Safety/Judgement: Awareness of environment; Fall prevention  Pain:  Pain level pre-treatment: not rated  Pain level post-treatment: not rated    Activity Tolerance:   Fair  Please refer to the flowsheet for vital signs taken during this treatment.   After treatment:   [x]  Patient left in no apparent distress sitting up in chair  []  Patient left in no apparent distress in bed  [x]  Call bell left within reach  [x]  Nursing notified  [x]  Spouse  present  []  Bed alarm activated    COMMUNICATION/EDUCATION:   [x]      Role of Occupational Therapy in the acute care setting  [x]      Home safety education was provided and the patient/caregiver indicated understanding. []      Patient/family have participated as able and agree with findings and recommendations. []      Patient is unable to participate in plan of care at this time. Thank you for this referral.  Cynthia Freeman OTR/L  Time Calculation: 21 mins      Eval Complexity: History: LOW Complexity : Brief history review ; Examination: LOW Complexity : 1-3 performance deficits relating to physical, cognitive , or psychosocial skils that result in activity limitations and / or participation restrictions ; Decision Making:LOW Complexity : No comorbidities that affect functional and no verbal or physical assistance needed to complete eval tasks     [employer] AM-PAC® Daily Activity Inpatient Short Form (6-Clicks)*    How much HELP from another person does the patient currently need    (If the patient hasn't done an activity recently, how much help from another person do you think he/she would need if he/she tried?)   Total (Total A or Dep)   A Lot  (Mod to Max A)   A Little (Sup or Min A)   None (Mod I to I)   Putting on and taking off regular lower body clothing? [] 1 [] 2 [] 3 [x] 4   2. Bathing (including washing, rinsing,      drying)? [] 1 [] 2 [] 3 [x] 4   3. Toileting, which includes using toilet, bedpan or urinal?   [] 1 [] 2 [] 3 [x] 4   4. Putting on and taking off regular upper body clothing? [] 1 [] 2 [] 3 [x] 4   5. Taking care of personal grooming such as brushing teeth? [] 1 [] 2 [] 3 [x] 4   6. Eating meals? [] 1 [] 2 [] 3 [x] 4     At this time and based on an AM-PAC score of 24/24, no further OT is recommended upon discharge due to (i.e. patient at baseline functional statusetc). Recommend patient returns to prior setting with prior services.

## 2023-01-28 NOTE — PROGRESS NOTES
0700: Bedside, Verbal, and Written shift change report given to writer by Kvng Masterson. Report included the following information OR Summary, Procedure Summary, Intake/Output, MAR, Recent Results, Med Rec Status, Cardiac Rhythm ., Alarm Parameters , Quality Measures, and Dual Neuro Assessment. Wound Prevention Checklist  Patient: Tadeo Li (58 y.o. 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 Betina Stafford, RN   2161: Pt ambulated in quarles with walker, stated left foot \"feels kind of numb\". LLE warm and dry, (+) Left DP and PT doppler pulses. Left groin Prevena clean, dry and intact without drainage in Prevena atrium, Prevena battery on/green light. 1020: Pt asking to page physician for \"more pain pills\" after ambulated. Pt stated that left groin aching- pt informed that the last pain medication requested was  before 2300. Pt medicated for pain per request-repositioned in chair for comfort, reclined HOB to 30 degrees, lower extremities elevated. Dr. Hardeep Christie called per patient request- orders received. 1106: PCXR completed  1200: Afebrile, Left groin prevena dssg clean, dry, in place, (+) doppler DP and PT pulses BLE.  1600: Afebrile, Left groin prevena dssg clean, dry, in place, (+) doppler DP and PT pulses BLE. 2020: Bedside and Verbal shift change report given to [facility] by writer.  Report included the following information ED Summary, OR Summary, Procedure Summary, Intake/Output, MAR, Recent Results, Med Rec Status, Cardiac Rhythm ., Alarm Parameters , Quality Measures, and Dual Neuro Assessment. Pts' spouse at bedside.

## 2023-01-28 NOTE — PROGRESS NOTES
1900:  Assumed care of patient. Patient's  at bedside. Patient has doppler pulses in LEFT pedal and PT. Patient has no other needs at this time. 2257:  Patient asked for PRN dilaudid, see MAR.

## 2023-01-29 VITALS
SYSTOLIC BLOOD PRESSURE: 123 MMHG | TEMPERATURE: 99.7 F | WEIGHT: 136.3 LBS | HEART RATE: 87 BPM | OXYGEN SATURATION: 96 % | HEIGHT: 65 IN | BODY MASS INDEX: 22.71 KG/M2 | DIASTOLIC BLOOD PRESSURE: 46 MMHG | RESPIRATION RATE: 16 BRPM

## 2023-01-29 LAB
APTT PPP: 93.9 SEC (ref 23–36.4)
BASOPHILS # BLD: 0 K/UL (ref 0–0.1)
BASOPHILS NFR BLD: 0 % (ref 0–2)
DIFFERENTIAL METHOD BLD: ABNORMAL
EOSINOPHIL # BLD: 0.7 K/UL (ref 0–0.4)
EOSINOPHIL NFR BLD: 7 % (ref 0–5)
ERYTHROCYTE [DISTWIDTH] IN BLOOD BY AUTOMATED COUNT: 15.7 % (ref 11.6–14.5)
HCT VFR BLD AUTO: 23.1 % (ref 35–45)
HGB BLD-MCNC: 7.3 G/DL (ref 12–16)
IMM GRANULOCYTES # BLD AUTO: 0 K/UL (ref 0–0.04)
IMM GRANULOCYTES NFR BLD AUTO: 0 % (ref 0–0.5)
LYMPHOCYTES # BLD: 3 K/UL (ref 0.9–3.6)
LYMPHOCYTES NFR BLD: 30 % (ref 21–52)
MCH RBC QN AUTO: 29.3 PG (ref 24–34)
MCHC RBC AUTO-ENTMCNC: 31.6 G/DL (ref 31–37)
MCV RBC AUTO: 92.8 FL (ref 78–100)
MONOCYTES # BLD: 0.6 K/UL (ref 0.05–1.2)
MONOCYTES NFR BLD: 6 % (ref 3–10)
NEUTS SEG # BLD: 5.8 K/UL (ref 1.8–8)
NEUTS SEG NFR BLD: 57 % (ref 40–73)
NRBC # BLD: 0 K/UL (ref 0–0.01)
NRBC BLD-RTO: 0 PER 100 WBC
PLATELET # BLD AUTO: 277 K/UL (ref 135–420)
PLATELET COMMENTS,PCOM: ABNORMAL
PMV BLD AUTO: 10.5 FL (ref 9.2–11.8)
RBC # BLD AUTO: 2.49 M/UL (ref 4.2–5.3)
RBC MORPH BLD: ABNORMAL
RBC MORPH BLD: ABNORMAL
WBC # BLD AUTO: 10.1 K/UL (ref 4.6–13.2)

## 2023-01-29 PROCEDURE — 36415 COLL VENOUS BLD VENIPUNCTURE: CPT

## 2023-01-29 PROCEDURE — 85730 THROMBOPLASTIN TIME PARTIAL: CPT

## 2023-01-29 PROCEDURE — 94762 N-INVAS EAR/PLS OXIMTRY CONT: CPT

## 2023-01-29 PROCEDURE — 94640 AIRWAY INHALATION TREATMENT: CPT

## 2023-01-29 PROCEDURE — 74011250637 HC RX REV CODE- 250/637: Performed by: SURGERY

## 2023-01-29 PROCEDURE — 74011000250 HC RX REV CODE- 250: Performed by: SURGERY

## 2023-01-29 PROCEDURE — 85025 COMPLETE CBC W/AUTO DIFF WBC: CPT

## 2023-01-29 RX ORDER — GUAIFENESIN 100 MG/5ML
81 LIQUID (ML) ORAL DAILY
Qty: 90 TABLET | Refills: 4 | Status: SHIPPED | OUTPATIENT
Start: 2023-01-29

## 2023-01-29 RX ORDER — GUAIFENESIN 100 MG/5ML
81 LIQUID (ML) ORAL DAILY
Qty: 90 TABLET | Refills: 4 | Status: SHIPPED | OUTPATIENT
Start: 2023-01-30

## 2023-01-29 RX ADMIN — ARFORMOTEROL TARTRATE 15 MCG: 15 SOLUTION RESPIRATORY (INHALATION) at 09:49

## 2023-01-29 RX ADMIN — CETIRIZINE HYDROCHLORIDE 10 MG: 10 TABLET ORAL at 08:06

## 2023-01-29 RX ADMIN — BUDESONIDE 250 MCG: 0.25 SUSPENSION RESPIRATORY (INHALATION) at 09:50

## 2023-01-29 RX ADMIN — PREGABALIN 100 MG: 50 CAPSULE ORAL at 17:32

## 2023-01-29 RX ADMIN — ASPIRIN 81 MG CHEWABLE TABLET 81 MG: 81 TABLET CHEWABLE at 08:06

## 2023-01-29 RX ADMIN — APIXABAN 10 MG: 5 TABLET, FILM COATED ORAL at 13:10

## 2023-01-29 RX ADMIN — PREGABALIN 100 MG: 50 CAPSULE ORAL at 08:06

## 2023-01-29 RX ADMIN — TRIAMTERENE AND HYDROCHLOROTHIAZIDE 1 TABLET: 37.5; 25 TABLET ORAL at 08:06

## 2023-01-29 RX ADMIN — MONTELUKAST 10 MG: 10 TABLET, FILM COATED ORAL at 08:06

## 2023-01-29 RX ADMIN — DEXTROSE AND SODIUM CHLORIDE 50 ML/HR: 5; 450 INJECTION, SOLUTION INTRAVENOUS at 02:59

## 2023-01-29 RX ADMIN — IPRATROPIUM BROMIDE 0.5 MG: 0.5 SOLUTION RESPIRATORY (INHALATION) at 09:49

## 2023-01-29 RX ADMIN — SODIUM CHLORIDE, PRESERVATIVE FREE 10 ML: 5 INJECTION INTRAVENOUS at 05:33

## 2023-01-29 RX ADMIN — APIXABAN 10 MG: 5 TABLET, FILM COATED ORAL at 18:00

## 2023-01-29 RX ADMIN — OXYCODONE HYDROCHLORIDE AND ACETAMINOPHEN 2 TABLET: 5; 325 TABLET ORAL at 13:14

## 2023-01-29 NOTE — PROGRESS NOTES
0700: Bedside and Verbal shift change report given to   Norberto Nam and writer by Chayo Musa. Report included the following information OR Summary, Procedure Summary, Intake/Output, MAR, Recent Results, Med Rec Status, Cardiac Rhythm ., Alarm Parameters , Quality Measures, and Dual Neuro Assessment. Wound Prevention Checklist  Patient: Maryjane Brandon (89 y.o. 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 Seth Sellers, RN   5082: Pt verbalized expectation for discharge today- Dr. Hyun Omer paged since IV Heparin infusion discontinued @ 1311 today and Eliquis 10mg PO given @ 1310 today as per Dr. Samuel Rogers order. Pt ambulated in quarles without assistive device and pain controlled with Percocet as ordered- see MAR, Last dose Percocet 10mg PO @ 1314 today. Dr. Hyun Omer returned phone call and wants pt to be discharged tonight, writer given telephone order for discharge and for patient to receive Eliquis 10mg PO Twice Daily with dose to be given tonmitesh and Dr. Hyun Omer will submit a prescription for Eliquis to pts' pharmacy at: [address]. [phone]: Discharge instructions relayed by R. [de-identified] who escorted pt out to spouses' vehicle for discharge home.

## 2023-01-29 NOTE — PROGRESS NOTES
Feels much better today  Sitting up alert vital signs are stable  Ischemic pain resolved but does have numbness in the foot  Doppler signals much improved  Will transition to outpatient anticoagulation

## 2023-01-29 NOTE — PROGRESS NOTES
Bedside and Verbal shift change report given to [address] [place] [address] (oncoming nurse) by Jens Falcon RN (offgoing nurse). Report included the following information SBAR, Kardex, Intake/Output, MAR, and Recent Results.

## 2023-01-29 NOTE — DISCHARGE SUMMARY
Physician Discharge Summary     Patient ID:  Greer Palacios  [id number]  1 y.o.  1950    Admit date: 1/19/2023    Discharge date: 1/29/2023      Admitting Physician: Zaira Jett MD     Discharge Physician: Zaira Jett MD     Admission Diagnoses: Femoral artery occlusion, left [facility]) [I70.202]    Discharge Diagnoses: There are no discharge diagnoses documented for the most recent discharge. Procedures for this admission: Procedure(s):  THROMBECTOMY REVISION LEFT LEG BYPASS GRAFT / Bettyjo Caller / BALLOON ANGIOPLASTY    Discharged Condition: stable    Hospital Course: Patient was admitted to the hospital with recurrent occluded bypass. She had an ischemic left foot. She had initial thrombectomy and revision and placed on heparin but then did reoccluded. She had a final thrombectomy and revision with removal of common femoral thrombus and balloon angioplasty which opened up the bypass very nicely. She now has outstanding Doppler signals to the foot. She is walking she does have some numbness we talked about this being an ischemic neuropathy that may prove to be prolonged permanent. She is stable and wishing to be discharged today. She was seen by hematology while in the hospital.  I am placing her on a starter pack for Eliquis as she has failed Plavix and aspirin we will also add baby aspirin to this. Consults: Hematology/Oncology    Significant Diagnostic Studies: angiography: Left leg angiogram    Treatments: Thrombectomy revision left leg graft    Discharge Exam: LUNG: clear to auscultation bilaterally  HEART: S1, S2 normal  ABDOMEN:  no change  Left groin incision soft and intact no infection  Foot is warm and viable  Doppler signals are excellent    Disposition: home    Patient Instructions:   Cannot display discharge medications since this patient is not currently admitted.       Reference discharge instructions as provided by nursing for diet, labs, medications, activity, wound care and any outpatient referrals.     Follow-up with Dr. Alexx Anne    Signed:  Silviano Ellis MD  1/29/2023  6:38 PM

## 2023-01-30 LAB
ABO + RH BLD: NORMAL
BLD PROD TYP BPU: NORMAL
BLD PROD TYP BPU: NORMAL
BLOOD GROUP ANTIBODIES SERPL: NORMAL
BPU ID: NORMAL
BPU ID: NORMAL
CALLED TO:,BCALL1: NORMAL
CROSSMATCH RESULT,%XM: NORMAL
CROSSMATCH RESULT,%XM: NORMAL
SPECIMEN EXP DATE BLD: NORMAL
STATUS OF UNIT,%ST: NORMAL
STATUS OF UNIT,%ST: NORMAL
UNIT DIVISION, %UDIV: 0
UNIT DIVISION, %UDIV: 0

## 2023-02-17 ENCOUNTER — HOSPITAL ENCOUNTER (INPATIENT)
Facility: HOSPITAL | Age: 73
LOS: 13 days | Discharge: HOME | DRG: 252 | End: 2023-03-02
Attending: EMERGENCY MEDICINE | Admitting: SURGERY
Payer: MEDICARE

## 2023-02-17 ENCOUNTER — APPOINTMENT (OUTPATIENT)
Facility: HOSPITAL | Age: 73
DRG: 252 | End: 2023-02-17
Payer: MEDICARE

## 2023-02-17 ENCOUNTER — ANESTHESIA (OUTPATIENT)
Facility: HOSPITAL | Age: 73
End: 2023-02-17
Payer: MEDICARE

## 2023-02-17 ENCOUNTER — ANESTHESIA EVENT (OUTPATIENT)
Facility: HOSPITAL | Age: 73
End: 2023-02-17
Payer: MEDICARE

## 2023-02-17 DIAGNOSIS — T81.30XA DEHISCENCE OF WOUND: ICD-10-CM

## 2023-02-17 DIAGNOSIS — T81.30XA WOUND DEHISCENCE: ICD-10-CM

## 2023-02-17 DIAGNOSIS — I70.202 FEMORAL ARTERY OCCLUSION, LEFT (HCC): ICD-10-CM

## 2023-02-17 DIAGNOSIS — T82.7XXD VASCULAR GRAFT INFECTION, SUBSEQUENT ENCOUNTER: Primary | ICD-10-CM

## 2023-02-17 DIAGNOSIS — T81.31XA DEHISCENCE OF OPERATIVE WOUND, INITIAL ENCOUNTER: ICD-10-CM

## 2023-02-17 DIAGNOSIS — B99.9 INFECTION: ICD-10-CM

## 2023-02-17 PROBLEM — T14.8XXA OPEN WOUND: Status: ACTIVE | Noted: 2020-09-10

## 2023-02-17 LAB
ALBUMIN SERPL-MCNC: 2.3 G/DL (ref 3.4–5)
ALBUMIN/GLOB SERPL: 0.5 (ref 0.8–1.7)
ALP SERPL-CCNC: 142 U/L (ref 45–117)
ALT SERPL-CCNC: 29 U/L (ref 13–56)
ANION GAP SERPL CALC-SCNC: 7 MMOL/L (ref 3–18)
AST SERPL-CCNC: 25 U/L (ref 10–38)
BASOPHILS # BLD: 0.1 K/UL (ref 0–0.1)
BASOPHILS NFR BLD: 0 % (ref 0–2)
BILIRUB SERPL-MCNC: 0.4 MG/DL (ref 0.2–1)
BUN SERPL-MCNC: 13 MG/DL (ref 7–18)
BUN/CREAT SERPL: 18 (ref 12–20)
CALCIUM SERPL-MCNC: 10 MG/DL (ref 8.5–10.1)
CHLORIDE SERPL-SCNC: 101 MMOL/L (ref 100–111)
CO2 SERPL-SCNC: 29 MMOL/L (ref 21–32)
CREAT SERPL-MCNC: 0.71 MG/DL (ref 0.6–1.3)
DIFFERENTIAL METHOD BLD: ABNORMAL
EOSINOPHIL # BLD: 0 K/UL (ref 0–0.4)
EOSINOPHIL NFR BLD: 0 % (ref 0–5)
ERYTHROCYTE [DISTWIDTH] IN BLOOD BY AUTOMATED COUNT: 15.3 % (ref 11.6–14.5)
GLOBULIN SER CALC-MCNC: 4.8 G/DL (ref 2–4)
GLUCOSE BLD STRIP.AUTO-MCNC: 136 MG/DL (ref 70–110)
GLUCOSE SERPL-MCNC: 126 MG/DL (ref 74–99)
HCT VFR BLD AUTO: 31 % (ref 35–45)
HGB BLD-MCNC: 9.8 G/DL (ref 12–16)
IMM GRANULOCYTES # BLD AUTO: 0.6 K/UL (ref 0–0.04)
IMM GRANULOCYTES NFR BLD AUTO: 3 % (ref 0–0.5)
LYMPHOCYTES # BLD: 2.6 K/UL (ref 0.9–3.6)
LYMPHOCYTES NFR BLD: 14 % (ref 21–52)
MCH RBC QN AUTO: 27.9 PG (ref 24–34)
MCHC RBC AUTO-ENTMCNC: 31.6 G/DL (ref 31–37)
MCV RBC AUTO: 88.3 FL (ref 78–100)
MONOCYTES # BLD: 1.1 K/UL (ref 0.05–1.2)
MONOCYTES NFR BLD: 6 % (ref 3–10)
NEUTS SEG # BLD: 14.1 K/UL (ref 1.8–8)
NEUTS SEG NFR BLD: 76 % (ref 40–73)
NRBC # BLD: 0 K/UL (ref 0–0.01)
NRBC BLD-RTO: 0 PER 100 WBC
PLATELET # BLD AUTO: 565 K/UL (ref 135–420)
PMV BLD AUTO: 9.4 FL (ref 9.2–11.8)
POTASSIUM SERPL-SCNC: 3.6 MMOL/L (ref 3.5–5.5)
PROT SERPL-MCNC: 7.1 G/DL (ref 6.4–8.2)
RBC # BLD AUTO: 3.51 M/UL (ref 4.2–5.3)
SODIUM SERPL-SCNC: 137 MMOL/L (ref 136–145)
WBC # BLD AUTO: 18.6 K/UL (ref 4.6–13.2)

## 2023-02-17 PROCEDURE — 0JDM0ZZ EXTRACTION OF LEFT UPPER LEG SUBCUTANEOUS TISSUE AND FASCIA, OPEN APPROACH: ICD-10-PCS | Performed by: SURGERY

## 2023-02-17 PROCEDURE — 2580000003 HC RX 258: Performed by: SURGERY

## 2023-02-17 PROCEDURE — 82962 GLUCOSE BLOOD TEST: CPT

## 2023-02-17 PROCEDURE — 94761 N-INVAS EAR/PLS OXIMETRY MLT: CPT

## 2023-02-17 PROCEDURE — 36410 VNPNXR 3YR/> PHY/QHP DX/THER: CPT

## 2023-02-17 PROCEDURE — 3600000012 HC SURGERY LEVEL 2 ADDTL 15MIN: Performed by: SURGERY

## 2023-02-17 PROCEDURE — 87186 SC STD MICRODIL/AGAR DIL: CPT

## 2023-02-17 PROCEDURE — 93005 ELECTROCARDIOGRAM TRACING: CPT | Performed by: PHYSICIAN ASSISTANT

## 2023-02-17 PROCEDURE — 1100000003 HC PRIVATE W/ TELEMETRY

## 2023-02-17 PROCEDURE — 6360000002 HC RX W HCPCS: Performed by: NURSE ANESTHETIST, CERTIFIED REGISTERED

## 2023-02-17 PROCEDURE — 2580000003 HC RX 258: Performed by: NURSE ANESTHETIST, CERTIFIED REGISTERED

## 2023-02-17 PROCEDURE — 87077 CULTURE AEROBIC IDENTIFY: CPT

## 2023-02-17 PROCEDURE — 7100000001 HC PACU RECOVERY - ADDTL 15 MIN: Performed by: SURGERY

## 2023-02-17 PROCEDURE — 71045 X-RAY EXAM CHEST 1 VIEW: CPT

## 2023-02-17 PROCEDURE — 3700000001 HC ADD 15 MINUTES (ANESTHESIA): Performed by: SURGERY

## 2023-02-17 PROCEDURE — 7100000000 HC PACU RECOVERY - FIRST 15 MIN: Performed by: SURGERY

## 2023-02-17 PROCEDURE — 6360000002 HC RX W HCPCS: Performed by: SURGERY

## 2023-02-17 PROCEDURE — 87075 CULTR BACTERIA EXCEPT BLOOD: CPT

## 2023-02-17 PROCEDURE — 3700000000 HC ANESTHESIA ATTENDED CARE: Performed by: SURGERY

## 2023-02-17 PROCEDURE — 2500000003 HC RX 250 WO HCPCS: Performed by: NURSE ANESTHETIST, CERTIFIED REGISTERED

## 2023-02-17 PROCEDURE — 2709999900 HC NON-CHARGEABLE SUPPLY: Performed by: SURGERY

## 2023-02-17 PROCEDURE — 3600000002 HC SURGERY LEVEL 2 BASE: Performed by: SURGERY

## 2023-02-17 PROCEDURE — 2720000010 HC SURG SUPPLY STERILE: Performed by: SURGERY

## 2023-02-17 PROCEDURE — 6370000000 HC RX 637 (ALT 250 FOR IP): Performed by: NURSE ANESTHETIST, CERTIFIED REGISTERED

## 2023-02-17 PROCEDURE — 87205 SMEAR GRAM STAIN: CPT

## 2023-02-17 PROCEDURE — 2W17X6Z COMPRESSION OF LEFT INGUINAL REGION USING PRESSURE DRESSING: ICD-10-PCS | Performed by: SURGERY

## 2023-02-17 PROCEDURE — 80053 COMPREHEN METABOLIC PANEL: CPT

## 2023-02-17 PROCEDURE — 71046 X-RAY EXAM CHEST 2 VIEWS: CPT

## 2023-02-17 PROCEDURE — 85025 COMPLETE CBC W/AUTO DIFF WBC: CPT

## 2023-02-17 PROCEDURE — 87040 BLOOD CULTURE FOR BACTERIA: CPT

## 2023-02-17 PROCEDURE — 1100000000 HC RM PRIVATE

## 2023-02-17 PROCEDURE — 6370000000 HC RX 637 (ALT 250 FOR IP): Performed by: SURGERY

## 2023-02-17 PROCEDURE — 99285 EMERGENCY DEPT VISIT HI MDM: CPT

## 2023-02-17 RX ORDER — DIPHENHYDRAMINE HYDROCHLORIDE 50 MG/ML
12.5 INJECTION INTRAMUSCULAR; INTRAVENOUS
Status: DISCONTINUED | OUTPATIENT
Start: 2023-02-17 | End: 2023-02-17 | Stop reason: HOSPADM

## 2023-02-17 RX ORDER — HYDROMORPHONE HYDROCHLORIDE 1 MG/ML
1 INJECTION, SOLUTION INTRAMUSCULAR; INTRAVENOUS; SUBCUTANEOUS
Status: DISCONTINUED | OUTPATIENT
Start: 2023-02-17 | End: 2023-02-27

## 2023-02-17 RX ORDER — SCOLOPAMINE TRANSDERMAL SYSTEM 1 MG/1
PATCH, EXTENDED RELEASE TRANSDERMAL PRN
Status: DISCONTINUED | OUTPATIENT
Start: 2023-02-17 | End: 2023-02-17 | Stop reason: SDUPTHER

## 2023-02-17 RX ORDER — ALBUTEROL SULFATE 90 UG/1
2 AEROSOL, METERED RESPIRATORY (INHALATION) EVERY 6 HOURS PRN
COMMUNITY

## 2023-02-17 RX ORDER — LOPERAMIDE HYDROCHLORIDE 2 MG/1
2 CAPSULE ORAL PRN
Status: ON HOLD | COMMUNITY
End: 2023-03-01 | Stop reason: HOSPADM

## 2023-02-17 RX ORDER — OXYCODONE HYDROCHLORIDE 5 MG/1
5 TABLET ORAL EVERY 4 HOURS PRN
Status: DISCONTINUED | OUTPATIENT
Start: 2023-02-17 | End: 2023-03-02 | Stop reason: HOSPADM

## 2023-02-17 RX ORDER — SODIUM CHLORIDE, SODIUM LACTATE, POTASSIUM CHLORIDE, CALCIUM CHLORIDE 600; 310; 30; 20 MG/100ML; MG/100ML; MG/100ML; MG/100ML
INJECTION, SOLUTION INTRAVENOUS CONTINUOUS PRN
Status: DISCONTINUED | OUTPATIENT
Start: 2023-02-17 | End: 2023-02-17 | Stop reason: SDUPTHER

## 2023-02-17 RX ORDER — FLUTICASONE PROPIONATE 50 MCG
2 SPRAY, SUSPENSION (ML) NASAL DAILY
COMMUNITY

## 2023-02-17 RX ORDER — ASPIRIN 81 MG/1
81 TABLET ORAL DAILY
COMMUNITY

## 2023-02-17 RX ORDER — CYANOCOBALAMIN 1000 UG/ML
1000 INJECTION, SOLUTION INTRAMUSCULAR; SUBCUTANEOUS
COMMUNITY

## 2023-02-17 RX ORDER — INFLIXIMAB 100 MG/10ML
5 INJECTION, POWDER, LYOPHILIZED, FOR SOLUTION INTRAVENOUS
COMMUNITY

## 2023-02-17 RX ORDER — HYDRALAZINE HYDROCHLORIDE 20 MG/ML
10 INJECTION INTRAMUSCULAR; INTRAVENOUS EVERY 4 HOURS PRN
Status: DISCONTINUED | OUTPATIENT
Start: 2023-02-17 | End: 2023-03-02 | Stop reason: HOSPADM

## 2023-02-17 RX ORDER — MONTELUKAST SODIUM 10 MG/1
10 TABLET ORAL DAILY
COMMUNITY

## 2023-02-17 RX ORDER — PHENOBARBITAL, HYOSCYAMINE SULFATE, ATROPINE SULFATE, SCOPOLAMINE HYDROBROMIDE 16.2; .1037; .0194; .0065 MG/1; MG/1; MG/1; MG/1
1 TABLET ORAL EVERY 8 HOURS PRN
COMMUNITY

## 2023-02-17 RX ORDER — SODIUM CHLORIDE 9 MG/ML
INJECTION, SOLUTION INTRAVENOUS PRN
Status: DISCONTINUED | OUTPATIENT
Start: 2023-02-17 | End: 2023-02-17 | Stop reason: HOSPADM

## 2023-02-17 RX ORDER — DEXAMETHASONE SODIUM PHOSPHATE 4 MG/ML
INJECTION, SOLUTION INTRA-ARTICULAR; INTRALESIONAL; INTRAMUSCULAR; INTRAVENOUS; SOFT TISSUE PRN
Status: DISCONTINUED | OUTPATIENT
Start: 2023-02-17 | End: 2023-02-17 | Stop reason: SDUPTHER

## 2023-02-17 RX ORDER — FENTANYL CITRATE 50 UG/ML
INJECTION, SOLUTION INTRAMUSCULAR; INTRAVENOUS PRN
Status: DISCONTINUED | OUTPATIENT
Start: 2023-02-17 | End: 2023-02-17 | Stop reason: SDUPTHER

## 2023-02-17 RX ORDER — PREGABALIN 100 MG/1
200 CAPSULE ORAL DAILY
COMMUNITY

## 2023-02-17 RX ORDER — SODIUM CHLORIDE 9 MG/ML
INJECTION, SOLUTION INTRAVENOUS PRN
Status: DISCONTINUED | OUTPATIENT
Start: 2023-02-17 | End: 2023-03-02 | Stop reason: HOSPADM

## 2023-02-17 RX ORDER — DEXTROSE AND SODIUM CHLORIDE 5; .45 G/100ML; G/100ML
INJECTION, SOLUTION INTRAVENOUS CONTINUOUS
Status: DISCONTINUED | OUTPATIENT
Start: 2023-02-17 | End: 2023-03-02 | Stop reason: HOSPADM

## 2023-02-17 RX ORDER — ONDANSETRON 2 MG/ML
INJECTION INTRAMUSCULAR; INTRAVENOUS PRN
Status: DISCONTINUED | OUTPATIENT
Start: 2023-02-17 | End: 2023-02-17 | Stop reason: SDUPTHER

## 2023-02-17 RX ORDER — LIDOCAINE HYDROCHLORIDE 20 MG/ML
INJECTION, SOLUTION EPIDURAL; INFILTRATION; INTRACAUDAL; PERINEURAL PRN
Status: DISCONTINUED | OUTPATIENT
Start: 2023-02-17 | End: 2023-02-17 | Stop reason: SDUPTHER

## 2023-02-17 RX ORDER — SODIUM CHLORIDE 0.9 % (FLUSH) 0.9 %
5-40 SYRINGE (ML) INJECTION EVERY 12 HOURS SCHEDULED
Status: DISCONTINUED | OUTPATIENT
Start: 2023-02-17 | End: 2023-03-02 | Stop reason: HOSPADM

## 2023-02-17 RX ORDER — SODIUM CHLORIDE 0.9 % (FLUSH) 0.9 %
5-40 SYRINGE (ML) INJECTION EVERY 12 HOURS SCHEDULED
Status: DISCONTINUED | OUTPATIENT
Start: 2023-02-17 | End: 2023-02-17 | Stop reason: HOSPADM

## 2023-02-17 RX ORDER — VANCOMYCIN HYDROCHLORIDE 1 G/20ML
INJECTION, POWDER, LYOPHILIZED, FOR SOLUTION INTRAVENOUS PRN
Status: DISCONTINUED | OUTPATIENT
Start: 2023-02-17 | End: 2023-02-17 | Stop reason: ALTCHOICE

## 2023-02-17 RX ORDER — SODIUM CHLORIDE 0.9 % (FLUSH) 0.9 %
5-40 SYRINGE (ML) INJECTION PRN
Status: DISCONTINUED | OUTPATIENT
Start: 2023-02-17 | End: 2023-02-17 | Stop reason: HOSPADM

## 2023-02-17 RX ORDER — DICYCLOMINE HYDROCHLORIDE 10 MG/1
10 CAPSULE ORAL DAILY PRN
COMMUNITY

## 2023-02-17 RX ORDER — FENTANYL CITRATE 50 UG/ML
25 INJECTION, SOLUTION INTRAMUSCULAR; INTRAVENOUS EVERY 5 MIN PRN
Status: DISCONTINUED | OUTPATIENT
Start: 2023-02-17 | End: 2023-02-17 | Stop reason: HOSPADM

## 2023-02-17 RX ORDER — ONDANSETRON 2 MG/ML
4 INJECTION INTRAMUSCULAR; INTRAVENOUS
Status: DISCONTINUED | OUTPATIENT
Start: 2023-02-17 | End: 2023-02-17 | Stop reason: HOSPADM

## 2023-02-17 RX ORDER — ASPIRIN 81 MG/1
81 TABLET ORAL DAILY
Status: DISCONTINUED | OUTPATIENT
Start: 2023-02-18 | End: 2023-03-02 | Stop reason: HOSPADM

## 2023-02-17 RX ORDER — PROCHLORPERAZINE EDISYLATE 5 MG/ML
5 INJECTION INTRAMUSCULAR; INTRAVENOUS
Status: DISCONTINUED | OUTPATIENT
Start: 2023-02-17 | End: 2023-02-17 | Stop reason: HOSPADM

## 2023-02-17 RX ORDER — PREGABALIN 100 MG/1
100 CAPSULE ORAL DAILY
COMMUNITY

## 2023-02-17 RX ORDER — BIMATOPROST 0.3 MG/ML
1 SOLUTION/ DROPS OPHTHALMIC EVERY EVENING
COMMUNITY

## 2023-02-17 RX ORDER — ONDANSETRON 2 MG/ML
4 INJECTION INTRAMUSCULAR; INTRAVENOUS EVERY 6 HOURS PRN
Status: DISCONTINUED | OUTPATIENT
Start: 2023-02-17 | End: 2023-03-02 | Stop reason: HOSPADM

## 2023-02-17 RX ORDER — SODIUM CHLORIDE, SODIUM LACTATE, POTASSIUM CHLORIDE, CALCIUM CHLORIDE 600; 310; 30; 20 MG/100ML; MG/100ML; MG/100ML; MG/100ML
INJECTION, SOLUTION INTRAVENOUS CONTINUOUS
Status: DISCONTINUED | OUTPATIENT
Start: 2023-02-17 | End: 2023-02-18

## 2023-02-17 RX ORDER — ACETAMINOPHEN 325 MG/1
650 TABLET ORAL
Status: DISCONTINUED | OUTPATIENT
Start: 2023-02-17 | End: 2023-02-17 | Stop reason: HOSPADM

## 2023-02-17 RX ORDER — ONDANSETRON 4 MG/1
4 TABLET, ORALLY DISINTEGRATING ORAL EVERY 8 HOURS PRN
Status: DISCONTINUED | OUTPATIENT
Start: 2023-02-17 | End: 2023-03-02 | Stop reason: HOSPADM

## 2023-02-17 RX ORDER — SODIUM CHLORIDE 0.9 % (FLUSH) 0.9 %
5-40 SYRINGE (ML) INJECTION PRN
Status: DISCONTINUED | OUTPATIENT
Start: 2023-02-17 | End: 2023-03-02 | Stop reason: HOSPADM

## 2023-02-17 RX ORDER — PROPOFOL 10 MG/ML
INJECTION, EMULSION INTRAVENOUS PRN
Status: DISCONTINUED | OUTPATIENT
Start: 2023-02-17 | End: 2023-02-17 | Stop reason: SDUPTHER

## 2023-02-17 RX ADMIN — LIDOCAINE HYDROCHLORIDE 50 MG: 20 INJECTION, SOLUTION EPIDURAL; INFILTRATION; INTRACAUDAL; PERINEURAL at 17:55

## 2023-02-17 RX ADMIN — SODIUM CHLORIDE, PRESERVATIVE FREE 10 ML: 5 INJECTION INTRAVENOUS at 21:24

## 2023-02-17 RX ADMIN — ONDANSETRON 4 MG: 2 INJECTION INTRAMUSCULAR; INTRAVENOUS at 18:32

## 2023-02-17 RX ADMIN — PROPOFOL 90 MG: 10 INJECTION, EMULSION INTRAVENOUS at 17:55

## 2023-02-17 RX ADMIN — FENTANYL CITRATE 25 MCG: 50 INJECTION, SOLUTION INTRAMUSCULAR; INTRAVENOUS at 18:06

## 2023-02-17 RX ADMIN — FENTANYL CITRATE 25 MCG: 50 INJECTION, SOLUTION INTRAMUSCULAR; INTRAVENOUS at 19:08

## 2023-02-17 RX ADMIN — FENTANYL CITRATE 25 MCG: 50 INJECTION, SOLUTION INTRAMUSCULAR; INTRAVENOUS at 19:24

## 2023-02-17 RX ADMIN — VANCOMYCIN HYDROCHLORIDE 1000 MG: 1 INJECTION, POWDER, LYOPHILIZED, FOR SOLUTION INTRAVENOUS at 18:08

## 2023-02-17 RX ADMIN — DEXAMETHASONE SODIUM PHOSPHATE 4 MG: 4 INJECTION, SOLUTION INTRAMUSCULAR; INTRAVENOUS at 18:32

## 2023-02-17 RX ADMIN — FENTANYL CITRATE 25 MCG: 50 INJECTION, SOLUTION INTRAMUSCULAR; INTRAVENOUS at 18:27

## 2023-02-17 RX ADMIN — FENTANYL CITRATE 25 MCG: 50 INJECTION, SOLUTION INTRAMUSCULAR; INTRAVENOUS at 18:12

## 2023-02-17 RX ADMIN — SCOLOPAMINE TRANSDERMAL SYSTEM 1 PATCH: 1 PATCH, EXTENDED RELEASE TRANSDERMAL at 18:15

## 2023-02-17 RX ADMIN — DEXTROSE MONOHYDRATE AND SODIUM CHLORIDE: 5; .45 INJECTION, SOLUTION INTRAVENOUS at 21:18

## 2023-02-17 RX ADMIN — SODIUM CHLORIDE, SODIUM LACTATE, POTASSIUM CHLORIDE, AND CALCIUM CHLORIDE: 600; 310; 30; 20 INJECTION, SOLUTION INTRAVENOUS at 17:51

## 2023-02-17 RX ADMIN — OXYCODONE HYDROCHLORIDE 5 MG: 5 TABLET ORAL at 21:45

## 2023-02-17 ASSESSMENT — PAIN DESCRIPTION - LOCATION
LOCATION: GROIN
LOCATION: INCISION;GROIN
LOCATION: GROIN;INCISION
LOCATION: GROIN;INCISION
LOCATION: INCISION;GROIN
LOCATION: GROIN

## 2023-02-17 ASSESSMENT — PAIN DESCRIPTION - ORIENTATION
ORIENTATION: RIGHT

## 2023-02-17 ASSESSMENT — PAIN SCALES - GENERAL
PAINLEVEL_OUTOF10: 6
PAINLEVEL_OUTOF10: 4
PAINLEVEL_OUTOF10: 4
PAINLEVEL_OUTOF10: 6
PAINLEVEL_OUTOF10: 6
PAINLEVEL_OUTOF10: 5

## 2023-02-17 ASSESSMENT — PAIN DESCRIPTION - DESCRIPTORS
DESCRIPTORS: BURNING

## 2023-02-17 NOTE — H&P
Surgery History and Physical    Subjective:      George Fabian is a 74 y.o.  female who presents with dehiscence of left femoral incision. Patient had surgery in early January and has been doing well at home. She said yesterday she had her Remicade infusion. 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 She has history of wound dehiscence and infections chronic in nature at other surgical sites as well. She said she took her temperature this morning when it opened up and had no fever she is got no fever recorded here in the ER. She has no pain no complaints just that the wound opened up and drained clearish fluid. .    Patient Active Problem List    Diagnosis Date Noted    Femoral artery occlusion, left ([address] 01/19/2023    Postmenopausal osteoporosis 04/22/2022    Vascular graft infection ([address] 12/09/2020    UTI (urinary tract infection) 12/03/2020    Cellulitis of abdominal wall 12/03/2020    Open wound 09/10/2020    Chronic wound infection of abdomen 06/21/2018    Infection of vascular bypass graft ([address] 04/30/2018    Nonhealing surgical wound 04/30/2018    CAP (community acquired pneumonia) 06/27/2017    Severe sepsis ([address] 06/27/2017    Abscess 05/10/2017    Arteriovenous graft infection ([address] 05/10/2017    Dermal sinus tract of lumbosacral region ([address] 05/10/2017    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[address] 08/09/2016    Chronic aorto-iliac occlusion syndrome ([address] 01/19/2016    Wound disruption, post-op, skin 01/09/2015    Mass of breast, left 07/30/2014    HTN (hypertension) 04/01/2013    PVD (peripheral vascular disease) ([address] 04/01/2013    Crohn's disease ([address] 04/01/2013     Past Medical History:   Diagnosis Date    Arthritis     CAP (community acquired pneumonia) 06/27/2017    Chronic lung disease     Claudication ([address]     left leg    Crohn's disease ([address]     Crohn's disease (HCC)     GERD (gastroesophageal reflux disease)     HTN (hypertension)     Ill-defined condition     Uses O2 at night.    Nausea & vomiting     Neuropathy     Other and unspecified symptoms and signs involving general sensations and perceptions     PVD    Other ill-defined conditions(799.89)     Crohn's    Parathyroid tumor     sees Endo Dr Santillan    Peripheral neuropathy     Pulmonary emphysema (HCC)     PVD (peripheral vascular disease) (HCC)     right leg    Sepsis (HCC) 06/27/2017    Vitamin B12 deficiency       Past Surgical History:   Procedure Laterality Date    APPENDECTOMY      BREAST LUMPECTOMY Left     breast left- precancerous    BUNIONECTOMY      left eye    BYPASS GRAFT OTHR,AXILL-FEM Right 04/19/2013    BYPASS GRAFT OTHR,FEM-POP Left 05/2013    CATARACT REMOVAL      bilateral    CHOLECYSTECTOMY      COLONOSCOPY N/A 09/11/2019    DIAGNOSTIC COLONOSCOPY performed by Nba Schroeder MD at [facility] ENDOSCOPY    FOOT/TOES SURGERY PROC UNLISTED      ORTHOPEDIC SURGERY      lateral epicondilitis on right    OTHER SURGICAL HISTORY  01/05/2023    left leg thrombectomy with stent    OTHER SURGICAL HISTORY  09/2013    I & D Right chest/flank wall abscess vs granuloma    OTHER SURGICAL HISTORY      intestional resection x4    OTHER SURGICAL HISTORY  03/07/2013    catheter into aorta    UPPER ARM/ELBOW SURGERY UNLISTED      VASCULAR SURGERY  09/10/2020    Debridement and washout of bypass graft.      Social History     Tobacco Use    Smoking status: Every Day     Packs/day: 1.00     Types: Cigarettes    Smokeless tobacco: Never   Substance Use Topics    Alcohol use: No      Family History   Problem Relation Age of Onset    COPD Father     Coronary Art Dis Mother     Heart Attack Mother     Elevated Lipids Mother     COPD Brother     Heart Attack Brother     Heart Surgery Mother         CABGx3    Other Brother         PAD      Prior to Admission medications    Not on File     Not on File      Review of Systems:    A comprehensive review of systems was negative except for that written in the History  of Present Illness. Objective:     Patient Vitals for the past 8 hrs:   BP Temp Temp src Pulse Resp SpO2   23 1249 (!) 111/55 97.7 °F (36.5 °C) Oral 92 16 98 %       Temp (24hrs), Av.7 °F (36.5 °C), Min:97.7 °F (36.5 °C), Max:97.7 °F (36.5 °C)      Physical Exam:  Affect is pleasant, appears well no distress  Head is normocephalic  Neck no JVD  Chest is clear  Cardiac is regular  Abdomen soft flat nontender  Left groin incision is dehisced with some cellular looking fluid. It is not red and not painful and not bleeding. Left calf incision has no swelling redness pain or mass  Bypass is patent with vascularized foot    Labs: No results found for this or any previous visit (from the past 24 hour(s)). Data Review: Labs pending    Assessment:     Active Problems:    Open wound  Resolved Problems:    * No resolved hospital problems. *      Plan:      We will washout the wound today and likely do partial closure  She has not had anything to eat today  Asked the ER to draw lab work and blood culture  Discussed with ID, will prescribe Augmentin and doxycycline    Signed By: Samia Hernandez MD     2023

## 2023-02-17 NOTE — ED NOTES
Assumed care of pt in rm 6. Pt here for post op bleeding. Pt is A Ox 4, up ambulating to restroom. 22 ga PIV started to R hand. A full rainbow, one set of blood cultures and EKG obtained and sent. OR at bedside to transport pt OR.       Ronnell Adkins RN  02/17/23 4107

## 2023-02-17 NOTE — PERIOP NOTE
TRANSFER - IN REPORT:    Verbal report received from Chey Zhou RN  on [employer]  being received from ED for ordered procedure      Report consisted of patient's Situation, Background, Assessment and   Recommendations(SBAR). Information from the following report(s) Nurse Handoff Report, ED Encounter Summary, Intake/Output, MAR, and Recent Results was reviewed with the receiving nurse. Opportunity for questions and clarification was provided.

## 2023-02-17 NOTE — OP NOTE
Operative Note      Patient: Anyi Murphy  YOB: 1950  MRN: [id number]    Date of Procedure: 2/17/2023    Pre-Op Diagnosis: Infection [B99.9]    Post-Op Diagnosis:  Dehiscence of left groin wound       Procedure(s):  WASHOUT LEFT GROIN/WOUND VAC PLACEMENT    Surgeon(s):  Gricel Lamb MD    Assistant:   Surgical Assistant: Hernan Rock    Anesthesia: General    Estimated Blood Loss (mL): Minimal    Complications: None    Specimens:   ID Type Source Tests Collected by Time Destination   1 : LEFT GROIN WOUND CULTURE Swab Groin CULTURE, ANAEROBIC, CULTURE, WOUND Gricel Lamb MD 2/17/2023 1845        Implants:  * No implants in log *      Drains: * No LDAs found *    Findings: No purulence encountered, some white fibrous material seen all debrided with DeBakey and 4 x 4's. Graft is exposed coated in vancomycin powder and culture sent    Detailed Description of Procedure:   Brought to the OR after finding a dehisced wound from this morning  She had appropriate prepped draped in usual standard fashion as she had general anesthesia. The wound was open for about half the distance. There was a coating of white fibrous material but no necrotic material.  No purulence no foul odor. Debrided all loose material there is a viable and healthy base unfortunately the graft was clearly exposed. I did a 3 L washout with pulse lavage and then coated the site with vancomycin powder. The plate closed the fascia over the graft with interrupted 2-0 Monocryl sutures and applied a silver coated wound VAC in the subcu space. Cultures were sent.   We will follow this closely and start antibiotics postop as well    Electronically signed by Gricel Lamb MD on 2/17/2023 at 6:55 PM

## 2023-02-17 NOTE — ANESTHESIA PRE PROCEDURE
Department of Anesthesiology  Preprocedure Note       Name:  Caleb Childers   Age:  74 y.o.  :  1950                                          MRN:  [id number]         Date:  2023      Surgeon: Gideon Good):  Ad Garcia MD    Procedure: Procedure(s):  WASHOUT LEFT GROIN/PULSEVAC    Medications prior to admission:   Prior to Admission medications    Not on File       Current medications:    No current facility-administered medications for this encounter. No current outpatient medications on file. Allergies:  Not on File    Problem List:    Patient Active Problem List   Diagnosis Code    UTI (urinary tract infection) N39.0    Chronic wound infection of abdomen S31.109A, L08.9    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[facility]) I70.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 7XXA    HTN (hypertension) I10    Abscess L02.91    Arteriovenous graft infection ([address] T82. 7XXA    CAP (community acquired pneumonia) J18.9    Dermal sinus tract of lumbosacral region ([address] Q06.8    Nonhealing surgical wound T81.89XA    Vascular graft infection ([address] T82. 7XXA    Chronic aorto-iliac occlusion syndrome ([facility]) I74.09    PVD (peripheral vascular disease) ([facility]) I73.9    Crohn's disease ([address] K50.90    Open wound T14. 8XXA    Wound disruption, post-op, skin T81.31XA    Severe sepsis ([facility]) A41.9, R65.20    Mass of breast, left N63.20    Cellulitis of abdominal wall L03.311    Postmenopausal osteoporosis M81.0    Femoral artery occlusion, left ([facility]) I70.202       Past Medical History:        Diagnosis Date    Arthritis     CAP (community acquired pneumonia) 2017    Chronic lung disease     Claudication ([address]     left leg    Crohn's disease ([address]     Crohn's disease ([address]     GERD (gastroesophageal reflux disease)     HTN (hypertension)     Ill-defined condition     Uses O2 at night.     Nausea & vomiting     Neuropathy     Other and unspecified symptoms and signs involving general sensations and perceptions     PVD    Other ill-defined conditions(799.89)     Crohn's    Parathyroid tumor     sees Endo Dr Shayan Archuleta Peripheral neuropathy     [facility]     PVD (peripheral vascular disease) ([facility]     right leg    Sepsis ([facility] 06/27/2017    Vitamin B12 deficiency        Past Surgical History:        Procedure Laterality Date    APPENDECTOMY      BREAST LUMPECTOMY Left     breast left- precancerous    BUNIONECTOMY      left eye    BYPASS GRAFT OTHR,AXILL-FEM Right 04/19/2013    BYPASS GRAFT OTHR,FEM-POP Left 05/2013    CATARACT REMOVAL      bilateral    CHOLECYSTECTOMY      COLONOSCOPY N/A 09/11/2019    DIAGNOSTIC COLONOSCOPY performed by Maurice Park MD at [address] [place] [address] FOOT/TOES SURGERY [facility]      lateral epicondilitis on right    OTHER SURGICAL HISTORY  01/05/2023    left leg thrombectomy with stent    OTHER SURGICAL HISTORY  09/2013    I & D Right chest/flank wall abscess vs granuloma    OTHER SURGICAL HISTORY      intestional resection x4    OTHER SURGICAL HISTORY  03/07/2013    catheter into aorta    UPPER ARM/ELBOW SURGERY UNLISTED      VASCULAR SURGERY  09/10/2020    Debridement and washout of bypass graft. Social History:    Social History     Tobacco Use    Smoking status: Every Day     Packs/day: 1.00     Types: Cigarettes    Smokeless tobacco: Never   Substance Use Topics    Alcohol use:  No                                Ready to quit: Not Answered  Counseling given: Not Answered      Vital Signs (Current):   Vitals:    02/17/23 1249   BP: (!) 111/55   Pulse: 92   Resp: 16   Temp: 97.7 °F (36.5 °C)   TempSrc: Oral   SpO2: 98%                                              BP Readings from Last 3 Encounters:   02/17/23 (!) 111/55   10/28/22 (!) 162/82   08/26/21 (!) 148/63       NPO Status:                                                                                 BMI:   Wt Readings from Last 3 Encounters:   08/26/21 138 lb (62.6 kg)     There is no height or weight on file to calculate BMI.    CBC:   Lab Results   Component Value Date/Time    WBC 10.1 01/29/2023 02:01 AM    RBC 2.49 01/29/2023 02:01 AM    HGB 7.3 01/29/2023 02:01 AM    HCT 23.1 01/29/2023 02:01 AM    MCV 92.8 01/29/2023 02:01 AM    RDW 15.7 01/29/2023 02:01 AM     01/29/2023 02:01 AM       CMP:   Lab Results   Component Value Date/Time     01/26/2023 05:40 PM    K 4.3 01/26/2023 05:40 PM     01/26/2023 05:40 PM    CO2 25 01/26/2023 05:40 PM    BUN 13 01/26/2023 05:40 PM    CREATININE 0.82 01/26/2023 05:40 PM    GFRAA >60 04/27/2022 11:16 AM    AGRATIO 0.9 10/26/2022 11:20 AM    LABGLOM >60 10/15/2019 10:04 AM    GLUCOSE 147 01/26/2023 05:40 PM    PROT 8.3 10/26/2022 11:20 AM    CALCIUM 9.1 01/26/2023 05:40 PM    BILITOT 0.4 10/26/2022 11:20 AM    ALKPHOS 77 10/26/2022 11:20 AM    AST 19 10/26/2022 11:20 AM    ALT 31 10/26/2022 11:20 AM       POC Tests: No results for input(s): POCGLU, POCNA, POCK, POCCL, POCBUN, POCHEMO, POCHCT in the last 72 hours.     Coags:   Lab Results   Component Value Date/Time    PROTIME 15.9 12/03/2020 02:20 PM    INR 1.3 12/03/2020 02:20 PM    APTT 93.9 01/29/2023 02:01 AM       HCG (If Applicable): No results found for: PREGTESTUR, PREGSERUM, HCG, HCGQUANT     ABGs: No results found for: PHART, PO2ART, XTH5NBW, KHZ0FDG, BEART, U0XEVKBQ     Type & Screen (If Applicable):  No results found for: LABABO, LABRH    Drug/Infectious Status (If Applicable):  No results found for: HIV, HEPCAB    COVID-19 Screening (If Applicable):   Lab Results   Component Value Date/Time    COVID19 Not Detected 08/23/2021 01:42 PM           Anesthesia Evaluation  Patient summary reviewed and Nursing notes reviewed  Airway: Mallampati: II  TM distance: >3 FB   Neck ROM: full  Mouth opening: > = 3 FB   Dental: normal exam         Pulmonary:normal exam    (+) pneumonia:  COPD: Cardiovascular:  Exercise tolerance: poor (<4 METS),   (+) hypertension:,         Rhythm: regular  Rate: normal                    Neuro/Psych:   (+) neuromuscular disease:,             GI/Hepatic/Renal:   (+) GERD:,           Endo/Other:                     Abdominal:             Vascular: Other Findings:           Anesthesia Plan      general     ASA 3 - emergent       Induction: rapid sequence. Anesthetic plan and risks discussed with patient. Plan discussed with CRNA.                     Yaritza Casas MD   2/17/2023

## 2023-02-17 NOTE — ED PROVIDER NOTES
EMERGENCY DEPARTMENT HISTORY AND PHYSICAL EXAM        Date: 2/17/2023  Patient Name: Avery Denis    History of Presenting Illness     Chief Complaint   Patient presents with    Post-op Problem     Fem pop procedure. History Provided By: Patient    HPI: Avery Denis, 74 y.o. female PMHx significant for HTN, PVD with claudication, neuropathy presents to the ED with cc of incision dehiscence. Patient underwent thrombectomy with revision of left leg bypass graft/patch angioplasty/balloon angioplasty with Dr. Anika Suresh on 1/26/2023. Patient states she had been doing well postoperatively until this morning. An area of her left groin incision opened and she is now having bloody drainage. She denies increased pain at the site, no systemic complaints of fevers, chills, nausea or vomiting. She contacted Dr. Han Read office and was directed to come here to the emergency department. There are no other complaints, changes, or physical findings at this time. PCP: Birdie Lockwood MD    No current facility-administered medications on file prior to encounter. No current outpatient medications on file prior to encounter. Past History     Past Medical History:  Past Medical History:   Diagnosis Date    Arthritis     CAP (community acquired pneumonia) 06/27/2017    Chronic lung disease     Claudication ([address]     left leg    Crohn's disease ([address]     Crohn's disease ([address]     GERD (gastroesophageal reflux disease)     HTN (hypertension)     Ill-defined condition     Uses O2 at night.     Nausea & vomiting     Neuropathy     Other and unspecified symptoms and signs involving general sensations and perceptions     PVD    Other ill-defined conditions(799.89)     Crohn's    Parathyroid tumor     sees Patrick Jiménez    Peripheral neuropathy     Pulmonary emphysema ([address]     PVD (peripheral vascular disease) ([address]     right leg    Sepsis ([address] 06/27/2017    Vitamin B12 deficiency        Past Surgical History:  Past Surgical History:   Procedure Laterality Date    APPENDECTOMY      BREAST LUMPECTOMY Left     breast left- precancerous    BUNIONECTOMY      left eye    BYPASS GRAFT OTHR,AXILL-FEM Right 04/19/2013    BYPASS GRAFT OTHR,FEM-POP Left 05/2013    CATARACT REMOVAL      bilateral    CHOLECYSTECTOMY      COLONOSCOPY N/A 09/11/2019    DIAGNOSTIC COLONOSCOPY performed by Jennifer Bae MD at [facility] ENDOSCOPY    FOOT/TOES SURGERY [address]      lateral epicondilitis on right    OTHER SURGICAL HISTORY  01/05/2023    left leg thrombectomy with stent    OTHER SURGICAL HISTORY  09/2013    I & D Right chest/flank wall abscess vs granuloma    OTHER SURGICAL HISTORY      intestional resection x4    OTHER SURGICAL HISTORY  03/07/2013    catheter into aorta    UPPER ARM/ELBOW SURGERY UNLISTED      VASCULAR SURGERY  09/10/2020    Debridement and washout of bypass graft. Family History:  Family History   Problem Relation Age of Onset    COPD Father     Coronary Art Dis Mother     Heart Attack Mother     Elevated Lipids Mother     COPD Brother     Heart Attack Brother     Heart Surgery Mother         CABGx3    Other Brother         PAD       Social History:  Social History     Tobacco Use    Smoking status: Every Day     Packs/day: 1.00     Types: Cigarettes    Smokeless tobacco: Never   Substance Use Topics    Alcohol use: No    Drug use: Never       Allergies:  Not on File      Review of Systems   Review of Systems  Constitutional: no fevers or chills  Respiratory: no cough, dyspnea, pleuritic pain  Cardio: no chest pain or palpitations  GI: no abdominal pain, nausea, vomiting, constipation or diarrhea  MSK: post op leg pain, baseline  Skin: dehiscence left groin  Neuro: no headache, dizziness, weakness  Psych: no change to mental status or behavior      Physical Exam   Physical Exam  CONSTITUTIONAL:  Alert, in no apparent distress;  well developed;  well nourished.   HEENT: Head NCAT, EOMI, non-icteric sclera, normal conjunctiva, no rhinorrhea, mucous membranes moist  RESPIRATORY: Equal breath sounds in all fields. Normal work of breathing  CARDIOVASCULAR:  Regular rate and rhythm. No murmurs. DP pulses intact and equal, capillary refill intact to all left toes  GI: Abdomen soft, non-distended. Non-tender in all 4 quadrants  MSK: ROM intact to all extremities, ambulatory without assistive device. Full range of motion of left hip. NEURO: Sensation intact to light touch bilateral lower extremities  SKIN: Approximately 3 cm opening to inferior aspect of of left groin incision with whitish bloody drainage. The area is nontender without surrounding cellulitis. No ecchymosis or hematoma. PSYCH:  Alert and normal affect. Diagnostic Study Results     Labs -   No results found for this or any previous visit (from the past 12 hour(s)). Radiologic Studies -   XR CHEST (2 VW)    (Results Pending)       Medical Decision Making   I am the first provider for this patient. I reviewed the vital signs, available nursing notes, past medical history, past surgical history, family history and social history. Vital Signs-  Vitals:    02/17/23 1249   BP: (!) 111/55   Pulse: 92   Resp: 16   Temp: 97.7 °F (36.5 °C)   SpO2: 98%             Records Reviewed: Hospital records    Provider Notes (Medical Decision Making):   Patient presenting with what appears to be dehiscence of left groin access site for recent vascular procedure. She is a patient of Dr. Charlie Choi, will attempt to contact him for further direction. She is otherwise well-appearing, denies systemic complaints. There is no evidence of streaking. Have sent photos to Dr. Keshav Carrizales via [employer]. ED Course:   Initial assessment performed. The patients presenting problems have been discussed, and they are in agreement with the care plan formulated and outlined with them.   I have encouraged them to ask questions as they arise throughout their visit. ED Course as of 02/17/23 1547   Fri Feb 17, 2023   1506 Evaluated the patient at this time, 2 cm area of dehiscence of the left inguinal wound with pale red watery drainage surrounding no appreciable tenderness no further drainage. No surrounding erythema no crepitance. [CB]      ED Course User Index  [CB] Flakito Lyon MD       3:47 PM  Dr. Mccord Nicks down to see patient. He has requested we order basic labs, EKG and chest x-ray. He is also requested blood cultures. He plans to take her to the OR for washout at this time. Disposition:  admitted    PLAN:  1. Medication List      You have not been prescribed any medications. 2.   Return to ED if worse     Diagnosis     Clinical Impression:   1. Dehiscence of operative wound, initial encounter        Attestations:    Jada Romero PA-C    Please note that this dictation was completed with [employer], the computer voice recognition software. Quite often unanticipated grammatical, syntax, homophones, and other interpretive errors are inadvertently transcribed by the computer software. Please disregard these errors. Please excuse any errors that have escaped final proofreading. Thank you.        Jada Romero PA-C  02/17/23 [id number] Juliette Sharma PA-C  02/17/23 1971

## 2023-02-18 LAB
ANION GAP SERPL CALC-SCNC: 6 MMOL/L (ref 3–18)
BASOPHILS # BLD: 0 K/UL (ref 0–0.1)
BASOPHILS NFR BLD: 0 % (ref 0–2)
BUN SERPL-MCNC: 13 MG/DL (ref 7–18)
BUN/CREAT SERPL: 17 (ref 12–20)
CALCIUM SERPL-MCNC: 9.7 MG/DL (ref 8.5–10.1)
CHLORIDE SERPL-SCNC: 103 MMOL/L (ref 100–111)
CO2 SERPL-SCNC: 28 MMOL/L (ref 21–32)
CREAT SERPL-MCNC: 0.75 MG/DL (ref 0.6–1.3)
DIFFERENTIAL METHOD BLD: ABNORMAL
EKG ATRIAL RATE: 80 BPM
EKG DIAGNOSIS: NORMAL
EKG Q-T INTERVAL: 376 MS
EKG QRS DURATION: 98 MS
EKG QTC CALCULATION (BAZETT): 433 MS
EKG R AXIS: 58 DEGREES
EKG T AXIS: 29 DEGREES
EKG VENTRICULAR RATE: 80 BPM
EOSINOPHIL # BLD: 0.1 K/UL (ref 0–0.4)
EOSINOPHIL NFR BLD: 1 % (ref 0–5)
ERYTHROCYTE [DISTWIDTH] IN BLOOD BY AUTOMATED COUNT: 15.4 % (ref 11.6–14.5)
GLUCOSE SERPL-MCNC: 130 MG/DL (ref 74–99)
HCT VFR BLD AUTO: 27.7 % (ref 35–45)
HGB BLD-MCNC: 8.8 G/DL (ref 12–16)
IMM GRANULOCYTES # BLD AUTO: 0.3 K/UL (ref 0–0.04)
IMM GRANULOCYTES NFR BLD AUTO: 3 % (ref 0–0.5)
LYMPHOCYTES # BLD: 3.4 K/UL (ref 0.9–3.6)
LYMPHOCYTES NFR BLD: 25 % (ref 21–52)
MCH RBC QN AUTO: 27.8 PG (ref 24–34)
MCHC RBC AUTO-ENTMCNC: 31.8 G/DL (ref 31–37)
MCV RBC AUTO: 87.4 FL (ref 78–100)
MONOCYTES # BLD: 1.1 K/UL (ref 0.05–1.2)
MONOCYTES NFR BLD: 8 % (ref 3–10)
NEUTS SEG # BLD: 8.9 K/UL (ref 1.8–8)
NEUTS SEG NFR BLD: 64 % (ref 40–73)
NRBC # BLD: 0 K/UL (ref 0–0.01)
NRBC BLD-RTO: 0 PER 100 WBC
PLATELET # BLD AUTO: 584 K/UL (ref 135–420)
PMV BLD AUTO: 10 FL (ref 9.2–11.8)
POTASSIUM SERPL-SCNC: 3.5 MMOL/L (ref 3.5–5.5)
RBC # BLD AUTO: 3.17 M/UL (ref 4.2–5.3)
SODIUM SERPL-SCNC: 137 MMOL/L (ref 136–145)
WBC # BLD AUTO: 13.8 K/UL (ref 4.6–13.2)

## 2023-02-18 PROCEDURE — 97165 OT EVAL LOW COMPLEX 30 MIN: CPT

## 2023-02-18 PROCEDURE — 97161 PT EVAL LOW COMPLEX 20 MIN: CPT

## 2023-02-18 PROCEDURE — 6360000002 HC RX W HCPCS: Performed by: SURGERY

## 2023-02-18 PROCEDURE — 97535 SELF CARE MNGMENT TRAINING: CPT

## 2023-02-18 PROCEDURE — 6370000000 HC RX 637 (ALT 250 FOR IP): Performed by: SURGERY

## 2023-02-18 PROCEDURE — 87040 BLOOD CULTURE FOR BACTERIA: CPT

## 2023-02-18 PROCEDURE — 36415 COLL VENOUS BLD VENIPUNCTURE: CPT

## 2023-02-18 PROCEDURE — 97116 GAIT TRAINING THERAPY: CPT

## 2023-02-18 PROCEDURE — 80048 BASIC METABOLIC PNL TOTAL CA: CPT

## 2023-02-18 PROCEDURE — 2580000003 HC RX 258: Performed by: SURGERY

## 2023-02-18 PROCEDURE — 85025 COMPLETE CBC W/AUTO DIFF WBC: CPT

## 2023-02-18 PROCEDURE — 1100000003 HC PRIVATE W/ TELEMETRY

## 2023-02-18 RX ADMIN — HYDROMORPHONE HYDROCHLORIDE 1 MG: 1 INJECTION, SOLUTION INTRAMUSCULAR; INTRAVENOUS; SUBCUTANEOUS at 15:24

## 2023-02-18 RX ADMIN — ASPIRIN 81 MG: 81 TABLET, COATED ORAL at 10:01

## 2023-02-18 RX ADMIN — VANCOMYCIN HYDROCHLORIDE 1500 MG: 10 INJECTION, POWDER, LYOPHILIZED, FOR SOLUTION INTRAVENOUS at 23:00

## 2023-02-18 RX ADMIN — HYDROMORPHONE HYDROCHLORIDE 1 MG: 1 INJECTION, SOLUTION INTRAMUSCULAR; INTRAVENOUS; SUBCUTANEOUS at 10:01

## 2023-02-18 RX ADMIN — HYDROMORPHONE HYDROCHLORIDE 1 MG: 1 INJECTION, SOLUTION INTRAMUSCULAR; INTRAVENOUS; SUBCUTANEOUS at 00:35

## 2023-02-18 RX ADMIN — HYDROMORPHONE HYDROCHLORIDE 1 MG: 1 INJECTION, SOLUTION INTRAMUSCULAR; INTRAVENOUS; SUBCUTANEOUS at 20:09

## 2023-02-18 RX ADMIN — SODIUM CHLORIDE, PRESERVATIVE FREE 10 ML: 5 INJECTION INTRAVENOUS at 10:03

## 2023-02-18 RX ADMIN — SODIUM CHLORIDE, PRESERVATIVE FREE 10 ML: 5 INJECTION INTRAVENOUS at 20:11

## 2023-02-18 RX ADMIN — HYDROMORPHONE HYDROCHLORIDE 1 MG: 1 INJECTION, SOLUTION INTRAMUSCULAR; INTRAVENOUS; SUBCUTANEOUS at 05:33

## 2023-02-18 RX ADMIN — HYDROMORPHONE HYDROCHLORIDE 1 MG: 1 INJECTION, SOLUTION INTRAMUSCULAR; INTRAVENOUS; SUBCUTANEOUS at 23:26

## 2023-02-18 ASSESSMENT — PAIN DESCRIPTION - LOCATION
LOCATION: LEG;GROIN
LOCATION: GROIN
LOCATION: LEG
LOCATION: LEG;GROIN

## 2023-02-18 ASSESSMENT — PAIN SCALES - GENERAL
PAINLEVEL_OUTOF10: 8
PAINLEVEL_OUTOF10: 4
PAINLEVEL_OUTOF10: 7
PAINLEVEL_OUTOF10: 6
PAINLEVEL_OUTOF10: 3
PAINLEVEL_OUTOF10: 8
PAINLEVEL_OUTOF10: 8

## 2023-02-18 ASSESSMENT — PAIN DESCRIPTION - DESCRIPTORS
DESCRIPTORS: ACHING
DESCRIPTORS: SORE;DISCOMFORT

## 2023-02-18 ASSESSMENT — PAIN - FUNCTIONAL ASSESSMENT: PAIN_FUNCTIONAL_ASSESSMENT: ACTIVITIES ARE NOT PREVENTED

## 2023-02-18 ASSESSMENT — PAIN DESCRIPTION - ORIENTATION
ORIENTATION: LEFT
ORIENTATION: LEFT;LOWER

## 2023-02-18 NOTE — PROGRESS NOTES
conducted an initial consultation and Spiritual Assessment for [employer], who is a 74 y.o.,female. Patient's Primary Language is: [place]. According to the patient's EMR Faith Affiliation is: Baptism. The reason the Patient came to the hospital is:   Patient Active Problem List    Diagnosis Date Noted    Dehiscence of wound 02/17/2023    Femoral artery occlusion, left (HCC) 01/19/2023    Postmenopausal osteoporosis 04/22/2022    Vascular graft infection ([address] 12/09/2020    UTI (urinary tract infection) 12/03/2020    Cellulitis of abdominal wall 12/03/2020    Open wound 09/10/2020    Chronic wound infection of abdomen 06/21/2018    Infection of vascular bypass graft ([address] 04/30/2018    Nonhealing surgical wound 04/30/2018    CAP (community acquired pneumonia) 06/27/2017    Severe sepsis ([address] 06/27/2017    Abscess 05/10/2017    Arteriovenous graft infection ([address] 05/10/2017    Dermal sinus tract of lumbosacral region ([address] 05/10/2017    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 Explored issues of yo, belief, spirituality and Hindu/ritual needs while hospitalized. Listened empathically. Provided information about Spiritual Care Services. Offered prayer and assurance of continued prayers on patient's behalf. Chart reviewed. The following outcomes where achieved:  Patient shared limited information about both their medical narrative and spiritual journey/beliefs.  confirmed Patient's Faith Affiliation. Patient expressed gratitude for 's visit.     Assessment:  Patient was frustrated that she has has four surgeries in the last few months. She is hoping and praying that this will be the last surgery she will need. Patient does not have any Yarsanism/cultural needs that will affect patient's preferences in health care. Plan:  Chaplains will continue to follow and will provide pastoral care on an as needed/requested basis.  recommends bedside caregivers page  on duty if patient shows signs of acute spiritual or emotional distress.     [address]   [phone]

## 2023-02-18 NOTE — PLAN OF CARE
Problem: Physical Therapy - Adult  Goal: By Discharge: Performs mobility at highest level of function for planned discharge setting. See evaluation for individualized goals. Description: 1. Patient will move from supine to sit and sit to supine  in bed with modified independence. 2.  Patient will transfer from bed to chair and chair to bed with modified independence using the least restrictive device. 3.  Patient will perform sit to stand with modified independence. 4.  Patient will ambulate with supervision/set-up for 75 feet with the least restrictive device. 5.  Patient will ascend/descend 3 stairs with 1-2 handrail(s) with minimal assistance/contact guard assist   Outcome: Progressing           PHYSICAL THERAPY EVALUATION    Patient: [employer] (27 y.o. female)  Date: 2/18/2023  Primary Diagnosis: Dehiscence of operative wound, initial encounter [T81.31XA]  Dehiscence of wound [T81.30XA]  Procedure(s) (LRB):  WASHOUT LEFT GROIN/WOUND VAC PLACEMENT (Left) 1 Day Post-Op   Precautions: Other (comment) (Cardiac monitoring),   PLOF: essentially I with ADLs and functional mobility (PRN use of SC or RW dependent on whether a \"good day or bad day. \" No longer drives, retired     ASSESSMENT : cleared by nursing, patient found supine in bed and alert with  present. Despite 8/10 pain she was agreeable for consult. She was supine to sit to supine with supervision/SBA and sit to stand to sit with SBA, ambulation in the room with RW  SBA/CG 25'. She has left wound vac in place and chose to return to bed at conclusion. She was left with call bell in reach and nursing notified. She does note good days and bad days that affects her need for assistance with ADLS and use of SC or RW.  Time spent discussing HHPT intervention followed by outpatient PT services for continuum of care and maximizing function and overall strength- patient receptive to this as she feels she did not get full recovery from previous surgery. DEFICITS/IMPAIRMENTS:    , Body Structures, Functions, Activity Limitations Requiring Skilled Therapeutic Intervention: Decreased functional mobility ; Decreased endurance;Decreased strength    Patient will benefit from skilled intervention to address the above impairments. Patient's rehabilitation potential/Therapy Prognosis: Good. Factors which may influence rehabilitation potential include:    []         None noted  []         Mental ability/status  []         Medical condition  []         Home/family situation and support systems  []         Safety awareness  [x]         Pain tolerance/management  []         Other:      PLAN :  Recommendations and Planned Interventions:    [x]           Bed Mobility Training             []    Neuromuscular Re-Education  [x]           Transfer Training                   []    Orthotic/Prosthetic Training  [x]           Gait Training                          []    Modalities  [x]           Therapeutic Exercises           []    Edema Management/Control  [x]           Therapeutic Activities            [x]    Family Training/Education  [x]           Patient Education  []           Other (comment):    Frequency/Duration: Patient will be followed by physical therapy to address goals, 1-2 times per day/3-5 days per week to address goals. Further Equipment Recommendations for Discharge: shower chair    Discharge Recommendations: Home with Home health PT    AMPAC: Current research shows that an AM-PAC score of 18 (14 without stairs) or greater is associated with a discharge to the patient's home setting. Based on an AM-PAC score of  /24 (or 15/20 if omitting stairs) and their current functional mobility deficits, it is recommended that the patient have 2-3 sessions per week of Physical Therapy at d/c to increase the patient's independence.     This AMPAC score should be considered in conjunction with interdisciplinary team recommendations to determine the most appropriate discharge setting. Patient's social support, diagnosis, medical stability, and prior level of function should also be taken into consideration. SUBJECTIVE:   Patient stated The amount of pain I have and the amount of help I need is dependent on the day. I have good days and not so good days.     OBJECTIVE DATA SUMMARY:     Past Medical History:   Diagnosis Date    Arthritis     CAP (community acquired pneumonia) 06/27/2017    Chronic lung disease     Claudication ([address]     left leg    Crohn's disease ([address]     Crohn's disease ([address]     GERD (gastroesophageal reflux disease)     HTN (hypertension)     Ill-defined condition     Uses O2 at night.     Nausea & vomiting     Neuropathy     Other and unspecified symptoms and signs involving general sensations and perceptions     PVD    Other ill-defined conditions(799.89)     Crohn's    Parathyroid tumor     sees Endo Dr Magdalena Chandler    Peripheral neuropathy     Pulmonary emphysema ([address]     PVD (peripheral vascular disease) ([address]     right leg    Sepsis ([address] 06/27/2017    Vitamin B12 deficiency      Past Surgical History:   Procedure Laterality Date    APPENDECTOMY      BREAST LUMPECTOMY Left     breast left- precancerous    BUNIONECTOMY      left eye    BYPASS GRAFT OTHR,AXILL-FEM Right 04/19/2013    BYPASS GRAFT OTHR,FEM-POP Left 05/2013    CATARACT REMOVAL      bilateral    CHOLECYSTECTOMY      COLONOSCOPY N/A 09/11/2019    DIAGNOSTIC COLONOSCOPY performed by Jonas Macedo MD at [address] [facility] [address]      lateral epicondilitis on right    OTHER SURGICAL HISTORY  01/05/2023    left leg thrombectomy with stent    OTHER SURGICAL HISTORY  09/2013    I & D Right chest/flank wall abscess vs granuloma    OTHER SURGICAL HISTORY      intestional resection x4    OTHER SURGICAL HISTORY  03/07/2013    catheter into aorta    UPPER ARM/ELBOW SURGERY UNLISTED      VASCULAR SURGERY  09/10/2020    Debridement and washout of bypass graft.      Barriers to Learning/Limitations: None  Compensate with: visual, verbal, tactile, kinesthetic cues/model  Home Situation:  Social/Functional History  Lives With: Spouse  Type of Home: House  Home Layout: Two level  Home Equipment: Walker, rolling, Cane  Has the patient had two or more falls in the past year or any fall with injury in the past year?: Unknown  Receives Help From: Family  ADL Assistance: Independent (level of assistance needed dependent on \"good v bad\" day)  Ambulation Assistance: Needs assistance (level of assistance needed dependent on \"good v bad\" day)  Active : No  Occupation: Retired  Critical Behavior:  Orientation Level: Oriented X4  WNL     Strength:    Strength: Generally decreased, functional (B LE)    Tone & Sensation:   Tone: Normal  Sensation: Intact    Range Of Motion:  AROM: Within functional limits (B LE)       Functional Mobility:  Bed Mobility:     Bed Mobility Training  Bed Mobility Training: Yes  Overall Level of Assistance: Supervision;Stand-by assistance  Supine to Sit: Supervision;Stand-by assistance  Sit to Supine: Supervision;Stand-by assistance  Scooting: Supervision  Transfers:     Transfer Training  Transfer Training: Yes  Overall Level of Assistance: Stand-by assistance  Sit to Stand: Stand-by assistance  Stand to Sit: Stand-by assistance  Balance:      Balance  Sitting: Intact  Sitting - Static: Good (unsupported)  Sitting - Dynamic: Good (unsupported)  Standing: Intact  Standing - Static: Good  Standing - Dynamic: Good  Wheelchair Mobility:     Ambulation/Gait Training:  Ambulation  Surface: Level tile  Device: Rolling Walker  Assistance: Stand by assistance;Contact guard assistance     Device: Rolling Walker     Assistance: Stand by assistance;Contact guard assistance     Gait  Overall Level of Assistance: Contact-guard assistance;Stand-by assistance  Base of Support: Narrowed  Step Length: Right shortened;Left shortened  Distance (ft): 25 Feet  Assistive Device: Walker, rolling     Therapeutic Exercises/Neuromuscular Re-education:     Pain:  Pain level pre-treatment: 8/10   Pain level post-treatment: 8/10   Pain Intervention(s): Medication (see MAR); Rest, Ice, Repositioning   Response to intervention: Nurse notified, See doc flow     Activity Tolerance:    Activity Tolerance: Patient tolerated evaluation without incident;Patient limited by endurance  Please refer to the flowsheet for vital signs taken during this treatment. After treatment:   []         Patient left in no apparent distress sitting up in chair  [x]         Patient left in no apparent distress in bed  [x]         Call bell left within reach  [x]         Nursing notified  [x]         Caregiver present  []         Bed alarm activated  []         SCDs applied    COMMUNICATION/EDUCATION:   Patient Education  Education Given To: Patient; Family  Education Provided: Role of Therapy;Plan of Care;Equipment; Fall Prevention Strategies  Education Method: Demonstration;Verbal;Teach Back  Barriers to Learning: None  Education Outcome: Verbalized understanding;Demonstrated understanding    Thank you for this referral.  Lisa Marie, PT  Minutes: 30      Eval Complexity: [employer] AM-PAC® Basic Mobility Inpatient Short Form (6-Clicks) Version 2    How much HELP from another person does the patient currently need    (If the patient hasn't done an activity recently, how much help from another person do you think he/she would need if he/she tried?)   Total (Total A or Dep)   A Lot  (Mod to Max A)   A Little (Sup or Min A)   None (Mod I to I)   Turning from your back to your side while in a flat bed without using bedrails? [] 1 [] 2 [x] 3 [] 4   2. Moving from lying on your back to sitting on the side of a flat bed without using bedrails? [] 1 [] 2 [x] 3 [] 4   3. Moving to and from a bed to a chair (including a wheelchair)?    [] 1 [] 2 [x] 3 [] 4 4. Standing up from a chair using your arms (e.g., wheelchair, or bedside chair)? [] 1 [] 2 [x] 3 [] 4   5. Walking in hospital room? [] 1 [] 2 [x] 3 [] 4   6. Climbing 3-5 steps with a railing?+   [] 1 [] 2 [] 3 [] 4   +If stair climbing cannot be assessed, skip item #6. Sum responses from items 1-5.

## 2023-02-18 NOTE — PROGRESS NOTES
OCCUPATIONAL THERAPY EVALUATION/DISCHARGE    Patient: Frandy Guerrero (45 y.o. female)  Date: 2/18/2023  Primary Diagnosis: Dehiscence of operative wound, initial encounter [T81.31XA]  Dehiscence of wound [T81.30XA]  Procedure(s) (LRB):  WASHOUT LEFT GROIN/WOUND VAC PLACEMENT (Left) 1 Day Post-Op   Precautions: Fall Risk, General Precautions  PLOF: Pt lives with spouse, Mod Ind/Supervision for most ADLs and functional mobility using cane or walker since January 2023, prior to that was Independent. .    ASSESSMENT AND RECOMMENDATIONS:  Based on the objective data below, patient presents with ability to perform basic ADLs and functional transfers very near baseline level of function. Pt performed/simulated UB with Mod Ind, required Supervision for LB ADLs due to pain in L groin with L hip flexion. Pt demonstrates good safety awareness during all tasks and reports she performed functional mobility around the room earlier with PT, and she is feeling comfortable with her level of independence at this point, however, is interested in pursuing Home health OT to maximize her return to PLOF in home environment. Pt lives with supportive family available to assist as needed. Maximum therapeutic gains met at current level of care and patient will be discharged from occupational therapy at this time. Further Equipment Recommendations for Discharge: shower chair    Discharge Recommendations: Home with Home health OT    AMPAC: Current research shows that an AM-PAC score of 18 or greater is associated with a discharge to the patient's home setting. Based on an AM-PAC score of 21/24 and their current ADL deficits; it is recommended that the patient have 2-3 sessions per week of Occupational Therapy at d/c to increase the patient's independence. This AMPAC score should be considered in conjunction with interdisciplinary team recommendations to determine the most appropriate discharge setting.  Patient's social support, diagnosis, medical stability, and prior level of function should also be taken into consideration. SUBJECTIVE:   Patient stated I can still do most things.     OBJECTIVE DATA SUMMARY:     Past Medical History:   Diagnosis Date    Arthritis     CAP (community acquired pneumonia) 06/27/2017    Chronic lung disease     Claudication ([address]     left leg    Crohn's disease ([address]     Crohn's disease ([address]     GERD (gastroesophageal reflux disease)     HTN (hypertension)     Ill-defined condition     Uses O2 at night. Nausea & vomiting     Neuropathy     Other and unspecified symptoms and signs involving general sensations and perceptions     PVD    Other ill-defined conditions(799.89)     Crohn's    Parathyroid tumor     sees Endo Dr Jania Gerber    Peripheral neuropathy     Pulmonary emphysema ([address]     PVD (peripheral vascular disease) ([address]     right leg    Sepsis ([address] 06/27/2017    Vitamin B12 deficiency      Past Surgical History:   Procedure Laterality Date    APPENDECTOMY      BREAST LUMPECTOMY Left     breast left- precancerous    BUNIONECTOMY      left eye    BYPASS GRAFT OTHR,AXILL-FEM Right 04/19/2013    BYPASS GRAFT OTHR,FEM-POP Left 05/2013    CATARACT REMOVAL      bilateral    CHOLECYSTECTOMY      COLONOSCOPY N/A 09/11/2019    DIAGNOSTIC COLONOSCOPY performed by Zoie Isaacs MD at [address] [facility] [address]      lateral epicondilitis on right    OTHER SURGICAL HISTORY  01/05/2023    left leg thrombectomy with stent    OTHER SURGICAL HISTORY  09/2013    I & D Right chest/flank wall abscess vs granuloma    OTHER SURGICAL HISTORY      intestional resection x4    OTHER SURGICAL HISTORY  03/07/2013    catheter into aorta    UPPER ARM/ELBOW SURGERY UNLISTED      VASCULAR SURGERY  09/10/2020    Debridement and washout of bypass graft.      Barriers to Learning/Limitations: None  Compensate with: visual, verbal, tactile, kinesthetic cues/model    Home Situation:   Social/Functional History  Lives With: Spouse  Type of Home: House  Home Layout: Two level, Performs ADL's on one level, Able to Live on Main level with bedroom/bathroom  Bathroom Shower/Tub: Tub/Shower unit  Home Equipment: Brando Edelson, rolling, Cane  Has the patient had two or more falls in the past year or any fall with injury in the past year?: Unknown  Receives Help From: Family  ADL Assistance: Independent  Ambulation Assistance: Needs assistance (level of assistance needed dependent on \"good v bad\" day)  Transfer Assistance: Independent  Active : No  Occupation: Retired  []  Right hand dominant   []  Left hand dominant    Cognitive/Behavioral Status:  Orientation Level: Oriented X4  WNL   Skin: visible skin intact, wound vac intact  Edema: none noted    Vision/Perceptual:    Vision: Within Functional Limits       Coordination: BUE  Coordination: Within functional limits     Coordination: Within functional limits (B LE)      Balance:     Balance  Sitting: Intact  Sitting - Static: Good (unsupported)  Sitting - Dynamic: Good (unsupported)  Standing: Intact  Standing - Static: Good  Standing - Dynamic: Good    Strength: BUE  Strength: Generally decreased, functional (B LE)  Strength: Generally decreased, functional    Tone & Sensation: BUE  Tone: Normal  Sensation: Intact  Tone: Normal  Sensation: Intact    Range of Motion: BUE  AROM: Within functional limits (B LE)     AROM: Within functional limits   Functional Mobility and Transfers for ADLs:  Bed Mobility:     Bed Mobility Training  Bed Mobility Training: Yes  Overall Level of Assistance: Supervision;Stand-by assistance  Supine to Sit: Supervision;Stand-by assistance  Sit to Supine: Supervision;Stand-by assistance  Scooting: Supervision  Transfers:   Transfer Training  Transfer Training: Yes  Overall Level of Assistance: Stand-by assistance  Sit to Stand: Stand-by assistance  Stand to Sit: Stand-by assistance    ADL Assessment:      Feeding: Independent  Grooming: Modified independent   UE Bathing: Modified independent   LE Bathing: Supervision  UE Dressing: Modified independent   LE Dressing: Supervision  Toileting: Supervision    ADL Intervention:  Education on compensatory and EC techniques with UB/LB ADLs at home.   Dressing with Supervision, bathing supervision.  Pain:  Pain level pre-treatment: not rated  Pain level post-treatment: not rated    Activity Tolerance:   Activity Tolerance: Patient limited by fatigue  Please refer to the flowsheet for vital signs taken during this treatment.    After treatment:   [] Patient left in no apparent distress sitting up in chair  [x] Patient left in no apparent distress in bed  [x] Call bell left within reach  [x] Nursing notified  [x] Caregiver present  [] Bed alarm activated    COMMUNICATION/EDUCATION:   Patient Education  Education Given To: Patient;Family  Education Provided: Role of Therapy;Energy Conservation;Fall Prevention Strategies;IADL Safety;Family Education  Education Method: Verbal;Teach Back  Barriers to Learning: None  Education Outcome: Verbalized understanding;Demonstrated understanding    Thank you for this referral.  Bernadette Wing OTR/L  Minutes: 17    Eval Complexity: Decision Making: Low Complexity    [facility] AM-PAC® Daily Activity Inpatient Short Form (6-Clicks)*    How much HELP from another person does the patient currently need…    (If the patient hasn't done an activity recently, how much help from another person do you think he/she would need if he/she tried?)   Total (Total A or Dep)   A Lot  (Mod to Max A)   A Little (Sup or Min A)   None (Mod I to I)   Putting on and taking off regular lower body clothing?   [] 1 [] 2 [x] 3 [] 4   2. Bathing (including washing, rinsing,      drying)?    [] 1 [] 2 [x] 3 [] 4   3. Toileting, which includes using toilet, bedpan or urinal?   [] 1 [] 2 [x] 3 [] 4   4. Putting on and taking off regular upper body clothing?   [] 1 []  2 [] 3 [x] 4   5. Taking care of personal grooming such as brushing teeth? [] 1 [] 2 [] 3 [x] 4   6. Eating meals? [] 1 [] 2 [] 3 [x] 4     Current research shows that an AM-PAC score of 18 or greater is associated with a discharge to the patient's home setting. Based on an AM-PAC score of 21/24 and their current ADL deficits; it is recommended that the patient have 2-3 sessions per week of Occupational Therapy at d/c to increase the patient's independence.

## 2023-02-18 NOTE — PERIOP NOTE
Prior to admission med list updated with information patient can remember as requested by Dr. Kevin Hall. Patient was unable to remember the name of a new BP med and not sure of Eliquis dose. Unsuccessfully attempted to reach patient's  to request he bring her meds in tomorrow to update her list.   Tomy on [address] [facility] informed this needs follow up.

## 2023-02-19 ENCOUNTER — APPOINTMENT (OUTPATIENT)
Facility: HOSPITAL | Age: 73
DRG: 252 | End: 2023-02-19
Payer: MEDICARE

## 2023-02-19 LAB
ANION GAP SERPL CALC-SCNC: 5 MMOL/L (ref 3–18)
BACTERIA SPEC CULT: NORMAL
BASOPHILS # BLD: 0 K/UL (ref 0–0.1)
BASOPHILS NFR BLD: 0 % (ref 0–2)
BUN SERPL-MCNC: 9 MG/DL (ref 7–18)
BUN/CREAT SERPL: 12 (ref 12–20)
CALCIUM SERPL-MCNC: 9.7 MG/DL (ref 8.5–10.1)
CHLORIDE SERPL-SCNC: 105 MMOL/L (ref 100–111)
CO2 SERPL-SCNC: 28 MMOL/L (ref 21–32)
CREAT SERPL-MCNC: 0.76 MG/DL (ref 0.6–1.3)
DIFFERENTIAL METHOD BLD: ABNORMAL
EOSINOPHIL # BLD: 0.6 K/UL (ref 0–0.4)
EOSINOPHIL NFR BLD: 3 % (ref 0–5)
ERYTHROCYTE [DISTWIDTH] IN BLOOD BY AUTOMATED COUNT: 15.8 % (ref 11.6–14.5)
GLUCOSE SERPL-MCNC: 123 MG/DL (ref 74–99)
HCT VFR BLD AUTO: 25 % (ref 35–45)
HGB BLD-MCNC: 7.8 G/DL (ref 12–16)
IMM GRANULOCYTES # BLD AUTO: 0 K/UL (ref 0–0.04)
IMM GRANULOCYTES NFR BLD AUTO: 0 % (ref 0–0.5)
LYMPHOCYTES # BLD: 4.4 K/UL (ref 0.9–3.6)
LYMPHOCYTES NFR BLD: 23 % (ref 21–52)
MCH RBC QN AUTO: 28 PG (ref 24–34)
MCHC RBC AUTO-ENTMCNC: 31.2 G/DL (ref 31–37)
MCV RBC AUTO: 89.6 FL (ref 78–100)
MONOCYTES # BLD: 1 K/UL (ref 0.05–1.2)
MONOCYTES NFR BLD: 5 % (ref 3–10)
NEUTS BAND NFR BLD MANUAL: 1 %
NEUTS SEG # BLD: 13 K/UL (ref 1.8–8)
NEUTS SEG NFR BLD: 68 % (ref 40–73)
NRBC # BLD: 0 K/UL (ref 0–0.01)
NRBC BLD-RTO: 0 PER 100 WBC
PLATELET # BLD AUTO: 549 K/UL (ref 135–420)
PLATELET COMMENT: ABNORMAL
PMV BLD AUTO: 9.7 FL (ref 9.2–11.8)
POTASSIUM SERPL-SCNC: 3.2 MMOL/L (ref 3.5–5.5)
RBC # BLD AUTO: 2.79 M/UL (ref 4.2–5.3)
RBC MORPH BLD: ABNORMAL
SERVICE CMNT-IMP: NORMAL
SODIUM SERPL-SCNC: 138 MMOL/L (ref 136–145)
WBC # BLD AUTO: 19 K/UL (ref 4.6–13.2)
WBC MORPH BLD: ABNORMAL

## 2023-02-19 PROCEDURE — 74177 CT ABD & PELVIS W/CONTRAST: CPT

## 2023-02-19 PROCEDURE — 80048 BASIC METABOLIC PNL TOTAL CA: CPT

## 2023-02-19 PROCEDURE — 1100000003 HC PRIVATE W/ TELEMETRY

## 2023-02-19 PROCEDURE — 6370000000 HC RX 637 (ALT 250 FOR IP): Performed by: SURGERY

## 2023-02-19 PROCEDURE — 94761 N-INVAS EAR/PLS OXIMETRY MLT: CPT

## 2023-02-19 PROCEDURE — 6360000002 HC RX W HCPCS: Performed by: SURGERY

## 2023-02-19 PROCEDURE — 85025 COMPLETE CBC W/AUTO DIFF WBC: CPT

## 2023-02-19 PROCEDURE — 36415 COLL VENOUS BLD VENIPUNCTURE: CPT

## 2023-02-19 PROCEDURE — 6360000004 HC RX CONTRAST MEDICATION: Performed by: INTERNAL MEDICINE

## 2023-02-19 PROCEDURE — 2580000003 HC RX 258: Performed by: SURGERY

## 2023-02-19 RX ADMIN — HYDROMORPHONE HYDROCHLORIDE 1 MG: 1 INJECTION, SOLUTION INTRAMUSCULAR; INTRAVENOUS; SUBCUTANEOUS at 23:00

## 2023-02-19 RX ADMIN — HYDROMORPHONE HYDROCHLORIDE 1 MG: 1 INJECTION, SOLUTION INTRAMUSCULAR; INTRAVENOUS; SUBCUTANEOUS at 08:24

## 2023-02-19 RX ADMIN — HYDROMORPHONE HYDROCHLORIDE 1 MG: 1 INJECTION, SOLUTION INTRAMUSCULAR; INTRAVENOUS; SUBCUTANEOUS at 10:53

## 2023-02-19 RX ADMIN — SODIUM CHLORIDE, PRESERVATIVE FREE 10 ML: 5 INJECTION INTRAVENOUS at 22:40

## 2023-02-19 RX ADMIN — HYDROMORPHONE HYDROCHLORIDE 1 MG: 1 INJECTION, SOLUTION INTRAMUSCULAR; INTRAVENOUS; SUBCUTANEOUS at 04:19

## 2023-02-19 RX ADMIN — ASPIRIN 81 MG: 81 TABLET, COATED ORAL at 08:21

## 2023-02-19 RX ADMIN — VANCOMYCIN HYDROCHLORIDE 1250 MG: 10 INJECTION, POWDER, LYOPHILIZED, FOR SOLUTION INTRAVENOUS at 22:40

## 2023-02-19 RX ADMIN — HYDROMORPHONE HYDROCHLORIDE 1 MG: 1 INJECTION, SOLUTION INTRAMUSCULAR; INTRAVENOUS; SUBCUTANEOUS at 16:02

## 2023-02-19 RX ADMIN — IOPAMIDOL 80 ML: 612 INJECTION, SOLUTION INTRAVENOUS at 23:39

## 2023-02-19 RX ADMIN — SODIUM CHLORIDE, PRESERVATIVE FREE 10 ML: 5 INJECTION INTRAVENOUS at 08:21

## 2023-02-19 RX ADMIN — HYDROMORPHONE HYDROCHLORIDE 1 MG: 1 INJECTION, SOLUTION INTRAMUSCULAR; INTRAVENOUS; SUBCUTANEOUS at 20:45

## 2023-02-19 RX ADMIN — OXYCODONE HYDROCHLORIDE 5 MG: 5 TABLET ORAL at 21:00

## 2023-02-19 ASSESSMENT — PAIN DESCRIPTION - LOCATION
LOCATION: GROIN
LOCATION: GROIN;LEG
LOCATION: GROIN;LEG
LOCATION: LEG;GROIN
LOCATION: LEG;GROIN
LOCATION: ABDOMEN;BACK
LOCATION: GROIN;LEG
LOCATION: ABDOMEN;BACK
LOCATION: ABDOMEN;BACK

## 2023-02-19 ASSESSMENT — PAIN SCALES - GENERAL
PAINLEVEL_OUTOF10: 7
PAINLEVEL_OUTOF10: 8
PAINLEVEL_OUTOF10: 7
PAINLEVEL_OUTOF10: 8
PAINLEVEL_OUTOF10: 8
PAINLEVEL_OUTOF10: 9
PAINLEVEL_OUTOF10: 8
PAINLEVEL_OUTOF10: 10

## 2023-02-19 ASSESSMENT — PAIN DESCRIPTION - DESCRIPTORS
DESCRIPTORS: ACHING

## 2023-02-19 ASSESSMENT — PAIN DESCRIPTION - PAIN TYPE: TYPE: CHRONIC PAIN

## 2023-02-19 ASSESSMENT — PAIN DESCRIPTION - ONSET: ONSET: ON-GOING

## 2023-02-19 ASSESSMENT — PAIN DESCRIPTION - ORIENTATION
ORIENTATION: LEFT
ORIENTATION: LEFT;RIGHT;LOWER
ORIENTATION: LEFT

## 2023-02-19 ASSESSMENT — PAIN DESCRIPTION - FREQUENCY: FREQUENCY: INTERMITTENT

## 2023-02-19 ASSESSMENT — PAIN - FUNCTIONAL ASSESSMENT: PAIN_FUNCTIONAL_ASSESSMENT: PREVENTS OR INTERFERES SOME ACTIVE ACTIVITIES AND ADLS

## 2023-02-19 NOTE — CONSULTS
[facility]  (A Division of [address] [place] [address])                                                           Date of Admission: 2/17/2023   Date of Note: 2/19/2023  Reason for Consult: wound infeciton/sepsis  Referring MD: Candelaria Leone    Thank you for involving me in the care of this patient. Please do not hesitate to contact me on the above number if question or concern. Current Antimicrobials:    Prior Antimicrobials:  Vancomcyin       Allergy to antibiotics: Keflex- has hives     Assessment:     Left groin wound dehiscense-- possible deep infection  Leucocytosis/ sepsis  2/2 above  likely  S/P left leg thromboectomy/ angiogram/angioplasty and fem-below knee bypass X2 in jan 2023  PVD  Immunosuppressed due to Remicaide for Crohn's disease-- last dose 2/17/23  Anemia-- seems to keep declining-- bleeding? Neuropathy    Recommendation -- ID related: Will order CT AP to assess for occult abscess infecton in abdoment as well as  assess deep abscess/fluid collectoin at surgical site  Cont vancomycin, can hold off adding gram negative coverage-- and until plan for OR known. Her wound cultures/bcx negative so far  Please send off bacterial/fungal and AFB cultures on this lady--- she is immunosuppressed and at risk for atypical infection  Will add Moxifloxacin after debridement/OR--  sample taken  Need to remain off Remicaide for wound healing, clearing of possible infection   As hemodynamically appears stable, will hold off     Dr Freitas Mode will see patient again on 2/20. I will be covering for [facility] this weekend and can be reached if needed at [phone]. Thank you. HPI:      Citlali Medel is a 74 y.o.   female with PMH of PVD, crohns disease getting regular Remicaide infusion, Neuropathy, Pul emphysema who presents with dehiscence of left femoral incision-- she had surgieries on left groin in [facility], last Jan 24 . she didn't feel too bad after discharge, but gradual decine in appetite and general weakness and occasioanl chills. Denies SOB/cough, fever or rigors. No N/V. She had swelling that opened up and drained fluid on Friday and came in to ED. On same day, she got her monthly Remicaide but was given half dose instead per patient. She was found ot have leucocytosis, wound cuture taken and blood culture taken in ED no growth so far. She is currently on Vancomycin. Hemodynamically stable. Per Vascular note, fluid appearnace is \"clearish\"    She is currently on Vancomcyin. Active Hospital Problems    Diagnosis Date Noted    Dehiscence of wound [facility] 02/17/2023     Priority: Medium    Open wound [T14. 8XXA] 09/10/2020     Past Medical History:   Diagnosis Date    Arthritis     CAP (community acquired pneumonia) 06/27/2017    Chronic lung disease     Claudication ([address]     left leg    Crohn's disease ([address]     Crohn's disease ([address]     GERD (gastroesophageal reflux disease)     HTN (hypertension)     Ill-defined condition     Uses O2 at night.     Nausea & vomiting     Neuropathy     Other and unspecified symptoms and signs involving general sensations and perceptions     PVD    Other ill-defined conditions(799.89)     Crohn's    Parathyroid tumor     sees Endo Dr Osvaldo Potter    Peripheral neuropathy     Pulmonary emphysema ([address]     PVD (peripheral vascular disease) ([address]     right leg    Sepsis ([address] 06/27/2017    Vitamin B12 deficiency      Past Surgical History:   Procedure Laterality Date    APPENDECTOMY      BREAST LUMPECTOMY Left     breast left- precancerous    BUNIONECTOMY      left eye    BYPASS GRAFT OTHR,AXILL-FEM Right 04/19/2013    BYPASS GRAFT OTHR,FEM-POP Left 05/2013    CATARACT REMOVAL      bilateral    CHOLECYSTECTOMY      COLONOSCOPY N/A 09/11/2019    DIAGNOSTIC COLONOSCOPY performed [place] at [address] [facility] [address] lateral epicondilitis on right    OTHER SURGICAL HISTORY  01/05/2023    left leg thrombectomy with stent    OTHER SURGICAL HISTORY  09/2013    I & D Right chest/flank wall abscess vs granuloma    OTHER SURGICAL HISTORY      intestional resection x4    OTHER SURGICAL HISTORY  03/07/2013    catheter into aorta    UPPER ARM/ELBOW SURGERY UNLISTED      VASCULAR SURGERY  09/10/2020    Debridement and washout of bypass graft. Family History   Problem Relation Age of Onset    COPD Father     Coronary Art Dis Mother     Heart Attack Mother     Elevated Lipids Mother     COPD Brother     Heart Attack Brother     Heart Surgery Mother         CABGx3    Other Brother         PAD     Social History     Socioeconomic History    Marital status:      Spouse name: Not on file    Number of children: Not on file    Years of education: Not on file    Highest education level: Not on file   Occupational History    Not on file   Tobacco Use    Smoking status: Every Day     Packs/day: 1.00     Types: Cigarettes    Smokeless tobacco: Never   Substance and Sexual Activity    Alcohol use: No    Drug use: Never    Sexual activity: Not on file   Other Topics Concern    Not on file   Social History Narrative    Father worked in the [employer] and was diagnosed with Asbestosis.      Social Determinants of Health     Financial Resource Strain: Not on file   Food Insecurity: Not on file   Transportation Needs: Not on file   Physical Activity: Not on file   Stress: Not on file   Social Connections: Not on file   Intimate Partner Violence: Not on file   Housing Stability: Not on file       Medications:  Current Facility-Administered Medications   Medication Dose Route Frequency Provider Last Rate Last Admin    vancomycin (VANCOCIN) 1250 mg in sodium chloride 0.9% 250 mL IVPB  1,250 mg IntraVENous Q24H Andrew Lawrence MD        dextrose 5 % and 0.45 % sodium chloride infusion   IntraVENous Continuous Andrew Lawrence MD 75 mL/hr at 02/17/23 2118 New Bag at 02/17/23 2118    sodium chloride flush 0.9 % injection 5-40 mL  5-40 mL IntraVENous 2 times per day Rayray Medina MD   10 mL at 02/19/23 9171    sodium chloride flush 0.9 % injection 5-40 mL  5-40 mL IntraVENous PRN Rayray Medina MD        0.9 % sodium chloride infusion   IntraVENous PRN Rayray Medina MD        hydrALAZINE (APRESOLINE) injection 10 mg  10 mg IntraVENous Q4H PRN Rayray Medina MD        HYDROmorphone HCl PF (DILAUDID) injection 1 mg  1 mg IntraVENous Q2H PRN Rayray Medina MD   1 mg at 02/19/23 1053    ondansetron (ZOFRAN-ODT) disintegrating tablet 4 mg  4 mg Oral Q8H PRN Rayray Medina MD        Or    ondansetron [facility]) injection 4 mg  4 mg IntraVENous Q6H PRN Rayray Medina MD        aspirin EC tablet 81 mg  81 mg Oral Daily Rayray Medina MD   81 mg at 02/19/23 1478    oxyCODONE (ROXICODONE) immediate release tablet 5 mg  5 mg Oral Q4H PRN Rayray Medina MD   5 mg at 02/17/23 2145        Review of Systems     Negative Unless BOLDED     General: fevers, chills, myalgias, arthralgias, unexplained weight loss, malaise, fatigue. HEENT:  headaches, recent URI, recent dental procedures;  tinnitus, hearing loss , visual changes, dizziness or blurred vision  PUlMONARY:  cough , shortness of breath, sputum production, hx of asthma or COPD. previous treatement for TB or PPD. Cardiovascular: chest pain, previous CAD/MI, vavlular heart disease,  murmurs  GI:   nausea, vomiting, diarrhea, abdominal pain, prior C.diff  :  urinary frequency, dysuria, hematuria, bladder incontinence.    Neurologic:  seizures, syncope or prior CVA/TIA, confusion, memory impairment, neuropathy  Musculoskeletal:  myalgias arthralgias, joint pain/ swelling left groin,  back pain  Skin:  Purities, cellulitis,  chronic stasis ulcer, diabetic foot ulcers  Endocrine: polyuria, polydipsia, hair loss, weight gain  Psych: Denies depression or anxiety       Objective:       BP (!) 113/58   Pulse 75 Temp 98.9 °F (37.2 °C) (Oral)   Resp 18   SpO2 95%   Temp (24hrs), Av.5 °F (36.9 °C), Min:98 °F (36.7 °C), Max:98.9 °F (37.2 °C)      Lines: PIV    General:   WD WN, awake alert and oriented   Skin:   no rashes or skin lesions noted on limited exam  Wound vac on left groin  No surrounding cellulitis   HEENT:  Normocephalic, atraumatic, EOMI, no scleral icterus or pallor; no conjunctival hemmohage; No thrush. Dentition good. Neck supple, no bruits. Lymph Nodes:   no cervical, axillary or inguinal adenopathy   Lungs:   non-labored, bilaterally clear to aspiration- no crackles wheezes rales or rhonchi   Heart:  RRR, s1 and s2; no murmurs rubs or gallops, no edema, + pedal pulses   Abdomen:  soft, non-distended, active bowel sounds. Appropriate surgical scars for stated surgeries. Non-tender   Genitourinary:  deferred   Extremities:   no clubbing, cyanosis; no joint effusions or swelling; ; muscle mass appropriate for age   Neurologic:  No gross focal sensory abnormalities;  Cranial nerves intact   Psychiatric:   appropriate and interactive.         Labs: Results:   Chemistry Recent Labs     23  1704 23  1038    137   K 3.6 3.5    103   CO2 29 28   BUN 13 13   GLOB 4.8*  --       CBC w/Diff Recent Labs     23  1704 23  1038 23  0321   WBC 18.6* 13.8* 19.0*   RBC 3.51* 3.17* 2.79*   HGB 9.8* 8.8* 7.8*   HCT 31.0* 27.7* 25.0*   * 584* 549*            No results found for: SDES No components found for: CULT     Results       Procedure Component Value Units Date/Time    Culture, Blood 2 [[id number]] Collected: 23 0244    Order Status: Completed Specimen: Blood Updated: 23 0700     Special Requests NO SPECIAL REQUESTS        Culture NO GROWTH 1 DAY       Culture, Anaerobic [[id number]] Collected: 23 1849    Order Status: Sent Specimen: Swab from Groin Updated: 23 0105    Culture, Wound Aerobic Only [[id number]] Collected: 23 1849 Order Status: Completed Specimen: Swab from Groin Updated: 02/18/23 1550     Special Requests NO SPECIAL REQUESTS        Gram stain NO WBC'S SEEN         NO ORGANISMS SEEN        Culture       CULTURE IN [facility] UPDATES TO FOLLOW          Culture, Blood 1 [[id number]] Collected: 02/17/23 1704    Order Status: Completed Specimen: Blood Updated: 02/19/23 0700     Special Requests NO SPECIAL REQUESTS        Culture NO GROWTH 2 DAYS       Blood Culture 2 [[id number]] Collected: 02/17/23 1545    Order Status: Canceled Specimen: Blood     Blood Culture 1 [[id number]] Collected: 02/17/23 1530    Order Status: Canceled Specimen: Blood               Imaging: All imaging reviewed from Admission to present as per radiology interpretation in [address] [facility] MD [facility]  Office #:     [phone]  [phone]   Office Fax: [phone]

## 2023-02-19 NOTE — PROGRESS NOTES
Remains stable, no new complaints  Labs noted  Will make npo, may have to remove graft tomorrow.    No bleed at site and no blood in stools  Hold eliquis  Will get home meds from pt today

## 2023-02-19 NOTE — PROGRESS NOTES
0178: Bedside shift change report given to Tonia Suero RN (oncoming nurse) by Francisca Monroe RN (offgoing nurse). Report included the following information Nurse Handoff Report, Adult Overview, Intake/Output, MAR, and Cardiac Rhythm NSR .     0820: Rounding on pt. Pt c/o pain. Level of pain is at a  9. Wound VAC alarming continuously. Will assess and find issue. 1030: Wound VAC issue found and it's working well now. Dressing change completed also. No s/s of infection noted, will continue to monitor pt reaminder of shift. 1845: CT dept called and requested a BMP on pt in order to proceed with further tests. Order placed. 1905: Bedside shift change report given to Maile Barrera RN (oncoming nurse) by Tonia Suero RN (offgoing nurse). Report included the following information Nurse Handoff Report, Adult Overview, Intake/Output, MAR, and Cardiac Rhythm NSR .

## 2023-02-19 NOTE — ANESTHESIA POSTPROCEDURE EVALUATION
Department of Anesthesiology  Postprocedure Note    Patient: Yenny Park  MRN: [id number]  YOB: 1950  Date of evaluation: 2/19/2023      Procedure Summary     Date: 02/17/23 Room / Location: [address] [place] [address] 01 / [address] OR    Anesthesia Start: 1751 Anesthesia Stop: 1900    Procedure: WASHOUT LEFT GROIN/WOUND VAC PLACEMENT (Left) Diagnosis:       Infection      (Infection [B99.9])    Surgeons: Amarilis You MD Responsible Provider: Nubia Chino MD    Anesthesia Type: General ASA Status: 3 - Emergent          Anesthesia Type: General    Gurmeet Phase I: Gurmeet Score: 9    Gurmeet Phase II:        Anesthesia Post Evaluation    Patient location during evaluation: bedside  Patient participation: complete - patient participated  Level of consciousness: awake  Airway patency: patent  Nausea & Vomiting: no nausea  Complications: no  Cardiovascular status: hemodynamically stable  Respiratory status: acceptable  Hydration status: euvolemic  Multimodal analgesia pain management approach

## 2023-02-19 NOTE — PROGRESS NOTES
0500: Paged MD Jackson regarding wound vac \"blockage alert\" and leaking out of the dressing. Dressing reinforced, still alarming, MD never returned page.

## 2023-02-19 NOTE — PLAN OF CARE
Problem: Pain  Goal: Verbalizes/displays adequate comfort level or baseline comfort level  Outcome: Progressing     Problem: Discharge Planning  Goal: Discharge to home or other facility with appropriate resources  Outcome: Progressing     Problem: Physical Therapy - Adult  Goal: By Discharge: Performs mobility at highest level of function for planned discharge setting. See evaluation for individualized goals. Description: 1. Patient will move from supine to sit and sit to supine  in bed with modified independence. 2.  Patient will transfer from bed to chair and chair to bed with modified independence using the least restrictive device. 3.  Patient will perform sit to stand with modified independence. 4.  Patient will ambulate with supervision/set-up for 75 feet with the least restrictive device.    5.  Patient will ascend/descend 3 stairs with 1-2 handrail(s) with minimal assistance/contact guard assist   2/18/2023 1113 by Marina So PT  Outcome: Progressing

## 2023-02-19 NOTE — PROGRESS NOTES
[address] [facility] Pharmacokinetic Monitoring Service - Vancomycin     [address] is a 74 y.o. female starting on vancomycin therapy for Surgical Site Infection. Pharmacy consulted for monitoring and adjustment. Target Concentration: Goal AUC/DAVID 400-600 mg*hr/L    Additional Antimicrobials: None    Pertinent Laboratory Values:   Temp: 98.5 °F (36.9 °C)  Recent Labs     02/17/23  1704 02/18/23  1038   CREATININE 0.71 0.75   BUN 13 13   WBC 18.6* 13.8*     CrCl cannot be calculated (Unknown ideal weight.). Pertinent Cultures:  Culture Date Source Results   02/18 Blood Pending   02/17 Wound - Groin; Swab CULTURE IN PROGRESS   02/17 Blood NO GROWTH AFTER 14 HOURS   01/28 Blood NO GROWTH 6 DAYS        MRSA Nasal Swab: N/A.  Non-respiratory infection    Plan:  Dosing recommendations based on Bayesian software  Start vancomycin 1500 mg IV x 1 followed by 1250 mg IV q24h  Anticipated AUC of 461 and trough concentration of 12.7 at steady state  Renal labs as indicated   Vancomycin concentration ordered for  02/21 @ 0400  Pharmacy will continue to monitor patient and adjust therapy as indicated    Thank you for the consult,  Mic Vanessa, [address] [facility]  2/18/2023

## 2023-02-19 NOTE — PROGRESS NOTES
Saw at the bedside with  present  Tells me she feels fine no pain no complaints  Vital signs are stable  Wound VAC is in place  White count is decreased  Limb was viable  Await cultures, discussed with infectious disease. Patient aware of risk of graft infection.   We will continue vancomycin for now

## 2023-02-20 LAB
ANION GAP SERPL CALC-SCNC: 5 MMOL/L (ref 3–18)
BACTERIA SPEC CULT: ABNORMAL
BASOPHILS # BLD: 0 K/UL (ref 0–0.1)
BASOPHILS NFR BLD: 0 % (ref 0–2)
BUN SERPL-MCNC: 9 MG/DL (ref 7–18)
BUN/CREAT SERPL: 15 (ref 12–20)
CALCIUM SERPL-MCNC: 9.2 MG/DL (ref 8.5–10.1)
CHLORIDE SERPL-SCNC: 106 MMOL/L (ref 100–111)
CO2 SERPL-SCNC: 27 MMOL/L (ref 21–32)
CREAT SERPL-MCNC: 0.62 MG/DL (ref 0.6–1.3)
DIFFERENTIAL METHOD BLD: ABNORMAL
EOSINOPHIL # BLD: 0.2 K/UL (ref 0–0.4)
EOSINOPHIL NFR BLD: 1 % (ref 0–5)
ERYTHROCYTE [DISTWIDTH] IN BLOOD BY AUTOMATED COUNT: 15.9 % (ref 11.6–14.5)
GLUCOSE SERPL-MCNC: 92 MG/DL (ref 74–99)
GRAM STN SPEC: ABNORMAL
GRAM STN SPEC: ABNORMAL
HCT VFR BLD AUTO: 23.8 % (ref 35–45)
HGB BLD-MCNC: 7.7 G/DL (ref 12–16)
IMM GRANULOCYTES # BLD AUTO: 0 K/UL (ref 0–0.04)
IMM GRANULOCYTES NFR BLD AUTO: 0 % (ref 0–0.5)
LYMPHOCYTES # BLD: 5.4 K/UL (ref 0.9–3.6)
LYMPHOCYTES NFR BLD: 32 % (ref 21–52)
MCH RBC QN AUTO: 28.3 PG (ref 24–34)
MCHC RBC AUTO-ENTMCNC: 32.4 G/DL (ref 31–37)
MCV RBC AUTO: 87.5 FL (ref 78–100)
METAMYELOCYTES NFR BLD MANUAL: 1 %
MONOCYTES # BLD: 0.8 K/UL (ref 0.05–1.2)
MONOCYTES NFR BLD: 5 % (ref 3–10)
NEUTS SEG # BLD: 10.3 K/UL (ref 1.8–8)
NEUTS SEG NFR BLD: 61 % (ref 40–73)
NRBC # BLD: 0 K/UL (ref 0–0.01)
NRBC BLD-RTO: 0 PER 100 WBC
PLATELET # BLD AUTO: 518 K/UL (ref 135–420)
PLATELET COMMENT: ABNORMAL
PMV BLD AUTO: 10.1 FL (ref 9.2–11.8)
POTASSIUM SERPL-SCNC: 3 MMOL/L (ref 3.5–5.5)
RBC # BLD AUTO: 2.72 M/UL (ref 4.2–5.3)
RBC MORPH BLD: ABNORMAL
SERVICE CMNT-IMP: ABNORMAL
SODIUM SERPL-SCNC: 138 MMOL/L (ref 136–145)
WBC # BLD AUTO: 16.9 K/UL (ref 4.6–13.2)

## 2023-02-20 PROCEDURE — 2700000000 HC OXYGEN THERAPY PER DAY

## 2023-02-20 PROCEDURE — 6370000000 HC RX 637 (ALT 250 FOR IP): Performed by: SURGERY

## 2023-02-20 PROCEDURE — 80048 BASIC METABOLIC PNL TOTAL CA: CPT

## 2023-02-20 PROCEDURE — 6360000002 HC RX W HCPCS: Performed by: INTERNAL MEDICINE

## 2023-02-20 PROCEDURE — 94761 N-INVAS EAR/PLS OXIMETRY MLT: CPT

## 2023-02-20 PROCEDURE — 85025 COMPLETE CBC W/AUTO DIFF WBC: CPT

## 2023-02-20 PROCEDURE — 94640 AIRWAY INHALATION TREATMENT: CPT

## 2023-02-20 PROCEDURE — 6360000002 HC RX W HCPCS: Performed by: SURGERY

## 2023-02-20 PROCEDURE — 2580000003 HC RX 258: Performed by: SURGERY

## 2023-02-20 PROCEDURE — 6370000000 HC RX 637 (ALT 250 FOR IP): Performed by: INTERNAL MEDICINE

## 2023-02-20 PROCEDURE — 2580000003 HC RX 258: Performed by: INTERNAL MEDICINE

## 2023-02-20 PROCEDURE — 36415 COLL VENOUS BLD VENIPUNCTURE: CPT

## 2023-02-20 PROCEDURE — 1100000003 HC PRIVATE W/ TELEMETRY

## 2023-02-20 RX ORDER — ARFORMOTEROL TARTRATE 15 UG/2ML
15 SOLUTION RESPIRATORY (INHALATION) 2 TIMES DAILY
Status: DISCONTINUED | OUTPATIENT
Start: 2023-02-20 | End: 2023-03-02 | Stop reason: HOSPADM

## 2023-02-20 RX ORDER — CETIRIZINE HYDROCHLORIDE 10 MG/1
10 TABLET ORAL NIGHTLY
Status: DISCONTINUED | OUTPATIENT
Start: 2023-02-20 | End: 2023-03-02 | Stop reason: HOSPADM

## 2023-02-20 RX ORDER — BUDESONIDE 0.25 MG/2ML
0.25 INHALANT ORAL 2 TIMES DAILY
Status: DISCONTINUED | OUTPATIENT
Start: 2023-02-20 | End: 2023-03-02 | Stop reason: HOSPADM

## 2023-02-20 RX ORDER — CINACALCET 30 MG/1
30 TABLET, FILM COATED ORAL DAILY
Status: DISCONTINUED | OUTPATIENT
Start: 2023-02-20 | End: 2023-03-02 | Stop reason: HOSPADM

## 2023-02-20 RX ORDER — LATANOPROST 50 UG/ML
1 SOLUTION/ DROPS OPHTHALMIC NIGHTLY
Status: DISCONTINUED | OUTPATIENT
Start: 2023-02-20 | End: 2023-03-02 | Stop reason: HOSPADM

## 2023-02-20 RX ORDER — VITAMIN B COMPLEX
2000 TABLET ORAL DAILY
Status: DISCONTINUED | OUTPATIENT
Start: 2023-02-20 | End: 2023-03-02 | Stop reason: HOSPADM

## 2023-02-20 RX ORDER — ALBUTEROL SULFATE 2.5 MG/3ML
2.5 SOLUTION RESPIRATORY (INHALATION) EVERY 6 HOURS PRN
Status: DISCONTINUED | OUTPATIENT
Start: 2023-02-20 | End: 2023-03-02 | Stop reason: HOSPADM

## 2023-02-20 RX ORDER — MONTELUKAST SODIUM 10 MG/1
10 TABLET ORAL DAILY
Status: DISCONTINUED | OUTPATIENT
Start: 2023-02-20 | End: 2023-03-02 | Stop reason: HOSPADM

## 2023-02-20 RX ORDER — ALBUTEROL SULFATE 90 UG/1
2 AEROSOL, METERED RESPIRATORY (INHALATION) EVERY 6 HOURS PRN
Status: DISCONTINUED | OUTPATIENT
Start: 2023-02-20 | End: 2023-02-20

## 2023-02-20 RX ADMIN — CETIRIZINE HYDROCHLORIDE 10 MG: 10 TABLET, FILM COATED ORAL at 20:08

## 2023-02-20 RX ADMIN — CINACALCET HYDROCHLORIDE 30 MG: 30 TABLET, FILM COATED ORAL at 14:58

## 2023-02-20 RX ADMIN — IPRATROPIUM BROMIDE 0.5 MG: 0.5 SOLUTION RESPIRATORY (INHALATION) at 23:21

## 2023-02-20 RX ADMIN — CHOLECALCIFEROL TAB 25 MCG (1000 UNIT) 2000 UNITS: 25 TAB at 14:58

## 2023-02-20 RX ADMIN — SODIUM CHLORIDE, PRESERVATIVE FREE 10 ML: 5 INJECTION INTRAVENOUS at 20:09

## 2023-02-20 RX ADMIN — HYDROMORPHONE HYDROCHLORIDE 1 MG: 1 INJECTION, SOLUTION INTRAMUSCULAR; INTRAVENOUS; SUBCUTANEOUS at 01:45

## 2023-02-20 RX ADMIN — OXYCODONE HYDROCHLORIDE 5 MG: 5 TABLET ORAL at 11:35

## 2023-02-20 RX ADMIN — VANCOMYCIN HYDROCHLORIDE 1250 MG: 10 INJECTION, POWDER, LYOPHILIZED, FOR SOLUTION INTRAVENOUS at 22:34

## 2023-02-20 RX ADMIN — ARFORMOTEROL TARTRATE 15 MCG: 15 SOLUTION RESPIRATORY (INHALATION) at 19:49

## 2023-02-20 RX ADMIN — IPRATROPIUM BROMIDE 0.5 MG: 0.5 SOLUTION RESPIRATORY (INHALATION) at 16:24

## 2023-02-20 RX ADMIN — HYDROMORPHONE HYDROCHLORIDE 1 MG: 1 INJECTION, SOLUTION INTRAMUSCULAR; INTRAVENOUS; SUBCUTANEOUS at 22:33

## 2023-02-20 RX ADMIN — PREGABALIN 200 MG: 25 CAPSULE ORAL at 20:08

## 2023-02-20 RX ADMIN — OXYCODONE HYDROCHLORIDE 5 MG: 5 TABLET ORAL at 20:00

## 2023-02-20 RX ADMIN — HYDROMORPHONE HYDROCHLORIDE 1 MG: 1 INJECTION, SOLUTION INTRAMUSCULAR; INTRAVENOUS; SUBCUTANEOUS at 18:03

## 2023-02-20 RX ADMIN — MONTELUKAST 10 MG: 10 TABLET, FILM COATED ORAL at 14:58

## 2023-02-20 RX ADMIN — CEFTRIAXONE 2000 MG: 2 INJECTION, POWDER, FOR SOLUTION INTRAMUSCULAR; INTRAVENOUS at 20:03

## 2023-02-20 RX ADMIN — BUDESONIDE 250 MCG: 0.25 SUSPENSION RESPIRATORY (INHALATION) at 19:49

## 2023-02-20 RX ADMIN — OXYCODONE HYDROCHLORIDE 5 MG: 5 TABLET ORAL at 02:30

## 2023-02-20 ASSESSMENT — PAIN SCALES - GENERAL
PAINLEVEL_OUTOF10: 4
PAINLEVEL_OUTOF10: 7
PAINLEVEL_OUTOF10: 5
PAINLEVEL_OUTOF10: 8
PAINLEVEL_OUTOF10: 0
PAINLEVEL_OUTOF10: 8
PAINLEVEL_OUTOF10: 6
PAINLEVEL_OUTOF10: 8
PAINLEVEL_OUTOF10: 8
PAINLEVEL_OUTOF10: 2
PAINLEVEL_OUTOF10: 8

## 2023-02-20 ASSESSMENT — PAIN DESCRIPTION - DESCRIPTORS
DESCRIPTORS: GNAWING
DESCRIPTORS: ACHING
DESCRIPTORS: ACHING;GNAWING
DESCRIPTORS: SHARP;ACHING
DESCRIPTORS: ACHING
DESCRIPTORS: ACHING

## 2023-02-20 ASSESSMENT — PAIN DESCRIPTION - ORIENTATION
ORIENTATION: LEFT
ORIENTATION: RIGHT;LEFT
ORIENTATION: RIGHT;LEFT

## 2023-02-20 ASSESSMENT — PAIN DESCRIPTION - LOCATION
LOCATION: GROIN
LOCATION: BACK
LOCATION: GROIN
LOCATION: GROIN
LOCATION: ABDOMEN;BACK
LOCATION: GROIN

## 2023-02-20 ASSESSMENT — PAIN - FUNCTIONAL ASSESSMENT
PAIN_FUNCTIONAL_ASSESSMENT: ACTIVITIES ARE NOT PREVENTED
PAIN_FUNCTIONAL_ASSESSMENT: PREVENTS OR INTERFERES SOME ACTIVE ACTIVITIES AND ADLS
PAIN_FUNCTIONAL_ASSESSMENT: ACTIVITIES ARE NOT PREVENTED

## 2023-02-20 NOTE — CARE COORDINATION
Case Management Assessment  Initial Evaluation    Date/Time of Evaluation: 2/20/2023 9:06 AM  Assessment Completed by: Lennox Needy, RN    If patient is discharged prior to next notation, then this note serves as note for discharge by case management. Patient Name: Dorita Wells                   YOB: 1950  Diagnosis: Dehiscence of operative wound, initial encounter [T81.31XA]  Dehiscence of wound Celestine Riosdarlin                   Date / Time: 2/17/2023 12:53 PM    Patient Admission Status: Inpatient   Readmission Risk (Low < 19, Mod (19-27), High > 27): Readmission Risk Score: 11.2    Current PCP: Logan Barber MD  PCP verified by CM? (P) Yes (Dr. Ofelia Mensah)    Chart Reviewed: Yes      History Provided by: (P) Patient  Patient Orientation: (P) Alert and Oriented    Patient Cognition: (P) Alert    Hospitalization in the last 30 days (Readmission):  Yes    If yes, Readmission Assessment in  Navigator will be completed. Advance Directives:      Code Status: Full Code   Patient's Primary Decision Maker is:        Discharge Planning:    Patient lives with: (P) Spouse/Significant Other Type of Home: (P) House  Primary Care Giver: (P) Self  Patient Support Systems include: (P) Spouse/Significant Other   Current Financial resources: (P) Medicare  Current community resources:    Current services prior to admission:              Current DME:              Type of Home Care services:       ADLS  Prior functional level:    Current functional level:      PT AM-PAC:   /24  OT AM-PAC:   /24    Family can provide assistance at DC: (P) Yes  Would you like Case Management to discuss the discharge plan with any other family members/significant others, and if so, who?     Plans to Return to Present Housing: (P) Yes  Other Identified Issues/Barriers to RETURNING to current housing: none  Potential Assistance needed at discharge: (P) Home Care            Potential DME:    Patient expects to discharge to: (P) [facility] for transportation at discharge:      Financial    Payor: Raeann Blind / Plan: MEDICARE PART A AND B / Product Type: *No Product type* /     Does insurance require precert for SNF: No    Potential assistance Purchasing Medications:    Meds-to-Beds request:      No Pharmacies Listed    Notes:    Factors facilitating achievement of predicted outcomes: Family support and Caregiver support    Barriers to discharge: Pain, Medical complications, and Wound Care    Additional Case Management Notes: The Plan for Transition of Care is related to the following treatment goals of Dehiscence of operative wound, initial encounter [T81.31XA]  Dehiscence of wound [I20.39QK]    IF APPLICABLE: The Patient and/or patient representative Zamzam and her family were provided with a choice of provider and agrees with the discharge plan. Freedom of choice list with basic dialogue that supports the patient's individualized plan of care/goals and shares the quality data associated with the providers was provided to:     Patient Representative Name:       The Patient and/or Patient Representative Agree with the Discharge Plan?       Lilly Forrest RN  Case Management Department  Ph: [phone]

## 2023-02-20 NOTE — PROGRESS NOTES
Albuterol nebulizer solution was substituted for albuterol inhaler per P&T approved substitution protocol  MERY RODRIGUEZ RP

## 2023-02-20 NOTE — PROGRESS NOTES
Substitution Information per the P&T Committee approved Therapeutic Interchanges Policy     Medication Ordered Preferred Medication Dispensed    Trelegy Ellipta® (fluticasone/umeclidinium/vilanterol) 100/62.5/25 mcg daily Pulmicort Respules® (budesonide) 0.25mg/2ml BID  +  Arformoterol 15mcg/2ml BID  +  Ipratropium scheduled q8h

## 2023-02-20 NOTE — PROGRESS NOTES
Met with patient and her  several times today  She tells me she feels well, no complaints other than frustration  No pain at the groin site no difficulty with breathing no chest pain no abdominal pain. No episodes of bleeding from the groin no blood in her stools. Labs and vitals reviewed, no fevers  Has not been able to receive antibiotics reliably secondary to no IV access, may be reason for persistent white count. Discussed CT with radiology, no acute findings  Discussed with infectious disease, will get appropriate IV access for antibiotics and a indium scan.   Patient is aware and agreeable

## 2023-02-21 ENCOUNTER — APPOINTMENT (OUTPATIENT)
Facility: HOSPITAL | Age: 73
DRG: 252 | End: 2023-02-21
Payer: MEDICARE

## 2023-02-21 LAB
ABO + RH BLD: NORMAL
ANION GAP SERPL CALC-SCNC: 6 MMOL/L (ref 3–18)
BLOOD GROUP ANTIBODIES SERPL: NORMAL
BUN SERPL-MCNC: 9 MG/DL (ref 7–18)
BUN/CREAT SERPL: 11 (ref 12–20)
CALCIUM SERPL-MCNC: 10.1 MG/DL (ref 8.5–10.1)
CHLORIDE SERPL-SCNC: 104 MMOL/L (ref 100–111)
CO2 SERPL-SCNC: 29 MMOL/L (ref 21–32)
CREAT SERPL-MCNC: 0.81 MG/DL (ref 0.6–1.3)
ERYTHROCYTE [DISTWIDTH] IN BLOOD BY AUTOMATED COUNT: 15.9 % (ref 11.6–14.5)
FERRITIN SERPL-MCNC: 78 NG/ML (ref 8–388)
FOLATE SERPL-MCNC: 7.2 NG/ML (ref 3.1–17.5)
GLUCOSE SERPL-MCNC: 110 MG/DL (ref 74–99)
HCT VFR BLD AUTO: 27.4 % (ref 35–45)
HGB BLD-MCNC: 8.5 G/DL (ref 12–16)
IRON SATN MFR SERPL: 4 % (ref 20–50)
IRON SERPL-MCNC: 12 UG/DL (ref 50–175)
LDH SERPL L TO P-CCNC: 128 U/L (ref 81–234)
MCH RBC QN AUTO: 28 PG (ref 24–34)
MCHC RBC AUTO-ENTMCNC: 31 G/DL (ref 31–37)
MCV RBC AUTO: 90.1 FL (ref 78–100)
NRBC # BLD: 0 K/UL (ref 0–0.01)
NRBC BLD-RTO: 0 PER 100 WBC
PLATELET # BLD AUTO: 603 K/UL (ref 135–420)
PMV BLD AUTO: 9.5 FL (ref 9.2–11.8)
POTASSIUM SERPL-SCNC: 3.2 MMOL/L (ref 3.5–5.5)
RBC # BLD AUTO: 3.04 M/UL (ref 4.2–5.3)
SODIUM SERPL-SCNC: 139 MMOL/L (ref 136–145)
SPECIMEN EXP DATE BLD: NORMAL
TIBC SERPL-MCNC: 294 UG/DL (ref 250–450)
VANCOMYCIN SERPL-MCNC: 28.4 UG/ML (ref 5–40)
VIT B12 SERPL-MCNC: >2000 PG/ML (ref 211–911)
WBC # BLD AUTO: 20.7 K/UL (ref 4.6–13.2)

## 2023-02-21 PROCEDURE — 82728 ASSAY OF FERRITIN: CPT

## 2023-02-21 PROCEDURE — 6370000000 HC RX 637 (ALT 250 FOR IP): Performed by: INTERNAL MEDICINE

## 2023-02-21 PROCEDURE — 85027 COMPLETE CBC AUTOMATED: CPT

## 2023-02-21 PROCEDURE — 80048 BASIC METABOLIC PNL TOTAL CA: CPT

## 2023-02-21 PROCEDURE — 36415 COLL VENOUS BLD VENIPUNCTURE: CPT

## 2023-02-21 PROCEDURE — 82607 VITAMIN B-12: CPT

## 2023-02-21 PROCEDURE — 81270 JAK2 GENE: CPT

## 2023-02-21 PROCEDURE — 81338 MPL GENE COMMON VARIANTS: CPT

## 2023-02-21 PROCEDURE — 83615 LACTATE (LD) (LDH) ENZYME: CPT

## 2023-02-21 PROCEDURE — 1100000003 HC PRIVATE W/ TELEMETRY

## 2023-02-21 PROCEDURE — A9547 IN111 OXYQUINOLINE: HCPCS | Performed by: SURGERY

## 2023-02-21 PROCEDURE — 6360000002 HC RX W HCPCS: Performed by: INTERNAL MEDICINE

## 2023-02-21 PROCEDURE — 6360000002 HC RX W HCPCS: Performed by: SURGERY

## 2023-02-21 PROCEDURE — 3430000000 HC RX DIAGNOSTIC RADIOPHARMACEUTICAL: Performed by: SURGERY

## 2023-02-21 PROCEDURE — 80202 ASSAY OF VANCOMYCIN: CPT

## 2023-02-21 PROCEDURE — 83550 IRON BINDING TEST: CPT

## 2023-02-21 PROCEDURE — 94640 AIRWAY INHALATION TREATMENT: CPT

## 2023-02-21 PROCEDURE — 6370000000 HC RX 637 (ALT 250 FOR IP): Performed by: SURGERY

## 2023-02-21 PROCEDURE — 86900 BLOOD TYPING SEROLOGIC ABO: CPT

## 2023-02-21 PROCEDURE — 87040 BLOOD CULTURE FOR BACTERIA: CPT

## 2023-02-21 PROCEDURE — 81219 CALR GENE COM VARIANTS: CPT

## 2023-02-21 PROCEDURE — 2580000003 HC RX 258: Performed by: SURGERY

## 2023-02-21 PROCEDURE — 2580000003 HC RX 258: Performed by: INTERNAL MEDICINE

## 2023-02-21 PROCEDURE — 36410 VNPNXR 3YR/> PHY/QHP DX/THER: CPT

## 2023-02-21 RX ORDER — MAGNESIUM SULFATE IN WATER 40 MG/ML
2000 INJECTION, SOLUTION INTRAVENOUS PRN
Status: DISCONTINUED | OUTPATIENT
Start: 2023-02-21 | End: 2023-03-02 | Stop reason: HOSPADM

## 2023-02-21 RX ORDER — INDIUM IN-111 OXYQUINOLINE 1 UG/ML
486 SOLUTION INTRAVENOUS
Status: COMPLETED | OUTPATIENT
Start: 2023-02-21 | End: 2023-02-21

## 2023-02-21 RX ORDER — FOLIC ACID 1 MG/1
1 TABLET ORAL DAILY
Status: DISCONTINUED | OUTPATIENT
Start: 2023-02-21 | End: 2023-03-02 | Stop reason: HOSPADM

## 2023-02-21 RX ORDER — POTASSIUM CHLORIDE 7.45 MG/ML
10 INJECTION INTRAVENOUS PRN
Status: DISCONTINUED | OUTPATIENT
Start: 2023-02-21 | End: 2023-03-02 | Stop reason: HOSPADM

## 2023-02-21 RX ORDER — ACETAMINOPHEN 325 MG/1
650 TABLET ORAL EVERY 4 HOURS PRN
Status: DISCONTINUED | OUTPATIENT
Start: 2023-02-21 | End: 2023-03-02 | Stop reason: HOSPADM

## 2023-02-21 RX ADMIN — HYDROMORPHONE HYDROCHLORIDE 1 MG: 1 INJECTION, SOLUTION INTRAMUSCULAR; INTRAVENOUS; SUBCUTANEOUS at 16:56

## 2023-02-21 RX ADMIN — ASPIRIN 81 MG: 81 TABLET, COATED ORAL at 08:53

## 2023-02-21 RX ADMIN — SODIUM CHLORIDE, PRESERVATIVE FREE 10 ML: 5 INJECTION INTRAVENOUS at 22:01

## 2023-02-21 RX ADMIN — MONTELUKAST 10 MG: 10 TABLET, FILM COATED ORAL at 06:41

## 2023-02-21 RX ADMIN — POTASSIUM CHLORIDE 10 MEQ: 7.46 INJECTION, SOLUTION INTRAVENOUS at 19:21

## 2023-02-21 RX ADMIN — CHOLECALCIFEROL TAB 25 MCG (1000 UNIT) 2000 UNITS: 25 TAB at 06:41

## 2023-02-21 RX ADMIN — LATANOPROST 1 DROP: 50 SOLUTION OPHTHALMIC at 22:32

## 2023-02-21 RX ADMIN — CINACALCET HYDROCHLORIDE 30 MG: 30 TABLET, FILM COATED ORAL at 08:53

## 2023-02-21 RX ADMIN — HYDROMORPHONE HYDROCHLORIDE 1 MG: 1 INJECTION, SOLUTION INTRAMUSCULAR; INTRAVENOUS; SUBCUTANEOUS at 21:59

## 2023-02-21 RX ADMIN — IRON SUCROSE 200 MG: 20 INJECTION, SOLUTION INTRAVENOUS at 08:53

## 2023-02-21 RX ADMIN — PREGABALIN 100 MG: 25 CAPSULE ORAL at 08:53

## 2023-02-21 RX ADMIN — ARFORMOTEROL TARTRATE 15 MCG: 15 SOLUTION RESPIRATORY (INHALATION) at 22:03

## 2023-02-21 RX ADMIN — PREGABALIN 200 MG: 25 CAPSULE ORAL at 21:58

## 2023-02-21 RX ADMIN — POTASSIUM CHLORIDE 10 MEQ: 7.46 INJECTION, SOLUTION INTRAVENOUS at 18:12

## 2023-02-21 RX ADMIN — BUDESONIDE 250 MCG: 0.25 SUSPENSION RESPIRATORY (INHALATION) at 12:38

## 2023-02-21 RX ADMIN — FOLIC ACID 1 MG: 1 TABLET ORAL at 08:53

## 2023-02-21 RX ADMIN — CETIRIZINE HYDROCHLORIDE 10 MG: 10 TABLET, FILM COATED ORAL at 21:58

## 2023-02-21 RX ADMIN — SODIUM CHLORIDE, PRESERVATIVE FREE 10 ML: 5 INJECTION INTRAVENOUS at 08:54

## 2023-02-21 RX ADMIN — CEFTRIAXONE 2000 MG: 2 INJECTION, POWDER, FOR SOLUTION INTRAMUSCULAR; INTRAVENOUS at 21:59

## 2023-02-21 RX ADMIN — INDIUM IN-111 OXYQUINOLINE 0.49 MILLICURIE: 1 SOLUTION INTRAVENOUS at 12:25

## 2023-02-21 RX ADMIN — BUDESONIDE 250 MCG: 0.25 SUSPENSION RESPIRATORY (INHALATION) at 22:03

## 2023-02-21 RX ADMIN — VANCOMYCIN HYDROCHLORIDE 1250 MG: 10 INJECTION, POWDER, LYOPHILIZED, FOR SOLUTION INTRAVENOUS at 23:21

## 2023-02-21 RX ADMIN — IPRATROPIUM BROMIDE 0.5 MG: 0.5 SOLUTION RESPIRATORY (INHALATION) at 12:37

## 2023-02-21 RX ADMIN — IPRATROPIUM BROMIDE 0.5 MG: 0.5 SOLUTION RESPIRATORY (INHALATION) at 22:03

## 2023-02-21 RX ADMIN — HYDROMORPHONE HYDROCHLORIDE 1 MG: 1 INJECTION, SOLUTION INTRAMUSCULAR; INTRAVENOUS; SUBCUTANEOUS at 08:51

## 2023-02-21 RX ADMIN — HYDROMORPHONE HYDROCHLORIDE 1 MG: 1 INJECTION, SOLUTION INTRAMUSCULAR; INTRAVENOUS; SUBCUTANEOUS at 13:26

## 2023-02-21 RX ADMIN — ACETAMINOPHEN 650 MG: 325 TABLET ORAL at 12:26

## 2023-02-21 RX ADMIN — ARFORMOTEROL TARTRATE 15 MCG: 15 SOLUTION RESPIRATORY (INHALATION) at 12:37

## 2023-02-21 ASSESSMENT — PAIN DESCRIPTION - LOCATION
LOCATION: GROIN
LOCATION: GROIN
LOCATION: LEG
LOCATION: GROIN;LEG
LOCATION: GROIN

## 2023-02-21 ASSESSMENT — PAIN SCALES - GENERAL
PAINLEVEL_OUTOF10: 8
PAINLEVEL_OUTOF10: 9
PAINLEVEL_OUTOF10: 8
PAINLEVEL_OUTOF10: 4
PAINLEVEL_OUTOF10: 7
PAINLEVEL_OUTOF10: 3
PAINLEVEL_OUTOF10: 4
PAINLEVEL_OUTOF10: 7

## 2023-02-21 ASSESSMENT — PAIN DESCRIPTION - DESCRIPTORS
DESCRIPTORS: SHARP
DESCRIPTORS: SHARP
DESCRIPTORS: ACHING
DESCRIPTORS: STABBING
DESCRIPTORS: SHARP

## 2023-02-21 ASSESSMENT — PAIN - FUNCTIONAL ASSESSMENT
PAIN_FUNCTIONAL_ASSESSMENT: PREVENTS OR INTERFERES SOME ACTIVE ACTIVITIES AND ADLS
PAIN_FUNCTIONAL_ASSESSMENT: ACTIVITIES ARE NOT PREVENTED

## 2023-02-21 ASSESSMENT — PAIN DESCRIPTION - ORIENTATION
ORIENTATION: LEFT

## 2023-02-21 NOTE — PROGRESS NOTES
HEMATOLOGY AND ONCOLOGY   PROGRESS NOTE      Patient: Ga Ko   MRN: [id number]      YOB: 1950      Admit Date: 2/17/2023    Assessment:     # LEFT LEG BYPASS GRAFT THROMBOSIS:   # ANEMIA:  # THROMBOCYTOSIS:   # INFECTED L LEG WOUND:    - Dehiscence of wound  # CROHN'S DISEASE:     Plan:     - I reviewed the available labs with the patient and her  and explained to them the evolving anemia and thrombocytosis. - given the pattern of CBC changes over the last 2 months, I am more suspecting reactive thrombocytosis and possible anemia of inflammation/infection vs blood loss. - Check LDH level, iron saturation and iron level. Ferritin is likely elevated. Check also B12 and folate. - Could check hemoccult. - Could check the JAK2 mutations status d/t recent thrombosis. - Hypercoagulable work up is planned to be done as an outpatient by Dr. Leslie Elaine. - Anticoagulation as per the vascular team. She is already on baby Aspirin.   - Follow up cultures and antibiotics management as per the ID team.    I discussed the plan with the patient and her  and answered their questions. Electronically signed by Daniel Smith MD on 2/20/2023 at 8:54 PM   [facility] [phone]    Subjective:     Patient is known to the [facility]. 15 team from recent admission. She was seen by Dr. Leslie Elaine for hypercoagulable work up after a diagnosis of recurrent occluded left leg bypass. She had an ischemic left foot. She has had x3 procedures to treat the occlusion, 01/05, 01/19 and 01/26/2023. She underwent initial thrombectomy and revision and placed on heparin but then did reoccluded. She had a final thrombectomy and revision with removal of common femoral thrombus and balloon angioplasty which opened up the bypass. She was discharged home recently on 01/29/2023 but presented back to ER on 02/17 complaining of dehiscence of left femoral incision.  She had swelling that opened up and drained fluid on Friday. She had her monthly Remicade on the day before presenting to ER. No fever, chest pain, SOB, diarrhea or any urinary symptoms. Her labs showed evolving anemia and thrombocytosis, subsequently the hematology team was consulted. She denies any h/o abnormal bleeding. She had transfusion in the past post surgery. No recent weight loss. Objective:     Patient Vitals for the past 24 hrs:   BP Temp Temp src Pulse Resp SpO2   23 116/66 97.5 °F (36.4 °C) Oral 64 16 --   23 1949 -- -- -- 66 18 96 %   23 1833 -- -- -- -- 18 --   23 1803 -- -- -- -- 18 --   23 1703 137/64 98.6 °F (37 °C) Oral 86 17 96 %   23 1625 -- -- -- -- -- 98 %   23 1208 115/61 97.2 °F (36.2 °C) Oral 81 17 97 %   23 1135 -- -- -- -- 18 --   23 0838 (!) 121/59 98.1 °F (36.7 °C) Oral 75 18 97 %   23 0411 (!) 114/57 98.3 °F (36.8 °C) Oral 82 18 97 %   23 0022 112/61 98.3 °F (36.8 °C) Oral 79 20 98 %         Intake/Output Summary (Last 24 hours) at 2023  Last data filed at 2023  Gross per 24 hour   Intake 10 ml   Output --   Net 10 ml       Temp (24hrs), Av °F (36.7 °C), Min:97.2 °F (36.2 °C), Max:98.6 °F (37 °C)       Review Of Systems:     Review of Symptoms: Please see ED notes and admission/consult notes which I have reviewed; otherwise as below. Constitutional:  No Fever; No chills; No weight loss    Skin:  No rash; No itching   HEENT:  No changes in vision or hearing. No oral sores. No neck swelling. Cardiovascular:  See HPI    Respiratory:  See HPI    Gastrointestinal:  No nausea or vomiting; No abdominal pain and no diarrhea. Genitourinary:  No dysuria; No increase in urinary frequency   Musculoskeletal:  No weakness or muscle pains   Endo:  No polyuria; + fatigue. Heme:  No bruising; No bleeding   Neurological:  No Seizures; No focal weakness. Legs sensory changes.    Psychiatric:  No mood changes; no suicidal ideation Physical Exam:     Constitutional: Alert, oriented, not in distress  Eyes: PERRLA, anicteric, no redness  HEENT: no palpable Lymph nodes, no mucositis, no thrush. Respiratory: symmetrical expansion, no rales, no rhonchi, no wheezing. Cardiovascular: S1S2 positive. Gastrointestinal: soft, benign, non tender, no palpable mass. Integument: no rash, no petechiae, no ecchymosis. Musculoskeletal: no edema, no cyanosis, no clubbing. L leg wound vac. Neurological: intact, symmetrical exam with no focal motor or sensory deficits.     Lab:     Recent Results (from the past 24 hour(s))   CBC with Auto Differential    Collection Time: 02/20/23  1:19 AM   Result Value Ref Range    WBC 16.9 (H) 4.6 - 13.2 K/uL    RBC 2.72 (L) 4.20 - 5.30 M/uL    Hemoglobin 7.7 (L) 12.0 - 16.0 g/dL    Hematocrit 23.8 (L) 35.0 - 45.0 %    MCV 87.5 78.0 - 100.0 FL    MCH 28.3 24.0 - 34.0 PG    MCHC 32.4 31.0 - 37.0 g/dL    RDW 15.9 (H) 11.6 - 14.5 %    Platelets 751 (H) 556 - 420 K/uL    MPV 10.1 9.2 - 11.8 FL    Nucleated RBCs 0.0 0  WBC    nRBC 0.00 0.00 - 0.01 K/uL    Seg Neutrophils 61 40 - 73 %    Lymphocytes 32 21 - 52 %    Monocytes 5 3 - 10 %    Eosinophils % 1 0 - 5 %    Basophils 0 0 - 2 %    Metamyelocytes 1 %    Immature Granulocytes 0 0.0 - 0.5 %    Segs Absolute 10.3 (H) 1.8 - 8.0 K/UL    Absolute Lymph # 5.4 (H) 0.9 - 3.6 K/UL    Absolute Mono # 0.8 0.05 - 1.2 K/UL    Absolute Eos # 0.2 0.0 - 0.4 K/UL    Basophils Absolute 0.0 0.0 - 0.1 K/UL    Absolute Immature Granulocyte 0.0 0.00 - 0.04 K/UL    Differential Type MANUAL      Platelet Comment Increased Platelets      RBC Comment NORMOCYTIC, NORMOCHROMIC     Basic Metabolic Panel    Collection Time: 02/20/23  1:19 AM   Result Value Ref Range    Sodium 138 136 - 145 mmol/L    Potassium 3.0 (L) 3.5 - 5.5 mmol/L    Chloride 106 100 - 111 mmol/L    CO2 27 21 - 32 mmol/L    Anion Gap 5 3.0 - 18 mmol/L    Glucose 92 74 - 99 mg/dL    BUN 9 7.0 - 18 MG/DL    Creatinine 0.62 0.6 - 1.3 MG/DL    Bun/Cre Ratio 15 12 - 20      Est, Glom Filt Rate >60 >60 ml/min/1.73m2    Calcium 9.2 8.5 - 10.1 MG/DL

## 2023-02-21 NOTE — PROGRESS NOTES
Phone: [phone]    Hematology / Oncology Progress Note            Patient: Toma Florentino   MRN: [id number]         CSN: [id number]    YOB: 1950      Admit Date: 2023    Assessment:     Principal Problem:    Dehiscence of wound  Active Problems:    Open wound  Resolved Problems:    * No resolved hospital problems. *    Recurrent arterial thrombosis, graft thrombosis  Wound dehiscence  Anemia, low iron and folate  Plan:     Venofer IV x3, po folic acid  Hypercoagulable w/u as out pt.   Wound care, IV antibiotics    Dominic Petty MD  [facility] [phone]      Subjective:     No fever    Objective:     BP (!) 137/59   Pulse 90   Temp (!) 100.6 °F (38.1 °C) (Oral)   Resp 16   SpO2 96%           Temp (24hrs), Av.4 °F (36.9 °C), Min:97.2 °F (36.2 °C), Max:100.6 °F (38.1 °C)        Intake/Output Summary (Last 24 hours) at 2023 0849  Last data filed at 2023  Gross per 24 hour   Intake 10 ml   Output --   Net 10 ml     Review of Systems:   Constitutional: negative for fevers, chills, sweats and fatigue  Eyes: negative for visual disturbance,  icterus  Ears, Nose, Mouth, Throat, and Face: negative for tinnitus, epistaxis, sore mouth and hoarseness  Respiratory: negative for cough, sputum, hemoptysis, pleurisy/chest pain or wheezing  Cardiovascular: negative for chest pain, , palpitations,  syncope, paroxysmal nocturnal dyspnea  Gastrointestinal: negative for reflux symptoms, nausea, vomiting, melena, diarrhea, constipation and abdominal pain  Genitourinary:negative for dysuria, nocturia, urinary incontinence, hesitancy and hematuria  Endocrine: no polydipsia, no goitre  Integument: negative for rash, skin lesion(s) and pruritus  Hematologic/Lymphatic: negative for easy bruising, bleeding and lymphadenopathy  Musculoskeletal:negative for myalgias, arthralgias and bone pain  Neurological: negative for headaches, dizziness, seizures, paresthesia and weakness    Physical Exam: ECOG : 1  Constitutional: Alert, oriented, not in distress  Eyes: PERRLA, anicteric, pallor  ENT: no palpable Lymph nodes  Respiratory: bilateral air entry, no rales,  no wheezing. Cardiovascular: S1S2 regular , no  murmur  Gastrointestinal: soft,  non tender, no HSM, normal bowel sounds. Neurological: intact cranial nerves, no focal motor or sensory deficits.       Labs:  Recent Results (from the past 24 hour(s))   Vancomycin Level, Random    Collection Time: 02/21/23  1:18 AM   Result Value Ref Range    Vancomycin Rm 28.4 5.0 - 40.0 UG/ML   Vitamin B12 & Folate    Collection Time: 02/21/23  1:18 AM   Result Value Ref Range    Vitamin B-12 >2000 (H) 211 - 911 pg/mL    Folate 7.2 3.10 - 17.50 ng/mL   Iron and TIBC    Collection Time: 02/21/23  1:18 AM   Result Value Ref Range    Iron 12 (L) 50 - 175 ug/dL    TIBC 294 250 - 450 ug/dL    Iron % Saturation 4 (L) 20 - 50 %   Lactate Dehydrogenase    Collection Time: 02/21/23  1:18 AM   Result Value Ref Range     81 - 234 U/L

## 2023-02-21 NOTE — PROGRESS NOTES
[address] [facility] Pharmacokinetic Monitoring Service - Vancomycin    Indication:  Surgical Site Infection  Target Concentration: Goal AUC/ADVID 400-600 mg*hr/L  Day of Therapy: 4  Additional Antimicrobials: None    Pertinent Laboratory Values:   Temp: 98 °F (36.7 °C)  Recent Labs     02/19/23  0321 02/19/23  1942 02/20/23  0119   CREATININE  --  0.76 0.62   BUN  --  9 9   WBC 19.0*  --  16.9*       CrCl cannot be calculated (Unknown ideal weight.). Pertinent Cultures:  Culture Date Source Results   2/17 blood ngtd   2/17 Wound, groin No organisms seen   MRSA Nasal Swab: N/A.  Non-respiratory infection    Assessment:  Date/Time Current Dose Concentration Timing of Concentration (h) AUC   2/18 1500mg      2/19 1250mg q24h      2/20 1250 mg q 24hr      Note: Serum concentrations collected for AUC dosing may appear elevated if collected in close proximity to the dose administered, this is not necessarily an indication of toxicity    Plan:  Current dosing regimen is therapeutic  Continue current dose of 1250mg q24h  Renal labs as indicated   Vancomycin concentration when clinically indicated  Pharmacy will continue to monitor patient and adjust therapy as indicated    Thank you for the consult,  Rogelio Watters, [address] [facility]  2/21/2023

## 2023-02-21 NOTE — PROGRESS NOTES
[facility]  (A Division of [address] [place] [address])    Follow-up Note      Date of Admission: 2023       Date of Note:  2023          Current Antimicrobials:    Prior Antimicrobials:        Assessment:         Left groin wound dehiscense-- possible deep infection/ Superficial cx + KLesiella  Leucocytosis/ sepsis   above  likely  S/P left leg thromboectomy/ angiogram/angioplasty and fem-below knee bypass X2 in 2023  PVD  Immunosuppressed due to Remicaide for Crohn's disease-- last dose 23  Anemia-- seems to keep declining-- bleeding? Neuropathy    Plan:   Continue vancomycin  Add ceftriaxone  New PIV and PICC  Trend CBC, BMP    CT scan reviewed- no acute abscess or hematoma noted. Will add Indium scan to further clarify if there is potential extension of infection into the graft. - if evidence of extension, will need graft removal    Hold Immune modulators due to infection concerns. Romi Caraballo DO  [facility]  [address], [address] [facility] [place] [address]  Office: [phone], Ext 8       Microbiology:   wound cx: light K. Aerogens   blood cx: ngtd x 2   blood cx: ngtdx 2    Lines / Catheters:  PIV    Subjective:   Patient seen and examined.  at bedside. Overall she feels well. NO fevers or chills. Frustrated.      Objective:      /64   Pulse 86   Temp 98.6 °F (37 °C) (Oral)   Resp 18   SpO2 96%   Temp (24hrs), Av.1 °F (36.7 °C), Min:97.2 °F (36.2 °C), Max:98.6 °F (37 °C)        General:   awake alert and oriented, non-toxic   Skin:   no rashes or skin lesions noted on limited exam, dry and warm   HEENT:  No scleral icterus or pallor; oral mucosa moist, lips moist   Lymph Nodes:   not assessed today   Lungs:   Non-labored; bilaterally clear to aspiration- no crackles wheezes rales or rhonchi   Heart:  RRR, s1 and s2; no murmurs rubs or gallops; no edema, + pedal pulses Abdomen:  soft, non-distended, active bowel sounds, non-tender   Genitourinary:  deferred   Extremities:   average muscle tone; no contractures, no joint effusions; wound vacin right groin; right thugh incision tendern to palpation   Neurologic:  No gross focal motor or sensory abnormalities; CN 2-12 intact; Follows commands. Psychiatric:   appropriate and interactive.          Lab results:  Chemistry  Recent Labs     02/18/23  1038 02/19/23  1942 02/20/23  0119    138 138   K 3.5 3.2* 3.0*    105 106   CO2 28 28 27   BUN 13 9 9       CBC w/ Diff  Recent Labs     02/18/23  1038 02/19/23  0321 02/20/23  0119   WBC 13.8* 19.0* 16.9*   RBC 3.17* 2.79* 2.72*   HGB 8.8* 7.8* 7.7*   HCT 27.7* 25.0* 23.8*   * 549* 518*       Microbiology  Results       Procedure Component Value Units Date/Time    Culture, Blood 2 [[id number]] Collected: 02/18/23 0244    Order Status: Completed Specimen: Blood Updated: 02/20/23 0901     Special Requests NO SPECIAL REQUESTS        Culture NO GROWTH 2 DAYS       Culture, Anaerobic [[id number]] Collected: 02/17/23 1849    Order Status: Completed Specimen: Swab from Groin Updated: 02/19/23 1513     Special Requests NO SPECIAL REQUESTS        Culture NO ANAEROBES ISOLATED       Culture, Wound Aerobic Only [[id number]]  (Abnormal)  (Susceptibility) Collected: 02/17/23 1849    Order Status: Completed Specimen: Swab from Groin Updated: 02/20/23 1039     Special Requests NO SPECIAL REQUESTS        Gram stain NO WBC'S SEEN         NO ORGANISMS SEEN        Culture       LIGHT Klebsiella (Enterobacter) aerogenes          Susceptibility        Klebsiella aerogenes      BACTERIAL SUSCEPTIBILITY PANEL DAVID      amikacin <=2 ug/mL Sensitive      ceFAZolin >=64 ug/mL Resistant      cefepime <=1 ug/mL Sensitive      cefOXitin >=64 ug/mL Resistant      cefTAZidime <=1 ug/mL Sensitive      cefTRIAXone <=1 ug/mL Sensitive      ciprofloxacin <=0.25 ug/mL Sensitive      gentamicin <=1 ug/mL Sensitive      levofloxacin <=0.12 ug/mL Sensitive      tobramycin <=1 ug/mL Sensitive      trimethoprim-sulfamethoxazole <=20 ug/mL Sensitive                           Culture, Blood 1 [[id number]] Collected: 02/17/23 1704    Order Status: Completed Specimen: Blood Updated: 02/20/23 0901     Special Requests NO SPECIAL REQUESTS        Culture NO GROWTH 3 DAYS       Blood Culture 2 [[id number]] Collected: 02/17/23 1545    Order Status: Canceled Specimen: Blood     Blood Culture 1 [[id number]] Collected: 02/17/23 1530    Order Status: Canceled Specimen: Blood               Laureen Hernandez DO   2/20/2023

## 2023-02-21 NOTE — PROGRESS NOTES
[facility]  (A Division of [address] [place] [address])    Follow-up Note      Date of Admission: 2023       Date of Note:  2023          Current Antimicrobials:    Prior Antimicrobials:  Vancomycin - present  Ceftriaxone - present      Assessment:         Left groin wound dehiscense-- possible deep infection/ Superficial cx + Klebsiella. S/p washout with wound vac on   Leucocytosis/ sepsis   above  likely  S/P left leg thromboectomy/ angiogram/angioplasty and fem-below knee bypass X2 in 2023  PVD  Immunosuppressed due to Remicaide for Crohn's disease-- last dose 23  Anemia-- seems to keep declining-- bleeding? Neuropathy    Plan:   Continue vancomycin and ceftriaxone  Trend CBC, BMP   - WBC trending up today  F/u repeat blood cx from     CT scan reviewed- no acute abscess or hematoma noted. Still with fluid collections around graft    Pending Indium scan to further clarify if there is potential extension of infection into the graft. - if evidence of extension, will need graft removal    Hold Immune modulators due to infection concerns. Discussed with nursing at bedside. Discussed with Dr. Priti Santana, [facility]  [address], [address] [facility]  Office: [phone], Ext [phone]       Microbiology:   wound cx: light K. Aerogens   blood cx: ngtd x 2   blood cx: ngtdx 2   blood cx: pending    Lines / Catheters:  PIV    Subjective:   Patient seen and examined. Napping, wakes up easily. No new particular complaints today. Still has some tenderness to her thigh  Overall she feels well. Had low grade temps ealier this am. Blood cx repeated.      Objective:      BP (!) 100/52   Pulse 80   Temp 98.9 °F (37.2 °C) (Oral)   Resp 16   SpO2 97%   Temp (24hrs), Av °F (37.2 °C), Min:97.5 °F (36.4 °C), Max:100.6 °F (38.1 °C)        General:   awake alert and oriented, non-toxic   Skin:   no rashes or skin lesions noted on limited exam, dry and warm   HEENT:  No scleral icterus or pallor; oral mucosa moist, lips moist   Lymph Nodes:   not assessed today   Lungs:   Non-labored; bilaterally clear to aspiration- no crackles wheezes rales or rhonchi   Heart:  RRR, s1 and s2; no murmurs rubs or gallops; no edema, + pedal pulses   Abdomen:  soft, non-distended, active bowel sounds, non-tender   Genitourinary:  deferred   Extremities:   average muscle tone; no contractures, no joint effusions; wound vacin right groin; right thugh incision tendern to palpation   Neurologic:  No gross focal motor or sensory abnormalities; CN 2-12 intact; Follows commands. Psychiatric:   appropriate and interactive.          Lab results:  Chemistry  Recent Labs     02/19/23  1942 02/20/23  0119 02/21/23  1228    138 139   K 3.2* 3.0* 3.2*    106 104   CO2 28 27 29   BUN 9 9 9       CBC w/ Diff  Recent Labs     02/19/23  0321 02/20/23  0119 02/21/23  1228   WBC 19.0* 16.9* 20.7*   RBC 2.79* 2.72* 3.04*   HGB 7.8* 7.7* 8.5*   HCT 25.0* 23.8* 27.4*   * 518* 603*       Microbiology  Results       Procedure Component Value Units Date/Time    Culture, Blood 2 [[id number]] Collected: 02/21/23 1228    Order Status: Sent Specimen: Blood Updated: 02/21/23 1402    Culture, Blood 1 [[id number]] Collected: 02/21/23 1218    Order Status: Sent Specimen: Blood Updated: 02/21/23 1402    Culture, Blood 2 [[id number]] Collected: 02/18/23 0244    Order Status: Completed Specimen: Blood Updated: 02/21/23 0703     Special Requests NO SPECIAL REQUESTS        Culture NO GROWTH 3 DAYS       Culture, Anaerobic [[id number]] Collected: 02/17/23 1849    Order Status: Completed Specimen: Swab from Groin Updated: 02/19/23 1513     Special Requests NO SPECIAL REQUESTS        Culture NO ANAEROBES ISOLATED       Culture, Wound Aerobic Only [[id number]]  (Abnormal)  (Susceptibility) Collected: 02/17/23 8957 Order Status: Completed Specimen: Swab from Groin Updated: 02/20/23 1039     Special Requests NO SPECIAL REQUESTS        Gram stain NO WBC'S SEEN         NO ORGANISMS SEEN        Culture       LIGHT Klebsiella (Enterobacter) aerogenes          Susceptibility        Klebsiella aerogenes      BACTERIAL SUSCEPTIBILITY PANEL DAVID      amikacin <=2 ug/mL Sensitive      ceFAZolin >=64 ug/mL Resistant      cefepime <=1 ug/mL Sensitive      cefOXitin >=64 ug/mL Resistant      cefTAZidime <=1 ug/mL Sensitive      cefTRIAXone <=1 ug/mL Sensitive      ciprofloxacin <=0.25 ug/mL Sensitive      gentamicin <=1 ug/mL Sensitive      levofloxacin <=0.12 ug/mL Sensitive      tobramycin <=1 ug/mL Sensitive      trimethoprim-sulfamethoxazole <=20 ug/mL Sensitive                           Culture, Blood 1 [[id number]] Collected: 02/17/23 1704    Order Status: Completed Specimen: Blood Updated: 02/21/23 0703     Special Requests NO SPECIAL REQUESTS        Culture NO GROWTH 4 DAYS       Blood Culture 2 [[id number]] Collected: 02/17/23 1545    Order Status: Canceled Specimen: Blood     Blood Culture 1 [[id number]] Collected: 02/17/23 1530    Order Status: Canceled Specimen: Blood               Ruth Freddy, DO   2/21/2023

## 2023-02-21 NOTE — PROGRESS NOTES
0700: Bedside and Verbal shift change report given to Ronaldo Parker RN (oncoming nurse) by Shaq Ansari RN (offgoing nurse). Report included the following information Nurse Handoff Report, Index, Intake/Output, MAR, Recent Results, and Cardiac Rhythm NSR .     0830: Pt reported pain in left groin/thigh. Pt medicated. 1130: Pt temp 100.6. Pt in NAD. No medications ordered, Dr. Merritt Manual paged and ordered medications and labs. 1226: Tylenol given for temp. Will recheck temp in an hour. 1321: Temp rechecked at 98.9.    1326: Pt medicated for left groin/thigh pain    1656: Pt medicated for left groin/thigh pain. 1812: Potassium Chloride 10 mEq started per Potassium IV replacement Protocol. Pt will receive 4 doses. 1956: Bedside and Verbal shift change report given to Xin Pozo RN (oncoming nurse) by Ronaldo Parker RN (offgoing nurse). Report included the following information Nurse Handoff Report, Index, Intake/Output, MAR, Recent Results, and Cardiac Rhythm NSR .

## 2023-02-21 NOTE — PROGRESS NOTES
No new complaints from patient  Low-grade temperature noted 100.6  White count elevated with very elevated platelets, 228 and anemic without evidence of bleed.   Hematology consulted  Hypokalemia noted  Await indium scan  Asked the operating room to order a cadaver vein in case PTFE resection necessary

## 2023-02-22 ENCOUNTER — APPOINTMENT (OUTPATIENT)
Facility: HOSPITAL | Age: 73
DRG: 252 | End: 2023-02-22
Payer: MEDICARE

## 2023-02-22 ENCOUNTER — ANESTHESIA EVENT (OUTPATIENT)
Dept: CARDIOTHORACIC SURGERY | Facility: HOSPITAL | Age: 73
End: 2023-02-22
Payer: MEDICARE

## 2023-02-22 LAB
ALBUMIN SERPL-MCNC: 1.8 G/DL (ref 3.4–5)
ALBUMIN/GLOB SERPL: 0.4 (ref 0.8–1.7)
ALP SERPL-CCNC: 90 U/L (ref 45–117)
ALT SERPL-CCNC: 20 U/L (ref 13–56)
ANION GAP SERPL CALC-SCNC: 8 MMOL/L (ref 3–18)
AST SERPL-CCNC: 13 U/L (ref 10–38)
BASOPHILS # BLD: 0.1 K/UL (ref 0–0.1)
BASOPHILS NFR BLD: 1 % (ref 0–2)
BILIRUB SERPL-MCNC: <0.1 MG/DL (ref 0.2–1)
BUN SERPL-MCNC: 6 MG/DL (ref 7–18)
BUN/CREAT SERPL: 8 (ref 12–20)
CALCIUM SERPL-MCNC: 9.3 MG/DL (ref 8.5–10.1)
CHLORIDE SERPL-SCNC: 109 MMOL/L (ref 100–111)
CO2 SERPL-SCNC: 24 MMOL/L (ref 21–32)
CREAT SERPL-MCNC: 0.74 MG/DL (ref 0.6–1.3)
DIFFERENTIAL METHOD BLD: ABNORMAL
EOSINOPHIL # BLD: 0.3 K/UL (ref 0–0.4)
EOSINOPHIL NFR BLD: 2 % (ref 0–5)
ERYTHROCYTE [DISTWIDTH] IN BLOOD BY AUTOMATED COUNT: 16.1 % (ref 11.6–14.5)
GLOBULIN SER CALC-MCNC: 4.4 G/DL (ref 2–4)
GLUCOSE SERPL-MCNC: 122 MG/DL (ref 74–99)
HCT VFR BLD AUTO: 26.7 % (ref 35–45)
HGB BLD-MCNC: 8 G/DL (ref 12–16)
IMM GRANULOCYTES # BLD AUTO: 0.4 K/UL (ref 0–0.04)
IMM GRANULOCYTES NFR BLD AUTO: 2 % (ref 0–0.5)
LYMPHOCYTES # BLD: 3.9 K/UL (ref 0.9–3.6)
LYMPHOCYTES NFR BLD: 24 % (ref 21–52)
MAGNESIUM SERPL-MCNC: 1.5 MG/DL (ref 1.6–2.6)
MCH RBC QN AUTO: 27.2 PG (ref 24–34)
MCHC RBC AUTO-ENTMCNC: 30 G/DL (ref 31–37)
MCV RBC AUTO: 90.8 FL (ref 78–100)
MONOCYTES # BLD: 1.2 K/UL (ref 0.05–1.2)
MONOCYTES NFR BLD: 8 % (ref 3–10)
NEUTS SEG # BLD: 10.2 K/UL (ref 1.8–8)
NEUTS SEG NFR BLD: 64 % (ref 40–73)
NRBC # BLD: 0 K/UL (ref 0–0.01)
NRBC BLD-RTO: 0 PER 100 WBC
PLATELET # BLD AUTO: 504 K/UL (ref 135–420)
PMV BLD AUTO: 9.6 FL (ref 9.2–11.8)
POTASSIUM SERPL-SCNC: 3.1 MMOL/L (ref 3.5–5.5)
PROT SERPL-MCNC: 6.2 G/DL (ref 6.4–8.2)
RBC # BLD AUTO: 2.94 M/UL (ref 4.2–5.3)
SODIUM SERPL-SCNC: 141 MMOL/L (ref 136–145)
WBC # BLD AUTO: 16.1 K/UL (ref 4.6–13.2)

## 2023-02-22 PROCEDURE — 36415 COLL VENOUS BLD VENIPUNCTURE: CPT

## 2023-02-22 PROCEDURE — 80053 COMPREHEN METABOLIC PANEL: CPT

## 2023-02-22 PROCEDURE — 2580000003 HC RX 258: Performed by: SURGERY

## 2023-02-22 PROCEDURE — 6370000000 HC RX 637 (ALT 250 FOR IP): Performed by: SURGERY

## 2023-02-22 PROCEDURE — 94640 AIRWAY INHALATION TREATMENT: CPT

## 2023-02-22 PROCEDURE — 2580000003 HC RX 258: Performed by: INTERNAL MEDICINE

## 2023-02-22 PROCEDURE — 6370000000 HC RX 637 (ALT 250 FOR IP): Performed by: INTERNAL MEDICINE

## 2023-02-22 PROCEDURE — 85025 COMPLETE CBC W/AUTO DIFF WBC: CPT

## 2023-02-22 PROCEDURE — 6360000002 HC RX W HCPCS: Performed by: INTERNAL MEDICINE

## 2023-02-22 PROCEDURE — 83735 ASSAY OF MAGNESIUM: CPT

## 2023-02-22 PROCEDURE — A9541 TC99M SULFUR COLLOID: HCPCS | Performed by: SURGERY

## 2023-02-22 PROCEDURE — 1100000003 HC PRIVATE W/ TELEMETRY

## 2023-02-22 PROCEDURE — 97116 GAIT TRAINING THERAPY: CPT

## 2023-02-22 PROCEDURE — 6360000002 HC RX W HCPCS: Performed by: SURGERY

## 2023-02-22 PROCEDURE — 94761 N-INVAS EAR/PLS OXIMETRY MLT: CPT

## 2023-02-22 PROCEDURE — 3430000000 HC RX DIAGNOSTIC RADIOPHARMACEUTICAL: Performed by: SURGERY

## 2023-02-22 RX ORDER — LANOLIN ALCOHOL/MO/W.PET/CERES
800 CREAM (GRAM) TOPICAL EVERY 8 HOURS
Status: COMPLETED | OUTPATIENT
Start: 2023-02-22 | End: 2023-02-23

## 2023-02-22 RX ADMIN — PREGABALIN 200 MG: 25 CAPSULE ORAL at 21:07

## 2023-02-22 RX ADMIN — SODIUM CHLORIDE, PRESERVATIVE FREE 10 ML: 5 INJECTION INTRAVENOUS at 21:15

## 2023-02-22 RX ADMIN — IPRATROPIUM BROMIDE 0.5 MG: 0.5 SOLUTION RESPIRATORY (INHALATION) at 20:02

## 2023-02-22 RX ADMIN — OXYCODONE HYDROCHLORIDE 5 MG: 5 TABLET ORAL at 00:43

## 2023-02-22 RX ADMIN — CETIRIZINE HYDROCHLORIDE 10 MG: 10 TABLET, FILM COATED ORAL at 21:08

## 2023-02-22 RX ADMIN — PREGABALIN 100 MG: 25 CAPSULE ORAL at 08:32

## 2023-02-22 RX ADMIN — ARFORMOTEROL TARTRATE 15 MCG: 15 SOLUTION RESPIRATORY (INHALATION) at 07:51

## 2023-02-22 RX ADMIN — VANCOMYCIN HYDROCHLORIDE 1250 MG: 10 INJECTION, POWDER, LYOPHILIZED, FOR SOLUTION INTRAVENOUS at 21:23

## 2023-02-22 RX ADMIN — POTASSIUM CHLORIDE 10 MEQ: 7.46 INJECTION, SOLUTION INTRAVENOUS at 05:19

## 2023-02-22 RX ADMIN — POTASSIUM CHLORIDE 10 MEQ: 7.46 INJECTION, SOLUTION INTRAVENOUS at 06:24

## 2023-02-22 RX ADMIN — IPRATROPIUM BROMIDE 0.5 MG: 0.5 SOLUTION RESPIRATORY (INHALATION) at 13:21

## 2023-02-22 RX ADMIN — CHOLECALCIFEROL TAB 25 MCG (1000 UNIT) 2000 UNITS: 25 TAB at 06:24

## 2023-02-22 RX ADMIN — HYDROMORPHONE HYDROCHLORIDE 1 MG: 1 INJECTION, SOLUTION INTRAMUSCULAR; INTRAVENOUS; SUBCUTANEOUS at 03:15

## 2023-02-22 RX ADMIN — POTASSIUM BICARBONATE 40 MEQ: 782 TABLET, EFFERVESCENT ORAL at 12:36

## 2023-02-22 RX ADMIN — LATANOPROST 1 DROP: 50 SOLUTION OPHTHALMIC at 21:14

## 2023-02-22 RX ADMIN — BUDESONIDE 250 MCG: 0.25 SUSPENSION RESPIRATORY (INHALATION) at 20:01

## 2023-02-22 RX ADMIN — MONTELUKAST 10 MG: 10 TABLET, FILM COATED ORAL at 06:24

## 2023-02-22 RX ADMIN — IPRATROPIUM BROMIDE 0.5 MG: 0.5 SOLUTION RESPIRATORY (INHALATION) at 07:51

## 2023-02-22 RX ADMIN — POTASSIUM BICARBONATE 40 MEQ: 782 TABLET, EFFERVESCENT ORAL at 21:08

## 2023-02-22 RX ADMIN — OXYCODONE HYDROCHLORIDE 5 MG: 5 TABLET ORAL at 05:19

## 2023-02-22 RX ADMIN — POTASSIUM CHLORIDE 10 MEQ: 7.46 INJECTION, SOLUTION INTRAVENOUS at 07:40

## 2023-02-22 RX ADMIN — CINACALCET HYDROCHLORIDE 30 MG: 30 TABLET, FILM COATED ORAL at 08:33

## 2023-02-22 RX ADMIN — BUDESONIDE 250 MCG: 0.25 SUSPENSION RESPIRATORY (INHALATION) at 07:51

## 2023-02-22 RX ADMIN — HYDROMORPHONE HYDROCHLORIDE 1 MG: 1 INJECTION, SOLUTION INTRAMUSCULAR; INTRAVENOUS; SUBCUTANEOUS at 12:35

## 2023-02-22 RX ADMIN — Medication 800 MG: at 21:08

## 2023-02-22 RX ADMIN — SODIUM CHLORIDE, PRESERVATIVE FREE 10 ML: 5 INJECTION INTRAVENOUS at 08:36

## 2023-02-22 RX ADMIN — FOLIC ACID 1 MG: 1 TABLET ORAL at 08:33

## 2023-02-22 RX ADMIN — HYDROMORPHONE HYDROCHLORIDE 1 MG: 1 INJECTION, SOLUTION INTRAMUSCULAR; INTRAVENOUS; SUBCUTANEOUS at 21:42

## 2023-02-22 RX ADMIN — HYDROMORPHONE HYDROCHLORIDE 1 MG: 1 INJECTION, SOLUTION INTRAMUSCULAR; INTRAVENOUS; SUBCUTANEOUS at 08:34

## 2023-02-22 RX ADMIN — CEFTRIAXONE 2000 MG: 2 INJECTION, POWDER, FOR SOLUTION INTRAMUSCULAR; INTRAVENOUS at 21:06

## 2023-02-22 RX ADMIN — IRON SUCROSE 200 MG: 20 INJECTION, SOLUTION INTRAVENOUS at 08:34

## 2023-02-22 RX ADMIN — HYDROMORPHONE HYDROCHLORIDE 1 MG: 1 INJECTION, SOLUTION INTRAMUSCULAR; INTRAVENOUS; SUBCUTANEOUS at 18:40

## 2023-02-22 RX ADMIN — ARFORMOTEROL TARTRATE 15 MCG: 15 SOLUTION RESPIRATORY (INHALATION) at 20:02

## 2023-02-22 RX ADMIN — ASPIRIN 81 MG: 81 TABLET, COATED ORAL at 08:33

## 2023-02-22 RX ADMIN — Medication 10.9 MILLICURIE: at 09:00

## 2023-02-22 RX ADMIN — Medication 800 MG: at 12:35

## 2023-02-22 ASSESSMENT — PAIN SCALES - GENERAL
PAINLEVEL_OUTOF10: 8

## 2023-02-22 ASSESSMENT — PAIN DESCRIPTION - ORIENTATION
ORIENTATION: LEFT

## 2023-02-22 ASSESSMENT — PAIN DESCRIPTION - LOCATION
LOCATION: LEG
LOCATION: GROIN
LOCATION: LEG
LOCATION: LEG

## 2023-02-22 ASSESSMENT — PAIN DESCRIPTION - DESCRIPTORS
DESCRIPTORS: STABBING
DESCRIPTORS: SORE
DESCRIPTORS: STABBING
DESCRIPTORS: STABBING

## 2023-02-22 ASSESSMENT — PAIN DESCRIPTION - ONSET: ONSET: ON-GOING

## 2023-02-22 ASSESSMENT — PAIN - FUNCTIONAL ASSESSMENT
PAIN_FUNCTIONAL_ASSESSMENT: PREVENTS OR INTERFERES SOME ACTIVE ACTIVITIES AND ADLS

## 2023-02-22 ASSESSMENT — PAIN DESCRIPTION - PAIN TYPE: TYPE: CHRONIC PAIN

## 2023-02-22 ASSESSMENT — PAIN DESCRIPTION - FREQUENCY: FREQUENCY: INTERMITTENT

## 2023-02-22 NOTE — PROGRESS NOTES
Patient is seen and radiology for indium scan. Discussed with  at bedside while patient was out of the room.   Continue with current antibiotics, trend CBC, BMP  Follow-up cultures    Awaiting results from indium scan but patient is n.p.o. after midnight    Discussed with Dr. Cherelle Balderas

## 2023-02-22 NOTE — PROGRESS NOTES
Physical Therapy    Attempted to see patient for PT treatment session however she is currently off the floor. Will continue to follow.   Meggan Mcfarland, PT

## 2023-02-22 NOTE — PROGRESS NOTES
Currently imaging for indium scan, seen while in scanner tells me she is comfortable and without complaint. She is participating with physical therapy today. Labs reviewed  Low-grade fever yesterday, afebrile today  Await results of scan.   We will make n.p.o. after midnight for decision-making purposes

## 2023-02-22 NOTE — PLAN OF CARE
Problem: Physical Therapy - Adult  Goal: By Discharge: Performs mobility at highest level of function for planned discharge setting. See evaluation for individualized goals. Description: 1. Patient will move from supine to sit and sit to supine  in bed with modified independence. 2.  Patient will transfer from bed to chair and chair to bed with modified independence using the least restrictive device. 3.  Patient will perform sit to stand with modified independence. 4.  Patient will ambulate with supervision/set-up for 75 feet with the least restrictive device. 5.  Patient will ascend/descend 3 stairs with 1-2 handrail(s) with minimal assistance/contact guard assist   Outcome: Progressing          PHYSICAL THERAPY TREATMENT    Patient: Yadira Caldwell (14 y.o. female)  Date: 2/22/2023  Diagnosis: Dehiscence of operative wound, initial encounter [T81.31XA]  Dehiscence of wound [T81.30XA] Dehiscence of wound  Procedure(s) (LRB):  PROCEDURE CANCELED (Left) 2 Days Post-Op  Precautions: Fall Risk, General Precautions,  ,  ,  ,  ,  ,  ,    PLOF: independent with mobility     ASSESSMENT:  Pt agreeable and motivated to participate in PT treatment. Dr. Reese Manzano cleared patient for ambulation and transfer training. Pt performed standing transfers at a supervision level and ambulated 220 feet with RW and SBA. Pt ambulated with short shuffling steps and verbal cues to stay close to the RW . Pt was left sitting edge of bed with needs in reach and family present. Progression toward goals:   [x]      Improving appropriately and progressing toward goals  []      Improving slowly and progressing toward goals  []      Not making progress toward goals and plan of care will be adjusted     PLAN:  Patient continues to benefit from skilled intervention to address the above impairments. Continue treatment per established plan of care.     Further Equipment Recommendations for Discharge: rolling walker         [facility]: Current research shows that an AM-PAC score of 18 (14 without stairs) or greater is associated with a discharge to the patient's home setting. Based on an AM-PAC score of 16/20  omitting stairs and their current functional mobility deficits, it is recommended that the patient have 2-3 sessions per week of Physical Therapy at d/c to increase the patient's independence. This AMPA score should be considered in conjunction with interdisciplinary team recommendations to determine the most appropriate discharge setting. Patient's social support, diagnosis, medical stability, and prior level of function should also be taken into consideration. SUBJECTIVE:   Patient stated, \"I'm doing pretty good. \"    OBJECTIVE DATA SUMMARY:   Critical Behavior:   Calm and cooperative, motivated     Functional Mobility Training:  Bed Mobility:  Bed Mobility Training  Bed Mobility Training: Yes  Overall Level of Assistance: Supervision  Supine to Sit: Supervision  Sit to Supine: Supervision  Scooting: Supervision  Transfers:  Transfer Training  Overall Level of Assistance: Stand-by assistance  Sit to Stand: Stand-by assistance  Stand to Sit: Stand-by assistance  Balance:  Balance  Sitting: Intact  Sitting - Static: Good (unsupported)  Sitting - Dynamic: Good (unsupported)  Standing: With support  Standing - Static: Good  Standing - Dynamic: Fair   Ambulation/Gait Training:     Gait  Overall Level of Assistance: Stand-by assistance  Base of Support: Narrowed  Step Length: Right shortened;Left shortened  Gait Abnormalities: Decreased step clearance  Assistive Device: Walker, rolling  Distance: 220 feet    Therapeutic Exercises:     EXERCISE   Sets   Reps   Active Active Assist   Passive Self ROM   Comments   Ankle Pumps 1 10  [x] [] [] []    Quad Sets/Glut Sets    [] [] [] [] Hold for 5 secs   Hamstring Sets   [] [] [] []    Short Arc Quads   [] [] [] []    Heel Slides   [] [] [] []    Straight Leg Raises   [] [] [] []    Hip Add [] [] [] [] Hold for 5 secs, w/ pillow squeeze   Long Arc Quads   [] [] [] []    Seated Marching   [] [] [] []    Standing Marching   [] [] [] []       [] [] [] []        Pain:  Pain level pre-treatment: 2/10  Pain level post-treatment: 2/10   Pain Intervention(s): Rest, Repositioning   Response to intervention: Nurse notified    Activity Tolerance:    fair  Please refer to the flowsheet for vital signs taken during this treatment. After treatment:   [] Patient left in no apparent distress sitting up in chair  [x] Patient left in no apparent distress in bed  [x] Call bell left within reach  [x] Nursing notified  [x] Caregiver present  [] Bed alarm activated  [] SCDs applied      COMMUNICATION/EDUCATION:   Patient Education  Education Given To: Patient; Family  Education Provided: Role of Therapy;Transfer Training;Plan of Care;Equipment  Education Method: Demonstration;Verbal;Teach Back  Barriers to Learning: None  Education Outcome: Verbalized understanding;Demonstrated understanding      Misael Hopkins, PT  Minutes: [facility] AM-PAC® Basic Mobility Inpatient Short Form (6-Clicks) Version 2    How much HELP from another person does the patient currently need    (If the patient hasn't done an activity recently, how much help from another person do you think he/she would need if he/she tried?)   Total (Total A or Dep)   A Lot  (Mod to Max A)   A Little (Sup or Min A)   None (Mod I to I)   Turning from your back to your side while in a flat bed without using bedrails? [] 1 [] 2 [] 3 [x] 4   2. Moving from lying on your back to sitting on the side of a flat bed without using bedrails? [] 1 [] 2 [x] 3 [] 4   3. Moving to and from a bed to a chair (including a wheelchair)? [] 1 [] 2 [x] 3 [] 4   4. Standing up from a chair using your arms (e.g., wheelchair, or bedside chair)? [] 1 [] 2 [x] 3 [] 4   5. Walking in hospital room? [] 1 [] 2 [x] 3 [] 4   6.  Climbing 3-5 steps with a railing?+   [] 1 [] 2 [] 3 [] 4   +If stair climbing cannot be assessed, skip item #6. Sum responses from items 1-5. Current research shows that an AM-PAC score of 18 (14 without stairs) or greater is associated with a discharge to the patient's home setting. Based on an AM-PAC score of 16/20  omitting stairs and their current functional mobility deficits, it is recommended that the patient have 2-3 sessions per week of Physical Therapy at d/c to increase the patient's independence.

## 2023-02-22 NOTE — PROGRESS NOTES
1953-Bedside report received from [facility]. Pt in bed, A&Ox4, Lines and drains in place, no signs of distress observed. Pt spouse at bedside. 1000-Pt in bed resting, VSS.     1200-Pt in bed resting. Willcontinue to monitor. 0315-Pain meds given, see MAR. VSS. Will continue to monitor. 2920-Report given to [facility], Care relinquished.

## 2023-02-23 ENCOUNTER — ANESTHESIA (OUTPATIENT)
Dept: CARDIOTHORACIC SURGERY | Facility: HOSPITAL | Age: 73
End: 2023-02-23
Payer: MEDICARE

## 2023-02-23 LAB
ABO + RH BLD: NORMAL
ANION GAP SERPL CALC-SCNC: 5 MMOL/L (ref 3–18)
BACTERIA SPEC CULT: NORMAL
BLOOD GROUP ANTIBODIES SERPL: NORMAL
BUN SERPL-MCNC: 7 MG/DL (ref 7–18)
BUN/CREAT SERPL: 11 (ref 12–20)
CALCIUM SERPL-MCNC: 9.7 MG/DL (ref 8.5–10.1)
CHLORIDE SERPL-SCNC: 108 MMOL/L (ref 100–111)
CO2 SERPL-SCNC: 25 MMOL/L (ref 21–32)
CREAT SERPL-MCNC: 0.64 MG/DL (ref 0.6–1.3)
ERYTHROCYTE [DISTWIDTH] IN BLOOD BY AUTOMATED COUNT: 16.5 % (ref 11.6–14.5)
GLUCOSE SERPL-MCNC: 81 MG/DL (ref 74–99)
HCT VFR BLD AUTO: 26.8 % (ref 35–45)
HGB BLD-MCNC: 8.3 G/DL (ref 12–16)
MCH RBC QN AUTO: 28.2 PG (ref 24–34)
MCHC RBC AUTO-ENTMCNC: 31 G/DL (ref 31–37)
MCV RBC AUTO: 91.2 FL (ref 78–100)
NRBC # BLD: 0 K/UL (ref 0–0.01)
NRBC BLD-RTO: 0 PER 100 WBC
PLATELET # BLD AUTO: 509 K/UL (ref 135–420)
PMV BLD AUTO: 9.9 FL (ref 9.2–11.8)
POTASSIUM SERPL-SCNC: 4.5 MMOL/L (ref 3.5–5.5)
RBC # BLD AUTO: 2.94 M/UL (ref 4.2–5.3)
SERVICE CMNT-IMP: NORMAL
SODIUM SERPL-SCNC: 138 MMOL/L (ref 136–145)
SPECIMEN EXP DATE BLD: NORMAL
WBC # BLD AUTO: 17.3 K/UL (ref 4.6–13.2)

## 2023-02-23 PROCEDURE — 3600000012 HC SURGERY LEVEL 2 ADDTL 15MIN: Performed by: SURGERY

## 2023-02-23 PROCEDURE — A4216 STERILE WATER/SALINE, 10 ML: HCPCS | Performed by: NURSE ANESTHETIST, CERTIFIED REGISTERED

## 2023-02-23 PROCEDURE — 6360000002 HC RX W HCPCS: Performed by: SURGERY

## 2023-02-23 PROCEDURE — 88300 SURGICAL PATH GROSS: CPT

## 2023-02-23 PROCEDURE — 87205 SMEAR GRAM STAIN: CPT

## 2023-02-23 PROCEDURE — 2709999900 HC NON-CHARGEABLE SUPPLY: Performed by: SURGERY

## 2023-02-23 PROCEDURE — 2580000003 HC RX 258: Performed by: INTERNAL MEDICINE

## 2023-02-23 PROCEDURE — 3700000000 HC ANESTHESIA ATTENDED CARE: Performed by: SURGERY

## 2023-02-23 PROCEDURE — 3600000002 HC SURGERY LEVEL 2 BASE: Performed by: SURGERY

## 2023-02-23 PROCEDURE — 7100000000 HC PACU RECOVERY - FIRST 15 MIN: Performed by: SURGERY

## 2023-02-23 PROCEDURE — 2500000003 HC RX 250 WO HCPCS: Performed by: NURSE ANESTHETIST, CERTIFIED REGISTERED

## 2023-02-23 PROCEDURE — 3700000001 HC ADD 15 MINUTES (ANESTHESIA): Performed by: SURGERY

## 2023-02-23 PROCEDURE — 6360000002 HC RX W HCPCS: Performed by: NURSE ANESTHETIST, CERTIFIED REGISTERED

## 2023-02-23 PROCEDURE — 6370000000 HC RX 637 (ALT 250 FOR IP): Performed by: SURGERY

## 2023-02-23 PROCEDURE — 6360000002 HC RX W HCPCS: Performed by: INTERNAL MEDICINE

## 2023-02-23 PROCEDURE — 2580000003 HC RX 258: Performed by: NURSE ANESTHETIST, CERTIFIED REGISTERED

## 2023-02-23 PROCEDURE — 6370000000 HC RX 637 (ALT 250 FOR IP): Performed by: INTERNAL MEDICINE

## 2023-02-23 PROCEDURE — 86901 BLOOD TYPING SEROLOGIC RH(D): CPT

## 2023-02-23 PROCEDURE — 80048 BASIC METABOLIC PNL TOTAL CA: CPT

## 2023-02-23 PROCEDURE — 7100000001 HC PACU RECOVERY - ADDTL 15 MIN: Performed by: SURGERY

## 2023-02-23 PROCEDURE — 87077 CULTURE AEROBIC IDENTIFY: CPT

## 2023-02-23 PROCEDURE — 04BL0ZZ EXCISION OF LEFT FEMORAL ARTERY, OPEN APPROACH: ICD-10-PCS | Performed by: SURGERY

## 2023-02-23 PROCEDURE — 94761 N-INVAS EAR/PLS OXIMETRY MLT: CPT

## 2023-02-23 PROCEDURE — 94640 AIRWAY INHALATION TREATMENT: CPT

## 2023-02-23 PROCEDURE — 87075 CULTR BACTERIA EXCEPT BLOOD: CPT

## 2023-02-23 PROCEDURE — 36415 COLL VENOUS BLD VENIPUNCTURE: CPT

## 2023-02-23 PROCEDURE — 2580000003 HC RX 258: Performed by: SURGERY

## 2023-02-23 PROCEDURE — 87186 SC STD MICRODIL/AGAR DIL: CPT

## 2023-02-23 PROCEDURE — 85027 COMPLETE CBC AUTOMATED: CPT

## 2023-02-23 PROCEDURE — 1100000003 HC PRIVATE W/ TELEMETRY

## 2023-02-23 RX ORDER — HEPARIN SODIUM 200 [USP'U]/100ML
INJECTION, SOLUTION INTRAVENOUS
Status: DISPENSED
Start: 2023-02-23 | End: 2023-02-24

## 2023-02-23 RX ORDER — PROPOFOL 10 MG/ML
INJECTION, EMULSION INTRAVENOUS PRN
Status: DISCONTINUED | OUTPATIENT
Start: 2023-02-23 | End: 2023-02-23 | Stop reason: SDUPTHER

## 2023-02-23 RX ORDER — GENTAMICIN SULFATE 40 MG/ML
INJECTION, SOLUTION INTRAMUSCULAR; INTRAVENOUS PRN
Status: DISCONTINUED | OUTPATIENT
Start: 2023-02-23 | End: 2023-02-23 | Stop reason: HOSPADM

## 2023-02-23 RX ORDER — FENTANYL CITRATE 50 UG/ML
INJECTION, SOLUTION INTRAMUSCULAR; INTRAVENOUS PRN
Status: DISCONTINUED | OUTPATIENT
Start: 2023-02-23 | End: 2023-02-23 | Stop reason: SDUPTHER

## 2023-02-23 RX ORDER — SODIUM CHLORIDE 0.9 % (FLUSH) 0.9 %
5-40 SYRINGE (ML) INJECTION PRN
Status: DISCONTINUED | OUTPATIENT
Start: 2023-02-23 | End: 2023-02-23 | Stop reason: HOSPADM

## 2023-02-23 RX ORDER — HYDROMORPHONE HYDROCHLORIDE 1 MG/ML
0.5 INJECTION, SOLUTION INTRAMUSCULAR; INTRAVENOUS; SUBCUTANEOUS EVERY 5 MIN PRN
Status: DISCONTINUED | OUTPATIENT
Start: 2023-02-23 | End: 2023-02-23 | Stop reason: HOSPADM

## 2023-02-23 RX ORDER — SODIUM CHLORIDE 0.9 % (FLUSH) 0.9 %
5-40 SYRINGE (ML) INJECTION EVERY 12 HOURS SCHEDULED
Status: DISCONTINUED | OUTPATIENT
Start: 2023-02-23 | End: 2023-02-23 | Stop reason: HOSPADM

## 2023-02-23 RX ORDER — LIDOCAINE HYDROCHLORIDE 10 MG/ML
1 INJECTION, SOLUTION EPIDURAL; INFILTRATION; INTRACAUDAL; PERINEURAL
Status: DISCONTINUED | OUTPATIENT
Start: 2023-02-23 | End: 2023-02-23 | Stop reason: HOSPADM

## 2023-02-23 RX ORDER — HYDROMORPHONE HCL 110MG/55ML
PATIENT CONTROLLED ANALGESIA SYRINGE INTRAVENOUS PRN
Status: DISCONTINUED | OUTPATIENT
Start: 2023-02-23 | End: 2023-02-23 | Stop reason: SDUPTHER

## 2023-02-23 RX ORDER — DEXAMETHASONE SODIUM PHOSPHATE 4 MG/ML
INJECTION, SOLUTION INTRA-ARTICULAR; INTRALESIONAL; INTRAMUSCULAR; INTRAVENOUS; SOFT TISSUE PRN
Status: DISCONTINUED | OUTPATIENT
Start: 2023-02-23 | End: 2023-02-23 | Stop reason: SDUPTHER

## 2023-02-23 RX ORDER — GENTAMICIN SULFATE 40 MG/ML
INJECTION, SOLUTION INTRAMUSCULAR; INTRAVENOUS
Status: DISPENSED
Start: 2023-02-23 | End: 2023-02-24

## 2023-02-23 RX ORDER — EPHEDRINE SULFATE/0.9% NACL/PF 50 MG/5 ML
SYRINGE (ML) INTRAVENOUS PRN
Status: DISCONTINUED | OUTPATIENT
Start: 2023-02-23 | End: 2023-02-23 | Stop reason: SDUPTHER

## 2023-02-23 RX ORDER — SODIUM CHLORIDE 9 MG/ML
INJECTION, SOLUTION INTRAVENOUS PRN
Status: DISCONTINUED | OUTPATIENT
Start: 2023-02-23 | End: 2023-02-23 | Stop reason: HOSPADM

## 2023-02-23 RX ORDER — ONDANSETRON 2 MG/ML
INJECTION INTRAMUSCULAR; INTRAVENOUS PRN
Status: DISCONTINUED | OUTPATIENT
Start: 2023-02-23 | End: 2023-02-23 | Stop reason: SDUPTHER

## 2023-02-23 RX ORDER — LIDOCAINE HYDROCHLORIDE 20 MG/ML
INJECTION, SOLUTION EPIDURAL; INFILTRATION; INTRACAUDAL; PERINEURAL PRN
Status: DISCONTINUED | OUTPATIENT
Start: 2023-02-23 | End: 2023-02-23 | Stop reason: SDUPTHER

## 2023-02-23 RX ORDER — HEPARIN SODIUM 200 [USP'U]/100ML
INJECTION, SOLUTION INTRAVENOUS CONTINUOUS PRN
Status: DISCONTINUED | OUTPATIENT
Start: 2023-02-23 | End: 2023-02-23 | Stop reason: HOSPADM

## 2023-02-23 RX ORDER — ONDANSETRON 2 MG/ML
4 INJECTION INTRAMUSCULAR; INTRAVENOUS
Status: DISCONTINUED | OUTPATIENT
Start: 2023-02-23 | End: 2023-02-23 | Stop reason: HOSPADM

## 2023-02-23 RX ORDER — SODIUM CHLORIDE, SODIUM LACTATE, POTASSIUM CHLORIDE, CALCIUM CHLORIDE 600; 310; 30; 20 MG/100ML; MG/100ML; MG/100ML; MG/100ML
INJECTION, SOLUTION INTRAVENOUS CONTINUOUS
Status: DISCONTINUED | OUTPATIENT
Start: 2023-02-23 | End: 2023-02-23 | Stop reason: HOSPADM

## 2023-02-23 RX ORDER — DIPHENHYDRAMINE HYDROCHLORIDE 50 MG/ML
12.5 INJECTION INTRAMUSCULAR; INTRAVENOUS
Status: DISCONTINUED | OUTPATIENT
Start: 2023-02-23 | End: 2023-02-23 | Stop reason: HOSPADM

## 2023-02-23 RX ADMIN — PREGABALIN 100 MG: 25 CAPSULE ORAL at 09:34

## 2023-02-23 RX ADMIN — SODIUM CHLORIDE, POTASSIUM CHLORIDE, SODIUM LACTATE AND CALCIUM CHLORIDE: 600; 310; 30; 20 INJECTION, SOLUTION INTRAVENOUS at 12:51

## 2023-02-23 RX ADMIN — IPRATROPIUM BROMIDE 0.5 MG: 0.5 SOLUTION RESPIRATORY (INHALATION) at 07:34

## 2023-02-23 RX ADMIN — HYDROMORPHONE HYDROCHLORIDE 1 MG: 1 INJECTION, SOLUTION INTRAMUSCULAR; INTRAVENOUS; SUBCUTANEOUS at 18:57

## 2023-02-23 RX ADMIN — FENTANYL CITRATE 50 MCG: 50 INJECTION, SOLUTION INTRAMUSCULAR; INTRAVENOUS at 14:24

## 2023-02-23 RX ADMIN — HYDROMORPHONE HYDROCHLORIDE 0.5 MG: 2 INJECTION INTRAMUSCULAR; INTRAVENOUS; SUBCUTANEOUS at 15:06

## 2023-02-23 RX ADMIN — PROPOFOL 100 MG: 10 INJECTION, EMULSION INTRAVENOUS at 14:10

## 2023-02-23 RX ADMIN — PREGABALIN 200 MG: 25 CAPSULE ORAL at 20:48

## 2023-02-23 RX ADMIN — FOLIC ACID 1 MG: 1 TABLET ORAL at 09:34

## 2023-02-23 RX ADMIN — HYDROMORPHONE HYDROCHLORIDE 0.5 MG: 2 INJECTION INTRAMUSCULAR; INTRAVENOUS; SUBCUTANEOUS at 15:04

## 2023-02-23 RX ADMIN — FENTANYL CITRATE 50 MCG: 50 INJECTION, SOLUTION INTRAMUSCULAR; INTRAVENOUS at 14:17

## 2023-02-23 RX ADMIN — ASPIRIN 81 MG: 81 TABLET, COATED ORAL at 09:34

## 2023-02-23 RX ADMIN — SODIUM CHLORIDE, PRESERVATIVE FREE 10 ML: 5 INJECTION INTRAVENOUS at 20:53

## 2023-02-23 RX ADMIN — FENTANYL CITRATE 50 MCG: 50 INJECTION, SOLUTION INTRAMUSCULAR; INTRAVENOUS at 14:23

## 2023-02-23 RX ADMIN — IRON SUCROSE 200 MG: 20 INJECTION, SOLUTION INTRAVENOUS at 09:33

## 2023-02-23 RX ADMIN — CHOLECALCIFEROL TAB 25 MCG (1000 UNIT) 2000 UNITS: 25 TAB at 06:46

## 2023-02-23 RX ADMIN — FENTANYL CITRATE 50 MCG: 50 INJECTION, SOLUTION INTRAMUSCULAR; INTRAVENOUS at 14:19

## 2023-02-23 RX ADMIN — CINACALCET HYDROCHLORIDE 30 MG: 30 TABLET, FILM COATED ORAL at 09:34

## 2023-02-23 RX ADMIN — SODIUM CHLORIDE, PRESERVATIVE FREE 10 ML: 5 INJECTION INTRAVENOUS at 09:52

## 2023-02-23 RX ADMIN — HYDROMORPHONE HYDROCHLORIDE 1 MG: 1 INJECTION, SOLUTION INTRAMUSCULAR; INTRAVENOUS; SUBCUTANEOUS at 21:04

## 2023-02-23 RX ADMIN — BUDESONIDE 250 MCG: 0.25 SUSPENSION RESPIRATORY (INHALATION) at 20:05

## 2023-02-23 RX ADMIN — BUDESONIDE 250 MCG: 0.25 SUSPENSION RESPIRATORY (INHALATION) at 07:34

## 2023-02-23 RX ADMIN — LATANOPROST 1 DROP: 50 SOLUTION OPHTHALMIC at 21:01

## 2023-02-23 RX ADMIN — CETIRIZINE HYDROCHLORIDE 10 MG: 10 TABLET, FILM COATED ORAL at 20:53

## 2023-02-23 RX ADMIN — ONDANSETRON 4 MG: 2 INJECTION INTRAMUSCULAR; INTRAVENOUS at 14:10

## 2023-02-23 RX ADMIN — CEFTRIAXONE 2000 MG: 2 INJECTION, POWDER, FOR SOLUTION INTRAMUSCULAR; INTRAVENOUS at 20:47

## 2023-02-23 RX ADMIN — IPRATROPIUM BROMIDE 0.5 MG: 0.5 SOLUTION RESPIRATORY (INHALATION) at 20:04

## 2023-02-23 RX ADMIN — Medication 10 MG: at 14:29

## 2023-02-23 RX ADMIN — FAMOTIDINE 20 MG: 10 INJECTION, SOLUTION INTRAVENOUS at 12:47

## 2023-02-23 RX ADMIN — ARFORMOTEROL TARTRATE 15 MCG: 15 SOLUTION RESPIRATORY (INHALATION) at 20:04

## 2023-02-23 RX ADMIN — HYDROMORPHONE HYDROCHLORIDE 0.5 MG: 2 INJECTION INTRAMUSCULAR; INTRAVENOUS; SUBCUTANEOUS at 15:02

## 2023-02-23 RX ADMIN — VANCOMYCIN HYDROCHLORIDE 1250 MG: 10 INJECTION, POWDER, LYOPHILIZED, FOR SOLUTION INTRAVENOUS at 21:13

## 2023-02-23 RX ADMIN — Medication 800 MG: at 04:25

## 2023-02-23 RX ADMIN — DEXAMETHASONE SODIUM PHOSPHATE 4 MG: 4 INJECTION, SOLUTION INTRAMUSCULAR; INTRAVENOUS at 14:10

## 2023-02-23 RX ADMIN — MONTELUKAST 10 MG: 10 TABLET, FILM COATED ORAL at 06:46

## 2023-02-23 RX ADMIN — OXYCODONE HYDROCHLORIDE 5 MG: 5 TABLET ORAL at 20:16

## 2023-02-23 RX ADMIN — ARFORMOTEROL TARTRATE 15 MCG: 15 SOLUTION RESPIRATORY (INHALATION) at 07:34

## 2023-02-23 RX ADMIN — OXYCODONE HYDROCHLORIDE 5 MG: 5 TABLET ORAL at 01:30

## 2023-02-23 RX ADMIN — HYDROMORPHONE HYDROCHLORIDE 1 MG: 1 INJECTION, SOLUTION INTRAMUSCULAR; INTRAVENOUS; SUBCUTANEOUS at 23:30

## 2023-02-23 RX ADMIN — POTASSIUM BICARBONATE 40 MEQ: 782 TABLET, EFFERVESCENT ORAL at 04:25

## 2023-02-23 RX ADMIN — LIDOCAINE HYDROCHLORIDE 20 MG: 20 INJECTION, SOLUTION EPIDURAL; INFILTRATION; INTRACAUDAL; PERINEURAL at 14:10

## 2023-02-23 RX ADMIN — HYDROMORPHONE HYDROCHLORIDE 0.5 MG: 2 INJECTION INTRAMUSCULAR; INTRAVENOUS; SUBCUTANEOUS at 15:00

## 2023-02-23 ASSESSMENT — PAIN SCALES - GENERAL
PAINLEVEL_OUTOF10: 7
PAINLEVEL_OUTOF10: 7
PAINLEVEL_OUTOF10: 8
PAINLEVEL_OUTOF10: 8
PAINLEVEL_OUTOF10: 7
PAINLEVEL_OUTOF10: 6
PAINLEVEL_OUTOF10: 7

## 2023-02-23 ASSESSMENT — PAIN DESCRIPTION - LOCATION
LOCATION: LEG
LOCATION: INCISION
LOCATION: LEG
LOCATION: INCISION
LOCATION: INCISION
LOCATION: LEG

## 2023-02-23 ASSESSMENT — PAIN DESCRIPTION - ORIENTATION
ORIENTATION: LEFT

## 2023-02-23 ASSESSMENT — PAIN DESCRIPTION - DESCRIPTORS
DESCRIPTORS: ACHING;GNAWING
DESCRIPTORS: ACHING
DESCRIPTORS: ACHING;NAGGING

## 2023-02-23 NOTE — PLAN OF CARE
Problem: Pain  Goal: Verbalizes/displays adequate comfort level or baseline comfort level  Outcome: Progressing     Problem: Safety - Adult  Goal: Free from fall injury  Outcome: Progressing     Problem: Skin/Tissue Integrity  Goal: Absence of new skin breakdown  Description: 1. Monitor for areas of redness and/or skin breakdown  2. Assess vascular access sites hourly  3. Every 4-6 hours minimum:  Change oxygen saturation probe site  4. Every 4-6 hours:  If on nasal continuous positive airway pressure, respiratory therapy assess nares and determine need for appliance change or resting period.   Outcome: Progressing     Problem: Nutrition Deficit:  Goal: Optimize nutritional status  Outcome: Progressing

## 2023-02-23 NOTE — OP NOTE
Operative Note      Patient: Kj Wellington  YOB: 1950  MRN: [id number]    Date of Procedure: 2/23/2023    Pre-Op Diagnosis: INFECTED LEFT BYPASS GRAFT    Post-Op Diagnosis: Same       Procedure(s):  REMOVAL OF INFECTED BYPASS GRAFT LEFT LEG    Surgeon(s):  Magen Michel MD    Assistant:   Surgical Assistant: Tiara Jarquin    Anesthesia: General    Estimated Blood Loss (mL): Minimal    Complications: None    Specimens:   ID Type Source Tests Collected by Time Destination   1 : LEFT FEM ABOVE KNEE POP Swab Leg CULTURE, WOUND, SUSCEPTIBILITY, AER + ANAEROB Magen Michel MD 2/23/2023 1451    A : LEFT LEG BYPASS GRAFT Hardware Leg SURGICAL PATHOLOGY Magen Michel MD 2/23/2023 1506        Implants:  * No implants in log *      Drains:   Negative Pressure Wound Therapy Leg Anterior;Left;Proximal;Upper (Active)   Wound Type Dehisced 02/23/23 0935   Dressing Type White Foam;Other (Comment) 02/23/23 1540   Number of pieces used 2 02/23/23 1540   Cycle Continuous 02/23/23 1540   Target Pressure (mmHg) 125 02/23/23 0935   Canister changed? Yes 02/23/23 1540   Dressing Status New dressing applied 02/23/23 1540   Dressing Changed Changed/New 02/23/23 1540   Drainage Amount Small 02/21/23 2202   Drainage Description Purulent 02/21/23 2202   Dressing Change Due 02/22/23 02/21/23 2202   Output (ml) 25 ml 02/21/23 2202   Wound Assessment Dry 02/21/23 2202   No-wound Assessment Intact 02/21/23 2202   Odor None 02/21/23 2202       Negative Pressure Wound Therapy Leg Left (Active)   Dressing Type Other (Comment) 02/23/23 1552   Number of pieces used 1 02/23/23 1552   Cycle Continuous 02/23/23 1552   Canister changed? 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 Above-knee popliteal artery was occluded.   Femoral to below-knee bypass not grossly infected and is patent. Detailed Description of Procedure:   She had general anesthesia she is already on appropriate antibiotics. Her left leg was prepped draped usual standard fashion followed by [facility] guidelines for aseptic technique. Took down and open the femoral incision there is a small amount of murky fluid here the graft was exposed under the skin. The 6 mm propatent Leon below-knee bypass graft does not appear grossly infected but deeper into the groin is a femoral to above-knee bypass which is covered in yellow soft film. I mobilized this and the bypass graft can be freely floating. I went down and isolated the common femoral artery and placed a vessel loop above and below the anastomosis to the artery anastomosis intact there is no bleeding. I opened up the incision above the knee where the old femoropopliteal in place. Carried this down to the graft the graft here was just sitting in a pool of pus cultures were sent. Followed this down to the popliteal artery and took all graft and suture off the popliteal artery and ligated the anastomosis. There is no bleeding at the site. The popliteal is apparently occluded at this level. No pulled the graft out of the tunnel without any difficulty. My concern today for revising the other PTFE graft is this gross infection. I clamped the graft just below where the graft is picking backing on my anastomosis and suture ligated it and cut it in her removed 99% of the infected graft. Next I did the surgical antibiotic wash followed by a gentamicin and saline wash. The wound was cleaned there is no further purulence or necrosis or any foreign debris. I left the Vesseloops in place and marked the common femoral.  Placed a white foam then a gray foam in the groin. A gray foam in the lower incision connected to wound VAC. The plan is to let this area settle and have no gross purulence. We will continue antibiotics and watch her clinically.   Hopefully can come back Monday and revise as needed the below-knee bypass graft with a cadaver vein. Concern for putting a new graft in today's infected field. Discussed this with the  as well her wound vacs are applied she is transferred recovery in stable condition.     Electronically signed by Gregory Main MD on 2/23/2023 at 4:04 PM

## 2023-02-23 NOTE — PLAN OF CARE
Problem: Pain  Goal: Verbalizes/displays adequate comfort level or baseline comfort level  Outcome: Progressing     Problem: Discharge Planning  Goal: Discharge to home or other facility with appropriate resources  Outcome: Progressing     Problem: Physical Therapy - Adult  Goal: By Discharge: Performs mobility at highest level of function for planned discharge setting. See evaluation for individualized goals. Description: 1. Patient will move from supine to sit and sit to supine  in bed with modified independence. 2.  Patient will transfer from bed to chair and chair to bed with modified independence using the least restrictive device. 3.  Patient will perform sit to stand with modified independence. 4.  Patient will ambulate with supervision/set-up for 75 feet with the least restrictive device. 5.  Patient will ascend/descend 3 stairs with 1-2 handrail(s) with minimal assistance/contact guard assist   2/22/2023 1349 by Trupti Birch PT  Outcome: Progressing     Problem: Safety - Adult  Goal: Free from fall injury  Outcome: Progressing     Problem: Skin/Tissue Integrity  Goal: Absence of new skin breakdown  Description: 1. Monitor for areas of redness and/or skin breakdown  2. Assess vascular access sites hourly  3. Every 4-6 hours minimum:  Change oxygen saturation probe site  4. Every 4-6 hours:  If on nasal continuous positive airway pressure, respiratory therapy assess nares and determine need for appliance change or resting period.   Outcome: Progressing

## 2023-02-23 NOTE — PLAN OF CARE
Problem: Pain  Goal: Verbalizes/displays adequate comfort level or baseline comfort level  2/22/2023 2217 by Johanna Cam RN  Outcome: Progressing  2/22/2023 2213 by Johanna Cam RN  Outcome: Progressing     Problem: Discharge Planning  Goal: Discharge to home or other facility with appropriate resources  2/22/2023 2217 by Johanna Cam RN  Outcome: Progressing  2/22/2023 2213 by Johanna Cam RN  Outcome: Progressing     Problem: Physical Therapy - Adult  Goal: By Discharge: Performs mobility at highest level of function for planned discharge setting. See evaluation for individualized goals. Description: 1. Patient will move from supine to sit and sit to supine  in bed with modified independence. 2.  Patient will transfer from bed to chair and chair to bed with modified independence using the least restrictive device. 3.  Patient will perform sit to stand with modified independence. 4.  Patient will ambulate with supervision/set-up for 75 feet with the least restrictive device. 5.  Patient will ascend/descend 3 stairs with 1-2 handrail(s) with minimal assistance/contact guard assist   2/22/2023 1349 by Mac Baptiste PT  Outcome: Progressing     Problem: Safety - Adult  Goal: Free from fall injury  2/22/2023 2217 by Johanna Cam RN  Outcome: Progressing  2/22/2023 2213 by Johanna Cam RN  Outcome: Progressing     Problem: Skin/Tissue Integrity  Goal: Absence of new skin breakdown  Description: 1. Monitor for areas of redness and/or skin breakdown  2. Assess vascular access sites hourly  3. Every 4-6 hours minimum:  Change oxygen saturation probe site  4. Every 4-6 hours:  If on nasal continuous positive airway pressure, respiratory therapy assess nares and determine need for appliance change or resting period.   2/22/2023 2217 by Johanna Cam RN  Outcome: Progressing  2/22/2023 2213 by Johanna Cam RN  Outcome: Progressing

## 2023-02-23 NOTE — PROGRESS NOTES
Patient is off to the floor (Cath holding) at the time of visit. Patient is not available to be assessed at this time.       Qing Holt [phone]

## 2023-02-23 NOTE — ANESTHESIA PRE PROCEDURE
Department of Anesthesiology  Preprocedure Note       Name:  Zamzam Calixto   Age:  72 y.o.  :  1950                                          MRN:  [id number]         Date:  2023      Surgeon: Surgeon(s):  Mich Ashley MD    Procedure: Procedure(s):  REVISION INFECTED LEFT LEG BYPASS GRAFT/ CADAVER VEIN    Medications prior to admission:   Prior to Admission medications    Medication Sig Start Date End Date Taking? Authorizing Provider   albuterol sulfate HFA (PROVENTIL;VENTOLIN;PROAIR) 108 (90 Base) MCG/ACT inhaler Inhale 2 puffs into the lungs every 6 hours as needed 21   Ar Automatic Reconciliation   apixaban starter pack (ELIQUIS) 5 MG TBPK tablet Take 10 mg (two 5 mg tablets) by mouth twice a day for 7 days Followed by 5 mg (one 5 mg tablet) by mouth twice a day 23   Ar Automatic Reconciliation   aspirin 81 MG chewable tablet Take 81 mg by mouth daily 23   Ar Automatic Reconciliation   bimatoprost (LUMIGAN) 0.03 % ophthalmic drops Apply 1 drop to eye every evening    Ar Automatic Reconciliation   Cetirizine HCl (ZYRTEC ALLERGY) 10 MG CAPS Take by mouth    Ar Automatic Reconciliation   vitamin D 25 MCG (1000 UT) CAPS Take by mouth daily    Ar Automatic Reconciliation   colchicine (COLCRYS) 0.6 MG tablet Take 0.6 mg by mouth 2 times daily    Ar Automatic Reconciliation   cyanocobalamin 1000 MCG/ML injection Inject 1,000 mcg into the muscle every 14 days    Ar Automatic Reconciliation   dicyclomine (BENTYL) 10 MG capsule Take 10 mg by mouth daily as needed    Ar Automatic Reconciliation   DULoxetine (CYMBALTA) 60 MG extended release capsule Take 60 mg by mouth    Ar Automatic Reconciliation   fluticasone (FLONASE) 50 MCG/ACT nasal spray 2 sprays by Nasal route daily as needed    Ar Automatic Reconciliation   fluticasone-umeclidin-vilant (TRELEGY ELLIPTA) 100-62.5-25 MCG/ACT AEPB inhaler Inhale 1 puff into the lungs daily 22   Ar Automatic Reconciliation   inFLIXimab  (REMICADE) 100 MG injection Infuse 5 mg/kg intravenously once    Ar Automatic Reconciliation   loperamide (IMODIUM A-D) 2 MG tablet Take 2 mg by mouth daily as needed    Ar Automatic Reconciliation   loratadine (CLARITIN) 10 MG tablet Take 10 mg by mouth daily    Ar Automatic Reconciliation   montelukast (SINGULAIR) 10 MG tablet Take 10 mg by mouth daily    Ar Automatic Reconciliation   atropine-phenobarbital-scopolamine-hyoscyamine (DONNATAL) 16.2 MG TABS Take 1 tablet by mouth every 8 hours as needed    Ar Automatic Reconciliation   pregabalin (LYRICA) 100 MG capsule Take by mouth 2 times daily. Ar Automatic Reconciliation   triamterene-hydroCHLOROthiazide (DYAZIDE) 37.5-25 MG per capsule Take by mouth daily    Ar Automatic Reconciliation   Cetirizine HCl 10 MG CAPS Take 10 mg by mouth at bedtime    Historical Provider, MD   montelukast (SINGULAIR) 10 MG tablet Take 10 mg by mouth Daily    Historical Provider, MD   fluticasone-umeclidin-vilant (TRELEGY ELLIPTA) 100-62.5-25 MCG/ACT AEPB inhaler Inhale 1 puff into the lungs daily    Historical Provider, MD   vitamin D 25 MCG (1000 UT) CAPS Take by mouth Daily    Historical Provider, MD   albuterol sulfate HFA (VENTOLIN HFA) 108 (90 Base) MCG/ACT inhaler Inhale 2 puffs into the lungs every 6 hours as needed for Wheezing    Historical Provider, MD   fluticasone (FLONASE) 50 MCG/ACT nasal spray 2 sprays by Each Nostril route daily    Historical Provider, MD   pregabalin (LYRICA) 100 MG capsule Take 100 mg by mouth daily. Take in AM    Historical Provider, MD   pregabalin (LYRICA) 100 MG capsule Take 200 mg by mouth daily.  Take 200 mg at night    Historical Provider, MD   aspirin 81 MG EC tablet Take 81 mg by mouth daily    Historical Provider, MD   cyanocobalamin 1000 MCG/ML injection Inject 1,000 mcg into the muscle every 14 days    Historical Provider, MD   loperamide (IMODIUM) 2 MG capsule Take 2 mg by mouth as needed for Diarrhea    Historical Provider, MD dicyclomine (BENTYL) 10 MG capsule Take 10 mg by mouth daily as needed (ABDOMINAL PAIN)    Historical Provider, MD   bimatoprost (LUMIGAN) 0.03 % ophthalmic drops Place 1 drop into both eyes every evening    Historical Provider, MD   inFLIXimab (REMICADE) 100 MG injection Infuse 5 mg/kg intravenously every 28 days    Historical Provider, MD   atropine-phenobarbital-scopolamine-hyoscyamine (DONNATAL) 16.2 MG TABS Take 1 tablet by mouth every 8 hours as needed (DIARRHEA OR NAUSEA) Indications: Irritable Bowel Syndrome    Historical Provider, MD       Current medications:    No current facility-administered medications for this visit. No current outpatient medications on file.      Facility-Administered Medications Ordered in Other Visits   Medication Dose Route Frequency Provider Last Rate Last Admin    lidocaine PF 1 % injection 1 mL  1 mL IntraDERmal Once PRN WILDA Baker CRNA        famotidine (PEPCID) 20 mg in sodium chloride (PF) 0.9 % 10 mL injection  20 mg IntraVENous Once WILDA Baker CRNA        lactated ringers IV soln infusion   IntraVENous Continuous WILDA Baker CRNA        folic acid (FOLVITE) tablet 1 mg  1 mg Oral Daily Greta Garcia MD   1 mg at 02/23/23 0934    acetaminophen (TYLENOL) tablet 650 mg  650 mg Oral Q4H PRN Edgar Reeves MD   650 mg at 02/21/23 1226    potassium chloride 10 mEq/100 mL IVPB (Peripheral Line)  10 mEq IntraVENous PRN Edgar Reeves  mL/hr at 02/22/23 0740 10 mEq at 02/22/23 0740    magnesium sulfate 2000 mg in 50 mL IVPB premix  2,000 mg IntraVENous PRN Edgar Reeves MD        ipratropium (ATROVENT) 0.02 % nebulizer solution 0.5 mg  0.5 mg Nebulization TID Shankar Montelongo MD   0.5 mg at 02/23/23 0734    latanoprost (XALATAN) 0.005 % ophthalmic solution 1 drop  1 drop Both Eyes Nightly Donice B Carole, DO   1 drop at 02/22/23 2114    cetirizine (ZYRTEC) tablet 10 mg  10 mg Oral Nightly Donice B Carole, DO   10 mg at 02/22/23 2108    montelukast (SINGULAIR) tablet 10 mg  10 mg Oral Daily Donice B Carole, DO   10 mg at 02/23/23 0646    pregabalin (LYRICA) capsule 100 mg  100 mg Oral Daily Donice B Carole, DO   100 mg at 02/23/23 0934    pregabalin (LYRICA) capsule 200 mg  200 mg Oral Nightly Donice B Caroel, DO   200 mg at 02/22/23 2107    Vitamin D (CHOLECALCIFEROL) tablet 2,000 Units  2,000 Units Oral Daily Donice B Carole, DO   2,000 Units at 02/23/23 0646    cinacalcet (SENSIPAR) tablet 30 mg  30 mg Oral Daily Donice B Libby, DO   30 mg at 02/23/23 0934    albuterol (PROVENTIL) nebulizer solution 2.5 mg  2.5 mg Nebulization Q6H PRN Donice B Carole, DO        budesonide (PULMICORT) nebulizer suspension 250 mcg  0.25 mg Nebulization BID Donice B Carole, DO   250 mcg at 02/23/23 0453    And    arformoterol tartrate (BROVANA) nebulizer solution 15 mcg  15 mcg Nebulization BID Donice B Carole, DO   15 mcg at 02/23/23 0734    cefTRIAXone (ROCEPHIN) 2,000 mg in sterile water 20 mL IV syringe  2,000 mg IntraVENous Q24H Donice B Carole, DO   2,000 mg at 02/22/23 2106    vancomycin (VANCOCIN) 1250 mg in sodium chloride 0.9% 250 mL IVPB  1,250 mg IntraVENous Q24H Krista Vargas MD   Stopped at 02/23/23 0130    dextrose 5 % and 0.45 % sodium chloride infusion   IntraVENous Continuous Krista Vargas MD 75 mL/hr at 02/17/23 2118 New Bag at 02/17/23 2118    sodium chloride flush 0.9 % injection 5-40 mL  5-40 mL IntraVENous 2 times per day Krista Vargas MD   10 mL at 02/23/23 8472    sodium chloride flush 0.9 % injection 5-40 mL  5-40 mL IntraVENous PRN Krista Vargas MD        0.9 % sodium chloride infusion   IntraVENous PRN Krista Vargas MD        hydrALAZINE (APRESOLINE) injection 10 mg  10 mg IntraVENous Q4H PRN Krista Vargas MD        HYDROmorphone HCl PF (DILAUDID) injection 1 mg  1 mg IntraVENous Q2H PRN Krista Vargas MD   1 mg at 02/22/23 2142    ondansetron (ZOFRAN-ODT) disintegrating tablet 4 mg  4 mg Oral Q8H PRN Lisa Faria MD        Or    ondansetron [facility]) injection 4 mg  4 mg IntraVENous Q6H PRN Lisa Faria MD        aspirin EC tablet 81 mg  81 mg Oral Daily Lisa Faria MD   81 mg at 02/23/23 0934    oxyCODONE (ROXICODONE) immediate release tablet 5 mg  5 mg Oral Q4H PRN Lisa Faria MD   5 mg at 02/23/23 0130       Allergies: Allergies   Allergen Reactions    Pentazocine Shortness Of Breath and Nausea And Vomiting    Cephalexin Diarrhea    Meperidine Hallucinations and Other (See Comments)     Halucinations    Propoxyphene Itching    Codeine Nausea And Vomiting     Other reaction(s): other/intolerance       Problem List:    Patient Active Problem List   Diagnosis Code    UTI (urinary tract infection) N39.0    Chronic wound infection of abdomen S31.109A, L08.9    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 7XXA    HTN (hypertension) I10    Abscess L02.91    Arteriovenous graft infection ([address] T82. 7XXA    CAP (community acquired pneumonia) J18.9    Dermal sinus tract of lumbosacral region ([address] Q06.8    Nonhealing surgical wound T81.89XA    Vascular graft infection ([address] T82. 7XXA    Chronic aorto-iliac occlusion syndrome ([facility]) I74.09    PVD (peripheral vascular disease) ([facility]) I73.9    Crohn's disease ([address] K50.90    Open wound T14. 8XXA    Wound disruption, post-op, skin T81.31XA    Severe sepsis ([facility]) A41.9, R65.20    Mass of breast, left N63.20    Cellulitis of abdominal wall L03.311    Postmenopausal osteoporosis M81.0    Femoral artery occlusion, left ([facility]) I70.202    Dehiscence of wound T81.30XA       Past Medical History:        Diagnosis Date    Arthritis     CAP (community acquired pneumonia) 06/27/2017    Chronic lung disease     Claudication ([address]     left leg    Crohn's disease ([address]     Crohn's disease ([address]     GERD (gastroesophageal reflux disease)     HTN (hypertension)     Ill-defined condition     Uses O2 at night.  Nausea & vomiting     Neuropathy     Other and unspecified symptoms and signs involving general sensations and perceptions     PVD    Other ill-defined conditions(799.89)     Crohn's    Parathyroid tumor     sees Endo Dr Corin Perez Peripheral neuropathy     [facility]     PVD (peripheral vascular disease) ([facility]     right leg    Sepsis ([facility] 06/27/2017    Vitamin B12 deficiency        Past Surgical History:        Procedure Laterality Date    APPENDECTOMY      BREAST LUMPECTOMY Left     breast left- precancerous    BUNIONECTOMY      left eye    BYPASS GRAFT OTHR,AXILL-FEM Right 04/19/2013    BYPASS GRAFT OTHR,FEM-POP Left 05/2013    CATARACT REMOVAL      bilateral    CHOLECYSTECTOMY      COLONOSCOPY N/A 09/11/2019    DIAGNOSTIC COLONOSCOPY performed by Sebastian Ziegler MD at [address] [place] [address] FOOT/TOES SURGERY [address] Left 2/17/2023    WASHOUT LEFT GROIN/WOUND VAC PLACEMENT performed by Rui Beltran MD at [place] [address]      lateral epicondilitis on right    OTHER SURGICAL HISTORY  01/05/2023    left leg thrombectomy with stent    OTHER SURGICAL HISTORY  09/2013    I & D Right chest/flank wall abscess vs granuloma    OTHER SURGICAL HISTORY      intestional resection x4    OTHER SURGICAL HISTORY  03/07/2013    catheter into aorta    UPPER ARM/ELBOW SURGERY UNLISTED      VASCULAR SURGERY  09/10/2020    Debridement and washout of bypass graft. Social History:    Social History     Tobacco Use    Smoking status: Every Day     Packs/day: 1.00     Types: Cigarettes    Smokeless tobacco: Never   Substance Use Topics    Alcohol use: No                                Ready to quit: Not Answered  Counseling given: Not Answered      Vital Signs (Current): There were no vitals filed for this visit.                                            BP Readings from Last 3 Encounters: 02/23/23 (!) 132/55   10/28/22 (!) 162/82   08/26/21 (!) 148/63       NPO Status:                                                                                 BMI:   Wt Readings from Last 3 Encounters:   08/26/21 138 lb (62.6 kg)     There is no height or weight on file to calculate BMI.    CBC:   Lab Results   Component Value Date/Time    WBC 17.3 02/23/2023 02:10 AM    RBC 2.94 02/23/2023 02:10 AM    HGB 8.3 02/23/2023 02:10 AM    HCT 26.8 02/23/2023 02:10 AM    MCV 91.2 02/23/2023 02:10 AM    RDW 16.5 02/23/2023 02:10 AM     02/23/2023 02:10 AM       CMP:   Lab Results   Component Value Date/Time     02/23/2023 02:10 AM    K 4.5 02/23/2023 02:10 AM     02/23/2023 02:10 AM    CO2 25 02/23/2023 02:10 AM    BUN 7 02/23/2023 02:10 AM    CREATININE 0.64 02/23/2023 02:10 AM    GFRAA >60 04/27/2022 11:16 AM    AGRATIO 0.9 10/26/2022 11:20 AM    LABGLOM >60 02/23/2023 02:10 AM    GLUCOSE 81 02/23/2023 02:10 AM    PROT 6.2 02/22/2023 02:47 AM    CALCIUM 9.7 02/23/2023 02:10 AM    BILITOT <0.1 02/22/2023 02:47 AM    ALKPHOS 90 02/22/2023 02:47 AM    ALKPHOS 77 10/26/2022 11:20 AM    AST 13 02/22/2023 02:47 AM    ALT 20 02/22/2023 02:47 AM       POC Tests: No results for input(s): POCGLU, POCNA, POCK, POCCL, POCBUN, POCHEMO, POCHCT in the last 72 hours.     Coags:   Lab Results   Component Value Date/Time    PROTIME 15.9 12/03/2020 02:20 PM    INR 1.3 12/03/2020 02:20 PM    APTT 93.9 01/29/2023 02:01 AM       HCG (If Applicable): No results found for: PREGTESTUR, PREGSERUM, HCG, HCGQUANT     ABGs: No results found for: PHART, PO2ART, UNZ9NZK, EBS9DCH, BEART, B4FCKGGK     Type & Screen (If Applicable):  No results found for: LABABO, LABRH    Drug/Infectious Status (If Applicable):  No results found for: HIV, HEPCAB    COVID-19 Screening (If Applicable):   Lab Results   Component Value Date/Time    COVID19 Not Detected 08/23/2021 01:42 PM           Anesthesia Evaluation  Patient summary reviewed and Nursing notes reviewed  Airway: Mallampati: II  TM distance: >3 FB   Neck ROM: full  Mouth opening: > = 3 FB   Dental: normal exam         Pulmonary:normal exam    (+) pneumonia:  COPD:                             Cardiovascular:  Exercise tolerance: poor (<4 METS),   (+) hypertension:,         Rhythm: regular  Rate: normal                    Neuro/Psych:   (+) neuromuscular disease:,             GI/Hepatic/Renal:   (+) GERD:,           Endo/Other:                     Abdominal:             Vascular: Other Findings:             Anesthesia Plan      general     ASA 3       Induction: intravenous. Anesthetic plan and risks discussed with patient. Plan discussed with CRNA.                     Megan Delong MD   2/23/2023

## 2023-02-23 NOTE — PROGRESS NOTES
TRANSFER - IN REPORT:    Verbal report received from PACU on [address]  being received from [facility] for routine progression of patient care      Report consisted of patient's Situation, Background, Assessment and   Recommendations(SBAR). Information from the following report(s) Nurse Handoff Report and Surgery Report was reviewed with the receiving nurse. Opportunity for questions and clarification was provided. Assessment completed upon patient's arrival to unit and care assumed.

## 2023-02-23 NOTE — PROGRESS NOTES
[facility]  (A Division of [address] [place] [address])    Follow-up Note      Date of Admission: 2/17/2023       Date of Note:  2/23/2023          Current Antimicrobials:    Prior Antimicrobials:  Vancomycin 2/17- present  Ceftriaxone 2/20- present      Assessment:         Left groin wound dehiscense-- possible deep infection/ Superficial cx + Klebsiella. S/p washout with wound vac on 2/17  Leucocytosis/ sepsis  2/2 above  likely  S/P left leg thromboectomy/ angiogram/angioplasty and fem-below knee bypass X2 in jan 2023  PVD  Immunosuppressed due to Remicaide for Crohn's disease-- last dose 2/17/23  Anemia-- seems to keep declining-- bleeding? Neuropathy    Plan:   Continue vancomycin and ceftriaxone  Trend CBC, BMP   - WBC without much change   f/u repeat blood cx from 2/21-remain negative so far    CT scan reviewed- no acute abscess or hematoma noted. Still with fluid collections around graft    Pending Indium scan to further clarify if there is potential extension of infection into the graft.    -I have independently reviewed the scan. Per my read there is increased uptake in the left groin this may or may not be related to infection versus recent surgical debridement. Additionally there is uptake in the left thigh concerning for infection or inflammation of the graft    Hold Immune modulators due to infection concerns. Discussed with nursing at bedside. Discussed with Dr. Clarice March with plan to potentially remove old graft with surgical exploration. Discussed indium scan-formal read is not yet in place however it appears as though there is uptake in the left thigh as well as in the left groin      DO John Solorio 1949 [facility]  [address], [address] [facility] [place] [address]  Office: [phone], Ext [phone]       Microbiology:  2/17 wound cx: light K.  Aerogens  2/17 blood cx: ngtd x 2  2/18 blood cx: ngtdx 2  2/21 blood cx: No growth to date x2    Lines / Catheters:  PIV    Subjective:   Patient seen and examined in preop holding. She is very drowsy today. Having trouble keeping her eyes open. She is complaining of increasing pain in her left thigh. Having low-grade temps. Awaiting surgery this afternoon    Objective:      BP (!) 132/55   Pulse 77   Temp 99.5 °F (37.5 °C) (Oral)   Resp 18   Ht 5' 5\" (1.651 m)   SpO2 93%   BMI 22.68 kg/m²   Temp (24hrs), Av.1 °F (37.3 °C), Min:98.7 °F (37.1 °C), Max:99.5 °F (37.5 °C)        General:   awake alert and oriented, non-toxic   Skin:   no rashes or skin lesions noted on limited exam, dry and warm   HEENT:  No scleral icterus or pallor; oral mucosa moist, lips moist   Lymph Nodes:   not assessed today   Lungs:   Non-labored; bilaterally clear to aspiration- no crackles wheezes rales or rhonchi   Heart:  RRR, s1 and s2; no murmurs rubs or gallops; no edema, + pedal pulses   Abdomen:  soft, non-distended, active bowel sounds, non-tender   Genitourinary:  deferred   Extremities:   average muscle tone; no contractures, no joint effusions; wound vacin right groin; right thugh incision tendern to palpation   Neurologic:  No gross focal motor or sensory abnormalities; CN 2-12 intact; Follows commands. Psychiatric:   appropriate and interactive.          Lab results:  Chemistry  Recent Labs     23  1228 23  0247 23  0210    141 138   K 3.2* 3.1* 4.5    109 108   CO2 29 24 25   BUN 9 6* 7   GLOB  --  4.4*  --        CBC w/ Diff  Recent Labs     23  1228 23  0247 23  0210   WBC 20.7* 16.1* 17.3*   RBC 3.04* 2.94* 2.94*   HGB 8.5* 8.0* 8.3*   HCT 27.4* 26.7* 26.8*   * 504* 509*       Microbiology  Results       Procedure Component Value Units Date/Time    Culture, Blood 2 [[id number]] Collected: 23 1228    Order Status: Completed Specimen: Blood Updated: 23 0616     Special Requests NO SPECIAL REQUESTS        Culture NO GROWTH 2 DAYS       Culture, Blood 1 [[id number]] Collected: 02/21/23 1218    Order Status: Completed Specimen: Blood Updated: 02/23/23 0616     Special Requests NO SPECIAL REQUESTS        Culture NO GROWTH 2 DAYS       Culture, Blood 2 [[id number]] Collected: 02/18/23 0244    Order Status: Completed Specimen: Blood Updated: 02/23/23 0616     Special Requests NO SPECIAL REQUESTS        Culture NO GROWTH 5 DAYS       Culture, Anaerobic [[id number]] Collected: 02/17/23 1849    Order Status: Completed Specimen: Swab from Groin Updated: 02/19/23 1513     Special Requests NO SPECIAL REQUESTS        Culture NO ANAEROBES ISOLATED       Culture, Wound Aerobic Only [[id number]]  (Abnormal)  (Susceptibility) Collected: 02/17/23 1849    Order Status: Completed Specimen: Swab from Groin Updated: 02/20/23 1039     Special Requests NO SPECIAL REQUESTS        Gram stain NO WBC'S SEEN         NO ORGANISMS SEEN        Culture       LIGHT Klebsiella (Enterobacter) aerogenes          Susceptibility        Klebsiella aerogenes      BACTERIAL SUSCEPTIBILITY PANEL DAVID      amikacin <=2 ug/mL Sensitive      ceFAZolin >=64 ug/mL Resistant      cefepime <=1 ug/mL Sensitive      cefOXitin >=64 ug/mL Resistant      cefTAZidime <=1 ug/mL Sensitive      cefTRIAXone <=1 ug/mL Sensitive      ciprofloxacin <=0.25 ug/mL Sensitive      gentamicin <=1 ug/mL Sensitive      levofloxacin <=0.12 ug/mL Sensitive      tobramycin <=1 ug/mL Sensitive      trimethoprim-sulfamethoxazole <=20 ug/mL Sensitive                           Culture, Blood 1 [[id number]] Collected: 02/17/23 1704    Order Status: Completed Specimen: Blood Updated: 02/23/23 0616     Special Requests NO SPECIAL REQUESTS        Culture NO GROWTH 6 DAYS       Blood Culture 2 [[id number]] Collected: 02/17/23 1545    Order Status: Canceled Specimen: Blood     Blood Culture 1 [[id number]] Collected: 02/17/23 1530    Order Status: Canceled Specimen: Blood               Aime Mccartney DO   2/23/2023

## 2023-02-23 NOTE — PROGRESS NOTES
1920 Bedside and Verbal shift change  Received from CHIQUI Givens   (outgoing nurse), to VIRI Garcia (incoming)  Pt. Is AOX 4. IV patent and infusing well, Pt. denies pain at this time. Report included the following information SBAR, Procedure Summary, Intake/Output, MAR, Recent Results, Med Rec Status, and Cardiac Rhythm @ SR. Will Resume care and monitor Pt. Condition. Pt. Head to Toe Assessment Done and documented. Pt. Educated on call bell when in need of help and assistance. Pt. verbalized understanding. 2142  Pt. Given ain meds per [facility] for pain management. 2230  Pt. OOB to bathroom x1 assist by . 0000  Pt. Resting with eyes closed, easily awaken. Pt educate don NPO status. 0130   Pt. Given ashley per [facility] for pan meds. 0200 Pt. Denies pain. 0400  pt. Made no complaints. 0600  Pt. Able to rest and sleep well throughout the shift. Verbal and bedside Shift changed report given to Hernan Hayes RN (oncoming RN) on Pt. Condition. Report consisted of patients Situation, History, Activities, intake/output,  Background, Assessment and Recommendations(SBAR). Information from the following report(s) Kardex, order Summary, Lab results and MAR was reviewed with the receiving nurse. Opportunity for questions and clarification was provided.

## 2023-02-23 NOTE — PROGRESS NOTES
Physician Progress Note      PATIENT:               Ash Tavares  CSN #:                  [id number]  :                       1950  ADMIT DATE:       2023 12:53 PM  [address] [place] DATE:  RESPONDING  PROVIDER #:        Bernardino Garcia DO          QUERY TEXT:    Pt admitted with ? Left groin wound dehiscence s/p washout with wound vac on    . Noted documentation of sepsis  2/2  ? Left groin wound dehiscence with   possible deep infection on 23 by ordered ID consultant. If possible,   please document in progress notes and discharge summary:    The medical record reflects the following:    Risk Factors:Left groin wound dehiscence  with possible deep infection s/p   washout with wound vac on     Clinical Indicators: Leukocytosis/ sepsis  2/2   likely ? Left groin wound   dehiscence-- possible deep infection/ Superficial cx + Klebsiella s/p washout   with wound vac on     ADt:2023 12:53 pm WBC: 18.6 , VS--PULSE 92 ,  bp -113/46 , 100/55 , RR   22    Treatment: Vancomycin - present ,Ceftriaxone - present    Thank you  Nereyda Grijalva RN CRCR CDI  , [facility] /[facility]  [email or web address]  Options provided:  -- sepsis  2/2 ? Left groin wound dehiscence with possible deep infection   confirmed present on admission  -- sepsis ruled out  -- Defer to ID consultant documentation regarding sepsis  2/2 ? Left groin   wound dehiscence  with possible deep infection  -- Other - I will add my own diagnosis  -- Disagree - Not applicable / Not valid  -- Disagree - Clinically unable to determine / Unknown  -- Refer to Clinical Documentation Reviewer    PROVIDER RESPONSE TEXT:    The diagnosis of sepsis 2/2 Left groin wound dehiscence with possible deep   infection was confirmed as present on admission. Query created by:  Virgilio Gastelum on 2023 7:57 PM      Electronically signed by:  Bernardino Garcia DO 2023 11:07 AM

## 2023-02-23 NOTE — PROGRESS NOTES
Assumed care of patient from Pana, [address] [place] [address] (off going nurse). Patient alert and oriented. No apparent distress noted. Patient offers no concerns and can verbalize needs. Assessment to follow. Call bell within reach. Bed in lowest, locked position.

## 2023-02-23 NOTE — ANESTHESIA POSTPROCEDURE EVALUATION
Department of Anesthesiology  Postprocedure Note    Patient: Viraj Urena  MRN: [id number]  YOB: 1950  Date of evaluation: 2/23/2023      Procedure Summary     Date: 02/23/23 Room / Location: [facility] CVT 02 / [facility] CARDIAC SURGERY    Anesthesia Start: 1351 Anesthesia Stop:     Procedure: REMOVAL OF INFECTED BYPASS GRAFT LEFT LEG (Left) Diagnosis:       Infection of vascular bypass graft, subsequent encounter      (INFECTED LEFT BYPASS GRAFT)    Surgeons: Dario Kahn MD Responsible Provider: Frances Bolivar MD    Anesthesia Type: General ASA Status: 3          Anesthesia Type: General    Gurmeet Phase I: Gurmeet Score: 9    Gurmeet Phase II:        Anesthesia Post Evaluation    Patient location during evaluation: bedside  Airway patency: patent  Complications: no  Cardiovascular status: hemodynamically stable  Respiratory status: acceptable  Hydration status: stable

## 2023-02-23 NOTE — PROGRESS NOTES
Comprehensive Nutrition Assessment    Type and Reason for Visit:  Initial, NPO/Clear Liquid, Positive Nutrition Screen, RD Nutrition Re-Screen/LOS    Nutrition Recommendations/Plan:   Resume diet when medically appropriate - will include oral supplement to promote wound healing   Obtain current weight from nursing   Monitor readiness to resume PO intake, weight, labs, and plan of care during admission. Malnutrition Assessment:  Malnutrition Status: At risk for malnutrition (Comment) (r/t varied PO intake, wounds and advance age) (02/23/23 1326)      Nutrition History and Allergies:   Past medical history:osteoporosis, UTI, cellulitis of abdominal wall, open wound, chronic wound infection of abdomen, CAP, severe sepsis, abscess, mass of breast, HTN, PVD, Crohn's disease. NKFA. Weight history per chart review:  CBW: not documented, 30 days: 1/23/2023:138 lbs. Nutrition Assessment:    Pt admitted for dehiscence of left femoral incision. Visited pt in room-pt unavailable; currently off unit for pending procedure. Pt NPO status (x1 day). Prior to NPO status, pt tolerated diet with at least 75% PO intake - no additional oral supplements. Per chart review, pt s/p REVISION INFECTED LEFT LEG BYPASS GRAFT/ CADAVER VEIN. Current labs WNL. Will include oral supplement once diet advances to promote wound healing. Nutrition Related Findings:    Last BM 2/19. Output: 500mL (urine-catheter). Pertinent Medications: pepcid, folic acid, iron sucrose, magnesium oxide, vitamin D. Wound Type: Surgical Incision, Multiple, Skin Tears      Current Nutrition Intake & Therapies:    Average Meal Intake: NPO  Average Supplements Intake: NPO  Diet NPO    Anthropometric Measures:  Height: 5' 5\" (165.1 cm)  Ideal Body Weight (IBW): 125 lbs (57 kg)       Current Body Weight:  (no current weight documented),   IBW.     Current BMI (kg/m2):    Usual Body Weight: 138 lb (62.6 kg)     Weight Adjustment For: No Adjustment  BMI Categories: Normal Weight (BMI 22.0 to 24.9) age over 72    Estimated Daily Nutrient Needs:  Energy Requirements Based On: Formula (MSJ x 1.2-1.3)  Weight Used for Energy Requirements: Usual (from 01/2023)  Energy (kcal/day): 5016-4789  Weight Used for Protein Requirements: Usual (1.0-1.2)  Protein (g/day): 63-76  Method Used for Fluid Requirements: 1 ml/kcal  Fluid (ml/day): 8209-8279    Nutrition Diagnosis:   Increased nutrient needs related to acute injury/trauma, increase demand for energy/nutrients as evidenced by NPO or clear liquid status due to medical condition, wounds    Nutrition Interventions:   Food and/or Nutrient Delivery: Start Oral Diet  Nutrition Education/Counseling: Education not indicated, No recommendation at this time  Coordination of Nutrition Care: Continue to monitor while inpatient       Goals:     Goals: Meet at least 75% of estimated needs, by next RD assessment       Nutrition Monitoring and Evaluation:   Behavioral-Environmental Outcomes: None Identified  Food/Nutrient Intake Outcomes: Diet Advancement/Tolerance  Physical Signs/Symptoms Outcomes: Biochemical Data, Nutrition Focused Physical Findings, Skin, Weight, GI Status, Meal Time Behavior    Discharge Planning:     Too soon to determine     [address] [facility], [address]  Contact: [phone]

## 2023-02-23 NOTE — PROGRESS NOTES
Went over the nuclear scan with radiology  It appears that the old occluded bypass graft, femoral to above-knee pop, lights up on the study. Discussed these findings with the patient and will today attempt to explore and take out the graft that isolated on the scan. If this turns around and improves her infection, we will all be pleased. If she continues to appear infected we will have to deal with revision of the active bypass. Limb loss is her risk.   She is agreeable

## 2023-02-23 NOTE — PERIOP NOTE
TRANSFER - IN REPORT:    Verbal report received from [address] [employer]  being received from 2 Nini Cooley for ordered procedure      Report consisted of patient's Situation, Background, Assessment and   Recommendations(SBAR). Information from the following report(s) Nurse Handoff Report was reviewed with the receiving nurse. Opportunity for questions and clarification was provided. Assessment completed upon patient's arrival to unit and care assumed.

## 2023-02-24 LAB
ANION GAP SERPL CALC-SCNC: 5 MMOL/L (ref 3–18)
BACTERIA SPEC CULT: NORMAL
BASOPHILS # BLD: 0.1 K/UL (ref 0–0.1)
BASOPHILS NFR BLD: 0 % (ref 0–2)
BUN SERPL-MCNC: 10 MG/DL (ref 7–18)
BUN/CREAT SERPL: 11 (ref 12–20)
CALCIUM SERPL-MCNC: 10.5 MG/DL (ref 8.5–10.1)
CHLORIDE SERPL-SCNC: 109 MMOL/L (ref 100–111)
CO2 SERPL-SCNC: 27 MMOL/L (ref 21–32)
CREAT SERPL-MCNC: 0.87 MG/DL (ref 0.6–1.3)
DIFFERENTIAL METHOD BLD: ABNORMAL
EOSINOPHIL # BLD: 0.2 K/UL (ref 0–0.4)
EOSINOPHIL NFR BLD: 1 % (ref 0–5)
ERYTHROCYTE [DISTWIDTH] IN BLOOD BY AUTOMATED COUNT: 17 % (ref 11.6–14.5)
GLUCOSE SERPL-MCNC: 113 MG/DL (ref 74–99)
HCT VFR BLD AUTO: 29.8 % (ref 35–45)
HGB BLD-MCNC: 9.1 G/DL (ref 12–16)
IMM GRANULOCYTES # BLD AUTO: 0.3 K/UL (ref 0–0.04)
IMM GRANULOCYTES NFR BLD AUTO: 2 % (ref 0–0.5)
LYMPHOCYTES # BLD: 3.7 K/UL (ref 0.9–3.6)
LYMPHOCYTES NFR BLD: 22 % (ref 21–52)
MCH RBC QN AUTO: 28 PG (ref 24–34)
MCHC RBC AUTO-ENTMCNC: 30.5 G/DL (ref 31–37)
MCV RBC AUTO: 91.7 FL (ref 78–100)
MONOCYTES # BLD: 1.1 K/UL (ref 0.05–1.2)
MONOCYTES NFR BLD: 6 % (ref 3–10)
NEUTS SEG # BLD: 12 K/UL (ref 1.8–8)
NEUTS SEG NFR BLD: 69 % (ref 40–73)
NRBC # BLD: 0 K/UL (ref 0–0.01)
NRBC BLD-RTO: 0 PER 100 WBC
PLATELET # BLD AUTO: 588 K/UL (ref 135–420)
PMV BLD AUTO: 9.9 FL (ref 9.2–11.8)
POTASSIUM SERPL-SCNC: 4.7 MMOL/L (ref 3.5–5.5)
RBC # BLD AUTO: 3.25 M/UL (ref 4.2–5.3)
SERVICE CMNT-IMP: NORMAL
SODIUM SERPL-SCNC: 141 MMOL/L (ref 136–145)
WBC # BLD AUTO: 17.4 K/UL (ref 4.6–13.2)

## 2023-02-24 PROCEDURE — 94640 AIRWAY INHALATION TREATMENT: CPT

## 2023-02-24 PROCEDURE — 2700000000 HC OXYGEN THERAPY PER DAY

## 2023-02-24 PROCEDURE — 6360000002 HC RX W HCPCS: Performed by: INTERNAL MEDICINE

## 2023-02-24 PROCEDURE — 1100000003 HC PRIVATE W/ TELEMETRY

## 2023-02-24 PROCEDURE — 97116 GAIT TRAINING THERAPY: CPT

## 2023-02-24 PROCEDURE — 85025 COMPLETE CBC W/AUTO DIFF WBC: CPT

## 2023-02-24 PROCEDURE — 2580000003 HC RX 258: Performed by: SURGERY

## 2023-02-24 PROCEDURE — 6370000000 HC RX 637 (ALT 250 FOR IP): Performed by: INTERNAL MEDICINE

## 2023-02-24 PROCEDURE — 36415 COLL VENOUS BLD VENIPUNCTURE: CPT

## 2023-02-24 PROCEDURE — 80048 BASIC METABOLIC PNL TOTAL CA: CPT

## 2023-02-24 PROCEDURE — 6370000000 HC RX 637 (ALT 250 FOR IP): Performed by: SURGERY

## 2023-02-24 PROCEDURE — 6360000002 HC RX W HCPCS: Performed by: SURGERY

## 2023-02-24 PROCEDURE — 94761 N-INVAS EAR/PLS OXIMETRY MLT: CPT

## 2023-02-24 PROCEDURE — 2580000003 HC RX 258: Performed by: INTERNAL MEDICINE

## 2023-02-24 RX ADMIN — OXYCODONE HYDROCHLORIDE 5 MG: 5 TABLET ORAL at 05:35

## 2023-02-24 RX ADMIN — BUDESONIDE 250 MCG: 0.25 SUSPENSION RESPIRATORY (INHALATION) at 08:34

## 2023-02-24 RX ADMIN — HYDROMORPHONE HYDROCHLORIDE 1 MG: 1 INJECTION, SOLUTION INTRAMUSCULAR; INTRAVENOUS; SUBCUTANEOUS at 04:20

## 2023-02-24 RX ADMIN — HYDROMORPHONE HYDROCHLORIDE 1 MG: 1 INJECTION, SOLUTION INTRAMUSCULAR; INTRAVENOUS; SUBCUTANEOUS at 20:06

## 2023-02-24 RX ADMIN — SODIUM CHLORIDE, PRESERVATIVE FREE 10 ML: 5 INJECTION INTRAVENOUS at 21:10

## 2023-02-24 RX ADMIN — LATANOPROST 1 DROP: 50 SOLUTION OPHTHALMIC at 21:04

## 2023-02-24 RX ADMIN — HYDROMORPHONE HYDROCHLORIDE 1 MG: 1 INJECTION, SOLUTION INTRAMUSCULAR; INTRAVENOUS; SUBCUTANEOUS at 17:49

## 2023-02-24 RX ADMIN — PREGABALIN 200 MG: 25 CAPSULE ORAL at 21:03

## 2023-02-24 RX ADMIN — ASPIRIN 81 MG: 81 TABLET, COATED ORAL at 09:03

## 2023-02-24 RX ADMIN — ARFORMOTEROL TARTRATE 15 MCG: 15 SOLUTION RESPIRATORY (INHALATION) at 08:34

## 2023-02-24 RX ADMIN — OXYCODONE HYDROCHLORIDE 5 MG: 5 TABLET ORAL at 00:50

## 2023-02-24 RX ADMIN — VANCOMYCIN HYDROCHLORIDE 1250 MG: 10 INJECTION, POWDER, LYOPHILIZED, FOR SOLUTION INTRAVENOUS at 21:04

## 2023-02-24 RX ADMIN — FOLIC ACID 1 MG: 1 TABLET ORAL at 09:04

## 2023-02-24 RX ADMIN — IPRATROPIUM BROMIDE 0.5 MG: 0.5 SOLUTION RESPIRATORY (INHALATION) at 19:38

## 2023-02-24 RX ADMIN — MONTELUKAST 10 MG: 10 TABLET, FILM COATED ORAL at 09:03

## 2023-02-24 RX ADMIN — CETIRIZINE HYDROCHLORIDE 10 MG: 10 TABLET, FILM COATED ORAL at 21:04

## 2023-02-24 RX ADMIN — BUDESONIDE 250 MCG: 0.25 SUSPENSION RESPIRATORY (INHALATION) at 19:39

## 2023-02-24 RX ADMIN — PREGABALIN 100 MG: 25 CAPSULE ORAL at 09:04

## 2023-02-24 RX ADMIN — ARFORMOTEROL TARTRATE 15 MCG: 15 SOLUTION RESPIRATORY (INHALATION) at 19:38

## 2023-02-24 RX ADMIN — CEFTRIAXONE 2000 MG: 2 INJECTION, POWDER, FOR SOLUTION INTRAMUSCULAR; INTRAVENOUS at 20:06

## 2023-02-24 RX ADMIN — HYDROMORPHONE HYDROCHLORIDE 1 MG: 1 INJECTION, SOLUTION INTRAMUSCULAR; INTRAVENOUS; SUBCUTANEOUS at 01:40

## 2023-02-24 RX ADMIN — CHOLECALCIFEROL TAB 25 MCG (1000 UNIT) 2000 UNITS: 25 TAB at 09:04

## 2023-02-24 RX ADMIN — IPRATROPIUM BROMIDE 0.5 MG: 0.5 SOLUTION RESPIRATORY (INHALATION) at 15:24

## 2023-02-24 RX ADMIN — IPRATROPIUM BROMIDE 0.5 MG: 0.5 SOLUTION RESPIRATORY (INHALATION) at 08:34

## 2023-02-24 RX ADMIN — HYDROMORPHONE HYDROCHLORIDE 1 MG: 1 INJECTION, SOLUTION INTRAMUSCULAR; INTRAVENOUS; SUBCUTANEOUS at 12:37

## 2023-02-24 RX ADMIN — SODIUM CHLORIDE, PRESERVATIVE FREE 10 ML: 5 INJECTION INTRAVENOUS at 09:05

## 2023-02-24 RX ADMIN — HYDROMORPHONE HYDROCHLORIDE 1 MG: 1 INJECTION, SOLUTION INTRAMUSCULAR; INTRAVENOUS; SUBCUTANEOUS at 06:40

## 2023-02-24 RX ADMIN — CINACALCET HYDROCHLORIDE 30 MG: 30 TABLET, FILM COATED ORAL at 09:03

## 2023-02-24 ASSESSMENT — PAIN DESCRIPTION - ORIENTATION
ORIENTATION: LEFT

## 2023-02-24 ASSESSMENT — PAIN DESCRIPTION - LOCATION
LOCATION: LEG
LOCATION: INCISION
LOCATION: INCISION
LOCATION: LEG
LOCATION: LEG
LOCATION: INCISION
LOCATION: LEG
LOCATION: LEG
LOCATION: INCISION

## 2023-02-24 ASSESSMENT — PAIN SCALES - GENERAL
PAINLEVEL_OUTOF10: 7
PAINLEVEL_OUTOF10: 0
PAINLEVEL_OUTOF10: 0
PAINLEVEL_OUTOF10: 6
PAINLEVEL_OUTOF10: 6
PAINLEVEL_OUTOF10: 8
PAINLEVEL_OUTOF10: 6
PAINLEVEL_OUTOF10: 7
PAINLEVEL_OUTOF10: 7
PAINLEVEL_OUTOF10: 8
PAINLEVEL_OUTOF10: 0
PAINLEVEL_OUTOF10: 4
PAINLEVEL_OUTOF10: 7
PAINLEVEL_OUTOF10: 6
PAINLEVEL_OUTOF10: 6

## 2023-02-24 ASSESSMENT — PAIN - FUNCTIONAL ASSESSMENT
PAIN_FUNCTIONAL_ASSESSMENT: ACTIVITIES ARE NOT PREVENTED

## 2023-02-24 ASSESSMENT — PAIN DESCRIPTION - DESCRIPTORS
DESCRIPTORS: THROBBING
DESCRIPTORS: ACHING;BURNING;GNAWING
DESCRIPTORS: SHARP;THROBBING
DESCRIPTORS: ACHING;GNAWING
DESCRIPTORS: SHARP
DESCRIPTORS: SHARP
DESCRIPTORS: ACHING
DESCRIPTORS: ACHING;BURNING;GNAWING
DESCRIPTORS: SHARP

## 2023-02-24 ASSESSMENT — PAIN DESCRIPTION - DIRECTION: RADIATING_TOWARDS: KNEE

## 2023-02-24 ASSESSMENT — PAIN DESCRIPTION - PAIN TYPE: TYPE: SURGICAL PAIN

## 2023-02-24 ASSESSMENT — PAIN DESCRIPTION - FREQUENCY: FREQUENCY: INTERMITTENT

## 2023-02-24 NOTE — PROGRESS NOTES
Comprehensive Nutrition Assessment    Type and Reason for Visit:  Reassess, Positive Nutrition Screen, NPO/Clear Liquid    Nutrition Recommendations/Plan:   Add supplement: Cr BID (95 kcal, 2.5 gm collagen protein each),  Ensure Enlive once daily (350 kcal, 20 gm protein each)  Continue all other nutrition interventions. Encourage/ monitor po intake of meals and supplements. Malnutrition Assessment:  Malnutrition Status: At risk for malnutrition (Comment) (r/t varied PO intake, wounds and advance age) (02/23/23 1326)        Nutrition Assessment:    Po diet resumed yesterday. Pt with fair/good meal intake since admission per chart documentation. Tolerating diet. Wt checked; nutrition needs reassessed. Pt with woulds; plan to add protein supplement    Nutrition Related Findings:    BM 2/23 Wound Type: Surgical Incision, Multiple, Skin Tears      Current Nutrition Intake & Therapies:    Average Meal Intake: 51-75%  Average Supplements Intake: None Ordered  ADULT DIET; Regular    Anthropometric Measures:  Height: 5' 5\" (165.1 cm)  Ideal Body Weight (IBW): 125 lbs (57 kg)       Current Body Weight: 138 lb (62.6 kg), 110.4 % IBW.  Weight Source: Not Specified  Current BMI (kg/m2): 23  Usual Body Weight: 138 lb (62.6 kg)     Weight Adjustment For: No Adjustment  BMI Categories: Normal Weight (BMI 22.0 to 24.9) age over 72    Estimated Daily Nutrient Needs:  Energy Requirements Based On: Formula (MSJ x1.2-1.4)  Weight Used for Energy Requirements: Current  Energy (kcal/day): 4290-0342  Weight Used for Protein Requirements: Current  Protein (g/day): 63-75 (wt x1-1.2)  Method Used for Fluid Requirements: 1 ml/kcal  Fluid (ml/day): 4346-0451    Nutrition Diagnosis:   Increased nutrient needs related to acute injury/trauma, increase demand for energy/nutrients as evidenced by NPO or clear liquid status due to medical condition, wounds    Nutrition Interventions:   Food and/or Nutrient Delivery: Continue Current Diet, Start Oral Nutrition Supplement  Nutrition Education/Counseling: Education not indicated, No recommendation at this time  Coordination of Nutrition Care: Continue to monitor while inpatient       Goals:  Previous Goal Met: Progressing toward Goal(s)  Goals: Meet at least 75% of estimated needs, by next RD assessment       Nutrition Monitoring and Evaluation:   Behavioral-Environmental Outcomes: None Identified  Food/Nutrient Intake Outcomes: Diet Advancement/Tolerance, Food and Nutrient Intake, Supplement Intake  Physical Signs/Symptoms Outcomes: Biochemical Data, GI Status, Meal Time Behavior, Weight, Skin    Discharge Planning:     Too soon to determine     [address]ila [address]  Contact: [phone]

## 2023-02-24 NOTE — PROGRESS NOTES
[facility]  (A Division of [address] [place] [address])    Follow-up Note      Date of Admission: 2/17/2023       Date of Note:  2/24/2023          Current Antimicrobials:    Prior Antimicrobials:  Vancomycin 2/17- present  Ceftriaxone 2/20- present      Assessment:         Left groin wound dehiscense-- possible deep infection/ Superficial cx + Klebsiella. S/p washout with wound vac on 2/17  S/p removal of infected bypass graft 2/23/23 in left thigh  Leucocytosis/ sepsis  2/2 above  likely  S/P left leg thromboectomy/ angiogram/angioplasty and fem-below knee bypass X2 in jan 2023  PVD  Immunosuppressed due to Remicaide for Crohn's disease-- last dose 2/17/23  Anemia-- seems to keep declining-- bleeding? Neuropathy    Plan:   Continue vancomycin and ceftriaxone  Trend CBC, BMP   - labs pending for this am  f/u repeat blood cx from 2/21-remain negative so far  F/u 2/23 surgical cultures- NGTD    Indium scan reviewed- Intense WBC activity at the anterior left groin soft tissues felt to  correlate with site of reported wound dehiscence and washout. Hold Immune modulators due to infection concerns. Discussed with nursing at bedside. Discussed with Dr. Cherelle Balderas- plans to go back to the OR on Monday      [facility], [facility]  [address], [address] [address]  Office: [phone], Ext [phone]       Microbiology:  2/17 wound cx: light K. Aerogens  2/17 blood cx: ngtd x 2  2/18 blood cx: ngtdx 2  2/21 blood cx: No growth to date x2  2/23 surgical/graft cx: NGTD    Lines / Catheters:  PIV    Subjective:   Patient seen and examined.  at bedside. She is feeling much better today. No fevers or chills. Eating well. Pain controlled. No fevers or chills. No other complaints today. Wondering what the next step is.      Objective:      BP (!) 146/64   Pulse 80   Temp 98.3 °F (36.8 °C) (Oral)   Resp 16   Ht 5' 5\" (1.651 m) SpO2 99%   BMI 22.68 kg/m²   Temp (24hrs), Av.1 °F (36.7 °C), Min:97.7 °F (36.5 °C), Max:98.5 °F (36.9 °C)        General:   awake alert and oriented, non-toxic   Skin:   no rashes or skin lesions noted on limited exam, dry and warm   HEENT:  No scleral icterus or pallor; oral mucosa moist, lips moist   Lymph Nodes:   not assessed today   Lungs:   Non-labored; bilaterally clear to aspiration- no crackles wheezes rales or rhonchi   Heart:  RRR, s1 and s2; no murmurs rubs or gallops; no edema, + pedal pulses   Abdomen:  soft, non-distended, active bowel sounds, non-tender   Genitourinary:  deferred   Extremities:   average muscle tone; no contractures, no joint effusions; wound vacin left groin; wound VAC on left thigh   Neurologic:  No gross focal motor or sensory abnormalities; CN 2-12 intact; Follows commands. Psychiatric:   appropriate and interactive.          Lab results:  Chemistry  Recent Labs     23  0247 23  021    138   K 3.1* 4.5    108   CO2 24 25   BUN 6* 7   GLOB 4.4*  --        CBC w/ Diff  Recent Labs     23  02423   WBC 16.1* 17.3*   RBC 2.94* 2.94*   HGB 8.0* 8.3*   HCT 26.7* 26.8*   * 509*       Microbiology  Results       Procedure Component Value Units Date/Time    Culture, Wound [[id number]] Collected: 23 145    Order Status: Completed Specimen: Leg Updated: 23 1630     Special Requests LEG        Gram stain FEW WBCS SEEN         NO ORGANISMS SEEN        Culture PENDING    Culture, Anaerobic [[id number]] Collected: 23 1451    Order Status: No result Updated: 233    Culture, Blood 2 [[id number]] Collected: 23 1228    Order Status: Completed Specimen: Blood Updated: 23 0648     Special Requests NO SPECIAL REQUESTS        Culture NO GROWTH 3 DAYS       Culture, Blood 1 [[id number]] Collected: 23 1218    Order Status: Completed Specimen: Blood Updated: 23 2401     Special Requests NO SPECIAL REQUESTS        Culture NO GROWTH 3 DAYS       Culture, Blood 2 [[id number]] Collected: 02/18/23 0244    Order Status: Completed Specimen: Blood Updated: 02/24/23 0648     Special Requests NO SPECIAL REQUESTS        Culture NO GROWTH 6 DAYS       Culture, Anaerobic [[id number]] Collected: 02/17/23 1849    Order Status: Completed Specimen: Swab from Groin Updated: 02/19/23 1513     Special Requests NO SPECIAL REQUESTS        Culture NO ANAEROBES ISOLATED       Culture, Wound Aerobic Only [[id number]]  (Abnormal)  (Susceptibility) Collected: 02/17/23 1849    Order Status: Completed Specimen: Swab from Groin Updated: 02/20/23 1039     Special Requests NO SPECIAL REQUESTS        Gram stain NO WBC'S SEEN         NO ORGANISMS SEEN        Culture       LIGHT Klebsiella (Enterobacter) aerogenes          Susceptibility        Klebsiella aerogenes      BACTERIAL SUSCEPTIBILITY PANEL DAVID      amikacin <=2 ug/mL Sensitive      ceFAZolin >=64 ug/mL Resistant      cefepime <=1 ug/mL Sensitive      cefOXitin >=64 ug/mL Resistant      cefTAZidime <=1 ug/mL Sensitive      cefTRIAXone <=1 ug/mL Sensitive      ciprofloxacin <=0.25 ug/mL Sensitive      gentamicin <=1 ug/mL Sensitive      levofloxacin <=0.12 ug/mL Sensitive      tobramycin <=1 ug/mL Sensitive      trimethoprim-sulfamethoxazole <=20 ug/mL Sensitive                           Culture, Blood 1 [[id number]] Collected: 02/17/23 1704    Order Status: Completed Specimen: Blood Updated: 02/23/23 0616     Special Requests NO SPECIAL REQUESTS        Culture NO GROWTH 6 DAYS       Blood Culture 2 [[id number]] Collected: 02/17/23 1545    Order Status: Canceled Specimen: Blood     Blood Culture 1 [[id number]] Collected: 02/17/23 1530    Order Status: Canceled Specimen: Blood               Romi Caraballo DO   2/24/2023

## 2023-02-24 NOTE — PLAN OF CARE
Problem: Physical Therapy - Adult  Goal: By Discharge: Performs mobility at highest level of function for planned discharge setting. See evaluation for individualized goals. Description: 1. Patient will move from supine to sit and sit to supine  in bed with modified independence. 2.  Patient will transfer from bed to chair and chair to bed with modified independence using the least restrictive device. 3.  Patient will perform sit to stand with modified independence. 4.  Patient will ambulate with supervision/set-up for 75 feet with the least restrictive device. 5.  Patient will ascend/descend 3 stairs with 1-2 handrail(s) with minimal assistance/contact guard assist   2/24/2023 1338 by Kellie Delgado PT  Outcome: Completed      PHYSICAL THERAPY TREATMENT/DISCHARGE    Patient: Therese Martinez (60 y.o. female)  Date: 2/24/2023  Diagnosis: Dehiscence of operative wound, initial encounter [T81.31XA]  Dehiscence of wound [T81.30XA] Dehiscence of wound  Procedure(s) (LRB):  REMOVAL OF INFECTED BYPASS GRAFT LEFT LEG (Left) 1 Day Post-Op  Precautions: Fall Risk, General Precautions      ASSESSMENT:  Patient s/p left leg bypass graft removal. Patient received ambulating in the hallway with her spouse. She ambulates using RW with supervision for management of wound vac. Steady gait with reciprocal pattern. She demonstrates safety and independence with mobility using RW. Will sign off at this time. PLAN:  Maximum therapeutic gains met at current level of care and patient will be discharged from physical therapy at this time. Rationale for discharge:  [x]     Goals Achieved  []     Plateau Reached  []     Patient not participating in therapy  []     Other:    Further Equipment Recommendations for Discharge: rolling walker         [facility]: Current research shows that an AM-PAC score of 18 (14 without stairs) or greater is associated with a discharge to the patient's home setting.  Based on an AM-PAC score of 20/24 (or **/20 if omitting stairs) and their current functional mobility deficits, it is recommended that the patient have 2-3 sessions per week of Physical Therapy at d/c to increase the patient's independence. This AMPA score should be considered in conjunction with interdisciplinary team recommendations to determine the most appropriate discharge setting. Patient's social support, diagnosis, medical stability, and prior level of function should also be taken into consideration. SUBJECTIVE:   Patient stated, \"I feel better. \"    OBJECTIVE DATA SUMMARY:       Functional Mobility Training:    Transfers:  Transfer Training  Stand to Sit: Modified independent  Balance:  Balance  Sitting: Intact  Standing: With support  Standing - Static: Good  Standing - Dynamic: Good     Ambulation/Gait Training:     Gait  Overall Level of Assistance: Supervision  Distance (ft): 200 Feet  Assistive Device: Walker, rolling        Pain:  Pain level pre-treatment: 0/10  Pain level post-treatment: 0/10   Pain Intervention(s): Rest, Ice, Repositioning   Response to intervention: Nurse notified    Activity Tolerance:    Good  Please refer to the flowsheet for vital signs taken during this treatment. After treatment:   [x] Patient left in no apparent distress sitting up in chair  [] Patient left in no apparent distress in bed  [x] Call bell left within reach  [] Nursing notified  [] Caregiver present  [] Bed alarm activated  [] SCDs applied      COMMUNICATION/EDUCATION:   Patient Education  Education Given To: Patient; Family  Education Provided: Role of Therapy;Transfer Training;Plan of Care;Equipment  Education Method: Demonstration;Verbal;Teach Back  Barriers to Learning: None  Education Outcome: Verbalized understanding;Demonstrated understanding      Leanne Rosenthal, PT  Minutes: [facility] [facility] AM-PAC® Basic Mobility Inpatient Short Form (6-Clicks) Version 2    How much HELP from another person does the patient currently need    (If the patient hasn't done an activity recently, how much help from another person do you think he/she would need if he/she tried?)   Total (Total A or Dep)   A Lot  (Mod to Max A)   A Little (Sup or Min A)   None (Mod I to I)   Turning from your back to your side while in a flat bed without using bedrails? [] 1 [] 2 [] 3 [x] 4   2. Moving from lying on your back to sitting on the side of a flat bed without using bedrails? [] 1 [] 2 [] 3 [x] 4   3. Moving to and from a bed to a chair (including a wheelchair)? [] 1 [] 2 [x] 3 [] 4   4. Standing up from a chair using your arms (e.g., wheelchair, or bedside chair)? [] 1 [] 2 [x] 3 [] 4   5. Walking in hospital room? [] 1 [] 2 [x] 3 [] 4   6. Climbing 3-5 steps with a railing?+   [] 1 [] 2 [x] 3 [] 4   +If stair climbing cannot be assessed, skip item #6. Sum responses from items 1-5.

## 2023-02-24 NOTE — PLAN OF CARE
Problem: Pain  Goal: Verbalizes/displays adequate comfort level or baseline comfort level  Outcome: Progressing     Problem: Discharge Planning  Goal: Discharge to home or other facility with appropriate resources  Outcome: Progressing  Flowsheets (Taken 2/24/2023 0815)  Discharge to home or other facility with appropriate resources: Identify barriers to discharge with patient and caregiver     Problem: Safety - Adult  Goal: Free from fall injury  Outcome: Progressing     Problem: Skin/Tissue Integrity  Goal: Absence of new skin breakdown  Description: 1. Monitor for areas of redness and/or skin breakdown  2. Assess vascular access sites hourly  3. Every 4-6 hours minimum:  Change oxygen saturation probe site  4. Every 4-6 hours:  If on nasal continuous positive airway pressure, respiratory therapy assess nares and determine need for appliance change or resting period. Outcome: Progressing     Problem: Nutrition Deficit:  Goal: Optimize nutritional status  Outcome: Progressing     Problem: Physical Therapy - Adult  Goal: By Discharge: Performs mobility at highest level of function for planned discharge setting. See evaluation for individualized goals. Description: 1. Patient will move from supine to sit and sit to supine  in bed with modified independence. 2.  Patient will transfer from bed to chair and chair to bed with modified independence using the least restrictive device. 3.  Patient will perform sit to stand with modified independence. 4.  Patient will ambulate with supervision/set-up for 75 feet with the least restrictive device.    5.  Patient will ascend/descend 3 stairs with 1-2 handrail(s) with minimal assistance/contact guard assist   2/24/2023 1338 by Maddie Christopher, PT  Outcome: Completed  2/24/2023 1338 by Maddie Christopher, PT  Outcome: Progressing

## 2023-02-24 NOTE — PROGRESS NOTES
Pt requesting frequent pain medications. She request that PRN med's ordered be changed to routine medications so that the \"staff will just be required to bring it to me without me having to ask for it every time. \" This RN explained the difference between PRN med's and routine and notified pt that nurses could not change med's to routine. Only physician can make that decision and order med's accordingly. This RN explained that I would round frequently to assess her pain and other needs.

## 2023-02-24 NOTE — PROGRESS NOTES
conducted an initial consultation and Spiritual Assessment for [employer], who is a 74 y.o.,female. Patients Primary Language is: [place] Z[place]. According to the patients EMR Advent Affiliation is: Alevism. The reason the Patient came to the hospital is:   Patient Active Problem List    Diagnosis Date Noted    Dehiscence of wound 02/17/2023    Femoral artery occlusion, left (HCC) 01/19/2023    Postmenopausal osteoporosis 04/22/2022    Vascular graft infection ([address] 12/09/2020    UTI (urinary tract infection) 12/03/2020    Cellulitis of abdominal wall 12/03/2020    Open wound 09/10/2020    Chronic wound infection of abdomen 06/21/2018    Infection of vascular bypass graft ([address] 04/30/2018    Nonhealing surgical wound 04/30/2018    CAP (community acquired pneumonia) 06/27/2017    Severe sepsis ([address] 06/27/2017    Abscess 05/10/2017    Arteriovenous graft infection ([address] 05/10/2017    Dermal sinus tract of lumbosacral region ([address] 05/10/2017    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 Three other guests were in the room including the patient's . Explored issues of yo, belief, spirituality and Baptist/ritual needs while hospitalized. Listened empathically. Provided chaplaincy education. Provided information about Spiritual Care Services by dialing \"0\" that connects to Spiritual Care. Offered prayer, holy communion, and assurance of continued prayers on patient's behalf. Chart reviewed. The following outcomes where achieved:  Patient and another family members received holy communion.   Patient shared limited information about both her medical narrative and spiritual journey/beliefs.  confirmed Patient's Sabianist Oriental orthodox Affiliation with [address] in [place], [place]. Patient processed feeling about current hospitalization. Patient expressed gratitude for 's visit. Assessment:  Patient does not have any Religion/cultural needs that will affect patients preferences in health care. There are no spiritual or Religion issues which require intervention at this time. Plan:  Chaplains will continue to follow and will provide pastoral care on an as needed/requested basis.  recommends bedside caregivers page  on duty if patient shows signs of acute spiritual or emotional distress.     Qing Rivera [address]   [phone]

## 2023-02-25 LAB
BACTERIA SPEC CULT: NORMAL
SERVICE CMNT-IMP: NORMAL

## 2023-02-25 PROCEDURE — 94761 N-INVAS EAR/PLS OXIMETRY MLT: CPT

## 2023-02-25 PROCEDURE — 6360000002 HC RX W HCPCS: Performed by: SURGERY

## 2023-02-25 PROCEDURE — 6370000000 HC RX 637 (ALT 250 FOR IP): Performed by: SURGERY

## 2023-02-25 PROCEDURE — 2580000003 HC RX 258: Performed by: INTERNAL MEDICINE

## 2023-02-25 PROCEDURE — 6370000000 HC RX 637 (ALT 250 FOR IP): Performed by: INTERNAL MEDICINE

## 2023-02-25 PROCEDURE — 94640 AIRWAY INHALATION TREATMENT: CPT

## 2023-02-25 PROCEDURE — 6360000002 HC RX W HCPCS: Performed by: INTERNAL MEDICINE

## 2023-02-25 PROCEDURE — 1100000003 HC PRIVATE W/ TELEMETRY

## 2023-02-25 PROCEDURE — 2580000003 HC RX 258: Performed by: SURGERY

## 2023-02-25 RX ADMIN — SODIUM CHLORIDE, PRESERVATIVE FREE 10 ML: 5 INJECTION INTRAVENOUS at 09:07

## 2023-02-25 RX ADMIN — LATANOPROST 1 DROP: 50 SOLUTION OPHTHALMIC at 20:48

## 2023-02-25 RX ADMIN — SODIUM CHLORIDE, PRESERVATIVE FREE 10 ML: 5 INJECTION INTRAVENOUS at 20:41

## 2023-02-25 RX ADMIN — IPRATROPIUM BROMIDE 0.5 MG: 0.5 SOLUTION RESPIRATORY (INHALATION) at 07:50

## 2023-02-25 RX ADMIN — IPRATROPIUM BROMIDE 0.5 MG: 0.5 SOLUTION RESPIRATORY (INHALATION) at 20:00

## 2023-02-25 RX ADMIN — HYDROMORPHONE HYDROCHLORIDE 1 MG: 1 INJECTION, SOLUTION INTRAMUSCULAR; INTRAVENOUS; SUBCUTANEOUS at 12:37

## 2023-02-25 RX ADMIN — FOLIC ACID 1 MG: 1 TABLET ORAL at 09:07

## 2023-02-25 RX ADMIN — BUDESONIDE 250 MCG: 0.25 SUSPENSION RESPIRATORY (INHALATION) at 20:00

## 2023-02-25 RX ADMIN — OXYCODONE HYDROCHLORIDE 5 MG: 5 TABLET ORAL at 09:06

## 2023-02-25 RX ADMIN — VANCOMYCIN HYDROCHLORIDE 1250 MG: 10 INJECTION, POWDER, LYOPHILIZED, FOR SOLUTION INTRAVENOUS at 19:50

## 2023-02-25 RX ADMIN — HYDROMORPHONE HYDROCHLORIDE 1 MG: 1 INJECTION, SOLUTION INTRAMUSCULAR; INTRAVENOUS; SUBCUTANEOUS at 22:15

## 2023-02-25 RX ADMIN — CETIRIZINE HYDROCHLORIDE 10 MG: 10 TABLET, FILM COATED ORAL at 20:39

## 2023-02-25 RX ADMIN — MONTELUKAST 10 MG: 10 TABLET, FILM COATED ORAL at 06:30

## 2023-02-25 RX ADMIN — CINACALCET HYDROCHLORIDE 30 MG: 30 TABLET, FILM COATED ORAL at 09:07

## 2023-02-25 RX ADMIN — PREGABALIN 200 MG: 25 CAPSULE ORAL at 20:38

## 2023-02-25 RX ADMIN — OXYCODONE HYDROCHLORIDE 5 MG: 5 TABLET ORAL at 03:47

## 2023-02-25 RX ADMIN — CHOLECALCIFEROL TAB 25 MCG (1000 UNIT) 2000 UNITS: 25 TAB at 06:30

## 2023-02-25 RX ADMIN — OXYCODONE HYDROCHLORIDE 5 MG: 5 TABLET ORAL at 18:45

## 2023-02-25 RX ADMIN — BUDESONIDE 250 MCG: 0.25 SUSPENSION RESPIRATORY (INHALATION) at 07:50

## 2023-02-25 RX ADMIN — CEFTRIAXONE 2000 MG: 2 INJECTION, POWDER, FOR SOLUTION INTRAMUSCULAR; INTRAVENOUS at 19:46

## 2023-02-25 RX ADMIN — HYDROMORPHONE HYDROCHLORIDE 1 MG: 1 INJECTION, SOLUTION INTRAMUSCULAR; INTRAVENOUS; SUBCUTANEOUS at 06:30

## 2023-02-25 RX ADMIN — IPRATROPIUM BROMIDE 0.5 MG: 0.5 SOLUTION RESPIRATORY (INHALATION) at 14:00

## 2023-02-25 RX ADMIN — ARFORMOTEROL TARTRATE 15 MCG: 15 SOLUTION RESPIRATORY (INHALATION) at 20:00

## 2023-02-25 RX ADMIN — PREGABALIN 100 MG: 25 CAPSULE ORAL at 09:06

## 2023-02-25 RX ADMIN — ARFORMOTEROL TARTRATE 15 MCG: 15 SOLUTION RESPIRATORY (INHALATION) at 07:50

## 2023-02-25 RX ADMIN — ASPIRIN 81 MG: 81 TABLET, COATED ORAL at 09:07

## 2023-02-25 ASSESSMENT — PAIN SCALES - GENERAL
PAINLEVEL_OUTOF10: 6
PAINLEVEL_OUTOF10: 7
PAINLEVEL_OUTOF10: 2
PAINLEVEL_OUTOF10: 6
PAINLEVEL_OUTOF10: 7
PAINLEVEL_OUTOF10: 8
PAINLEVEL_OUTOF10: 6
PAINLEVEL_OUTOF10: 3
PAINLEVEL_OUTOF10: 8
PAINLEVEL_OUTOF10: 4

## 2023-02-25 ASSESSMENT — PAIN DESCRIPTION - DESCRIPTORS
DESCRIPTORS: ACHING
DESCRIPTORS: SHARP

## 2023-02-25 ASSESSMENT — PAIN DESCRIPTION - ORIENTATION
ORIENTATION: LEFT

## 2023-02-25 ASSESSMENT — PAIN DESCRIPTION - LOCATION
LOCATION: LEG

## 2023-02-25 ASSESSMENT — PAIN - FUNCTIONAL ASSESSMENT: PAIN_FUNCTIONAL_ASSESSMENT: ACTIVITIES ARE NOT PREVENTED

## 2023-02-25 NOTE — PROGRESS NOTES
Resting comfortably, awakens easily  Elevated white count persistent  Wounds are clean with VAC in place no redness no pain  Plan for Monday for removal of PTFE graft and replacement with cadaver graft for treatment of sepsis and revascularization

## 2023-02-25 NOTE — PROGRESS NOTES
Bedside shift change report given to Jessica Lozano RN (oncoming nurse) by Lakeshia Carrion RN (offgoing nurse). Report included the following information Nurse Handoff Report.

## 2023-02-25 NOTE — PROGRESS NOTES
[facility]  (A Division of [address] [place] [address])    Follow-up Note      Date of Admission: 2023       Date of Note:  2023          Current Antimicrobials:    Prior Antimicrobials:  Vancomycin - present  Ceftriaxone - present      Assessment:         Left groin wound dehiscense-- possible deep infection/ Superficial cx + Klebsiella. S/p washout with wound vac on   S/p removal of infected bypass graft 23 in left thigh   surgical cx: GNR  Leucocytosis/ sepsis   above  likely  S/P left leg thromboectomy/ angiogram/angioplasty and fem-below knee bypass X2 in 2023  PVD  Immunosuppressed due to Remicaide for Crohn's disease-- last dose 23  Anemia-- seems to keep declining-- bleeding? Neuropathy    Plan:   Continue vancomycin and ceftriaxone  Trend CBC, BMP  f/u repeat blood cx from -remain negative so far  F/u  surgical cultures- GNR    Hold Immune modulators due to infection concerns. Back to the OR on Monday      DO [facility]  [address], [address] [facility] [address]  Office: [phone], Ext [phone]       Microbiology:   wound cx: light K. Aerogens   blood cx: ngtd x 2   blood cx: ngtdx 2   blood cx: No growth to date x2   surgical/graft cx: NGTD    Lines / Catheters:  PIV    Subjective:   Patient seen and examined.  at bedside. She is feeling much better today. No fevers or chills. Eating well. Pain controlled. No fevers or chills. No other complaints today.     Objective:      BP (!) 114/50   Pulse 81   Temp 98.3 °F (36.8 °C) (Oral)   Resp 16   Ht 5' 5\" (1.651 m)   Wt 138 lb (62.6 kg)   SpO2 100%   BMI 22.96 kg/m²   Temp (24hrs), Av.4 °F (36.9 °C), Min:98.2 °F (36.8 °C), Max:98.8 °F (37.1 °C)        General:   awake alert and oriented, non-toxic   Skin:   no rashes or skin lesions noted on limited exam, dry and warm   HEENT:  No scleral icterus or pallor; oral mucosa moist, lips moist   Lymph Nodes:   not assessed today   Lungs:   Non-labored; bilaterally clear to aspiration- no crackles wheezes rales or rhonchi   Heart:  RRR, s1 and s2; no murmurs rubs or gallops; no edema, + pedal pulses   Abdomen:  soft, non-distended, active bowel sounds, non-tender   Genitourinary:  deferred   Extremities:   average muscle tone; no contractures, no joint effusions; wound vacin left groin; wound VAC on left thigh   Neurologic:  No gross focal motor or sensory abnormalities; CN 2-12 intact; Follows commands. Psychiatric:   appropriate and interactive.          Lab results:  Chemistry  Recent Labs     02/23/23  0210 02/24/23  1302    141   K 4.5 4.7    109   CO2 25 27   BUN 7 10       CBC w/ Diff  Recent Labs     02/23/23  0210 02/24/23  1302   WBC 17.3* 17.4*   RBC 2.94* 3.25*   HGB 8.3* 9.1*   HCT 26.8* 29.8*   * 588*       Microbiology  Results       Procedure Component Value Units Date/Time    Culture, Wound [[id number]]  (Abnormal) Collected: 02/23/23 1451    Order Status: Completed Specimen: Leg Updated: 02/25/23 1435     Special Requests LEG        Gram stain FEW WBCS SEEN         NO ORGANISMS SEEN        Culture LIGHT Gram negative rods       Culture, Anaerobic [[id number]] Collected: 02/23/23 1451    Order Status: Completed Specimen: Leg Updated: 02/25/23 1437     Special Requests LEG        Culture NO ANAEROBES ISOLATED       Culture, Blood 2 [[id number]] Collected: 02/21/23 1228    Order Status: Completed Specimen: Blood Updated: 02/25/23 0606     Special Requests NO SPECIAL REQUESTS        Culture NO GROWTH 4 DAYS       Culture, Blood 1 [[id number]] Collected: 02/21/23 1218    Order Status: Completed Specimen: Blood Updated: 02/25/23 0606     Special Requests NO SPECIAL REQUESTS        Culture NO GROWTH 4 DAYS       Culture, Blood 2 [[id number]] Collected: 02/18/23 0244    Order Status: Completed Specimen: Blood Updated: 02/24/23 0648     Special Requests NO SPECIAL REQUESTS        Culture NO GROWTH 6 DAYS       Culture, Anaerobic [[id number]] Collected: 02/17/23 1849    Order Status: Completed Specimen: Swab from Groin Updated: 02/19/23 1513     Special Requests NO SPECIAL REQUESTS        Culture NO ANAEROBES ISOLATED       Culture, Wound Aerobic Only [[id number]]  (Abnormal)  (Susceptibility) Collected: 02/17/23 1849    Order Status: Completed Specimen: Swab from Groin Updated: 02/20/23 1039     Special Requests NO SPECIAL REQUESTS        Gram stain NO WBC'S SEEN         NO ORGANISMS SEEN        Culture       LIGHT Klebsiella (Enterobacter) aerogenes          Susceptibility        Klebsiella aerogenes      BACTERIAL SUSCEPTIBILITY PANEL DAVID      amikacin <=2 ug/mL Sensitive      ceFAZolin >=64 ug/mL Resistant      cefepime <=1 ug/mL Sensitive      cefOXitin >=64 ug/mL Resistant      cefTAZidime <=1 ug/mL Sensitive      cefTRIAXone <=1 ug/mL Sensitive      ciprofloxacin <=0.25 ug/mL Sensitive      gentamicin <=1 ug/mL Sensitive      levofloxacin <=0.12 ug/mL Sensitive      tobramycin <=1 ug/mL Sensitive      trimethoprim-sulfamethoxazole <=20 ug/mL Sensitive                           Culture, Blood 1 [[id number]] Collected: 02/17/23 1704    Order Status: Completed Specimen: Blood Updated: 02/23/23 0616     Special Requests NO SPECIAL REQUESTS        Culture NO GROWTH 6 DAYS       Blood Culture 2 [[id number]] Collected: 02/17/23 1545    Order Status: Canceled Specimen: Blood     Blood Culture 1 [[id number]] Collected: 02/17/23 1530    Order Status: Canceled Specimen: Blood               Alex Samarah, DO   2/25/2023

## 2023-02-25 NOTE — PROGRESS NOTES
[address] [facility] Pharmacokinetic Monitoring Service - Vancomycin    Indication:  Surgical Site Infection  Goal AUC/DAVID 400-600 mg*hr/L  Day of Therapy: 8  Additional Antimicrobials: None    Pertinent Laboratory Values:   Temp: 98.3 °F (36.8 °C), Weight: 138 lb (62.6 kg)  Recent Labs     02/23/23  0210 02/24/23  1302   CREATININE 0.64 0.87   BUN 7 10   WBC 17.3* 17.4*       Estimated Creatinine Clearance: 51 mL/min (based on SCr of 0.87 mg/dL). Pertinent Cultures:  Culture Date Source Results   2/17 blood NGF   2/17 Wound, groin Light Kleb   2/18 blood NGF   2/21 blood NGTD   2/23 Wound, leg Light GNR   MRSA Nasal Swab: N/A.  Non-respiratory infection    Assessment:  Date Current Dose Concentration Timing of Concentration (h) AUC   2/18 1,500 mg x1 - - -   2/19 1,250 mg q24h - - -   2/20 1,250 mg q24h      2/21 1,250 mg q24h 28.4 3 461   2/22 1,250 mg q24h - - -   2/23 1,250 mg q24h - - -   2/24 1,250 mg q24h - - -   2/25 1,250 mg q24h - - -   Note: Serum concentrations collected for AUC dosing may appear elevated if collected in close proximity to the dose administered, this is not necessarily an indication of toxicity    Plan:  Current dosing regimen is therapeutic  Continue current dose of 1250 mg every 24 hour  Renal labs as indicated   Vancomycin concentration when clinically indicated  Pharmacy will continue to monitor patient and adjust therapy as indicated    Thank you for the consult,  Colin Licea, [address] [facility]  2/25/2023

## 2023-02-26 LAB
BACTERIA SPEC CULT: ABNORMAL
GRAM STN SPEC: ABNORMAL
GRAM STN SPEC: ABNORMAL
SERVICE CMNT-IMP: ABNORMAL
VANCOMYCIN SERPL-MCNC: 18.7 UG/ML (ref 5–40)

## 2023-02-26 PROCEDURE — 36415 COLL VENOUS BLD VENIPUNCTURE: CPT

## 2023-02-26 PROCEDURE — 94761 N-INVAS EAR/PLS OXIMETRY MLT: CPT

## 2023-02-26 PROCEDURE — 2580000003 HC RX 258: Performed by: INTERNAL MEDICINE

## 2023-02-26 PROCEDURE — 6370000000 HC RX 637 (ALT 250 FOR IP): Performed by: SURGERY

## 2023-02-26 PROCEDURE — 6360000002 HC RX W HCPCS: Performed by: INTERNAL MEDICINE

## 2023-02-26 PROCEDURE — 80202 ASSAY OF VANCOMYCIN: CPT

## 2023-02-26 PROCEDURE — 1100000003 HC PRIVATE W/ TELEMETRY

## 2023-02-26 PROCEDURE — 2580000003 HC RX 258: Performed by: SURGERY

## 2023-02-26 PROCEDURE — 6360000002 HC RX W HCPCS: Performed by: SURGERY

## 2023-02-26 PROCEDURE — 6370000000 HC RX 637 (ALT 250 FOR IP): Performed by: INTERNAL MEDICINE

## 2023-02-26 PROCEDURE — 94640 AIRWAY INHALATION TREATMENT: CPT

## 2023-02-26 RX ADMIN — ASPIRIN 81 MG: 81 TABLET, COATED ORAL at 09:52

## 2023-02-26 RX ADMIN — BUDESONIDE 250 MCG: 0.25 SUSPENSION RESPIRATORY (INHALATION) at 07:36

## 2023-02-26 RX ADMIN — IPRATROPIUM BROMIDE 0.5 MG: 0.5 SOLUTION RESPIRATORY (INHALATION) at 20:38

## 2023-02-26 RX ADMIN — FOLIC ACID 1 MG: 1 TABLET ORAL at 09:53

## 2023-02-26 RX ADMIN — ARFORMOTEROL TARTRATE 15 MCG: 15 SOLUTION RESPIRATORY (INHALATION) at 07:36

## 2023-02-26 RX ADMIN — OXYCODONE HYDROCHLORIDE 5 MG: 5 TABLET ORAL at 21:47

## 2023-02-26 RX ADMIN — HYDROMORPHONE HYDROCHLORIDE 1 MG: 1 INJECTION, SOLUTION INTRAMUSCULAR; INTRAVENOUS; SUBCUTANEOUS at 09:49

## 2023-02-26 RX ADMIN — CINACALCET HYDROCHLORIDE 30 MG: 30 TABLET, FILM COATED ORAL at 10:02

## 2023-02-26 RX ADMIN — CEFTRIAXONE 2000 MG: 2 INJECTION, POWDER, FOR SOLUTION INTRAMUSCULAR; INTRAVENOUS at 20:38

## 2023-02-26 RX ADMIN — VANCOMYCIN HYDROCHLORIDE 1000 MG: 1 INJECTION, POWDER, LYOPHILIZED, FOR SOLUTION INTRAVENOUS at 12:05

## 2023-02-26 RX ADMIN — PREGABALIN 200 MG: 25 CAPSULE ORAL at 20:37

## 2023-02-26 RX ADMIN — IPRATROPIUM BROMIDE 0.5 MG: 0.5 SOLUTION RESPIRATORY (INHALATION) at 12:05

## 2023-02-26 RX ADMIN — PREGABALIN 100 MG: 25 CAPSULE ORAL at 08:51

## 2023-02-26 RX ADMIN — HYDROMORPHONE HYDROCHLORIDE 1 MG: 1 INJECTION, SOLUTION INTRAMUSCULAR; INTRAVENOUS; SUBCUTANEOUS at 16:46

## 2023-02-26 RX ADMIN — BUDESONIDE 250 MCG: 0.25 SUSPENSION RESPIRATORY (INHALATION) at 20:39

## 2023-02-26 RX ADMIN — LATANOPROST 1 DROP: 50 SOLUTION OPHTHALMIC at 20:39

## 2023-02-26 RX ADMIN — HYDROMORPHONE HYDROCHLORIDE 1 MG: 1 INJECTION, SOLUTION INTRAMUSCULAR; INTRAVENOUS; SUBCUTANEOUS at 23:32

## 2023-02-26 RX ADMIN — MONTELUKAST 10 MG: 10 TABLET, FILM COATED ORAL at 08:00

## 2023-02-26 RX ADMIN — SODIUM CHLORIDE, PRESERVATIVE FREE 10 ML: 5 INJECTION INTRAVENOUS at 10:03

## 2023-02-26 RX ADMIN — CHOLECALCIFEROL TAB 25 MCG (1000 UNIT) 2000 UNITS: 25 TAB at 08:52

## 2023-02-26 RX ADMIN — CETIRIZINE HYDROCHLORIDE 10 MG: 10 TABLET, FILM COATED ORAL at 20:38

## 2023-02-26 RX ADMIN — IPRATROPIUM BROMIDE 0.5 MG: 0.5 SOLUTION RESPIRATORY (INHALATION) at 07:36

## 2023-02-26 RX ADMIN — SODIUM CHLORIDE, PRESERVATIVE FREE 10 ML: 5 INJECTION INTRAVENOUS at 20:52

## 2023-02-26 RX ADMIN — OXYCODONE HYDROCHLORIDE 5 MG: 5 TABLET ORAL at 12:06

## 2023-02-26 RX ADMIN — ARFORMOTEROL TARTRATE 15 MCG: 15 SOLUTION RESPIRATORY (INHALATION) at 20:38

## 2023-02-26 ASSESSMENT — PAIN DESCRIPTION - ORIENTATION
ORIENTATION: LEFT

## 2023-02-26 ASSESSMENT — PAIN SCALES - GENERAL
PAINLEVEL_OUTOF10: 8
PAINLEVEL_OUTOF10: 6
PAINLEVEL_OUTOF10: 0
PAINLEVEL_OUTOF10: 4
PAINLEVEL_OUTOF10: 7
PAINLEVEL_OUTOF10: 8
PAINLEVEL_OUTOF10: 10
PAINLEVEL_OUTOF10: 0

## 2023-02-26 ASSESSMENT — PAIN DESCRIPTION - FREQUENCY: FREQUENCY: INTERMITTENT

## 2023-02-26 ASSESSMENT — PAIN DESCRIPTION - LOCATION
LOCATION: LEG

## 2023-02-26 ASSESSMENT — PAIN DESCRIPTION - DESCRIPTORS
DESCRIPTORS: ACHING

## 2023-02-26 ASSESSMENT — PAIN DESCRIPTION - ONSET: ONSET: ON-GOING

## 2023-02-26 ASSESSMENT — PAIN DESCRIPTION - PAIN TYPE: TYPE: SURGICAL PAIN

## 2023-02-26 ASSESSMENT — PAIN - FUNCTIONAL ASSESSMENT
PAIN_FUNCTIONAL_ASSESSMENT: ACTIVITIES ARE NOT PREVENTED
PAIN_FUNCTIONAL_ASSESSMENT: ACTIVITIES ARE NOT PREVENTED

## 2023-02-26 NOTE — PROGRESS NOTES
[address] [facility] Pharmacokinetic Monitoring Service - Vancomycin    Indication:  Surgical Site Infection  Goal AUC/DAVID 400-600 mg*hr/L  Day of Therapy: 9  Additional Antimicrobials: CAX    Pertinent Laboratory Values:   Temp: 99.4 °F (37.4 °C), Weight: 138 lb (62.6 kg)  Recent Labs     02/24/23  1302   CREATININE 0.87   BUN 10   WBC 17.4*       Estimated Creatinine Clearance: 51 mL/min (based on SCr of 0.87 mg/dL). Pertinent Cultures:  Culture Date Source Results   2/17 blood NGF   2/17 Wound, groin Light Kleb   2/18 blood NGF   2/21 blood NGTD   2/23 Wound, leg Light GNR   MRSA Nasal Swab: N/A.  Non-respiratory infection    Assessment:  Date Current Dose Concentration Timing of Concentration (h) AUC   2/18 1,500 mg x1 - - -   2/19 1,250 mg q24h - - -   2/20 1,250 mg q24h      2/21 1,250 mg q24h 28.4 3 461   2/22 1,250 mg q24h - - -   2/23 1,250 mg q24h - - -   2/24 1,250 mg q24h - - -   2/25 1,250 mg q24h - - -   2/26 1,250 mg q24h  1,000 mg q18h 18.7 8 462  487   Note: Serum concentrations collected for AUC dosing may appear elevated if collected in close proximity to the dose administered, this is not necessarily an indication of toxicity    Plan:  Increase dose from 1,250 mg q24h to 1,000 mg q18h  No level ordered at this time  Pharmacy will continue to monitor patient and adjust therapy as indicated    Thank you for the consult,  Kasey Chambers [facility]  2/26/2023

## 2023-02-26 NOTE — PROGRESS NOTES
No new complaints  Vss, no fevers  Surgical sites clean, no redness or pain  Foot well perfused, bilateral  Plan for surgery tomorrow for cadaver conduit bypass and removal of all PTFE

## 2023-02-27 ENCOUNTER — ANESTHESIA (OUTPATIENT)
Dept: CARDIOTHORACIC SURGERY | Facility: HOSPITAL | Age: 73
DRG: 857 | End: 2023-02-27
Payer: MEDICARE

## 2023-02-27 LAB
ABO + RH BLD: NORMAL
BACTERIA SPEC CULT: NORMAL
BACTERIA SPEC CULT: NORMAL
BLOOD GROUP ANTIBODIES SERPL: NORMAL
GLUCOSE BLD STRIP.AUTO-MCNC: 113 MG/DL (ref 70–110)
GLUCOSE BLD STRIP.AUTO-MCNC: 136 MG/DL (ref 70–110)
SERVICE CMNT-IMP: NORMAL
SERVICE CMNT-IMP: NORMAL
SPECIMEN EXP DATE BLD: NORMAL

## 2023-02-27 PROCEDURE — 2720000010 HC SURG SUPPLY STERILE: Performed by: SURGERY

## 2023-02-27 PROCEDURE — 6360000002 HC RX W HCPCS: Performed by: SURGERY

## 2023-02-27 PROCEDURE — 2580000003 HC RX 258: Performed by: NURSE ANESTHETIST, CERTIFIED REGISTERED

## 2023-02-27 PROCEDURE — 6360000002 HC RX W HCPCS

## 2023-02-27 PROCEDURE — 6360000002 HC RX W HCPCS: Performed by: INTERNAL MEDICINE

## 2023-02-27 PROCEDURE — 2580000003 HC RX 258: Performed by: INTERNAL MEDICINE

## 2023-02-27 PROCEDURE — 86850 RBC ANTIBODY SCREEN: CPT

## 2023-02-27 PROCEDURE — 2580000003 HC RX 258: Performed by: SURGERY

## 2023-02-27 PROCEDURE — 99100 ANES PT EXTEME AGE<1 YR&>70: CPT | Performed by: ANESTHESIOLOGY

## 2023-02-27 PROCEDURE — 3600000012 HC SURGERY LEVEL 2 ADDTL 15MIN: Performed by: SURGERY

## 2023-02-27 PROCEDURE — 041L0KL BYPASS LEFT FEMORAL ARTERY TO POPLITEAL ARTERY WITH NONAUTOLOGOUS TISSUE SUBSTITUTE, OPEN APPROACH: ICD-10-PCS | Performed by: SURGERY

## 2023-02-27 PROCEDURE — 6370000000 HC RX 637 (ALT 250 FOR IP): Performed by: SURGERY

## 2023-02-27 PROCEDURE — 2500000003 HC RX 250 WO HCPCS: Performed by: NURSE ANESTHETIST, CERTIFIED REGISTERED

## 2023-02-27 PROCEDURE — 3700000000 HC ANESTHESIA ATTENDED CARE: Performed by: SURGERY

## 2023-02-27 PROCEDURE — 6360000002 HC RX W HCPCS: Performed by: NURSE ANESTHETIST, CERTIFIED REGISTERED

## 2023-02-27 PROCEDURE — 04BN0ZZ EXCISION OF LEFT POPLITEAL ARTERY, OPEN APPROACH: ICD-10-PCS | Performed by: SURGERY

## 2023-02-27 PROCEDURE — 6370000000 HC RX 637 (ALT 250 FOR IP): Performed by: INTERNAL MEDICINE

## 2023-02-27 PROCEDURE — 2709999900 HC NON-CHARGEABLE SUPPLY: Performed by: SURGERY

## 2023-02-27 PROCEDURE — 2000000000 HC ICU R&B

## 2023-02-27 PROCEDURE — 01270 ANES PX ARTERIES UPR LEG NOS: CPT | Performed by: ANESTHESIOLOGY

## 2023-02-27 PROCEDURE — 3700000001 HC ADD 15 MINUTES (ANESTHESIA): Performed by: SURGERY

## 2023-02-27 PROCEDURE — 3600000002 HC SURGERY LEVEL 2 BASE: Performed by: SURGERY

## 2023-02-27 PROCEDURE — 94640 AIRWAY INHALATION TREATMENT: CPT

## 2023-02-27 PROCEDURE — 82962 GLUCOSE BLOOD TEST: CPT

## 2023-02-27 DEVICE — IMPLANTABLE DEVICE: Type: IMPLANTABLE DEVICE | Site: LEG | Status: FUNCTIONAL

## 2023-02-27 RX ORDER — DEXMEDETOMIDINE HYDROCHLORIDE 100 UG/ML
INJECTION, SOLUTION INTRAVENOUS PRN
Status: DISCONTINUED | OUTPATIENT
Start: 2023-02-27 | End: 2023-02-27 | Stop reason: SDUPTHER

## 2023-02-27 RX ORDER — ROCURONIUM BROMIDE 10 MG/ML
INJECTION, SOLUTION INTRAVENOUS PRN
Status: DISCONTINUED | OUTPATIENT
Start: 2023-02-27 | End: 2023-02-27 | Stop reason: SDUPTHER

## 2023-02-27 RX ORDER — HEPARIN SODIUM 200 [USP'U]/100ML
INJECTION, SOLUTION INTRAVENOUS CONTINUOUS PRN
Status: COMPLETED | OUTPATIENT
Start: 2023-02-27 | End: 2023-02-27

## 2023-02-27 RX ORDER — DEXAMETHASONE SODIUM PHOSPHATE 4 MG/ML
INJECTION, SOLUTION INTRA-ARTICULAR; INTRALESIONAL; INTRAMUSCULAR; INTRAVENOUS; SOFT TISSUE PRN
Status: DISCONTINUED | OUTPATIENT
Start: 2023-02-27 | End: 2023-02-27 | Stop reason: SDUPTHER

## 2023-02-27 RX ORDER — PROPOFOL 10 MG/ML
INJECTION, EMULSION INTRAVENOUS PRN
Status: DISCONTINUED | OUTPATIENT
Start: 2023-02-27 | End: 2023-02-27 | Stop reason: SDUPTHER

## 2023-02-27 RX ORDER — GLYCOPYRROLATE 0.2 MG/ML
INJECTION INTRAMUSCULAR; INTRAVENOUS PRN
Status: DISCONTINUED | OUTPATIENT
Start: 2023-02-27 | End: 2023-02-27 | Stop reason: SDUPTHER

## 2023-02-27 RX ORDER — MAGNESIUM SULFATE HEPTAHYDRATE 500 MG/ML
INJECTION, SOLUTION INTRAMUSCULAR; INTRAVENOUS PRN
Status: DISCONTINUED | OUTPATIENT
Start: 2023-02-27 | End: 2023-02-27 | Stop reason: SDUPTHER

## 2023-02-27 RX ORDER — CHLORHEXIDINE GLUCONATE 4 G/100ML
SOLUTION TOPICAL
Status: DISPENSED
Start: 2023-02-27 | End: 2023-02-27

## 2023-02-27 RX ORDER — DEXTROSE, SODIUM CHLORIDE, SODIUM LACTATE, POTASSIUM CHLORIDE, AND CALCIUM CHLORIDE 5; .6; .31; .03; .02 G/100ML; G/100ML; G/100ML; G/100ML; G/100ML
INJECTION, SOLUTION INTRAVENOUS CONTINUOUS PRN
Status: COMPLETED | OUTPATIENT
Start: 2023-02-27 | End: 2023-02-27

## 2023-02-27 RX ORDER — FENTANYL CITRATE 50 UG/ML
INJECTION, SOLUTION INTRAMUSCULAR; INTRAVENOUS PRN
Status: DISCONTINUED | OUTPATIENT
Start: 2023-02-27 | End: 2023-02-27 | Stop reason: SDUPTHER

## 2023-02-27 RX ORDER — GENTAMICIN SULFATE 40 MG/ML
INJECTION, SOLUTION INTRAMUSCULAR; INTRAVENOUS
Status: COMPLETED
Start: 2023-02-27 | End: 2023-02-27

## 2023-02-27 RX ORDER — LIDOCAINE HYDROCHLORIDE 20 MG/ML
INJECTION, SOLUTION EPIDURAL; INFILTRATION; INTRACAUDAL; PERINEURAL PRN
Status: DISCONTINUED | OUTPATIENT
Start: 2023-02-27 | End: 2023-02-27 | Stop reason: SDUPTHER

## 2023-02-27 RX ORDER — HEPARIN SODIUM 200 [USP'U]/100ML
INJECTION, SOLUTION INTRAVENOUS
Status: DISPENSED
Start: 2023-02-27 | End: 2023-02-27

## 2023-02-27 RX ORDER — SODIUM CHLORIDE 9 MG/ML
INJECTION, SOLUTION INTRAVENOUS CONTINUOUS PRN
Status: DISCONTINUED | OUTPATIENT
Start: 2023-02-27 | End: 2023-02-27 | Stop reason: SDUPTHER

## 2023-02-27 RX ORDER — GENTAMICIN SULFATE 80 MG/100ML
INJECTION, SOLUTION INTRAVENOUS CONTINUOUS PRN
Status: COMPLETED | OUTPATIENT
Start: 2023-02-27 | End: 2023-02-27

## 2023-02-27 RX ORDER — ONDANSETRON 2 MG/ML
INJECTION INTRAMUSCULAR; INTRAVENOUS PRN
Status: DISCONTINUED | OUTPATIENT
Start: 2023-02-27 | End: 2023-02-27 | Stop reason: SDUPTHER

## 2023-02-27 RX ORDER — HEPARIN SODIUM 1000 [USP'U]/ML
INJECTION, SOLUTION INTRAVENOUS; SUBCUTANEOUS PRN
Status: DISCONTINUED | OUTPATIENT
Start: 2023-02-27 | End: 2023-02-27 | Stop reason: SDUPTHER

## 2023-02-27 RX ORDER — NEOSTIGMINE METHYLSULFATE 1 MG/ML
INJECTION, SOLUTION INTRAVENOUS PRN
Status: DISCONTINUED | OUTPATIENT
Start: 2023-02-27 | End: 2023-02-27 | Stop reason: SDUPTHER

## 2023-02-27 RX ADMIN — HYDROMORPHONE HYDROCHLORIDE 1 MG: 1 INJECTION, SOLUTION INTRAMUSCULAR; INTRAVENOUS; SUBCUTANEOUS at 16:00

## 2023-02-27 RX ADMIN — HEPARIN SODIUM 5000 UNITS: 1000 INJECTION, SOLUTION INTRAVENOUS; SUBCUTANEOUS at 09:10

## 2023-02-27 RX ADMIN — IPRATROPIUM BROMIDE 0.5 MG: 0.5 SOLUTION RESPIRATORY (INHALATION) at 19:22

## 2023-02-27 RX ADMIN — PREGABALIN 200 MG: 25 CAPSULE ORAL at 20:32

## 2023-02-27 RX ADMIN — PHENYLEPHRINE HYDROCHLORIDE 100 MCG: 10 INJECTION INTRAVENOUS at 08:08

## 2023-02-27 RX ADMIN — PHENYLEPHRINE HYDROCHLORIDE 100 MCG: 10 INJECTION INTRAVENOUS at 10:04

## 2023-02-27 RX ADMIN — ARFORMOTEROL TARTRATE 15 MCG: 15 SOLUTION RESPIRATORY (INHALATION) at 19:22

## 2023-02-27 RX ADMIN — CEFTRIAXONE 2000 MG: 2 INJECTION, POWDER, FOR SOLUTION INTRAMUSCULAR; INTRAVENOUS at 20:25

## 2023-02-27 RX ADMIN — DEXMEDETOMIDINE HYDROCHLORIDE 4 MCG: 100 INJECTION, SOLUTION INTRAVENOUS at 10:22

## 2023-02-27 RX ADMIN — PROPOFOL 120 MG: 10 INJECTION, EMULSION INTRAVENOUS at 08:04

## 2023-02-27 RX ADMIN — DEXAMETHASONE SODIUM PHOSPHATE 4 MG: 4 INJECTION, SOLUTION INTRAMUSCULAR; INTRAVENOUS at 08:04

## 2023-02-27 RX ADMIN — CETIRIZINE HYDROCHLORIDE 10 MG: 10 TABLET, FILM COATED ORAL at 20:16

## 2023-02-27 RX ADMIN — DEXMEDETOMIDINE HYDROCHLORIDE 4 MCG: 100 INJECTION, SOLUTION INTRAVENOUS at 10:10

## 2023-02-27 RX ADMIN — PHENYLEPHRINE HYDROCHLORIDE 200 MCG: 10 INJECTION INTRAVENOUS at 08:14

## 2023-02-27 RX ADMIN — MAGNESIUM SULFATE HEPTAHYDRATE 1 G: 500 INJECTION, SOLUTION INTRAMUSCULAR; INTRAVENOUS at 09:23

## 2023-02-27 RX ADMIN — ROCURONIUM BROMIDE 10 MG: 10 INJECTION, SOLUTION INTRAVENOUS at 09:51

## 2023-02-27 RX ADMIN — BUDESONIDE 250 MCG: 0.25 SUSPENSION RESPIRATORY (INHALATION) at 19:22

## 2023-02-27 RX ADMIN — SODIUM CHLORIDE: 900 INJECTION, SOLUTION INTRAVENOUS at 08:15

## 2023-02-27 RX ADMIN — FENTANYL CITRATE 50 MCG: 50 INJECTION, SOLUTION INTRAMUSCULAR; INTRAVENOUS at 09:19

## 2023-02-27 RX ADMIN — NEOSTIGMINE METHYLSULFATE 3 MG: 1 INJECTION, SOLUTION INTRAVENOUS at 10:15

## 2023-02-27 RX ADMIN — OXYCODONE HYDROCHLORIDE 5 MG: 5 TABLET ORAL at 14:05

## 2023-02-27 RX ADMIN — LATANOPROST 1 DROP: 50 SOLUTION OPHTHALMIC at 20:25

## 2023-02-27 RX ADMIN — ROCURONIUM BROMIDE 40 MG: 10 INJECTION, SOLUTION INTRAVENOUS at 08:04

## 2023-02-27 RX ADMIN — VANCOMYCIN HYDROCHLORIDE 1000 MG: 1 INJECTION, POWDER, LYOPHILIZED, FOR SOLUTION INTRAVENOUS at 05:26

## 2023-02-27 RX ADMIN — GENTAMICIN SULFATE: 40 INJECTION, SOLUTION INTRAMUSCULAR; INTRAVENOUS at 09:00

## 2023-02-27 RX ADMIN — PHENYLEPHRINE HYDROCHLORIDE 100 MCG: 10 INJECTION INTRAVENOUS at 08:49

## 2023-02-27 RX ADMIN — PHENYLEPHRINE HYDROCHLORIDE 100 MCG: 10 INJECTION INTRAVENOUS at 09:04

## 2023-02-27 RX ADMIN — SODIUM CHLORIDE, PRESERVATIVE FREE 10 ML: 5 INJECTION INTRAVENOUS at 11:00

## 2023-02-27 RX ADMIN — HYDROMORPHONE HYDROCHLORIDE 1 MG: 1 INJECTION, SOLUTION INTRAMUSCULAR; INTRAVENOUS; SUBCUTANEOUS at 05:27

## 2023-02-27 RX ADMIN — ONDANSETRON 4 MG: 2 INJECTION INTRAMUSCULAR; INTRAVENOUS at 08:04

## 2023-02-27 RX ADMIN — DEXMEDETOMIDINE HYDROCHLORIDE 4 MCG: 100 INJECTION, SOLUTION INTRAVENOUS at 09:23

## 2023-02-27 RX ADMIN — HYDROMORPHONE HYDROCHLORIDE 1 MG: 1 INJECTION, SOLUTION INTRAMUSCULAR; INTRAVENOUS; SUBCUTANEOUS at 20:16

## 2023-02-27 RX ADMIN — FENTANYL CITRATE 50 MCG: 50 INJECTION, SOLUTION INTRAMUSCULAR; INTRAVENOUS at 08:42

## 2023-02-27 RX ADMIN — DEXMEDETOMIDINE HYDROCHLORIDE 4 MCG: 100 INJECTION, SOLUTION INTRAVENOUS at 10:19

## 2023-02-27 RX ADMIN — LIDOCAINE HYDROCHLORIDE 40 MG: 20 INJECTION, SOLUTION EPIDURAL; INFILTRATION; INTRACAUDAL; PERINEURAL at 08:04

## 2023-02-27 RX ADMIN — SODIUM CHLORIDE, PRESERVATIVE FREE 10 ML: 5 INJECTION INTRAVENOUS at 20:26

## 2023-02-27 RX ADMIN — GLYCOPYRROLATE 0.4 MG: 0.2 INJECTION INTRAMUSCULAR; INTRAVENOUS at 10:15

## 2023-02-27 ASSESSMENT — PAIN SCALES - GENERAL
PAINLEVEL_OUTOF10: 9
PAINLEVEL_OUTOF10: 0
PAINLEVEL_OUTOF10: 6
PAINLEVEL_OUTOF10: 8
PAINLEVEL_OUTOF10: 0
PAINLEVEL_OUTOF10: 9

## 2023-02-27 ASSESSMENT — PAIN DESCRIPTION - LOCATION
LOCATION: LEG
LOCATION: GROIN
LOCATION: LEG
LOCATION: ABDOMEN

## 2023-02-27 ASSESSMENT — PAIN DESCRIPTION - ORIENTATION
ORIENTATION: LEFT

## 2023-02-27 ASSESSMENT — PAIN DESCRIPTION - DESCRIPTORS
DESCRIPTORS: ACHING
DESCRIPTORS: ACHING
DESCRIPTORS: STABBING

## 2023-02-27 ASSESSMENT — PAIN DESCRIPTION - ONSET: ONSET: ON-GOING

## 2023-02-27 ASSESSMENT — LIFESTYLE VARIABLES: SMOKING_STATUS: 1

## 2023-02-27 ASSESSMENT — PAIN - FUNCTIONAL ASSESSMENT
PAIN_FUNCTIONAL_ASSESSMENT: ACTIVITIES ARE NOT PREVENTED
PAIN_FUNCTIONAL_ASSESSMENT: 0-10
PAIN_FUNCTIONAL_ASSESSMENT: PREVENTS OR INTERFERES SOME ACTIVE ACTIVITIES AND ADLS

## 2023-02-27 ASSESSMENT — COPD QUESTIONNAIRES: CAT_SEVERITY: MODERATE

## 2023-02-27 ASSESSMENT — PAIN DESCRIPTION - FREQUENCY: FREQUENCY: CONTINUOUS

## 2023-02-27 ASSESSMENT — PAIN DESCRIPTION - PAIN TYPE: TYPE: SURGICAL PAIN

## 2023-02-27 ASSESSMENT — PAIN SCALES - WONG BAKER: WONGBAKER_NUMERICALRESPONSE: 0

## 2023-02-27 NOTE — ANESTHESIA PRE PROCEDURE
Department of Anesthesiology  Preprocedure Note       Name:  Rhina Hagen   Age:  74 y.o.  :  1950                                          MRN:  [id number]         Date:  2023      Surgeon: Sita Taylor):  Ryan Diaz MD    Procedure: Procedure(s):  REVISION LEFT FEMORAL POPLITEAL BYPASS WITH CADAVER VEIN    Medications prior to admission:   Prior to Admission medications    Medication Sig Start Date End Date Taking? Authorizing Provider   Cetirizine HCl 10 MG CAPS Take 10 mg by mouth at bedtime   Yes Historical Provider, MD   montelukast (SINGULAIR) 10 MG tablet Take 10 mg by mouth Daily   Yes Historical Provider, MD   fluticasone-umeclidin-vilant (TRELEGY ELLIPTA) 100-62.5-25 MCG/ACT AEPB inhaler Inhale 1 puff into the lungs daily   Yes Historical Provider, MD   vitamin D 25 MCG (1000 UT) CAPS Take by mouth Daily   Yes Historical Provider, MD   albuterol sulfate HFA (VENTOLIN HFA) 108 (90 Base) MCG/ACT inhaler Inhale 2 puffs into the lungs every 6 hours as needed for Wheezing   Yes Historical Provider, MD   fluticasone (FLONASE) 50 MCG/ACT nasal spray 2 sprays by Each Nostril route daily   Yes Historical Provider, MD   pregabalin (LYRICA) 100 MG capsule Take 100 mg by mouth daily. Take in AM   Yes Historical Provider, MD   pregabalin (LYRICA) 100 MG capsule Take 200 mg by mouth daily.  Take 200 mg at night   Yes Historical Provider, MD   aspirin 81 MG EC tablet Take 81 mg by mouth daily   Yes Historical Provider, MD   cyanocobalamin 1000 MCG/ML injection Inject 1,000 mcg into the muscle every 14 days   Yes Historical Provider, MD   loperamide (IMODIUM) 2 MG capsule Take 2 mg by mouth as needed for Diarrhea   Yes Historical Provider, MD   dicyclomine (BENTYL) 10 MG capsule Take 10 mg by mouth daily as needed (ABDOMINAL PAIN)   Yes Historical Provider, MD   bimatoprost (LUMIGAN) 0.03 % ophthalmic drops Place 1 drop into both eyes every evening   Yes Historical Provider, MD   inFLIXimab (REMICADE) 100 MG injection Infuse 5 mg/kg intravenously every 28 days   Yes Historical Provider, MD   atropine-phenobarbital-scopolamine-hyoscyamine (DONNATAL) 16.2 MG TABS Take 1 tablet by mouth every 8 hours as needed (DIARRHEA OR NAUSEA) Indications: Irritable Bowel Syndrome   Yes Historical Provider, MD   albuterol sulfate HFA (PROVENTIL;VENTOLIN;PROAIR) 108 (90 Base) MCG/ACT inhaler Inhale 2 puffs into the lungs every 6 hours as needed 8/26/21   Ar Automatic Reconciliation   apixaban starter pack (ELIQUIS) 5 MG TBPK tablet Take 10 mg (two 5 mg tablets) by mouth twice a day for 7 days Followed by 5 mg (one 5 mg tablet) by mouth twice a day 1/29/23   Ar Automatic Reconciliation   aspirin 81 MG chewable tablet Take 81 mg by mouth daily 1/29/23   Ar Automatic Reconciliation   bimatoprost (LUMIGAN) 0.03 % ophthalmic drops Apply 1 drop to eye every evening    Ar Automatic Reconciliation   Cetirizine HCl (ZYRTEC ALLERGY) 10 MG CAPS Take by mouth    Ar Automatic Reconciliation   vitamin D 25 MCG (1000 UT) CAPS Take by mouth daily    Ar Automatic Reconciliation   colchicine (COLCRYS) 0.6 MG tablet Take 0.6 mg by mouth 2 times daily    Ar Automatic Reconciliation   cyanocobalamin 1000 MCG/ML injection Inject 1,000 mcg into the muscle every 14 days    Ar Automatic Reconciliation   dicyclomine (BENTYL) 10 MG capsule Take 10 mg by mouth daily as needed    Ar Automatic Reconciliation   DULoxetine (CYMBALTA) 60 MG extended release capsule Take 60 mg by mouth    Ar Automatic Reconciliation   fluticasone (FLONASE) 50 MCG/ACT nasal spray 2 sprays by Nasal route daily as needed    Ar Automatic Reconciliation   fluticasone-umeclidin-vilant (TRELEGY ELLIPTA) 100-62.5-25 MCG/ACT AEPB inhaler Inhale 1 puff into the lungs daily 11/14/22   Ar Automatic Reconciliation   inFLIXimab (REMICADE) 100 MG injection Infuse 5 mg/kg intravenously once    Ar Automatic Reconciliation   loperamide (IMODIUM A-D) 2 MG tablet Take 2 mg by mouth daily as needed Ar Automatic Reconciliation   loratadine (CLARITIN) 10 MG tablet Take 10 mg by mouth daily    Ar Automatic Reconciliation   montelukast (SINGULAIR) 10 MG tablet Take 10 mg by mouth daily    Ar Automatic Reconciliation   atropine-phenobarbital-scopolamine-hyoscyamine (DONNATAL) 16.2 MG TABS Take 1 tablet by mouth every 8 hours as needed    Ar Automatic Reconciliation   pregabalin (LYRICA) 100 MG capsule Take by mouth 2 times daily.     Ar Automatic Reconciliation   triamterene-hydroCHLOROthiazide (DYAZIDE) 37.5-25 MG per capsule Take by mouth daily    Ar Automatic Reconciliation       Current medications:    Current Facility-Administered Medications   Medication Dose Route Frequency Provider Last Rate Last Admin    Heparin (Porcine) 1000-0.9 UT/500ML-% infusion             lidocaine 1 % injection             vancomycin (VANCOCIN) 1,000 mg in sodium chloride 0.9 % 250 mL (vial-mate) IVPB  1,000 mg IntraVENous Q18H Donice B Carole,  mL/hr at 02/27/23 0526 1,000 mg at 15/31/41 7401    folic acid (FOLVITE) tablet 1 mg  1 mg Oral Daily Greta Garcia MD   1 mg at 02/26/23 0953    acetaminophen (TYLENOL) tablet 650 mg  650 mg Oral Q4H PRN Guillaume Cantrell MD   650 mg at 02/21/23 1226    potassium chloride 10 mEq/100 mL IVPB (Peripheral Line)  10 mEq IntraVENous PRN Guillaume Cantrell  mL/hr at 02/22/23 0740 10 mEq at 02/22/23 0740    magnesium sulfate 2000 mg in 50 mL IVPB premix  2,000 mg IntraVENous PRN Guillaume Cantrell MD        ipratropium (ATROVENT) 0.02 % nebulizer solution 0.5 mg  0.5 mg Nebulization TID Yosvany Marie MD   0.5 mg at 02/26/23 2038    latanoprost (XALATAN) 0.005 % ophthalmic solution 1 drop  1 drop Both Eyes Nightly Donice B Carole, DO   1 drop at 02/26/23 2039    cetirizine (ZYRTEC) tablet 10 mg  10 mg Oral Nightly Donice B Carole, DO   10 mg at 02/26/23 2038    montelukast (SINGULAIR) tablet 10 mg  10 mg Oral Daily Phan Lam DO   10 mg at 02/26/23 0800    pregabalin (Marcos Cai) capsule 100 mg  100 mg Oral Daily Donice B Carole, DO   100 mg at 02/26/23 0851    pregabalin (LYRICA) capsule 200 mg  200 mg Oral Nightly Donice B Carole, DO   200 mg at 02/26/23 2037    Vitamin D (CHOLECALCIFEROL) tablet 2,000 Units  2,000 Units Oral Daily Donice B Carole, DO   2,000 Units at 02/26/23 9838    cinacalcet (SENSIPAR) tablet 30 mg  30 mg Oral Daily Donice B Carole, DO   30 mg at 02/26/23 1002    albuterol (PROVENTIL) nebulizer solution 2.5 mg  2.5 mg Nebulization Q6H PRN Donice B Carole, DO        budesonide (PULMICORT) nebulizer suspension 250 mcg  0.25 mg Nebulization BID Donice B Carole, DO   250 mcg at 02/26/23 2039    And    arformoterol tartrate (BROVANA) nebulizer solution 15 mcg  15 mcg Nebulization BID Donice B Carole, DO   15 mcg at 02/26/23 2038    cefTRIAXone (ROCEPHIN) 2,000 mg in sterile water 20 mL IV syringe  2,000 mg IntraVENous Q24H Donice B Carole, DO   2,000 mg at 02/26/23 2038    dextrose 5 % and 0.45 % sodium chloride infusion   IntraVENous Continuous Pierre Joseph MD 75 mL/hr at 02/17/23 2118 New Bag at 02/17/23 2118    sodium chloride flush 0.9 % injection 5-40 mL  5-40 mL IntraVENous 2 times per day Pierre Joseph MD   10 mL at 02/26/23 2052    sodium chloride flush 0.9 % injection 5-40 mL  5-40 mL IntraVENous PRN Pierre Joseph MD        0.9 % sodium chloride infusion   IntraVENous PRN Pierre Joseph MD        hydrALAZINE (APRESOLINE) injection 10 mg  10 mg IntraVENous Q4H PRN Pierre Joseph MD        HYDROmorphone HCl PF (DILAUDID) injection 1 mg  1 mg IntraVENous Q2H PRN Pierre Joseph MD   1 mg at 02/27/23 0527    ondansetron (ZOFRAN-ODT) disintegrating tablet 4 mg  4 mg Oral Q8H PRN Pierre Joseph MD        Or    [facility] injection 4 mg  4 mg IntraVENous Q6H PRN Pierre Joseph MD        aspirin EC tablet 81 mg  81 mg Oral Daily Pierre Joseph MD   81 mg at 02/26/23 9902    oxyCODONE (ROXICODONE) immediate release tablet 5 mg  5 mg Oral Q4H PRN Chance Rodgers MD   5 mg at 02/26/23 1995       Allergies: Allergies   Allergen Reactions    Pentazocine Shortness Of Breath and Nausea And Vomiting    Cephalexin Diarrhea    Meperidine Hallucinations and Other (See Comments)     Halucinations    Propoxyphene Itching    Codeine Nausea And Vomiting     Other reaction(s): other/intolerance       Problem List:    Patient Active Problem List   Diagnosis Code    UTI (urinary tract infection) N39.0    Chronic wound infection of abdomen S31.109A, L08.9    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 7XXA    HTN (hypertension) I10    Abscess L02.91    Arteriovenous graft infection ([address] T82. 7XXA    CAP (community acquired pneumonia) J18.9    Dermal sinus tract of lumbosacral region ([address] Q06.8    Nonhealing surgical wound T81.89XA    Vascular graft infection ([address] T82. 7XXA    Chronic aorto-iliac occlusion syndrome ([facility]) I74.09    PVD (peripheral vascular disease) ([facility]) I73.9    Crohn's disease ([address] K50.90    Open wound T14. 8XXA    Wound disruption, post-op, skin T81.31XA    Severe sepsis ([facility]) A41.9, R65.20    Mass of breast, left N63.20    Cellulitis of abdominal wall L03.311    Postmenopausal osteoporosis M81.0    Femoral artery occlusion, left ([facility]) I70.202    Dehiscence of wound T81.30XA       Past Medical History:        Diagnosis Date    Arthritis     CAP (community acquired pneumonia) 06/27/2017    Chronic lung disease     Claudication ([address]     left leg    Crohn's disease ([address]     Crohn's disease ([address]     GERD (gastroesophageal reflux disease)     HTN (hypertension)     Ill-defined condition     Uses O2 at night.     Nausea & vomiting     Neuropathy     Other and unspecified symptoms and signs involving general sensations and perceptions     PVD    Other ill-defined conditions(799.89)     Crohn's    Parathyroid tumor     sees [facility] Peripheral neuropathy     Pulmonary emphysema ([address]     PVD (peripheral vascular disease) ([address]     right leg    Sepsis ([address] 06/27/2017    Vitamin B12 deficiency        Past Surgical History:        Procedure Laterality Date    APPENDECTOMY      BREAST LUMPECTOMY Left     breast left- precancerous    BUNIONECTOMY      left eye    BYPASS GRAFT OTHR,AXILL-FEM Right 04/19/2013    BYPASS GRAFT OTHR,FEM-POP Left 05/2013    CATARACT REMOVAL      bilateral    CHOLECYSTECTOMY      COLONOSCOPY N/A 09/11/2019    DIAGNOSTIC COLONOSCOPY performed by Koko Alexander MD at [place] [address] Left 2/23/2023    REMOVAL OF INFECTED BYPASS GRAFT LEFT LEG performed by Nicole Oliver MD at [address] [place] [address] FOOT/TOES SURGERY [address] Left 2/17/2023    WASHOUT LEFT GROIN/WOUND VAC PLACEMENT performed by Nicole Oliver MD at [place] [address]      lateral epicondilitis on right    OTHER SURGICAL HISTORY  01/05/2023    left leg thrombectomy with stent    OTHER SURGICAL HISTORY  09/2013    I & D Right chest/flank wall abscess vs granuloma    OTHER SURGICAL HISTORY      intestional resection x4    OTHER SURGICAL HISTORY  03/07/2013    catheter into aorta    UPPER ARM/ELBOW SURGERY UNLISTED      VASCULAR SURGERY  09/10/2020    Debridement and washout of bypass graft. Social History:    Social History     Tobacco Use    Smoking status: Every Day     Packs/day: 1.00     Types: Cigarettes    Smokeless tobacco: Never   Substance Use Topics    Alcohol use:  No                                Ready to quit: Not Answered  Counseling given: Not Answered      Vital Signs (Current):   Vitals:    02/26/23 2332 02/27/23 0002 02/27/23 0004 02/27/23 0515   BP:   (!) 122/49 (!) 116/52   Pulse:   81 81   Resp: 18 18 18 18   Temp:   98.7 °F (37.1 °C) 99.7 °F (37.6 °C)   TempSrc:   Oral Oral   SpO2:   94% 96%   Weight:       Height: BP Readings from Last 3 Encounters:   02/27/23 (!) 116/52   10/28/22 (!) 162/82   08/26/21 (!) 148/63       NPO Status: Time of last liquid consumption: 2359                        Time of last solid consumption: 2200                        Date of last liquid consumption: 02/26/23                        Date of last solid food consumption: 02/26/23    BMI:   Wt Readings from Last 3 Encounters:   02/24/23 138 lb (62.6 kg)   08/26/21 138 lb (62.6 kg)     Body mass index is 22.96 kg/m². CBC:   Lab Results   Component Value Date/Time    WBC 17.4 02/24/2023 01:02 PM    RBC 3.25 02/24/2023 01:02 PM    HGB 9.1 02/24/2023 01:02 PM    HCT 29.8 02/24/2023 01:02 PM    MCV 91.7 02/24/2023 01:02 PM    RDW 17.0 02/24/2023 01:02 PM     02/24/2023 01:02 PM       CMP:   Lab Results   Component Value Date/Time     02/24/2023 01:02 PM    K 4.7 02/24/2023 01:02 PM     02/24/2023 01:02 PM    CO2 27 02/24/2023 01:02 PM    BUN 10 02/24/2023 01:02 PM    CREATININE 0.87 02/24/2023 01:02 PM    GFRAA >60 04/27/2022 11:16 AM    AGRATIO 0.9 10/26/2022 11:20 AM    LABGLOM >60 02/24/2023 01:02 PM    GLUCOSE 113 02/24/2023 01:02 PM    PROT 6.2 02/22/2023 02:47 AM    CALCIUM 10.5 02/24/2023 01:02 PM    BILITOT <0.1 02/22/2023 02:47 AM    ALKPHOS 90 02/22/2023 02:47 AM    ALKPHOS 77 10/26/2022 11:20 AM    AST 13 02/22/2023 02:47 AM    ALT 20 02/22/2023 02:47 AM       POC Tests: No results for input(s): POCGLU, POCNA, POCK, POCCL, POCBUN, POCHEMO, POCHCT in the last 72 hours.     Coags:   Lab Results   Component Value Date/Time    PROTIME 15.9 12/03/2020 02:20 PM    INR 1.3 12/03/2020 02:20 PM    APTT 93.9 01/29/2023 02:01 AM       HCG (If Applicable): No results found for: PREGTESTUR, PREGSERUM, HCG, HCGQUANT     ABGs: No results found for: PHART, PO2ART, VGH0LDO, XGJ8ERM, BEART, W7YEPYAH     Type & Screen (If Applicable):  No results found for: LABABO, LABRH    Drug/Infectious Status (If Applicable):  No results found for: HIV, HEPCAB    COVID-19 Screening (If Applicable):   Lab Results   Component Value Date/Time    COVID19 Not Detected 08/23/2021 01:42 PM           Anesthesia Evaluation  Patient summary reviewed  Airway: Mallampati: II  TM distance: >3 FB   Neck ROM: full  Mouth opening: > = 3 FB   Dental:    (+) poor dentition  Comment: Very few rotten teeth in mouth    Pulmonary:normal exam    (+) COPD: moderate,  current smoker                           Cardiovascular:  Exercise tolerance: good (>4 METS),   (+) hypertension: mild,                   Neuro/Psych:   (+) neuromuscular disease (Peripheral Neuropathy):,             GI/Hepatic/Renal:   (+) GERD: well controlled,           Endo/Other:                     Abdominal:             Vascular:   + PVD, aortic or cerebral, . Other Findings:           Anesthesia Plan      general     ASA 3       Induction: intravenous. Anesthetic plan and risks discussed with patient. Plan discussed with CRNA.                     Raiza Waddell MD   2/27/2023

## 2023-02-27 NOTE — PERIOP NOTE
TRANSFER - OUT REPORT:    Verbal report given to Rekha FLORIAN on [employer]  being transferred to ICU for routine post-op       Report consisted of patient's Situation, Background, Assessment and   Recommendations(SBAR). Information from the following report(s) Nurse Handoff Report and Surgery Report was reviewed with the receiving nurse. Deale Assessment: No data recorded  Lines:   Midline Single Lumen 02/21/23 Left Brachial (Active)   Criteria for Appropriate Use Limited/no vessel suitable for conventional peripheral access 02/26/23 2035   Site Assessment Clean, dry & intact 02/26/23 2035   Phlebitis Assessment No symptoms 02/26/23 2035   Infiltration Assessment 0 02/26/23 2035   Extremity Circumference (cm) 28 cm 02/23/23 0935   External Catheter Length (cm) 0 cm 02/23/23 0935   Lumen Color/Status White 02/25/23 0342   Line Care Connections checked and tightened;Cap changed; Tubing changed 02/26/23 2035   Alcohol Cap Used Yes 02/26/23 2035   Date of Last Dressing Change 02/21/23 02/26/23 2035   Dressing Type Transparent 02/26/23 2035   Dressing Status Clean, dry & intact 02/26/23 2035   Dressing Intervention New 02/21/23 2028        Opportunity for questions and clarification was provided.       Patient transported with:  Monitor, O2 @ 3lpm, and Registered Nurse

## 2023-02-27 NOTE — PLAN OF CARE
Problem: Pain  Goal: Verbalizes/displays adequate comfort level or baseline comfort level  Outcome: Not Progressing  Flowsheets (Taken 2/27/2023 1600)  Verbalizes/displays adequate comfort level or baseline comfort level: Assess pain using appropriate pain scale     Problem: Discharge Planning  Goal: Discharge to home or other facility with appropriate resources  Outcome: Not Progressing  Flowsheets (Taken 2/27/2023 1100)  Discharge to home or other facility with appropriate resources: Identify barriers to discharge with patient and caregiver     Problem: Safety - Adult  Goal: Free from fall injury  Outcome: Not Progressing  Flowsheets (Taken 2/27/2023 1100)  Free From Fall Injury: Instruct family/caregiver on patient safety     Problem: Skin/Tissue Integrity  Goal: Absence of new skin breakdown  Description: 1. Monitor for areas of redness and/or skin breakdown  2. Assess vascular access sites hourly  3. Every 4-6 hours minimum:  Change oxygen saturation probe site  4. Every 4-6 hours:  If on nasal continuous positive airway pressure, respiratory therapy assess nares and determine need for appliance change or resting period.   Outcome: Not Progressing     Problem: Nutrition Deficit:  Goal: Optimize nutritional status  Outcome: Not Progressing     Problem: ABCDS Injury Assessment  Goal: Absence of physical injury  Outcome: Not Progressing  Flowsheets (Taken 2/27/2023 1100)  Absence of Physical Injury: Implement safety measures based on patient assessment     Problem: Pain  Goal: Verbalizes/displays adequate comfort level or baseline comfort level  Outcome: Not Progressing  Flowsheets (Taken 2/27/2023 1600)  Verbalizes/displays adequate comfort level or baseline comfort level: Assess pain using appropriate pain scale     Problem: Discharge Planning  Goal: Discharge to home or other facility with appropriate resources  Outcome: Not Progressing  Flowsheets (Taken 2/27/2023 1100)  Discharge to home or other facility with appropriate resources: Identify barriers to discharge with patient and caregiver     Problem: Safety - Adult  Goal: Free from fall injury  Outcome: Not Progressing  Flowsheets (Taken 2/27/2023 1100)  Free From Fall Injury: Instruct family/caregiver on patient safety     Problem: Skin/Tissue Integrity  Goal: Absence of new skin breakdown  Description: 1. Monitor for areas of redness and/or skin breakdown  2. Assess vascular access sites hourly  3. Every 4-6 hours minimum:  Change oxygen saturation probe site  4. Every 4-6 hours:  If on nasal continuous positive airway pressure, respiratory therapy assess nares and determine need for appliance change or resting period.   Outcome: Not Progressing     Problem: Nutrition Deficit:  Goal: Optimize nutritional status  Outcome: Not Progressing     Problem: ABCDS Injury Assessment  Goal: Absence of physical injury  Outcome: Not Progressing  Flowsheets (Taken 2/27/2023 1100)  Absence of Physical Injury: Implement safety measures based on patient assessment

## 2023-02-27 NOTE — PROGRESS NOTES
Pt rested well throughout the night. Pre op checklist completed with pt and spouse to best of ability.

## 2023-02-27 NOTE — PROGRESS NOTES
Bedside report given to Son, RN. Pt resting quietly in bed at this time, family at bedside. Pt in no distress with stable vital signs.

## 2023-02-27 NOTE — OP NOTE
Operative Note      Patient: Bruce Pereyra  YOB: 1950  MRN: [id number]    Date of Procedure: 2/27/2023    Pre-Op Diagnosis: Infection of vascular bypass graft, subsequent encounter [T82. 7XXD]    Post-Op Diagnosis: Same       Procedure(s):  REVISION LEFT FEMORAL POPLITEAL BYPASS WITH CADAVER VEIN  Removal of PTFE femoral below-knee popliteal artery bypass graft  Left common femoral to below-knee popliteal artery bypass with cadaver vein conduit. Surgeon(s):  Gregory Main MD    Assistant:   Surgical Assistant: Nichol Mensah    Anesthesia: General    Estimated Blood Loss (mL): Minimal    Complications: None    Specimens:   * No specimens in log *    Implants:  Implant Name Type Inv. Item Serial No.  Lot No. LRB No. Used Action   [id number] - [id number]   [id number]  Left 1 Implanted         Drains:   Negative Pressure Wound Therapy Leg Anterior;Left;Proximal;Upper (Active)   Wound Type Surgical 02/25/23 1957   Dressing Type Black Foam 02/25/23 1957   Number of pieces used 1 02/24/23 1959   Cycle Continuous 02/25/23 1957   Target Pressure (mmHg) 125 02/25/23 0800   Canister changed? No 02/25/23 1957   Dressing Status Clean, dry & intact;Dry 02/25/23 1957   Dressing Changed Changed/New 02/25/23 1957   Drainage Amount Scant 02/25/23 1957   Drainage Description Serosanguinous 02/25/23 1957   Dressing Change Due 02/27/23 02/25/23 0800   Output (ml) 25 ml 02/21/23 2202   Wound Assessment Dry 02/25/23 0800   No-wound Assessment Intact 02/25/23 0800   Odor None 02/25/23 1957       Negative Pressure Wound Therapy Leg Left (Active)   Wound Type Surgical 02/25/23 1957   Dressing Type Black Foam 02/25/23 1957   Number of pieces used 1 02/24/23 1959   Cycle Continuous 02/25/23 1957   Target Pressure (mmHg) 125 02/25/23 0800   Canister changed?  No 02/25/23 0800   Dressing Status Clean;Dry;Clean, dry & intact 02/25/23 1957   Dressing Changed Changed/New 02/23/23 1552   Drainage Description Serosanguinous 02/25/23 1957   Dressing Change Due 02/27/23 02/25/23 0800   Wound Assessment Dry 02/25/23 1957   No-wound Assessment Intact 02/25/23 1957   Odor None 02/25/23 1957       Urinary Catheter 02/27/23 García (Active)       Findings: Functioning PTFE femoral to below-knee popliteal bypass removed in its entirety. New tunnel made and a femoral to below-knee popliteal artery cadaver conduit bypass placed without complication. No pus encountered today    Detailed Description of Procedure:   Patient has had the offending graft removed, now we will remove the remainder of PTFE for completeness. She had general anesthesia she was on antibiotics. Opened up the wound VAC to wound there was no purulence today was pleased to see that the bypass was running. I made an incision below the knee exposed the below-knee portion of the bypass. I made a new tunnel and placed a cadaver vein after I prepped it and marked it and then anticoagulated and clamped the vessels and completely excise all the PTFE of the popliteal popliteal and pulled the graft completely out. I cut down and cleaned up the arterial anastomosis and spatulated the vein graft and sewed it end-to-side to the artery with 6-0 Prolene suture. Upon completion I released the controls there is good flow into the graft now. Good hemostasis. I anastomose the distal end of the popliteal below the knee with 6-0 Prolene suture circular in standard fashion as well. Once I completed this removed all the controls there is excellent pulses in the popliteal artery is pleased with the result. Irrigated with gentamicin irrigation including all 3 incisions at the end the tunnels. I saw no purulence today. Just in the efforts of protecting this new graft I closed the fascia with interrupted 2-0 Monocryl for the fascia and I placed 3 Prolene sutures to hold the skin edges together to avoid dehiscence.   I completely wound VAC to the mid thigh wound. I closed the lower incision fascia with 2-0 Monocryl and 4 Monocryl and Dermabond glue for the skin and there is no purulence here.   She tolerated this well transferred to the ICU for postop care    Electronically signed by Reece Canales MD on 2/27/2023 at 10:28 AM

## 2023-02-27 NOTE — PROGRESS NOTES
[facility]  (A Division of [address] [place] [address])    Follow-up Note      Date of Admission: 2023       Date of Note:  2023          Current Antimicrobials:    Prior Antimicrobials:  Vancomycin - present  Ceftriaxone - present      Assessment:         Left groin wound dehiscense-- possible deep infection/ Superficial cx + Klebsiella. S/p washout with wound vac on   S/p removal of infected bypass graft 23 in left thigh   surgical cx: + Eli Sees aerogenes   s/p resvision left fem-pop with cadaver vein and removal of femoral blow knee graft  Leucocytosis/ sepsis   above  likely  S/P left leg thromboectomy/ angiogram/angioplasty and fem-below knee bypass X2 in 2023  PVD  Immunosuppressed due to Remicaide for Crohn's disease-- last dose 23  Anemia-- seems to keep declining-- bleeding? Neuropathy    Plan:   Continue vancomycin and ceftriaxone  Trend CBC, BMP- check in am  f/u repeat blood cx from -remain negative so far  F/u  surgical cultures-Klebsiella aerogenes    Hold Immune modulators due to infection concerns. Discussed with Dr. Merritt Manual postop- no reported purulence noted intra-op. Sites irrigated with gentamicin      [facility]  [address], [address] [facility] [address]  Office: [phone], Ext 8       Microbiology:   wound cx: light K. Aerogens   blood cx: ngtd x 2   blood cx: ngtdx 2   blood cx: No growth to date x2   surgical/graft cx: NGTD    Lines / Catheters:  PIV    Subjective:   Patient seen and examined. Seen in PACU. Still groggy from anesthesia.   No other new events over the weekend with a Tmax 99.7    Objective:      /63   Pulse 77   Temp (!) 96.6 °F (35.9 °C) (Oral)   Resp 15   Ht 5' 5\" (1.651 m)   Wt 132 lb 7.9 oz (60.1 kg)   SpO2 100%   BMI 22.05 kg/m²   Temp (24hrs), Av.3 °F (36.8 °C), Min:96.6 °F (35.9 °C), Max:99.7 °F (37.6 °C)        General:  Sleepy postanesthesia, non-toxic   Skin:   no rashes or skin lesions noted on limited exam, dry and warm   HEENT:  No scleral icterus or pallor; oral mucosa moist, lips moist   Lymph Nodes:   not assessed today   Lungs:   Non-labored; bilaterally clear to aspiration- no crackles wheezes rales or rhonchi   Heart:  RRR, s1 and s2; no murmurs rubs or gallops; no edema, + pedal pulses   Abdomen:  soft, non-distended, active bowel sounds, non-tender   Genitourinary:  deferred   Extremities:   average muscle tone; no contractures, no joint effusions; wound vacin left groin; wound VAC on left thigh; post-op surgical dresssings in place- not removed. Neurologic:  No gross focal motor or sensory abnormalities; CN 2-12 intact; Follows commands. Psychiatric:   appropriate and interactive.          Lab results:  Chemistry  Recent Labs     02/24/23  1302      K 4.7      CO2 27   BUN 10       CBC w/ Diff  Recent Labs     02/24/23  1302   WBC 17.4*   RBC 3.25*   HGB 9.1*   HCT 29.8*   *       Microbiology  Results       Procedure Component Value Units Date/Time    Culture, Wound [[id number]]  (Abnormal)  (Susceptibility) Collected: 02/23/23 1451    Order Status: Completed Specimen: Leg Updated: 02/26/23 0989     Special Requests LEG        Gram stain FEW WBCS SEEN         NO ORGANISMS SEEN        Culture       LIGHT Klebsiella (Enterobacter) aerogenes          Susceptibility        Klebsiella aerogenes      BACTERIAL SUSCEPTIBILITY PANEL DAVID      amikacin <=2 ug/mL Sensitive      ceFAZolin <=4 ug/mL Resistant      cefepime <=1 ug/mL Sensitive      cefOXitin >=64 ug/mL Resistant      cefTAZidime <=1 ug/mL Sensitive      cefTRIAXone <=1 ug/mL Sensitive      ciprofloxacin <=0.25 ug/mL Sensitive      gentamicin <=1 ug/mL Sensitive      levofloxacin <=0.12 ug/mL Sensitive      tobramycin <=1 ug/mL Sensitive      trimethoprim-sulfamethoxazole <=20 ug/mL Sensitive Culture, Anaerobic [[id number]] Collected: 02/23/23 1451    Order Status: Completed Specimen: Leg Updated: 02/25/23 1437     Special Requests LEG        Culture NO ANAEROBES ISOLATED       Culture, Blood 2 [[id number]] Collected: 02/21/23 1228    Order Status: Completed Specimen: Blood Updated: 02/27/23 0619     Special Requests NO SPECIAL REQUESTS        Culture NO GROWTH 6 DAYS       Culture, Blood 1 [[id number]] Collected: 02/21/23 1218    Order Status: Completed Specimen: Blood Updated: 02/27/23 0619     Special Requests NO SPECIAL REQUESTS        Culture NO GROWTH 6 DAYS       Culture, Blood 2 [[id number]] Collected: 02/18/23 0244    Order Status: Completed Specimen: Blood Updated: 02/24/23 0648     Special Requests NO SPECIAL REQUESTS        Culture NO GROWTH 6 DAYS       Culture, Anaerobic [[id number]] Collected: 02/17/23 1849    Order Status: Completed Specimen: Swab from Groin Updated: 02/19/23 1513     Special Requests NO SPECIAL REQUESTS        Culture NO ANAEROBES ISOLATED       Culture, Wound Aerobic Only [[id number]]  (Abnormal)  (Susceptibility) Collected: 02/17/23 1849    Order Status: Completed Specimen: Swab from Groin Updated: 02/20/23 1039     Special Requests NO SPECIAL REQUESTS        Gram stain NO WBC'S SEEN         NO ORGANISMS SEEN        Culture       LIGHT Klebsiella (Enterobacter) aerogenes          Susceptibility        Klebsiella aerogenes      BACTERIAL SUSCEPTIBILITY PANEL DAVID      amikacin <=2 ug/mL Sensitive      ceFAZolin >=64 ug/mL Resistant      cefepime <=1 ug/mL Sensitive      cefOXitin >=64 ug/mL Resistant      cefTAZidime <=1 ug/mL Sensitive      cefTRIAXone <=1 ug/mL Sensitive      ciprofloxacin <=0.25 ug/mL Sensitive      gentamicin <=1 ug/mL Sensitive      levofloxacin <=0.12 ug/mL Sensitive      tobramycin <=1 ug/mL Sensitive      trimethoprim-sulfamethoxazole <=20 ug/mL Sensitive                           Culture, Blood 1 [[id number]] Collected: 02/17/23 1704    Order Status: Completed Specimen: Blood Updated: 02/23/23 0616     Special Requests NO SPECIAL REQUESTS        Culture NO GROWTH 6 DAYS       Blood Culture 2 [[id number]] Collected: 02/17/23 1545    Order Status: Canceled Specimen: Blood     Blood Culture 1 [[id number]] Collected: 02/17/23 1530    Order Status: Canceled Specimen: Blood               Xochitl Grant DO   2/27/2023

## 2023-02-27 NOTE — PROGRESS NOTES
0740: Bedside shift change report given to Melodie Howard RN (oncoming nurse) by Kurt Wagner RN (offgoing nurse). Report included the following information Nurse Handoff Report, Adult Overview, Intake/Output, MAR, and Cardiac Rhythm NSR .

## 2023-02-27 NOTE — ANESTHESIA POSTPROCEDURE EVALUATION
Department of Anesthesiology  Postprocedure Note    Patient: Alena Iniguez  MRN: [id number]  YOB: 1950  Date of evaluation: 2/27/2023      Procedure Summary     Date: 02/27/23 Room / Location: [facility] CVT 02 / [facility] CARDIAC SURGERY    Anesthesia Start: 0756 Anesthesia Stop: 1101    Procedure: REVISION LEFT FEMORAL POPLITEAL BYPASS WITH CADAVER VEIN (Left) Diagnosis:       Infection of vascular bypass graft, subsequent encounter      (Infection of vascular bypass graft, subsequent encounter [T82. 7XXD])    Surgeons: Kelsi Nicole MD Responsible Provider: Nancy Rivera MD    Anesthesia Type: General ASA Status: 3          Anesthesia Type: General    Gurmeet Phase I: Gurmeet Score: 10    Gurmeet Phase II:        Anesthesia Post Evaluation    Patient location during evaluation: bedside  Patient participation: complete - patient participated  Airway patency: patent  Nausea & Vomiting: no nausea  Complications: no  Cardiovascular status: hemodynamically stable  Respiratory status: acceptable  Hydration status: euvolemic

## 2023-02-27 NOTE — PERIOP NOTE
TRANSFER - IN REPORT:    Verbal report received from CHIQUI Soria  on [employer]  being received from  Nini Cooley  for ordered procedure      Report consisted of patient's Situation, Background, Assessment and   Recommendations(SBAR). Information from the following report(s) Nurse Handoff Report was reviewed with the receiving nurse. Opportunity for questions and clarification was provided. Assessment completed upon patient's arrival to unit and care assumed.

## 2023-02-27 NOTE — PROGRESS NOTES
Transferred-In from OR via bed in stable condition, S/P REVISION LEFT FEMORAL POPLITEAL BYPASS WITH CADAVER VEIN, removal of PTFE femoral below-knee popliteal artery bypass graft, and L common femoral to below-knee popliteal artery bypass with cadaver vein conduit under GA done by Dr. Bobo Perez, routine T-In care and assessment done.

## 2023-02-27 NOTE — PROGRESS NOTES
[address] [facility] Pharmacokinetic Monitoring Service - Vancomycin    Indication:  Surgical Site Infection  Goal AUC/DAVID 400-600 mg*hr/L  Day of Therapy: 10  Additional Antimicrobials: CAX    Pertinent Laboratory Values:   Temp: (!) 96.6 °F (35.9 °C), Weight: 132 lb 7.9 oz (60.1 kg)  Recent Labs     02/24/23  1302   CREATININE 0.87   BUN 10   WBC 17.4*     Estimated Creatinine Clearance: 51 mL/min (based on SCr of 0.87 mg/dL). Pertinent Cultures:  Culture Date Source Results   2/17 blood NGF   2/17 Wound, groin Light Kleb   2/18 blood NGF   2/21 blood NGF   2/23 Wound, leg Light Kleb   MRSA Nasal Swab: N/A.  Non-respiratory infection    Assessment:  Date Current Dose Concentration Timing of Concentration (h) AUC   2/18 1,500 mg x1 - - -   2/19 1,250 mg q24h - - -   2/20 1,250 mg q24h      2/21 1,250 mg q24h 28.4 3 461   2/22 1,250 mg q24h - - -   2/23 1,250 mg q24h - - -   2/24 1,250 mg q24h - - -   2/25 1,250 mg q24h - - -   2/26 1,250 mg q24h  1,000 mg q18h 18.7 8 462  487   2/27 1,000 mg q18h - - -   Note: Serum concentrations collected for AUC dosing may appear elevated if collected in close proximity to the dose administered, this is not necessarily an indication of toxicity    Plan:  Continue current dose of 1,000 mg q18h  No level ordered at this time  Pharmacy will continue to monitor patient and adjust therapy as indicated    Thank you for the consult,  Linda Salamanca, [facility]  2/27/2023

## 2023-02-28 LAB
ANION GAP SERPL CALC-SCNC: 6 MMOL/L (ref 3–18)
BASOPHILS # BLD: 0 K/UL (ref 0–0.1)
BASOPHILS NFR BLD: 0 % (ref 0–2)
BUN SERPL-MCNC: 8 MG/DL (ref 7–18)
BUN/CREAT SERPL: 12 (ref 12–20)
CALCIUM SERPL-MCNC: 9.9 MG/DL (ref 8.5–10.1)
CALR EXON 9 MUT ANL BLD/T: NORMAL
CHLORIDE SERPL-SCNC: 112 MMOL/L (ref 100–111)
CITATION REF LAB TEST: NORMAL
CO2 SERPL-SCNC: 23 MMOL/L (ref 21–32)
CREAT SERPL-MCNC: 0.69 MG/DL (ref 0.6–1.3)
DIFFERENTIAL METHOD BLD: ABNORMAL
DIRECTOR REVIEW: 489204: NORMAL
EOSINOPHIL # BLD: 0.1 K/UL (ref 0–0.4)
EOSINOPHIL NFR BLD: 1 % (ref 0–5)
ERYTHROCYTE [DISTWIDTH] IN BLOOD BY AUTOMATED COUNT: 18.6 % (ref 11.6–14.5)
GLUCOSE SERPL-MCNC: 101 MG/DL (ref 74–99)
HCT VFR BLD AUTO: 27.2 % (ref 35–45)
HGB BLD-MCNC: 8 G/DL (ref 12–16)
IMM GRANULOCYTES # BLD AUTO: 0.1 K/UL (ref 0–0.04)
IMM GRANULOCYTES NFR BLD AUTO: 1 % (ref 0–0.5)
JAK2 P.V617F BLD/T QL: NORMAL
LAB DIRECTOR NAME PROVIDER: NORMAL
LABORATORY COMMENT REPORT: NORMAL
LYMPHOCYTES # BLD: 3.6 K/UL (ref 0.9–3.6)
LYMPHOCYTES NFR BLD: 28 % (ref 21–52)
Lab: NORMAL
MCH RBC QN AUTO: 27.5 PG (ref 24–34)
MCHC RBC AUTO-ENTMCNC: 29.4 G/DL (ref 31–37)
MCV RBC AUTO: 93.5 FL (ref 78–100)
MONOCYTES # BLD: 0.9 K/UL (ref 0.05–1.2)
MONOCYTES NFR BLD: 7 % (ref 3–10)
NEUTS SEG # BLD: 8.2 K/UL (ref 1.8–8)
NEUTS SEG NFR BLD: 64 % (ref 40–73)
NRBC # BLD: 0 K/UL (ref 0–0.01)
NRBC BLD-RTO: 0 PER 100 WBC
PLATELET # BLD AUTO: 465 K/UL (ref 135–420)
PMV BLD AUTO: 9.5 FL (ref 9.2–11.8)
POTASSIUM SERPL-SCNC: 4 MMOL/L (ref 3.5–5.5)
RBC # BLD AUTO: 2.91 M/UL (ref 4.2–5.3)
REF LAB TEST METHOD: NORMAL
SODIUM SERPL-SCNC: 141 MMOL/L (ref 136–145)
WBC # BLD AUTO: 12.8 K/UL (ref 4.6–13.2)

## 2023-02-28 PROCEDURE — 6360000002 HC RX W HCPCS: Performed by: INTERNAL MEDICINE

## 2023-02-28 PROCEDURE — 36415 COLL VENOUS BLD VENIPUNCTURE: CPT

## 2023-02-28 PROCEDURE — 2580000003 HC RX 258: Performed by: INTERNAL MEDICINE

## 2023-02-28 PROCEDURE — 85025 COMPLETE CBC W/AUTO DIFF WBC: CPT

## 2023-02-28 PROCEDURE — 6370000000 HC RX 637 (ALT 250 FOR IP): Performed by: INTERNAL MEDICINE

## 2023-02-28 PROCEDURE — 2700000000 HC OXYGEN THERAPY PER DAY

## 2023-02-28 PROCEDURE — 80048 BASIC METABOLIC PNL TOTAL CA: CPT

## 2023-02-28 PROCEDURE — 2000000000 HC ICU R&B

## 2023-02-28 PROCEDURE — 6370000000 HC RX 637 (ALT 250 FOR IP): Performed by: SURGERY

## 2023-02-28 PROCEDURE — 6360000002 HC RX W HCPCS: Performed by: SURGERY

## 2023-02-28 PROCEDURE — 2580000003 HC RX 258: Performed by: SURGERY

## 2023-02-28 PROCEDURE — 94640 AIRWAY INHALATION TREATMENT: CPT

## 2023-02-28 PROCEDURE — 94761 N-INVAS EAR/PLS OXIMETRY MLT: CPT

## 2023-02-28 RX ADMIN — FOLIC ACID 1 MG: 1 TABLET ORAL at 10:41

## 2023-02-28 RX ADMIN — CETIRIZINE HYDROCHLORIDE 10 MG: 10 TABLET, FILM COATED ORAL at 20:51

## 2023-02-28 RX ADMIN — PREGABALIN 200 MG: 25 CAPSULE ORAL at 20:50

## 2023-02-28 RX ADMIN — APIXABAN 5 MG: 5 TABLET, FILM COATED ORAL at 20:51

## 2023-02-28 RX ADMIN — IPRATROPIUM BROMIDE 0.5 MG: 0.5 SOLUTION RESPIRATORY (INHALATION) at 13:54

## 2023-02-28 RX ADMIN — IPRATROPIUM BROMIDE 0.5 MG: 0.5 SOLUTION RESPIRATORY (INHALATION) at 20:00

## 2023-02-28 RX ADMIN — HYDROMORPHONE HYDROCHLORIDE 1 MG: 1 INJECTION, SOLUTION INTRAMUSCULAR; INTRAVENOUS; SUBCUTANEOUS at 20:49

## 2023-02-28 RX ADMIN — ASPIRIN 81 MG: 81 TABLET, COATED ORAL at 10:41

## 2023-02-28 RX ADMIN — CEFTRIAXONE 2000 MG: 2 INJECTION, POWDER, FOR SOLUTION INTRAMUSCULAR; INTRAVENOUS at 20:50

## 2023-02-28 RX ADMIN — CHOLECALCIFEROL TAB 25 MCG (1000 UNIT) 2000 UNITS: 25 TAB at 07:42

## 2023-02-28 RX ADMIN — PREGABALIN 100 MG: 25 CAPSULE ORAL at 10:41

## 2023-02-28 RX ADMIN — ARFORMOTEROL TARTRATE 15 MCG: 15 SOLUTION RESPIRATORY (INHALATION) at 19:59

## 2023-02-28 RX ADMIN — HYDROMORPHONE HYDROCHLORIDE 1 MG: 1 INJECTION, SOLUTION INTRAMUSCULAR; INTRAVENOUS; SUBCUTANEOUS at 14:17

## 2023-02-28 RX ADMIN — BUDESONIDE 250 MCG: 0.25 SUSPENSION RESPIRATORY (INHALATION) at 07:52

## 2023-02-28 RX ADMIN — SODIUM CHLORIDE, PRESERVATIVE FREE 10 ML: 5 INJECTION INTRAVENOUS at 09:00

## 2023-02-28 RX ADMIN — HYDROMORPHONE HYDROCHLORIDE 1 MG: 1 INJECTION, SOLUTION INTRAMUSCULAR; INTRAVENOUS; SUBCUTANEOUS at 00:25

## 2023-02-28 RX ADMIN — BUDESONIDE 250 MCG: 0.25 SUSPENSION RESPIRATORY (INHALATION) at 20:00

## 2023-02-28 RX ADMIN — MONTELUKAST 10 MG: 10 TABLET, FILM COATED ORAL at 07:42

## 2023-02-28 RX ADMIN — SODIUM CHLORIDE, PRESERVATIVE FREE 10 ML: 5 INJECTION INTRAVENOUS at 20:53

## 2023-02-28 RX ADMIN — IPRATROPIUM BROMIDE 0.5 MG: 0.5 SOLUTION RESPIRATORY (INHALATION) at 07:52

## 2023-02-28 RX ADMIN — VANCOMYCIN HYDROCHLORIDE 1000 MG: 1 INJECTION, POWDER, LYOPHILIZED, FOR SOLUTION INTRAVENOUS at 00:24

## 2023-02-28 RX ADMIN — ARFORMOTEROL TARTRATE 15 MCG: 15 SOLUTION RESPIRATORY (INHALATION) at 07:52

## 2023-02-28 RX ADMIN — CINACALCET HYDROCHLORIDE 30 MG: 30 TABLET, FILM COATED ORAL at 10:41

## 2023-02-28 RX ADMIN — APIXABAN 5 MG: 5 TABLET, FILM COATED ORAL at 14:15

## 2023-02-28 RX ADMIN — HYDROMORPHONE HYDROCHLORIDE 1 MG: 1 INJECTION, SOLUTION INTRAMUSCULAR; INTRAVENOUS; SUBCUTANEOUS at 07:50

## 2023-02-28 ASSESSMENT — PAIN DESCRIPTION - DESCRIPTORS
DESCRIPTORS: STABBING
DESCRIPTORS: ACHING

## 2023-02-28 ASSESSMENT — PAIN SCALES - GENERAL
PAINLEVEL_OUTOF10: 4
PAINLEVEL_OUTOF10: 7
PAINLEVEL_OUTOF10: 0
PAINLEVEL_OUTOF10: 0
PAINLEVEL_OUTOF10: 8
PAINLEVEL_OUTOF10: 8

## 2023-02-28 ASSESSMENT — PAIN DESCRIPTION - ORIENTATION
ORIENTATION: LEFT
ORIENTATION: LEFT

## 2023-02-28 ASSESSMENT — PAIN DESCRIPTION - LOCATION
LOCATION: LEG
LOCATION: LEG

## 2023-02-28 ASSESSMENT — PAIN - FUNCTIONAL ASSESSMENT: PAIN_FUNCTIONAL_ASSESSMENT: ACTIVITIES ARE NOT PREVENTED

## 2023-02-28 NOTE — PROGRESS NOTES
Feels better, sitting up at bedside  Vss  Labs improved  Incisions clean  Dc arndt  Oob  Eliquis and aspirin

## 2023-02-28 NOTE — ANESTHESIA POSTPROCEDURE EVALUATION
Department of Anesthesiology  Postprocedure Note    Patient: George Fabian  MRN: [id number]  YOB: 1950  Date of evaluation: 2/28/2023      Procedure Summary     Date: 02/27/23 Room / Location: [facility] CVT 02 / [facility] CARDIAC SURGERY    Anesthesia Start: 0756 Anesthesia Stop: 1101    Procedure: REVISION LEFT FEMORAL POPLITEAL BYPASS WITH CADAVER VEIN (Left) Diagnosis:       Infection of vascular bypass graft, subsequent encounter      (Infection of vascular bypass graft, subsequent encounter [T82. 7XXD])    Surgeons: César Winston MD Responsible Provider: Minnie Cifuentes MD    Anesthesia Type: General ASA Status: 3          Anesthesia Type: General    Gurmeet Phase I: Gurmeet Score: 10    Gurmeet Phase II:        Anesthesia Post Evaluation    Patient location during evaluation: PACU  Patient participation: complete - patient participated  Level of consciousness: awake  Pain score: 2  Airway patency: patent  Nausea & Vomiting: no nausea and no vomiting  Complications: no  Cardiovascular status: hemodynamically stable  Respiratory status: acceptable  Hydration status: euvolemic

## 2023-02-28 NOTE — PROGRESS NOTES
[address] [facility] Pharmacokinetic Monitoring Service - Vancomycin    Indication:  Surgical Site Infection  Goal AUC/DAVID 400-600 mg*hr/L  Day of Therapy: 11  Additional Antimicrobials: CAX    Pertinent Laboratory Values:   Temp: 98.5 °F (36.9 °C), Weight: 132 lb 7.9 oz (60.1 kg)  Recent Labs     02/28/23  0459   CREATININE 0.69   BUN 8   WBC 12.8     Estimated Creatinine Clearance: 64 mL/min (based on SCr of 0.69 mg/dL). Pertinent Cultures:  Culture Date Source Results   2/17 blood NGF   2/17 Wound, groin Light Kleb   2/18 blood NGF   2/21 blood NGF   2/23 Wound, leg Light Kleb   MRSA Nasal Swab: N/A.  Non-respiratory infection    Assessment:  Date Current Dose Concentration Timing of Concentration (h) AUC   2/18 1,500 mg x1 - - -   2/19 1,250 mg q24h - - -   2/20 1,250 mg q24h      2/21 1,250 mg q24h 28.4 3 461   2/22 1,250 mg q24h - - -   2/23 1,250 mg q24h - - -   2/24 1,250 mg q24h - - -   2/25 1,250 mg q24h - - -   2/26 1,250 mg q24h  1,000 mg q18h 18.7 8 462  487   2/27 1,000 mg q18h - - -   2/28 1,000 mg q18h - - -   Note: Serum concentrations collected for AUC dosing may appear elevated if collected in close proximity to the dose administered, this is not necessarily an indication of toxicity    Plan:  Continue current dose of 1,000 mg q18h  Ordered a level for 3/1 with AM labs  Pharmacy will continue to monitor patient and adjust therapy as indicated    Thank you for the consult,  DIVYA Posey [facility]  2/28/2023

## 2023-02-28 NOTE — PROGRESS NOTES
[facility]  (A Division of [address] [place] [address])    Follow-up Note      Date of Admission: 2/17/2023       Date of Note:  2/28/2023          Current Antimicrobials:    Prior Antimicrobials:  Vancomycin 2/17- present  Ceftriaxone 2/20- present      Assessment:         Left groin wound dehiscense-- possible deep infection/ Superficial cx + Klebsiella. S/p washout with wound vac on 2/17  S/p removal of infected bypass graft 2/23/23 in left thigh  2/23 surgical cx: + [place] aerogenes  2/27 s/p resvision left fem-pop with cadaver vein and removal of femoral blow knee graft  Leucocytosis/ sepsis  2/2 above  likely  S/P left leg thromboectomy/ angiogram/angioplasty and fem-below knee bypass X2 in jan 2023  PVD  Immunosuppressed due to Remicaide for Crohn's disease-- last dose 2/17/23  Anemia-- seems to keep declining-- bleeding? Neuropathy    Plan:   Continue ceftriaxone   -Anticipate minimum of 2 weeks from the time infected graft was removed on 2/23. Depending upon clinical outcomes she may need longer suppressive therapy. D/C vancomycin    Trend CBC, BMP- check in am    F/u 2/23 surgical cultures-Klebsiella aerogenes    Hold Immune modulators due to infection concerns. Discussed with Dr. Noreen Mello, [facility]  [address] [facility] [address]  Office: [phone], Ext 8       Microbiology:  2/17 wound cx: light K. Aerogens  2/17 blood cx: ngtd x 2  2/18 blood cx: ngtdx 2  2/21 blood cx: No growth to date x2  2/23 surgical/graft cx: NGTD    Lines / Catheters:  PIV    Subjective:   Patient seen and examined. Feeling much better today. Got her García out. Was able to get up and sit in the chair. She still is utilizing oxygen. Pain is controlled. No fevers or chills. She is motivated to go home as soon as possible because she is feeling well.   Wants to have her dressings changed    Objective:      BP (!) 157/70   Pulse 78   Temp 98.6 °F (37 °C) (Oral)   Resp 12   Ht 5' 5\" (1.651 m)   Wt 132 lb 7.9 oz (60.1 kg)   SpO2 98%   BMI 22.05 kg/m²   Temp (24hrs), Av.5 °F (36.9 °C), Min:98.4 °F (36.9 °C), Max:98.7 °F (37.1 °C)        General:  Sleepy postanesthesia, non-toxic   Skin:   no rashes or skin lesions noted on limited exam, dry and warm   HEENT:  No scleral icterus or pallor; oral mucosa moist, lips moist   Lymph Nodes:   not assessed today   Lungs:   Non-labored; bilaterally clear to aspiration- no crackles wheezes rales or rhonchi   Heart:  RRR, s1 and s2; no murmurs rubs or gallops; no edema, + pedal pulses   Abdomen:  soft, non-distended, active bowel sounds, non-tender   Genitourinary:  deferred   Extremities:   average muscle tone; no contractures, no joint effusions; wound vacin left groin; wound VAC on left thigh; post-op surgical dresssings in place- not removed. Neurologic:  No gross focal motor or sensory abnormalities; CN 2-12 intact; Follows commands. Psychiatric:   appropriate and interactive.          Lab results:  Chemistry  Recent Labs     23  0459      K 4.0   *   CO2 23   BUN 8       CBC w/ Diff  Recent Labs     23  0459   WBC 12.8   RBC 2.91*   HGB 8.0*   HCT 27.2*   *       Microbiology  Results       Procedure Component Value Units Date/Time    Culture, Wound [[id number]]  (Abnormal)  (Susceptibility) Collected: 23 1451    Order Status: Completed Specimen: Leg Updated: 23 0921     Special Requests LEG        Gram stain FEW WBCS SEEN         NO ORGANISMS SEEN        Culture       LIGHT Klebsiella (Enterobacter) aerogenes          Susceptibility        Klebsiella aerogenes      BACTERIAL SUSCEPTIBILITY PANEL DAVID      amikacin <=2 ug/mL Sensitive      ceFAZolin <=4 ug/mL Resistant      cefepime <=1 ug/mL Sensitive      cefOXitin >=64 ug/mL Resistant      cefTAZidime <=1 ug/mL Sensitive      cefTRIAXone <=1 ug/mL Sensitive ciprofloxacin <=0.25 ug/mL Sensitive      gentamicin <=1 ug/mL Sensitive      levofloxacin <=0.12 ug/mL Sensitive      tobramycin <=1 ug/mL Sensitive      trimethoprim-sulfamethoxazole <=20 ug/mL Sensitive                           Culture, Anaerobic [[id number]] Collected: 02/23/23 1451    Order Status: Completed Specimen: Leg Updated: 02/25/23 1437     Special Requests LEG        Culture NO ANAEROBES ISOLATED       Culture, Blood 2 [[id number]] Collected: 02/21/23 1228    Order Status: Completed Specimen: Blood Updated: 02/27/23 0619     Special Requests NO SPECIAL REQUESTS        Culture NO GROWTH 6 DAYS       Culture, Blood 1 [[id number]] Collected: 02/21/23 1218    Order Status: Completed Specimen: Blood Updated: 02/27/23 0619     Special Requests NO SPECIAL REQUESTS        Culture NO GROWTH 6 DAYS       Culture, Blood 2 [[id number]] Collected: 02/18/23 0244    Order Status: Completed Specimen: Blood Updated: 02/24/23 0648     Special Requests NO SPECIAL REQUESTS        Culture NO GROWTH 6 DAYS       Culture, Anaerobic [[id number]] Collected: 02/17/23 1849    Order Status: Completed Specimen: Swab from Groin Updated: 02/19/23 1513     Special Requests NO SPECIAL REQUESTS        Culture NO ANAEROBES ISOLATED       Culture, Wound Aerobic Only [[id number]]  (Abnormal)  (Susceptibility) Collected: 02/17/23 1849    Order Status: Completed Specimen: Swab from Groin Updated: 02/20/23 1039     Special Requests NO SPECIAL REQUESTS        Gram stain NO WBC'S SEEN         NO ORGANISMS SEEN        Culture       LIGHT Klebsiella (Enterobacter) aerogenes          Susceptibility        Klebsiella aerogenes      BACTERIAL SUSCEPTIBILITY PANEL DAVID      amikacin <=2 ug/mL Sensitive      ceFAZolin >=64 ug/mL Resistant      cefepime <=1 ug/mL Sensitive      cefOXitin >=64 ug/mL Resistant      cefTAZidime <=1 ug/mL Sensitive      cefTRIAXone <=1 ug/mL Sensitive      ciprofloxacin <=0.25 ug/mL Sensitive      gentamicin <=1 ug/mL Sensitive      levofloxacin <=0.12 ug/mL Sensitive      tobramycin <=1 ug/mL Sensitive      trimethoprim-sulfamethoxazole <=20 ug/mL Sensitive                           Culture, Blood 1 [[id number]] Collected: 02/17/23 1704    Order Status: Completed Specimen: Blood Updated: 02/23/23 0616     Special Requests NO SPECIAL REQUESTS        Culture NO GROWTH 6 DAYS       Blood Culture 2 [[id number]] Collected: 02/17/23 1545    Order Status: Canceled Specimen: Blood     Blood Culture 1 [[id number]] Collected: 02/17/23 1530    Order Status: Canceled Specimen: Blood               Chichi Torre DO   2/28/2023

## 2023-03-01 ENCOUNTER — HOME HEALTH ADMISSION (OUTPATIENT)
Age: 73
End: 2023-03-01
Payer: MEDICARE

## 2023-03-01 LAB
CITATION REF LAB TEST: NORMAL
LAB DIRECTOR NAME PROVIDER: NORMAL
MPL GENE MUT TESTED BLD/T: NORMAL
MPL P.W515L+W515K+S505N BLD/T QL: NORMAL
SERVICE CMNT-IMP: NORMAL

## 2023-03-01 PROCEDURE — 6370000000 HC RX 637 (ALT 250 FOR IP): Performed by: INTERNAL MEDICINE

## 2023-03-01 PROCEDURE — 94761 N-INVAS EAR/PLS OXIMETRY MLT: CPT

## 2023-03-01 PROCEDURE — 2580000003 HC RX 258: Performed by: SURGERY

## 2023-03-01 PROCEDURE — 6360000002 HC RX W HCPCS: Performed by: INTERNAL MEDICINE

## 2023-03-01 PROCEDURE — 94640 AIRWAY INHALATION TREATMENT: CPT

## 2023-03-01 PROCEDURE — 2000000000 HC ICU R&B

## 2023-03-01 PROCEDURE — 6370000000 HC RX 637 (ALT 250 FOR IP): Performed by: SURGERY

## 2023-03-01 PROCEDURE — 6360000002 HC RX W HCPCS: Performed by: SURGERY

## 2023-03-01 PROCEDURE — 2580000003 HC RX 258: Performed by: INTERNAL MEDICINE

## 2023-03-01 RX ORDER — LEVOFLOXACIN 500 MG/1
750 TABLET, FILM COATED ORAL DAILY
Qty: 7 TABLET | Refills: 0 | Status: SHIPPED | OUTPATIENT
Start: 2023-03-01 | End: 2023-03-15

## 2023-03-01 RX ADMIN — CINACALCET HYDROCHLORIDE 30 MG: 30 TABLET, FILM COATED ORAL at 09:03

## 2023-03-01 RX ADMIN — CHOLECALCIFEROL TAB 25 MCG (1000 UNIT) 2000 UNITS: 25 TAB at 08:02

## 2023-03-01 RX ADMIN — SODIUM CHLORIDE, PRESERVATIVE FREE 10 ML: 5 INJECTION INTRAVENOUS at 20:52

## 2023-03-01 RX ADMIN — HYDROMORPHONE HYDROCHLORIDE 1 MG: 1 INJECTION, SOLUTION INTRAMUSCULAR; INTRAVENOUS; SUBCUTANEOUS at 15:16

## 2023-03-01 RX ADMIN — HYDROMORPHONE HYDROCHLORIDE 1 MG: 1 INJECTION, SOLUTION INTRAMUSCULAR; INTRAVENOUS; SUBCUTANEOUS at 09:03

## 2023-03-01 RX ADMIN — APIXABAN 5 MG: 5 TABLET, FILM COATED ORAL at 20:50

## 2023-03-01 RX ADMIN — LATANOPROST 1 DROP: 50 SOLUTION OPHTHALMIC at 20:51

## 2023-03-01 RX ADMIN — CETIRIZINE HYDROCHLORIDE 10 MG: 10 TABLET, FILM COATED ORAL at 20:50

## 2023-03-01 RX ADMIN — APIXABAN 5 MG: 5 TABLET, FILM COATED ORAL at 09:03

## 2023-03-01 RX ADMIN — ASPIRIN 81 MG: 81 TABLET, COATED ORAL at 09:03

## 2023-03-01 RX ADMIN — ARFORMOTEROL TARTRATE 15 MCG: 15 SOLUTION RESPIRATORY (INHALATION) at 20:32

## 2023-03-01 RX ADMIN — FOLIC ACID 1 MG: 1 TABLET ORAL at 09:03

## 2023-03-01 RX ADMIN — ARFORMOTEROL TARTRATE 15 MCG: 15 SOLUTION RESPIRATORY (INHALATION) at 07:57

## 2023-03-01 RX ADMIN — PREGABALIN 100 MG: 25 CAPSULE ORAL at 09:02

## 2023-03-01 RX ADMIN — IPRATROPIUM BROMIDE 0.5 MG: 0.5 SOLUTION RESPIRATORY (INHALATION) at 07:57

## 2023-03-01 RX ADMIN — HYDROMORPHONE HYDROCHLORIDE 1 MG: 1 INJECTION, SOLUTION INTRAMUSCULAR; INTRAVENOUS; SUBCUTANEOUS at 18:40

## 2023-03-01 RX ADMIN — CEFTRIAXONE 2000 MG: 2 INJECTION, POWDER, FOR SOLUTION INTRAMUSCULAR; INTRAVENOUS at 20:48

## 2023-03-01 RX ADMIN — IPRATROPIUM BROMIDE 0.5 MG: 0.5 SOLUTION RESPIRATORY (INHALATION) at 20:32

## 2023-03-01 RX ADMIN — SODIUM CHLORIDE, PRESERVATIVE FREE 10 ML: 5 INJECTION INTRAVENOUS at 09:00

## 2023-03-01 RX ADMIN — BUDESONIDE 250 MCG: 0.25 SUSPENSION RESPIRATORY (INHALATION) at 20:32

## 2023-03-01 RX ADMIN — HYDROMORPHONE HYDROCHLORIDE 1 MG: 1 INJECTION, SOLUTION INTRAMUSCULAR; INTRAVENOUS; SUBCUTANEOUS at 20:49

## 2023-03-01 RX ADMIN — PREGABALIN 200 MG: 25 CAPSULE ORAL at 20:50

## 2023-03-01 RX ADMIN — MONTELUKAST 10 MG: 10 TABLET, FILM COATED ORAL at 08:02

## 2023-03-01 RX ADMIN — BUDESONIDE 250 MCG: 0.25 SUSPENSION RESPIRATORY (INHALATION) at 07:57

## 2023-03-01 RX ADMIN — IPRATROPIUM BROMIDE 0.5 MG: 0.5 SOLUTION RESPIRATORY (INHALATION) at 14:03

## 2023-03-01 ASSESSMENT — PAIN DESCRIPTION - LOCATION: LOCATION: LEG

## 2023-03-01 ASSESSMENT — PAIN DESCRIPTION - ORIENTATION: ORIENTATION: LEFT

## 2023-03-01 ASSESSMENT — PAIN SCALES - GENERAL
PAINLEVEL_OUTOF10: 8
PAINLEVEL_OUTOF10: 8
PAINLEVEL_OUTOF10: 2

## 2023-03-01 ASSESSMENT — PAIN DESCRIPTION - DESCRIPTORS: DESCRIPTORS: ACHING

## 2023-03-01 ASSESSMENT — PAIN - FUNCTIONAL ASSESSMENT: PAIN_FUNCTIONAL_ASSESSMENT: ACTIVITIES ARE NOT PREVENTED

## 2023-03-01 ASSESSMENT — PAIN SCALES - WONG BAKER: WONGBAKER_NUMERICALRESPONSE: 2

## 2023-03-01 NOTE — PROGRESS NOTES
Received discharge prescriptions for patient at outpatient pharmacy. Use "[employer] 30day free trial no copay. Copay for other presciption $1.89.

## 2023-03-01 NOTE — PROGRESS NOTES
Outpatient pharmacy left voicemail for Dr. Connor Shi on Levofloxacin verification. Please contact outpatient pharmacy at [phone].

## 2023-03-01 NOTE — CARE COORDINATION
BHUPINDER made contact with Shayan [address] Representative regarding ordering the wound vac equipment. BHUPINDER completed the Wound VAC request and faxed the wound vac script to the attending physician for his signature. BHUPINDER will fax the patients H&P, OP report, and clinical notes and the home health agency information who will be administering the dressing changes. BHUPINDER will continue to follow and provide update.      ANTELMO Rico    [facility]

## 2023-03-01 NOTE — CARE COORDINATION
BHUPINDER made contact with Nikhil Patel and she confirmed she received the clinical documents. She advised the wound vac will not be approved tonight, however the wound vac will be approved in the morning 3/2/23. The patient, attending physician and [facility] has been notified.       ANTELMO De Oliveira    [facility]

## 2023-03-01 NOTE — PROGRESS NOTES
[facility]  (A Division of [address] [place] [address])    Follow-up Note      Date of Admission: 2/17/2023       Date of Note:  3/1/2023          Current Antimicrobials:    Prior Antimicrobials:  Vancomycin 2/17- present  Ceftriaxone 2/20- present      Assessment:         Left groin wound dehiscense-- possible deep infection/ Superficial cx + Klebsiella. S/p washout with wound vac on 2/17  S/p removal of infected bypass graft 2/23/23 in left thigh  2/23 surgical cx: + Ken Hayde aerogenes  2/27 s/p resvision left fem-pop with cadaver vein and removal of femoral blow knee graft  Leucocytosis/ sepsis  2/2 above  likely  S/P left leg thromboectomy/ angiogram/angioplasty and fem-below knee bypass X2 in jan 2023  PVD  Immunosuppressed due to Remicaide for Crohn's disease-- last dose 2/17/23  Anemia-- seems to keep declining-- bleeding? Neuropathy    Plan:   Continue ceftriaxone   -Anticipate minimum of 2 weeks from the time infected graft was removed on 2/23.     - transition to po levofloxacin 750 mg po daily x 14 days. Depending upon clinical outcomes she may need longer suppressive therapy. Trend CBC, BMP- check 2-3 days before OP visit  F/u 2/23 surgical cultures-Klebsiella aerogenes    Hold Immune modulators due to infection concerns. Advised that she discuss Remicade with her GI physician. [id number] Nerissa Arellano for d/c from ID perspective. Has f/u appointment on 3/14 at 12 pm    Discussed with Dr. Efrain Dacosta, [facility]  [address], [address] [facility]  Office: [phone], Ext 8       Microbiology:  2/17 wound cx: light K. Aerogens  2/17 blood cx: ngtd x 2  2/18 blood cx: ngtdx 2  2/21 blood cx: No growth to date x2  2/23 surgical/graft cx: NGTD    Lines / Catheters:  PIV    Subjective:   Patient seen and examined. Feeling much better today. Has been up and walking around. No fevers or chills.  Dressing changed by Dr. Sethi Naas today. No other concerns. Wants to go home. Objective:      BP (!) 133/56   Pulse 81   Temp 98.5 °F (36.9 °C) (Oral)   Resp 15   Ht 5' 5\" (1.651 m)   Wt 141 lb 12.1 oz (64.3 kg)   SpO2 96%   BMI 23.59 kg/m²   Temp (24hrs), Av.3 °F (36.8 °C), Min:98 °F (36.7 °C), Max:98.6 °F (37 °C)        General:  Sleepy postanesthesia, non-toxic   Skin:   no rashes or skin lesions noted on limited exam, dry and warm   HEENT:  No scleral icterus or pallor; oral mucosa moist, lips moist   Lymph Nodes:   not assessed today   Lungs:   Non-labored; bilaterally clear to aspiration- no crackles wheezes rales or rhonchi   Heart:  RRR, s1 and s2; no murmurs rubs or gallops; no edema, + pedal pulses   Abdomen:  soft, non-distended, active bowel sounds, non-tender   Genitourinary:  deferred   Extremities:   average muscle tone; no contractures, no joint effusions; wound vacin left groin; wound VAC on left thigh; post-op surgical dresssings in place- not removed. Neurologic:  No gross focal motor or sensory abnormalities; CN 2-12 intact; Follows commands. Psychiatric:   appropriate and interactive.          Lab results:  Chemistry  Recent Labs     23  0459      K 4.0   *   CO2 23   BUN 8       CBC w/ Diff  Recent Labs     23  0459   WBC 12.8   RBC 2.91*   HGB 8.0*   HCT 27.2*   *       Microbiology  Results       Procedure Component Value Units Date/Time    Culture, Wound [[id number]]  (Abnormal)  (Susceptibility) Collected: 23 1451    Order Status: Completed Specimen: Leg Updated: 23 0914     Special Requests LEG        Gram stain FEW WBCS SEEN         NO ORGANISMS SEEN        Culture       LIGHT Klebsiella (Enterobacter) aerogenes          Susceptibility        Klebsiella aerogenes      BACTERIAL SUSCEPTIBILITY PANEL DAVID      amikacin <=2 ug/mL Sensitive      ceFAZolin <=4 ug/mL Resistant      cefepime <=1 ug/mL Sensitive      cefOXitin >=64 ug/mL Resistant cefTAZidime <=1 ug/mL Sensitive      cefTRIAXone <=1 ug/mL Sensitive      ciprofloxacin <=0.25 ug/mL Sensitive      gentamicin <=1 ug/mL Sensitive      levofloxacin <=0.12 ug/mL Sensitive      tobramycin <=1 ug/mL Sensitive      trimethoprim-sulfamethoxazole <=20 ug/mL Sensitive                           Culture, Anaerobic [[id number]] Collected: 02/23/23 1451    Order Status: Completed Specimen: Leg Updated: 02/25/23 1437     Special Requests LEG        Culture NO ANAEROBES ISOLATED       Culture, Blood 2 [[id number]] Collected: 02/21/23 1228    Order Status: Completed Specimen: Blood Updated: 02/27/23 0619     Special Requests NO SPECIAL REQUESTS        Culture NO GROWTH 6 DAYS       Culture, Blood 1 [[id number]] Collected: 02/21/23 1218    Order Status: Completed Specimen: Blood Updated: 02/27/23 0619     Special Requests NO SPECIAL REQUESTS        Culture NO GROWTH 6 DAYS       Culture, Blood 2 [[id number]] Collected: 02/18/23 0244    Order Status: Completed Specimen: Blood Updated: 02/24/23 0648     Special Requests NO SPECIAL REQUESTS        Culture NO GROWTH 6 DAYS       Culture, Anaerobic [[id number]] Collected: 02/17/23 1849    Order Status: Completed Specimen: Swab from Groin Updated: 02/19/23 1513     Special Requests NO SPECIAL REQUESTS        Culture NO ANAEROBES ISOLATED       Culture, Wound Aerobic Only [[id number]]  (Abnormal)  (Susceptibility) Collected: 02/17/23 1849    Order Status: Completed Specimen: Swab from Groin Updated: 02/20/23 1039     Special Requests NO SPECIAL REQUESTS        Gram stain NO WBC'S SEEN         NO ORGANISMS SEEN        Culture       LIGHT Klebsiella (Enterobacter) aerogenes          Susceptibility        Klebsiella aerogenes      BACTERIAL SUSCEPTIBILITY PANEL DAVID      amikacin <=2 ug/mL Sensitive      ceFAZolin >=64 ug/mL Resistant      cefepime <=1 ug/mL Sensitive      cefOXitin >=64 ug/mL Resistant      cefTAZidime <=1 ug/mL Sensitive      cefTRIAXone <=1 ug/mL Sensitive      ciprofloxacin <=0.25 ug/mL Sensitive      gentamicin <=1 ug/mL Sensitive      levofloxacin <=0.12 ug/mL Sensitive      tobramycin <=1 ug/mL Sensitive      trimethoprim-sulfamethoxazole <=20 ug/mL Sensitive                           Culture, Blood 1 [[id number]] Collected: 02/17/23 1704    Order Status: Completed Specimen: Blood Updated: 02/23/23 0616     Special Requests NO SPECIAL REQUESTS        Culture NO GROWTH 6 DAYS       Blood Culture 2 [[id number]] Collected: 02/17/23 1545    Order Status: Canceled Specimen: Blood     Blood Culture 1 [[id number]] Collected: 02/17/23 1530    Order Status: Canceled Specimen: Blood               Sofía Stovall DO   3/1/2023

## 2023-03-01 NOTE — DISCHARGE SUMMARY
Physician Discharge Summary     Patient ID:  Jia Bahena  [id number]  17 y.o.  1950    Admit date: 2/17/2023    Discharge date: 3/1/2023      Admitting Physician: Rhys Anne MD     Discharge Physician: Rhys Anne MD     Admission Diagnoses: Dehiscence of operative wound, initial encounter [T81.31XA]  Dehiscence of wound [T81.30XA]    Discharge Diagnoses: There are no discharge diagnoses documented for the most recent discharge. Procedures for this admission: Procedure(s):  REVISION LEFT FEMORAL POPLITEAL BYPASS WITH CADAVER VEIN    Discharged Condition: stable    Hospital Course: Admitted to the hospital and washed out the left groin and sent cultures. Found to have Klebsiella. CT shows old bypass graft, not in use, to be infected so returned OR to remove this. Still with some signs of infection so brought back to the OR again to remove the active PTFE graft all PTFE was removed and replaced with a cadaver graft. Her labs normalized she feels better. She is ambulatory tolerating diet and wishing to go home. She has a wound VAC on the left thigh wounds all the clean not painful and no redness    Consults: ID, hematology/oncology, and hospitalist    Significant Diagnostic Studies: CAT scan    Treatments: surgery: Removal of all PTFE graft left lower extremity replacement with cadaver vein    Discharge Exam: LUNG: clear to auscultation bilaterally  HEART: regular rate and rhythm, S1, S2 normal, no murmur, click, rub or gallop and S1, S2 normal  ABDOMEN:  no change  Groin incision partially open packing changed with no purulence thigh incision with wound VAC lower leg incision closed all appear clean.   Excellent pulse in foot    Disposition: home    Patient Instructions:   Current Discharge Medication List        START taking these medications    Details   apixaban (ELIQUIS) 5 MG TABS tablet Take 1 tablet by mouth 2 times daily  Qty: 60 tablet, Refills: 5      levoFLOXacin (LEVAQUIN) 500 MG tablet Take 1.5 tablets by mouth daily for 14 days  Qty: 7 tablet, Refills: 0           CONTINUE these medications which have NOT CHANGED    Details   montelukast (SINGULAIR) 10 MG tablet Take 10 mg by mouth Daily      fluticasone-umeclidin-vilant (TRELEGY ELLIPTA) 100-62.5-25 MCG/ACT AEPB inhaler Inhale 1 puff into the lungs daily      vitamin D 25 MCG (1000 UT) CAPS Take by mouth Daily      !! albuterol sulfate HFA (VENTOLIN HFA) 108 (90 Base) MCG/ACT inhaler Inhale 2 puffs into the lungs every 6 hours as needed for Wheezing      fluticasone (FLONASE) 50 MCG/ACT nasal spray 2 sprays by Each Nostril route daily      ! ! pregabalin (LYRICA) 100 MG capsule Take 100 mg by mouth daily. Take in AM      !! pregabalin (LYRICA) 100 MG capsule Take 200 mg by mouth daily.  Take 200 mg at night      aspirin 81 MG EC tablet Take 81 mg by mouth daily      cyanocobalamin 1000 MCG/ML injection Inject 1,000 mcg into the muscle every 14 days      dicyclomine (BENTYL) 10 MG capsule Take 10 mg by mouth daily as needed (ABDOMINAL PAIN)      bimatoprost (LUMIGAN) 0.03 % ophthalmic drops Place 1 drop into both eyes every evening      inFLIXimab (REMICADE) 100 MG injection Infuse 5 mg/kg intravenously every 28 days      atropine-phenobarbital-scopolamine-hyoscyamine (DONNATAL) 16.2 MG TABS Take 1 tablet by mouth every 8 hours as needed (DIARRHEA OR NAUSEA) Indications: Irritable Bowel Syndrome      !! albuterol sulfate HFA (PROVENTIL;VENTOLIN;PROAIR) 108 (90 Base) MCG/ACT inhaler Inhale 2 puffs into the lungs every 6 hours as needed      aspirin 81 MG chewable tablet Take 81 mg by mouth daily      Cetirizine HCl (ZYRTEC ALLERGY) 10 MG CAPS Take by mouth      colchicine (COLCRYS) 0.6 MG tablet Take 0.6 mg by mouth 2 times daily      DULoxetine (CYMBALTA) 60 MG extended release capsule Take 60 mg by mouth      loperamide (IMODIUM A-D) 2 MG tablet Take 2 mg by mouth daily as needed      loratadine (CLARITIN) 10 MG tablet Take 10 mg by mouth daily      triamterene-hydroCHLOROthiazide (DYAZIDE) 37.5-25 MG per capsule Take by mouth daily       ! ! - Potential duplicate medications found. Please discuss with provider. STOP taking these medications       apixaban starter pack (ELIQUIS) 5 MG TBPK tablet Comments:   Reason for Stopping:         loperamide (IMODIUM) 2 MG capsule Comments:   Reason for Stopping:               Reference discharge instructions as provided by nursing for diet, labs, medications, activity, wound care and any outpatient referrals.     Follow-up with Dr. Neo Alonso and Dr. Shannan Betancourt  Will need home health for wound VAC    Signed:  Po Kelsey MD  3/1/2023  12:29 PM

## 2023-03-01 NOTE — HOME CARE
Received HH referral this afternoon for [facility] for SN for wound vac dressing change and L groin wound care ; spoke to patient and her spouse (Moreno),Verified demographics,explained [facility] services and answered all questions and provided with [facility] contact card ; Patient states she has DME: home O2 with [employer] and has access to RW if needed ; KCI/3M wound vac has been ordered by naomie Lewis   and awaiting approval for wound vac and pending discharge tomorrow ; [facility] referral updated by Yanelis Poon LPN and [facility] will continue to follow. MARIKA LOZANO.

## 2023-03-02 VITALS
OXYGEN SATURATION: 89 % | RESPIRATION RATE: 18 BRPM | SYSTOLIC BLOOD PRESSURE: 124 MMHG | WEIGHT: 141.76 LBS | TEMPERATURE: 99.5 F | HEART RATE: 78 BPM | BODY MASS INDEX: 23.62 KG/M2 | HEIGHT: 65 IN | DIASTOLIC BLOOD PRESSURE: 61 MMHG

## 2023-03-02 PROCEDURE — 2580000003 HC RX 258: Performed by: SURGERY

## 2023-03-02 PROCEDURE — 94761 N-INVAS EAR/PLS OXIMETRY MLT: CPT

## 2023-03-02 PROCEDURE — 6370000000 HC RX 637 (ALT 250 FOR IP): Performed by: INTERNAL MEDICINE

## 2023-03-02 PROCEDURE — 6360000002 HC RX W HCPCS: Performed by: SURGERY

## 2023-03-02 PROCEDURE — 6370000000 HC RX 637 (ALT 250 FOR IP): Performed by: SURGERY

## 2023-03-02 RX ORDER — OXYCODONE AND ACETAMINOPHEN 10; 325 MG/1; MG/1
1 TABLET ORAL EVERY 6 HOURS PRN
Qty: 40 TABLET | Refills: 0 | Status: SHIPPED | OUTPATIENT
Start: 2023-03-02 | End: 2023-03-16

## 2023-03-02 RX ADMIN — HYDROMORPHONE HYDROCHLORIDE 1 MG: 1 INJECTION, SOLUTION INTRAMUSCULAR; INTRAVENOUS; SUBCUTANEOUS at 11:44

## 2023-03-02 RX ADMIN — MONTELUKAST 10 MG: 10 TABLET, FILM COATED ORAL at 09:00

## 2023-03-02 RX ADMIN — PREGABALIN 100 MG: 25 CAPSULE ORAL at 08:59

## 2023-03-02 RX ADMIN — APIXABAN 5 MG: 5 TABLET, FILM COATED ORAL at 09:00

## 2023-03-02 RX ADMIN — SODIUM CHLORIDE, PRESERVATIVE FREE 10 ML: 5 INJECTION INTRAVENOUS at 09:01

## 2023-03-02 RX ADMIN — FOLIC ACID 1 MG: 1 TABLET ORAL at 09:00

## 2023-03-02 RX ADMIN — ASPIRIN 81 MG: 81 TABLET, COATED ORAL at 09:00

## 2023-03-02 RX ADMIN — CINACALCET HYDROCHLORIDE 30 MG: 30 TABLET, FILM COATED ORAL at 09:00

## 2023-03-02 RX ADMIN — OXYCODONE HYDROCHLORIDE 5 MG: 5 TABLET ORAL at 15:47

## 2023-03-02 RX ADMIN — CHOLECALCIFEROL TAB 25 MCG (1000 UNIT) 2000 UNITS: 25 TAB at 09:00

## 2023-03-02 RX ADMIN — OXYCODONE HYDROCHLORIDE 5 MG: 5 TABLET ORAL at 09:00

## 2023-03-02 ASSESSMENT — PAIN DESCRIPTION - LOCATION: LOCATION: KNEE

## 2023-03-02 ASSESSMENT — PAIN SCALES - GENERAL
PAINLEVEL_OUTOF10: 7
PAINLEVEL_OUTOF10: 8

## 2023-03-02 ASSESSMENT — PAIN DESCRIPTION - ORIENTATION: ORIENTATION: LEFT

## 2023-03-02 NOTE — HOME CARE
Received home health referral for [facility] for (SN - wound vac). Discharge noted for today. Wound Vac has been approved by [facility]. Initiated referral from yesterday has been updated  sent to central intake to be processed by central intake.      ---   Liam Ascencio, UMAIRN  [facility] - INPATIENT Liaison

## 2023-03-02 NOTE — CARE COORDINATION
SW resent the wound vac script pending approval. SW will continue to follow and provide update.       ANTELMO Benson    [facility]

## 2023-03-02 NOTE — CARE COORDINATION
SW made contact with [facility] to follow up on the wound vac order. Radha Cunningham advised due to the date on the script being ineligible the script must be resent. BHUPINDER will contact the attending physician and refax the script.       Rosalba Ascencio, MSW    [facility]

## 2023-03-02 NOTE — CARE COORDINATION
BHUPINDER made contact with [facility] and she noted the wound vac was approved. BHUPINDER obtained the pt's signature and picked up the wound vac. Nurse Diane Valenzuela RN informed. The patient was provided the wound vac discharge education form. BHUPINDER faxed the signed Wound Vac copy to [facility].       ANTELMO Short    [facility]

## 2023-03-02 NOTE — PROGRESS NOTES
[facility]  (A Division of [address] [place] [address])    Follow-up Note      Date of Admission: 2/17/2023       Date of Note:  3/2/2023          Current Antimicrobials:    Prior Antimicrobials:  Vancomycin 2/17- present  Ceftriaxone 2/20- present      Assessment:         Left groin wound dehiscense-- possible deep infection/ Superficial cx + Klebsiella. S/p washout with wound vac on 2/17  S/p removal of infected bypass graft 2/23/23 in left thigh  2/23 surgical cx: + Garon Settle aerogenes  2/27 s/p resvision left fem-pop with cadaver vein and removal of femoral blow knee graft  Leucocytosis/ sepsis  2/2 above  likely  S/P left leg thromboectomy/ angiogram/angioplasty and fem-below knee bypass X2 in jan 2023  PVD  Immunosuppressed due to Remicaide for Crohn's disease-- last dose 2/17/23  Anemia-- seems to keep declining-- bleeding? Neuropathy    Plan:   Complete ceftriaxone   -Anticipate minimum of 2 weeks from the time infected graft was removed on 2/23.     - transition to po levofloxacin 750 mg po daily x 14 days. Depending upon clinical outcomes she may need longer suppressive therapy. Trend CBC, BMP- check 2-3 days before OP visit  F/u 2/23 surgical cultures-Klebsiella aerogenes    Hold Immune modulators due to infection concerns. Advised that she discuss Remicade with her GI physician. Rupesh Boateng for d/c from ID perspective. Has f/u appointment on 3/14 at 12 pm    Discussed with Dr. Wagner Barbosa     My partner Dr. Jossie Merritt will be covering my patients from Friday, March 3 until Sunday, March 12. Please call him at [phone] for any concerns. Lester Walter DO  [facility]  [address], [address] [facility] [address]  Office: [phone], Ext 8       Microbiology:  2/17 wound cx: light K.  Aerogens  2/17 blood cx: ngtd x 2  2/18 blood cx: ngtdx 2  2/21 blood cx: No growth to date x2  2/23 surgical/graft cx: Marveen Branson / Catheters:  PIV    Subjective:   Patient seen and examined.  at bedside  Feeling much better today. No other concerns. Waiting for her wound VAC to be delivered before she can go home. No new questions or concerns for me today. Objective:      BP (!) 122/49   Pulse 79   Temp 99.5 °F (37.5 °C) (Oral)   Resp 21   Ht 5' 5\" (1.651 m)   Wt 141 lb 12.1 oz (64.3 kg)   SpO2 91%   BMI 23.59 kg/m²   Temp (24hrs), Av.8 °F (37.1 °C), Min:98.5 °F (36.9 °C), Max:99.5 °F (37.5 °C)        General:  Sleepy postanesthesia, non-toxic   Skin:   no rashes or skin lesions noted on limited exam, dry and warm   HEENT:  No scleral icterus or pallor; oral mucosa moist, lips moist   Lymph Nodes:   not assessed today   Lungs:   Non-labored; bilaterally clear to aspiration- no crackles wheezes rales or rhonchi   Heart:  RRR, s1 and s2; no murmurs rubs or gallops; no edema, + pedal pulses   Abdomen:  soft, non-distended, active bowel sounds, non-tender   Genitourinary:  deferred   Extremities:   average muscle tone; no contractures, no joint effusions; wound vacin left groin; wound VAC on left thigh; post-op surgical dresssings in place- not removed. Neurologic:  No gross focal motor or sensory abnormalities; CN 2-12 intact; Follows commands. Psychiatric:   appropriate and interactive.          Lab results:  Chemistry  Recent Labs     23  0459      K 4.0   *   CO2 23   BUN 8       CBC w/ Diff  Recent Labs     23  0459   WBC 12.8   RBC 2.91*   HGB 8.0*   HCT 27.2*   *       Microbiology  Results       Procedure Component Value Units Date/Time    Culture, Wound [[id number]]  (Abnormal)  (Susceptibility) Collected: 23 1451    Order Status: Completed Specimen: Leg Updated: 23 0942     Special Requests LEG        Gram stain FEW WBCS SEEN         NO ORGANISMS SEEN        Culture       LIGHT Klebsiella (Enterobacter) aerogenes          Susceptibility        Klebsiella aerogenes BACTERIAL SUSCEPTIBILITY PANEL DAVID      amikacin <=2 ug/mL Sensitive      ceFAZolin <=4 ug/mL Resistant      cefepime <=1 ug/mL Sensitive      cefOXitin >=64 ug/mL Resistant      cefTAZidime <=1 ug/mL Sensitive      cefTRIAXone <=1 ug/mL Sensitive      ciprofloxacin <=0.25 ug/mL Sensitive      gentamicin <=1 ug/mL Sensitive      levofloxacin <=0.12 ug/mL Sensitive      tobramycin <=1 ug/mL Sensitive      trimethoprim-sulfamethoxazole <=20 ug/mL Sensitive                           Culture, Anaerobic [[id number]] Collected: 02/23/23 1451    Order Status: Completed Specimen: Leg Updated: 02/25/23 1437     Special Requests LEG        Culture NO ANAEROBES ISOLATED       Culture, Blood 2 [[id number]] Collected: 02/21/23 1228    Order Status: Completed Specimen: Blood Updated: 02/27/23 0619     Special Requests NO SPECIAL REQUESTS        Culture NO GROWTH 6 DAYS       Culture, Blood 1 [[id number]] Collected: 02/21/23 1218    Order Status: Completed Specimen: Blood Updated: 02/27/23 0619     Special Requests NO SPECIAL REQUESTS        Culture NO GROWTH 6 DAYS       Culture, Blood 2 [[id number]] Collected: 02/18/23 0244    Order Status: Completed Specimen: Blood Updated: 02/24/23 0648     Special Requests NO SPECIAL REQUESTS        Culture NO GROWTH 6 DAYS       Culture, Anaerobic [[id number]] Collected: 02/17/23 1849    Order Status: Completed Specimen: Swab from Groin Updated: 02/19/23 1513     Special Requests NO SPECIAL REQUESTS        Culture NO ANAEROBES ISOLATED       Culture, Wound Aerobic Only [[id number]]  (Abnormal)  (Susceptibility) Collected: 02/17/23 1849    Order Status: Completed Specimen: Swab from Groin Updated: 02/20/23 1039     Special Requests NO SPECIAL REQUESTS        Gram stain NO WBC'S SEEN         NO ORGANISMS SEEN        Culture       LIGHT Klebsiella (Enterobacter) aerogenes          Susceptibility        Klebsiella aerogenes      BACTERIAL SUSCEPTIBILITY PANEL DAVID      amikacin <=2 ug/mL Sensitive      ceFAZolin >=64 ug/mL Resistant      cefepime <=1 ug/mL Sensitive      cefOXitin >=64 ug/mL Resistant      cefTAZidime <=1 ug/mL Sensitive      cefTRIAXone <=1 ug/mL Sensitive      ciprofloxacin <=0.25 ug/mL Sensitive      gentamicin <=1 ug/mL Sensitive      levofloxacin <=0.12 ug/mL Sensitive      tobramycin <=1 ug/mL Sensitive      trimethoprim-sulfamethoxazole <=20 ug/mL Sensitive                           Culture, Blood 1 [[id number]] Collected: 02/17/23 1704    Order Status: Completed Specimen: Blood Updated: 02/23/23 0616     Special Requests NO SPECIAL REQUESTS        Culture NO GROWTH 6 DAYS       Blood Culture 2 [[id number]] Collected: 02/17/23 1545    Order Status: Canceled Specimen: Blood     Blood Culture 1 [[id number]] Collected: 02/17/23 1530    Order Status: Canceled Specimen: Blood               Ariana Rick DO   3/2/2023

## 2023-03-03 ENCOUNTER — HOME CARE VISIT (OUTPATIENT)
Age: 73
End: 2023-03-03

## 2023-03-03 VITALS
OXYGEN SATURATION: 96 % | SYSTOLIC BLOOD PRESSURE: 124 MMHG | RESPIRATION RATE: 17 BRPM | DIASTOLIC BLOOD PRESSURE: 60 MMHG | HEART RATE: 64 BPM | TEMPERATURE: 97.7 F

## 2023-03-03 PROCEDURE — 0221000100 HH NO PAY CLAIM PROCEDURE

## 2023-03-03 PROCEDURE — G0299 HHS/HOSPICE OF RN EA 15 MIN: HCPCS

## 2023-03-03 ASSESSMENT — ENCOUNTER SYMPTOMS
DYSPNEA ACTIVITY LEVEL: AFTER AMBULATING LESS THAN 10 FT
STOOL DESCRIPTION: SOFT
PAIN LOCATION - PAIN QUALITY: SHARP

## 2023-03-03 NOTE — Clinical Note
Patient admitted into [facility] services for wound care, disease and medication management with SN frequency: 1w1, 3w6, 2 PRN.

## 2023-03-04 NOTE — HOME HEALTH
Skilled services/Home bound verification:     Skilled Reason for admission/summary of clinical condition:  s/p revision left femoral popliteal bypass with cadaver vein . This patient is homebound for the following reasons Requires considerable and taxing effort to leave the home , Requires the assistance of 1 or more persons to leave the home  and Only leaves the home for medical reasons or [employer] services and are infrequent and of short duration for other reasons . Caregiver: spouse. Caregiver assists with ADLs, meal prep and transportation. Medications reconciled and all medications are available in the home this visit. The following education was provided regarding medications:SN instructed patient / caregiver regarding medication Levaquin. SN taught patient / caregiver that levaquin is an antibiotic and that it is used to treat bacterial infections. SN explained to patient that taking levaquin can make the skin more sensitive to sunlight and also may cause swelling or tearing of a tendon. SN explained to patient / caregiver that the side effects of this medication includes headache, nausea, constipation, diarrhea, difficulty sleeping, dry mouth and ear problems. SN instructed patient to notify physician ( MD ) if experiencing persistence or worsening of side effects. Medications  are effective at this time. High risk medication teaching regarding anticoagulants, hyperglycemic agents or opiod narcotics performed (specify) nstructed patient/caregiver to notify SN/PT of signs and symptoms of anticoagulant adverse effects including bleeding gums, blood in urine or stool, easily bruising. Instructed on importance of fall prevention due to risk for bleeding. Instructed patient/caregiver to take exact amount of narcotics prescribed, signs and symptoms of oversedation, notify SN/PT if oversedated. May cause constipation, notify SN/PT if no BM x 3 days.        Dr. Ham Diez notified of any discrepancies/look a like medications/medication interactions: none. Home health supplies by type and quantity ordered/delivered this visit include: gauze, teagderm, packing, skin prep    Patient education provided this visit to include: Instructed patient training for patients and their caregivers who will be using the device at home should include how to: Recognize signs and symptoms of complications, such as redness, warmth, and pain associated with possible infection. Contact healthcare providers, especially in emergency situations, respond to emergency situations; for instance, if bright red blood is seen in the tubing or canister, to immediately stop NPWT, apply direct manual pressure to the dressing, and activate emergency medical services. Patient level of understanding of education provided:Patient and caregiver verbalized understanding    Sharps Education Provided:n/a      Patient response to procedure performed:  Patient tolerated w/o any increased level of pain      Home exercise program/Homework provided: Follow DVT precautions, watch for s/s of infection, follow fall precautions, continue a well balanced diet. Report any abnormal s/s related to infection, DVT, or disease process. Follow up with MD as ordered, medication compliance, importance of maintaining adequate hydration and nutrition, Monitoring for skin breakdown, off setting pressure      Pt/Caregiver instructed on plan of care and are agreeable to plan of care at this time. Physician Gabi notified of patient admission to home health and plan of care including anticipated frequency of 1w1, 3w6, 2 PRN and treatments/interventions/modalities of wound vac, wound care, disease and medication management. Discharge planning discussed with patient and caregiver.   Discharge planning as follows: Discharge when goals are met, wounds are healed, patient/caregiver able to manage disease process, medications, and pain   Pt/Caregiver did verbalize understanding of discharge planning. Next MD appointment 3/14/23 (date) with Dr. Tylor Koehler (vascular) and Dr. Li Nolen (ID) same day appointments MD/NP/PA. Patient/caregiver encouraged/instructed to keep appointment as lack of follow through with physician appointment could result in discontinuation of home care services for non-compliance.

## 2023-03-06 ENCOUNTER — HOME CARE VISIT (OUTPATIENT)
Age: 73
End: 2023-03-06
Payer: MEDICARE

## 2023-03-06 VITALS
DIASTOLIC BLOOD PRESSURE: 60 MMHG | SYSTOLIC BLOOD PRESSURE: 140 MMHG | TEMPERATURE: 97.6 F | OXYGEN SATURATION: 95 % | HEART RATE: 78 BPM | RESPIRATION RATE: 18 BRPM

## 2023-03-06 PROCEDURE — G0299 HHS/HOSPICE OF RN EA 15 MIN: HCPCS

## 2023-03-06 ASSESSMENT — ENCOUNTER SYMPTOMS: PAIN LOCATION - PAIN QUALITY: SHARP

## 2023-03-06 NOTE — HOME HEALTH
Skilled reason for visit: Wound care to left groin and left medial leg    Caregiver involvement: Spouse available for transportation and errands as needed. Medications reviewed and all medications are available in the home this visit. The following education was provided regarding medications: Take all medications as prescribed. MD notified of any discrepancies/look a-like medications/medication interactions: n/a  Medications are effective at this time. Home health supplies by type and quantity ordered/delivered this visit include: n/a    Patient education provided this visit: Ambulate as tolerated, deep breathing exercises, quit smoking and how smoking affects wound healing     Sharps education provided: n/a    Patient level of understanding of education provided: verbalizes understanding and also reported she will not quit smoking     Skilled Care Performed this visit: negative pressure therapy and wound care     Patient response to procedure performed:  Patient reportd discomfort to area but tolerated well     Agency Progress toward goals: progressing - see intervention tab     Patient's Progress towards personal goals: progressing    Home exercise program: deep breathing exercises and ambulation as tolerated     Continued need for the following skills: Nursing and Physical Therapy. Plan for next visit: wound care    Patient and/or caregiver notified and agrees to changes in the Plan of Care: N/A. The following discharge planning was discussed with the pt/caregiver: when all goals are met, wound is healed and patient is able to manage disease process .

## 2023-03-08 ENCOUNTER — HOME CARE VISIT (OUTPATIENT)
Age: 73
End: 2023-03-08
Payer: MEDICARE

## 2023-03-08 PROCEDURE — G0299 HHS/HOSPICE OF RN EA 15 MIN: HCPCS

## 2023-03-10 ENCOUNTER — HOME CARE VISIT (OUTPATIENT)
Age: 73
End: 2023-03-10
Payer: MEDICARE

## 2023-03-10 VITALS
DIASTOLIC BLOOD PRESSURE: 74 MMHG | SYSTOLIC BLOOD PRESSURE: 138 MMHG | HEART RATE: 80 BPM | OXYGEN SATURATION: 99 % | OXYGEN SATURATION: 98 % | RESPIRATION RATE: 20 BRPM | HEART RATE: 80 BPM | DIASTOLIC BLOOD PRESSURE: 78 MMHG | RESPIRATION RATE: 18 BRPM | SYSTOLIC BLOOD PRESSURE: 138 MMHG | TEMPERATURE: 98.6 F | TEMPERATURE: 98.8 F

## 2023-03-10 PROCEDURE — G0299 HHS/HOSPICE OF RN EA 15 MIN: HCPCS

## 2023-03-10 ASSESSMENT — ENCOUNTER SYMPTOMS
PAIN LOCATION - PAIN QUALITY: ACHY
DYSPNEA ACTIVITY LEVEL: AFTER AMBULATING LESS THAN 10 FT

## 2023-03-10 NOTE — HOME HEALTH
Skilled reason for visit: DISEASE MED MANAGEMENT, PHYSICAL ASSESSMENT, VITAL SIGNS, WOUND CARE  PDGM Dx:  S/P REVISION LEFT FEMORAL POPLITEAL BYPASS WITH CADAVER VEIN  Caregiver involvement: family, iadls, adls, meal prep, med management, taking to md appointments. Medications reviewed and all medications are available in the home this visit. The following education was provided regarding medications:  patient/cg reminded to continue to take medications as prescribed. patient aware to monitor for effectiveness and to notify staff of any adverse reactions to medications/any changes to medication regimen. .    MD notified of any discrepancies/look a-like medications/medication interactions: NA  Medications are EFFECTIVE at this time. Home health supplies by type and quantity ordered/delivered this visit include: NA    Patient education provided this visit: encouraged patient to get three nutritional meals daily and to stay hydrated, drink plenty of fluids. patient made aware to monitor for s/s of infection [increased swelling, increased redness around site, increased pain, foul smelling drainage, fever] aware who to report to/when. SEE CARE PLAN  SEE WOUND ADDENDUM  Sharps education provided: NA    Patient level of understanding of education provided: PATIENT/CG  WAS ABLE TO REPEAT BACK AND VOICED UNDERSTANDING OF ALL EDUCATION PROVIDED.     Skilled Care Performed this visit: SAME AS REASON FOR VISIT    Patient response to procedure performed:   PATIENT TOLERATED WELL WITH NO C/O OF INCREASED PAIN OR DISCOMFORT    Agency Progress toward goals: PROGRESSING    Patient's Progress towards personal goals: progressing    Home exercise program: PATIENT TO TAKE ALL MEDS AS ORDERED, DO ICS X10/HR WHILE AWAKE, DRINK PLENTY OF FLUIDS,    Continued need for the following skills: Nursing,    Plan for next visit - wound care

## 2023-03-10 NOTE — HOME HEALTH
Skilled reason for visit: DISEASE MED MANAGEMENT, PHYSICAL ASSESSMENT, VITAL SIGNS, WOUND CARE  PDGM Dx:  S/P REVISION LEFT FEMORAL POPLITEAL BYPASS WITH CADAVER VEIN. List of Comorbidities:    Arthritis      CAP (community acquired pneumonia) 06/27/2017    Chronic lung disease      Claud[facility] ([facility]        left leg    [facility]     Crohn's disease ([facility]      GERD (gastroesophageal reflux disease)      HTN (hypertension)      Il l-defined condition        Uses O2 at night.  Nausea & vomiting      Neuropathy      Other and unspecified symptoms and signs involving general sensations and perceptions        PVD    Other ill-defined conditions(799.89)        Crohn's    Parathyroid tumor        sees Endo Dr Steve Tomas Peripheral neuropathy      [facility] emphysema [facility]      PVD (peripheral vascular disease) ([facility]        right leg    Sepsis ([facility] 06/27/2017    Vit amin B12 deficiency   Caregiver involvement: family, iadls, adls, meal prep, med management, taking to md appointments. Medications reviewed and all medications are available in the home this visit. The following education was provided regarding medications:  patient/cg reminded to continue to take medications as prescribed. patient aware to monitor for effectiveness and to notify staff of any adverse reactions to medications/any changes to medication regimen. .    MD notified of any discrepancies/look a-like medications/medication interactions: NA  Medications are EFFECTIVE at this time. Home health supplies by type and quantity ordered/delivered this visit include: NA    Patient education provided this visit: encouraged patient to get three nutritional meals daily and to stay hydrated, drink plenty of fluids. patient made aware to monitor for s/s of infection [increased swelling, increased redness around site, increased pain, foul smelling drainage, fever] aware who to report to/when.   SEE CARE PLAN  SEE WOUND ADDENDUM  Sharps education provided: NA    Patient level of understanding of education provided: PATIENT/CG  WAS ABLE TO REPEAT BACK AND VOICED UNDERSTANDING OF ALL EDUCATION PROVIDED.     Skilled Care Performed this visit: SAME AS REASON FOR VISIT    Patient response to procedure performed:   PATIENT TOLERATED WELL WITH NO C/O OF INCREASED PAIN OR DISCOMFORT    Agency Progress toward goals: PROGRESSING     Patient's Progress towards personal goals: PROGRESSING    Home exercise program: PATIENT TO TAKE ALL MEDS AS ORDERED, DO ICS X10/HR WHILE AWAKE, DRINK PLENTY OF FLUIDS,    Continued need for the following skills: Nursing    Plan for next visit- wound care

## 2023-03-13 ENCOUNTER — HOME CARE VISIT (OUTPATIENT)
Age: 73
End: 2023-03-13
Payer: MEDICARE

## 2023-03-13 PROCEDURE — G0300 HHS/HOSPICE OF LPN EA 15 MIN: HCPCS

## 2023-03-14 RX ORDER — NYSTATIN 100000 U/G
CREAM TOPICAL
Qty: 30 G | Refills: 0 | Status: SHIPPED | OUTPATIENT
Start: 2023-03-14

## 2023-03-14 RX ORDER — LEVOFLOXACIN 500 MG/1
500 TABLET, FILM COATED ORAL DAILY
Qty: 14 TABLET | Refills: 1 | Status: SHIPPED | OUTPATIENT
Start: 2023-03-14 | End: 2023-04-11

## 2023-03-15 ENCOUNTER — HOME CARE VISIT (OUTPATIENT)
Age: 73
End: 2023-03-15
Payer: MEDICARE

## 2023-03-15 PROCEDURE — G0300 HHS/HOSPICE OF LPN EA 15 MIN: HCPCS

## 2023-03-16 VITALS
OXYGEN SATURATION: 99 % | HEART RATE: 61 BPM | SYSTOLIC BLOOD PRESSURE: 127 MMHG | TEMPERATURE: 97.8 F | RESPIRATION RATE: 16 BRPM | DIASTOLIC BLOOD PRESSURE: 61 MMHG

## 2023-03-16 ASSESSMENT — ENCOUNTER SYMPTOMS: STOOL DESCRIPTION: FORMED

## 2023-03-17 ENCOUNTER — HOME CARE VISIT (OUTPATIENT)
Age: 73
End: 2023-03-17
Payer: MEDICARE

## 2023-03-17 VITALS — OXYGEN SATURATION: 98 % | RESPIRATION RATE: 18 BRPM | TEMPERATURE: 98.3 F | HEART RATE: 72 BPM

## 2023-03-17 PROCEDURE — G0299 HHS/HOSPICE OF RN EA 15 MIN: HCPCS

## 2023-03-17 NOTE — HOME HEALTH
SKILLED REASON for visit: dressing change wound vac CAREGIVER INVOLVEMENT: spouse is available as needed for assistance with iadls, adls, meal prep, medication management, taking to md appointments. MEDICATIONS: reviewed and all medications are available in the home this visit. Patient denies any medication changes at this time of assessment. EDUCATION PROVIDED: regarding medications, medication interactions, and look alike medications (specify): reviewed side effects, purposes, dosage, frequencies. High risk medication teaching regarding anticoagulants, hyperglycemic agents or opiod narcotics performed (specify) na Medications are effective at this time. SUPPLIES: by type and quantity ordered/delivered this visit include: n/a PATIENT EDUCATION ON THIS VISIT: troubleshooting related to wound vac application. PATIENT LEVEL OF UNDERSTANDING: patient/cg has a partial understanding of education that was provided at this time by engaging in all education provided and is able to verbalize understanding, pt denies any questions or concerns at this time. SKILLED CARE PERFORMED THIS VISIT-dressing change /wound vac application PATIENT RESPONSE TO PROCEDURE PERFORMED: patient tolerated procedure with no signs of discomfort, grimacing or pain, no complications or concerns noted. Agency Progress toward goals: goals/teaching reviewed. patient is progressing towards goals at this time, Patient's Progress towards personal goals: pt reporting they are progressing towards their goals at this time. Home exercise program: HEP deep breathing exercises Continued need for the following skills: Nursing Plan for next visit: routine Patient and/or caregiver notified and agrees to changes in the Plan of Care NA The following discharge planning was discussed with the pt/caregiver: Patient will be discharged once education has completed, patient is medically stable and independent with care.

## 2023-03-17 NOTE — HOME HEALTH
Skilled reason for visit: DISEASE MED MANAGEMENT, PHYSICAL ASSESSMENT, VITAL SIGNS, WOUND CARE  PICTURE WAS TAKEN THIS VISIT AND IS LOCATED IN THE MEDIA SECTION  PDGM Dx:  S/P REVISION LEFT FEMORAL POPLITEAL BYPASS WITH CADAVER VEIN. Caregiver involvement: family, iadls, adls, meal prep, med management, taking to md appointments. Medications reviewed and all medications are available in the home this visit. The following education was provided regarding medications:  patient/cg reminded to continue to take medications as prescribed. patient aware to monitor for effectiveness and to notify staff of any adverse reactions to medications/any changes to medication regimen. .      MD notified of any discrepancies/look a-like medications/medication interactions: NA    Medications are EFFECTIVE at this time. Home health supplies by type and quantity ordered/delivered this visit include: NA  Patient education provided this visit: encouraged patient to get three nutritional meals daily and to stay hydrated, drink plenty of fluids. patient made aware to monitor for s/s of infection [increased swelling, increased redness around site, increased pain, foul smelling drainage, fever] aware who to report to/when. SEE CARE PLAN  SEE WOUND ADDENDUM  Sharps education provided: NA  Patient level of understanding of education provided: PATIENT/CG  WAS ABLE TO REPEAT BACK AND VOICED UNDERSTANDING OF ALL EDUCATION PROVIDED.   Skilled Care Performed this visit: SAME AS REASON FOR VISIT  Patient response to procedure performed:   PATIENT TOLERATED WELL WITH NO C/O OF INCREASED PAIN OR DISCOMFORT  Agency Progress toward goals: PROGRESSING   Patient's Progress towards personal goals: PROGRESSING  Home exercise program: PATIENT TO TAKE ALL MEDS AS ORDERED, DO ICS X10/HR WHILE AWAKE, DRINK PLENTY OF FLUIDS,  Continued need for the following skills: Nursing  Plan for next visit- wound care

## 2023-03-19 VITALS
SYSTOLIC BLOOD PRESSURE: 121 MMHG | HEART RATE: 60 BPM | TEMPERATURE: 97.7 F | RESPIRATION RATE: 16 BRPM | OXYGEN SATURATION: 98 % | DIASTOLIC BLOOD PRESSURE: 63 MMHG

## 2023-03-19 ASSESSMENT — ENCOUNTER SYMPTOMS
PAIN LOCATION - PAIN QUALITY: DULL
STOOL DESCRIPTION: HARD

## 2023-03-19 NOTE — HOME HEALTH
SKILLED REASON for visit: wound vac application   CAREGIVER INVOLVEMENT: family is available as needed for assistance with iadls, adls, meal prep, medication management, taking to md appointments. MEDICATIONS: reviewed and all medications are available in the home this visit. Patient denies any medication changes at this time of assessment. EDUCATION PROVIDED: regarding medications, medication interactions, and look alike medications (specify): reviewed side effects, purposes, dosage, frequencies. High risk medication teaching regarding anticoagulants, hyperglycemic agents or opiod narcotics performed (specify) na Medications are effective at this time. SUPPLIES: by type and quantity ordered/delivered this visit include: n/a   PATIENT EDUCATION ON THIS VISIT: wound vac troubleshooting   PATIENT LEVEL OF UNDERSTANDING: patient/cg has a partial understanding of education that was provided at this time by engaging in all education provided and is able to verbalize understanding, pt denies any questions or concerns at this time. SKILLED CARE PERFORMED THIS VISIT- wound care wound vac   BLE PATIENT RESPONSE TO PROCEDURE PERFORMED: patient tolerated procedure with no signs of discomfort, grimacing or pain, no complications or concerns noted. Agency Progress toward goals: goals/teaching reviewed. patient is progressing towards goals at this time, Patient's Progress towards personal goals: pt reporting they are progressing towards their goals at this time. Home exercise program: HEP deep breathing exercises Continued need for the following skills: Nursing Plan for next visit: wound care Patient and/or caregiver notified and agrees to changes in the Plan of Care NA The following discharge planning was discussed with the pt/caregiver: Patient will be discharged once education has completed, patient is medically stable and independent with care.

## 2023-03-20 ENCOUNTER — HOME CARE VISIT (OUTPATIENT)
Age: 73
End: 2023-03-20
Payer: MEDICARE

## 2023-03-20 VITALS
DIASTOLIC BLOOD PRESSURE: 78 MMHG | RESPIRATION RATE: 18 BRPM | SYSTOLIC BLOOD PRESSURE: 138 MMHG | TEMPERATURE: 98.8 F | OXYGEN SATURATION: 100 % | HEART RATE: 95 BPM

## 2023-03-20 PROCEDURE — G0299 HHS/HOSPICE OF RN EA 15 MIN: HCPCS

## 2023-03-20 ASSESSMENT — ENCOUNTER SYMPTOMS: DYSPNEA ACTIVITY LEVEL: AFTER AMBULATING LESS THAN 10 FT

## 2023-03-22 ENCOUNTER — HOME CARE VISIT (OUTPATIENT)
Age: 73
End: 2023-03-22
Payer: MEDICARE

## 2023-03-22 VITALS — HEART RATE: 72 BPM | TEMPERATURE: 98.6 F | OXYGEN SATURATION: 99 % | RESPIRATION RATE: 18 BRPM

## 2023-03-22 PROCEDURE — G0299 HHS/HOSPICE OF RN EA 15 MIN: HCPCS

## 2023-03-22 ASSESSMENT — ENCOUNTER SYMPTOMS: DYSPNEA ACTIVITY LEVEL: AFTER AMBULATING MORE THAN 20 FT

## 2023-03-24 ENCOUNTER — HOME CARE VISIT (OUTPATIENT)
Age: 73
End: 2023-03-24
Payer: MEDICARE

## 2023-03-24 VITALS
DIASTOLIC BLOOD PRESSURE: 80 MMHG | HEART RATE: 77 BPM | TEMPERATURE: 98 F | RESPIRATION RATE: 18 BRPM | SYSTOLIC BLOOD PRESSURE: 138 MMHG | OXYGEN SATURATION: 98 %

## 2023-03-24 PROCEDURE — G0299 HHS/HOSPICE OF RN EA 15 MIN: HCPCS

## 2023-03-24 ASSESSMENT — ENCOUNTER SYMPTOMS: DYSPNEA ACTIVITY LEVEL: AFTER AMBULATING LESS THAN 10 FT

## 2023-03-24 NOTE — HOME HEALTH
Skilled reason for visit: DISEASE MED MANAGEMENT, PHYSICAL ASSESSMENT, VITAL SIGNS, WOUND CARE  PATIENT'S WOUND IS HEALING VERY WELL, WE SHOULD BE ABLE TO DC HER WOUND VAC IN A WEEK OR SO IF WE HAVE NO COMPLICATIONS. PDGM Dx:  S/P REVISION LEFT FEMORAL POPLITEAL BYPASS WITH CADAVER VEIN    Caregiver involvement: family, iadls, adls, meal prep, med management, taking to md appointments. Medications reviewed and all medications are available in the home this visit. The following education was provided regarding medications:  patient/cg reminded to continue to take medications as prescribed. patient aware to monitor for effectiveness and to notify staff of any adverse reactions to medications/any changes to medication regimen. .      MD notified of any discrepancies/look a-like medications/medication interactions: NA    Medications are EFFECTIVE at this time. Home health supplies by type and quantity ordered/delivered this visit include: NA    Patient education provided this visit: encouraged patient to get three nutritional meals daily and to stay hydrated, drink plenty of fluids. patient made aware to monitor for s/s of infection [increased swelling, increased redness around site, increased pain, foul smelling drainage, fever] aware who to report to/when. SEE CARE PLAN    SEE WOUND ADDENDUM    Sharps education provided: NA    Patient level of understanding of education provided: PATIENT/CG  WAS ABLE TO REPEAT BACK AND VOICED UNDERSTANDING OF ALL EDUCATION PROVIDED.     Skilled Care Performed this visit: SAME AS REASON FOR VISIT    Patient response to procedure performed:   PATIENT TOLERATED WELL WITH NO C/O OF INCREASED PAIN OR DISCOMFORT    Agency Progress toward goals: PROGRESSING    Patient's Progress towards personal goals: progressing    Home exercise program: PATIENT TO TAKE ALL MEDS AS ORDERED, DO ICS X10/HR WHILE AWAKE, DRINK PLENTY OF FLUIDS,    Continued need for the following skills:

## 2023-03-27 ENCOUNTER — HOME CARE VISIT (OUTPATIENT)
Age: 73
End: 2023-03-27
Payer: MEDICARE

## 2023-03-27 VITALS
HEART RATE: 88 BPM | TEMPERATURE: 98.7 F | SYSTOLIC BLOOD PRESSURE: 138 MMHG | RESPIRATION RATE: 18 BRPM | DIASTOLIC BLOOD PRESSURE: 70 MMHG | OXYGEN SATURATION: 99 %

## 2023-03-27 PROCEDURE — G0299 HHS/HOSPICE OF RN EA 15 MIN: HCPCS

## 2023-03-27 ASSESSMENT — ENCOUNTER SYMPTOMS: DYSPNEA ACTIVITY LEVEL: AFTER AMBULATING MORE THAN 20 FT

## 2023-03-27 NOTE — HOME HEALTH
Skilled reason for visit: DISEASE MED MANAGEMENT, PHYSICAL ASSESSMENT, VITAL SIGNS, WOUND CARE  wounds are healing very nicely and wound vac is no longer needed and a dry dressing was applied  Caregiver involvement: family, iadls, adls, meal prep, med management, taking to md appointments. Medications reviewed and all medications are available in the home this visit. The following education was provided regarding medications:  patient/cg reminded to continue to take medications as prescribed. patient aware to monitor for effectiveness and to notify staff of any adverse reactions to medications/any changes to medication regimen. .    MD notified of any discrepancies/look a-like medications/medication interactions: NA  Medications are EFFECTIVE at this time. Home health supplies by type and quantity ordered/delivered this visit include: NA    Patient education provided this visit: encouraged patient to get three nutritional meals daily and to stay hydrated, drink plenty of fluids. patient made aware to monitor for s/s of infection [increased swelling, increased redness around site, increased pain, foul smelling drainage, fever] aware who to report to/when. Sharps education provided: NA    Patient level of understanding of education provided: PATIENT/CG  WAS ABLE TO REPEAT BACK AND VOICED UNDERSTANDING OF ALL EDUCATION PROVIDED.     Skilled Care Performed this visit: SAME AS REASON FOR VISIT    Patient response to procedure performed:   PATIENT TOLERATED WELL WITH NO C/O OF INCREASED PAIN OR DISCOMFORT    Agency Progress toward goals: PROGRESSING     Patient's Progress towards personal goals: progressing    Home exercise program: PATIENT TO TAKE ALL MEDS AS ORDERED, DO ICS X10/HR WHILE AWAKE, DRINK PLENTY OF FLUIDS,    Continued need for the following skills: Nursing,     Plan for next visit- wound care, dc at the end of this week

## 2023-03-29 ENCOUNTER — HOME CARE VISIT (OUTPATIENT)
Age: 73
End: 2023-03-29
Payer: MEDICARE

## 2023-03-29 PROCEDURE — G0299 HHS/HOSPICE OF RN EA 15 MIN: HCPCS

## 2023-03-30 VITALS
DIASTOLIC BLOOD PRESSURE: 76 MMHG | SYSTOLIC BLOOD PRESSURE: 138 MMHG | HEART RATE: 76 BPM | OXYGEN SATURATION: 98 % | TEMPERATURE: 98.3 F | RESPIRATION RATE: 18 BRPM

## 2023-03-30 ASSESSMENT — ENCOUNTER SYMPTOMS: DYSPNEA ACTIVITY LEVEL: AT REST

## 2023-03-31 ENCOUNTER — HOME CARE VISIT (OUTPATIENT)
Age: 73
End: 2023-03-31
Payer: MEDICARE

## 2023-03-31 VITALS — SYSTOLIC BLOOD PRESSURE: 130 MMHG | DIASTOLIC BLOOD PRESSURE: 80 MMHG

## 2023-03-31 PROCEDURE — G0299 HHS/HOSPICE OF RN EA 15 MIN: HCPCS

## 2023-03-31 NOTE — HOME HEALTH
Skilled reason for visit: DISEASE MED MANAGEMENT, PHYSICAL ASSESSMENT, VITAL SIGNS, WOUND CARE  PATIENT WAS GIVEN AN OPPERTUNITY TO VOICE QUESTIONS AND/OR CONCERNS. PATIENT STATES THAT THEY HAVE NO QUESTIONS OR CONCERNS THIS VISIT.    wounds are healing very nicely and wound vac is no longer needed and a dry dressing was applied    Caregiver involvement: family, iadls, adls, meal prep, med management, taking to md appointments. Medications reviewed and all medications are available in the home this visit. The following education was provided regarding medications:  patient/cg reminded to continue to take medications as prescribed. patient aware to monitor for effectiveness and to notify staff of any adverse reactions to medications/any changes to medication regimen. .      MD notified of any discrepancies/look a-like medications/medication interactions: NA    Medications are EFFECTIVE at this time. Home health supplies by type and quantity ordered/delivered this visit include: NA    Patient education provided this visit: encouraged patient to get three nutritional meals daily and to stay hydrated, drink plenty of fluids. patient made aware to monitor for s/s of infection [increased swelling, increased redness around site, increased pain, foul smelling drainage, fever] aware who to report to/when. Sharps education provided: NA    Patient level of understanding of education provided: PATIENT/CG  WAS ABLE TO REPEAT BACK AND VOICED UNDERSTANDING OF ALL EDUCATION PROVIDED.     Skilled Care Performed this visit: SAME AS REASON FOR VISIT    Patient response to procedure performed:   PATIENT TOLERATED WELL WITH NO C/O OF INCREASED PAIN OR DISCOMFORT    Agency Progress toward goals: PROGRESSING     Patient's Progress towards personal goals: progressing    Home exercise program: PATIENT TO TAKE ALL MEDS AS ORDERED, DO ICS X10/HR WHILE AWAKE, DRINK PLENTY OF FLUIDS,    Continued need for the following skills:

## 2023-04-03 ENCOUNTER — HOME CARE VISIT (OUTPATIENT)
Age: 73
End: 2023-04-03
Payer: MEDICARE

## 2023-04-03 VITALS
DIASTOLIC BLOOD PRESSURE: 72 MMHG | SYSTOLIC BLOOD PRESSURE: 138 MMHG | OXYGEN SATURATION: 98 % | HEART RATE: 70 BPM | RESPIRATION RATE: 18 BRPM | TEMPERATURE: 98.3 F

## 2023-04-03 PROCEDURE — G0299 HHS/HOSPICE OF RN EA 15 MIN: HCPCS

## 2023-04-03 ASSESSMENT — ENCOUNTER SYMPTOMS: DYSPNEA ACTIVITY LEVEL: AFTER AMBULATING MORE THAN 20 FT

## 2023-04-04 PROBLEM — A49.01 MSSA (METHICILLIN SUSCEPTIBLE STAPHYLOCOCCUS AUREUS): Status: ACTIVE | Noted: 2023-04-04

## 2023-04-05 ENCOUNTER — HOME CARE VISIT (OUTPATIENT)
Age: 73
End: 2023-04-05
Payer: MEDICARE

## 2023-04-06 ENCOUNTER — HOME CARE VISIT (OUTPATIENT)
Age: 73
End: 2023-04-06
Payer: MEDICARE

## 2023-04-06 VITALS
TEMPERATURE: 98.7 F | DIASTOLIC BLOOD PRESSURE: 80 MMHG | SYSTOLIC BLOOD PRESSURE: 140 MMHG | OXYGEN SATURATION: 97 % | RESPIRATION RATE: 18 BRPM | HEART RATE: 76 BPM

## 2023-04-06 PROCEDURE — G0299 HHS/HOSPICE OF RN EA 15 MIN: HCPCS

## 2023-04-06 ASSESSMENT — ENCOUNTER SYMPTOMS: DYSPNEA ACTIVITY LEVEL: AFTER AMBULATING MORE THAN 20 FT

## 2023-04-06 NOTE — Clinical Note
Patient is meeting all SN goals at this time and is ready for agency discharge to follow up with PCP/ Surgeon. Patient is aware to follow up with PCP/Surgeon as ordered. Opportunity for questions provided- none at this time. Patient aware to refer an  y questions after DC to MD office.

## 2023-04-18 RX ORDER — CEFDINIR 300 MG/1
600 CAPSULE ORAL DAILY
Qty: 60 CAPSULE | Refills: 5 | Status: SHIPPED | OUTPATIENT
Start: 2023-04-18 | End: 2023-05-18

## 2023-04-26 ENCOUNTER — APPOINTMENT (OUTPATIENT)
Dept: INFUSION THERAPY | Age: 73
End: 2023-04-26

## 2023-04-26 ENCOUNTER — HOSPITAL ENCOUNTER (OUTPATIENT)
Facility: HOSPITAL | Age: 73
Setting detail: INFUSION SERIES
End: 2023-04-26
Payer: MEDICARE

## 2023-04-26 VITALS
DIASTOLIC BLOOD PRESSURE: 67 MMHG | TEMPERATURE: 97.7 F | RESPIRATION RATE: 16 BRPM | HEART RATE: 78 BPM | SYSTOLIC BLOOD PRESSURE: 167 MMHG | OXYGEN SATURATION: 98 %

## 2023-04-26 LAB
ANION GAP SERPL CALC-SCNC: 7 MMOL/L (ref 3–18)
BUN SERPL-MCNC: 11 MG/DL (ref 7–18)
BUN/CREAT SERPL: 13 (ref 12–20)
CALCIUM SERPL-MCNC: 10.9 MG/DL (ref 8.5–10.1)
CHLORIDE SERPL-SCNC: 110 MMOL/L (ref 100–111)
CO2 SERPL-SCNC: 26 MMOL/L (ref 21–32)
CREAT SERPL-MCNC: 0.86 MG/DL (ref 0.6–1.3)
GLUCOSE SERPL-MCNC: 94 MG/DL (ref 74–99)
MAGNESIUM SERPL-MCNC: 1.9 MG/DL (ref 1.6–2.6)
PHOSPHATE SERPL-MCNC: 3 MG/DL (ref 2.5–4.9)
POTASSIUM SERPL-SCNC: 4.2 MMOL/L (ref 3.5–5.5)
SODIUM SERPL-SCNC: 143 MMOL/L (ref 136–145)

## 2023-04-26 PROCEDURE — 80048 BASIC METABOLIC PNL TOTAL CA: CPT

## 2023-04-26 PROCEDURE — 36415 COLL VENOUS BLD VENIPUNCTURE: CPT

## 2023-04-26 PROCEDURE — 83735 ASSAY OF MAGNESIUM: CPT

## 2023-04-26 PROCEDURE — 84100 ASSAY OF PHOSPHORUS: CPT

## 2023-04-26 ASSESSMENT — PAIN DESCRIPTION - PAIN TYPE: TYPE: NEUROPATHIC PAIN

## 2023-04-26 ASSESSMENT — PAIN DESCRIPTION - ORIENTATION: ORIENTATION: LEFT

## 2023-04-26 ASSESSMENT — PAIN DESCRIPTION - LOCATION: LOCATION: LEG

## 2023-04-26 ASSESSMENT — PAIN DESCRIPTION - ONSET: ONSET: ON-GOING

## 2023-04-26 ASSESSMENT — PAIN SCALES - GENERAL: PAINLEVEL_OUTOF10: 4

## 2023-04-26 ASSESSMENT — PAIN DESCRIPTION - DESCRIPTORS: DESCRIPTORS: NUMBNESS;TINGLING

## 2023-04-26 NOTE — PROGRESS NOTES
[facility] Lab Visit:    Bigg Cool  1950  [id number]    [id number]: Pt arrived ambulatory w/o assist. Labs drawn per Prolia order via left AC venipuncture x1 attempt. Pt tolerated well without complaints. Gauze/ coban to site. Pt departed [facility] ambulatory and in no distress at 1135. Pt is scheduled to return for her Prolia injection appointment on 4/28/23 at 1330.     Vitals:    04/26/23 1125   BP: (!) 167/67   Pulse: 78   Resp: 16   Temp: 97.7 °F (36.5 °C)   SpO2: 98%

## 2023-04-27 ENCOUNTER — HOSPITAL ENCOUNTER (OUTPATIENT)
Facility: HOSPITAL | Age: 73
Discharge: HOME | End: 2023-04-27
Payer: MEDICARE

## 2023-04-27 VITALS — WEIGHT: 131 LBS | HEIGHT: 65 IN | BODY MASS INDEX: 21.83 KG/M2

## 2023-04-27 DIAGNOSIS — Z87.891 PERSONAL HISTORY OF TOBACCO USE: ICD-10-CM

## 2023-04-27 PROBLEM — E83.42 HYPOMAGNESEMIA: Status: ACTIVE | Noted: 2023-04-27

## 2023-04-27 PROCEDURE — 71271 CT THORAX LUNG CANCER SCR C-: CPT

## 2023-04-27 RX ORDER — DIPHENHYDRAMINE HYDROCHLORIDE 50 MG/ML
50 INJECTION INTRAMUSCULAR; INTRAVENOUS
Status: CANCELLED | OUTPATIENT
Start: 2023-04-28

## 2023-04-27 RX ORDER — ACETAMINOPHEN 325 MG/1
650 TABLET ORAL
Status: CANCELLED | OUTPATIENT
Start: 2023-04-28

## 2023-04-27 RX ORDER — ALBUTEROL SULFATE 90 UG/1
4 AEROSOL, METERED RESPIRATORY (INHALATION) PRN
Status: CANCELLED | OUTPATIENT
Start: 2023-04-28

## 2023-04-27 RX ORDER — EPINEPHRINE 1 MG/ML
0.3 INJECTION, SOLUTION, CONCENTRATE INTRAVENOUS PRN
Status: CANCELLED | OUTPATIENT
Start: 2023-04-28

## 2023-04-27 RX ORDER — SODIUM CHLORIDE 9 MG/ML
INJECTION, SOLUTION INTRAVENOUS CONTINUOUS
Status: CANCELLED | OUTPATIENT
Start: 2023-04-28

## 2023-04-27 RX ORDER — METHYLPREDNISOLONE SODIUM SUCCINATE 40 MG/ML
40 INJECTION, POWDER, LYOPHILIZED, FOR SOLUTION INTRAMUSCULAR; INTRAVENOUS
Status: CANCELLED
Start: 2023-04-28

## 2023-04-27 RX ORDER — ONDANSETRON 2 MG/ML
8 INJECTION INTRAMUSCULAR; INTRAVENOUS
Status: CANCELLED | OUTPATIENT
Start: 2023-04-28

## 2023-04-28 ENCOUNTER — HOSPITAL ENCOUNTER (OUTPATIENT)
Facility: HOSPITAL | Age: 73
Setting detail: INFUSION SERIES
End: 2023-04-28
Payer: MEDICARE

## 2023-04-28 ENCOUNTER — APPOINTMENT (OUTPATIENT)
Dept: INFUSION THERAPY | Age: 73
End: 2023-04-28

## 2023-04-28 VITALS
SYSTOLIC BLOOD PRESSURE: 153 MMHG | TEMPERATURE: 98 F | RESPIRATION RATE: 85 BRPM | DIASTOLIC BLOOD PRESSURE: 55 MMHG | OXYGEN SATURATION: 97 % | HEART RATE: 84 BPM

## 2023-04-28 DIAGNOSIS — M81.0 POSTMENOPAUSAL OSTEOPOROSIS: ICD-10-CM

## 2023-04-28 DIAGNOSIS — E83.42 HYPOMAGNESEMIA: Primary | ICD-10-CM

## 2023-04-28 PROCEDURE — 96372 THER/PROPH/DIAG INJ SC/IM: CPT

## 2023-04-28 PROCEDURE — 6360000002 HC RX W HCPCS: Performed by: NURSE PRACTITIONER

## 2023-04-28 RX ORDER — METHYLPREDNISOLONE SODIUM SUCCINATE 40 MG/ML
40 INJECTION, POWDER, LYOPHILIZED, FOR SOLUTION INTRAMUSCULAR; INTRAVENOUS
Status: CANCELLED
Start: 2023-10-22

## 2023-04-28 RX ORDER — SODIUM CHLORIDE 9 MG/ML
INJECTION, SOLUTION INTRAVENOUS CONTINUOUS
Status: CANCELLED | OUTPATIENT
Start: 2023-10-22

## 2023-04-28 RX ORDER — ACETAMINOPHEN 325 MG/1
650 TABLET ORAL
Status: CANCELLED | OUTPATIENT
Start: 2023-10-22

## 2023-04-28 RX ORDER — EPINEPHRINE 1 MG/ML
0.3 INJECTION, SOLUTION, CONCENTRATE INTRAVENOUS PRN
Status: CANCELLED | OUTPATIENT
Start: 2023-10-22

## 2023-04-28 RX ORDER — ONDANSETRON 2 MG/ML
8 INJECTION INTRAMUSCULAR; INTRAVENOUS
Status: CANCELLED | OUTPATIENT
Start: 2023-10-22

## 2023-04-28 RX ORDER — DIPHENHYDRAMINE HYDROCHLORIDE 50 MG/ML
50 INJECTION INTRAMUSCULAR; INTRAVENOUS
Status: CANCELLED | OUTPATIENT
Start: 2023-10-22

## 2023-04-28 RX ORDER — ALBUTEROL SULFATE 90 UG/1
4 AEROSOL, METERED RESPIRATORY (INHALATION) PRN
Status: CANCELLED | OUTPATIENT
Start: 2023-10-22

## 2023-04-28 RX ADMIN — DENOSUMAB 60 MG: 60 INJECTION SUBCUTANEOUS at 13:48

## 2023-04-28 ASSESSMENT — PAIN DESCRIPTION - PAIN TYPE: TYPE: NEUROPATHIC PAIN

## 2023-04-28 ASSESSMENT — PAIN SCALES - GENERAL: PAINLEVEL_OUTOF10: 5

## 2023-04-28 ASSESSMENT — PAIN DESCRIPTION - ORIENTATION: ORIENTATION: LEFT

## 2023-04-28 ASSESSMENT — PAIN DESCRIPTION - ONSET: ONSET: ON-GOING

## 2023-04-28 ASSESSMENT — PAIN DESCRIPTION - LOCATION: LOCATION: FOOT;LEG

## 2023-04-28 NOTE — PROGRESS NOTES
[facility] OPIC Progress Note    Date: 2023    Name: Toy Rose    MRN: [id number]         : 1950    Prolia Q 6 months    Ms. Shayan Haro arrived to [facility] at 1330 ambulatory. Pain to left foot/leg 5/10. Took pain medication today. Ms. Shayan Haro was assessed and education was provided. She is an existing prolia patient and tolerating well. Ms. Calixto's vitals were reviewed. Vitals:    23 1333   BP: (!) 153/55   Pulse: 84   Resp: (!) 85   Temp: 98 °F (36.7 °C)   SpO2: 97%         Lab results were obtained and reviewed. Calcium level 10.9--done on 23. Patient states her endocrinologist is aware of her increasing calcium levels and monitoring same. She meets parameters for prolia today. Prolia 60 mg was administered as ordered SQ in patient's  left upper back of arm. No bleeding at site. Bandaid to site. .    Ms. Shayan Haro tolerated well without complaints or reaction. Discharge/ follow-up instructions discussed w/ pt. Pt verbalized understanding. Pt armband removed & shredded. Ms. Shayan Haro was discharged from [place] [address] in stable condition at 1350. She is to return on 10/25/23 at 1130 for her prolia lab appointment.     Jeanette Sweet RN  2023

## 2023-05-03 ENCOUNTER — TELEPHONE (OUTPATIENT)
Age: 73
End: 2023-05-03

## 2023-05-03 DIAGNOSIS — R91.1 LUNG NODULE: Primary | ICD-10-CM

## 2023-05-11 ENCOUNTER — HOSPITAL ENCOUNTER (OUTPATIENT)
Facility: HOSPITAL | Age: 73
Discharge: HOME | End: 2023-05-11
Payer: MEDICARE

## 2023-05-11 ENCOUNTER — CLINICAL DOCUMENTATION (OUTPATIENT)
Facility: HOSPITAL | Age: 73
End: 2023-05-11

## 2023-05-11 DIAGNOSIS — T82.7XXD INFECTION OF VASCULAR BYPASS GRAFT, SUBSEQUENT ENCOUNTER: Primary | ICD-10-CM

## 2023-05-11 DIAGNOSIS — T82.7XXA INFECTION AND INFLAMMATORY REACTION DUE TO CARDIAC DEVICE, IMPLANT, AND GRAFT, INITIAL ENCOUNTER (HCC): ICD-10-CM

## 2023-05-11 LAB
ALBUMIN SERPL-MCNC: 3.4 G/DL (ref 3.4–5)
ALBUMIN/GLOB SERPL: 0.8 (ref 0.8–1.7)
ALP SERPL-CCNC: 106 U/L (ref 45–117)
ALT SERPL-CCNC: 24 U/L (ref 13–56)
ANION GAP SERPL CALC-SCNC: 7 MMOL/L (ref 3–18)
APPEARANCE UR: CLEAR
AST SERPL-CCNC: 16 U/L (ref 10–38)
BACTERIA URNS QL MICRO: ABNORMAL /HPF
BILIRUB SERPL-MCNC: 0.3 MG/DL (ref 0.2–1)
BILIRUB UR QL: NEGATIVE
BUN SERPL-MCNC: 12 MG/DL (ref 7–18)
BUN/CREAT SERPL: 17 (ref 12–20)
CALCIUM SERPL-MCNC: 9.5 MG/DL (ref 8.5–10.1)
CHLORIDE SERPL-SCNC: 112 MMOL/L (ref 100–111)
CO2 SERPL-SCNC: 22 MMOL/L (ref 21–32)
COLOR UR: YELLOW
CREAT SERPL-MCNC: 0.71 MG/DL (ref 0.6–1.3)
EPITH CASTS URNS QL MICRO: ABNORMAL /LPF (ref 0–5)
GLOBULIN SER CALC-MCNC: 4.4 G/DL (ref 2–4)
GLUCOSE SERPL-MCNC: 91 MG/DL (ref 74–99)
GLUCOSE UR STRIP.AUTO-MCNC: NEGATIVE MG/DL
HGB UR QL STRIP: ABNORMAL
KETONES UR QL STRIP.AUTO: NEGATIVE MG/DL
LEUKOCYTE ESTERASE UR QL STRIP.AUTO: ABNORMAL
NITRITE UR QL STRIP.AUTO: NEGATIVE
PH UR STRIP: 5.5 (ref 5–8)
POTASSIUM SERPL-SCNC: 4.1 MMOL/L (ref 3.5–5.5)
PROT SERPL-MCNC: 7.8 G/DL (ref 6.4–8.2)
PROT UR STRIP-MCNC: 30 MG/DL
RBC #/AREA URNS HPF: ABNORMAL /HPF (ref 0–5)
SODIUM SERPL-SCNC: 141 MMOL/L (ref 136–145)
SP GR UR REFRACTOMETRY: 1.01 (ref 1–1.03)
UROBILINOGEN UR QL STRIP.AUTO: 0.2 EU/DL (ref 0.2–1)
WBC URNS QL MICRO: ABNORMAL /HPF (ref 0–4)

## 2023-05-11 PROCEDURE — 87077 CULTURE AEROBIC IDENTIFY: CPT

## 2023-05-11 PROCEDURE — 87086 URINE CULTURE/COLONY COUNT: CPT

## 2023-05-11 PROCEDURE — 80053 COMPREHEN METABOLIC PANEL: CPT

## 2023-05-11 PROCEDURE — 87186 SC STD MICRODIL/AGAR DIL: CPT

## 2023-05-11 PROCEDURE — 81001 URINALYSIS AUTO W/SCOPE: CPT

## 2023-05-11 PROCEDURE — 36415 COLL VENOUS BLD VENIPUNCTURE: CPT

## 2023-05-11 RX ORDER — LEVOFLOXACIN 500 MG/1
250 TABLET, FILM COATED ORAL DAILY
Qty: 30 TABLET | Refills: 5 | Status: SHIPPED | OUTPATIENT
Start: 2023-05-11 | End: 2023-06-10

## 2023-05-14 LAB
BACTERIA SPEC CULT: ABNORMAL
BACTERIA SPEC CULT: ABNORMAL
CC UR VC: ABNORMAL
SERVICE CMNT-IMP: ABNORMAL

## 2023-05-26 NOTE — PROGRESS NOTES
Recent Visits  Date Type Provider Dept   03/15/23 Office Visit Donnie Reyes MD  Internal Medicine   09/26/22 Office Visit Donnie Reyes MD  Internal Medicine   06/21/22 Office Visit Donnie Reyes MD  Internal Medicine   Showing recent visits within past 365 days with a meds authorizing provider and meeting all other requirements  Future Appointments  No visits were found meeting these conditions.  Showing future appointments within next 90 days with a meds authorizing provider and meeting all other requirements         0700: Bedside and Verbal shift change report given to Alber Bello RN (oncoming nurse) by Pj Kwok RN (offgoing nurse). Report included the following information Nurse Handoff Report, Index, Adult Overview, Intake/Output, MAR, Recent Results, and Cardiac Rhythm NSR .    0730: CVT OR called for report. 0800: Morning assessment performed. Pt given cholorohexidine bath in preparation for surgery. 1053:  places pt back on regular diet, and informs patient, surgery will not take place today. 1115: Dr. Carolyn Shrestha in to talk with pt regarding IV access. Pt did not agree to PICC placement, but Pt agrees to allow PICC team to come and place an Ultrasound guided PIV for testing tomorrow. 1730: IV access team in to place ultrasound guided PIV. 20g in RUE obtained. 1745: Pt family at bedside. 1803: IV dilaudid given for pain. 1833: Pain reassessment completed. 1954: Bedside and Verbal shift change report given to Leandra Miguel RN (oncoming nurse) by Alber Bello RN (offgoing nurse). Report included the following information Nurse Handoff Report, Index, Intake/Output, MAR, Recent Results, and Cardiac Rhythm NSR .

## 2023-06-01 ENCOUNTER — HOSPITAL ENCOUNTER (OUTPATIENT)
Facility: HOSPITAL | Age: 73
Discharge: HOME | End: 2023-06-01
Payer: MEDICARE

## 2023-06-01 DIAGNOSIS — I70.412 ATHEROSCLEROSIS OF AUTOLOGOUS VEIN BYPASS GRAFT OF LEFT LOWER EXTREMITY WITH INTERMITTENT CLAUDICATION (HCC): ICD-10-CM

## 2023-06-01 PROCEDURE — 75635 CT ANGIO ABDOMINAL ARTERIES: CPT

## 2023-06-01 PROCEDURE — 6360000004 HC RX CONTRAST MEDICATION: Performed by: SURGERY

## 2023-06-01 RX ADMIN — IOPAMIDOL 100 ML: 755 INJECTION, SOLUTION INTRAVENOUS at 13:33

## 2023-07-09 NOTE — PROGRESS NOTES
Tells me immediately after surgery she felt 100% better. She ate a full dinner last night and breakfast this morning. She is very happy to have this relief.     Her vital signs are stable  Groin and thigh incision are clean with wound VAC in place, good seal  Revascularization is patent     Await ordered results  Continue antibiotics  Wounds are open in fact nail   Will revise and close versus cadaver bypass on Monday Surgery/Other

## 2023-07-12 ENCOUNTER — HOSPITAL ENCOUNTER (OUTPATIENT)
Facility: HOSPITAL | Age: 73
Discharge: HOME | End: 2023-07-15
Payer: MEDICARE

## 2023-07-12 DIAGNOSIS — I70.412: ICD-10-CM

## 2023-07-12 LAB
ANION GAP SERPL CALC-SCNC: 5 MMOL/L (ref 3–18)
BASOPHILS # BLD: 0 K/UL (ref 0–0.1)
BASOPHILS NFR BLD: 0 % (ref 0–2)
BUN SERPL-MCNC: 14 MG/DL (ref 7–18)
BUN/CREAT SERPL: 16 (ref 12–20)
CALCIUM SERPL-MCNC: 9.5 MG/DL (ref 8.5–10.1)
CHLORIDE SERPL-SCNC: 112 MMOL/L (ref 100–111)
CO2 SERPL-SCNC: 26 MMOL/L (ref 21–32)
CREAT SERPL-MCNC: 0.86 MG/DL (ref 0.6–1.3)
DIFFERENTIAL METHOD BLD: ABNORMAL
EOSINOPHIL # BLD: 0.2 K/UL (ref 0–0.4)
EOSINOPHIL NFR BLD: 3 % (ref 0–5)
ERYTHROCYTE [DISTWIDTH] IN BLOOD BY AUTOMATED COUNT: 16.7 % (ref 11.6–14.5)
GLUCOSE SERPL-MCNC: 135 MG/DL (ref 74–99)
HCT VFR BLD AUTO: 33 % (ref 35–45)
HGB BLD-MCNC: 10.3 G/DL (ref 12–16)
IMM GRANULOCYTES # BLD AUTO: 0 K/UL (ref 0–0.04)
IMM GRANULOCYTES NFR BLD AUTO: 0 % (ref 0–0.5)
LYMPHOCYTES # BLD: 3.1 K/UL (ref 0.9–3.6)
LYMPHOCYTES NFR BLD: 33 % (ref 21–52)
MCH RBC QN AUTO: 26.8 PG (ref 24–34)
MCHC RBC AUTO-ENTMCNC: 31.2 G/DL (ref 31–37)
MCV RBC AUTO: 85.7 FL (ref 78–100)
MONOCYTES # BLD: 0.8 K/UL (ref 0.05–1.2)
MONOCYTES NFR BLD: 8 % (ref 3–10)
NEUTS SEG # BLD: 5.3 K/UL (ref 1.8–8)
NEUTS SEG NFR BLD: 56 % (ref 40–73)
NRBC # BLD: 0 K/UL (ref 0–0.01)
NRBC BLD-RTO: 0 PER 100 WBC
PLATELET # BLD AUTO: 391 K/UL (ref 135–420)
PMV BLD AUTO: 10.1 FL (ref 9.2–11.8)
POTASSIUM SERPL-SCNC: 3.8 MMOL/L (ref 3.5–5.5)
RBC # BLD AUTO: 3.85 M/UL (ref 4.2–5.3)
SODIUM SERPL-SCNC: 143 MMOL/L (ref 136–145)
WBC # BLD AUTO: 9.5 K/UL (ref 4.6–13.2)

## 2023-07-12 PROCEDURE — 85025 COMPLETE CBC W/AUTO DIFF WBC: CPT

## 2023-07-12 PROCEDURE — 80048 BASIC METABOLIC PNL TOTAL CA: CPT

## 2023-07-12 PROCEDURE — 36415 COLL VENOUS BLD VENIPUNCTURE: CPT

## 2023-07-21 NOTE — PROGRESS NOTES
1. Have you been to an emergency room or urgent care clinic since your last visit? no    Hospitalized since your last visit? If yes, where, when, and reason for visit? no  2. Have you seen or consulted any other health care providers outside of the [facility] since your last visit including any procedures, health maintenance items.  If yes, where, when and reason for visit? no
Cleansed wound to right flank with NS and then packed wound with NS wet to dry dressing and covered with ABD and secured with tape. Patient tolerated well. Verbal order given to Trina [facility] nurse for NS wet to dry dressing for tomorrow and then patient will have procedure on Thursday.
Subjective:      Sathish Sood is a 77 y.o. female who presents today for post op visit, status post I&D of right flank wound. She has a open wound wound which is located on the right flank. Current symptoms: no fevers but some newer erythema below site. Wound still has serous drainage but seems to be decreasing. Objective:     Visit Vitals    /60 (BP 1 Location: Left arm, BP Patient Position: Sitting)    Pulse 84    Resp 17    Ht 5' 5\" (1.651 m)    Wt 132 lb (59.9 kg)    BMI 21.97 kg/m2       Wound:   wound had been approximated with sutures but it reopened when removed. Can still see nub of old bypass which appears to be where drainage and track are. Seen by dr Elizabeth Bran as well who recommends another debridement     Assessment:     Wound check/discharge visit. Plan:       ICD-10-CM ICD-9-CM    1. Postoperative wound infection, subsequent encounter T81. 4XXD V58.89 CBC WITH AUTOMATED DIFF     998.59 CULTURE, WOUND W GRAM STAIN     Orders Placed This Encounter    CULTURE, WOUND W GRAM STAIN    CBC WITH AUTOMATED DIFF     Debridement this Thursday  Get cbc to see if any elevated white count  New wound cultures as well    Follow-up Disposition: Not on [place]    Portions of this note have been entered using voice recognition software.
No

## 2023-07-28 ENCOUNTER — APPOINTMENT (OUTPATIENT)
Facility: HOSPITAL | Age: 73
End: 2023-07-28
Payer: MEDICARE

## 2023-08-08 ENCOUNTER — HOSPITAL ENCOUNTER (OUTPATIENT)
Facility: HOSPITAL | Age: 73
Discharge: HOME | End: 2023-08-11
Attending: INTERNAL MEDICINE
Payer: MEDICARE

## 2023-08-08 DIAGNOSIS — R91.1 LUNG NODULE: ICD-10-CM

## 2023-08-08 PROCEDURE — 71250 CT THORAX DX C-: CPT

## 2023-08-14 DIAGNOSIS — R91.8 LUNG NODULES: Primary | ICD-10-CM

## 2023-08-23 ENCOUNTER — OFFICE VISIT (OUTPATIENT)
Age: 73
End: 2023-08-23
Payer: MEDICARE

## 2023-08-23 VITALS
SYSTOLIC BLOOD PRESSURE: 146 MMHG | WEIGHT: 121.6 LBS | HEART RATE: 83 BPM | TEMPERATURE: 97.2 F | HEIGHT: 65 IN | BODY MASS INDEX: 20.26 KG/M2 | DIASTOLIC BLOOD PRESSURE: 62 MMHG | OXYGEN SATURATION: 96 % | RESPIRATION RATE: 18 BRPM

## 2023-08-23 DIAGNOSIS — R91.8 LUNG NODULES: ICD-10-CM

## 2023-08-23 DIAGNOSIS — J43.2 CENTRILOBULAR EMPHYSEMA (HCC): Primary | ICD-10-CM

## 2023-08-23 DIAGNOSIS — F17.200 TOBACCO DEPENDENCE: ICD-10-CM

## 2023-08-23 PROCEDURE — G8420 CALC BMI NORM PARAMETERS: HCPCS | Performed by: INTERNAL MEDICINE

## 2023-08-23 PROCEDURE — 1123F ACP DISCUSS/DSCN MKR DOCD: CPT | Performed by: INTERNAL MEDICINE

## 2023-08-23 PROCEDURE — 94060 EVALUATION OF WHEEZING: CPT | Performed by: INTERNAL MEDICINE

## 2023-08-23 PROCEDURE — 1090F PRES/ABSN URINE INCON ASSESS: CPT | Performed by: INTERNAL MEDICINE

## 2023-08-23 PROCEDURE — 3017F COLORECTAL CA SCREEN DOC REV: CPT | Performed by: INTERNAL MEDICINE

## 2023-08-23 PROCEDURE — G8427 DOCREV CUR MEDS BY ELIG CLIN: HCPCS | Performed by: INTERNAL MEDICINE

## 2023-08-23 PROCEDURE — 3077F SYST BP >= 140 MM HG: CPT | Performed by: INTERNAL MEDICINE

## 2023-08-23 PROCEDURE — 4004F PT TOBACCO SCREEN RCVD TLK: CPT | Performed by: INTERNAL MEDICINE

## 2023-08-23 PROCEDURE — 99214 OFFICE O/P EST MOD 30 MIN: CPT | Performed by: INTERNAL MEDICINE

## 2023-08-23 PROCEDURE — 3078F DIAST BP <80 MM HG: CPT | Performed by: INTERNAL MEDICINE

## 2023-08-23 PROCEDURE — 3023F SPIROM DOC REV: CPT | Performed by: INTERNAL MEDICINE

## 2023-08-23 PROCEDURE — G8400 PT W/DXA NO RESULTS DOC: HCPCS | Performed by: INTERNAL MEDICINE

## 2023-08-23 RX ORDER — FLUTICASONE FUROATE, UMECLIDINIUM BROMIDE AND VILANTEROL TRIFENATATE 100; 62.5; 25 UG/1; UG/1; UG/1
1 POWDER RESPIRATORY (INHALATION) DAILY
Qty: 3 EACH | Refills: 3 | Status: SHIPPED | OUTPATIENT
Start: 2023-08-23

## 2023-08-23 ASSESSMENT — ENCOUNTER SYMPTOMS
COUGH: 0
PHOTOPHOBIA: 0
VOICE CHANGE: 0
CONSTIPATION: 0
APNEA: 0
BACK PAIN: 0
CHOKING: 0
DIARRHEA: 0
VOMITING: 0
NAUSEA: 0
STRIDOR: 0
EYE PAIN: 0
BLOOD IN STOOL: 0
ABDOMINAL PAIN: 0
CHEST TIGHTNESS: 0
SHORTNESS OF BREATH: 1
TROUBLE SWALLOWING: 0
EYE DISCHARGE: 0
EYE REDNESS: 0
COLOR CHANGE: 0
EYE ITCHING: 0

## 2023-08-23 NOTE — PROGRESS NOTES
Citlali Choi presents today for   Chief Complaint   Patient presents with    Follow-up    COPD     CT 8/11/23       Is someone accompanying this pt? no    Is the patient using any DME equipment during [address]? yes    -D[employer]    Depression Screening:    PHQ-9 Questionaire 4/13/2023   Little interest or pleasure in doing things 0   Feeling down, depressed, or hopeless 0   PHQ-9 Total Score 0       Learning Needs Questionnaire:     No question data found. Fall Risk:     Fall Risk 4/13/2023   2 or more falls in past year? no   Fall with injury in past year? no        Abuse Screening: AMB Abuse Screening 4/13/2023   Do you ever feel afraid of your partner? N   Are you in a relationship with someone who physically or mentally threatens you? N   Is it safe for you to go home? Y         Coordination of Care:    1. Have you been to the ER, urgent care clinic since your last visit? Hospitalized since your last visit? No    2. Have you seen or consulted any other health care providers outside of the [address] [place] [address] since your last visit? Include any pap smears or colon screening. Elis Cordova  [facility]    Medication list has been update per patient.
included portions of the liver and spleen are unremarkable. Jordon Sinclair      Osseous Structures: Unremarkable for age. Impression  No previously noted nodular foci show interval increase  There are several new border 5 mm semisolid foci. Lung RADS 3:6 month follow-up  low-dose CT recommended    . All CT scans at this facility are performed using dose optimization technique as  appropriate to the performed exam, to include automated exposure control,  adjustment of the mA and/or kV according to patient's size (Including  appropriate matching for site-specific examinations), or use of iterative  reconstruction technique. An electronic signature was used to authenticate this note.     --Ember Nuñez MD

## 2023-09-27 ENCOUNTER — OFFICE VISIT (OUTPATIENT)
Age: 73
End: 2023-09-27
Payer: MEDICARE

## 2023-09-27 VITALS — HEIGHT: 65 IN | BODY MASS INDEX: 20.33 KG/M2 | WEIGHT: 122 LBS

## 2023-09-27 DIAGNOSIS — M70.61 TROCHANTERIC BURSITIS OF RIGHT HIP: ICD-10-CM

## 2023-09-27 DIAGNOSIS — M25.551 PAIN IN RIGHT HIP: Primary | ICD-10-CM

## 2023-09-27 PROCEDURE — G8400 PT W/DXA NO RESULTS DOC: HCPCS | Performed by: PHYSICIAN ASSISTANT

## 2023-09-27 PROCEDURE — 73502 X-RAY EXAM HIP UNI 2-3 VIEWS: CPT | Performed by: PHYSICIAN ASSISTANT

## 2023-09-27 PROCEDURE — 3017F COLORECTAL CA SCREEN DOC REV: CPT | Performed by: PHYSICIAN ASSISTANT

## 2023-09-27 PROCEDURE — G8427 DOCREV CUR MEDS BY ELIG CLIN: HCPCS | Performed by: PHYSICIAN ASSISTANT

## 2023-09-27 PROCEDURE — 20611 DRAIN/INJ JOINT/BURSA W/US: CPT | Performed by: PHYSICIAN ASSISTANT

## 2023-09-27 PROCEDURE — 4004F PT TOBACCO SCREEN RCVD TLK: CPT | Performed by: PHYSICIAN ASSISTANT

## 2023-09-27 PROCEDURE — 1090F PRES/ABSN URINE INCON ASSESS: CPT | Performed by: PHYSICIAN ASSISTANT

## 2023-09-27 PROCEDURE — 99204 OFFICE O/P NEW MOD 45 MIN: CPT | Performed by: PHYSICIAN ASSISTANT

## 2023-09-27 PROCEDURE — 1123F ACP DISCUSS/DSCN MKR DOCD: CPT | Performed by: PHYSICIAN ASSISTANT

## 2023-09-27 PROCEDURE — G8420 CALC BMI NORM PARAMETERS: HCPCS | Performed by: PHYSICIAN ASSISTANT

## 2023-09-27 RX ORDER — DICLOFENAC EPOLAMINE 0.01 G/1
1 SYSTEM TOPICAL 2 TIMES DAILY
Qty: 60 PATCH | Refills: 2 | Status: SHIPPED | OUTPATIENT
Start: 2023-09-27

## 2023-09-27 RX ORDER — BETAMETHASONE SODIUM PHOSPHATE AND BETAMETHASONE ACETATE 3; 3 MG/ML; MG/ML
6 INJECTION, SUSPENSION INTRA-ARTICULAR; INTRALESIONAL; INTRAMUSCULAR; SOFT TISSUE ONCE
Status: COMPLETED | OUTPATIENT
Start: 2023-09-27 | End: 2023-09-27

## 2023-09-27 RX ADMIN — BETAMETHASONE SODIUM PHOSPHATE AND BETAMETHASONE ACETATE 6 MG: 3; 3 INJECTION, SUSPENSION INTRA-ARTICULAR; INTRALESIONAL; INTRAMUSCULAR; SOFT TISSUE at 09:17

## 2023-10-03 ENCOUNTER — CLINICAL DOCUMENTATION (OUTPATIENT)
Facility: HOSPITAL | Age: 73
End: 2023-10-03

## 2023-10-03 RX ORDER — NYSTATIN 100000 U/G
CREAM TOPICAL
Qty: 30 G | Refills: 2 | Status: SHIPPED | OUTPATIENT
Start: 2023-10-03

## 2023-10-03 RX ORDER — SULFAMETHOXAZOLE AND TRIMETHOPRIM 400; 80 MG/1; MG/1
1 TABLET ORAL DAILY
Qty: 30 TABLET | Refills: 5 | Status: SHIPPED | OUTPATIENT
Start: 2023-10-03 | End: 2023-11-02

## 2023-10-03 NOTE — PROGRESS NOTES
Ms. Alysia Mcclure is here today with her  for follow-up of recurrent vascular graft infections. Overall since last being seen she has been feeling fairly well. She notes that she was having hematuria and has been evaluated by heme-onc as well as urology. She has an upcoming urology appointment for a planned cystoscopy. She notes that she thinks her hematuria had been related to the levofloxacin. She stopped the levofloxacin about 1 month ago and her hematuria had resolved 1 week later. Overall she is feeling quite well and regaining her strength and functionality. She is hoping to restart her Remicade although she has not had any recent flares of her UC. No fevers or chills. No new wounds or other areas of concern. Plan:  Hold on levofloxacindue to concern for hematuria  Will start bactrim SS daily  - anticipate life long supression.  - discussed severe potential side effects including TENS, hemolysis/anemia and Renal failure. Patient and  understand and repeat back need for monitoring. Needs BUN/Cr and CBC every 3-4 months while on chronic Abx   - patient states her PCP and/or GI physicians usually order labs and prefers they order them  Refill nystatin cream.     Ok to restart Remicade. RTC in 3 months or sooner if needed.      Discussed with Dr. Stephen Glynn  Please fax copy of note to her PCP

## 2023-10-25 ENCOUNTER — HOSPITAL ENCOUNTER (OUTPATIENT)
Facility: HOSPITAL | Age: 73
Setting detail: INFUSION SERIES
Discharge: HOME | End: 2023-10-28
Payer: MEDICARE

## 2023-10-25 VITALS
TEMPERATURE: 97.4 F | DIASTOLIC BLOOD PRESSURE: 71 MMHG | SYSTOLIC BLOOD PRESSURE: 140 MMHG | OXYGEN SATURATION: 98 % | RESPIRATION RATE: 16 BRPM | HEART RATE: 77 BPM

## 2023-10-25 DIAGNOSIS — E83.42 HYPOMAGNESEMIA: ICD-10-CM

## 2023-10-25 DIAGNOSIS — M81.0 POSTMENOPAUSAL OSTEOPOROSIS: ICD-10-CM

## 2023-10-25 LAB
ALBUMIN SERPL-MCNC: 3.5 G/DL (ref 3.4–5)
ALBUMIN/GLOB SERPL: 0.8 (ref 0.8–1.7)
ALP SERPL-CCNC: 132 U/L (ref 45–117)
ALT SERPL-CCNC: 21 U/L (ref 13–56)
ANION GAP SERPL CALC-SCNC: 3 MMOL/L (ref 3–18)
AST SERPL-CCNC: 13 U/L (ref 10–38)
BILIRUB SERPL-MCNC: 0.3 MG/DL (ref 0.2–1)
BUN SERPL-MCNC: 11 MG/DL (ref 7–18)
BUN/CREAT SERPL: 13 (ref 12–20)
CALCIUM SERPL-MCNC: 10.7 MG/DL (ref 8.5–10.1)
CHLORIDE SERPL-SCNC: 107 MMOL/L (ref 100–111)
CO2 SERPL-SCNC: 29 MMOL/L (ref 21–32)
CREAT SERPL-MCNC: 0.86 MG/DL (ref 0.6–1.3)
GLOBULIN SER CALC-MCNC: 4.3 G/DL (ref 2–4)
GLUCOSE SERPL-MCNC: 87 MG/DL (ref 74–99)
MAGNESIUM SERPL-MCNC: 1.8 MG/DL (ref 1.6–2.6)
PHOSPHATE SERPL-MCNC: 3.2 MG/DL (ref 2.5–4.9)
POTASSIUM SERPL-SCNC: 4.7 MMOL/L (ref 3.5–5.5)
PROT SERPL-MCNC: 7.8 G/DL (ref 6.4–8.2)
SODIUM SERPL-SCNC: 139 MMOL/L (ref 136–145)

## 2023-10-25 PROCEDURE — 84100 ASSAY OF PHOSPHORUS: CPT

## 2023-10-25 PROCEDURE — 80053 COMPREHEN METABOLIC PANEL: CPT

## 2023-10-25 PROCEDURE — 36415 COLL VENOUS BLD VENIPUNCTURE: CPT

## 2023-10-25 PROCEDURE — 83735 ASSAY OF MAGNESIUM: CPT

## 2023-10-27 ENCOUNTER — HOSPITAL ENCOUNTER (OUTPATIENT)
Facility: HOSPITAL | Age: 73
Setting detail: INFUSION SERIES
End: 2023-10-27
Payer: MEDICARE

## 2023-10-27 VITALS
HEART RATE: 87 BPM | DIASTOLIC BLOOD PRESSURE: 66 MMHG | RESPIRATION RATE: 16 BRPM | OXYGEN SATURATION: 97 % | SYSTOLIC BLOOD PRESSURE: 145 MMHG | TEMPERATURE: 97.7 F

## 2023-10-27 DIAGNOSIS — E83.42 HYPOMAGNESEMIA: Primary | ICD-10-CM

## 2023-10-27 DIAGNOSIS — M81.0 POSTMENOPAUSAL OSTEOPOROSIS: ICD-10-CM

## 2023-10-27 PROCEDURE — 6360000002 HC RX W HCPCS: Performed by: NURSE PRACTITIONER

## 2023-10-27 PROCEDURE — 96372 THER/PROPH/DIAG INJ SC/IM: CPT

## 2023-10-27 RX ORDER — SODIUM CHLORIDE 9 MG/ML
INJECTION, SOLUTION INTRAVENOUS CONTINUOUS
OUTPATIENT
Start: 2024-04-24

## 2023-10-27 RX ORDER — ONDANSETRON 2 MG/ML
8 INJECTION INTRAMUSCULAR; INTRAVENOUS
OUTPATIENT
Start: 2024-04-24

## 2023-10-27 RX ORDER — DIPHENHYDRAMINE HYDROCHLORIDE 50 MG/ML
50 INJECTION INTRAMUSCULAR; INTRAVENOUS
OUTPATIENT
Start: 2024-04-24

## 2023-10-27 RX ORDER — EPINEPHRINE 1 MG/ML
0.3 INJECTION, SOLUTION, CONCENTRATE INTRAVENOUS PRN
OUTPATIENT
Start: 2024-04-24

## 2023-10-27 RX ORDER — ALBUTEROL SULFATE 90 UG/1
4 AEROSOL, METERED RESPIRATORY (INHALATION) PRN
OUTPATIENT
Start: 2024-04-24

## 2023-10-27 RX ORDER — ACETAMINOPHEN 325 MG/1
650 TABLET ORAL
OUTPATIENT
Start: 2024-04-24

## 2023-10-27 RX ADMIN — DENOSUMAB 60 MG: 60 INJECTION SUBCUTANEOUS at 11:28

## 2023-10-27 ASSESSMENT — PAIN SCALES - GENERAL: PAINLEVEL_OUTOF10: 0

## 2024-02-19 ENCOUNTER — APPOINTMENT (OUTPATIENT)
Facility: HOSPITAL | Age: 74
End: 2024-02-19
Payer: MEDICARE

## 2024-02-19 ENCOUNTER — HOSPITAL ENCOUNTER (INPATIENT)
Facility: HOSPITAL | Age: 74
LOS: 4 days | Discharge: HOME | End: 2024-02-23
Attending: INTERNAL MEDICINE | Admitting: INTERNAL MEDICINE
Payer: MEDICARE

## 2024-02-19 DIAGNOSIS — I70.202 FEMORAL ARTERY OCCLUSION, LEFT (HCC): Primary | ICD-10-CM

## 2024-02-19 DIAGNOSIS — I70.90 ARTERIAL OCCLUSION: ICD-10-CM

## 2024-02-19 DIAGNOSIS — I99.8 ACUTE LOWER LIMB ISCHEMIA: ICD-10-CM

## 2024-02-19 LAB
ALBUMIN SERPL-MCNC: 3.7 G/DL (ref 3.4–5)
ALBUMIN/GLOB SERPL: 0.9 (ref 0.8–1.7)
ALP SERPL-CCNC: 66 U/L (ref 45–117)
ALT SERPL-CCNC: 25 U/L (ref 13–56)
ANION GAP SERPL CALC-SCNC: 5 MMOL/L (ref 3–18)
APTT PPP: 24.8 SEC (ref 23–36.4)
APTT PPP: 30.1 SEC (ref 23–36.4)
AST SERPL-CCNC: 46 U/L (ref 10–38)
BASOPHILS # BLD: 0 K/UL (ref 0–0.1)
BASOPHILS NFR BLD: 0 % (ref 0–2)
BILIRUB SERPL-MCNC: 0.5 MG/DL (ref 0.2–1)
BUN SERPL-MCNC: 9 MG/DL (ref 7–18)
BUN/CREAT SERPL: 11 (ref 12–20)
CALCIUM SERPL-MCNC: 10.4 MG/DL (ref 8.5–10.1)
CHLORIDE SERPL-SCNC: 105 MMOL/L (ref 100–111)
CO2 SERPL-SCNC: 28 MMOL/L (ref 21–32)
CREAT SERPL-MCNC: 0.84 MG/DL (ref 0.6–1.3)
DIFFERENTIAL METHOD BLD: ABNORMAL
EOSINOPHIL # BLD: 0.1 K/UL (ref 0–0.4)
EOSINOPHIL NFR BLD: 1 % (ref 0–5)
ERYTHROCYTE [DISTWIDTH] IN BLOOD BY AUTOMATED COUNT: 17.2 % (ref 11.6–14.5)
GLOBULIN SER CALC-MCNC: 4.2 G/DL (ref 2–4)
GLUCOSE SERPL-MCNC: 89 MG/DL (ref 74–99)
HCT VFR BLD AUTO: 36.2 % (ref 35–45)
HGB BLD-MCNC: 12 G/DL (ref 12–16)
IMM GRANULOCYTES # BLD AUTO: 0 K/UL (ref 0–0.04)
IMM GRANULOCYTES NFR BLD AUTO: 0 % (ref 0–0.5)
INR PPP: 1.1 (ref 0.9–1.1)
LYMPHOCYTES # BLD: 3.6 K/UL (ref 0.9–3.6)
LYMPHOCYTES NFR BLD: 35 % (ref 21–52)
MCH RBC QN AUTO: 29.1 PG (ref 24–34)
MCHC RBC AUTO-ENTMCNC: 33.1 G/DL (ref 31–37)
MCV RBC AUTO: 87.7 FL (ref 78–100)
MONOCYTES # BLD: 0.8 K/UL (ref 0.05–1.2)
MONOCYTES NFR BLD: 8 % (ref 3–10)
NEUTS SEG # BLD: 5.9 K/UL (ref 1.8–8)
NEUTS SEG NFR BLD: 56 % (ref 40–73)
NRBC # BLD: 0 K/UL (ref 0–0.01)
NRBC BLD-RTO: 0 PER 100 WBC
PLATELET # BLD AUTO: 247 K/UL (ref 135–420)
PMV BLD AUTO: 10.1 FL (ref 9.2–11.8)
POTASSIUM SERPL-SCNC: 4.6 MMOL/L (ref 3.5–5.5)
PROT SERPL-MCNC: 7.9 G/DL (ref 6.4–8.2)
PROTHROMBIN TIME: 13.9 SEC (ref 11.9–14.7)
RBC # BLD AUTO: 4.13 M/UL (ref 4.2–5.3)
SODIUM SERPL-SCNC: 138 MMOL/L (ref 136–145)
WBC # BLD AUTO: 10.5 K/UL (ref 4.6–13.2)

## 2024-02-19 PROCEDURE — 99285 EMERGENCY DEPT VISIT HI MDM: CPT

## 2024-02-19 PROCEDURE — 85025 COMPLETE CBC W/AUTO DIFF WBC: CPT

## 2024-02-19 PROCEDURE — 86901 BLOOD TYPING SEROLOGIC RH(D): CPT

## 2024-02-19 PROCEDURE — 86923 COMPATIBILITY TEST ELECTRIC: CPT

## 2024-02-19 PROCEDURE — 85610 PROTHROMBIN TIME: CPT

## 2024-02-19 PROCEDURE — 85730 THROMBOPLASTIN TIME PARTIAL: CPT

## 2024-02-19 PROCEDURE — 75635 CT ANGIO ABDOMINAL ARTERIES: CPT

## 2024-02-19 PROCEDURE — 99223 1ST HOSP IP/OBS HIGH 75: CPT | Performed by: INTERNAL MEDICINE

## 2024-02-19 PROCEDURE — 6360000004 HC RX CONTRAST MEDICATION: Performed by: PHYSICIAN ASSISTANT

## 2024-02-19 PROCEDURE — 71045 X-RAY EXAM CHEST 1 VIEW: CPT

## 2024-02-19 PROCEDURE — 86900 BLOOD TYPING SEROLOGIC ABO: CPT

## 2024-02-19 PROCEDURE — 86850 RBC ANTIBODY SCREEN: CPT

## 2024-02-19 PROCEDURE — 6360000002 HC RX W HCPCS: Performed by: PHYSICIAN ASSISTANT

## 2024-02-19 PROCEDURE — 1100000000 HC RM PRIVATE

## 2024-02-19 PROCEDURE — 80053 COMPREHEN METABOLIC PANEL: CPT

## 2024-02-19 RX ORDER — CINACALCET 30 MG/1
30 TABLET, FILM COATED ORAL 2 TIMES DAILY
Status: DISCONTINUED | OUTPATIENT
Start: 2024-02-20 | End: 2024-02-23 | Stop reason: HOSPADM

## 2024-02-19 RX ORDER — SODIUM CHLORIDE 0.9 % (FLUSH) 0.9 %
5-40 SYRINGE (ML) INJECTION EVERY 12 HOURS SCHEDULED
Status: DISCONTINUED | OUTPATIENT
Start: 2024-02-19 | End: 2024-02-23 | Stop reason: HOSPADM

## 2024-02-19 RX ORDER — PREGABALIN 50 MG/1
100 CAPSULE ORAL DAILY
Status: DISCONTINUED | OUTPATIENT
Start: 2024-02-20 | End: 2024-02-23 | Stop reason: HOSPADM

## 2024-02-19 RX ORDER — SULFAMETHOXAZOLE AND TRIMETHOPRIM 400; 80 MG/1; MG/1
0.5 TABLET ORAL DAILY
Status: DISCONTINUED | OUTPATIENT
Start: 2024-02-20 | End: 2024-02-20 | Stop reason: DRUGHIGH

## 2024-02-19 RX ORDER — SODIUM CHLORIDE 0.9 % (FLUSH) 0.9 %
5-40 SYRINGE (ML) INJECTION PRN
Status: DISCONTINUED | OUTPATIENT
Start: 2024-02-19 | End: 2024-02-23 | Stop reason: HOSPADM

## 2024-02-19 RX ORDER — MAGNESIUM SULFATE IN WATER 40 MG/ML
2000 INJECTION, SOLUTION INTRAVENOUS PRN
Status: DISCONTINUED | OUTPATIENT
Start: 2024-02-19 | End: 2024-02-23 | Stop reason: HOSPADM

## 2024-02-19 RX ORDER — ACETAMINOPHEN 325 MG/1
650 TABLET ORAL EVERY 6 HOURS PRN
Status: DISCONTINUED | OUTPATIENT
Start: 2024-02-19 | End: 2024-02-23 | Stop reason: HOSPADM

## 2024-02-19 RX ORDER — ACETAMINOPHEN 650 MG/1
650 SUPPOSITORY RECTAL EVERY 6 HOURS PRN
Status: DISCONTINUED | OUTPATIENT
Start: 2024-02-19 | End: 2024-02-23 | Stop reason: HOSPADM

## 2024-02-19 RX ORDER — ASPIRIN 81 MG/1
81 TABLET, CHEWABLE ORAL DAILY
Status: DISCONTINUED | OUTPATIENT
Start: 2024-02-20 | End: 2024-02-23 | Stop reason: HOSPADM

## 2024-02-19 RX ORDER — POLYETHYLENE GLYCOL 3350 17 G/17G
17 POWDER, FOR SOLUTION ORAL DAILY PRN
Status: DISCONTINUED | OUTPATIENT
Start: 2024-02-19 | End: 2024-02-23 | Stop reason: HOSPADM

## 2024-02-19 RX ORDER — HEPARIN SODIUM 10000 [USP'U]/100ML
5-30 INJECTION, SOLUTION INTRAVENOUS CONTINUOUS
Status: DISCONTINUED | OUTPATIENT
Start: 2024-02-19 | End: 2024-02-20 | Stop reason: CLARIF

## 2024-02-19 RX ORDER — POTASSIUM CHLORIDE 20 MEQ/1
40 TABLET, EXTENDED RELEASE ORAL PRN
Status: DISCONTINUED | OUTPATIENT
Start: 2024-02-19 | End: 2024-02-23 | Stop reason: HOSPADM

## 2024-02-19 RX ORDER — SODIUM CHLORIDE 9 MG/ML
INJECTION, SOLUTION INTRAVENOUS PRN
Status: DISCONTINUED | OUTPATIENT
Start: 2024-02-19 | End: 2024-02-23 | Stop reason: HOSPADM

## 2024-02-19 RX ORDER — POTASSIUM CHLORIDE 7.45 MG/ML
10 INJECTION INTRAVENOUS PRN
Status: DISCONTINUED | OUTPATIENT
Start: 2024-02-19 | End: 2024-02-23 | Stop reason: HOSPADM

## 2024-02-19 RX ORDER — ALBUTEROL SULFATE 2.5 MG/3ML
2.5 SOLUTION RESPIRATORY (INHALATION) EVERY 6 HOURS PRN
Status: DISCONTINUED | OUTPATIENT
Start: 2024-02-19 | End: 2024-02-23 | Stop reason: HOSPADM

## 2024-02-19 RX ORDER — ALBUTEROL SULFATE 90 UG/1
2 AEROSOL, METERED RESPIRATORY (INHALATION) EVERY 6 HOURS PRN
Status: DISCONTINUED | OUTPATIENT
Start: 2024-02-19 | End: 2024-02-19 | Stop reason: CLARIF

## 2024-02-19 RX ADMIN — IOPAMIDOL 100 ML: 755 INJECTION, SOLUTION INTRAVENOUS at 19:27

## 2024-02-19 RX ADMIN — HEPARIN SODIUM AND DEXTROSE 18 UNITS/KG/HR: 10000; 5 INJECTION INTRAVENOUS at 22:17

## 2024-02-19 ASSESSMENT — ENCOUNTER SYMPTOMS
RHINORRHEA: 0
EYE REDNESS: 0
STRIDOR: 0
WHEEZING: 0
ABDOMINAL PAIN: 0
BACK PAIN: 0
EYE DISCHARGE: 0
SHORTNESS OF BREATH: 0
COUGH: 0
VOMITING: 0
NAUSEA: 0
SORE THROAT: 0

## 2024-02-19 NOTE — ED TRIAGE NOTES
PATIENT PRESENTED TO THE EMERGENCY DEPT AFTER BEING TOLD BY PROVIDER AFTER DOING A ULTRASOUND THAT THERE WAS NO BLOOD FLOW FROM THE PATIENT FOOT TO GROIN.PATIENT ADMITS TO HAVING CRAMP AND NUMBNESS TO LEFT LEG ON SATURDAY.       PATIENT ALERT AND ORIENTED X 4, PATIENT BREATHES FREELY ON ROOM AIR IN NIL CARDIOPULMOANRY DISTRESS

## 2024-02-20 ENCOUNTER — ANESTHESIA EVENT (OUTPATIENT)
Dept: CARDIOTHORACIC SURGERY | Facility: HOSPITAL | Age: 74
End: 2024-02-20
Payer: MEDICARE

## 2024-02-20 ENCOUNTER — APPOINTMENT (OUTPATIENT)
Facility: HOSPITAL | Age: 74
End: 2024-02-20
Payer: MEDICARE

## 2024-02-20 ENCOUNTER — ANESTHESIA (OUTPATIENT)
Dept: CARDIOTHORACIC SURGERY | Facility: HOSPITAL | Age: 74
End: 2024-02-20
Payer: MEDICARE

## 2024-02-20 LAB
APTT PPP: 48.7 SEC (ref 23–36.4)
APTT PPP: >180 SEC (ref 23–36.4)
EKG ATRIAL RATE: 71 BPM
EKG DIAGNOSIS: NORMAL
EKG P AXIS: 70 DEGREES
EKG P-R INTERVAL: 176 MS
EKG Q-T INTERVAL: 396 MS
EKG QRS DURATION: 98 MS
EKG QTC CALCULATION (BAZETT): 430 MS
EKG R AXIS: 63 DEGREES
EKG T AXIS: 50 DEGREES
EKG VENTRICULAR RATE: 71 BPM
HISTORY CHECK: NORMAL

## 2024-02-20 PROCEDURE — 3700000001 HC ADD 15 MINUTES (ANESTHESIA): Performed by: SURGERY

## 2024-02-20 PROCEDURE — 04UL0JZ SUPPLEMENT LEFT FEMORAL ARTERY WITH SYNTHETIC SUBSTITUTE, OPEN APPROACH: ICD-10-PCS | Performed by: SURGERY

## 2024-02-20 PROCEDURE — 2709999900 HC NON-CHARGEABLE SUPPLY: Performed by: SURGERY

## 2024-02-20 PROCEDURE — 2580000003 HC RX 258: Performed by: INTERNAL MEDICINE

## 2024-02-20 PROCEDURE — 2500000003 HC RX 250 WO HCPCS: Performed by: ANESTHESIOLOGY

## 2024-02-20 PROCEDURE — 02HV33Z INSERTION OF INFUSION DEVICE INTO SUPERIOR VENA CAVA, PERCUTANEOUS APPROACH: ICD-10-PCS | Performed by: ANESTHESIOLOGY

## 2024-02-20 PROCEDURE — 04CL0ZZ EXTIRPATION OF MATTER FROM LEFT FEMORAL ARTERY, OPEN APPROACH: ICD-10-PCS | Performed by: SURGERY

## 2024-02-20 PROCEDURE — 2580000003 HC RX 258: Performed by: SURGERY

## 2024-02-20 PROCEDURE — 6360000002 HC RX W HCPCS: Performed by: STUDENT IN AN ORGANIZED HEALTH CARE EDUCATION/TRAINING PROGRAM

## 2024-02-20 PROCEDURE — 36415 COLL VENOUS BLD VENIPUNCTURE: CPT

## 2024-02-20 PROCEDURE — 3600000012 HC SURGERY LEVEL 2 ADDTL 15MIN: Performed by: SURGERY

## 2024-02-20 PROCEDURE — 3700000000 HC ANESTHESIA ATTENDED CARE: Performed by: SURGERY

## 2024-02-20 PROCEDURE — 3600000002 HC SURGERY LEVEL 2 BASE: Performed by: SURGERY

## 2024-02-20 PROCEDURE — 2140000001 HC CVICU INTERMEDIATE R&B

## 2024-02-20 PROCEDURE — 6360000002 HC RX W HCPCS: Performed by: ANESTHESIOLOGY

## 2024-02-20 PROCEDURE — 2720000010 HC SURG SUPPLY STERILE: Performed by: SURGERY

## 2024-02-20 PROCEDURE — 6360000002 HC RX W HCPCS: Performed by: SURGERY

## 2024-02-20 PROCEDURE — 99232 SBSQ HOSP IP/OBS MODERATE 35: CPT | Performed by: STUDENT IN AN ORGANIZED HEALTH CARE EDUCATION/TRAINING PROGRAM

## 2024-02-20 PROCEDURE — 6360000002 HC RX W HCPCS: Performed by: NURSE ANESTHETIST, CERTIFIED REGISTERED

## 2024-02-20 PROCEDURE — 6370000000 HC RX 637 (ALT 250 FOR IP): Performed by: INTERNAL MEDICINE

## 2024-02-20 PROCEDURE — A4217 STERILE WATER/SALINE, 500 ML: HCPCS | Performed by: SURGERY

## 2024-02-20 PROCEDURE — 2580000003 HC RX 258: Performed by: ANESTHESIOLOGY

## 2024-02-20 PROCEDURE — 7100000000 HC PACU RECOVERY - FIRST 15 MIN: Performed by: SURGERY

## 2024-02-20 PROCEDURE — 93005 ELECTROCARDIOGRAM TRACING: CPT | Performed by: INTERNAL MEDICINE

## 2024-02-20 PROCEDURE — 85730 THROMBOPLASTIN TIME PARTIAL: CPT

## 2024-02-20 PROCEDURE — 6360000002 HC RX W HCPCS: Performed by: INTERNAL MEDICINE

## 2024-02-20 PROCEDURE — C1757 CATH, THROMBECTOMY/EMBOLECT: HCPCS | Performed by: SURGERY

## 2024-02-20 PROCEDURE — 6370000000 HC RX 637 (ALT 250 FOR IP): Performed by: ANESTHESIOLOGY

## 2024-02-20 PROCEDURE — C1768 GRAFT, VASCULAR: HCPCS | Performed by: SURGERY

## 2024-02-20 PROCEDURE — 7100000001 HC PACU RECOVERY - ADDTL 15 MIN: Performed by: SURGERY

## 2024-02-20 PROCEDURE — 93010 ELECTROCARDIOGRAM REPORT: CPT | Performed by: INTERNAL MEDICINE

## 2024-02-20 DEVICE — XENOSURE BIOLOGIC PATCH, 0.8CM X 8CM, EIFU
Type: IMPLANTABLE DEVICE | Site: LEG | Status: FUNCTIONAL
Brand: XENOSURE BIOLOGIC PATCH

## 2024-02-20 RX ORDER — HEPARIN SODIUM 1000 [USP'U]/ML
INJECTION, SOLUTION INTRAVENOUS; SUBCUTANEOUS PRN
Status: DISCONTINUED | OUTPATIENT
Start: 2024-02-20 | End: 2024-02-20 | Stop reason: SDUPTHER

## 2024-02-20 RX ORDER — ROCURONIUM BROMIDE 10 MG/ML
INJECTION, SOLUTION INTRAVENOUS PRN
Status: DISCONTINUED | OUTPATIENT
Start: 2024-02-20 | End: 2024-02-20 | Stop reason: SDUPTHER

## 2024-02-20 RX ORDER — LIDOCAINE HYDROCHLORIDE 20 MG/ML
INJECTION, SOLUTION EPIDURAL; INFILTRATION; INTRACAUDAL; PERINEURAL PRN
Status: DISCONTINUED | OUTPATIENT
Start: 2024-02-20 | End: 2024-02-20 | Stop reason: SDUPTHER

## 2024-02-20 RX ORDER — SODIUM CHLORIDE, SODIUM LACTATE, POTASSIUM CHLORIDE, CALCIUM CHLORIDE 600; 310; 30; 20 MG/100ML; MG/100ML; MG/100ML; MG/100ML
INJECTION, SOLUTION INTRAVENOUS CONTINUOUS PRN
Status: DISCONTINUED | OUTPATIENT
Start: 2024-02-20 | End: 2024-02-20 | Stop reason: SDUPTHER

## 2024-02-20 RX ORDER — HEPARIN SODIUM 200 [USP'U]/100ML
INJECTION, SOLUTION INTRAVENOUS CONTINUOUS PRN
Status: COMPLETED | OUTPATIENT
Start: 2024-02-20 | End: 2024-02-20

## 2024-02-20 RX ORDER — SODIUM CHLORIDE, SODIUM LACTATE, POTASSIUM CHLORIDE, CALCIUM CHLORIDE 600; 310; 30; 20 MG/100ML; MG/100ML; MG/100ML; MG/100ML
INJECTION, SOLUTION INTRAVENOUS CONTINUOUS
Status: DISCONTINUED | OUTPATIENT
Start: 2024-02-20 | End: 2024-02-23 | Stop reason: HOSPADM

## 2024-02-20 RX ORDER — HEPARIN SODIUM 1000 [USP'U]/ML
80 INJECTION, SOLUTION INTRAVENOUS; SUBCUTANEOUS PRN
Status: DISCONTINUED | OUTPATIENT
Start: 2024-02-20 | End: 2024-02-21

## 2024-02-20 RX ORDER — ONDANSETRON 2 MG/ML
4 INJECTION INTRAMUSCULAR; INTRAVENOUS EVERY 6 HOURS PRN
Status: DISCONTINUED | OUTPATIENT
Start: 2024-02-20 | End: 2024-02-23 | Stop reason: HOSPADM

## 2024-02-20 RX ORDER — PHENYLEPHRINE HCL IN 0.9% NACL 1 MG/10 ML
SYRINGE (ML) INTRAVENOUS PRN
Status: DISCONTINUED | OUTPATIENT
Start: 2024-02-20 | End: 2024-02-20 | Stop reason: SDUPTHER

## 2024-02-20 RX ORDER — NEOSTIGMINE METHYLSULFATE 1 MG/ML
INJECTION, SOLUTION INTRAVENOUS PRN
Status: DISCONTINUED | OUTPATIENT
Start: 2024-02-20 | End: 2024-02-20 | Stop reason: SDUPTHER

## 2024-02-20 RX ORDER — OXYCODONE HYDROCHLORIDE AND ACETAMINOPHEN 5; 325 MG/1; MG/1
1 TABLET ORAL EVERY 4 HOURS PRN
Status: DISCONTINUED | OUTPATIENT
Start: 2024-02-20 | End: 2024-02-23 | Stop reason: HOSPADM

## 2024-02-20 RX ORDER — GLYCOPYRROLATE 0.2 MG/ML
INJECTION INTRAMUSCULAR; INTRAVENOUS PRN
Status: DISCONTINUED | OUTPATIENT
Start: 2024-02-20 | End: 2024-02-20 | Stop reason: SDUPTHER

## 2024-02-20 RX ORDER — HEPARIN SODIUM 1000 [USP'U]/ML
80 INJECTION, SOLUTION INTRAVENOUS; SUBCUTANEOUS ONCE
Status: DISCONTINUED | OUTPATIENT
Start: 2024-02-20 | End: 2024-02-20 | Stop reason: SDUPTHER

## 2024-02-20 RX ORDER — PROPOFOL 10 MG/ML
INJECTION, EMULSION INTRAVENOUS PRN
Status: DISCONTINUED | OUTPATIENT
Start: 2024-02-20 | End: 2024-02-20 | Stop reason: SDUPTHER

## 2024-02-20 RX ORDER — IODIXANOL 320 MG/ML
INJECTION, SOLUTION INTRAVASCULAR
Status: DISPENSED
Start: 2024-02-20 | End: 2024-02-21

## 2024-02-20 RX ORDER — ARFORMOTEROL TARTRATE 15 UG/2ML
15 SOLUTION RESPIRATORY (INHALATION)
Status: DISCONTINUED | OUTPATIENT
Start: 2024-02-20 | End: 2024-02-23 | Stop reason: HOSPADM

## 2024-02-20 RX ORDER — ONDANSETRON 2 MG/ML
INJECTION INTRAMUSCULAR; INTRAVENOUS PRN
Status: DISCONTINUED | OUTPATIENT
Start: 2024-02-20 | End: 2024-02-20 | Stop reason: SDUPTHER

## 2024-02-20 RX ORDER — SCOLOPAMINE TRANSDERMAL SYSTEM 1 MG/1
1 PATCH, EXTENDED RELEASE TRANSDERMAL ONCE
Status: COMPLETED | OUTPATIENT
Start: 2024-02-20 | End: 2024-02-23

## 2024-02-20 RX ORDER — HEPARIN SODIUM 200 [USP'U]/100ML
INJECTION, SOLUTION INTRAVENOUS
Status: DISPENSED
Start: 2024-02-20 | End: 2024-02-21

## 2024-02-20 RX ORDER — SODIUM CHLORIDE 9 MG/ML
INJECTION, SOLUTION INTRAVENOUS PRN
Status: DISCONTINUED | OUTPATIENT
Start: 2024-02-20 | End: 2024-02-23 | Stop reason: HOSPADM

## 2024-02-20 RX ORDER — LABETALOL HYDROCHLORIDE 5 MG/ML
INJECTION, SOLUTION INTRAVENOUS PRN
Status: DISCONTINUED | OUTPATIENT
Start: 2024-02-20 | End: 2024-02-20 | Stop reason: SDUPTHER

## 2024-02-20 RX ORDER — CEFAZOLIN SODIUM 1 G/3ML
INJECTION, POWDER, FOR SOLUTION INTRAMUSCULAR; INTRAVENOUS PRN
Status: DISCONTINUED | OUTPATIENT
Start: 2024-02-20 | End: 2024-02-20 | Stop reason: SDUPTHER

## 2024-02-20 RX ORDER — PROCHLORPERAZINE EDISYLATE 5 MG/ML
5 INJECTION INTRAMUSCULAR; INTRAVENOUS
Status: DISCONTINUED | OUTPATIENT
Start: 2024-02-20 | End: 2024-02-20 | Stop reason: HOSPADM

## 2024-02-20 RX ORDER — BUDESONIDE 0.25 MG/2ML
0.25 INHALANT ORAL
Status: DISCONTINUED | OUTPATIENT
Start: 2024-02-20 | End: 2024-02-23 | Stop reason: HOSPADM

## 2024-02-20 RX ORDER — HEPARIN SODIUM 10000 [USP'U]/100ML
500 INJECTION, SOLUTION INTRAVENOUS CONTINUOUS
Status: DISCONTINUED | OUTPATIENT
Start: 2024-02-20 | End: 2024-02-21

## 2024-02-20 RX ORDER — HEPARIN SODIUM 10000 [USP'U]/100ML
5-30 INJECTION, SOLUTION INTRAVENOUS CONTINUOUS
Status: DISCONTINUED | OUTPATIENT
Start: 2024-02-20 | End: 2024-02-20 | Stop reason: DRUGHIGH

## 2024-02-20 RX ORDER — FENTANYL CITRATE 50 UG/ML
INJECTION, SOLUTION INTRAMUSCULAR; INTRAVENOUS PRN
Status: DISCONTINUED | OUTPATIENT
Start: 2024-02-20 | End: 2024-02-20 | Stop reason: SDUPTHER

## 2024-02-20 RX ORDER — DEXTROSE AND SODIUM CHLORIDE 5; .45 G/100ML; G/100ML
INJECTION, SOLUTION INTRAVENOUS CONTINUOUS
Status: DISCONTINUED | OUTPATIENT
Start: 2024-02-20 | End: 2024-02-23 | Stop reason: HOSPADM

## 2024-02-20 RX ORDER — HEPARIN SODIUM 1000 [USP'U]/ML
40 INJECTION, SOLUTION INTRAVENOUS; SUBCUTANEOUS PRN
Status: DISCONTINUED | OUTPATIENT
Start: 2024-02-20 | End: 2024-02-21

## 2024-02-20 RX ORDER — SULFAMETHOXAZOLE AND TRIMETHOPRIM 400; 80 MG/1; MG/1
1 TABLET ORAL DAILY
Status: DISCONTINUED | OUTPATIENT
Start: 2024-02-20 | End: 2024-02-23 | Stop reason: HOSPADM

## 2024-02-20 RX ADMIN — ARFORMOTEROL TARTRATE 15 MCG: 15 SOLUTION RESPIRATORY (INHALATION) at 08:25

## 2024-02-20 RX ADMIN — HEPARIN SODIUM 4540 UNITS: 1000 INJECTION INTRAVENOUS; SUBCUTANEOUS at 06:38

## 2024-02-20 RX ADMIN — CINACALCET HYDROCHLORIDE 30 MG: 30 TABLET, FILM COATED ORAL at 21:38

## 2024-02-20 RX ADMIN — ARFORMOTEROL TARTRATE 15 MCG: 15 SOLUTION RESPIRATORY (INHALATION) at 00:35

## 2024-02-20 RX ADMIN — HYDROMORPHONE HYDROCHLORIDE 0.5 MG: 1 INJECTION, SOLUTION INTRAMUSCULAR; INTRAVENOUS; SUBCUTANEOUS at 00:35

## 2024-02-20 RX ADMIN — Medication 100 MCG: at 17:45

## 2024-02-20 RX ADMIN — LABETALOL HYDROCHLORIDE 10 MG: 5 INJECTION, SOLUTION INTRAVENOUS at 18:52

## 2024-02-20 RX ADMIN — Medication 100 MCG: at 17:26

## 2024-02-20 RX ADMIN — LIDOCAINE HYDROCHLORIDE 100 MG: 20 INJECTION, SOLUTION EPIDURAL; INFILTRATION; INTRACAUDAL; PERINEURAL at 16:43

## 2024-02-20 RX ADMIN — Medication 100 MCG: at 18:11

## 2024-02-20 RX ADMIN — IPRATROPIUM BROMIDE 0.5 MG: 0.5 SOLUTION RESPIRATORY (INHALATION) at 08:25

## 2024-02-20 RX ADMIN — BUDESONIDE 250 MCG: 0.25 INHALANT RESPIRATORY (INHALATION) at 00:35

## 2024-02-20 RX ADMIN — Medication 200 MCG: at 16:59

## 2024-02-20 RX ADMIN — GLYCOPYRROLATE 0.6 MG: 0.2 INJECTION INTRAMUSCULAR; INTRAVENOUS at 18:37

## 2024-02-20 RX ADMIN — ONDANSETRON 4 MG: 2 INJECTION INTRAMUSCULAR; INTRAVENOUS at 13:54

## 2024-02-20 RX ADMIN — ROCURONIUM BROMIDE 50 MG: 10 INJECTION, SOLUTION INTRAVENOUS at 16:43

## 2024-02-20 RX ADMIN — SODIUM CHLORIDE, SODIUM LACTATE, POTASSIUM CHLORIDE, AND CALCIUM CHLORIDE: 600; 310; 30; 20 INJECTION, SOLUTION INTRAVENOUS at 16:41

## 2024-02-20 RX ADMIN — ONDANSETRON 4 MG: 2 INJECTION INTRAMUSCULAR; INTRAVENOUS at 06:30

## 2024-02-20 RX ADMIN — SODIUM CHLORIDE, PRESERVATIVE FREE 10 ML: 5 INJECTION INTRAVENOUS at 00:37

## 2024-02-20 RX ADMIN — SODIUM CHLORIDE, SODIUM LACTATE, POTASSIUM CHLORIDE, AND CALCIUM CHLORIDE: 600; 310; 30; 20 INJECTION, SOLUTION INTRAVENOUS at 18:49

## 2024-02-20 RX ADMIN — FENTANYL CITRATE 25 MCG: 50 INJECTION INTRAMUSCULAR; INTRAVENOUS at 17:29

## 2024-02-20 RX ADMIN — DEXTROSE AND SODIUM CHLORIDE: 5; 450 INJECTION, SOLUTION INTRAVENOUS at 07:57

## 2024-02-20 RX ADMIN — CINACALCET HYDROCHLORIDE 30 MG: 30 TABLET, FILM COATED ORAL at 02:38

## 2024-02-20 RX ADMIN — Medication 100 MCG: at 17:21

## 2024-02-20 RX ADMIN — HEPARIN SODIUM AND DEXTROSE 500 UNITS/HR: 10000; 5 INJECTION INTRAVENOUS at 21:39

## 2024-02-20 RX ADMIN — NEOSTIGMINE METHYLSULFATE 3 MG: 1 INJECTION, SOLUTION INTRAVENOUS at 18:37

## 2024-02-20 RX ADMIN — PREGABALIN 200 MG: 50 CAPSULE ORAL at 00:35

## 2024-02-20 RX ADMIN — CEFAZOLIN 2 G: 330 INJECTION, POWDER, FOR SOLUTION INTRAMUSCULAR; INTRAVENOUS at 16:55

## 2024-02-20 RX ADMIN — Medication 200 MCG: at 17:06

## 2024-02-20 RX ADMIN — ONDANSETRON 4 MG: 2 INJECTION INTRAMUSCULAR; INTRAVENOUS at 18:37

## 2024-02-20 RX ADMIN — Medication 100 MCG: at 17:35

## 2024-02-20 RX ADMIN — Medication 200 MCG: at 16:54

## 2024-02-20 RX ADMIN — HYDROMORPHONE HYDROCHLORIDE 0.5 MG: 1 INJECTION, SOLUTION INTRAMUSCULAR; INTRAVENOUS; SUBCUTANEOUS at 08:40

## 2024-02-20 RX ADMIN — SODIUM CHLORIDE, PRESERVATIVE FREE 10 ML: 5 INJECTION INTRAVENOUS at 08:27

## 2024-02-20 RX ADMIN — BUDESONIDE 250 MCG: 0.25 INHALANT RESPIRATORY (INHALATION) at 08:25

## 2024-02-20 RX ADMIN — LABETALOL HYDROCHLORIDE 10 MG: 5 INJECTION, SOLUTION INTRAVENOUS at 18:56

## 2024-02-20 RX ADMIN — FENTANYL CITRATE 25 MCG: 50 INJECTION INTRAMUSCULAR; INTRAVENOUS at 17:30

## 2024-02-20 RX ADMIN — PREGABALIN 200 MG: 50 CAPSULE ORAL at 21:38

## 2024-02-20 RX ADMIN — FENTANYL CITRATE 50 MCG: 50 INJECTION INTRAMUSCULAR; INTRAVENOUS at 17:50

## 2024-02-20 RX ADMIN — HEPARIN SODIUM 5000 UNITS: 1000 INJECTION, SOLUTION INTRAVENOUS; SUBCUTANEOUS at 17:41

## 2024-02-20 RX ADMIN — PROPOFOL 150 MG: 10 INJECTION, EMULSION INTRAVENOUS at 16:43

## 2024-02-20 ASSESSMENT — PAIN SCALES - GENERAL
PAINLEVEL_OUTOF10: 6
PAINLEVEL_OUTOF10: 7
PAINLEVEL_OUTOF10: 7
PAINLEVEL_OUTOF10: 8
PAINLEVEL_OUTOF10: 3
PAINLEVEL_OUTOF10: 3

## 2024-02-20 ASSESSMENT — PAIN DESCRIPTION - ORIENTATION
ORIENTATION: LEFT

## 2024-02-20 ASSESSMENT — PAIN DESCRIPTION - LOCATION
LOCATION: FOOT

## 2024-02-20 ASSESSMENT — PAIN - FUNCTIONAL ASSESSMENT: PAIN_FUNCTIONAL_ASSESSMENT: 0-10

## 2024-02-20 ASSESSMENT — PAIN DESCRIPTION - DESCRIPTORS
DESCRIPTORS: ACHING
DESCRIPTORS: ACHING

## 2024-02-20 ASSESSMENT — PAIN DESCRIPTION - PAIN TYPE: TYPE: CHRONIC PAIN;OTHER (COMMENT)

## 2024-02-20 NOTE — ANESTHESIA PROCEDURE NOTES
Central Venous Line:    A central venous line was placed using ultrasound guidance, in the OR for the following indication(s): central venous access.    Sterility preparation included the following: hand hygiene performed prior to procedure, maximum sterile barriers used and sterile technique used to drape from head to toe.    The patient was placed in Trendelenburg position.The right internal jugular vein was prepped.    The site was prepped with [email or web address].introducer     During the procedure, the following specific steps were taken: target vein identified, needle advanced into vein and blood aspirated and guidewire advanced into vein.    Intravenous verification was obtained by ultrasound and venous blood return.    Post insertion care included: all ports aspirated, all ports flushed easily, guidewire removed intact, Biopatch applied, line sutured in place and dressing applied.    During the procedure the patient experienced: patient tolerated procedure well with no complications.      Outcomes: uncomplicated  Real-time US image taken/store: yes  Anesthesia type: general..No  Staffing  Performed: Anesthesiologist   Performed by: Anuj Jackson MD  Authorized by: Amado Cobian MD    Preanesthetic Checklist  Completed: patient identified, IV checked, site marked, risks and benefits discussed, surgical/procedural consents, equipment checked, pre-op evaluation, timeout performed, anesthesia consent given, oxygen available, monitors applied/VS acknowledged, fire risk safety assessment completed and verbalized and blood product R/B/A discussed and consented

## 2024-02-20 NOTE — PERIOP NOTE
TRANSFER - IN REPORT:    Verbal report received from CHIQUI Leos  on [facility]  being received from Room 206 for ordered procedure      Report consisted of patient's Situation, Background, Assessment and   Recommendations(SBAR).     Information from the following report(s) Nurse Handoff Report was reviewed with the receiving nurse.    Opportunity for questions and clarification was provided.      Assessment completed upon patient's arrival to unit and care assumed.

## 2024-02-20 NOTE — ED NOTES
TRANSFER - OUT REPORT:    Verbal report given to Cheryl on Zamzam H [facility]  being transferred to [facility] for routine progression of patient care       Report consisted of patient's Situation, Background, Assessment and   Recommendations(SBAR).     Information from the following report(s) Nurse Handoff Report, ED SBAR, MAR, and Med Rec Status was reviewed with the receiving nurse.    Patient transported with:  O2 @ 2lpm and Registered Nurse

## 2024-02-20 NOTE — OP NOTE
Operative Note      Patient: Zamzam Calixto  YOB: 1950  MRN: [id number]    Date of Procedure: 2/20/2024    Pre-Op Diagnosis Codes:     * Peripheral vascular disease, unspecified (HCC) [I73.9]    Post-Op Diagnosis:  Acute occlusion of left femoral artery bypass graft       Procedure(s):  THROMBECTOMY LEFT FEMORAL ARTERY, FEMORAL ARTERY BYPASS WITH PATCH LEFT LEG patch angioplasty left common femoral artery to bypass graft    Surgeons:  Mich Ashley    Assistant:   Surgical Assistant: Alphonso Knight    Anesthesia: General    Estimated Blood Loss (mL): less than 50     Complications: None    Specimens:   * No specimens in log *    Implants:  Implant Name Type Inv. Item Serial No.  Lot No. LRB No. Used Action   LEMAITRE VASCULAR PATCH [id number]    [employer]PMM [id number] Left 1 Implanted         Drains:   Urinary Catheter 02/20/24 García (Active)       [REMOVED] Negative Pressure Wound Therapy Leg Anterior;Left;Proximal;Upper (Removed)       [REMOVED] Negative Pressure Wound Therapy Leg Left (Removed)       [REMOVED] Negative Pressure Wound Therapy Leg Inner (Removed)       [REMOVED] Urinary Catheter 02/27/23 García (Removed)       Findings: Acute thrombus found in bypass graft and common femoral artery, left side        Detailed Description of Procedure:   Patient was brought to the operating room where she had appropriate antibiotics general anesthesia and line placement by anesthesia.  Her left leg was prepped draped usual standard fashion.  Made incision to the groin and expose the CryoVein bypass, exposed the common femoral artery to the external iliac artery.  Also exposed the profunda femoris.  Placed Vesseloops around these locations.  Gave 5000 of heparin.  Placed a clamp on the external iliac artery and vessel control on the profunda.  Made incision on the hathaway of the bypass graft and extended well into the common femoral artery.  There was fresh thrombus present which

## 2024-02-20 NOTE — PROGRESS NOTES
Received telephone ED report from Adri Nagel RN. Report reviewed SBAR, MAR, VS and ED assessment.

## 2024-02-20 NOTE — PROGRESS NOTES
Advance Care Planning   Healthcare Decision Maker:      Click here to complete Healthcare Decision Makers including selection of the Healthcare Decision Maker Relationship (ie \"Primary\").  Today we documented Decision Maker(s) consistent with Legal Next of Kin hierarchy.       SELINA CALIXTO Spouse          Primary Phone: [phone] (M)Home Phone: [phone] Phone: [phone]      ADRIA RABAGO  Other          Primary Phone: [phone] (M)Home Phone: [phone] Phone: [phone]        Advance Medical Directive at home. Not on file.         conducted an initial consultation and Spiritual Assessment for Zamzam Calixto, who is a 73 y.o.,female. Patient's Primary Language is: English.   According to the patient's EMR Congregation Affiliation is: Moravian.     The reason the Patient came to the hospital is:   Patient Active Problem List    Diagnosis Date Noted    Dehiscence of wound 02/17/2023    Arterial occlusion 02/19/2024    Acute lower limb ischemia 02/19/2024    Hypomagnesemia 04/27/2023    MSSA (methicillin susceptible Staphylococcus aureus) 04/04/2023    Femoral artery occlusion, left (HCC) 01/19/2023    Postmenopausal osteoporosis 04/22/2022    Vascular graft infection ([facility]) 12/09/2020    UTI (urinary tract infection) 12/03/2020    Cellulitis of abdominal wall 12/03/2020    Open wound 09/10/2020    Chronic wound infection of abdomen 06/21/2018    Infection of vascular bypass graft ([facility]) 04/30/2018    Nonhealing surgical wound 04/30/2018    CAP (community acquired pneumonia) 06/27/2017    Severe sepsis ([facility]) 06/27/2017    Abscess 05/10/2017    Arteriovenous graft infection ([facility]) 05/10/2017    Dermal sinus tract of lumbosacral region ([facility]) 05/10/2017    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   Explored issues of yo, belief, spirituality and Restorationism/ritual needs while hospitalized.  Provided information about Spiritual Care Services.  Chart reviewed.    The following outcomes where achieved:   confirmed Patient's Orthodoxy Affiliation.  Patient expressed gratitude for 's visit.    Assessment:  Patient does not have any Restorationism/cultural needs that will affect patient's preferences in health care.  There are no spiritual or Restorationism issues which require intervention at this time.     Plan:  Chaplains will continue to follow and will provide pastoral care on an as needed/requested basis.   recommends bedside caregivers page  on duty if patient shows signs of acute spiritual or emotional distress.    Chaplain Della Ho  Spiritual Care   [phone]

## 2024-02-20 NOTE — PERIOP NOTE
TRANSFER - IN REPORT:    Verbal report received from Cathy FLORIAN on [facility]  being received from [address] [place] [address] for ordered procedure      Report consisted of patient's Situation, Background, Assessment and   Recommendations(SBAR).     Information from the following report(s) Nurse Handoff Report was reviewed with the receiving nurse.    Opportunity for questions and clarification was provided.      Assessment completed upon patient's arrival to unit and care assumed.

## 2024-02-20 NOTE — ANESTHESIA PROCEDURE NOTES
Arterial Line:    An arterial line was placed in the OR for the following indication(s): continuous blood pressure monitoring, blood sampling needed and unable to use non-invasive cuff.    A  (size) (length), Arrow (type) catheter was placed, Seldinger technique used, into the right radial artery, secured by Tegaderm.  Anesthesia type: General    Events:  patient tolerated procedure well with no complications.  Performed: Anesthesiologist   Preanesthetic Checklist  Completed: patient identified, IV checked, site marked, risks and benefits discussed, surgical/procedural consents, equipment checked, pre-op evaluation, timeout performed, anesthesia consent given, oxygen available, monitors applied/VS acknowledged, fire risk safety assessment completed and verbalized and blood product R/B/A discussed and consented

## 2024-02-20 NOTE — CONSULTS
10 mL at 02/20/24 0827    sodium chloride flush 0.9 % injection 5-40 mL  5-40 mL IntraVENous PRN Jean Carlos Hines MD        0.9 % sodium chloride infusion   IntraVENous PRN Jean Carlos Hines MD        potassium chloride (KLOR-CON M) extended release tablet 40 mEq  40 mEq Oral PRN Jean Carlos Hines MD        Or    potassium bicarb-citric acid (EFFER-K) effervescent tablet 40 mEq  40 mEq Oral PRN Jean Carlos Hines MD        Or    potassium chloride 10 mEq/100 mL IVPB (Peripheral Line)  10 mEq IntraVENous PRN Jean Carlos Hines MD        magnesium sulfate 2000 mg in 50 mL IVPB premix  2,000 mg IntraVENous PRN Jean Carlos Hines MD        polyethylene glycol (GLYCOLAX) packet 17 g  17 g Oral Daily PRN Jean Carlos Hines MD        acetaminophen (TYLENOL) tablet 650 mg  650 mg Oral Q6H PRN Jean Carlos Hines MD        Or    acetaminophen (TYLENOL) suppository 650 mg  650 mg Rectal Q6H PRN Jean Carlos Hnies MD        albuterol (PROVENTIL) (2.5 MG/3ML) 0.083% nebulizer solution 2.5 mg  2.5 mg Nebulization Q6H PRN Jean Carlos Hines MD         Allergies   Allergen Reactions    Pentazocine Shortness Of Breath and Nausea And Vomiting    Cephalexin Diarrhea    Meperidine Hallucinations and Other (See Comments)     Halucinations    Propoxyphene Itching    Codeine Nausea And Vomiting     Other reaction(s): other/intolerance     Social History     Socioeconomic History    Marital status:      Spouse name: Not on file    Number of children: Not on file    Years of education: Not on file    Highest education level: Not on file   Occupational History    Not on file   Tobacco Use    Smoking status: Every Day     Current packs/day: 1.00     Average packs/day: 1 pack/day for 50.0 years (50.0 ttl pk-yrs)     Types: Cigarettes    Smokeless tobacco: Never   Vaping Use    Vaping Use: Never used   Substance and Sexual Activity    Alcohol use: No    Drug use: Never    Sexual activity: Not on file   Other Topics Concern    Not on file   Social  History Narrative    Father worked in the shipyard and was diagnosed with Asbestosis.     Social Determinants of Health     Financial Resource Strain: Not on file   Food Insecurity: No Food Insecurity (2/20/2024)    Hunger Vital Sign     Worried About Running Out of Food in the Last Year: Never true     Ran Out of Food in the Last Year: Never true   Transportation Needs: No Transportation Needs (2/20/2024)    PRAPARE - Transportation     Lack of Transportation (Medical): No     Lack of Transportation (Non-Medical): No   Physical Activity: Not on file   Stress: Not on file   Social Connections: Feeling Socially Integrated (4/6/2023)    OASIS : Social Isolation     Frequency of experiencing loneliness or isolation: Never   Intimate Partner Violence: Not on file   Housing Stability: Low Risk  (2/20/2024)    Housing Stability Vital Sign     Unable to Pay for Housing in the Last Year: No     Number of Places Lived in the Last Year: 1     Unstable Housing in the Last Year: No      Family History   Problem Relation Age of Onset    COPD Father     Coronary Art Dis Mother     Heart Attack Mother     Elevated Lipids Mother     COPD Brother     Heart Attack Brother     Heart Surgery Mother         CABGx3    Other Brother         PAD       Review of Systems    A full review of systems was completed times ten organ systems and was deemed negative unless otherwise mentioned in the HPI.     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

## 2024-02-20 NOTE — H&P
Hospitalist Admission History and Physical    NAME:  Zamzam Calixto   :   1950   MRN:   [id number]     PCP:  Miriam Niño APRN - NP  Date/Time:  2024 11:32 PM  Subjective:   CHIEF COMPLAINT: left leg pain      HISTORY OF PRESENT ILLNESS:     Zamzam is a 73 y.o.   female w/ h/o PVD s/p vascularization procedrue of the left leg w/ complications including infection of vascular bypass graft who presents with reports of left distal leg pain onset about 2 days ago that progressively got worse. Pain is located \"from the knee down\". This morning she went for what sounds like a non invasive vascular study in [place] and was later called w/ the advise to come to the ED due to abnormal result. Here CTA showed several vascular occlusions and the vascular surgeon recommended stopping her Eliquis, starting her on heparin gtt and admitting her under our service for further eval and management.         Past Medical History:   Diagnosis Date    Arthritis     CAP (community acquired pneumonia) 2017    Chronic lung disease     Claudication (HCC)     left leg    Crohn's disease (HCC)     Crohn's disease (HCC)     GERD (gastroesophageal reflux disease)     HTN (hypertension)     Ill-defined condition     Uses O2 at night.    Nausea & vomiting     Neuropathy     Other and unspecified symptoms and signs involving general sensations and perceptions     PVD    Other ill-defined conditions(799.89)     Crohn's    Parathyroid tumor     sees Patrick Santillan    Peripheral neuropathy     Pulmonary emphysema (HCC)     PVD (peripheral vascular disease) (HCC)     right leg    Sepsis (HCC) 2017    Vitamin B12 deficiency         Past Surgical History:   Procedure Laterality Date    APPENDECTOMY      BREAST LUMPECTOMY Left     breast left- precancerous    BUNIONECTOMY      left eye    BYPASS GRAFT OTHR,AXILL-FEM Right 2013    BYPASS GRAFT OTHR,FEM-POP Left 2013    CATARACT REMOVAL      bilateral     Nostril route daily   fluticasone-umeclidin-vilant (TRELEGY ELLIPTA) 100-62.5-25 MCG/ACT AEPB inhaler   No No   Sig: Inhale 1 puff into the lungs daily   inFLIXimab (REMICADE) 100 MG injection   Yes No   Sig: Infuse 5 mg/kg intravenously every 28 days   Patient not taking: Reported on 2023   montelukast (SINGULAIR) 10 MG tablet   Yes No   Sig: Take 10 mg by mouth Daily   Patient not taking: Reported on 2023   nystatin (MYCOSTATIN) 828359 UNIT/GM cream   No No   Sig: Apply topically 2 times daily.   nystatin (MYCOSTATIN) 290723 UNIT/GM cream   No No   Sig: Apply topically 2 times daily.   oxyCODONE-acetaminophen (PERCOCET) 5-325 MG per tablet   Yes No   Sig: Take 1 tablet by mouth every 4 hours as needed for Pain.   Patient not taking: Reported on 2023   pregabalin (LYRICA) 100 MG capsule   Yes No   Sig: Take 1 capsule by mouth 2 times daily. Take in 1 cap in AM & 2 caps in PM   triamterene (DYRENIUM) 50 MG capsule   Yes No   Sig: Take 50 mg by mouth daily.   Patient not taking: Reported on 2023   triamterene-hydroCHLOROthiazide (DYAZIDE) 37.5-25 MG per capsule   Yes No   Sig: Take by mouth daily   Patient not taking: Reported on 2023   vitamin D 25 MCG (1000 UT) CAPS   Yes No   Sig: Take 2 capsules by mouth Daily      Facility-Administered Medications: None       REVIEW OF SYSTEMS:    Please see above otw 10 point ROS checked and negative.      Objective:   VITALS:    BP (!) 149/56   Pulse 71   Temp 98.1 °F (36.7 °C)   Resp 13   Ht 1.651 m (5' 5\")   Wt 56.7 kg (125 lb)   SpO2 95%   BMI 20.80 kg/m²   Temp (24hrs), Av.1 °F (36.7 °C), Min:98.1 °F (36.7 °C), Max:98.1 °F (36.7 °C)      PHYSICAL EXAM:   General:    Alert, cooperative, no distress, appears stated age.     Head:   Normocephalic, without obvious abnormality, atraumatic.  Eyes:   Conjunctivae clear, anicteric sclerae.  Pupils are equal  Nose:  Nares normal. No drainage.  Throat:    Lips, mucosa, and tongue normal.  No

## 2024-02-20 NOTE — ANESTHESIA PRE PROCEDURE
injection            • scopolamine (TRANSDERM-SCOP) transdermal patch 1 patch  1 patch TransDERmal Once Anuj Jackson MD   1 patch at 02/20/24 1605   • aspirin chewable tablet 81 mg  81 mg Oral Daily Jean Carlos Hines MD       • cinacalcet (SENSIPAR) tablet 30 mg  30 mg Oral BID Jean Carlos Hines MD   30 mg at 02/20/24 0238   • pregabalin (LYRICA) capsule 100 mg  100 mg Oral Daily Jean Carlos Hines MD       • sodium chloride flush 0.9 % injection 5-40 mL  5-40 mL IntraVENous 2 times per day Jean Carlos Hines MD   10 mL at 02/20/24 0827   • sodium chloride flush 0.9 % injection 5-40 mL  5-40 mL IntraVENous PRN Jean Carlos Hines MD       • 0.9 % sodium chloride infusion   IntraVENous PRN Jean Carlos Hines MD       • potassium chloride (KLOR-CON M) extended release tablet 40 mEq  40 mEq Oral PRN Jean Carlos Hines MD        Or   • potassium bicarb-citric acid (EFFER-K) effervescent tablet 40 mEq  40 mEq Oral PRN Jean Carlos Hines MD        Or   • potassium chloride 10 mEq/100 mL IVPB (Peripheral Line)  10 mEq IntraVENous PRN Jean Carlos Hines MD       • magnesium sulfate 2000 mg in 50 mL IVPB premix  2,000 mg IntraVENous PRN Jean Carlos Hines MD       • polyethylene glycol (GLYCOLAX) packet 17 g  17 g Oral Daily PRN Jean Carlos Hines MD       • acetaminophen (TYLENOL) tablet 650 mg  650 mg Oral Q6H PRN Jean Carlos Hines MD        Or   • acetaminophen (TYLENOL) suppository 650 mg  650 mg Rectal Q6H PRN Jean Carlos Hines MD       • albuterol (PROVENTIL) (2.5 MG/3ML) 0.083% nebulizer solution 2.5 mg  2.5 mg Nebulization Q6H PRN Jean Carlos Hines MD           Allergies:    Allergies   Allergen Reactions   • Pentazocine Shortness Of Breath and Nausea And Vomiting   • Cephalexin Diarrhea   • Meperidine Hallucinations and Other (See Comments)     Halucinations   • Propoxyphene Itching   • Codeine Nausea And Vomiting     Other reaction(s): other/intolerance       Problem List:    Patient Active Problem List   Diagnosis Code   • UTI

## 2024-02-20 NOTE — PROGRESS NOTES
Arformoterol + Budesonide + Ipratropium nebulizer was therapeutically interchanged for Trelegy Ellipta MDI per the P &T Committee approved Therapeutic Interchanges Policy.

## 2024-02-20 NOTE — ED PROVIDER NOTES
EMERGENCY DEPARTMENT HISTORY AND PHYSICAL EXAM    Date: 2/19/2024  Patient Name: Zamzam Calixto    History of Presenting Illness     Chief Complaint   Patient presents with    Leg Pain         History Provided By: patient     Chief Complaint: leg pain   Duration 3 days  Timing:  acute  Location: LLE  Quality: throbbing  Severity: moderate  Modifying Factors: none  Associated Symptoms: none       Additional History (Context): Zamzam Calixto is a 73 y.o. female with PMH PAD on eliquis, chron's disease, htn GERD, and chronic lung disease who presents with c/o acute onset LLE pain that started on Saturday and progressively worsened. Pt's vascular surgeon is Dr. Mich Ashley. She went to his office today and had an arterial duplex study which she states showed no flow to the LLE. She was advised to come tot he ED for evaluation. Pt took her eliquis this morning. No other complaints reported.     PCP: Miriam Niño, WILDA - NP    Current Facility-Administered Medications   Medication Dose Route Frequency Provider Last Rate Last Admin    heparin 25,000 units in dextrose 5% 250 mL (premix) infusion  5-30 Units/kg/hr IntraVENous Continuous Gricel Jimenez PA-C 10.2 mL/hr at 02/19/24 2217 18 Units/kg/hr at 02/19/24 2217     Current Outpatient Medications   Medication Sig Dispense Refill    nystatin (MYCOSTATIN) 262923 UNIT/GM cream Apply topically 2 times daily. 30 g 2    diclofenac (FLECTOR) 1.3 % PTCH patch Place 1 patch onto the skin 2 times daily 60 patch 2    fluticasone-umeclidin-vilant (TRELEGY ELLIPTA) 100-62.5-25 MCG/ACT AEPB inhaler Inhale 1 puff into the lungs daily 3 each 3    clopidogrel (PLAVIX) 75 MG tablet Take 1 tablet by mouth daily (Patient not taking: Reported on 4/13/2023)      oxyCODONE-acetaminophen (PERCOCET) 5-325 MG per tablet Take 1 tablet by mouth every 4 hours as needed for Pain. (Patient not taking: Reported on 4/28/2023)      nystatin (MYCOSTATIN) 780456 UNIT/GM cream Apply

## 2024-02-20 NOTE — PROGRESS NOTES
Albuterol nebulizer was therapeutically interchanged for albuterol MDI per the P &T Committee approved Therapeutic Interchanges Policy.

## 2024-02-21 ENCOUNTER — APPOINTMENT (OUTPATIENT)
Facility: HOSPITAL | Age: 74
End: 2024-02-21
Payer: MEDICARE

## 2024-02-21 LAB
APTT PPP: 32.4 SEC (ref 23–36.4)
ECHO BSA: 1.61 M2
ERYTHROCYTE [DISTWIDTH] IN BLOOD BY AUTOMATED COUNT: 17.4 % (ref 11.6–14.5)
ERYTHROCYTE [DISTWIDTH] IN BLOOD BY AUTOMATED COUNT: 17.6 % (ref 11.6–14.5)
HCT VFR BLD AUTO: 32.1 % (ref 35–45)
HCT VFR BLD AUTO: 32.8 % (ref 35–45)
HGB BLD-MCNC: 10.2 G/DL (ref 12–16)
HGB BLD-MCNC: 10.5 G/DL (ref 12–16)
MCH RBC QN AUTO: 28.3 PG (ref 24–34)
MCH RBC QN AUTO: 28.8 PG (ref 24–34)
MCHC RBC AUTO-ENTMCNC: 31.8 G/DL (ref 31–37)
MCHC RBC AUTO-ENTMCNC: 32 G/DL (ref 31–37)
MCV RBC AUTO: 89.2 FL (ref 78–100)
MCV RBC AUTO: 89.9 FL (ref 78–100)
NRBC # BLD: 0 K/UL (ref 0–0.01)
NRBC # BLD: 0 K/UL (ref 0–0.01)
NRBC BLD-RTO: 0 PER 100 WBC
NRBC BLD-RTO: 0 PER 100 WBC
PLATELET # BLD AUTO: 211 K/UL (ref 135–420)
PLATELET # BLD AUTO: 223 K/UL (ref 135–420)
PMV BLD AUTO: 10.5 FL (ref 9.2–11.8)
PMV BLD AUTO: 10.9 FL (ref 9.2–11.8)
RBC # BLD AUTO: 3.6 M/UL (ref 4.2–5.3)
RBC # BLD AUTO: 3.65 M/UL (ref 4.2–5.3)
WBC # BLD AUTO: 10.4 K/UL (ref 4.6–13.2)
WBC # BLD AUTO: 10.6 K/UL (ref 4.6–13.2)

## 2024-02-21 PROCEDURE — 2580000003 HC RX 258: Performed by: INTERNAL MEDICINE

## 2024-02-21 PROCEDURE — 93926 LOWER EXTREMITY STUDY: CPT | Performed by: SURGERY

## 2024-02-21 PROCEDURE — 2140000001 HC CVICU INTERMEDIATE R&B

## 2024-02-21 PROCEDURE — 93926 LOWER EXTREMITY STUDY: CPT

## 2024-02-21 PROCEDURE — 94640 AIRWAY INHALATION TREATMENT: CPT

## 2024-02-21 PROCEDURE — 6370000000 HC RX 637 (ALT 250 FOR IP): Performed by: INTERNAL MEDICINE

## 2024-02-21 PROCEDURE — 85730 THROMBOPLASTIN TIME PARTIAL: CPT

## 2024-02-21 PROCEDURE — 6360000002 HC RX W HCPCS: Performed by: INTERNAL MEDICINE

## 2024-02-21 PROCEDURE — 99232 SBSQ HOSP IP/OBS MODERATE 35: CPT | Performed by: STUDENT IN AN ORGANIZED HEALTH CARE EDUCATION/TRAINING PROGRAM

## 2024-02-21 PROCEDURE — 2700000000 HC OXYGEN THERAPY PER DAY

## 2024-02-21 PROCEDURE — 85027 COMPLETE CBC AUTOMATED: CPT

## 2024-02-21 PROCEDURE — 6360000002 HC RX W HCPCS: Performed by: SURGERY

## 2024-02-21 PROCEDURE — 36415 COLL VENOUS BLD VENIPUNCTURE: CPT

## 2024-02-21 PROCEDURE — 94761 N-INVAS EAR/PLS OXIMETRY MLT: CPT

## 2024-02-21 RX ORDER — HEPARIN SODIUM 10000 [USP'U]/100ML
5-30 INJECTION, SOLUTION INTRAVENOUS CONTINUOUS
Status: DISCONTINUED | OUTPATIENT
Start: 2024-02-21 | End: 2024-02-22 | Stop reason: DRUGHIGH

## 2024-02-21 RX ORDER — HEPARIN SODIUM 1000 [USP'U]/ML
80 INJECTION, SOLUTION INTRAVENOUS; SUBCUTANEOUS PRN
Status: DISCONTINUED | OUTPATIENT
Start: 2024-02-21 | End: 2024-02-23 | Stop reason: SDUPTHER

## 2024-02-21 RX ORDER — HEPARIN SODIUM 1000 [USP'U]/ML
80 INJECTION, SOLUTION INTRAVENOUS; SUBCUTANEOUS ONCE
Status: COMPLETED | OUTPATIENT
Start: 2024-02-21 | End: 2024-02-21

## 2024-02-21 RX ORDER — HEPARIN SODIUM 1000 [USP'U]/ML
40 INJECTION, SOLUTION INTRAVENOUS; SUBCUTANEOUS PRN
Status: DISCONTINUED | OUTPATIENT
Start: 2024-02-21 | End: 2024-02-23 | Stop reason: SDUPTHER

## 2024-02-21 RX ADMIN — HYDROMORPHONE HYDROCHLORIDE 0.5 MG: 1 INJECTION, SOLUTION INTRAMUSCULAR; INTRAVENOUS; SUBCUTANEOUS at 16:37

## 2024-02-21 RX ADMIN — PREGABALIN 200 MG: 50 CAPSULE ORAL at 21:43

## 2024-02-21 RX ADMIN — SULFAMETHOXAZOLE AND TRIMETHOPRIM 1 TABLET: 400; 80 TABLET ORAL at 09:30

## 2024-02-21 RX ADMIN — BUDESONIDE 250 MCG: 0.25 INHALANT RESPIRATORY (INHALATION) at 20:32

## 2024-02-21 RX ADMIN — IPRATROPIUM BROMIDE 0.5 MG: 0.5 SOLUTION RESPIRATORY (INHALATION) at 14:44

## 2024-02-21 RX ADMIN — IPRATROPIUM BROMIDE 0.5 MG: 0.5 SOLUTION RESPIRATORY (INHALATION) at 07:37

## 2024-02-21 RX ADMIN — HYDROMORPHONE HYDROCHLORIDE 0.5 MG: 1 INJECTION, SOLUTION INTRAMUSCULAR; INTRAVENOUS; SUBCUTANEOUS at 19:34

## 2024-02-21 RX ADMIN — HEPARIN SODIUM 4540 UNITS: 1000 INJECTION INTRAVENOUS; SUBCUTANEOUS at 16:49

## 2024-02-21 RX ADMIN — ARFORMOTEROL TARTRATE 15 MCG: 15 SOLUTION RESPIRATORY (INHALATION) at 07:37

## 2024-02-21 RX ADMIN — ASPIRIN 81 MG CHEWABLE TABLET 81 MG: 81 TABLET CHEWABLE at 09:29

## 2024-02-21 RX ADMIN — HYDROMORPHONE HYDROCHLORIDE 0.5 MG: 1 INJECTION, SOLUTION INTRAMUSCULAR; INTRAVENOUS; SUBCUTANEOUS at 09:40

## 2024-02-21 RX ADMIN — CINACALCET HYDROCHLORIDE 30 MG: 30 TABLET, FILM COATED ORAL at 21:43

## 2024-02-21 RX ADMIN — ARFORMOTEROL TARTRATE 15 MCG: 15 SOLUTION RESPIRATORY (INHALATION) at 20:32

## 2024-02-21 RX ADMIN — BUDESONIDE 250 MCG: 0.25 INHALANT RESPIRATORY (INHALATION) at 07:37

## 2024-02-21 RX ADMIN — HYDROMORPHONE HYDROCHLORIDE 0.5 MG: 1 INJECTION, SOLUTION INTRAMUSCULAR; INTRAVENOUS; SUBCUTANEOUS at 13:23

## 2024-02-21 RX ADMIN — IPRATROPIUM BROMIDE 0.5 MG: 0.5 SOLUTION RESPIRATORY (INHALATION) at 20:32

## 2024-02-21 RX ADMIN — SODIUM CHLORIDE, PRESERVATIVE FREE 10 ML: 5 INJECTION INTRAVENOUS at 09:30

## 2024-02-21 RX ADMIN — CINACALCET HYDROCHLORIDE 30 MG: 30 TABLET, FILM COATED ORAL at 09:33

## 2024-02-21 RX ADMIN — HYDROMORPHONE HYDROCHLORIDE 0.5 MG: 1 INJECTION, SOLUTION INTRAMUSCULAR; INTRAVENOUS; SUBCUTANEOUS at 01:02

## 2024-02-21 RX ADMIN — HYDROMORPHONE HYDROCHLORIDE 0.5 MG: 1 INJECTION, SOLUTION INTRAMUSCULAR; INTRAVENOUS; SUBCUTANEOUS at 03:40

## 2024-02-21 RX ADMIN — PREGABALIN 100 MG: 50 CAPSULE ORAL at 09:29

## 2024-02-21 ASSESSMENT — PAIN DESCRIPTION - DESCRIPTORS
DESCRIPTORS: SQUEEZING
DESCRIPTORS: SQUEEZING
DESCRIPTORS: ACHING;SHARP
DESCRIPTORS: SHARP;SQUEEZING
DESCRIPTORS: BURNING;SQUEEZING
DESCRIPTORS: SHARP
DESCRIPTORS: ACHING;DISCOMFORT
DESCRIPTORS: BURNING;SQUEEZING

## 2024-02-21 ASSESSMENT — PAIN SCALES - GENERAL
PAINLEVEL_OUTOF10: 3
PAINLEVEL_OUTOF10: 2
PAINLEVEL_OUTOF10: 8
PAINLEVEL_OUTOF10: 2
PAINLEVEL_OUTOF10: 10
PAINLEVEL_OUTOF10: 10
PAINLEVEL_OUTOF10: 3
PAINLEVEL_OUTOF10: 5
PAINLEVEL_OUTOF10: 8
PAINLEVEL_OUTOF10: 7
PAINLEVEL_OUTOF10: 3
PAINLEVEL_OUTOF10: 9
PAINLEVEL_OUTOF10: 8

## 2024-02-21 ASSESSMENT — PAIN DESCRIPTION - ORIENTATION
ORIENTATION: LEFT
ORIENTATION: LEFT
ORIENTATION: LEFT;LOWER
ORIENTATION: RIGHT
ORIENTATION: LEFT
ORIENTATION: LEFT
ORIENTATION: RIGHT
ORIENTATION: LEFT;LOWER

## 2024-02-21 ASSESSMENT — PAIN SCALES - WONG BAKER
WONGBAKER_NUMERICALRESPONSE: 2

## 2024-02-21 ASSESSMENT — PAIN DESCRIPTION - LOCATION
LOCATION: LEG
LOCATION: INCISION;GROIN
LOCATION: LEG

## 2024-02-21 ASSESSMENT — PAIN - FUNCTIONAL ASSESSMENT: PAIN_FUNCTIONAL_ASSESSMENT: PREVENTS OR INTERFERES SOME ACTIVE ACTIVITIES AND ADLS

## 2024-02-21 NOTE — PLAN OF CARE
Problem: Discharge Planning  Goal: Discharge to home or other facility with appropriate resources  2/21/2024 1525 by Rakesh Kenney RN  Outcome: Progressing  2/21/2024 0439 by Princess Vita Garsia RN  Outcome: Progressing     Problem: Safety - Adult  Goal: Free from fall injury  2/21/2024 1525 by Rakesh Kenney RN  Outcome: Progressing  2/21/2024 0439 by Princess Vita Garsia RN  Outcome: Progressing     Problem: ABCDS Injury Assessment  Goal: Absence of physical injury  2/21/2024 1525 by Rakesh Kenney RN  Outcome: Progressing  2/21/2024 0439 by Princess Vita Garsia RN  Outcome: Progressing  Flowsheets (Taken 2/20/2024 2120)  Absence of Physical Injury: Implement safety measures based on patient assessment     Problem: Pain  Goal: Verbalizes/displays adequate comfort level or baseline comfort level  2/21/2024 1525 by Rakesh Kenney RN  Outcome: Progressing  2/21/2024 0439 by Princess Vita Garsia RN  Outcome: Progressing  Flowsheets  Taken 2/21/2024 0400  Verbalizes/displays adequate comfort level or baseline comfort level:   Encourage patient to monitor pain and request assistance   Assess pain using appropriate pain scale   Implement non-pharmacological measures as appropriate and evaluate response  Taken 2/21/2024 0000  Verbalizes/displays adequate comfort level or baseline comfort level:   Encourage patient to monitor pain and request assistance   Assess pain using appropriate pain scale   Implement non-pharmacological measures as appropriate and evaluate response  Taken 2/20/2024 2120  Verbalizes/displays adequate comfort level or baseline comfort level:   Encourage patient to monitor pain and request assistance   Assess pain using appropriate pain scale   Implement non-pharmacological measures as appropriate and evaluate response

## 2024-02-21 NOTE — CARE COORDINATION
02/21/24 1143   IMM Letter   IMM Letter given to Patient/Family/Significant other/Guardian/POA/by: Roshni Valle   IMM Letter date given: 02/21/24   IMM Letter time given: 1130

## 2024-02-21 NOTE — PROGRESS NOTES
Patient received communion from [employer] volunteer for Zamzam Calixto, who is a 73 y.o.,female.      The  provided the following Interventions:  Continued the relationship of care and support.   Offered prayer and assurance of continued prayer on patients behalf.   Chart reviewed.    The following outcomes were achieved:  Patient received communion from [employer].     Assessment:  There are no further spiritual or Jew issues which require Spiritual Care Services interventions at this time.     Plan:  Chaplains will continue to follow and will provide pastoral care on an as needed/requested basis.   recommends bedside caregivers page  on duty if patient shows signs of acute spiritual or emotional distress.     Della Ho     Spiritual Care   [phone]

## 2024-02-21 NOTE — ANESTHESIA POSTPROCEDURE EVALUATION
Department of Anesthesiology  Postprocedure Note    Patient: Zamzam Calixto  MRN: [id number]  YOB: 1950  Date of evaluation: 2/21/2024    Procedure Summary     Date: 02/20/24 Room / Location: [facility] 02 / [facility] CARDIAC SURGERY    Anesthesia Start: 1640 Anesthesia Stop: 1918    Procedure: THROMBECTOMY LEFT FEMORAL ARTERY, FEMORAL ARTERY BYPASS WITH PATCH LEFT LEG (Left: Leg Upper) Diagnosis:       Peripheral vascular disease, unspecified (HCC)      (Peripheral vascular disease, unspecified (HCC) [I73.9])    Surgeons: Mich Ashley MD Responsible Provider: Clark Molina DO    Anesthesia Type: General ASA Status: 4          Anesthesia Type: General    Gurmeet Phase I: Gurmeet Score: 8    Gurmeet Phase II:      Anesthesia Post Evaluation    Patient location during evaluation: PACU  Patient participation: complete - patient participated  Level of consciousness: awake and alert  Airway patency: patent  Nausea & Vomiting: no nausea and no vomiting  Cardiovascular status: hemodynamically stable  Respiratory status: acceptable  Hydration status: stable  Multimodal analgesia pain management approach        No notable events documented.

## 2024-02-21 NOTE — PERIOP NOTE
1911 Received patient from CVT OR after vascular procedure. Left femoral thrombectomy, femoral artery bypass with patch left leg patch angioplasty, left common femoral artery to bypass graft. Patient's left foot cyanotic and cool with out pulses. OR nurse Marcelle Madera states Dr. Ashley is aware.

## 2024-02-21 NOTE — PROGRESS NOTES
Progress Note  Hospitalist Service    Patient: Zamzam Calixto MRN: [id number]   SSN: [id number]  YOB: 1950   Age: 73 y.o.  Sex: female      Admit Date: 2/19/2024    LOS: 1 day   Chief Complaint   Patient presents with    Leg Pain       Subjective:     Patient seen and examined.  Awaiting vascular procedure while on rounds.     Objective:     Vitals:  BP (!) 151/53   Pulse 67   Temp 98.1 °F (36.7 °C) (Temporal)   Resp 18   Ht 1.651 m (5' 5\")   Wt 56.7 kg (125 lb)   SpO2 93%   BMI 20.80 kg/m²     Physical Exam:   General appearance: alert, appears stated age, and cooperative  Lungs: clear to auscultation bilaterally  Heart: regular rate and rhythm, S1, S2 normal, no murmur, click, rub or gallop  Abdomen: soft, non-tender; bowel sounds normal; no masses,  no organomegaly  Pulses: 2+ and symmetric  Skin: Skin color, texture, turgor normal. No rashes or lesions  Neuro:  normal without focal findings, mental status, speech normal, alert and oriented x3, FRIDA, and reflexes normal and symmetric    Intake and Output:  Current Shift: No intake/output data recorded.  Last three shifts: 02/19 0701 - 02/20 1900  In: 1500 [I.V.:1500]  Out: 300 [Urine:250]    Lab/Data Review:  Recent Results (from the past 12 hour(s))   APTT    Collection Time: 02/20/24  1:49 PM   Result Value Ref Range    APTT >180.0 (HH) 23.0 - 36.4 SEC   PREPARE RBC (CROSSMATCH), 2 Units    Collection Time: 02/20/24  5:00 PM   Result Value Ref Range    History Check Historical check performed          Key Findings or tests:       Telemetry NONE   Oxygen NONE     Assessment and Plan:     Acute occlusion of left femoral artery bypass graft now s/p femoral artery bypass with patch to left leg, angioplasty left common femoral artery to bypass graft  Acute lower limb ischemia, s/p angioplasty   Crohns Disease on Remicade   Hyperparathyroidism  COPD - on Trelegy at home. Will sub Brovana + Pulmicort.        Diamante Vázquez DO, hospitalist

## 2024-02-21 NOTE — CARE COORDINATION
/24  OT AM-PAC:   /24    Family can provide assistance at DC: (P) Yes  Would you like Case Management to discuss the discharge plan with any other family members/significant others, and if so, who?    Plans to Return to Present Housing: Yes  Other Identified Issues/Barriers to RETURNING to current housing: None  Potential Assistance needed at discharge: (P) N/A            Potential DME:    Patient expects to discharge to: (P) House  Plan for transportation at discharge: (P) Family    Financial    Payor: MEDICARE / Plan: MEDICARE PART A AND B / Product Type: *No Product type* /     Does insurance require precert for SNF: No    Potential assistance Purchasing Medications: (P) No  Meds-to-Beds request[facility]        [id number] [place], [place] - [address] [facility] - P [phone] - F [phone]  [address] [facility] [address]  Phone: [phone] Fax: [phone]    [facility] - [place] - [address] [facility] - P [phone] - F [phone]  [facility]  [place] [address]  Phone: [phone] Fax: [phone]      Notes:    Factors facilitating achievement of predicted outcomes: Family support, Caregiver support, Friend support, Motivated, Cooperative, and Pleasant    Barriers to discharge:  None  Additional Case Management Notes: Anticipate to home when medically stable. No discharge needs at this time.    The Plan for Transition of Care is related to the following treatment goals of Arterial occlusion [I70.90]  Acute lower limb ischemia [I99.8]    IF APPLICABLE: The Patient and/or patient representative Zamzam and her family were provided with a choice of provider and agrees with the discharge plan. Freedom of choice list with basic dialogue that supports the patient's individualized plan of care/goals and shares the quality data associated with the providers was provided to: (P) Patient   Patient Representative Name:       The Patient and/or  Patient Representative Agree with the Discharge Plan? (P) Yes    Roshni Valle  Case Management Department  Ph:  Fax:

## 2024-02-21 NOTE — CARE COORDINATION
02/21/24 1143   /Social Work Whiteboard Notes   /Social Work Whiteboard RED 02/21/24 Anticipate to home when medically stable. No discharge needs at this time. Tll-CM

## 2024-02-21 NOTE — PROGRESS NOTES
Awake and Alert this morning sitting up in bed  VSS, Afebrile, Labs stable, cont to watch H/H  Left groin stable  Proveena intact  Cont to have left foot pain, foot is cool  + doppler signal at distal BPG, unable to find signal at DP and PT    Cont Heparin gtt  Arterial Duplex today

## 2024-02-21 NOTE — PROGRESS NOTES
Progress Note  Hospitalist Service    Patient: Zamzam Calxito MRN: [id number]   SSN: [id number]  YOB: 1950   Age: 73 y.o.  Sex: female      Admit Date: 2/19/2024    LOS: 2 days   Chief Complaint   Patient presents with    Leg Pain       Subjective:     Patient seen and examined.  Awaiting Duplex US results.     Objective:     Vitals:  /62   Pulse 84   Temp 99.1 °F (37.3 °C) (Oral)   Resp 19   Ht 1.651 m (5' 5\")   Wt 56.7 kg (125 lb)   SpO2 96%   BMI 20.80 kg/m²     Physical Exam:   General appearance: alert, appears stated age, and cooperative  Lungs: clear to auscultation bilaterally  Heart: regular rate and rhythm, S1, S2 normal, no murmur, click, rub or gallop  Abdomen: soft, non-tender; bowel sounds normal; no masses,  no organomegaly  Pulses: 2+ and symmetric  Skin: Skin color, texture, turgor normal. No rashes or lesions  Neuro:  normal without focal findings, mental status, speech normal, alert and oriented x3, FRIDA, and reflexes normal and symmetric    Intake and Output:  Current Shift: 02/21 0701 - 02/21 1900  In: 240 [P.O.:240]  Out: 225 [Urine:225]  Last three shifts: 02/19 1901 - 02/21 0700  In: 1500 [I.V.:1500]  Out: 725 [Urine:675]    Lab/Data Review:  Recent Results (from the past 12 hour(s))   Vascular duplex lower arterial bypass graft left    Collection Time: 02/21/24  4:11 PM   Result Value Ref Range    Body Surface Area 1.61 m2         Key Findings or tests:       Telemetry NONE   Oxygen NONE     Assessment and Plan:     Acute occlusion of left femoral artery bypass graft now s/p femoral artery bypass with patch to left leg, angioplasty left common femoral artery to bypass graft. Continues to have continued pain in left foot. No pulses at DP and PT. Awaiting Duplex arterial duplex read.   Acute lower limb ischemia, s/p angioplasty   Crohns Disease on Remicade   Hyperparathyroidism  COPD - on Trelegy at home. Will sub Brovana + Pulmicort.        Diamante Vázquez,

## 2024-02-21 NOTE — PROGRESS NOTES
Bedside shift change report given to CHIQUI Spaulding and CHIQUI Cameron (oncoming nurse) by CHIQUI Torres (offgoing nurse). Report included the following information Nurse Handoff Report, Intake/Output, Recent Results, Cardiac Rhythm NSR, and Alarm Parameters.        0940:Medicated as per MAR.  0950: c/o pain in the left leg given PRN dilaudid    1520: off unit to vascular dept with transport tech.    1626: received call from Vascular that there is occlusion in distal bypass., informed DR Jha -made orders for full strength DVT heparin protocol.    1830: for angio florencio, for NPO post midnight.    Bedside shift change report given to CHIQUI Torres (oncoming nurse) by CHIQUI Spaulding and CHIQUI Cameron (offgoing nurse). Report included the following information Nurse Handoff Report, Intake/Output, Cardiac Rhythm NSR, and Alarm Parameters.

## 2024-02-21 NOTE — PERIOP NOTE
TRANSFER - OUT REPORT:    Verbal report given to [facility] on Zamzam Calixto  being transferred to room 2308 for routine progression of patient care       Report consisted of patient's Situation, Background, Assessment and   Recommendations(SBAR).     Information from the following report(s) Nurse Handoff Report, Surgery Report, Intake/Output, and MAR was reviewed with the receiving nurse.           Lines:   Peripheral IV 02/19/24 Left Antecubital (Active)   Site Assessment Clean, dry & intact 02/20/24 1958   Line Status Capped 02/20/24 1958   Line Care Connections checked and tightened 02/20/24 0825   Phlebitis Assessment No symptoms 02/20/24 1958   Infiltration Assessment 0 02/20/24 1958   Alcohol Cap Used Yes 02/20/24 0825   Dressing Status Clean, dry & intact 02/20/24 1958   Dressing Type Transparent 02/20/24 1958   Dressing Intervention New 02/19/24 1824       Peripheral IV 02/20/24 Distal;Left;Posterior Forearm (Active)   Site Assessment Clean, dry & intact 02/20/24 1958   Line Status Capped 02/20/24 1958   Line Care Connections checked and tightened 02/20/24 0825   Phlebitis Assessment No symptoms 02/20/24 1958   Infiltration Assessment 0 02/20/24 1958   Alcohol Cap Used Yes 02/20/24 0825   Dressing Status New dressing applied 02/20/24 1958   Dressing Type Transparent 02/20/24 1958       Arterial Line 02/20/24 Right Radial (Active)       Introducer 02/20/24 Internal jugular Right (Active)   Specific Qualities IV infusing 02/20/24 1958        Opportunity for questions and clarification was provided.      Patient transported with:  O2 @ 3lpm and Tech

## 2024-02-21 NOTE — PROGRESS NOTES
2040: TRANSFER - IN REPORT:    Verbal report received from CHIQUI Mariee on Zamzam Calixto being received from PACU for routine progression of care.     Report consisted of patient’s Situation, Background, Assessment and Recommendations(SBAR).     Information from the following report(s) SBAR, Kardex, Intake/Output, MAR, Recent Results, and Cardiac Rhythm NSR  was reviewed. Opportunity for questions and clarification was provided.      Assessment completed upon patient’s arrival to unit and care assumed.     2115: Patient received to room 2308. Patient connected to monitor,V/S obtained and assessment completed. Pt very drowsy but able to answer questions asked. A/Ox4; no c/o pain or SOB at this time. 95% SpO2 on 3LNC. Left femoral incision site noted prevena dressing; no bleeding or hematoma noted. Left lower extremities noted warm and pink in color except left foot which is noted to be cyanotic, cool to touch and no palpable pulses. Per CHIQUI Mariee \"Dr. Jackson is aware.\" Plan of care reviewed. Patient oriented to room and call light. Patient aware to use call light to communicate any pain or needs.     Admission skin assessment completed with CHIQUI Rodney and reveals the following: Skin intact; prevena dressing noted on the left femoral incision area.     2139: Ordered continuous heparin gtt given to pt. Scheduled meds given.     0000: Left foot warmer compared initial assessment except the toes area. Toes are still cool to touch and cyanotic. Left post tibial pulse present but very faint using the doppler; however, pedal pulse is still absent.     0102: Assisted pt to the bedside commode; noted small loose brown stool. PRN pain med given per pt request for c/o pain on the femoral incision area.     0340: PRN pain med given per pt request.     0400: Toes are still cool to touch and cyanotic. Left post tibial pulse present but very faint using the doppler; however, pedal pulse is still absent.     0740: Bedside shift  change report given to CHIQUI Roman/CHIQUI Cameron (oncoming nurse) by CHIQUI Torres (offgoing nurse). Report included the following information SBAR, Kardex, Intake/Output, MAR, Recent Results, and Cardiac Rhythm NSR .

## 2024-02-22 ENCOUNTER — ANESTHESIA EVENT (OUTPATIENT)
Dept: CARDIOTHORACIC SURGERY | Facility: HOSPITAL | Age: 74
End: 2024-02-22
Payer: MEDICARE

## 2024-02-22 ENCOUNTER — ANESTHESIA (OUTPATIENT)
Dept: CARDIOTHORACIC SURGERY | Facility: HOSPITAL | Age: 74
End: 2024-02-22
Payer: MEDICARE

## 2024-02-22 ENCOUNTER — APPOINTMENT (OUTPATIENT)
Facility: HOSPITAL | Age: 74
End: 2024-02-22
Payer: MEDICARE

## 2024-02-22 LAB
APTT PPP: 84.6 SEC (ref 23–36.4)
APTT PPP: 89.4 SEC (ref 23–36.4)
ERYTHROCYTE [DISTWIDTH] IN BLOOD BY AUTOMATED COUNT: 17.5 % (ref 11.6–14.5)
ERYTHROCYTE [DISTWIDTH] IN BLOOD BY AUTOMATED COUNT: 17.5 % (ref 11.6–14.5)
HCT VFR BLD AUTO: 32.4 % (ref 35–45)
HCT VFR BLD AUTO: 32.5 % (ref 35–45)
HGB BLD-MCNC: 10.3 G/DL (ref 12–16)
HGB BLD-MCNC: 10.5 G/DL (ref 12–16)
MCH RBC QN AUTO: 28.5 PG (ref 24–34)
MCH RBC QN AUTO: 28.8 PG (ref 24–34)
MCHC RBC AUTO-ENTMCNC: 31.8 G/DL (ref 31–37)
MCHC RBC AUTO-ENTMCNC: 32.3 G/DL (ref 31–37)
MCV RBC AUTO: 89 FL (ref 78–100)
MCV RBC AUTO: 89.5 FL (ref 78–100)
NRBC # BLD: 0 K/UL (ref 0–0.01)
NRBC # BLD: 0 K/UL (ref 0–0.01)
NRBC BLD-RTO: 0 PER 100 WBC
NRBC BLD-RTO: 0 PER 100 WBC
PLATELET # BLD AUTO: 203 K/UL (ref 135–420)
PLATELET # BLD AUTO: 219 K/UL (ref 135–420)
PMV BLD AUTO: 10.2 FL (ref 9.2–11.8)
PMV BLD AUTO: 11 FL (ref 9.2–11.8)
RBC # BLD AUTO: 3.62 M/UL (ref 4.2–5.3)
RBC # BLD AUTO: 3.65 M/UL (ref 4.2–5.3)
WBC # BLD AUTO: 10 K/UL (ref 4.6–13.2)
WBC # BLD AUTO: 8.6 K/UL (ref 4.6–13.2)

## 2024-02-22 PROCEDURE — 6370000000 HC RX 637 (ALT 250 FOR IP): Performed by: INTERNAL MEDICINE

## 2024-02-22 PROCEDURE — 85027 COMPLETE CBC AUTOMATED: CPT

## 2024-02-22 PROCEDURE — 36415 COLL VENOUS BLD VENIPUNCTURE: CPT

## 2024-02-22 PROCEDURE — 2580000003 HC RX 258: Performed by: SURGERY

## 2024-02-22 PROCEDURE — 2500000003 HC RX 250 WO HCPCS: Performed by: SURGERY

## 2024-02-22 PROCEDURE — 6360000002 HC RX W HCPCS: Performed by: NURSE ANESTHETIST, CERTIFIED REGISTERED

## 2024-02-22 PROCEDURE — A4217 STERILE WATER/SALINE, 500 ML: HCPCS | Performed by: SURGERY

## 2024-02-22 PROCEDURE — 6360000002 HC RX W HCPCS: Performed by: SURGERY

## 2024-02-22 PROCEDURE — C1769 GUIDE WIRE: HCPCS | Performed by: SURGERY

## 2024-02-22 PROCEDURE — 6360000004 HC RX CONTRAST MEDICATION: Performed by: SURGERY

## 2024-02-22 PROCEDURE — 85730 THROMBOPLASTIN TIME PARTIAL: CPT

## 2024-02-22 PROCEDURE — 2580000003 HC RX 258: Performed by: NURSE ANESTHETIST, CERTIFIED REGISTERED

## 2024-02-22 PROCEDURE — 2720000010 HC SURG SUPPLY STERILE: Performed by: SURGERY

## 2024-02-22 PROCEDURE — 3700000001 HC ADD 15 MINUTES (ANESTHESIA): Performed by: SURGERY

## 2024-02-22 PROCEDURE — 6360000002 HC RX W HCPCS: Performed by: INTERNAL MEDICINE

## 2024-02-22 PROCEDURE — 3600000012 HC SURGERY LEVEL 2 ADDTL 15MIN: Performed by: SURGERY

## 2024-02-22 PROCEDURE — 3700000000 HC ANESTHESIA ATTENDED CARE: Performed by: SURGERY

## 2024-02-22 PROCEDURE — 2580000003 HC RX 258: Performed by: INTERNAL MEDICINE

## 2024-02-22 PROCEDURE — B41G1ZZ FLUOROSCOPY OF LEFT LOWER EXTREMITY ARTERIES USING LOW OSMOLAR CONTRAST: ICD-10-PCS | Performed by: SURGERY

## 2024-02-22 PROCEDURE — 047L3ZZ DILATION OF LEFT FEMORAL ARTERY, PERCUTANEOUS APPROACH: ICD-10-PCS | Performed by: SURGERY

## 2024-02-22 PROCEDURE — C1725 CATH, TRANSLUMIN NON-LASER: HCPCS | Performed by: SURGERY

## 2024-02-22 PROCEDURE — 94640 AIRWAY INHALATION TREATMENT: CPT

## 2024-02-22 PROCEDURE — 2500000003 HC RX 250 WO HCPCS: Performed by: NURSE ANESTHETIST, CERTIFIED REGISTERED

## 2024-02-22 PROCEDURE — 047N3ZZ DILATION OF LEFT POPLITEAL ARTERY, PERCUTANEOUS APPROACH: ICD-10-PCS | Performed by: SURGERY

## 2024-02-22 PROCEDURE — 2700000000 HC OXYGEN THERAPY PER DAY

## 2024-02-22 PROCEDURE — 2140000001 HC CVICU INTERMEDIATE R&B

## 2024-02-22 PROCEDURE — 99232 SBSQ HOSP IP/OBS MODERATE 35: CPT | Performed by: STUDENT IN AN ORGANIZED HEALTH CARE EDUCATION/TRAINING PROGRAM

## 2024-02-22 PROCEDURE — 3600000002 HC SURGERY LEVEL 2 BASE: Performed by: SURGERY

## 2024-02-22 PROCEDURE — C1894 INTRO/SHEATH, NON-LASER: HCPCS | Performed by: SURGERY

## 2024-02-22 PROCEDURE — C1887 CATHETER, GUIDING: HCPCS | Performed by: SURGERY

## 2024-02-22 PROCEDURE — 94761 N-INVAS EAR/PLS OXIMETRY MLT: CPT

## 2024-02-22 PROCEDURE — 7100000000 HC PACU RECOVERY - FIRST 15 MIN: Performed by: SURGERY

## 2024-02-22 PROCEDURE — 3E05317 INTRODUCTION OF OTHER THROMBOLYTIC INTO PERIPHERAL ARTERY, PERCUTANEOUS APPROACH: ICD-10-PCS | Performed by: SURGERY

## 2024-02-22 PROCEDURE — 7100000001 HC PACU RECOVERY - ADDTL 15 MIN: Performed by: SURGERY

## 2024-02-22 PROCEDURE — 2709999900 HC NON-CHARGEABLE SUPPLY: Performed by: SURGERY

## 2024-02-22 PROCEDURE — 047J3Z1 DILATION OF LEFT EXTERNAL ILIAC ARTERY USING DRUG-COATED BALLOON, PERCUTANEOUS APPROACH: ICD-10-PCS | Performed by: SURGERY

## 2024-02-22 RX ORDER — FENTANYL CITRATE 50 UG/ML
INJECTION, SOLUTION INTRAMUSCULAR; INTRAVENOUS PRN
Status: DISCONTINUED | OUTPATIENT
Start: 2024-02-22 | End: 2024-02-22 | Stop reason: SDUPTHER

## 2024-02-22 RX ORDER — EPHEDRINE SULFATE/0.9% NACL/PF 50 MG/5 ML
SYRINGE (ML) INTRAVENOUS PRN
Status: DISCONTINUED | OUTPATIENT
Start: 2024-02-22 | End: 2024-02-22 | Stop reason: SDUPTHER

## 2024-02-22 RX ORDER — SODIUM CHLORIDE, SODIUM LACTATE, POTASSIUM CHLORIDE, CALCIUM CHLORIDE 600; 310; 30; 20 MG/100ML; MG/100ML; MG/100ML; MG/100ML
INJECTION, SOLUTION INTRAVENOUS CONTINUOUS
Status: DISCONTINUED | OUTPATIENT
Start: 2024-02-22 | End: 2024-02-23 | Stop reason: HOSPADM

## 2024-02-22 RX ORDER — SODIUM CHLORIDE, SODIUM LACTATE, POTASSIUM CHLORIDE, CALCIUM CHLORIDE 600; 310; 30; 20 MG/100ML; MG/100ML; MG/100ML; MG/100ML
INJECTION, SOLUTION INTRAVENOUS CONTINUOUS PRN
Status: DISCONTINUED | OUTPATIENT
Start: 2024-02-22 | End: 2024-02-22 | Stop reason: SDUPTHER

## 2024-02-22 RX ORDER — CEFAZOLIN SODIUM 1 G/3ML
INJECTION, POWDER, FOR SOLUTION INTRAMUSCULAR; INTRAVENOUS PRN
Status: DISCONTINUED | OUTPATIENT
Start: 2024-02-22 | End: 2024-02-22 | Stop reason: SDUPTHER

## 2024-02-22 RX ORDER — APREPITANT 40 MG/1
40 CAPSULE ORAL
Status: COMPLETED | OUTPATIENT
Start: 2024-02-22 | End: 2024-02-22

## 2024-02-22 RX ORDER — IODIXANOL 320 MG/ML
INJECTION, SOLUTION INTRAVASCULAR
Status: DISCONTINUED
Start: 2024-02-22 | End: 2024-02-22 | Stop reason: WASHOUT

## 2024-02-22 RX ORDER — PROPOFOL 10 MG/ML
INJECTION, EMULSION INTRAVENOUS PRN
Status: DISCONTINUED | OUTPATIENT
Start: 2024-02-22 | End: 2024-02-22 | Stop reason: SDUPTHER

## 2024-02-22 RX ORDER — NITROGLYCERIN 5 MG/ML
INJECTION, SOLUTION INTRAVENOUS
Status: DISPENSED
Start: 2024-02-22 | End: 2024-02-22

## 2024-02-22 RX ORDER — HEPARIN SODIUM 10000 [USP'U]/100ML
5-30 INJECTION, SOLUTION INTRAVENOUS CONTINUOUS
Status: DISCONTINUED | OUTPATIENT
Start: 2024-02-23 | End: 2024-02-23 | Stop reason: ALTCHOICE

## 2024-02-22 RX ORDER — HEPARIN SODIUM 200 [USP'U]/100ML
INJECTION, SOLUTION INTRAVENOUS CONTINUOUS PRN
Status: COMPLETED | OUTPATIENT
Start: 2024-02-22 | End: 2024-02-22

## 2024-02-22 RX ORDER — HEPARIN SODIUM 1000 [USP'U]/ML
80 INJECTION, SOLUTION INTRAVENOUS; SUBCUTANEOUS ONCE
Status: COMPLETED | OUTPATIENT
Start: 2024-02-23 | End: 2024-02-23

## 2024-02-22 RX ORDER — WATER 10 ML/10ML
INJECTION INTRAMUSCULAR; INTRAVENOUS; SUBCUTANEOUS
Status: DISPENSED
Start: 2024-02-22 | End: 2024-02-22

## 2024-02-22 RX ORDER — MAGNESIUM HYDROXIDE 1200 MG/15ML
LIQUID ORAL CONTINUOUS PRN
Status: COMPLETED | OUTPATIENT
Start: 2024-02-22 | End: 2024-02-22

## 2024-02-22 RX ORDER — HEPARIN SODIUM 1000 [USP'U]/ML
INJECTION, SOLUTION INTRAVENOUS; SUBCUTANEOUS PRN
Status: DISCONTINUED | OUTPATIENT
Start: 2024-02-22 | End: 2024-02-22 | Stop reason: SDUPTHER

## 2024-02-22 RX ORDER — HEPARIN SODIUM 200 [USP'U]/100ML
INJECTION, SOLUTION INTRAVENOUS
Status: DISPENSED
Start: 2024-02-22 | End: 2024-02-22

## 2024-02-22 RX ORDER — IODIXANOL 320 MG/ML
INJECTION, SOLUTION INTRAVASCULAR
Status: DISPENSED
Start: 2024-02-22 | End: 2024-02-22

## 2024-02-22 RX ORDER — DEXAMETHASONE SODIUM PHOSPHATE 4 MG/ML
INJECTION, SOLUTION INTRA-ARTICULAR; INTRALESIONAL; INTRAMUSCULAR; INTRAVENOUS; SOFT TISSUE PRN
Status: DISCONTINUED | OUTPATIENT
Start: 2024-02-22 | End: 2024-02-22 | Stop reason: SDUPTHER

## 2024-02-22 RX ORDER — PHENYLEPHRINE HCL IN 0.9% NACL 1 MG/10 ML
SYRINGE (ML) INTRAVENOUS PRN
Status: DISCONTINUED | OUTPATIENT
Start: 2024-02-22 | End: 2024-02-22 | Stop reason: SDUPTHER

## 2024-02-22 RX ORDER — FENTANYL CITRATE 50 UG/ML
50 INJECTION, SOLUTION INTRAMUSCULAR; INTRAVENOUS EVERY 5 MIN PRN
Status: DISCONTINUED | OUTPATIENT
Start: 2024-02-22 | End: 2024-02-22 | Stop reason: HOSPADM

## 2024-02-22 RX ORDER — LIDOCAINE HYDROCHLORIDE 20 MG/ML
INJECTION, SOLUTION EPIDURAL; INFILTRATION; INTRACAUDAL; PERINEURAL PRN
Status: DISCONTINUED | OUTPATIENT
Start: 2024-02-22 | End: 2024-02-22 | Stop reason: SDUPTHER

## 2024-02-22 RX ORDER — ONDANSETRON 2 MG/ML
INJECTION INTRAMUSCULAR; INTRAVENOUS PRN
Status: DISCONTINUED | OUTPATIENT
Start: 2024-02-22 | End: 2024-02-22 | Stop reason: SDUPTHER

## 2024-02-22 RX ORDER — ONDANSETRON 2 MG/ML
4 INJECTION INTRAMUSCULAR; INTRAVENOUS
Status: DISCONTINUED | OUTPATIENT
Start: 2024-02-22 | End: 2024-02-22 | Stop reason: HOSPADM

## 2024-02-22 RX ORDER — LABETALOL HYDROCHLORIDE 5 MG/ML
10 INJECTION, SOLUTION INTRAVENOUS
Status: DISCONTINUED | OUTPATIENT
Start: 2024-02-22 | End: 2024-02-22 | Stop reason: HOSPADM

## 2024-02-22 RX ORDER — HEPARIN SODIUM 10000 [USP'U]/100ML
500 INJECTION, SOLUTION INTRAVENOUS CONTINUOUS
Status: DISCONTINUED | OUTPATIENT
Start: 2024-02-22 | End: 2024-02-23 | Stop reason: ALTCHOICE

## 2024-02-22 RX ORDER — IODIXANOL 320 MG/ML
INJECTION, SOLUTION INTRAVASCULAR PRN
Status: DISCONTINUED | OUTPATIENT
Start: 2024-02-22 | End: 2024-02-22 | Stop reason: ALTCHOICE

## 2024-02-22 RX ORDER — NITROGLYCERIN 5 MG/ML
INJECTION, SOLUTION INTRAVENOUS
Status: DISCONTINUED
Start: 2024-02-22 | End: 2024-02-22 | Stop reason: WASHOUT

## 2024-02-22 RX ADMIN — PREGABALIN 200 MG: 50 CAPSULE ORAL at 21:13

## 2024-02-22 RX ADMIN — Medication 100 MCG: at 11:18

## 2024-02-22 RX ADMIN — Medication 10 MG: at 10:06

## 2024-02-22 RX ADMIN — ARFORMOTEROL TARTRATE 15 MCG: 15 SOLUTION RESPIRATORY (INHALATION) at 21:42

## 2024-02-22 RX ADMIN — HYDROMORPHONE HYDROCHLORIDE 0.5 MG: 1 INJECTION, SOLUTION INTRAMUSCULAR; INTRAVENOUS; SUBCUTANEOUS at 07:01

## 2024-02-22 RX ADMIN — BUDESONIDE 250 MCG: 0.25 INHALANT RESPIRATORY (INHALATION) at 21:42

## 2024-02-22 RX ADMIN — SODIUM CHLORIDE, PRESERVATIVE FREE 10 ML: 5 INJECTION INTRAVENOUS at 14:36

## 2024-02-22 RX ADMIN — HEPARIN SODIUM 2500 UNITS: 1000 INJECTION INTRAVENOUS; SUBCUTANEOUS at 10:59

## 2024-02-22 RX ADMIN — FENTANYL CITRATE 50 MCG: 50 INJECTION INTRAMUSCULAR; INTRAVENOUS at 09:14

## 2024-02-22 RX ADMIN — SODIUM CHLORIDE, SODIUM LACTATE, POTASSIUM CHLORIDE, AND CALCIUM CHLORIDE: 600; 310; 30; 20 INJECTION, SOLUTION INTRAVENOUS at 09:14

## 2024-02-22 RX ADMIN — SODIUM CHLORIDE, POTASSIUM CHLORIDE, SODIUM LACTATE AND CALCIUM CHLORIDE: 600; 310; 30; 20 INJECTION, SOLUTION INTRAVENOUS at 14:27

## 2024-02-22 RX ADMIN — SULFAMETHOXAZOLE AND TRIMETHOPRIM 1 TABLET: 400; 80 TABLET ORAL at 14:53

## 2024-02-22 RX ADMIN — FENTANYL CITRATE 25 MCG: 50 INJECTION INTRAMUSCULAR; INTRAVENOUS at 10:53

## 2024-02-22 RX ADMIN — Medication 100 MCG: at 11:39

## 2024-02-22 RX ADMIN — ASPIRIN 81 MG CHEWABLE TABLET 81 MG: 81 TABLET CHEWABLE at 14:53

## 2024-02-22 RX ADMIN — PROPOFOL 150 MG: 10 INJECTION, EMULSION INTRAVENOUS at 09:25

## 2024-02-22 RX ADMIN — CINACALCET HYDROCHLORIDE 30 MG: 30 TABLET, FILM COATED ORAL at 21:13

## 2024-02-22 RX ADMIN — CEFAZOLIN 2 G: 1 INJECTION, POWDER, FOR SOLUTION INTRAMUSCULAR; INTRAVENOUS at 09:52

## 2024-02-22 RX ADMIN — HYDROMORPHONE HYDROCHLORIDE 0.5 MG: 1 INJECTION, SOLUTION INTRAMUSCULAR; INTRAVENOUS; SUBCUTANEOUS at 19:43

## 2024-02-22 RX ADMIN — Medication 100 MCG: at 09:33

## 2024-02-22 RX ADMIN — Medication 10 MG: at 10:15

## 2024-02-22 RX ADMIN — POLYETHYLENE GLYCOL 3350 17 G: 17 POWDER, FOR SOLUTION ORAL at 18:40

## 2024-02-22 RX ADMIN — LIDOCAINE HYDROCHLORIDE 80 MG: 20 INJECTION, SOLUTION EPIDURAL; INFILTRATION; INTRACAUDAL; PERINEURAL at 09:25

## 2024-02-22 RX ADMIN — HYDROMORPHONE HYDROCHLORIDE 0.5 MG: 1 INJECTION, SOLUTION INTRAMUSCULAR; INTRAVENOUS; SUBCUTANEOUS at 00:04

## 2024-02-22 RX ADMIN — SODIUM CHLORIDE, PRESERVATIVE FREE 10 ML: 5 INJECTION INTRAVENOUS at 21:16

## 2024-02-22 RX ADMIN — APREPITANT 40 MG: 40 CAPSULE ORAL at 08:57

## 2024-02-22 RX ADMIN — HEPARIN SODIUM 5000 UNITS: 1000 INJECTION INTRAVENOUS; SUBCUTANEOUS at 10:03

## 2024-02-22 RX ADMIN — DEXTROSE AND SODIUM CHLORIDE: 5; 450 INJECTION, SOLUTION INTRAVENOUS at 07:48

## 2024-02-22 RX ADMIN — Medication 10 MG: at 09:35

## 2024-02-22 RX ADMIN — ONDANSETRON 4 MG: 2 INJECTION INTRAMUSCULAR; INTRAVENOUS at 11:39

## 2024-02-22 RX ADMIN — DEXAMETHASONE SODIUM PHOSPHATE 8 MG: 4 INJECTION INTRA-ARTICULAR; INTRALESIONAL; INTRAMUSCULAR; INTRAVENOUS; SOFT TISSUE at 09:34

## 2024-02-22 RX ADMIN — HEPARIN SODIUM AND DEXTROSE 18 UNITS/KG/HR: 10000; 5 INJECTION INTRAVENOUS at 07:01

## 2024-02-22 RX ADMIN — IPRATROPIUM BROMIDE 0.5 MG: 0.5 SOLUTION RESPIRATORY (INHALATION) at 21:42

## 2024-02-22 RX ADMIN — HYDROMORPHONE HYDROCHLORIDE 0.5 MG: 1 INJECTION, SOLUTION INTRAMUSCULAR; INTRAVENOUS; SUBCUTANEOUS at 14:35

## 2024-02-22 RX ADMIN — HEPARIN SODIUM AND DEXTROSE 500 UNITS/HR: 10000; 5 INJECTION INTRAVENOUS at 14:28

## 2024-02-22 RX ADMIN — SODIUM CHLORIDE, POTASSIUM CHLORIDE, SODIUM LACTATE AND CALCIUM CHLORIDE: 600; 310; 30; 20 INJECTION, SOLUTION INTRAVENOUS at 16:17

## 2024-02-22 ASSESSMENT — PAIN DESCRIPTION - LOCATION
LOCATION: FOOT
LOCATION: LEG
LOCATION: FOOT;LEG
LOCATION: FOOT
LOCATION: FOOT

## 2024-02-22 ASSESSMENT — PAIN DESCRIPTION - DESCRIPTORS
DESCRIPTORS: ACHING;SQUEEZING;BURNING
DESCRIPTORS: SQUEEZING
DESCRIPTORS: ACHING
DESCRIPTORS: ACHING;SQUEEZING;BURNING
DESCRIPTORS: ACHING

## 2024-02-22 ASSESSMENT — LIFESTYLE VARIABLES: SMOKING_STATUS: 1

## 2024-02-22 ASSESSMENT — PAIN SCALES - WONG BAKER
WONGBAKER_NUMERICALRESPONSE: 2
WONGBAKER_NUMERICALRESPONSE: 2

## 2024-02-22 ASSESSMENT — PAIN SCALES - GENERAL
PAINLEVEL_OUTOF10: 2
PAINLEVEL_OUTOF10: 7
PAINLEVEL_OUTOF10: 8
PAINLEVEL_OUTOF10: 0
PAINLEVEL_OUTOF10: 2
PAINLEVEL_OUTOF10: 7
PAINLEVEL_OUTOF10: 0

## 2024-02-22 ASSESSMENT — PAIN DESCRIPTION - ORIENTATION
ORIENTATION: LEFT

## 2024-02-22 ASSESSMENT — COPD QUESTIONNAIRES: CAT_SEVERITY: MODERATE

## 2024-02-22 NOTE — PROGRESS NOTES
Bedside shift change report given to CHIQUI Spaulding and CHIQUI Cameron (oncoming nurse) by CHIQUI Torres (offgoing nurse). Report included the following information Nurse Handoff Report, Intake/Output, Cardiac Rhythm NSR, and Alarm Parameters.      0805: patient off the floor to OR.    1414: patient came back to unit., assessment done.    1440: IV dilaudid given  for pain.    1640: c/o constipation, given glycolax PRN    1910:Bedside shift change report given to CHIQUI Torres (oncoming nurse) by CHIQUI Spaulding and CHIQUI Cameron (offgoing nurse). Report included the following information Nurse Handoff Report, Intake/Output, Cardiac Rhythm NSR, and Alarm Parameters.

## 2024-02-22 NOTE — PROGRESS NOTES
Progress Note  Hospitalist Service    Patient: Zamzam Calixto MRN: [id number]   SSN: [id number]  YOB: 1950   Age: 73 y.o.  Sex: female      Admit Date: 2/19/2024    LOS: 3 days   Chief Complaint   Patient presents with    Leg Pain       Subjective:     Patient seen and examined.  States left leg pain improving. Able to move all toes.    at bedside.     Objective:     Vitals:  /68   Pulse 77   Temp 97.7 °F (36.5 °C) (Oral)   Resp 15   Ht 1.651 m (5' 5\")   Wt 56.7 kg (125 lb)   SpO2 93%   BMI 20.80 kg/m²     Physical Exam:   General appearance: alert, appears stated age, and cooperative  Lungs: clear to auscultation bilaterally  Heart: regular rate and rhythm, S1, S2 normal, no murmur, click, rub or gallop  Abdomen: soft, non-tender; bowel sounds normal; no masses,  no organomegaly  Pulses: 2+ and symmetric  Skin: Skin color, texture, turgor normal. No rashes or lesions  Neuro:  normal without focal findings, mental status, speech normal, alert and oriented x3, FRIDA, and reflexes normal and symmetric    Intake and Output:  Current Shift: 02/22 0701 - 02/22 1900  In: 807.5 [I.V.:807.5]  Out: 1250 [Urine:1250]  Last three shifts: 02/20 1901 - 02/22 0700  In: 240 [P.O.:240]  Out: 950 [Urine:950]    Lab/Data Review:  Recent Results (from the past 12 hour(s))   CBC    Collection Time: 02/22/24  4:42 AM   Result Value Ref Range    WBC 10.0 4.6 - 13.2 K/uL    RBC 3.65 (L) 4.20 - 5.30 M/uL    Hemoglobin 10.5 (L) 12.0 - 16.0 g/dL    Hematocrit 32.5 (L) 35.0 - 45.0 %    MCV 89.0 78.0 - 100.0 FL    MCH 28.8 24.0 - 34.0 PG    MCHC 32.3 31.0 - 37.0 g/dL    RDW 17.5 (H) 11.6 - 14.5 %    Platelets 219 135 - 420 K/uL    MPV 11.0 9.2 - 11.8 FL    Nucleated RBCs 0.0 0  WBC    nRBC 0.00 0.00 - 0.01 K/uL   APTT    Collection Time: 02/22/24  7:38 AM   Result Value Ref Range    APTT 84.6 (H) 23.0 - 36.4 SEC         Key Findings or tests:       Telemetry NONE   Oxygen NONE     Assessment and

## 2024-02-22 NOTE — ANESTHESIA PRE PROCEDURE
pack/day for 50.0 years (50.0 ttl pk-yrs)     Types: Cigarettes   • Smokeless tobacco: Never   Substance Use Topics   • Alcohol use: No                                Ready to quit: Not Answered  Counseling given: Not Answered      Vital Signs (Current):   Vitals:    02/22/24 0034 02/22/24 0400 02/22/24 0701 02/22/24 0730   BP:  128/89  131/63   Pulse:  65     Resp: 20 18 18    Temp:  98.5 °F (36.9 °C)  97.9 °F (36.6 °C)   TempSrc:  Oral  Temporal   SpO2:  92%     Weight:       Height:                                                  BP Readings from Last 3 Encounters:   02/22/24 131/63   10/27/23 (!) 145/66   10/25/23 (!) 140/71       NPO Status: Time of last liquid consumption: 2350                        Time of last solid consumption: 1800                        Date of last liquid consumption: 02/21/24                        Date of last solid food consumption: 02/21/24    BMI:   Wt Readings from Last 3 Encounters:   02/19/24 56.7 kg (125 lb)   09/27/23 55.3 kg (122 lb)   08/23/23 55.2 kg (121 lb 9.6 oz)     Body mass index is 20.8 kg/m².    CBC:   Lab Results   Component Value Date/Time    WBC 10.0 02/22/2024 04:42 AM    RBC 3.65 02/22/2024 04:42 AM    HGB 10.5 02/22/2024 04:42 AM    HCT 32.5 02/22/2024 04:42 AM    MCV 89.0 02/22/2024 04:42 AM    RDW 17.5 02/22/2024 04:42 AM     02/22/2024 04:42 AM       CMP:   Lab Results   Component Value Date/Time     02/19/2024 05:58 PM    K 4.6 02/19/2024 05:58 PM     02/19/2024 05:58 PM    CO2 28 02/19/2024 05:58 PM    BUN 9 02/19/2024 05:58 PM    CREATININE 0.84 02/19/2024 05:58 PM    GFRAA >60 04/27/2022 11:16 AM    AGRATIO 0.9 02/19/2024 05:58 PM    AGRATIO 0.9 10/26/2022 11:20 AM    LABGLOM >60 02/19/2024 05:58 PM    GLUCOSE 89 02/19/2024 05:58 PM    PROT 7.9 02/19/2024 05:58 PM    CALCIUM 10.4 02/19/2024 05:58 PM    BILITOT 0.5 02/19/2024 05:58 PM    ALKPHOS 66 02/19/2024 05:58 PM    ALKPHOS 77 10/26/2022 11:20 AM    AST 46 02/19/2024 05:58 PM

## 2024-02-22 NOTE — OP NOTE
Operative Note      Patient: Zamzam Calixto  YOB: 1950  MRN: [id number]    Date of Procedure: 2/22/2024    Pre-Op Diagnosis Codes:     * Tibial artery occlusion, left ([facility]) [I70.202]    Post-Op Diagnosis: Same       Procedure:  Open exposure for access left axillary artery  Placement of catheter to left femoral artery, left leg angiogram.  Placement of catheter to left popliteal artery, left popliteal angiogram with interpretation  Balloon angioplasty of CryoVein bypass and popliteal artery with.  Balloon angioplasty of external iliac artery and common femoral artery with angiogram and interpretation Aangiogram and interpretation  Aortogram with interpretation  tPA lysis left popliteal and tibial arteries    Surgeon(s):  Mich Ashley MD    Assistant:   Surgical Assistant: Ranjana Fontaine    Anesthesia: General    Estimated Blood Loss (mL): Minimal    Complications: None    Specimens:   * No specimens in log *    Implants:  * No implants in log *      Drains:   Urinary Catheter 02/20/24 García (Active)   Catheter Indications Perioperative use for selected surgical procedures 02/22/24 0723   Site Assessment No urethral drainage 02/22/24 0723   Urine Color Yellow 02/22/24 0723   Urine Appearance Clear 02/22/24 0723   Collection Container Standard 02/22/24 0723   Securement Method Securing device (Describe) 02/22/24 0723   Catheter Care  Perineal wipes 02/22/24 0723   Catheter Best Practices  Drainage tube clipped to bed 02/22/24 0723   Status Draining;Patent 02/22/24 0723   Output (mL) 950 mL 02/22/24 0701       [REMOVED] Negative Pressure Wound Therapy Leg Anterior;Left;Proximal;Upper (Removed)       [REMOVED] Negative Pressure Wound Therapy Leg Left (Removed)       [REMOVED] Negative Pressure Wound Therapy Leg Inner (Removed)       [REMOVED] Urinary Catheter 02/27/23 García (Removed)       Findings: Aorta: The distal aorta is small and patent.  The left common iliac artery and external neck  is complete there is severe small vessel disease.  Exchanged the wire for a Bentson wire and pulled the sheath back.  I flushed the artery and repaired the site with interrupted 6-0 Prolene sutures.  There was a nice pulse in the artery postrepair.  Irrigated and closed the fascia with 3-0 interrupted.  Monocryl.  4-0 Monocryl and Dermabond for the skin.  She was awakened and transferred to recovery in stable condition    Electronically signed by Mich Jackson MD on 2/22/2024 at 11:50 AM

## 2024-02-22 NOTE — PLAN OF CARE
Problem: Discharge Planning  Goal: Discharge to home or other facility with appropriate resources  2/22/2024 1040 by Rakesh Kenney RN  Outcome: Progressing  Flowsheets (Taken 2/22/2024 0723)  Discharge to home or other facility with appropriate resources:   Identify barriers to discharge with patient and caregiver   Arrange for needed discharge resources and transportation as appropriate   Identify discharge learning needs (meds, wound care, etc)   Arrange for interpreters to assist at discharge as needed   Refer to discharge planning if patient needs post-hospital services based on physician order or complex needs related to functional status, cognitive ability or social support system  2/22/2024 0415 by Princess Vita Garsia RN  Outcome: Progressing  Flowsheets (Taken 2/21/2024 1925)  Discharge to home or other facility with appropriate resources:   Identify barriers to discharge with patient and caregiver   Arrange for needed discharge resources and transportation as appropriate     Problem: Safety - Adult  Goal: Free from fall injury  2/22/2024 1040 by Rakesh Kenney RN  Outcome: Progressing  2/22/2024 0415 by Princess Vita Garsia RN  Outcome: Progressing     Problem: ABCDS Injury Assessment  Goal: Absence of physical injury  2/22/2024 1040 by Rakesh Kenney RN  Outcome: Progressing  2/22/2024 0415 by Princess Vita Garsia RN  Outcome: Progressing  Flowsheets (Taken 2/21/2024 1925)  Absence of Physical Injury: Implement safety measures based on patient assessment     Problem: Pain  Goal: Verbalizes/displays adequate comfort level or baseline comfort level  2/22/2024 1040 by Rakesh Kenney RN  Outcome: Progressing  2/22/2024 0415 by Princess Vita Garsia RN  Outcome: Progressing  Flowsheets  Taken 2/22/2024 0400  Verbalizes/displays adequate comfort level or baseline comfort level:   Encourage patient to monitor pain and request assistance   Assess pain using appropriate pain scale    Implement non-pharmacological measures as appropriate and evaluate response  Taken 2/22/2024 0000  Verbalizes/displays adequate comfort level or baseline comfort level:   Encourage patient to monitor pain and request assistance   Assess pain using appropriate pain scale   Implement non-pharmacological measures as appropriate and evaluate response  Taken 2/21/2024 1925  Verbalizes/displays adequate comfort level or baseline comfort level:   Encourage patient to monitor pain and request assistance   Assess pain using appropriate pain scale   Implement non-pharmacological measures as appropriate and evaluate response

## 2024-02-22 NOTE — PERIOP NOTE
TRANSFER - OUT REPORT:    Verbal report given to roseanna Roman on Zamzam Calixto  being transferred to [facility] Stepdown for routine progression of patient care       Report consisted of patient's Situation, Background, Assessment and   Recommendations(SBAR).     Information from the following report(s) Nurse Handoff Report, Surgery Report, and Cardiac Rhythm sinus rhythm  was reviewed with the receiving nurse.           Lines:   Peripheral IV 02/19/24 Left Antecubital (Active)   Site Assessment Clean, dry & intact 02/22/24 0744   Line Status Capped 02/22/24 0744   Line Care Connections checked and tightened 02/22/24 0744   Phlebitis Assessment No symptoms 02/22/24 0744   Infiltration Assessment 0 02/22/24 0744   Alcohol Cap Used Yes 02/22/24 0744   Dressing Status Clean, dry & intact 02/22/24 0744   Dressing Type Transparent 02/22/24 0744   Dressing Intervention New 02/19/24 1824       Peripheral IV 02/20/24 Distal;Left;Posterior Forearm (Active)   Site Assessment Clean, dry & intact 02/22/24 0744   Line Status Infusing 02/22/24 0744   Line Care Connections checked and tightened 02/22/24 0744   Phlebitis Assessment No symptoms 02/22/24 0744   Infiltration Assessment 0 02/22/24 0744   Alcohol Cap Used Yes 02/22/24 0744   Dressing Status Clean, dry & intact 02/22/24 0744   Dressing Type Transparent 02/22/24 0744       Arterial Line 02/20/24 Right Radial (Active)       Introducer 02/20/24 Internal jugular Right (Active)   Specific Qualities IV infusing 02/20/24 1958   Dressing Status Clean, dry & intact 02/22/24 0744        Opportunity for questions and clarification was provided.      Patient transported with:  O2 @ 2lpm and Tech

## 2024-02-23 VITALS
WEIGHT: 125 LBS | OXYGEN SATURATION: 98 % | DIASTOLIC BLOOD PRESSURE: 63 MMHG | HEART RATE: 82 BPM | SYSTOLIC BLOOD PRESSURE: 128 MMHG | HEIGHT: 65 IN | TEMPERATURE: 97.9 F | RESPIRATION RATE: 17 BRPM | BODY MASS INDEX: 20.83 KG/M2

## 2024-02-23 LAB
ABO + RH BLD: NORMAL
APTT PPP: 33.9 SEC (ref 23–36.4)
BLD PROD TYP BPU: NORMAL
BLD PROD TYP BPU: NORMAL
BLOOD BANK DISPENSE STATUS: NORMAL
BLOOD BANK DISPENSE STATUS: NORMAL
BLOOD GROUP ANTIBODIES SERPL: NORMAL
BPU ID: NORMAL
BPU ID: NORMAL
CROSSMATCH RESULT: NORMAL
CROSSMATCH RESULT: NORMAL
ERYTHROCYTE [DISTWIDTH] IN BLOOD BY AUTOMATED COUNT: 17.3 % (ref 11.6–14.5)
HCT VFR BLD AUTO: 32 % (ref 35–45)
HGB BLD-MCNC: 10.1 G/DL (ref 12–16)
MCH RBC QN AUTO: 28.2 PG (ref 24–34)
MCHC RBC AUTO-ENTMCNC: 31.6 G/DL (ref 31–37)
MCV RBC AUTO: 89.4 FL (ref 78–100)
NRBC # BLD: 0 K/UL (ref 0–0.01)
NRBC BLD-RTO: 0 PER 100 WBC
PLATELET # BLD AUTO: 223 K/UL (ref 135–420)
PMV BLD AUTO: 11.2 FL (ref 9.2–11.8)
RBC # BLD AUTO: 3.58 M/UL (ref 4.2–5.3)
SPECIMEN EXP DATE BLD: NORMAL
UNIT DIVISION: 0
UNIT DIVISION: 0
WBC # BLD AUTO: 10.1 K/UL (ref 4.6–13.2)

## 2024-02-23 PROCEDURE — 94761 N-INVAS EAR/PLS OXIMETRY MLT: CPT

## 2024-02-23 PROCEDURE — 6360000002 HC RX W HCPCS: Performed by: INTERNAL MEDICINE

## 2024-02-23 PROCEDURE — 6370000000 HC RX 637 (ALT 250 FOR IP): Performed by: INTERNAL MEDICINE

## 2024-02-23 PROCEDURE — 94640 AIRWAY INHALATION TREATMENT: CPT

## 2024-02-23 PROCEDURE — 36415 COLL VENOUS BLD VENIPUNCTURE: CPT

## 2024-02-23 PROCEDURE — 2700000000 HC OXYGEN THERAPY PER DAY

## 2024-02-23 PROCEDURE — 6360000002 HC RX W HCPCS: Performed by: SURGERY

## 2024-02-23 PROCEDURE — 2580000003 HC RX 258: Performed by: INTERNAL MEDICINE

## 2024-02-23 PROCEDURE — 6370000000 HC RX 637 (ALT 250 FOR IP): Performed by: SURGERY

## 2024-02-23 PROCEDURE — 85730 THROMBOPLASTIN TIME PARTIAL: CPT

## 2024-02-23 PROCEDURE — 85027 COMPLETE CBC AUTOMATED: CPT

## 2024-02-23 RX ORDER — OXYCODONE HYDROCHLORIDE AND ACETAMINOPHEN 5; 325 MG/1; MG/1
1 TABLET ORAL EVERY 6 HOURS PRN
Qty: 12 TABLET | Refills: 0 | Status: SHIPPED | OUTPATIENT
Start: 2024-02-23 | End: 2024-02-26

## 2024-02-23 RX ORDER — SULFAMETHOXAZOLE AND TRIMETHOPRIM 400; 80 MG/1; MG/1
1 TABLET ORAL DAILY
Qty: 10 TABLET | Refills: 0
Start: 2024-02-24 | End: 2024-03-05

## 2024-02-23 RX ADMIN — CINACALCET HYDROCHLORIDE 30 MG: 30 TABLET, FILM COATED ORAL at 08:55

## 2024-02-23 RX ADMIN — HYDROMORPHONE HYDROCHLORIDE 0.5 MG: 1 INJECTION, SOLUTION INTRAMUSCULAR; INTRAVENOUS; SUBCUTANEOUS at 04:36

## 2024-02-23 RX ADMIN — BUDESONIDE 250 MCG: 0.25 INHALANT RESPIRATORY (INHALATION) at 08:05

## 2024-02-23 RX ADMIN — ACETAMINOPHEN 325MG 650 MG: 325 TABLET ORAL at 09:06

## 2024-02-23 RX ADMIN — HYDROMORPHONE HYDROCHLORIDE 0.5 MG: 1 INJECTION, SOLUTION INTRAMUSCULAR; INTRAVENOUS; SUBCUTANEOUS at 00:45

## 2024-02-23 RX ADMIN — ASPIRIN 81 MG CHEWABLE TABLET 81 MG: 81 TABLET CHEWABLE at 08:55

## 2024-02-23 RX ADMIN — ARFORMOTEROL TARTRATE 15 MCG: 15 SOLUTION RESPIRATORY (INHALATION) at 07:56

## 2024-02-23 RX ADMIN — SULFAMETHOXAZOLE AND TRIMETHOPRIM 1 TABLET: 400; 80 TABLET ORAL at 08:55

## 2024-02-23 RX ADMIN — PREGABALIN 100 MG: 50 CAPSULE ORAL at 08:55

## 2024-02-23 RX ADMIN — HEPARIN SODIUM 4540 UNITS: 1000 INJECTION INTRAVENOUS; SUBCUTANEOUS at 09:05

## 2024-02-23 RX ADMIN — IPRATROPIUM BROMIDE 0.5 MG: 0.5 SOLUTION RESPIRATORY (INHALATION) at 07:56

## 2024-02-23 RX ADMIN — SODIUM CHLORIDE, PRESERVATIVE FREE 10 ML: 5 INJECTION INTRAVENOUS at 09:00

## 2024-02-23 RX ADMIN — OXYCODONE HYDROCHLORIDE AND ACETAMINOPHEN 1 TABLET: 5; 325 TABLET ORAL at 08:55

## 2024-02-23 ASSESSMENT — PAIN SCALES - WONG BAKER
WONGBAKER_NUMERICALRESPONSE: 2
WONGBAKER_NUMERICALRESPONSE: 2

## 2024-02-23 ASSESSMENT — PAIN DESCRIPTION - DESCRIPTORS
DESCRIPTORS: ACHING
DESCRIPTORS: ACHING;TIGHTNESS
DESCRIPTORS: SORE
DESCRIPTORS: THROBBING;NUMBNESS
DESCRIPTORS: ACHING
DESCRIPTORS: ACHING

## 2024-02-23 ASSESSMENT — PAIN DESCRIPTION - LOCATION
LOCATION: FOOT;LEG
LOCATION: FOOT

## 2024-02-23 ASSESSMENT — PAIN DESCRIPTION - ORIENTATION
ORIENTATION: LEFT

## 2024-02-23 ASSESSMENT — PAIN SCALES - GENERAL
PAINLEVEL_OUTOF10: 2
PAINLEVEL_OUTOF10: 6
PAINLEVEL_OUTOF10: 6
PAINLEVEL_OUTOF10: 3
PAINLEVEL_OUTOF10: 3
PAINLEVEL_OUTOF10: 2
PAINLEVEL_OUTOF10: 7
PAINLEVEL_OUTOF10: 0
PAINLEVEL_OUTOF10: 4
PAINLEVEL_OUTOF10: 5
PAINLEVEL_OUTOF10: 8

## 2024-02-23 ASSESSMENT — PAIN DESCRIPTION - ONSET
ONSET: ON-GOING
ONSET: ON-GOING

## 2024-02-23 ASSESSMENT — PAIN - FUNCTIONAL ASSESSMENT
PAIN_FUNCTIONAL_ASSESSMENT: ACTIVITIES ARE NOT PREVENTED
PAIN_FUNCTIONAL_ASSESSMENT: PREVENTS OR INTERFERES SOME ACTIVE ACTIVITIES AND ADLS

## 2024-02-23 NOTE — PLAN OF CARE
Problem: Discharge Planning  Goal: Discharge to home or other facility with appropriate resources  2/23/2024 1431 by Мария Clarke RN  Outcome: Adequate for Discharge  Flowsheets (Taken 2/23/2024 0800)  Discharge to home or other facility with appropriate resources:   Identify barriers to discharge with patient and caregiver   Identify discharge learning needs (meds, wound care, etc)   Arrange for needed discharge resources and transportation as appropriate  2/23/2024 0606 by Princess Vita Garsia RN  Outcome: Progressing  Flowsheets (Taken 2/22/2024 1925)  Discharge to home or other facility with appropriate resources:   Identify barriers to discharge with patient and caregiver   Arrange for needed discharge resources and transportation as appropriate     Problem: Safety - Adult  Goal: Free from fall injury  2/23/2024 1431 by Мария Clarke RN  Outcome: Adequate for Discharge  Flowsheets (Taken 2/23/2024 0800)  Free From Fall Injury: Instruct family/caregiver on patient safety  2/23/2024 0606 by Princess Vita Garsia RN  Outcome: Progressing     Problem: ABCDS Injury Assessment  Goal: Absence of physical injury  2/23/2024 1431 by Мария Clarke RN  Outcome: Adequate for Discharge  2/23/2024 0606 by Princess Vita Garsia RN  Outcome: Progressing  Flowsheets (Taken 2/22/2024 1925)  Absence of Physical Injury: Implement safety measures based on patient assessment     Problem: Pain  Goal: Verbalizes/displays adequate comfort level or baseline comfort level  2/23/2024 1431 by Мария Clarke RN  Outcome: Adequate for Discharge  2/23/2024 0606 by Princess Vita Garsia RN  Outcome: Progressing  Flowsheets  Taken 2/23/2024 0400  Verbalizes/displays adequate comfort level or baseline comfort level:   Encourage patient to monitor pain and request assistance   Assess pain using appropriate pain scale   Implement non-pharmacological measures as appropriate and evaluate response  Taken 2/23/2024

## 2024-02-23 NOTE — PROGRESS NOTES
1915: Bedside shift change report given to CHIQUI Torres (oncoming nurse) by CHIQUI Roman (offgoing nurse). Report included the following information SBAR, Kardex, Intake/Output, MAR, Recent Results, and Cardiac Rhythm NSR .     Per Dayshift RN, \"arndt was removed at 1730 and pt has not voided yet.   Pt had a bowel movement an hour after arndt was removed but there's no urine.\"  Wound Prevention Checklist    Patient: Zamzam Calixto (73 y.o. female)  Date: 2/23/2024  Diagnosis: Arterial occlusion [I70.90]  Acute lower limb ischemia [I99.8] Arterial occlusion    Precautions:         []  Heel prevention boots placed on patient    []  Patient turned q2h during shift    []  Lift team ordered    []  Patient on [place] bed/Specialty bed    []  Each Wound is documented during shift (Stage, Color, drainage, odor, measurements, and dressings)    [x]  Dual skin check done with CHIQUI Roman RN     1925: Shift assessment done. V/S obtained. Pt resting quietly in bed. A/Ox4. No c/o SOB at this time but pt c/o leg pain; PRN pain med given. 95% SpO2 on 2LNC. Assessed Left DP/PT pulses; noted positive pulse on PT via Doppler; PD pulse negative. Left foot noted cool to touch. Heparin gtt running on fixed rate per order. Patient oriented to room and call light. Patient aware to use call light to communicate any pain or needs.     2113: Scheduled med given. Assisted pt to bedside commode; attempted to void, unsuccessful. Pt states \"I already peed it all when I had a bowel movement.\"    0000: Assessed DP/PT pulses; noted positive pulses on both via Doppler. Marked pulses. Foot is warm and toes are a little cool to touch.     0045:  Pt c/o leg pain; PRN pain med given.    0100: Pt has not yet voided. Attempted to void earlier but unable to. Bladder scan done showed: 500 mL of urine retained. Pt states \"let me try again later, I don't want you to put the arndt catheter back.\" Educated pt about the risk of infection if

## 2024-02-23 NOTE — CARE COORDINATION
D/C order noted for today. Orders reviewed. No needs identified at this time. CM remains available if needed.         Roshni Valle BSW CM

## 2024-02-23 NOTE — ANESTHESIA POSTPROCEDURE EVALUATION
Department of Anesthesiology  Postprocedure Note    Patient: Zamzam Calixto  MRN: [id number]  YOB: 1950  Date of evaluation: 2/22/2024    Procedure Summary       Date: 02/22/24 Room / Location: [facility] 02 / [facility] CARDIAC SURGERY    Anesthesia Start: 0914 Anesthesia Stop: 1200    Procedure: OPEN EXCISION LEFT AXILLARY ARTERY, LEFT LEG ANGIOGRAM, BALLOON ANGIOPLASTY, POPLITEAL ARTERY AND TPA ADMINISTRATION (Left: Leg Upper) Diagnosis:       Tibial artery occlusion, left (HCC)      (Tibial artery occlusion, left (HCC) [I70.202])    Surgeons: Mich Ashley MD Responsible Provider: Sd Veronica MD    Anesthesia Type: General ASA Status: 3            Anesthesia Type: General    Gurmeet Phase I: Gurmeet Score: 9    Gurmeet Phase II:      Anesthesia Post Evaluation    Patient location during evaluation: bedside  Patient participation: complete - patient participated  Level of consciousness: responsive to verbal stimuli  Airway patency: patent  Nausea & Vomiting: no nausea  Respiratory status: acceptable  Hydration status: euvolemic    No notable events documented.

## 2024-02-23 NOTE — PROGRESS NOTES
Awake and Alert, up to chair this morning  VSS, Afebrile, Labs stable  + doppler signal at DP and PT  Left groin stable    Ok to DC home today  DC cental line  DC Heparin gtt and start ASA and Eliquis this evening  Prevena intact- will need to remain in place  until Tues 2/27  Follow up in office 7-10 days

## 2024-02-23 NOTE — CARE COORDINATION
02/23/24 1311   /Social Work Whiteboard Notes   /Social Work Whiteboard RED 02/23/24 Anticipate to home when medically stable. No discharge needs at this time. Tll-CM

## 2024-03-13 ENCOUNTER — TRANSCRIBE ORDERS (OUTPATIENT)
Facility: HOSPITAL | Age: 74
End: 2024-03-13

## 2024-03-13 DIAGNOSIS — I70.422: Primary | ICD-10-CM

## 2024-03-14 ENCOUNTER — HOSPITAL ENCOUNTER (OUTPATIENT)
Facility: HOSPITAL | Age: 74
Discharge: HOME | End: 2024-03-14
Attending: SURGERY
Payer: MEDICARE

## 2024-03-14 DIAGNOSIS — I70.422: ICD-10-CM

## 2024-03-14 PROCEDURE — 6360000004 HC RX CONTRAST MEDICATION: Performed by: SURGERY

## 2024-03-14 PROCEDURE — 75635 CT ANGIO ABDOMINAL ARTERIES: CPT

## 2024-03-14 RX ADMIN — IOPAMIDOL 120 ML: 755 INJECTION, SOLUTION INTRAVENOUS at 14:31

## 2024-03-21 ENCOUNTER — HOSPITAL ENCOUNTER (OUTPATIENT)
Facility: HOSPITAL | Age: 74
Discharge: HOME | End: 2024-03-21
Payer: MEDICARE

## 2024-03-21 ENCOUNTER — HOSPITAL ENCOUNTER (OUTPATIENT)
Facility: HOSPITAL | Age: 74
End: 2024-03-21
Payer: MEDICARE

## 2024-03-21 ENCOUNTER — TRANSCRIBE ORDERS (OUTPATIENT)
Facility: HOSPITAL | Age: 74
End: 2024-03-21

## 2024-03-21 DIAGNOSIS — Z01.818 PRE-OP EXAM: ICD-10-CM

## 2024-03-21 DIAGNOSIS — I70.322: Primary | ICD-10-CM

## 2024-03-21 DIAGNOSIS — I70.322: ICD-10-CM

## 2024-03-21 LAB
ABO + RH BLD: NORMAL
ANION GAP SERPL CALC-SCNC: 5 MMOL/L (ref 3–18)
BLOOD GROUP ANTIBODIES SERPL: NORMAL
BUN SERPL-MCNC: 10 MG/DL (ref 7–18)
BUN/CREAT SERPL: 12 (ref 12–20)
CALCIUM SERPL-MCNC: 10.6 MG/DL (ref 8.5–10.1)
CHLORIDE SERPL-SCNC: 106 MMOL/L (ref 100–111)
CO2 SERPL-SCNC: 26 MMOL/L (ref 21–32)
CREAT SERPL-MCNC: 0.81 MG/DL (ref 0.6–1.3)
ERYTHROCYTE [DISTWIDTH] IN BLOOD BY AUTOMATED COUNT: 15.9 % (ref 11.6–14.5)
GLUCOSE SERPL-MCNC: 82 MG/DL (ref 74–99)
HCT VFR BLD AUTO: 38.2 % (ref 35–45)
HGB BLD-MCNC: 12.2 G/DL (ref 12–16)
INR PPP: 1.1 (ref 0.9–1.1)
MCH RBC QN AUTO: 29.4 PG (ref 24–34)
MCHC RBC AUTO-ENTMCNC: 31.9 G/DL (ref 31–37)
MCV RBC AUTO: 92 FL (ref 78–100)
NRBC # BLD: 0 K/UL (ref 0–0.01)
NRBC BLD-RTO: 0 PER 100 WBC
PLATELET # BLD AUTO: 315 K/UL (ref 135–420)
PMV BLD AUTO: 11.3 FL (ref 9.2–11.8)
POTASSIUM SERPL-SCNC: 4.3 MMOL/L (ref 3.5–5.5)
PROTHROMBIN TIME: 14 SEC (ref 11.9–14.7)
RBC # BLD AUTO: 4.15 M/UL (ref 4.2–5.3)
SODIUM SERPL-SCNC: 137 MMOL/L (ref 136–145)
SPECIMEN EXP DATE BLD: NORMAL
WBC # BLD AUTO: 12.3 K/UL (ref 4.6–13.2)

## 2024-03-21 PROCEDURE — 86901 BLOOD TYPING SEROLOGIC RH(D): CPT

## 2024-03-21 PROCEDURE — 36415 COLL VENOUS BLD VENIPUNCTURE: CPT

## 2024-03-21 PROCEDURE — 86850 RBC ANTIBODY SCREEN: CPT

## 2024-03-21 PROCEDURE — 80048 BASIC METABOLIC PNL TOTAL CA: CPT

## 2024-03-21 PROCEDURE — 85610 PROTHROMBIN TIME: CPT

## 2024-03-21 PROCEDURE — 71046 X-RAY EXAM CHEST 2 VIEWS: CPT

## 2024-03-21 PROCEDURE — 86900 BLOOD TYPING SEROLOGIC ABO: CPT

## 2024-03-21 PROCEDURE — 85027 COMPLETE CBC AUTOMATED: CPT

## 2024-03-22 ENCOUNTER — ANESTHESIA EVENT (OUTPATIENT)
Dept: CARDIOTHORACIC SURGERY | Facility: HOSPITAL | Age: 74
End: 2024-03-22
Payer: MEDICARE

## 2024-03-22 LAB
EKG ATRIAL RATE: 63 BPM
EKG DIAGNOSIS: NORMAL
EKG P AXIS: 5 DEGREES
EKG P-R INTERVAL: 148 MS
EKG Q-T INTERVAL: 402 MS
EKG QRS DURATION: 94 MS
EKG QTC CALCULATION (BAZETT): 411 MS
EKG R AXIS: 62 DEGREES
EKG T AXIS: 76 DEGREES
EKG VENTRICULAR RATE: 63 BPM

## 2024-03-22 RX ORDER — AMLODIPINE BESYLATE 5 MG/1
10 TABLET ORAL DAILY
COMMUNITY

## 2024-03-22 NOTE — PERIOP NOTE
Instructions for your surgery at [facility]      Today's Date:  3/22/2024      Patient's Name:  Zamzam Calixto           Surgery Date:  3/25/2024              Please enter the main entrance of the hospital and check-in at the  located in the lobby. Once checked in at the , you will take the elevators to the second floor, and report to the waiting room on the left. The room will say Procedure Registration.    Do NOT eat or drink anything, including candy, gum, or ice chips after midnight prior to your surgery, unless you have specific instructions from your surgeon or anesthesia provider to do so.  Brush your teeth before coming to the hospital. You may swish with water, but do not swallow.  No smoking/Vaping/E-Cigarettes 24 hours prior to the day of surgery.  No alcohol 24 hours prior to the day of surgery.  No recreational drugs for one week prior to the day of surgery.  Bring Photo ID, Insurance information, and Co-pay if required on day of surgery.  Bring in pertinent legal documents, such as, Medical Power of , DNR, Advance Directive, etc.  Leave all valuables, including money/purse, at home.  Remove all jewelry, including ALL body piercings, nail polish, acrylic nails, and makeup (including mascara); no lotions, powders, deodorant, or perfume/cologne/after shave on the skin.  Follow instruction for Hibiclens washes and CHG wipes from surgeon's office.   Glasses and dentures may be worn to the hospital. They must be removed prior to surgery. Please bring case/container for glasses or dentures.   Contact lenses should not be worn on day of surgery.   Call your doctor's office if symptoms of a cold or illness develop within 24-48 hours prior to your surgery.  Call your doctor's office if you have any questions concerning insurance or co-pays.  15. AN ADULT (relative or friend 18 years or older) MUST DRIVE YOU HOME AFTER YOUR SURGERY.  16. Please make

## 2024-03-25 ENCOUNTER — HOSPITAL ENCOUNTER (INPATIENT)
Facility: HOSPITAL | Age: 74
LOS: 8 days | Discharge: HOME HEALTH | DRG: 253 | End: 2024-04-02
Attending: SURGERY | Admitting: SURGERY
Payer: MEDICARE

## 2024-03-25 ENCOUNTER — ANESTHESIA (OUTPATIENT)
Dept: CARDIOTHORACIC SURGERY | Facility: HOSPITAL | Age: 74
End: 2024-03-25
Payer: MEDICARE

## 2024-03-25 DIAGNOSIS — I70.90 ARTERIAL OCCLUSION: ICD-10-CM

## 2024-03-25 DIAGNOSIS — I99.8 ACUTE LOWER LIMB ISCHEMIA: Primary | ICD-10-CM

## 2024-03-25 DIAGNOSIS — I70.202 OCCLUSION OF LEFT FEMORAL ARTERY (HCC): ICD-10-CM

## 2024-03-25 LAB — APTT PPP: 28.9 SEC (ref 23–36.4)

## 2024-03-25 PROCEDURE — 85730 THROMBOPLASTIN TIME PARTIAL: CPT

## 2024-03-25 PROCEDURE — C1713 ANCHOR/SCREW BN/BN,TIS/BN: HCPCS | Performed by: SURGERY

## 2024-03-25 PROCEDURE — 3700000001 HC ADD 15 MINUTES (ANESTHESIA): Performed by: SURGERY

## 2024-03-25 PROCEDURE — 2580000003 HC RX 258: Performed by: NURSE ANESTHETIST, CERTIFIED REGISTERED

## 2024-03-25 PROCEDURE — 6360000002 HC RX W HCPCS: Performed by: SURGERY

## 2024-03-25 PROCEDURE — 041L0JN BYPASS LEFT FEMORAL ARTERY TO POSTERIOR TIBIAL ARTERY WITH SYNTHETIC SUBSTITUTE, OPEN APPROACH: ICD-10-PCS | Performed by: SURGERY

## 2024-03-25 PROCEDURE — 6370000000 HC RX 637 (ALT 250 FOR IP): Performed by: SURGERY

## 2024-03-25 PROCEDURE — 2580000003 HC RX 258: Performed by: ANESTHESIOLOGY

## 2024-03-25 PROCEDURE — 6370000000 HC RX 637 (ALT 250 FOR IP): Performed by: NURSE ANESTHETIST, CERTIFIED REGISTERED

## 2024-03-25 PROCEDURE — C1894 INTRO/SHEATH, NON-LASER: HCPCS | Performed by: SURGERY

## 2024-03-25 PROCEDURE — 3600000002 HC SURGERY LEVEL 2 BASE: Performed by: SURGERY

## 2024-03-25 PROCEDURE — 2500000003 HC RX 250 WO HCPCS: Performed by: ANESTHESIOLOGY

## 2024-03-25 PROCEDURE — 2580000003 HC RX 258: Performed by: SURGERY

## 2024-03-25 PROCEDURE — 3600000012 HC SURGERY LEVEL 2 ADDTL 15MIN: Performed by: SURGERY

## 2024-03-25 PROCEDURE — 6360000002 HC RX W HCPCS: Performed by: ANESTHESIOLOGY

## 2024-03-25 PROCEDURE — 3700000000 HC ANESTHESIA ATTENDED CARE: Performed by: SURGERY

## 2024-03-25 PROCEDURE — 1100000000 HC RM PRIVATE

## 2024-03-25 PROCEDURE — 2709999900 HC NON-CHARGEABLE SUPPLY: Performed by: SURGERY

## 2024-03-25 PROCEDURE — A4217 STERILE WATER/SALINE, 500 ML: HCPCS | Performed by: SURGERY

## 2024-03-25 RX ORDER — BUDESONIDE 0.25 MG/2ML
0.25 INHALANT ORAL
Status: DISCONTINUED | OUTPATIENT
Start: 2024-03-26 | End: 2024-04-02 | Stop reason: HOSPADM

## 2024-03-25 RX ORDER — HEPARIN SODIUM 10000 [USP'U]/100ML
500 INJECTION, SOLUTION INTRAVENOUS CONTINUOUS
Status: DISCONTINUED | OUTPATIENT
Start: 2024-03-25 | End: 2024-03-25

## 2024-03-25 RX ORDER — PHENYLEPHRINE HCL IN 0.9% NACL 1 MG/10 ML
SYRINGE (ML) INTRAVENOUS PRN
Status: DISCONTINUED | OUTPATIENT
Start: 2024-03-25 | End: 2024-03-25 | Stop reason: SDUPTHER

## 2024-03-25 RX ORDER — VANCOMYCIN 2 GRAM/500 ML IN 0.9 % SODIUM CHLORIDE INTRAVENOUS
PRN
Status: DISCONTINUED | OUTPATIENT
Start: 2024-03-25 | End: 2024-03-25 | Stop reason: SDUPTHER

## 2024-03-25 RX ORDER — LATANOPROST 50 UG/ML
1 SOLUTION/ DROPS OPHTHALMIC NIGHTLY
Status: DISCONTINUED | OUTPATIENT
Start: 2024-03-25 | End: 2024-04-02 | Stop reason: HOSPADM

## 2024-03-25 RX ORDER — SODIUM CHLORIDE 0.9 % (FLUSH) 0.9 %
5-40 SYRINGE (ML) INJECTION EVERY 12 HOURS SCHEDULED
Status: DISCONTINUED | OUTPATIENT
Start: 2024-03-25 | End: 2024-04-02 | Stop reason: HOSPADM

## 2024-03-25 RX ORDER — OXYCODONE HYDROCHLORIDE 5 MG/1
10 TABLET ORAL EVERY 6 HOURS PRN
Status: DISCONTINUED | OUTPATIENT
Start: 2024-03-25 | End: 2024-04-02 | Stop reason: HOSPADM

## 2024-03-25 RX ORDER — SODIUM CHLORIDE 0.9 % (FLUSH) 0.9 %
5-40 SYRINGE (ML) INJECTION PRN
Status: DISCONTINUED | OUTPATIENT
Start: 2024-03-25 | End: 2024-03-25 | Stop reason: HOSPADM

## 2024-03-25 RX ORDER — FENTANYL CITRATE 50 UG/ML
INJECTION, SOLUTION INTRAMUSCULAR; INTRAVENOUS PRN
Status: DISCONTINUED | OUTPATIENT
Start: 2024-03-25 | End: 2024-03-25 | Stop reason: SDUPTHER

## 2024-03-25 RX ORDER — LIDOCAINE HYDROCHLORIDE 20 MG/ML
INJECTION, SOLUTION EPIDURAL; INFILTRATION; INTRACAUDAL; PERINEURAL PRN
Status: DISCONTINUED | OUTPATIENT
Start: 2024-03-25 | End: 2024-03-25 | Stop reason: SDUPTHER

## 2024-03-25 RX ORDER — PROPOFOL 10 MG/ML
INJECTION, EMULSION INTRAVENOUS PRN
Status: DISCONTINUED | OUTPATIENT
Start: 2024-03-25 | End: 2024-03-25 | Stop reason: SDUPTHER

## 2024-03-25 RX ORDER — PREGABALIN 50 MG/1
100 CAPSULE ORAL 2 TIMES DAILY
Status: DISCONTINUED | OUTPATIENT
Start: 2024-03-25 | End: 2024-04-02 | Stop reason: HOSPADM

## 2024-03-25 RX ORDER — SODIUM CHLORIDE 9 MG/ML
INJECTION, SOLUTION INTRAVENOUS PRN
Status: DISCONTINUED | OUTPATIENT
Start: 2024-03-25 | End: 2024-03-25 | Stop reason: HOSPADM

## 2024-03-25 RX ORDER — SODIUM CHLORIDE, SODIUM LACTATE, POTASSIUM CHLORIDE, CALCIUM CHLORIDE 600; 310; 30; 20 MG/100ML; MG/100ML; MG/100ML; MG/100ML
INJECTION, SOLUTION INTRAVENOUS CONTINUOUS
Status: DISCONTINUED | OUTPATIENT
Start: 2024-03-25 | End: 2024-03-25 | Stop reason: HOSPADM

## 2024-03-25 RX ORDER — ALBUTEROL SULFATE 90 UG/1
2 AEROSOL, METERED RESPIRATORY (INHALATION) EVERY 6 HOURS PRN
Status: DISCONTINUED | OUTPATIENT
Start: 2024-03-25 | End: 2024-03-25

## 2024-03-25 RX ORDER — HEPARIN SODIUM 200 [USP'U]/100ML
INJECTION, SOLUTION INTRAVENOUS
Status: DISPENSED
Start: 2024-03-25 | End: 2024-03-25

## 2024-03-25 RX ORDER — ONDANSETRON 4 MG/1
4 TABLET, ORALLY DISINTEGRATING ORAL EVERY 8 HOURS PRN
Status: DISCONTINUED | OUTPATIENT
Start: 2024-03-25 | End: 2024-04-02 | Stop reason: HOSPADM

## 2024-03-25 RX ORDER — HEPARIN SODIUM 10000 [USP'U]/100ML
500 INJECTION, SOLUTION INTRAVENOUS CONTINUOUS
Status: DISCONTINUED | OUTPATIENT
Start: 2024-03-25 | End: 2024-03-29

## 2024-03-25 RX ORDER — ONDANSETRON 2 MG/ML
4 INJECTION INTRAMUSCULAR; INTRAVENOUS EVERY 6 HOURS PRN
Status: DISCONTINUED | OUTPATIENT
Start: 2024-03-25 | End: 2024-04-02 | Stop reason: HOSPADM

## 2024-03-25 RX ORDER — SODIUM CHLORIDE 9 MG/ML
INJECTION, SOLUTION INTRAVENOUS PRN
Status: DISCONTINUED | OUTPATIENT
Start: 2024-03-25 | End: 2024-04-02 | Stop reason: HOSPADM

## 2024-03-25 RX ORDER — ROCURONIUM BROMIDE 10 MG/ML
INJECTION, SOLUTION INTRAVENOUS PRN
Status: DISCONTINUED | OUTPATIENT
Start: 2024-03-25 | End: 2024-03-25 | Stop reason: SDUPTHER

## 2024-03-25 RX ORDER — ALBUTEROL SULFATE 2.5 MG/3ML
2.5 SOLUTION RESPIRATORY (INHALATION) EVERY 6 HOURS PRN
Status: DISCONTINUED | OUTPATIENT
Start: 2024-03-25 | End: 2024-04-02 | Stop reason: HOSPADM

## 2024-03-25 RX ORDER — ONDANSETRON 2 MG/ML
INJECTION INTRAMUSCULAR; INTRAVENOUS PRN
Status: DISCONTINUED | OUTPATIENT
Start: 2024-03-25 | End: 2024-03-25 | Stop reason: SDUPTHER

## 2024-03-25 RX ORDER — CINACALCET 30 MG/1
30 TABLET, FILM COATED ORAL 2 TIMES DAILY
Status: DISCONTINUED | OUTPATIENT
Start: 2024-03-25 | End: 2024-04-02 | Stop reason: HOSPADM

## 2024-03-25 RX ORDER — SODIUM CHLORIDE 9 MG/ML
INJECTION, SOLUTION INTRAVENOUS CONTINUOUS PRN
Status: DISCONTINUED | OUTPATIENT
Start: 2024-03-25 | End: 2024-03-25 | Stop reason: SDUPTHER

## 2024-03-25 RX ORDER — MAGNESIUM HYDROXIDE 1200 MG/15ML
LIQUID ORAL CONTINUOUS PRN
Status: COMPLETED | OUTPATIENT
Start: 2024-03-25 | End: 2024-03-25

## 2024-03-25 RX ORDER — HEPARIN SODIUM 1000 [USP'U]/ML
INJECTION, SOLUTION INTRAVENOUS; SUBCUTANEOUS PRN
Status: DISCONTINUED | OUTPATIENT
Start: 2024-03-25 | End: 2024-03-25 | Stop reason: SDUPTHER

## 2024-03-25 RX ORDER — SODIUM CHLORIDE 0.9 % (FLUSH) 0.9 %
5-40 SYRINGE (ML) INJECTION EVERY 12 HOURS SCHEDULED
Status: DISCONTINUED | OUTPATIENT
Start: 2024-03-25 | End: 2024-03-25 | Stop reason: HOSPADM

## 2024-03-25 RX ORDER — HEPARIN SODIUM 200 [USP'U]/100ML
INJECTION, SOLUTION INTRAVENOUS CONTINUOUS PRN
Status: COMPLETED | OUTPATIENT
Start: 2024-03-25 | End: 2024-03-25

## 2024-03-25 RX ORDER — OXYCODONE HYDROCHLORIDE 5 MG/1
10 TABLET ORAL EVERY 4 HOURS PRN
Status: DISCONTINUED | OUTPATIENT
Start: 2024-03-25 | End: 2024-04-02 | Stop reason: HOSPADM

## 2024-03-25 RX ORDER — AMLODIPINE BESYLATE 10 MG/1
10 TABLET ORAL DAILY
Status: DISCONTINUED | OUTPATIENT
Start: 2024-03-25 | End: 2024-04-02 | Stop reason: HOSPADM

## 2024-03-25 RX ORDER — ARFORMOTEROL TARTRATE 15 UG/2ML
15 SOLUTION RESPIRATORY (INHALATION)
Status: DISCONTINUED | OUTPATIENT
Start: 2024-03-26 | End: 2024-04-02 | Stop reason: HOSPADM

## 2024-03-25 RX ORDER — VANCOMYCIN HYDROCHLORIDE 1 G/20ML
INJECTION, POWDER, LYOPHILIZED, FOR SOLUTION INTRAVENOUS
Status: DISPENSED
Start: 2024-03-25 | End: 2024-03-25

## 2024-03-25 RX ORDER — DULOXETIN HYDROCHLORIDE 30 MG/1
60 CAPSULE, DELAYED RELEASE ORAL DAILY
Status: DISCONTINUED | OUTPATIENT
Start: 2024-03-25 | End: 2024-04-02 | Stop reason: HOSPADM

## 2024-03-25 RX ORDER — DEXTROSE AND SODIUM CHLORIDE 5; .45 G/100ML; G/100ML
INJECTION, SOLUTION INTRAVENOUS CONTINUOUS
Status: DISCONTINUED | OUTPATIENT
Start: 2024-03-25 | End: 2024-03-26

## 2024-03-25 RX ORDER — FAMOTIDINE 20 MG/1
20 TABLET, FILM COATED ORAL ONCE
Status: COMPLETED | OUTPATIENT
Start: 2024-03-25 | End: 2024-03-25

## 2024-03-25 RX ORDER — IODIXANOL 320 MG/ML
INJECTION, SOLUTION INTRAVASCULAR
Status: DISCONTINUED
Start: 2024-03-25 | End: 2024-03-25 | Stop reason: WASHOUT

## 2024-03-25 RX ORDER — SODIUM CHLORIDE 0.9 % (FLUSH) 0.9 %
5-40 SYRINGE (ML) INJECTION PRN
Status: DISCONTINUED | OUTPATIENT
Start: 2024-03-25 | End: 2024-04-02 | Stop reason: HOSPADM

## 2024-03-25 RX ADMIN — CINACALCET HYDROCHLORIDE 30 MG: 60 TABLET, FILM COATED ORAL at 20:54

## 2024-03-25 RX ADMIN — SODIUM CHLORIDE, POTASSIUM CHLORIDE, SODIUM LACTATE AND CALCIUM CHLORIDE: 600; 310; 30; 20 INJECTION, SOLUTION INTRAVENOUS at 06:45

## 2024-03-25 RX ADMIN — HYDROMORPHONE HYDROCHLORIDE 0.5 MG: 1 INJECTION, SOLUTION INTRAMUSCULAR; INTRAVENOUS; SUBCUTANEOUS at 13:09

## 2024-03-25 RX ADMIN — DULOXETINE HYDROCHLORIDE 60 MG: 30 CAPSULE, DELAYED RELEASE ORAL at 14:06

## 2024-03-25 RX ADMIN — SODIUM CHLORIDE: 9 INJECTION, SOLUTION INTRAVENOUS at 07:45

## 2024-03-25 RX ADMIN — Medication 100 MCG: at 08:39

## 2024-03-25 RX ADMIN — HEPARIN SODIUM 5000 UNITS: 1000 INJECTION, SOLUTION INTRAVENOUS; SUBCUTANEOUS at 10:01

## 2024-03-25 RX ADMIN — DEXTROSE AND SODIUM CHLORIDE: 5; 450 INJECTION, SOLUTION INTRAVENOUS at 13:03

## 2024-03-25 RX ADMIN — FENTANYL CITRATE 50 MCG: 50 INJECTION INTRAMUSCULAR; INTRAVENOUS at 08:44

## 2024-03-25 RX ADMIN — HYDROMORPHONE HYDROCHLORIDE 0.5 MG: 1 INJECTION, SOLUTION INTRAMUSCULAR; INTRAVENOUS; SUBCUTANEOUS at 20:54

## 2024-03-25 RX ADMIN — PROPOFOL 20 MG: 10 INJECTION, EMULSION INTRAVENOUS at 11:19

## 2024-03-25 RX ADMIN — SODIUM CHLORIDE, PRESERVATIVE FREE 10 ML: 5 INJECTION INTRAVENOUS at 21:30

## 2024-03-25 RX ADMIN — LIDOCAINE HYDROCHLORIDE 100 MG: 20 INJECTION, SOLUTION EPIDURAL; INFILTRATION; INTRACAUDAL; PERINEURAL at 07:50

## 2024-03-25 RX ADMIN — DEXTROSE AND SODIUM CHLORIDE: 5; 450 INJECTION, SOLUTION INTRAVENOUS at 21:04

## 2024-03-25 RX ADMIN — FENTANYL CITRATE 50 MCG: 50 INJECTION INTRAMUSCULAR; INTRAVENOUS at 11:06

## 2024-03-25 RX ADMIN — Medication 100 MCG: at 08:58

## 2024-03-25 RX ADMIN — PROPOFOL 20 MG: 10 INJECTION, EMULSION INTRAVENOUS at 11:29

## 2024-03-25 RX ADMIN — PROPOFOL 20 MG: 10 INJECTION, EMULSION INTRAVENOUS at 11:22

## 2024-03-25 RX ADMIN — CINACALCET HYDROCHLORIDE 30 MG: 60 TABLET, FILM COATED ORAL at 14:06

## 2024-03-25 RX ADMIN — Medication 100 MCG: at 09:10

## 2024-03-25 RX ADMIN — SUGAMMADEX 200 MG: 100 INJECTION, SOLUTION INTRAVENOUS at 11:15

## 2024-03-25 RX ADMIN — ROCURONIUM BROMIDE 50 MG: 10 INJECTION, SOLUTION INTRAVENOUS at 07:50

## 2024-03-25 RX ADMIN — HEPARIN SODIUM 500 UNITS/HR: 10000 INJECTION, SOLUTION INTRAVENOUS at 16:25

## 2024-03-25 RX ADMIN — PREGABALIN 100 MG: 50 CAPSULE ORAL at 20:55

## 2024-03-25 RX ADMIN — PREGABALIN 100 MG: 50 CAPSULE ORAL at 14:08

## 2024-03-25 RX ADMIN — PROPOFOL 100 MG: 10 INJECTION, EMULSION INTRAVENOUS at 07:50

## 2024-03-25 RX ADMIN — FAMOTIDINE 20 MG: 20 TABLET ORAL at 06:30

## 2024-03-25 RX ADMIN — ONDANSETRON 4 MG: 2 INJECTION INTRAMUSCULAR; INTRAVENOUS at 11:15

## 2024-03-25 RX ADMIN — HYDROMORPHONE HYDROCHLORIDE 0.5 MG: 1 INJECTION, SOLUTION INTRAMUSCULAR; INTRAVENOUS; SUBCUTANEOUS at 16:47

## 2024-03-25 RX ADMIN — VANCOMYCIN 2 GRAM/500 ML IN 0.9 % SODIUM CHLORIDE INTRAVENOUS 1 MG: at 08:27

## 2024-03-25 ASSESSMENT — PAIN DESCRIPTION - LOCATION
LOCATION: LEG

## 2024-03-25 ASSESSMENT — PAIN DESCRIPTION - ORIENTATION
ORIENTATION: LEFT

## 2024-03-25 ASSESSMENT — PAIN SCALES - GENERAL
PAINLEVEL_OUTOF10: 7
PAINLEVEL_OUTOF10: 4
PAINLEVEL_OUTOF10: 4
PAINLEVEL_OUTOF10: 7
PAINLEVEL_OUTOF10: 4
PAINLEVEL_OUTOF10: 3
PAINLEVEL_OUTOF10: 0
PAINLEVEL_OUTOF10: 0

## 2024-03-25 ASSESSMENT — PAIN DESCRIPTION - DESCRIPTORS
DESCRIPTORS: ACHING;BURNING
DESCRIPTORS: BURNING;ACHING;DISCOMFORT
DESCRIPTORS: ACHING;DISCOMFORT
DESCRIPTORS: BURNING;DISCOMFORT
DESCRIPTORS: BURNING
DESCRIPTORS: DISCOMFORT

## 2024-03-25 ASSESSMENT — PAIN DESCRIPTION - PAIN TYPE: TYPE: SURGICAL PAIN

## 2024-03-25 ASSESSMENT — PAIN - FUNCTIONAL ASSESSMENT: PAIN_FUNCTIONAL_ASSESSMENT: 0-10

## 2024-03-25 NOTE — PERIOP NOTE
Patient /Family /Designee has been informed that [facility] is not responsible for patient belongings per policy and the signed [facility] Patient Agreement document.  Personal items should be sent home or checked in with security.  Patient /Family /Designee selected the following action:                            [x]  Send personal items home with a family member,- [place]                                                 []  Check in personal items with security, excluding clothing                            []  Maintain personal items at the bedside, against recommendation                                 by Taye White [facility]                                   ** If patient /family /designee chooses to maintain personal items at the bedside,                                      Complete the patient belongings inventory in the EMR.

## 2024-03-25 NOTE — ANESTHESIA PRE PROCEDURE
Applicable):   Lab Results   Component Value Date/Time    COVID19 Not Detected 08/23/2021 01:42 PM           Anesthesia Evaluation  Patient summary reviewed and Nursing notes reviewed  Airway: Mallampati: II  TM distance: >3 FB   Neck ROM: full  Mouth opening: > = 3 FB   Dental:    (+) poor dentition and partials      Pulmonary:normal exam    (+) pneumonia:  COPD:                                     Cardiovascular:    (+) hypertension:                  Neuro/Psych:               GI/Hepatic/Renal:   (+) GERD:          Endo/Other:                     Abdominal: normal exam            Vascular:          Other Findings:       Anesthesia Plan      general     ASA 4       Induction: intravenous.  arterial line  MIPS: Postoperative opioids intended and Prophylactic antiemetics administered.  Anesthetic plan and risks discussed with patient.    Use of blood products discussed with patient whom consented to blood products.                  Anuj Jackson MD   3/25/2024

## 2024-03-25 NOTE — PROGRESS NOTES
1058: Received call from CHIQUI Maciel in [facility] OR who states that Dr Ashley is now closing and patient will be transferred over soon.    1150: Patient arrived to room from OR.    1200: Patient with low temp of 96.0. Jamey hugger applied. Patient alert and oriented with pain 4/10 in LLE. Contacted Dr Ashley regarding order for pain medication and that pulses currently not heard on doppler in LLE but heard in RLE.    1245: Dr Ashley at bedside and found popliteal pulse using doppler.     Per Dr Ashley, 4 hours post op to start Heparin infusion for vascular at 500 units/hr. Order entered to start at 1600.    1450: Patient now on room air. 94%. Pain controlled with Dilaudid. Able to sit up and eat and tolerating fluids.     1900: Bedside and Verbal shift change report given to CHIQUI Bernal (oncoming nurse) by CHIQUI Callahan (offgoing nurse). Report included the following information Nurse Handoff Report, MAR, and Cardiac Rhythm NSR .

## 2024-03-25 NOTE — ANESTHESIA POSTPROCEDURE EVALUATION
Department of Anesthesiology  Postprocedure Note    Patient: Zamzam Calixto  MRN: [id number]  YOB: 1950  Date of evaluation: 3/25/2024    Procedure Summary       Date: 03/25/24 Room / Location: 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    Gurmeet Phase I: Gurmeet Score: 10    Gurmeet Phase II:      Anesthesia Post Evaluation    Patient location during evaluation: PACU  Patient participation: complete - patient participated  Level of consciousness: awake and alert  Airway patency: patent  Nausea & Vomiting: no nausea and no vomiting  Cardiovascular status: hemodynamically stable  Respiratory status: acceptable  Hydration status: stable  Multimodal analgesia pain management approach    No notable events documented.

## 2024-03-25 NOTE — H&P
Surgery History and Physical    Subjective:      Zamzam Calixto is a 73 y.o.  female who presents with femoral-popliteal occlusion left leg.  Refer to outpatient H&P.    Patient Active Problem List    Diagnosis Date Noted    Dehiscence of wound 02/17/2023    Arterial occlusion 02/19/2024    Acute lower limb ischemia 02/19/2024    Hypomagnesemia 04/27/2023    MSSA (methicillin susceptible Staphylococcus aureus) 04/04/2023    Femoral artery occlusion, left (HCC) 01/19/2023    Postmenopausal osteoporosis 04/22/2022    Vascular graft infection (HCC) 12/09/2020    UTI (urinary tract infection) 12/03/2020    Cellulitis of abdominal wall 12/03/2020    Open wound 09/10/2020    Chronic wound infection of abdomen 06/21/2018    Infection of vascular bypass graft (HCC) 04/30/2018    Nonhealing surgical wound 04/30/2018    CAP (community acquired pneumonia) 06/27/2017    Severe sepsis ([facility]) 06/27/2017    Abscess 05/10/2017    Arteriovenous graft infection ([facility]) 05/10/2017    Dermal sinus tract of lumbosacral region ([facility]) 05/10/2017    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[facility]) 08/09/2016    Chronic aorto-iliac occlusion syndrome ([facility]) 01/19/2016    Wound disruption, post-op, skin 01/09/2015    Mass of breast, left 07/30/2014    HTN (hypertension) 04/01/2013    PVD (peripheral vascular disease) ([facility]) 04/01/2013    Crohn's disease ([facility]) 04/01/2013     Past Medical History:   Diagnosis Date    Arthritis     CAP (community acquired pneumonia) 06/27/2017    Chronic lung disease     Claudication (HCC)     left leg    Crohn's disease (HCC)     Crohn's disease (HCC)     GERD (gastroesophageal reflux disease)     HTN (hypertension)     Ill-defined condition     Uses O2 at night.    Nausea & vomiting     Neuropathy     Other and unspecified symptoms and signs involving general sensations and perceptions     PVD    Other ill-defined conditions(799.89)     Crohn's    Parathyroid tumor     sees Endo Dr Santillan

## 2024-03-25 NOTE — PROGRESS NOTES
Arformoterol nebulizer BID +  Budesonide 250mcg nebulizer BID + Ipratropiam nebulizer TID was substituted for Trelegy inhaler per P&T approved substitution protocol  MERY RODRIGUEZ RPH

## 2024-03-25 NOTE — ANESTHESIA PROCEDURE NOTES
Arterial Line:    An arterial line was placed using ultrasound guidance, in the OR for the following indication(s): continuous blood pressure monitoring and blood sampling needed.    A  (size) (length), Angiocath (type) catheter was placed, Seldinger technique used, into the right radial artery, secured by Tegaderm.  Anesthesia type: General    Events:  patient tolerated procedure well with no complications.  Performed: Anesthesiologist   Preanesthetic Checklist  Completed: patient identified, IV checked, site marked, risks and benefits discussed, surgical/procedural consents, equipment checked, pre-op evaluation, timeout performed, anesthesia consent given, oxygen available, monitors applied/VS acknowledged, fire risk safety assessment completed and verbalized and blood product R/B/A discussed and consented

## 2024-03-25 NOTE — ACP (ADVANCE CARE PLANNING)
Advance Care Planning   Healthcare Decision Maker:    Primary Decision Maker: SELINA MARTINEZ - Spouse - [phone]    Click here to complete Healthcare Decision Makers including selection of the Healthcare Decision Maker Relationship (ie \"Primary\").          conducted a pre-surgery visit with Zamzam Martinez, who is a 73 y.o.,female.     The  provided the following Interventions:  Initiated a relationship of care and support with patient as a pre-op visit this morning where she will have cardiac surgery done. There I sno advance directive on file   Offered prayer and assurance of continued prayers on patient's behalf.     Plan:  Chaplains will continue to follow and will provide pastoral care on an as needed/requested basis.   recommends bedside caregivers page  on duty if patient shows signs of acute spiritual or emotional distress.  gerard Barrett   Board Certified    Spiritual Care   [phone]

## 2024-03-26 LAB
ANION GAP SERPL CALC-SCNC: 4 MMOL/L (ref 3–18)
APTT PPP: 30.6 SEC (ref 23–36.4)
BUN SERPL-MCNC: 8 MG/DL (ref 7–18)
BUN/CREAT SERPL: 11 (ref 12–20)
CALCIUM SERPL-MCNC: 8.9 MG/DL (ref 8.5–10.1)
CHLORIDE SERPL-SCNC: 108 MMOL/L (ref 100–111)
CO2 SERPL-SCNC: 26 MMOL/L (ref 21–32)
CREAT SERPL-MCNC: 0.74 MG/DL (ref 0.6–1.3)
ERYTHROCYTE [DISTWIDTH] IN BLOOD BY AUTOMATED COUNT: 15.5 % (ref 11.6–14.5)
GLUCOSE SERPL-MCNC: 99 MG/DL (ref 74–99)
HCT VFR BLD AUTO: 27.4 % (ref 35–45)
HGB BLD-MCNC: 8.7 G/DL (ref 12–16)
MCH RBC QN AUTO: 29.1 PG (ref 24–34)
MCHC RBC AUTO-ENTMCNC: 31.8 G/DL (ref 31–37)
MCV RBC AUTO: 91.6 FL (ref 78–100)
NRBC # BLD: 0 K/UL (ref 0–0.01)
NRBC BLD-RTO: 0 PER 100 WBC
PLATELET # BLD AUTO: 221 K/UL (ref 135–420)
PMV BLD AUTO: 11.1 FL (ref 9.2–11.8)
POTASSIUM SERPL-SCNC: 3.4 MMOL/L (ref 3.5–5.5)
RBC # BLD AUTO: 2.99 M/UL (ref 4.2–5.3)
SODIUM SERPL-SCNC: 138 MMOL/L (ref 136–145)
WBC # BLD AUTO: 9.7 K/UL (ref 4.6–13.2)

## 2024-03-26 PROCEDURE — 2700000000 HC OXYGEN THERAPY PER DAY

## 2024-03-26 PROCEDURE — 85730 THROMBOPLASTIN TIME PARTIAL: CPT

## 2024-03-26 PROCEDURE — 80048 BASIC METABOLIC PNL TOTAL CA: CPT

## 2024-03-26 PROCEDURE — 1100000000 HC RM PRIVATE

## 2024-03-26 PROCEDURE — 6360000002 HC RX W HCPCS: Performed by: SURGERY

## 2024-03-26 PROCEDURE — 97166 OT EVAL MOD COMPLEX 45 MIN: CPT

## 2024-03-26 PROCEDURE — 36415 COLL VENOUS BLD VENIPUNCTURE: CPT

## 2024-03-26 PROCEDURE — 94640 AIRWAY INHALATION TREATMENT: CPT

## 2024-03-26 PROCEDURE — 94761 N-INVAS EAR/PLS OXIMETRY MLT: CPT

## 2024-03-26 PROCEDURE — 97530 THERAPEUTIC ACTIVITIES: CPT

## 2024-03-26 PROCEDURE — 6370000000 HC RX 637 (ALT 250 FOR IP): Performed by: SURGERY

## 2024-03-26 PROCEDURE — 97116 GAIT TRAINING THERAPY: CPT

## 2024-03-26 PROCEDURE — 85027 COMPLETE CBC AUTOMATED: CPT

## 2024-03-26 PROCEDURE — 51798 US URINE CAPACITY MEASURE: CPT

## 2024-03-26 PROCEDURE — 97162 PT EVAL MOD COMPLEX 30 MIN: CPT

## 2024-03-26 PROCEDURE — 2580000003 HC RX 258: Performed by: SURGERY

## 2024-03-26 RX ORDER — POLYETHYLENE GLYCOL 3350 17 G/17G
17 POWDER, FOR SOLUTION ORAL DAILY
Status: DISCONTINUED | OUTPATIENT
Start: 2024-03-27 | End: 2024-04-02 | Stop reason: HOSPADM

## 2024-03-26 RX ORDER — POTASSIUM CHLORIDE 20 MEQ/1
20 TABLET, EXTENDED RELEASE ORAL ONCE
Status: COMPLETED | OUTPATIENT
Start: 2024-03-26 | End: 2024-03-26

## 2024-03-26 RX ORDER — BISACODYL 10 MG
10 SUPPOSITORY, RECTAL RECTAL DAILY PRN
Status: DISCONTINUED | OUTPATIENT
Start: 2024-03-26 | End: 2024-04-02 | Stop reason: HOSPADM

## 2024-03-26 RX ADMIN — ARFORMOTEROL TARTRATE 15 MCG: 15 SOLUTION RESPIRATORY (INHALATION) at 19:30

## 2024-03-26 RX ADMIN — CINACALCET HYDROCHLORIDE 30 MG: 60 TABLET, FILM COATED ORAL at 09:18

## 2024-03-26 RX ADMIN — CINACALCET HYDROCHLORIDE 30 MG: 60 TABLET, FILM COATED ORAL at 20:03

## 2024-03-26 RX ADMIN — POTASSIUM CHLORIDE 20 MEQ: 1500 TABLET, EXTENDED RELEASE ORAL at 20:03

## 2024-03-26 RX ADMIN — ARFORMOTEROL TARTRATE 15 MCG: 15 SOLUTION RESPIRATORY (INHALATION) at 08:21

## 2024-03-26 RX ADMIN — OXYCODONE HYDROCHLORIDE 10 MG: 10 TABLET ORAL at 21:29

## 2024-03-26 RX ADMIN — OXYCODONE HYDROCHLORIDE 10 MG: 10 TABLET ORAL at 09:18

## 2024-03-26 RX ADMIN — OXYCODONE HYDROCHLORIDE 10 MG: 10 TABLET ORAL at 17:04

## 2024-03-26 RX ADMIN — IPRATROPIUM BROMIDE 0.5 MG: 0.5 SOLUTION RESPIRATORY (INHALATION) at 08:21

## 2024-03-26 RX ADMIN — SODIUM CHLORIDE, PRESERVATIVE FREE 10 ML: 5 INJECTION INTRAVENOUS at 20:04

## 2024-03-26 RX ADMIN — DULOXETINE HYDROCHLORIDE 60 MG: 30 CAPSULE, DELAYED RELEASE ORAL at 09:18

## 2024-03-26 RX ADMIN — IPRATROPIUM BROMIDE 0.5 MG: 0.5 SOLUTION RESPIRATORY (INHALATION) at 19:30

## 2024-03-26 RX ADMIN — PREGABALIN 100 MG: 50 CAPSULE ORAL at 09:18

## 2024-03-26 RX ADMIN — HYDROMORPHONE HYDROCHLORIDE 0.5 MG: 1 INJECTION, SOLUTION INTRAMUSCULAR; INTRAVENOUS; SUBCUTANEOUS at 02:55

## 2024-03-26 RX ADMIN — BUDESONIDE 250 MCG: 0.25 INHALANT RESPIRATORY (INHALATION) at 19:30

## 2024-03-26 RX ADMIN — PREGABALIN 100 MG: 50 CAPSULE ORAL at 20:02

## 2024-03-26 RX ADMIN — SODIUM CHLORIDE, PRESERVATIVE FREE 10 ML: 5 INJECTION INTRAVENOUS at 08:21

## 2024-03-26 RX ADMIN — HYDROMORPHONE HYDROCHLORIDE 0.5 MG: 1 INJECTION, SOLUTION INTRAMUSCULAR; INTRAVENOUS; SUBCUTANEOUS at 06:38

## 2024-03-26 RX ADMIN — IPRATROPIUM BROMIDE 0.5 MG: 0.5 SOLUTION RESPIRATORY (INHALATION) at 12:57

## 2024-03-26 RX ADMIN — BUDESONIDE 250 MCG: 0.25 INHALANT RESPIRATORY (INHALATION) at 08:21

## 2024-03-26 ASSESSMENT — PAIN SCALES - GENERAL
PAINLEVEL_OUTOF10: 9
PAINLEVEL_OUTOF10: 7
PAINLEVEL_OUTOF10: 0
PAINLEVEL_OUTOF10: 6
PAINLEVEL_OUTOF10: 7
PAINLEVEL_OUTOF10: 7
PAINLEVEL_OUTOF10: 4
PAINLEVEL_OUTOF10: 8
PAINLEVEL_OUTOF10: 4
PAINLEVEL_OUTOF10: 4

## 2024-03-26 ASSESSMENT — PAIN DESCRIPTION - ORIENTATION
ORIENTATION: LEFT
ORIENTATION: LEFT
ORIENTATION: RIGHT
ORIENTATION: LEFT

## 2024-03-26 ASSESSMENT — PAIN DESCRIPTION - LOCATION
LOCATION: LEG

## 2024-03-26 ASSESSMENT — PAIN DESCRIPTION - DESCRIPTORS
DESCRIPTORS: THROBBING;SHARP
DESCRIPTORS: ACHING;BURNING;STABBING
DESCRIPTORS: SHARP;ACHING
DESCRIPTORS: ACHING

## 2024-03-26 ASSESSMENT — PAIN DESCRIPTION - PAIN TYPE: TYPE: SURGICAL PAIN

## 2024-03-26 ASSESSMENT — PAIN - FUNCTIONAL ASSESSMENT: PAIN_FUNCTIONAL_ASSESSMENT: ACTIVITIES ARE NOT PREVENTED

## 2024-03-26 NOTE — PROGRESS NOTES
Alert and comfortable  Tells me foot pain has resolved  Groin and leg incisions stable clean dry and intact no bleed or hematoma  Doppler signal present in bypass graft  Soft Doppler at posterior tibial left leg  Heparin for now, okay to get out of bed as tolerated

## 2024-03-26 NOTE — PROGRESS NOTES
Patient received communion from [employer] volunteer for Zamzam Calixto, who is a 73 y.o.,female.      The  provided the following Interventions:  Continued the relationship of care and support.   Offered prayer and assurance of continued prayer on patients behalf.   Chart reviewed.    The following outcomes were achieved:  Patient received communion from [employer].     Assessment:  There are no further spiritual or Spiritism issues which require Spiritual Care Services interventions at this time.     Plan:  Chaplains will continue to follow and will provide pastoral care on an as needed/requested basis.   recommends bedside caregivers page  on duty if patient shows signs of acute spiritual or emotional distress.     Della Ho     Spiritual Care   [phone]

## 2024-03-26 NOTE — PROGRESS NOTES
07:15  Received pt in bed from Dolores Low RN.  Pt A &O x 4.  Rates pain 5/10.  Heparin infusing at 500 units/hr non-titratable.  IVF D5-1/2NS infusing infusing at 75 ml/hr.    07:30  Dr. Ashley at bedside.  Pt with verified pulses via doppler to

## 2024-03-27 ENCOUNTER — APPOINTMENT (OUTPATIENT)
Facility: HOSPITAL | Age: 74
DRG: 253 | End: 2024-03-27
Attending: SURGERY
Payer: MEDICARE

## 2024-03-27 LAB
ANION GAP SERPL CALC-SCNC: 4 MMOL/L (ref 3–18)
APTT PPP: 35.5 SEC (ref 23–36.4)
BUN SERPL-MCNC: 8 MG/DL (ref 7–18)
BUN/CREAT SERPL: 12 (ref 12–20)
CALCIUM SERPL-MCNC: 9.2 MG/DL (ref 8.5–10.1)
CHLORIDE SERPL-SCNC: 106 MMOL/L (ref 100–111)
CO2 SERPL-SCNC: 27 MMOL/L (ref 21–32)
CREAT SERPL-MCNC: 0.68 MG/DL (ref 0.6–1.3)
GLUCOSE SERPL-MCNC: 101 MG/DL (ref 74–99)
POTASSIUM SERPL-SCNC: 3.4 MMOL/L (ref 3.5–5.5)
SODIUM SERPL-SCNC: 137 MMOL/L (ref 136–145)
VAS LEFT ATA DIST PSV: 21.5 CM/S
VAS LEFT ATA MID PSV: 20.8 CM/S
VAS LEFT ATA PROX PSV: 20.1 CM/S
VAS LEFT PERONEAL DIST PSV: 13.7 CM/S
VAS LEFT PERONEAL MID PSV: 12.4 CM/S
VAS LEFT PERONEAL PROX PSV: 34.2 CM/S
VAS LEFT PFA PROX PSV: 27.2 CM/S
VAS LEFT PTA MID PSV: 27.2 CM/S

## 2024-03-27 PROCEDURE — 6370000000 HC RX 637 (ALT 250 FOR IP): Performed by: SURGERY

## 2024-03-27 PROCEDURE — 85730 THROMBOPLASTIN TIME PARTIAL: CPT

## 2024-03-27 PROCEDURE — 93926 LOWER EXTREMITY STUDY: CPT

## 2024-03-27 PROCEDURE — 6360000002 HC RX W HCPCS: Performed by: SURGERY

## 2024-03-27 PROCEDURE — 6370000000 HC RX 637 (ALT 250 FOR IP)

## 2024-03-27 PROCEDURE — 2580000003 HC RX 258: Performed by: SURGERY

## 2024-03-27 PROCEDURE — 97530 THERAPEUTIC ACTIVITIES: CPT

## 2024-03-27 PROCEDURE — 36415 COLL VENOUS BLD VENIPUNCTURE: CPT

## 2024-03-27 PROCEDURE — 1100000000 HC RM PRIVATE

## 2024-03-27 PROCEDURE — 80048 BASIC METABOLIC PNL TOTAL CA: CPT

## 2024-03-27 PROCEDURE — 94761 N-INVAS EAR/PLS OXIMETRY MLT: CPT

## 2024-03-27 PROCEDURE — 94640 AIRWAY INHALATION TREATMENT: CPT

## 2024-03-27 PROCEDURE — 93926 LOWER EXTREMITY STUDY: CPT | Performed by: INTERNAL MEDICINE

## 2024-03-27 PROCEDURE — 97535 SELF CARE MNGMENT TRAINING: CPT

## 2024-03-27 PROCEDURE — 97116 GAIT TRAINING THERAPY: CPT

## 2024-03-27 RX ORDER — POTASSIUM CHLORIDE 20 MEQ/1
40 TABLET, EXTENDED RELEASE ORAL PRN
Status: DISCONTINUED | OUTPATIENT
Start: 2024-03-27 | End: 2024-04-02 | Stop reason: HOSPADM

## 2024-03-27 RX ORDER — POTASSIUM CHLORIDE 7.45 MG/ML
10 INJECTION INTRAVENOUS PRN
Status: DISCONTINUED | OUTPATIENT
Start: 2024-03-27 | End: 2024-04-02 | Stop reason: HOSPADM

## 2024-03-27 RX ADMIN — OXYCODONE HYDROCHLORIDE 10 MG: 10 TABLET ORAL at 05:12

## 2024-03-27 RX ADMIN — IPRATROPIUM BROMIDE 0.5 MG: 0.5 SOLUTION RESPIRATORY (INHALATION) at 16:11

## 2024-03-27 RX ADMIN — CINACALCET HYDROCHLORIDE 30 MG: 60 TABLET, FILM COATED ORAL at 08:38

## 2024-03-27 RX ADMIN — IPRATROPIUM BROMIDE 0.5 MG: 0.5 SOLUTION RESPIRATORY (INHALATION) at 19:46

## 2024-03-27 RX ADMIN — ARFORMOTEROL TARTRATE 15 MCG: 15 SOLUTION RESPIRATORY (INHALATION) at 19:46

## 2024-03-27 RX ADMIN — LATANOPROST 1 DROP: 50 SOLUTION OPHTHALMIC at 21:30

## 2024-03-27 RX ADMIN — CINACALCET HYDROCHLORIDE 30 MG: 60 TABLET, FILM COATED ORAL at 21:04

## 2024-03-27 RX ADMIN — SODIUM CHLORIDE, PRESERVATIVE FREE 10 ML: 5 INJECTION INTRAVENOUS at 21:30

## 2024-03-27 RX ADMIN — SODIUM CHLORIDE, PRESERVATIVE FREE 10 ML: 5 INJECTION INTRAVENOUS at 08:40

## 2024-03-27 RX ADMIN — BUDESONIDE 250 MCG: 0.25 INHALANT RESPIRATORY (INHALATION) at 19:46

## 2024-03-27 RX ADMIN — OXYCODONE HYDROCHLORIDE 10 MG: 10 TABLET ORAL at 09:43

## 2024-03-27 RX ADMIN — PREGABALIN 100 MG: 50 CAPSULE ORAL at 08:38

## 2024-03-27 RX ADMIN — AMLODIPINE BESYLATE 10 MG: 10 TABLET ORAL at 08:39

## 2024-03-27 RX ADMIN — DULOXETINE HYDROCHLORIDE 60 MG: 30 CAPSULE, DELAYED RELEASE ORAL at 08:38

## 2024-03-27 RX ADMIN — HEPARIN SODIUM 500 UNITS/HR: 10000 INJECTION, SOLUTION INTRAVENOUS at 16:07

## 2024-03-27 RX ADMIN — PREGABALIN 100 MG: 50 CAPSULE ORAL at 21:04

## 2024-03-27 RX ADMIN — IPRATROPIUM BROMIDE 0.5 MG: 0.5 SOLUTION RESPIRATORY (INHALATION) at 07:10

## 2024-03-27 RX ADMIN — ARFORMOTEROL TARTRATE 15 MCG: 15 SOLUTION RESPIRATORY (INHALATION) at 07:09

## 2024-03-27 RX ADMIN — BUDESONIDE 250 MCG: 0.25 INHALANT RESPIRATORY (INHALATION) at 07:09

## 2024-03-27 RX ADMIN — POTASSIUM CHLORIDE 40 MEQ: 1500 TABLET, EXTENDED RELEASE ORAL at 13:06

## 2024-03-27 ASSESSMENT — PAIN DESCRIPTION - ORIENTATION
ORIENTATION: LEFT

## 2024-03-27 ASSESSMENT — PAIN DESCRIPTION - PAIN TYPE
TYPE: SURGICAL PAIN

## 2024-03-27 ASSESSMENT — PAIN DESCRIPTION - DESCRIPTORS
DESCRIPTORS: ACHING
DESCRIPTORS: ACHING;SORE
DESCRIPTORS: SORE
DESCRIPTORS: ACHING;SORE

## 2024-03-27 ASSESSMENT — PAIN SCALES - GENERAL
PAINLEVEL_OUTOF10: 5
PAINLEVEL_OUTOF10: 8
PAINLEVEL_OUTOF10: 2
PAINLEVEL_OUTOF10: 5
PAINLEVEL_OUTOF10: 3
PAINLEVEL_OUTOF10: 0
PAINLEVEL_OUTOF10: 1

## 2024-03-27 ASSESSMENT — PAIN - FUNCTIONAL ASSESSMENT
PAIN_FUNCTIONAL_ASSESSMENT: ACTIVITIES ARE NOT PREVENTED

## 2024-03-27 ASSESSMENT — PAIN DESCRIPTION - LOCATION
LOCATION: FOOT

## 2024-03-27 NOTE — PROGRESS NOTES
Bedside and Verbal shift change report given to Marli Tomas RN (oncoming nurse) by Montse Mc RN (offgoing nurse). Report included the following information Nurse Handoff Report, Adult Overview, Surgery Report, Intake/Output, MAR, Recent Results, Cardiac Rhythm Sinus , and Neuro Assessment.   Wound Prevention Checklist    Patient: Zamzam Calixto (73 y.o. 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

## 2024-03-27 NOTE — CARE COORDINATION
03/27/24 1141   IMM Letter   IMM Letter given to Patient/Family/Significant other/Guardian/POA/by: Sonia Patel   IMM Letter date given: 03/27/24   IMM Letter time given: 1118           Medicare pt has received, reviewed, and signed 2nd IM letter informing them of their right to appeal the discharge.  Signed copy has been placed on pt bedside chart.

## 2024-03-27 NOTE — CARE COORDINATION
03/27/24 1505   Service Assessment   Patient Orientation Alert and Oriented;Person;Place;Situation   Cognition Alert   History Provided By Patient   Primary Caregiver Self   Support Systems Spouse/Significant Other;Family Members   Patient's Healthcare Decision Maker is: Legal Next of Kin   PCP Verified by CM Yes   Last Visit to PCP Within last 3 months  (per pt, she has pcp appointment on 4/19/24 at 1015am)   Prior Functional Level Independent in ADLs/IADLs   Current Functional Level Independent in ADLs/IADLs   Can patient return to prior living arrangement Yes   Ability to make needs known: Good   Family able to assist with home care needs: Yes   Financial Resources Medicare  (BCBS)   Community Resources None   Social/Functional History   Lives With Spouse   Type of Home House   Home Layout Two level   Home Access Stairs to enter with rails   Entrance Stairs - Number of Steps 4   Bathroom Shower/Tub Tub/Shower unit   Bathroom Equipment Grab bars in shower   Home Equipment Walker, rolling;Crutches   Receives Help From Family   ADL Assistance Independent   Ambulation Assistance Independent   Transfer Assistance Independent   Active  Yes   Mode of Transportation Car   Occupation Retired   Discharge Planning   Type of Residence House   Living Arrangements Spouse/Significant Other   Current Services Prior To Admission None   Potential Assistance Needed N/A   DME Ordered? No   Potential Assistance Purchasing Medications No   Type of Home Care Services None   Patient expects to be discharged to: House   Services At/After Discharge   Transition of Care Consult (CM Consult) Home Health   Internal Home Health Yes   Services At/After Discharge OT;PT;Nursing services   Mode of Transport at Discharge Other (see comment)  (spouse)   Confirm Follow Up Transport Family   Condition of Participation: Discharge Planning   The Plan for Transition of Care is related to the following treatment goals: Plan to d/c with home

## 2024-03-27 NOTE — PROGRESS NOTES
Bedside, Verbal, and Written shift change report given to Martin  (oncoming nurse) by Marli FLORIAN  (offgoing nurse). Report included the following information Nurse Handoff Report, Index, ED Encounter Summary, Adult Overview, Surgery Report, Intake/Output, MAR, Med Rec Status, Cardiac Rhythm NSR, Alarm Parameters, and Neuro Assessment.    1300 Pt  seen patient  OT seen patient   .1500 Ambulated without difficulty around the unit and Stepdown    1700 Dr Ashley called  with order to keep patient NPO post midnight MD will reevaluate patient  1900 Hands off to Junior Renee RN

## 2024-03-27 NOTE — PROGRESS NOTES
Awake, alert  sitting in bedside chair  Tells me foot pain has returned overnight  Left foot and toes tender to touch, toes are blanched concerning for ischemia  Groin and leg incisions stable clean dry and intact no bleed or hematoma  Doppler signal in bypass graft not found at distal end, signal found at knee but very posterior  Soft Doppler at posterior tibial left leg  Continue Heparin gtt for now  K 3.4 this am, replacement protocol ordered  Will obtain Duplex of Left Fem-PT BPG  May need angiogram  Will discuss further with Gabi

## 2024-03-28 ENCOUNTER — ANESTHESIA EVENT (OUTPATIENT)
Dept: CARDIOTHORACIC SURGERY | Facility: HOSPITAL | Age: 74
End: 2024-03-28
Payer: MEDICARE

## 2024-03-28 ENCOUNTER — ANESTHESIA (OUTPATIENT)
Dept: CARDIOTHORACIC SURGERY | Facility: HOSPITAL | Age: 74
End: 2024-03-28
Payer: MEDICARE

## 2024-03-28 LAB
APTT PPP: 31.9 SEC (ref 23–36.4)
POTASSIUM SERPL-SCNC: 3.4 MMOL/L (ref 3.5–5.5)

## 2024-03-28 PROCEDURE — 2700000000 HC OXYGEN THERAPY PER DAY

## 2024-03-28 PROCEDURE — 94761 N-INVAS EAR/PLS OXIMETRY MLT: CPT

## 2024-03-28 PROCEDURE — 1100000000 HC RM PRIVATE

## 2024-03-28 PROCEDURE — 36415 COLL VENOUS BLD VENIPUNCTURE: CPT

## 2024-03-28 PROCEDURE — 2580000003 HC RX 258: Performed by: SURGERY

## 2024-03-28 PROCEDURE — 84132 ASSAY OF SERUM POTASSIUM: CPT

## 2024-03-28 PROCEDURE — 94640 AIRWAY INHALATION TREATMENT: CPT

## 2024-03-28 PROCEDURE — 6360000002 HC RX W HCPCS: Performed by: SURGERY

## 2024-03-28 PROCEDURE — 6370000000 HC RX 637 (ALT 250 FOR IP): Performed by: SURGERY

## 2024-03-28 PROCEDURE — 6370000000 HC RX 637 (ALT 250 FOR IP)

## 2024-03-28 PROCEDURE — 85730 THROMBOPLASTIN TIME PARTIAL: CPT

## 2024-03-28 RX ADMIN — SODIUM CHLORIDE, PRESERVATIVE FREE 10 ML: 5 INJECTION INTRAVENOUS at 20:46

## 2024-03-28 RX ADMIN — IPRATROPIUM BROMIDE 0.5 MG: 0.5 SOLUTION RESPIRATORY (INHALATION) at 20:22

## 2024-03-28 RX ADMIN — IPRATROPIUM BROMIDE 0.5 MG: 0.5 SOLUTION RESPIRATORY (INHALATION) at 14:25

## 2024-03-28 RX ADMIN — PREGABALIN 100 MG: 50 CAPSULE ORAL at 09:51

## 2024-03-28 RX ADMIN — OXYCODONE HYDROCHLORIDE 10 MG: 10 TABLET ORAL at 20:43

## 2024-03-28 RX ADMIN — OXYCODONE HYDROCHLORIDE 10 MG: 10 TABLET ORAL at 10:34

## 2024-03-28 RX ADMIN — CINACALCET HYDROCHLORIDE 30 MG: 60 TABLET, FILM COATED ORAL at 09:50

## 2024-03-28 RX ADMIN — SODIUM CHLORIDE, PRESERVATIVE FREE 10 ML: 5 INJECTION INTRAVENOUS at 09:53

## 2024-03-28 RX ADMIN — ARFORMOTEROL TARTRATE 15 MCG: 15 SOLUTION RESPIRATORY (INHALATION) at 07:49

## 2024-03-28 RX ADMIN — DULOXETINE HYDROCHLORIDE 60 MG: 30 CAPSULE, DELAYED RELEASE ORAL at 09:49

## 2024-03-28 RX ADMIN — CINACALCET HYDROCHLORIDE 30 MG: 60 TABLET, FILM COATED ORAL at 20:45

## 2024-03-28 RX ADMIN — IPRATROPIUM BROMIDE 0.5 MG: 0.5 SOLUTION RESPIRATORY (INHALATION) at 07:49

## 2024-03-28 RX ADMIN — BUDESONIDE 250 MCG: 0.25 INHALANT RESPIRATORY (INHALATION) at 07:49

## 2024-03-28 RX ADMIN — POTASSIUM CHLORIDE 40 MEQ: 1500 TABLET, EXTENDED RELEASE ORAL at 22:53

## 2024-03-28 RX ADMIN — BUDESONIDE 250 MCG: 0.25 INHALANT RESPIRATORY (INHALATION) at 20:22

## 2024-03-28 RX ADMIN — PREGABALIN 100 MG: 50 CAPSULE ORAL at 20:43

## 2024-03-28 RX ADMIN — ARFORMOTEROL TARTRATE 15 MCG: 15 SOLUTION RESPIRATORY (INHALATION) at 20:22

## 2024-03-28 RX ADMIN — AMLODIPINE BESYLATE 10 MG: 10 TABLET ORAL at 09:49

## 2024-03-28 ASSESSMENT — PAIN - FUNCTIONAL ASSESSMENT
PAIN_FUNCTIONAL_ASSESSMENT: PREVENTS OR INTERFERES SOME ACTIVE ACTIVITIES AND ADLS
PAIN_FUNCTIONAL_ASSESSMENT: PREVENTS OR INTERFERES SOME ACTIVE ACTIVITIES AND ADLS

## 2024-03-28 ASSESSMENT — PAIN DESCRIPTION - LOCATION
LOCATION: FOOT
LOCATION: FOOT
LOCATION: LEG;FOOT
LOCATION: FOOT

## 2024-03-28 ASSESSMENT — PAIN SCALES - GENERAL
PAINLEVEL_OUTOF10: 8
PAINLEVEL_OUTOF10: 8
PAINLEVEL_OUTOF10: 4
PAINLEVEL_OUTOF10: 0
PAINLEVEL_OUTOF10: 1
PAINLEVEL_OUTOF10: 4
PAINLEVEL_OUTOF10: 4

## 2024-03-28 ASSESSMENT — PAIN DESCRIPTION - FREQUENCY
FREQUENCY: CONTINUOUS

## 2024-03-28 ASSESSMENT — PAIN DESCRIPTION - DESCRIPTORS
DESCRIPTORS: SHARP
DESCRIPTORS: ACHING;SORE
DESCRIPTORS: STABBING;SHARP
DESCRIPTORS: STABBING

## 2024-03-28 ASSESSMENT — PAIN DESCRIPTION - ONSET
ONSET: ON-GOING
ONSET: PROGRESSIVE
ONSET: ON-GOING

## 2024-03-28 ASSESSMENT — PAIN DESCRIPTION - ORIENTATION
ORIENTATION: LEFT

## 2024-03-28 ASSESSMENT — PAIN DESCRIPTION - PAIN TYPE
TYPE: ACUTE PAIN

## 2024-03-28 NOTE — PROGRESS NOTES
Bedside and Verbal shift change report given to CHIQUI Forrest (oncoming nurse) by CHIQUI Leslie (offgoing nurse). Report included the following information Nurse Handoff Report, Intake/Output, MAR, and Cardiac Rhythm NSR .     Wound Prevention Checklist    Patient: Zamzam Calixto (73 y.o. 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

## 2024-03-28 NOTE — PROGRESS NOTES
Patient tells me her foot feels back to baseline before surgery.  Ultrasound or Doppler of graft ordered yesterday, unfortunately graft not evaluated  Bedside seems to be signal over the bypass graft bedside seems to be signal over the bypass graft but very monophasic to no flow to the foot.  Discussed with patient, will take to the OR today for angiography possible revision for limb salvage purposes

## 2024-03-29 ENCOUNTER — APPOINTMENT (OUTPATIENT)
Facility: HOSPITAL | Age: 74
DRG: 253 | End: 2024-03-29
Attending: SURGERY
Payer: MEDICARE

## 2024-03-29 LAB
ANION GAP SERPL CALC-SCNC: 4 MMOL/L (ref 3–18)
APTT PPP: 30.4 SEC (ref 23–36.4)
BASOPHILS # BLD: 0 K/UL (ref 0–0.1)
BASOPHILS NFR BLD: 0 % (ref 0–2)
BUN SERPL-MCNC: 9 MG/DL (ref 7–18)
BUN/CREAT SERPL: 13 (ref 12–20)
CALCIUM SERPL-MCNC: 9.7 MG/DL (ref 8.5–10.1)
CHLORIDE SERPL-SCNC: 103 MMOL/L (ref 100–111)
CO2 SERPL-SCNC: 30 MMOL/L (ref 21–32)
CREAT SERPL-MCNC: 0.68 MG/DL (ref 0.6–1.3)
DIFFERENTIAL METHOD BLD: ABNORMAL
EOSINOPHIL # BLD: 0.4 K/UL (ref 0–0.4)
EOSINOPHIL NFR BLD: 4 % (ref 0–5)
ERYTHROCYTE [DISTWIDTH] IN BLOOD BY AUTOMATED COUNT: 15.4 % (ref 11.6–14.5)
GLUCOSE SERPL-MCNC: 99 MG/DL (ref 74–99)
HCT VFR BLD AUTO: 25.8 % (ref 35–45)
HGB BLD-MCNC: 8.2 G/DL (ref 12–16)
IMM GRANULOCYTES # BLD AUTO: 0.1 K/UL (ref 0–0.04)
IMM GRANULOCYTES NFR BLD AUTO: 1 % (ref 0–0.5)
LYMPHOCYTES # BLD: 1.9 K/UL (ref 0.9–3.6)
LYMPHOCYTES NFR BLD: 20 % (ref 21–52)
MCH RBC QN AUTO: 28.8 PG (ref 24–34)
MCHC RBC AUTO-ENTMCNC: 31.8 G/DL (ref 31–37)
MCV RBC AUTO: 90.5 FL (ref 78–100)
MONOCYTES # BLD: 1.1 K/UL (ref 0.05–1.2)
MONOCYTES NFR BLD: 11 % (ref 3–10)
NEUTS SEG # BLD: 6 K/UL (ref 1.8–8)
NEUTS SEG NFR BLD: 63 % (ref 40–73)
NRBC # BLD: 0 K/UL (ref 0–0.01)
NRBC BLD-RTO: 0 PER 100 WBC
PLATELET # BLD AUTO: 282 K/UL (ref 135–420)
PMV BLD AUTO: 11 FL (ref 9.2–11.8)
POTASSIUM SERPL-SCNC: 3.9 MMOL/L (ref 3.5–5.5)
RBC # BLD AUTO: 2.85 M/UL (ref 4.2–5.3)
SODIUM SERPL-SCNC: 137 MMOL/L (ref 136–145)
WBC # BLD AUTO: 9.5 K/UL (ref 4.6–13.2)

## 2024-03-29 PROCEDURE — 2580000003 HC RX 258: Performed by: NURSE ANESTHETIST, CERTIFIED REGISTERED

## 2024-03-29 PROCEDURE — 86900 BLOOD TYPING SEROLOGIC ABO: CPT

## 2024-03-29 PROCEDURE — 94640 AIRWAY INHALATION TREATMENT: CPT

## 2024-03-29 PROCEDURE — C1894 INTRO/SHEATH, NON-LASER: HCPCS | Performed by: SURGERY

## 2024-03-29 PROCEDURE — 6360000002 HC RX W HCPCS: Performed by: SURGERY

## 2024-03-29 PROCEDURE — 86901 BLOOD TYPING SEROLOGIC RH(D): CPT

## 2024-03-29 PROCEDURE — 86850 RBC ANTIBODY SCREEN: CPT

## 2024-03-29 PROCEDURE — 3600000012 HC SURGERY LEVEL 2 ADDTL 15MIN: Performed by: SURGERY

## 2024-03-29 PROCEDURE — 2500000003 HC RX 250 WO HCPCS: Performed by: NURSE ANESTHETIST, CERTIFIED REGISTERED

## 2024-03-29 PROCEDURE — 85025 COMPLETE CBC W/AUTO DIFF WBC: CPT

## 2024-03-29 PROCEDURE — C1757 CATH, THROMBECTOMY/EMBOLECT: HCPCS | Performed by: SURGERY

## 2024-03-29 PROCEDURE — 36415 COLL VENOUS BLD VENIPUNCTURE: CPT

## 2024-03-29 PROCEDURE — 94761 N-INVAS EAR/PLS OXIMETRY MLT: CPT

## 2024-03-29 PROCEDURE — 2700000000 HC OXYGEN THERAPY PER DAY

## 2024-03-29 PROCEDURE — 04CL0ZZ EXTIRPATION OF MATTER FROM LEFT FEMORAL ARTERY, OPEN APPROACH: ICD-10-PCS | Performed by: SURGERY

## 2024-03-29 PROCEDURE — B41G1ZZ FLUOROSCOPY OF LEFT LOWER EXTREMITY ARTERIES USING LOW OSMOLAR CONTRAST: ICD-10-PCS | Performed by: SURGERY

## 2024-03-29 PROCEDURE — 6370000000 HC RX 637 (ALT 250 FOR IP): Performed by: SURGERY

## 2024-03-29 PROCEDURE — 3600000002 HC SURGERY LEVEL 2 BASE: Performed by: SURGERY

## 2024-03-29 PROCEDURE — 97116 GAIT TRAINING THERAPY: CPT

## 2024-03-29 PROCEDURE — 3700000001 HC ADD 15 MINUTES (ANESTHESIA): Performed by: SURGERY

## 2024-03-29 PROCEDURE — 6370000000 HC RX 637 (ALT 250 FOR IP): Performed by: ANESTHESIOLOGY

## 2024-03-29 PROCEDURE — 2720000010 HC SURG SUPPLY STERILE: Performed by: SURGERY

## 2024-03-29 PROCEDURE — 86923 COMPATIBILITY TEST ELECTRIC: CPT

## 2024-03-29 PROCEDURE — C1725 CATH, TRANSLUMIN NON-LASER: HCPCS | Performed by: SURGERY

## 2024-03-29 PROCEDURE — 3700000000 HC ANESTHESIA ATTENDED CARE: Performed by: SURGERY

## 2024-03-29 PROCEDURE — 2580000003 HC RX 258: Performed by: SURGERY

## 2024-03-29 PROCEDURE — 1100000000 HC RM PRIVATE

## 2024-03-29 PROCEDURE — 047S3ZZ DILATION OF LEFT POSTERIOR TIBIAL ARTERY, PERCUTANEOUS APPROACH: ICD-10-PCS | Performed by: SURGERY

## 2024-03-29 PROCEDURE — 85730 THROMBOPLASTIN TIME PARTIAL: CPT

## 2024-03-29 PROCEDURE — 2709999900 HC NON-CHARGEABLE SUPPLY: Performed by: SURGERY

## 2024-03-29 PROCEDURE — 97530 THERAPEUTIC ACTIVITIES: CPT

## 2024-03-29 PROCEDURE — 6360000002 HC RX W HCPCS: Performed by: NURSE ANESTHETIST, CERTIFIED REGISTERED

## 2024-03-29 PROCEDURE — C1769 GUIDE WIRE: HCPCS | Performed by: SURGERY

## 2024-03-29 PROCEDURE — 80048 BASIC METABOLIC PNL TOTAL CA: CPT

## 2024-03-29 PROCEDURE — 6360000004 HC RX CONTRAST MEDICATION: Performed by: SURGERY

## 2024-03-29 RX ORDER — HEPARIN SODIUM 10000 [USP'U]/100ML
500 INJECTION, SOLUTION INTRAVENOUS CONTINUOUS
Status: DISCONTINUED | OUTPATIENT
Start: 2024-03-29 | End: 2024-03-30

## 2024-03-29 RX ORDER — ROCURONIUM BROMIDE 10 MG/ML
INJECTION, SOLUTION INTRAVENOUS PRN
Status: DISCONTINUED | OUTPATIENT
Start: 2024-03-29 | End: 2024-03-29 | Stop reason: SDUPTHER

## 2024-03-29 RX ORDER — PROPOFOL 10 MG/ML
INJECTION, EMULSION INTRAVENOUS PRN
Status: DISCONTINUED | OUTPATIENT
Start: 2024-03-29 | End: 2024-03-29 | Stop reason: SDUPTHER

## 2024-03-29 RX ORDER — LIDOCAINE HYDROCHLORIDE 20 MG/ML
INJECTION, SOLUTION EPIDURAL; INFILTRATION; INTRACAUDAL; PERINEURAL PRN
Status: DISCONTINUED | OUTPATIENT
Start: 2024-03-29 | End: 2024-03-29 | Stop reason: SDUPTHER

## 2024-03-29 RX ORDER — FENTANYL CITRATE 50 UG/ML
INJECTION, SOLUTION INTRAMUSCULAR; INTRAVENOUS PRN
Status: DISCONTINUED | OUTPATIENT
Start: 2024-03-29 | End: 2024-03-29 | Stop reason: SDUPTHER

## 2024-03-29 RX ORDER — ONDANSETRON 2 MG/ML
INJECTION INTRAMUSCULAR; INTRAVENOUS PRN
Status: DISCONTINUED | OUTPATIENT
Start: 2024-03-29 | End: 2024-03-29 | Stop reason: SDUPTHER

## 2024-03-29 RX ORDER — VANCOMYCIN HYDROCHLORIDE 1 G/20ML
INJECTION, POWDER, LYOPHILIZED, FOR SOLUTION INTRAVENOUS
Status: DISPENSED
Start: 2024-03-29 | End: 2024-03-30

## 2024-03-29 RX ORDER — SODIUM CHLORIDE 9 MG/ML
INJECTION, SOLUTION INTRAVENOUS CONTINUOUS PRN
Status: DISCONTINUED | OUTPATIENT
Start: 2024-03-29 | End: 2024-03-29 | Stop reason: SDUPTHER

## 2024-03-29 RX ORDER — HEPARIN SODIUM 200 [USP'U]/100ML
INJECTION, SOLUTION INTRAVENOUS CONTINUOUS PRN
Status: COMPLETED | OUTPATIENT
Start: 2024-03-29 | End: 2024-03-29

## 2024-03-29 RX ORDER — SCOLOPAMINE TRANSDERMAL SYSTEM 1 MG/1
1 PATCH, EXTENDED RELEASE TRANSDERMAL ONCE
Status: COMPLETED | OUTPATIENT
Start: 2024-03-29 | End: 2024-04-01

## 2024-03-29 RX ORDER — HEPARIN SODIUM 200 [USP'U]/100ML
INJECTION, SOLUTION INTRAVENOUS
Status: DISPENSED
Start: 2024-03-29 | End: 2024-03-30

## 2024-03-29 RX ORDER — PHENYLEPHRINE HCL IN 0.9% NACL 1 MG/10 ML
SYRINGE (ML) INTRAVENOUS PRN
Status: DISCONTINUED | OUTPATIENT
Start: 2024-03-29 | End: 2024-03-29 | Stop reason: SDUPTHER

## 2024-03-29 RX ORDER — IODIXANOL 320 MG/ML
INJECTION, SOLUTION INTRAVASCULAR
Status: DISPENSED
Start: 2024-03-29 | End: 2024-03-30

## 2024-03-29 RX ORDER — IODIXANOL 320 MG/ML
INJECTION, SOLUTION INTRAVASCULAR PRN
Status: DISCONTINUED | OUTPATIENT
Start: 2024-03-29 | End: 2024-03-29 | Stop reason: HOSPADM

## 2024-03-29 RX ORDER — HEPARIN SODIUM 1000 [USP'U]/ML
INJECTION, SOLUTION INTRAVENOUS; SUBCUTANEOUS PRN
Status: DISCONTINUED | OUTPATIENT
Start: 2024-03-29 | End: 2024-03-29 | Stop reason: SDUPTHER

## 2024-03-29 RX ADMIN — PROPOFOL 100 MG: 10 INJECTION, EMULSION INTRAVENOUS at 16:32

## 2024-03-29 RX ADMIN — ONDANSETRON 4 MG: 2 INJECTION INTRAMUSCULAR; INTRAVENOUS at 17:39

## 2024-03-29 RX ADMIN — Medication 100 MCG: at 16:41

## 2024-03-29 RX ADMIN — HEPARIN SODIUM 5000 UNITS: 1000 INJECTION INTRAVENOUS; SUBCUTANEOUS at 17:02

## 2024-03-29 RX ADMIN — PREGABALIN 100 MG: 50 CAPSULE ORAL at 09:20

## 2024-03-29 RX ADMIN — SODIUM CHLORIDE: 9 INJECTION, SOLUTION INTRAVENOUS at 16:39

## 2024-03-29 RX ADMIN — IPRATROPIUM BROMIDE 0.5 MG: 0.5 SOLUTION RESPIRATORY (INHALATION) at 08:11

## 2024-03-29 RX ADMIN — ROCURONIUM BROMIDE 40 MG: 10 INJECTION, SOLUTION INTRAVENOUS at 16:33

## 2024-03-29 RX ADMIN — Medication 100 MCG: at 16:34

## 2024-03-29 RX ADMIN — DULOXETINE HYDROCHLORIDE 60 MG: 30 CAPSULE, DELAYED RELEASE ORAL at 09:17

## 2024-03-29 RX ADMIN — VANCOMYCIN HYDROCHLORIDE 1000 MG: 1 INJECTION, POWDER, LYOPHILIZED, FOR SOLUTION INTRAVENOUS at 16:53

## 2024-03-29 RX ADMIN — FENTANYL CITRATE 25 MCG: 50 INJECTION INTRAMUSCULAR; INTRAVENOUS at 17:34

## 2024-03-29 RX ADMIN — SUGAMMADEX 150 MG: 100 INJECTION, SOLUTION INTRAVENOUS at 17:45

## 2024-03-29 RX ADMIN — SODIUM CHLORIDE: 9 INJECTION, SOLUTION INTRAVENOUS at 16:23

## 2024-03-29 RX ADMIN — FENTANYL CITRATE 25 MCG: 50 INJECTION INTRAMUSCULAR; INTRAVENOUS at 17:57

## 2024-03-29 RX ADMIN — SODIUM CHLORIDE, PRESERVATIVE FREE 10 ML: 5 INJECTION INTRAVENOUS at 09:21

## 2024-03-29 RX ADMIN — CINACALCET HYDROCHLORIDE 30 MG: 60 TABLET, FILM COATED ORAL at 09:17

## 2024-03-29 RX ADMIN — HEPARIN SODIUM AND DEXTROSE 500 UNITS/HR: 10000; 5 INJECTION INTRAVENOUS at 21:25

## 2024-03-29 RX ADMIN — OXYCODONE HYDROCHLORIDE 10 MG: 10 TABLET ORAL at 04:58

## 2024-03-29 RX ADMIN — SODIUM CHLORIDE, PRESERVATIVE FREE 10 ML: 5 INJECTION INTRAVENOUS at 21:18

## 2024-03-29 RX ADMIN — HEPARIN SODIUM 500 UNITS/HR: 10000 INJECTION, SOLUTION INTRAVENOUS at 07:19

## 2024-03-29 RX ADMIN — HYDROMORPHONE HYDROCHLORIDE 0.5 MG: 1 INJECTION, SOLUTION INTRAMUSCULAR; INTRAVENOUS; SUBCUTANEOUS at 19:34

## 2024-03-29 RX ADMIN — ARFORMOTEROL TARTRATE 15 MCG: 15 SOLUTION RESPIRATORY (INHALATION) at 08:11

## 2024-03-29 RX ADMIN — PREGABALIN 100 MG: 50 CAPSULE ORAL at 21:17

## 2024-03-29 RX ADMIN — BUDESONIDE 250 MCG: 0.25 INHALANT RESPIRATORY (INHALATION) at 08:11

## 2024-03-29 RX ADMIN — FENTANYL CITRATE 25 MCG: 50 INJECTION INTRAMUSCULAR; INTRAVENOUS at 16:32

## 2024-03-29 RX ADMIN — AMLODIPINE BESYLATE 10 MG: 10 TABLET ORAL at 09:17

## 2024-03-29 RX ADMIN — LIDOCAINE HYDROCHLORIDE 50 MG: 20 INJECTION, SOLUTION EPIDURAL; INFILTRATION; INTRACAUDAL; PERINEURAL at 16:32

## 2024-03-29 RX ADMIN — CINACALCET HYDROCHLORIDE 30 MG: 60 TABLET, FILM COATED ORAL at 21:17

## 2024-03-29 RX ADMIN — FENTANYL CITRATE 25 MCG: 50 INJECTION INTRAMUSCULAR; INTRAVENOUS at 17:48

## 2024-03-29 RX ADMIN — OXYCODONE HYDROCHLORIDE 10 MG: 10 TABLET ORAL at 21:27

## 2024-03-29 ASSESSMENT — PAIN SCALES - GENERAL
PAINLEVEL_OUTOF10: 9
PAINLEVEL_OUTOF10: 3
PAINLEVEL_OUTOF10: 7
PAINLEVEL_OUTOF10: 3
PAINLEVEL_OUTOF10: 7

## 2024-03-29 ASSESSMENT — PAIN DESCRIPTION - LOCATION
LOCATION: FOOT;LEG

## 2024-03-29 ASSESSMENT — PAIN DESCRIPTION - DESCRIPTORS
DESCRIPTORS: SHARP

## 2024-03-29 ASSESSMENT — PAIN DESCRIPTION - PAIN TYPE
TYPE: ACUTE PAIN
TYPE: ACUTE PAIN

## 2024-03-29 ASSESSMENT — PAIN DESCRIPTION - ORIENTATION
ORIENTATION: LEFT
ORIENTATION: LEFT;LOWER
ORIENTATION: LEFT

## 2024-03-29 NOTE — OP NOTE
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 Item Serial No.  Lot No. LRB No. Used Action   CRYO FEMORAL ARTERY 40 CM   [id number] [employer]  Left 1 Implanted   CRYO FEMORAL ARTERY 30 CM   [id number] [employer]  Left 1 Implanted         Drains:   Urinary Catheter 03/25/24 2 Way (Active)   Output (mL) 15 mL 03/25/24 1210       [REMOVED] Negative Pressure Wound Therapy Leg Anterior;Left;Proximal;Upper (Removed)       [REMOVED] Negative Pressure Wound Therapy Leg Left (Removed)       [REMOVED] Negative Pressure Wound Therapy Leg Inner (Removed)       [REMOVED] Urinary Catheter 02/27/23 García (Removed)       [REMOVED] Urinary Catheter 02/20/24 García (Removed)       Findings: Femoral artery to origin of the posterior tibial artery bypass at the bifurcation.  2 consecutive Artegraft used for conduit        Detailed Description of Procedure:   Patient was brought to the operating room placed on table spine position had appropriate vancomycin and general anesthesia.  A-line was placed by anesthesia.  Appropriate lines were gained.  The left leg was prepped from the iliac crest to the toes.  The foot appears ischemic.  Patient has a known rejection of PTFE.  Recently failed vein graft.  Attempt today with Artegraft.  First I made an incision on the inner aspect of the calf 
0850   Discontinuation Reason Per provider order 03/26/24 0000       Findings: Bypass graft was patent proximally but then occluded throughout the mid leg and distal leg.  Runoff was completely occluded.  Absent contrast filling of the posterior tibial, peroneal, and anterior tibial artery.    Balloon angioplasty from the graft through the length of the posterior tibial artery.  Postangioplasty posterior tibial artery is patent from the graft.        Detailed Description of Procedure:   Very complex PAD left leg with ischemic foot.  Had recent Artegraft bypass.  Now thrombosed.  Brought the operating room she had vancomycin García catheter prep of the left leg from the groin to the toes.  I reopened the groin incision and identified the bypass graft without any difficulty.  I placed 2 Vesseloops here.  Proximally is patent but then became occlusive.  Made an incision over the occluded area and thrombectomized the entire graft including the inflow and gave 5000 of heparin.  There was fresh thrombus in the bypass graft.  I could not pass the Mohinder balloon beyond the bypass graft.  Once this was open I repaired the graftotomy site with a 6-0 Prolene suture.  I punctured the graft with a needle placed a wire and then a 6 Macedonian 55 cm Ansell sheath.  Brought this all the way down to the distal bypass.  Performed angiography and the graft was open but there was no runoff.  Aspirated the contrast.  Placed a Glidewire into the posterior tibial artery and exchanged for a V18 wire and balloon angioplastied the graft anastomosis all the way to the length of the posterior tibial artery with a 2.5 x 150 balloon.  Post angiography this feels very nicely including collaterals and then into the venous runoff.  There is no extravasation.  Did not do any further angioplasty for fear of rupturing the distal anastomosis.  I was happy with the runoff in the foot appears much more pink.  Second toe is still dusky.  I removed the sheath

## 2024-03-29 NOTE — PERIOP NOTE
TRANSFER - IN REPORT:    Verbal report received from Victoriano Schroeder RN on Zamzam Calixto  being received from [facility] ICU for ordered procedure      Report consisted of patient's Situation, Background, Assessment and   Recommendations(SBAR).     Information from the following report(s) Nurse Handoff Report, Intake/Output, MAR, and Recent Results was reviewed with the receiving nurse.    Opportunity for questions and clarification was provided.      Assessment completed upon patient's arrival to unit and care assumed.

## 2024-03-29 NOTE — ANESTHESIA PRE PROCEDURE
Evaluation  Patient summary reviewed and Nursing notes reviewed  Airway: Mallampati: II  TM distance: >3 FB   Neck ROM: full  Mouth opening: > = 3 FB   Dental:    (+) poor dentition and partials      Pulmonary:normal exam    (+) pneumonia:  COPD:                                     Cardiovascular:    (+) hypertension:                  Neuro/Psych:   Negative Neuro/Psych ROS              GI/Hepatic/Renal:   (+) GERD:          Endo/Other: Negative Endo/Other ROS                    Abdominal: normal exam            Vascular:           ROS comment: Multiple left femoral procedures over past year. Other Findings:           Anesthesia Plan      general     ASA 4       Induction: intravenous.      Anesthetic plan and risks discussed with patient.      Plan discussed with CRNA.                  Clark Molina, DO   3/29/2024

## 2024-03-29 NOTE — ANESTHESIA POSTPROCEDURE EVALUATION
Department of Anesthesiology  Postprocedure Note    Patient: Zamzam Calixto  MRN: [id number]  YOB: 1950  Date of evaluation: 3/29/2024    Procedure Summary       Date: 03/29/24 Room / Location: [facility] 02 / [facility] CARDIAC SURGERY    Anesthesia Start: 1623 Anesthesia Stop: 1811    Procedure: ANGIOGRAM LEFT LEG WITH REVISION BYPASS THROMBECTOMY BALLOON ANGIOPLASTY OF LEFT POSTERIOR TIBIAL ARTERY (Left) Diagnosis:       PAD (peripheral artery disease) ([facility])      (PAD (peripheral artery disease) ([facility]) [I73.9])    Surgeons: Mich Ashley MD Responsible Provider: Clark Molina DO    Anesthesia Type: General ASA Status: 4            Anesthesia Type: General    Gurmeet Phase I: Gurmeet Score: 10    Gurmeet Phase II:      Anesthesia Post Evaluation    Patient location during evaluation: ICU  Patient participation: complete - patient participated  Level of consciousness: awake  Airway patency: patent  Nausea & Vomiting: no nausea and no vomiting  Cardiovascular status: hemodynamically stable  Respiratory status: acceptable  Hydration status: stable  Multimodal analgesia pain management approach    No notable events documented.

## 2024-03-29 NOTE — PERIOP NOTE
TRANSFER - OUT REPORT:    Verbal report given to Victoriano Schroeder RN on Zamzam Calixto  being transferred to [facility] ICU for routine post-op       Report consisted of patient's Situation, Background, Assessment and   Recommendations(SBAR).     Information from the following report(s) Nurse Handoff Report, Surgery Report, and Recent Results was reviewed with the receiving nurse.           Lines:   Peripheral IV 03/25/24 Left;Posterior Hand (Active)   Site Assessment Clean, dry & intact 03/29/24 1600   Line Status Infusing 03/29/24 1600   Line Care Connections checked and tightened 03/29/24 1600   Phlebitis Assessment No symptoms 03/29/24 1600   Infiltration Assessment 0 03/29/24 1600   Alcohol Cap Used Yes 03/29/24 1600   Dressing Status Clean, dry & intact 03/29/24 1600   Dressing Type Transparent 03/29/24 1600        Opportunity for questions and clarification was provided.      Patient transported with:  Monitor and Registered Nurse

## 2024-03-30 LAB
APTT PPP: 34.5 SEC (ref 23–36.4)
APTT PPP: 36.2 SEC (ref 23–36.4)
APTT PPP: 61.6 SEC (ref 23–36.4)
BASOPHILS # BLD: 0 K/UL (ref 0–0.1)
BASOPHILS NFR BLD: 0 % (ref 0–2)
DIFFERENTIAL METHOD BLD: ABNORMAL
EOSINOPHIL # BLD: 0.4 K/UL (ref 0–0.4)
EOSINOPHIL NFR BLD: 5 % (ref 0–5)
ERYTHROCYTE [DISTWIDTH] IN BLOOD BY AUTOMATED COUNT: 15.3 % (ref 11.6–14.5)
HCT VFR BLD AUTO: 24.2 % (ref 35–45)
HGB BLD-MCNC: 7.6 G/DL (ref 12–16)
IMM GRANULOCYTES # BLD AUTO: 0 K/UL (ref 0–0.04)
IMM GRANULOCYTES NFR BLD AUTO: 1 % (ref 0–0.5)
LYMPHOCYTES # BLD: 1.5 K/UL (ref 0.9–3.6)
LYMPHOCYTES NFR BLD: 18 % (ref 21–52)
MCH RBC QN AUTO: 28.4 PG (ref 24–34)
MCHC RBC AUTO-ENTMCNC: 31.4 G/DL (ref 31–37)
MCV RBC AUTO: 90.3 FL (ref 78–100)
MONOCYTES # BLD: 1 K/UL (ref 0.05–1.2)
MONOCYTES NFR BLD: 13 % (ref 3–10)
NEUTS SEG # BLD: 5 K/UL (ref 1.8–8)
NEUTS SEG NFR BLD: 63 % (ref 40–73)
NRBC # BLD: 0 K/UL (ref 0–0.01)
NRBC BLD-RTO: 0 PER 100 WBC
PLATELET # BLD AUTO: 290 K/UL (ref 135–420)
PMV BLD AUTO: 10.5 FL (ref 9.2–11.8)
RBC # BLD AUTO: 2.68 M/UL (ref 4.2–5.3)
WBC # BLD AUTO: 7.9 K/UL (ref 4.6–13.2)

## 2024-03-30 PROCEDURE — 94640 AIRWAY INHALATION TREATMENT: CPT

## 2024-03-30 PROCEDURE — 1100000000 HC RM PRIVATE

## 2024-03-30 PROCEDURE — 85730 THROMBOPLASTIN TIME PARTIAL: CPT

## 2024-03-30 PROCEDURE — 94761 N-INVAS EAR/PLS OXIMETRY MLT: CPT

## 2024-03-30 PROCEDURE — 2700000000 HC OXYGEN THERAPY PER DAY

## 2024-03-30 PROCEDURE — 6370000000 HC RX 637 (ALT 250 FOR IP): Performed by: SURGERY

## 2024-03-30 PROCEDURE — 36415 COLL VENOUS BLD VENIPUNCTURE: CPT

## 2024-03-30 PROCEDURE — 6360000002 HC RX W HCPCS: Performed by: SURGERY

## 2024-03-30 PROCEDURE — 85025 COMPLETE CBC W/AUTO DIFF WBC: CPT

## 2024-03-30 PROCEDURE — 2580000003 HC RX 258: Performed by: SURGERY

## 2024-03-30 PROCEDURE — 51798 US URINE CAPACITY MEASURE: CPT

## 2024-03-30 RX ORDER — SODIUM CHLORIDE 9 MG/ML
INJECTION, SOLUTION INTRAVENOUS CONTINUOUS
Status: DISCONTINUED | OUTPATIENT
Start: 2024-03-30 | End: 2024-04-02 | Stop reason: HOSPADM

## 2024-03-30 RX ORDER — HEPARIN SODIUM 10000 [USP'U]/100ML
5-30 INJECTION, SOLUTION INTRAVENOUS CONTINUOUS
Status: DISCONTINUED | OUTPATIENT
Start: 2024-03-30 | End: 2024-04-02

## 2024-03-30 RX ADMIN — HYDROMORPHONE HYDROCHLORIDE 0.5 MG: 1 INJECTION, SOLUTION INTRAMUSCULAR; INTRAVENOUS; SUBCUTANEOUS at 20:01

## 2024-03-30 RX ADMIN — IPRATROPIUM BROMIDE 0.5 MG: 0.5 SOLUTION RESPIRATORY (INHALATION) at 21:08

## 2024-03-30 RX ADMIN — ARFORMOTEROL TARTRATE 15 MCG: 15 SOLUTION RESPIRATORY (INHALATION) at 21:08

## 2024-03-30 RX ADMIN — PREGABALIN 100 MG: 50 CAPSULE ORAL at 19:58

## 2024-03-30 RX ADMIN — HYDROMORPHONE HYDROCHLORIDE 0.5 MG: 1 INJECTION, SOLUTION INTRAMUSCULAR; INTRAVENOUS; SUBCUTANEOUS at 11:39

## 2024-03-30 RX ADMIN — DULOXETINE HYDROCHLORIDE 60 MG: 30 CAPSULE, DELAYED RELEASE ORAL at 08:41

## 2024-03-30 RX ADMIN — CINACALCET HYDROCHLORIDE 30 MG: 60 TABLET, FILM COATED ORAL at 08:41

## 2024-03-30 RX ADMIN — ARFORMOTEROL TARTRATE 15 MCG: 15 SOLUTION RESPIRATORY (INHALATION) at 09:36

## 2024-03-30 RX ADMIN — SODIUM CHLORIDE, PRESERVATIVE FREE 10 ML: 5 INJECTION INTRAVENOUS at 08:46

## 2024-03-30 RX ADMIN — BUDESONIDE 250 MCG: 0.25 INHALANT RESPIRATORY (INHALATION) at 09:36

## 2024-03-30 RX ADMIN — IPRATROPIUM BROMIDE 0.5 MG: 0.5 SOLUTION RESPIRATORY (INHALATION) at 09:36

## 2024-03-30 RX ADMIN — AMLODIPINE BESYLATE 10 MG: 10 TABLET ORAL at 08:40

## 2024-03-30 RX ADMIN — HYDROMORPHONE HYDROCHLORIDE 0.5 MG: 1 INJECTION, SOLUTION INTRAMUSCULAR; INTRAVENOUS; SUBCUTANEOUS at 06:36

## 2024-03-30 RX ADMIN — OXYCODONE HYDROCHLORIDE 10 MG: 10 TABLET ORAL at 16:52

## 2024-03-30 RX ADMIN — OXYCODONE HYDROCHLORIDE 10 MG: 10 TABLET ORAL at 05:21

## 2024-03-30 RX ADMIN — SODIUM CHLORIDE 100 ML/HR: 9 INJECTION, SOLUTION INTRAVENOUS at 14:05

## 2024-03-30 RX ADMIN — PREGABALIN 100 MG: 50 CAPSULE ORAL at 08:41

## 2024-03-30 RX ADMIN — CINACALCET HYDROCHLORIDE 30 MG: 60 TABLET, FILM COATED ORAL at 19:58

## 2024-03-30 RX ADMIN — OXYCODONE HYDROCHLORIDE 10 MG: 10 TABLET ORAL at 22:15

## 2024-03-30 RX ADMIN — BUDESONIDE 250 MCG: 0.25 INHALANT RESPIRATORY (INHALATION) at 21:08

## 2024-03-30 RX ADMIN — SODIUM CHLORIDE: 9 INJECTION, SOLUTION INTRAVENOUS at 22:18

## 2024-03-30 RX ADMIN — IPRATROPIUM BROMIDE 0.5 MG: 0.5 SOLUTION RESPIRATORY (INHALATION) at 13:43

## 2024-03-30 ASSESSMENT — PAIN DESCRIPTION - PAIN TYPE
TYPE: CHRONIC PAIN
TYPE: ACUTE PAIN
TYPE: ACUTE PAIN

## 2024-03-30 ASSESSMENT — PAIN DESCRIPTION - ORIENTATION
ORIENTATION: LEFT;LOWER
ORIENTATION: LEFT

## 2024-03-30 ASSESSMENT — PAIN DESCRIPTION - LOCATION
LOCATION: FOOT;LEG
LOCATION: FOOT;LEG
LOCATION: FOOT
LOCATION: LEG
LOCATION: FOOT

## 2024-03-30 ASSESSMENT — PAIN SCALES - GENERAL
PAINLEVEL_OUTOF10: 0
PAINLEVEL_OUTOF10: 8
PAINLEVEL_OUTOF10: 8
PAINLEVEL_OUTOF10: 0
PAINLEVEL_OUTOF10: 9
PAINLEVEL_OUTOF10: 8
PAINLEVEL_OUTOF10: 4
PAINLEVEL_OUTOF10: 3
PAINLEVEL_OUTOF10: 6
PAINLEVEL_OUTOF10: 7
PAINLEVEL_OUTOF10: 0
PAINLEVEL_OUTOF10: 8
PAINLEVEL_OUTOF10: 6

## 2024-03-30 ASSESSMENT — PAIN DESCRIPTION - DESCRIPTORS
DESCRIPTORS: SHARP
DESCRIPTORS: SHARP
DESCRIPTORS: HYPERSENSITIVITY
DESCRIPTORS: CRAMPING

## 2024-03-31 LAB
APTT PPP: 58.5 SEC (ref 23–36.4)
APTT PPP: 63.3 SEC (ref 23–36.4)
APTT PPP: 71.4 SEC (ref 23–36.4)
HCT VFR BLD AUTO: 29.2 % (ref 35–45)
HGB BLD-MCNC: 9.5 G/DL (ref 12–16)
HISTORY CHECK: NORMAL

## 2024-03-31 PROCEDURE — 94761 N-INVAS EAR/PLS OXIMETRY MLT: CPT

## 2024-03-31 PROCEDURE — P9016 RBC LEUKOCYTES REDUCED: HCPCS

## 2024-03-31 PROCEDURE — 2580000003 HC RX 258: Performed by: SURGERY

## 2024-03-31 PROCEDURE — 1100000000 HC RM PRIVATE

## 2024-03-31 PROCEDURE — 6360000002 HC RX W HCPCS: Performed by: SURGERY

## 2024-03-31 PROCEDURE — 36430 TRANSFUSION BLD/BLD COMPNT: CPT

## 2024-03-31 PROCEDURE — 85730 THROMBOPLASTIN TIME PARTIAL: CPT

## 2024-03-31 PROCEDURE — 85014 HEMATOCRIT: CPT

## 2024-03-31 PROCEDURE — 85018 HEMOGLOBIN: CPT

## 2024-03-31 PROCEDURE — 94640 AIRWAY INHALATION TREATMENT: CPT

## 2024-03-31 PROCEDURE — 36415 COLL VENOUS BLD VENIPUNCTURE: CPT

## 2024-03-31 PROCEDURE — 6370000000 HC RX 637 (ALT 250 FOR IP): Performed by: SURGERY

## 2024-03-31 RX ORDER — SODIUM CHLORIDE 9 MG/ML
INJECTION, SOLUTION INTRAVENOUS PRN
Status: DISCONTINUED | OUTPATIENT
Start: 2024-03-31 | End: 2024-04-02 | Stop reason: HOSPADM

## 2024-03-31 RX ADMIN — PREGABALIN 100 MG: 50 CAPSULE ORAL at 08:31

## 2024-03-31 RX ADMIN — IPRATROPIUM BROMIDE 0.5 MG: 0.5 SOLUTION RESPIRATORY (INHALATION) at 20:38

## 2024-03-31 RX ADMIN — HEPARIN SODIUM 24 UNITS/KG/HR: 10000 INJECTION, SOLUTION INTRAVENOUS at 21:15

## 2024-03-31 RX ADMIN — DULOXETINE HYDROCHLORIDE 60 MG: 30 CAPSULE, DELAYED RELEASE ORAL at 08:30

## 2024-03-31 RX ADMIN — POLYETHYLENE GLYCOL 3350 17 G: 17 POWDER, FOR SOLUTION ORAL at 08:29

## 2024-03-31 RX ADMIN — IPRATROPIUM BROMIDE 0.5 MG: 0.5 SOLUTION RESPIRATORY (INHALATION) at 09:16

## 2024-03-31 RX ADMIN — CINACALCET HYDROCHLORIDE 30 MG: 60 TABLET, FILM COATED ORAL at 20:32

## 2024-03-31 RX ADMIN — HYDROMORPHONE HYDROCHLORIDE 0.5 MG: 1 INJECTION, SOLUTION INTRAMUSCULAR; INTRAVENOUS; SUBCUTANEOUS at 16:41

## 2024-03-31 RX ADMIN — ARFORMOTEROL TARTRATE 15 MCG: 15 SOLUTION RESPIRATORY (INHALATION) at 20:38

## 2024-03-31 RX ADMIN — IPRATROPIUM BROMIDE 0.5 MG: 0.5 SOLUTION RESPIRATORY (INHALATION) at 13:50

## 2024-03-31 RX ADMIN — HEPARIN SODIUM 20 UNITS/KG/HR: 10000 INJECTION, SOLUTION INTRAVENOUS at 01:11

## 2024-03-31 RX ADMIN — SODIUM CHLORIDE 100 ML/HR: 9 INJECTION, SOLUTION INTRAVENOUS at 10:11

## 2024-03-31 RX ADMIN — BUDESONIDE 250 MCG: 0.25 INHALANT RESPIRATORY (INHALATION) at 09:15

## 2024-03-31 RX ADMIN — OXYCODONE HYDROCHLORIDE 10 MG: 10 TABLET ORAL at 08:54

## 2024-03-31 RX ADMIN — PREGABALIN 100 MG: 50 CAPSULE ORAL at 20:32

## 2024-03-31 RX ADMIN — SODIUM CHLORIDE: 9 INJECTION, SOLUTION INTRAVENOUS at 21:16

## 2024-03-31 RX ADMIN — AMLODIPINE BESYLATE 10 MG: 10 TABLET ORAL at 08:30

## 2024-03-31 RX ADMIN — ARFORMOTEROL TARTRATE 15 MCG: 15 SOLUTION RESPIRATORY (INHALATION) at 09:16

## 2024-03-31 RX ADMIN — CINACALCET HYDROCHLORIDE 30 MG: 60 TABLET, FILM COATED ORAL at 08:29

## 2024-03-31 RX ADMIN — SODIUM CHLORIDE, PRESERVATIVE FREE 10 ML: 5 INJECTION INTRAVENOUS at 08:31

## 2024-03-31 RX ADMIN — SODIUM CHLORIDE, PRESERVATIVE FREE 10 ML: 5 INJECTION INTRAVENOUS at 20:36

## 2024-03-31 RX ADMIN — BUDESONIDE 250 MCG: 0.25 INHALANT RESPIRATORY (INHALATION) at 20:38

## 2024-03-31 ASSESSMENT — PAIN DESCRIPTION - DESCRIPTORS
DESCRIPTORS: ACHING
DESCRIPTORS: ACHING

## 2024-03-31 ASSESSMENT — PAIN SCALES - GENERAL
PAINLEVEL_OUTOF10: 7
PAINLEVEL_OUTOF10: 10

## 2024-03-31 ASSESSMENT — PAIN DESCRIPTION - ORIENTATION
ORIENTATION: LEFT
ORIENTATION: LEFT

## 2024-03-31 ASSESSMENT — PAIN DESCRIPTION - LOCATION
LOCATION: FOOT
LOCATION: FOOT

## 2024-03-31 NOTE — PROGRESS NOTES
Bedside and Verbal shift change report given to This Writer (oncoming nurse) by CHIQUI Bernal (offgoing nurse). Report included the following information Nurse Handoff Report, Adult Overview, Intake/Output, MAR, Recent Results, Cardiac Rhythm SR, and Alarm Parameters.    Wound Prevention Checklist  Patient: Zamzam Calixto (73 y.o. 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 No complaints of pain at this time. Patient on NS at 100ml/hr and Heparin at 20 units/kg/hr. García noted and patient on 2L NC at this time.   Shaka Summers RN    Bedside and Verbal shift change report given to CHIQUI Pastrana (oncoming nurse) by This Writer (offgoing nurse). Report included the following information Nurse Handoff Report, Intake/Output, MAR, Recent Results, Cardiac Rhythm SR, Alarm Parameters, and Quality Measures.

## 2024-03-31 NOTE — PROGRESS NOTES
2001:  Patient complaining of pain, see MAR.     2221:  Patient called RN to room, states she has to \"get up and pee\".  Showed patient she has a arndt, checked line, not kinked, draining properly, no sediment.  Bladder scanned and showed less than 25 cc.  Patient asked for PRN pain medication, see MAR.

## 2024-03-31 NOTE — FLOWSHEET NOTE
03/31/24 1619   Urine Assessment   Bladder Scan Volume (mL) 13 mL     Pt.  c/o full bladder, García in place

## 2024-03-31 NOTE — CONSENT
Informed Consent for Blood Component Transfusion Note    I have discussed with the patient the rationale for blood component transfusion; its benefits in treating or preventing fatigue, organ damage, or death; and its risk which includes mild transfusion reactions, rare risk of blood borne infection, or more serious but rare reactions. I have discussed the alternatives to transfusion, including the risk and consequences of not receiving transfusion. The patient had an opportunity to ask questions and had agreed to proceed with transfusion of blood components.    Electronically signed by Mich Jackson MD on 3/31/24 at 12:40 PM EDT

## 2024-04-01 LAB
ABO + RH BLD: NORMAL
APTT PPP: 123.5 SEC (ref 23–36.4)
APTT PPP: 85.5 SEC (ref 23–36.4)
BLD PROD TYP BPU: NORMAL
BLOOD BANK BLOOD PRODUCT EXPIRATION DATE: NORMAL
BLOOD BANK DISPENSE STATUS: NORMAL
BLOOD BANK ISBT PRODUCT BLOOD TYPE: 6200
BLOOD BANK PRODUCT CODE: NORMAL
BLOOD BANK UNIT TYPE AND RH: NORMAL
BLOOD GROUP ANTIBODIES SERPL: NORMAL
BPU ID: NORMAL
CALLED TO: NORMAL
CROSSMATCH RESULT: NORMAL
SPECIMEN EXP DATE BLD: NORMAL
UNIT DIVISION: 0
UNIT ISSUE DATE/TIME: NORMAL

## 2024-04-01 PROCEDURE — 97164 PT RE-EVAL EST PLAN CARE: CPT

## 2024-04-01 PROCEDURE — 2700000000 HC OXYGEN THERAPY PER DAY

## 2024-04-01 PROCEDURE — 85730 THROMBOPLASTIN TIME PARTIAL: CPT

## 2024-04-01 PROCEDURE — 6370000000 HC RX 637 (ALT 250 FOR IP): Performed by: SURGERY

## 2024-04-01 PROCEDURE — 6360000002 HC RX W HCPCS: Performed by: SURGERY

## 2024-04-01 PROCEDURE — 2140000001 HC CVICU INTERMEDIATE R&B

## 2024-04-01 PROCEDURE — 94640 AIRWAY INHALATION TREATMENT: CPT

## 2024-04-01 PROCEDURE — 97535 SELF CARE MNGMENT TRAINING: CPT

## 2024-04-01 PROCEDURE — 97530 THERAPEUTIC ACTIVITIES: CPT

## 2024-04-01 PROCEDURE — 30233N1 TRANSFUSION OF NONAUTOLOGOUS RED BLOOD CELLS INTO PERIPHERAL VEIN, PERCUTANEOUS APPROACH: ICD-10-PCS | Performed by: SURGERY

## 2024-04-01 PROCEDURE — 94761 N-INVAS EAR/PLS OXIMETRY MLT: CPT

## 2024-04-01 PROCEDURE — 36415 COLL VENOUS BLD VENIPUNCTURE: CPT

## 2024-04-01 PROCEDURE — 2580000003 HC RX 258: Performed by: SURGERY

## 2024-04-01 RX ADMIN — OXYCODONE HYDROCHLORIDE 10 MG: 10 TABLET ORAL at 00:16

## 2024-04-01 RX ADMIN — DULOXETINE HYDROCHLORIDE 60 MG: 30 CAPSULE, DELAYED RELEASE ORAL at 09:09

## 2024-04-01 RX ADMIN — IPRATROPIUM BROMIDE 0.5 MG: 0.5 SOLUTION RESPIRATORY (INHALATION) at 09:39

## 2024-04-01 RX ADMIN — OXYCODONE HYDROCHLORIDE 10 MG: 10 TABLET ORAL at 14:26

## 2024-04-01 RX ADMIN — PREGABALIN 100 MG: 50 CAPSULE ORAL at 09:09

## 2024-04-01 RX ADMIN — HYDROMORPHONE HYDROCHLORIDE 0.5 MG: 1 INJECTION, SOLUTION INTRAMUSCULAR; INTRAVENOUS; SUBCUTANEOUS at 18:51

## 2024-04-01 RX ADMIN — SODIUM CHLORIDE, PRESERVATIVE FREE 10 ML: 5 INJECTION INTRAVENOUS at 20:54

## 2024-04-01 RX ADMIN — OXYCODONE HYDROCHLORIDE 10 MG: 10 TABLET ORAL at 20:53

## 2024-04-01 RX ADMIN — CINACALCET HYDROCHLORIDE 30 MG: 60 TABLET, FILM COATED ORAL at 09:09

## 2024-04-01 RX ADMIN — ARFORMOTEROL TARTRATE 15 MCG: 15 SOLUTION RESPIRATORY (INHALATION) at 20:47

## 2024-04-01 RX ADMIN — IPRATROPIUM BROMIDE 0.5 MG: 0.5 SOLUTION RESPIRATORY (INHALATION) at 15:04

## 2024-04-01 RX ADMIN — ARFORMOTEROL TARTRATE 15 MCG: 15 SOLUTION RESPIRATORY (INHALATION) at 09:39

## 2024-04-01 RX ADMIN — BUDESONIDE 250 MCG: 0.25 INHALANT RESPIRATORY (INHALATION) at 09:39

## 2024-04-01 RX ADMIN — HEPARIN SODIUM 26 UNITS/KG/HR: 10000 INJECTION, SOLUTION INTRAVENOUS at 21:05

## 2024-04-01 RX ADMIN — IPRATROPIUM BROMIDE 0.5 MG: 0.5 SOLUTION RESPIRATORY (INHALATION) at 20:47

## 2024-04-01 RX ADMIN — HYDROMORPHONE HYDROCHLORIDE 0.5 MG: 1 INJECTION, SOLUTION INTRAMUSCULAR; INTRAVENOUS; SUBCUTANEOUS at 12:28

## 2024-04-01 RX ADMIN — HYDROMORPHONE HYDROCHLORIDE 0.5 MG: 1 INJECTION, SOLUTION INTRAMUSCULAR; INTRAVENOUS; SUBCUTANEOUS at 04:41

## 2024-04-01 RX ADMIN — PREGABALIN 100 MG: 50 CAPSULE ORAL at 20:53

## 2024-04-01 RX ADMIN — CINACALCET HYDROCHLORIDE 30 MG: 60 TABLET, FILM COATED ORAL at 20:53

## 2024-04-01 RX ADMIN — BUDESONIDE 250 MCG: 0.25 INHALANT RESPIRATORY (INHALATION) at 20:47

## 2024-04-01 ASSESSMENT — PAIN - FUNCTIONAL ASSESSMENT
PAIN_FUNCTIONAL_ASSESSMENT: ACTIVITIES ARE NOT PREVENTED
PAIN_FUNCTIONAL_ASSESSMENT: PREVENTS OR INTERFERES SOME ACTIVE ACTIVITIES AND ADLS
PAIN_FUNCTIONAL_ASSESSMENT: ACTIVITIES ARE NOT PREVENTED
PAIN_FUNCTIONAL_ASSESSMENT: ACTIVITIES ARE NOT PREVENTED

## 2024-04-01 ASSESSMENT — PAIN DESCRIPTION - DESCRIPTORS
DESCRIPTORS: SORE
DESCRIPTORS: SORE;THROBBING
DESCRIPTORS: SORE
DESCRIPTORS: ACHING
DESCRIPTORS: SORE

## 2024-04-01 ASSESSMENT — PAIN SCALES - GENERAL
PAINLEVEL_OUTOF10: 10
PAINLEVEL_OUTOF10: 5
PAINLEVEL_OUTOF10: 3
PAINLEVEL_OUTOF10: 8
PAINLEVEL_OUTOF10: 3
PAINLEVEL_OUTOF10: 6
PAINLEVEL_OUTOF10: 10
PAINLEVEL_OUTOF10: 3

## 2024-04-01 ASSESSMENT — PAIN DESCRIPTION - ORIENTATION
ORIENTATION: LEFT
ORIENTATION: MID
ORIENTATION: LEFT

## 2024-04-01 ASSESSMENT — PAIN DESCRIPTION - LOCATION
LOCATION: FOOT

## 2024-04-01 ASSESSMENT — PAIN DESCRIPTION - PAIN TYPE
TYPE: ACUTE PAIN

## 2024-04-01 NOTE — PROGRESS NOTES
Alert and comfortable, tells me her foot feels a little better today  Transfusion yesterday without complication  Groin and calf incisions are soft and intact with no bleed or hematoma  Foot is warm with only minimal posterior tibial signal.  Second toe ischemic  Transfer out to stepdown today, PT OT  High risk for above-knee amputation

## 2024-04-01 NOTE — PROGRESS NOTES
2117- Patient restless. Patient pulls out IV in L hand, takes off gripper socks, and SCD on R leg. Patient also stated that the dogs are usually upstairs. Patient quickly reoriented.   2130- 20g IV placed in L AC.

## 2024-04-01 NOTE — PROGRESS NOTES
0700: Bedside and Verbal shift change report given to writer (oncoming nurse) by Shaka hansen RN(offgoing nurse). Report included the following information Nurse Handoff Report, Adult Overview, Surgery Report, Intake/Output, MAR, Recent Results, Med Rec Status, Cardiac Rhythm ., Alarm Parameters, Quality Measures, and Neuro Assessment. Neurovascular assessment LLE: foot cool with cool toes that are blancheable except Left second toe is cold with distal phalanx blue and second and third phalanxes white- all areas cold/ non blancheable. Pt stated numbness of left second toe and intermittent numbness left great toe. Pt stated can feel all remaining areas of left foot and leg. Left medial lower  leg incision clean dry and approximated/intact with skin closure glue- no drainage noted. Left groin Prevena in place to utilized device suction with vac cannister empty.  0840: Bedside and Verbal Transfer report given to Shakira Goetz RN (oncoming nurse) by writer(offgoing nurse). Report included the following information Nurse Handoff Report, Adult Overview, Surgery Report, Intake/Output, MAR, Recent Results, Med Rec Status, Cardiac Rhythm ., Alarm Parameters, Quality Measures, and Neuro Assessment. Pt transferred via bed from 2353 to 2315 accompanied by receiving RNShakira.

## 2024-04-02 ENCOUNTER — HOME HEALTH ADMISSION (OUTPATIENT)
Age: 74
End: 2024-04-02
Payer: MEDICARE

## 2024-04-02 VITALS
BODY MASS INDEX: 20.86 KG/M2 | HEIGHT: 65 IN | TEMPERATURE: 98 F | OXYGEN SATURATION: 93 % | DIASTOLIC BLOOD PRESSURE: 59 MMHG | SYSTOLIC BLOOD PRESSURE: 127 MMHG | WEIGHT: 125.22 LBS | HEART RATE: 85 BPM | RESPIRATION RATE: 15 BRPM

## 2024-04-02 LAB — APTT PPP: 118 SEC (ref 23–36.4)

## 2024-04-02 PROCEDURE — 2700000000 HC OXYGEN THERAPY PER DAY

## 2024-04-02 PROCEDURE — 85730 THROMBOPLASTIN TIME PARTIAL: CPT

## 2024-04-02 PROCEDURE — 6370000000 HC RX 637 (ALT 250 FOR IP): Performed by: SURGERY

## 2024-04-02 PROCEDURE — 94761 N-INVAS EAR/PLS OXIMETRY MLT: CPT

## 2024-04-02 PROCEDURE — 2580000003 HC RX 258: Performed by: SURGERY

## 2024-04-02 PROCEDURE — 97530 THERAPEUTIC ACTIVITIES: CPT

## 2024-04-02 PROCEDURE — 6360000002 HC RX W HCPCS: Performed by: SURGERY

## 2024-04-02 PROCEDURE — 97168 OT RE-EVAL EST PLAN CARE: CPT

## 2024-04-02 PROCEDURE — 36415 COLL VENOUS BLD VENIPUNCTURE: CPT

## 2024-04-02 RX ORDER — HYDROCODONE BITARTRATE AND ACETAMINOPHEN 5; 325 MG/1; MG/1
1 TABLET ORAL EVERY 4 HOURS PRN
Qty: 42 TABLET | Refills: 0 | Status: SHIPPED | OUTPATIENT
Start: 2024-04-02 | End: 2024-04-09

## 2024-04-02 RX ADMIN — AMLODIPINE BESYLATE 10 MG: 10 TABLET ORAL at 09:31

## 2024-04-02 RX ADMIN — DULOXETINE HYDROCHLORIDE 60 MG: 30 CAPSULE, DELAYED RELEASE ORAL at 09:31

## 2024-04-02 RX ADMIN — OXYCODONE HYDROCHLORIDE 10 MG: 10 TABLET ORAL at 07:32

## 2024-04-02 RX ADMIN — PREGABALIN 100 MG: 50 CAPSULE ORAL at 09:30

## 2024-04-02 RX ADMIN — CINACALCET HYDROCHLORIDE 30 MG: 60 TABLET, FILM COATED ORAL at 09:31

## 2024-04-02 RX ADMIN — SODIUM CHLORIDE, PRESERVATIVE FREE 10 ML: 5 INJECTION INTRAVENOUS at 09:33

## 2024-04-02 RX ADMIN — APIXABAN 5 MG: 5 TABLET, FILM COATED ORAL at 12:39

## 2024-04-02 RX ADMIN — HYDROMORPHONE HYDROCHLORIDE 0.5 MG: 1 INJECTION, SOLUTION INTRAMUSCULAR; INTRAVENOUS; SUBCUTANEOUS at 00:14

## 2024-04-02 ASSESSMENT — PAIN DESCRIPTION - ORIENTATION
ORIENTATION: LEFT
ORIENTATION: LEFT

## 2024-04-02 ASSESSMENT — PAIN DESCRIPTION - LOCATION
LOCATION: FOOT
LOCATION: FOOT

## 2024-04-02 ASSESSMENT — PAIN SCALES - GENERAL
PAINLEVEL_OUTOF10: 9
PAINLEVEL_OUTOF10: 8

## 2024-04-02 NOTE — CARE COORDINATION
Discharge orders noted.  Chart reviewed.  PTOT recommending home health.  Met with pt.  She is in agreement with home health with [facility].  She stated she has rolling walker at home.  She stated her  will be picking her up when ready to go home.      Sent message to Dr. Ashley that CM will ask the nurse to order home health and he is ok with this.    Spoke with Nurse Malachi to order home health.          [id number]:      Discharge order noted for today. Pt has been accepted to [facility]. Met with patient  and are agreeable to the transition plan today. Transport has been arranged through pt's spouse. Patient's discharge summary and home health  orders have been forwarded to [facility]. Updated bedside RN, Malachi,  to the transition plan.  Discharge information has been documented on the AVS.           CHRISSY SheltonN RN  Care Management

## 2024-04-02 NOTE — HOME CARE
Received home health referral for [facility] for SN,PT,OT. Discharge order noted for today; spoke to patient in room, Verified demographics,explained HH services ,answered all questions and provided patient with [facility] contact card ;  Patient states she has DME: home O2 and portable O2,[employer], RW and [facility] ; HH referral processed to [facility] central Intake and scheduling .MARIKA LOZANO.

## 2024-04-02 NOTE — PLAN OF CARE
Problem: Occupational Therapy - Adult  Goal: By Discharge: Performs self-care activities at highest level of function for planned discharge setting.  See evaluation for individualized goals.  Description: Occupational Therapy Goals:  Initiated 3/26/2024 to be met within 7-10 days.    1.  Patient will perform grooming standing with fair+ balance with supervision/set-up.   2.  Patient will perform upper body dressing with modified independence.  3.  Patient will perform lower body dressing  with minimum assistance/contact-guard assistance .  4.  Patient will perform toilet transfers with minimum assistance/contact-guard assistance .  5.  Patient will perform all aspects of toileting with minimum assistance/contact-guard assistance .  6.  Patient will participate in upper extremity therapeutic exercise/activities with supervision/set-up for 8-10 minutes to increase strength/endurance for ADLs.    7.  Patient will utilize energy conservation techniques during functional activities with verbal and visual cues.    PLOF: Independent with ADLs, lives with  at home  3/26/2024 1057 by Robin Bateman  Outcome: Progressing  Note: Occupational Therapy Goals:  Initiated 3/26/2024 to be met within 7-10 days.    1.  Patient will perform grooming standing with fair+ balance with modified independence.   2.  Patient will perform upper body dressing with modified independence.  3.  Patient will perform lower body dressing  with moderate assistance .  4.  Patient will perform toilet transfers with moderate assistance .  5.  Patient will perform all aspects of toileting with moderate assistance .  6.  Patient will participate in upper extremity therapeutic exercise/activities with supervision/set-up for 8-10 minutes to increase strength/endurance for ADLs.    7.  Patient will utilize energy conservation techniques during functional activities with verbal and visual cues.    PLOF: Independent with ADLs, lives with  at 
  Problem: Occupational Therapy - Adult  Goal: By Discharge: Performs self-care activities at highest level of function for planned discharge setting.  See evaluation for individualized goals.  Description: Occupational Therapy Goals:  Initiated 3/26/2024 to be met within 7-10 days.    1.  Patient will perform grooming standing with fair+ balance with supervision/set-up.   2.  Patient will perform upper body dressing with modified independence.  3.  Patient will perform lower body dressing  with minimum assistance/contact-guard assistance .  4.  Patient will perform toilet transfers with minimum assistance/contact-guard assistance .  5.  Patient will perform all aspects of toileting with minimum assistance/contact-guard assistance .  6.  Patient will participate in upper extremity therapeutic exercise/activities with supervision/set-up for 8-10 minutes to increase strength/endurance for ADLs.    7.  Patient will utilize energy conservation techniques during functional activities with verbal and visual cues.    PLOF: Independent with ADLs, lives with  at home  Outcome: Progressing   OCCUPATIONAL THERAPY TREATMENT    Patient: Zamzam Calixto (73 y.o. 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  ASSESSMENT:  Pt cleared by RN for OT tx at this time. Pt presented supine in bed upon entry reporting L LE pain/discomfort. She transitioned to EOB Supervision in prep for functional tasks. Once sitting, she demo G balance and reports increased pain in L LE 
  Problem: Occupational Therapy - Adult  Goal: By Discharge: Performs self-care activities at highest level of function for planned discharge setting.  See evaluation for individualized goals.  Description: Occupational Therapy Goals:  Initiated 3/26/2024, Re-evaluated 4/2/2024, goals 3-5 met and upgraded, continue with remaining goals to be met within 7-10 days.    1.  Patient will perform grooming standing with fair+ balance with supervision/set-up.   2.  Patient will perform upper body dressing with modified independence.  3.  Patient will perform lower body dressing  with supervision .  4.  Patient will perform toilet transfers with supervision .  5.  Patient will perform all aspects of toileting with supervision .  6.  Patient will participate in upper extremity therapeutic exercise/activities with supervision/set-up for 8-10 minutes to increase strength/endurance for ADLs.    7.  Patient will utilize energy conservation techniques during functional activities with verbal and visual cues.    PLOF: Independent with ADLs, lives with  at home  Outcome: Progressing  OCCUPATIONAL THERAPY RE-EVALUATION    Patient: Zamzam Calixto (73 y.o. 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 Pt reports she will be going home later today, declines OOB initially. Pt was able to scoot up in bed with Supervision, noted to be very heavily soiled with urine, initially refused to clean up and change linens. With encouragement pt transitioned to EOB and performed SPT to the chair simulating BSC txfr with CGA and VCs for 
  Problem: Occupational Therapy - Adult  Goal: By Discharge: Performs self-care activities at highest level of function for planned discharge setting.  See evaluation for individualized goals.  Outcome: Progressing  Note: Occupational Therapy Goals:  Initiated 3/26/2024 to be met within 7-10 days.    1.  Patient will perform grooming standing with fair+ balance with supervision/set-up.   2.  Patient will perform upper body dressing with modified independence.  3.  Patient will perform lower body dressing  with minimum assistance/contact-guard assistance .  4.  Patient will perform toilet transfers with minimum assistance/contact-guard assistance .  5.  Patient will perform all aspects of toileting with minimum assistance/contact-guard assistance .  6.  Patient will participate in upper extremity therapeutic exercise/activities with supervision/set-up for 8-10 minutes to increase strength/endurance for ADLs.    7.  Patient will utilize energy conservation techniques during functional activities with verbal and visual cues.    PLOF: Independent with ADLs, lives with  at home     OCCUPATIONAL THERAPY EVALUATION    Patient: Zamzam Calixto (73 y.o. 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 Patient seen sitting in the recliner on 1 L N/C,  present. Pt /44, 92 HR, 92% O2. Pt able to don gown with SBA and wipe face using wipes with supervision seated. Pt attempt to doff B socks but only able to reach towards shin d/t pain in LLE. Pt requires moderate assistance for sit>stand and minimum assistance for stand>sit x2 and maintain position for 
  Problem: Physical Therapy - Adult  Goal: By Discharge: Performs mobility at highest level of function for planned discharge setting.  See evaluation for individualized goals.  Description: Physical Therapy Goals:  Initiated 3/26/2024 to be met within 7-10 days.    1.  Patient will move from supine to sit and sit to supine , scoot up and down, and roll side to side in bed with supervision/set-up.    2.  Patient will transfer from bed to chair and chair to bed with supervision/set-up using the least restrictive device.  3.  Patient will perform sit to stand with supervision/set-up.  4.  Patient will ambulate with supervision/set-up for 50 feet with the least restrictive device.   5.  Patient will ascend/descend 14 stairs with unilateral handrail(s) with supervision/set-up.    PLOF: lives with  in 2 level home ([address]/14 steps to 2nd florr), owns RW, no fall history, independent at baseline      Outcome: Progressing       PHYSICAL THERAPY TREATMENT    Patient: Zamzam Calixto (73 y.o. 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 Received supine; agreeable. Able to complete bed mobility independently, transfers with SBA-CGA, and ambulation up to 40 feet using RW with CGA. Demonstrates step to gait pattern with reduced LLE weightbearing. Cues provided for upright posture, RW use and general sequencing throughout functional tasks to improve autonomy. Continue PT POC.     Progression toward goals:   []      Improving appropriately and progressing toward goals  [x]      Improving slowly and progressing toward goals  []      
  Problem: Physical Therapy - Adult  Goal: By Discharge: Performs mobility at highest level of function for planned discharge setting.  See evaluation for individualized goals.  Description: Physical Therapy Goals:  Initiated 3/26/2024 to be met within 7-10 days.    1.  Patient will move from supine to sit and sit to supine , scoot up and down, and roll side to side in bed with supervision/set-up.    2.  Patient will transfer from bed to chair and chair to bed with supervision/set-up using the least restrictive device.  3.  Patient will perform sit to stand with supervision/set-up.  4.  Patient will ambulate with supervision/set-up for 50 feet with the least restrictive device.   5.  Patient will ascend/descend 14 stairs with unilateral handrail(s) with supervision/set-up.    PLOF: lives with  in 2 level home ([address]/14 steps to 2nd florr), owns RW, no fall history, independent at baseline      Outcome: Progressing     PHYSICAL THERAPY EVALUATION    Patient: Zamzam Calixto (73 y.o. 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 Received seated; agreeable.  present. Presenting with deficits as listed below; however, most notably limited by LLE surgical site pain/discomfort. Pre-medicated before session. Able to complete transfers with CGA-SBA as well as short distance ambulation up to 30 feet using RW with CGA. Cues provided for RW use/step sequencing to improve gait mechanics, with good carryover noted. Will benefit from acute PT services to address mobility deficits.     DEFICITS/IMPAIRMENTS:    , Body Structures, Functions, Activity Limitations Requiring Skilled 
  Problem: Physical Therapy - Adult  Goal: By Discharge: Performs mobility at highest level of function for planned discharge setting.  See evaluation for individualized goals.  Description: Physical Therapy Goals:  Initiated 3/26/2024 to be met within 7-10 days.    1.  Patient will move from supine to sit and sit to supine , scoot up and down, and roll side to side in bed with supervision/set-up.    2.  Patient will transfer from bed to chair and chair to bed with supervision/set-up using the least restrictive device.  3.  Patient will perform sit to stand with supervision/set-up.  4.  Patient will ambulate with supervision/set-up for 50 feet with the least restrictive device.   5.  Patient will ascend/descend 14 stairs with unilateral handrail(s) with supervision/set-up.    PLOF: lives with  in 2 level home ([address]/14 steps to 2nd florr), owns RW, no fall history, independent at baseline      Outcome: Progressing     PHYSICAL THERAPY TREATMENT    Patient: Zamzam Calixto (73 y.o. 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 Received supine; agreeable; however, reporting elevated L gluteal pain, with patient attributing to sciatica. L gluteal pain described as radiating into L quadricep. Able to complete bed mobility independently, transfers with CGA and step ups to simulate stair navigation with CGA. Educated on appropriate step sequencing; however, limited by reduced standing tolerance in setting of pain. RN made aware. Continue PT POC.    Progression toward goals:   [x]      
  Problem: Physical Therapy - Adult  Goal: By Discharge: Performs mobility at highest level of function for planned discharge setting.  See evaluation for individualized goals.  Description: Physical Therapy Goals:  Initiated 3/26/2024 to be met within 7-10 days.    1.  Patient will move from supine to sit and sit to supine , scoot up and down, and roll side to side in bed with supervision/set-up.    2.  Patient will transfer from bed to chair and chair to bed with supervision/set-up using the least restrictive device.  3.  Patient will perform sit to stand with supervision/set-up.  4.  Patient will ambulate with supervision/set-up for 50 feet with the least restrictive device.   5.  Patient will ascend/descend 14 stairs with unilateral handrail(s) with supervision/set-up.    PLOF: lives with  in 2 level home ([address]/14 steps to 2nd florr), owns RW, no fall history, independent at baseline      Outcome: Progressing   PHYSICAL THERAPY RE-EVALUATION    Patient: Zamzam Calixto (73 y.o. female)  Date: 4/1/2024  Primary Diagnosis: Atheroscler of autologous vein bypass graft of left leg with rest pain ([facility]) [I70.422]  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HCC) [I70.202]  Procedure(s) (LRB):  ANGIOGRAM LEFT LEG WITH REVISION BYPASS THROMBECTOMY BALLOON ANGIOPLASTY OF LEFT POSTERIOR TIBIAL ARTERY (Left) 3 Days Post-Op   Precautions: Fall Risk, General Precautions,  ,  ,  ,  ,  ,  ,      ASSESSMENT :  Pt received in bed and cleared by nursing to participate in PT re-evaluation as pt is 3 days s/p Balloon angioplasty of left posterior tibial artery. She presents with increase pain on L leg but Is agreeable to session. Pt supervision for bed mobility to EOB. Pt voices concerns for standing and her pain is increasing in a dependent position. Pt presents with L LE deficits in sensation with general strength intact, but 4/5 grossly on L LE due to breakaway pain with pressure during MMT. Despite encouragement, pt declines 
  Problem: Safety - Adult  Goal: Free from fall injury  4/1/2024 2258 by Yakelin Khanna RN  Outcome: Progressing  Problem: Pain  Goal: Verbalizes/displays adequate comfort level or baseline comfort level  4/1/2024 2258 by Yakelin Khanna RN  Outcome: Progressing  Flowsheets  Taken 4/1/2024 2258 by Yakelin Khanna RN  Verbalizes/displays adequate comfort level or baseline comfort level:   Encourage patient to monitor pain and request assistance   Assess pain using appropriate pain scale   Administer analgesics based on type and severity of pain and evaluate response    Problem: Cardiovascular - Adult  Goal: Absence of cardiac dysrhythmias or at baseline  4/1/2024 2258 by Yakelin Khanna RN  Outcome: Progressing  Flowsheets (Taken 4/1/2024 0921 by Shakira Goetz RN)  Absence of cardiac dysrhythmias or at baseline: Monitor cardiac rate and rhythm    
  Problem: Safety - Adult  Goal: Free from fall injury  Outcome: Progressing     Problem: Pain  Goal: Verbalizes/displays adequate comfort level or baseline comfort level  Outcome: Progressing      Problem: Cardiovascular - Adult  Goal: Absence of cardiac dysrhythmias or at baseline  Outcome: Progressing     
  Problem: Safety - Adult  Goal: Free from fall injury  Outcome: Progressing     Problem: Pain  Goal: Verbalizes/displays adequate comfort level or baseline comfort level  Outcome: Progressing     Problem: Discharge Planning  Goal: Discharge to home or other facility with appropriate resources  Outcome: Progressing  Flowsheets (Taken 3/28/2024 0802)  Discharge to home or other facility with appropriate resources: Identify barriers to discharge with patient and caregiver     Problem: Neurosensory - Adult  Goal: Achieves maximal functionality and self care  Outcome: Progressing  Flowsheets (Taken 3/28/2024 0802)  Achieves maximal functionality and self care:   Monitor swallowing and airway patency with patient fatigue and changes in neurological status   Encourage and assist patient to increase activity and self care with guidance from physical therapy/occupational therapy     Problem: Cardiovascular - Adult  Goal: Absence of cardiac dysrhythmias or at baseline  Recent Flowsheet Documentation  Taken 3/28/2024 0802 by Adriane Bennett RN  Absence of cardiac dysrhythmias or at baseline:   Monitor cardiac rate and rhythm   Assess for signs of decreased cardiac output     Problem: Skin/Tissue Integrity - Adult  Goal: Skin integrity remains intact  Recent Flowsheet Documentation  Taken 3/28/2024 0802 by Adriane Bennett RN  Skin Integrity Remains Intact:   Monitor for areas of redness and/or skin breakdown   Assess vascular access sites hourly   Every 4-6 hours minimum: Change oxygen saturation probe site  Goal: Incisions, wounds, or drain sites healing without S/S of infection  Recent Flowsheet Documentation  Taken 3/28/2024 0802 by Adriane Bennett RN  Incisions, Wounds, or Drain Sites Healing Without Sign and Symptoms of Infection:   TWICE DAILY: Assess and document skin integrity   TWICE DAILY: Assess and document dressing/incision, wound bed, drain sites and surrounding tissue   Implement wound care per orders   
  Problem: Safety - Adult  Goal: Free from fall injury  Outcome: Progressing  Flowsheets (Taken 3/30/2024 1812)  Free From Fall Injury: Instruct family/caregiver on patient safety     Problem: Pain  Goal: Verbalizes/displays adequate comfort level or baseline comfort level  Outcome: Progressing  Flowsheets (Taken 3/30/2024 1812)  Verbalizes/displays adequate comfort level or baseline comfort level:   Encourage patient to monitor pain and request assistance   Assess pain using appropriate pain scale   Administer analgesics based on type and severity of pain and evaluate response   Implement non-pharmacological measures as appropriate and evaluate response   Consider cultural and social influences on pain and pain management     Problem: Cardiovascular - Adult  Goal: Absence of cardiac dysrhythmias or at baseline  Outcome: Progressing  Flowsheets (Taken 3/30/2024 1812)  Absence of cardiac dysrhythmias or at baseline: Monitor cardiac rate and rhythm     Problem: Skin/Tissue Integrity - Adult  Goal: Skin integrity remains intact  Outcome: Progressing  Flowsheets (Taken 3/30/2024 1812)  Skin Integrity Remains Intact: Monitor for areas of redness and/or skin breakdown     Problem: Musculoskeletal - Adult  Goal: Return mobility to safest level of function  Outcome: Progressing     
Problem: Safety - Adult  Goal: Free from fall injury  3/31/2024 0130 by Shaka Summers RN  Outcome: Progressing     Problem: Pain  Goal: Verbalizes/displays adequate comfort level or baseline comfort level  3/31/2024 0130 by Shaka Summers RN  Outcome: Progressing  Flowsheets (Taken 3/31/2024 0000)  Verbalizes/displays adequate comfort level or baseline comfort level:   Encourage patient to monitor pain and request assistance   Assess pain using appropriate pain scale   Administer analgesics based on type and severity of pain and evaluate response   Consider cultural and social influences on pain and pain management   Implement non-pharmacological measures as appropriate and evaluate response   Notify Licensed Independent Practitioner if interventions unsuccessful or patient reports new pain     Problem: Discharge Planning  Goal: Discharge to home or other facility with appropriate resources  Outcome: Progressing  Flowsheets  Taken 3/31/2024 0000 by Shaka Summers RN  Discharge to home or other facility with appropriate resources:   Identify barriers to discharge with patient and caregiver   Arrange for needed discharge resources and transportation as appropriate   Identify discharge learning needs (meds, wound care, etc)   Refer to discharge planning if patient needs post-hospital services based on physician order or complex needs related to functional status, cognitive ability or social support system     Problem: Neurosensory - Adult  Goal: Achieves maximal functionality and self care  Outcome: Progressing     Problem: Cardiovascular - Adult  Goal: Absence of cardiac dysrhythmias or at baseline  3/31/2024 0130 by Shaka Summers RN  Outcome: Progressing  Flowsheets  Taken 3/31/2024 0000 by Shaka Summers RN  Absence of cardiac dysrhythmias or at baseline:   Monitor cardiac rate and rhythm   Assess for signs of decreased cardiac output   Administer antiarrhythmia medication and electrolyte 
Patient will perform upper body dressing with modified independence.  3.  Patient will perform lower body dressing  with minimum assistance/contact-guard assistance .  4.  Patient will perform toilet transfers with minimum assistance/contact-guard assistance .  5.  Patient will perform all aspects of toileting with minimum assistance/contact-guard assistance .  6.  Patient will participate in upper extremity therapeutic exercise/activities with supervision/set-up for 8-10 minutes to increase strength/endurance for ADLs.    7.  Patient will utilize energy conservation techniques during functional activities with verbal and visual cues.    PLOF: Independent with ADLs, lives with  at home  4/1/2024 1310 by Kianna Fernando OTA  Outcome: Progressing     Problem: Physical Therapy - Adult  Goal: By Discharge: Performs mobility at highest level of function for planned discharge setting.  See evaluation for individualized goals.  Description: Physical Therapy Goals:  Initiated 3/26/2024 to be met within 7-10 days.    1.  Patient will move from supine to sit and sit to supine , scoot up and down, and roll side to side in bed with supervision/set-up.    2.  Patient will transfer from bed to chair and chair to bed with supervision/set-up using the least restrictive device.  3.  Patient will perform sit to stand with supervision/set-up.  4.  Patient will ambulate with supervision/set-up for 50 feet with the least restrictive device.   5.  Patient will ascend/descend 14 stairs with unilateral handrail(s) with supervision/set-up.    PLOF: lives with  in 2 level home ([address]/14 steps to 2nd florr), owns RW, no fall history, independent at baseline      4/1/2024 1300 by Kristy Zarate, PT  Outcome: Progressing     Problem: Neurosensory - Adult  Goal: Achieves maximal functionality and self care  Outcome: Progressing  Flowsheets (Taken 4/1/2024 0921)  Achieves maximal functionality and self care: Monitor swallowing 
up in recliner, all needs in reach w/ BLE elevated.    Progression toward goals:  []          Improving appropriately and progressing toward goals  []          Improving slowly and progressing toward goals  []          Not making progress toward goals and plan of care will be adjusted     PLAN:  Patient continues to benefit from skilled intervention to address the above impairments.  Continue treatment per established plan of care.    Further Equipment Recommendations for Discharge: none    AMPAC: AM-PAC Inpatient Daily Activity Raw Score: 21    Current research shows that an AM-PAC score of 18 or greater is associated with a discharge to the patient's home setting. Based on an AM-PAC score and their current ADL deficits; it is recommended that the patient have 2-3 sessions per week of Occupational Therapy at d/c to increase the patient's independence.      This AMPAC score should be considered in conjunction with interdisciplinary team recommendations to determine the most appropriate discharge setting. Patient's social support, diagnosis, medical stability, and prior level of function should also be taken into consideration.     SUBJECTIVE:   Patient stated, \"I feel good today, but my left foot is hurting.\"    OBJECTIVE DATA SUMMARY:   Cognitive/Behavioral Status:  Orientation  Overall Orientation Status: Within Normal Limits  Orientation Level: Oriented X4  Cognition  Overall Cognitive Status: WNL    Functional Mobility and Transfers for ADLs:   Bed Mobility:  Bed Mobility Training  Bed Mobility Training: Yes  Rolling: Modified independent  Supine to Sit: Modified independent  Sit to Supine: Modified independent  Scooting: Modified independent   Transfers:  Transfer Training  Transfer Training: Yes  Interventions: Verbal cues  Sit to Stand: Contact-guard assistance  Stand to Sit: Contact-guard assistance  Bed to Chair: Contact-guard assistance    Balance:  Balance  Sitting: Intact  Standing: Intact  Standing - 
oxygen saturation probe site  4.  Every 4-6 hours:  If on nasal continuous positive airway pressure, respiratory therapy assess nares and determine need for appliance change or resting period.  Outcome: Completed     
identified infection/condition     Problem: Metabolic/Fluid and Electrolytes - Adult  Goal: Hemodynamic stability and optimal renal function maintained  3/31/2024 0130 by Shaka Summers RN  Outcome: Progressing  Flowsheets  Taken 3/31/2024 0000 by Shaka Summers RN  Hemodynamic stability and optimal renal function maintained:   Monitor labs and assess for signs and symptoms of volume excess or deficit   Monitor intake, output and patient weight   Monitor urine specific gravity, serum osmolarity and serum sodium as indicated or ordered   Monitor response to interventions for patient's volume status, including labs, urine output, blood pressure (other measures as available)   Encourage oral intake as appropriate   Instruct patient on fluid and nutrition restrictions as appropriate  Taken 3/30/2024 1900 by Dolores Low RN  Hemodynamic stability and optimal renal function maintained:   Monitor labs and assess for signs and symptoms of volume excess or deficit   Monitor intake, output and patient weight   Monitor urine specific gravity, serum osmolarity and serum sodium as indicated or ordered   Monitor response to interventions for patient's volume status, including labs, urine output, blood pressure (other measures as available)   Encourage oral intake as appropriate   Instruct patient on fluid and nutrition restrictions as appropriate     Problem: Hematologic - Adult  Goal: Maintains hematologic stability  3/31/2024 0130 by Shaka Summers RN  Outcome: Progressing  Flowsheets  Taken 3/31/2024 0000 by Shaka Summers RN  Maintains hematologic stability:   Assess for signs and symptoms of bleeding or hemorrhage   Monitor labs for bleeding or clotting disorders   Administer blood products/factors as ordered  Taken 3/30/2024 1900 by Dolores Low RN  Maintains hematologic stability:   Assess for signs and symptoms of bleeding or hemorrhage   Monitor labs for bleeding or clotting disorders

## 2024-04-04 ENCOUNTER — HOME CARE VISIT (OUTPATIENT)
Age: 74
End: 2024-04-04

## 2024-04-05 ENCOUNTER — HOME CARE VISIT (OUTPATIENT)
Age: 74
End: 2024-04-05

## 2024-04-05 VITALS
HEART RATE: 76 BPM | RESPIRATION RATE: 20 BRPM | TEMPERATURE: 98.2 F | OXYGEN SATURATION: 97 % | DIASTOLIC BLOOD PRESSURE: 66 MMHG | SYSTOLIC BLOOD PRESSURE: 122 MMHG

## 2024-04-05 PROCEDURE — 0221000100 HH NO PAY CLAIM PROCEDURE

## 2024-04-05 PROCEDURE — G0299 HHS/HOSPICE OF RN EA 15 MIN: HCPCS

## 2024-04-06 NOTE — HOME HEALTH
Skilled services/Home bound verification:  LLE pain, and decreased ROM.      Skilled Reason for admission/summary of clinical condition:  73 year old female post LLE post fem bypass graft, has left second toe blister with dark color to toe.   has been notified.  Patient has history of COPD, arthritis, HTN, PVD/PAD, wears oxygen.  Alert and oriented, lives with spouse who is very helpful  Patient reports high pain level left second toe. Left leg incision healed  Pulling left leg up onto chair and rocking back and forth due to pain, has only been taking 1/4th of a pain pill, states a full pill makes her too tired, educated on need for pain medication to improve severe pain, states she will try a half a pain pill today.  Is SOB with exertion, fatigues easily, low endurance, poor  activity tolerance related to high pain level and sob .   Taking fluids well, appetite only fair, but drinks boost.   Educated this visit on need to elevate LLE , on ss bleeding from anticoagulation with appropriate intervention, energy conservation, oxygen safety, ss worsening left second toe to notify nurse agency or MD immediately for.   Admitted to home health for assessment/teaching psot left fempop bypass graft, assess/teach ss complications, fall prevention, pain management, medication teaching, nutrition and hydration    PT, OT evaluations ordered with referral    This patient is homebound for the following reasons Requires considerable and taxing effort to leave the home  and Requires the assistance of 1 or more persons to leave the home .    Caregiver: spouse.  Caregiver assists with adls, meals, mobility, transportation.     Medications reconciled and all medications are available in the home this visit.      The following education was provided regarding medications: .Medication regimen, doses, purpose/actions, frequencies and potential side effects.    Medications  are effective at this time.      High risk medication

## 2024-04-07 ENCOUNTER — HOME CARE VISIT (OUTPATIENT)
Age: 74
End: 2024-04-07

## 2024-04-07 VITALS
TEMPERATURE: 97.6 F | SYSTOLIC BLOOD PRESSURE: 130 MMHG | DIASTOLIC BLOOD PRESSURE: 70 MMHG | HEART RATE: 92 BPM | RESPIRATION RATE: 18 BRPM | OXYGEN SATURATION: 96 %

## 2024-04-07 PROCEDURE — G0151 HHCP-SERV OF PT,EA 15 MIN: HCPCS

## 2024-04-07 NOTE — HOME HEALTH
Per discharge summary: Hospital Course: Admitted to the hospital for left femoral to tibial bypass.  Had bypass o cclusion post procedure was reopened and angioplasty of the posterior tibials performed at that time.  Tells me her foot feels better with relief of ischemic pain     PMHx: Arthritis                 CAP (community acquired pneumonia)         06/27/2017    Chronic lung disease       Claudication (HCC) left leg    Crohn's disease (HCC)                Crohn's disease (HCC)                GERD (gastroesophageal reflux disease)            HTN (hypertension)          Ill-defined condition Uses O2 at night.     Nausea & vomiting            Neuropathy  Other and unspecified symptoms and signs involving general sensations and perceptions          PVD     Other ill-defined conditions(799.89)         Crohn's     Parathyroid tumor sees Patrick Santillan     Peripheral neuropathy                  Pulmonary emphysema (HCC)                PVD (peripheral vascular disease) (HCC)           right leg     Sepsis ([facility])  06/27/2017      Vitamin B12 deficiency                SUBJECTIVE: pain on LLE, difficulty with walking   LIVING SITUATION/PLOF: Lives with  2 level house with 12 steps to go to bedroom and < 5 steps to enter main floor without railing, was independent with ADL's and IADL's and was not using any AD for gait and was able to drive   CAREGIVER INVOLVEMENT/ ASSISTANCE NEEDED FOR: [place] spouse for transportation, meal preparation,  with mobility/ADL's  MEDICATIONS REVIEWED AND UPDATED:no change with medicine   NEXT MD APPOINTMENT: PCP Miriam Niño 4/19/24    EQUIIPMENT: RW, SPC   ROM: BLE wfl  STRENGTH: L hip flexor, extensor, hip abductors, adductors, knee flexor, extensor and L ankle PF and DF 3/5,  R hip flexor, extensor, hip abductors, adductors, knee flexor, extensor and R ankle PF and DF 3+/5  FTSTS  27 seconds  TUG 26 seconds   WOUNDS: refer to SN per patient request   BED MOBILITY: supervision with

## 2024-04-10 ENCOUNTER — HOME CARE VISIT (OUTPATIENT)
Age: 74
End: 2024-04-10

## 2024-04-10 VITALS
TEMPERATURE: 97.8 F | OXYGEN SATURATION: 98 % | DIASTOLIC BLOOD PRESSURE: 78 MMHG | SYSTOLIC BLOOD PRESSURE: 118 MMHG | RESPIRATION RATE: 16 BRPM | HEART RATE: 78 BPM

## 2024-04-10 PROCEDURE — G0300 HHS/HOSPICE OF LPN EA 15 MIN: HCPCS

## 2024-04-10 ASSESSMENT — ENCOUNTER SYMPTOMS: PAIN LOCATION - PAIN QUALITY: STABBING

## 2024-04-10 NOTE — HOME HEALTH
Skilled reason for visit: Education     Caregiver involvement: .    Medications reviewed and all medications are available in the home this visit.    The following education was provided regarding medications: Continue to take medication as ordered  MD notified of any discrepancies/look a-like medications/medication interactions: n/a  Medications are effective at this time.      Home health supplies by type and quantity ordered/delivered this visit include: n/a    Patient education provided this visit: See Interventions    Sharps education provided: n/a    Patient level of understanding of education provided: Alert and Orientated    Patient response to procedure performed:  n/a    Agency Progress toward goals: Continued Education     Patient's Progress towards personal goals: Patient states she feels fine     Home exercise program: Fall Prevention     Continued need for the following skills: Nursing.    Plan for next visit: Education     Patient and/or caregiver notified and agrees to changes in the Plan of Care: Yes.     The following discharge planning was discussed with the pt/caregiver: Plan to discharge once goals are met

## 2024-04-11 ENCOUNTER — HOME CARE VISIT (OUTPATIENT)
Age: 74
End: 2024-04-11

## 2024-04-11 VITALS
DIASTOLIC BLOOD PRESSURE: 60 MMHG | TEMPERATURE: 98.2 F | SYSTOLIC BLOOD PRESSURE: 142 MMHG | HEART RATE: 93 BPM | OXYGEN SATURATION: 98 % | RESPIRATION RATE: 16 BRPM

## 2024-04-11 PROCEDURE — G0157 HHC PT ASSISTANT EA 15: HCPCS

## 2024-04-11 ASSESSMENT — ENCOUNTER SYMPTOMS: PAIN LOCATION - PAIN QUALITY: THROBBING, BURNING

## 2024-04-11 NOTE — HOME HEALTH
Subjective: This foot just throbs all the time.      Caregiver involvement: Pt's  assists with household tasks, meal prep and care as needed.    Medications reviewed and all medications are available in the home this visit.    Medications are effective at this time.  no new medication    Patient education provided this visit: fall prevention, pain management, body mechanics with mobility.      Patient level of understanding of education provided: Pt requires further education.    Patient response to procedure performed:  Pain limiting tolerance for standing activity.    Patient's Progress towards personal goals: Pt reports no falls and no ER visits.  Pt progressing with transfers from min A to now SBA.  She is gradually increasing the distance and frequency that she is able to amb t/o the day, however L foot pain in limiting distance she is able to amb.  Educated pt on benefit of amb with SPC to increase balance and unweighting L foot I stance phase.      Continued need for the following skills: HHPT medically necessary to address decreased LE strength, decreased standing balance and increased risk of falls.    Plan for next visit: stairs    The following discharge planning was discussed with the pt/caregiver: d/c HHPT in 6 more visits.

## 2024-04-12 ENCOUNTER — HOME CARE VISIT (OUTPATIENT)
Age: 74
End: 2024-04-12

## 2024-04-13 ENCOUNTER — HOME CARE VISIT (OUTPATIENT)
Age: 74
End: 2024-04-13

## 2024-04-13 PROCEDURE — G0299 HHS/HOSPICE OF RN EA 15 MIN: HCPCS

## 2024-04-14 VITALS
OXYGEN SATURATION: 100 % | DIASTOLIC BLOOD PRESSURE: 76 MMHG | HEART RATE: 88 BPM | TEMPERATURE: 98.1 F | RESPIRATION RATE: 18 BRPM | SYSTOLIC BLOOD PRESSURE: 150 MMHG

## 2024-04-17 ENCOUNTER — HOME CARE VISIT (OUTPATIENT)
Age: 74
End: 2024-04-17
Payer: MEDICARE

## 2024-04-18 ENCOUNTER — HOME CARE VISIT (OUTPATIENT)
Age: 74
End: 2024-04-18
Payer: MEDICARE

## 2024-04-18 VITALS
DIASTOLIC BLOOD PRESSURE: 64 MMHG | HEART RATE: 87 BPM | RESPIRATION RATE: 16 BRPM | OXYGEN SATURATION: 99 % | TEMPERATURE: 98 F | SYSTOLIC BLOOD PRESSURE: 138 MMHG

## 2024-04-18 PROCEDURE — G0157 HHC PT ASSISTANT EA 15: HCPCS

## 2024-04-18 ASSESSMENT — ENCOUNTER SYMPTOMS: PAIN LOCATION - PAIN QUALITY: ACHE, THROB

## 2024-04-18 NOTE — HOME HEALTH
Subjective: I'm having an above knee amputation on Monday.    Caregiver involvement: Pt's  assists with household tasks, meal prep and care as needed.    Medications reviewed and all medications are available in the home this visit.    Medications are effective at this time.  no new medication    Patient education provided this visit: fall prevention, energy conservation    Patient level of understanding of education provided: Pt requires further education.    Patient response to procedure performed: Pain increased from 6/10 to now 7.5/10 after exercises.    Patient's Progress towards personal goals: Pt reports no falls and no ER visits.  She saw Dr. Ashley on Wednesday and he scheduled pt for above knee amputation on Monday.  Pt has progressed with transfers and bed mobility to mod I.  She is limited in amb and overall activity due to pain in L foot and leg.  Pain increases with all activity and she states it takes a long time to calm back down despite pain medication.    Continued need for the following skills: HHPT medically necessary to address decreased LE strength, decreased standing balance and increased risk of falls.    Plan for next visit: LE strengthening     The following discharge planning was discussed with the pt/caregiver: pt currently scheduled for 5 more visits.

## 2024-04-18 NOTE — PROGRESS NOTES
Instructions for your surgery at [facility]      Today's Date:  4/18/2024      Patient's Name:  Zamzam Calixto           Surgery Date:  04/22/2024              Please enter the main entrance of the hospital and check-in at the  located in the lobby. Once checked in at the , you will take the elevators to the second floor, and report to the waiting room on the left. The room will say Procedure Registration.    Do NOT eat or drink anything, including candy, gum, or ice chips after midnight prior to your surgery, unless you have specific instructions from your surgeon or anesthesia provider to do so.  Brush your teeth before coming to the hospital. You may swish with water, but do not swallow.  No smoking/Vaping/E-Cigarettes 24 hours prior to the day of surgery.  No alcohol 24 hours prior to the day of surgery.  No recreational drugs for one week prior to the day of surgery.  Bring Photo ID, Insurance information, and Co-pay if required on day of surgery.  Bring in pertinent legal documents, such as, Medical Power of , DNR, Advance Directive, etc.  Leave all valuables, including money/purse, at home.  Remove all jewelry, including ALL body piercings, nail polish, acrylic nails, and makeup (including mascara); no lotions, powders, deodorant, or perfume/cologne/after shave on the skin.  Follow instruction for Hibiclens washes and CHG wipes from surgeon's office.   Glasses and dentures may be worn to the hospital. They must be removed prior to surgery. Please bring case/container for glasses or dentures.   Contact lenses should not be worn on day of surgery.   Call your doctor's office if symptoms of a cold or illness develop within 24-48 hours prior to your surgery.  Call your doctor's office if you have any questions concerning insurance or co-pays.  15. AN ADULT (relative or friend 18 years or older) MUST DRIVE YOU HOME AFTER YOUR SURGERY.  16. Please make  arrangements for a responsible adult (18 years or older) to be with you for 24 hours after your surgery.   17. ONE VISITOR will be allowed in the waiting area during your surgery.  Exceptions may be made for surgical admissions, per nursing unit guidelines      Special Instructions:      Bring a list of CURRENT medications.  Follow instructions from the office regarding Blood Thinners.  Follow instructions from the office regarding medications to take the morning of surgery.   Bring inhaler.      On day of surgery if you are running late, unable to make procedure time, or sick, please call the Pre-op department at [phone]    These surgical instructions were reviewed with Zamzam Calixto during the PAT phone call.

## 2024-04-19 ENCOUNTER — HOME CARE VISIT (OUTPATIENT)
Age: 74
End: 2024-04-19
Payer: MEDICARE

## 2024-04-19 ENCOUNTER — ANESTHESIA EVENT (OUTPATIENT)
Dept: CARDIOTHORACIC SURGERY | Facility: HOSPITAL | Age: 74
End: 2024-04-19
Payer: MEDICARE

## 2024-04-20 ENCOUNTER — HOME CARE VISIT (OUTPATIENT)
Age: 74
End: 2024-04-20
Payer: MEDICARE

## 2024-04-22 ENCOUNTER — ANESTHESIA (OUTPATIENT)
Dept: CARDIOTHORACIC SURGERY | Facility: HOSPITAL | Age: 74
End: 2024-04-22
Payer: MEDICARE

## 2024-04-22 ENCOUNTER — HOSPITAL ENCOUNTER (INPATIENT)
Facility: HOSPITAL | Age: 74
LOS: 3 days | Discharge: NURSING, REHAB OR LONG-TERM CARE | End: 2024-04-25
Attending: SURGERY | Admitting: SURGERY
Payer: MEDICARE

## 2024-04-22 DIAGNOSIS — I70.202 OCCLUSION OF LEFT FEMORAL ARTERY (HCC): Primary | ICD-10-CM

## 2024-04-22 LAB
ANION GAP SERPL CALC-SCNC: 4 MMOL/L (ref 3–18)
BUN SERPL-MCNC: 11 MG/DL (ref 7–18)
BUN/CREAT SERPL: 13 (ref 12–20)
CALCIUM SERPL-MCNC: 11.2 MG/DL (ref 8.5–10.1)
CHLORIDE SERPL-SCNC: 101 MMOL/L (ref 100–111)
CO2 SERPL-SCNC: 31 MMOL/L (ref 21–32)
CREAT SERPL-MCNC: 0.85 MG/DL (ref 0.6–1.3)
GLUCOSE SERPL-MCNC: 107 MG/DL (ref 74–99)
POTASSIUM SERPL-SCNC: 3.1 MMOL/L (ref 3.5–5.5)
SODIUM SERPL-SCNC: 136 MMOL/L (ref 136–145)

## 2024-04-22 PROCEDURE — 3600000012 HC SURGERY LEVEL 2 ADDTL 15MIN: Performed by: SURGERY

## 2024-04-22 PROCEDURE — 88307 TISSUE EXAM BY PATHOLOGIST: CPT

## 2024-04-22 PROCEDURE — 86900 BLOOD TYPING SEROLOGIC ABO: CPT

## 2024-04-22 PROCEDURE — 2580000003 HC RX 258: Performed by: NURSE ANESTHETIST, CERTIFIED REGISTERED

## 2024-04-22 PROCEDURE — 86850 RBC ANTIBODY SCREEN: CPT

## 2024-04-22 PROCEDURE — 2580000003 HC RX 258: Performed by: SURGERY

## 2024-04-22 PROCEDURE — 2500000003 HC RX 250 WO HCPCS: Performed by: ANESTHESIOLOGY

## 2024-04-22 PROCEDURE — 2580000003 HC RX 258: Performed by: ANESTHESIOLOGY

## 2024-04-22 PROCEDURE — 2700000000 HC OXYGEN THERAPY PER DAY

## 2024-04-22 PROCEDURE — 94761 N-INVAS EAR/PLS OXIMETRY MLT: CPT

## 2024-04-22 PROCEDURE — 7100000000 HC PACU RECOVERY - FIRST 15 MIN: Performed by: SURGERY

## 2024-04-22 PROCEDURE — 6360000002 HC RX W HCPCS

## 2024-04-22 PROCEDURE — 3700000000 HC ANESTHESIA ATTENDED CARE: Performed by: SURGERY

## 2024-04-22 PROCEDURE — 2720000010 HC SURG SUPPLY STERILE: Performed by: SURGERY

## 2024-04-22 PROCEDURE — 3700000001 HC ADD 15 MINUTES (ANESTHESIA): Performed by: SURGERY

## 2024-04-22 PROCEDURE — 3600000002 HC SURGERY LEVEL 2 BASE: Performed by: SURGERY

## 2024-04-22 PROCEDURE — 1100000000 HC RM PRIVATE

## 2024-04-22 PROCEDURE — 2709999900 HC NON-CHARGEABLE SUPPLY: Performed by: SURGERY

## 2024-04-22 PROCEDURE — 6370000000 HC RX 637 (ALT 250 FOR IP): Performed by: SURGERY

## 2024-04-22 PROCEDURE — 80048 BASIC METABOLIC PNL TOTAL CA: CPT

## 2024-04-22 PROCEDURE — 0Y6D0Z3 DETACHMENT AT LEFT UPPER LEG, LOW, OPEN APPROACH: ICD-10-PCS | Performed by: SURGERY

## 2024-04-22 PROCEDURE — 6360000002 HC RX W HCPCS: Performed by: ANESTHESIOLOGY

## 2024-04-22 PROCEDURE — A4217 STERILE WATER/SALINE, 500 ML: HCPCS | Performed by: SURGERY

## 2024-04-22 PROCEDURE — 86901 BLOOD TYPING SEROLOGIC RH(D): CPT

## 2024-04-22 PROCEDURE — 88311 DECALCIFY TISSUE: CPT

## 2024-04-22 PROCEDURE — 6370000000 HC RX 637 (ALT 250 FOR IP): Performed by: NURSE ANESTHETIST, CERTIFIED REGISTERED

## 2024-04-22 PROCEDURE — 86923 COMPATIBILITY TEST ELECTRIC: CPT

## 2024-04-22 PROCEDURE — 7100000001 HC PACU RECOVERY - ADDTL 15 MIN: Performed by: SURGERY

## 2024-04-22 RX ORDER — ACETAMINOPHEN 160 MG/5ML
650 LIQUID ORAL
Status: DISCONTINUED | OUTPATIENT
Start: 2024-04-22 | End: 2024-04-22 | Stop reason: HOSPADM

## 2024-04-22 RX ORDER — OXYCODONE HYDROCHLORIDE 10 MG/1
10 TABLET ORAL EVERY 4 HOURS PRN
Status: DISCONTINUED | OUTPATIENT
Start: 2024-04-22 | End: 2024-04-25 | Stop reason: HOSPADM

## 2024-04-22 RX ORDER — SODIUM CHLORIDE 0.9 % (FLUSH) 0.9 %
5-40 SYRINGE (ML) INJECTION PRN
Status: DISCONTINUED | OUTPATIENT
Start: 2024-04-22 | End: 2024-04-22 | Stop reason: HOSPADM

## 2024-04-22 RX ORDER — ALBUTEROL SULFATE 2.5 MG/3ML
2.5 SOLUTION RESPIRATORY (INHALATION) EVERY 6 HOURS PRN
Status: DISCONTINUED | OUTPATIENT
Start: 2024-04-22 | End: 2024-04-25 | Stop reason: HOSPADM

## 2024-04-22 RX ORDER — HYDRALAZINE HYDROCHLORIDE 20 MG/ML
10 INJECTION INTRAMUSCULAR; INTRAVENOUS EVERY 4 HOURS PRN
Status: DISCONTINUED | OUTPATIENT
Start: 2024-04-22 | End: 2024-04-25 | Stop reason: HOSPADM

## 2024-04-22 RX ORDER — BUDESONIDE 0.25 MG/2ML
0.25 INHALANT ORAL
Status: DISCONTINUED | OUTPATIENT
Start: 2024-04-23 | End: 2024-04-25 | Stop reason: HOSPADM

## 2024-04-22 RX ORDER — ONDANSETRON 4 MG/1
4 TABLET, ORALLY DISINTEGRATING ORAL EVERY 8 HOURS PRN
Status: DISCONTINUED | OUTPATIENT
Start: 2024-04-22 | End: 2024-04-25 | Stop reason: HOSPADM

## 2024-04-22 RX ORDER — SODIUM CHLORIDE, SODIUM LACTATE, POTASSIUM CHLORIDE, CALCIUM CHLORIDE 600; 310; 30; 20 MG/100ML; MG/100ML; MG/100ML; MG/100ML
INJECTION, SOLUTION INTRAVENOUS CONTINUOUS
Status: DISCONTINUED | OUTPATIENT
Start: 2024-04-22 | End: 2024-04-22 | Stop reason: HOSPADM

## 2024-04-22 RX ORDER — IPRATROPIUM BROMIDE AND ALBUTEROL SULFATE 2.5; .5 MG/3ML; MG/3ML
1 SOLUTION RESPIRATORY (INHALATION)
Status: DISCONTINUED | OUTPATIENT
Start: 2024-04-22 | End: 2024-04-22 | Stop reason: HOSPADM

## 2024-04-22 RX ORDER — NALOXONE HYDROCHLORIDE 0.4 MG/ML
INJECTION, SOLUTION INTRAMUSCULAR; INTRAVENOUS; SUBCUTANEOUS PRN
Status: DISCONTINUED | OUTPATIENT
Start: 2024-04-22 | End: 2024-04-22 | Stop reason: HOSPADM

## 2024-04-22 RX ORDER — HYDRALAZINE HYDROCHLORIDE 20 MG/ML
10 INJECTION INTRAMUSCULAR; INTRAVENOUS
Status: DISCONTINUED | OUTPATIENT
Start: 2024-04-22 | End: 2024-04-22 | Stop reason: HOSPADM

## 2024-04-22 RX ORDER — MAGNESIUM HYDROXIDE 1200 MG/15ML
LIQUID ORAL CONTINUOUS PRN
Status: DISCONTINUED | OUTPATIENT
Start: 2024-04-22 | End: 2024-04-22 | Stop reason: HOSPADM

## 2024-04-22 RX ORDER — AMLODIPINE BESYLATE 10 MG/1
10 TABLET ORAL DAILY
Status: DISCONTINUED | OUTPATIENT
Start: 2024-04-22 | End: 2024-04-25 | Stop reason: HOSPADM

## 2024-04-22 RX ORDER — SODIUM CHLORIDE 9 MG/ML
INJECTION, SOLUTION INTRAVENOUS PRN
Status: DISCONTINUED | OUTPATIENT
Start: 2024-04-22 | End: 2024-04-22 | Stop reason: HOSPADM

## 2024-04-22 RX ORDER — ONDANSETRON 2 MG/ML
4 INJECTION INTRAMUSCULAR; INTRAVENOUS
Status: DISCONTINUED | OUTPATIENT
Start: 2024-04-22 | End: 2024-04-22 | Stop reason: HOSPADM

## 2024-04-22 RX ORDER — FENTANYL CITRATE 50 UG/ML
25 INJECTION, SOLUTION INTRAMUSCULAR; INTRAVENOUS EVERY 5 MIN PRN
Status: DISCONTINUED | OUTPATIENT
Start: 2024-04-22 | End: 2024-04-22 | Stop reason: HOSPADM

## 2024-04-22 RX ORDER — ONDANSETRON 2 MG/ML
INJECTION INTRAMUSCULAR; INTRAVENOUS PRN
Status: DISCONTINUED | OUTPATIENT
Start: 2024-04-22 | End: 2024-04-22 | Stop reason: SDUPTHER

## 2024-04-22 RX ORDER — DEXTROSE AND SODIUM CHLORIDE 5; .45 G/100ML; G/100ML
INJECTION, SOLUTION INTRAVENOUS CONTINUOUS
Status: DISCONTINUED | OUTPATIENT
Start: 2024-04-22 | End: 2024-04-24

## 2024-04-22 RX ORDER — SODIUM CHLORIDE 0.9 % (FLUSH) 0.9 %
5-40 SYRINGE (ML) INJECTION EVERY 12 HOURS SCHEDULED
Status: DISCONTINUED | OUTPATIENT
Start: 2024-04-22 | End: 2024-04-22 | Stop reason: HOSPADM

## 2024-04-22 RX ORDER — ARFORMOTEROL TARTRATE 15 UG/2ML
15 SOLUTION RESPIRATORY (INHALATION)
Status: DISCONTINUED | OUTPATIENT
Start: 2024-04-23 | End: 2024-04-25 | Stop reason: HOSPADM

## 2024-04-22 RX ORDER — DEXTROSE MONOHYDRATE 100 MG/ML
INJECTION, SOLUTION INTRAVENOUS CONTINUOUS PRN
Status: DISCONTINUED | OUTPATIENT
Start: 2024-04-22 | End: 2024-04-22 | Stop reason: HOSPADM

## 2024-04-22 RX ORDER — FENTANYL CITRATE 50 UG/ML
50 INJECTION, SOLUTION INTRAMUSCULAR; INTRAVENOUS EVERY 5 MIN PRN
Status: CANCELLED | OUTPATIENT
Start: 2024-04-22

## 2024-04-22 RX ORDER — DIPHENHYDRAMINE HYDROCHLORIDE 50 MG/ML
12.5 INJECTION INTRAMUSCULAR; INTRAVENOUS
Status: DISCONTINUED | OUTPATIENT
Start: 2024-04-22 | End: 2024-04-22 | Stop reason: HOSPADM

## 2024-04-22 RX ORDER — FENTANYL CITRATE 50 UG/ML
50 INJECTION, SOLUTION INTRAMUSCULAR; INTRAVENOUS EVERY 5 MIN PRN
Status: DISCONTINUED | OUTPATIENT
Start: 2024-04-22 | End: 2024-04-22 | Stop reason: HOSPADM

## 2024-04-22 RX ORDER — SODIUM CHLORIDE 9 MG/ML
INJECTION, SOLUTION INTRAVENOUS PRN
Status: DISCONTINUED | OUTPATIENT
Start: 2024-04-22 | End: 2024-04-25 | Stop reason: HOSPADM

## 2024-04-22 RX ORDER — DROPERIDOL 2.5 MG/ML
0.62 INJECTION, SOLUTION INTRAMUSCULAR; INTRAVENOUS
Status: DISCONTINUED | OUTPATIENT
Start: 2024-04-22 | End: 2024-04-22 | Stop reason: HOSPADM

## 2024-04-22 RX ORDER — PROPOFOL 10 MG/ML
INJECTION, EMULSION INTRAVENOUS PRN
Status: DISCONTINUED | OUTPATIENT
Start: 2024-04-22 | End: 2024-04-22 | Stop reason: SDUPTHER

## 2024-04-22 RX ORDER — ONDANSETRON 2 MG/ML
4 INJECTION INTRAMUSCULAR; INTRAVENOUS EVERY 6 HOURS PRN
Status: DISCONTINUED | OUTPATIENT
Start: 2024-04-22 | End: 2024-04-25 | Stop reason: HOSPADM

## 2024-04-22 RX ORDER — SODIUM CHLORIDE, SODIUM LACTATE, POTASSIUM CHLORIDE, CALCIUM CHLORIDE 600; 310; 30; 20 MG/100ML; MG/100ML; MG/100ML; MG/100ML
INJECTION, SOLUTION INTRAVENOUS CONTINUOUS PRN
Status: DISCONTINUED | OUTPATIENT
Start: 2024-04-22 | End: 2024-04-22 | Stop reason: SDUPTHER

## 2024-04-22 RX ORDER — SODIUM CHLORIDE 0.9 % (FLUSH) 0.9 %
5-40 SYRINGE (ML) INJECTION PRN
Status: DISCONTINUED | OUTPATIENT
Start: 2024-04-22 | End: 2024-04-25 | Stop reason: HOSPADM

## 2024-04-22 RX ORDER — CINACALCET 30 MG/1
30 TABLET, FILM COATED ORAL
Status: DISCONTINUED | OUTPATIENT
Start: 2024-04-22 | End: 2024-04-25 | Stop reason: HOSPADM

## 2024-04-22 RX ORDER — SODIUM CHLORIDE 0.9 % (FLUSH) 0.9 %
5-40 SYRINGE (ML) INJECTION EVERY 12 HOURS SCHEDULED
Status: DISCONTINUED | OUTPATIENT
Start: 2024-04-22 | End: 2024-04-25 | Stop reason: HOSPADM

## 2024-04-22 RX ORDER — ROCURONIUM BROMIDE 10 MG/ML
INJECTION, SOLUTION INTRAVENOUS PRN
Status: DISCONTINUED | OUTPATIENT
Start: 2024-04-22 | End: 2024-04-22 | Stop reason: SDUPTHER

## 2024-04-22 RX ORDER — LIDOCAINE HYDROCHLORIDE 20 MG/ML
INJECTION, SOLUTION EPIDURAL; INFILTRATION; INTRACAUDAL; PERINEURAL PRN
Status: DISCONTINUED | OUTPATIENT
Start: 2024-04-22 | End: 2024-04-22 | Stop reason: SDUPTHER

## 2024-04-22 RX ORDER — DULOXETIN HYDROCHLORIDE 60 MG/1
60 CAPSULE, DELAYED RELEASE ORAL DAILY
Status: DISCONTINUED | OUTPATIENT
Start: 2024-04-22 | End: 2024-04-25 | Stop reason: HOSPADM

## 2024-04-22 RX ORDER — FAMOTIDINE 20 MG/1
20 TABLET, FILM COATED ORAL ONCE
Status: COMPLETED | OUTPATIENT
Start: 2024-04-22 | End: 2024-04-22

## 2024-04-22 RX ORDER — VANCOMYCIN HYDROCHLORIDE 1 G/20ML
INJECTION, POWDER, LYOPHILIZED, FOR SOLUTION INTRAVENOUS
Status: COMPLETED
Start: 2024-04-22 | End: 2024-04-22

## 2024-04-22 RX ORDER — FENTANYL CITRATE 50 UG/ML
25 INJECTION, SOLUTION INTRAMUSCULAR; INTRAVENOUS EVERY 5 MIN PRN
Status: CANCELLED | OUTPATIENT
Start: 2024-04-22

## 2024-04-22 RX ORDER — FENTANYL CITRATE 50 UG/ML
INJECTION, SOLUTION INTRAMUSCULAR; INTRAVENOUS PRN
Status: DISCONTINUED | OUTPATIENT
Start: 2024-04-22 | End: 2024-04-22 | Stop reason: SDUPTHER

## 2024-04-22 RX ORDER — OXYCODONE HYDROCHLORIDE 5 MG/1
5 TABLET ORAL EVERY 4 HOURS PRN
Status: DISCONTINUED | OUTPATIENT
Start: 2024-04-22 | End: 2024-04-25 | Stop reason: HOSPADM

## 2024-04-22 RX ADMIN — DULOXETINE HYDROCHLORIDE 60 MG: 60 CAPSULE, DELAYED RELEASE ORAL at 20:18

## 2024-04-22 RX ADMIN — VANCOMYCIN HYDROCHLORIDE: 1 INJECTION, POWDER, LYOPHILIZED, FOR SOLUTION INTRAVENOUS at 20:01

## 2024-04-22 RX ADMIN — ROCURONIUM BROMIDE 50 MG: 10 INJECTION, SOLUTION INTRAVENOUS at 14:19

## 2024-04-22 RX ADMIN — SODIUM CHLORIDE, POTASSIUM CHLORIDE, SODIUM LACTATE AND CALCIUM CHLORIDE: 600; 310; 30; 20 INJECTION, SOLUTION INTRAVENOUS at 13:11

## 2024-04-22 RX ADMIN — CINACALCET HYDROCHLORIDE 30 MG: 30 TABLET, FILM COATED ORAL at 20:18

## 2024-04-22 RX ADMIN — OXYCODONE HYDROCHLORIDE 5 MG: 5 TABLET ORAL at 20:24

## 2024-04-22 RX ADMIN — DEXTROSE AND SODIUM CHLORIDE: 5; 450 INJECTION, SOLUTION INTRAVENOUS at 20:24

## 2024-04-22 RX ADMIN — SUGAMMADEX 200 MG: 100 INJECTION, SOLUTION INTRAVENOUS at 14:51

## 2024-04-22 RX ADMIN — AMLODIPINE BESYLATE 10 MG: 10 TABLET ORAL at 20:18

## 2024-04-22 RX ADMIN — PREGABALIN 100 MG: 25 CAPSULE ORAL at 20:18

## 2024-04-22 RX ADMIN — FENTANYL CITRATE 100 MCG: 50 INJECTION INTRAMUSCULAR; INTRAVENOUS at 14:54

## 2024-04-22 RX ADMIN — LIDOCAINE HYDROCHLORIDE 100 MG: 20 INJECTION, SOLUTION EPIDURAL; INFILTRATION; INTRACAUDAL; PERINEURAL at 14:19

## 2024-04-22 RX ADMIN — FENTANYL CITRATE 100 MCG: 50 INJECTION INTRAMUSCULAR; INTRAVENOUS at 14:39

## 2024-04-22 RX ADMIN — FAMOTIDINE 20 MG: 20 TABLET ORAL at 13:11

## 2024-04-22 RX ADMIN — PROPOFOL 150 MG: 10 INJECTION, EMULSION INTRAVENOUS at 14:19

## 2024-04-22 RX ADMIN — ONDANSETRON 4 MG: 2 INJECTION INTRAMUSCULAR; INTRAVENOUS at 14:52

## 2024-04-22 RX ADMIN — SODIUM CHLORIDE, PRESERVATIVE FREE 10 ML: 5 INJECTION INTRAVENOUS at 20:18

## 2024-04-22 RX ADMIN — VANCOMYCIN HYDROCHLORIDE 1000 MG: 1 INJECTION, POWDER, LYOPHILIZED, FOR SOLUTION INTRAVENOUS at 14:34

## 2024-04-22 RX ADMIN — SODIUM CHLORIDE, SODIUM LACTATE, POTASSIUM CHLORIDE, AND CALCIUM CHLORIDE: 600; 310; 30; 20 INJECTION, SOLUTION INTRAVENOUS at 14:15

## 2024-04-22 ASSESSMENT — PAIN DESCRIPTION - ORIENTATION
ORIENTATION: LEFT

## 2024-04-22 ASSESSMENT — PAIN DESCRIPTION - LOCATION
LOCATION: LEG

## 2024-04-22 ASSESSMENT — PAIN DESCRIPTION - PAIN TYPE
TYPE: SURGICAL PAIN

## 2024-04-22 ASSESSMENT — PAIN DESCRIPTION - DESCRIPTORS
DESCRIPTORS: THROBBING;SHARP;TINGLING
DESCRIPTORS: SORE
DESCRIPTORS: SORE

## 2024-04-22 ASSESSMENT — PAIN SCALES - GENERAL
PAINLEVEL_OUTOF10: 5
PAINLEVEL_OUTOF10: 1
PAINLEVEL_OUTOF10: 1
PAINLEVEL_OUTOF10: 9

## 2024-04-22 ASSESSMENT — PAIN DESCRIPTION - ONSET: ONSET: SUDDEN

## 2024-04-22 ASSESSMENT — PAIN DESCRIPTION - FREQUENCY: FREQUENCY: INTERMITTENT

## 2024-04-22 ASSESSMENT — PAIN - FUNCTIONAL ASSESSMENT
PAIN_FUNCTIONAL_ASSESSMENT: 0-10
PAIN_FUNCTIONAL_ASSESSMENT: PREVENTS OR INTERFERES SOME ACTIVE ACTIVITIES AND ADLS

## 2024-04-22 NOTE — ANESTHESIA POSTPROCEDURE EVALUATION
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    Gurmeet Phase I: Gurmeet Score: 10    Gurmeet Phase II:      Anesthesia Post Evaluation    Patient location during evaluation: PACU  Patient participation: complete - patient participated  Level of consciousness: awake  Airway patency: patent  Nausea & Vomiting: no vomiting and no nausea  Cardiovascular status: hemodynamically stable  Respiratory status: acceptable  Hydration status: euvolemic  Pain management: adequate    No notable events documented.

## 2024-04-22 NOTE — OP NOTE
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) [I70.322]    Post-Op Diagnosis: Same       Procedure(s):  LEFT ABOVE KNEE AMPUTATION (AKA)    Surgeon(s):  Mich Ashley MD    Assistant:   Surgical Assistant: Ranjana Fontaine    Anesthesia: General    Estimated Blood Loss (mL): less than 100     Complications: None    Specimens:   ID Type Source Tests Collected by Time Destination   A : LEFT LEG ABOVE KNEE Tissue Tissue SURGICAL PATHOLOGY Mich Ashley MD 4/22/2024 1440        Implants:  * No implants in log *      Drains:   [REMOVED] Negative Pressure Wound Therapy Leg Anterior;Left;Proximal;Upper (Removed)       [REMOVED] Negative Pressure Wound Therapy Leg Left (Removed)       [REMOVED] Negative Pressure Wound Therapy Leg Inner (Removed)       [REMOVED] Urinary Catheter 02/27/23 García (Removed)       [REMOVED] Urinary Catheter 02/20/24 García (Removed)       [REMOVED] Urinary Catheter 03/25/24 2 Way (Removed)   Catheter Indications Need for fluid volume management of the critically ill patient in a critical care setting 03/26/24 0400   Site Assessment No urethral drainage 03/26/24 0400   Urine Color Yellow;Steafni 03/26/24 0400   Urine Appearance Clear 03/26/24 0400   Collection Container Standard 03/26/24 0400   Securement Method Securing device (Describe) 03/26/24 0400   Catheter Care  Perineal wipes 03/26/24 0400   Catheter Best Practices  Drainage tube clipped to bed;Catheter secured to thigh;Tamper seal intact;Bag below bladder;Bag not on floor;Lack of dependent loop in tubing;Drainage bag less than half full 03/26/24 0400   Status Draining;Patent 03/26/24 0400   Output (mL) 130 mL 03/26/24 0850   Discontinuation Reason Per provider order 03/26/24 0000       [REMOVED] Urinary Catheter 03/29/24 García (Removed)   Catheter

## 2024-04-22 NOTE — H&P
01/05/2023    left leg thrombectomy with stent    OTHER SURGICAL HISTORY  09/2013    I & D Right chest/flank wall abscess vs granuloma    OTHER SURGICAL HISTORY      intestional resection x4    OTHER SURGICAL HISTORY  03/07/2013    catheter into aorta    UPPER ARM/ELBOW SURGERY UNLISTED      VASCULAR SURGERY  09/10/2020    Debridement and washout of bypass graft.      Social History     Tobacco Use    Smoking status: Every Day     Current packs/day: 1.00     Average packs/day: 1 pack/day for 50.0 years (50.0 ttl pk-yrs)     Types: Cigarettes    Smokeless tobacco: Never   Substance Use Topics    Alcohol use: No      Family History   Problem Relation Age of Onset    COPD Father     Coronary Art Dis Mother     Heart Attack Mother     Elevated Lipids Mother     COPD Brother     Heart Attack Brother     Heart Surgery Mother         CABGx3    Other Brother         PAD      Prior to Admission medications    Medication Sig Start Date End Date Taking? Authorizing Provider   amLODIPine (NORVASC) 5 MG tablet Take 2 tablets by mouth daily    Provider, MD Sathish   apixaban (ELIQUIS) 5 MG TABS tablet Take 1 tablet by mouth 2 times daily 2/23/24   Diamante Vázquez, DO   fluticasone-umeclidin-vilant (TRELEGY ELLIPTA) 100-62.5-25 MCG/ACT AEPB inhaler Inhale 1 puff into the lungs daily 8/23/23   Erendira Holder MD   nystatin (MYCOSTATIN) 941408 UNIT/GM cream Apply topically 2 times daily. 3/14/23   Phan Lam,    cinacalcet (SENSIPAR) 30 MG tablet Take 1 tablet by mouth in the morning and 1 tablet in the evening.    Provider, MD Sathish   Cetirizine HCl (ZYRTEC ALLERGY) 10 MG CAPS Take 1 capsule by mouth daily    Automatic Reconciliation, Ar   DULoxetine (CYMBALTA) 60 MG extended release capsule Take 1 capsule by mouth daily    Automatic Reconciliation, Ar   albuterol sulfate HFA (PROVENTIL;VENTOLIN;PROAIR) 108 (90 Base) MCG/ACT inhaler Inhale 2 puffs into the lungs every 6 hours as needed for Wheezing    Provider,  MD Sathish   pregabalin (LYRICA) 100 MG capsule Take 1 capsule by mouth 2 times daily. Take in 1 cap in AM & 2 caps in PM    ProviderSathish MD   cyanocobalamin 1000 MCG/ML injection Inject 1 mL into the muscle every 14 days    ProviderSathish MD     Allergies   Allergen Reactions    Pentazocine Shortness Of Breath and Nausea And Vomiting    Cephalexin Diarrhea    Meperidine Hallucinations and Other (See Comments)     Halucinations    Propoxyphene Itching    Codeine Nausea And Vomiting     Other reaction(s): other/intolerance         Review of Systems:    A comprehensive review of systems was negative except for that written in the History of Present Illness.    Objective:     Patient Vitals for the past 8 hrs:   BP Temp Temp src Pulse Resp SpO2 Height Weight   24 1115 134/63 99.1 °F (37.3 °C) Oral 98 18 93 % 1.651 m (5' 5\") 53.5 kg (118 lb)       Temp (24hrs), Av.1 °F (37.3 °C), Min:99.1 °F (37.3 °C), Max:99.1 °F (37.3 °C)      Physical Exam:  Alert and comfortable  Head is normocephalic  Neck no JVD  Chest is clear  Cardiac is regular  Abdomen soft flat nontender  Left lower extremity cold discolored early forming gangrene    Labs:   Recent Results (from the past 24 hour(s))   Basic Metabolic Panel    Collection Time: 24  1:00 PM   Result Value Ref Range    Sodium 136 136 - 145 mmol/L    Potassium 3.1 (L) 3.5 - 5.5 mmol/L    Chloride 101 100 - 111 mmol/L    CO2 31 21 - 32 mmol/L    Anion Gap 4 3.0 - 18 mmol/L    Glucose 107 (H) 74 - 99 mg/dL    BUN 11 7.0 - 18 MG/DL    Creatinine 0.85 0.6 - 1.3 MG/DL    Bun/Cre Ratio 13 12 - 20      Est, Glom Filt Rate 71 >60 ml/min/1.73m2    Calcium 11.2 (H) 8.5 - 10.1 MG/DL       Data Review: Labs reviewed \    Assessment:     Active Problems:    * No active hospital problems. *  Resolved Problems:    * No resolved hospital problems. *      Plan:     Plan for left above-knee amputation    Signed By: Mich Jackson MD     2024

## 2024-04-22 NOTE — PERIOP NOTE
TRANSFER - OUT REPORT:    Verbal report given to Criss FLORIAN on Zamzam Calixto  being transferred to [address] [place] [address] for routine post-op       Report consisted of patient's Situation, Background, Assessment and   Recommendations(SBAR).     Information from the following report(s) Nurse Handoff Report, Adult Overview, and Surgery Report was reviewed with the receiving nurse.           Lines:   Peripheral IV 04/22/24 Posterior;Right Wrist (Active)   Site Assessment Clean, dry & intact 04/22/24 1719   Line Status Infusing 04/22/24 1719   Line Care Connections checked and tightened 04/22/24 1719   Phlebitis Assessment No symptoms 04/22/24 1719   Infiltration Assessment 0 04/22/24 1719   Dressing Status Clean, dry & intact 04/22/24 1719   Dressing Type Transparent 04/22/24 1719        Opportunity for questions and clarification was provided.      Patient transported with:  O2 @ 3lpm and Tech

## 2024-04-22 NOTE — ANESTHESIA PRE PROCEDURE
Department of Anesthesiology  Preprocedure Note       Name:  Zamzam Calixto   Age:  73 y.o.  :  1950                                          MRN:  [id number]         Date:  2024      Surgeon: Surgeon(s):  Mich Ashley MD    Procedure: Procedure(s):  LEFT ABOVE KNEE AMPUTATION (AKA)    Medications prior to admission:   Prior to Admission medications    Medication Sig Start Date End Date Taking? Authorizing Provider   amLODIPine (NORVASC) 5 MG tablet Take 2 tablets by mouth daily    Sathish Gil MD   apixaban (ELIQUIS) 5 MG TABS tablet Take 1 tablet by mouth 2 times daily 24   Diamante Vázquez,    fluticasone-umeclidin-vilant (TRELEGY ELLIPTA) 100-62.5-25 MCG/ACT AEPB inhaler Inhale 1 puff into the lungs daily 23   Erendira Holder MD   nystatin (MYCOSTATIN) 362104 UNIT/GM cream Apply topically 2 times daily. 3/14/23   Phan Lam DO   cinacalcet (SENSIPAR) 30 MG tablet Take 1 tablet by mouth in the morning and 1 tablet in the evening.    ProviderSathish MD   Cetirizine HCl (ZYRTEC ALLERGY) 10 MG CAPS Take 1 capsule by mouth daily    Automatic Reconciliation, Ar   DULoxetine (CYMBALTA) 60 MG extended release capsule Take 1 capsule by mouth daily    Automatic Reconciliation, Ar   albuterol sulfate HFA (PROVENTIL;VENTOLIN;PROAIR) 108 (90 Base) MCG/ACT inhaler Inhale 2 puffs into the lungs every 6 hours as needed for Wheezing    Sahtish Gil MD   pregabalin (LYRICA) 100 MG capsule Take 1 capsule by mouth 2 times daily. Take in 1 cap in AM & 2 caps in PM    Sathish Gil MD   cyanocobalamin 1000 MCG/ML injection Inject 1 mL into the muscle every 14 days    Sathish Gil MD       Current medications:    Current Facility-Administered Medications   Medication Dose Route Frequency Provider Last Rate Last Admin   • sodium chloride flush 0.9 % injection 5-40 mL  5-40 mL IntraVENous 2 times per day Lalo Solis, APRN - CRNA       • sodium

## 2024-04-23 LAB
ANION GAP SERPL CALC-SCNC: 5 MMOL/L (ref 3–18)
BUN SERPL-MCNC: 11 MG/DL (ref 7–18)
BUN/CREAT SERPL: 14 (ref 12–20)
CALCIUM SERPL-MCNC: 9.5 MG/DL (ref 8.5–10.1)
CHLORIDE SERPL-SCNC: 104 MMOL/L (ref 100–111)
CO2 SERPL-SCNC: 30 MMOL/L (ref 21–32)
CREAT SERPL-MCNC: 0.8 MG/DL (ref 0.6–1.3)
ERYTHROCYTE [DISTWIDTH] IN BLOOD BY AUTOMATED COUNT: 14.6 % (ref 11.6–14.5)
GLUCOSE SERPL-MCNC: 99 MG/DL (ref 74–99)
HCT VFR BLD AUTO: 24 % (ref 35–45)
HGB BLD-MCNC: 7.6 G/DL (ref 12–16)
MAGNESIUM SERPL-MCNC: 1.5 MG/DL (ref 1.6–2.6)
MCH RBC QN AUTO: 27.5 PG (ref 24–34)
MCHC RBC AUTO-ENTMCNC: 31.7 G/DL (ref 31–37)
MCV RBC AUTO: 87 FL (ref 78–100)
NRBC # BLD: 0 K/UL (ref 0–0.01)
NRBC BLD-RTO: 0 PER 100 WBC
PLATELET # BLD AUTO: 470 K/UL (ref 135–420)
PMV BLD AUTO: 9.8 FL (ref 9.2–11.8)
POTASSIUM SERPL-SCNC: 2.8 MMOL/L (ref 3.5–5.5)
RBC # BLD AUTO: 2.76 M/UL (ref 4.2–5.3)
SODIUM SERPL-SCNC: 139 MMOL/L (ref 136–145)
WBC # BLD AUTO: 14.5 K/UL (ref 4.6–13.2)

## 2024-04-23 PROCEDURE — 85027 COMPLETE CBC AUTOMATED: CPT

## 2024-04-23 PROCEDURE — 97162 PT EVAL MOD COMPLEX 30 MIN: CPT

## 2024-04-23 PROCEDURE — 6370000000 HC RX 637 (ALT 250 FOR IP): Performed by: INTERNAL MEDICINE

## 2024-04-23 PROCEDURE — 83735 ASSAY OF MAGNESIUM: CPT

## 2024-04-23 PROCEDURE — 2700000000 HC OXYGEN THERAPY PER DAY

## 2024-04-23 PROCEDURE — 6360000002 HC RX W HCPCS: Performed by: SURGERY

## 2024-04-23 PROCEDURE — 97535 SELF CARE MNGMENT TRAINING: CPT

## 2024-04-23 PROCEDURE — 2580000003 HC RX 258: Performed by: SURGERY

## 2024-04-23 PROCEDURE — 94761 N-INVAS EAR/PLS OXIMETRY MLT: CPT

## 2024-04-23 PROCEDURE — 6370000000 HC RX 637 (ALT 250 FOR IP): Performed by: SURGERY

## 2024-04-23 PROCEDURE — 94640 AIRWAY INHALATION TREATMENT: CPT

## 2024-04-23 PROCEDURE — 97530 THERAPEUTIC ACTIVITIES: CPT

## 2024-04-23 PROCEDURE — 80048 BASIC METABOLIC PNL TOTAL CA: CPT

## 2024-04-23 PROCEDURE — 97166 OT EVAL MOD COMPLEX 45 MIN: CPT

## 2024-04-23 PROCEDURE — 1100000000 HC RM PRIVATE

## 2024-04-23 PROCEDURE — 36415 COLL VENOUS BLD VENIPUNCTURE: CPT

## 2024-04-23 RX ORDER — SULFAMETHOXAZOLE AND TRIMETHOPRIM 400; 80 MG/1; MG/1
1 TABLET ORAL DAILY
Status: DISCONTINUED | OUTPATIENT
Start: 2024-04-23 | End: 2024-04-25 | Stop reason: HOSPADM

## 2024-04-23 RX ORDER — POTASSIUM CHLORIDE 7.45 MG/ML
10 INJECTION INTRAVENOUS
Status: DISPENSED | OUTPATIENT
Start: 2024-04-23 | End: 2024-04-23

## 2024-04-23 RX ADMIN — APIXABAN 5 MG: 5 TABLET, FILM COATED ORAL at 20:45

## 2024-04-23 RX ADMIN — BUDESONIDE 250 MCG: 0.25 INHALANT RESPIRATORY (INHALATION) at 20:08

## 2024-04-23 RX ADMIN — SULFAMETHOXAZOLE AND TRIMETHOPRIM 1 TABLET: 400; 80 TABLET ORAL at 17:07

## 2024-04-23 RX ADMIN — OXYCODONE HYDROCHLORIDE 5 MG: 5 TABLET ORAL at 09:45

## 2024-04-23 RX ADMIN — HYDROMORPHONE HYDROCHLORIDE 0.5 MG: 1 INJECTION, SOLUTION INTRAMUSCULAR; INTRAVENOUS; SUBCUTANEOUS at 20:48

## 2024-04-23 RX ADMIN — POTASSIUM CHLORIDE 10 MEQ: 7.46 INJECTION, SOLUTION INTRAVENOUS at 05:46

## 2024-04-23 RX ADMIN — AMLODIPINE BESYLATE 10 MG: 10 TABLET ORAL at 08:55

## 2024-04-23 RX ADMIN — ARFORMOTEROL TARTRATE 15 MCG: 15 SOLUTION RESPIRATORY (INHALATION) at 20:08

## 2024-04-23 RX ADMIN — DULOXETINE HYDROCHLORIDE 60 MG: 60 CAPSULE, DELAYED RELEASE ORAL at 08:56

## 2024-04-23 RX ADMIN — PREGABALIN 100 MG: 25 CAPSULE ORAL at 20:45

## 2024-04-23 RX ADMIN — APIXABAN 5 MG: 5 TABLET, FILM COATED ORAL at 08:55

## 2024-04-23 RX ADMIN — BUDESONIDE 250 MCG: 0.25 INHALANT RESPIRATORY (INHALATION) at 08:33

## 2024-04-23 RX ADMIN — SODIUM CHLORIDE, PRESERVATIVE FREE 10 ML: 5 INJECTION INTRAVENOUS at 08:56

## 2024-04-23 RX ADMIN — SODIUM CHLORIDE, PRESERVATIVE FREE 10 ML: 5 INJECTION INTRAVENOUS at 20:46

## 2024-04-23 RX ADMIN — POTASSIUM CHLORIDE 10 MEQ: 7.46 INJECTION, SOLUTION INTRAVENOUS at 09:47

## 2024-04-23 RX ADMIN — ARFORMOTEROL TARTRATE 15 MCG: 15 SOLUTION RESPIRATORY (INHALATION) at 08:33

## 2024-04-23 RX ADMIN — PREGABALIN 100 MG: 25 CAPSULE ORAL at 08:55

## 2024-04-23 RX ADMIN — IPRATROPIUM BROMIDE 0.5 MG: 0.5 SOLUTION RESPIRATORY (INHALATION) at 15:28

## 2024-04-23 RX ADMIN — IPRATROPIUM BROMIDE 0.5 MG: 0.5 SOLUTION RESPIRATORY (INHALATION) at 20:08

## 2024-04-23 RX ADMIN — CINACALCET HYDROCHLORIDE 30 MG: 30 TABLET, FILM COATED ORAL at 08:55

## 2024-04-23 RX ADMIN — CINACALCET HYDROCHLORIDE 30 MG: 30 TABLET, FILM COATED ORAL at 20:44

## 2024-04-23 RX ADMIN — IPRATROPIUM BROMIDE 0.5 MG: 0.5 SOLUTION RESPIRATORY (INHALATION) at 08:33

## 2024-04-23 ASSESSMENT — PAIN SCALES - GENERAL
PAINLEVEL_OUTOF10: 6
PAINLEVEL_OUTOF10: 4
PAINLEVEL_OUTOF10: 9
PAINLEVEL_OUTOF10: 0
PAINLEVEL_OUTOF10: 5
PAINLEVEL_OUTOF10: 5
PAINLEVEL_OUTOF10: 8
PAINLEVEL_OUTOF10: 0
PAINLEVEL_OUTOF10: 5
PAINLEVEL_OUTOF10: 5

## 2024-04-23 ASSESSMENT — PAIN DESCRIPTION - PAIN TYPE
TYPE: SURGICAL PAIN

## 2024-04-23 ASSESSMENT — PAIN DESCRIPTION - ORIENTATION
ORIENTATION: LEFT

## 2024-04-23 ASSESSMENT — PAIN DESCRIPTION - LOCATION
LOCATION: LEG

## 2024-04-23 ASSESSMENT — PAIN DESCRIPTION - FREQUENCY
FREQUENCY: INTERMITTENT
FREQUENCY: INTERMITTENT

## 2024-04-23 ASSESSMENT — PAIN DESCRIPTION - DESCRIPTORS
DESCRIPTORS: THROBBING
DESCRIPTORS: ACHING

## 2024-04-23 ASSESSMENT — PAIN - FUNCTIONAL ASSESSMENT: PAIN_FUNCTIONAL_ASSESSMENT: PREVENTS OR INTERFERES SOME ACTIVE ACTIVITIES AND ADLS

## 2024-04-23 ASSESSMENT — PAIN DESCRIPTION - ONSET
ONSET: GRADUAL
ONSET: SUDDEN

## 2024-04-23 NOTE — PLAN OF CARE
Problem: Physical Therapy - Adult  Goal: By Discharge: Performs mobility at highest level of function for planned discharge setting.  See evaluation for individualized goals.  Description: Physical Therapy Goals  Initiated 4/23/2024 and to be accomplished within 7 day(s)  1.  Patient will move from supine to sit and sit to supine in bed with modified independence.    2.  Patient will transfer from bed to chair and chair to bed with modified independence using the least restrictive device.  3.  Patient will perform sit to stand with modified independence.  4.  Patient will ambulate with minimal assistance/contact guard assist for 25 feet with the least restrictive device.   5.  Patient will ascend/descend 3 stairs with handrail(s) with minimal assistance/contact guard assist.    PLOF: Lives with spouse. Two story with first floor set-up. 3 steps to enter. Independent prior to LLE AKA.   Outcome: Progressing   PHYSICAL THERAPY EVALUATION    Patient: Zamzam Calixto (73 y.o. 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 Educated on need for intermittent supine positioning, stretching of left hip, and no pillows under LLE. Transferred to EOB without physical assistance. Verbal cues for scooting towards EOB. Min A for x2 for sit to stand. Cues of hand placement with ww. Able to demonstrate advancement of RLE for side steps to prepare for stand pivot transfers. Returned to supine in bed. Seated in bed with HOB elevated. Educated on need for RN assistance with mobility; verbalized understanding. Call bell in reach.     DEFICITS/IMPAIRMENTS:   Body Structures, Functions, Activity Limitations Requiring Skilled Therapeutic Intervention: Decreased functional mobility  Impaired  Sitting - Static: Good (unsupported)  Sitting - Dynamic: Fair (occasional)  Standing: Impaired  Standing - Static: Fair  Standing - Dynamic: Fair    Pain:  Intensity Pre-treatment: 0/10   Intensity Post-treatment: 0/10    Activity Tolerance:   Activity Tolerance: Patient tolerated evaluation without incident    After treatment:   []         Patient left in no apparent distress sitting up in chair  [x]         Patient left in no apparent distress in bed  [x]         Call bell left within reach  [x]         Nursing notified  []         Caregiver present  []         Bed alarm activated  []         Chair alarm activated  []         SCDs applied    COMMUNICATION/EDUCATION:   Patient Education  Education Given To: Patient;Family  Education Provided: Role of Therapy;Plan of Care  Education Method: Demonstration;Verbal;Teach Back  Barriers to Learning: None  Education Outcome: Verbalized understanding;Demonstrated understanding;Continued education needed    Thank you for this referral.  Elsa Torres, PT  Minutes: 31    Eval Complexity: Decision Making: Medium Complexity

## 2024-04-23 NOTE — PLAN OF CARE
CARDIAC SURGERY    FEMORAL BYPASS Left 2/20/2024    THROMBECTOMY LEFT FEMORAL ARTERY, FEMORAL ARTERY BYPASS WITH PATCH LEFT LEG performed by Mich Ashley MD at [facility] CARDIAC SURGERY    FEMORAL BYPASS Left 2/22/2024    OPEN EXCISION LEFT AXILLARY ARTERY, LEFT LEG ANGIOGRAM, BALLOON ANGIOPLASTY, POPLITEAL ARTERY AND TPA ADMINISTRATION performed by Mich Ashley MD at [facility] CARDIAC SURGERY    FEMORAL BYPASS Left 3/29/2024    ANGIOGRAM LEFT LEG WITH REVISION BYPASS THROMBECTOMY BALLOON ANGIOPLASTY OF LEFT POSTERIOR TIBIAL ARTERY performed by Mich Ashley MD at [facility] CARDIAC SURGERY    FEMORAL-TIBIAL BYPASS GRAFT Left 3/25/2024    LEFT FEMORAL TIBIAL BYPASS WITH DONOR VEIN; C-ARM performed by Mich Ashley MD at [facility] CARDIAC SURGERY    FOOT/TOES SURGERY PROC UNLISTED      GROIN SURGERY Left 2/17/2023    WASHOUT LEFT GROIN/WOUND VAC PLACEMENT performed by Mich Ashley MD at [facility] MAIN OR    LEG SURGERY Left 4/22/2024    LEFT ABOVE KNEE AMPUTATION (AKA) performed by Mich Ashley MD at [facility] CARDIAC SURGERY    ORTHOPEDIC SURGERY      lateral epicondilitis on right    OTHER SURGICAL HISTORY  01/05/2023    left leg thrombectomy with stent    OTHER SURGICAL HISTORY  09/2013    I & D Right chest/flank wall abscess vs granuloma    OTHER SURGICAL HISTORY      intestional resection x4    OTHER SURGICAL HISTORY  03/07/2013    catheter into aorta    UPPER ARM/ELBOW SURGERY UNLISTED      VASCULAR SURGERY  09/10/2020    Debridement and washout of bypass graft.       Home Situation:   Social/Functional History  Lives With: Spouse  Type of Home: House  Home Layout: Two level, Able to Live on Main level with bedroom/bathroom  Home Access: Stairs to enter with rails  Entrance Stairs - Number of Steps: 3  [x]  Right hand dominant   []  Left hand dominant    Cognitive/Behavioral Status:  Orientation  Overall Orientation Status: Within Normal Limits  Orientation Level: Oriented X4    Skin: Intact  Edema: None

## 2024-04-23 NOTE — CONSULTS
[facility]  (A Division of [facility])      Consultation Note      Date of Admission: 4/22/2024    Date of Note: 4/23/2024      Reason for Referral: Antibiotic management  Referring Physician: Dr. 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  S/p washout with wound vac on 2/17  S/p removal of infected bypass graft 2/23/23 in left thigh  2/23/23 surgical cx: + Kelbsiella aerogenes  2/27/23 s/p resvision left fem-pop with cadaver vein and removal of femoral blow knee graft  Leukocytosis-  14.4 on presentation   PVD  Immunosuppressed due to Remicaide for Crohn's disease  Anemia--   Neuropathy  Electrolyte abnormalities       Plan:   Continue chronic suppression with Bactrim.  Recurrent graft infections have the patch potential for catastrophic outcomes.   -Trend BMP every 3 to 4 months while on Bactrim due to medication associated toxicities    Given leukocytosis on this hospitalization, repeat CBC in AM.  Replete Mg and potassium  Wean O2 as tolerated     Phan Lam DO  [facility]  [address], [address], [place] [address]  Office: [phone], Ext [phone]      Lines / Catheters:  Peripheral    HPI:  Ms. Cantu is a bartolo 73-year-old female with a history of Crohn's disease, hypertension, COPD with a history of smoking, peripheral artery disease with multiple prior vascular procedures who is now admitted for left-sided AKA.  She has been followed by Dr. Ashley and she had developed worsening left lower extremity pain both at rest as well as with  no rashes or skin lesions noted on limited exam, no jaundice   HEENT:  Normocephalic, atraumatic, PERRL, EOMI, no scleral icterus; no conjunctival hemmohage;    Lymph Nodes:   no cervical or inguinal adenopathy   Lungs:   non-labored, bilaterally clear to aspiration   Heart:  RRR, s1 and s2; no murmurs, no edema, + pedal pulses   Abdomen:  soft, non-distended, active bowel sounds. Non-tender   Genitourinary:  deferred   Extremities:   no clubbing, cyanosis; no joint effusions or swelling; muscle mass appropriate for age; left AKA dressing intact- no strikethrough   Neurologic:  No gross focal sensory abnormalities; equal muscle strength to upper and lower extremities. Speech appropirate. Cranial nerves intact   Psychiatric:   appropriate and interactive.       Labs: Results:   Chemistry Recent Labs     04/22/24  1300 04/23/24  0058    139   K 3.1* 2.8*    104   CO2 31 30   BUN 11 11      CBC w/Diff Recent Labs     04/23/24  0058   WBC 14.5*   RBC 2.76*   HGB 7.6*   HCT 24.0*   *            No results found for: \"SDES\" No components found for: \"CULT\"     Results       ** No results found for the last 336 hours. **                  Imaging:     All available imaging since presentation reviewed as per EPIC

## 2024-04-23 NOTE — CARE COORDINATION
CM called krishna spoke to Kelli BRUSH 417Miguel from ARU, to review patient for possible ARU admission.

## 2024-04-23 NOTE — PROGRESS NOTES
1910 - Received bedside report from Ange FLORIAN, patient was resting in bed, HOB elevated, bed in lowest position, family at bedside, and pt oriented to call button.    2006 - Turned patient on left side, removed IV in left hand    2053 - Medications given including dilaudid for pain 8/10.

## 2024-04-23 NOTE — PROGRESS NOTES
conducted an initial consultation and Spiritual Assessment for Zamzam Calixto, who is a 73 y.o.,female. Patient's Primary Language is: English.   According to the patient's EMR Holiness Affiliation is: Rastafarian.     The reason the Patient came to the hospital is:   Patient Active Problem List    Diagnosis Date Noted    Dehiscence of wound 02/17/2023    Arterial occlusion 02/19/2024    Acute lower limb ischemia 02/19/2024    Hypomagnesemia 04/27/2023    MSSA (methicillin susceptible Staphylococcus aureus) 04/04/2023    Femoral artery occlusion, left (HCC) 01/19/2023    Postmenopausal osteoporosis 04/22/2022    Vascular graft infection ([facility]) 12/09/2020    UTI (urinary tract infection) 12/03/2020    Cellulitis of abdominal wall 12/03/2020    Open wound 09/10/2020    Chronic wound infection of abdomen 06/21/2018    Infection of vascular bypass graft ([facility]) 04/30/2018    Nonhealing surgical wound 04/30/2018    CAP (community acquired pneumonia) 06/27/2017    Severe sepsis ([facility]) 06/27/2017    Abscess 05/10/2017    Arteriovenous graft infection ([facility]) 05/10/2017    Dermal sinus tract of lumbosacral region ([facility]) 05/10/2017    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[facility]) 08/09/2016    Chronic aorto-iliac occlusion syndrome ([facility]) 01/19/2016    Wound disruption, post-op, skin 01/09/2015    Mass of breast, left 07/30/2014    HTN (hypertension) 04/01/2013    PVD (peripheral vascular disease) ([facility]) 04/01/2013    Crohn's disease ([facility]) 04/01/2013        The  provided the following Interventions:  Initiated a relationship of care and support.   Explored issues of yo, belief, spirituality and Yarsani/ritual needs while hospitalized.  Provided information about Spiritual Care Services.  Chart reviewed.    The following outcomes where achieved:  Patient not interested in completing an Advance Medical Directive at this time.  Patient received communion from [email or web address]   confirmed Patient's Holiness  Affiliation.  Patient expressed gratitude for 's visit.    Assessment:  Patient does not have any Nondenominational/cultural needs that will affect patient's preferences in health care.  There are no spiritual or Nondenominational issues which require intervention at this time.     Plan:  Chaplains will continue to follow and will provide pastoral care on an as needed/requested basis.   recommends bedside caregivers page  on duty if patient shows signs of acute spiritual or emotional distress.    Chaplain Della [facility]  Spiritual Care   [phone]

## 2024-04-23 NOTE — PROGRESS NOTES
Awake and comfortable  Tells me feels much better, first time slept in weeks  Left AKA dressing clean and intact  PT /OT eval for rehab

## 2024-04-23 NOTE — PROGRESS NOTES
3 bags of potassium chloride given this shift as ordered, unable to scan product, pharmacy made aware

## 2024-04-23 NOTE — CASE COMMUNICATION
Pt missed PT visit today due to significant pain issues of L foot, she requested to hold PT for today.

## 2024-04-23 NOTE — PROGRESS NOTES
0232...Called physician answering service to inform of potassium 2.8, awaiting call back\  0522...per Dr. Card, order IV potassium 10 mEq x4 and also do add-on to have Mg checked.

## 2024-04-23 NOTE — PROGRESS NOTES
ARU/IPR REFERRAL CONTACT NOTE  [facility]      Thank you for the opportunity to review this patient's case for admission to [facility].    Based on our pre-admission screening:     [ x] Our Team/Medical Director is following this case.   Comments: Referral received will review chart with team and follow up with patient to discuss preferences.    Again, Thank you for this referral. Should you have any questions please do not hesitate to call.     Sincerely,  Kelli Orona    Clinical Liaison  [facility]  [phone]

## 2024-04-24 ENCOUNTER — HOME CARE VISIT (OUTPATIENT)
Age: 74
End: 2024-04-24
Payer: MEDICARE

## 2024-04-24 LAB
ANION GAP SERPL CALC-SCNC: 6 MMOL/L (ref 3–18)
BASOPHILS # BLD: 0 K/UL (ref 0–0.1)
BASOPHILS NFR BLD: 0 % (ref 0–2)
BUN SERPL-MCNC: 10 MG/DL (ref 7–18)
BUN/CREAT SERPL: 11 (ref 12–20)
CALCIUM SERPL-MCNC: 8.2 MG/DL (ref 8.5–10.1)
CHLORIDE SERPL-SCNC: 96 MMOL/L (ref 100–111)
CO2 SERPL-SCNC: 27 MMOL/L (ref 21–32)
CREAT SERPL-MCNC: 0.9 MG/DL (ref 0.6–1.3)
DIFFERENTIAL METHOD BLD: ABNORMAL
EOSINOPHIL # BLD: 0.6 K/UL (ref 0–0.4)
EOSINOPHIL NFR BLD: 6 % (ref 0–5)
ERYTHROCYTE [DISTWIDTH] IN BLOOD BY AUTOMATED COUNT: 15 % (ref 11.6–14.5)
GLUCOSE BLD STRIP.AUTO-MCNC: 127 MG/DL (ref 70–110)
GLUCOSE SERPL-MCNC: 472 MG/DL (ref 74–99)
HCT VFR BLD AUTO: 20.6 % (ref 35–45)
HGB BLD-MCNC: 6.5 G/DL (ref 12–16)
HISTORY CHECK: NORMAL
IMM GRANULOCYTES # BLD AUTO: 0.1 K/UL (ref 0–0.04)
IMM GRANULOCYTES NFR BLD AUTO: 1 % (ref 0–0.5)
LYMPHOCYTES # BLD: 1.9 K/UL (ref 0.9–3.6)
LYMPHOCYTES NFR BLD: 18 % (ref 21–52)
MCH RBC QN AUTO: 27.3 PG (ref 24–34)
MCHC RBC AUTO-ENTMCNC: 31.6 G/DL (ref 31–37)
MCV RBC AUTO: 86.6 FL (ref 78–100)
MONOCYTES # BLD: 1 K/UL (ref 0.05–1.2)
MONOCYTES NFR BLD: 9 % (ref 3–10)
NEUTS SEG # BLD: 6.9 K/UL (ref 1.8–8)
NEUTS SEG NFR BLD: 66 % (ref 40–73)
NRBC # BLD: 0 K/UL (ref 0–0.01)
NRBC BLD-RTO: 0 PER 100 WBC
PLATELET # BLD AUTO: 394 K/UL (ref 135–420)
PMV BLD AUTO: 9.9 FL (ref 9.2–11.8)
POTASSIUM SERPL-SCNC: 2.6 MMOL/L (ref 3.5–5.5)
RBC # BLD AUTO: 2.38 M/UL (ref 4.2–5.3)
SODIUM SERPL-SCNC: 129 MMOL/L (ref 136–145)
WBC # BLD AUTO: 10.4 K/UL (ref 4.6–13.2)

## 2024-04-24 PROCEDURE — 36415 COLL VENOUS BLD VENIPUNCTURE: CPT

## 2024-04-24 PROCEDURE — 99223 1ST HOSP IP/OBS HIGH 75: CPT | Performed by: FAMILY MEDICINE

## 2024-04-24 PROCEDURE — 97535 SELF CARE MNGMENT TRAINING: CPT

## 2024-04-24 PROCEDURE — P9016 RBC LEUKOCYTES REDUCED: HCPCS

## 2024-04-24 PROCEDURE — 97116 GAIT TRAINING THERAPY: CPT

## 2024-04-24 PROCEDURE — 85025 COMPLETE CBC W/AUTO DIFF WBC: CPT

## 2024-04-24 PROCEDURE — 6370000000 HC RX 637 (ALT 250 FOR IP): Performed by: SURGERY

## 2024-04-24 PROCEDURE — 36430 TRANSFUSION BLD/BLD COMPNT: CPT

## 2024-04-24 PROCEDURE — 1100000000 HC RM PRIVATE

## 2024-04-24 PROCEDURE — 2580000003 HC RX 258: Performed by: SURGERY

## 2024-04-24 PROCEDURE — 80048 BASIC METABOLIC PNL TOTAL CA: CPT

## 2024-04-24 PROCEDURE — 82962 GLUCOSE BLOOD TEST: CPT

## 2024-04-24 PROCEDURE — 6360000002 HC RX W HCPCS: Performed by: SURGERY

## 2024-04-24 PROCEDURE — 2700000000 HC OXYGEN THERAPY PER DAY

## 2024-04-24 PROCEDURE — 94640 AIRWAY INHALATION TREATMENT: CPT

## 2024-04-24 PROCEDURE — 97110 THERAPEUTIC EXERCISES: CPT

## 2024-04-24 PROCEDURE — 97530 THERAPEUTIC ACTIVITIES: CPT

## 2024-04-24 PROCEDURE — 94761 N-INVAS EAR/PLS OXIMETRY MLT: CPT

## 2024-04-24 PROCEDURE — 6370000000 HC RX 637 (ALT 250 FOR IP): Performed by: INTERNAL MEDICINE

## 2024-04-24 RX ORDER — SODIUM CHLORIDE 9 MG/ML
INJECTION, SOLUTION INTRAVENOUS PRN
Status: DISCONTINUED | OUTPATIENT
Start: 2024-04-24 | End: 2024-04-25 | Stop reason: HOSPADM

## 2024-04-24 RX ORDER — POTASSIUM CHLORIDE 7.45 MG/ML
10 INJECTION INTRAVENOUS
Status: DISCONTINUED | OUTPATIENT
Start: 2024-04-24 | End: 2024-04-24

## 2024-04-24 RX ORDER — INSULIN LISPRO 100 [IU]/ML
0-8 INJECTION, SOLUTION INTRAVENOUS; SUBCUTANEOUS
Status: DISCONTINUED | OUTPATIENT
Start: 2024-04-24 | End: 2024-04-25 | Stop reason: HOSPADM

## 2024-04-24 RX ORDER — GLUCAGON 1 MG
1 KIT INJECTION PRN
Status: DISCONTINUED | OUTPATIENT
Start: 2024-04-24 | End: 2024-04-25 | Stop reason: HOSPADM

## 2024-04-24 RX ORDER — POTASSIUM CHLORIDE 7.45 MG/ML
10 INJECTION INTRAVENOUS
Status: COMPLETED | OUTPATIENT
Start: 2024-04-25 | End: 2024-04-25

## 2024-04-24 RX ORDER — INSULIN LISPRO 100 [IU]/ML
0-4 INJECTION, SOLUTION INTRAVENOUS; SUBCUTANEOUS NIGHTLY
Status: DISCONTINUED | OUTPATIENT
Start: 2024-04-24 | End: 2024-04-25 | Stop reason: HOSPADM

## 2024-04-24 RX ORDER — MAGNESIUM SULFATE 1 G/100ML
1000 INJECTION INTRAVENOUS ONCE
Status: DISCONTINUED | OUTPATIENT
Start: 2024-04-24 | End: 2024-04-25

## 2024-04-24 RX ORDER — DEXTROSE MONOHYDRATE 100 MG/ML
INJECTION, SOLUTION INTRAVENOUS CONTINUOUS PRN
Status: DISCONTINUED | OUTPATIENT
Start: 2024-04-24 | End: 2024-04-25 | Stop reason: HOSPADM

## 2024-04-24 RX ORDER — SODIUM CHLORIDE 450 MG/100ML
INJECTION, SOLUTION INTRAVENOUS CONTINUOUS
Status: DISCONTINUED | OUTPATIENT
Start: 2024-04-24 | End: 2024-04-25 | Stop reason: HOSPADM

## 2024-04-24 RX ORDER — MAGNESIUM SULFATE IN WATER 40 MG/ML
2000 INJECTION, SOLUTION INTRAVENOUS ONCE
Status: DISCONTINUED | OUTPATIENT
Start: 2024-04-24 | End: 2024-04-25

## 2024-04-24 RX ADMIN — DULOXETINE HYDROCHLORIDE 60 MG: 60 CAPSULE, DELAYED RELEASE ORAL at 08:57

## 2024-04-24 RX ADMIN — PREGABALIN 100 MG: 25 CAPSULE ORAL at 21:05

## 2024-04-24 RX ADMIN — HYDROMORPHONE HYDROCHLORIDE 0.5 MG: 1 INJECTION, SOLUTION INTRAMUSCULAR; INTRAVENOUS; SUBCUTANEOUS at 06:40

## 2024-04-24 RX ADMIN — BUDESONIDE 250 MCG: 0.25 INHALANT RESPIRATORY (INHALATION) at 10:11

## 2024-04-24 RX ADMIN — CINACALCET HYDROCHLORIDE 30 MG: 30 TABLET, FILM COATED ORAL at 21:04

## 2024-04-24 RX ADMIN — APIXABAN 5 MG: 5 TABLET, FILM COATED ORAL at 21:05

## 2024-04-24 RX ADMIN — ARFORMOTEROL TARTRATE 15 MCG: 15 SOLUTION RESPIRATORY (INHALATION) at 10:11

## 2024-04-24 RX ADMIN — OXYCODONE HYDROCHLORIDE 10 MG: 10 TABLET ORAL at 20:06

## 2024-04-24 RX ADMIN — APIXABAN 5 MG: 5 TABLET, FILM COATED ORAL at 08:57

## 2024-04-24 RX ADMIN — IPRATROPIUM BROMIDE 0.5 MG: 0.5 SOLUTION RESPIRATORY (INHALATION) at 10:11

## 2024-04-24 RX ADMIN — CINACALCET HYDROCHLORIDE 30 MG: 30 TABLET, FILM COATED ORAL at 08:57

## 2024-04-24 RX ADMIN — BUDESONIDE 250 MCG: 0.25 INHALANT RESPIRATORY (INHALATION) at 22:58

## 2024-04-24 RX ADMIN — SODIUM CHLORIDE, PRESERVATIVE FREE 10 ML: 5 INJECTION INTRAVENOUS at 08:58

## 2024-04-24 RX ADMIN — IPRATROPIUM BROMIDE 0.5 MG: 0.5 SOLUTION RESPIRATORY (INHALATION) at 23:00

## 2024-04-24 RX ADMIN — HYDROMORPHONE HYDROCHLORIDE 0.5 MG: 1 INJECTION, SOLUTION INTRAMUSCULAR; INTRAVENOUS; SUBCUTANEOUS at 11:25

## 2024-04-24 RX ADMIN — SULFAMETHOXAZOLE AND TRIMETHOPRIM 1 TABLET: 400; 80 TABLET ORAL at 08:57

## 2024-04-24 RX ADMIN — PREGABALIN 100 MG: 25 CAPSULE ORAL at 08:57

## 2024-04-24 RX ADMIN — ARFORMOTEROL TARTRATE 15 MCG: 15 SOLUTION RESPIRATORY (INHALATION) at 22:59

## 2024-04-24 RX ADMIN — DEXTROSE AND SODIUM CHLORIDE: 5; 450 INJECTION, SOLUTION INTRAVENOUS at 09:09

## 2024-04-24 RX ADMIN — IPRATROPIUM BROMIDE 0.5 MG: 0.5 SOLUTION RESPIRATORY (INHALATION) at 14:49

## 2024-04-24 ASSESSMENT — PAIN DESCRIPTION - ORIENTATION
ORIENTATION: LEFT

## 2024-04-24 ASSESSMENT — PAIN DESCRIPTION - LOCATION
LOCATION: LEG

## 2024-04-24 ASSESSMENT — PAIN DESCRIPTION - DESCRIPTORS
DESCRIPTORS: CRAMPING
DESCRIPTORS: TINGLING;ACHING
DESCRIPTORS: ACHING;TINGLING
DESCRIPTORS: PENETRATING;THROBBING

## 2024-04-24 ASSESSMENT — PAIN SCALES - GENERAL
PAINLEVEL_OUTOF10: 4
PAINLEVEL_OUTOF10: 8
PAINLEVEL_OUTOF10: 4
PAINLEVEL_OUTOF10: 0
PAINLEVEL_OUTOF10: 2
PAINLEVEL_OUTOF10: 0
PAINLEVEL_OUTOF10: 9
PAINLEVEL_OUTOF10: 7

## 2024-04-24 ASSESSMENT — PAIN DESCRIPTION - FREQUENCY
FREQUENCY: INTERMITTENT

## 2024-04-24 ASSESSMENT — PAIN - FUNCTIONAL ASSESSMENT
PAIN_FUNCTIONAL_ASSESSMENT: PREVENTS OR INTERFERES SOME ACTIVE ACTIVITIES AND ADLS
PAIN_FUNCTIONAL_ASSESSMENT: ACTIVITIES ARE NOT PREVENTED
PAIN_FUNCTIONAL_ASSESSMENT: ACTIVITIES ARE NOT PREVENTED
PAIN_FUNCTIONAL_ASSESSMENT: PREVENTS OR INTERFERES SOME ACTIVE ACTIVITIES AND ADLS

## 2024-04-24 ASSESSMENT — PAIN DESCRIPTION - ONSET
ONSET: GRADUAL

## 2024-04-24 ASSESSMENT — PAIN DESCRIPTION - PAIN TYPE
TYPE: SURGICAL PAIN

## 2024-04-24 NOTE — PLAN OF CARE
Problem: Safety - Adult  Goal: Free from fall injury  4/24/2024 1544 by Vanesa Hsieh RN  Outcome: Progressing  4/24/2024 0145 by Julia Gonzalez RN  Outcome: Progressing     Problem: Pain  Goal: Verbalizes/displays adequate comfort level or baseline comfort level  4/24/2024 1544 by Vanesa Hsieh RN  Outcome: Progressing  4/24/2024 0145 by Julia Gonzalez RN  Outcome: Progressing  Flowsheets  Taken 4/23/2024 2001 by Julia Gonzalez RN  Verbalizes/displays adequate comfort level or baseline comfort level: Assess pain using appropriate pain scale  Taken 4/23/2024 1755 by Ange Ruiz RN  Verbalizes/displays adequate comfort level or baseline comfort level:   Assess pain using appropriate pain scale   Encourage patient to monitor pain and request assistance  Taken 4/23/2024 1259 by Ange Ruiz RN  Verbalizes/displays adequate comfort level or baseline comfort level:   Encourage patient to monitor pain and request assistance   Assess pain using appropriate pain scale     Problem: Discharge Planning  Goal: Discharge to home or other facility with appropriate resources  4/24/2024 1544 by Vanesa Hsieh RN  Outcome: Progressing  4/24/2024 0145 by Julia Gonzalez RN  Outcome: Progressing     Problem: Physical Therapy - Adult  Goal: By Discharge: Performs mobility at highest level of function for planned discharge setting.  See evaluation for individualized goals.  Description: Physical Therapy Goals  Initiated 4/23/2024 and to be accomplished within 7 day(s)  1.  Patient will move from supine to sit and sit to supine in bed with modified independence.    2.  Patient will transfer from bed to chair and chair to bed with modified independence using the least restrictive device.  3.  Patient will perform sit to stand with modified independence.  4.  Patient will ambulate with minimal assistance/contact guard assist for 25 feet with the least restrictive device.   5.  Patient will ascend/descend 3 stairs with

## 2024-04-24 NOTE — CARE COORDINATION
Pt independent with care   Dispo:  ARU no auth required        04/24/24 5128   Service Assessment   Patient Orientation Alert and Oriented;Person;Place;Situation   Cognition Alert   History Provided By Patient   Primary Caregiver Self   Support Systems Spouse/Significant Other   PCP Verified by CM Yes   Last Visit to PCP Within last 3 months   Prior Functional Level Independent in ADLs/IADLs   Current Functional Level Independent in ADLs/IADLs   Can patient return to prior living arrangement Yes   Ability to make needs known: Good   Family able to assist with home care needs: Yes   Financial Resources Medicare   Community Resources None   Social/Functional History   Lives With Spouse   Type of Home House   Home Layout Two level;Able to Live on Main level with bedroom/bathroom   Home Access Stairs to enter with rails   Entrance Stairs - Rails None   Bathroom Shower/Tub Tub/Shower unit   Bathroom Equipment Grab bars in shower   Home Equipment Walker, rolling;Crutches   Receives Help From Family   ADL Assistance Independent   Ambulation Assistance Independent   Transfer Assistance Independent   Active  Yes   Occupation Retired   Discharge Planning   Type of Residence Acute Rehab   Living Arrangements Spouse/Significant Other   Current Services Prior To Admission Oxygen Therapy  (1 LITER)   Potential Assistance Needed N/A   DME Ordered? No   Potential Assistance Purchasing Medications No   Type of Home Care Services None   Patient expects to be discharged to: Acute rehab   Services At/After Discharge   [place] Resource Information Provided? No   Condition of Participation: Discharge Planning   The Plan for Transition of Care is related to the following treatment goals: Discharge to [facility] Acute Rehab   The Patient and/or Patient Representative was provided with a Choice of Provider? Patient   The Patient and/Or Patient Representative agree with the Discharge Plan? Yes

## 2024-04-24 NOTE — CONSULTS
[REMOVED] Negative Pressure Wound Therapy Leg Inner (Removed)       [REMOVED] Urinary Catheter 02/27/23 García (Removed)       [REMOVED] Urinary Catheter 02/20/24 García (Removed)       [REMOVED] Urinary Catheter 03/25/24 2 Way (Removed)   Catheter Indications Need for fluid volume management of the critically ill patient in a critical care setting 03/26/24 0400   Site Assessment No urethral drainage 03/26/24 0400   Urine Color Yellow;Stefani 03/26/24 0400   Urine Appearance Clear 03/26/24 0400   Collection Container Standard 03/26/24 0400   Securement Method Securing device (Describe) 03/26/24 0400   Catheter Care  Perineal wipes 03/26/24 0400   Catheter Best Practices  Drainage tube clipped to bed;Catheter secured to thigh;Tamper seal intact;Bag below bladder;Bag not on floor;Lack of dependent loop in tubing;Drainage bag less than half full 03/26/24 0400   Status Draining;Patent 03/26/24 0400   Output (mL) 130 mL 03/26/24 0850   Discontinuation Reason Per provider order 03/26/24 0000       [REMOVED] Urinary Catheter 03/29/24 García (Removed)   Catheter Indications Perioperative use for selected surgical procedures;Need for fluid volume management of the critically ill patient in a critical care setting 04/01/24 0800   Site Assessment No urethral drainage 04/01/24 0800   Urine Color Yellow 04/01/24 0800   Urine Appearance Clear 04/01/24 0800   Collection Container Standard 04/01/24 0800   Securement Method Securing device (Describe) 04/01/24 0800   Catheter Care  Perineal wipes 04/01/24 0800   Catheter Best Practices  Drainage tube clipped to bed;Tamper seal intact;Catheter secured to thigh;Bag below bladder;Bag not on floor;Lack of dependent loop in tubing;Drainage bag less than half full 04/01/24 0800   Status Draining;Patent 04/01/24 0800   Output (mL) 60 mL 04/01/24 0800         Findings:  Infection Present At Time Of Surgery (PATOS) (choose all levels that have infection present):  No infection present  Other

## 2024-04-24 NOTE — PROGRESS NOTES
ARU/IPR REFERRAL CONTACT NOTE  [facility]      Thank you for the opportunity to review this patient's case for admission to [facility].    Based on our pre-admission screening:     [ x] Our Team/Medical Director is following this case.   Comments: Met with patient at the bedside who reports that she is agreeable to ARU when medically cleared. Discussed with patient the need to transition to oral pain medication when appropriate.       Again, Thank you for this referral. Should you have any questions please do not hesitate to call.     Sincerely,  Kelli Orona    Clinical Liaison  [facility]  [phone]

## 2024-04-24 NOTE — PROGRESS NOTES
Discussed with  and ARU  Patient was accepted  We will recheck labs  Likely transfer to ARU  tomorrow

## 2024-04-24 NOTE — PROGRESS NOTES
0715: Bedside and Verbal shift change report given to CHIQUI Alexis (oncoming nurse) by CHIQUI Dumont (offgoing nurse). Report included the following information Nurse Handoff Report, Index, Adult Overview, MAR, Recent Results, and Alarm Parameters.      1200: Report given to CHIQUI Vargas

## 2024-04-24 NOTE — PLAN OF CARE
Problem: Occupational Therapy - Adult  Goal: By Discharge: Performs self-care activities at highest level of function for planned discharge setting.  See evaluation for individualized goals.  Description: Occupational Therapy Goals:  Initiated 4/23/2024 to be met within 7-10 days.    1.  Patient will perform lower body dressing with supervision/set-up.   2.  Patient will perform bed mobility in preparation for ADLs with modified independence.  3.  Patient will perform toilet transfers with supervision/set-up, Fair balance.  4.  Patient will perform all aspects of toileting with supervision/set-up.  5.  Patient will participate in upper extremity therapeutic exercise/activities with supervision/set-up for 8-10 minutes to increase strength/endurance for ADLs.    6.  Patient will utilize energy conservation techniques during functional activities with verbal cues.      PLOF: Patient was independent with self-care and functional mobility PTA.  Outcome: Progressing      OCCUPATIONAL THERAPY TREATMENT    Patient: Zamzam Calixto (73 y.o. 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  ASSESSMENT:  Pt cleared to participate in OT by RN. Upon entering the room, the pt was supine in bed finishing breathing tx, alert, and agreeable to participate in OT session with spouse briefly present. Pt stand by assist for initially donning mesh underwear in bed using weight shift and bridge technique, min assist needed for task completion standing. Patient min assist for supine > sit/scooting to EOB and min assist for functional transfer to chair using rolling walker simulating toilet  transfer. Verbal cues required for placement of hands during functional transfers and for assistance with maneuvering RW. Pt demos good carryover of E.C. techniques this session on 1 L NC. Patient left in recliner, all needs met and call bell in reach.         Progression toward goals:  [x]          Improving appropriately and progressing toward goals  []          Improving slowly and progressing toward goals  []          Not making progress toward goals and plan of care will be adjusted     PLAN:  Patient continues to benefit from skilled intervention to address the above impairments.  Continue treatment per established plan of care.    Further Equipment Recommendations for Discharge: bedside commode, shower chair, and transfer bench    AMPAC: AM-PAC Inpatient Daily Activity Raw Score: 17    Current research shows that an AM-PAC score of 17 or less is not associated with a discharge to the patient's home setting. Based on an AM-PAC score and their current ADL deficits; it is recommended that the patient have 5-7 sessions per week of Occupational Therapy at d/c to increase the patient's independence.  Currently, this patient demonstrates the potential endurance, and/or tolerance for 3 hours of therapy each day at d/c.        This AMPAC score should be considered in conjunction with interdisciplinary team recommendations to determine the most appropriate discharge setting. Patient's social support, diagnosis, medical stability, and prior level of function should also be taken into consideration.     SUBJECTIVE:   Patient stated, \"I feel good today\"    OBJECTIVE DATA SUMMARY:   Cognitive/Behavioral Status:  Orientation  Overall Orientation Status: Within Normal Limits  Orientation Level: Oriented X4       Functional Mobility and Transfers for ADLs:   Bed Mobility:  Bed Mobility Training  Bed Mobility Training: Yes  Supine to Sit: Minimum assistance;Assist X1  Scooting: Contact-guard assistance;Assist

## 2024-04-24 NOTE — PLAN OF CARE
assistance  Transfers:  Transfer Training  Transfer Training: Yes  Sit to Stand: Minimum assistance;Assist X1  Stand to Sit: Minimum assistance;Assist X1  Stand Pivot Transfers: Minimum assistance;Assist X1  Bed to Chair: Minimum assistance;Assist X1  Balance:  Balance  Sitting: Impaired  Sitting - Static: Good (unsupported)  Sitting - Dynamic: Fair (occasional)  Standing: Impaired  Standing - Static: Fair  Standing - Dynamic: Fair      Ambulation/Gait Training:     Gait  Gait Training: Yes  Overall Level of Assistance: Minimum assistance  Distance (ft): 4 Feet  Assistive Device: Walker, rolling  Interventions: Verbal cues  Gait Abnormalities: Decreased step clearance    Therapeutic Exercises:     EXERCISE   Sets   Reps   Active Active Assist   Passive Self ROM   Comments   Ankle Pumps    [] [] [] []    Quad Sets/Glut Sets    [] [] [] [] Hold for 5 secs   Hamstring Sets   [] [] [] []    Short Arc Quads   [] [] [] []    Heel Slides   [] [] [] []    Straight Leg Raises   [] [] [] []    Hip Add   [] [] [] [] Hold for 5 secs, w/ pillow squeeze   Sidelying hip extension 1 5 [x] [] [] [] With left leg   Side lying hip extension 1 5 [x] [] [] [] Left leg   Standing hip extension 1 5 [x] [] [] [] Left leg      [] [] [] []        Pain:  Intensity Pre-treatment: 0/10   Intensity Post-treatment: 0/10      Activity Tolerance:   Activity Tolerance: Patient tolerated evaluation without incident  Please refer to the flowsheet for vital signs taken during this treatment.  After treatment:   [] Patient left in no apparent distress sitting up in chair  [x] Patient left in no apparent distress in bed  [x] Call bell left within reach  [x] Nursing notified  [] Caregiver present  [] Bed alarm activated  [] SCDs applied      COMMUNICATION/EDUCATION:   Patient Education  Education Given To: Patient  Education Provided: Role of Therapy;Plan of Care;Transfer Training;Home Exercise Program  Education Method: Demonstration;Verbal;Teach

## 2024-04-24 NOTE — PROGRESS NOTES
[facility]  (A Division of [facility])      Consultation Note      Date of Admission: 4/22/2024    Date of Note: 4/24/2024      Reason for Referral: Antibiotic management  Referring Physician: Dr. 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  S/p washout with wound vac on 2/17  S/p removal of infected bypass graft 2/23/23 in left thigh  2/23/23 surgical cx: + Kelbsiella aerogenes  2/27/23 s/p resvision left fem-pop with cadaver vein and removal of femoral blow knee graft  Leukocytosis-  14.4 on presentation   PVD  Immunosuppressed due to Remicaide for Crohn's disease  Anemia--   Neuropathy  Electrolyte abnormalities       Plan:   Continue chronic suppression with Bactrim.  Recurrent graft infections have the patch potential for catastrophic outcomes.   -Trend BMP every 3 to 4 months while on Bactrim due to medication associated toxicities    Given leukocytosis on this hospitalization, repeat CBC today.  Replete Mg and potassium-recheck BMP today      DO Sacha [facility]  [address], [address] [address]  Office: [phone], Ext [phone]      Lines / Catheters:  Peripheral    Subjective:   Out of bed to chair.  Overall doing okay.  Starting to have some phantom pains which she describes as having a sharp pain in her foot.  No fevers or chills.  Eating okay.  No other new events.    Tmax 99.2    HPI:  Ms. Cantu is a bartolo 73-year-old female with a history of Crohn's disease, hypertension, COPD with a history of smoking, peripheral

## 2024-04-24 NOTE — CONSENT
Informed Consent for Blood Component Transfusion Note    I have discussed with the patient the rationale for blood component transfusion; its benefits in treating or preventing fatigue, organ damage, or death; and its risk which includes mild transfusion reactions, rare risk of blood borne infection, or more serious but rare reactions. I have discussed the alternatives to transfusion, including the risk and consequences of not receiving transfusion. The patient had an opportunity to ask questions and had agreed to proceed with transfusion of blood components.    Electronically signed by Mich Jackson MD on 4/24/24 at 5:11 PM EDT

## 2024-04-25 ENCOUNTER — HOSPITAL ENCOUNTER (INPATIENT)
Facility: HOSPITAL | Age: 74
DRG: 560 | End: 2024-04-25
Attending: EMERGENCY MEDICINE | Admitting: EMERGENCY MEDICINE
Payer: MEDICARE

## 2024-04-25 VITALS
WEIGHT: 118 LBS | OXYGEN SATURATION: 90 % | HEIGHT: 65 IN | SYSTOLIC BLOOD PRESSURE: 136 MMHG | TEMPERATURE: 98.1 F | HEART RATE: 85 BPM | RESPIRATION RATE: 16 BRPM | BODY MASS INDEX: 19.66 KG/M2 | DIASTOLIC BLOOD PRESSURE: 68 MMHG

## 2024-04-25 DIAGNOSIS — Z89.612 S/P AKA (ABOVE KNEE AMPUTATION) UNILATERAL, LEFT (HCC): Primary | ICD-10-CM

## 2024-04-25 LAB
ABO + RH BLD: NORMAL
ANION GAP SERPL CALC-SCNC: 5 MMOL/L (ref 3–18)
BLD PROD TYP BPU: NORMAL
BLD PROD TYP BPU: NORMAL
BLOOD BANK BLOOD PRODUCT EXPIRATION DATE: NORMAL
BLOOD BANK BLOOD PRODUCT EXPIRATION DATE: NORMAL
BLOOD BANK DISPENSE STATUS: NORMAL
BLOOD BANK DISPENSE STATUS: NORMAL
BLOOD BANK ISBT PRODUCT BLOOD TYPE: 6200
BLOOD BANK ISBT PRODUCT BLOOD TYPE: 6200
BLOOD BANK PRODUCT CODE: NORMAL
BLOOD BANK PRODUCT CODE: NORMAL
BLOOD BANK UNIT TYPE AND RH: NORMAL
BLOOD BANK UNIT TYPE AND RH: NORMAL
BLOOD GROUP ANTIBODIES SERPL: NORMAL
BPU ID: NORMAL
BPU ID: NORMAL
BUN SERPL-MCNC: 8 MG/DL (ref 7–18)
BUN/CREAT SERPL: 10 (ref 12–20)
CALCIUM SERPL-MCNC: 9.1 MG/DL (ref 8.5–10.1)
CALLED TO: NORMAL
CHLORIDE SERPL-SCNC: 102 MMOL/L (ref 100–111)
CO2 SERPL-SCNC: 28 MMOL/L (ref 21–32)
CREAT SERPL-MCNC: 0.78 MG/DL (ref 0.6–1.3)
CROSSMATCH RESULT: NORMAL
CROSSMATCH RESULT: NORMAL
EST. AVERAGE GLUCOSE BLD GHB EST-MCNC: 163 MG/DL
GLUCOSE BLD STRIP.AUTO-MCNC: 136 MG/DL (ref 70–110)
GLUCOSE BLD STRIP.AUTO-MCNC: 98 MG/DL (ref 70–110)
GLUCOSE SERPL-MCNC: 107 MG/DL (ref 74–99)
HBA1C MFR BLD: 7.3 % (ref 4.2–5.6)
HCT VFR BLD AUTO: 29.1 % (ref 35–45)
HGB BLD-MCNC: 9.6 G/DL (ref 12–16)
MAGNESIUM SERPL-MCNC: 2 MG/DL (ref 1.6–2.6)
POTASSIUM SERPL-SCNC: 3.2 MMOL/L (ref 3.5–5.5)
SODIUM SERPL-SCNC: 135 MMOL/L (ref 136–145)
SPECIMEN EXP DATE BLD: NORMAL
UNIT DIVISION: 0
UNIT DIVISION: 0
UNIT ISSUE DATE/TIME: NORMAL
UNIT ISSUE DATE/TIME: NORMAL

## 2024-04-25 PROCEDURE — 6370000000 HC RX 637 (ALT 250 FOR IP): Performed by: SURGERY

## 2024-04-25 PROCEDURE — 6370000000 HC RX 637 (ALT 250 FOR IP): Performed by: EMERGENCY MEDICINE

## 2024-04-25 PROCEDURE — 6360000002 HC RX W HCPCS: Performed by: SURGERY

## 2024-04-25 PROCEDURE — 80048 BASIC METABOLIC PNL TOTAL CA: CPT

## 2024-04-25 PROCEDURE — 2580000003 HC RX 258: Performed by: SURGERY

## 2024-04-25 PROCEDURE — 97530 THERAPEUTIC ACTIVITIES: CPT

## 2024-04-25 PROCEDURE — 85014 HEMATOCRIT: CPT

## 2024-04-25 PROCEDURE — 83036 HEMOGLOBIN GLYCOSYLATED A1C: CPT

## 2024-04-25 PROCEDURE — 83735 ASSAY OF MAGNESIUM: CPT

## 2024-04-25 PROCEDURE — 85018 HEMOGLOBIN: CPT

## 2024-04-25 PROCEDURE — 99232 SBSQ HOSP IP/OBS MODERATE 35: CPT | Performed by: HOSPITALIST

## 2024-04-25 PROCEDURE — 97535 SELF CARE MNGMENT TRAINING: CPT

## 2024-04-25 PROCEDURE — 94640 AIRWAY INHALATION TREATMENT: CPT

## 2024-04-25 PROCEDURE — 94761 N-INVAS EAR/PLS OXIMETRY MLT: CPT

## 2024-04-25 PROCEDURE — 6370000000 HC RX 637 (ALT 250 FOR IP): Performed by: INTERNAL MEDICINE

## 2024-04-25 PROCEDURE — 2700000000 HC OXYGEN THERAPY PER DAY

## 2024-04-25 PROCEDURE — 6360000002 HC RX W HCPCS: Performed by: FAMILY MEDICINE

## 2024-04-25 PROCEDURE — 1180000000 HC REHAB R&B

## 2024-04-25 PROCEDURE — 36415 COLL VENOUS BLD VENIPUNCTURE: CPT

## 2024-04-25 PROCEDURE — 82962 GLUCOSE BLOOD TEST: CPT

## 2024-04-25 PROCEDURE — 6370000000 HC RX 637 (ALT 250 FOR IP): Performed by: HOSPITALIST

## 2024-04-25 RX ORDER — FOLIC ACID 1 MG/1
1 TABLET ORAL DAILY
Status: DISCONTINUED | OUTPATIENT
Start: 2024-04-25 | End: 2024-04-25 | Stop reason: HOSPADM

## 2024-04-25 RX ORDER — MAGNESIUM SULFATE 1 G/100ML
1000 INJECTION INTRAVENOUS ONCE
Status: COMPLETED | OUTPATIENT
Start: 2024-04-25 | End: 2024-04-25

## 2024-04-25 RX ORDER — AMLODIPINE BESYLATE 10 MG/1
10 TABLET ORAL DAILY
Status: DISCONTINUED | OUTPATIENT
Start: 2024-04-26 | End: 2024-05-11 | Stop reason: HOSPADM

## 2024-04-25 RX ORDER — LANOLIN ALCOHOL/MO/W.PET/CERES
400 CREAM (GRAM) TOPICAL DAILY
Status: DISCONTINUED | OUTPATIENT
Start: 2024-04-25 | End: 2024-04-25 | Stop reason: HOSPADM

## 2024-04-25 RX ORDER — PREGABALIN 50 MG/1
100 CAPSULE ORAL 2 TIMES DAILY
Status: DISCONTINUED | OUTPATIENT
Start: 2024-04-25 | End: 2024-05-01

## 2024-04-25 RX ORDER — POTASSIUM CHLORIDE 20 MEQ/1
40 TABLET, EXTENDED RELEASE ORAL 2 TIMES DAILY
Status: DISCONTINUED | OUTPATIENT
Start: 2024-04-25 | End: 2024-04-25 | Stop reason: HOSPADM

## 2024-04-25 RX ORDER — ALBUTEROL SULFATE 2.5 MG/3ML
2.5 SOLUTION RESPIRATORY (INHALATION) EVERY 4 HOURS PRN
Status: DISCONTINUED | OUTPATIENT
Start: 2024-04-25 | End: 2024-05-11 | Stop reason: HOSPADM

## 2024-04-25 RX ORDER — DULOXETIN HYDROCHLORIDE 30 MG/1
60 CAPSULE, DELAYED RELEASE ORAL DAILY
Status: DISCONTINUED | OUTPATIENT
Start: 2024-04-26 | End: 2024-05-11 | Stop reason: HOSPADM

## 2024-04-25 RX ORDER — BUDESONIDE 0.25 MG/2ML
0.25 INHALANT ORAL
Status: DISCONTINUED | OUTPATIENT
Start: 2024-04-26 | End: 2024-05-11 | Stop reason: HOSPADM

## 2024-04-25 RX ORDER — MAGNESIUM SULFATE IN WATER 40 MG/ML
2000 INJECTION, SOLUTION INTRAVENOUS ONCE
Status: COMPLETED | OUTPATIENT
Start: 2024-04-25 | End: 2024-04-25

## 2024-04-25 RX ORDER — SULFAMETHOXAZOLE AND TRIMETHOPRIM 400; 80 MG/1; MG/1
1 TABLET ORAL DAILY
Status: DISCONTINUED | OUTPATIENT
Start: 2024-04-26 | End: 2024-05-03

## 2024-04-25 RX ORDER — CINACALCET 60 MG/1
30 TABLET, FILM COATED ORAL 2 TIMES DAILY
Status: DISCONTINUED | OUTPATIENT
Start: 2024-04-25 | End: 2024-05-11 | Stop reason: HOSPADM

## 2024-04-25 RX ORDER — ALBUTEROL SULFATE 90 UG/1
2 AEROSOL, METERED RESPIRATORY (INHALATION) EVERY 4 HOURS PRN
Status: DISCONTINUED | OUTPATIENT
Start: 2024-04-25 | End: 2024-04-25

## 2024-04-25 RX ORDER — NALOXONE HYDROCHLORIDE 0.4 MG/ML
0.4 INJECTION, SOLUTION INTRAMUSCULAR; INTRAVENOUS; SUBCUTANEOUS PRN
Status: DISCONTINUED | OUTPATIENT
Start: 2024-04-25 | End: 2024-05-11 | Stop reason: HOSPADM

## 2024-04-25 RX ORDER — ACETAMINOPHEN 325 MG/1
650 TABLET ORAL EVERY 4 HOURS PRN
Status: DISCONTINUED | OUTPATIENT
Start: 2024-04-25 | End: 2024-05-03

## 2024-04-25 RX ORDER — ARFORMOTEROL TARTRATE 15 UG/2ML
15 SOLUTION RESPIRATORY (INHALATION)
Status: DISCONTINUED | OUTPATIENT
Start: 2024-04-26 | End: 2024-05-11 | Stop reason: HOSPADM

## 2024-04-25 RX ORDER — HYDROCODONE BITARTRATE AND ACETAMINOPHEN 5; 325 MG/1; MG/1
1 TABLET ORAL EVERY 4 HOURS PRN
Status: DISCONTINUED | OUTPATIENT
Start: 2024-04-25 | End: 2024-05-11 | Stop reason: HOSPADM

## 2024-04-25 RX ORDER — HYDROCODONE BITARTRATE AND ACETAMINOPHEN 5; 325 MG/1; MG/1
1 TABLET ORAL EVERY 4 HOURS PRN
Qty: 42 TABLET | Refills: 0 | Status: ON HOLD | OUTPATIENT
Start: 2024-04-25 | End: 2024-05-02

## 2024-04-25 RX ORDER — POLYETHYLENE GLYCOL 3350 17 G/17G
17 POWDER, FOR SOLUTION ORAL DAILY PRN
Status: DISCONTINUED | OUTPATIENT
Start: 2024-04-25 | End: 2024-05-11 | Stop reason: HOSPADM

## 2024-04-25 RX ORDER — CETIRIZINE HYDROCHLORIDE 10 MG/1
10 TABLET ORAL DAILY
Status: DISCONTINUED | OUTPATIENT
Start: 2024-04-26 | End: 2024-05-11 | Stop reason: HOSPADM

## 2024-04-25 RX ADMIN — MAGNESIUM SULFATE HEPTAHYDRATE 2000 MG: 40 INJECTION, SOLUTION INTRAVENOUS at 02:40

## 2024-04-25 RX ADMIN — POTASSIUM CHLORIDE 40 MEQ: 1500 TABLET, EXTENDED RELEASE ORAL at 13:23

## 2024-04-25 RX ADMIN — FOLIC ACID 1 MG: 1 TABLET ORAL at 13:23

## 2024-04-25 RX ADMIN — SODIUM CHLORIDE, PRESERVATIVE FREE 10 ML: 5 INJECTION INTRAVENOUS at 08:19

## 2024-04-25 RX ADMIN — OXYCODONE HYDROCHLORIDE 10 MG: 10 TABLET ORAL at 08:10

## 2024-04-25 RX ADMIN — POTASSIUM CHLORIDE 10 MEQ: 7.46 INJECTION, SOLUTION INTRAVENOUS at 04:37

## 2024-04-25 RX ADMIN — APIXABAN 5 MG: 5 TABLET, FILM COATED ORAL at 08:18

## 2024-04-25 RX ADMIN — CINACALCET HYDROCHLORIDE 30 MG: 30 TABLET, FILM COATED ORAL at 08:18

## 2024-04-25 RX ADMIN — HYDROMORPHONE HYDROCHLORIDE 0.5 MG: 1 INJECTION, SOLUTION INTRAMUSCULAR; INTRAVENOUS; SUBCUTANEOUS at 16:39

## 2024-04-25 RX ADMIN — AMLODIPINE BESYLATE 10 MG: 10 TABLET ORAL at 08:18

## 2024-04-25 RX ADMIN — DULOXETINE HYDROCHLORIDE 60 MG: 60 CAPSULE, DELAYED RELEASE ORAL at 08:17

## 2024-04-25 RX ADMIN — MAGNESIUM SULFATE HEPTAHYDRATE 1000 MG: 10 INJECTION, SOLUTION INTRAVENOUS at 01:44

## 2024-04-25 RX ADMIN — BUDESONIDE 250 MCG: 0.25 INHALANT RESPIRATORY (INHALATION) at 08:39

## 2024-04-25 RX ADMIN — POTASSIUM CHLORIDE 10 MEQ: 7.46 INJECTION, SOLUTION INTRAVENOUS at 05:41

## 2024-04-25 RX ADMIN — APIXABAN 5 MG: 5 TABLET, FILM COATED ORAL at 20:57

## 2024-04-25 RX ADMIN — IPRATROPIUM BROMIDE 0.5 MG: 0.5 SOLUTION RESPIRATORY (INHALATION) at 08:39

## 2024-04-25 RX ADMIN — POTASSIUM CHLORIDE 10 MEQ: 7.46 INJECTION, SOLUTION INTRAVENOUS at 02:13

## 2024-04-25 RX ADMIN — PREGABALIN 100 MG: 50 CAPSULE ORAL at 20:57

## 2024-04-25 RX ADMIN — CINACALCET HYDROCHLORIDE 30 MG: 60 TABLET, FILM COATED ORAL at 20:57

## 2024-04-25 RX ADMIN — POTASSIUM CHLORIDE 10 MEQ: 7.46 INJECTION, SOLUTION INTRAVENOUS at 03:19

## 2024-04-25 RX ADMIN — SULFAMETHOXAZOLE AND TRIMETHOPRIM 1 TABLET: 400; 80 TABLET ORAL at 08:18

## 2024-04-25 RX ADMIN — PREGABALIN 100 MG: 25 CAPSULE ORAL at 08:17

## 2024-04-25 RX ADMIN — ARFORMOTEROL TARTRATE 15 MCG: 15 SOLUTION RESPIRATORY (INHALATION) at 08:39

## 2024-04-25 RX ADMIN — Medication 400 MG: at 13:23

## 2024-04-25 RX ADMIN — POTASSIUM CHLORIDE 10 MEQ: 7.46 INJECTION, SOLUTION INTRAVENOUS at 01:23

## 2024-04-25 ASSESSMENT — PAIN DESCRIPTION - DESCRIPTORS
DESCRIPTORS: ACHING;TINGLING
DESCRIPTORS: ACHING
DESCRIPTORS: ACHING;HEAVINESS
DESCRIPTORS: ACHING

## 2024-04-25 ASSESSMENT — PAIN DESCRIPTION - FREQUENCY
FREQUENCY: INTERMITTENT
FREQUENCY: CONTINUOUS
FREQUENCY: INTERMITTENT
FREQUENCY: INTERMITTENT

## 2024-04-25 ASSESSMENT — PAIN DESCRIPTION - ORIENTATION
ORIENTATION: LEFT

## 2024-04-25 ASSESSMENT — PAIN SCALES - GENERAL
PAINLEVEL_OUTOF10: 4
PAINLEVEL_OUTOF10: 8
PAINLEVEL_OUTOF10: 0
PAINLEVEL_OUTOF10: 5
PAINLEVEL_OUTOF10: 0
PAINLEVEL_OUTOF10: 8
PAINLEVEL_OUTOF10: 4
PAINLEVEL_OUTOF10: 3
PAINLEVEL_OUTOF10: 0

## 2024-04-25 ASSESSMENT — PAIN DESCRIPTION - ONSET
ONSET: GRADUAL
ONSET: ON-GOING
ONSET: GRADUAL
ONSET: GRADUAL

## 2024-04-25 ASSESSMENT — PAIN DESCRIPTION - PAIN TYPE
TYPE: SURGICAL PAIN

## 2024-04-25 ASSESSMENT — PAIN DESCRIPTION - LOCATION
LOCATION: OTHER (COMMENT)
LOCATION: LEG

## 2024-04-25 ASSESSMENT — PAIN - FUNCTIONAL ASSESSMENT
PAIN_FUNCTIONAL_ASSESSMENT: ACTIVITIES ARE NOT PREVENTED
PAIN_FUNCTIONAL_ASSESSMENT: PREVENTS OR INTERFERES SOME ACTIVE ACTIVITIES AND ADLS
PAIN_FUNCTIONAL_ASSESSMENT: ACTIVITIES ARE NOT PREVENTED
PAIN_FUNCTIONAL_ASSESSMENT: PREVENTS OR INTERFERES SOME ACTIVE ACTIVITIES AND ADLS
PAIN_FUNCTIONAL_ASSESSMENT: ACTIVITIES ARE NOT PREVENTED
PAIN_FUNCTIONAL_ASSESSMENT: PREVENTS OR INTERFERES SOME ACTIVE ACTIVITIES AND ADLS

## 2024-04-25 NOTE — DISCHARGE INSTRUCTIONS
DISCHARGE SUMMARY from Nurse    PATIENT INSTRUCTIONS:    After general anesthesia or intravenous sedation, for 24 hours or while taking prescription Narcotics:  Limit your activities  Do not drive and operate hazardous machinery  Do not make important personal or business decisions  Do  not drink alcoholic beverages  If you have not urinated within 8 hours after discharge, please contact your surgeon on call.    Report the following to your surgeon:  Excessive pain, swelling, redness or odor of or around the surgical area  Temperature over 100.5  Nausea and vomiting lasting longer than 4 hours or if unable to take medications  Any signs of decreased circulation or nerve impairment to extremity: change in color, persistent  numbness, tingling, coldness or increase pain  Any questions    What to do at Home:  Recommended activity: activity as tolerated, with asssistance    If you experience any of the following symptoms Discharge Summary included, please follow up with Dr. Ashley.    *  Please give a list of your current medications to your Primary Care Provider.    *  Please update this list whenever your medications are discontinued, doses are      changed, or new medications (including over-the-counter products) are added.    *  Please carry medication information at all times in case of emergency situations.    These are general instructions for a healthy lifestyle:    No smoking/ No tobacco products/ Avoid exposure to second hand smoke  Surgeon General's Warning:  Quitting smoking now greatly reduces serious risk to your health.    Obesity, smoking, and sedentary lifestyle greatly increases your risk for illness    A healthy diet, regular physical exercise & weight monitoring are important for maintaining a healthy lifestyle    You may be retaining fluid if you have a history of heart failure or if you experience any of the following symptoms:  Weight gain of 3 pounds or more overnight or 5 pounds in a week,  increased swelling in our hands or feet or shortness of breath while lying flat in bed.  Please call your doctor as soon as you notice any of these symptoms; do not wait until your next office visit.  Patient armband removed and shredded   Thank you for enrolling in [facility]. Please follow the instructions below to securely access your online medical record. [facility] allows you to send messages to your doctor, view your test results, renew your prescriptions, schedule appointments, and more.     How Do I Sign Up?  In your Internet browser, go to [email or web address]  Click on the Sign Up Now link in the Sign In box. You will see the New Member Sign Up page.  Enter your [facility] Access Code exactly as it appears below. You will not need to use this code after you’ve completed the sign-up process. If you do not sign up before the expiration date, you must request a new code.  [facility] Access Code: Activation code not generated  Current [facility] Status: Active    Enter your Social Security Number ([id number]) and Date of Birth (mm/dd/yyyy) as indicated and click Submit. You will be taken to the next sign-up page.  Create a [facility] ID. This will be your [facility] login ID and cannot be changed, so think of one that is secure and easy to remember.  Create a [facility] password. You can change your password at any time.  Enter your Password Reset Question and Answer. This can be used at a later time if you forget your password.   Enter your e-mail address. You will receive e-mail notification when new information is available in [facility].  Click Sign Up. You can now view your medical record.     Additional Information  If you have questions, please contact your physician practice where you receive care. Remember, [facility] is NOT to be used for urgent needs. For medical emergencies, dial 911.     The discharge information has been reviewed with the {PATIENT PARENT GUARDIAN:[id number]}.  The {PATIENT PARENT

## 2024-04-25 NOTE — PROGRESS NOTES
"  SUBJECTIVE:   Martinez Granda is a 42 year old male who presents to clinic today for the following health issues:      ED/UC Followup:    Facility:  [facility]  Date of visit: 01/21/2019  Reason for visit: left shoulder pain  Current Status: not improving     Per patient his shoulder feels like it is popping.   He does reports some dull constant pain but worse with certain movements.   Patient is a  for the [employer] and does do some lifting but nothing over 30-40 lbs. He does do a lot of overhead movements. The pain is different day to day so it is difficult to describe,   Takes aleve or tylenol as needed with some relief.   On a scale of 1/6 his pain is about 6/10 at its worst.         Problem list and histories reviewed & adjusted, as indicated.  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    Social History     Tobacco Use     Smoking status: Current Every Day Smoker     Smokeless tobacco: Never Used   Substance Use Topics     Alcohol use: Yes     Alcohol/week: 0.0 oz     No family history on file.        Reviewed and updated as needed this visit by clinical staff  Tobacco  Allergies  Meds       Reviewed and updated as needed this visit by Provider         ROS:  Constitutional, msk, neuro  systems are negative, except as otherwise noted.    OBJECTIVE:     /83 (BP Location: Right arm, Patient Position: Chair, Cuff Size: Adult Regular)   Pulse 90   Temp 98.2  F (36.8  C) (Oral)   Resp 16   Ht 1.664 m (5' 5.5\")   Wt 61.7 kg (136 lb)   BMI 22.29 kg/m    Body mass index is 22.29 kg/m .  GENERAL: healthy, alert and no distress  MS: left shoulder- full ROM, strength 5/5, no TTP over SITS, TTP over medial border scapula     Diagnostic Test Results:  none     ASSESSMENT/PLAN: " Patient requested not to take 2000 breathing treatment.  RN aware.         1. Strain of left shoulder, subsequent encounter  - no indication for MRI at this time. Supportive care reviewed. Will refer to PT for further treatment and evaluation.   - [facility] REFERRAL; Future    See Patient Instructions    Ele Kingston MD  [facility]

## 2024-04-25 NOTE — PROGRESS NOTES
Patient going to ARU room 191 at 1630  Transfer report given to receiving RN Bill  Opportunity for questions and clarification given  Patient to transfer after Dinner

## 2024-04-25 NOTE — PLAN OF CARE
Problem: Safety - Adult  Goal: Free from fall injury  4/25/2024 0042 by Anita Mathews RN  Outcome: Progressing  4/24/2024 1544 by Vanesa Hsieh RN  Outcome: Progressing     Problem: Pain  Goal: Verbalizes/displays adequate comfort level or baseline comfort level  4/25/2024 0042 by Anita Mathews RN  Outcome: Progressing  4/24/2024 1544 by Vanesa Hsieh RN  Outcome: Progressing     Problem: Discharge Planning  Goal: Discharge to home or other facility with appropriate resources  4/25/2024 0042 by Anita Mathews RN  Outcome: Progressing  4/24/2024 1544 by Vanesa Hsieh RN  Outcome: Progressing     Problem: Physical Therapy - Adult  Goal: By Discharge: Performs mobility at highest level of function for planned discharge setting.  See evaluation for individualized goals.  Description: Physical Therapy Goals  Initiated 4/23/2024 and to be accomplished within 7 day(s)  1.  Patient will move from supine to sit and sit to supine in bed with modified independence.    2.  Patient will transfer from bed to chair and chair to bed with modified independence using the least restrictive device.  3.  Patient will perform sit to stand with modified independence.  4.  Patient will ambulate with minimal assistance/contact guard assist for 25 feet with the least restrictive device.   5.  Patient will ascend/descend 3 stairs with handrail(s) with minimal assistance/contact guard assist.    PLOF: Lives with spouse. Two story with first floor set-up. 3 steps to enter. Independent prior to LLE AKA.   4/24/2024 1341 by Jolanta Hdez PT  Outcome: Progressing     Problem: Occupational Therapy - Adult  Goal: By Discharge: Performs self-care activities at highest level of function for planned discharge setting.  See evaluation for individualized goals.  Description: Occupational Therapy Goals:  Initiated 4/23/2024 to be met within 7-10 days.    1.  Patient will perform lower body dressing with supervision/set-up.   2.

## 2024-04-25 NOTE — DISCHARGE SUMMARY
Physician Discharge Summary     Patient ID:  Zamzam Calixto  [id number]  73 y.o.  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    Procedures for this admission: Procedure(s):  LEFT ABOVE KNEE AMPUTATION (AKA)    Discharged Condition: stable    Hospital Course: Admitted to the hospital for left above-knee amputation.  Procedure was without complication.  While hospitalized noted to have hypokalemia, low magnesium, and anemia, chronic.  I feel this is from malnutrition related to poor appetite from pain of left leg.  These were all corrected while in the hospital.  She met criteria for adult rehab unit.  She is okay today for transfer.    Consults: ID and hospitalist    Significant Diagnostic Studies: Preop ultrasound    Treatments: surgery: Left above-knee amputation    Discharge Exam: LUNG: clear to auscultation bilaterally  HEART: regular rate and rhythm, S1, S2 normal, no murmur, click, rub or gallop  ABDOMEN:  no change  Left above-knee amputation incision is intact and clean.  There is no ischemia here.  No signs of infection no redness no hematoma no swelling.    Disposition: ARU    Patient Instructions:   Current Discharge Medication List        START taking these medications    Details   HYDROcodone-acetaminophen (NORCO) 5-325 MG per tablet Take 1 tablet by mouth every 4 hours as needed for Pain for up to 7 days. Intended supply: 7 days. Take lowest dose possible to manage pain Max Daily Amount: 6 tablets  Qty: 42 tablet, Refills: 0    Comments: Reduce doses taken as pain becomes manageable  Associated Diagnoses: Occlusion of left femoral

## 2024-04-25 NOTE — PROGRESS NOTES
Patient received communion from [employer] volunteer for Zamzam Calixto, who is a 73 y.o.,female.      The  provided the following Interventions:  Continued the relationship of care and support.   Offered prayer and assurance of continued prayer on patients behalf.   Chart reviewed.    The following outcomes were achieved:  Patient received communion from [employer].     Assessment:  There are no further spiritual or Baptism issues which require Spiritual Care Services interventions at this time.     Plan:  Chaplains will continue to follow and will provide pastoral care on an as needed/requested basis.   recommends bedside caregivers page  on duty if patient shows signs of acute spiritual or emotional distress.     Juliet Low, [facility]     Spiritual Care   [phone]

## 2024-04-25 NOTE — PROGRESS NOTES
[facility] Hospital[facility]  Progress Note    Patient: Zamzam Calixto Age: 73 y.o. : 1950 MR#: [id number] SSN: [id number]  Date/Time: 2024    Subjective:     Had eggs and yanez for breakfast. BM today. No chest pain, cough, shortness of breath, n/v/d/c. Still smokes. Resides at home w/ , non smoker. On 1L oxygen at home, follows w/ Dr. Holder.     Assessment/Plan:     1. Occlusion of left fem-tib graft with resting ischemic pain: -   left fem tib bypass on 3/25/2024 followed by a thrombectomy of the bypass  S/p left AKA 24  --> per ID, Continue chronic suppression with Bactrim. Trend BMP every 3 to 4 months while on Bactrim due to medication associated toxicities.   2. Hx Left groin wound dehiscence 2023-- deep infection/ Superficial cx + Klebsiella.   3. Anemia requiring transfusion. Supplement folic acid.   4. PAD  5. Hypokalemia replete as needed.   6. hypomagnesemia. Po mag supplements x 5 days.  7. Hyponatremia improving at appropriate rate of correction  8. Acute post op blood loss anemia  9. COPD without acute exacerbation   10. Tobacco use d/o. Smoking cessation education prior to discharge.   11. DM2 - ssi. ADA diet.   12. Thin body habitus Body mass index is 19.64 kg/m².  13. Hypertension controlled on norvasc.  14. Chronic respiratory failure w/ hypoxia,on 1 L O2 at home, follows w/ Dr. Holder.   15. Dvt prophylaxis  16. Full code. Patient is medically stable and appropriate for discharge to ARU.       Additional Notes:      Case discussed with:  [x]patient  []family  [x]nursing  [x]case management  [x] discussed on IDT.   DVT Prophylaxis:  []Lovenox  []Hep SQ  []SCDs  []Coumadin   []On Heparin gtt   [x] noac    Objective:   VS: /72   Pulse 79   Temp 97.9 °F (36.6 °C) (Oral)   Resp 16   Ht 1.651 m (5' 5\")   Wt 53.5 kg (118 lb)   SpO2 93%   BMI 19.64 kg/m²    Tmax/24hrs: Temp (24hrs), Av.1 °F (36.7 °C), Min:97.8 °F (36.6 °C),  Creatinine 0.90 0.6 - 1.3 MG/DL    Bun/Cre Ratio 11 (L) 12 - 20      Est, Glom Filt Rate 67 >60 ml/min/1.73m2    Calcium 8.2 (L) 8.5 - 10.1 MG/DL   PREPARE RBC (CROSSMATCH), 1 Units    Collection Time: 04/24/24  5:15 PM   Result Value Ref Range    History Check Historical check performed    POCT Glucose    Collection Time: 04/24/24  8:22 PM   Result Value Ref Range    POC Glucose 127 (H) 70 - 110 mg/dL   Hemoglobin A1C    Collection Time: 04/25/24  2:32 AM   Result Value Ref Range    Hemoglobin A1C 7.3 (H) 4.2 - 5.6 %    Estimated Avg Glucose 163 mg/dL   Basic Metabolic Panel    Collection Time: 04/25/24  2:32 AM   Result Value Ref Range    Sodium 135 (L) 136 - 145 mmol/L    Potassium 3.2 (L) 3.5 - 5.5 mmol/L    Chloride 102 100 - 111 mmol/L    CO2 28 21 - 32 mmol/L    Anion Gap 5 3.0 - 18 mmol/L    Glucose 107 (H) 74 - 99 mg/dL    BUN 8 7.0 - 18 MG/DL    Creatinine 0.78 0.6 - 1.3 MG/DL    Bun/Cre Ratio 10 (L) 12 - 20      Est, Glom Filt Rate 79 >60 ml/min/1.73m2    Calcium 9.1 8.5 - 10.1 MG/DL   Magnesium    Collection Time: 04/25/24  2:32 AM   Result Value Ref Range    Magnesium 2.0 1.6 - 2.6 mg/dL   Hemoglobin and Hematocrit    Collection Time: 04/25/24  2:32 AM   Result Value Ref Range    Hemoglobin 9.6 (L) 12.0 - 16.0 g/dL    Hematocrit 29.1 (L) 35.0 - 45.0 %   POCT Glucose    Collection Time: 04/25/24  8:23 AM   Result Value Ref Range    POC Glucose 98 70 - 110 mg/dL     Additional Data Reviewed:      Signed By: Tomasa Carbajal MD     April 25, 2024 9:25 AM

## 2024-04-25 NOTE — PROGRESS NOTES
[facility]  (A Division of [facility])      Consultation Note      Date of Admission: 4/22/2024    Date of Note: 4/25/2024      Reason for Referral: Antibiotic management  Referring Physician: Dr. 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  S/p washout with wound vac on 2/17  S/p removal of infected bypass graft 2/23/23 in left thigh  2/23/23 surgical cx: + Kelbsiella aerogenes  2/27/23 s/p resvision left fem-pop with cadaver vein and removal of femoral blow knee graft  Leukocytosis-  14.4 on presentation- now resolved   PVD  Immunosuppressed due to Remicaide for Crohn's disease  Anemia--   Neuropathy  Electrolyte abnormalities       Plan:   Continue chronic suppression with Bactrim.  Recurrent graft infections have the patch potential for catastrophic outcomes.   -Trend BMP every 3 to 4 months while on Bactrim due to medication associated toxicities    Reviewed repeat CBC-WBCs have now normalized.  Replete Mg and potassium-recheck BMP today-replaced  Transfuse 2 units given hemoglobin is 6.5 from 4/24 CBC.  Recheck today with hemoglobin 9.5    Okay for discharge from ID perspective.  Will follow-up in 4-week      DO [facility]  [address], [address], [place] [address]  Office: [phone], Ext [phone]      Lines / Catheters:  Peripheral    Subjective:   Out of bed to chair.  Working with physical therapy this morning.  She is talkative but seems a little bit off compared to her usual.  She is worried about forgetting that her leg was  VS is a 77year old male w/ paroxysmal atrial fibrillation, embolic ROB/MCA, chronic hypotension s/p ILR (5-2019) who presents to B today under direction of Dr Harish Ha and Dr Chencho aBll for loop explant due to malfunctioning loop recorder  For further details please see device reports under the cardiology tab in Epic or Dr Giana Lopez note from 6-  Since this office visit no new changes have occurred  Plan for ILR explant with discharge home post  /64 (BP Location: Right arm)   Pulse 58   Temp 97 7 °F (36 5 °C) (Oral)   Resp 17   Ht 6' (1 829 m)   Wt 88 5 kg (195 lb)   SpO2 98%   BMI 26 45 kg/m²   VS reviewed and WNL    Genitourinary:  deferred   Extremities:   no clubbing, cyanosis; no joint effusions or swelling; muscle mass appropriate for age; left AKA dressing intact- no strikethrough   Neurologic:  No gross focal sensory abnormalities; equal muscle strength to upper and lower extremities. Speech appropirate. Cranial nerves intact   Psychiatric:   appropriate and interactive.       Labs: Results:   Chemistry Recent Labs     04/23/24  0058 04/24/24  1603 04/25/24  0232    129* 135*   K 2.8* 2.6* 3.2*    96* 102   CO2 30 27 28   BUN 11 10 8      CBC w/Diff Recent Labs     04/23/24  0058 04/24/24  1603 04/25/24  0232   WBC 14.5* 10.4  --    RBC 2.76* 2.38*  --    HGB 7.6* 6.5* 9.6*   HCT 24.0* 20.6* 29.1*   * 394  --             No results found for: \"SDES\" No components found for: \"CULT\"     Results       ** No results found for the last 336 hours. **                  Imaging:     All available imaging since presentation reviewed as per EPIC

## 2024-04-25 NOTE — PLAN OF CARE
Problem: Occupational Therapy - Adult  Goal: By Discharge: Performs self-care activities at highest level of function for planned discharge setting.  See evaluation for individualized goals.  Description: Occupational Therapy Goals:  Initiated 4/23/2024 to be met within 7-10 days.    1.  Patient will perform lower body dressing with supervision/set-up.   2.  Patient will perform bed mobility in preparation for ADLs with modified independence.  3.  Patient will perform toilet transfers with supervision/set-up, Fair balance.  4.  Patient will perform all aspects of toileting with supervision/set-up.  5.  Patient will participate in upper extremity therapeutic exercise/activities with supervision/set-up for 8-10 minutes to increase strength/endurance for ADLs.    6.  Patient will utilize energy conservation techniques during functional activities with verbal cues.      PLOF: Patient was independent with self-care and functional mobility PTA.  Outcome: Progressing    OCCUPATIONAL THERAPY TREATMENT    Patient: Zamzam Calixto (73 y.o. 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 Upon entering the room, RN present and pt agreeable to participate in OT session with spouse briefly present. Patient oriented x 4 but presents with intermittent confusion this session requiring moderate cues for redirection to task and increased cues for sequencing. Patient participated in morning ADLs and presents stand by assist for LB bathing seated, stand by assist for upper body dressing seated and contact guard assist for lower body dressing standing. Verbal cues required for placement of hands during functional transfers and  Mouth: Independent  Drink to Mouth: Independent    Upper Body Dressing Assistance  Dressing Assistance: Stand by assistance  Hospital Gown: Stand by assistance    LE Bathing:  Perineal care: Stand by assistance (seated with wash wipes)    Lower Body Dressing Assistance  Dressing Assistance: Contact guard assistance  Underwear: Contact guard assistance ( mesh)  Position: seated; standing    Pain:  Intensity Pre-treatment: 0/10   Intensity Post-treatment: 0/10    Activity Tolerance:   Activity Tolerance: Patient Tolerated treatment well  Please refer to the flowsheet for vital signs taken during this treatment.    After treatment:   [x] Patient left in no apparent distress sitting up in chair  [] Patient left in no apparent distress in bed  [x] Call bell left within reach  [x] Nursing notified  [] Caregiver present  [] Bed alarm activated    COMMUNICATION/EDUCATION:   Patient Education  Education Given To: Patient  Education Provided: Role of Therapy;Fall Prevention Strategies;Plan of Care;ADL Adaptive Strategies;Transfer Training;Energy Conservation;Precautions;Family Education;Equipment  Education Method: Demonstration;Verbal;Teach Back  Barriers to Learning: Cognition  Education Outcome: Continued education needed    Thank you for this referral.  Afia Poritllo OTR/L  Minutes: 47    Eval Complexity: Decision Making: Medium Complexity

## 2024-04-25 NOTE — PROGRESS NOTES
Awakens easily, comfortable  Vital signs are stable  Hospitalist input much appreciated  Responded to transfusion nicely  Dressing taken down left above-knee amputation.  Site is clean dry and intact.  There is no hematoma no fluid no drainage.  Staples are intact.  No appearance of ischemia    Status post left above-knee amputation, and contact with  prosthetics.  Is okay for stump 's.  Transfer to rehab as soon as ready

## 2024-04-25 NOTE — CARE COORDINATION
04/25/24 1226   /Social Work Whiteboard Notes   /Social Work Whiteboard GREEN: [id number] S/P L AKA. Dispo ARU.  No auth Required.

## 2024-04-25 NOTE — PLAN OF CARE
Problem: Safety - Adult  Goal: Free from fall injury  4/25/2024 1147 by Adelia Hernandez RN  Outcome: Adequate for Discharge  4/25/2024 1138 by Adelia Hernandez RN  Outcome: Progressing  4/25/2024 0042 by Anita Mathews RN  Outcome: Progressing     Problem: Pain  Goal: Verbalizes/displays adequate comfort level or baseline comfort level  4/25/2024 1147 by Adelia Hernandez RN  Outcome: Adequate for Discharge  4/25/2024 1138 by Adelia Hernandez RN  Outcome: Progressing  4/25/2024 0042 by Anita Mathews RN  Outcome: Progressing     Problem: Discharge Planning  Goal: Discharge to home or other facility with appropriate resources  4/25/2024 1147 by Adelia Hernandez RN  Outcome: Adequate for Discharge  4/25/2024 1138 by Adelia Hernandez RN  Outcome: Progressing  4/25/2024 0042 by Anita Mathews RN  Outcome: Progressing     Problem: Skin/Tissue Integrity  Goal: Absence of new skin breakdown  Description: 1.  Monitor for areas of redness and/or skin breakdown  2.  Assess vascular access sites hourly  3.  Every 4-6 hours minimum:  Change oxygen saturation probe site  4.  Every 4-6 hours:  If on nasal continuous positive airway pressure, respiratory therapy assess nares and determine need for appliance change or resting period.  4/25/2024 1147 by Adelia Hernandez RN  Outcome: Adequate for Discharge  4/25/2024 0042 by Anita Mathews RN  Outcome: Progressing

## 2024-04-25 NOTE — PROGRESS NOTES
Patient completed  blood transfusion of 2 units of red blood cell. Blood parameters was cross checked before administration. H and H was collected after one hour 15minutes of blood transfusion. Patient experienced no blood transfusion reaction.

## 2024-04-25 NOTE — PROGRESS NOTES
7:50 am: Bed side report was given to Adelia FLORIAN, patient was transfused 2unit of blood during the shift. Got magnesium 1000mg and 2000mg IVPB, potassium 10meq X5 was administered with no reaction.

## 2024-04-25 NOTE — PLAN OF CARE
Problem: Safety - Adult  Goal: Free from fall injury  4/25/2024 1138 by Adelia Hernandez RN  Outcome: Progressing  4/25/2024 0042 by Anita Mathews RN  Outcome: Progressing     Problem: Pain  Goal: Verbalizes/displays adequate comfort level or baseline comfort level  4/25/2024 1138 by Adelia Hernandez RN  Outcome: Progressing  4/25/2024 0042 by Anita Mathews RN  Outcome: Progressing     Problem: Discharge Planning  Goal: Discharge to home or other facility with appropriate resources  4/25/2024 1138 by Adelia Hernandez RN  Outcome: Progressing  4/25/2024 0042 by Anita Mathews RN  Outcome: Progressing     Problem: Skin/Tissue Integrity  Goal: Absence of new skin breakdown  Description: 1.  Monitor for areas of redness and/or skin breakdown  2.  Assess vascular access sites hourly  3.  Every 4-6 hours minimum:  Change oxygen saturation probe site  4.  Every 4-6 hours:  If on nasal continuous positive airway pressure, respiratory therapy assess nares and determine need for appliance change or resting period.  4/25/2024 0042 by Anita Mathews RN  Outcome: Progressing

## 2024-04-25 NOTE — PROGRESS NOTES
ARU/IPR REFERRAL CONTACT NOTE  [facility]      Thank you for the opportunity to review this patient's case for admission to [facility].    Based on our pre-admission screening:     [ x] This patient meets criteria for admission to [facility].  Plan to admit patient to ARU room 191 @430pm  Please call report to [phone] prior to transfer  Appreciate discharge summary in chart prior to transfer.      Again, Thank you for this referral. Should you have any questions please do not hesitate to call.     Sincerely,  Kelli Orona    Clinical Liaison  [facility]  [phone]

## 2024-04-26 ENCOUNTER — HOSPITAL ENCOUNTER (OUTPATIENT)
Facility: HOSPITAL | Age: 74
Setting detail: INFUSION SERIES
End: 2024-04-26

## 2024-04-26 LAB
ANION GAP SERPL CALC-SCNC: 3 MMOL/L (ref 3–18)
BASOPHILS # BLD: 0 K/UL (ref 0–0.1)
BASOPHILS NFR BLD: 0 % (ref 0–2)
BUN SERPL-MCNC: 8 MG/DL (ref 7–18)
BUN/CREAT SERPL: 9 (ref 12–20)
CALCIUM SERPL-MCNC: 10.2 MG/DL (ref 8.5–10.1)
CHLORIDE SERPL-SCNC: 107 MMOL/L (ref 100–111)
CO2 SERPL-SCNC: 28 MMOL/L (ref 21–32)
CREAT SERPL-MCNC: 0.89 MG/DL (ref 0.6–1.3)
DIFFERENTIAL METHOD BLD: ABNORMAL
EOSINOPHIL # BLD: 0.6 K/UL (ref 0–0.4)
EOSINOPHIL NFR BLD: 6 % (ref 0–5)
ERYTHROCYTE [DISTWIDTH] IN BLOOD BY AUTOMATED COUNT: 14.6 % (ref 11.6–14.5)
GLUCOSE SERPL-MCNC: 92 MG/DL (ref 74–99)
HCT VFR BLD AUTO: 34.9 % (ref 35–45)
HGB BLD-MCNC: 11.4 G/DL (ref 12–16)
IMM GRANULOCYTES # BLD AUTO: 0.1 K/UL (ref 0–0.04)
IMM GRANULOCYTES NFR BLD AUTO: 1 % (ref 0–0.5)
LYMPHOCYTES # BLD: 1.9 K/UL (ref 0.9–3.6)
LYMPHOCYTES NFR BLD: 18 % (ref 21–52)
MAGNESIUM SERPL-MCNC: 1.9 MG/DL (ref 1.6–2.6)
MCH RBC QN AUTO: 27.9 PG (ref 24–34)
MCHC RBC AUTO-ENTMCNC: 32.7 G/DL (ref 31–37)
MCV RBC AUTO: 85.3 FL (ref 78–100)
MONOCYTES # BLD: 1.1 K/UL (ref 0.05–1.2)
MONOCYTES NFR BLD: 10 % (ref 3–10)
NEUTS SEG # BLD: 7.1 K/UL (ref 1.8–8)
NEUTS SEG NFR BLD: 65 % (ref 40–73)
NRBC # BLD: 0 K/UL (ref 0–0.01)
NRBC BLD-RTO: 0 PER 100 WBC
PLATELET # BLD AUTO: 540 K/UL (ref 135–420)
PMV BLD AUTO: 9.3 FL (ref 9.2–11.8)
POTASSIUM SERPL-SCNC: 3.9 MMOL/L (ref 3.5–5.5)
RBC # BLD AUTO: 4.09 M/UL (ref 4.2–5.3)
SODIUM SERPL-SCNC: 138 MMOL/L (ref 136–145)
WBC # BLD AUTO: 10.9 K/UL (ref 4.6–13.2)

## 2024-04-26 PROCEDURE — 2700000000 HC OXYGEN THERAPY PER DAY

## 2024-04-26 PROCEDURE — 97116 GAIT TRAINING THERAPY: CPT

## 2024-04-26 PROCEDURE — 97530 THERAPEUTIC ACTIVITIES: CPT

## 2024-04-26 PROCEDURE — 83735 ASSAY OF MAGNESIUM: CPT

## 2024-04-26 PROCEDURE — 80048 BASIC METABOLIC PNL TOTAL CA: CPT

## 2024-04-26 PROCEDURE — 1180000000 HC REHAB R&B

## 2024-04-26 PROCEDURE — 97166 OT EVAL MOD COMPLEX 45 MIN: CPT

## 2024-04-26 PROCEDURE — 6370000000 HC RX 637 (ALT 250 FOR IP): Performed by: EMERGENCY MEDICINE

## 2024-04-26 PROCEDURE — 6360000002 HC RX W HCPCS: Performed by: EMERGENCY MEDICINE

## 2024-04-26 PROCEDURE — 97162 PT EVAL MOD COMPLEX 30 MIN: CPT

## 2024-04-26 PROCEDURE — 85025 COMPLETE CBC W/AUTO DIFF WBC: CPT

## 2024-04-26 PROCEDURE — 97535 SELF CARE MNGMENT TRAINING: CPT

## 2024-04-26 PROCEDURE — 94761 N-INVAS EAR/PLS OXIMETRY MLT: CPT

## 2024-04-26 PROCEDURE — 36415 COLL VENOUS BLD VENIPUNCTURE: CPT

## 2024-04-26 PROCEDURE — 97542 WHEELCHAIR MNGMENT TRAINING: CPT

## 2024-04-26 PROCEDURE — 99223 1ST HOSP IP/OBS HIGH 75: CPT | Performed by: EMERGENCY MEDICINE

## 2024-04-26 RX ADMIN — ARFORMOTEROL TARTRATE 15 MCG: 15 SOLUTION RESPIRATORY (INHALATION) at 20:38

## 2024-04-26 RX ADMIN — AMLODIPINE BESYLATE 10 MG: 10 TABLET ORAL at 10:04

## 2024-04-26 RX ADMIN — APIXABAN 5 MG: 5 TABLET, FILM COATED ORAL at 20:38

## 2024-04-26 RX ADMIN — CINACALCET HYDROCHLORIDE 30 MG: 60 TABLET, FILM COATED ORAL at 20:38

## 2024-04-26 RX ADMIN — SULFAMETHOXAZOLE AND TRIMETHOPRIM 1 TABLET: 400; 80 TABLET ORAL at 10:04

## 2024-04-26 RX ADMIN — IPRATROPIUM BROMIDE 0.5 MG: 0.5 SOLUTION RESPIRATORY (INHALATION) at 22:42

## 2024-04-26 RX ADMIN — IPRATROPIUM BROMIDE 0.5 MG: 0.5 SOLUTION RESPIRATORY (INHALATION) at 16:00

## 2024-04-26 RX ADMIN — ARFORMOTEROL TARTRATE 15 MCG: 15 SOLUTION RESPIRATORY (INHALATION) at 10:04

## 2024-04-26 RX ADMIN — APIXABAN 5 MG: 5 TABLET, FILM COATED ORAL at 10:04

## 2024-04-26 RX ADMIN — PREGABALIN 100 MG: 50 CAPSULE ORAL at 10:04

## 2024-04-26 RX ADMIN — DULOXETINE HYDROCHLORIDE 60 MG: 30 CAPSULE, DELAYED RELEASE ORAL at 10:06

## 2024-04-26 RX ADMIN — IPRATROPIUM BROMIDE 0.5 MG: 0.5 SOLUTION RESPIRATORY (INHALATION) at 10:04

## 2024-04-26 RX ADMIN — BUDESONIDE 250 MCG: 0.25 INHALANT RESPIRATORY (INHALATION) at 10:04

## 2024-04-26 RX ADMIN — CINACALCET HYDROCHLORIDE 30 MG: 60 TABLET, FILM COATED ORAL at 10:04

## 2024-04-26 RX ADMIN — PREGABALIN 100 MG: 50 CAPSULE ORAL at 20:38

## 2024-04-26 RX ADMIN — BUDESONIDE 250 MCG: 0.25 INHALANT RESPIRATORY (INHALATION) at 20:38

## 2024-04-26 ASSESSMENT — PAIN SCALES - GENERAL
PAINLEVEL_OUTOF10: 0

## 2024-04-26 NOTE — PROGRESS NOTES
Zamzam Calixto is a 73 y.o.  female admitted on 4/25/2024 from [address] [place] [address]. Report received from CHIQUI Yuan. Transportation was provided by [facility], and the patient was transferred to her room via Stretcher. Patient was assisted to bed with assist of 1. The patient was oriented to her room. Fall risk precautions were discussed with the patient; she was instructed to call for help prior to getting up. The following fall risk precautions were initiated: bed/ chair alarms, door signage, yellow bracelet and socks. Four eyes nurse skin assessment was performed by Patricia FLORIAN and CHIQUI Jennings. Skin problems noted: No.    Dick Chaparro RN

## 2024-04-26 NOTE — PLAN OF CARE
[facility] PHYSICAL REHABILITATION  [address] [place] [address]     INPATIENT REHABILITATION  OVERALL PLAN OF CARE    Name: Zamzam Calixto CSN: [id number]   Age: 73 y.o.  MRN: [id number]   Sex: female Admit Date: 4/25/2024     Primary Rehabilitation Diagnosis   Impaired mobility and ADLs in the setting of a left above-knee amputation     Other Co-Morbid Conditions managed in Rehab      -Peripheral arterial disease  -History of recurrent graft infection  -Ongoing tobacco use  -Chronic hypoxic respiratory failure on oxygen 1 L via nasal cannula  -COPD without acute exacerbation  -Anemia likely postoperative  -Hypertension  -Type 2 diabetes  -Hypokalemia  -Neuropathy  -History of Crohn's disease-has been on Remicade    MEDICAL PLAN:     - Impaired mobility and ADLs in the setting of a left above-knee amputation  Physical therapy and Occupational Therapy  Judicious pain control  Fall precautions     -Peripheral arterial disease  On Eliquis     -History of recurrent graft infection  Chronic suppressive Bactrim as per ID  Monitor labs intermittently while on Bactrim     -Ongoing tobacco use  I counseled patient regarding smoking cessation     -Chronic hypoxic respiratory failure on oxygen 1 L via nasal cannula  Continue oxygen 1 L via nasal cannula     -COPD without acute exacerbation  Continue Trelegy, albuterol as needed     -Anemia likely postoperative  Monitor blood counts intermittently     -Hypertension  Continue amlodipine 10 mg daily  Monitor blood pressure     -Type 2 diabetes  Continue sliding scale insulin  Monitor sugars ACHS     -Hypokalemia  Monitor potassium and electrolytes intermittently     -Neuropathy  Continue Cymbalta 60 mg daily  Continue Lyrica 100 mg twice daily     I discussed the care plan with the patient       Recent Labs     04/24/24  1603 04/25/24  0232 04/26/24  0658   WBC 10.4  --  10.9   HGB 6.5* 9.6* 11.4*   HCT 20.6* 29.1* 34.9*     --  540*     Recent Labs      activities, gait training (progressive ambulation program), wheelchair management and functional transfer training. Patient would also benefit from concurrent and group therapy sessions to promote increased participation in skilled therapy interventions and to provide opportunities for increased social interaction.          II. Occupational Therapy              A. Intensity: 1-2 hour per day              B. Frequency: 5 times per week              C. Duration: 2 weeks              D. Short Term Goals:    Initiated (4/26/2024) and to be accomplished within 7 day(s), (to be reassessed by 5/3/2024)  1. Pt will perform self-feeding with set-up.   2. Pt will perform grooming with SBA.  3. Pt will perform UB bathing with supv.  4. Pt will perform LB bathing with CGA using AE as needed.  5. Pt will perform tub/shower transfer with Min A using least restrictive assistive device.  6. Pt will perform UB dressing with supv.  7. Pt will perform LB dressing with CGA/ Min A using AE as needed.  8. Pt will perform toileting task with Min A.  9. Pt will perform toilet transfer with CGA/ Min A using least restrictive assistive device.               E. Long Term Goals:    Initiated (4/26/2024) and to be accomplished within 3 week(s)  1. Pt will perform self-feeding with Mod I.  2. Pt will perform grooming with set-up.  3. Pt will perform UB bathing with set-up.  4. Pt will perform LB bathing with supv using AE as needed.  5. Pt will perform tub/shower transfer with CGA using least restrictive assistive device.   6. Pt will perform UB dressing with set-up.  7. Pt will perform LB dressing with supv using AE as needed.  8. Pt will perform toileting task with SBA.  9. Pt will perform toilet transfer with SBA using least restrictive assistive device.    F. Interventions:                            Pt would benefit from skilled occupational therapy in order to improve independent functional mobility/ADLs,/IADLs within the home.

## 2024-04-26 NOTE — H&P
history of any headaches or visual changes.      Past Medical History:      Diagnosis Date    Arthritis     CAP (community acquired pneumonia) 06/27/2017    Chronic lung disease     Claudication (HCC)     left leg    Crohn's disease (HCC)     Crohn's disease (HCC)     GERD (gastroesophageal reflux disease)     HTN (hypertension)     Ill-defined condition     Uses O2 at night.    Nausea & vomiting     Neuropathy     Other and unspecified symptoms and signs involving general sensations and perceptions     PVD    Other ill-defined conditions(799.89)     Crohn's    Parathyroid tumor     sees Endo Dr Santillan    Peripheral neuropathy     Pulmonary emphysema (HCC)     PVD (peripheral vascular disease) (HCC)     right leg    Sepsis (HCC) 06/27/2017    Vitamin B12 deficiency        Past Surgical History:      Procedure Laterality Date    APPENDECTOMY      BREAST LUMPECTOMY Left     breast left- precancerous    BUNIONECTOMY      left eye    BYPASS GRAFT OTHR,AXILL-FEM Right 04/19/2013    BYPASS GRAFT OTHR,FEM-POP Left 05/2013    CATARACT REMOVAL      bilateral    CHOLECYSTECTOMY      COLONOSCOPY N/A 09/11/2019    DIAGNOSTIC COLONOSCOPY performed by Nba Schroeder MD at [facility] ENDOSCOPY    FEMORAL BYPASS Left 2/23/2023    REMOVAL OF INFECTED BYPASS GRAFT LEFT LEG performed by Mich Ashley MD at [facility] CARDIAC SURGERY    FEMORAL BYPASS Left 2/27/2023    REVISION LEFT FEMORAL POPLITEAL BYPASS WITH CADAVER VEIN performed by Mich Ashley MD at [facility] CARDIAC SURGERY    FEMORAL BYPASS Left 2/20/2024    THROMBECTOMY LEFT FEMORAL ARTERY, FEMORAL ARTERY BYPASS WITH PATCH LEFT LEG performed by Mich Ashley MD at [facility] CARDIAC SURGERY    FEMORAL BYPASS Left 2/22/2024    OPEN EXCISION LEFT AXILLARY ARTERY, LEFT LEG ANGIOGRAM, BALLOON ANGIOPLASTY, POPLITEAL ARTERY AND TPA ADMINISTRATION performed by Mich Ashley MD at [facility] CARDIAC SURGERY    FEMORAL BYPASS Left 3/29/2024    ANGIOGRAM LEFT LEG WITH REVISION BYPASS THROMBECTOMY  or easy bruising or bleeding.   NEUROLOGIC: No headache, seizures, numbness, tingling.  Patient has some generalized weakness          Objective:     Vital Signs:  Patient Vitals for the past 24 hrs:   BP Temp Temp src Pulse Resp SpO2 Height Weight   04/26/24 1600 (!) 152/66 98.1 °F (36.7 °C) Oral 76 18 97 % -- --   04/26/24 0950 127/61 -- -- 84 -- -- -- --   04/26/24 0850 (!) 153/69 98.1 °F (36.7 °C) Oral 81 18 94 % -- --   04/25/24 2017 137/64 98 °F (36.7 °C) Oral 83 18 93 % -- --   04/25/24 1800 127/63 98.6 °F (37 °C) Oral 81 18 94 % 1.651 m (5' 5\") 54 kg (119 lb)        Body mass index is 19.8 kg/m².    Physical Examination:   General:    Sitting in a chair in no acute distress.  HEENT:  Pupils equal.  Sclera anicteric.  Conjunctiva pink.  Mucous membranes                           Moist, no ear or nasal discharge  Neck:  Supple.  Trachea midline.  No accessory muscle use.  No thyromegaly.                           No jugular venous distention, no carotid bruit  CV:                  Regular rate and rhythm. S1S2+  Lungs:   Clear to auscultation bilaterally.  No Wheezing or Rhonchi. No rales.  Abdomen:   Soft, non-tender. Not distended.  Bowel sounds normal.   Extremities: No cyanosis.  No edema. Pulses 1+ b/l.  Left AKA noted  Neurologic: Alert and oriented X 3.  Follows commands, responds appropriately. No focal neurological deficit was noted  Skin:                Warm and dry.  No rashes.           Current Medications:  Current Facility-Administered Medications   Medication Dose Route Frequency    amLODIPine (NORVASC) tablet 10 mg  10 mg Oral Daily    apixaban (ELIQUIS) tablet 5 mg  5 mg Oral BID    cetirizine (ZYRTEC) tablet 10 mg  10 mg Oral Daily    cinacalcet (SENSIPAR) tablet 30 mg  30 mg Oral BID    DULoxetine (CYMBALTA) extended release capsule 60 mg  60 mg Oral Daily    HYDROcodone-acetaminophen (NORCO) 5-325 MG per tablet 1 tablet  1 tablet Oral Q4H PRN    naloxone (NARCAN) injection 0.4 mg  0.4 mg  cognition, balance, activity tolerance, independence in functional transfers, range of motion, safety awareness, independence in ADL  and independence in home management skills.  - Specialized 24 hour rehabilitation nursing care for bowel and bladder retraining, disease management, pain management, pressure ulcer prevention and management per policy, education on pressure relief techniques, embolism prevention, nutrition management, hydration management, transfer training and   medication distribution.    - Nutrition and Dietary services will be obtained for assessment of adequate calorie needs, hydration and calorie counts as appropriate.  - Social work services for patient and family counseling and safe discharge planning.       MEDICAL PLAN:    - Impaired mobility and ADLs in the setting of a left above-knee amputation  Physical therapy and Occupational Therapy  Judicious pain control  Fall precautions    -Peripheral arterial disease  On Eliquis    -History of recurrent graft infection  Chronic suppressive Bactrim as per ID  Monitor labs intermittently while on Bactrim    -Ongoing tobacco use  I counseled patient regarding smoking cessation    -Chronic hypoxic respiratory failure on oxygen 1 L via nasal cannula  Continue oxygen 1 L via nasal cannula    -COPD without acute exacerbation  Continue Trelegy, albuterol as needed    -Anemia likely postoperative  Monitor blood counts intermittently    -Hypertension  Continue amlodipine 10 mg daily  Monitor blood pressure    -Type 2 diabetes  Continue sliding scale insulin  Monitor sugars ACHS    -Hypokalemia  Monitor potassium and electrolytes intermittently    -Neuropathy  Continue Cymbalta 60 mg daily  Continue Lyrica 100 mg twice daily    I discussed the care plan with the patient and she verbalized understanding and agreed.  Patient wishes to be a full code      Body mass index is 19.8 kg/m².     The domains of functional impairment present in this patient which will  Unintended errors may occur.      Signed:    Michael Gaming MD    April 26, 2024

## 2024-04-26 NOTE — PLAN OF CARE
Problem: Physical Therapy - Adult  Goal: By Discharge: Performs mobility at highest level of function for planned discharge setting.  See evaluation for individualized goals.  Description: Physical Therapy Short Term Goals  Initiated 4/26/2024 and to be accomplished within 7 day(s) [5/3/24]  1.  Patient will supine to sit, sit to supine, roll right, and roll left  in bed with modified independence.    2.  Patient will transfer from bed to chair and chair to bed with contact guard assist using the least restrictive device.  3.  Patient will perform sit to stand with contact guard assist  4.  Patient will ambulate with contact guard assist for 25 feet with the least restrictive device.   5.  Patient will propel w/c 150 ft on level surface with supervision for mobility on unit.    Physical Therapy Long Term Goals  Initiated 4/26/2024 and to be accomplished within 21 day(s) [5/17/24]  1.  Patient will supine to sit, sit to supine, roll right, and roll left in bed with independence.    2.  Patient will transfer from bed to chair and chair to bed with modified independence using the least restrictive device.  3.  Patient will perform sit to stand with modified independence.  4.  Patient will ambulate with supervision/set-up for 50 feet with the least restrictive device.   5.  Patient will ascend/descend 5 stairs with at least one handrail(s) with minimal assistance/contact guard assist.   Outcome: Progressing     PHYSICAL THERAPY EVALUATION    Patient: Zamzam Calixto (73 y.o. female)  Date: 4/26/2024  Diagnosis: S/P AKA (above knee amputation) unilateral, left ([facility]) [Z89.612]   Precautions: Fall Risk;General Precautions (pt used 1 L of O2 at home previously)                Chart, physical therapy assessment, plan of care and goals were reviewed.    Time In: 930  Time Out: 1100  Patient seen for: evaluation, gait training, transfers training, balance training, pt education, w/c mobility training      Time In:  (prior to current hospitalization).)  Active : Yes  Mode of Transportation: SUV  Occupation: Retired       Barriers to Learning/Limitations:  cognition  Compensate with: visual, verbal, tactile, kinesthetic cues/model         Outcome Measures: IRF-DENISE     MMT Initial Assessment   Right Lower Extremity Left Lower Extremity   Hip Flexion R Hip Flexion: 4/5 L Hip Flexion: 3-/5   Knee Extension R Knee Extension: 4-/5  (L AKA)   Knee Flexion R Knee Flexion: 4/5  (L AKA)   Ankle Dorsiflexion R Ankle Dorsiflexion: 4/5  (L AKA)   0/5 No palpable muscle contraction  1/5 Palpable muscle contraction, no joint movement  2-/5 Less than full range of motion in gravity eliminated position  2/5 Able to complete full range of motion in gravity eliminated position  2+/5 Able to initiate movement against gravity  3-/5 More than half but not full range of motion against gravity  3/5 Able to complete full range of motion against gravity  3+/5 Completes full range of motion against gravity with minimal resistance  4-/5 Completes full range of motion against gravity with minimal-moderate resistance  4/5 Completes full range of motion against gravity with moderate resistance  4+/5 Completes full range of motion against gravity with moderate-maximum resistance  5/5 Completes full range of motion against gravity with maximum resistance        GROSS ASSESSMENT Initial Assessment 4/26/2024   AROM  RLE   LLE   WFL  Exceptions (generally decreased due to pain limitations)   Strength  RLE  LLE   Exception  Exception   Coordination  WFL's R LE and L residual limb   Tone  RLE  LLE   Normotonic  Normotonic   Sensation Impaired (L AKA (residual limb) phantom limb sensation; phantom limb pain; sensitive to touch/pressure)   PROM  RLE  LLE   WFL  Exceptions (generally decreased due to pain limitations)       POSTURE Initial Assessment 4/26/2024   Posture (WDL)  exception   Posture Assessment Rounded shoulders, forward head       BALANCE Initial

## 2024-04-26 NOTE — PROGRESS NOTES
Wound Prevention Checklist    Patient: Zamzam Calixto (73 y.o. female)  Date: 4/26/2024  Diagnosis: S/P AKA (above knee amputation) unilateral, left (HCC) [Z89.612] S/P AKA (above knee amputation) unilateral, left (HCC)    []  Heel prevention boots placed on patient    [x]  Patient turned q2h during shift    []  Lift team ordered    [x]  Patient on [place] bed/Specialty bed    [x]  Each Wound is documented during shift (Stage, Color, drainage, odor, measurements, and dressings)    [x]  Dual skin check done with MARIKA Kahn RN

## 2024-04-26 NOTE — PLAN OF CARE
Problem: Occupational Therapy - Adult  Goal: By Discharge: Performs self-care activities at highest level of function for planned discharge setting.  See evaluation for individualized goals.  Description: Occupational Therapy Goals   Long Term Goals  Initiated (2024) and to be accomplished within 3 week(s)  1. Pt will perform self-feeding with Mod I.  2. Pt will perform grooming with set-up.  3. Pt will perform UB bathing with set-up.  4. Pt will perform LB bathing with supv using AE as needed.  5. Pt will perform tub/shower transfer with CGA using least restrictive assistive device.   6. Pt will perform UB dressing with set-up.  7. Pt will perform LB dressing with supv using AE as needed.  8. Pt will perform toileting task with SBA.  9. Pt will perform toilet transfer with SBA using least restrictive assistive device.    Short Term Goals   Initiated (2024) and to be accomplished within 7 day(s), (to be reassessed by 5/3/2024)  1. Pt will perform self-feeding with set-up.   2. Pt will perform grooming with SBA.  3. Pt will perform UB bathing with supv.  4. Pt will perform LB bathing with CGA using AE as needed.  5. Pt will perform tub/shower transfer with Min A using least restrictive assistive device.  6. Pt will perform UB dressing with supv.  7. Pt will perform LB dressing with CGA/ Min A using AE as needed.  8. Pt will perform toileting task with Min A.  9. Pt will perform toilet transfer with CGA/ Min A using least restrictive assistive device.    Outcome: Progressing   OCCUPATIONAL THERAPY EVALUATION    Patient: Zamzam Calixto 73 y.o.  Date: 2024  Primary Diagnosis: S/P AKA (above knee amputation) unilateral, left (HCC) [Z89.612]    Patient identified with name and : yes    Past Medical History:   Past Medical History:   Diagnosis Date    Arthritis     CAP (community acquired pneumonia) 2017    Chronic lung disease     Claudication (HCC)     left leg    Crohn's disease (HCC)      AKA (residual limb) Min A with increased time. Pt demonstrating ability to pull pants up over hips and over buttocks in right/ left side lying position. (Mod A) for managing L AKA limb sock.     TOILETING Initial Assessment   Functional Level Moderate assistance   Clinical factors Self-care toileting (Mod A) for donning brief and for clothing management; (CGA/ Min A) for hygiene. Task simulated as pt did not require toileting at this time.     TOILET TRANSFER Initial Assessment   Transfer Technique Stand pivot   Functional Level Minimal assistance   Equipment Standard bedside commode   Toilet Transfer Comments Stand pivot transfer (Min A) using FWW; gait belt (EOB <-> 3-in-1 commode) at bedside. Verbal cues for hand placement, positioning, and for safe handling of AD (FWW).     Activity Tolerance:   Patient Tolerated treatment well       EDUCATION:   Education Given To: Patient  Education Provided: Role of Therapy;Plan of Care;Safety;ADL Function;Energy Conservation;Equipment;Fall Prevention Strategies  Education Provided Comments: Pt edu/ instructed on role of occupational therapy on inpatient rehab unit, therapy scheduling on ARU, and fall prevention strategies (use of call bell to call staff for assistance and not to get up without assist; use of non-skid foot wear).  Education Method: Demonstration;Verbal  Barriers to Learning:  (slowing processing)  Education Outcome: Verbalized understanding;Demonstrated understanding;Continued education needed    ASSESSMENT :  Based on the objective data described above, the patient presents with decreased (I) with ADL's, decreased functional strength/ endurance, impaired balance/ coordination, L AKA (above knee amputation), phantom limb pain/ phantom limb sensation, decreased safety awareness, slow processing, and impaired functional transfers/ mobility which negatively impact pt performance, independence, and safety with ADL/IADL's and functional mobility for return to  regarding patient safety needs and functional performance.    Please refer to the flow sheet for vital signs taken during this treatment.  After treatment:   [] Patient left in no apparent distress sitting up in chair  [x] Patient left in no apparent distress in bed with needs met  [] Patient handoff to SLP/PT  [x] Call bell left within reach  [x] Nursing notified  [] Caregiver present  [x] Bed alarm activated    Order received from MD for occupational therapy services and chart reviewed.  Pt to be seen 5 times per week for 3 hours of total therapy per day for 3 weeks.  Thank you for the referral.    IRF-DENISE Completed: yes  Entered Differentiated Treatment minutes: yes    Thank you for this referral.  Jana Silveira, OT  4/26/2024

## 2024-04-26 NOTE — PLAN OF CARE
Problem: Safety - Adult  Goal: Free from fall injury  Outcome: Progressing  Flowsheets (Taken 4/25/2024 2336)  Free From Fall Injury: Instruct family/caregiver on patient safety     Problem: ABCDS Injury Assessment  Goal: Absence of physical injury  Outcome: Progressing  Flowsheets (Taken 4/25/2024 2336)  Absence of Physical Injury: Implement safety measures based on patient assessment     Problem: Skin/Tissue Integrity  Goal: Absence of new skin breakdown  Description: 1.  Monitor for areas of redness and/or skin breakdown  2.  Assess vascular access sites hourly  3.  Every 4-6 hours minimum:  Change oxygen saturation probe site  4.  Every 4-6 hours:  If on nasal continuous positive airway pressure, respiratory therapy assess nares and determine need for appliance change or resting period.  Outcome: Progressing

## 2024-04-27 PROCEDURE — 2700000000 HC OXYGEN THERAPY PER DAY

## 2024-04-27 PROCEDURE — 6360000002 HC RX W HCPCS: Performed by: EMERGENCY MEDICINE

## 2024-04-27 PROCEDURE — 97535 SELF CARE MNGMENT TRAINING: CPT

## 2024-04-27 PROCEDURE — 6370000000 HC RX 637 (ALT 250 FOR IP): Performed by: EMERGENCY MEDICINE

## 2024-04-27 PROCEDURE — 94761 N-INVAS EAR/PLS OXIMETRY MLT: CPT

## 2024-04-27 PROCEDURE — 97110 THERAPEUTIC EXERCISES: CPT

## 2024-04-27 PROCEDURE — 1180000000 HC REHAB R&B

## 2024-04-27 RX ADMIN — CINACALCET HYDROCHLORIDE 30 MG: 60 TABLET, FILM COATED ORAL at 09:00

## 2024-04-27 RX ADMIN — PREGABALIN 100 MG: 50 CAPSULE ORAL at 21:30

## 2024-04-27 RX ADMIN — APIXABAN 5 MG: 5 TABLET, FILM COATED ORAL at 09:00

## 2024-04-27 RX ADMIN — AMLODIPINE BESYLATE 10 MG: 10 TABLET ORAL at 09:00

## 2024-04-27 RX ADMIN — BUDESONIDE 250 MCG: 0.25 INHALANT RESPIRATORY (INHALATION) at 21:28

## 2024-04-27 RX ADMIN — CETIRIZINE HYDROCHLORIDE 10 MG: 10 TABLET, FILM COATED ORAL at 09:00

## 2024-04-27 RX ADMIN — PREGABALIN 100 MG: 50 CAPSULE ORAL at 09:00

## 2024-04-27 RX ADMIN — DULOXETINE HYDROCHLORIDE 60 MG: 30 CAPSULE, DELAYED RELEASE ORAL at 09:00

## 2024-04-27 RX ADMIN — SULFAMETHOXAZOLE AND TRIMETHOPRIM 1 TABLET: 400; 80 TABLET ORAL at 09:00

## 2024-04-27 RX ADMIN — ACETAMINOPHEN 650 MG: 325 TABLET ORAL at 15:36

## 2024-04-27 RX ADMIN — CINACALCET HYDROCHLORIDE 30 MG: 60 TABLET, FILM COATED ORAL at 21:28

## 2024-04-27 RX ADMIN — APIXABAN 5 MG: 5 TABLET, FILM COATED ORAL at 21:28

## 2024-04-27 RX ADMIN — ARFORMOTEROL TARTRATE 15 MCG: 15 SOLUTION RESPIRATORY (INHALATION) at 21:28

## 2024-04-27 ASSESSMENT — PAIN DESCRIPTION - ORIENTATION
ORIENTATION: RIGHT
ORIENTATION: RIGHT

## 2024-04-27 ASSESSMENT — PAIN DESCRIPTION - DESCRIPTORS
DESCRIPTORS: DULL
DESCRIPTORS: POUNDING

## 2024-04-27 ASSESSMENT — PAIN SCALES - GENERAL
PAINLEVEL_OUTOF10: 0
PAINLEVEL_OUTOF10: 7
PAINLEVEL_OUTOF10: 2

## 2024-04-27 ASSESSMENT — PAIN DESCRIPTION - LOCATION
LOCATION: FOOT
LOCATION: FOOT

## 2024-04-27 NOTE — PLAN OF CARE
Problem: Occupational Therapy - Adult  Goal: By Discharge: Performs self-care activities at highest level of function for planned discharge setting.  See evaluation for individualized goals.  Description: Occupational Therapy Goals   Long Term Goals  Initiated (2024) and to be accomplished within 3 week(s)  1. Pt will perform self-feeding with Mod I.  2. Pt will perform grooming with set-up.  3. Pt will perform UB bathing with set-up.  4. Pt will perform LB bathing with supv using AE as needed.  5. Pt will perform tub/shower transfer with CGA using least restrictive assistive device.   6. Pt will perform UB dressing with set-up.  7. Pt will perform LB dressing with supv using AE as needed.  8. Pt will perform toileting task with SBA.  9. Pt will perform toilet transfer with SBA using least restrictive assistive device.    Short Term Goals   Initiated (2024) and to be accomplished within 7 day(s), (to be reassessed by 5/3/2024)  1. Pt will perform self-feeding with set-up.   2. Pt will perform grooming with SBA.  3. Pt will perform UB bathing with supv.  4. Pt will perform LB bathing with CGA using AE as needed.  5. Pt will perform tub/shower transfer with Min A using least restrictive assistive device.  6. Pt will perform UB dressing with supv.  7. Pt will perform LB dressing with CGA/ Min A using AE as needed.  8. Pt will perform toileting task with Min A.  9. Pt will perform toilet transfer with CGA/ Min A using least restrictive assistive device.    Outcome: Progressing   Occupational Therapy TREATMENT    Patient: Zamzam Calixto   73 y.o.    Patient identified with name and : Yes    Date: 2024    First Tx Session  Time In:  0830  Time Out:  1000    Diagnosis: S/P AKA (above knee amputation) unilateral, left (HCC) [Z89.612]   Precautions: Fall Restrictions/Precautions: Fall Risk, General Precautions (pt used 1 L of O2 at home previously)                           Chart, occupational  therapy assessment, plan of care, and goals were reviewed.     Pain:  Pt reports 0/10 pain or discomfort prior to treatment.    Pt reports 0/10 pain or discomfort post treatment.   Intervention Provided: N/A      SUBJECTIVE:   Patient stated “I have a dog\".    OBJECTIVE DATA SUMMARY:     COGNITION/PERCEPTION Daily Assessment   Orientation status  Ox4   Cognitive status          THERAPEUTIC EXERCISE Daily Assessment    To increase strength  and endurance for ADLs:  Pt propelled w/c from room<>gym with S for safety.  Pt participated in UB bike task up to 10 minutes at min-mod resistance.  .   Instruct pt. in home exercise program: UB HEP (chess press, chest pulls, butterfly wings, front raise,upright rows) with 2lb weight in hands and chair raises seated un supported in w/c.        TOILET TRANSFER/TOILETING  Daily Assessment   Transfer Technique Stand step (Pt performed stand step/hop from EOB to bedside commode) using RW with CGA.   Pt performed toileting task with Juan-Moda requiring asst with clothing management/balance  in standing using RW.  No hygiene performed with setup.     Level of Assistance     Equipment Standard bedside commode   Toilet Transfer Comments         WHEELCHAIR/BED TRANSFER INDEPENDENCE Daily Assessment   Transfer Technique  Bedside commode to w/c with CGA using RW and hopping method.    Level of Assistance     Equipment     Comments       Activity Tolerance:    Fair          EDUCATION:        ASSESSMENT:  Pt tolerated session well increasing strength and balance for ADLs.   Progression toward goals:  [x]          Improving appropriately and progressing toward goals  []          Improving slowly and progressing toward goals  []          Not making progress toward goals and plan of care will be adjusted       PLAN:  Patient continues to benefit from skilled intervention to address the above impairments.  Continue treatment per established plan of care.  Discharge Recommendations:  Home

## 2024-04-28 VITALS
HEART RATE: 77 BPM | DIASTOLIC BLOOD PRESSURE: 68 MMHG | TEMPERATURE: 98.1 F | HEIGHT: 65 IN | WEIGHT: 119 LBS | RESPIRATION RATE: 18 BRPM | OXYGEN SATURATION: 96 % | SYSTOLIC BLOOD PRESSURE: 140 MMHG | BODY MASS INDEX: 19.83 KG/M2

## 2024-04-28 PROCEDURE — 97530 THERAPEUTIC ACTIVITIES: CPT

## 2024-04-28 PROCEDURE — 6360000002 HC RX W HCPCS: Performed by: EMERGENCY MEDICINE

## 2024-04-28 PROCEDURE — 1180000000 HC REHAB R&B

## 2024-04-28 PROCEDURE — 6370000000 HC RX 637 (ALT 250 FOR IP): Performed by: EMERGENCY MEDICINE

## 2024-04-28 PROCEDURE — 97110 THERAPEUTIC EXERCISES: CPT

## 2024-04-28 PROCEDURE — 97112 NEUROMUSCULAR REEDUCATION: CPT

## 2024-04-28 PROCEDURE — 97116 GAIT TRAINING THERAPY: CPT

## 2024-04-28 PROCEDURE — 97542 WHEELCHAIR MNGMENT TRAINING: CPT

## 2024-04-28 RX ADMIN — APIXABAN 5 MG: 5 TABLET, FILM COATED ORAL at 20:50

## 2024-04-28 RX ADMIN — ARFORMOTEROL TARTRATE 15 MCG: 15 SOLUTION RESPIRATORY (INHALATION) at 20:50

## 2024-04-28 RX ADMIN — BUDESONIDE 250 MCG: 0.25 INHALANT RESPIRATORY (INHALATION) at 20:50

## 2024-04-28 RX ADMIN — ARFORMOTEROL TARTRATE 15 MCG: 15 SOLUTION RESPIRATORY (INHALATION) at 09:45

## 2024-04-28 RX ADMIN — DULOXETINE HYDROCHLORIDE 60 MG: 30 CAPSULE, DELAYED RELEASE ORAL at 09:45

## 2024-04-28 RX ADMIN — CETIRIZINE HYDROCHLORIDE 10 MG: 10 TABLET, FILM COATED ORAL at 09:45

## 2024-04-28 RX ADMIN — SULFAMETHOXAZOLE AND TRIMETHOPRIM 1 TABLET: 400; 80 TABLET ORAL at 09:45

## 2024-04-28 RX ADMIN — CINACALCET HYDROCHLORIDE 30 MG: 60 TABLET, FILM COATED ORAL at 20:50

## 2024-04-28 RX ADMIN — CINACALCET HYDROCHLORIDE 30 MG: 60 TABLET, FILM COATED ORAL at 09:45

## 2024-04-28 RX ADMIN — PREGABALIN 100 MG: 50 CAPSULE ORAL at 20:50

## 2024-04-28 RX ADMIN — PREGABALIN 100 MG: 50 CAPSULE ORAL at 09:45

## 2024-04-28 RX ADMIN — APIXABAN 5 MG: 5 TABLET, FILM COATED ORAL at 09:45

## 2024-04-28 RX ADMIN — BUDESONIDE 250 MCG: 0.25 INHALANT RESPIRATORY (INHALATION) at 09:46

## 2024-04-28 RX ADMIN — IPRATROPIUM BROMIDE 0.5 MG: 0.5 SOLUTION RESPIRATORY (INHALATION) at 09:46

## 2024-04-28 RX ADMIN — IPRATROPIUM BROMIDE 0.5 MG: 0.5 SOLUTION RESPIRATORY (INHALATION) at 16:57

## 2024-04-28 RX ADMIN — IPRATROPIUM BROMIDE 0.5 MG: 0.5 SOLUTION RESPIRATORY (INHALATION) at 00:09

## 2024-04-28 RX ADMIN — AMLODIPINE BESYLATE 10 MG: 10 TABLET ORAL at 09:44

## 2024-04-28 ASSESSMENT — PAIN SCALES - GENERAL
PAINLEVEL_OUTOF10: 0

## 2024-04-28 NOTE — PLAN OF CARE
Problem: Physical Therapy - Adult  Goal: By Discharge: Performs mobility at highest level of function for planned discharge setting.  See evaluation for individualized goals.  Description: Physical Therapy Short Term Goals  Initiated 4/26/2024 and to be accomplished within 7 day(s) [5/3/24]  1.  Patient will supine to sit, sit to supine, roll right, and roll left  in bed with modified independence.    2.  Patient will transfer from bed to chair and chair to bed with contact guard assist using the least restrictive device.  3.  Patient will perform sit to stand with contact guard assist  4.  Patient will ambulate with contact guard assist for 25 feet with the least restrictive device.   5.  Patient will propel w/c 150 ft on level surface with supervision for mobility on unit.    Physical Therapy Long Term Goals  Initiated 4/26/2024 and to be accomplished within 21 day(s) [5/17/24]  1.  Patient will supine to sit, sit to supine, roll right, and roll left in bed with independence.    2.  Patient will transfer from bed to chair and chair to bed with modified independence using the least restrictive device.  3.  Patient will perform sit to stand with modified independence.  4.  Patient will ambulate with supervision/set-up for 50 feet with the least restrictive device.   5.  Patient will ascend/descend 5 stairs with at least one handrail(s) with minimal assistance/contact guard assist.   Outcome: Progressing     PHYSICAL THERAPY TREATMENT    Patient: Zamzam Calixto (73 y.o. female)  Date: 4/28/2024  Diagnosis: S/P AKA (above knee amputation) unilateral, left ([facility]) [Z89.612]   Precautions: Fall precautions  Chart, physical therapy assessment, plan of care and goals were reviewed.    Time in: 0705  Time out : 0835    Patient seen for: ther ex, ther act, gait training, transfer training, sequencing training, wheelchair mobility training, balance training    Pain:  Pt pain was reported as 0/10 pre-treatment.  Pt pain

## 2024-04-29 PROCEDURE — 92523 SPEECH SOUND LANG COMPREHEN: CPT

## 2024-04-29 PROCEDURE — 97129 THER IVNTJ 1ST 15 MIN: CPT

## 2024-04-29 PROCEDURE — 6370000000 HC RX 637 (ALT 250 FOR IP): Performed by: EMERGENCY MEDICINE

## 2024-04-29 PROCEDURE — 97530 THERAPEUTIC ACTIVITIES: CPT

## 2024-04-29 PROCEDURE — 97112 NEUROMUSCULAR REEDUCATION: CPT

## 2024-04-29 PROCEDURE — 94761 N-INVAS EAR/PLS OXIMETRY MLT: CPT

## 2024-04-29 PROCEDURE — 97116 GAIT TRAINING THERAPY: CPT

## 2024-04-29 PROCEDURE — 99232 SBSQ HOSP IP/OBS MODERATE 35: CPT | Performed by: EMERGENCY MEDICINE

## 2024-04-29 PROCEDURE — 97542 WHEELCHAIR MNGMENT TRAINING: CPT

## 2024-04-29 PROCEDURE — 97110 THERAPEUTIC EXERCISES: CPT

## 2024-04-29 PROCEDURE — 6360000002 HC RX W HCPCS: Performed by: EMERGENCY MEDICINE

## 2024-04-29 PROCEDURE — 1180000000 HC REHAB R&B

## 2024-04-29 RX ADMIN — IPRATROPIUM BROMIDE 0.5 MG: 0.5 SOLUTION RESPIRATORY (INHALATION) at 08:54

## 2024-04-29 RX ADMIN — IPRATROPIUM BROMIDE 0.5 MG: 0.5 SOLUTION RESPIRATORY (INHALATION) at 00:36

## 2024-04-29 RX ADMIN — ARFORMOTEROL TARTRATE 15 MCG: 15 SOLUTION RESPIRATORY (INHALATION) at 08:54

## 2024-04-29 RX ADMIN — IPRATROPIUM BROMIDE 0.5 MG: 0.5 SOLUTION RESPIRATORY (INHALATION) at 23:58

## 2024-04-29 RX ADMIN — ACETAMINOPHEN 650 MG: 325 TABLET ORAL at 11:03

## 2024-04-29 RX ADMIN — BUDESONIDE 250 MCG: 0.25 INHALANT RESPIRATORY (INHALATION) at 08:54

## 2024-04-29 RX ADMIN — CINACALCET HYDROCHLORIDE 30 MG: 60 TABLET, FILM COATED ORAL at 20:45

## 2024-04-29 RX ADMIN — CETIRIZINE HYDROCHLORIDE 10 MG: 10 TABLET, FILM COATED ORAL at 08:54

## 2024-04-29 RX ADMIN — PREGABALIN 100 MG: 50 CAPSULE ORAL at 20:45

## 2024-04-29 RX ADMIN — IPRATROPIUM BROMIDE 0.5 MG: 0.5 SOLUTION RESPIRATORY (INHALATION) at 17:20

## 2024-04-29 RX ADMIN — BUDESONIDE 250 MCG: 0.25 INHALANT RESPIRATORY (INHALATION) at 20:45

## 2024-04-29 RX ADMIN — APIXABAN 5 MG: 5 TABLET, FILM COATED ORAL at 20:46

## 2024-04-29 RX ADMIN — SULFAMETHOXAZOLE AND TRIMETHOPRIM 1 TABLET: 400; 80 TABLET ORAL at 08:53

## 2024-04-29 RX ADMIN — DULOXETINE HYDROCHLORIDE 60 MG: 30 CAPSULE, DELAYED RELEASE ORAL at 08:53

## 2024-04-29 RX ADMIN — ARFORMOTEROL TARTRATE 15 MCG: 15 SOLUTION RESPIRATORY (INHALATION) at 20:45

## 2024-04-29 RX ADMIN — APIXABAN 5 MG: 5 TABLET, FILM COATED ORAL at 08:53

## 2024-04-29 RX ADMIN — PREGABALIN 100 MG: 50 CAPSULE ORAL at 08:53

## 2024-04-29 RX ADMIN — CINACALCET HYDROCHLORIDE 30 MG: 60 TABLET, FILM COATED ORAL at 08:53

## 2024-04-29 RX ADMIN — AMLODIPINE BESYLATE 10 MG: 10 TABLET ORAL at 08:53

## 2024-04-29 ASSESSMENT — PAIN SCALES - GENERAL
PAINLEVEL_OUTOF10: 0
PAINLEVEL_OUTOF10: 3
PAINLEVEL_OUTOF10: 0

## 2024-04-29 ASSESSMENT — PAIN DESCRIPTION - PAIN TYPE: TYPE: SURGICAL PAIN

## 2024-04-29 ASSESSMENT — PAIN DESCRIPTION - ORIENTATION: ORIENTATION: LEFT

## 2024-04-29 ASSESSMENT — PAIN DESCRIPTION - ONSET: ONSET: GRADUAL

## 2024-04-29 ASSESSMENT — PAIN DESCRIPTION - LOCATION: LOCATION: LEG

## 2024-04-29 ASSESSMENT — PAIN DESCRIPTION - DESCRIPTORS: DESCRIPTORS: ACHING

## 2024-04-29 ASSESSMENT — PAIN - FUNCTIONAL ASSESSMENT: PAIN_FUNCTIONAL_ASSESSMENT: ACTIVITIES ARE NOT PREVENTED

## 2024-04-29 ASSESSMENT — PAIN DESCRIPTION - FREQUENCY: FREQUENCY: INTERMITTENT

## 2024-04-29 NOTE — PROGRESS NOTES
[facility]  [address] [place] [id number]     INPATIENT REHABILITATION  DAILY PROGRESS NOTE     Date: 4/29/2024    Name: Zamzam Calixto Age / Sex: 73 y.o. / female   CSN: [id number] MRN: [id number]   Admit Date: 4/25/2024 Length of Stay: 4 days     Primary Rehabilitation Diagnosis   Impaired mobility and ADLs in the setting of a left above-knee amputation    Subjective:     I personally saw and evaluated this patient face-to-face today.  Patient is sitting in bed in no apparent distress, awake and alert.  Pain is controlled.    Objective:     Vital Signs:  Patient Vitals for the past 24 hrs:   BP Temp Temp src Pulse Resp SpO2   04/29/24 1558 (!) 155/69 98.2 °F (36.8 °C) Oral 81 18 97 %   04/29/24 0800 132/75 98.1 °F (36.7 °C) Oral 56 18 97 %   04/28/24 2002 (!) 140/68 98.1 °F (36.7 °C) Oral 77 18 96 %        Physical Examination:  General:  Awake, alert  Cardiovascular:  S1S2+, RRR  Pulmonary:  CTA b/l  GI:  Soft, BS+, NT, ND  Extremities: Left AKA noted      Current Medications:  Current Facility-Administered Medications   Medication Dose Route Frequency    amLODIPine (NORVASC) tablet 10 mg  10 mg Oral Daily    apixaban (ELIQUIS) tablet 5 mg  5 mg Oral BID    cetirizine (ZYRTEC) tablet 10 mg  10 mg Oral Daily    cinacalcet (SENSIPAR) tablet 30 mg  30 mg Oral BID    DULoxetine (CYMBALTA) extended release capsule 60 mg  60 mg Oral Daily    HYDROcodone-acetaminophen (NORCO) 5-325 MG per tablet 1 tablet  1 tablet Oral Q4H PRN    naloxone (NARCAN) injection 0.4 mg  0.4 mg IntraVENous PRN    pregabalin (LYRICA) capsule 100 mg  100 mg Oral BID    acetaminophen (TYLENOL) tablet 650 mg  650 mg Oral Q4H PRN    polyethylene glycol (GLYCOLAX) packet 17 g  17 g Oral Daily PRN    sulfamethoxazole-trimethoprim (BACTRIM;SEPTRA) 400-80 MG per tablet 1 tablet  1 tablet Oral Daily    albuterol (PROVENTIL) (2.5 MG/3ML) 0.083% nebulizer solution 2.5 mg  2.5 mg Nebulization Q4H PRN     Bowel and Bladder Function     [x]  Fluid Electrolyte and Nutrition Balance     [x]  Pain Control      3. Issues that 24 hour rehabilitation nursing is following:    [x]  Fall and safety precautions     [x]  Wound Care     [x]  Bowel and Bladder Function     [x]  Fluid Electrolyte and Nutrition Balance     [x]  Pain Control      [x]  Assistance with and education on in-room safety with transfers to and from the bed, wheelchair, toilet and shower.      4. Acute rehabilitation plan of care:    [x]  Continue current care and rehab.           [x]  Physical Therapy           [x]  Occupational Therapy           []  Speech Therapy      []  Hold Rehab until further notice     5. Medications:    [x]  MAR Reviewed     [x]  Continue Present Medications       6. Chemical DVT Prophylaxis:      []  Enoxaparin     []  Unfractionated Heparin     []  Warfarin     [x]  NOAC     []  Aspirin     []  None     7. Mechanical DVT Prophylaxis:      []  LINDSEY Stockings     []  Sequential Compression Device     [x]  None     8. GI Prophylaxis:      []  PPI     []  H2 Blocker     [x]  None / Not indicated     9. Code status:Full     Dragon medical dictation software was used for portions of this report. Unintended errors may occur.      Signed:    Michael Gaming MD      April 29, 2024

## 2024-04-29 NOTE — PROGRESS NOTES
Wound Prevention Checklist    Patient: Zamzam Calixto (73 y.o. female)  Date: 4/29/2024  Diagnosis: S/P AKA (above knee amputation) unilateral, left (HCC) [Z89.612] S/P AKA (above knee amputation) unilateral, left (HCC)    Precautions:         []  Heel prevention boots placed on patient    []  Patient turned q2h during shift    []  Lift team ordered    []  Patient on [place] bed/Specialty bed    []  Each Wound is documented during shift (Stage, Color, drainage, odor, measurements, and dressings)    []  Dual skin check done with WAYNE Rodarte RN

## 2024-04-29 NOTE — PLAN OF CARE
Problem: Occupational Therapy - Adult  Goal: By Discharge: Performs self-care activities at highest level of function for planned discharge setting.  See evaluation for individualized goals.  Description: Occupational Therapy Goals   Long Term Goals  Initiated (2024) and to be accomplished within 3 week(s)  1. Pt will perform self-feeding with Mod I.  2. Pt will perform grooming with set-up.  3. Pt will perform UB bathing with set-up.  4. Pt will perform LB bathing with supv using AE as needed.  5. Pt will perform tub/shower transfer with CGA using least restrictive assistive device.   6. Pt will perform UB dressing with set-up.  7. Pt will perform LB dressing with supv using AE as needed.  8. Pt will perform toileting task with SBA.  9. Pt will perform toilet transfer with SBA using least restrictive assistive device.    Short Term Goals   Initiated (2024) and to be accomplished within 7 day(s), (to be reassessed by 5/3/2024)  1. Pt will perform self-feeding with set-up.   2. Pt will perform grooming with SBA.  3. Pt will perform UB bathing with supv.  4. Pt will perform LB bathing with CGA using AE as needed.  5. Pt will perform tub/shower transfer with Min A using least restrictive assistive device.  6. Pt will perform UB dressing with supv.  7. Pt will perform LB dressing with CGA/ Min A using AE as needed.  8. Pt will perform toileting task with Min A.  9. Pt will perform toilet transfer with CGA/ Min A using least restrictive assistive device.    Outcome: Progressing   Occupational Therapy TREATMENT    Patient: Zamzam Calixto   73 y.o.    Patient identified with name and : yes    Date: 2024    First Tx Session  Time In: 0930  Time Out: 1100    Diagnosis: S/P AKA (above knee amputation) unilateral, left (HCC) [Z89.612]   Precautions:  Restrictions/Precautions: Fall Risk, General Precautions (pt used 1 L of O2 at home previously)             Chart, occupational therapy assessment, plan of  care, and goals were reviewed.     Pain:  Pt reports 2/10 to 7/10 pain or discomfort at rest prior to treatment (intermittent phantom limb pain in L AKA/ residual limb from 2/10 to 7/10).   Pt reports 2/10 to 7/10 pain or discomfort post treatment (intermittent phantom limb pain in L AKA/ residual limb from 2/10 to 7/10).   Intervention Provided: intermittent rest breaks; tasks modified based on pt's tolerance. Care coordinated with Dick RN; staff nurse to follow-up regarding pain medication.     SUBJECTIVE:   Patient stated “I've been having phantom limb pain, it comes and goes.\"    OBJECTIVE DATA SUMMARY:     COGNITION/PERCEPTION Daily Assessment   Overall orientation status Within Functional Limits   Orientation level Oriented X4   Overall cognitive status Exceptions    Following Commands Follows multistep commands with increased time    Attention Span Attends with cues to redirect    Safety Judgment Decreased awareness of need for assistance   Problem Solving Assistance required to generate solutions;Assistance required to implement solutions    Insights Fully aware of deficits   Initiation Requires cues for some   Sequencing Requires cues for some     THERAPEUTIC ACTIVITY Daily Assessment    Sitting Balance: Supervision  Standing Balance: Contact guard assistance (Stand pivot transfer (CGA) on RLE using FWW; gait belt (EOB > w/c). Left AKA.)     RUE/ LUE there act performed using large colored pegs while replicating visual pattern onto vertical foam peg board surface (48 pegs); followed by removal. Therapist providing initial instructions. Increased task challenge using 1.5# wrist weights. Performed for FMC (tip pinch; in-hand manipulation), functional reach, eye hand coordination, and activity tolerance.     RUE/ LUE there act performed using tabletop shoulder arch for reaching across midline to bring plastic rings up/ over arch to opposite side (20 plastic rings x2 trials each hand); increased task challenge  using 1.5# wrist weights. Therapist providing initial instruction with demonstration. Performed for range of motion, strength, and endurance for carryover to self-care performance.      THERAPEUTIC EXERCISE Daily Assessment    RUE/ LUE there ex performed using arm bike (Power ) for 15 min duration with moderate resistance. Performed for range of motion, strength, and endurance for increased functional independence with ADL's and transfers using least restrictive assistive device. Rest break x1 due to fatigue.     RUE/ LUE red theraband exercises performed (15 reps x2 sets) for shoulder flexion, abduction/ adduction, chest pull, out-front pull down, and bicep curls. Verbal cues with demonstration for technique. Rest break between sets due to fatigue. Performed for range of motion, UB strengthening, and activity tolerance for increased functional independence with ADL's and transfers using least restrictive assistive device (FWW). Rest break between sets. RUE/ LUE there ex (15 reps x2 sets) performed using 2# hand weight for chest press and bicep curls. VC's with demo for hand positioning and technique.      FUNCTIONAL MOBILITY Daily Assessment    Functional - Mobility Device: Wheelchair  Activity: To/From therapy gym  Assist Level: Supervision  Functional Mobility Comments: Functional wheelchair mobility performed (supv) to/ from therapy gym; pt using BUE's to propel over level surface. Verbal cues for managing R/ L wheelchair brakes. Therapist managing R leg rest.   Stand Pivot Transfers: Contact guard assistance ((CGA) using FWW; gait belt)  Sit to stand: Contact guard assistance (verbal cues for hand placement)  Stand to sit: Contact guard assistance (verbal cues for hand placement)  Transfer Comments: using FWW; gait belt      WHEELCHAIR/BED TRANSFER INDEPENDENCE Daily Assessment   Transfer Technique Stand pivot   Level of Assistance Contact guard assistance   Equipment Wheelchair;Other (FWW; gait belt)

## 2024-04-29 NOTE — PLAN OF CARE
Problem: Physical Therapy - Adult  Goal: By Discharge: Performs mobility at highest level of function for planned discharge setting.  See evaluation for individualized goals.  Description: Physical Therapy Short Term Goals  Initiated 4/26/2024 and to be accomplished within 7 day(s) [5/3/24]  1.  Patient will supine to sit, sit to supine, roll right, and roll left  in bed with modified independence.    2.  Patient will transfer from bed to chair and chair to bed with contact guard assist using the least restrictive device.  3.  Patient will perform sit to stand with contact guard assist  4.  Patient will ambulate with contact guard assist for 25 feet with the least restrictive device.   5.  Patient will propel w/c 150 ft on level surface with supervision for mobility on unit.    Physical Therapy Long Term Goals  Initiated 4/26/2024 and to be accomplished within 21 day(s) [5/17/24]  1.  Patient will supine to sit, sit to supine, roll right, and roll left in bed with independence.    2.  Patient will transfer from bed to chair and chair to bed with modified independence using the least restrictive device.  3.  Patient will perform sit to stand with modified independence.  4.  Patient will ambulate with supervision/set-up for 50 feet with the least restrictive device.   5.  Patient will ascend/descend 5 stairs with at least one handrail(s) with minimal assistance/contact guard assist.   Outcome: Progressing     PHYSICAL THERAPY TREATMENT    Patient: Zamzam Calixto (73 y.o. female)  Date: 4/29/2024  Diagnosis: S/P AKA (above knee amputation) unilateral, left ([facility]) [Z89.612]   Precautions: falls risk  Chart, physical therapy assessment, plan of care and goals were reviewed.    Time in: 1102  Time out : 1232    Patient seen for: gait, transfers training, balance training, w/c mobility training, and pt education    Pain:  Pt pain was reported as 2/10, right residual limb, pre-treatment.  Pt pain was reported as 3/10,

## 2024-04-29 NOTE — PLAN OF CARE
(gastroesophageal reflux disease)     HTN (hypertension)     Ill-defined condition     Uses O2 at night.    Nausea & vomiting     Neuropathy     Other and unspecified symptoms and signs involving general sensations and perceptions     PVD    Other ill-defined conditions(799.89)     Crohn's    Parathyroid tumor     sees Endo Dr Santillan    Peripheral neuropathy     Pulmonary emphysema (HCC)     PVD (peripheral vascular disease) (HCC)     right leg    Sepsis (HCC) 06/27/2017    Vitamin B12 deficiency      Past Surgical History:   Procedure Laterality Date    APPENDECTOMY      BREAST LUMPECTOMY Left     breast left- precancerous    BUNIONECTOMY      left eye    BYPASS GRAFT OTHR,AXILL-FEM Right 04/19/2013    BYPASS GRAFT OTHR,FEM-POP Left 05/2013    CATARACT REMOVAL      bilateral    CHOLECYSTECTOMY      COLONOSCOPY N/A 09/11/2019    DIAGNOSTIC COLONOSCOPY performed by Nba Schroeder MD at [facility] ENDOSCOPY    FEMORAL BYPASS Left 2/23/2023    REMOVAL OF INFECTED BYPASS GRAFT LEFT LEG performed by Mich Ashley MD at [facility] CARDIAC SURGERY    FEMORAL BYPASS Left 2/27/2023    REVISION LEFT FEMORAL POPLITEAL BYPASS WITH CADAVER VEIN performed by Mich Ashley MD at [facility] CARDIAC SURGERY    FEMORAL BYPASS Left 2/20/2024    THROMBECTOMY LEFT FEMORAL ARTERY, FEMORAL ARTERY BYPASS WITH PATCH LEFT LEG performed by Mich Ashley MD at [facility] CARDIAC SURGERY    FEMORAL BYPASS Left 2/22/2024    OPEN EXCISION LEFT AXILLARY ARTERY, LEFT LEG ANGIOGRAM, BALLOON ANGIOPLASTY, POPLITEAL ARTERY AND TPA ADMINISTRATION performed by Mich Ashley MD at [facility] CARDIAC SURGERY    FEMORAL BYPASS Left 3/29/2024    ANGIOGRAM LEFT LEG WITH REVISION BYPASS THROMBECTOMY BALLOON ANGIOPLASTY OF LEFT POSTERIOR TIBIAL ARTERY performed by Mich Ashley MD at [facility] CARDIAC SURGERY    FEMORAL-TIBIAL BYPASS GRAFT Left 3/25/2024    LEFT FEMORAL TIBIAL BYPASS WITH DONOR VEIN; C-ARM performed by Mich Ashley MD at [facility] CARDIAC SURGERY    FOOT/TOES  SURGERY PROC UNLISTED      GROIN SURGERY Left 2/17/2023    WASHOUT LEFT GROIN/WOUND VAC PLACEMENT performed by Mich Ashley MD at [facility] MAIN OR    LEG SURGERY Left 4/22/2024    LEFT ABOVE KNEE AMPUTATION (AKA) performed by Mich Ashley MD at [facility] CARDIAC SURGERY    ORTHOPEDIC SURGERY      lateral epicondilitis on right    OTHER SURGICAL HISTORY  01/05/2023    left leg thrombectomy with stent    OTHER SURGICAL HISTORY  09/2013    I & D Right chest/flank wall abscess vs granuloma    OTHER SURGICAL HISTORY      intestional resection x4    OTHER SURGICAL HISTORY  03/07/2013    catheter into aorta    UPPER ARM/ELBOW SURGERY UNLISTED      VASCULAR SURGERY  09/10/2020    Debridement and washout of bypass graft.     Prior Level of Function/Home Situation:  Social/Functional History  Lives With: Spouse  Type of Home: House  Home Layout: Two level, Able to Live on Main level with bedroom/bathroom  Home Access: Stairs to enter without rails  Entrance Stairs - Number of Steps: 5  Entrance Stairs - Rails: None  Bathroom Shower/Tub: Tub/Shower unit  Bathroom Toilet: Handicap height  Bathroom Equipment: None  Bathroom Accessibility: Accessible  Home Equipment: None (neighbor that can lone pt a RW or crutches)  Has the patient had two or more falls in the past year or any fall with injury in the past year?: No  Receives Help From: Family (sister in [place])  ADL Assistance: Independent  Homemaking Assistance: Independent (Per patient report, (I) with homemaking tasks (pt reports being independent with laundry, cooking, and medication management/ use of a weekly pill box).)  Ambulation Assistance: Independent  Transfer Assistance: Independent  Active : Yes  Mode of Transportation: Van (mini van)  Occupation: Retired    Mental Status:  Orientation  Overall Orientation Status: Within Normal Limits    Motor Speech:   WFL     Language Comprehension and Expression:  Auditory Comprehension  Comprehension: Within Functional  guzman left within reach  []  Nursing notified  []  Caregiver present  []  Bed alarm activated    COMMUNICATION/EDUCATION:   [x] Patient educated regarding compensatory speech/language/comprehension techniques  provided via demonstration, verbalization and teach back of comprehension  [x] Patient/family have participated as able in goal setting and plan of care.  [x] Patient/family agree to work toward stated goals and plan of care.  [] Patient understands intent and goals of therapy, neutral about participation.  [] Patient unable to participate in goal setting/plan of care secondary to cognition, hearing/vision deficits; education ongoing with interdisciplinary staff     Time in: 12:56  Time out: 13:30  Minutes: 34    Thank you for this referral.   Anna Jensen M.S., CCC - SLP  Speech Language Pathologist  [facility]

## 2024-04-29 NOTE — PROGRESS NOTES
0800 Pt. Awake sitting up in bed alert and oriented x 4 no change in assessment. Dressing to L aka intact.  0930 Pt. Sitting up in chair eating breakfast.   1200 with therapy.   1330 able to transfer from bed to chair with assist.   1500 no change in assessment.   1800 Pt. Sitting up in chair eating dinner.

## 2024-04-30 ENCOUNTER — HOME CARE VISIT (OUTPATIENT)
Age: 74
End: 2024-04-30
Payer: MEDICARE

## 2024-04-30 PROCEDURE — 99232 SBSQ HOSP IP/OBS MODERATE 35: CPT | Performed by: EMERGENCY MEDICINE

## 2024-04-30 PROCEDURE — 97110 THERAPEUTIC EXERCISES: CPT

## 2024-04-30 PROCEDURE — 97150 GROUP THERAPEUTIC PROCEDURES: CPT

## 2024-04-30 PROCEDURE — 94761 N-INVAS EAR/PLS OXIMETRY MLT: CPT

## 2024-04-30 PROCEDURE — 1180000000 HC REHAB R&B

## 2024-04-30 PROCEDURE — 97530 THERAPEUTIC ACTIVITIES: CPT

## 2024-04-30 PROCEDURE — 97116 GAIT TRAINING THERAPY: CPT

## 2024-04-30 PROCEDURE — 6370000000 HC RX 637 (ALT 250 FOR IP): Performed by: EMERGENCY MEDICINE

## 2024-04-30 PROCEDURE — 97535 SELF CARE MNGMENT TRAINING: CPT

## 2024-04-30 PROCEDURE — 97130 THER IVNTJ EA ADDL 15 MIN: CPT

## 2024-04-30 PROCEDURE — 97129 THER IVNTJ 1ST 15 MIN: CPT

## 2024-04-30 PROCEDURE — 6360000002 HC RX W HCPCS: Performed by: EMERGENCY MEDICINE

## 2024-04-30 RX ADMIN — BUDESONIDE 250 MCG: 0.25 INHALANT RESPIRATORY (INHALATION) at 21:16

## 2024-04-30 RX ADMIN — IPRATROPIUM BROMIDE 0.5 MG: 0.5 SOLUTION RESPIRATORY (INHALATION) at 09:15

## 2024-04-30 RX ADMIN — DULOXETINE HYDROCHLORIDE 60 MG: 30 CAPSULE, DELAYED RELEASE ORAL at 09:15

## 2024-04-30 RX ADMIN — ARFORMOTEROL TARTRATE 15 MCG: 15 SOLUTION RESPIRATORY (INHALATION) at 09:15

## 2024-04-30 RX ADMIN — CETIRIZINE HYDROCHLORIDE 10 MG: 10 TABLET, FILM COATED ORAL at 09:15

## 2024-04-30 RX ADMIN — PREGABALIN 100 MG: 50 CAPSULE ORAL at 09:15

## 2024-04-30 RX ADMIN — IPRATROPIUM BROMIDE 0.5 MG: 0.5 SOLUTION RESPIRATORY (INHALATION) at 16:08

## 2024-04-30 RX ADMIN — ARFORMOTEROL TARTRATE 15 MCG: 15 SOLUTION RESPIRATORY (INHALATION) at 21:16

## 2024-04-30 RX ADMIN — BUDESONIDE 250 MCG: 0.25 INHALANT RESPIRATORY (INHALATION) at 09:15

## 2024-04-30 RX ADMIN — CINACALCET HYDROCHLORIDE 30 MG: 60 TABLET, FILM COATED ORAL at 21:16

## 2024-04-30 RX ADMIN — AMLODIPINE BESYLATE 10 MG: 10 TABLET ORAL at 09:15

## 2024-04-30 RX ADMIN — SULFAMETHOXAZOLE AND TRIMETHOPRIM 1 TABLET: 400; 80 TABLET ORAL at 09:15

## 2024-04-30 RX ADMIN — IPRATROPIUM BROMIDE 0.5 MG: 0.5 SOLUTION RESPIRATORY (INHALATION) at 22:44

## 2024-04-30 RX ADMIN — APIXABAN 5 MG: 5 TABLET, FILM COATED ORAL at 09:15

## 2024-04-30 RX ADMIN — CINACALCET HYDROCHLORIDE 30 MG: 60 TABLET, FILM COATED ORAL at 09:15

## 2024-04-30 RX ADMIN — PREGABALIN 100 MG: 50 CAPSULE ORAL at 21:15

## 2024-04-30 RX ADMIN — APIXABAN 5 MG: 5 TABLET, FILM COATED ORAL at 21:16

## 2024-04-30 ASSESSMENT — PAIN SCALES - GENERAL
PAINLEVEL_OUTOF10: 0

## 2024-04-30 NOTE — PROGRESS NOTES
Wound Prevention Checklist    Patient: Zamzam Calixto (73 y.o. female)  Date: 4/30/2024  Diagnosis: S/P AKA (above knee amputation) unilateral, left (HCC) [Z89.612] S/P AKA (above knee amputation) unilateral, left (HCC)    []  Heel prevention boots placed on patient    [x]  Patient turned q2h during shift    []  Lift team ordered    [x]  Patient on [place] bed/Specialty bed    [x]  Each Wound is documented during shift (Stage, Color, drainage, odor, measurements, and dressings)    [x]  Dual skin check done with CHIQUI Jennings RN

## 2024-04-30 NOTE — PROGRESS NOTES
[facility] Physical Rehabilitation  Team Conference  Date: 4/30/2024  ACTIVE MONITORING OF CO-MORBID CONDITIONS/MANAGEMENT OF NEW MEDICAL ISSUES: S/P AKA (above knee amputation) unilateral, left ([facility]) [Z89.612]    **Please refer to patient's electronic medical record to view the care plan and goals**  NURSING Making gains Yes   Skin Care: Skin Integumentary   Skin Color: Pink  Skin Condition/Temp: Dry, Warm  Skin Integrity: Incision (see LDA)  Location: Left AKA    Wound Care: surgical site       Pain: Pain Assessment  Pain Assessment: 0-10 (04/30/24 0400)  Pain Level: 0 (04/30/24 0400)  Patient's Stated Pain Goal: Unable to verbalize/indicate pain goal (sleeping) (04/30/24 0400)  Pain Location: Leg (04/29/24 1103)  Pain Orientation: Left (04/29/24 1103)  Pain Descriptors: Aching (04/29/24 1103)  Functional Pain Assessment: Activities are not prevented (04/29/24 1103)  Pain Type: Surgical pain (04/29/24 1103)  Pain Frequency: Intermittent (04/29/24 1103)  Pain Onset: Gradual (04/29/24 1103)  Non-Pharmaceutical Pain Intervention(s): Repositioned;Rest (04/29/24 1103)  Response to Pain Intervention: Patient satisfied (04/29/24 1203)  Side Effects: No reported side effects (04/29/24 1203)  Multiple Pain Sites: No (04/29/24 1203)    Bladder/Bowel Urine Assessment  Urinary Status: Voiding  Urinary Incontinence: Absent  Urine Color: Unable to assess (urine is mixed with stool)  Urine Appearance: Clear  Urine Odor: No odor Stool Assessment  Incontinence: No  Stool Appearance: Soft  Stool Color: Brown  Stool Amount: Large  Stool Source: Rectum  Last BM (including prior to admit): 04/30/24   Goal: Patient will be free of infection during rehab stay.       Barrier: hygiene, wound care       Intervention: inspect incision, dressing changes PRN       Lab value concerns   No       Occupational Therapy  Making gains  Yes   Goal: Patient will perform toileting with 25% assistance.       Barrier: decreased balance and  coordination       Intervention: adl training, upper body strengthening       ADL Accessibility Limitations:none        Physical Therapy Making gains Yes   Goal: Patient will perform transfers between bed and wheelchair with stand by assist and safe technique.       Barrier: decreased balance and strength       Intervention: therex, transfer training       Household Mobility Accessibility Limitations:5 steps to enter, no rails - family working on ramp         Speech Therapy Making gains Yes   Goal: Patient will recall sequences and precautions related to transfers with 80 % accuracy with min cues.       Barrier: mild cognitive deficits       Intervention: cognitive retraining         Therapy Minutes Density Met: Yes    Therapy Minutes Not met Action/Justification:   [] Pt on medical hold  [] Pt refusing therapy despite encouragement and education on benefits of therapy  [] Pt displays decreased tolerance to therapy  [] Other     CMG Date: 5/6/2024  Estimated Discharge Date: 5/11/2024  [x]Rehabilitation goals from IPOC/Treatment plan reviewed  [x]No changes identified  []Goal(s) changed:     Patient needs identified [x]Caregiver Education   []Family Conference         Recommended Discharge Plan []home alone   []home alone with assist prn    []Home with continuous supervision       [x]Home with continuous assistance   [] Assisted living                     []SNF   Home Health pt, ot    CURRENT EQUIPMENT NEEDS:  [x]Handicap Placard Application    Equipment needed at discharge: Rolling walker, Wheelchair, and Wheelchair cushion    ADL Equipment:Tub bench and 3 in 1 BSC    Plan/Adjustments to Plan   [x]Medical conditions exist that require a minimum of 3 times/week physician      oversight and 24-hour rehabilitation nursing to manage/progress the plan of care   [x]Functional deficits require intensive and coordinated therapies to achieve   goals outlined in plan of care   [x]3 hours therapy 5 days/week OR 15 hours  therapy over 7 days   [x]Continued plan of care as patient is showing progress and /or has an expectation to benefit    Patient's plan of care has been reviewed and/or adjusted. Continue treatment as outlined.   I have led this Team Conference and agree with the plan, Michael Gaming MD, 4/30/2024, 1:24 PM      Team Conference Recorder Julia Diaz  Date 4/30/2024    Team members participating in today's conference.   [] Leticia Miller, PT     [x] Chaka Shin, PT           [x]    Francheska Reeves, SLP       [x] Yakelin Toussaint, OT      [x] Jana Silveira, OT                    [x]  Anna Jensen, CHRISSY  [x] BHUPINDER Montiel        [] María Harris RN             [x] RN: Dick Chaparro RN   [] Luz Boyce NP                               [] Other:

## 2024-04-30 NOTE — PROGRESS NOTES
0800 Pt. Awake sitting up in bed alert and oriented x 4 no change in assessment.   0930 Pt. Sitting up in chair eating breakfast.   1200 with therapy.   1330 able to transfer from bed to chair with assist.   1500 no change in assessment.  to Left AKA dry and intact no complaints of pain  1800 Pt. Sitting up in bed eating dinner.

## 2024-04-30 NOTE — PROGRESS NOTES
TEAM CONFERENCE FOLLOW-UP  Patient: Zamzam Calixto (73 y.o. female)  Date: 4/30/2024  Diagnosis: S/P AKA (above knee amputation) unilateral, left (HCC) [Z89.612] S/P AKA (above knee amputation) unilateral, left (HCC)      Precautions:      Met with patient to discuss the findings from 4/30/2024 Team Conference.    Patient requested further information and/or had questions:   Flores Diaz

## 2024-04-30 NOTE — PLAN OF CARE
Problem: SLP Adult - Disturbed Thought Process  Goal: By Discharge: Demonstrates cognitive skills at highest level of function for planned discharge setting.   See evaluation for individualized goals.  Description: Long term goals   1. Pt will independently recall trained safety recommendations/sequences (transfers) w/ 90% accy to facilitate safety and independence by 5/13.  2. Pt will recall novel short term auditory information w/ 85% accy given min cues to facilitate recall of new information by 5/13.  3.Pt will recall novel short term visual information w/ 85% accy given min cues to facilitate recall of new information by 5/13  4 Pt will demonstrate alternating attention during functional tasks w/ 90 % accy given min cues to facilitate ability to multitask and promote independence by 5/13.    5. Pt will demonstrate functional sequencing for 7-8 step tasks given min cues to facilitate independence by 5/13.    Short term goals:   1. Pt will recall trained safety recommendations/sequences (transfers) w/ 80% accy given min cues to facilitate safety and independence by 5/6.  2. Pt will recall novel short term auditory information w/ 75% accy given min cues to facilitate recall of new information by 5/6.  3.Pt will recall novel short term visual information w/ 75% accy given min cues to facilitate recall of new information by 5/6  4 Pt will demonstrate alternating attention during functional tasks w/ 80% accy given min cues to facilitate ability to multitask and promote independence by 5/6.    5. Pt will demonstrate functional sequencing for 4-6 step tasks given min cues to facilitate independence by 5/6.  Outcome: Progressing     SPEECH-LANGUAGE TREATMENT    Patient: Zamzam Calixto (73 y.o. female)  Date: 4/30/2024  Diagnosis: S/P AKA (above knee amputation) unilateral, left (HCC) [Z89.612] S/P AKA (above knee amputation) unilateral, left (HCC)      Precautions: Standard  PLOF: As per H&P     ASSESSMENT:  Pt  demonstrates good progress in recall of sequences for transfers and emergent use of memory strategies.     Progression toward goals:  [x]       Improving appropriately and progressing toward goals  []       Improving slowly and progressing toward goals  []       Not making progress toward goals and plan of care will be adjusted     PLAN:  Patient continues to benefit from skilled intervention to address the above impairments.  Continue treatment per established plan of care.     SUBJECTIVE:   Patient stated “how are you”.    OBJECTIVE:   Daily Assessment:     Orientation  Overall Orientation Status: Within Normal Limits    Orientation:  Person, Place, Time, and Situation    Treatment & Interventions:    Neuro-Linguistics:    Memory    -educated pt on internal/external memory aids: repetition, grouping, writing down info    -Auditory recall of novel auditory information (pt generated list x8 items) given min-mod cues w/ 75% accy    -recall of transfer sequences, min cues 80% accy     Attention    -trail making (number-letter combination) min cues w/ 75% accy                     Response & Tolerance to Activities:    Excellent participation in session tasks     PAIN:  Start of Tx: 0  End of Tx: 0     After treatment:   [x]       Patient left in no apparent distress sitting up in chair  []       Patient left in no apparent distress in bed  [x]       Call bell left within reach  []       Nursing notified  []       Caregiver present  []       Bed alarm activated      COMMUNICATION/EDUCATION:   [x] Patient educated regarding compensatory speech/language/comprehension techniques provided via demonstration, verbalization and teach back of comprehension  [x] Patient/family have participated as able in goal setting and plan of care.  [] Patient/family agree to work toward stated goals and plan of care.  [] Patient understands intent and goals of therapy, neutral about participation.  [] Patient unable to participate in goal

## 2024-04-30 NOTE — PLAN OF CARE
Problem: Physical Therapy - Adult  Goal: By Discharge: Performs mobility at highest level of function for planned discharge setting.  See evaluation for individualized goals.  Description: Physical Therapy Short Term Goals  Initiated 4/26/2024 and to be accomplished within 7 day(s) [5/3/24]  1.  Patient will supine to sit, sit to supine, roll right, and roll left  in bed with modified independence.    2.  Patient will transfer from bed to chair and chair to bed with contact guard assist using the least restrictive device.  3.  Patient will perform sit to stand with contact guard assist  4.  Patient will ambulate with contact guard assist for 25 feet with the least restrictive device.   5.  Patient will propel w/c 150 ft on level surface with supervision for mobility on unit.    Physical Therapy Long Term Goals  Initiated 4/26/2024 and to be accomplished within 21 day(s) [5/17/24]  1.  Patient will supine to sit, sit to supine, roll right, and roll left in bed with independence.    2.  Patient will transfer from bed to chair and chair to bed with modified independence using the least restrictive device.  3.  Patient will perform sit to stand with modified independence.  4.  Patient will ambulate with supervision/set-up for 50 feet with the least restrictive device.   5.  Patient will ascend/descend 5 stairs with at least one handrail(s) with minimal assistance/contact guard assist.   Outcome: Progressing     PHYSICAL THERAPY TREATMENT    Patient: Zamzam Calixto (73 y.o. female)  Date: 4/30/2024  Diagnosis: S/P AKA (above knee amputation) unilateral, left ([facility]) [Z89.612]   Precautions: fall risk  Chart, physical therapy assessment, plan of care and goals were reviewed.    Time in: 1030  Time out : 1130    Patient seen for: Gait training, transfer training, therapeutic exercise, and group therapy.     Pain:  Pt pain was reported as no c/o pre-treatment.  Pt pain was reported as no c/o  post-treatment.  Intervention: N/A    Patient identified with name and : Yes    SUBJECTIVE:      Patient reports that she is feeling pretty good today but the prosthetist has not been in yet to see her.     OBJECTIVE DATA SUMMARY:    Objective:   Education: Education Given To: Patient  Education Provided: Safety  Education Provided Comments: Educated patient on the importance of reaching back to the mat table when sitting to increase patient's safety.  Education Method: Verbal  Barriers to Learning: None  Education Outcome: Verbalized understanding;Demonstrated understanding  BED/MAT MOBILITY Daily Assessment    Rolling Right  N/A    Rolling Left  N/A    Supine to Sit  Modified independent     Sit to Supine  Modified independent       TRANSFERS Daily Assessment    Sit to Stand Contact guard assistance for balance and safety    Transfer Assist Score Contact guard assistance            Comments    Patient performed stand step transfer from w/c <-> left side of the mat table with RW and CGA for balance and safety. Patient required min vc to ensure that patient was pushing up from the mat table instead of the RW to increase the patients safety.     Car Transfer      Car Type         GAIT Daily Assessment    Gait Deviations  Slow maxine; decreased step length; decreased step height    Assistive device Rolling Walker    Ambulation assistance - surface  Contact guard assistance  Level tile    Distance 40ft    Comments Patient ambulated 40 ft with a swing through gait pattern, RW, wheelchair follow, and CGA for balance and safety. Patient required min vc to ensure that the patient was performing proper sequence with RW.     Ambulation-uneven surface            BALANCE Daily Assessment    Posture Good    Sitting - Static Good    Sitting - Dynamic Good-    Standing - Static Fair + (with RW support)     Standing - Dynamic Fair (with RW support)    Comments        THERAPEUTIC EXERCISES Daily Assessment     Supine: SLR 2x15  bilaterally, heel slides on RLE 2x15; Ankle 4-way with red Tband on RLE 2x15 each direction.       Group therapy treatment:    Seated  EXERCISE   Sets   Reps   Active Active Assist   Comments   Heel/Toe Raise  2 15 [x] [] On RLE    Long Arc Quads 2 15 [x] [] On RLE with 2# AW   Seated Marching 2 15 [x] [] Bilaterally with 2# on RLE   Hamstring curls 2 15 [x] [] Red Tband on RLE   Hip Abd/ER clamshells w/ T-band 2 15 [x] [] Isometrics with therapist providing resistance on lateral thigh bilaterally   Hip Abd SLRs   [] []    Hip Add Isometrics 2 15 [x] [] On RLE with therapist providing resistance at knee   Heel taps on floor   [] []    Wheelchair pushups   [] []      [x]   Patient appropriate to continue group therapy sessions to promote increased participation in skilled therapy interventions and to provide opportunities for increased social interaction.      ASSESSMENT:  Patient was able to perform stand step transfer from w/c <-> mat table using a RW, and CGA for balance and safety which shows improvement since last session where the patient performed squat pivot transfers. Patient will benefit from continued skilled intervention to increase strength in LEs, improve ambulation, transfers, and balance.     Progression toward goals:  [x]      Improving appropriately and progressing toward goals  []      Improving slowly and progressing toward goals  []      Not making progress toward goals and plan of care will be adjusted      PLAN:  Patient continues to benefit from skilled intervention to address the above impairments.  Continue treatment per established plan of care.  Discharge Recommendations:  Home with Home health PT;24 hour supervision or assist  Further Equipment Recommendations for Discharge:        Rolling; standard wheelchair              Estimated Discharge Date: TBD    Activity Tolerance:   good  Please refer to the flowsheet for vital signs taken during this treatment.    After treatment:   [] Patient

## 2024-04-30 NOTE — PROGRESS NOTES
[facility] PHYSICAL REHABILITATION  [address] [place] [id number]     INPATIENT REHABILITATION  DAILY PROGRESS NOTE     Date: 4/30/2024    Name: Zamzam Calixto Age / Sex: 73 y.o. / female   CSN: [id number] MRN: [id number]   Admit Date: 4/25/2024 Length of Stay: 5 days     Primary Rehabilitation Diagnosis   Impaired mobility and ADLs in the setting of a left above-knee amputation    Subjective:     I personally saw and evaluated this patient face-to-face today.  Patient is sitting in bed in no apparent distress, awake and alert.  Patient is in good spirits.  Patient still has some phantom pain    Objective:     Vital Signs:  Patient Vitals for the past 24 hrs:   BP Temp Temp src Pulse Resp SpO2   04/30/24 1648 (!) 153/58 98.2 °F (36.8 °C) Oral 76 19 96 %   04/30/24 0752 128/69 98.1 °F (36.7 °C) Oral 88 18 92 %   04/29/24 2041 135/62 98 °F (36.7 °C) Oral 73 20 94 %        Physical Examination:  General:  Awake, alert  Cardiovascular:  S1S2+, RRR  Pulmonary:  CTA b/l  GI:  Soft, BS+, NT, ND  Extremities:  No edema  Left AKA noted      Current Medications:  Current Facility-Administered Medications   Medication Dose Route Frequency    amLODIPine (NORVASC) tablet 10 mg  10 mg Oral Daily    apixaban (ELIQUIS) tablet 5 mg  5 mg Oral BID    cetirizine (ZYRTEC) tablet 10 mg  10 mg Oral Daily    cinacalcet (SENSIPAR) tablet 30 mg  30 mg Oral BID    DULoxetine (CYMBALTA) extended release capsule 60 mg  60 mg Oral Daily    HYDROcodone-acetaminophen (NORCO) 5-325 MG per tablet 1 tablet  1 tablet Oral Q4H PRN    naloxone (NARCAN) injection 0.4 mg  0.4 mg IntraVENous PRN    pregabalin (LYRICA) capsule 100 mg  100 mg Oral BID    acetaminophen (TYLENOL) tablet 650 mg  650 mg Oral Q4H PRN    polyethylene glycol (GLYCOLAX) packet 17 g  17 g Oral Daily PRN    sulfamethoxazole-trimethoprim (BACTRIM;SEPTRA) 400-80 MG per tablet 1 tablet  1 tablet Oral Daily    albuterol (PROVENTIL) (2.5 MG/3ML) 0.083% nebulizer  walker and contact-guard assistance.  In comparison at the time of admission patient was only able to ambulate 5 feet x 2 with a rolling walker and minimal assistance.    2. Medical Issues being followed closely:    [x]  Fall and safety precautions     [x]  Wound Care     [x]  Bowel and Bladder Function     [x]  Fluid Electrolyte and Nutrition Balance     [x]  Pain Control      3. Issues that 24 hour rehabilitation nursing is following:    [x]  Fall and safety precautions     [x]  Wound Care     [x]  Bowel and Bladder Function     [x]  Fluid Electrolyte and Nutrition Balance     [x]  Pain Control      [x]  Assistance with and education on in-room safety with transfers to and from the bed, wheelchair, toilet and shower.      4. Acute rehabilitation plan of care:    [x]  Continue current care and rehab.           [x]  Physical Therapy           [x]  Occupational Therapy           []  Speech Therapy      []  Hold Rehab until further notice     5. Medications:    [x]  MAR Reviewed     [x]  Continue Present Medications       6. Chemical DVT Prophylaxis:      []  Enoxaparin     []  Unfractionated Heparin     []  Warfarin     [x]  NOAC     []  Aspirin     []  None     7. Mechanical DVT Prophylaxis:      []  LINDSEY Stockings     []  Sequential Compression Device     [x]  None     8. GI Prophylaxis:      []  PPI     []  H2 Blocker     [x]  None / Not indicated     9. Code status:Full     Dragon medical dictation software was used for portions of this report. Unintended errors may occur.    Personal Protective Equipment ( face mask ) was used while interacting with the patient        Signed:    Michael Gaming MD      April 30, 2024

## 2024-04-30 NOTE — PLAN OF CARE
Problem: Safety - Adult  Goal: Free from fall injury  4/29/2024 2328 by Rosmery Churchill RN  Outcome: Progressing  4/29/2024 2328 by Rosmery Churchill RN  Outcome: Progressing  Flowsheets (Taken 4/29/2024 2326)  Free From Fall Injury: Instruct family/caregiver on patient safety     Problem: ABCDS Injury Assessment  Goal: Absence of physical injury  4/29/2024 2328 by Rosmery Churchill RN  Outcome: Progressing  4/29/2024 2328 by Rosmery Churchill RN  Outcome: Progressing  Flowsheets (Taken 4/29/2024 2326)  Absence of Physical Injury: Implement safety measures based on patient assessment     Problem: Skin/Tissue Integrity  Goal: Absence of new skin breakdown  Description: 1.  Monitor for areas of redness and/or skin breakdown  2.  Assess vascular access sites hourly  3.  Every 4-6 hours minimum:  Change oxygen saturation probe site  4.  Every 4-6 hours:  If on nasal continuous positive airway pressure, respiratory therapy assess nares and determine need for appliance change or resting period.  4/29/2024 2328 by Rosmery Churchill RN  Outcome: Progressing  4/29/2024 2328 by Rosmery Churchill RN  Outcome: Progressing     Problem: Pain  Goal: Verbalizes/displays adequate comfort level or baseline comfort level  4/29/2024 2328 by Rosmery Churchill RN  Outcome: Progressing  4/29/2024 2328 by Rosmery Churchill RN  Outcome: Progressing

## 2024-04-30 NOTE — PLAN OF CARE
Problem: Occupational Therapy - Adult  Goal: By Discharge: Performs self-care activities at highest level of function for planned discharge setting.  See evaluation for individualized goals.  Description: Occupational Therapy Goals   Long Term Goals  Initiated (2024) and to be accomplished within 3 week(s)  1. Pt will perform self-feeding with Mod I.  2. Pt will perform grooming with set-up.  3. Pt will perform UB bathing with set-up.  4. Pt will perform LB bathing with supv using AE as needed.  5. Pt will perform tub/shower transfer with CGA using least restrictive assistive device.   6. Pt will perform UB dressing with set-up.  7. Pt will perform LB dressing with supv using AE as needed.  8. Pt will perform toileting task with SBA.  9. Pt will perform toilet transfer with SBA using least restrictive assistive device.    Short Term Goals   Initiated (2024) and to be accomplished within 7 day(s), (to be reassessed by 5/3/2024)  1. Pt will perform self-feeding with set-up.   2. Pt will perform grooming with SBA.  3. Pt will perform UB bathing with supv.  4. Pt will perform LB bathing with CGA using AE as needed.  5. Pt will perform tub/shower transfer with Min A using least restrictive assistive device.  6. Pt will perform UB dressing with supv.  7. Pt will perform LB dressing with CGA/ Min A using AE as needed.  8. Pt will perform toileting task with Min A.  9. Pt will perform toilet transfer with CGA/ Min A using least restrictive assistive device.    Outcome: Progressing   Occupational Therapy TREATMENT    Patient: Zamzam Calixto   73 y.o.    Patient identified with name and : yes    Date: 2024    First Tx Session  Time In: 930  Time Out: 1030  Second Tx Session  Time In: 1411 (occupational therapy group tx session)  Time Out: 1441    Diagnosis: S/P AKA (above knee amputation) unilateral, left ([facility]) [Z89.612]   Precautions: Restrictions/Precautions: Fall Risk, General Precautions (pt used 1  Nursing notified  []  Caregiver present  []  Bed alarm activated  [x]  Wheelchair alarm activated  []  Chantal [employer]in[employer] offloaded  []  Heels activated for pressure relief  []  Telesitter/Care companion present    Entered Differentiated Treatment minutes: yes    Jana Silveira OT  4/30/2024

## 2024-04-30 NOTE — PROGRESS NOTES
Wound Prevention Checklist    Patient: Zamzam Calixto (73 y.o. female)  Date: 4/30/2024  Diagnosis: S/P AKA (above knee amputation) unilateral, left ([facility]) [Z89.612] S/P AKA (above knee amputation) unilateral, left ([facility])    Precautions:         []  Heel prevention boots placed on patient    []  Patient turned q2h during shift    []  Lift team ordered    []  Patient on [place] bed/Specialty bed    []  Each Wound is documented during shift (Stage, Color, drainage, odor, measurements, and dressings)    []  Dual skin check done with WAYNE Pruitt RN

## 2024-04-30 NOTE — PROGRESS NOTES
Pt is a 73 year old female admitted to ARU. Pt is alert and oriented, alone in the room. Pt states that she lives at home with her spouse in a 2 level home with 3 steps to enter-  is construting a ramp. Pt states that she has a first floor set up with access to a full bathroom and a tub shower. Pt states that she normally stays on the second floor with access to a tub shower.     Pt states that prior to admission she was able to self care and denies history with DME, outpatient and SNF. Pt states that she was active with [facility]    Pt states that she sees Dr Velazquez as her PCP and fills her medications at [facility] on [facility].     Pt states that she does not have a POA and states that her spouse - Moreno Calixto  ([phone]) - is her NOK contact. Pt states that her spouse will be her helper at dc and consents to scheduling caregiver education once appropriate.     Pt affirms her insurance as medicare and [employer] FEP. Sw reviewed dc planning. Pt states understanding and denies questions at this time.     Sw will follow.

## 2024-05-01 PROCEDURE — 94761 N-INVAS EAR/PLS OXIMETRY MLT: CPT

## 2024-05-01 PROCEDURE — 97110 THERAPEUTIC EXERCISES: CPT

## 2024-05-01 PROCEDURE — 97530 THERAPEUTIC ACTIVITIES: CPT

## 2024-05-01 PROCEDURE — 97116 GAIT TRAINING THERAPY: CPT

## 2024-05-01 PROCEDURE — 97535 SELF CARE MNGMENT TRAINING: CPT

## 2024-05-01 PROCEDURE — 97129 THER IVNTJ 1ST 15 MIN: CPT

## 2024-05-01 PROCEDURE — 97130 THER IVNTJ EA ADDL 15 MIN: CPT

## 2024-05-01 PROCEDURE — 6370000000 HC RX 637 (ALT 250 FOR IP): Performed by: EMERGENCY MEDICINE

## 2024-05-01 PROCEDURE — 1180000000 HC REHAB R&B

## 2024-05-01 PROCEDURE — 97150 GROUP THERAPEUTIC PROCEDURES: CPT

## 2024-05-01 PROCEDURE — 6360000002 HC RX W HCPCS: Performed by: EMERGENCY MEDICINE

## 2024-05-01 PROCEDURE — 99232 SBSQ HOSP IP/OBS MODERATE 35: CPT | Performed by: EMERGENCY MEDICINE

## 2024-05-01 RX ORDER — PREGABALIN 50 MG/1
150 CAPSULE ORAL 2 TIMES DAILY
Status: DISCONTINUED | OUTPATIENT
Start: 2024-05-01 | End: 2024-05-11 | Stop reason: HOSPADM

## 2024-05-01 RX ADMIN — APIXABAN 5 MG: 5 TABLET, FILM COATED ORAL at 21:00

## 2024-05-01 RX ADMIN — CETIRIZINE HYDROCHLORIDE 10 MG: 10 TABLET, FILM COATED ORAL at 09:05

## 2024-05-01 RX ADMIN — IPRATROPIUM BROMIDE 0.5 MG: 0.5 SOLUTION RESPIRATORY (INHALATION) at 16:31

## 2024-05-01 RX ADMIN — CINACALCET HYDROCHLORIDE 30 MG: 60 TABLET, FILM COATED ORAL at 09:05

## 2024-05-01 RX ADMIN — PREGABALIN 100 MG: 50 CAPSULE ORAL at 09:05

## 2024-05-01 RX ADMIN — BUDESONIDE 250 MCG: 0.25 INHALANT RESPIRATORY (INHALATION) at 09:05

## 2024-05-01 RX ADMIN — CINACALCET HYDROCHLORIDE 30 MG: 60 TABLET, FILM COATED ORAL at 21:00

## 2024-05-01 RX ADMIN — DULOXETINE HYDROCHLORIDE 60 MG: 30 CAPSULE, DELAYED RELEASE ORAL at 09:05

## 2024-05-01 RX ADMIN — ARFORMOTEROL TARTRATE 15 MCG: 15 SOLUTION RESPIRATORY (INHALATION) at 09:06

## 2024-05-01 RX ADMIN — BUDESONIDE 250 MCG: 0.25 INHALANT RESPIRATORY (INHALATION) at 21:09

## 2024-05-01 RX ADMIN — AMLODIPINE BESYLATE 10 MG: 10 TABLET ORAL at 09:05

## 2024-05-01 RX ADMIN — ARFORMOTEROL TARTRATE 15 MCG: 15 SOLUTION RESPIRATORY (INHALATION) at 21:10

## 2024-05-01 RX ADMIN — SULFAMETHOXAZOLE AND TRIMETHOPRIM 1 TABLET: 400; 80 TABLET ORAL at 09:05

## 2024-05-01 RX ADMIN — IPRATROPIUM BROMIDE 0.5 MG: 0.5 SOLUTION RESPIRATORY (INHALATION) at 09:05

## 2024-05-01 RX ADMIN — PREGABALIN 150 MG: 50 CAPSULE ORAL at 20:59

## 2024-05-01 RX ADMIN — APIXABAN 5 MG: 5 TABLET, FILM COATED ORAL at 09:05

## 2024-05-01 ASSESSMENT — PAIN SCALES - GENERAL
PAINLEVEL_OUTOF10: 0

## 2024-05-01 NOTE — PROGRESS NOTES
0800 Pt. Awake sitting up in bed alert and oriented x 4 no change in assessment. Left AKA dressing intact.   0930 Pt. Sitting up in chair eating breakfast.   1200 with therapy.   1330 able to transfer from bed to chair with assist.   1500 no change in assessment.   1800 Pt. Sitting up in chair eating dinner.

## 2024-05-01 NOTE — PROGRESS NOTES
[facility] PHYSICAL REHABILITATION  [address] [place] [id number]     INPATIENT REHABILITATION  DAILY PROGRESS NOTE     Date: 5/1/2024    Name: Zamzam Calixto Age / Sex: 73 y.o. / female   CSN: [id number] MRN: [id number]   Admit Date: 4/25/2024 Length of Stay: 6 days     Primary Rehabilitation Diagnosis   Impaired mobility and ADLs in the setting of a left above-knee amputation    Subjective:     I personally saw and evaluated this patient face-to-face today.  Patient is sitting in bed in no apparent distress.  Patient states that she is having a lot of phantom pain    Objective:     Vital Signs:  Patient Vitals for the past 24 hrs:   BP Temp Temp src Pulse Resp SpO2   05/01/24 1545 (!) 114/57 98.2 °F (36.8 °C) Oral 77 18 97 %   05/01/24 0830 (!) 143/70 98.3 °F (36.8 °C) Oral 70 18 98 %   04/30/24 2100 137/64 98.2 °F (36.8 °C) Oral 73 19 --        Physical Examination:  General:  Awake, alert  Cardiovascular:  S1S2+, RRR  Pulmonary:  CTA b/l  GI:  Soft, BS+, NT, ND  Extremities:  No edema  Left AKA noted    Current Medications:  Current Facility-Administered Medications   Medication Dose Route Frequency    pregabalin (LYRICA) capsule 150 mg  150 mg Oral BID    amLODIPine (NORVASC) tablet 10 mg  10 mg Oral Daily    apixaban (ELIQUIS) tablet 5 mg  5 mg Oral BID    cetirizine (ZYRTEC) tablet 10 mg  10 mg Oral Daily    cinacalcet (SENSIPAR) tablet 30 mg  30 mg Oral BID    DULoxetine (CYMBALTA) extended release capsule 60 mg  60 mg Oral Daily    HYDROcodone-acetaminophen (NORCO) 5-325 MG per tablet 1 tablet  1 tablet Oral Q4H PRN    naloxone (NARCAN) injection 0.4 mg  0.4 mg IntraVENous PRN    acetaminophen (TYLENOL) tablet 650 mg  650 mg Oral Q4H PRN    polyethylene glycol (GLYCOLAX) packet 17 g  17 g Oral Daily PRN    sulfamethoxazole-trimethoprim (BACTRIM;SEPTRA) 400-80 MG per tablet 1 tablet  1 tablet Oral Daily    albuterol (PROVENTIL) (2.5 MG/3ML) 0.083% nebulizer solution 2.5 mg  2.5 mg  assistance  Surface: Level tile  Distance: 45 ft x2   Stairs  Yes   (RW support)  Minimal assistance Stairs  Stairs?: No  Rails: Bilateral  Assistance: Minimal assistance, Moderate assistance (pt fearful with initial attempt)     Functional Progress:    OCCUPATIONAL THERAPY    ON ADMISSION MOST RECENT   Eating  Setup, Supervision Eating  Feeding: Setup   Grooming  Stand by assistance, Contact guard assistance Grooming  Grooming: Stand by assistance   Bathing  UB Bathing Stand by assistance  LB Bathing Minimal assistance Bathing  UB Bathing   UE Bathing: Supervision  LB Bathing   FLOW([id number]:last)@   Upper Body Dressing  Stand by assistance   Upper Body Dressing  UE Dressing: Supervision   Lower Body Dressing  Moderate assistance, Minimal assistance Lower Body Dressing  LE Dressing: Minimal assistance, Verbal cueing, Increased time to complete   Toileting  Moderate assistance Toileting  Toileting: Minimal assistance   Toilet Transfers  Minimal assistance Toilet Transfers  Toilet Transfer: Contact guard assistance   Tub Transfers  Tub Transfers: Not tested  Tub Transfers Comments: Not tested during initial OT evaluation due to fatigue, impaired standing balance/ coordination, and safety.  SHOWER Transfers  Shower - Transfer From: Wheelchair  Shower - Transfer Type: To and From  Shower - Transfer To: Transfer tub bench  Shower - Technique: Stand pivot (use of grab bar)  Shower Transfers: Not tested  Shower Transfers Comments: Not tested during initial OT evaluation due to fatigue, impaired standing balance/ coordination, and safety. Tub Transfers  Tub Transfers  Tub Transfers: Not tested  Tub Transfers Comments: Not tested during initial OT evaluation due to fatigue, impaired standing balance/ coordination, and safety.  SHOWER Transfers  Tub Transfers  Tub Transfers: Not tested  Tub Transfers Comments: Not tested during initial OT evaluation due to fatigue, impaired standing balance/ coordination, and safety.

## 2024-05-01 NOTE — PLAN OF CARE
Problem: Occupational Therapy - Adult  Goal: By Discharge: Performs self-care activities at highest level of function for planned discharge setting.  See evaluation for individualized goals.  Description: Occupational Therapy Goals   Long Term Goals  Initiated (2024) and to be accomplished within 3 week(s)  1. Pt will perform self-feeding with Mod I.  2. Pt will perform grooming with set-up.  3. Pt will perform UB bathing with set-up.  4. Pt will perform LB bathing with supv using AE as needed.  5. Pt will perform tub/shower transfer with CGA using least restrictive assistive device.   6. Pt will perform UB dressing with set-up.  7. Pt will perform LB dressing with supv using AE as needed.  8. Pt will perform toileting task with SBA.  9. Pt will perform toilet transfer with SBA using least restrictive assistive device.    Short Term Goals   Initiated (2024) and to be accomplished within 7 day(s), (to be reassessed by 5/3/2024)  1. Pt will perform self-feeding with set-up.   2. Pt will perform grooming with SBA.  3. Pt will perform UB bathing with supv.  4. Pt will perform LB bathing with CGA using AE as needed.  5. Pt will perform tub/shower transfer with Min A using least restrictive assistive device.  6. Pt will perform UB dressing with supv.  7. Pt will perform LB dressing with CGA/ Min A using AE as needed.  8. Pt will perform toileting task with Min A.  9. Pt will perform toilet transfer with CGA/ Min A using least restrictive assistive device.    Outcome: Progressing   Occupational Therapy TREATMENT    Patient: Zamzam Calixto   73 y.o.    Patient identified with name and : yes    Date: 2024    First Tx Session  Time In: 0700  Time Out: 0830    Diagnosis: S/P AKA (above knee amputation) unilateral, left ([facility]) [Z89.612]   Precautions:  Restrictions/Precautions: Fall Risk, General Precautions (pt used 1 L of O2 at home previously)               Chart, occupational therapy assessment, plan of  seated on tub bench during ADL shower. Pt demonstrating ability to bathe chest, abdomen, B axilla, and abdomen; pt washed her back with increased time.     LOWER BODY BATHING Daily Assessment   Functional Level  Contact guard assistance;Verbal cueing   Clinical Factors Pt performed LB bathing (CGA) seated on tub bench during ADL shower. Waterproof barrier applied over L AKA (residual limb) prior to showering. Pt bathed her upper thighs and RLE using washcloth seated on tub bench. Pt demonstrating ability to bring her RLE into crossed leg position in order to wash R foot. Pt instructed on weight shifting side to side to wash cristina area, hips, and sacrum from seated position.     SHOWER TRANSFER  Daily Assessment   Shower Transfer Technique Stand pivot (use of grab bar)   Shower Transfer Functional Level Contact Guard   Equipment Transfer tub bench   Shower Transfer Comments Stand pivot transfer performed (CGA) using grab bar (w/c <-> tub transfer bench); gait belt. Verbal cues for hand placement and body positioning.     UPPER BODY DRESSING/UNDRESSING Daily Assessment   Functional Level Supervision   Clinical Factors Pt donned bra and pull over shirt (supv) seated on tub bench.     LOWER BODY DRESSING/UNDRESSING Daily Assessment   Functional Level Minimal assistance;Verbal cueing;Increased time to complete   Clinical Factors Pt performed LB dressing (Min A) w/c level and in stance using FWW with VC's and increased time. Pt demonstrating ability to don L AKA  and limb sock with (Min A) and step by step verbal cues for sequencing. Pt donned underwear and micha pants (CGA/ Min A); pt demonstrating ability to thread clothing over her RLE and L residual limb with increased time. Pt stood (CGA) using FWW and (Min A) to pull up clothing over buttocks/ sacrum. Pt donned R slipper sock (set-up/ supv) using crossed leg technique seated in wheelchair.     TOILETING Daily Assessment   Functional Level Minimal assistance

## 2024-05-01 NOTE — PROGRESS NOTES
Patient received communion from [employer] volunteer for Zamzam Calixto, who is a 73 y.o.,female.      The  provided the following Interventions:  Continued the relationship of care and support.   Offered prayer and assurance of continued prayer on patients behalf.   Chart reviewed.    The following outcomes were achieved:  Patient received communion from [employer].     Assessment:  There are no further spiritual or [place] issues which require Spiritual Care Services interventions at this time.     Plan:  Chaplains will continue to follow and will provide pastoral care on an as needed/requested basis.   recommends bedside caregivers page  on duty if patient shows signs of acute spiritual or emotional distress.       Della Ho     Spiritual Care   [phone]

## 2024-05-01 NOTE — PROGRESS NOTES
Wound Prevention Checklist    Patient: Zamzam Calixto (73 y.o. female)  Date: 5/1/2024  Diagnosis: S/P AKA (above knee amputation) unilateral, left (HCC) [Z89.612] S/P AKA (above knee amputation) unilateral, left (HCC)    Precautions:         []  Heel prevention boots placed on patient    []  Patient turned q2h during shift    []  Lift team ordered    []  Patient on [place] bed/Specialty bed    []  Each Wound is documented during shift (Stage, Color, drainage, odor, measurements, and dressings)    []  Dual skin check done with WAYNE Rodarte RN

## 2024-05-01 NOTE — PLAN OF CARE
Problem: Physical Therapy - Adult  Goal: By Discharge: Performs mobility at highest level of function for planned discharge setting.  See evaluation for individualized goals.  Description: Physical Therapy Short Term Goals  Initiated 4/26/2024 and to be accomplished within 7 day(s) [5/3/24]  1.  Patient will supine to sit, sit to supine, roll right, and roll left  in bed with modified independence.    2.  Patient will transfer from bed to chair and chair to bed with contact guard assist using the least restrictive device.  3.  Patient will perform sit to stand with contact guard assist  4.  Patient will ambulate with contact guard assist for 25 feet with the least restrictive device.   5.  Patient will propel w/c 150 ft on level surface with supervision for mobility on unit.    Physical Therapy Long Term Goals  Initiated 4/26/2024 and to be accomplished within 21 day(s) [5/17/24]  1.  Patient will supine to sit, sit to supine, roll right, and roll left in bed with independence.    2.  Patient will transfer from bed to chair and chair to bed with modified independence using the least restrictive device.  3.  Patient will perform sit to stand with modified independence.  4.  Patient will ambulate with supervision/set-up for 50 feet with the least restrictive device.   5.  Patient will ascend/descend 5 stairs with at least one handrail(s) with minimal assistance/contact guard assist.   Outcome: Progressing     PHYSICAL THERAPY TREATMENT    Patient: Zamzam Calixto (73 y.o. female)  Date: 5/1/2024  Diagnosis: S/P AKA (above knee amputation) unilateral, left (HCC) [Z89.612]   Precautions: fall risk   Chart, physical therapy assessment, plan of care and goals were reviewed.    Time in: 1037  Time out : 1203    Patient seen for: Gait training, transfer training, therapeutic exercise and therapeutic activities    Pain:  Pt pain was reported as no c/o pre-treatment.  Pt pain was reported as no/co  Home health PT;24 hour supervision or assist  Further Equipment Recommendations for Discharge:        Rolling             Estimated Discharge Date: TBD    Activity Tolerance:   Good   Please refer to the flowsheet for vital signs taken during this treatment.    After treatment:   [] Patient left in no apparent distress in bed  [x] Patient left in no apparent distress sitting up in chair  [] Patient left in no apparent distress in w/c mobilizing under own power  [] Patient left in no apparent distress dining area  [] Patient left in no apparent distress mobilizing under own power  [x] Call bell left within reach  [] Nursing notified  [] Caregiver present  [] Bed alarm activated   [x] Chair alarm activated        Sarah Lucas, REKHAA  5/1/2024

## 2024-05-01 NOTE — PLAN OF CARE
Problem: SLP Adult - Disturbed Thought Process  Goal: By Discharge: Demonstrates cognitive skills at highest level of function for planned discharge setting.   See evaluation for individualized goals.  Description: Long term goals   1. Pt will independently recall trained safety recommendations/sequences (transfers) w/ 90% accy to facilitate safety and independence by 5/13.  2. Pt will recall novel short term auditory information w/ 85% accy given min cues to facilitate recall of new information by 5/13.  3.Pt will recall novel short term visual information w/ 85% accy given min cues to facilitate recall of new information by 5/13  4 Pt will demonstrate alternating attention during functional tasks w/ 90 % accy given min cues to facilitate ability to multitask and promote independence by 5/13.    5. Pt will demonstrate functional sequencing for 7-8 step tasks given min cues to facilitate independence by 5/13.    Short term goals:   1. Pt will recall trained safety recommendations/sequences (transfers) w/ 80% accy given min cues to facilitate safety and independence by 5/6.  2. Pt will recall novel short term auditory information w/ 75% accy given min cues to facilitate recall of new information by 5/6.  3.Pt will recall novel short term visual information w/ 75% accy given min cues to facilitate recall of new information by 5/6  4 Pt will demonstrate alternating attention during functional tasks w/ 80% accy given min cues to facilitate ability to multitask and promote independence by 5/6.    5. Pt will demonstrate functional sequencing for 4-6 step tasks given min cues to facilitate independence by 5/6.  Outcome: Progressing   SPEECH-LANGUAGE TREATMENT    Patient: Zamzam Calixto (73 y.o. female)  Date: 5/1/2024  Diagnosis: S/P AKA (above knee amputation) unilateral, left (HCC) [Z89.612] S/P AKA (above knee amputation) unilateral, left (HCC)      Precautions: Standard  PLOF: As per H&P     ASSESSMENT:  Pt  continues to make excellent gains in POC as seen by increased recall/confidence in transfers sequences and increased use of memory strategies. Pt endorsed \"brain fog\" is decreasing.     Progression toward goals:  [x]       Improving appropriately and progressing toward goals  []       Improving slowly and progressing toward goals  []       Not making progress toward goals and plan of care will be adjusted     PLAN:  Patient continues to benefit from skilled intervention to address the above impairments.  Continue treatment per established plan of care.     SUBJECTIVE:   Patient stated “this helps a lot”.    OBJECTIVE:   Daily Assessment:     Orientation  Overall Orientation Status: Within Normal Limits    Orientation:  Person, Place, Time, and Situation    Treatment & Interventions:    Neuro-Linguistics:    Recall therapy strategies, min cues 90% accy   Verbal sequencing of safe transfers (5-7 steps), min cues 90% accy   Visual recall of 12 pictures w/ use of internal memory aids of grouping & repetition    Immediate recall 11/12, independently    Delayed recall 4 minutes, 9 items independently; increased to 12/12 min cues                     Response & Tolerance to Activities:   Excellent participation    PAIN:  Start of Tx: 0  End of Tx: 0     After treatment:   [x]       Patient left in no apparent distress sitting up in chair  []       Patient left in no apparent distress in bed  [x]       Call bell left within reach  []       Nursing notified  []       Caregiver present  []       Bed alarm activated      COMMUNICATION/EDUCATION:   [] Patient educated regarding compensatory speech/language/comprehension techniques provided via demonstration, verbalization and teach back of comprehension  [x] Patient/family have participated as able in goal setting and plan of care.  [x] Patient/family agree to work toward stated goals and plan of care.  [] Patient understands intent and goals of therapy, neutral about

## 2024-05-02 PROCEDURE — 97116 GAIT TRAINING THERAPY: CPT

## 2024-05-02 PROCEDURE — 97110 THERAPEUTIC EXERCISES: CPT

## 2024-05-02 PROCEDURE — 97130 THER IVNTJ EA ADDL 15 MIN: CPT

## 2024-05-02 PROCEDURE — 6370000000 HC RX 637 (ALT 250 FOR IP): Performed by: EMERGENCY MEDICINE

## 2024-05-02 PROCEDURE — 97530 THERAPEUTIC ACTIVITIES: CPT

## 2024-05-02 PROCEDURE — 1180000000 HC REHAB R&B

## 2024-05-02 PROCEDURE — 97535 SELF CARE MNGMENT TRAINING: CPT

## 2024-05-02 PROCEDURE — 6360000002 HC RX W HCPCS: Performed by: EMERGENCY MEDICINE

## 2024-05-02 PROCEDURE — 97129 THER IVNTJ 1ST 15 MIN: CPT

## 2024-05-02 PROCEDURE — 99232 SBSQ HOSP IP/OBS MODERATE 35: CPT | Performed by: EMERGENCY MEDICINE

## 2024-05-02 RX ADMIN — AMLODIPINE BESYLATE 10 MG: 10 TABLET ORAL at 08:21

## 2024-05-02 RX ADMIN — SULFAMETHOXAZOLE AND TRIMETHOPRIM 1 TABLET: 400; 80 TABLET ORAL at 08:21

## 2024-05-02 RX ADMIN — APIXABAN 5 MG: 5 TABLET, FILM COATED ORAL at 21:15

## 2024-05-02 RX ADMIN — CINACALCET HYDROCHLORIDE 30 MG: 60 TABLET, FILM COATED ORAL at 08:21

## 2024-05-02 RX ADMIN — PREGABALIN 150 MG: 50 CAPSULE ORAL at 21:16

## 2024-05-02 RX ADMIN — ARFORMOTEROL TARTRATE 15 MCG: 15 SOLUTION RESPIRATORY (INHALATION) at 21:15

## 2024-05-02 RX ADMIN — IPRATROPIUM BROMIDE 0.5 MG: 0.5 SOLUTION RESPIRATORY (INHALATION) at 17:31

## 2024-05-02 RX ADMIN — IPRATROPIUM BROMIDE 0.5 MG: 0.5 SOLUTION RESPIRATORY (INHALATION) at 00:12

## 2024-05-02 RX ADMIN — BUDESONIDE 250 MCG: 0.25 INHALANT RESPIRATORY (INHALATION) at 08:20

## 2024-05-02 RX ADMIN — PREGABALIN 150 MG: 50 CAPSULE ORAL at 08:21

## 2024-05-02 RX ADMIN — IPRATROPIUM BROMIDE 0.5 MG: 0.5 SOLUTION RESPIRATORY (INHALATION) at 08:20

## 2024-05-02 RX ADMIN — DULOXETINE HYDROCHLORIDE 60 MG: 30 CAPSULE, DELAYED RELEASE ORAL at 08:21

## 2024-05-02 RX ADMIN — BUDESONIDE 250 MCG: 0.25 INHALANT RESPIRATORY (INHALATION) at 21:15

## 2024-05-02 RX ADMIN — ARFORMOTEROL TARTRATE 15 MCG: 15 SOLUTION RESPIRATORY (INHALATION) at 08:21

## 2024-05-02 RX ADMIN — CINACALCET HYDROCHLORIDE 30 MG: 60 TABLET, FILM COATED ORAL at 21:15

## 2024-05-02 RX ADMIN — CETIRIZINE HYDROCHLORIDE 10 MG: 10 TABLET, FILM COATED ORAL at 08:21

## 2024-05-02 RX ADMIN — APIXABAN 5 MG: 5 TABLET, FILM COATED ORAL at 08:21

## 2024-05-02 ASSESSMENT — PAIN SCALES - GENERAL
PAINLEVEL_OUTOF10: 0

## 2024-05-02 NOTE — PROGRESS NOTES
[facility] PHYSICAL REHABILITATION  [address] [place] [id number]     INPATIENT REHABILITATION  DAILY PROGRESS NOTE     Date: 5/2/2024    Name: Zamzam Calixto Age / Sex: 73 y.o. / female   CSN: [id number] MRN: [id number]   Admit Date: 4/25/2024 Length of Stay: 7 days     Primary Rehabilitation Diagnosis   Impaired mobility and ADLs in the setting of a left above-knee amputation    Subjective:     I personally saw and evaluated this patient face-to-face today.  Patient is sitting in bed in no apparent distress.   is at bedside.  Patient states that phantom pain is better today with the increased dose of Lyrica    Objective:     Vital Signs:  Patient Vitals for the past 24 hrs:   BP Temp Temp src Pulse Resp SpO2   05/02/24 1656 138/85 98.2 °F (36.8 °C) Oral 78 18 100 %   05/02/24 0749 (!) 142/61 97.8 °F (36.6 °C) Oral 78 19 97 %   05/01/24 2045 134/67 97.6 °F (36.4 °C) Oral 77 19 99 %        Physical Examination:  General:  Awake, alert, follows command and responds appropriately  Cardiovascular:  S1S2+, RRR  Pulmonary:  CTA b/l  GI:  Soft, BS+, NT, ND  Extremities:  No edema  Left AKA noted    Current Medications:  Current Facility-Administered Medications   Medication Dose Route Frequency    pregabalin (LYRICA) capsule 150 mg  150 mg Oral BID    amLODIPine (NORVASC) tablet 10 mg  10 mg Oral Daily    apixaban (ELIQUIS) tablet 5 mg  5 mg Oral BID    cetirizine (ZYRTEC) tablet 10 mg  10 mg Oral Daily    cinacalcet (SENSIPAR) tablet 30 mg  30 mg Oral BID    DULoxetine (CYMBALTA) extended release capsule 60 mg  60 mg Oral Daily    HYDROcodone-acetaminophen (NORCO) 5-325 MG per tablet 1 tablet  1 tablet Oral Q4H PRN    naloxone (NARCAN) injection 0.4 mg  0.4 mg IntraVENous PRN    acetaminophen (TYLENOL) tablet 650 mg  650 mg Oral Q4H PRN    polyethylene glycol (GLYCOLAX) packet 17 g  17 g Oral Daily PRN    sulfamethoxazole-trimethoprim (BACTRIM;SEPTRA) 400-80 MG per tablet 1 tablet  1

## 2024-05-02 NOTE — PLAN OF CARE
Problem: Safety - Adult  Goal: Free from fall injury  5/2/2024 1019 by Elham Landrum RN  Outcome: Progressing  5/1/2024 2320 by Shahida Gomez RN  Outcome: Progressing     Problem: ABCDS Injury Assessment  Goal: Absence of physical injury  5/2/2024 1019 by Elham Landrum RN  Outcome: Progressing  5/1/2024 2320 by Shahida Gomze RN  Outcome: Progressing     Problem: Skin/Tissue Integrity  Goal: Absence of new skin breakdown  Description: 1.  Monitor for areas of redness and/or skin breakdown  2.  Assess vascular access sites hourly  3.  Every 4-6 hours minimum:  Change oxygen saturation probe site  4.  Every 4-6 hours:  If on nasal continuous positive airway pressure, respiratory therapy assess nares and determine need for appliance change or resting period.  5/2/2024 1019 by Elham Landrum RN  Outcome: Progressing  5/1/2024 2320 by Shahida Gomez RN  Outcome: Progressing     Problem: Pain  Goal: Verbalizes/displays adequate comfort level or baseline comfort level  Outcome: Progressing

## 2024-05-02 NOTE — PLAN OF CARE
Problem: Occupational Therapy - Adult  Goal: By Discharge: Performs self-care activities at highest level of function for planned discharge setting.  See evaluation for individualized goals.  Description: Occupational Therapy Goals   Long Term Goals  Initiated (2024) and to be accomplished within 3 week(s)  1. Pt will perform self-feeding with Mod I.  2. Pt will perform grooming with set-up.  3. Pt will perform UB bathing with set-up.  4. Pt will perform LB bathing with supv using AE as needed.  5. Pt will perform tub/shower transfer with CGA using least restrictive assistive device.   6. Pt will perform UB dressing with set-up.  7. Pt will perform LB dressing with supv using AE as needed.  8. Pt will perform toileting task with SBA.  9. Pt will perform toilet transfer with SBA using least restrictive assistive device.    Short Term Goals   Initiated (2024) and to be accomplished within 7 day(s), (to be reassessed by 5/3/2024)  1. Pt will perform self-feeding with set-up.   2. Pt will perform grooming with SBA.  3. Pt will perform UB bathing with supv.  4. Pt will perform LB bathing with CGA using AE as needed.  5. Pt will perform tub/shower transfer with Min A using least restrictive assistive device.  6. Pt will perform UB dressing with supv.  7. Pt will perform LB dressing with CGA/ Min A using AE as needed.  8. Pt will perform toileting task with Min A.  9. Pt will perform toilet transfer with CGA/ Min A using least restrictive assistive device.    Outcome: Progressing   Occupational Therapy TREATMENT    Patient: Zamzam Calixto   73 y.o.    Patient identified with name and : yes    Date: 2024    First Tx Session  Time In: 1035  Time Out: 1105  Second Tx Session  Time In: 1330  Time Out: 1430    Diagnosis: S/P AKA (above knee amputation) unilateral, left (HCC) [Z89.612]   Precautions: Restrictions/Precautions: Fall Risk, General Precautions (pt used 1 L of O2 at home previously)            to propel over level surface. Verbal cues for managing R/ L wheelchair brakes.   Stand Pivot Transfers: Contact guard assistance;Stand by assistance (Stand pivot xfer performed (CGA/ SBA) using FWW; gait belt (stand pivot xfer performed using RLE). S/P left AKA)  Sit to stand: Stand by assistance;Contact guard assistance (verbal cues for hand placement)  Stand to sit: Contact guard assistance;Stand by assistance (verbal cues for hand placement)  Transfer Comments: Stand pivot xfer performed (CGA/ SBA) using FWW; gait belt (stand pivot xfer performed using RLE). S/P left AKA      WHEELCHAIR/BED TRANSFER INDEPENDENCE Daily Assessment   Transfer Technique Stand step;Stand pivot   Level of Assistance Stand by assistance   Equipment Wheelchair;Other (FWW; gait belt)   Comments Stand pivot xfer performed (SBA) on RLE using FWW; gait belt from (EOB > w/c). VC's for hand placement. S/P L AKA.     Activity Tolerance:  Patient Tolerated treatment well       EDUCATION:   Education Given To: Patient  Education Provided: Safety;Transfer Training;Mobility Training;Home Exercise Program;Energy Conservation  Education Method: Demonstration;Verbal  Barriers to Learning: None  Education Outcome: Verbalized understanding;Demonstrated understanding;Continued education needed    ASSESSMENT:  Patient continues to benefit from skilled occupational therapy intervention to address decreased (I) with ADL's, decreased functional strength/ endurance, impaired balance/ coordination, L AKA (above knee amputation), phantom limb pain/ phantom limb sensation, decreased safety awareness, slow processing, and impaired functional transfers/ mobility which negatively impact pt performance, independence, and safety with ADL/IADL's and functional mobility for return to prior level of functioning.   Progression toward goals:  [x]          Improving appropriately and progressing toward goals  []          Improving slowly and progressing toward goals  []           Not making progress toward goals and plan of care will be adjusted      PLAN:  Patient continues to benefit from skilled intervention to address the above impairments.  Continue treatment per established plan of care.  Discharge Recommendations:   Home health;  home occupational therapy with 24 hr assist  Further Equipment Recommendations for Discharge:   3-in-1 commode; tub transfer bench   Estimated LOS: TBD      COMMUNICATION/EDUCATION:   [] Home safety education was provided and the patient/caregiver indicated understanding.  [x] Patient/family have participated as able in goal setting and plan of care.  [x] Patient/family agree to work toward stated goals and plan of care.  [] Patient understands intent and goals of therapy, but is neutral about his/her participation.  [] Patient is unable to participate in goal setting and plan of care.    Please refer to the flowsheet for vital signs taken during this treatment.  After treatment:   [x]  Patient left in no apparent distress sitting up in wheelchair with needs met  []  Patient left in no apparent distress in bed  []  Patient handoff to ST/ PT  [x]  Call bell and immediate needs left within reach  [x]  Nursing notified  []  Caregiver present  []  Bed alarm activated  [x]  Wheelchair alarm activated  []  Chantal Prominences offloaded  []  Heels activated for pressure relief  []  Telesitter/Care companion present    Entered Differentiated Treatment minutes:  yes    Jana Silveira OT  5/2/2024

## 2024-05-02 NOTE — PROGRESS NOTES
[facility] Physical Rehabilitation  Team Conference  Date: 5/2/2024  ACTIVE MONITORING OF CO-MORBID CONDITIONS/MANAGEMENT OF NEW MEDICAL ISSUES: S/P AKA (above knee amputation) unilateral, left ([facility]) [Z89.612]    **Please refer to patient's electronic medical record to view the care plan and goals**  NURSING Making gains Yes   Skin Care: Skin Integumentary   Skin Color: Pink  Skin Condition/Temp: Dry, Warm  Skin Integrity: Incision (see LDA)  Location: Left AKA  Skin Fold Management: Yes    Wound Care: surgical site - BKA       Pain: Pain Assessment  Pain Assessment: None - Denies Pain (05/02/24 0749)  Pain Level: 0 (05/02/24 0749)  Patient's Stated Pain Goal: 0 - No pain (05/02/24 0749)  Pain Location: Leg (04/29/24 1103)  Pain Orientation: Left (04/29/24 1103)  Pain Descriptors: Aching (04/29/24 1103)  Functional Pain Assessment: Activities are not prevented (04/29/24 1103)  Pain Type: Surgical pain (04/29/24 1103)  Pain Frequency: Intermittent (04/29/24 1103)  Pain Onset: Gradual (04/29/24 1103)  Non-Pharmaceutical Pain Intervention(s): Repositioned;Rest (04/29/24 1103)  Response to Pain Intervention: Patient satisfied (04/29/24 1203)  Side Effects: No reported side effects (04/29/24 1203)  Multiple Pain Sites: No (04/29/24 1203)    Bladder/Bowel Urine Assessment  Urinary Status: Voiding  Urinary Incontinence: Absent  Urine Color: Unable to assess (urine is mixed with stool)  Urine Appearance: Clear  Urine Odor: No odor Stool Assessment  Incontinence: No  Stool Appearance: Soft  Stool Color: Brown  Stool Amount: Large  Stool Source: Rectum  Last BM (including prior to admit): 05/02/24   Goal: Patient will have timely wound healing       Barrier: poor circulation, decreased mobility       Intervention: maintain wound care, monitor vital signs      Lab value concerns   No       Occupational Therapy  Making gains  Yes   Goal: Patient will perform toileting with stand by assistance.       Barrier: decreased

## 2024-05-02 NOTE — PLAN OF CARE
Problem: Physical Therapy - Adult  Goal: By Discharge: Performs mobility at highest level of function for planned discharge setting.  See evaluation for individualized goals.  Description: Physical Therapy Short Term Goals  Initiated 4/26/2024 and to be accomplished within 7 day(s) [5/3/24]  1.  Patient will supine to sit, sit to supine, roll right, and roll left  in bed with modified independence.    2.  Patient will transfer from bed to chair and chair to bed with contact guard assist using the least restrictive device.  3.  Patient will perform sit to stand with contact guard assist  4.  Patient will ambulate with contact guard assist for 25 feet with the least restrictive device.   5.  Patient will propel w/c 150 ft on level surface with supervision for mobility on unit.    Physical Therapy Long Term Goals  Initiated 4/26/2024 and to be accomplished within 21 day(s) [5/17/24]  1.  Patient will supine to sit, sit to supine, roll right, and roll left in bed with independence.    2.  Patient will transfer from bed to chair and chair to bed with modified independence using the least restrictive device.  3.  Patient will perform sit to stand with modified independence.  4.  Patient will ambulate with supervision/set-up for 50 feet with the least restrictive device.   5.  Patient will ascend/descend 5 stairs with at least one handrail(s) with minimal assistance/contact guard assist.   Outcome: Progressing      PHYSICAL THERAPY TREATMENT    Patient: Zamzam Calixto (73 y.o. female)  Date: 5/2/2024  Diagnosis: S/P AKA (above knee amputation) unilateral, left ([facility]) [Z89.612]   Precautions: fall risk   Chart, physical therapy assessment, plan of care and goals were reviewed.    Time in: 1105  Time out: 1208    Time in: 1449  Time out : 1556    Patient seen for: gait training, transfer training, therapeutic exercise, therapeutic activities    Pain:  Pt pain was reported as no c/o pre-treatment.  Pt pain was reported  as no c/o post-treatment.  Intervention: N/A    Patient identified with name and : Yes    SUBJECTIVE:      Patient reports that she feels good today.     OBJECTIVE DATA SUMMARY:    Objective:   Education: Education Given To: Patient  Education Provided: Safety;Transfer Training;Mobility Training;Home Exercise Program;Energy Conservation  Education Provided Comments: Patient educated on the purpose of and how to perform step up and overs on 4-inch step in parallel bars to decrease patient's fear on stair negotiation.  Education Method: Demonstration;Verbal  Barriers to Learning: None  Education Outcome: Verbalized understanding;Demonstrated understanding  BED/MAT MOBILITY Daily Assessment    Rolling Right Modified independent    Rolling Left Modified independent    Supine to Sit Modified independent    Sit to Supine Modified independent      TRANSFERS Daily Assessment    Sit to Stand Stand by assistance for balance and safety    Transfer Assist Score    Stand by assistance          Comments    Patient performed stand step transfer from w/c <-> left side of mat table with RW and SBA for balance and safety.     Car Transfer      Car Type         GAIT Daily Assessment    Gait Deviations Slow Miranda;Decreased step length;Decreased step height    Assistive device Rolling Walker    Ambulation assistance - surface  Stand by assistance  Level tile    Distance 51 feet    Comments Patient ambulated 51 feet with a swing through gait pattern, RW, wheelchair follow, and SBA for balance and safety. Patient required minimal vc to ensure that she is not pushing RW too far ahead of her to increase patient's safety.     Ambulation-uneven surface              BALANCE Daily Assessment    Posture Good    Sitting - Static Good    Sitting - Dynamic Good    Standing - Static Good (with one hand onrolling walker for support)    Standing - Dynamic Good (with one hand on rolling walker for support)    Comments        WHEELCHAIR  goals and plan of care will be adjusted      PLAN:  Patient continues to benefit from skilled intervention to address the above impairments.  Continue treatment per established plan of care.  Discharge Recommendations:  Home with Home health PT;24 hour supervision or assist  Further Equipment Recommendations for Discharge:  Standard     Rolling             Estimated Discharge Date: 5/11/2024    Activity Tolerance:   Good   Please refer to the flowsheet for vital signs taken during this treatment.    After treatment:   [] Patient left in no apparent distress in bed  [x] Patient left in no apparent distress sitting up in chair  [] Patient left in no apparent distress in w/c mobilizing under own power  [] Patient left in no apparent distress dining area  [] Patient left in no apparent distress mobilizing under own power  [x] Call bell left within reach  [] Nursing notified  [] Caregiver present  [] Bed alarm activated   [x] Chair alarm activated        BUBBA Laughlin  5/2/2024

## 2024-05-02 NOTE — PROGRESS NOTES
conducted a Follow up consultation and Spiritual Assessment for Zamzam Calixto, who is a 73 y.o.,female.      The  provided the following Interventions:  Continued the relationship of care and support.   Listened empathically.  Offered prayer, holy communion, and assurance of continued prayer on behalf of the patient.    Intervention/Outcome:  Patient received holy communion  Patient expressed gratitude to 's visit.    Plan:  Continue support as per need or as a request basis.  All caregivers, family members, call  if patient develops any spiritual or emotional crises.     Juliet Low, [facility]  Spiritual Care  [phone]

## 2024-05-02 NOTE — PLAN OF CARE
Problem: SLP Adult - Disturbed Thought Process  Goal: By Discharge: Demonstrates cognitive skills at highest level of function for planned discharge setting.   See evaluation for individualized goals.  Description: Long term goals   1. Pt will independently recall trained safety recommendations/sequences (transfers) w/ 90% accy to facilitate safety and independence by 5/13.  2. Pt will recall novel short term auditory information w/ 85% accy given min cues to facilitate recall of new information by 5/13.  3.Pt will recall novel short term visual information w/ 85% accy given min cues to facilitate recall of new information by 5/13  4 Pt will demonstrate alternating attention during functional tasks w/ 90 % accy given min cues to facilitate ability to multitask and promote independence by 5/13.    5. Pt will demonstrate functional sequencing for 7-8 step tasks given min cues to facilitate independence by 5/13.    Short term goals:   1. Pt will recall trained safety recommendations/sequences (transfers) w/ 80% accy given min cues to facilitate safety and independence by 5/6.  2. Pt will recall novel short term auditory information w/ 75% accy given min cues to facilitate recall of new information by 5/6.  3.Pt will recall novel short term visual information w/ 75% accy given min cues to facilitate recall of new information by 5/6  4 Pt will demonstrate alternating attention during functional tasks w/ 80% accy given min cues to facilitate ability to multitask and promote independence by 5/6.    5. Pt will demonstrate functional sequencing for 4-6 step tasks given min cues to facilitate independence by 5/6.  Outcome: Progressing     SPEECH-LANGUAGE TREATMENT    Patient: Zamzam Calixto (73 y.o. female)  Date: 5/2/2024  Diagnosis: S/P AKA (above knee amputation) unilateral, left (HCC) [Z89.612] S/P AKA (above knee amputation) unilateral, left (HCC)      Precautions: Standard  PLOF: As per H&P     ASSESSMENT:  Pt  setting/plan of care secondary to cognition, hearing/vision deficits; education ongoing with interdisciplinary staff     Time in: 09:27  Time out: 09:57  Minutes: 30     Anna Jensen M.S., CCC - SLP  Speech Language Pathologist  [facility]

## 2024-05-03 PROCEDURE — 97129 THER IVNTJ 1ST 15 MIN: CPT

## 2024-05-03 PROCEDURE — 6360000002 HC RX W HCPCS: Performed by: EMERGENCY MEDICINE

## 2024-05-03 PROCEDURE — 99232 SBSQ HOSP IP/OBS MODERATE 35: CPT | Performed by: EMERGENCY MEDICINE

## 2024-05-03 PROCEDURE — 87205 SMEAR GRAM STAIN: CPT

## 2024-05-03 PROCEDURE — 97110 THERAPEUTIC EXERCISES: CPT

## 2024-05-03 PROCEDURE — 97116 GAIT TRAINING THERAPY: CPT

## 2024-05-03 PROCEDURE — 6370000000 HC RX 637 (ALT 250 FOR IP): Performed by: EMERGENCY MEDICINE

## 2024-05-03 PROCEDURE — 97130 THER IVNTJ EA ADDL 15 MIN: CPT

## 2024-05-03 PROCEDURE — 94761 N-INVAS EAR/PLS OXIMETRY MLT: CPT

## 2024-05-03 PROCEDURE — 97530 THERAPEUTIC ACTIVITIES: CPT

## 2024-05-03 PROCEDURE — 1180000000 HC REHAB R&B

## 2024-05-03 PROCEDURE — 87070 CULTURE OTHR SPECIMN AEROBIC: CPT

## 2024-05-03 PROCEDURE — 97535 SELF CARE MNGMENT TRAINING: CPT

## 2024-05-03 PROCEDURE — 97150 GROUP THERAPEUTIC PROCEDURES: CPT

## 2024-05-03 RX ORDER — ACETAMINOPHEN 325 MG/1
650 TABLET ORAL EVERY 4 HOURS PRN
Status: DISCONTINUED | OUTPATIENT
Start: 2024-05-03 | End: 2024-05-11 | Stop reason: HOSPADM

## 2024-05-03 RX ORDER — SULFAMETHOXAZOLE AND TRIMETHOPRIM 800; 160 MG/1; MG/1
1 TABLET ORAL EVERY 12 HOURS SCHEDULED
Status: DISCONTINUED | OUTPATIENT
Start: 2024-05-03 | End: 2024-05-10

## 2024-05-03 RX ADMIN — IPRATROPIUM BROMIDE 0.5 MG: 0.5 SOLUTION RESPIRATORY (INHALATION) at 08:24

## 2024-05-03 RX ADMIN — CETIRIZINE HYDROCHLORIDE 10 MG: 10 TABLET, FILM COATED ORAL at 08:24

## 2024-05-03 RX ADMIN — CINACALCET HYDROCHLORIDE 30 MG: 60 TABLET, FILM COATED ORAL at 21:12

## 2024-05-03 RX ADMIN — SULFAMETHOXAZOLE AND TRIMETHOPRIM 1 TABLET: 400; 80 TABLET ORAL at 08:24

## 2024-05-03 RX ADMIN — ARFORMOTEROL TARTRATE 15 MCG: 15 SOLUTION RESPIRATORY (INHALATION) at 08:27

## 2024-05-03 RX ADMIN — IPRATROPIUM BROMIDE 0.5 MG: 0.5 SOLUTION RESPIRATORY (INHALATION) at 15:43

## 2024-05-03 RX ADMIN — SULFAMETHOXAZOLE AND TRIMETHOPRIM 1 TABLET: 800; 160 TABLET ORAL at 21:12

## 2024-05-03 RX ADMIN — AMLODIPINE BESYLATE 10 MG: 10 TABLET ORAL at 08:24

## 2024-05-03 RX ADMIN — DULOXETINE HYDROCHLORIDE 60 MG: 30 CAPSULE, DELAYED RELEASE ORAL at 08:24

## 2024-05-03 RX ADMIN — IPRATROPIUM BROMIDE 0.5 MG: 0.5 SOLUTION RESPIRATORY (INHALATION) at 00:07

## 2024-05-03 RX ADMIN — APIXABAN 5 MG: 5 TABLET, FILM COATED ORAL at 21:12

## 2024-05-03 RX ADMIN — BUDESONIDE 250 MCG: 0.25 INHALANT RESPIRATORY (INHALATION) at 08:24

## 2024-05-03 RX ADMIN — ARFORMOTEROL TARTRATE 15 MCG: 15 SOLUTION RESPIRATORY (INHALATION) at 21:12

## 2024-05-03 RX ADMIN — BUDESONIDE 250 MCG: 0.25 INHALANT RESPIRATORY (INHALATION) at 21:12

## 2024-05-03 RX ADMIN — PREGABALIN 150 MG: 50 CAPSULE ORAL at 08:24

## 2024-05-03 RX ADMIN — CINACALCET HYDROCHLORIDE 30 MG: 60 TABLET, FILM COATED ORAL at 08:24

## 2024-05-03 RX ADMIN — APIXABAN 5 MG: 5 TABLET, FILM COATED ORAL at 08:24

## 2024-05-03 RX ADMIN — PREGABALIN 150 MG: 50 CAPSULE ORAL at 21:12

## 2024-05-03 ASSESSMENT — PAIN SCALES - GENERAL
PAINLEVEL_OUTOF10: 0

## 2024-05-03 NOTE — PLAN OF CARE
Problem: Safety - Adult  Goal: Free from fall injury  5/3/2024 1041 by Elham Landrum RN  Outcome: Progressing  5/2/2024 2216 by Rosmery Churchill RN  Outcome: Progressing  Flowsheets (Taken 5/2/2024 2215)  Free From Fall Injury: Instruct family/caregiver on patient safety     Problem: ABCDS Injury Assessment  Goal: Absence of physical injury  5/3/2024 1041 by Elham Landrum RN  Outcome: Progressing  5/2/2024 2216 by Rosmery Churchill RN  Outcome: Progressing  Flowsheets (Taken 5/2/2024 2215)  Absence of Physical Injury: Implement safety measures based on patient assessment     Problem: Skin/Tissue Integrity  Goal: Absence of new skin breakdown  Description: 1.  Monitor for areas of redness and/or skin breakdown  2.  Assess vascular access sites hourly  3.  Every 4-6 hours minimum:  Change oxygen saturation probe site  4.  Every 4-6 hours:  If on nasal continuous positive airway pressure, respiratory therapy assess nares and determine need for appliance change or resting period.  5/3/2024 1041 by Elham Landrum RN  Outcome: Progressing  5/2/2024 2216 by Rosmery Churchill RN  Outcome: Progressing     Problem: Pain  Goal: Verbalizes/displays adequate comfort level or baseline comfort level  5/3/2024 1041 by Elham Landrum RN  Outcome: Progressing  5/2/2024 2216 by Rosmery Churchill RN  Outcome: Progressing

## 2024-05-03 NOTE — PLAN OF CARE
Problem: Safety - Adult  Goal: Free from fall injury  5/2/2024 2216 by Rosmery Churchill RN  Outcome: Progressing  Flowsheets (Taken 5/2/2024 2215)  Free From Fall Injury: Instruct family/caregiver on patient safety  5/2/2024 1019 by Elham Landrum RN  Outcome: Progressing     Problem: ABCDS Injury Assessment  Goal: Absence of physical injury  5/2/2024 2216 by Rosmery Churchill RN  Outcome: Progressing  Flowsheets (Taken 5/2/2024 2215)  Absence of Physical Injury: Implement safety measures based on patient assessment  5/2/2024 1019 by Elham Landrum RN  Outcome: Progressing     Problem: Skin/Tissue Integrity  Goal: Absence of new skin breakdown  Description: 1.  Monitor for areas of redness and/or skin breakdown  2.  Assess vascular access sites hourly  3.  Every 4-6 hours minimum:  Change oxygen saturation probe site  4.  Every 4-6 hours:  If on nasal continuous positive airway pressure, respiratory therapy assess nares and determine need for appliance change or resting period.  5/2/2024 2216 by Rosmery Churchill RN  Outcome: Progressing  5/2/2024 1019 by Elham Landrum RN  Outcome: Progressing     Problem: Pain  Goal: Verbalizes/displays adequate comfort level or baseline comfort level  5/2/2024 2216 by Rosmery Churchill RN  Outcome: Progressing  5/2/2024 1019 by Elham Landrum RN  Outcome: Progressing

## 2024-05-03 NOTE — PLAN OF CARE
Problem: Physical Therapy - Adult  Goal: By Discharge: Performs mobility at highest level of function for planned discharge setting.  See evaluation for individualized goals.  Description: Physical Therapy Short Term Goals  Initiated 4/26/2024 and to be accomplished within 7 day(s) [5/3/24]  1.  Patient will supine to sit, sit to supine, roll right, and roll left  in bed with modified independence.    2.  Patient will transfer from bed to chair and chair to bed with contact guard assist using the least restrictive device.  3.  Patient will perform sit to stand with contact guard assist  4.  Patient will ambulate with contact guard assist for 25 feet with the least restrictive device.   5.  Patient will propel w/c 150 ft on level surface with supervision for mobility on unit.    Physical Therapy Long Term Goals  Initiated 4/26/2024 and to be accomplished within 21 day(s) [5/17/24]  1.  Patient will supine to sit, sit to supine, roll right, and roll left in bed with independence.    2.  Patient will transfer from bed to chair and chair to bed with modified independence using the least restrictive device.  3.  Patient will perform sit to stand with modified independence.  4.  Patient will ambulate with supervision/set-up for 50 feet with the least restrictive device.   5.  Patient will ascend/descend 5 stairs with at least one handrail(s) with minimal assistance/contact guard assist.   Outcome: Progressing     PHYSICAL THERAPY TREATMENT    Patient: Zamzam Calixto (73 y.o. female)  Date: 5/3/2024  Diagnosis: S/P AKA (above knee amputation) unilateral, left (HCC) [Z89.612]   Precautions: fall risk  Chart, physical therapy assessment, plan of care and goals were reviewed.    Time in: 0934  Time out : 1105    Patient seen for: gait training, transfer training, therapeutic exercise, therapeutic activities    Pain:  Pt pain was reported as no c/o pre-treatment.  Pt pain was reported as no c/o  Assessment   Able to Propel 408 ft   Assist Level Stand by assistance   Curbs/ramps assistance required     Wheelchair management Manages B hand brakes    Comments Patient propelled w/c 408 feet outside in the courtyard with varying concrete textures to promote community w/c management with modified independence.     THERAPEUTIC EXERCISES Daily Assessment     Supine: Ankle 4-way with red Tband 2x15, heel slides 2x15, hip abduction 2x15 bilaterally, SLR 2x15, bridges 2x15, SAQ with 3# and small red bolster 2x15, marching with 3# 2x15     Sidelying; left hip abduction 2x15    Seated: LAQ with 3# 2x15, HR/TR 2x15 with 3#, marching with 3# 2x15 bilaterally, hip abduction with resistance from therapist 2x15 bilaterally, hip adduction with resistance from therapist 2x15 on RLE    Standing in parallel bars: hip flexion 2x15, hip abduction 2x15, hip extension 2x15, step up and overs on 4-inch step and CGA for balance and safety 4 times.     All exercises used to improve gait, transfers, therapeutic activities to allow the patient to return home safely and independently.       ASSESSMENT:  Patient was able to perform 150 ft with RW and SBA for safety which shows improvement since last session where the patient ambulated 51 feet. Patient was able to perform stand pivot transfer without an AD and CGA for balance. and safety. Patient will benefit from continued skilled intervention to increase strength in BLEs, improve ambulation, transfers, and balance.   Progression toward goals:  [x]      Improving appropriately and progressing toward goals  []      Improving slowly and progressing toward goals  []      Not making progress toward goals and plan of care will be adjusted      PLAN:  Patient continues to benefit from skilled intervention to address the above impairments.  Continue treatment per established plan of care.  Discharge Recommendations:  Home with Home health PT;24 hour supervision or assist  Further Equipment

## 2024-05-03 NOTE — PLAN OF CARE
Problem: SLP Adult - Disturbed Thought Process  Goal: By Discharge: Demonstrates cognitive skills at highest level of function for planned discharge setting.   See evaluation for individualized goals.  Description: Long term goals   1. Pt will independently recall trained safety recommendations/sequences (transfers) w/ 90% accy to facilitate safety and independence by 5/13. GOAL MET  2. Pt will recall novel short term auditory information w/ 85% accy given min cues to facilitate recall of new information by 5/13. GOAL MET  3.Pt will recall novel short term visual information w/ 85% accy given min cues to facilitate recall of new information by 5/13 GOAL MET  4 Pt will demonstrate alternating attention during functional tasks w/ 90 % accy given min cues to facilitate ability to multitask and promote independence by 5/13.  GOAL MET  5. Pt will demonstrate functional sequencing for 7-8 step tasks given min cues to facilitate independence by 5/13. GOAL MET    Short term goals:   1. Pt will recall trained safety recommendations/sequences (transfers) w/ 80% accy given min cues to facilitate safety and independence by 5/6. GOAL MET  2. Pt will recall novel short term auditory information w/ 75% accy given min cues to facilitate recall of new information by 5/6. GOAL MET  3.Pt will recall novel short term visual information w/ 75% accy given min cues to facilitate recall of new information by 5/6 GOAL MET  4 Pt will demonstrate alternating attention during functional tasks w/ 80% accy given min cues to facilitate ability to multitask and promote independence by 5/6.  GOAL MET  5. Pt will demonstrate functional sequencing for 4-6 step tasks given min cues to facilitate independence by 5/6. GOAL MET  Outcome: Completed       SPEECH LANGUAGE PATHOLOGY DISCHARGE SUMMARY      Patient: Zamzam Calixto (73 y.o. female)  Date: 5/3/2024  Primary Diagnosis: S/P AKA (above knee amputation) unilateral, left ([facility]) [Z89.612]        Precautions: Aspiration    Speech-Language Tx:  -Selective attention for deductive puzzle; 100% accy, independently  -Short term recall of auditory information (pt generated list) w/ use of repetition strategy; 90% accy, min cues  -verbal sequencing of transfers (8-9 steps) min cues 100% accy    Progression toward goals:  [x]         Improving appropriately and progressing toward goals  []         Improving slowly and progressing toward goals  []         Not making progress toward goals and plan of care will be adjusted      PLAN:  Recommendations and Planned Interventions:  Pt has reached max potential with skilled SLP interventions. Further SLP interventions are not warranted at this time.      SUBJECTIVE:   Patient stated “I'm thinking so much clearer now”.    OBJECTIVE:   Daily Assessment:     Orientation  Overall Orientation Status: Within Normal Limits    Orientation:  Person, Place, Time, and Situation    Motor Speech Initial Assessment Discharge Progress Assessment 5/3/2024    Apraxic Characteristics: None  Dysarthric Characteristics: None  Intelligibility: No impairment  Apraxic Characteristics: None  Dysarthric Characteristics: None  Intelligibility: No impairment     Language Comprehension and Expression Initial Assessment Discharge Progress Assessment 5/3/2024    Comprehension: Within Functional Limits  Comprehension: Within Functional Limits    Primary Mode of Expression: Verbal  Primary Mode of Expression: Verbal    Verbal Expression: Exceptions to functional limits  Initiation: WFL  Repetition: WFL  Automatic Speech: WFL  Confrontation: WFL  Convergent: WFL  Divergent: Mild  Responsive: WFL  Conversation: WFL  Interfering Components: Attention, Impaired thought organization  Verbal Expression: Exceptions to functional limits  Initiation: WFL  Repetition: WFL  Automatic Speech: WFL  Confrontation: WFL  Convergent: WFL  Divergent: WFL  Responsive: WFL  Conversation: WFL  Interfering Components:

## 2024-05-03 NOTE — PROGRESS NOTES
Occupational Therapy Goals   Long Term Goals  Initiated (2024) and to be accomplished within 3 week(s)  1. Pt will perform self-feeding with Mod I.  2. Pt will perform grooming with set-up.  3. Pt will perform UB bathing with set-up.  4. Pt will perform LB bathing with supv using AE as needed.  5. Pt will perform tub/shower transfer with CGA using least restrictive assistive device.   6. Pt will perform UB dressing with set-up.  7. Pt will perform LB dressing with supv using AE as needed.  8. Pt will perform toileting task with SBA.  9. Pt will perform toilet transfer with SBA using least restrictive assistive device.    Short Term Goals   Initiated (2024) and to be accomplished within 7 day(s), (updated 5/3/2024)  1. Pt will perform self-feeding with set-up. ( 5/3/2024)  2. Pt will perform grooming with SBA. ( 5/3/2024)  3. Pt will perform UB bathing with supv. ( 5/3/2024)  4. Pt will perform LB bathing with CGA using AE as needed. ( 5/3/2024)  5. Pt will perform tub/shower transfer with Min A using least restrictive assistive device. ( 5/3/2024)  6. Pt will perform UB dressing with supv. ( 5/3/2024)  7. Pt will perform LB dressing with CGA/ Min A using AE as needed. ( 5/3/2024)  8. Pt will perform toileting task with Min A. ( 5/3/2024)  9. Pt will perform toilet transfer with CGA/ Min A using least restrictive ass    OT WEEKLY PROGRESS NOTE  Patient Name:Zamzam Calixto    First Treatment Session  Time In: 1400  Time Out: 1530    Medical Diagnosis:  S/P AKA (above knee amputation) unilateral, left (HCC) [Z89.612] S/P AKA (above knee amputation) unilateral, left (HCC)     Precautions:  Restrictions/Precautions: Fall Risk, General Precautions (pt used 1 L of O2 at home previously)                             Pain at start of tx:0/10 pain or discomfort.  Pain at stop of tx:0/10 pain or discomfort.    Patient identified with name and :yes  Subjective: My  should be installing a    Clinical factors Self-care toileting (Mod A) for donning brief and for clothing management; (CGA/ Min A) for hygiene. Task simulated as pt did not require toileting at this time. Pt requiring CGA for clothing management in stance using FWW for safety. Pt performed hygiene while seated on 3-in-1 bedside commode.     TOILET TRANSFER INDEPENDENCE Initial Assessment Weekly Progress Assessment 5/3/2024   Transfer Technique Stand pivot Stand pivot   Level of Assistance Minimal assistance Toilet Transfer: Contact guard assistance   Equipment Standard bedside commode Standard bedside commode   Toilet Transfer Comments Stand pivot transfer (Min A) using FWW; gait belt (EOB <-> 3-in-1 commode) at bedside. Verbal cues for hand placement, positioning, and for safe handling of AD (FWW). Toilet Transfers Comments: Using FWW for safety.     Skin Integrity: good    Activity Tolerance:   Fair+         OCCUPATIONAL THERAPY GROUP   THERAC Time Comments   Seated UE Activity 30 Pt participated in group activity with one other pt. Pt played the game Sorry, having to reach forward to draw/discard cards and to move small game pieces around board. For increased challenge, 1# weights were attached to wrist.   Standing UE Activity     Ball Toss     FM Coordination Activity     Functional Reach activity              EDUCATION:   Education Given To: Patient  Education Provided: Transfer Training  Education Provided Comments: Patient educated on the importance of reaching back for the surface behind her to lower herself down instead of using rolling walker.  Education Method: Demonstration  Barriers to Learning: None  Education Outcome: Verbalized understanding;Demonstrated understanding    ASSESSMENT:  Pt making progress with independence in self-care tasks. Pt has achieved 9/9 STGS. Pt should continue to make progress as she improves BUE and activity tolerance. Pt able to tere LAKA  and limb sock with extra time.     Progression

## 2024-05-03 NOTE — PROGRESS NOTES
TEAM CONFERENCE FOLLOW-UP  Patient: Zamzam Calixto (73 y.o. female)  Date: 5/3/2024  Diagnosis: S/P AKA (above knee amputation) unilateral, left (HCC) [Z89.612] S/P AKA (above knee amputation) unilateral, left (HCC)      Precautions:      Met with patient to discuss the findings from 5/3/2024 Team Conference.    Patient requested further information and/or had questions:   Flores Diaz

## 2024-05-04 LAB
ANION GAP SERPL CALC-SCNC: 5 MMOL/L (ref 3–18)
BASOPHILS # BLD: 0.1 K/UL (ref 0–0.1)
BASOPHILS NFR BLD: 1 % (ref 0–2)
BUN SERPL-MCNC: 16 MG/DL (ref 7–18)
BUN/CREAT SERPL: 18 (ref 12–20)
CALCIUM SERPL-MCNC: 10.3 MG/DL (ref 8.5–10.1)
CHLORIDE SERPL-SCNC: 107 MMOL/L (ref 100–111)
CO2 SERPL-SCNC: 27 MMOL/L (ref 21–32)
CREAT SERPL-MCNC: 0.87 MG/DL (ref 0.6–1.3)
DIFFERENTIAL METHOD BLD: ABNORMAL
EOSINOPHIL # BLD: 0.3 K/UL (ref 0–0.4)
EOSINOPHIL NFR BLD: 4 % (ref 0–5)
ERYTHROCYTE [DISTWIDTH] IN BLOOD BY AUTOMATED COUNT: 14.7 % (ref 11.6–14.5)
GLUCOSE SERPL-MCNC: 81 MG/DL (ref 74–99)
HCT VFR BLD AUTO: 32.6 % (ref 35–45)
HGB BLD-MCNC: 10.4 G/DL (ref 12–16)
IMM GRANULOCYTES # BLD AUTO: 0.1 K/UL (ref 0–0.04)
IMM GRANULOCYTES NFR BLD AUTO: 1 % (ref 0–0.5)
LYMPHOCYTES # BLD: 2.7 K/UL (ref 0.9–3.6)
LYMPHOCYTES NFR BLD: 31 % (ref 21–52)
MCH RBC QN AUTO: 27.8 PG (ref 24–34)
MCHC RBC AUTO-ENTMCNC: 31.9 G/DL (ref 31–37)
MCV RBC AUTO: 87.2 FL (ref 78–100)
MONOCYTES # BLD: 0.7 K/UL (ref 0.05–1.2)
MONOCYTES NFR BLD: 8 % (ref 3–10)
NEUTS SEG # BLD: 5 K/UL (ref 1.8–8)
NEUTS SEG NFR BLD: 57 % (ref 40–73)
NRBC # BLD: 0 K/UL (ref 0–0.01)
NRBC BLD-RTO: 0 PER 100 WBC
PLATELET # BLD AUTO: 357 K/UL (ref 135–420)
PMV BLD AUTO: 9.6 FL (ref 9.2–11.8)
POTASSIUM SERPL-SCNC: 3.9 MMOL/L (ref 3.5–5.5)
RBC # BLD AUTO: 3.74 M/UL (ref 4.2–5.3)
SODIUM SERPL-SCNC: 139 MMOL/L (ref 136–145)
WBC # BLD AUTO: 8.8 K/UL (ref 4.6–13.2)

## 2024-05-04 PROCEDURE — 85025 COMPLETE CBC W/AUTO DIFF WBC: CPT

## 2024-05-04 PROCEDURE — 6360000002 HC RX W HCPCS: Performed by: EMERGENCY MEDICINE

## 2024-05-04 PROCEDURE — 1180000000 HC REHAB R&B

## 2024-05-04 PROCEDURE — 36415 COLL VENOUS BLD VENIPUNCTURE: CPT

## 2024-05-04 PROCEDURE — 80048 BASIC METABOLIC PNL TOTAL CA: CPT

## 2024-05-04 PROCEDURE — 94761 N-INVAS EAR/PLS OXIMETRY MLT: CPT

## 2024-05-04 PROCEDURE — 6370000000 HC RX 637 (ALT 250 FOR IP): Performed by: EMERGENCY MEDICINE

## 2024-05-04 RX ADMIN — SULFAMETHOXAZOLE AND TRIMETHOPRIM 1 TABLET: 800; 160 TABLET ORAL at 20:56

## 2024-05-04 RX ADMIN — IPRATROPIUM BROMIDE 0.5 MG: 0.5 SOLUTION RESPIRATORY (INHALATION) at 00:05

## 2024-05-04 RX ADMIN — BUDESONIDE 250 MCG: 0.25 INHALANT RESPIRATORY (INHALATION) at 20:57

## 2024-05-04 RX ADMIN — BUDESONIDE 250 MCG: 0.25 INHALANT RESPIRATORY (INHALATION) at 09:24

## 2024-05-04 RX ADMIN — ARFORMOTEROL TARTRATE 15 MCG: 15 SOLUTION RESPIRATORY (INHALATION) at 20:57

## 2024-05-04 RX ADMIN — IPRATROPIUM BROMIDE 0.5 MG: 0.5 SOLUTION RESPIRATORY (INHALATION) at 09:24

## 2024-05-04 RX ADMIN — AMLODIPINE BESYLATE 10 MG: 10 TABLET ORAL at 09:23

## 2024-05-04 RX ADMIN — IPRATROPIUM BROMIDE 0.5 MG: 0.5 SOLUTION RESPIRATORY (INHALATION) at 15:20

## 2024-05-04 RX ADMIN — DULOXETINE HYDROCHLORIDE 60 MG: 30 CAPSULE, DELAYED RELEASE ORAL at 09:24

## 2024-05-04 RX ADMIN — PREGABALIN 150 MG: 50 CAPSULE ORAL at 20:57

## 2024-05-04 RX ADMIN — CINACALCET HYDROCHLORIDE 30 MG: 60 TABLET, FILM COATED ORAL at 20:57

## 2024-05-04 RX ADMIN — PREGABALIN 150 MG: 50 CAPSULE ORAL at 09:23

## 2024-05-04 RX ADMIN — CINACALCET HYDROCHLORIDE 30 MG: 60 TABLET, FILM COATED ORAL at 09:23

## 2024-05-04 RX ADMIN — SULFAMETHOXAZOLE AND TRIMETHOPRIM 1 TABLET: 800; 160 TABLET ORAL at 09:24

## 2024-05-04 RX ADMIN — CETIRIZINE HYDROCHLORIDE 10 MG: 10 TABLET, FILM COATED ORAL at 09:23

## 2024-05-04 RX ADMIN — APIXABAN 5 MG: 5 TABLET, FILM COATED ORAL at 20:57

## 2024-05-04 RX ADMIN — APIXABAN 5 MG: 5 TABLET, FILM COATED ORAL at 09:24

## 2024-05-04 RX ADMIN — ARFORMOTEROL TARTRATE 15 MCG: 15 SOLUTION RESPIRATORY (INHALATION) at 09:24

## 2024-05-04 ASSESSMENT — PAIN SCALES - GENERAL
PAINLEVEL_OUTOF10: 0

## 2024-05-04 NOTE — PROGRESS NOTES
conducted a Follow up consultation and Spiritual Assessment for Zamzam Calixto, who is a 73 y.o.,female.      The  provided the following Interventions:  Continued the relationship of care and support.   Listened empathically.  Offered prayer, holy communion, and assurance of continued prayer on behalf of the patient.    Intervention/Outcome:  Patient received holy communion  Patient expressed gratitude to 's visit.    Plan:  Continue support as per need or as a request basis.  All caregivers, family members, call  if patient develops any spiritual or emotional crises.     Juliet Low, [facility]  Spiritual Care  [phone]

## 2024-05-04 NOTE — PROGRESS NOTES
[facility] PHYSICAL REHABILITATION  [address] [place] [id number]     INPATIENT REHABILITATION  DAILY PROGRESS NOTE     Date: 5/3/2024    Name: Zamzam Calixto Age / Sex: 73 y.o. / female   CSN: [id number] MRN: [id number]   Admit Date: 4/25/2024 Length of Stay: 8 days     Primary Rehabilitation Diagnosis   Impaired mobility and ADLs in the setting of a left above-knee amputation    Subjective:     I personally saw and evaluated this patient face-to-face today.  Patient is sitting in a chair in no apparent distress.   is at bedside.  RN is present in room.  I examined the patient left AKA stump with RN present.  Staples are in place.  In the middle of the incision there is an area with some purulence.  I have asked RN to obtain a wound culture    Objective:     Vital Signs:  Patient Vitals for the past 24 hrs:   BP Temp Temp src Pulse Resp SpO2   05/03/24 1545 (!) 139/58 98.4 °F (36.9 °C) Oral 78 18 97 %   05/03/24 0823 (!) 154/60 97.7 °F (36.5 °C) Oral 63 19 100 %   05/02/24 2112 137/69 97.6 °F (36.4 °C) Oral 79 20 98 %        Physical Examination:  General:  Awake, alert, follows command and responds appropriately  Cardiovascular:  S1S2+, RRR  Pulmonary:  CTA b/l  GI:  Soft, BS+, NT, ND  Extremities:  No edema  Left AKA noted.  I examined wound with RN present.  Staples are in place.  No wound dehiscence.  In the middle there is an area with some purulent drainage.    Current Medications:  Current Facility-Administered Medications   Medication Dose Route Frequency    acetaminophen (TYLENOL) tablet 650 mg  650 mg Oral Q4H PRN    pregabalin (LYRICA) capsule 150 mg  150 mg Oral BID    amLODIPine (NORVASC) tablet 10 mg  10 mg Oral Daily    apixaban (ELIQUIS) tablet 5 mg  5 mg Oral BID    cetirizine (ZYRTEC) tablet 10 mg  10 mg Oral Daily    cinacalcet (SENSIPAR) tablet 30 mg  30 mg Oral BID    DULoxetine (CYMBALTA) extended release capsule 60 mg  60 mg Oral Daily

## 2024-05-04 NOTE — PLAN OF CARE
Problem: Safety - Adult  Goal: Free from fall injury  5/3/2024 2336 by Rosmery Churchill RN  Outcome: Progressing  Flowsheets (Taken 5/3/2024 2335)  Free From Fall Injury: Instruct family/caregiver on patient safety  5/3/2024 1041 by Elham Landrum RN  Outcome: Progressing     Problem: ABCDS Injury Assessment  Goal: Absence of physical injury  5/3/2024 2336 by Rosmery Churchill RN  Outcome: Progressing  Flowsheets (Taken 5/3/2024 2335)  Absence of Physical Injury: Implement safety measures based on patient assessment  5/3/2024 1041 by Elham Landrum RN  Outcome: Progressing     Problem: Skin/Tissue Integrity  Goal: Absence of new skin breakdown  Description: 1.  Monitor for areas of redness and/or skin breakdown  2.  Assess vascular access sites hourly  3.  Every 4-6 hours minimum:  Change oxygen saturation probe site  4.  Every 4-6 hours:  If on nasal continuous positive airway pressure, respiratory therapy assess nares and determine need for appliance change or resting period.  5/3/2024 2336 by Rosmery Churchill RN  Outcome: Progressing  5/3/2024 1041 by Elham Landrum RN  Outcome: Progressing     Problem: Pain  Goal: Verbalizes/displays adequate comfort level or baseline comfort level  5/3/2024 2336 by Rosmery Churchill RN  Outcome: Progressing  5/3/2024 1041 by Elham Landrum RN  Outcome: Progressing

## 2024-05-04 NOTE — PROGRESS NOTES
[facility]  (A Division of [facility])    Follow-up Note    Dr Lam will be back Monday morning.  I have the duty this weekend, if needed.        Date of Admission: 4/25/2024       Date of Note:  5/4/2024    Summary:  Ms. Cantu is a bartolo 73-year-old female with a history of Crohn's disease, hypertension, COPD with a history of smoking, peripheral artery disease with multiple prior vascular procedures who is now admitted for left-sided AKA.  She has been followed by Dr. Ashley and she had developed worsening left lower extremity pain both at rest as well as with ambulation.She had a left fem tib bypass on 3/25/2024 followed by a thrombectomy of the bypass.  She was seen in the office on 4/17 and was noted to have an ischemic second and third toe with developing gangrene.  At that time her left foot was cold to touch.  It was decided that she be admitted for an emergent left-sided AKA on 4/22 due to concern for an occluded bypass graft.  Overall she is in good spirits.  She has not been having any fevers or chills.  Her WBC was 14.5 on presentation.  She was given a dose of vancomycin.  She had been on Bactrim as an outpatient for chronic suppression given multiple prior vascular graft infections.  She reports that she had been taking her Bactrim as directed and has not missed any doses.    AKA - 4/22  TOW to ARU - 4/25         CC:  \"It's less mushy this AM\"  Today:  Chart reviewed/pt seen this afternoon.  Noted onset of mid-incision serous DC without pain a few days ago despite taking chronic tmp-smx SS PO daily for suppression of both MSSA/Kleb aerogenes infections of limbs/abdomen.  No f/s/c.  Feels fine o/w.  Appetite good.    Tm<10  WBC 8.8k with normal subsets    Current Antimicrobials:    Prior Antimicrobials:  Tmp-smx DS PO (5/3-) #1 Tmp-smx SS PO daily (FEB23-)  Vancomycin IV preop (4/22)       Assessment: Rec / Plan:   Incisional cellulitis  Looks

## 2024-05-04 NOTE — PROGRESS NOTES
Notified Dr. Gaming on round re: redness & drainage on incision site. Verbal orders received to send wound culture. Wound culture obtained and sent to lab.

## 2024-05-04 NOTE — CONSULTS
Brief Afar/ID Note    Called this evening by Dr Gaming about new mid-incision opening/suppuration after L AKA 4/22.  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  Has also had MSSA infections in other wounds a few years ago (also SS to tmp-smx).  No fevers of late.  Hemodynamically stable    Please grab me some surface wound cultures tonight.  Recommend going back up to full/treatment dose of the tmp-smx (DS PO bid) for now.  I don't think she needs IV at this time.  I'll be in tomorrow noon to see her.    Thanks for calling.  Jorge Galo MD  Cell [phone]  [facility]

## 2024-05-05 VITALS
RESPIRATION RATE: 18 BRPM | WEIGHT: 119 LBS | SYSTOLIC BLOOD PRESSURE: 146 MMHG | OXYGEN SATURATION: 98 % | HEART RATE: 72 BPM | BODY MASS INDEX: 19.83 KG/M2 | DIASTOLIC BLOOD PRESSURE: 65 MMHG | HEIGHT: 65 IN | TEMPERATURE: 98 F

## 2024-05-05 LAB
BACTERIA SPEC CULT: NORMAL
GRAM STN SPEC: NORMAL
SERVICE CMNT-IMP: NORMAL

## 2024-05-05 PROCEDURE — 6360000002 HC RX W HCPCS: Performed by: EMERGENCY MEDICINE

## 2024-05-05 PROCEDURE — 94761 N-INVAS EAR/PLS OXIMETRY MLT: CPT

## 2024-05-05 PROCEDURE — 1180000000 HC REHAB R&B

## 2024-05-05 PROCEDURE — 6370000000 HC RX 637 (ALT 250 FOR IP): Performed by: EMERGENCY MEDICINE

## 2024-05-05 RX ADMIN — PREGABALIN 150 MG: 50 CAPSULE ORAL at 20:48

## 2024-05-05 RX ADMIN — CINACALCET HYDROCHLORIDE 30 MG: 60 TABLET, FILM COATED ORAL at 20:48

## 2024-05-05 RX ADMIN — IPRATROPIUM BROMIDE 0.5 MG: 0.5 SOLUTION RESPIRATORY (INHALATION) at 00:16

## 2024-05-05 RX ADMIN — APIXABAN 5 MG: 5 TABLET, FILM COATED ORAL at 08:31

## 2024-05-05 RX ADMIN — CINACALCET HYDROCHLORIDE 30 MG: 60 TABLET, FILM COATED ORAL at 08:31

## 2024-05-05 RX ADMIN — ARFORMOTEROL TARTRATE 15 MCG: 15 SOLUTION RESPIRATORY (INHALATION) at 08:31

## 2024-05-05 RX ADMIN — CETIRIZINE HYDROCHLORIDE 10 MG: 10 TABLET, FILM COATED ORAL at 08:31

## 2024-05-05 RX ADMIN — DULOXETINE HYDROCHLORIDE 60 MG: 30 CAPSULE, DELAYED RELEASE ORAL at 08:31

## 2024-05-05 RX ADMIN — AMLODIPINE BESYLATE 10 MG: 10 TABLET ORAL at 08:31

## 2024-05-05 RX ADMIN — ARFORMOTEROL TARTRATE 15 MCG: 15 SOLUTION RESPIRATORY (INHALATION) at 20:48

## 2024-05-05 RX ADMIN — PREGABALIN 150 MG: 50 CAPSULE ORAL at 08:31

## 2024-05-05 RX ADMIN — IPRATROPIUM BROMIDE 0.5 MG: 0.5 SOLUTION RESPIRATORY (INHALATION) at 23:47

## 2024-05-05 RX ADMIN — SULFAMETHOXAZOLE AND TRIMETHOPRIM 1 TABLET: 800; 160 TABLET ORAL at 20:49

## 2024-05-05 RX ADMIN — APIXABAN 5 MG: 5 TABLET, FILM COATED ORAL at 20:49

## 2024-05-05 RX ADMIN — IPRATROPIUM BROMIDE 0.5 MG: 0.5 SOLUTION RESPIRATORY (INHALATION) at 16:27

## 2024-05-05 RX ADMIN — BUDESONIDE 250 MCG: 0.25 INHALANT RESPIRATORY (INHALATION) at 20:48

## 2024-05-05 RX ADMIN — BUDESONIDE 250 MCG: 0.25 INHALANT RESPIRATORY (INHALATION) at 08:31

## 2024-05-05 RX ADMIN — IPRATROPIUM BROMIDE 0.5 MG: 0.5 SOLUTION RESPIRATORY (INHALATION) at 08:31

## 2024-05-05 RX ADMIN — SULFAMETHOXAZOLE AND TRIMETHOPRIM 1 TABLET: 800; 160 TABLET ORAL at 08:36

## 2024-05-05 ASSESSMENT — PAIN SCALES - GENERAL
PAINLEVEL_OUTOF10: 0

## 2024-05-05 NOTE — PLAN OF CARE
Problem: Safety - Adult  Goal: Free from fall injury  5/5/2024 0938 by Margaret Dasilva RN  Outcome: Progressing  Free From Fall Injury: Instruct family/caregiver on patient safety       Problem: ABCDS Injury Assessment  Goal: Absence of physical injury  Outcome: Progressing  Absence of Physical Injury: Implement safety measures based on patient assessment     Problem: Skin/Tissue Integrity  Goal: Absence of new skin breakdown  Description: 1.  Monitor for areas of redness and/or skin breakdown  2.  Assess vascular access sites hourly  3.  Every 4-6 hours minimum:  Change oxygen saturation probe site  Outcome: Progressing     Problem: Pain  Goal: Verbalizes/displays adequate comfort level or baseline comfort level  Outcome: Progressing  Verbalizes/displays adequate comfort level or baseline comfort level: Assess pain using appropriate pain scale

## 2024-05-05 NOTE — PLAN OF CARE
Problem: ABCDS Injury Assessment  Goal: Absence of physical injury  5/4/2024 1543 by Elham Landrum, RN  Outcome: Progressing     Problem: Skin/Tissue Integrity  Goal: Absence of new skin breakdown  Description: 1.  Monitor for areas of redness and/or skin breakdown  2.  Assess vascular access sites hourly  3.  Every 4-6 hours minimum:  Change oxygen saturation probe site  4.  Every 4-6 hours:  If on nasal continuous positive airway pressure, respiratory therapy assess nares and determine need for appliance change or resting period.  5/4/2024 1543 by Elham Landrum, RN  Outcome: Progressing

## 2024-05-05 NOTE — PLAN OF CARE
Problem: Safety - Adult  Goal: Free from fall injury  5/5/2024 1948 by Shahida Gomez RN  Outcome: Progressing  5/5/2024 0938 by Margaret Dasilva RN  Outcome: Progressing  Flowsheets (Taken 5/3/2024 2335 by Rosmery Churchill, RN)  Free From Fall Injury: Instruct family/caregiver on patient safety     Problem: ABCDS Injury Assessment  Goal: Absence of physical injury  5/5/2024 1948 by Shahida Gomez RN  Outcome: Progressing  5/5/2024 0938 by Margaret Dasilav RN  Outcome: Progressing  Flowsheets (Taken 5/3/2024 2335 by Rosmery Churchill, RN)  Absence of Physical Injury: Implement safety measures based on patient assessment     Problem: Skin/Tissue Integrity  Goal: Absence of new skin breakdown  Description: 1.  Monitor for areas of redness and/or skin breakdown  2.  Assess vascular access sites hourly  3.  Every 4-6 hours minimum:  Change oxygen saturation probe site  4.  Every 4-6 hours:  If on nasal continuous positive airway pressure, respiratory therapy assess nares and determine need for appliance change or resting period.  5/5/2024 1948 by Shahida Gomez RN  Outcome: Progressing  5/5/2024 0938 by Margaret Dasilva RN  Outcome: Progressing

## 2024-05-06 PROBLEM — E11.8 DIABETES MELLITUS TYPE 2 WITH COMPLICATIONS (HCC): Status: ACTIVE | Noted: 2024-05-06

## 2024-05-06 PROBLEM — J44.9 CHRONIC OBSTRUCTIVE PULMONARY DISEASE (HCC): Status: ACTIVE | Noted: 2024-05-06

## 2024-05-06 PROCEDURE — 97530 THERAPEUTIC ACTIVITIES: CPT

## 2024-05-06 PROCEDURE — 97116 GAIT TRAINING THERAPY: CPT

## 2024-05-06 PROCEDURE — 97110 THERAPEUTIC EXERCISES: CPT

## 2024-05-06 PROCEDURE — 6370000000 HC RX 637 (ALT 250 FOR IP): Performed by: EMERGENCY MEDICINE

## 2024-05-06 PROCEDURE — 99232 SBSQ HOSP IP/OBS MODERATE 35: CPT | Performed by: HOSPITALIST

## 2024-05-06 PROCEDURE — 1180000000 HC REHAB R&B

## 2024-05-06 PROCEDURE — 97535 SELF CARE MNGMENT TRAINING: CPT

## 2024-05-06 PROCEDURE — 6360000002 HC RX W HCPCS: Performed by: EMERGENCY MEDICINE

## 2024-05-06 RX ADMIN — APIXABAN 5 MG: 5 TABLET, FILM COATED ORAL at 20:57

## 2024-05-06 RX ADMIN — CINACALCET HYDROCHLORIDE 30 MG: 60 TABLET, FILM COATED ORAL at 08:09

## 2024-05-06 RX ADMIN — AMLODIPINE BESYLATE 10 MG: 10 TABLET ORAL at 08:10

## 2024-05-06 RX ADMIN — BUDESONIDE 250 MCG: 0.25 INHALANT RESPIRATORY (INHALATION) at 20:56

## 2024-05-06 RX ADMIN — IPRATROPIUM BROMIDE 0.5 MG: 0.5 SOLUTION RESPIRATORY (INHALATION) at 16:11

## 2024-05-06 RX ADMIN — IPRATROPIUM BROMIDE 0.5 MG: 0.5 SOLUTION RESPIRATORY (INHALATION) at 08:08

## 2024-05-06 RX ADMIN — DULOXETINE HYDROCHLORIDE 60 MG: 30 CAPSULE, DELAYED RELEASE ORAL at 08:10

## 2024-05-06 RX ADMIN — ARFORMOTEROL TARTRATE 15 MCG: 15 SOLUTION RESPIRATORY (INHALATION) at 20:56

## 2024-05-06 RX ADMIN — BUDESONIDE 250 MCG: 0.25 INHALANT RESPIRATORY (INHALATION) at 08:08

## 2024-05-06 RX ADMIN — ARFORMOTEROL TARTRATE 15 MCG: 15 SOLUTION RESPIRATORY (INHALATION) at 08:08

## 2024-05-06 RX ADMIN — SULFAMETHOXAZOLE AND TRIMETHOPRIM 1 TABLET: 800; 160 TABLET ORAL at 20:57

## 2024-05-06 RX ADMIN — CINACALCET HYDROCHLORIDE 30 MG: 60 TABLET, FILM COATED ORAL at 20:57

## 2024-05-06 RX ADMIN — PREGABALIN 150 MG: 50 CAPSULE ORAL at 20:57

## 2024-05-06 RX ADMIN — CETIRIZINE HYDROCHLORIDE 10 MG: 10 TABLET, FILM COATED ORAL at 08:10

## 2024-05-06 RX ADMIN — APIXABAN 5 MG: 5 TABLET, FILM COATED ORAL at 08:10

## 2024-05-06 RX ADMIN — SULFAMETHOXAZOLE AND TRIMETHOPRIM 1 TABLET: 800; 160 TABLET ORAL at 08:09

## 2024-05-06 RX ADMIN — PREGABALIN 150 MG: 50 CAPSULE ORAL at 08:09

## 2024-05-06 ASSESSMENT — PAIN SCALES - GENERAL
PAINLEVEL_OUTOF10: 0

## 2024-05-06 NOTE — PLAN OF CARE
Problem: Physical Therapy - Adult  Goal: By Discharge: Performs mobility at highest level of function for planned discharge setting.  See evaluation for individualized goals.  Description: Physical Therapy Short Term Goals  Initiated 4/26/2024, re-assessed 5/6/2024 and to be accomplished within 7 day(s) [5/10/24]  1.  Patient will supine to sit, sit to supine, roll right, and roll left  in bed with modified independence.  (MET 5/6/2024)  2.  Patient will transfer from bed to chair and chair to bed with contact guard assist using the least restrictive device.(MET 5/6/2024)  3.  Patient will perform sit to stand with contact guard assist(MET 5/6/2024)  4.  Patient will ambulate with contact guard assist for 25 feet with the least restrictive device. (MET 5/6/2024)  5.  Patient will propel w/c 150 ft on level surface with supervision for mobility on unit.(MET 5/6/2024)    Physical Therapy Long Term Goals  Initiated 4/26/2024 and to be accomplished within 21 day(s) [5/17/24]  1.  Patient will supine to sit, sit to supine, roll right, and roll left in bed with independence.    2.  Patient will transfer from bed to chair and chair to bed with modified independence using the least restrictive device.  3.  Patient will perform sit to stand with modified independence.  4.  Patient will ambulate with supervision/set-up for 50 feet with the least restrictive device.   5.  Patient will ascend/descend 5 stairs with at least one handrail(s) with minimal assistance/contact guard assist.   Outcome: Progressing    PHYSICAL THERAPY WEEKLY PROGRESS NOTE    Patient: Zamzam Calixto (73 y.o. female)  Date: 5/6/2024  Diagnosis: S/P AKA (above knee amputation) unilateral, left ([facility]) [Z89.612]   Precautions: fall risk  Chart, physical therapy assessment, plan of care and goals were reviewed.    Time in: 945  Time out: 1115  Time in:1306  Time out:1330    Patient seen for: txfr training, w/c mobility, gait training, therapeutic   Not making progress toward goals and plan of care will be adjusted     PLAN:  Patient continues to benefit from skilled intervention to address the above impairments.  Continue treatment per established plan of care.  Discharge Recommendations:  Home with Home health PT;24 hour supervision or assist  Further Equipment Recommendations for Discharge:  Standard w/c    Rolling walker            Estimated Discharge Date:5/11/2024    Activity Tolerance:   good  Please refer to the flowsheet for vital signs taken during this treatment.  After treatment:   [] Patient left in no apparent distress in bed  [x] Patient left in no apparent distress sitting up in chair  [] Patient left in no apparent distress in w/c mobilizing under own power  [] Patient left in no apparent distress dining area  [] Patient left in no apparent distress mobilizing under own power  [x] Call bell left within reach  [] Nursing notified  [] Caregiver present  [] Bed alarm activated   [x] Chair alarm activated        Ele Godoy, FERNANDA  5/6/2024

## 2024-05-06 NOTE — PLAN OF CARE
Verbal  Barriers to Learning: None  Education Outcome: Verbalized understanding;Demonstrated understanding    ASSESSMENT:  Pt is making progress toward goals and increasing independence and safety with functional mobility and self cares. Pt requires minimal cues for hand placement for functional transfers and pain with donning LLE  however  reports reduced pain levels and  improved performance. Pt is increasing BUE strength for carryover with self cares and fall prevention.   Progression toward goals:  [x]          Improving appropriately and progressing toward goals  []          Improving slowly and progressing toward goals  []          Not making progress toward goals and plan of care will be adjusted       PLAN:  Patient continues to benefit from skilled intervention to address the above impairments.  Continue treatment per established plan of care.  Discharge Recommendations:  Home health; home occupational therapy with 24 hr assist   Further Equipment Recommendations for Discharge:  3-in-1 commode; tub transfer bench   Estimated LOS: 5/11/2024     COMMUNICATION/EDUCATION:   [] Home safety education was provided and the patient/caregiver indicated understanding.  [] Patient/family have participated as able in goal setting and plan of care.  [x] Patient/family agree to work toward stated goals and plan of care.  [] Patient understands intent and goals of therapy, but is neutral about his/her participation.  [] Patient is unable to participate in goal setting and plan of care.    Please refer to the flowsheet for vital signs taken during this treatment.  After treatment:   []  Patient left in no apparent distress sitting up in chair   [x]  Patient left in no apparent distress in bed  []  Patient handoff to SLP/PT  [x]  Call bell and immediate needs left within reach  []  Nursing notified  []  Caregiver present  []  Bed alarm activated  []  Chair alarm activated  []  Chantal Prominences offloaded  []  Heels  activated for pressure relief  []  Telesitter/Care companion present      Entered Differentiated Treatment minutes: yes      Yakelin Silverman, OT  5/6/2024

## 2024-05-06 NOTE — PLAN OF CARE
Problem: Safety - Adult  Goal: Free from fall injury  5/6/2024 0841 by Elham Landrum RN  Outcome: Progressing  5/5/2024 1948 by Shahida Gomez RN  Outcome: Progressing     Problem: ABCDS Injury Assessment  Goal: Absence of physical injury  5/6/2024 0841 by Elham Landrum RN  Outcome: Progressing  5/5/2024 1948 by Shahida Gomez RN  Outcome: Progressing     Problem: Skin/Tissue Integrity  Goal: Absence of new skin breakdown  Description: 1.  Monitor for areas of redness and/or skin breakdown  2.  Assess vascular access sites hourly  3.  Every 4-6 hours minimum:  Change oxygen saturation probe site  4.  Every 4-6 hours:  If on nasal continuous positive airway pressure, respiratory therapy assess nares and determine need for appliance change or resting period.  5/6/2024 0841 by Elham Landrum RN  Outcome: Progressing  5/5/2024 1948 by Shahida Gomez RN  Outcome: Progressing     Problem: Pain  Goal: Verbalizes/displays adequate comfort level or baseline comfort level  5/6/2024 0841 by Elham Landrum RN  Outcome: Progressing  5/5/2024 1948 by Shahida Gomez RN  Outcome: Progressing

## 2024-05-06 NOTE — PROGRESS NOTES
[facility] PHYSICAL REHABILITATION  [address] [place] [id number]     INPATIENT REHABILITATION  DAILY PROGRESS NOTE     Date: 5/6/2024    Name: Zamzam Calixto Age / Sex: 73 y.o. / female   CSN: [id number] MRN: [id number]   Admit Date: 4/25/2024 Length of Stay: 11 days     Primary Rehabilitation Diagnosis   Impaired mobility and ADLs in the setting of a left above-knee amputation    Subjective:     Patient sitting in the wheelchair, feels good.  States her wound looks much better, no discharge over the weekend.    Objective:     Vital Signs:  Patient Vitals for the past 24 hrs:   BP Temp Temp src Pulse Resp SpO2   05/06/24 0745 (!) 136/52 97.4 °F (36.3 °C) Oral 60 18 96 %   05/05/24 2045 (!) 146/65 98 °F (36.7 °C) Oral 72 18 98 %   05/05/24 1544 (!) 154/61 98.1 °F (36.7 °C) Oral 75 18 96 %        Physical Examination:  General:  Awake, alert, follows command and responds appropriately  Cardiovascular:  S1S2+, RRR  Pulmonary:  CTA b/l  GI:  Soft, BS+, NT, ND  Extremities:  No edema  Left AKA noted. Staples are in place.  No wound dehiscence.  In the middle there is some redness but no discharge noted    Current Medications:  Current Facility-Administered Medications   Medication Dose Route Frequency    acetaminophen (TYLENOL) tablet 650 mg  650 mg Oral Q4H PRN    sulfamethoxazole-trimethoprim (BACTRIM DS;SEPTRA DS) 800-160 MG per tablet 1 tablet  1 tablet Oral 2 times per day    pregabalin (LYRICA) capsule 150 mg  150 mg Oral BID    amLODIPine (NORVASC) tablet 10 mg  10 mg Oral Daily    apixaban (ELIQUIS) tablet 5 mg  5 mg Oral BID    cetirizine (ZYRTEC) tablet 10 mg  10 mg Oral Daily    cinacalcet (SENSIPAR) tablet 30 mg  30 mg Oral BID    DULoxetine (CYMBALTA) extended release capsule 60 mg  60 mg Oral Daily    HYDROcodone-acetaminophen (NORCO) 5-325 MG per tablet 1 tablet  1 tablet Oral Q4H PRN    naloxone (NARCAN) injection 0.4 mg  0.4 mg IntraVENous PRN    polyethylene glycol (GLYCOLAX)  failure on oxygen 1 L via nasal cannula  Continue oxygen 1 L via nasal cannula     -COPD without acute exacerbation  Continue Trelegy, albuterol as needed     -Anemia likely postoperative  Monitor blood counts intermittently     -Hypertension  Continue amlodipine 10 mg daily  Monitor blood pressure     -Type 2 diabetes  Continue sliding scale insulin  Monitor sugars ACHS     -Hypokalemia  Monitor potassium and electrolytes intermittently     -Neuropathy  Continue Cymbalta 60 mg daily  Continue Lyrica 100 mg twice daily  5/1-increase Lyrica to 150 mg twice daily for phantom pains       4/29-I discussed the care plan with the patient    4/30-patient is on Cymbalta and Lyrica for phantom pain and neuropathy.  I discussed care plan with the patient and  at bedside.    5/1-patient is having a lot of phantom pain.  Patient is on Cymbalta 60 mg daily and Lyrica 100 mg twice daily.  I discussed with patient and will increase Lyrica to 150 mg twice daily.  I discussed the care plan with the patient    5/2-I discussed the care plan with the patient and  at bedside.  Will check labs in the morning.    5/3-I discussed the care plan with the patient and  at bedside.  I discussed with RN     5/6-I discussed with the patient about repeat labs tomorrow, ID recommendations.  Patient understood and agreed with my plan.    Body mass index is 19.8 kg/m².      Functional Progress:    PHYSICAL THERAPY    ON ADMISSION MOST RECENT   Balance  Balance  Posture: Good  Sitting - Static: Good  Sitting - Dynamic: Good  Standing - Static: Good (with RW)  Standing - Dynamic: Good, - (with RW)     Good  Good  Good (with RW)  Good;- (with RW)      Balance  Balance  Posture: Good  Sitting - Static: Good  Sitting - Dynamic: Good  Standing - Static: Good (with RW)  Standing - Dynamic: Good, - (with RW)     Sitting - Static: Good  Sitting - Dynamic: Good  Standing - Static: Good (with RW)  Standing - Dynamic: Good;- (with RW)

## 2024-05-06 NOTE — PROGRESS NOTES
[facility]  (A Division of [facility])      Consultation Note      Date of Admission: 4/25/2024    Date of Note: 5/6/2024      Reason for Referral: Antibiotic management  Referring Physician: Dr. 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  S/p washout with wound vac on 2/17  S/p removal of infected bypass graft 2/23/23 in left thigh  2/23/23 surgical cx: + Kelbsiella aerogenes  2/27/23 s/p resvision left fem-pop with cadaver vein and removal of femoral blow knee graft  Leukocytosis-  14.4 on presentation- now resolved   PVD  Immunosuppressed due to Remicaide for Crohn's disease  Anemia--   Neuropathy  Electrolyte abnormalities  Possible incisional cellulitis to stump-overall resolved.  No drainage noted.  5/3 wound culture with heavy mixed skin soraya       Plan:   Continue Bactrim twice daily through the end of the week then transition back to chronic daily suppression..  Recurrent graft infections have the patch potential for catastrophic outcomes.   -Trend BMP every 3 to 4 months while on Bactrim due to medication associated toxicities    Reviewed repeat CBC-WBCs have now normalized.  Has follow-up appointment set for 5/21 at 10:30 AM      DO [facility]  [address], [address], [place] [address]  Office: [phone], Ext [phone]      Lines / Catheters:  Peripheral    Subjective:   In her room anything at breakfast this morning.  Already finished with OT and waiting to go to PT.  No new fevers or chills.  Globally  she is feeling much better and is hoping to go home on Saturday.  Tolerating antibiotics.  No particular complaints at this time    Tmax 98.1    HPI:  Ms. Cantu is a bartolo 73-year-old female with a history of Crohn's disease, hypertension, COPD with a history of smoking, peripheral artery disease with multiple prior vascular procedures who is now admitted for left-sided AKA.  She has been followed by Dr. Ashley and she had developed worsening left lower extremity pain both at rest as well as with ambulation.She had a left fem tib bypass on 3/25/2024 followed by a thrombectomy of the bypass.  She was seen in the office on  and was noted to have an ischemic second and third toe with developing gangrene.  At that time her left foot was cold to touch.  It was decided that she be admitted for an emergent left-sided AKA on  due to concern for an occluded bypass graft.  Overall she is in good spirits.  She has not been having any fevers or chills.  Her WBC was 14.5 on presentation.  She was given a dose of vancomycin.  She had been on Bactrim as an outpatient for chronic suppression given multiple prior vascular graft infections.  She reports that she had been taking her Bactrim as directed and has not missed any doses.       Objective:      BP (!) 136/52   Pulse 60   Temp 97.4 °F (36.3 °C) (Oral)   Resp 18   Ht 1.651 m (5' 5\")   Wt 54 kg (119 lb)   SpO2 96%   BMI 19.80 kg/m²   Temp (24hrs), Av.8 °F (36.6 °C), Min:97.4 °F (36.3 °C), Max:98.1 °F (36.7 °C)        General:   awake alert and oriented, appears stated age   Skin:   no rashes or skin lesions noted on limited exam, no jaundice   HEENT:  Normocephalic, atraumatic, PERRL, EOMI, no scleral icterus; no conjunctival hemmohage;    Lymph Nodes:   no cervical or inguinal adenopathy   Lungs:   non-labored, bilaterally clear to aspiration   Heart:  RRR, s1 and s2; no murmurs, no edema, + pedal pulses   Abdomen:  soft, non-distended, active bowel

## 2024-05-07 LAB
ANION GAP SERPL CALC-SCNC: 2 MMOL/L (ref 3–18)
BASOPHILS # BLD: 0.1 K/UL (ref 0–0.1)
BASOPHILS NFR BLD: 1 % (ref 0–2)
BUN SERPL-MCNC: 20 MG/DL (ref 7–18)
BUN/CREAT SERPL: 18 (ref 12–20)
CALCIUM SERPL-MCNC: 10.7 MG/DL (ref 8.5–10.1)
CHLORIDE SERPL-SCNC: 106 MMOL/L (ref 100–111)
CO2 SERPL-SCNC: 29 MMOL/L (ref 21–32)
CREAT SERPL-MCNC: 1.1 MG/DL (ref 0.6–1.3)
DIFFERENTIAL METHOD BLD: ABNORMAL
EOSINOPHIL # BLD: 0.4 K/UL (ref 0–0.4)
EOSINOPHIL NFR BLD: 4 % (ref 0–5)
ERYTHROCYTE [DISTWIDTH] IN BLOOD BY AUTOMATED COUNT: 14.8 % (ref 11.6–14.5)
GLUCOSE SERPL-MCNC: 83 MG/DL (ref 74–99)
HCT VFR BLD AUTO: 33 % (ref 35–45)
HGB BLD-MCNC: 10.5 G/DL (ref 12–16)
IMM GRANULOCYTES # BLD AUTO: 0 K/UL (ref 0–0.04)
IMM GRANULOCYTES NFR BLD AUTO: 0 % (ref 0–0.5)
LYMPHOCYTES # BLD: 2.9 K/UL (ref 0.9–3.6)
LYMPHOCYTES NFR BLD: 32 % (ref 21–52)
MAGNESIUM SERPL-MCNC: 2 MG/DL (ref 1.6–2.6)
MCH RBC QN AUTO: 27.8 PG (ref 24–34)
MCHC RBC AUTO-ENTMCNC: 31.8 G/DL (ref 31–37)
MCV RBC AUTO: 87.3 FL (ref 78–100)
MONOCYTES # BLD: 0.9 K/UL (ref 0.05–1.2)
MONOCYTES NFR BLD: 9 % (ref 3–10)
NEUTS SEG # BLD: 4.7 K/UL (ref 1.8–8)
NEUTS SEG NFR BLD: 53 % (ref 40–73)
NRBC # BLD: 0 K/UL (ref 0–0.01)
NRBC BLD-RTO: 0 PER 100 WBC
PLATELET # BLD AUTO: 331 K/UL (ref 135–420)
PMV BLD AUTO: 9.5 FL (ref 9.2–11.8)
POTASSIUM SERPL-SCNC: 4.4 MMOL/L (ref 3.5–5.5)
RBC # BLD AUTO: 3.78 M/UL (ref 4.2–5.3)
SODIUM SERPL-SCNC: 137 MMOL/L (ref 136–145)
WBC # BLD AUTO: 9 K/UL (ref 4.6–13.2)

## 2024-05-07 PROCEDURE — 97530 THERAPEUTIC ACTIVITIES: CPT

## 2024-05-07 PROCEDURE — 97110 THERAPEUTIC EXERCISES: CPT

## 2024-05-07 PROCEDURE — 1180000000 HC REHAB R&B

## 2024-05-07 PROCEDURE — 83735 ASSAY OF MAGNESIUM: CPT

## 2024-05-07 PROCEDURE — 36415 COLL VENOUS BLD VENIPUNCTURE: CPT

## 2024-05-07 PROCEDURE — 97116 GAIT TRAINING THERAPY: CPT

## 2024-05-07 PROCEDURE — 6370000000 HC RX 637 (ALT 250 FOR IP): Performed by: EMERGENCY MEDICINE

## 2024-05-07 PROCEDURE — 85025 COMPLETE CBC W/AUTO DIFF WBC: CPT

## 2024-05-07 PROCEDURE — 6360000002 HC RX W HCPCS: Performed by: EMERGENCY MEDICINE

## 2024-05-07 PROCEDURE — 80048 BASIC METABOLIC PNL TOTAL CA: CPT

## 2024-05-07 PROCEDURE — 99232 SBSQ HOSP IP/OBS MODERATE 35: CPT | Performed by: HOSPITALIST

## 2024-05-07 RX ADMIN — BUDESONIDE 250 MCG: 0.25 INHALANT RESPIRATORY (INHALATION) at 09:09

## 2024-05-07 RX ADMIN — PREGABALIN 150 MG: 50 CAPSULE ORAL at 20:44

## 2024-05-07 RX ADMIN — IPRATROPIUM BROMIDE 0.5 MG: 0.5 SOLUTION RESPIRATORY (INHALATION) at 09:09

## 2024-05-07 RX ADMIN — BUDESONIDE 250 MCG: 0.25 INHALANT RESPIRATORY (INHALATION) at 20:44

## 2024-05-07 RX ADMIN — IPRATROPIUM BROMIDE 0.5 MG: 0.5 SOLUTION RESPIRATORY (INHALATION) at 16:19

## 2024-05-07 RX ADMIN — APIXABAN 5 MG: 5 TABLET, FILM COATED ORAL at 20:44

## 2024-05-07 RX ADMIN — DULOXETINE HYDROCHLORIDE 60 MG: 30 CAPSULE, DELAYED RELEASE ORAL at 09:09

## 2024-05-07 RX ADMIN — IPRATROPIUM BROMIDE 0.5 MG: 0.5 SOLUTION RESPIRATORY (INHALATION) at 00:23

## 2024-05-07 RX ADMIN — SULFAMETHOXAZOLE AND TRIMETHOPRIM 1 TABLET: 800; 160 TABLET ORAL at 09:09

## 2024-05-07 RX ADMIN — ARFORMOTEROL TARTRATE 15 MCG: 15 SOLUTION RESPIRATORY (INHALATION) at 09:09

## 2024-05-07 RX ADMIN — APIXABAN 5 MG: 5 TABLET, FILM COATED ORAL at 09:10

## 2024-05-07 RX ADMIN — AMLODIPINE BESYLATE 10 MG: 10 TABLET ORAL at 09:10

## 2024-05-07 RX ADMIN — CETIRIZINE HYDROCHLORIDE 10 MG: 10 TABLET, FILM COATED ORAL at 09:10

## 2024-05-07 RX ADMIN — CINACALCET HYDROCHLORIDE 30 MG: 60 TABLET, FILM COATED ORAL at 09:09

## 2024-05-07 RX ADMIN — ARFORMOTEROL TARTRATE 15 MCG: 15 SOLUTION RESPIRATORY (INHALATION) at 20:44

## 2024-05-07 RX ADMIN — SULFAMETHOXAZOLE AND TRIMETHOPRIM 1 TABLET: 800; 160 TABLET ORAL at 20:44

## 2024-05-07 RX ADMIN — CINACALCET HYDROCHLORIDE 30 MG: 60 TABLET, FILM COATED ORAL at 20:44

## 2024-05-07 RX ADMIN — PREGABALIN 150 MG: 50 CAPSULE ORAL at 09:09

## 2024-05-07 ASSESSMENT — PAIN SCALES - GENERAL
PAINLEVEL_OUTOF10: 0

## 2024-05-07 NOTE — PROGRESS NOTES
[facility] PHYSICAL REHABILITATION  [address] [place] [id number]     INPATIENT REHABILITATION  DAILY PROGRESS NOTE     Date: 5/7/2024    Name: Zamzam Calixto Age / Sex: 73 y.o. / female   CSN: [id number] MRN: [id number]   Admit Date: 4/25/2024 Length of Stay: 12 days     Primary Rehabilitation Diagnosis   Impaired mobility and ADLs in the setting of a left above-knee amputation    Subjective:     Patient sitting in the wheelchair, feels good.  States her wound looks much better, no new complaints.    Objective:     Vital Signs:  Patient Vitals for the past 24 hrs:   BP Temp Temp src Pulse Resp SpO2   05/07/24 0745 (!) 134/56 97.8 °F (36.6 °C) Oral 62 18 98 %   05/06/24 2045 116/63 98.1 °F (36.7 °C) Oral 71 18 95 %   05/06/24 1543 (!) 144/75 98.1 °F (36.7 °C) Oral 99 18 91 %        Physical Examination:  General:  Awake, alert, follows command and responds appropriately  Cardiovascular:  S1S2+, RRR  Pulmonary:  CTA b/l  GI:  Soft, BS+, NT, ND  Extremities:  No edema  Left AKA noted. Staples are in place.  No wound dehiscence.  In the middle there is some redness but no discharge noted    Current Medications:  Current Facility-Administered Medications   Medication Dose Route Frequency    acetaminophen (TYLENOL) tablet 650 mg  650 mg Oral Q4H PRN    sulfamethoxazole-trimethoprim (BACTRIM DS;SEPTRA DS) 800-160 MG per tablet 1 tablet  1 tablet Oral 2 times per day    pregabalin (LYRICA) capsule 150 mg  150 mg Oral BID    amLODIPine (NORVASC) tablet 10 mg  10 mg Oral Daily    apixaban (ELIQUIS) tablet 5 mg  5 mg Oral BID    cetirizine (ZYRTEC) tablet 10 mg  10 mg Oral Daily    cinacalcet (SENSIPAR) tablet 30 mg  30 mg Oral BID    DULoxetine (CYMBALTA) extended release capsule 60 mg  60 mg Oral Daily    HYDROcodone-acetaminophen (NORCO) 5-325 MG per tablet 1 tablet  1 tablet Oral Q4H PRN    naloxone (NARCAN) injection 0.4 mg  0.4 mg IntraVENous PRN    polyethylene glycol (GLYCOLAX) packet 17 g  17  balance/ coordination, and safety.  SHOWER Transfers  Tub Transfers  Tub Transfers: Not tested  Tub Transfers Comments: Not tested during initial OT evaluation due to fatigue, impaired standing balance/ coordination, and safety.     Legend:   7 - Independent   6 - Modified Independent   5 - Standby Assistance / Supervision / Set-up   4 - Minimum Assistance / Contact Guard Assistance   3 - Moderate Assistance   2 - Maximum Assistance   1 - Total Assistance / Dependent             Plan:     1. Justification for continued stay: Fair progression towards established rehabilitation goals.  Patient is now able to ambulate 150 feet with a rolling walker and standby assistance.  In comparison at the time of admission patient was only able to ambulate 5 feet x 2 with a rolling walker and minimal assistance.    2. Medical Issues being followed closely:    [x]  Fall and safety precautions     [x]  Wound Care     [x]  Bowel and Bladder Function     [x]  Fluid Electrolyte and Nutrition Balance     [x]  Pain Control      3. Issues that 24 hour rehabilitation nursing is following:    [x]  Fall and safety precautions     [x]  Wound Care     [x]  Bowel and Bladder Function     [x]  Fluid Electrolyte and Nutrition Balance     [x]  Pain Control      [x]  Assistance with and education on in-room safety with transfers to and from the bed, wheelchair, toilet and shower.      4. Acute rehabilitation plan of care:    [x]  Continue current care and rehab.           [x]  Physical Therapy           [x]  Occupational Therapy           []  Speech Therapy      []  Hold Rehab until further notice     5. Medications:    [x]  MAR Reviewed     [x]  Continue Present Medications       6. Chemical DVT Prophylaxis:      []  Enoxaparin     []  Unfractionated Heparin     []  Warfarin     [x]  NOAC     []  Aspirin     []  None     7. Mechanical DVT Prophylaxis:      []  LINDSEY Stockings     []  Sequential Compression Device     [x]  None     8. GI Prophylaxis:

## 2024-05-07 NOTE — PROGRESS NOTES
0800 Pt. Awake sitting up in bed alert and oriented x 4 no change in assessment. Dressing to left Aka clean, dry and intact.  0930 Pt. Sitting up in chair eating breakfast.   1200 with therapy.   1330 able to transfer from bed to chair with assist.   1500 no change in assessment.   1800 Pt. Sitting up in chair eating dinner.

## 2024-05-07 NOTE — PLAN OF CARE
Problem: Safety - Adult  Goal: Free from fall injury  5/6/2024 2202 by Chey Deal RN  Outcome: Progressing  Flowsheets (Taken 5/6/2024 2201)  Free From Fall Injury: Instruct family/caregiver on patient safety  5/6/2024 0841 by Elham Landrum RN  Outcome: Progressing     Problem: ABCDS Injury Assessment  Goal: Absence of physical injury  5/6/2024 2202 by Chey Deal RN  Outcome: Progressing  Flowsheets (Taken 5/6/2024 2201)  Absence of Physical Injury: Implement safety measures based on patient assessment  5/6/2024 0841 by Elham Landrum RN  Outcome: Progressing     Problem: Skin/Tissue Integrity  Goal: Absence of new skin breakdown  Description: 1.  Monitor for areas of redness and/or skin breakdown  2.  Assess vascular access sites hourly  3.  Every 4-6 hours minimum:  Change oxygen saturation probe site  4.  Every 4-6 hours:  If on nasal continuous positive airway pressure, respiratory therapy assess nares and determine need for appliance change or resting period.  5/6/2024 2202 by Chey Deal RN  Outcome: Progressing  5/6/2024 0841 by Elham Landrum RN  Outcome: Progressing     Problem: Pain  Goal: Verbalizes/displays adequate comfort level or baseline comfort level  5/6/2024 2202 by Chey Deal RN  Outcome: Progressing  Flowsheets (Taken 5/6/2024 2000)  Verbalizes/displays adequate comfort level or baseline comfort level:   Encourage patient to monitor pain and request assistance   Assess pain using appropriate pain scale   Administer analgesics based on type and severity of pain and evaluate response  5/6/2024 0841 by Elham Landrum RN  Outcome: Progressing

## 2024-05-07 NOTE — PLAN OF CARE
Problem: Occupational Therapy - Adult  Goal: By Discharge: Performs self-care activities at highest level of function for planned discharge setting.  See evaluation for individualized goals.  Description: Occupational Therapy Goals   Long Term Goals  Initiated (2024) and to be accomplished within 3 week(s)  1. Pt will perform self-feeding with Mod I.  2. Pt will perform grooming with set-up.  3. Pt will perform UB bathing with set-up.  4. Pt will perform LB bathing with supv using AE as needed.  5. Pt will perform tub/shower transfer with CGA using least restrictive assistive device.   6. Pt will perform UB dressing with set-up.  7. Pt will perform LB dressing with supv using AE as needed.  8. Pt will perform toileting task with SBA.  9. Pt will perform toilet transfer with SBA using least restrictive assistive device.    Short Term Goals   Initiated (2024) and to be accomplished within 7 day(s), (updated 5/3/2024)  1. Pt will perform self-feeding with set-up. ( 5/3/2024)  2. Pt will perform grooming with SBA. ( 5/3/2024)  3. Pt will perform UB bathing with supv. ( 5/3/2024)  4. Pt will perform LB bathing with CGA using AE as needed. ( 5/3/2024)  5. Pt will perform tub/shower transfer with Min A using least restrictive assistive device. ( 5/3/2024)  6. Pt will perform UB dressing with supv. ( 5/3/2024)  7. Pt will perform LB dressing with CGA/ Min A using AE as needed. ( 5/3/2024)  8. Pt will perform toileting task with Min A. ( 5/3/2024)  9. Pt will perform toilet transfer with CGA/ Min A using least restrictive assistive device. ( 5/3/2024)    Outcome: Progressing   Occupational Therapy TREATMENT    Patient: Zamzam Calixto   73 y.o.    Patient identified with name and : yes    Date: 2024    First Tx Session  Time In: 1330  Time Out: 1404    Second Tx Session  Time In: 1105  Time Out: 1201    Diagnosis: S/P AKA (above knee amputation) unilateral, left ([facility]) [Z89.612]  understanding.  [] Patient/family have participated as able in goal setting and plan of care.  [x] Patient/family agree to work toward stated goals and plan of care.  [] Patient understands intent and goals of therapy, but is neutral about his/her participation.  [] Patient is unable to participate in goal setting and plan of care.    Please refer to the flowsheet for vital signs taken during this treatment.  After treatment:   [x]  Patient left in no apparent distress sitting up in chair   []  Patient left in no apparent distress in bed  []  Patient handoff to SLP/PT  [x]  Call bell and immediate needs left within reach  []  Nursing notified  []  Caregiver present  []  Bed alarm activated  []  Chair alarm activated  []  Chantal Prominences offloaded  []  Heels activated for pressure relief  []  Telesitter/Care companion present      Entered Differentiated Treatment minutes: yes      Yakelin Silverman OT  5/7/2024

## 2024-05-07 NOTE — PROGRESS NOTES
[facility]  (A Division of [facility])      Consultation Note      Date of Admission: 4/25/2024    Date of Note: 5/7/2024      Reason for Referral: Antibiotic management  Referring Physician: Dr. 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  S/p washout with wound vac on 2/17  S/p removal of infected bypass graft 2/23/23 in left thigh  2/23/23 surgical cx: + Kelbsiella aerogenes  2/27/23 s/p resvision left fem-pop with cadaver vein and removal of femoral blow knee graft  Leukocytosis-  14.4 on presentation- now resolved   PVD  Immunosuppressed due to Remicaide for Crohn's disease  Anemia--   Neuropathy  Electrolyte abnormalities  Possible incisional cellulitis to stump-overall resolved.  No drainage noted.  5/3 wound culture with heavy mixed skin soraya       Plan:   Continue Bactrim twice daily   - transition back to chronic daily suppression on 5/10.    -Trend BMP every 3 to 4 months while on Bactrim due to medication associated toxicities    Reviewed repeat CBC-WBCs have now normalized.  Has follow-up appointment set for 5/21 at 10:30 AM      Phan Lam DO  [facility]  [address], [address], [place] [address]  Office: [phone], Ext [phone]      Lines / Catheters:  Peripheral    Subjective:   Doing well.  Still has occasional phantom pain but no other particular complaints.  Happy with her progress in general.  tolerating antibiotics.     Tmax 98.1  WBCs 9.0    HPI:  Ms. Cantu is a bartolo 73-year-old female with a  I reput in a stat referral.  She needs referral in hand to go to the clinic.

## 2024-05-07 NOTE — PLAN OF CARE
Problem: Physical Therapy - Adult  Goal: By Discharge: Performs mobility at highest level of function for planned discharge setting.  See evaluation for individualized goals.  Description: Physical Therapy Short Term Goals  Initiated 4/26/2024, re-assessed 5/6/2024 and to be accomplished within 7 day(s) [5/10/24]  1.  Patient will supine to sit, sit to supine, roll right, and roll left  in bed with modified independence.    2.  Patient will transfer from bed to chair and chair to bed with contact guard assist using the least restrictive device.(MET 5/6/2024)  3.  Patient will perform sit to stand with contact guard assist(MET 5/6/2024)  4.  Patient will ambulate with contact guard assist for 25 feet with the least restrictive device. (MET 5/6/2024)  5.  Patient will propel w/c 150 ft on level surface with supervision for mobility on unit.(MET 5/6/2024)    Physical Therapy Long Term Goals  Initiated 4/26/2024 and to be accomplished within 21 day(s) [5/17/24]  1.  Patient will supine to sit, sit to supine, roll right, and roll left in bed with independence.    2.  Patient will transfer from bed to chair and chair to bed with modified independence using the least restrictive device.  3.  Patient will perform sit to stand with modified independence.  4.  Patient will ambulate with supervision/set-up for 50 feet with the least restrictive device.   5.  Patient will ascend/descend 5 stairs with at least one handrail(s) with minimal assistance/contact guard assist.   Outcome: Progressing     PHYSICAL THERAPY TREATMENT    Patient: Zamzam Calixto (73 y.o. female)  Date: 5/7/2024  Diagnosis: S/P AKA (above knee amputation) unilateral, left ([facility]) [Z89.612]   Precautions: fall risk  Chart, physical therapy assessment, plan of care and goals were reviewed.    Time in: 0938  Time out : 1106    Patient seen for: txfr training, gait training, therapeutic exercises, bed mobility    Pain:  Pt pain was reported as no c/o  distress in w/c mobilizing under own power  [] Patient left in no apparent distress dining area  [] Patient left in no apparent distress mobilizing under own power  [] Call bell left within reach  [] Nursing notified  [] Caregiver present  [] Bed alarm activated   [x] Chair alarm activated        Ele Godoy, FERNANDA  5/7/2024

## 2024-05-07 NOTE — PROGRESS NOTES
Wound Prevention Checklist    Patient: Zamzam Calixto (73 y.o. female)  Date: 5/7/2024  Diagnosis: S/P AKA (above knee amputation) unilateral, left ([facility]) [Z89.612] S/P AKA (above knee amputation) unilateral, left ([facility])    Precautions:         []  Heel prevention boots placed on patient    []  Patient turned q2h during shift    []  Lift team ordered    []  Patient on [place] bed/Specialty bed    []  Each Wound is documented during shift (Stage, Color, drainage, odor, measurements, and dressings)    []  Dual skin check done with WAYNE Rodarte RN

## 2024-05-08 PROCEDURE — 97535 SELF CARE MNGMENT TRAINING: CPT

## 2024-05-08 PROCEDURE — 1180000000 HC REHAB R&B

## 2024-05-08 PROCEDURE — 99232 SBSQ HOSP IP/OBS MODERATE 35: CPT | Performed by: HOSPITALIST

## 2024-05-08 PROCEDURE — 6370000000 HC RX 637 (ALT 250 FOR IP): Performed by: EMERGENCY MEDICINE

## 2024-05-08 PROCEDURE — 6360000002 HC RX W HCPCS: Performed by: EMERGENCY MEDICINE

## 2024-05-08 PROCEDURE — 97530 THERAPEUTIC ACTIVITIES: CPT

## 2024-05-08 PROCEDURE — 97110 THERAPEUTIC EXERCISES: CPT

## 2024-05-08 PROCEDURE — 97116 GAIT TRAINING THERAPY: CPT

## 2024-05-08 RX ORDER — MULTIVITAMIN WITH IRON
1 TABLET ORAL DAILY
Status: DISCONTINUED | OUTPATIENT
Start: 2024-05-08 | End: 2024-05-11 | Stop reason: HOSPADM

## 2024-05-08 RX ADMIN — IPRATROPIUM BROMIDE 0.5 MG: 0.5 SOLUTION RESPIRATORY (INHALATION) at 16:56

## 2024-05-08 RX ADMIN — BUDESONIDE 250 MCG: 0.25 INHALANT RESPIRATORY (INHALATION) at 08:12

## 2024-05-08 RX ADMIN — SULFAMETHOXAZOLE AND TRIMETHOPRIM 1 TABLET: 800; 160 TABLET ORAL at 08:45

## 2024-05-08 RX ADMIN — IPRATROPIUM BROMIDE 0.5 MG: 0.5 SOLUTION RESPIRATORY (INHALATION) at 02:56

## 2024-05-08 RX ADMIN — PREGABALIN 150 MG: 50 CAPSULE ORAL at 21:37

## 2024-05-08 RX ADMIN — AMLODIPINE BESYLATE 10 MG: 10 TABLET ORAL at 08:45

## 2024-05-08 RX ADMIN — CINACALCET HYDROCHLORIDE 30 MG: 60 TABLET, FILM COATED ORAL at 08:45

## 2024-05-08 RX ADMIN — CINACALCET HYDROCHLORIDE 30 MG: 60 TABLET, FILM COATED ORAL at 21:37

## 2024-05-08 RX ADMIN — CETIRIZINE HYDROCHLORIDE 10 MG: 10 TABLET, FILM COATED ORAL at 08:45

## 2024-05-08 RX ADMIN — BUDESONIDE 250 MCG: 0.25 INHALANT RESPIRATORY (INHALATION) at 21:36

## 2024-05-08 RX ADMIN — ARFORMOTEROL TARTRATE 15 MCG: 15 SOLUTION RESPIRATORY (INHALATION) at 21:36

## 2024-05-08 RX ADMIN — THERA TABS 1 TABLET: TAB at 11:30

## 2024-05-08 RX ADMIN — APIXABAN 5 MG: 5 TABLET, FILM COATED ORAL at 21:36

## 2024-05-08 RX ADMIN — IPRATROPIUM BROMIDE 0.5 MG: 0.5 SOLUTION RESPIRATORY (INHALATION) at 08:12

## 2024-05-08 RX ADMIN — APIXABAN 5 MG: 5 TABLET, FILM COATED ORAL at 08:45

## 2024-05-08 RX ADMIN — ARFORMOTEROL TARTRATE 15 MCG: 15 SOLUTION RESPIRATORY (INHALATION) at 08:12

## 2024-05-08 RX ADMIN — PREGABALIN 150 MG: 50 CAPSULE ORAL at 08:45

## 2024-05-08 RX ADMIN — DULOXETINE HYDROCHLORIDE 60 MG: 30 CAPSULE, DELAYED RELEASE ORAL at 08:45

## 2024-05-08 RX ADMIN — SULFAMETHOXAZOLE AND TRIMETHOPRIM 1 TABLET: 800; 160 TABLET ORAL at 21:36

## 2024-05-08 ASSESSMENT — PAIN SCALES - GENERAL
PAINLEVEL_OUTOF10: 0

## 2024-05-08 NOTE — PROGRESS NOTES
Wound Prevention Checklist    Patient: Zamzam Calixto (73 y.o. female)  Date: 5/8/2024  Diagnosis: S/P AKA (above knee amputation) unilateral, left ([facility]) [Z89.612] S/P AKA (above knee amputation) unilateral, left ([facility])    Precautions:         []  Heel prevention boots placed on patient    []  Patient turned q2h during shift    []  Lift team ordered    []  Patient on [place] bed/Specialty bed    []  Each Wound is documented during shift (Stage, Color, drainage, odor, measurements, and dressings)    []  Dual skin check done with WAYNE Green RN

## 2024-05-08 NOTE — PROGRESS NOTES
[facility]  (A Division of [facility])      Consultation Note      Date of Admission: 4/25/2024    Date of Note: 5/8/2024      Reason for Referral: Antibiotic management  Referring Physician: Dr. 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  S/p washout with wound vac on 2/17  S/p removal of infected bypass graft 2/23/23 in left thigh  2/23/23 surgical cx: + Kelbsiella aerogenes  2/27/23 s/p resvision left fem-pop with cadaver vein and removal of femoral blow knee graft  Leukocytosis-  14.4 on presentation- now resolved   PVD  Immunosuppressed due to Remicaide for Crohn's disease  Anemia--   Neuropathy  Electrolyte abnormalities  Possible incisional cellulitis to stump-overall resolved.  No drainage noted.  5/3 wound culture with heavy mixed skin soraya       Plan:   Continue Bactrim twice daily   - transition back to chronic daily suppression on 5/10.    -Trend BMP every 3 to 4 months while on Bactrim due to medication associated toxicities    Reviewed repeat CBC-WBCs have now normalized.  Has follow-up appointment set for 5/21 at 10:30 AM      Phan Lam DO  [facility]  [address], [address], [place] [address]  Office: [phone], Ext [phone]      Lines / Catheters:  Peripheral    Subjective:   Doing well.  Still has occasional phantom pain but no other particular complaints.  Says that she is awaiting wound care tomorrow when Dr. Ashley comes in.  Otherwise she feels like she is doing great.  Does not like taking the large

## 2024-05-08 NOTE — PLAN OF CARE
Problem: Physical Therapy - Adult  Goal: By Discharge: Performs mobility at highest level of function for planned discharge setting.  See evaluation for individualized goals.  Description: Physical Therapy Short Term Goals  Initiated 4/26/2024, re-assessed 5/6/2024 and to be accomplished within 7 day(s) [5/10/24]  1.  Patient will supine to sit, sit to supine, roll right, and roll left  in bed with modified independence.    2.  Patient will transfer from bed to chair and chair to bed with contact guard assist using the least restrictive device.(MET 5/6/2024)  3.  Patient will perform sit to stand with contact guard assist(MET 5/6/2024)  4.  Patient will ambulate with contact guard assist for 25 feet with the least restrictive device. (MET 5/6/2024)  5.  Patient will propel w/c 150 ft on level surface with supervision for mobility on unit.(MET 5/6/2024)    Physical Therapy Long Term Goals  Initiated 4/26/2024 and to be accomplished within 21 day(s) [5/17/24]  1.  Patient will supine to sit, sit to supine, roll right, and roll left in bed with independence.    2.  Patient will transfer from bed to chair and chair to bed with modified independence using the least restrictive device.  3.  Patient will perform sit to stand with modified independence.  4.  Patient will ambulate with supervision/set-up for 50 feet with the least restrictive device.   5.  Patient will ascend/descend 5 stairs with at least one handrail(s) with minimal assistance/contact guard assist.   Outcome: Progressing     PHYSICAL THERAPY TREATMENT    Patient: Zamzam Calixto (73 y.o. female)  Date: 5/8/2024  Diagnosis: S/P AKA (above knee amputation) unilateral, left ([facility]) [Z89.612]   Precautions: fall risk  Chart, physical therapy assessment, plan of care and goals were reviewed.    Time in: 1150  Time out : 1220  Time in: 1540  Time out: 1630  Patient seen for: txfr training, bed mobility, therapeutic exercises, gait training, balance  training    Pain:  Pt pain was reported as no c/o pre-treatment.  Pt pain was reported as no c/o post-treatment.  Intervention: NA    Patient identified with name and : Yes    SUBJECTIVE:      Pt reports feeling good today and ready to participate in therapy.     OBJECTIVE DATA SUMMARY:    Objective:   Education: Education Given To: Patient  Education Provided: Safety;IADL Function;Fall Prevention Strategies  Education Method: Verbal  Barriers to Learning: None  Education Outcome: Verbalized understanding;Demonstrated understanding  BED/MAT MOBILITY Daily Assessment    Rolling Right      Rolling Left      Supine to Sit Independent on left side of mat table and on right side of bed    Sit to Supine Independent      TRANSFERS Daily Assessment    Sit to Stand Stand by assistance from w/c with occasional v/c for reaching back with B hands for stand to sit.     Transfer Assist Score    Stand by assistance          Comments  Pt performed stand pivot txfr bed to w/c and w/c<->mat table with SBA and v/c for proper hand placement for proper and safe technique to decrease risk of LOB and fall.       Car Transfer      Car Type         GAIT Daily Assessment    Gait Deviations Slow Miranda;Decreased step length;Decreased step height    Assistive device Rolling Walker    Ambulation assistance - surface  Stand by assistance  Level tile    Distance 80ft    Comments Pt ambulated 80ft with RW and SBA with swing through gait and slow pacing for safety.     Ambulation-uneven surface              BALANCE Daily Assessment    Posture      Sitting - Static Good    Sitting - Dynamic Good    Standing - Static Good (with RW)    Standing - Dynamic Good;- (with RW)    Comments  Pt participated in table top game in standing with RW for BUE support with SBA/CGA x10min.       WHEELCHAIR MOBILITY/MANAGEMENT Daily Assessment   Able to Propel 55ft   Assist Level Modified independent   Curbs/ramps assistance required  NT   Wheelchair management

## 2024-05-08 NOTE — PROGRESS NOTES
0800 Pt. Awake sitting up in bed alert and oriented x 4 no change in assessment.   0930 Pt. Sitting up in chair eating breakfast.   1200 with therapy.   1330 able to transfer from bed to chair with assist.   1500 no change in assessment.   1800 Pt. Sitting up in chair eating dinner.

## 2024-05-08 NOTE — PROGRESS NOTES
Patient up and dressed and ready for therapy  Tells me her mental space is come along very nicely being here in rehab.  Was concerned about a site on her incision but has cleaned up  Stump  in place  We will take off dressing tomorrow and reevaluate

## 2024-05-08 NOTE — PROGRESS NOTES
[facility]  [address] [place] [id number]     INPATIENT REHABILITATION  DAILY PROGRESS NOTE     Date: 5/8/2024    Name: Zamzam Calixto Age / Sex: 73 y.o. / female   CSN: [id number] MRN: [id number]   Admit Date: 4/25/2024 Length of Stay: 13 days     Primary Rehabilitation Diagnosis   Impaired mobility and ADLs in the setting of a left above-knee amputation    Subjective:     Patient sitting in the wheelchair, feels good.  no new complaints.    Objective:     Vital Signs:  Patient Vitals for the past 24 hrs:   BP Temp Temp src Pulse Resp SpO2 Height   05/08/24 1023 -- -- -- -- -- -- 1.651 m (5' 5\")   05/08/24 0801 (!) 133/56 98.2 °F (36.8 °C) Oral 64 17 90 % --   05/07/24 2030 120/63 98.7 °F (37.1 °C) Oral 83 18 95 % --   05/07/24 1553 (!) 136/52 97.3 °F (36.3 °C) Oral 74 18 94 % --        Physical Examination:  General:  Awake, alert, follows command and responds appropriately  Cardiovascular:  S1S2+, RRR  Pulmonary:  CTA b/l  GI:  Soft, BS+, NT, ND  Extremities:  No edema  Left AKA noted. Staples are in place.  No wound dehiscence.  In the middle there is some redness but no discharge noted, redness better    Current Medications:  Current Facility-Administered Medications   Medication Dose Route Frequency    multivitamin 1 tablet  1 tablet Oral Daily    acetaminophen (TYLENOL) tablet 650 mg  650 mg Oral Q4H PRN    sulfamethoxazole-trimethoprim (BACTRIM DS;SEPTRA DS) 800-160 MG per tablet 1 tablet  1 tablet Oral 2 times per day    pregabalin (LYRICA) capsule 150 mg  150 mg Oral BID    amLODIPine (NORVASC) tablet 10 mg  10 mg Oral Daily    apixaban (ELIQUIS) tablet 5 mg  5 mg Oral BID    cetirizine (ZYRTEC) tablet 10 mg  10 mg Oral Daily    cinacalcet (SENSIPAR) tablet 30 mg  30 mg Oral BID    DULoxetine (CYMBALTA) extended release capsule 60 mg  60 mg Oral Daily    HYDROcodone-acetaminophen (NORCO) 5-325 MG per tablet 1 tablet  1 tablet Oral Q4H PRN    naloxone

## 2024-05-08 NOTE — PLAN OF CARE
Problem: Occupational Therapy - Adult  Goal: By Discharge: Performs self-care activities at highest level of function for planned discharge setting.  See evaluation for individualized goals.  Description: Occupational Therapy Goals   Long Term Goals  Initiated (2024) and to be accomplished within 3 week(s)  1. Pt will perform self-feeding with Mod I.  2. Pt will perform grooming with set-up.  3. Pt will perform UB bathing with set-up.  4. Pt will perform LB bathing with supv using AE as needed.  5. Pt will perform tub/shower transfer with CGA using least restrictive assistive device.   6. Pt will perform UB dressing with set-up.  7. Pt will perform LB dressing with supv using AE as needed.  8. Pt will perform toileting task with SBA.  9. Pt will perform toilet transfer with SBA using least restrictive assistive device.    Short Term Goals   Initiated (2024) and to be accomplished within 7 day(s), (updated 5/3/2024)  1. Pt will perform self-feeding with set-up. ( 5/3/2024)  2. Pt will perform grooming with SBA. ( 5/3/2024)  3. Pt will perform UB bathing with supv. ( 5/3/2024)  4. Pt will perform LB bathing with CGA using AE as needed. ( 5/3/2024)  5. Pt will perform tub/shower transfer with Min A using least restrictive assistive device. ( 5/3/2024)  6. Pt will perform UB dressing with supv. ( 5/3/2024)  7. Pt will perform LB dressing with CGA/ Min A using AE as needed. ( 5/3/2024)  8. Pt will perform toileting task with Min A. ( 5/3/2024)  9. Pt will perform toilet transfer with CGA/ Min A using least restrictive assistive device. ( 5/3/2024)    Outcome: Progressing   Occupational Therapy TREATMENT    Patient: Zamzam Calixto   73 y.o.    Patient identified with name and : yes    Date: 2024    First Tx Session  Time In: 1330  Time Out: 1400    Second Tx Session  Time In: 0930  Time Out: 1032    Diagnosis: S/P AKA (above knee amputation) unilateral, left ([facility]) [Z89.612]

## 2024-05-08 NOTE — PROGRESS NOTES
Comprehensive Nutrition Assessment    Type and Reason for Visit:  Initial, RD Nutrition Re-Screen/LOS    Nutrition Recommendations/Plan:   Continue current diet.   Plan to add oral nutrition supplement to optimize nutrition intake opportunity: Glucerna (each provides 220 kcal, 10g protein) once daily  Plan to order MVI r/t increased nutrient needs.   Monitor PO intake/tolerance of meals/supplements, weight, labs, and POC while admitted.      Malnutrition Assessment:  Malnutrition Status:  At risk for malnutrition (Comment) (increased nutrient needs r/t recent AKA) (05/08/24 1045)    Context:  Acute Illness     Findings of the 6 clinical characteristics of malnutrition:  Energy Intake:  No significant decrease in energy intake  Weight Loss:  No significant weight loss     Body Fat Loss:  Unable to assess     Muscle Mass Loss:  Unable to assess    Fluid Accumulation:  No significant fluid accumulation     Strength:  Not Performed    Nutrition History and Allergies:   PMHx: PAD, HTN, DM, crohn's disease, GERD. Wt hx: c wt- 119 lb (bed scale), 125.2 lb (3/25/24, -5%), 122 lb (8/1/23), 131.4 lb (4/13/23, -4.7%), no significant wt loss documented x >1 year PTA. NKFA.     Nutrition Assessment:    Pt admitted to ARU for management of debilities following left AKA.   Per flow sheets, meal intake %, with 23/25 entries >50%. Pt unavailable to speak to. Will continue to monitor PO intake/tolerance of meals while admitted.    Nutrition Related Findings:    Last BM: 5/7. Edema: none. Labs (5/7): BUN 20 H, GFR 52 L, Ca 10.7 H. Pertinent meds: reviewed. Wound Type: Surgical Incision       Current Nutrition Intake & Therapies:    Average Meal Intake: 51-75%, %  Average Supplements Intake: None Ordered  ADULT DIET; Regular; Low Fat/Low Chol/High Fiber/2 gm Na    Anthropometric Measures:  Height: 165.1 cm (5' 5\")  Ideal Body Weight (IBW): 125 lbs (57 kg)       Current Body Weight: 54 kg (119 lb), 95.2 % IBW. Weight

## 2024-05-08 NOTE — PLAN OF CARE
Problem: Safety - Adult  Goal: Free from fall injury  Outcome: Progressing  Flowsheets (Taken 5/7/2024 2007)  Free From Fall Injury: Instruct family/caregiver on patient safety     Problem: ABCDS Injury Assessment  Goal: Absence of physical injury  Outcome: Progressing  Flowsheets (Taken 5/7/2024 2007)  Absence of Physical Injury: Implement safety measures based on patient assessment     Problem: Skin/Tissue Integrity  Goal: Absence of new skin breakdown  Description: 1.  Monitor for areas of redness and/or skin breakdown  2.  Assess vascular access sites hourly  3.  Every 4-6 hours minimum:  Change oxygen saturation probe site  4.  Every 4-6 hours:  If on nasal continuous positive airway pressure, respiratory therapy assess nares and determine need for appliance change or resting period.  Outcome: Progressing     Problem: Pain  Goal: Verbalizes/displays adequate comfort level or baseline comfort level  Outcome: Progressing  Flowsheets (Taken 5/7/2024 2000)  Verbalizes/displays adequate comfort level or baseline comfort level:   Encourage patient to monitor pain and request assistance   Assess pain using appropriate pain scale   Administer analgesics based on type and severity of pain and evaluate response   Implement non-pharmacological measures as appropriate and evaluate response

## 2024-05-09 PROCEDURE — 97535 SELF CARE MNGMENT TRAINING: CPT

## 2024-05-09 PROCEDURE — 94761 N-INVAS EAR/PLS OXIMETRY MLT: CPT

## 2024-05-09 PROCEDURE — 97110 THERAPEUTIC EXERCISES: CPT

## 2024-05-09 PROCEDURE — 97530 THERAPEUTIC ACTIVITIES: CPT

## 2024-05-09 PROCEDURE — 1180000000 HC REHAB R&B

## 2024-05-09 PROCEDURE — 6360000002 HC RX W HCPCS: Performed by: EMERGENCY MEDICINE

## 2024-05-09 PROCEDURE — 6370000000 HC RX 637 (ALT 250 FOR IP): Performed by: EMERGENCY MEDICINE

## 2024-05-09 PROCEDURE — 97116 GAIT TRAINING THERAPY: CPT

## 2024-05-09 PROCEDURE — 99232 SBSQ HOSP IP/OBS MODERATE 35: CPT | Performed by: EMERGENCY MEDICINE

## 2024-05-09 RX ADMIN — PREGABALIN 150 MG: 50 CAPSULE ORAL at 08:18

## 2024-05-09 RX ADMIN — APIXABAN 5 MG: 5 TABLET, FILM COATED ORAL at 21:08

## 2024-05-09 RX ADMIN — APIXABAN 5 MG: 5 TABLET, FILM COATED ORAL at 08:18

## 2024-05-09 RX ADMIN — CETIRIZINE HYDROCHLORIDE 10 MG: 10 TABLET, FILM COATED ORAL at 08:18

## 2024-05-09 RX ADMIN — ARFORMOTEROL TARTRATE 15 MCG: 15 SOLUTION RESPIRATORY (INHALATION) at 08:18

## 2024-05-09 RX ADMIN — SULFAMETHOXAZOLE AND TRIMETHOPRIM 1 TABLET: 800; 160 TABLET ORAL at 08:17

## 2024-05-09 RX ADMIN — IPRATROPIUM BROMIDE 0.5 MG: 0.5 SOLUTION RESPIRATORY (INHALATION) at 08:18

## 2024-05-09 RX ADMIN — BUDESONIDE 250 MCG: 0.25 INHALANT RESPIRATORY (INHALATION) at 08:18

## 2024-05-09 RX ADMIN — ARFORMOTEROL TARTRATE 15 MCG: 15 SOLUTION RESPIRATORY (INHALATION) at 21:08

## 2024-05-09 RX ADMIN — IPRATROPIUM BROMIDE 0.5 MG: 0.5 SOLUTION RESPIRATORY (INHALATION) at 23:33

## 2024-05-09 RX ADMIN — CINACALCET HYDROCHLORIDE 30 MG: 60 TABLET, FILM COATED ORAL at 08:18

## 2024-05-09 RX ADMIN — IPRATROPIUM BROMIDE 0.5 MG: 0.5 SOLUTION RESPIRATORY (INHALATION) at 00:32

## 2024-05-09 RX ADMIN — THERA TABS 1 TABLET: TAB at 08:18

## 2024-05-09 RX ADMIN — DULOXETINE HYDROCHLORIDE 60 MG: 30 CAPSULE, DELAYED RELEASE ORAL at 08:18

## 2024-05-09 RX ADMIN — AMLODIPINE BESYLATE 10 MG: 10 TABLET ORAL at 08:18

## 2024-05-09 RX ADMIN — BUDESONIDE 250 MCG: 0.25 INHALANT RESPIRATORY (INHALATION) at 21:08

## 2024-05-09 RX ADMIN — CINACALCET HYDROCHLORIDE 30 MG: 60 TABLET, FILM COATED ORAL at 21:07

## 2024-05-09 RX ADMIN — SULFAMETHOXAZOLE AND TRIMETHOPRIM 1 TABLET: 800; 160 TABLET ORAL at 21:08

## 2024-05-09 RX ADMIN — IPRATROPIUM BROMIDE 0.5 MG: 0.5 SOLUTION RESPIRATORY (INHALATION) at 16:36

## 2024-05-09 RX ADMIN — PREGABALIN 150 MG: 50 CAPSULE ORAL at 21:08

## 2024-05-09 ASSESSMENT — PAIN SCALES - GENERAL
PAINLEVEL_OUTOF10: 0

## 2024-05-09 NOTE — PROGRESS NOTES
Sw discussed dc planning for Saturday. Sw offered [facility] for home care and pt states preference to remain with BS. Sw notified liaisons.     Pt states that she will purchase or borrow a rolling walker, bedside commode and tub transfer bench.     Sw will place order for wheelchair through [employer] for [employer] delivery.     Sw attempted to call pt's spouse for caregiver education with no answer. Pt states that she will have her spouse present for 930 PT session tomorrow morning.     Sw will follow.

## 2024-05-09 NOTE — PROGRESS NOTES
Patient received communion from [employer] volunteer for Zamzam Calixto, who is a 73 y.o.,female.      The  provided the following Interventions:  Continued the relationship of care and support.   Offered prayer and assurance of continued prayer on patients behalf.   Chart reviewed.    The following outcomes were achieved:  Patient received communion from [employer].     Assessment:  There are no further spiritual or Alevism issues which require Spiritual Care Services interventions at this time.     Plan:  Chaplains will continue to follow and will provide pastoral care on an as needed/requested basis.   recommends bedside caregivers page  on duty if patient shows signs of acute spiritual or emotional distress.     Juliet Low, [facility]     Spiritual Care   [phone]

## 2024-05-09 NOTE — PROGRESS NOTES
independence.       Barrier: left aka       Intervention: adl training, transfer training       ADL Accessibility Limitations:none        Physical Therapy Making gains Yes   Goal: Patient will perform squat pivot transfer at [facility].       Barrier: decreased carryover of safe hand placement       Intervention: transfer training       Household Mobility Accessibility Limitations:none - ramp installed                                Therapy Minutes Density Met: Yes    Therapy Minutes Not met Action/Justification:   [] Pt on medical hold  [] Pt refusing therapy despite encouragement and education on benefits of therapy  [] Pt displays decreased tolerance to therapy  [] Other     CMG Date: 5/6/2024  Estimated Discharge Date: 5/11/2024  [x]Rehabilitation goals from IPOC/Treatment plan reviewed  []No changes identified  [x]Goal(s) changed: with ramp installed, steps are not a current goal - edit to follow     Patient needs identified [x]Caregiver Education   []Family Conference         Recommended Discharge Plan []home alone   []home alone with assist prn    [x]Home with continuous supervision       []Home with continuous assistance   [] Assisted living                     []SNF   Home Health pt, ot, wound care     CURRENT EQUIPMENT NEEDS:  [x]Handicap Placard Application    Equipment needed at discharge: Rolling walker, wheelchair     ADL Equipment:Tub bench and 3 in 1 BSC    Plan/Adjustments to Plan   [x]Medical conditions exist that require a minimum of 3 times/week physician      oversight and 24-hour rehabilitation nursing to manage/progress the plan of care   [x]Functional deficits require intensive and coordinated therapies to achieve   goals outlined in plan of care   [x]3 hours therapy 5 days/week OR 15 hours therapy over 7 days   [x]Continued plan of care as patient is showing progress and /or has an expectation to benefit    Patient's plan of care has been reviewed and/or adjusted. Continue

## 2024-05-09 NOTE — PROGRESS NOTES
TEAM CONFERENCE FOLLOW-UP  Patient: Zamzam Calixto (73 y.o. female)  Date: 5/9/2024  Diagnosis: S/P AKA (above knee amputation) unilateral, left (HCC) [Z89.612] S/P AKA (above knee amputation) unilateral, left (HCC)      Precautions:      Met with patient to discuss the findings from 5/9/2024 Team Conference.    Patient requested further information and/or had questions:   Flores Diaz

## 2024-05-09 NOTE — PLAN OF CARE
Problem: Physical Therapy - Adult  Goal: By Discharge: Performs mobility at highest level of function for planned discharge setting.  See evaluation for individualized goals.  Description: Physical Therapy Short Term Goals  Initiated 4/26/2024, re-assessed 5/6/2024 and to be accomplished within 7 day(s) [5/10/24]  1.  Patient will supine to sit, sit to supine, roll right, and roll left  in bed with modified independence.    2.  Patient will transfer from bed to chair and chair to bed with contact guard assist using the least restrictive device.(MET 5/6/2024)  3.  Patient will perform sit to stand with contact guard assist(MET 5/6/2024)  4.  Patient will ambulate with contact guard assist for 25 feet with the least restrictive device. (MET 5/6/2024)  5.  Patient will propel w/c 150 ft on level surface with supervision for mobility on unit.(MET 5/6/2024)    Physical Therapy Long Term Goals  Initiated 4/26/2024 and to be accomplished within 21 day(s) [5/17/24]  1.  Patient will supine to sit, sit to supine, roll right, and roll left in bed with independence.    2.  Patient will transfer from bed to chair and chair to bed with modified independence using the least restrictive device.  3.  Patient will perform sit to stand with modified independence.  4.  Patient will ambulate with supervision/set-up for 50 feet with the least restrictive device.   5.  Patient will ascend/descend 5 stairs with at least one handrail(s) with minimal assistance/contact guard assist.   Outcome: Progressing     PHYSICAL THERAPY TREATMENT    Patient: Zamzam Calixto (73 y.o. female)  Date: 5/9/2024  Diagnosis: S/P AKA (above knee amputation) unilateral, left ([facility]) [Z89.612]   Precautions: falls   Chart, physical therapy assessment, plan of care and goals were reviewed.    Time in: 1035  Time out : 1135  Patient seen for: gait training, transfers training, bed mobility training, therapeutic exercise, w/c mobility, and pt education    Time  length    Assistive device Rolling Walker    Ambulation assistance - surface  Stand by assistance  Level tile    Distance 68 ft (34 ft, turn, 34 ft back); then 102 ft (51 ft, turn, 51 ft back)    Comments Swing-through pattern; slow pace for increased safety; decreased foot clearance. Pt does not need a w/c follow anymore during gait training.  During pm session, pt worked on gait training for 4 reps of shorter distances (10ft, 20ft) to simulate ambulation within the home    Ambulation-uneven surface   NT this session         BALANCE Daily Assessment    Posture      Sitting - Static Good    Sitting - Dynamic Good    Standing - Static Good (with RW)    Standing - Dynamic Good;- (with RW)    Comments        WHEELCHAIR MOBILITY/MANAGEMENT Daily Assessment   Able to Propel 130 ft   Assist Level Modified independent   Curbs/ramps assistance required     Wheelchair management manages B brakes independently   Comments Pt used B Ue's and R LE to propel     THERAPEUTIC EXERCISES Daily Assessment     AROM B LE's; 15 reps x 2 sets; with supervision for proper technique.  Ex's included: (supine) heel-slides with 2# on R LE; SLR with 2# on R LE; SLR against red theraband resistance L LE; hip abduction with 2# on R LE, and against red theraband resistance L LE; (side-lying) hip extension with L LE  During PM session: pt participated in standing ex's at RW with SBA, working on hip extension and hip abd with L residual limb. 15 reps x 2 sets.  Seated R ankle DF and PF against red resistance band, 15 reps x 2 sets.          ASSESSMENT:  Pt tolerated both sessions today without c/o's and continues to progress toward long term goals.  Pt needed vc's for proper hand placement for safety during transfers about 50% of the time today. Pt progressing with gait tolerance for household distances with less support needed.   Progression toward goals:  [x]      Improving appropriately and progressing toward goals  []      Improving slowly and

## 2024-05-09 NOTE — PROGRESS NOTES
Patient received communion from [employer] volunteer for Zamzam Calixto, who is a 73 y.o.,female.      The  provided the following Interventions:  Continued the relationship of care and support.   Offered prayer and assurance of continued prayer on patients behalf.   Chart reviewed.    The following outcomes were achieved:  Patient received communion from [employer].     Assessment:  There are no further spiritual or Nondenominational issues which require Spiritual Care Services interventions at this time.     Plan:  Chaplains will continue to follow and will provide pastoral care on an as needed/requested basis.   recommends bedside caregivers page  on duty if patient shows signs of acute spiritual or emotional distress.     Juliet Low, [facility]     Spiritual Care   [phone]

## 2024-05-09 NOTE — PLAN OF CARE
Problem: Safety - Adult  Goal: Free from fall injury  Outcome: Progressing  Flowsheets (Taken 5/8/2024 2237)  Free From Fall Injury: Instruct family/caregiver on patient safety     Problem: ABCDS Injury Assessment  Goal: Absence of physical injury  Outcome: Progressing  Flowsheets (Taken 5/8/2024 2237)  Absence of Physical Injury: Implement safety measures based on patient assessment     Problem: Skin/Tissue Integrity  Goal: Absence of new skin breakdown  Description: 1.  Monitor for areas of redness and/or skin breakdown  2.  Assess vascular access sites hourly  3.  Every 4-6 hours minimum:  Change oxygen saturation probe site  4.  Every 4-6 hours:  If on nasal continuous positive airway pressure, respiratory therapy assess nares and determine need for appliance change or resting period.  Outcome: Progressing     Problem: Pain  Goal: Verbalizes/displays adequate comfort level or baseline comfort level  Outcome: Progressing  Flowsheets (Taken 5/8/2024 2000)  Verbalizes/displays adequate comfort level or baseline comfort level:   Encourage patient to monitor pain and request assistance   Assess pain using appropriate pain scale   Administer analgesics based on type and severity of pain and evaluate response     Problem: Nutrition Deficit:  Goal: Optimize nutritional status  Outcome: Progressing  Flowsheets (Taken 5/8/2024 1023 by Brook Khan, RD)  Nutrient intake appropriate for improving, restoring, or maintaining nutritional needs:   Assess nutritional status and recommend course of action   Monitor oral intake, labs, and treatment plans   Recommend appropriate diets, oral nutritional supplements, and vitamin/mineral supplements

## 2024-05-09 NOTE — PROGRESS NOTES
[facility]  (A Division of [facility])      Consultation Note      Date of Admission: 4/25/2024    Date of Note: 5/9/2024      Reason for Referral: Antibiotic management  Referring Physician: Dr. 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  S/p washout with wound vac on 2/17  S/p removal of infected bypass graft 2/23/23 in left thigh  2/23/23 surgical cx: + Kelbsiella aerogenes  2/27/23 s/p resvision left fem-pop with cadaver vein and removal of femoral blow knee graft  Leukocytosis-  14.4 on presentation- now resolved   PVD  Immunosuppressed due to Remicaide for Crohn's disease  Anemia--   Neuropathy  Electrolyte abnormalities  Possible incisional cellulitis to stump-overall resolved.  No drainage noted.  5/3 wound culture with heavy mixed skin soraya       Plan:   Continue Bactrim twice daily   - transition back to chronic daily suppression on 5/10.    -Trend BMP every 3 to 4 months while on Bactrim due to medication associated toxicities    Waiting for vascular surgery to remove dressings later today.  Will address any concerns at that time.    Reviewed repeat CBC-WBCs have now normalized.  Has follow-up appointment set for 5/21 at 10:30 AM      Phan Lam DO  [facility]  [address], [address] [address]  Office: [phone], Ext [phone]      Lines / Catheters:  Peripheral    Subjective:   Doing well.  Up and working with PT OT.  Occasional phantom pain but nothing she is worried about.  Waiting for Dr. Ashley to

## 2024-05-09 NOTE — PLAN OF CARE
Problem: Occupational Therapy - Adult  Goal: By Discharge: Performs self-care activities at highest level of function for planned discharge setting.  See evaluation for individualized goals.  Description: Occupational Therapy Goals   Long Term Goals  Initiated (2024) and to be accomplished within 3 week(s)  1. Pt will perform self-feeding with Mod I.  2. Pt will perform grooming with set-up.  3. Pt will perform UB bathing with set-up.  4. Pt will perform LB bathing with supv using AE as needed.  5. Pt will perform tub/shower transfer with CGA using least restrictive assistive device.   6. Pt will perform UB dressing with set-up.  7. Pt will perform LB dressing with supv using AE as needed.  8. Pt will perform toileting task with SBA.  9. Pt will perform toilet transfer with SBA using least restrictive assistive device.    Short Term Goals   Initiated (2024) and to be accomplished within 7 day(s), (updated 5/3/2024)  1. Pt will perform self-feeding with set-up. ( 5/3/2024)  2. Pt will perform grooming with SBA. ( 5/3/2024)  3. Pt will perform UB bathing with supv. ( 5/3/2024)  4. Pt will perform LB bathing with CGA using AE as needed. ( 5/3/2024)  5. Pt will perform tub/shower transfer with Min A using least restrictive assistive device. ( 5/3/2024)  6. Pt will perform UB dressing with supv. ( 5/3/2024)  7. Pt will perform LB dressing with CGA/ Min A using AE as needed. ( 5/3/2024)  8. Pt will perform toileting task with Min A. ( 5/3/2024)  9. Pt will perform toilet transfer with CGA/ Min A using least restrictive assistive device. ( 5/3/2024)    Outcome: Progressing   Occupational Therapy TREATMENT    Patient: Zamzam Calixto   73 y.o.    Patient identified with name and : yes    Date: 2024    First Tx Session  Time In:  0630  Time Out:  0800    Second Tx Session  Time In:  1339  Time Out:  1400    Diagnosis: S/P AKA (above knee amputation) unilateral, left (HCC) [Z89.612]    Precautions:  Restrictions/Precautions: Fall Risk, General Precautions (pt used 1 L of O2 at home previously)                             Chart, occupational therapy assessment, plan of care, and goals were reviewed.     Pain:  Pt reports 0/10 pain or discomfort prior to treatment.    Pt reports -/10 pain or discomfort post treatment.   Intervention Provided:     SUBJECTIVE:   Patient stated \"I organize my closets first\".    OBJECTIVE DATA SUMMARY:     COGNITION/PERCEPTION Daily Assessment   Overall orientation status Within Functional Limits   Orientation level Oriented X4   Overall cognitive status WFL      THERAPEUTIC ACTIVITY Daily Assessment    Sitting Balance: Modified independent   Standing Balance: Modified independent               THERAPEUTIC EXERCISE Daily Assessment    Pt completed BUE strengthening with Power Trainer for 15 continuous minutes against mod resistance.                IADL Daily Assessment   Medication Management Pt engaged in simulated medication management activity which involved reading directions on bottles, opening/closing pill bottles and pill box and sorting 3 pill bottles into pill box. Pt demonstrated good BUE FM and problem solving with task and completed task with Jus.      GROOMING Daily Assessment   Functional Level Modified independent    Clinical factors  Pt performed grooming tasks in seated position from w/c with Jus.     UPPER BODY BATHING Daily Assessment   Functional Level Setup   Method Soap and water   Clinical Factors Pt performed UB showering from seated position on TTB with setup. Pt required setup of moisture barrier to L leg.     LOWER BODY BATHING Daily Assessment   Functional Level  Setup;Adaptive equipment   Clinical Factors Pt performed LB showering from seated position on TTB with setup and AE. Pt demonstrated ability to wash lower RLE leg and foot with long handled sponge and washed buttocks in seated position weight shifting side to side with good

## 2024-05-09 NOTE — PROGRESS NOTES
[facility]  [address] [place] [id number]     INPATIENT REHABILITATION  DAILY PROGRESS NOTE     Date: 5/9/2024    Name: Zamzam Calixto Age / Sex: 73 y.o. / female   CSN: [id number] MRN: [id number]   Admit Date: 4/25/2024 Length of Stay: 14 days     Primary Rehabilitation Diagnosis   Impaired mobility and ADLs in the setting of a left above-knee amputation    Subjective:   I personally saw and evaluated this patient face-to-face today.  Patient is sitting in bed in no apparent distress, awake and alert    Objective:     Vital Signs:  Patient Vitals for the past 24 hrs:   BP Temp Temp src Pulse Resp SpO2   05/09/24 1558 (!) 151/70 98.4 °F (36.9 °C) Oral 70 18 92 %   05/09/24 0800 136/71 97.9 °F (36.6 °C) Oral 60 18 100 %   05/08/24 2031 (!) 151/66 97.8 °F (36.6 °C) Oral 67 18 96 %        Physical Examination:  General:  Awake, alert  Cardiovascular:  S1S2+, RRR  Pulmonary:  CTA b/l  GI:  Soft, BS+, NT, ND  Extremities: Left AKA noted      Current Medications:  Current Facility-Administered Medications   Medication Dose Route Frequency    multivitamin 1 tablet  1 tablet Oral Daily    acetaminophen (TYLENOL) tablet 650 mg  650 mg Oral Q4H PRN    sulfamethoxazole-trimethoprim (BACTRIM DS;SEPTRA DS) 800-160 MG per tablet 1 tablet  1 tablet Oral 2 times per day    pregabalin (LYRICA) capsule 150 mg  150 mg Oral BID    amLODIPine (NORVASC) tablet 10 mg  10 mg Oral Daily    apixaban (ELIQUIS) tablet 5 mg  5 mg Oral BID    cetirizine (ZYRTEC) tablet 10 mg  10 mg Oral Daily    cinacalcet (SENSIPAR) tablet 30 mg  30 mg Oral BID    DULoxetine (CYMBALTA) extended release capsule 60 mg  60 mg Oral Daily    HYDROcodone-acetaminophen (NORCO) 5-325 MG per tablet 1 tablet  1 tablet Oral Q4H PRN    naloxone (NARCAN) injection 0.4 mg  0.4 mg IntraVENous PRN    polyethylene glycol (GLYCOLAX) packet 17 g  17 g Oral Daily PRN    albuterol (PROVENTIL) (2.5 MG/3ML) 0.083% nebulizer solution  in-room safety with transfers to and from the bed, wheelchair, toilet and shower.      4. Acute rehabilitation plan of care:    [x]  Continue current care and rehab.           [x]  Physical Therapy           [x]  Occupational Therapy           []  Speech Therapy      []  Hold Rehab until further notice     5. Medications:    [x]  MAR Reviewed     [x]  Continue Present Medications       6. Chemical DVT Prophylaxis:      []  Enoxaparin     []  Unfractionated Heparin     []  Warfarin     [x]  NOAC     []  Aspirin     []  None     7. Mechanical DVT Prophylaxis:      []  LINDSEY Stockings     []  Sequential Compression Device     [x]  None     8. GI Prophylaxis:      []  PPI     []  H2 Blocker     [x]  None / Not indicated     9. Code status:Full     Dragon medical dictation software was used for portions of this report. Unintended errors may occur.    Personal Protective Equipment ( face mask ) was used while interacting with the patient        Signed:    Michael Gaming MD      May 9, 2024

## 2024-05-09 NOTE — PROGRESS NOTES
Patient received communion from [employer] volunteer for Zamzam Calixto, who is a 73 y.o.,female.      The  provided the following Interventions:  Continued the relationship of care and support.   Offered prayer and assurance of continued prayer on patients behalf.   Chart reviewed.    The following outcomes were achieved:  Patient received communion from [employer].     Assessment:  There are no further spiritual or Anabaptism issues which require Spiritual Care Services interventions at this time.     Plan:  Chaplains will continue to follow and will provide pastoral care on an as needed/requested basis.   recommends bedside caregivers page  on duty if patient shows signs of acute spiritual or emotional distress.     Juliet Low, [facility]     Spiritual Care   [phone]

## 2024-05-10 PROCEDURE — 6360000002 HC RX W HCPCS: Performed by: EMERGENCY MEDICINE

## 2024-05-10 PROCEDURE — 97535 SELF CARE MNGMENT TRAINING: CPT

## 2024-05-10 PROCEDURE — 1180000000 HC REHAB R&B

## 2024-05-10 PROCEDURE — 97530 THERAPEUTIC ACTIVITIES: CPT

## 2024-05-10 PROCEDURE — 97116 GAIT TRAINING THERAPY: CPT

## 2024-05-10 PROCEDURE — 6370000000 HC RX 637 (ALT 250 FOR IP): Performed by: EMERGENCY MEDICINE

## 2024-05-10 PROCEDURE — 97110 THERAPEUTIC EXERCISES: CPT

## 2024-05-10 RX ORDER — PREGABALIN 150 MG/1
150 CAPSULE ORAL 2 TIMES DAILY
Qty: 60 CAPSULE | Refills: 0 | Status: SHIPPED | OUTPATIENT
Start: 2024-05-10 | End: 2024-06-09

## 2024-05-10 RX ORDER — SULFAMETHOXAZOLE AND TRIMETHOPRIM 800; 160 MG/1; MG/1
1 TABLET ORAL DAILY
Qty: 10 TABLET | Refills: 0 | Status: SHIPPED | OUTPATIENT
Start: 2024-05-11 | End: 2024-05-21

## 2024-05-10 RX ORDER — ACETAMINOPHEN 325 MG/1
650 TABLET ORAL EVERY 4 HOURS PRN
Qty: 20 TABLET | Refills: 0
Start: 2024-05-10

## 2024-05-10 RX ORDER — MULTIVITAMIN WITH IRON
1 TABLET ORAL DAILY
Qty: 30 TABLET | Refills: 0
Start: 2024-05-11

## 2024-05-10 RX ORDER — SULFAMETHOXAZOLE AND TRIMETHOPRIM 800; 160 MG/1; MG/1
1 TABLET ORAL DAILY
Status: DISCONTINUED | OUTPATIENT
Start: 2024-05-11 | End: 2024-05-11 | Stop reason: HOSPADM

## 2024-05-10 RX ADMIN — IPRATROPIUM BROMIDE 0.5 MG: 0.5 SOLUTION RESPIRATORY (INHALATION) at 08:28

## 2024-05-10 RX ADMIN — SULFAMETHOXAZOLE AND TRIMETHOPRIM 1 TABLET: 800; 160 TABLET ORAL at 08:28

## 2024-05-10 RX ADMIN — IPRATROPIUM BROMIDE 0.5 MG: 0.5 SOLUTION RESPIRATORY (INHALATION) at 17:18

## 2024-05-10 RX ADMIN — APIXABAN 5 MG: 5 TABLET, FILM COATED ORAL at 08:28

## 2024-05-10 RX ADMIN — THERA TABS 1 TABLET: TAB at 08:28

## 2024-05-10 RX ADMIN — BUDESONIDE 250 MCG: 0.25 INHALANT RESPIRATORY (INHALATION) at 08:28

## 2024-05-10 RX ADMIN — ARFORMOTEROL TARTRATE 15 MCG: 15 SOLUTION RESPIRATORY (INHALATION) at 08:28

## 2024-05-10 RX ADMIN — PREGABALIN 150 MG: 50 CAPSULE ORAL at 08:28

## 2024-05-10 RX ADMIN — IPRATROPIUM BROMIDE 0.5 MG: 0.5 SOLUTION RESPIRATORY (INHALATION) at 23:46

## 2024-05-10 RX ADMIN — PREGABALIN 150 MG: 50 CAPSULE ORAL at 21:08

## 2024-05-10 RX ADMIN — AMLODIPINE BESYLATE 10 MG: 10 TABLET ORAL at 08:29

## 2024-05-10 RX ADMIN — BUDESONIDE 250 MCG: 0.25 INHALANT RESPIRATORY (INHALATION) at 21:08

## 2024-05-10 RX ADMIN — APIXABAN 5 MG: 5 TABLET, FILM COATED ORAL at 21:08

## 2024-05-10 RX ADMIN — ARFORMOTEROL TARTRATE 15 MCG: 15 SOLUTION RESPIRATORY (INHALATION) at 21:08

## 2024-05-10 RX ADMIN — CETIRIZINE HYDROCHLORIDE 10 MG: 10 TABLET, FILM COATED ORAL at 08:28

## 2024-05-10 RX ADMIN — DULOXETINE HYDROCHLORIDE 60 MG: 30 CAPSULE, DELAYED RELEASE ORAL at 08:28

## 2024-05-10 RX ADMIN — CINACALCET HYDROCHLORIDE 30 MG: 60 TABLET, FILM COATED ORAL at 08:28

## 2024-05-10 RX ADMIN — CINACALCET HYDROCHLORIDE 30 MG: 60 TABLET, FILM COATED ORAL at 21:10

## 2024-05-10 ASSESSMENT — PAIN SCALES - GENERAL
PAINLEVEL_OUTOF10: 0

## 2024-05-10 NOTE — DISCHARGE INSTRUCTIONS
3 Breathing Exercises to Help You Relax (04:33)  Your health professional recommends that you watch this short online health video.  Here are three breathing exercises you can do when you need to relax and feel calm.  Purpose:  Shows patients how to do three breathing exercises to help them relax and feel calm.  Goal:  The viewer will learn how to do three breathing exercises to help them relax and feel calm.     How to watch the video    Scan the QR code   OR Visit the website    [email or web address]   Current as of: June 24, 2023               Content Version: 14.0  © 2006-2024 [employer].   Care instructions adapted under license by [employer]. If you have questions about a medical condition or this instruction, always ask your healthcare professional. [employer] disclaims any warranty or liability for your use of this information.      ------------------------------------------------------------------------------------------------------------    DISCHARGE INSTRUCTIONS    1. Make sure that when you request refills at the pharmacy that the refill requests are sent to your PCP (NOT to the prescriber at the [facility]) to avoid any delays in getting your medication refills. The physician at the [facility] will not able to order medications or refills after discharge -- Please understand that though we would like to help, it is simply not safe for our physician to order you a medication that we cannot monitor.     2. If any of the prescribed medications require a PRIOR AUTHORIZATION, contact your Primary Care Physician or specialist to EITHER complete prior authorizations and paperwork on your behalf OR prescribe an alternative medication. -- Please understand that though we would like to help, it is simply not safe for our physician to order you a medication that we cannot monitor.     3. If any of your

## 2024-05-10 NOTE — PLAN OF CARE
Problem: Physical Therapy - Adult  Goal: By Discharge: Performs mobility at highest level of function for planned discharge setting.  See evaluation for individualized goals.  Description: Physical Therapy Short Term Goals  Initiated 4/26/2024, re-assessed 5/10/2024 for d/c [5/11/24]  1.  Patient will supine to sit, sit to supine, roll right, and roll left  in bed with modified independence.  (MET 5/10/2024)  2.  Patient will transfer from bed to chair and chair to bed with contact guard assist using the least restrictive device.(MET 5/6/2024)  3.  Patient will perform sit to stand with contact guard assist(MET 5/6/2024)  4.  Patient will ambulate with contact guard assist for 25 feet with the least restrictive device. (MET 5/6/2024)  5.  Patient will propel w/c 150 ft on level surface with supervision for mobility on unit.(MET 5/6/2024)    Physical Therapy Long Term Goals  Initiated 4/26/2024 and to be accomplished within 21 day(s) [5/17/24]  1.  Patient will supine to sit, sit to supine, roll right, and roll left in bed with independence.  (MET 5/10/2024)  2.  Patient will transfer from bed to chair and chair to bed with modified independence using the least restrictive device.(SBA 5/10/2024)  3.  Patient will perform sit to stand with modified independence.(SBA 5/10/2024)  4.  Patient will ambulate with supervision/set-up for 50 feet with the least restrictive device. ([id number] with RW 5/10/2024)  5.  Patient will ascend/descend 5 stairs with at least one handrail(s) with minimal assistance/contact guard assist. (SBA 5/10/2024)  Outcome: Progressing     PHYSICAL THERAPY DISCHARGE NOTE    Patient: Zamzam Calixto (73 y.o. female)  Date: 5/10/2024  Diagnosis: S/P AKA (above knee amputation) unilateral, left (HCC) [Z89.612]   Precautions:                fall risk  Chart, physical therapy assessment, plan of care and goals were reviewed.    Time in: 0935  Time out: 1100    Patient seen for: CGE, txfr training,  Pt demo's decreased carryover for hand placement with stand and squat pivot txfrs. During CGE pt's  educated on proper hand placement for txfrs and pt requiring SBA for safety with all txfrs and gait. Pt issued gait belt and educated on proper technique to don. Pt will benefit from continued skilled intervention as pt progresses through AKA healing and receiving prosthesis.   LTGs: met 1/5     PLAN:  Pt would benefit from continued skilled physical therapy in order to improve independent functional mobility at home health level. Interventions may include range of motion (AROM, PROM B LE/trunk), motor function (B LE/trunk strengthening/coordination), activity tolerance (vitals, oxygen saturation levels), bed mobility training, balance activities, gait training (progressive ambulation program), and functional transfer training.     Discharge Recommendations:  Home with Home health PT;Home with assist PRN  Further Equipment Recommendations for Discharge:  Standard w/c     rolling walker            Activity Tolerance:   good  Please refer to the flowsheet for vital signs taken during this treatment.  After treatment:   [] Patient left in no apparent distress in bed  [] Patient left in no apparent distress sitting up in chair  [x] Patient left in no apparent distress in w/c mobilizing under own power  [] Patient left in no apparent distress dining area  [] Patient left in no apparent distress mobilizing under own power  [] Call bell left within reach  [] Nursing notified  [] Caregiver present  [] Bed alarm activated   [x] Chair alarm activated        IRF-DENISE Completed: rich Godoy, PTA  5/10/2024

## 2024-05-10 NOTE — PLAN OF CARE
Problem: Safety - Adult  Goal: Free from fall injury  5/10/2024 0923 by Elham Landrum RN  Outcome: Progressing  5/9/2024 2029 by Shahida Gomez RN  Outcome: Progressing     Problem: ABCDS Injury Assessment  Goal: Absence of physical injury  5/10/2024 0923 by Elham Landrum RN  Outcome: Progressing  5/9/2024 2029 by Shahida Gomez RN  Outcome: Progressing     Problem: Skin/Tissue Integrity  Goal: Absence of new skin breakdown  Description: 1.  Monitor for areas of redness and/or skin breakdown  2.  Assess vascular access sites hourly  3.  Every 4-6 hours minimum:  Change oxygen saturation probe site  4.  Every 4-6 hours:  If on nasal continuous positive airway pressure, respiratory therapy assess nares and determine need for appliance change or resting period.  5/10/2024 0923 by Elham Landrum RN  Outcome: Progressing  5/9/2024 2029 by Shahida Gomez RN  Outcome: Progressing     Problem: Pain  Goal: Verbalizes/displays adequate comfort level or baseline comfort level  5/10/2024 0923 by Elham Landrum RN  Outcome: Progressing  5/9/2024 2029 by Shahida Gomez RN  Outcome: Progressing     Problem: Nutrition Deficit:  Goal: Optimize nutritional status  Outcome: Progressing

## 2024-05-10 NOTE — PROGRESS NOTES
I personally saw and evaluated this patient face-to-face today.  Patient is sitting in a chair in no apparent distress.   is at bedside.  Patient is scheduled to be discharged home tomorrow I discussed discharge plans with the patient and .  I discussed with the ARU APC.  Rest as per APC note

## 2024-05-10 NOTE — PROGRESS NOTES
Prior to discharge, nurse practitioner discussed and went through current and discharge medications in detail with the patient and  at the bedside. The NP discussed which drugs to discontinue, resume, and continue after discharge. NP explained and answered questions about follow-up care/pcp/specialist appointments, as well as discharge process expectations. The patient expressed her understanding verbally. Patient will be discharged with home health, skilled nursing, PT/OT. NP discussed discharge process with discharge RN.     Luz Boyce, WILDA - CNP

## 2024-05-10 NOTE — PROGRESS NOTES
COPD with a history of smoking, peripheral artery disease with multiple prior vascular procedures who is now admitted for left-sided AKA.  She has been followed by Dr. Ashley and she had developed worsening left lower extremity pain both at rest as well as with ambulation.She had a left fem tib bypass on 3/25/2024 followed by a thrombectomy of the bypass.  She was seen in the office on  and was noted to have an ischemic second and third toe with developing gangrene.  At that time her left foot was cold to touch.  It was decided that she be admitted for an emergent left-sided AKA on  due to concern for an occluded bypass graft.  Overall she is in good spirits.  She has not been having any fevers or chills.  Her WBC was 14.5 on presentation.  She was given a dose of vancomycin.  She had been on Bactrim as an outpatient for chronic suppression given multiple prior vascular graft infections.  She reports that she had been taking her Bactrim as directed and has not missed any doses.       Objective:      /67   Pulse 60   Temp 98 °F (36.7 °C) (Oral)   Resp 16   Ht 1.651 m (5' 5\")   Wt 54 kg (119 lb)   SpO2 97%   BMI 19.80 kg/m²   Temp (24hrs), Av.1 °F (36.7 °C), Min:98 °F (36.7 °C), Max:98.4 °F (36.9 °C)        General:   awake alert and oriented, appears stated age   Skin:   no rashes or skin lesions noted on limited exam, no jaundice   HEENT:  Normocephalic, atraumatic, PERRL, EOMI, no scleral icterus; no conjunctival hemmohage;    Lymph Nodes:   no cervical or inguinal adenopathy   Lungs:   non-labored, bilaterally clear to aspiration   Heart:  RRR, s1 and s2; no murmurs, no edema, + pedal pulses   Abdomen:  soft, non-distended, active bowel sounds. Non-tender   Genitourinary:  deferred   Extremities:   no clubbing, cyanosis; no joint effusions or swelling; muscle mass appropriate for age; left AKA dressing intact- no strikethrough   Neurologic:  No gross focal sensory abnormalities; equal

## 2024-05-10 NOTE — PROGRESS NOTES
conducted a Follow up consultation and Spiritual Assessment for Zamzam Calixto, who is a 73 y.o.,female.      The  provided the following Interventions:  Continued the relationship of care and support.   Listened empathically.  Offered prayer, holy communion, and assurance of continued prayer on behalf of the patient.    Intervention/Outcome:  Patient received holy communion  Patient expressed gratitude to 's visit.    Plan:  Continue support as per need or as a request basis.  All caregivers, family members, call  if patient develops any spiritual or emotional crises.     Juliet Low, [facility]  Spiritual Care  [phone]

## 2024-05-10 NOTE — PROGRESS NOTES
5/10/2024   Transfer Technique Stand pivot (use of grab bar) Ambulating   Shower Transfer Functional Level Not tested Supervision   Equipment Transfer tub bench Transfer tub bench   Shower Transfer Comments Not tested during initial OT evaluation due to fatigue, impaired standing balance/ coordination, and safety. Using FWW for safety.     UPPER BODY DRESSING/UNDRESSING Initial Assessment Discharge Assessment 5/10/2024   Functional Level Stand by assistance Modified independent    Clinical Factors Pt donned bra and pull over shirt (SBA) seated at edge of bed. Pt performed UB dressing task seated in chair. Pt donned bra by clasping in front and then turning to thread BUE. Pt donned pullover shirt.     LOWER BODY DRESSING/UNDRESSING Initial Assessment Discharge Assessment 5/10/2024   Functional Level Moderate assistance, Minimal assistance Supervision   Clinical Factors Pt performed LB dressing (Min/ Mod A) seated at edge of bed and in long sitting bed level. Pt demonstrating ability to thread pant leg over R foot by reaching forward; pt threaded pant leg over L AKA (residual limb) Min A with increased time. Pt demonstrating ability to pull pants up over hips and over buttocks in right/ left side lying position. (Mod A) for managing L AKA limb sock. Pt performed LB dressing task seated in chair and in stance. Pt threaded right LE into underpants and pants then stood using FWW to pull up over hips/buttocks. Pt placed foot on knee to tere compression sock and non skid sock to right foot. Pt able to tere compression sleeve to left residual limb without assistance.     TOILETING Initial Assessment Discharge Assessment 5/10/2024   Functional Level Moderate assistance Supervision;Based on clinical judgement   Clinical factors Self-care toileting (Mod A) for donning brief and for clothing management; (CGA/ Min A) for hygiene. Task simulated as pt did not require toileting at this time. Based on LB dressing score, pt would be  coming in for pt's safety). Pt verbalized understanding.  Progression toward goals:  [x]      Improving appropriately and progressing toward goals  []      Improving slowly and progressing toward goals  []      Not making progress toward goals and plan of care will be adjusted     PLAN:  Pt would benefit from continued skilled occupational therapy in order to improve ADL function and IADL with use of least restrictive device. Interventions may include range of motion (AROM, PROM B UE/trunk), motor function (B UE/trunk strengthening/coordination), activity tolerance (vitals, oxygen saturation levels), ADL, balance activities, IADL, and functional transfer training.   Rationale for discharge:  [x] Goals Achieved  [] Plateau Reached  [] Patient not participating in therapy  [] Other:    Discharge Recommendations:   Home health with prn supervision/assist   Further Equipment Recommendations for Discharge:     3-in-1 commode; tub transfer bench          Please refer to the flow sheet for vital signs taken during this treatment.  After treatment:   []  Patient left in no apparent distress sitting up in chair  [x]  Patient left in no apparent distress in bed  []  Patient handoff to SLP/PT  [x]  Call bell left within reach  []  Nursing notified  []  Caregiver present  []  Bed alarm activated    COMMUNICATION/EDUCATION:   [] Home safety education was provided and the patient/caregiver indicated understanding.  [] Patient/family have participated as able in goal setting and plan of care.  [x] Patient/family agree to work toward stated goals and plan of care.  [] Patient understands intent and goals of therapy, but is neutral about his/her participation.  [] Patient is unable to participate in goal setting and plan of care.    IRF-DENISE Completed: yes  Entered Differentiated Treatment minutes: yes    ANAND Rodas  5/10/2024

## 2024-05-10 NOTE — PROGRESS NOTES
Sw delivered wheelchair from [employer].     No further needs are noted.

## 2024-05-11 VITALS
TEMPERATURE: 97.5 F | DIASTOLIC BLOOD PRESSURE: 62 MMHG | BODY MASS INDEX: 19.83 KG/M2 | HEART RATE: 77 BPM | SYSTOLIC BLOOD PRESSURE: 141 MMHG | OXYGEN SATURATION: 99 % | RESPIRATION RATE: 17 BRPM | WEIGHT: 119 LBS | HEIGHT: 65 IN

## 2024-05-11 PROCEDURE — 99239 HOSP IP/OBS DSCHRG MGMT >30: CPT | Performed by: NURSE PRACTITIONER

## 2024-05-11 PROCEDURE — 6360000002 HC RX W HCPCS: Performed by: EMERGENCY MEDICINE

## 2024-05-11 PROCEDURE — 6370000000 HC RX 637 (ALT 250 FOR IP): Performed by: EMERGENCY MEDICINE

## 2024-05-11 PROCEDURE — 6370000000 HC RX 637 (ALT 250 FOR IP): Performed by: INTERNAL MEDICINE

## 2024-05-11 RX ADMIN — CINACALCET HYDROCHLORIDE 30 MG: 60 TABLET, FILM COATED ORAL at 09:20

## 2024-05-11 RX ADMIN — CETIRIZINE HYDROCHLORIDE 10 MG: 10 TABLET, FILM COATED ORAL at 09:20

## 2024-05-11 RX ADMIN — PREGABALIN 150 MG: 50 CAPSULE ORAL at 09:20

## 2024-05-11 RX ADMIN — AMLODIPINE BESYLATE 10 MG: 10 TABLET ORAL at 09:20

## 2024-05-11 RX ADMIN — ARFORMOTEROL TARTRATE 15 MCG: 15 SOLUTION RESPIRATORY (INHALATION) at 08:36

## 2024-05-11 RX ADMIN — IPRATROPIUM BROMIDE 0.5 MG: 0.5 SOLUTION RESPIRATORY (INHALATION) at 08:36

## 2024-05-11 RX ADMIN — SULFAMETHOXAZOLE AND TRIMETHOPRIM 1 TABLET: 800; 160 TABLET ORAL at 09:20

## 2024-05-11 RX ADMIN — DULOXETINE HYDROCHLORIDE 60 MG: 30 CAPSULE, DELAYED RELEASE ORAL at 09:20

## 2024-05-11 RX ADMIN — BUDESONIDE 250 MCG: 0.25 INHALANT RESPIRATORY (INHALATION) at 08:33

## 2024-05-11 RX ADMIN — THERA TABS 1 TABLET: TAB at 09:20

## 2024-05-11 RX ADMIN — APIXABAN 5 MG: 5 TABLET, FILM COATED ORAL at 09:20

## 2024-05-11 ASSESSMENT — PAIN SCALES - GENERAL: PAINLEVEL_OUTOF10: 0

## 2024-05-11 NOTE — PLAN OF CARE
Problem: Safety - Adult  Goal: Free from fall injury  5/10/2024 2031 by Shahida Gomez RN  Outcome: Progressing  5/10/2024 0923 by Elham Landrum RN  Outcome: Progressing     Problem: ABCDS Injury Assessment  Goal: Absence of physical injury  5/10/2024 2031 by Shahida Gomez RN  Outcome: Progressing  5/10/2024 0923 by Elham Landrum RN  Outcome: Progressing     Problem: Skin/Tissue Integrity  Goal: Absence of new skin breakdown  Description: 1.  Monitor for areas of redness and/or skin breakdown  2.  Assess vascular access sites hourly  3.  Every 4-6 hours minimum:  Change oxygen saturation probe site  4.  Every 4-6 hours:  If on nasal continuous positive airway pressure, respiratory therapy assess nares and determine need for appliance change or resting period.  5/10/2024 2031 by Shahida Gomez RN  Outcome: Progressing  5/10/2024 0923 by Elham Landrum RN  Outcome: Progressing

## 2024-05-11 NOTE — PROGRESS NOTES
0800 Pt. Awake sitting up in bed alert and oriented x 4 no change in assessment. Incision to Left  AKA intact.   0930 Pt. Sitting up in chair eating breakfast. Pt. Reported to be ready for discharge home.   1000 Pt. And family verbalized understanding of discharge instructions and prescriptions.

## 2024-05-12 NOTE — DISCHARGE SUMMARY
acetaminophen 325 MG tablet  Commonly known as: TYLENOL  Take 2 tablets by mouth every 4 hours as needed for Fever or Pain     multivitamin Tabs tablet  Take 1 tablet by mouth daily     sulfamethoxazole-trimethoprim 800-160 MG per tablet  Commonly known as: BACTRIM DS;SEPTRA DS  Take 1 tablet by mouth daily for 10 days            CHANGE how you take these medications      pregabalin 150 MG capsule  Commonly known as: LYRICA  Take 1 capsule by mouth 2 times daily for 30 days. Max Daily Amount: 300 mg  What changed:   medication strength  how much to take  additional instructions            CONTINUE taking these medications      albuterol sulfate  (90 Base) MCG/ACT inhaler  Commonly known as: PROVENTIL;VENTOLIN;PROAIR     amLODIPine 5 MG tablet  Commonly known as: NORVASC     apixaban 5 MG Tabs tablet  Commonly known as: ELIQUIS  Take 1 tablet by mouth 2 times daily     cinacalcet 30 MG tablet  Commonly known as: SENSIPAR     cyanocobalamin 1000 MCG/ML injection     DULoxetine 60 MG extended release capsule  Commonly known as: CYMBALTA     nystatin 134314 UNIT/GM cream  Commonly known as: MYCOSTATIN  Apply topically 2 times daily.     Trelegy Ellipta 100-62.5-25 MCG/ACT Aepb inhaler  Generic drug: fluticasone-umeclidin-vilant  Inhale 1 puff into the lungs daily     ZyrTEC Allergy 10 MG Caps  Generic drug: Cetirizine HCl            STOP taking these medications      HYDROcodone-acetaminophen 5-325 MG per tablet  Commonly known as: Norco               Where to Get Your Medications        These medications were sent to [facility] [id number] - [place], [place] - [address] [facility] - P [phone] - F [phone]  [address] [place] [id number]      Phone: [phone]   pregabalin 150 MG capsule  sulfamethoxazole-trimethoprim 800-160 MG per tablet       Information about where to get these medications is not yet available    Ask your nurse or doctor about these medications  acetaminophen 325 MG

## 2024-05-13 NOTE — HOME CARE
Active patient with [facility].  Patient discharged on 5/11/24.  CARRI orders noted and updated by central intake over the weekend.

## 2024-05-14 ENCOUNTER — HOME CARE VISIT (OUTPATIENT)
Age: 74
End: 2024-05-14
Payer: MEDICARE

## 2024-05-14 PROCEDURE — G0151 HHCP-SERV OF PT,EA 15 MIN: HCPCS

## 2024-05-15 ENCOUNTER — HOME CARE VISIT (OUTPATIENT)
Age: 74
End: 2024-05-15
Payer: MEDICARE

## 2024-05-15 VITALS
DIASTOLIC BLOOD PRESSURE: 70 MMHG | RESPIRATION RATE: 16 BRPM | OXYGEN SATURATION: 96 % | TEMPERATURE: 98.2 F | HEART RATE: 89 BPM | SYSTOLIC BLOOD PRESSURE: 160 MMHG

## 2024-05-15 PROCEDURE — G0299 HHS/HOSPICE OF RN EA 15 MIN: HCPCS

## 2024-05-15 ASSESSMENT — ENCOUNTER SYMPTOMS: DYSPNEA ACTIVITY LEVEL: AFTER AMBULATING 10 - 20 FT

## 2024-05-15 NOTE — HOME HEALTH
Skilled Reason for admission/summary of clinical condition:  73-year-old Patient underwent left Fem- tibial bypass on March 24, 2024 followed by a thrombectomy of the bypass. Patient was having resting ischemic pain. Patient has prior history of recurrent graft infections. Patient was admitted under the vascular surgical service recently. Patient underwent a left above-knee amputation on April 22, 2024. Postoperatively patient remained stable. Patient was evaluated by PT and OT and it was felt that patient may benefit from transitioning to the inpatient rehab facility    HHPT medically necessary to address  dx and clinical findings :decreased LE strength, impaired gait, no HEP, stairs, LE swelling, bed mobility, dep in transfers, decreased endurance, decreased balance and decreased safety in order to improve functional mobility, quality of life and decrease burden of care.  PMHX: Peripheral arterial disease   -History of recurrent graft infection   -Ongoing tobacco use   -Chronic hypoxic respiratory failure on oxygen 1 L via nasal cannula   -COPD without acute exacerbation   -Anemia likely postoperative   -Hypertension   -Type 2 diabetes   -Hypokalemia   -Neuropathy   -History of Crohn's disease–has been on Remicade  Caregiver involvement: lives with spouse available 24/7 to assists with meds, meals, functional mobility, transport to MD appts.            Patient Lives in 2 story house with ramp to enter with handrails   Medications reconciled and all medications are  available in the home this visit.    PLOF: amb with rw/cane prior to amputation Indep with ADLs,   ROM: at initial assessment:   RLE wfl    Lhip wfl   Strength: at initial assessment:   L hip flexion 2-/5,   R hip flexion 3/5, R knee flexion 3+/5,  R knee extension 3+/5,    Bed mobility: supine <> sit SBA   Transfers: pt transferred sit to stand with Aleyda patel .  VC for scooting to edge of seat and placement of BUE for ease with anterior weight shift.

## 2024-05-15 NOTE — HOME HEALTH
medications/medication interactions na    Home health supplies by type and quantity ordered/delivered this visit include: na    Patient education provided this visit to include: lock wc during transfers,human help/supervision during transfers.   S/S  infection left stump incision;redness, draining areas, warmth, swelling, fever/chills     Measures to prevent infection; handwashing, keep incision clean and dry    Patient level of understanding of education provided: needs reinforcement.     Sharps Education Provided: na  Patient response to procedure performed:  na    Home exercise program/Homework provided: change position frequently    Pt/Caregiver instructed on plan of care and are agreeable to plan of care at this time.      Physician Dr. Alexandra  notified of patient admission to home health and plan of care including anticipated frequency of  SN1wk2 and treatments/interventions/modalities of  assess incision, pain, safety.     Discharge planning discussed with patient and caregiver.  Discharge planning as follows: when goals met and/or no longer in need of home health services .  Pt/Caregiver did verbalize understanding of discharge planning.     Next MD appointment Dr.Chad Alexandra in one week  Patient/caregiver encouraged/instructed to keep appointment as lack of follow through with physician appointment could result in discontinuation of home care services for non-compliance.

## 2024-05-17 ENCOUNTER — HOME CARE VISIT (OUTPATIENT)
Age: 74
End: 2024-05-17
Payer: MEDICARE

## 2024-05-17 PROCEDURE — G0151 HHCP-SERV OF PT,EA 15 MIN: HCPCS

## 2024-05-17 ASSESSMENT — ENCOUNTER SYMPTOMS
PAIN LOCATION - PAIN QUALITY: SHOOTING
STOOL DESCRIPTION: LOOSE

## 2024-05-20 ENCOUNTER — HOME CARE VISIT (OUTPATIENT)
Age: 74
End: 2024-05-20
Payer: MEDICARE

## 2024-05-20 VITALS — HEART RATE: 83 BPM | DIASTOLIC BLOOD PRESSURE: 67 MMHG | OXYGEN SATURATION: 99 % | SYSTOLIC BLOOD PRESSURE: 140 MMHG

## 2024-05-20 VITALS
DIASTOLIC BLOOD PRESSURE: 60 MMHG | HEART RATE: 69 BPM | SYSTOLIC BLOOD PRESSURE: 130 MMHG | OXYGEN SATURATION: 96 % | TEMPERATURE: 45.9 F

## 2024-05-20 PROCEDURE — G0157 HHC PT ASSISTANT EA 15: HCPCS

## 2024-05-20 NOTE — HOME HEALTH
SUBJECTIVE: Patient denies pain or fall   CAREGIVER INVOLVEMENT/ASSISTANCE NEEDED FOR: Patient resides with family who assist with ADL's and transfers as needed.   Medications reconciled and all medications ARE available in the home this visit. no changes reported. Medications are Effective at this time.   OBJECTIVE: See interventions.   PATIENT RESPONSE TO TREATMENT: Patient required frequent rest breaks due to faitgue   PATIENT LEVEL OF UNDERSTANDING OF EDUCATION PROVIDED:  Patient educated in fanthom pain, the need to maintain proper residual limb position to reduce muscle/ joint tightness.   ASSESSMENT OF PROGRESS TOWARD GOALS: Patient ambulated in the house w close w/c follow for safety, demo decreased R foot clearance w fatigue. Patient tolerate seated and standing therex with seated rest break, initiated standing and seated  balance therex during session with   Aleyda and verbal cues for technique and safety.   CONTINUED NEED FOR THE FOLLOWING SKILLS: Continuation of PT to further improve patient balance, gait, functional mobility and strength to decrease pts risk for falls.   PLAN FOR NEXT VISIT: Add standing dynamic balance therex to improve balance and  cont w standing therex to improve  B LE strength  THE FOLLOWING DISCHARGE PLANNING WAS DISCUSSED WITH THE PATIENT/CAREGIVER: D/C from HHPT when goals are met or max potential benefit achieved.

## 2024-05-20 NOTE — HOME HEALTH
Patient's Subjective: I am doing good.     HEP:  No  Insurance Changes:   Falls since last session:  no  Trips to ER since last session?  no  Pain/Discomfort ?  See pain page.   New Meds: no  Upcoming MD appointments: See Directions section for many appointments.  .  Caregiver involvement/assistance needed for:  The patient's caregiver assists with meals, transportation, chores  .  Home health supplies by type and quantity ordered/delivered this visit include:  none  .  Objective:  See interventions.    Patient response to treatment:  RPE post amb 5/10    Patient level of understanding of education provided:  -Issued written HEP. Educated on expectations of HEP. Pt able to return demonstrate with correct techniques.  - Use brakes prior to standing from WC. Pt remembered each consecutive time.     Assess of progress towards goals:   Pt able to participate with her supine exercises.  She denies any pain in > 24 hours.  She denies any falls.     Continued need for the following skills: strengthening, gait and transfer training, stability, ensuring she dos not present with hip flexion contractures to progress to prosthetic limb in near future    Plan for next visit: Progress exercises  to tolerance.     The following discharge was discussed with the patient/caregiver : Estimated DC ~ 5/31/2024 Louise Quinones, PT   NOMNC required? Yes

## 2024-05-21 ENCOUNTER — HOME CARE VISIT (OUTPATIENT)
Age: 74
End: 2024-05-21
Payer: MEDICARE

## 2024-05-21 RX ORDER — SULFAMETHOXAZOLE AND TRIMETHOPRIM 400; 80 MG/1; MG/1
1 TABLET ORAL DAILY
Qty: 30 TABLET | Refills: 5 | Status: SHIPPED | OUTPATIENT
Start: 2024-05-21

## 2024-05-23 ENCOUNTER — HOME CARE VISIT (OUTPATIENT)
Age: 74
End: 2024-05-23
Payer: MEDICARE

## 2024-05-23 VITALS
OXYGEN SATURATION: 99 % | TEMPERATURE: 97.9 F | DIASTOLIC BLOOD PRESSURE: 65 MMHG | HEART RATE: 87 BPM | SYSTOLIC BLOOD PRESSURE: 150 MMHG

## 2024-05-23 PROCEDURE — G0157 HHC PT ASSISTANT EA 15: HCPCS

## 2024-05-23 NOTE — HOME HEALTH
visit: Community amb, vehicle transfer tub transfer    The following discharge was discussed with the patient/caregiver : Estimated DC ~ 5/31/2024 Louise Quinones, PT   NOMNC required? Yes

## 2024-05-24 ENCOUNTER — HOME CARE VISIT (OUTPATIENT)
Age: 74
End: 2024-05-24
Payer: MEDICARE

## 2024-05-24 VITALS
OXYGEN SATURATION: 99 % | TEMPERATURE: 98.3 F | RESPIRATION RATE: 17 BRPM | HEART RATE: 87 BPM | SYSTOLIC BLOOD PRESSURE: 143 MMHG | DIASTOLIC BLOOD PRESSURE: 63 MMHG

## 2024-05-24 PROCEDURE — G0152 HHCP-SERV OF OT,EA 15 MIN: HCPCS

## 2024-05-24 NOTE — HOME HEALTH
Skilled Reason for admission/summary of clinical condition: 73 year old female referred to OT s/p Left femoral to tibial bypass with Artegraft, angioplasty posterior tibial artery    Caregiver Involvement: Pt lives in two level home with  who assists with ADLs/transportation/IADL    PMHx: CAP, Crohn's disease, GERD, HTN, PVD, Vitamin B12 deficiency     PLOF: Pt was independent with ADLs and was ambulating without use of assistive devices    Medications: Pt reports no changes to medications since last reviewed and educated to continue as directed per MD.    SUBJECTIVE: Pt reports no pain on this date. \"I don't really think I need any occupational therapy\"     DME ORDERED/RECOMMENDED: N/A     OBJECTIVE:  BATHING: Pt has tub shower with tub transfer bench. Pt cleared to shower on follow up appointment on Wednesday.   TOILETING: Pt supervision for toileting on comfort height toilet with toilet frame  UB DRESSING: Pt supervision for upper body dressing to tere/doff shirts   LB DRESSING: Pt supervision for lower body dressing to tere/doff socks, shoes and pants  GROOMING: Pt supervision for grooming for hair care, oral care and washing hands/face  FEEDING: Pt supervision for self feeding    Modified Cee RPE 3/10 after performing ambulation, transfers, and I/ADL assessment     BUE MMT: 4+/5  BUE AROM: WFL     VISION: Pt vision is WFL with reading glasses for reading directions/medications and to navigate their home environment safely.     BALANCE: Pt with good sitting balance/tolerance. Pt with fair- standing balance/tolerance.     BUE COORDINATION: Pt BUE hand coordination WFL shown through serial opposition test. Pt with good hand strength to open containers and manipulate clothing fasteners without difficulty.     BUE FINE MOTOR STRENGTH: Pt presents with B good hand strength for grasping objects for ADL/IADL tasks and opening containers. Pt is right handed.     OT instructed/demonstrated pt the following with

## 2024-05-28 ENCOUNTER — HOME CARE VISIT (OUTPATIENT)
Age: 74
End: 2024-05-28
Payer: MEDICARE

## 2024-05-28 PROCEDURE — G0300 HHS/HOSPICE OF LPN EA 15 MIN: HCPCS

## 2024-05-29 ENCOUNTER — HOME CARE VISIT (OUTPATIENT)
Age: 74
End: 2024-05-29
Payer: MEDICARE

## 2024-05-29 VITALS
DIASTOLIC BLOOD PRESSURE: 60 MMHG | RESPIRATION RATE: 18 BRPM | OXYGEN SATURATION: 100 % | SYSTOLIC BLOOD PRESSURE: 123 MMHG | HEART RATE: 87 BPM | TEMPERATURE: 98.1 F

## 2024-05-29 VITALS
HEART RATE: 84 BPM | DIASTOLIC BLOOD PRESSURE: 65 MMHG | RESPIRATION RATE: 18 BRPM | OXYGEN SATURATION: 95 % | SYSTOLIC BLOOD PRESSURE: 155 MMHG | TEMPERATURE: 97.8 F

## 2024-05-29 PROCEDURE — G0157 HHC PT ASSISTANT EA 15: HCPCS

## 2024-05-29 NOTE — HOME HEALTH
Skilled reason for visit: [facility]    Caregiver involvement: Spouse assist patient with meals and transportation    Medications reviewed and all medications are available in the home this visit.    The following education was provided regarding medications:  Medicaation reviewed no changes question or concerns.    MD notified of any discrepancies/look a-like medications/medication interactions: n/a  Medications are effective at this time.      Home health supplies by type and quantity ordered/delivered this visit include: n/a    Patient education provided this visit: medication teaching  safety and fall prevention education  nutrition education  skin care and assessment  pain management  Incision Care       Sharps education provided: n/a    Patient level of understanding of education provided: Patient verbalized complete understanding of education provided    Patient response to procedure performed:  n/a    Agency Progress toward goals: Met SN goals    Patient's Progress towards personal goals: Met all Goals    Home exercise program: monitor incision for s/s of infection    Continued need for the following skills: Physical Therapy.    Plan for next visit: n/a    Patient and/or caregiver notified and agrees to changes in the Plan of Care: Yes.     The following discharge planning was discussed with the pt/caregiver: Discharged skilled level of care is no longer needed

## 2024-05-29 NOTE — HOME HEALTH
Patient's Subjective: She continues to be pain free. She is leaving soon to go to a family event x 1 week.     HEP: Yes   Insurance Changes: no  Falls since last session: no  Trips to ER since last session?  no  Pain/Discomfort ?  See pain page.   New Meds: no  Upcoming MD appointments:   Dr Nathan PCP June 5 11:15   Prosthetist June 4th 10:00 a.m.     Assessment and Summary of Care:  Patient's current functional status before discharge is as follows  Strength: Will require reassessed at DC visit.  ROM:  WFL's  Bed Mobility: SBA  Transfers: SBA  Gait: Pt can amb in her home ~ 65 feet x 1, 2+ turns with RW close SBA due to the need to hop. She does present with stable amb, level surfaces. She usually pushes herself with her WC.   WC mobility: To assess the ability to exit/enter the home with ramp/WC and for B UE strengthening and R LE strengthening. She was able to exit with the WC at the door with Min A to get over the raised wood, then she was very close SBA with education for safest method to roll down and then back up the ramp. She was able to roll down the ramp in the forward position and forward and retro position going up the ramp. Instructed her  to always be between her and the ground. He reported understanding.  Recommended with her  on good weather days, to do for UE and R LE strengthening exercise. She reports she will.   Ramp: See above  Special Tests: Romel; 11/28. Will require reassessed at DC visit.   Recommendations: Highly recommend she go to an OP PT facility upon her return home from family week. She reports understanding and wants to go as well. This will closely coincide with her appointment with Prosthetist appt.     Plan for next visit: Reassessment then DC from PT.

## 2024-05-30 ENCOUNTER — HOME CARE VISIT (OUTPATIENT)
Age: 74
End: 2024-05-30
Payer: MEDICARE

## 2024-05-30 VITALS
OXYGEN SATURATION: 93 % | DIASTOLIC BLOOD PRESSURE: 65 MMHG | TEMPERATURE: 98.5 F | RESPIRATION RATE: 18 BRPM | SYSTOLIC BLOOD PRESSURE: 130 MMHG | HEART RATE: 83 BPM

## 2024-05-30 PROCEDURE — G0151 HHCP-SERV OF PT,EA 15 MIN: HCPCS

## 2024-05-31 NOTE — HOME HEALTH
Pt. clinically discharged and documentation finalized for completion of PT discharge   Caregiver involvement: Pt's spouse is primary caregiver at this time and has been assisting with medical appointments and some ADL support   Medications reconciled and all medications are available in the home this visit.  Meds reconciled/ reviewed Medications are effective at this time.     Patient education provided this visit: since all goals have been met and education has been completed, patient is medically stable and patient/caregiver are able to independently manage medications     ROM: WF's   Bed Mobility: independently   Transfers:  mod I w RW This allows the patient increased functional independence and mobility.     Strength: RLE strength improved as demo by 5X sit <> stand 39sec. This allows the patient increased functional independence and mobility      GAIT: Pt. is able to ambulate 65 ft w 2 turns in the house using rw, at SBA.   WC MOBILITY: to exit/enter the home with ramp manual  WC   pt able to exit with the WC at the door with Min A to roll over the door ledge., able to propel w/c at  S to roll down and then back up the ramp. She was able to roll down the ramp and back up ramp safely Spouse demo proficiency and knowledge about safety and proper guarding pt during w/c mob up/down ramp.   BALANCE: Patient able to safely maintain 30sec stand without BUE support with eyes open and eyes closed This allows the patient to be functionally more independent and have a decreased fall risk.     Progress toward goals: Patient has met all goals, see interventions for details. Pt. was able to return demonstrate all mobility training and HEP shown independently.     Patient response to treatment and education: Patient is aware to follow up with PCP/Surgeon as ordered.   Opportunity for questions provided at this time. Patient aware to refer any questions after discharge to MD office.  MD notified of DC.   Home exercise

## 2024-07-03 ENCOUNTER — HOSPITAL ENCOUNTER (OUTPATIENT)
Facility: HOSPITAL | Age: 74
Setting detail: RECURRING SERIES
Discharge: HOME | End: 2024-07-06
Payer: MEDICARE

## 2024-07-03 PROCEDURE — 97161 PT EVAL LOW COMPLEX 20 MIN: CPT

## 2024-07-03 PROCEDURE — 97535 SELF CARE MNGMENT TRAINING: CPT

## 2024-07-03 PROCEDURE — 97110 THERAPEUTIC EXERCISES: CPT

## 2024-07-03 NOTE — PROGRESS NOTES
[facility]  [address], [place] [address] Phone: [phone] Fax [phone]  Plan of Care / Statement of Necessity for Physical Therapy Services     Patient Name: Zamzam Calixto : 1950   Treatment   Diagnosis: R26.89  Abnormalities of gait and mobility, R26.81  Unsteadiness on feet, and M62.81  GENERAL MUSCLE WEAKNESS Medical Diagnosis: Other abnormalities of gait and mobility [R26.89]   Onset Date: 2024 - surgical date Payor Source: Payor: MEDICARE / Plan: MEDICARE PART A AND B / Product Type: *No Product type* /    Referral Source: Miriam Niño APRN - NP Start of Care (SOC): 7/3/2024   Prior Hospitalization: See medical history Provider #: [id number]   Prior Level of Function: Previously functionally independent, participated in recreational shopping and gardening, Lives with , retired    Comorbidities: Respiratory disorders, Musculoskeletal disorders, Complications related to surgery, and Other: OA, HTN, pulmonary emphysema, chron's, GERD, parathyroid tumor, PVD, bilateral cataracts removal, right vascular axillary to femoral bypass, right lateral epicondylitis     Assessment / key information:  Pt is a 74 y.o. female who presents with c/o gait abnormalities and LE weakness following a left AKA on 24. Amputation occurred following unsuccessful left LE vascular surgery that resulted in great toe gangrene. Functional deficits include: reduced ability to perform household chores, reduced independence with ADLs, inability to walk or stand unsupported, inability to participate in recreation. Upon exam, pt exhibited decreased transfer performance, decreased independence with  donning, and limited standing tolerance. Observable invaginations of incision centrally and at lateral/medial ends, residual limb presents conical shape without excessive edema. Patient scored 13/80 on LEFS indicating

## 2024-07-03 NOTE — PROGRESS NOTES
PT DAILY TREATMENT NOTE/NEURO EVAL     Patient Name: Zamzam Calixto    Date: 7/3/2024    : 1950  Insurance: Payor: MEDICARE / Plan: MEDICARE PART A AND B / Product Type: *No Product type* /      Patient  verified yes     Visit #   Current / Total 1 36   Time   In / Out 3:22 4:16   Pain   In / Out 0 0   Subjective Functional Status/Changes: See Subjective Summary     Treatment Area: Other abnormalities of gait and mobility [R26.89]    SUBJECTIVE    Subjective functional status/changes:     Chief Complaint: AKA left leg  History/Mechanism of Injury: Left AKA on 24 following complications from PAD. Pt experienced vascular surgery in 2024 which was unsuccessful resulting in left great toe.  Current Symptoms/Deficits: reduced ability to perform household chores, modified independent with ADLs, reduced independence with ADLs,   Pain-  Current: 0/10     Worst: 9/10   Best: 0/10  Previous Treatment/Compliance: acute PT/OT following surgery  Mobility Devices: manual wheelchair, has standard walker at home  PMHx/Surgical Hx: OA, HTN, pulmonary emphysema, chron's, GERD, parathyroid tumor, PVD, bilateral cataracts removal, right vascular axillary to femoral bypass, right lateral epicondylitis     Diagnostic Tests: [] Lab work [] X-rays    [] CT [] MRI     [] Other:  Results:    Work Hx: retired  Living Situation: Lives with   Household Modifications: shower bench  Hobbies: gardening, shopping  PLOF: Previously functionally independent, participated in recreational shopping and gardening  Pt Goals: \"to strengthen\"    OBJECTIVE/EXAMINATION        16 min [x]Eval     - untimed        Therapeutic Procedures:  Tx Min Billable or 1:1 Min (if diff from Tx Min) Procedure, Rationale, Specifics   14 14 98100 Therapeutic Exercise (timed):  increase ROM, strength, coordination, balance, and proprioception to improve patient's ability to progress to PLOF and address remaining functional goals. (see flow

## 2024-07-11 ENCOUNTER — HOSPITAL ENCOUNTER (OUTPATIENT)
Facility: HOSPITAL | Age: 74
Setting detail: RECURRING SERIES
Discharge: HOME | End: 2024-07-14
Payer: MEDICARE

## 2024-07-11 PROCEDURE — 97110 THERAPEUTIC EXERCISES: CPT

## 2024-07-11 PROCEDURE — 97140 MANUAL THERAPY 1/> REGIONS: CPT

## 2024-07-11 NOTE — PROGRESS NOTES
skilled therapy session since initial evaluation with a left Aka. Pt able to independently transfer from  to [facility] utilizing a stand pivot transfer. Session started with manual therapy wto improve functional hip extension to improve ease with ambulation upon receiving prosthetic. Pt able to perform side lying hip abductions with thera-band attached to left residual limb with emphasis placed on keeping her left hip slightly extended to isolate the gluteus medius. Pt is initially complaint with her HEP, but notes increased abdominal pain with lying prone. Therapist provided light overpressure during supine hip flexor stretch limit contractures in the left hip flexors. Session tolerated well.     Patient will continue to benefit from skilled PT / OT services to modify and progress therapeutic interventions, analyze and address functional mobility deficits, analyze and address ROM deficits, analyze and address strength deficits, analyze and address soft tissue restrictions, analyze and cue for proper movement patterns, and analyze and modify for postural abnormalities to address functional deficits and attain remaining goals.    Progress toward goals / Updated goals:  []  See Progress Note/Recertification    Short Term Goals: To be accomplished in 4 weeks:  1- Goal: Pt to be compliant with initial HEP to improve LE strength in preparation for prosthetic gait training.  Status at last note/certification: Established and reviewed with pt  Current:progressing: Pt unable to perform prone exercises due to abdominal pain. (7/11/2024)  2- Goal: Pt to demonstrate independent donning/doffing with prosthetic leg and  to increase independence with ADLs.  Status at last note/certification: pt unable to independently don , has not received initial prosthetic leg at time of evaluation     Long Term Goals: To be accomplished in 12 weeks:  1- Goal: Pt to demonstrate standing tolerance of > 15 minutes with equal

## 2024-07-17 ENCOUNTER — HOSPITAL ENCOUNTER (OUTPATIENT)
Facility: HOSPITAL | Age: 74
Setting detail: RECURRING SERIES
Discharge: HOME | End: 2024-07-20
Payer: MEDICARE

## 2024-07-17 PROCEDURE — 97140 MANUAL THERAPY 1/> REGIONS: CPT

## 2024-07-17 PROCEDURE — 97110 THERAPEUTIC EXERCISES: CPT

## 2024-07-17 PROCEDURE — 97530 THERAPEUTIC ACTIVITIES: CPT

## 2024-07-17 NOTE — PROGRESS NOTES
ADLs.  Status at last note/certification: pt unable to stand without UE support without prosthetic leg  2- Goal: Pt to demonstrate independent sit to stand transfer from seat height < 16 inches to facilitate return to gardening activities.  Status at last note/certification: modified independent from wheelchair  3- Goal: Pt to ambulate in clinic for > 200 feet with LRAD and minimal gait deviation to increase ability to participate in recreational activities in the community.  Status at last note/certification: Pt utilizes manual wheelchair outside the home without prosthetic leg  4- Goal: Pt to demonstrate ability to independently ascend/descend 1 step with SPC or less to facilitate independence and safety with curb negotiation in the community.   Status at last note/certification: to be assess upon receiving prosthetic leg   5- Goal: Pt to report LEFS score of 30 or greater to show improved function and quality of life.  Status at last note/certification: LEFS = 13/80 pts    Next PN/ RC due 8/1/2024  Auth due (visit number/ date) KATIANA    PLAN  - Continue Plan of Care    Candido Miller, PT    7/17/2024    5:44 PM    Future Appointments   Date Time Provider Department Center   7/19/2024 12:20 PM Berny Heard PTA [facility]   7/23/2024  2:40 PM ESTUARDO VILLASENOR [facility]   7/25/2024 10:20 AM Berny Heard PTA [facility]   7/30/2024  2:00 PM ESTUARDO VILLASENOR [facility]

## 2024-07-19 ENCOUNTER — HOSPITAL ENCOUNTER (OUTPATIENT)
Facility: HOSPITAL | Age: 74
Setting detail: RECURRING SERIES
Discharge: HOME | End: 2024-07-22
Payer: MEDICARE

## 2024-07-19 PROCEDURE — 97116 GAIT TRAINING THERAPY: CPT

## 2024-07-19 PROCEDURE — 97112 NEUROMUSCULAR REEDUCATION: CPT

## 2024-07-19 NOTE — PROGRESS NOTES
Objective Information/Functional Measures/Assessment    Pt arrives to session with prosthetic leg donned. Session started with standing weight shifts with emphasis placed on equal weight bearing/ upright posture. Tactile cues were provided during staggered stance weight shifts to promote anterior pelvic rotation during phase of gait. Pt instructed on reciprocal arm swing and lateral weight shifting during ambulation in parallel bars with patient hesitant to  bearing weight through her left side. Pt performed step overs and demonstrated better stability during increased weight bearing through left LE. Gait belt donned and CGA provided during ambulation in parallel bars. Intermittent rest breaks taken throughout session.  Session tolerated well.     Patient will continue to benefit from skilled PT / OT services to modify and progress therapeutic interventions, analyze and address functional mobility deficits, analyze and address ROM deficits, analyze and address strength deficits, analyze and address soft tissue restrictions, analyze and cue for proper movement patterns, and analyze and modify for postural abnormalities to address functional deficits and attain remaining goals.    Progress toward goals / Updated goals:  []  See Progress Note/Recertification    Short Term Goals: To be accomplished in 4 weeks:  1- Goal: Pt to be compliant with initial HEP to improve LE strength in preparation for prosthetic gait training.  Status at last note/certification: Established and reviewed with pt  Current:progressing: Pt unable to perform prone exercises due to abdominal pain. (7/11/2024)  2- Goal: Pt to demonstrate independent donning/doffing with prosthetic leg and  to increase independence with ADLs.  Status at last note/certification: pt unable to independently don , has not received initial prosthetic leg at time of evaluation   Current: not met: Pt unable to don/ doff prosthetic leg without

## 2024-07-23 ENCOUNTER — HOSPITAL ENCOUNTER (OUTPATIENT)
Facility: HOSPITAL | Age: 74
Setting detail: RECURRING SERIES
Discharge: HOME | End: 2024-07-26
Payer: MEDICARE

## 2024-07-23 PROCEDURE — 97112 NEUROMUSCULAR REEDUCATION: CPT

## 2024-07-23 PROCEDURE — 97116 GAIT TRAINING THERAPY: CPT

## 2024-07-23 NOTE — PROGRESS NOTES
accomplished in 12 weeks:  1- Goal: Pt to demonstrate standing tolerance of > 15 minutes with equal weightbearing bilaterally and < 1 UE support to increase ease of performance with ADLs.  Status at last note/certification: pt unable to stand without UE support without prosthetic leg  2- Goal: Pt to demonstrate independent sit to stand transfer from seat height < 16 inches to facilitate return to gardening activities.  Status at last note/certification: modified independent from wheelchair  3- Goal: Pt to ambulate in clinic for > 200 feet with LRAD and minimal gait deviation to increase ability to participate in recreational activities in the community.  Status at last note/certification: Pt utilizes manual wheelchair outside the home without prosthetic leg  4- Goal: Pt to demonstrate ability to independently ascend/descend 1 step with SPC or less to facilitate independence and safety with curb negotiation in the community.   Status at last note/certification: to be assess upon receiving prosthetic leg   5- Goal: Pt to report LEFS score of 30 or greater to show improved function and quality of life.  Status at last note/certification: LEFS = 13/80 pts    Next PN/ RC due 8/1/2024   Auth due (visit number/ date) none    PLAN  - Continue Plan of Care    Estuardo Montoya PTA    7/23/2024    2:42 PM  If an interpreting service was utilized for treatment of this patient, the contents of this document represent the material reviewed with the patient via the .     Future Appointments   Date Time Provider Department Center   7/25/2024 10:20 AM Berny Heard PTA [facility]   7/30/2024  2:00 PM ESTUARDO MONTOYA [facility]

## 2024-07-25 ENCOUNTER — HOSPITAL ENCOUNTER (OUTPATIENT)
Facility: HOSPITAL | Age: 74
Setting detail: RECURRING SERIES
Discharge: HOME | End: 2024-07-28
Payer: MEDICARE

## 2024-07-25 PROCEDURE — 97530 THERAPEUTIC ACTIVITIES: CPT

## 2024-07-25 PROCEDURE — 97112 NEUROMUSCULAR REEDUCATION: CPT

## 2024-07-25 PROCEDURE — 97116 GAIT TRAINING THERAPY: CPT

## 2024-07-25 NOTE — PROGRESS NOTES
PHYSICAL / OCCUPATIONAL THERAPY - DAILY TREATMENT NOTE    Patient Name: Zamzam Calixto    Date: 2024    : 1950  Insurance: Payor: MEDICARE / Plan: MEDICARE PART A AND B / Product Type: *No Product type* /      Patient  verified Yes     Visit #   Current / Total 6 36   Time   In / Out 10:23 11:17   Pain   In / Out 0/10 0/10   Subjective Functional Status/Changes: My left thigh is pretty sore but I'm not in any real pain. Last session went well.      TREATMENT AREA =  Other abnormalities of gait and mobility [R26.89]     OBJECTIVE         Therapeutic Procedures:    Tx Min Billable or 1:1 Min (if diff from Tx Min) Procedure, Rationale, Specifics   24 24 62455 Gait Training (timed):    200 feet with parallel bars/ FWW (assistive device) over flat surface surfaces with CG/ SB level of assist. Cuing for sequencing, keep COG within walker JEN.  To improve safety and dynamic movement with household/community ambulation.  (see flow sheet as applicable)     Details if applicable:  forward ambulation: 3 laps of forward ambulation in parallel bars, side-steps in parallel bars, ambulation with FWW utilizing a step to pattern for 60 feet. Step through 10 reps both sides.      22 22 04287 Neuromuscular Re-Education (timed):  improve balance, coordination, kinesthetic sense, posture, core stability and proprioception to improve patient's ability to develop conscious control of individual muscles and awareness of position of extremities in order to progress to PLOF and address remaining functional goals. (see flow sheet as applicable)     Details if applicable:  to include standing weight shifts.    8 8 80475 Therapeutic Activity (timed):  use of dynamic activities replicating functional movements to increase ROM, strength, coordination, balance, and proprioception in order to improve patient's ability to progress to PLOF and address remaining functional goals.  (see flow sheet as applicable)      Details if

## 2024-07-30 ENCOUNTER — HOSPITAL ENCOUNTER (OUTPATIENT)
Facility: HOSPITAL | Age: 74
Setting detail: RECURRING SERIES
Discharge: HOME | End: 2024-08-02
Payer: MEDICARE

## 2024-07-30 PROCEDURE — 97110 THERAPEUTIC EXERCISES: CPT

## 2024-07-30 PROCEDURE — 97116 GAIT TRAINING THERAPY: CPT

## 2024-07-30 PROCEDURE — 97530 THERAPEUTIC ACTIVITIES: CPT

## 2024-07-30 NOTE — PROGRESS NOTES
Pepe Jenkins was seen and treated in our emergency department on 5/7/2024.    No restrictions            Diagnosis:     Pepe  may return to work on return date.    He may return on this date: 05/09/2024         If you have any questions or concerns, please don't hesitate to call.      Sriram Ocasio MD    ______________________________           _______________          _______________  Hospital Representative                              Date                                Time [facility] [place] - [facility] AT [facility]   [address], [place] [address]  Phone: [phone] Fax: [phone]  PROGRESS NOTE  Patient Name: Zamzam Calixto : 1950   Treatment/Medical Diagnosis: Other abnormalities of gait and mobility [R26.89]   Referral Source: Miriam Niño, WILDA - NP     Payor: Payor: MEDICARE / Plan: MEDICARE PART A AND B / Product Type: *No Product type* /            Date of Initial Visit: 7/3/2024 Attended Visits: 7 Missed Visits: 0       CURRENT GOAL STATUS  1- Goal: Pt to be compliant with initial HEP to improve LE strength in preparation for prosthetic gait training.  Status at last note/certification: Established and reviewed with pt  Current:progressing: Pt unable to perform prone exercises due to abdominal pain. (2024). MET: Pt compliant with initial HEP. (  2- Goal: Pt to demonstrate independent donning/doffing with prosthetic leg and  to increase independence with ADLs.  Status at last note/certification: pt unable to independently don , has not received initial prosthetic leg at time of evaluation   Current: not met: Pt unable to don/ doff prosthetic leg without assistance. (2024)  Long Term Goals: To be accomplished in 12 weeks:  1- Goal: Pt to demonstrate standing tolerance of > 15 minutes with equal weightbearing bilaterally and < 1 UE support to increase ease of performance with ADLs.  Status at last note/certification: pt unable to stand without UE support without prosthetic leg  Current: progressin:25 minutes of continuous standing exercise with UE assistance in parallel bars. (2024)   2- Goal: Pt to demonstrate independent sit to stand transfer from seat height < 16 inches to facilitate return to gardening activities.  Status at last note/certification: modified independent from wheelchair  Met: UE support,but no therapist assist required  (2024)  3- Goal: Pt to ambulate in

## 2024-07-30 NOTE — PROGRESS NOTES
PHYSICAL / OCCUPATIONAL THERAPY - DAILY TREATMENT NOTE    Patient Name: Zamzam Calixto    Date: 2024    : 1950  Insurance: Payor: MEDICARE / Plan: MEDICARE PART A AND B / Product Type: *No Product type* /      Patient  verified Yes     Visit #   Current / Total 7 36   Time   In / Out 2:00 2:45   Pain   In / Out 0 0   Subjective Functional Status/Changes: I'm feeling more confident and encouraged with my progress     TREATMENT AREA =  Other abnormalities of gait and mobility [R26.89]     OBJECTIVE      Therapeutic Procedures:    Tx Min Billable or 1:1 Min (if diff from Tx Min) Procedure, Rationale, Specifics   15 15 72330 Gait Training (timed):    120 feet with FWW (assistive device) over level surfaces with minimal level of assist. Cuing for walker placement.  To improve safety and dynamic movement with household/community ambulation.  (see flow sheet as applicable)     Details if applicable:  ambulation with FWW, min A to CGA provided, Pt demonstrates good prosthetic control.  Intermittent step through pattern bilaterally      41931 Therapeutic Activity (timed):  use of dynamic activities replicating functional movements to increase ROM, strength, coordination, balance, and proprioception in order to improve patient's ability to progress to PLOF and address remaining functional goals.  (see flow sheet as applicable)     Details if applicable:     8 8 45875 Therapeutic Exercise (timed):  increase ROM, strength, coordination, balance, and proprioception to improve patient's ability to progress to PLOF and address remaining functional goals. (see flow sheet as applicable)     Details if applicable:            Details if applicable:            Details if applicable:     45 45 [facility] Totals Reminder: bill using total billable min of TIMED therapeutic procedures (example: do not include dry needle or estim unattended, both untimed codes, in totals to left)  8-22 min = 1 unit; 23-37 min = 2 units;

## 2024-08-08 ENCOUNTER — HOSPITAL ENCOUNTER (OUTPATIENT)
Facility: HOSPITAL | Age: 74
Setting detail: RECURRING SERIES
Discharge: HOME | End: 2024-08-11
Payer: MEDICARE

## 2024-08-08 PROCEDURE — 97116 GAIT TRAINING THERAPY: CPT

## 2024-08-08 PROCEDURE — 97530 THERAPEUTIC ACTIVITIES: CPT

## 2024-08-08 NOTE — PROGRESS NOTES
ability to independently ascend/descend 1 step with SPC or less to facilitate independence and safety with curb negotiation in the community.   Status at last note/certification: not met: unable to perform. (2024)  Current:  progressing: descended 2 steps on  exiting clinic with bilateral handrails, with prosthesis  leading.  SBA only  (2024)  5- Goal: Pt to report LEFS score of 30 or greater to show improved function and quality of life.  Status at last note/certification: Progressin/80  (2024)  Current:    Next PN/ RC due 2024  Auth due (visit number/ date) none    PLAN  - Continue Plan of Care    Estuardo Montoya PTA    2024    3:29 PM  If an interpreting service was utilized for treatment of this patient, the contents of this document represent the material reviewed with the patient via the .     Future Appointments   Date Time Provider Department Center   2024 11:00 AM Berny Heard PTA [facility]   2024  2:40 PM Marcelle Herrera PT [facility]   8/15/2024 10:20 AM Marcelle Herrera PT [facility]   2024 10:20 AM Marcelle Herrera PT [facility]   2024 10:20 AM Berny Heard PTA [facility]   2024  9:40 AM Berny Heard PTA [facility]   2024 10:20 AM [facility]

## 2024-08-09 ENCOUNTER — HOSPITAL ENCOUNTER (OUTPATIENT)
Facility: HOSPITAL | Age: 74
Setting detail: RECURRING SERIES
Discharge: HOME | End: 2024-08-12
Payer: MEDICARE

## 2024-08-09 PROCEDURE — 97116 GAIT TRAINING THERAPY: CPT

## 2024-08-09 PROCEDURE — 97530 THERAPEUTIC ACTIVITIES: CPT

## 2024-08-09 PROCEDURE — 97110 THERAPEUTIC EXERCISES: CPT

## 2024-08-09 NOTE — PROGRESS NOTES
dry needle or estim unattended, both untimed codes, in totals to left)  8-22 min = 1 unit; 23-37 min = 2 units; 38-52 min = 3 units; 53-67 min = 4 units; 68-82 min = 5 units   Total Total     [x]  Patient Education billed concurrently with other procedures   [x] Review HEP    [] Progressed/Changed HEP, detail:    [] Other detail:       Objective Information/Functional Measures/Assessment    Pt reports to skilled therapy session with gait abnormalities following a left AKA. Session started with standing weight shifts in parallel bars in preparation of gait training. Tactile cues were provided during staggered stance forward weight shifts to promote anterior pelvic rotation. Pt tolerated step over pool noodle with emphasis placed on increasing step length and equal weight bearing/ weight shifting. Pt alternated between a step to/ step through pattern during ambulation on gym floor with FWW. She is making progress towards her LTGs and was able to improve her standing/ walking tolerance. Pt was able to perform step ups with left LE relying heavily on UE support. Session tolerated well, noting only minor fatigue but no increases in pain.     Patient will continue to benefit from skilled PT / OT services to modify and progress therapeutic interventions, analyze and address functional mobility deficits, analyze and address ROM deficits, analyze and address strength deficits, analyze and address soft tissue restrictions, analyze and cue for proper movement patterns, and analyze and modify for postural abnormalities to address functional deficits and attain remaining goals.    Progress toward goals / Updated goals:  []  See Progress Note/Recertification    1- Goal: Pt to demonstrate independent donning/doffing with prosthetic leg and  to increase independence with ADLs.  Status at last note/certification: not met: Pt unable to don/ doff prosthetic leg without assistance. (7/19/2024)  Current:   2- Goal: Pt to

## 2024-08-13 ENCOUNTER — APPOINTMENT (OUTPATIENT)
Facility: HOSPITAL | Age: 74
End: 2024-08-13
Payer: MEDICARE

## 2024-08-14 ENCOUNTER — HOSPITAL ENCOUNTER (OUTPATIENT)
Facility: HOSPITAL | Age: 74
Setting detail: RECURRING SERIES
Discharge: HOME | End: 2024-08-17
Payer: MEDICARE

## 2024-08-14 PROCEDURE — 97112 NEUROMUSCULAR REEDUCATION: CPT

## 2024-08-14 PROCEDURE — 97530 THERAPEUTIC ACTIVITIES: CPT

## 2024-08-14 NOTE — PROGRESS NOTES
PHYSICAL / OCCUPATIONAL THERAPY - DAILY TREATMENT NOTE    Patient Name: Zamzam Calixto    Date: 2024    : 1950  Insurance: Payor: MEDICARE / Plan: MEDICARE PART A AND B / Product Type: *No Product type* /      Patient  verified Yes     Visit #   Current / Total 3 29   Time   In / Out 2:42 3:22   Pain   In / Out 0 0   Subjective Functional Status/Changes: Pt reports no significant changes since the last session.      TREATMENT AREA =  Other abnormalities of gait and mobility [R26.89]     OBJECTIVE  Therapeutic Procedures:    Tx Min Billable or 1:1 Min (if diff from Tx Min) Procedure, Rationale, Specifics   25  73713 Neuromuscular Re-Education (timed):  improve balance, coordination, kinesthetic sense, posture, core stability and proprioception to improve patient's ability to develop conscious control of individual muscles and awareness of position of extremities in order to progress to PLOF and address remaining functional goals. (see flow sheet as applicable)     Details if applicable:     15  58113 Therapeutic Activity (timed):  use of dynamic activities replicating functional movements to increase ROM, strength, coordination, balance, and proprioception in order to improve patient's ability to progress to PLOF and address remaining functional goals.  (see flow sheet as applicable)     Details if applicable:     40  [facility] Totals Reminder: bill using total billable min of TIMED therapeutic procedures (example: do not include dry needle or estim unattended, both untimed codes, in totals to left)  8-22 min = 1 unit; 23-37 min = 2 units; 38-52 min = 3 units; 53-67 min = 4 units; 68-82 min = 5 units   Total Total     [x]  Patient Education billed concurrently with other procedures   [x] Review HEP    [] Progressed/Changed HEP, detail:    [] Other detail:       Objective Information/Functional Measures/Assessment  Reported no pain post session today. Added exercises per flow sheet. Fatigues with 1/2  No

## 2024-08-15 ENCOUNTER — HOSPITAL ENCOUNTER (OUTPATIENT)
Facility: HOSPITAL | Age: 74
Setting detail: RECURRING SERIES
Discharge: HOME | End: 2024-08-18
Payer: MEDICARE

## 2024-08-15 PROCEDURE — 97116 GAIT TRAINING THERAPY: CPT

## 2024-08-15 PROCEDURE — 97112 NEUROMUSCULAR REEDUCATION: CPT

## 2024-08-15 NOTE — PROGRESS NOTES
PHYSICAL / OCCUPATIONAL THERAPY - DAILY TREATMENT NOTE    Patient Name: Zamzam Calixto    Date: 8/15/2024    : 1950  Insurance: Payor: MEDICARE / Plan: MEDICARE PART A AND B / Product Type: *No Product type* /      Patient  verified Yes     Visit #   Current / Total 4 29   Time   In / Out 10:25 11:03   Pain   In / Out 0 0   Subjective Functional Status/Changes: Pt reports she was tired from yesterday's session.      TREATMENT AREA =  Other abnormalities of gait and mobility [R26.89]     OBJECTIVE  Therapeutic Procedures:    Tx Min Billable or 1:1 Min (if diff from Tx Min) Procedure, Rationale, Specifics   10  35476 Neuromuscular Re-Education (timed):  improve balance, coordination, kinesthetic sense, posture, core stability and proprioception to improve patient's ability to develop conscious control of individual muscles and awareness of position of extremities in order to progress to PLOF and address remaining functional goals. (see flow sheet as applicable)     Details if applicable:     28  85630 Gait Training (timed):     Details if applicable:  see assessment below   38  [facility] Totals Reminder: bill using total billable min of TIMED therapeutic procedures (example: do not include dry needle or estim unattended, both untimed codes, in totals to left)  8-22 min = 1 unit; 23-37 min = 2 units; 38-52 min = 3 units; 53-67 min = 4 units; 68-82 min = 5 units   Total Total     [x]  Patient Education billed concurrently with other procedures   [x] Review HEP    [] Progressed/Changed HEP, detail:    [] Other detail:       Objective Information/Functional Measures/Assessment  Reported no pain post session today. Increased B hip flex AROM noted with 1/2 stance marching. Pt stated she has been noticing increased left LE internal rotation when swinging her left LE with gait and with heel strike and asked the therapist what can cause this. Upon assessing the pt's gait, the pt seems to increase the internal  pt received in [facility] x 3 pt states " I put my summer shoes on to walk about t ten miles and now I have blisters on both of my feet and my Right foot and calf are swollen " pt [facility] and was instructed  to visit ER

## 2024-08-20 ENCOUNTER — HOSPITAL ENCOUNTER (OUTPATIENT)
Facility: HOSPITAL | Age: 74
Setting detail: RECURRING SERIES
Discharge: HOME | End: 2024-08-23
Payer: MEDICARE

## 2024-08-20 PROCEDURE — 97112 NEUROMUSCULAR REEDUCATION: CPT

## 2024-08-20 PROCEDURE — 97530 THERAPEUTIC ACTIVITIES: CPT

## 2024-08-20 NOTE — PROGRESS NOTES
PHYSICAL / OCCUPATIONAL THERAPY - DAILY TREATMENT NOTE    Patient Name: Zamzam Calixto    Date: 2024    : 1950  Insurance: Payor: MEDICARE / Plan: MEDICARE PART A AND B / Product Type: *No Product type* /      Patient  verified Yes     Visit #   Current / Total 5 29   Time   In / Out 10:25 11:05   Pain   In / Out 0 0   Subjective Functional Status/Changes: Pt reports she has minimal pain today (did not put a number to the pain because it is minimal) due to having difficulty with getting the prosthesis donned yesterday.      TREATMENT AREA =  Other abnormalities of gait and mobility [R26.89]     OBJECTIVE  Therapeutic Procedures:    Tx Min Billable or 1:1 Min (if diff from Tx Min) Procedure, Rationale, Specifics   15  52757 Neuromuscular Re-Education (timed):  improve balance, coordination, kinesthetic sense, posture, core stability and proprioception to improve patient's ability to develop conscious control of individual muscles and awareness of position of extremities in order to progress to PLOF and address remaining functional goals. (see flow sheet as applicable)     Details if applicable:       30561 Therapeutic Activity (timed):  use of dynamic activities replicating functional movements to increase ROM, strength, coordination, balance, and proprioception in order to improve patient's ability to progress to PLOF and address remaining functional goals.  (see flow sheet as applicable)     Details if applicable:  exercises with pt education on glute engagement with squats.    40  [facility] Totals Reminder: bill using total billable min of TIMED therapeutic procedures (example: do not include dry needle or estim unattended, both untimed codes, in totals to left)  8-22 min = 1 unit; 23-37 min = 2 units; 38-52 min = 3 units; 53-67 min = 4 units; 68-82 min = 5 units   Total Total     [x]  Patient Education billed concurrently with other procedures   [x] Review HEP    [] Progressed/Changed HEP,

## 2024-08-22 ENCOUNTER — APPOINTMENT (OUTPATIENT)
Facility: HOSPITAL | Age: 74
End: 2024-08-22
Payer: MEDICARE

## 2024-08-27 ENCOUNTER — HOSPITAL ENCOUNTER (OUTPATIENT)
Facility: HOSPITAL | Age: 74
Setting detail: RECURRING SERIES
Discharge: HOME | End: 2024-08-30
Payer: MEDICARE

## 2024-08-27 PROCEDURE — 97530 THERAPEUTIC ACTIVITIES: CPT

## 2024-08-27 PROCEDURE — 97116 GAIT TRAINING THERAPY: CPT

## 2024-08-27 NOTE — PROGRESS NOTES
PHYSICAL / OCCUPATIONAL THERAPY - DAILY TREATMENT NOTE    Patient Name: Zamzam Calixto    Date: 2024    : 1950  Insurance: Payor: MEDICARE / Plan: MEDICARE PART A AND B / Product Type: *No Product type* /      Patient  verified Yes     Visit #   Current / Total 6 29   Time   In / Out 9:43 10:24   Pain   In / Out 0/10 0/10   Subjective Functional Status/Changes: I'm made a lot of improvements since the start of therapy.      TREATMENT AREA =  Other abnormalities of gait and mobility [R26.89]     OBJECTIVE         Therapeutic Procedures:    Tx Min Billable or 1:1 Min (if diff from Tx Min) Procedure, Rationale, Specifics   29 29 81585 Therapeutic Activity (timed):  use of dynamic activities replicating functional movements to increase ROM, strength, coordination, balance, and proprioception in order to improve patient's ability to progress to PLOF and address remaining functional goals.  (see flow sheet as applicable)     Details if applicable:  including goal assessment.      12 12 49151 Gait Training (timed):    220 feet with FWW (assistive device) over flat surfaces with CG level of assist. Cuing for even step length/ weight bearing.  To improve safety and dynamic movement with household/community ambulation.  (see flow sheet as applicable)     Details if applicable:                    41 41 [facility] Totals Reminder: bill using total billable min of TIMED therapeutic procedures (example: do not include dry needle or estim unattended, both untimed codes, in totals to left)  8-22 min = 1 unit; 23-37 min = 2 units; 38-52 min = 3 units; 53-67 min = 4 units; 68-82 min = 5 units   Total Total     [x]  Patient Education billed concurrently with other procedures   [x] Review HEP    [] Progressed/Changed HEP, detail:    [] Other detail:       Objective Information/Functional Measures/Assessment    Check PN. Gait belt donned and CGA provided during ambulation on gym floor. Pt utilized a step through pattern

## 2024-08-27 NOTE — PROGRESS NOTES
[facility]   [address], [place] [address]  Phone: [phone] Fax: [phone]  PROGRESS NOTE  Patient Name: Zamzam Calixto : 1950   Treatment/Medical Diagnosis: Other abnormalities of gait and mobility [R26.89]   Referral Source: Miriam Niño, WILDA - NP     Payor: Payor: MEDICARE / Plan: MEDICARE PART A AND B / Product Type: *No Product type* /            Date of Initial Visit: 7/3/2024 Attended Visits: 13 Missed Visits: 0       CURRENT GOAL STATUS  1- Goal: Pt to demonstrate independent donning/doffing with prosthetic leg and  to increase independence with ADLs.  Status at last note/certification: not met: Pt unable to don/ doff prosthetic leg without assistance. (2024)  Current: progressing: can remove prosthetic LE, difficulty donning. (2024)  2- Goal: Pt to demonstrate standing tolerance of > 15 minutes with equal weightbearing bilaterally and < 1 UE support to increase ease of performance with ADLs.  Status at last note/certification: progressin:25 minutes of continuous standing exercise with UE assistance in parallel bars. (2024)   Current: progressing, able to  the bars with no sitting  breaks and with HHA during therapy today 2024  3- Goal: Pt to ambulate in clinic for > 200 feet with LRAD and minimal gait deviation to increase ability to participate in recreational activities in the community.  Status at last note/certification: Progressing: Pt able to ambulate roughly 60 feet with FWW utilizing a step to pattern. (2025)  Current: progressing: Pt able to ambulate 220 feet with FWW utilizing a step thorough pattern . (2024)  4- Goal: Pt to demonstrate ability to independently ascend/descend 1 step with SPC or less to facilitate independence and safety with curb negotiation in the community.   Status at last note/certification: not met: unable to perform.  (2024)  Current:  progressing: able to ascend/ descend 4 stairs with step to pattern and CGA. Instability observed during descent. (2024)  5- Goal: Pt to report LEFS score of 30 or greater to show improved function and quality of life.  Status at last note/certification: Progressin/80  (2024)  Current: Currently met: 35/80. (2024)    SUMMARY OF TREATMENT  Pt has attended skilled physical therapy for 13 visits to address Other abnormalities of gait and mobility [R26.89] and has made good progress thus far with therapy measures.  Objectively, Pt demonstrates improved standing/ walking tolerance.  Pt's functional gains include greater ease with ambulating household distances, as well as improved dynamic standing balance.  Pt's functional deficits include requiring her WC whenever traveling community distances, difficulty making sharp turns, and as well as utilizing a step to pattern during stair negotiation. Pt rates pain as 5/10 at worst and 0/10 at best with the majority of her pain being in the distal aspect of her left residual limb. Pt reports a self-reported improvement rating of 70% since start of care.  Pt would continue to benefit from skilled therapy services to address functional deficits.      Medicare, cannot change goals, cannot adjust frequency/duration, no signature required   Reporting Period: (date from last Prog Note/Eval to current Prog Note/Recert)  2024 - 2024    1- Goal: Pt to demonstrate independent donning/doffing with prosthetic leg and  to increase independence with ADLs.  Status at last note/certification:progressing: can remove prosthetic LE, difficulty donning. (2024)  Current:   2- Goal: Pt to demonstrate standing tolerance of > 15 minutes with equal weightbearing bilaterally and < 1 UE support to increase ease of performance with ADLs.  Status at last note/certification: progressing, able to  the bars with no sitting  breaks and

## 2024-08-29 ENCOUNTER — HOSPITAL ENCOUNTER (OUTPATIENT)
Facility: HOSPITAL | Age: 74
Setting detail: RECURRING SERIES
End: 2024-08-29
Payer: MEDICARE

## 2024-08-29 PROCEDURE — 97116 GAIT TRAINING THERAPY: CPT

## 2024-08-29 PROCEDURE — 97530 THERAPEUTIC ACTIVITIES: CPT

## 2024-08-29 NOTE — PROGRESS NOTES
PHYSICAL / OCCUPATIONAL THERAPY - DAILY TREATMENT NOTE    Patient Name: Zamzam Calixto    Date: 2024    : 1950  Insurance: Payor: MEDICARE / Plan: MEDICARE PART A AND B / Product Type: *No Product type* /      Patient  verified Yes     Visit #   Current / Total    Time   In / Out 10:20 11:00   Pain   In / Out 0 0   Subjective Functional Status/Changes: I'm doing Ok     TREATMENT AREA =  Other abnormalities of gait and mobility [R26.89]     OBJECTIVE      Therapeutic Procedures:    Tx Min Billable or 1:1 Min (if diff from Tx Min) Procedure, Rationale, Specifics   23  02191 Gait Training (timed):    60 feet with FWW (assistive device) over level surfaces with CGA level of assist. Cuing for left hip hike to assist with clearing  prosthesis.  To improve safety and dynamic movement with household/community ambulation.  (see flow sheet as applicable)     Details if applicable:  ambulation in bars with 1 UE with cues to increase left hip hike to clear prosthesis and reduce circumduction.     17 17 [id number] Therapeutic Activity (timed):  use of dynamic activities replicating functional movements to increase ROM, strength, coordination, balance, and proprioception in order to improve patient's ability to progress to PLOF and address remaining functional goals.  (see flow sheet as applicable)     Details if applicable:            Details if applicable:            Details if applicable:            Details if applicable:     40 40 [facility] Totals Reminder: bill using total billable min of TIMED therapeutic procedures (example: do not include dry needle or estim unattended, both untimed codes, in totals to left)  8-22 min = 1 unit; 23-37 min = 2 units; 38-52 min = 3 units; 53-67 min = 4 units; 68-82 min = 5 units   Total Total     [x]  Patient Education billed concurrently with other procedures   [x] Review HEP    [] Progressed/Changed HEP, detail:    [] Other detail:       Objective Information/Functional

## 2024-09-04 ENCOUNTER — HOSPITAL ENCOUNTER (OUTPATIENT)
Facility: HOSPITAL | Age: 74
Setting detail: RECURRING SERIES
Discharge: HOME | End: 2024-09-07
Payer: MEDICARE

## 2024-09-04 PROBLEM — D50.9 IRON DEFICIENCY ANEMIA, UNSPECIFIED: Status: ACTIVE | Noted: 2024-09-04

## 2024-09-04 PROCEDURE — 97110 THERAPEUTIC EXERCISES: CPT

## 2024-09-04 PROCEDURE — 97530 THERAPEUTIC ACTIVITIES: CPT

## 2024-09-04 PROCEDURE — 97116 GAIT TRAINING THERAPY: CPT

## 2024-09-04 NOTE — PROGRESS NOTES
needle or estim unattended, both untimed codes, in totals to left)  8-22 min = 1 unit; 23-37 min = 2 units; 38-52 min = 3 units; 53-67 min = 4 units; 68-82 min = 5 units   Total Total     [x]  Patient Education billed concurrently with other procedures   [x] Review HEP    [] Progressed/Changed HEP, detail:    [] Other detail:       Objective Information/Functional Measures/Assessment    Patient seen today to address Other abnormalities of gait and mobility [R26.89].  Improving ability to maneuver and turn without pivoting on prosthesis.  Patient stood for 12 minutes then reported cramping in right glutes.  Eased after sitting for a few minutes.  Demonstrating continued gains with mobility using bars and FWW.    Patient will continue to benefit from skilled PT / OT services to modify and progress therapeutic interventions, analyze and address functional mobility deficits, analyze and address ROM deficits, analyze and address strength deficits, analyze and address soft tissue restrictions, analyze and cue for proper movement patterns, analyze and modify for postural abnormalities, analyze and address imbalance/dizziness, and instruct in home and community integration to address functional deficits and attain remaining goals.    Progress toward goals / Updated goals:  []  See Progress Note/Recertification    1- Goal: Pt to demonstrate independent donning/doffing with prosthetic leg and  to increase independence with ADLs.  Status at last note/certification:progressing: can remove prosthetic LE, difficulty donning. (8/27/2024)  Current: not met: doffs independently, but requires assist with donning sleeve prior to prosthesis due to shifting of leg  (9/4/2024)  2- Goal: Pt to demonstrate standing tolerance of > 15 minutes with equal weightbearing bilaterally and < 1 UE support to increase ease of performance with ADLs.  Status at last note/certification: progressing, able to  the bars with no sitting

## 2024-09-05 ENCOUNTER — APPOINTMENT (OUTPATIENT)
Facility: HOSPITAL | Age: 74
End: 2024-09-05
Payer: MEDICARE

## 2024-09-05 ENCOUNTER — HOSPITAL ENCOUNTER (EMERGENCY)
Facility: HOSPITAL | Age: 74
Discharge: HOME | End: 2024-09-05
Attending: EMERGENCY MEDICINE
Payer: MEDICARE

## 2024-09-05 VITALS
OXYGEN SATURATION: 94 % | WEIGHT: 125 LBS | RESPIRATION RATE: 14 BRPM | TEMPERATURE: 97.6 F | SYSTOLIC BLOOD PRESSURE: 124 MMHG | HEART RATE: 76 BPM | BODY MASS INDEX: 20.09 KG/M2 | HEIGHT: 66 IN | DIASTOLIC BLOOD PRESSURE: 57 MMHG

## 2024-09-05 DIAGNOSIS — R55 SYNCOPE, UNSPECIFIED SYNCOPE TYPE: ICD-10-CM

## 2024-09-05 DIAGNOSIS — R42 DIZZINESS: Primary | ICD-10-CM

## 2024-09-05 DIAGNOSIS — D72.829 LEUKOCYTOSIS, UNSPECIFIED TYPE: ICD-10-CM

## 2024-09-05 DIAGNOSIS — D50.9 IRON DEFICIENCY ANEMIA, UNSPECIFIED IRON DEFICIENCY ANEMIA TYPE: ICD-10-CM

## 2024-09-05 LAB
ALBUMIN SERPL-MCNC: 2.7 G/DL (ref 3.4–5)
ALBUMIN/GLOB SERPL: 0.8 (ref 0.8–1.7)
ALP SERPL-CCNC: 103 U/L (ref 45–117)
ALT SERPL-CCNC: 12 U/L (ref 13–56)
ANION GAP SERPL CALC-SCNC: 5 MMOL/L (ref 3–18)
APPEARANCE UR: CLEAR
AST SERPL-CCNC: 7 U/L (ref 10–38)
BASOPHILS # BLD: 0 K/UL (ref 0–0.1)
BASOPHILS NFR BLD: 0 % (ref 0–2)
BILIRUB SERPL-MCNC: 0.2 MG/DL (ref 0.2–1)
BILIRUB UR QL: NEGATIVE
BUN SERPL-MCNC: 17 MG/DL (ref 7–18)
BUN/CREAT SERPL: 21 (ref 12–20)
CALCIUM SERPL-MCNC: 9.5 MG/DL (ref 8.5–10.1)
CHLORIDE SERPL-SCNC: 108 MMOL/L (ref 100–111)
CO2 SERPL-SCNC: 25 MMOL/L (ref 21–32)
COLOR UR: YELLOW
CREAT SERPL-MCNC: 0.8 MG/DL (ref 0.6–1.3)
DIFFERENTIAL METHOD BLD: ABNORMAL
EKG ATRIAL RATE: 71 BPM
EKG DIAGNOSIS: NORMAL
EKG P AXIS: 60 DEGREES
EKG P-R INTERVAL: 154 MS
EKG Q-T INTERVAL: 420 MS
EKG QRS DURATION: 108 MS
EKG QTC CALCULATION (BAZETT): 456 MS
EKG R AXIS: 63 DEGREES
EKG T AXIS: 64 DEGREES
EKG VENTRICULAR RATE: 71 BPM
EOSINOPHIL # BLD: 0.2 K/UL (ref 0–0.4)
EOSINOPHIL NFR BLD: 1 % (ref 0–5)
EPITH CASTS URNS QL MICRO: NORMAL /LPF (ref 0–5)
ERYTHROCYTE [DISTWIDTH] IN BLOOD BY AUTOMATED COUNT: 15.2 % (ref 11.6–14.5)
GLOBULIN SER CALC-MCNC: 3.5 G/DL (ref 2–4)
GLUCOSE SERPL-MCNC: 117 MG/DL (ref 74–99)
GLUCOSE UR STRIP.AUTO-MCNC: NEGATIVE MG/DL
HCT VFR BLD AUTO: 27.5 % (ref 35–45)
HGB BLD-MCNC: 8.3 G/DL (ref 12–16)
HGB UR QL STRIP: NEGATIVE
HYALINE CASTS URNS QL MICRO: NORMAL /LPF (ref 0–2)
IMM GRANULOCYTES # BLD AUTO: 0.2 K/UL (ref 0–0.04)
IMM GRANULOCYTES NFR BLD AUTO: 1 % (ref 0–0.5)
INR PPP: 1.3 (ref 0.9–1.1)
KETONES UR QL STRIP.AUTO: NEGATIVE MG/DL
LACTATE SERPL-SCNC: 1.7 MMOL/L (ref 0.4–2)
LEUKOCYTE ESTERASE UR QL STRIP.AUTO: NEGATIVE
LYMPHOCYTES # BLD: 2.2 K/UL (ref 0.9–3.6)
LYMPHOCYTES NFR BLD: 10 % (ref 21–52)
MAGNESIUM SERPL-MCNC: 1.6 MG/DL (ref 1.6–2.6)
MCH RBC QN AUTO: 23.7 PG (ref 24–34)
MCHC RBC AUTO-ENTMCNC: 30.2 G/DL (ref 31–37)
MCV RBC AUTO: 78.6 FL (ref 78–100)
MONOCYTES # BLD: 1.5 K/UL (ref 0.05–1.2)
MONOCYTES NFR BLD: 7 % (ref 3–10)
NEUTS SEG # BLD: 17.6 K/UL (ref 1.8–8)
NEUTS SEG NFR BLD: 81 % (ref 40–73)
NITRITE UR QL STRIP.AUTO: NEGATIVE
NRBC # BLD: 0 K/UL (ref 0–0.01)
NRBC BLD-RTO: 0 PER 100 WBC
PH UR STRIP: 5.5 (ref 5–8)
PHOSPHATE SERPL-MCNC: 3.4 MG/DL (ref 2.5–4.9)
PLATELET # BLD AUTO: 463 K/UL (ref 135–420)
PLATELET COMMENT: ABNORMAL
PMV BLD AUTO: 9.1 FL (ref 9.2–11.8)
POTASSIUM SERPL-SCNC: 3 MMOL/L (ref 3.5–5.5)
PROCALCITONIN SERPL-MCNC: <0.05 NG/ML
PROT SERPL-MCNC: 6.2 G/DL (ref 6.4–8.2)
PROT UR STRIP-MCNC: ABNORMAL MG/DL
PROTHROMBIN TIME: 16.4 SEC (ref 11.9–14.9)
RBC # BLD AUTO: 3.5 M/UL (ref 4.2–5.3)
RBC #/AREA URNS HPF: NORMAL /HPF (ref 0–5)
RBC MORPH BLD: ABNORMAL
SODIUM SERPL-SCNC: 138 MMOL/L (ref 136–145)
SP GR UR REFRACTOMETRY: 1.02 (ref 1–1.03)
TROPONIN I SERPL HS-MCNC: 8 NG/L (ref 0–54)
TROPONIN I SERPL HS-MCNC: 8 NG/L (ref 0–54)
TSH SERPL DL<=0.05 MIU/L-ACNC: 1.15 UIU/ML (ref 0.36–3.74)
UROBILINOGEN UR QL STRIP.AUTO: 0.2 EU/DL (ref 0.2–1)
WBC # BLD AUTO: 21.7 K/UL (ref 4.6–13.2)
WBC URNS QL MICRO: NORMAL /HPF (ref 0–4)

## 2024-09-05 PROCEDURE — 93005 ELECTROCARDIOGRAM TRACING: CPT | Performed by: EMERGENCY MEDICINE

## 2024-09-05 PROCEDURE — 84443 ASSAY THYROID STIM HORMONE: CPT

## 2024-09-05 PROCEDURE — 51701 INSERT BLADDER CATHETER: CPT

## 2024-09-05 PROCEDURE — 84484 ASSAY OF TROPONIN QUANT: CPT

## 2024-09-05 PROCEDURE — 84100 ASSAY OF PHOSPHORUS: CPT

## 2024-09-05 PROCEDURE — 6370000000 HC RX 637 (ALT 250 FOR IP): Performed by: EMERGENCY MEDICINE

## 2024-09-05 PROCEDURE — 93010 ELECTROCARDIOGRAM REPORT: CPT | Performed by: INTERNAL MEDICINE

## 2024-09-05 PROCEDURE — 85610 PROTHROMBIN TIME: CPT

## 2024-09-05 PROCEDURE — 84145 PROCALCITONIN (PCT): CPT

## 2024-09-05 PROCEDURE — 96374 THER/PROPH/DIAG INJ IV PUSH: CPT

## 2024-09-05 PROCEDURE — 6360000002 HC RX W HCPCS: Performed by: EMERGENCY MEDICINE

## 2024-09-05 PROCEDURE — 71045 X-RAY EXAM CHEST 1 VIEW: CPT

## 2024-09-05 PROCEDURE — 80053 COMPREHEN METABOLIC PANEL: CPT

## 2024-09-05 PROCEDURE — 99285 EMERGENCY DEPT VISIT HI MDM: CPT

## 2024-09-05 PROCEDURE — 85025 COMPLETE CBC W/AUTO DIFF WBC: CPT

## 2024-09-05 PROCEDURE — 81001 URINALYSIS AUTO W/SCOPE: CPT

## 2024-09-05 PROCEDURE — 83735 ASSAY OF MAGNESIUM: CPT

## 2024-09-05 PROCEDURE — 70450 CT HEAD/BRAIN W/O DYE: CPT

## 2024-09-05 PROCEDURE — 83605 ASSAY OF LACTIC ACID: CPT

## 2024-09-05 RX ORDER — MECLIZINE HCL 12.5 MG 12.5 MG/1
25 TABLET ORAL
Status: COMPLETED | OUTPATIENT
Start: 2024-09-05 | End: 2024-09-05

## 2024-09-05 RX ORDER — ONDANSETRON 2 MG/ML
4 INJECTION INTRAMUSCULAR; INTRAVENOUS ONCE
Status: COMPLETED | OUTPATIENT
Start: 2024-09-05 | End: 2024-09-05

## 2024-09-05 RX ADMIN — ONDANSETRON 4 MG: 2 INJECTION, SOLUTION INTRAMUSCULAR; INTRAVENOUS at 17:09

## 2024-09-05 RX ADMIN — MECLIZINE 25 MG: 12.5 TABLET ORAL at 17:08

## 2024-09-05 ASSESSMENT — PAIN - FUNCTIONAL ASSESSMENT: PAIN_FUNCTIONAL_ASSESSMENT: NONE - DENIES PAIN

## 2024-09-05 NOTE — ED NOTES
Pt discharged at this time, provided pt with DC paperwork   Pt in no apparent distress, pain managed at this time   Pt discharged with family    All questions and concerns answered at this time

## 2024-09-05 NOTE — ED PROVIDER NOTES
[facility] EMERGENCY DEPT  EMERGENCY DEPARTMENT ENCOUNTER    Patient Name: Zamzam Calixto  MRN: [id number]  YOB: 1950  Provider: Alphonso Son DO  PCP: Miriam Niño APRN - NP   Time/Date of evaluation: 4:54 PM EDT on 9/5/24    History of Presenting Illness     History Provided by: Patient  History is limited by: Nothing     HISTORY:   Zamzam Calixto is a 74 y.o. female brought in by EMS with a chief complaint of dizziness.  Patient states that she became lightheaded, dizzy and experienced 1 episode of nausea and vomiting.  She states that she fell into her 's arm.  Patient's  states that she had a blank stare for a few seconds.  Patient has history of Crohn's disease, chronic lung disease, hypertension, GERD.  She was recently placed on prednisone and received a Remicade infusion yesterday for her Crohn's disease.  She denies any fever, chills, headache, sore throat, runny nose, chest pain, shortness of breath, cough, back pain, flank pain, abdominal pain, constipation, diarrhea, melena, dysuria, hematuria, vaginal discharge, vaginal bleeding.  Patient's  states that she was scheduled for an iron infusion today    Nursing Notes were all reviewed and agreed with or any disagreements were addressed in the HPI.    Past History     PAST MEDICAL HISTORY:  Past Medical History:   Diagnosis Date    Arthritis     CAP (community acquired pneumonia) 06/27/2017    Chronic lung disease     Claudication (HCC)     left leg    Crohn's disease (HCC)     Crohn's disease (HCC)     GERD (gastroesophageal reflux disease)     HTN (hypertension)     Ill-defined condition     Uses O2 at night.    Nausea & vomiting     Neuropathy     Other and unspecified symptoms and signs involving general sensations and perceptions     PVD    Other ill-defined conditions(799.89)     Crohn's    Parathyroid tumor     sees Patrick Santillan    Peripheral neuropathy     Pulmonary emphysema (HCC)     PVD (peripheral  guarding, no rigidity.  No CVA tenderness  Musculoskeletal: Full range of motion all extremities. No deformities. No peripheral edema.  Skin: Warm, dry. No rashes  Vascular: Pulses equal in all 4 extremities.  Neuro: CNs II-XII grossly intact. Sensation and motor function intact.  No focal neurological deficits.  Psych: Appropriate mood and affect.    Diagnostic Study Results     LABS:  Recent Results (from the past 12 hour(s))   EKG 12 Lead    Collection Time: 09/05/24  2:09 PM   Result Value Ref Range    Ventricular Rate 71 BPM    Atrial Rate 71 BPM    P-R Interval 154 ms    QRS Duration 108 ms    Q-T Interval 420 ms    QTc Calculation (Bazett) 456 ms    P Axis 60 degrees    R Axis 63 degrees    T Axis 64 degrees    Diagnosis       Normal sinus rhythm  Nonspecific ST abnormality  Abnormal ECG  When compared with ECG of 21-MAR-2024 16:58,  premature supraventricular complexes are no longer present  Non-specific change in ST segment in Inferior leads  Nonspecific T wave abnormality now evident in Inferior leads  Confirmed by Gary Hurley MD ([id number]) on 9/5/2024 2:41:40 PM     CMP    Collection Time: 09/05/24  3:55 PM   Result Value Ref Range    Sodium 138 136 - 145 mmol/L    Potassium 3.0 (L) 3.5 - 5.5 mmol/L    Chloride 108 100 - 111 mmol/L    CO2 25 21 - 32 mmol/L    Anion Gap 5 3.0 - 18 mmol/L    Glucose 117 (H) 74 - 99 mg/dL    BUN 17 7.0 - 18 MG/DL    Creatinine 0.80 0.6 - 1.3 MG/DL    BUN/Creatinine Ratio 21 (H) 12 - 20      Est, Glom Filt Rate 76 >60 ml/min/1.73m2    Calcium 9.5 8.5 - 10.1 MG/DL    Total Bilirubin 0.2 0.2 - 1.0 MG/DL    ALT 12 (L) 13 - 56 U/L    AST 7 (L) 10 - 38 U/L    Alk Phosphatase 103 45 - 117 U/L    Total Protein 6.2 (L) 6.4 - 8.2 g/dL    Albumin 2.7 (L) 3.4 - 5.0 g/dL    Globulin 3.5 2.0 - 4.0 g/dL    Albumin/Globulin Ratio 0.8 0.8 - 1.7     CBC with Auto Differential    Collection Time: 09/05/24  3:55 PM   Result Value Ref Range    WBC 21.7 (H) 4.6 - 13.2 K/uL    RBC 3.50 (L) 4.20 -

## 2024-09-05 NOTE — ED TRIAGE NOTES
Pt presents to ed via ems with c/o dizziness with syncopal episode at home. Pt endorses Nausea with vomiting. Pt stated she 'felt dizzy and fell into husbands arms. Pt stated  said she 'passed out'.    Pt denies pain/sob    4mg zofran given in ems    Hx Hpt, copd    Bg 139

## 2024-09-10 ENCOUNTER — HOSPITAL ENCOUNTER (OUTPATIENT)
Facility: HOSPITAL | Age: 74
Setting detail: RECURRING SERIES
Discharge: HOME | End: 2024-09-13
Payer: MEDICARE

## 2024-09-10 PROCEDURE — 97110 THERAPEUTIC EXERCISES: CPT

## 2024-09-10 PROCEDURE — 97530 THERAPEUTIC ACTIVITIES: CPT

## 2024-09-12 ENCOUNTER — HOSPITAL ENCOUNTER (OUTPATIENT)
Facility: HOSPITAL | Age: 74
Setting detail: INFUSION SERIES
Discharge: HOME | End: 2024-09-15
Payer: MEDICARE

## 2024-09-12 ENCOUNTER — HOSPITAL ENCOUNTER (OUTPATIENT)
Facility: HOSPITAL | Age: 74
Setting detail: RECURRING SERIES
Discharge: HOME | End: 2024-09-15
Payer: MEDICARE

## 2024-09-12 VITALS
RESPIRATION RATE: 16 BRPM | SYSTOLIC BLOOD PRESSURE: 117 MMHG | OXYGEN SATURATION: 94 % | DIASTOLIC BLOOD PRESSURE: 62 MMHG | TEMPERATURE: 97.9 F | HEART RATE: 95 BPM

## 2024-09-12 DIAGNOSIS — D50.9 IRON DEFICIENCY ANEMIA, UNSPECIFIED IRON DEFICIENCY ANEMIA TYPE: Primary | ICD-10-CM

## 2024-09-12 PROCEDURE — 97112 NEUROMUSCULAR REEDUCATION: CPT

## 2024-09-12 PROCEDURE — 96365 THER/PROPH/DIAG IV INF INIT: CPT

## 2024-09-12 PROCEDURE — 97110 THERAPEUTIC EXERCISES: CPT

## 2024-09-12 PROCEDURE — 97530 THERAPEUTIC ACTIVITIES: CPT

## 2024-09-12 PROCEDURE — 6360000002 HC RX W HCPCS: Performed by: INTERNAL MEDICINE

## 2024-09-12 PROCEDURE — 2580000003 HC RX 258: Performed by: INTERNAL MEDICINE

## 2024-09-12 RX ORDER — SODIUM CHLORIDE 9 MG/ML
5-250 INJECTION, SOLUTION INTRAVENOUS PRN
Status: CANCELLED | OUTPATIENT
Start: 2024-09-19

## 2024-09-12 RX ORDER — PREDNISONE 10 MG/1
TABLET ORAL
COMMUNITY

## 2024-09-12 RX ORDER — EPINEPHRINE 1 MG/ML
0.3 INJECTION, SOLUTION, CONCENTRATE INTRAVENOUS PRN
Status: CANCELLED | OUTPATIENT
Start: 2024-09-19

## 2024-09-12 RX ORDER — SODIUM CHLORIDE 0.9 % (FLUSH) 0.9 %
5-40 SYRINGE (ML) INJECTION PRN
Status: CANCELLED | OUTPATIENT
Start: 2024-09-19

## 2024-09-12 RX ORDER — DIPHENHYDRAMINE HYDROCHLORIDE 50 MG/ML
50 INJECTION INTRAMUSCULAR; INTRAVENOUS
Status: CANCELLED | OUTPATIENT
Start: 2024-09-19

## 2024-09-12 RX ORDER — ACETAMINOPHEN 325 MG/1
650 TABLET ORAL
Status: CANCELLED | OUTPATIENT
Start: 2024-09-19

## 2024-09-12 RX ORDER — SODIUM CHLORIDE 9 MG/ML
INJECTION, SOLUTION INTRAVENOUS CONTINUOUS
Status: CANCELLED | OUTPATIENT
Start: 2024-09-19

## 2024-09-12 RX ORDER — HEPARIN 100 UNIT/ML
500 SYRINGE INTRAVENOUS PRN
Status: CANCELLED | OUTPATIENT
Start: 2024-09-19

## 2024-09-12 RX ORDER — ONDANSETRON 2 MG/ML
8 INJECTION INTRAMUSCULAR; INTRAVENOUS
Status: CANCELLED | OUTPATIENT
Start: 2024-09-19

## 2024-09-12 RX ORDER — ALBUTEROL SULFATE 90 UG/1
4 INHALANT RESPIRATORY (INHALATION) PRN
Status: CANCELLED | OUTPATIENT
Start: 2024-09-19

## 2024-09-12 RX ORDER — SODIUM CHLORIDE 0.9 % (FLUSH) 0.9 %
5-40 SYRINGE (ML) INJECTION PRN
Status: ACTIVE | OUTPATIENT
Start: 2024-09-12 | End: 2024-09-13

## 2024-09-12 RX ORDER — SODIUM CHLORIDE 9 MG/ML
5-250 INJECTION, SOLUTION INTRAVENOUS PRN
Status: ACTIVE | OUTPATIENT
Start: 2024-09-12 | End: 2024-09-13

## 2024-09-12 RX ADMIN — SODIUM CHLORIDE 25 ML/HR: 9 INJECTION, SOLUTION INTRAVENOUS at 14:24

## 2024-09-12 RX ADMIN — SODIUM CHLORIDE, PRESERVATIVE FREE 10 ML: 5 INJECTION INTRAVENOUS at 14:20

## 2024-09-12 RX ADMIN — FERRIC CARBOXYMALTOSE INJECTION 750 MG: 50 INJECTION, SOLUTION INTRAVENOUS at 14:33

## 2024-09-12 ASSESSMENT — PAIN - FUNCTIONAL ASSESSMENT
PAIN_FUNCTIONAL_ASSESSMENT: NONE - DENIES PAIN
PAIN_FUNCTIONAL_ASSESSMENT: NONE - DENIES PAIN

## 2024-09-17 ENCOUNTER — APPOINTMENT (OUTPATIENT)
Facility: HOSPITAL | Age: 74
End: 2024-09-17
Payer: MEDICARE

## 2024-09-20 ENCOUNTER — HOSPITAL ENCOUNTER (OUTPATIENT)
Facility: HOSPITAL | Age: 74
Setting detail: RECURRING SERIES
Discharge: HOME | End: 2024-09-23
Payer: MEDICARE

## 2024-09-20 ENCOUNTER — HOSPITAL ENCOUNTER (OUTPATIENT)
Facility: HOSPITAL | Age: 74
Setting detail: INFUSION SERIES
Discharge: HOME | End: 2024-09-23
Payer: MEDICARE

## 2024-09-20 VITALS
TEMPERATURE: 98.1 F | OXYGEN SATURATION: 96 % | DIASTOLIC BLOOD PRESSURE: 66 MMHG | RESPIRATION RATE: 16 BRPM | HEART RATE: 74 BPM | SYSTOLIC BLOOD PRESSURE: 128 MMHG

## 2024-09-20 DIAGNOSIS — D50.9 IRON DEFICIENCY ANEMIA, UNSPECIFIED IRON DEFICIENCY ANEMIA TYPE: Primary | ICD-10-CM

## 2024-09-20 PROCEDURE — 2580000003 HC RX 258: Performed by: INTERNAL MEDICINE

## 2024-09-20 PROCEDURE — 97112 NEUROMUSCULAR REEDUCATION: CPT

## 2024-09-20 PROCEDURE — 97530 THERAPEUTIC ACTIVITIES: CPT

## 2024-09-20 PROCEDURE — 96365 THER/PROPH/DIAG IV INF INIT: CPT

## 2024-09-20 PROCEDURE — 6360000002 HC RX W HCPCS: Performed by: INTERNAL MEDICINE

## 2024-09-20 PROCEDURE — 97116 GAIT TRAINING THERAPY: CPT

## 2024-09-20 RX ORDER — DIPHENHYDRAMINE HYDROCHLORIDE 50 MG/ML
50 INJECTION INTRAMUSCULAR; INTRAVENOUS
OUTPATIENT
Start: 2024-09-26

## 2024-09-20 RX ORDER — EPINEPHRINE 1 MG/ML
0.3 INJECTION, SOLUTION, CONCENTRATE INTRAVENOUS PRN
OUTPATIENT
Start: 2024-09-26

## 2024-09-20 RX ORDER — SODIUM CHLORIDE 9 MG/ML
5-250 INJECTION, SOLUTION INTRAVENOUS PRN
Status: ACTIVE | OUTPATIENT
Start: 2024-09-20 | End: 2024-09-21

## 2024-09-20 RX ORDER — HEPARIN 100 UNIT/ML
500 SYRINGE INTRAVENOUS PRN
OUTPATIENT
Start: 2024-09-26

## 2024-09-20 RX ORDER — SODIUM CHLORIDE 9 MG/ML
5-250 INJECTION, SOLUTION INTRAVENOUS PRN
OUTPATIENT
Start: 2024-09-26

## 2024-09-20 RX ORDER — SODIUM CHLORIDE 9 MG/ML
5-250 INJECTION, SOLUTION INTRAVENOUS PRN
Status: CANCELLED | OUTPATIENT
Start: 2024-09-26

## 2024-09-20 RX ORDER — SODIUM CHLORIDE 0.9 % (FLUSH) 0.9 %
5-40 SYRINGE (ML) INJECTION PRN
OUTPATIENT
Start: 2024-09-26

## 2024-09-20 RX ORDER — ACETAMINOPHEN 325 MG/1
650 TABLET ORAL
OUTPATIENT
Start: 2024-09-26

## 2024-09-20 RX ORDER — SODIUM CHLORIDE 9 MG/ML
INJECTION, SOLUTION INTRAVENOUS CONTINUOUS
OUTPATIENT
Start: 2024-09-26

## 2024-09-20 RX ORDER — ALBUTEROL SULFATE 90 UG/1
4 INHALANT RESPIRATORY (INHALATION) PRN
OUTPATIENT
Start: 2024-09-26

## 2024-09-20 RX ORDER — ONDANSETRON 2 MG/ML
8 INJECTION INTRAMUSCULAR; INTRAVENOUS
OUTPATIENT
Start: 2024-09-26

## 2024-09-20 RX ORDER — SODIUM CHLORIDE 0.9 % (FLUSH) 0.9 %
5-40 SYRINGE (ML) INJECTION PRN
Status: ACTIVE | OUTPATIENT
Start: 2024-09-20 | End: 2024-09-21

## 2024-09-20 RX ADMIN — SODIUM CHLORIDE 25 ML/HR: 9 INJECTION, SOLUTION INTRAVENOUS at 13:58

## 2024-09-20 RX ADMIN — FERRIC CARBOXYMALTOSE INJECTION 750 MG: 50 INJECTION, SOLUTION INTRAVENOUS at 14:01

## 2024-09-20 RX ADMIN — SODIUM CHLORIDE, PRESERVATIVE FREE 10 ML: 5 INJECTION INTRAVENOUS at 13:50

## 2024-09-20 ASSESSMENT — PAIN - FUNCTIONAL ASSESSMENT
PAIN_FUNCTIONAL_ASSESSMENT: NONE - DENIES PAIN
PAIN_FUNCTIONAL_ASSESSMENT: NONE - DENIES PAIN

## 2024-09-20 NOTE — PROGRESS NOTES
[facility] OPIC Progress Note    Date: 2024    Name: Zamzam Calixto    MRN: [id number]         : 1950      Injectafer (Dose 2 of 2)      Ms. Calixto arrived to [facility] at 1350 via wheelchair.    Ms. Calixto was assessed and education was provided.    Ms. Calixto's vitals were reviewed.  Vitals:    24 1343   BP: 134/78   Pulse: 86   Resp: 16   Temp: 98.1 °F (36.7 °C)   SpO2: 97%     Patient reports tolerating her first Injectafer infusion without any adverse or side effects.    22G IV placed to LAC x1 attempt. Brisk blood return and flushes without difficulty.    0.9% NS administered @ KVO as ordered.    Injectafer 750mg administered over approximately 20 minutes as ordered followed by NS flush. Ms. Cailxto tolerated well.    Patient remained in [facility] for 30 minute observation period. At the end of the observation period, no s/s of reaction and VSS.    Discharge/ follow-up instructions discussed w/ pt. Instructed to f/u with referring provider. Pt verbalized understanding.    Pt armband removed & shredded.    [facility]. Calixt[facility] at 1500.  She has no future [facility] appointments at this time.       Staci Shaw RN  2024

## 2024-09-24 ENCOUNTER — HOSPITAL ENCOUNTER (OUTPATIENT)
Facility: HOSPITAL | Age: 74
Setting detail: RECURRING SERIES
Discharge: HOME | End: 2024-09-27
Payer: MEDICARE

## 2024-09-24 PROCEDURE — 97116 GAIT TRAINING THERAPY: CPT

## 2024-09-24 PROCEDURE — 97530 THERAPEUTIC ACTIVITIES: CPT

## 2024-09-26 ENCOUNTER — HOSPITAL ENCOUNTER (OUTPATIENT)
Facility: HOSPITAL | Age: 74
Setting detail: RECURRING SERIES
Discharge: HOME | End: 2024-09-29
Payer: MEDICARE

## 2024-09-26 PROCEDURE — 97530 THERAPEUTIC ACTIVITIES: CPT

## 2024-09-26 PROCEDURE — 97116 GAIT TRAINING THERAPY: CPT

## 2024-09-26 NOTE — PROGRESS NOTES
PHYSICAL / OCCUPATIONAL THERAPY - DAILY TREATMENT NOTE    Patient Name: Zamzam Calixto    Date: 2024    : 1950  Insurance: Payor: MEDICARE / Plan: MEDICARE PART A AND B / Product Type: *No Product type* /      Patient  verified Yes     Visit #   Current / Total 1 24   Time   In / Out 10:22 11:00   Pain   In / Out 0/10 0/10   Subjective Functional Status/Changes: I'm feeling pretty good today. I don't have any pain.      TREATMENT AREA =  Other abnormalities of gait and mobility [R26.89]     OBJECTIVE         Therapeutic Procedures:    Tx Min Billable or 1:1 Min (if diff from Tx Min) Procedure, Rationale, Specifics   13 13 22953 Gait Training (timed):    120 feet with parallel bars/ LBQC (assistive device) over flat surfaces with SB/ CGA level of assist. Cuing for sequencing and increased step length.  To improve safety and dynamic movement with household/community ambulation.  (see flow sheet as applicable)     Details if applicable:  1 lap on gym floor with LBQC, 3 obstacle step over. ( 3 laps)     25 25 25743 Therapeutic Activity (timed):  use of dynamic activities replicating functional movements to increase ROM, strength, coordination, balance, and proprioception in order to improve patient's ability to progress to PLOF and address remaining functional goals.  (see flow sheet as applicable)     Details if applicable:  to include stair negotiation.                   38 38 [facility] Totals Reminder: bill using total billable min of TIMED therapeutic procedures (example: do not include dry needle or estim unattended, both untimed codes, in totals to left)  8-22 min = 1 unit; 23-37 min = 2 units; 38-52 min = 3 units; 53-67 min = 4 units; 68-82 min = 5 units   Total Total     [x]  Patient Education billed concurrently with other procedures   [x] Review HEP    [] Progressed/Changed HEP, detail:    [] Other detail:       Objective Information/Functional Measures/Assessment    Pt reports to skilled

## 2024-10-01 ENCOUNTER — HOSPITAL ENCOUNTER (OUTPATIENT)
Facility: HOSPITAL | Age: 74
Setting detail: RECURRING SERIES
Discharge: HOME | End: 2024-10-04
Payer: MEDICARE

## 2024-10-01 PROCEDURE — 97116 GAIT TRAINING THERAPY: CPT

## 2024-10-01 PROCEDURE — 97530 THERAPEUTIC ACTIVITIES: CPT

## 2024-10-01 NOTE — PROGRESS NOTES
PHYSICAL / OCCUPATIONAL THERAPY - DAILY TREATMENT NOTE    Patient Name: Zamzam Calixto    Date: 10/1/2024    : 1950  Insurance: Payor: MEDICARE / Plan: MEDICARE PART A AND B / Product Type: *No Product type* /      Patient  verified Yes     Visit #   Current / Total 2 24   Time   In / Out 2:02 2:42   Pain   In / Out 0 0   Subjective Functional Status/Changes: Pt reports no pain today, but states she didn't do much walking yesterday and is having some increased right LE swelling.     TREATMENT AREA =  Other abnormalities of gait and mobility [R26.89]     OBJECTIVE         Therapeutic Procedures:    Tx Min Billable or 1:1 Min (if diff from Tx Min) Procedure, Rationale, Specifics   30 30 45840 Gait Training (timed):    180 feet with WBQPC and parallel bars (assistive device) over even surfaces with CGA to SBA level of assist. Cuing for anterrior pelvic rotation, increase left hip flexion, appropriate muscle activation for correct socket progression/movement, and .  To improve safety and dynamic movement with household/community ambulation.  (see flow sheet as applicable)     Details if applicable:       10 10 31406 Therapeutic Activity (timed):  use of dynamic activities replicating functional movements to increase ROM, strength, coordination, balance, and proprioception in order to improve patient's ability to progress to PLOF and address remaining functional goals.  (see flow sheet as applicable)     Details if applicable:            Details if applicable:            Details if applicable:            Details if applicable:     40 40 [facility] Totals Reminder: bill using total billable min of TIMED therapeutic procedures (example: do not include dry needle or estim unattended, both untimed codes, in totals to left)  8-22 min = 1 unit; 23-37 min = 2 units; 38-52 min = 3 units; 53-67 min = 4 units; 68-82 min = 5 units   Total Total     [x]  Patient Education billed concurrently with other procedures   [x]

## 2024-10-03 ENCOUNTER — HOSPITAL ENCOUNTER (OUTPATIENT)
Facility: HOSPITAL | Age: 74
Setting detail: RECURRING SERIES
Discharge: HOME | End: 2024-10-06
Payer: MEDICARE

## 2024-10-03 PROCEDURE — 97116 GAIT TRAINING THERAPY: CPT

## 2024-10-03 PROCEDURE — 97530 THERAPEUTIC ACTIVITIES: CPT

## 2024-10-03 NOTE — PROGRESS NOTES
PHYSICAL / OCCUPATIONAL THERAPY - DAILY TREATMENT NOTE    Patient Name: Zamzam Calixto    Date: 10/3/2024    : 1950  Insurance: Payor: MEDICARE / Plan: MEDICARE PART A AND B / Product Type: *No Product type* /      Patient  verified Yes     Visit #   Current / Total 3 24   Time   In / Out 10:20 11:00   Pain   In / Out 0 0   Subjective Functional Status/Changes: I go back to the prosthetist today  He will get to see me with the cane      TREATMENT AREA =  Other abnormalities of gait and mobility [R26.89]     OBJECTIVE      Therapeutic Procedures:    Tx Min Billable or 1:1 Min (if diff from Tx Min) Procedure, Rationale, Specifics   12 12 28279 Gait Training (timed):    60 feet feet with WBQC (assistive device) over level surfaces with CGA level of assist. Cuing for step/stride length.  To improve safety and dynamic movement with household/community ambulation.  (see flow sheet as applicable)     Details if applicable:  cues to promote equal step length and JEN for improved stability.     28 97 77185 Therapeutic Activity (timed):  use of dynamic activities replicating functional movements to increase ROM, strength, coordination, balance, and proprioception in order to improve patient's ability to progress to PLOF and address remaining functional goals.  (see flow sheet as applicable)     Details if applicable:            Details if applicable:            Details if applicable:            Details if applicable:     40 40 [facility] Totals Reminder: bill using total billable min of TIMED therapeutic procedures (example: do not include dry needle or estim unattended, both untimed codes, in totals to left)  8-22 min = 1 unit; 23-37 min = 2 units; 38-52 min = 3 units; 53-67 min = 4 units; 68-82 min = 5 units   Total Total     [x]  Patient Education billed concurrently with other procedures   [x] Review HEP    [] Progressed/Changed HEP, detail:    [] Other detail:       Objective Information/Functional

## 2024-10-08 ENCOUNTER — HOSPITAL ENCOUNTER (OUTPATIENT)
Facility: HOSPITAL | Age: 74
Setting detail: RECURRING SERIES
Discharge: HOME | End: 2024-10-11
Payer: MEDICARE

## 2024-10-08 PROCEDURE — 97530 THERAPEUTIC ACTIVITIES: CPT

## 2024-10-08 PROCEDURE — 97116 GAIT TRAINING THERAPY: CPT

## 2024-10-08 NOTE — PROGRESS NOTES
PHYSICAL / OCCUPATIONAL THERAPY - DAILY TREATMENT NOTE    Patient Name: Zamzam Calixto    Date: 10/8/2024    : 1950  Insurance: Payor: MEDICARE / Plan: MEDICARE PART A AND B / Product Type: *No Product type* /      Patient  verified Yes     Visit #   Current / Total 4 24   Time   In / Out 10:20 11:00   Pain   In / Out 0 0   Subjective Functional Status/Changes: I tried going up the stairs the other day,but my right leg \"locked\" up  not sure why  I just couldn't get it to go up the step  Maybe it was \"lack of confidence\".     TREATMENT AREA =  Other abnormalities of gait and mobility [R26.89]     OBJECTIVE      Therapeutic Procedures:    Tx Min Billable or 1:1 Min (if diff from Tx Min) Procedure, Rationale, Specifics   12  47594 Gait Training (timed):    50 feet with WBQC (assistive device) over level surfaces with CGA level of assist. Cuing for Posture, left stride.  To improve safety and dynamic movement with household/community ambulation.  (see flow sheet as applicable)     Details if applicable:        90575 Therapeutic Activity (timed):  use of dynamic activities replicating functional movements to increase ROM, strength, coordination, balance, and proprioception in order to improve patient's ability to progress to PLOF and address remaining functional goals.  (see flow sheet as applicable)     Details if applicable:  step over 1/2 foam in bars with QC, intermittent use of left UE to assist for stability.  Resisted walking with TB  around knees, for FW/retro and lateral walking to improve glute activation and hip strength          Details if applicable:            Details if applicable:            Details if applicable:     40 40 [facility] Totals Reminder: bill using total billable min of TIMED therapeutic procedures (example: do not include dry needle or estim unattended, both untimed codes, in totals to left)  8-22 min = 1 unit; 23-37 min = 2 units; 38-52 min = 3 units; 53-67 min = 4 units; 
No

## 2024-10-09 ENCOUNTER — HOSPITAL ENCOUNTER (OUTPATIENT)
Facility: HOSPITAL | Age: 74
Setting detail: RECURRING SERIES
Discharge: HOME | End: 2024-10-12
Payer: MEDICARE

## 2024-10-09 PROCEDURE — 97116 GAIT TRAINING THERAPY: CPT

## 2024-10-09 PROCEDURE — 97530 THERAPEUTIC ACTIVITIES: CPT

## 2024-10-09 NOTE — PROGRESS NOTES
PHYSICAL / OCCUPATIONAL THERAPY - DAILY TREATMENT NOTE    Patient Name: Zamzam Calixto    Date: 10/9/2024    : 1950  Insurance: Payor: MEDICARE / Plan: MEDICARE PART A AND B / Product Type: *No Product type* /      Patient  verified Yes     Visit #   Current / Total 5 24   Time   In / Out 10:21 10:59   Pain   In / Out 0/10 0/10   Subjective Functional Status/Changes: Thing are going well today.      TREATMENT AREA =  Other abnormalities of gait and mobility [R26.89]     OBJECTIVE         Therapeutic Procedures:    Tx Min Billable or 1:1 Min (if diff from Tx Min) Procedure, Rationale, Specifics   23 23 [id number] Gait Training (timed):    80 feet with LBQC (assistive device) over flat surfaces with CG level of assist. Cuing for lateral weight shifting, sequencing and heel strike.  To improve safety and dynamic movement with household/community ambulation.  (see flow sheet as applicable)     Details if applicable:  to include step over 1/2 foam. 1 lap forward ambulation on gym floor with LBQC. Parallel bars: 3 laps with red thera-band at knees. Forward monster walks: 2 laps.      15 15 [id number] Therapeutic Activity (timed):  use of dynamic activities replicating functional movements to increase ROM, strength, coordination, balance, and proprioception in order to improve patient's ability to progress to PLOF and address remaining functional goals.  (see flow sheet as applicable)     Details if applicable:                    38 38 [facility] Totals Reminder: bill using total billable min of TIMED therapeutic procedures (example: do not include dry needle or estim unattended, both untimed codes, in totals to left)  8-22 min = 1 unit; 23-37 min = 2 units; 38-52 min = 3 units; 53-67 min = 4 units; 68-82 min = 5 units   Total Total     [x]  Patient Education billed concurrently with other procedures   [x] Review HEP    [] Progressed/Changed HEP, detail:    [] Other detail:       Objective Information/Functional

## 2024-10-15 ENCOUNTER — HOSPITAL ENCOUNTER (OUTPATIENT)
Facility: HOSPITAL | Age: 74
Setting detail: RECURRING SERIES
Discharge: HOME | End: 2024-10-18
Payer: MEDICARE

## 2024-10-15 PROCEDURE — 97530 THERAPEUTIC ACTIVITIES: CPT

## 2024-10-15 PROCEDURE — 97116 GAIT TRAINING THERAPY: CPT

## 2024-10-15 NOTE — PROGRESS NOTES
mobility (9/24/2024)  Current: progressing: Pt ambulated 80 feet with LBQC with step through pattern, with cues, and CGA. (10/3/2024) progressing: Pt ambulated 120 feet with LBQC, CGA/ HHA   4- Goal: Pt to demonstrate ability to independently ascend/descend 1 step with SPC or less to facilitate independence and safety with curb negotiation in the community.   Status at last note/certification: Progressing - able to ascend stairs with use of bilateral handrails, SBA only for safety.  Has not progressed to SPC at this time  (9/24/2024)  Current: Goal in progress - pt able to ascend/descend stairs with step to pattern, bilateral UE support, and mild deviations with supervision (10/01/24) goal in progress Pt able to ascend/ descend stairs with increased weight bearing through left LE, still requires bilateral UE assistance. (10/15/2024)  5- Goal: Pt to report LEFS score of 52 or greater to show improved function and quality of life.  Status at last note/certification: Updated goal - LEFS 42/80=53% (9/24/2024)  Current:     Next PN/ RC due 10/23/2024  Auth due (visit number/ date) KATIANA    PLAN  - Continue Plan of Care    Berny Heard PTA    10/15/2024    10:24 AM  If an interpreting service was utilized for treatment of this patient, the contents of this document represent the material reviewed with the patient via the .     Future Appointments   Date Time Provider Department Center   10/17/2024 10:20 AM [facility]   10/22/2024 10:20 AM Berny Heard PTA [facility]   10/24/2024 10:20 AM [facility]   10/29/2024 10:20 AM Berny Heard PTA [facility]   10/31/2024 10:20 AM Berny Heard PTA [facility]

## 2024-10-17 ENCOUNTER — HOSPITAL ENCOUNTER (OUTPATIENT)
Facility: HOSPITAL | Age: 74
Setting detail: RECURRING SERIES
Discharge: HOME | End: 2024-10-20
Payer: MEDICARE

## 2024-10-17 PROCEDURE — 97110 THERAPEUTIC EXERCISES: CPT

## 2024-10-17 PROCEDURE — 97116 GAIT TRAINING THERAPY: CPT

## 2024-10-17 NOTE — PROGRESS NOTES
shift with light bilateral UE support (09/20/24)  Current: today 20 minutes of continual ex's, prior to reports of  right LE fatigue.(10/8/2024)  3- Goal: Pt to ambulate in clinic for > 200 feet with LRAD and minimal gait deviation to increase ability to participate in recreational activities in the community.  Status at last note/certification: not met: still dependent on FWW for mobility (9/24/2024)  Current: progressing: Pt ambulated 80 feet with LBQC with step through pattern, with cues, and CGA. (10/3/2024)  4- Goal: Pt to demonstrate ability to independently ascend/descend 1 step with SPC or less to facilitate independence and safety with curb negotiation in the community.   Status at last note/certification: Progressing - able to ascend stairs with use of bilateral handrails, SBA only for safety.  Has not progressed to SPC at this time  (9/24/2024)  Current: Goal in progress - pt able to ascend/descend stairs with step to pattern, bilateral UE support, and mild deviations with supervision (10/17/24)  5- Goal: Pt to report LEFS score of 52 or greater to show improved function and quality of life.  Status at last note/certification: Updated goal - LEFS 42/80=53% (9/24/2024)  Current:      Next PN/ RC due 10/23/2024  Auth due (visit number/ date) KATIANA    PLAN  - Continue Plan of Care  - Upgrade activities as tolerated    Mee Abad, PT    10/17/2024    10:10 AM  If an interpreting service was utilized for treatment of this patient, the contents of this document represent the material reviewed with the patient via the .     Future Appointments   Date Time Provider Department Center   10/17/2024 10:20 AM [facility]   10/22/2024 10:20 AM Berny Heard PTA [facility]   10/24/2024 10:20 AM [facility]   10/29/2024 10:20 AM Berny Heard PTA [facility]   10/31/2024 10:20 AM Berny Heard PTA [facility]

## 2024-10-22 ENCOUNTER — HOSPITAL ENCOUNTER (OUTPATIENT)
Facility: HOSPITAL | Age: 74
Setting detail: RECURRING SERIES
Discharge: HOME | End: 2024-10-25
Payer: MEDICARE

## 2024-10-22 PROCEDURE — 97530 THERAPEUTIC ACTIVITIES: CPT

## 2024-10-22 PROCEDURE — 97110 THERAPEUTIC EXERCISES: CPT

## 2024-10-22 NOTE — PROGRESS NOTES
[facility]   [address], [place] [address]  Phone: [phone] Fax: [phone]  PROGRESS NOTE  Patient Name: Zamzam Calixto : 1950   Treatment/Medical Diagnosis: Other abnormalities of gait and mobility [R26.89]   Referral Source: Miriam Niño, WILDA - NP     Payor: Payor: MEDICARE / Plan: MEDICARE PART A AND B / Product Type: *No Product type* /            Date of Initial Visit: 7/3/2024 Attended Visits: 27 Missed Visits: 2       CURRENT GOAL STATUS  1- Goal: Pt to demonstrate independent donning/doffing with prosthetic leg and  to increase independence with ADLs.  Status at last note/certification: not met: doffs independently, but requires assist with sleeve, however requires husbands assist align socket for donning   (2024)  Current: progressing: doffs independent, can now remove sleeve, requires occasional assistance with donning the prosthetic. (10/22/2024)  2- Goal: Pt to demonstrate standing tolerance of >15 minutes with equal weightbearing bilaterally and < 1 UE support to increase ease of performance with ADLs.  Status at last note/certification: progressing - 28:62 minutes standing tolerance with intermittent increased right weight shift with light bilateral UE support (24)  Current:  not met: 20-30 minutes of continual ex's, prior to reports of right LE fatigue, weight shifting/ and assistance at parallel bars required.(10/22/2024)  3- Goal: Pt to ambulate in clinic for > 200 feet with LRAD and minimal gait deviation to increase ability to participate in recreational activities in the community.  Status at last note/certification: not met: still dependent on FWW for mobility (2024)  Current: progressing: Pt ambulated 120 feet with LBQC with step through pattern, with cues, and CGA. (10/22/2024)  4- Goal: Pt to demonstrate ability to independently ascend/descend 1 step with SPC or less 
over medium salvador demonstrating moderate hip hiking in left hip in order to clear step. Bilateral UE assistance required during sit to stands with patient demonstrating good eccentric control. Pt performed multiple sets of sit to stands to address weakness in bilateral quadriceps. Session tolerated well.     Patient will continue to benefit from skilled PT / OT services to modify and progress therapeutic interventions, analyze and address functional mobility deficits, analyze and address ROM deficits, analyze and address strength deficits, analyze and address soft tissue restrictions, analyze and cue for proper movement patterns, and analyze and modify for postural abnormalities to address functional deficits and attain remaining goals.    Progress toward goals / Updated goals:  []  See Progress Note/Recertification    1- Goal: Pt to demonstrate independent donning/doffing with prosthetic leg and  to increase independence with ADLs.  Status at last note/certification: progressing: doffs independent, can now remove sleeve, requires occasional assistance with donning the prosthetic. (10/22/2024)  Current:   2- Goal: Pt to demonstrate standing tolerance of >15 minutes with equal weightbearing bilaterally and < 1 UE support to increase ease of performance with ADLs.  Status at last note/certification: not met: 20-30 minutes of continual ex's, prior to reports of  right LE fatigue, weight shifting/ and assistance at parallel bars required.(10/22/2024)  Current:    3- Goal: Pt to ambulate in clinic for > 200 feet with LRAD and minimal gait deviation to increase ability to participate in recreational activities in the community.  Status at last note/certification: progressing: Pt ambulated 120 feet with LBQC with step through pattern, with cues, and CGA. (10/22/2024)  Current:   4- Goal: Pt to demonstrate ability to independently ascend/descend 1 step with SPC or less to facilitate independence and safety with

## 2024-10-24 ENCOUNTER — HOSPITAL ENCOUNTER (OUTPATIENT)
Facility: HOSPITAL | Age: 74
Setting detail: RECURRING SERIES
Discharge: HOME | End: 2024-10-27
Payer: MEDICARE

## 2024-10-24 PROCEDURE — 97110 THERAPEUTIC EXERCISES: CPT

## 2024-10-24 PROCEDURE — 97112 NEUROMUSCULAR REEDUCATION: CPT

## 2024-10-24 NOTE — PROGRESS NOTES
in the community.  Status at last note/certification: progressing: Pt ambulated 120 feet with LBQC with step through pattern, with cues, and CGA. (10/22/2024)  Current:   4- Goal: Pt to demonstrate ability to independently ascend/descend 1 step with SPC or less to facilitate independence and safety with curb negotiation in the community.   Status at last note/certification: progressing - pt able to ascend/descend stairs with step to pattern, bilateral UE support, and mild deviations with supervision (10/17/24)  Current: progressing with cane training and increased quad strengthening (10/23/2024)  5- Goal: Pt to report LEFS score of 52 or greater to show improved function and quality of life.  Status at last note/certification: not met: 40/80= 50 percent. (10/22/2024)  Current:     Next PN/ RC due 11/20/2024  Auth due (visit number/ date) Medical necessity or 12/31/2024    PLAN  - Continue Plan of Care  - Upgrade activities as tolerated    Mee Abad, PT    10/24/2024    10:23 AM  If an interpreting service was utilized for treatment of this patient, the contents of this document represent the material reviewed with the patient via the .     Future Appointments   Date Time Provider Department Center   10/29/2024 10:20 AM Berny Heard PTA [facility]   10/31/2024 10:20 AM Berny Heard PTA [facility]

## 2024-10-29 ENCOUNTER — HOSPITAL ENCOUNTER (OUTPATIENT)
Facility: HOSPITAL | Age: 74
Setting detail: RECURRING SERIES
Discharge: HOME | End: 2024-11-01
Payer: MEDICARE

## 2024-10-29 PROCEDURE — 97110 THERAPEUTIC EXERCISES: CPT

## 2024-10-29 PROCEDURE — 97112 NEUROMUSCULAR REEDUCATION: CPT

## 2024-10-29 NOTE — PROGRESS NOTES
PHYSICAL / OCCUPATIONAL THERAPY - DAILY TREATMENT NOTE    Patient Name: Zamzam Calixto    Date: 10/29/2024    : 1950  Insurance: Payor: MEDICARE / Plan: MEDICARE PART A AND B / Product Type: *No Product type* /      Patient  verified Yes     Visit #   Current / Total 2 16   Time   In / Out 10:21 11:00   Pain   In / Out 0/10 0/10   Subjective Functional Status/Changes: I getting in and out of a car is getting a lot easier.      TREATMENT AREA =  Other abnormalities of gait and mobility [R26.89]     OBJECTIVE         Therapeutic Procedures:    Tx Min Billable or 1:1 Min (if diff from Tx Min) Procedure, Rationale, Specifics   25 25 [id number] Therapeutic Exercise (timed):  increase ROM, strength, coordination, balance, and proprioception to improve patient's ability to progress to PLOF and address remaining functional goals. (see flow sheet as applicable)     Details if applicable:       14 14 [id number] Neuromuscular Re-Education (timed):  improve balance, coordination, kinesthetic sense, posture, core stability and proprioception to improve patient's ability to develop conscious control of individual muscles and awareness of position of extremities in order to progress to PLOF and address remaining functional goals. (see flow sheet as applicable)     Details if applicable:                    39 39 [facility] Totals Reminder: bill using total billable min of TIMED therapeutic procedures (example: do not include dry needle or estim unattended, both untimed codes, in totals to left)  8-22 min = 1 unit; 23-37 min = 2 units; 38-52 min = 3 units; 53-67 min = 4 units; 68-82 min = 5 units   Total Total     [x]  Patient Education billed concurrently with other procedures   [x] Review HEP    [] Progressed/Changed HEP, detail:    [] Other detail:       Objective Information/Functional Measures/Assessment    Session started with plinth exercises in improve functional LE strength and ROM. Pt received updated HEP and verbalized

## 2024-10-31 ENCOUNTER — HOSPITAL ENCOUNTER (OUTPATIENT)
Facility: HOSPITAL | Age: 74
Setting detail: RECURRING SERIES
Discharge: HOME | End: 2024-11-03
Payer: MEDICARE

## 2024-10-31 PROCEDURE — 97110 THERAPEUTIC EXERCISES: CPT

## 2024-10-31 PROCEDURE — 97112 NEUROMUSCULAR REEDUCATION: CPT

## 2024-10-31 NOTE — PROGRESS NOTES
PHYSICAL / OCCUPATIONAL THERAPY - DAILY TREATMENT NOTE    Patient Name: Zamzam Calixto    Date: 10/31/2024    : 1950  Insurance: Payor: MEDICARE / Plan: MEDICARE PART A AND B / Product Type: *No Product type* /      Patient  verified Yes     Visit #   Current / Total 3 16   Time   In / Out 10:20 11:00   Pain   In / Out 0/10 0/10   Subjective Functional Status/Changes: My legs are sore but I'm not in any real pain.      TREATMENT AREA =  Other abnormalities of gait and mobility [R26.89]     OBJECTIVE         Therapeutic Procedures:    Tx Min Billable or 1:1 Min (if diff from Tx Min) Procedure, Rationale, Specifics   25 25 [id number] Therapeutic Exercise (timed):  increase ROM, strength, coordination, balance, and proprioception to improve patient's ability to progress to PLOF and address remaining functional goals. (see flow sheet as applicable)     Details if applicable:       15 15 36655 Neuromuscular Re-Education (timed):  improve balance, coordination, kinesthetic sense, posture, core stability and proprioception to improve patient's ability to develop conscious control of individual muscles and awareness of position of extremities in order to progress to PLOF and address remaining functional goals. (see flow sheet as applicable)     Details if applicable:                    40 40 [facility] Totals Reminder: bill using total billable min of TIMED therapeutic procedures (example: do not include dry needle or estim unattended, both untimed codes, in totals to left)  8-22 min = 1 unit; 23-37 min = 2 units; 38-52 min = 3 units; 53-67 min = 4 units; 68-82 min = 5 units   Total Total     [x]  Patient Education billed concurrently with other procedures   [x] Review HEP    [] Progressed/Changed HEP, detail:    [] Other detail:       Objective Information/Functional Measures/Assessment    Pt reports to skilled therapy session with gait abnormalities and decreased standing tolerance following a left AKA. Pt unable to

## 2024-11-12 ENCOUNTER — HOSPITAL ENCOUNTER (OUTPATIENT)
Facility: HOSPITAL | Age: 74
Setting detail: RECURRING SERIES
Discharge: HOME | End: 2024-11-15
Payer: MEDICARE

## 2024-11-12 PROCEDURE — 97530 THERAPEUTIC ACTIVITIES: CPT

## 2024-11-12 PROCEDURE — 97110 THERAPEUTIC EXERCISES: CPT

## 2024-11-12 PROCEDURE — 97116 GAIT TRAINING THERAPY: CPT

## 2024-11-12 NOTE — PROGRESS NOTES
PHYSICAL / OCCUPATIONAL THERAPY - DAILY TREATMENT NOTE    Patient Name: Zamzam Calixto    Date: 2024    : 1950  Insurance: Payor: MEDICARE / Plan: MEDICARE PART A AND B / Product Type: *No Product type* /      Patient  verified Yes     Visit #   Current / Total 4 16   Time   In / Out 10:22 11:00   Pain   In / Out 0/10 0/10   Subjective Functional Status/Changes: I've had to help my mother in law out in the past week, so I've been very busy. I haven't let that stop me from doing my HEP.      TREATMENT AREA =  Other abnormalities of gait and mobility [R26.89]     OBJECTIVE         Therapeutic Procedures:    Tx Min Billable or 1:1 Min (if diff from Tx Min) Procedure, Rationale, Specifics   15 15 [id number] Gait Training (timed):    200 feet with parallel bars/ LBQC (assistive device) over flat surfaces with CG/ SB level of assist. Cuing for lateral weight shifting.  To improve safety and dynamic movement with household/community ambulation.  (see flow sheet as applicable)     Details if applicable:  1 lap on gym floor, 4 laps in parallel bars.      10 10 97351 Therapeutic Activity (timed):  use of dynamic activities replicating functional movements to increase ROM, strength, coordination, balance, and proprioception in order to improve patient's ability to progress to PLOF and address remaining functional goals.  (see flow sheet as applicable)     Details if applicable:     13 13 49378 Therapeutic Exercise (timed):  increase ROM, strength, coordination, balance, and proprioception to improve patient's ability to progress to PLOF and address remaining functional goals. (see flow sheet as applicable)     Details if applicable:               38 38 [facility] Totals Reminder: bill using total billable min of TIMED therapeutic procedures (example: do not include dry needle or estim unattended, both untimed codes, in totals to left)  8-22 min = 1 unit; 23-37 min = 2 units; 38-52 min = 3 units; 53-67 min = 4 units;

## 2024-11-14 ENCOUNTER — HOSPITAL ENCOUNTER (OUTPATIENT)
Facility: HOSPITAL | Age: 74
Setting detail: RECURRING SERIES
Discharge: HOME | End: 2024-11-17
Payer: MEDICARE

## 2024-11-14 PROCEDURE — 97530 THERAPEUTIC ACTIVITIES: CPT

## 2024-11-14 PROCEDURE — 97116 GAIT TRAINING THERAPY: CPT

## 2024-11-14 NOTE — PROGRESS NOTES
PHYSICAL / OCCUPATIONAL THERAPY - DAILY TREATMENT NOTE    Patient Name: Zamzam Calixto    Date: 2024    : 1950  Insurance: Payor: MEDICARE / Plan: MEDICARE PART A AND B / Product Type: *No Product type* /      Patient  verified Yes     Visit #   Current / Total 5 16   Time   In / Out 10:25 11:03   Pain   In / Out 0 0   Subjective Functional Status/Changes: The prosthetist added padding around the top to reduce  size of the socket, however the distal end feels like it is moving around more.     TREATMENT AREA =  Other abnormalities of gait and mobility [R26.89]     OBJECTIVE      Therapeutic Procedures:    Tx Min Billable or 1:1 Min (if diff from Tx Min) Procedure, Rationale, Specifics   8 8 33357 Gait Training (timed):    30 feet with SQQC (assistive device) over level surfaces with CGA level of assist. Cuing for step through sequence.  To improve safety and dynamic movement with household/community ambulation.  (see flow sheet as applicable)     Details if applicable:  Wheelchair follow as pt stated prosthesis more uncomfortable  and \"\"bones sliding\" at distal end of limb since addition of padding     30 30 75987 Therapeutic Activity (timed):  use of dynamic activities replicating functional movements to increase ROM, strength, coordination, balance, and proprioception in order to improve patient's ability to progress to PLOF and address remaining functional goals.  (see flow sheet as applicable)     Details if applicable:            Details if applicable:            Details if applicable:            Details if applicable:     38 38 [facility] Totals Reminder: bill using total billable min of TIMED therapeutic procedures (example: do not include dry needle or estim unattended, both untimed codes, in totals to left)  8-22 min = 1 unit; 23-37 min = 2 units; 38-52 min = 3 units; 53-67 min = 4 units; 68-82 min = 5 units   Total Total     [x]  Patient Education billed concurrently with other procedures  Reason for call:  Notify patient request for Estrogel 0.06% Gel PA approval through [employer] was authorized with NO END DATE.    Thank You!   Tonya Hanley CPhT, B.A  Patient Care Advocate   [facility]  [email or web address]  Phone: [phone] Ext [phone]  Fax: [phone]

## 2024-11-19 ENCOUNTER — HOSPITAL ENCOUNTER (OUTPATIENT)
Facility: HOSPITAL | Age: 74
Setting detail: RECURRING SERIES
Discharge: HOME | End: 2024-11-22
Payer: MEDICARE

## 2024-11-19 PROCEDURE — 97530 THERAPEUTIC ACTIVITIES: CPT

## 2024-11-19 PROCEDURE — 97110 THERAPEUTIC EXERCISES: CPT

## 2024-11-19 PROCEDURE — 97112 NEUROMUSCULAR REEDUCATION: CPT

## 2024-11-19 NOTE — PROGRESS NOTES
PHYSICAL / OCCUPATIONAL THERAPY - DAILY TREATMENT NOTE    Patient Name: Zamzam Calixto    Date: 2024    : 1950  Insurance: Payor: MEDICARE / Plan: MEDICARE PART A AND B / Product Type: *No Product type* /      Patient  verified Yes     Visit #   Current / Total 6 16   Time   In / Out 10:23 11:03   Pain   In / Out 0 0   Subjective Functional Status/Changes: I'm better, but I still don't feel confident using my cane outside of the home     TREATMENT AREA =  Other abnormalities of gait and mobility [R26.89]     OBJECTIVE    Therapeutic Procedures:    Tx Min Billable or 1:1 Min (if diff from Tx Min) Procedure, Rationale, Specifics   20  32284 Neuromuscular Re-Education (timed):  improve balance, coordination, kinesthetic sense, posture, core stability and proprioception to improve patient's ability to develop conscious control of individual muscles and awareness of position of extremities in order to progress to PLOF and address remaining functional goals. (see flow sheet as applicable)     Details if applicable:       10 10 74959 Therapeutic Exercise (timed):  increase ROM, strength, coordination, balance, and proprioception to improve patient's ability to progress to PLOF and address remaining functional goals. (see flow sheet as applicable)     Details if applicable:     10 10 90343 Therapeutic Activity (timed):  use of dynamic activities replicating functional movements to increase ROM, strength, coordination, balance, and proprioception in order to improve patient's ability to progress to PLOF and address remaining functional goals.  (see flow sheet as applicable)     Details if applicable:            Details if applicable:            Details if applicable:     40 40 [facility] Totals Reminder: bill using total billable min of TIMED therapeutic procedures (example: do not include dry needle or estim unattended, both untimed codes, in totals to left)  8-22 min = 1 unit; 23-37 min = 2 units; 38-52 
negotiation in the community.   Status at last note/certification: progressing - pt able to ascend/descend stairs with step to pattern, bilateral UE support, and mild deviations with supervision (10/17/24)  Current: progressing with cane training and increased quad strengthening.  Able to negotiate independently with use of walker when in community.   nearby, but no assist. (11/19/2024)  5- Goal: Pt to report LEFS score of 52 or greater to show improved function and quality of life.  Status at last note/certification: not met: 40/80= 50 percent. (10/22/2024)  Current: progressing: LEFS 46%  (11/19/2024)    SUMMARY OF TREATMENT  Pt has attended skilled physical therapy for 33 visits to address Other abnormalities of gait and mobility [R26.89] and has made steady progress thus far with therapy measures. Objectively, Pt demonstrates increased standing tolerance, and steady improvement with gait and mobility.  Pt's functional gains include donning/doffing prosthesis independently, with  lifting it for her.  With walker, negotiates curbs independently.  Pt's functional deficits include: limited confidence when standing on left, but improving.  Ambulates with walker in community for safety and support. When ambulating with LBQC presents with step through patten intermittently.   Pt reports a self-reported improvement rating of 85% since start of care.  Pt would continue to benefit from skilled therapy services to address deficits to normalize gait pattern and improve independence with use of LBQC.     Medicare, cannot change goals, cannot adjust frequency/duration, no signature required   Reporting Period: (date from last Prog Note/Eval to current Prog Note/Recert)  10/22/2024 - 11/19/2024    1- Goal: Pt to demonstrate independent donning/doffing with prosthetic leg and  to increase independence with ADLs.  Status at last note/certification: Requires assist from  is to lift prosthesis,  but

## 2024-11-21 ENCOUNTER — HOSPITAL ENCOUNTER (OUTPATIENT)
Facility: HOSPITAL | Age: 74
Setting detail: RECURRING SERIES
Discharge: HOME | End: 2024-11-24
Payer: MEDICARE

## 2024-11-21 PROCEDURE — 97112 NEUROMUSCULAR REEDUCATION: CPT

## 2024-11-21 PROCEDURE — 97530 THERAPEUTIC ACTIVITIES: CPT

## 2024-11-21 NOTE — PROGRESS NOTES
PHYSICAL / OCCUPATIONAL THERAPY - DAILY TREATMENT NOTE    Patient Name: Zamzam Calixto    Date: 2024    : 1950  Insurance: Payor: MEDICARE / Plan: MEDICARE PART A AND B / Product Type: *No Product type* /      Patient  verified Yes     Visit #   Current / Total 1 10   Time   In / Out 10:20 11;05   Pain   In / Out 0 0   Subjective Functional Status/Changes: I saw the Vascular MD yesterday.  All  tests looked good  No concerns. I did ask about the swelling in my right leg,but was told it was due to overall increased sitting since AKA.     TREATMENT AREA =        OBJECTIVE      Therapeutic Procedures:    Tx Min Billable or 1:1 Min (if diff from Tx Min) Procedure, Rationale, Specifics   35 35 22696 Therapeutic Activity (timed):  use of dynamic activities replicating functional movements to increase ROM, strength, coordination, balance, and proprioception in order to improve patient's ability to progress to PLOF and address remaining functional goals.  (see flow sheet as applicable)     Details if applicable:  step ups with QC only in bars for safety     10 10 43692 Neuromuscular Re-Education (timed):  improve balance, coordination, kinesthetic sense, posture, core stability and proprioception to improve patient's ability to develop conscious control of individual muscles and awareness of position of extremities in order to progress to PLOF and address remaining functional goals. (see flow sheet as applicable)     Details if applicable:  added Pilates chair for right LE extension          Details if applicable:            Details if applicable:            Details if applicable:     45 45 [facility] Totals Reminder: bill using total billable min of TIMED therapeutic procedures (example: do not include dry needle or estim unattended, both untimed codes, in totals to left)  8-22 min = 1 unit; 23-37 min = 2 units; 38-52 min = 3 units; 53-67 min = 4 units; 68-82 min = 5 units   Total Total     [x]  Patient  Pt called today to say that her quality of life is really being negatively affected by the side effects from her Letrozole.    Pt reports: very achy in knees & shoulders, poor sleep due to hot flashes, depression, & then some dizziness this am.    Pt would like to stop the Letrozole for about a month & perhaps retry the Anastrazole as the side effects with the Anastrazole were better.      Pt is wondering if she should come in sooner to see .     approved Pt to be seen tomorrow 8-2-19 at 1:30pm & Pt verbalized agreement.

## 2024-11-25 ENCOUNTER — HOSPITAL ENCOUNTER (OUTPATIENT)
Facility: HOSPITAL | Age: 74
Setting detail: RECURRING SERIES
Discharge: HOME | End: 2024-11-28
Payer: MEDICARE

## 2024-11-25 PROCEDURE — 97530 THERAPEUTIC ACTIVITIES: CPT

## 2024-11-25 PROCEDURE — 97112 NEUROMUSCULAR REEDUCATION: CPT

## 2024-11-25 NOTE — PROGRESS NOTES
PHYSICAL / OCCUPATIONAL THERAPY - DAILY TREATMENT NOTE    Patient Name: Zamzam Calixto    Date: 2024    : 1950  Insurance: Payor: MEDICARE / Plan: MEDICARE PART A AND B / Product Type: *No Product type* /      Patient  verified Yes     Visit #   Current / Total 2 10   Time   In / Out 10:20 11:05   Pain   In / Out 0 0   Subjective Functional Status/Changes: I finally went up the stairs.  My  was with me  But I did it.  More  cautious going down     TREATMENT AREA =  Other abnormalities of gait and mobility [R26.89]     OBJECTIVE    Therapeutic Procedures:    Tx Min Billable or 1:1 Min (if diff from Tx Min) Procedure, Rationale, Specifics   18 18 70765 Neuromuscular Re-Education (timed):  improve balance, coordination, kinesthetic sense, posture, core stability and proprioception to improve patient's ability to develop conscious control of individual muscles and awareness of position of extremities in order to progress to PLOF and address remaining functional goals. (see flow sheet as applicable)     Details if applicable:        69075 Therapeutic Activity (timed):  use of dynamic activities replicating functional movements to increase ROM, strength, coordination, balance, and proprioception in order to improve patient's ability to progress to PLOF and address remaining functional goals.  (see flow sheet as applicable)     Details if applicable:            Details if applicable:            Details if applicable:            Details if applicable:     45 45 [facility] Totals Reminder: bill using total billable min of TIMED therapeutic procedures (example: do not include dry needle or estim unattended, both untimed codes, in totals to left)  8-22 min = 1 unit; 23-37 min = 2 units; 38-52 min = 3 units; 53-67 min = 4 units; 68-82 min = 5 units   Total Total     [x]  Patient Education billed concurrently with other procedures   [x] Review HEP    [] Progressed/Changed HEP, detail:    [] Other

## 2024-11-27 ENCOUNTER — HOSPITAL ENCOUNTER (OUTPATIENT)
Facility: HOSPITAL | Age: 74
Setting detail: RECURRING SERIES
Discharge: HOME | End: 2024-11-30
Payer: MEDICARE

## 2024-11-27 PROCEDURE — 97116 GAIT TRAINING THERAPY: CPT

## 2024-11-27 PROCEDURE — 97112 NEUROMUSCULAR REEDUCATION: CPT

## 2024-11-27 NOTE — PROGRESS NOTES
PHYSICAL / OCCUPATIONAL THERAPY - DAILY TREATMENT NOTE    Patient Name: Zamzam Calixto    Date: 2024    : 1950  Insurance: Payor: MEDICARE / Plan: MEDICARE PART A AND B / Product Type: *No Product type* /      Patient  verified Yes     Visit #   Current / Total 3 10   Time   In / Out 10:20 am 11:00 am   Pain   In / Out 0/10 010   Subjective Functional Status/Changes: \"I want to work on walking better with the quad cane. I think its a confidence thing.\"     TREATMENT AREA =  Other abnormalities of gait and mobility [R26.89]     OBJECTIVE    Therapeutic Procedures:    Tx Min Billable or 1:1 Min (if diff from Tx Min) Procedure, Rationale, Specifics   32 32 49655 Neuromuscular Re-Education (timed):  improve balance, coordination, kinesthetic sense, posture, core stability and proprioception to improve patient's ability to develop conscious control of individual muscles and awareness of position of extremities in order to progress to PLOF and address remaining functional goals. (see flow sheet as applicable)     Details if applicable:       8 8 11936 Gait Training (timed):    16 feet with SBQC (assistive device) over level surfaces with CG to min  level of assist. Cuing for weight shifting over left LE, pelvic shift, gait sequencing with SBQC.  To improve safety and dynamic movement with household/community ambulation.  (see flow sheet as applicable)     Details if applicable:            Details if applicable:            Details if applicable:            Details if applicable:     40 40 [facility] Totals Reminder: bill using total billable min of TIMED therapeutic procedures (example: do not include dry needle or estim unattended, both untimed codes, in totals to left)  8-22 min = 1 unit; 23-37 min = 2 units; 38-52 min = 3 units; 53-67 min = 4 units; 68-82 min = 5 units   Total Total     [x]  Patient Education billed concurrently with other procedures   [x] Review HEP    [] Progressed/Changed HEP,

## 2024-12-03 ENCOUNTER — HOSPITAL ENCOUNTER (OUTPATIENT)
Facility: HOSPITAL | Age: 74
Setting detail: RECURRING SERIES
Discharge: HOME | End: 2024-12-06
Payer: MEDICARE

## 2024-12-03 PROCEDURE — 97112 NEUROMUSCULAR REEDUCATION: CPT

## 2024-12-03 PROCEDURE — 97530 THERAPEUTIC ACTIVITIES: CPT

## 2024-12-03 NOTE — PROGRESS NOTES
PHYSICAL / OCCUPATIONAL THERAPY - DAILY TREATMENT NOTE    Patient Name: Zamzam Calixto    Date: 12/3/2024    : 1950  Insurance: Payor: MEDICARE / Plan: MEDICARE PART A AND B / Product Type: *No Product type* /      Patient  verified Yes     Visit #   Current / Total 4 10   Time   In / Out 10:23 11:03   Pain   In / Out 0 0   Subjective Functional Status/Changes: My leg was little sore  this morning  but I had been busy over the weekend.     TREATMENT AREA =  Other abnormalities of gait and mobility [R26.89]     OBJECTIVE      Therapeutic Procedures:    Tx Min Billable or 1:1 Min (if diff from Tx Min) Procedure, Rationale, Specifics   25 25 87534 Neuromuscular Re-Education (timed):  improve balance, coordination, kinesthetic sense, posture, core stability and proprioception to improve patient's ability to develop conscious control of individual muscles and awareness of position of extremities in order to progress to PLOF and address remaining functional goals. (see flow sheet as applicable)     Details if applicable:       15 15 56158 Therapeutic Activity (timed):  use of dynamic activities replicating functional movements to increase ROM, strength, coordination, balance, and proprioception in order to improve patient's ability to progress to PLOF and address remaining functional goals.  (see flow sheet as applicable)     Details if applicable:            Details if applicable:            Details if applicable:            Details if applicable:     40 40 [facility] Totals Reminder: bill using total billable min of TIMED therapeutic procedures (example: do not include dry needle or estim unattended, both untimed codes, in totals to left)  8-22 min = 1 unit; 23-37 min = 2 units; 38-52 min = 3 units; 53-67 min = 4 units; 68-82 min = 5 units   Total Total     [x]  Patient Education billed concurrently with other procedures   [x] Review HEP    [] Progressed/Changed HEP, detail:    [] Other detail:       Objective

## 2024-12-05 ENCOUNTER — HOSPITAL ENCOUNTER (OUTPATIENT)
Facility: HOSPITAL | Age: 74
Setting detail: RECURRING SERIES
Discharge: HOME | End: 2024-12-08
Payer: MEDICARE

## 2024-12-05 PROCEDURE — 97112 NEUROMUSCULAR REEDUCATION: CPT

## 2024-12-05 PROCEDURE — 97530 THERAPEUTIC ACTIVITIES: CPT

## 2024-12-05 NOTE — PROGRESS NOTES
cues, and CGA.    Current: ambulated x 16 ft today with step through pattern, SBQC, CG to Aleyda x 1 with decreased weight shift to left LE (11/27/24)  4- Goal: Pt to demonstrate ability to independently ascend/descend 1 step with SPC or less to facilitate independence and safety with curb negotiation in the community.   Status at last note/certification:  pt able to ascend/descend stairs with step to pattern, bilateral UE support, and mild deviations with supervision. Able to negotiate independently with use of walker when in community.   nearby, but no assist.   Progressing:  finally went up flight of stairs in home,with handrail support.  (11/25/2024)   5- Goal: Pt to report LEFS score of 52 or greater to show improved function and quality of life.  Status at last note/certification: LEFS 46%      Next PN/ RC due 12/18/2024   Auth due (visit number/ date)  12/31/2024 or medical necessity.     PLAN  - Continue Plan of Care    Estuardo Montoya PTA    12/5/2024    10:34 AM  If an interpreting service was utilized for treatment of this patient, the contents of this document represent the material reviewed with the patient via the .     Future Appointments   Date Time Provider Department Center   12/10/2024 12:20 PM [facility]   12/12/2024 12:20 PM WHITNEY [facility]TOYAY [facility]   12/17/2024 10:20 AM Berny Heard PTA [facility]   12/19/2024 10:20 AM Berny Heard PTA [facility]   12/24/2024 10:20 AM [facility]UARDO MONTOYA [facility]   12/26/2024 10:20 AM Mary Varghese, PT [facility]   12/31/2024 10:20 AM WHITNEY [facility]TOYAY [facility]   1/2/2025 10:20 AM [facility]

## 2024-12-10 ENCOUNTER — HOSPITAL ENCOUNTER (OUTPATIENT)
Facility: HOSPITAL | Age: 74
Setting detail: RECURRING SERIES
Discharge: HOME | End: 2024-12-13
Payer: MEDICARE

## 2024-12-10 PROCEDURE — 97530 THERAPEUTIC ACTIVITIES: CPT

## 2024-12-10 PROCEDURE — 97112 NEUROMUSCULAR REEDUCATION: CPT

## 2024-12-10 NOTE — PROGRESS NOTES
PHYSICAL / OCCUPATIONAL THERAPY - DAILY TREATMENT NOTE    Patient Name: Zamzam Calixto    Date: 12/10/2024    : 1950  Insurance: Payor: MEDICARE / Plan: MEDICARE PART A AND B / Product Type: *No Product type* /      Patient  verified Yes     Visit #   Current / Total 6 10   Time   In / Out 1:15 1:57   Pain   In / Out Denies pain  0/10   Subjective Functional Status/Changes: \"The prosthesis and the seat cushion fight each other sometimes\"      TREATMENT AREA =  Other abnormalities of gait and mobility [R26.89]   OBJECTIVE    Therapeutic Procedures:    Tx Min Billable or 1:1 Min (if diff from Tx Min) Procedure, Rationale, Specifics   23 23 [id number] Neuromuscular Re-Education (timed):  improve balance, coordination, kinesthetic sense, posture, core stability and proprioception to improve patient's ability to develop conscious control of individual muscles and awareness of position of extremities in order to progress to PLOF and address remaining functional goals. (see flow sheet as applicable)     Details if applicable:  weight shifting with theraband for proprioceptive input on 2nd set, in ll bars, with PT cueing for no hip hike.     15 15 40312 Therapeutic Activity (timed):  use of dynamic activities replicating functional movements to increase ROM, strength, coordination, balance, and proprioception in order to improve patient's ability to progress to PLOF and address remaining functional goals.  (see flow sheet as applicable)     Details if applicable:     4 4  07886 Manual Therapy (timed):  increase ROM and increase tissue extensibility to improve patient's ability to progress to PLOF and address remaining functional goals.  The manual therapy interventions were performed at a separate and distinct time from the therapeutic activities interventions .     3 x45 sec HSS, glute stretch LLE after therex, seated in WC, with C/R from pt.        Details if applicable:     42 42 MC BC Totals Reminder: bill

## 2024-12-12 ENCOUNTER — HOSPITAL ENCOUNTER (OUTPATIENT)
Facility: HOSPITAL | Age: 74
Setting detail: RECURRING SERIES
Discharge: HOME | End: 2024-12-15
Payer: MEDICARE

## 2024-12-12 PROCEDURE — 97112 NEUROMUSCULAR REEDUCATION: CPT

## 2024-12-12 PROCEDURE — 97110 THERAPEUTIC EXERCISES: CPT

## 2024-12-12 NOTE — PROGRESS NOTES
device, fall risk reiterated.   Wall supported and walker supported mini-squats with PT blocking knees from buckling, and passive stretch as well as AAROM stretch of hip flexors in prone and supine with C-leg donned.    PT encouraged daily stretch of hip flexors.    Needed CGA I ll bars for standing hip extension and abduction.     Patient will continue to benefit from skilled PT / OT services to modify and progress therapeutic interventions, analyze and address functional mobility deficits, analyze and address ROM deficits, analyze and address strength deficits, analyze and address soft tissue restrictions, analyze and cue for proper movement patterns, analyze and modify for postural abnormalities, analyze and address imbalance/dizziness, and instruct in home and community integration to address functional deficits and attain remaining goals.    Progress toward goals / Updated goals:  []  See Progress Note/Recertification    1- Goal: Pt to demonstrate independent donning/doffing with prosthetic leg and  to increase independence with ADLs.  Status at last note/certification: Requires assist from  is to lift prosthesis,  but can align and don/doff independently    progressing: performs 70-80% independently  (12/5/2024)    2- Goal: Pt to demonstrate standing tolerance of >15 minutes with equal weightbearing bilaterally and < 1 UE support to increase ease of performance with ADLs.  Status at last note:  progressing - varies, at most 30 minutes of standing (11/19/24)  Current: progressing: today 30 minutes standing activities  (12/10/2024)    3- Goal: Pt to ambulate in clinic for > 200 feet with LRAD and minimal gait deviation to increase ability to participate in recreational activities in the community.  Status at last note/certification: Duration varies  feet with LBQC with step through pattern, with cues, and CGA.    12/12/24 ambulated 50' laps x 2.25 laps in 4 minutes.     4- Goal: Pt to

## 2024-12-17 ENCOUNTER — HOSPITAL ENCOUNTER (OUTPATIENT)
Facility: HOSPITAL | Age: 74
Setting detail: RECURRING SERIES
Discharge: HOME | End: 2024-12-20
Payer: MEDICARE

## 2024-12-17 PROCEDURE — 97110 THERAPEUTIC EXERCISES: CPT

## 2024-12-17 PROCEDURE — 97530 THERAPEUTIC ACTIVITIES: CPT

## 2024-12-17 NOTE — PROGRESS NOTES
[facility] AT [facility]  [address], [place], [place], [address] Phone [phone], fax [phone]  CONTINUED PLAN OF CARE/RECERTIFICATION FOR PHYSICAL THERAPY          Patient Name: Zamzam Calixto : 1950   Treatment/Medical Diagnosis: Other abnormalities of gait and mobility [R26.89]   Onset Date: 2024 - surgical date     Referral Source: Miriam Niño APRN - NP Start of Care (SOC): 2024   Prior Hospitalization: See Medical History Provider #: [id number]   Prior Level of Function: Previously functionally independent, participated in recreational shopping and gardening, Lives with , retired    Comorbidities: Respiratory disorders, Musculoskeletal disorders, Complications related to surgery, and Other: OA, HTN, pulmonary emphysema, chron's, GERD, parathyroid tumor, PVD, bilateral cataracts removal, 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  Status at last note/certification: Requires assist from  is to lift prosthesis,  but can align and don/doff independently   Current:  progressing: performs 70-80% independently, can get sock and sleeve on, still requires  assistance lifting prosthesis. (2024)     2- Goal: Pt to demonstrate standing tolerance of >15 minutes with equal weightbearing bilaterally and < 1 UE support to increase ease of performance with ADLs.  Status at last note:  progressing - varies, at most 30 minutes of standing (24)  Current: progressing: today 30 minutes standing activities  (12/10/2024)     3- Goal: Pt to ambulate in clinic for > 200 feet with LRAD and minimal gait 
measures.  Objectively, Pt demonstrates improved standing/ walking tolerance.  Pt's functional gains include being able to utilize a step through pattern when ambulating with a LBQC, improved standing balance, and greater ease with negotiating stairs. Pt's functional deficits include utilizing a WC/ motorized scooter whenever going community distances, requiring assistance when performing sit to stand/ car transfers.  Pt denies any increases in symptoms during this past week. Pt reports a self-reported improvement rating of 85% since start of care.  Pt would continue to benefit from skilled therapy services to address functional deficits.    Patient will continue to benefit from skilled PT / OT services to modify and progress therapeutic interventions, analyze and address functional mobility deficits, analyze and address ROM deficits, analyze and address strength deficits, analyze and address soft tissue restrictions, analyze and cue for proper movement patterns, and analyze and modify for postural abnormalities to address functional deficits and attain remaining goals.    Progress toward goals / Updated goals:  []  See Progress Note/Recertification    Goals for this certification period include and are to be achieved in   12  weeks:     1- Goal: Pt to improve bilateral LE strength measurements to 4/5, to improve ease with ADLs.  Status at last note/certification: right hip flexion: 5/5, left hip flexion: 4-/5, right hip abduction: 4-/5, left hip abduction: 2+/5.  Current:   2- Goal: Pt to demonstrate independent donning/doffing with prosthetic leg and  to increase independence with ADLs.  Status at last note/certification:  progressing: performs 70-80% independently, can get sock and sleeve on, still requires  assistance lifting prosthesis. (12/16/2024)  Current:    3- Goal: Pt to demonstrate standing tolerance of >15 minutes with equal weightbearing bilaterally and < 1 UE support to increase ease of

## 2024-12-19 ENCOUNTER — HOSPITAL ENCOUNTER (OUTPATIENT)
Facility: HOSPITAL | Age: 74
Setting detail: RECURRING SERIES
Discharge: HOME | End: 2024-12-22
Payer: MEDICARE

## 2024-12-19 PROCEDURE — 97116 GAIT TRAINING THERAPY: CPT

## 2024-12-19 PROCEDURE — 97530 THERAPEUTIC ACTIVITIES: CPT

## 2024-12-19 NOTE — PROGRESS NOTES
PHYSICAL / OCCUPATIONAL THERAPY - DAILY TREATMENT NOTE    Patient Name: Zamzam Calixto    Date: 2024    : 1950  Insurance: Payor: MEDICARE / Plan: MEDICARE PART A AND B / Product Type: *No Product type* /      Patient  verified Yes     Visit #   Current / Total 1 24   Time   In / Out 10:21 10:59   Pain   In / Out 0/10 0/10   Subjective Functional Status/Changes: I can really feel the muscles I worked last time.      TREATMENT AREA =  Other abnormalities of gait and mobility [R26.89]     OBJECTIVE         Therapeutic Procedures:    Tx Min Billable or 1:1 Min (if diff from Tx Min) Procedure, Rationale, Specifics   12 12 33590 Gait Training (timed):    200 feet with parallel bars (assistive device) over flat surfaces with SB level of assist. Cuing for upright posture and decreased left hip circumduction.  To improve safety and dynamic movement with household/community ambulation.  (see flow sheet as applicable)     Details if applicable:  forward ambulation: 4 laps in parallel bars, obstacle negotiation ( 2 pool noodles, 2 1/2 foams on yoga blocks) 2 laps in parallel bars, Side-steps: 2 laps.      26 75 [id number] Therapeutic Activity (timed):  use of dynamic activities replicating functional movements to increase ROM, strength, coordination, balance, and proprioception in order to improve patient's ability to progress to PLOF and address remaining functional goals.  (see flow sheet as applicable)     Details if applicable:  to include standing hip extensions.                   38 38 MC BC Totals Reminder: bill using total billable min of TIMED therapeutic procedures (example: do not include dry needle or estim unattended, both untimed codes, in totals to left)  8-22 min = 1 unit; 23-37 min = 2 units; 38-52 min = 3 units; 53-67 min = 4 units; 68-82 min = 5 units   Total Total     [x]  Patient Education billed concurrently with other procedures   [x] Review HEP    [] Progressed/Changed HEP, detail:    []

## 2024-12-24 ENCOUNTER — HOSPITAL ENCOUNTER (OUTPATIENT)
Facility: HOSPITAL | Age: 74
Setting detail: RECURRING SERIES
Discharge: HOME | End: 2024-12-27
Payer: MEDICARE

## 2024-12-24 PROCEDURE — 97530 THERAPEUTIC ACTIVITIES: CPT

## 2024-12-24 PROCEDURE — 97116 GAIT TRAINING THERAPY: CPT

## 2024-12-24 NOTE — PROGRESS NOTES
today to address Other abnormalities of gait and mobility [R26.89].  Initiated transfers from tall kneel position to standing for progression to floor transfers in upcoming sessions.  Patient tranfers from WC to sitting on reformer.  Transitioned to tall kneel (on tri fold mat)  facing reformer for support. With skilled cues and UE support, she was able to move into 1/2 kneel, push up from right LE and into a standing position. SBA provided,however no therapist assist required.   Added dynamic UE activities in tall knee to facilitate greater hip/glute stability, and resisted lateral hip shifts with GTB. She demonstrated and reported more confidence with new activity.   Will progress to floor transfers as able in upcoming sessions    Patient will continue to benefit from skilled PT / OT services to modify and progress therapeutic interventions, analyze and address functional mobility deficits, analyze and address ROM deficits, analyze and address strength deficits, analyze and address soft tissue restrictions, analyze and cue for proper movement patterns, analyze and modify for postural abnormalities, analyze and address imbalance/dizziness, and instruct in home and community integration to address functional deficits and attain remaining goals.    Progress toward goals / Updated goals:  []  See Progress Note/Recertification    1- Goal: Pt to improve bilateral LE strength measurements to 4/5, to improve ease with ADLs.  Status at last note/certification: New goal - right hip flexion: 5/5, left hip flexion: 4-/5, right hip abduction: 4-/5, left hip abduction: 2+/5.  Current:   2- Goal: Pt to demonstrate independent donning/doffing with prosthetic leg and  to increase independence with ADLs.  Status at last note/certification:  progressing: performs 70-80% independently, can get sock and sleeve on, still requires  assistance lifting prosthesis. (12/16/2024)  Current:    3- Goal: Pt to demonstrate

## 2024-12-26 ENCOUNTER — APPOINTMENT (OUTPATIENT)
Facility: HOSPITAL | Age: 74
End: 2024-12-26
Payer: MEDICARE

## 2024-12-31 ENCOUNTER — APPOINTMENT (OUTPATIENT)
Facility: HOSPITAL | Age: 74
End: 2024-12-31
Payer: MEDICARE

## 2025-01-02 ENCOUNTER — HOSPITAL ENCOUNTER (OUTPATIENT)
Facility: HOSPITAL | Age: 75
Setting detail: RECURRING SERIES
Discharge: HOME | End: 2025-01-05
Payer: MEDICARE

## 2025-01-02 PROCEDURE — 97530 THERAPEUTIC ACTIVITIES: CPT

## 2025-01-02 PROCEDURE — 97110 THERAPEUTIC EXERCISES: CPT

## 2025-01-02 PROCEDURE — 97112 NEUROMUSCULAR REEDUCATION: CPT

## 2025-01-02 NOTE — PROGRESS NOTES
= 3 units; 53-67 min = 4 units; 68-82 min = 5 units   Total Total     [x]  Patient Education billed concurrently with other procedures   [x] Review HEP    [] Progressed/Changed HEP, detail:    [] Other detail:       Objective Information/Functional Measures/Assessment    Pt reported no pain prior to today's session. Pt declined gait training with SPC due to feelings of increased instability - trialed walking in parallel bars where observable left hip hike and increased left knee flexion were demonstrated. Therapist educated pt on probable gait deviations that could occur due to inaccurate fitting of orthotic and encouraged to reach out to orthotist for proper adjustment. Mini squats performed with therapist facilitation of left knee extension and max verbal cues for proper equal weight placement. Hip hinges performed at table with max verbal cues for equal weight bearing - pt reported only able to maintain left knee extension if hip was abducted away from body.     Patient will continue to benefit from skilled PT / OT services to modify and progress therapeutic interventions, analyze and address functional mobility deficits, analyze and address ROM deficits, analyze and address strength deficits, analyze and address soft tissue restrictions, analyze and cue for proper movement patterns, analyze and modify for postural abnormalities, analyze and address imbalance/dizziness, and instruct in home and community integration to address functional deficits and attain remaining goals.     Progress toward goals / Updated goals:  []  See Progress Note/Recertification     1- Goal: Pt to improve bilateral LE strength measurements to 4/5, to improve ease with ADLs.  Status at last note/certification: New goal - right hip flexion: 5/5, left hip flexion: 4-/5, right hip abduction: 4-/5, left hip abduction: 2+/5.  Current:   2- Goal: Pt to demonstrate independent donning/doffing with prosthetic leg and  to increase

## 2025-01-07 ENCOUNTER — APPOINTMENT (OUTPATIENT)
Facility: HOSPITAL | Age: 75
End: 2025-01-07
Payer: MEDICARE

## 2025-01-09 ENCOUNTER — HOSPITAL ENCOUNTER (OUTPATIENT)
Facility: HOSPITAL | Age: 75
Setting detail: RECURRING SERIES
End: 2025-01-09
Payer: MEDICARE

## 2025-01-14 ENCOUNTER — HOSPITAL ENCOUNTER (OUTPATIENT)
Facility: HOSPITAL | Age: 75
Setting detail: RECURRING SERIES
Discharge: HOME | End: 2025-01-17
Payer: MEDICARE

## 2025-01-14 PROCEDURE — 97535 SELF CARE MNGMENT TRAINING: CPT

## 2025-01-14 PROCEDURE — 97530 THERAPEUTIC ACTIVITIES: CPT

## 2025-01-14 NOTE — PROGRESS NOTES
Physical Therapy Discharge Instructions      [facility]  [address] [place] [address] [phone] fax      Patient: Zamzam Calixto  : 1950      Continue Home Exercise Program 1-2 times per day for 6 weeks, then decrease to 4 times per week      Continue with    [] Ice  as needed  times per day     [] Heat           Follow up with MD:     [] Upon completion of therapy     [x] As needed      Recommendations:     [x]   Return to activity with home program    []   Return to activity with the following modifications:       []Post Rehab Program    []Join Independent aquatic program     []Return to/join local gym        Additional Comments: set goals weekly to achieve personal goals      Chelsey Montoya PTA 2025 11:20 AM    If an interpreting service was utilized for treatment of this patient, the contents of this document represent the material reviewed with the patient via the .  
the home - reports feelings of instability with SPC (1/2/25)  5- Goal: Pt to demonstrate ability to independently ascend/descend 1 step with SPC or less to facilitate independence and safety with curb negotiation in the community.   Status at last note/certification: not met: : Pt able to ascend/ descend stairs with bilateral UE support with  close by. (12/17/2024)   Current:  not met: still has  for supervision and using bilateral handrails for support  (1/14/2025)  6- Goal: Pt to report LEFS score of 52 or greater to show improved function and quality of life.  Status at last note/certification:  regressing: LEFS: 43%. Pt denies any decline in function. (12/17/2024)  Current:        Next PN/ RC due ***  Auth due (visit number/ date) ***    PLAN  {[facility] PLAN:04726}    Estuardo Montoya PTA    1/14/2025    9:52 AM  If an interpreting service was utilized for treatment of this patient, the contents of this document represent the material reviewed with the patient via the .     Future Appointments   Date Time Provider Department Center   1/16/2025 11:00 AM Berny Heard PTA [facility]   1/21/2025 10:20 AM ESTUARDO MONTOYA [facility]   1/23/2025 10:20 AM ESTUARDO MONTOYA [facility]   1/28/2025 10:20 AM Mary Varghese PT [facility]   1/30/2025 10:20 AM ESTUARDO MONTOYA [facility]       
patient, the contents of this document represent the material reviewed with the patient via the .

## 2025-01-16 ENCOUNTER — APPOINTMENT (OUTPATIENT)
Facility: HOSPITAL | Age: 75
End: 2025-01-16
Payer: MEDICARE

## 2025-01-21 ENCOUNTER — APPOINTMENT (OUTPATIENT)
Facility: HOSPITAL | Age: 75
End: 2025-01-21
Payer: MEDICARE

## 2025-01-23 ENCOUNTER — APPOINTMENT (OUTPATIENT)
Facility: HOSPITAL | Age: 75
End: 2025-01-23
Payer: MEDICARE

## 2025-01-28 ENCOUNTER — APPOINTMENT (OUTPATIENT)
Facility: HOSPITAL | Age: 75
End: 2025-01-28
Payer: MEDICARE

## 2025-01-30 ENCOUNTER — APPOINTMENT (OUTPATIENT)
Facility: HOSPITAL | Age: 75
End: 2025-01-30
Payer: MEDICARE

## (undated) DEVICE — REM POLYHESIVE ADULT PATIENT RETURN ELECTRODE: Brand: VALLEYLAB

## (undated) DEVICE — SUTURE MCRYL SZ 3-0 L27IN ABSRB UD L26MM SH 1/2 CIR Y416H

## (undated) DEVICE — GLOVE SURG SZ 7 L11.33IN FNGR THK9.8MIL STRW LTX POLYMER

## (undated) DEVICE — NEEDLE ANGIO 1 WALL ULT SMOOTH 19GAX7CM MERIT AVANCE

## (undated) DEVICE — FOGARTY ARTERIAL EMBOLECTOMY CATHETER 3F 80CM: Brand: FOGARTY

## (undated) DEVICE — DRESSING,GAUZE,XEROFORM,CURAD,1"X8",ST: Brand: CURAD

## (undated) DEVICE — ROTATING SURGICAL PUNCHES, 1 PER POUCH: Brand: A&E MEDICAL / ROTATING SURGICAL PUNCHES

## (undated) DEVICE — THREE-QUARTER SHEET: Brand: CONVERTORS

## (undated) DEVICE — BLADE ES L2.75IN ELASTOMERIC COAT DURABLE BEND UPTO 90DEG

## (undated) DEVICE — 1LYRTR 16FR10ML100%SILI UMSNAP: Brand: MEDLINE INDUSTRIES, INC.

## (undated) DEVICE — HANDPIECE SET WITH COAXIAL HIGH FLOW TIP AND SUCTION TUBE: Brand: INTERPULSE

## (undated) DEVICE — SOLUTION SURG SCRB 8OZ 4% STRENGTH CHG BTL HIBICLN

## (undated) DEVICE — SUTURE PERMA-HAND SZ 2-0 L30IN NONABSORBABLE BLK L26MM SH K833H

## (undated) DEVICE — KERLIX BANDAGE ROLL: Brand: KERLIX

## (undated) DEVICE — INFLATION DEVICE: Brand: ENCORE 26

## (undated) DEVICE — O.R TOWEL, X-RAY DETECTABLE: Brand: DEROYAL

## (undated) DEVICE — PREMIUM DRY TRAY LF: Brand: MEDLINE INDUSTRIES, INC.

## (undated) DEVICE — CULTURETTE SGL EVAC TUBE PALL -- 100/CA

## (undated) DEVICE — CATHETER ANGIO BER2 038 4 FRX65 CM TEMPO AQUA

## (undated) DEVICE — FLEXOR, CHECK-FLO, INTRODUCER ANSEL MODIFICATION - WITH HIGH-FLEX DILATOR AND HYDROPHILIC COATING: Brand: FLEXOR

## (undated) DEVICE — INTENDED FOR TISSUE SEPARATION, AND OTHER PROCEDURES THAT REQUIRE A SHARP SURGICAL BLADE TO PUNCTURE OR CUT.: Brand: BARD-PARKER SAFETY BLADES SIZE 11, STERILE

## (undated) DEVICE — APPLICATOR MEDICATED 26 CC SOLUTION HI LT ORNG CHLORAPREP

## (undated) DEVICE — GLOVE SURG SZ 7.5 L11.73IN FNGR THK9.8MIL STRW LTX POLYMER

## (undated) DEVICE — DRAPE,EXTREMITY,89X128,STERILE: Brand: MEDLINE

## (undated) DEVICE — SHEET, DRAPE, SPLIT, STERILE: Brand: MEDLINE

## (undated) DEVICE — BLADE ASSEMB CLP HAIR FINE --

## (undated) DEVICE — SUTURE PROL SZ 5-0 L36IN NONABSORBABLE BLU L13MM C-1 3/8 8720H

## (undated) DEVICE — TABLE COVER: Brand: CONVERTORS

## (undated) DEVICE — APPLIER CLP L9.38IN M LIG TI DISP STR RNG HNDL LIGACLP

## (undated) DEVICE — DERMABOND SKIN ADH 0.7ML -- DERMABOND ADVANCED 12/BX

## (undated) DEVICE — SUT PROL 6-0 30IN C1 DA BLU --

## (undated) DEVICE — INTENDED FOR TISSUE SEPARATION, AND OTHER PROCEDURES THAT REQUIRE A SHARP SURGICAL BLADE TO PUNCTURE OR CUT.: Brand: BARD-PARKER SAFETY BLADES SIZE 10, STERILE

## (undated) DEVICE — GUIDEWIRE VASC L180CM DIA0.035IN L15CM STR TIP PTFE S STL

## (undated) DEVICE — CANISTER NEG PRSS 500ML WND THER W/ TBNG NO PRSS RANG W/

## (undated) DEVICE — TOWEL,OR,DSP,ST,WHITE,DLX,XR,4/PK,20PK/C: Brand: MEDLINE

## (undated) DEVICE — INTENDED FOR TISSUE SEPARATION, AND OTHER PROCEDURES THAT REQUIRE A SHARP SURGICAL BLADE TO PUNCTURE OR CUT.: Brand: BARD-PARKER ® CARBON RIB-BACK BLADES

## (undated) DEVICE — FOGARTY ARTERIAL EMBOLECTOMY CATHETER 4F 40CM: Brand: FOGARTY

## (undated) DEVICE — SWAB CULT LIQ STUART AGR AERB MOD IN BRK SGL RAYON TIP PLAS 220099] BECTON DICKINSON MICRO]

## (undated) DEVICE — DRAPE TWL SURG 16X26IN BLU ORB04] ALLCARE INC]

## (undated) DEVICE — PENCIL ES L3M BTTN SWCH S STL HEX LOK BLDE ELECTRD HOLSTER

## (undated) DEVICE — Device

## (undated) DEVICE — SOLUTION SCRB 4OZ 4% CHG CLN BASE FOR PT SKIN ANTISEPSIS

## (undated) DEVICE — BNDG,ELSTC,MATRIX,STRL,6"X5YD,LF,HOOK&LP: Brand: MEDLINE

## (undated) DEVICE — KIT INCIS MGMT 13CM PEEL AND PLC DISPOSABLE PREVENA

## (undated) DEVICE — SUTURE PERMAHAND SZ 0 L30IN NONABSORBABLE BLK SILK BRAID A306H

## (undated) DEVICE — 450 ML BOTTLE OF 0.05% CHLORHEXIDINE GLUCONATE IN 99.95% STERILE WATER FOR IRRIGATION, USP AND APPLICATOR.: Brand: IRRISEPT ANTIMICROBIAL WOUND LAVAGE

## (undated) DEVICE — RADIFOCUS TORQUE DEVICE MULTI-TORQUE VISE: Brand: RADIFOCUS TORQUE DEVICE

## (undated) DEVICE — SUTURE MCRYL SZ 2-0 L36IN ABSRB UD L36MM CT-1 1/2 CIR Y945H

## (undated) DEVICE — GAUZE,SPONGE,4"X4",16PLY,STRL,LF,10/TRAY: Brand: MEDLINE

## (undated) DEVICE — SUTURE ABSORBABLE BRAIDED 2-0 CT-1 27 IN UD VICRYL J259H

## (undated) DEVICE — SUTURE PERMA-HAND SZ 3-0 L24IN NONABSORBABLE BLK W/O NDL SA74H

## (undated) DEVICE — TRAY CATH OD16FR SIL URIN M STATLOK STBL DEV SURSTP

## (undated) DEVICE — SUTURE PROL SZ 7-0 L30IN NONABSORBABLE BLU L9.3MM BV-1 1/2 8703H

## (undated) DEVICE — SUTURE PERMAHAND SZ 4-0 L12X30IN NONABSORBABLE BLK SILK A303H

## (undated) DEVICE — RADIFOCUS GLIDEWIRE ADVANTAGE GUIDEWIRE: Brand: GLIDEWIRE ADVANTAGE

## (undated) DEVICE — STOCKINETTE,IMPERVIOUS,12X48,STERILE: Brand: MEDLINE

## (undated) DEVICE — KIT CLN UP BON SECOURS MARYV

## (undated) DEVICE — SOLUTION IV 1000ML 0.9% SOD CHL

## (undated) DEVICE — SUTURE PERMAHAND SZ 2-0 L30IN NONABSORBABLE BLK SILK W/O A305H

## (undated) DEVICE — TRAY PREP DRY SKIN W/ 5 COMPARTMENT W/ REM BSIN AND FCPS

## (undated) DEVICE — BANDAGE,GAUZE,BULKEE II,4.5"X4.1YD,STRL: Brand: MEDLINE

## (undated) DEVICE — DEVICE STBL AD TRICOT ANCHR PD FOR 3 W F CATH STATLOK

## (undated) DEVICE — CURITY NON-ADHERENT STRIPS: Brand: CURITY

## (undated) DEVICE — TRAY PREP DRY W/ PREM GLV 2 APPL 6 SPNG 2 UNDPD 1 OVERWRAP

## (undated) DEVICE — GUIDEWIRE VASC L260CM DIA0.035IN TIP L5CM PTFE COAT S STL

## (undated) DEVICE — SYSTEM INF CTRL

## (undated) DEVICE — SUTURE GOR TX SZ 5-0 L30IN NONABSORBABLE L12MM TTC-12 3/8 6M10A

## (undated) DEVICE — INTENDED FOR TISSUE SEPARATION, AND OTHER PROCEDURES THAT REQUIRE A SHARP SURGICAL BLADE TO PUNCTURE OR CUT.: Brand: BARD-PARKER SAFETY BLADES SIZE 15, STERILE

## (undated) DEVICE — BLADE SCALP SURG BARD-PARK 10 --

## (undated) DEVICE — PROBE VASC 8MHZ WTRPRF

## (undated) DEVICE — SYRINGE, LUER LOCK, 3ML: Brand: MEDLINE

## (undated) DEVICE — DECANTER BAG 9": Brand: MEDLINE INDUSTRIES, INC.

## (undated) DEVICE — TAPE ADH W1INXL10YD CLTH SILK H2O RESIST CURAD

## (undated) DEVICE — 2108 SERIES SAGITTAL BLADE (24.8 X 1.24 X 80.1MM)

## (undated) DEVICE — GUIDEWIRE WITH ICE™ HYDROPHILIC COATING: Brand: V-18™ CONTROL WIRE™

## (undated) DEVICE — PACK PROCEDURE SURG MAJ W/ BASIN LF

## (undated) DEVICE — SOL IRR SOD CL 0.9% 1000ML BTL --

## (undated) DEVICE — 3M™ IOBAN™ 2 ANTIMICROBIAL INCISE DRAPE 6651EZ: Brand: IOBAN™ 2

## (undated) DEVICE — CONTAINER SPEC ANAERB VACTNR

## (undated) DEVICE — PTA BALLOON DILATATION CATHETER: Brand: MUSTANG™

## (undated) DEVICE — SPONGE LAP 18X18IN STRL -- 5/PK

## (undated) DEVICE — Device: Brand: QUICK-CROSS SUPPORT CATHETER

## (undated) DEVICE — SUTURE NONABSORBABLE MONOFILAMENT 6-0 C-1 1X30 IN PROLENE 8706H

## (undated) DEVICE — DRESSING NEG PRSS 13CM PREVENA

## (undated) DEVICE — SUTURE MCRYL SZ 4-0 L18IN ABSRB UD L19MM PS-2 3/8 CIR PRIM Y496G

## (undated) DEVICE — INTRODUCER SHTH 8FR CANN L11CM DIL TIP 35MM BLU TUNGSTEN

## (undated) DEVICE — INTENDED FOR TISSUE SEPARATION, AND OTHER PROCEDURES THAT REQUIRE A SHARP SURGICAL BLADE TO PUNCTURE OR CUT.: Brand: BARD-PARKER ® STAINLESS STEEL BLADES

## (undated) DEVICE — AGENT HEMOSTATIC SURG ORIGINAL ABS 4X8IN LOOSE KNIT 12/BX

## (undated) DEVICE — 3M™ IOBAN™ 2 ANTIMICROBIAL INCISE DRAPE 6640EZ: Brand: IOBAN™ 2

## (undated) DEVICE — CATHETER ANGIO BER2 038 AD 4 FRX100 CM TEMPO AQUA

## (undated) DEVICE — ELECTRODE PT RET AD L9FT HI MOIST COND ADH HYDRGEL CORDED

## (undated) DEVICE — FOGARTY ARTERIAL EMBOLECTOMY CATHETER 3F 40CM: Brand: FOGARTY

## (undated) DEVICE — HEX-LOCKING BLADE ELECTRODE: Brand: EDGE

## (undated) DEVICE — DRESSING L SLV V.A.C. GRANUFOAM

## (undated) DEVICE — (D)PREP SKN CHLRAPRP APPL 26ML -- CONVERT TO ITEM 371833

## (undated) DEVICE — INTENDED TO BE USED TO OCCLUDE, RETRACT AND IDENTIFY ARTERIES, VEINS, TENDONS AND NERVES IN SURGICAL PROCEDURES: Brand: STERION®  VESSEL LOOP

## (undated) DEVICE — INTRODUCER SHTH 6FR CANN L11CM DIL TIP 35MM GRN TUNGSTEN

## (undated) DEVICE — PTA BALLOON DILATATION CATHETER: Brand: STERLING® SL

## (undated) DEVICE — HANDPIECE SET WITH HIGH FLOW TIP AND SUCTION TUBE: Brand: INTERPULSE

## (undated) DEVICE — LIQUIBAND RAPID ADHESIVE 36/CS 0.8ML: Brand: MEDLINE

## (undated) DEVICE — SUTURE PERMAHAND SZ 2-0 L18IN NONABSORBABLE BLK L26MM PS 1588H

## (undated) DEVICE — 3M™ STERI-STRIP™ COMPOUND BENZOIN TINCTURE 40 BAGS/CARTON 4 CARTONS/CASE C1544: Brand: 3M™ STERI-STRIP™

## (undated) DEVICE — SUTURE MCRYL SZ 2-0 L27IN ABSRB UD SH L26MM TAPERPOINT NDL Y417H

## (undated) DEVICE — SLIM BODY SKIN STAPLER: Brand: APPOSE ULC

## (undated) DEVICE — FOGARTY ARTERIAL EMBOLECTOMY CATHETER 4F 80CM: Brand: FOGARTY

## (undated) DEVICE — DRAPE,TOP,102X53,STERILE: Brand: MEDLINE

## (undated) DEVICE — SUTURE ETHLN SZ 2-0 L18IN NONABSORBABLE BLK L26MM PS 3/8 585H

## (undated) DEVICE — INTRODUCER SHTH 4FR CANN L11CM DIL TIP 25MM RED TUNGSTEN

## (undated) DEVICE — GAUZE SPONGES,16 PLY: Brand: CURITY

## (undated) DEVICE — 3M(TM) MEDIPORE(TM) +PAD SOFT CLOTH ADHESIVE WOUND DRESSING 3566: Brand: 3M™ MEDIPORE™

## (undated) DEVICE — TRAY,URINE METER,100% SILICONE,16FR10ML: Brand: MEDLINE

## (undated) DEVICE — KIT OR TURNOVER

## (undated) DEVICE — COVER LT HNDL FLX

## (undated) DEVICE — PREP SKN CHLRAPRP 26ML TNT -- CONVERT TO ITEM 373320

## (undated) DEVICE — 3M(TM) MEDIPORE(TM) +PAD SOFT CLOTH ADHESIVE WOUND DRESSING 3569: Brand: 3M™ MEDIPORE™

## (undated) DEVICE — DRAPE XR C ARM 41X74IN LF --

## (undated) DEVICE — SKIN PREP TRAY 4 COMPARTM TRAY: Brand: MEDLINE INDUSTRIES, INC.

## (undated) DEVICE — APPLIER CLP L9.375IN APER 2.1MM CLS L3.8MM 20 SM TI CLP

## (undated) DEVICE — GUIDEWIRE VASC STR 5 CM 0.035 INX180 CM MAGIC TORQUE

## (undated) DEVICE — RADIFOCUS GLIDEWIRE: Brand: GLIDEWIRE

## (undated) DEVICE — MEDI-VAC NON-CONDUCTIVE SUCTION TUBING 7MM X 6.1M (20 FT.) L: Brand: CARDINAL HEALTH

## (undated) DEVICE — SUTURE MCRYL SZ 4-0 L27IN ABSRB UD SH L26MM 1/2 CIR Y415H

## (undated) DEVICE — SUTURE PERMA-HAND SZ 2-0 L24IN NONABSORBABLE BLK W/O NDL SA75H

## (undated) DEVICE — INTRO SHTH W/GDWIRE 6FRX11CM -- BRITE TIP - ORDER AS EA

## (undated) DEVICE — SUTURE MCRYL 3-0 L27IN ABSRB VLT SH L26MM 1/2 CIR Y316H

## (undated) DEVICE — SUTURE MCRYL SZ 4 0 L18IN ABSRB VLT PS 1 L24MM 3 8 CIR REV Y682H

## (undated) DEVICE — 3M™ STERI-DRAPE™ U-DRAPE 1015: Brand: STERI-DRAPE™

## (undated) DEVICE — VALVE HEMSTAS 9FR POLYCARB L BOR DBL Y ACCS +

## (undated) DEVICE — BANDAGE COMPR W4INXL5YD WHT BGE POLY COT M E WRP WV HK AND

## (undated) DEVICE — SYRINGE MED 10ML LUERLOCK TIP W/O SFTY DISP

## (undated) DEVICE — PTA BALLOON DILATATION CATHETER: Brand: STERLING™

## (undated) DEVICE — BLADE CLIPPER GEN PURP NS

## (undated) DEVICE — 3M™ IOBAN™ 2 ANTIMICROBIAL INCISE DRAPE 6650EZ: Brand: IOBAN™ 2

## (undated) DEVICE — SOLUTION IV 100ML 0.9% SOD CHL DIL INJ

## (undated) DEVICE — APPLIER CLP AUTO MED 9.75 IN TI SURGCLP SUPER INTLOK 20 DISP

## (undated) DEVICE — 3M™ BAIR PAWS FLEX™ WARMING GOWN, STANDARD, 20 PER CASE 81003: Brand: BAIR PAWS™

## (undated) DEVICE — DRESSING NEG PRSS M W18XH3.3XL12.5CM BLK POLYUR FOAM WND

## (undated) DEVICE — 3M™ MEDIPORE™ H SOFT CLOTH SURGICAL TAPE, 2863, 3 IN X 10 YD, 12/CASE: Brand: 3M™ MEDIPORE™

## (undated) DEVICE — SPONGE HEMOSTAT CELLULS 4X8IN -- SURGICEL

## (undated) DEVICE — PREP SKN CHLRAPRP APL 26ML STR --

## (undated) DEVICE — DRSG GZ OIL EMUL CURAD 3X3 --

## (undated) DEVICE — MAYO STAND COVER: Brand: CONVERTORS

## (undated) DEVICE — PREVENA INCISION MANAGEMENT SYSTEM- PEEL & PLACE DRESSING: Brand: PREVENA™ PEEL & PLACE™

## (undated) DEVICE — SUTURE NONABSORBABLE MONOFILAMENT 5-0 C-1 1X24 IN PROLENE 8725H

## (undated) DEVICE — DRESSING NEG PRSS SM 10X7.5X3.3CM POLYUR FOR WND THER VAC

## (undated) DEVICE — SYS INCISION MANAGEMENT -- PREVENA

## (undated) DEVICE — BANDAGE COMPR SGL LAYERED CLP CLSR ELAS 15FT LEN 6IN W

## (undated) DEVICE — SET HNDPC W COAX FAN SPR TIP AND SUCT TB INTERPULSE

## (undated) DEVICE — FLEX ADVANTAGE 3000CC: Brand: FLEX ADVANTAGE

## (undated) DEVICE — VAC WHITEFOAM DRESSING SMALL FOAM ONLY: Brand: V.A.C.®

## (undated) DEVICE — AGENT HEMSTAT W2XL3IN OXIDIZED REGENERATED CELOS ABSRB

## (undated) DEVICE — GAUZE,SPONGE,8"X4",12PLY,XRAY,STRL,LF: Brand: MEDLINE

## (undated) DEVICE — PAD,ABDOMINAL,8"X10",ST,LF: Brand: MEDLINE

## (undated) DEVICE — SUTURE PERMAHAND SZ 0 L30IN NONABSORBABLE BLK L26MM SH 1/2 K834H

## (undated) DEVICE — ADHESIVE SKN CLSR HI VISC 2-O --

## (undated) DEVICE — SUT SLK 2-0SH 30IN BLK --

## (undated) DEVICE — PREMIUM WET SKIN PREP TRAY CHG: Brand: MEDLINE INDUSTRIES, INC.

## (undated) DEVICE — SUT ETHLN 3-0 18IN PC5 BLK --

## (undated) DEVICE — BNDG,ELSTC,MATRIX,STRL,4"X5YD,LF,HOOK&LP: Brand: MEDLINE

## (undated) DEVICE — COVER US PRB W15XL120CM W/ GEL RUBBERBAND TAPE STRP FLD GEN

## (undated) DEVICE — ADHESIVE SKIN CLSR 0.7ML TOP DERMBND ADV

## (undated) DEVICE — AGENT HEMSTAT 1GM PORCINE GEL ABSRB PWD FOR CONT OOZING

## (undated) DEVICE — KIT MIC INTR PERC 4F 60CM SMTH -- VSI

## (undated) DEVICE — ADHESIVE SKIN CLOSURE 0.7CC TOP MICROBIAL APPL DERMBND ADV

## (undated) DEVICE — KIT MICROINTRODUCER 4FR GWIRE L40CM DIA0.018IN ECHOGENIC NDL

## (undated) DEVICE — BANDAGE COBAN 4 IN COMPR W4INXL5YD FOAM COHESIVE QUIK STK SELF ADH SFT

## (undated) DEVICE — SUTURE MCRYL SZ 5-0 L18IN ABSRB UD L19MM PS-2 3/8 CIR PRIM Y495G

## (undated) DEVICE — BLANKET WRM AD W50XL85.8IN PACU FULL BODY FORC AIR

## (undated) DEVICE — SOLUTION IRRIG 1000ML H2O STRL BLT

## (undated) DEVICE — COVER LT HNDL UNIV PUR FLX DISP 2 PER PK

## (undated) DEVICE — SPONGE LAP W18XL18IN WHT COT 4 PLY FLD STRUNG RADPQ DISP ST 2 PER PACK